# Patient Record
Sex: FEMALE | Race: WHITE | NOT HISPANIC OR LATINO | Employment: OTHER | URBAN - METROPOLITAN AREA
[De-identification: names, ages, dates, MRNs, and addresses within clinical notes are randomized per-mention and may not be internally consistent; named-entity substitution may affect disease eponyms.]

---

## 2017-02-10 DIAGNOSIS — E55.9 VITAMIN D DEFICIENCY: ICD-10-CM

## 2017-02-10 RX ORDER — ERGOCALCIFEROL 1.25 MG/1
CAPSULE ORAL
Qty: 4 CAPSULE | Refills: 0 | Status: SHIPPED | OUTPATIENT
Start: 2017-02-10 | End: 2017-03-20 | Stop reason: SDUPTHER

## 2017-02-20 DIAGNOSIS — IMO0002 DIABETES TYPE 2, UNCONTROLLED: ICD-10-CM

## 2017-02-20 DIAGNOSIS — I10 ESSENTIAL HYPERTENSION: ICD-10-CM

## 2017-02-20 RX ORDER — LISINOPRIL 20 MG/1
TABLET ORAL
Qty: 30 TABLET | Refills: 0 | Status: SHIPPED | OUTPATIENT
Start: 2017-02-20 | End: 2017-04-21 | Stop reason: SDUPTHER

## 2017-02-22 ENCOUNTER — TELEPHONE (OUTPATIENT)
Dept: FAMILY MEDICINE CLINIC | Facility: CLINIC | Age: 52
End: 2017-02-22

## 2017-02-22 RX ORDER — CITALOPRAM 40 MG/1
TABLET ORAL
Qty: 45 TABLET | Refills: 0 | Status: SHIPPED | OUTPATIENT
Start: 2017-02-22 | End: 2017-03-31 | Stop reason: SDUPTHER

## 2017-02-23 NOTE — TELEPHONE ENCOUNTER
Please call/ needs PA.  Received notification that citalopram 40 mg is only allowed for 30 tablets every 30 days.  Please PA for 45 tablets.  Diagnosis anxiety and depression.

## 2017-03-20 DIAGNOSIS — E55.9 VITAMIN D DEFICIENCY: ICD-10-CM

## 2017-03-20 RX ORDER — ERGOCALCIFEROL 1.25 MG/1
CAPSULE ORAL
Qty: 4 CAPSULE | Refills: 0 | Status: SHIPPED | OUTPATIENT
Start: 2017-03-20 | End: 2017-04-21 | Stop reason: SDUPTHER

## 2017-03-31 DIAGNOSIS — F41.1 GENERALIZED ANXIETY DISORDER: ICD-10-CM

## 2017-03-31 RX ORDER — CITALOPRAM 40 MG/1
TABLET ORAL
Qty: 45 TABLET | Refills: 0 | Status: SHIPPED | OUTPATIENT
Start: 2017-03-31 | End: 2017-04-28 | Stop reason: SDUPTHER

## 2017-03-31 RX ORDER — BUSPIRONE HYDROCHLORIDE 10 MG/1
TABLET ORAL
Qty: 60 TABLET | Refills: 0 | Status: SHIPPED | OUTPATIENT
Start: 2017-03-31 | End: 2017-04-28 | Stop reason: SDUPTHER

## 2017-04-21 DIAGNOSIS — I10 ESSENTIAL HYPERTENSION: ICD-10-CM

## 2017-04-21 DIAGNOSIS — E55.9 VITAMIN D DEFICIENCY: ICD-10-CM

## 2017-04-21 DIAGNOSIS — IMO0002 DIABETES TYPE 2, UNCONTROLLED: ICD-10-CM

## 2017-04-21 RX ORDER — ERGOCALCIFEROL 1.25 MG/1
CAPSULE ORAL
Qty: 4 CAPSULE | Refills: 0 | Status: SHIPPED | OUTPATIENT
Start: 2017-04-21 | End: 2017-05-19 | Stop reason: SDUPTHER

## 2017-04-21 RX ORDER — LISINOPRIL 20 MG/1
TABLET ORAL
Qty: 30 TABLET | Refills: 0 | Status: SHIPPED | OUTPATIENT
Start: 2017-04-21 | End: 2017-05-19 | Stop reason: SDUPTHER

## 2017-04-28 DIAGNOSIS — F41.1 GENERALIZED ANXIETY DISORDER: ICD-10-CM

## 2017-04-28 RX ORDER — CITALOPRAM 40 MG/1
TABLET ORAL
Qty: 45 TABLET | Refills: 0 | Status: SHIPPED | OUTPATIENT
Start: 2017-04-28 | End: 2017-08-25 | Stop reason: SDUPTHER

## 2017-04-28 RX ORDER — BUSPIRONE HYDROCHLORIDE 10 MG/1
TABLET ORAL
Qty: 60 TABLET | Refills: 0 | Status: SHIPPED | OUTPATIENT
Start: 2017-04-28 | End: 2017-06-21 | Stop reason: SDUPTHER

## 2017-05-01 ENCOUNTER — TELEPHONE (OUTPATIENT)
Dept: FAMILY MEDICINE CLINIC | Facility: CLINIC | Age: 52
End: 2017-05-01

## 2017-05-08 ENCOUNTER — TRANSITIONAL CARE MANAGEMENT TELEPHONE ENCOUNTER (OUTPATIENT)
Dept: FAMILY MEDICINE CLINIC | Facility: CLINIC | Age: 52
End: 2017-05-08

## 2017-05-12 ENCOUNTER — OFFICE VISIT (OUTPATIENT)
Dept: FAMILY MEDICINE CLINIC | Facility: CLINIC | Age: 52
End: 2017-05-12

## 2017-05-12 VITALS
BODY MASS INDEX: 45.99 KG/M2 | DIASTOLIC BLOOD PRESSURE: 86 MMHG | RESPIRATION RATE: 18 BRPM | HEART RATE: 99 BPM | OXYGEN SATURATION: 98 % | TEMPERATURE: 98 F | HEIGHT: 67 IN | WEIGHT: 293 LBS | SYSTOLIC BLOOD PRESSURE: 130 MMHG

## 2017-05-12 DIAGNOSIS — J44.1 CHRONIC OBSTRUCTIVE PULMONARY DISEASE WITH ACUTE EXACERBATION (HCC): ICD-10-CM

## 2017-05-12 DIAGNOSIS — I26.99 OTHER PULMONARY EMBOLISM WITHOUT ACUTE COR PULMONALE, UNSPECIFIED CHRONICITY (HCC): Primary | ICD-10-CM

## 2017-05-12 DIAGNOSIS — R60.0 LOCALIZED EDEMA: ICD-10-CM

## 2017-05-12 DIAGNOSIS — I10 ESSENTIAL HYPERTENSION: ICD-10-CM

## 2017-05-12 DIAGNOSIS — Z09 HOSPITAL DISCHARGE FOLLOW-UP: ICD-10-CM

## 2017-05-12 PROCEDURE — 99495 TRANSJ CARE MGMT MOD F2F 14D: CPT | Performed by: FAMILY MEDICINE

## 2017-05-12 RX ORDER — FUROSEMIDE 20 MG/1
20 TABLET ORAL DAILY PRN
Qty: 30 TABLET | Refills: 2 | Status: SHIPPED | OUTPATIENT
Start: 2017-05-12 | End: 2017-07-07 | Stop reason: SDUPTHER

## 2017-05-12 RX ORDER — AMLODIPINE BESYLATE 10 MG/1
10 TABLET ORAL DAILY
Qty: 30 TABLET | Refills: 2 | Status: SHIPPED | OUTPATIENT
Start: 2017-05-12 | End: 2017-08-25 | Stop reason: SDUPTHER

## 2017-05-12 RX ORDER — AMLODIPINE BESYLATE 10 MG/1
10 TABLET ORAL DAILY
COMMUNITY
End: 2017-05-12 | Stop reason: SDUPTHER

## 2017-05-19 DIAGNOSIS — IMO0002 DIABETES TYPE 2, UNCONTROLLED: ICD-10-CM

## 2017-05-19 DIAGNOSIS — I10 ESSENTIAL HYPERTENSION: ICD-10-CM

## 2017-05-19 DIAGNOSIS — E55.9 VITAMIN D DEFICIENCY: ICD-10-CM

## 2017-05-19 RX ORDER — ERGOCALCIFEROL 1.25 MG/1
CAPSULE ORAL
Qty: 4 CAPSULE | Refills: 0 | Status: SHIPPED | OUTPATIENT
Start: 2017-05-19 | End: 2017-06-16 | Stop reason: SDUPTHER

## 2017-05-19 RX ORDER — LISINOPRIL 20 MG/1
TABLET ORAL
Qty: 30 TABLET | Refills: 0 | Status: SHIPPED | OUTPATIENT
Start: 2017-05-19 | End: 2017-09-14 | Stop reason: SDUPTHER

## 2017-05-22 ENCOUNTER — TELEPHONE (OUTPATIENT)
Dept: FAMILY MEDICINE CLINIC | Facility: CLINIC | Age: 52
End: 2017-05-22

## 2017-05-26 DIAGNOSIS — F32.A DEPRESSION: ICD-10-CM

## 2017-05-26 RX ORDER — BUPROPION HYDROCHLORIDE 150 MG/1
TABLET, EXTENDED RELEASE ORAL
Qty: 60 TABLET | Refills: 0 | Status: SHIPPED | OUTPATIENT
Start: 2017-05-26 | End: 2017-08-04 | Stop reason: SDUPTHER

## 2017-06-08 ENCOUNTER — TELEPHONE (OUTPATIENT)
Dept: FAMILY MEDICINE CLINIC | Facility: CLINIC | Age: 52
End: 2017-06-08

## 2017-06-16 DIAGNOSIS — E55.9 VITAMIN D DEFICIENCY: ICD-10-CM

## 2017-06-16 DIAGNOSIS — IMO0002 DIABETES TYPE 2, UNCONTROLLED: ICD-10-CM

## 2017-06-16 RX ORDER — ERGOCALCIFEROL 1.25 MG/1
CAPSULE ORAL
Qty: 4 CAPSULE | Refills: 0 | Status: SHIPPED | OUTPATIENT
Start: 2017-06-16 | End: 2017-07-14 | Stop reason: SDUPTHER

## 2017-06-21 DIAGNOSIS — F41.1 GENERALIZED ANXIETY DISORDER: ICD-10-CM

## 2017-06-22 RX ORDER — BUSPIRONE HYDROCHLORIDE 10 MG/1
TABLET ORAL
Qty: 60 TABLET | Refills: 0 | Status: SHIPPED | OUTPATIENT
Start: 2017-06-22 | End: 2017-07-22 | Stop reason: SDUPTHER

## 2017-07-07 DIAGNOSIS — R60.0 LOCALIZED EDEMA: ICD-10-CM

## 2017-07-07 RX ORDER — FUROSEMIDE 20 MG/1
TABLET ORAL
Qty: 30 TABLET | Refills: 0 | Status: SHIPPED | OUTPATIENT
Start: 2017-07-07 | End: 2017-09-22 | Stop reason: SDUPTHER

## 2017-07-14 DIAGNOSIS — IMO0002 DIABETES TYPE 2, UNCONTROLLED: ICD-10-CM

## 2017-07-14 DIAGNOSIS — E55.9 VITAMIN D DEFICIENCY: ICD-10-CM

## 2017-07-14 RX ORDER — ERGOCALCIFEROL 1.25 MG/1
CAPSULE ORAL
Qty: 4 CAPSULE | Refills: 0 | Status: SHIPPED | OUTPATIENT
Start: 2017-07-14 | End: 2017-08-25 | Stop reason: SDUPTHER

## 2017-07-14 RX ORDER — ERGOCALCIFEROL 1.25 MG/1
CAPSULE ORAL
Qty: 4 CAPSULE | Refills: 0 | Status: SHIPPED | OUTPATIENT
Start: 2017-07-14 | End: 2019-06-28

## 2017-07-22 DIAGNOSIS — F41.1 GENERALIZED ANXIETY DISORDER: ICD-10-CM

## 2017-07-24 RX ORDER — BUSPIRONE HYDROCHLORIDE 10 MG/1
TABLET ORAL
Qty: 60 TABLET | Refills: 0 | Status: SHIPPED | OUTPATIENT
Start: 2017-07-24 | End: 2017-08-25 | Stop reason: SDUPTHER

## 2017-08-04 DIAGNOSIS — F32.A DEPRESSION: ICD-10-CM

## 2017-08-04 RX ORDER — BUPROPION HYDROCHLORIDE 150 MG/1
TABLET, EXTENDED RELEASE ORAL
Qty: 60 TABLET | Refills: 0 | Status: SHIPPED | OUTPATIENT
Start: 2017-08-04 | End: 2017-08-28 | Stop reason: SDUPTHER

## 2017-08-11 ENCOUNTER — TELEPHONE (OUTPATIENT)
Dept: FAMILY MEDICINE CLINIC | Facility: CLINIC | Age: 52
End: 2017-08-11

## 2017-08-11 NOTE — TELEPHONE ENCOUNTER
----- Message from Arlene Crabtree sent at 8/11/2017  3:49 PM EDT -----  Contact: ALYSON; PT CALLED  PT ASKING FOR A CALL BACK TODAY AND DOES NOT WANT TO TELL  WHAT IT IS ABOUT-ASKING FOR ZHENG    [phone]

## 2017-08-11 NOTE — TELEPHONE ENCOUNTER
SPOKE WITH PT AND SHE IS ABLE TO TAKE THIS AND I CALLED IN AND SPOKE WITH ADRIANA AT PHARM AND CALLED THIS IN FOR PT

## 2017-08-11 NOTE — TELEPHONE ENCOUNTER
PT CALLING IN ASKING IF YOU CAN CALL HER IN SOMETHING FOR HER NERVES. PT FOUND OUR PT JENNIFER UNRESPONSIVE AND WAS ABLE TO GET AMBULANCE AND THEY WAS ABLE TO BRING HER BACK AFTER PT STARTED CPR AND PT HAD SEVERAL MORE HEART ATTACKS AND WAS PLACED ON [facility] AND JENNIFER PAST AWAY THIS MORNING. PT IS NEEDING SOMETHING FOR HER NERVES AS JENNIFER WAS HER BEST FRIEND AND IS CRYING HYSTERICALLY. PLEASE ADVISE.

## 2017-08-11 NOTE — TELEPHONE ENCOUNTER
Please call, very sorry for her loss.   We can call in Lorazepam 0.5 mg 1 -2 po q 8 hrs as needed for anxiety #15    BUT please confirm if able to take. There is an allergy listed to alprazolam. need to confirm reaction 1st.     bds

## 2017-08-25 DIAGNOSIS — F32.A DEPRESSION: ICD-10-CM

## 2017-08-25 DIAGNOSIS — I10 ESSENTIAL HYPERTENSION: ICD-10-CM

## 2017-08-25 DIAGNOSIS — F41.1 GENERALIZED ANXIETY DISORDER: ICD-10-CM

## 2017-08-25 DIAGNOSIS — IMO0002 DIABETES TYPE 2, UNCONTROLLED: ICD-10-CM

## 2017-08-25 DIAGNOSIS — E55.9 VITAMIN D DEFICIENCY: ICD-10-CM

## 2017-08-25 RX ORDER — CITALOPRAM 40 MG/1
TABLET ORAL
Qty: 45 TABLET | Refills: 0 | Status: SHIPPED | OUTPATIENT
Start: 2017-08-25 | End: 2017-09-22 | Stop reason: SDUPTHER

## 2017-08-28 RX ORDER — ERGOCALCIFEROL 1.25 MG/1
CAPSULE ORAL
Qty: 4 CAPSULE | Refills: 0 | Status: SHIPPED | OUTPATIENT
Start: 2017-08-28 | End: 2017-09-28 | Stop reason: SDUPTHER

## 2017-08-28 RX ORDER — AMLODIPINE BESYLATE 10 MG/1
TABLET ORAL
Qty: 30 TABLET | Refills: 0 | Status: SHIPPED | OUTPATIENT
Start: 2017-08-28 | End: 2018-03-31 | Stop reason: HOSPADM

## 2017-08-28 RX ORDER — BUPROPION HYDROCHLORIDE 150 MG/1
TABLET, EXTENDED RELEASE ORAL
Qty: 60 TABLET | Refills: 0 | Status: SHIPPED | OUTPATIENT
Start: 2017-08-28 | End: 2017-09-25 | Stop reason: SDUPTHER

## 2017-08-28 RX ORDER — BUSPIRONE HYDROCHLORIDE 10 MG/1
TABLET ORAL
Qty: 60 TABLET | Refills: 0 | Status: SHIPPED | OUTPATIENT
Start: 2017-08-28 | End: 2017-09-25 | Stop reason: SDUPTHER

## 2017-09-11 ENCOUNTER — TELEPHONE (OUTPATIENT)
Dept: FAMILY MEDICINE CLINIC | Facility: CLINIC | Age: 52
End: 2017-09-11

## 2017-09-11 RX ORDER — APIXABAN 5 MG/1
TABLET, FILM COATED ORAL
Qty: 60 TABLET | Refills: 0 | Status: SHIPPED | OUTPATIENT
Start: 2017-09-11 | End: 2017-10-10 | Stop reason: SDUPTHER

## 2017-09-11 NOTE — TELEPHONE ENCOUNTER
----- Message from Clarita Carroll sent at 9/11/2017 12:25 PM EDT -----  Contact: DR SHIELDS   PATIENT GOT A CARD IN THE MAIL TO SCHEDULE AN APPOINTMENT. SHE HAS NOT BEEN ABLE TO BECAUSE HER GRANDSON WAS BORN AND HE HAS BEEN IN THE NICU FOR A WHILE AND HAS BEEN THERE WITH HIM SINCE HE WAS BORN. PATIENTS GRANDSON IS SUPPOSED TO GO HOME THIS WEEK AND ONCE HE DOES SHE WILL SCHEDULE AN APPOINTMENT WITH YOU.  [phone]

## 2017-09-13 ENCOUNTER — TELEPHONE (OUTPATIENT)
Dept: FAMILY MEDICINE CLINIC | Facility: CLINIC | Age: 52
End: 2017-09-13

## 2017-09-13 DIAGNOSIS — I10 ESSENTIAL HYPERTENSION: ICD-10-CM

## 2017-09-13 NOTE — TELEPHONE ENCOUNTER
----- Message from Carole Dove sent at 9/13/2017  3:13 PM EDT -----  Contact: ALYSON ARNETT  PATIENT IS NEEDING A REFILL ON lisinopril (PRINIVIL,ZESTRIL) 20 MG tablet  IS OUT AND NEEDING REFILLED ON Thursday WHEN YOU RETURN PLEASE SENT OVER TO [facility] WAS ADVISED THAT YOU WOULD BE IN ON Thursday PATIENT CAN BE REACHED  [phone]

## 2017-09-14 RX ORDER — LISINOPRIL 20 MG/1
20 TABLET ORAL DAILY
Qty: 30 TABLET | Refills: 1 | Status: SHIPPED | OUTPATIENT
Start: 2017-09-14 | End: 2017-10-10 | Stop reason: SDUPTHER

## 2017-09-22 DIAGNOSIS — R60.0 LOCALIZED EDEMA: ICD-10-CM

## 2017-09-22 RX ORDER — FUROSEMIDE 20 MG/1
TABLET ORAL
Qty: 30 TABLET | Refills: 0 | Status: SHIPPED | OUTPATIENT
Start: 2017-09-22 | End: 2017-10-23 | Stop reason: SDUPTHER

## 2017-09-22 RX ORDER — CITALOPRAM 40 MG/1
TABLET ORAL
Qty: 45 TABLET | Refills: 0 | Status: SHIPPED | OUTPATIENT
Start: 2017-09-22 | End: 2017-10-23 | Stop reason: SDUPTHER

## 2017-09-25 DIAGNOSIS — F32.A DEPRESSION: ICD-10-CM

## 2017-09-25 DIAGNOSIS — F41.1 GENERALIZED ANXIETY DISORDER: ICD-10-CM

## 2017-09-25 DIAGNOSIS — IMO0002 DIABETES TYPE 2, UNCONTROLLED: ICD-10-CM

## 2017-09-26 RX ORDER — BUPROPION HYDROCHLORIDE 150 MG/1
TABLET, EXTENDED RELEASE ORAL
Qty: 30 TABLET | Refills: 0 | Status: SHIPPED | OUTPATIENT
Start: 2017-09-26 | End: 2017-10-13 | Stop reason: SDUPTHER

## 2017-09-26 RX ORDER — BUSPIRONE HYDROCHLORIDE 10 MG/1
TABLET ORAL
Qty: 30 TABLET | Refills: 0 | Status: SHIPPED | OUTPATIENT
Start: 2017-09-26 | End: 2017-10-23 | Stop reason: SDUPTHER

## 2017-09-28 ENCOUNTER — OFFICE VISIT (OUTPATIENT)
Dept: FAMILY MEDICINE CLINIC | Facility: CLINIC | Age: 52
End: 2017-09-28

## 2017-09-28 ENCOUNTER — TELEPHONE (OUTPATIENT)
Dept: FAMILY MEDICINE CLINIC | Facility: CLINIC | Age: 52
End: 2017-09-28

## 2017-09-28 VITALS
DIASTOLIC BLOOD PRESSURE: 70 MMHG | SYSTOLIC BLOOD PRESSURE: 108 MMHG | HEIGHT: 67 IN | TEMPERATURE: 96.5 F | BODY MASS INDEX: 45.99 KG/M2 | WEIGHT: 293 LBS | OXYGEN SATURATION: 96 % | HEART RATE: 102 BPM | RESPIRATION RATE: 18 BRPM

## 2017-09-28 DIAGNOSIS — Z12.11 COLON CANCER SCREENING: ICD-10-CM

## 2017-09-28 DIAGNOSIS — F41.9 ANXIETY: ICD-10-CM

## 2017-09-28 DIAGNOSIS — E11.65 UNCONTROLLED TYPE 2 DIABETES MELLITUS WITH HYPERGLYCEMIA, WITHOUT LONG-TERM CURRENT USE OF INSULIN (HCC): Primary | ICD-10-CM

## 2017-09-28 DIAGNOSIS — L85.3 DRY SKIN DERMATITIS: ICD-10-CM

## 2017-09-28 DIAGNOSIS — I26.99 OTHER ACUTE PULMONARY EMBOLISM WITHOUT ACUTE COR PULMONALE (HCC): ICD-10-CM

## 2017-09-28 DIAGNOSIS — Z12.31 ENCOUNTER FOR SCREENING MAMMOGRAM FOR BREAST CANCER: ICD-10-CM

## 2017-09-28 DIAGNOSIS — R91.8 MULTIPLE PULMONARY NODULES: ICD-10-CM

## 2017-09-28 DIAGNOSIS — I10 ESSENTIAL HYPERTENSION: Primary | ICD-10-CM

## 2017-09-28 DIAGNOSIS — J44.9 CHRONIC OBSTRUCTIVE PULMONARY DISEASE, UNSPECIFIED COPD TYPE (HCC): ICD-10-CM

## 2017-09-28 DIAGNOSIS — E11.65 UNCONTROLLED TYPE 2 DIABETES MELLITUS WITH HYPERGLYCEMIA, WITHOUT LONG-TERM CURRENT USE OF INSULIN (HCC): ICD-10-CM

## 2017-09-28 PROCEDURE — 99214 OFFICE O/P EST MOD 30 MIN: CPT | Performed by: FAMILY MEDICINE

## 2017-09-28 RX ORDER — PREDNISONE 10 MG/1
TABLET ORAL
Qty: 30 TABLET | Refills: 0 | Status: SHIPPED | OUTPATIENT
Start: 2017-09-28 | End: 2017-10-24

## 2017-09-28 RX ORDER — AMMONIUM LACTATE 12 G/100G
CREAM TOPICAL 2 TIMES DAILY
Qty: 140 G | Refills: 2 | Status: SHIPPED | OUTPATIENT
Start: 2017-09-28 | End: 2018-01-23 | Stop reason: SDUPTHER

## 2017-09-28 NOTE — TELEPHONE ENCOUNTER
Please call patient.  I failed to ask her when her last eye exam was and if it is been more than 1 year, recommend eye exam.  Medicare should cover a diabetic eye exam given that she has diabetes.    Also, last mammogram?    Last colonoscopy?    If agreeable, please set up for mammogram, colonoscopy screening and diabetic eye exam.

## 2017-09-28 NOTE — PROGRESS NOTES
Assessment/Plan     Problem List Items Addressed This Visit        Cardiovascular and Mediastinum    Essential hypertension - Primary    Relevant Orders    Lipid Panel With / Chol / HDL Ratio       Respiratory    Chronic obstructive pulmonary disease    Relevant Medications    predniSONE (DELTASONE) 10 MG tablet       Endocrine    Uncontrolled type 2 diabetes mellitus with hyperglycemia, without long-term current use of insulin    Relevant Orders    Lipid Panel With / Chol / HDL Ratio    Hemoglobin A1c       Other    Anxiety      Other Visit Diagnoses     Multiple pulmonary nodules        Dry skin dermatitis        Foot Left and Hands    Relevant Medications    ammonium lactate (LAC-HYDRIN) 12 % cream    Other acute pulmonary embolism without acute cor pulmonale               Follow up: Return in about 3 months (around 12/28/2017), or if symptoms worsen or fail to improve.     DISCUSSION  The patient had pulmonary embolism in June and found nodules on CT.  She reports a long-standing history of these nodules.  She will be due for repeat scan in December 2017 and will recheck then.    Diabetes mellitus type 2.  Uncontrolled.  Order for lipid panel and A1c was given.    COPD.  Some increased wheezing.  We will give a taper of prednisone.  Continue inhalers.  Recommend stopping smoking.    Anxiety.  Stable.  Continue medication.    Hypertension.  Blood pressure stable.  Continue current medications.    Dry cracked heels of the skin of the feet.  Try Lac-Hydrin grade May use on hands as well.  Explained that there would be some burning.  Follow-up with podiatry as scheduled.      We will have someone call her to check the status of her colonoscopy, mammogram, and eye exam.    Order for blood work was given and she will get that at the hospital since she is not fasting today and it is hard for her to get here fasting.      MEDICATIONS PRESCRIBED  Requested Prescriptions     Signed Prescriptions Disp Refills   •  ammonium lactate (LAC-HYDRIN) 12 % cream 140 g 2     Sig: Apply  topically 2 (Two) Times a Day.   • predniSONE (DELTASONE) 10 MG tablet 30 tablet 0     Si po daily x 3 days then 3 po daily x 3 days then 2 po daily x 3 days then 1 po daily x 3 days              -------------------------------------------    Subjective     Chief Complaint   Patient presents with   • Hypertension   • Hyperlipidemia   • Diabetes   • Anxiety   • Pain   • Med Refill       Hypertension   This is a chronic problem. The problem is controlled. Associated symptoms include anxiety, malaise/fatigue and shortness of breath (chronic). Pertinent negatives include no chest pain or headaches. Risk factors for coronary artery disease include diabetes mellitus, obesity and dyslipidemia. Past treatments include ACE inhibitors. There are no compliance problems.  Hypertensive end-organ damage includes kidney disease. There is no history of angina, CAD/MI, CVA, heart failure or left ventricular hypertrophy. Identifiable causes of hypertension include chronic renal disease.   Hyperlipidemia   This is a chronic problem. The current episode started more than 1 year ago. Recent lipid tests were reviewed and are variable. Exacerbating diseases include chronic renal disease and obesity. Associated symptoms include shortness of breath (chronic). Pertinent negatives include no chest pain or myalgias. She is currently on no antihyperlipidemic treatment. There are no compliance problems.  Risk factors for coronary artery disease include diabetes mellitus, hypertension and obesity.   Diabetes   She presents for her follow-up diabetic visit. She has type 2 diabetes mellitus. Her disease course has been stable. Hypoglycemia symptoms include nervousness/anxiousness (increased stress with death of friend). Pertinent negatives for hypoglycemia include no headaches. Pertinent negatives for diabetes include no chest pain. There are no hypoglycemic complications.  Symptoms are stable. Diabetic complications include peripheral neuropathy. Pertinent negatives for diabetic complications include no CVA or heart disease. Risk factors for coronary artery disease include diabetes mellitus, dyslipidemia, hypertension, obesity and tobacco exposure. Current diabetic treatment includes oral agent (monotherapy). She is compliant with treatment all of the time. Her weight is stable. She is following a generally healthy diet. (115- 140 now.   ) An ACE inhibitor/angiotensin II receptor blocker is being taken.   Anxiety   Presents for follow-up (on buspar and feels better) visit. Symptoms include nervous/anxious behavior (increased stress with death of friend) and shortness of breath (chronic). Patient reports no chest pain or depressed mood. Symptoms occur most days. The severity of symptoms is mild. The quality of sleep is fair. Nighttime awakenings: occasional.       Pain   This is a chronic problem. The current episode started more than 1 year ago. The problem occurs constantly. Pertinent negatives include no chest pain, headaches or myalgias. Associated symptoms comments: back pain . She has tried oral narcotics (Goes to [facility]) for the symptoms.     Grandson is 4 weeks this saturday    Sandra Fox  and grandrufino was born 4 weeks ago      Had CT in  and had bilateral pulmo noncalcified nodules. Needs repeat CT scan. 6 month , so Dec 2017 per rad report    Had been on antidepressants in the past. Effexor and Lexapro in the past. Effexor was good in the past.   Wellbutrin not helping with the smoking but still trying to quit    Going to podiatrist next month ( end of Oct). + neuropathy    Left foot drug and cracked  using cream (dimethicone and petroleum) and no hep    Had PE in  and on Eliquis now.   In Dec, can check CT PE protocol      Past Medical History,Medications, Allergies, and social history was reviewed.    Review of Systems   Constitutional: Positive for  "malaise/fatigue.   HENT: Negative.    Eyes: Negative.    Respiratory: Positive for shortness of breath (chronic).    Cardiovascular: Negative.  Negative for chest pain.   Gastrointestinal: Negative.    Endocrine: Negative.    Genitourinary: Negative.    Musculoskeletal: Negative.  Negative for myalgias.   Neurological: Negative.  Negative for headaches.   Psychiatric/Behavioral: The patient is nervous/anxious (increased stress with death of friend).        Objective     Vitals:    09/28/17 1140   BP: 108/70   Pulse: 102   Resp: 18   Temp: 96.5 °F (35.8 °C)   TempSrc: Temporal Artery    SpO2: 96%   Weight: (!) 339 lb 8 oz (154 kg)   Height: 66.5\" (168.9 cm)        Physical Exam   Constitutional: She is oriented to person, place, and time. She appears well-developed and well-nourished.   obese   HENT:   Head: Normocephalic and atraumatic.   Right Ear: Hearing, tympanic membrane, external ear and ear canal normal.   Left Ear: Hearing, tympanic membrane, external ear and ear canal normal.   Mouth/Throat: Oropharynx is clear and moist.   Eyes: Conjunctivae and EOM are normal. Pupils are equal, round, and reactive to light.   Neck: Normal range of motion. Neck supple. No thyromegaly present.   Cardiovascular: Normal rate, regular rhythm and normal heart sounds.  Exam reveals no gallop and no friction rub.    No murmur heard.  Pulmonary/Chest: Effort normal. No respiratory distress. She has wheezes (exp). She has no rales.   Abdominal: Soft. Bowel sounds are normal. She exhibits no distension. There is no tenderness.   obese   Musculoskeletal: She exhibits edema (trace).    Dani had a diabetic foot exam performed today.   Monofilament test performed: sensation is decreased in the toes to light touch.    Vascular Status -  Her exam exhibits right foot vasculature normal. Her exam exhibits no right foot edema. Her exam exhibits left foot vasculature normal. Her exam exhibits no left foot edema.   Skin Integrity  -  Her " right foot skin is intact (some dryness).    Dani's left foot skin is not intact. She has left heel dry and cracked. She has no left foot blister..  Neurological: She is alert and oriented to person, place, and time. No cranial nerve deficit.   Skin: Skin is warm and dry.   Psychiatric: She has a normal mood and affect. Her behavior is normal.   Nursing note and vitals reviewed.    left lower ab, 1 cm ulceration. no drainage, no scab              Darrion Tovar MD

## 2017-09-29 NOTE — TELEPHONE ENCOUNTER
SPOKE WITH PT AND ITS BEEN AWHILE SINCE SHE HAS EYE EXAM. THEY WILL COVER EXAM BUT HER PROBLEM IS GLASSES COST AND INFORMED HER A FEW PLACES TO CK OUT TO HELP WITH COST OF THEM. MAMMO HAS BEEN LONGER THAN 2 YRS AND STATES SHE IS SUPPOSE TO HAVE ONE DONE EVERY 9 MONTHS AS HER MOM IS IN REMISSION OF BREAST CANCER. PT HAS NEVER HAD COLONOSCOPY DONE. PT IS OK WITH YOU PLACING ORDERS FOR ALL OF THESE.

## 2017-10-03 ENCOUNTER — TELEPHONE (OUTPATIENT)
Dept: FAMILY MEDICINE CLINIC | Facility: CLINIC | Age: 52
End: 2017-10-03

## 2017-10-03 NOTE — TELEPHONE ENCOUNTER
----- Message from Arleen Crabtree sent at 10/3/2017 12:01 PM EDT -----  Contact: ALYSON;PT CALLED  PT STATES SHE WAS CALLED YESTERDAY-SHE WAS IN THE HOSPITAL  ON Sunday NIGHT-WILL PROBABLY WILL GO BACK TONIGHT BECAUSE SHE  IS HAVING A HARD TIME BREATHING AND LOW BLOOD CELL COUNT-WILL  WILL NOT HAVE A RIDE TO COME INTO OUR OFFICE    PLEASE CALL BACK [phone]  MESSAGE OK

## 2017-10-03 NOTE — TELEPHONE ENCOUNTER
----- Message from Arlene Crabtree sent at 10/3/2017 12:01 PM EDT -----  Contact: ALYSON;PT CALLED  PT STATES SHE WAS CALLED YESTERDAY-SHE WAS IN THE HOSPITAL  ON Sunday NIGHT-WILL PROBABLY WILL GO BACK TONIGHT BECAUSE SHE  IS HAVING A HARD TIME BREATHING AND LOW BLOOD CELL COUNT-WILL  WILL NOT HAVE A RIDE TO COME INTO OUR OFFICE    PLEASE CALL BACK [phone]  MESSAGE OK

## 2017-10-06 DIAGNOSIS — Z12.11 SPECIAL SCREENING FOR MALIGNANT NEOPLASMS, COLON: Primary | ICD-10-CM

## 2017-10-10 DIAGNOSIS — I10 ESSENTIAL HYPERTENSION: ICD-10-CM

## 2017-10-10 RX ORDER — LISINOPRIL 20 MG/1
TABLET ORAL
Qty: 30 TABLET | Refills: 2 | Status: SHIPPED | OUTPATIENT
Start: 2017-10-10 | End: 2018-01-17 | Stop reason: SDUPTHER

## 2017-10-10 RX ORDER — APIXABAN 5 MG/1
TABLET, FILM COATED ORAL
Qty: 60 TABLET | Refills: 2 | Status: SHIPPED | OUTPATIENT
Start: 2017-10-10 | End: 2017-12-05 | Stop reason: SDUPTHER

## 2017-10-13 ENCOUNTER — OFFICE VISIT (OUTPATIENT)
Dept: FAMILY MEDICINE CLINIC | Facility: CLINIC | Age: 52
End: 2017-10-13

## 2017-10-13 VITALS
WEIGHT: 293 LBS | HEART RATE: 96 BPM | TEMPERATURE: 97 F | OXYGEN SATURATION: 96 % | RESPIRATION RATE: 20 BRPM | HEIGHT: 67 IN | DIASTOLIC BLOOD PRESSURE: 64 MMHG | BODY MASS INDEX: 45.99 KG/M2 | SYSTOLIC BLOOD PRESSURE: 130 MMHG

## 2017-10-13 DIAGNOSIS — S90.851A FOREIGN BODY IN RIGHT FOOT, INITIAL ENCOUNTER: ICD-10-CM

## 2017-10-13 DIAGNOSIS — L03.116 CELLULITIS OF LEFT LOWER EXTREMITY: Primary | ICD-10-CM

## 2017-10-13 PROCEDURE — 99213 OFFICE O/P EST LOW 20 MIN: CPT | Performed by: FAMILY MEDICINE

## 2017-10-13 RX ORDER — BUPROPION HYDROCHLORIDE 150 MG/1
150 TABLET, EXTENDED RELEASE ORAL 2 TIMES DAILY
Qty: 60 TABLET | Refills: 5 | Status: SHIPPED | OUTPATIENT
Start: 2017-10-13 | End: 2017-11-07

## 2017-10-13 RX ORDER — DOXYCYCLINE HYCLATE 100 MG/1
100 CAPSULE ORAL 2 TIMES DAILY
Qty: 14 CAPSULE | Refills: 0 | Status: SHIPPED | OUTPATIENT
Start: 2017-10-13 | End: 2017-10-24

## 2017-10-13 NOTE — PROGRESS NOTES
Assessment/Plan     Problem List Items Addressed This Visit     None      Visit Diagnoses     Cellulitis of left lower extremity    -  Primary    Relevant Medications    doxycycline (VIBRAMYCIN) 100 MG capsule    Foreign body in right foot, initial encounter        Possible glass. Pt to call her podiatrist. There x 1 month           Follow up: Return if symptoms worsen or fail to improve.     DISCUSSION  Change to doxycycline for cellulitis. Side effects explained. Call if not improving or sooner if worsening or to emergency room if greatly worsens this weekend.    She will need to see podiatry for evaluation of possible glass in her foot.  She agrees to call.    Reviewed labs , A1c better and lipids good.       MEDICATIONS PRESCRIBED  Requested Prescriptions     Signed Prescriptions Disp Refills   • buPROPion SR (WELLBUTRIN SR) 150 MG 12 hr tablet 60 tablet 5     Sig: Take 1 tablet by mouth 2 (Two) Times a Day.   • doxycycline (VIBRAMYCIN) 100 MG capsule 14 capsule 0     Sig: Take 1 capsule by mouth 2 (Two) Times a Day.        -------------------------------------------    Subjective     Chief Complaint   Patient presents with   • Cellulitis     L leg redness, swelling, was seen at hospital for cellulitis, R foot has glass in it.       Lower Extremity Issue   This is a new problem. The current episode started 1 to 4 weeks ago (redness on left leg 2 weeks ago. Went to ER and dx cellulitis, rec'd rocehphin and cefdinir. Improving. Redness was worse and slowing getting better. ). The problem occurs constantly. The problem has been gradually improving (just not completly better. ). Associated symptoms include arthralgias. Pertinent negatives include no fever. Nothing aggravates the symptoms. Treatments tried: omnicef. The treatment provided mild relief.     Stepped on glass one month ago and small piece still there    Past Medical History,Medications, Allergies, and social history was reviewed.    Review of Systems  "  Constitutional: Negative.  Negative for fever.   HENT: Negative.    Respiratory: Negative.    Cardiovascular: Negative.    Musculoskeletal: Positive for arthralgias.   Psychiatric/Behavioral: Negative.        Objective     Vitals:    10/13/17 1423   BP: 130/64   Pulse: 96   Resp: 20   Temp: 97 °F (36.1 °C)   TempSrc: Temporal Artery    SpO2: 96%   Weight: (!) 335 lb (152 kg)   Height: 66.5\" (168.9 cm)        Physical Exam   Constitutional: She is oriented to person, place, and time. She appears well-developed and well-nourished.   HENT:   Head: Normocephalic and atraumatic.   Right Ear: Hearing and external ear normal.   Left Ear: Hearing and external ear normal.   Eyes: Conjunctivae and EOM are normal. Pupils are equal, round, and reactive to light.   Musculoskeletal:   left lateral lower leg. + mild redness, sl warm. sl tender.    Neurological: She is alert and oriented to person, place, and time.   Skin: Skin is warm.   Psychiatric: She has a normal mood and affect. Her behavior is normal.   Nursing note and vitals reviewed.              Darrion Tovra MD    "

## 2017-10-21 DIAGNOSIS — F41.1 GENERALIZED ANXIETY DISORDER: ICD-10-CM

## 2017-10-23 DIAGNOSIS — IMO0002 DIABETES TYPE 2, UNCONTROLLED: ICD-10-CM

## 2017-10-23 DIAGNOSIS — R60.0 LOCALIZED EDEMA: ICD-10-CM

## 2017-10-23 RX ORDER — FUROSEMIDE 20 MG/1
TABLET ORAL
Qty: 30 TABLET | Refills: 0 | Status: SHIPPED | OUTPATIENT
Start: 2017-10-23 | End: 2017-11-20 | Stop reason: SDUPTHER

## 2017-10-23 RX ORDER — CITALOPRAM 40 MG/1
TABLET ORAL
Qty: 45 TABLET | Refills: 0 | Status: SHIPPED | OUTPATIENT
Start: 2017-10-23 | End: 2017-11-20 | Stop reason: SDUPTHER

## 2017-10-23 RX ORDER — BUSPIRONE HYDROCHLORIDE 10 MG/1
TABLET ORAL
Qty: 60 TABLET | Refills: 2 | Status: SHIPPED | OUTPATIENT
Start: 2017-10-23 | End: 2018-01-27 | Stop reason: SDUPTHER

## 2017-10-23 NOTE — TELEPHONE ENCOUNTER
Please call.  Given she is on multiple medications, we need to confirm with her exactly which medications that she needs so we do not miss anything.  We only received a request from the pharmacy for metformin.  Please call and see which medication she needs specifically.

## 2017-10-23 NOTE — TELEPHONE ENCOUNTER
----- Message from Clarita Carroll sent at 10/23/2017 12:53 PM EDT -----  Contact: DR SHIELDS  PATIENT NEEDS A REFILL ON ALL HER MEDS SENT INTO [facility]. PATIENT WAS JUST SEEN LAST WEEK SO SHE WANTS THEM REFILLED FOR MORE THEN 2 WEEKS.  [phone]

## 2017-10-24 ENCOUNTER — OFFICE VISIT (OUTPATIENT)
Dept: FAMILY MEDICINE CLINIC | Facility: CLINIC | Age: 52
End: 2017-10-24

## 2017-10-24 VITALS
HEIGHT: 67 IN | OXYGEN SATURATION: 93 % | SYSTOLIC BLOOD PRESSURE: 122 MMHG | HEART RATE: 97 BPM | WEIGHT: 293 LBS | TEMPERATURE: 97.9 F | BODY MASS INDEX: 45.99 KG/M2 | RESPIRATION RATE: 18 BRPM | DIASTOLIC BLOOD PRESSURE: 84 MMHG

## 2017-10-24 DIAGNOSIS — E11.65 UNCONTROLLED TYPE 2 DIABETES MELLITUS WITH HYPERGLYCEMIA, WITHOUT LONG-TERM CURRENT USE OF INSULIN (HCC): ICD-10-CM

## 2017-10-24 DIAGNOSIS — L03.116 CELLULITIS OF LEFT LOWER EXTREMITY: Primary | ICD-10-CM

## 2017-10-24 PROCEDURE — 99213 OFFICE O/P EST LOW 20 MIN: CPT | Performed by: FAMILY MEDICINE

## 2017-10-24 RX ORDER — AMOXICILLIN AND CLAVULANATE POTASSIUM 875; 125 MG/1; MG/1
1 TABLET, FILM COATED ORAL 2 TIMES DAILY
Qty: 20 TABLET | Refills: 0 | Status: SHIPPED | OUTPATIENT
Start: 2017-10-24 | End: 2018-01-02

## 2017-10-24 NOTE — PROGRESS NOTES
Assessment/Plan     Problem List Items Addressed This Visit        Endocrine    Uncontrolled type 2 diabetes mellitus with hyperglycemia, without long-term current use of insulin    Relevant Medications    metFORMIN (GLUCOPHAGE) 1000 MG tablet      Other Visit Diagnoses     Cellulitis of left lower extremity    -  Primary    Relevant Medications    amoxicillin-clavulanate (AUGMENTIN) 875-125 MG per tablet           Follow up: Return if symptoms worsen or fail to improve.     DISCUSSION  Cellulitis.  Recurrent.  Consider Levaquin but does interact with citalopram.  We will try Augmentin as noted.  Side effects explained.  Miconazole if develops yeast infection.  Keep clean.  Call if not improving.    Consider knee injection in the future once infection is cleared up but I did explain to her that there was risk given that she is on Eliquis.  She has osteoarthritis of the knee.  Right.    Refilled metformin.      MEDICATIONS PRESCRIBED  Requested Prescriptions     Signed Prescriptions Disp Refills   • metFORMIN (GLUCOPHAGE) 1000 MG tablet 60 tablet 5     Sig: Take 1 tablet by mouth 2 (Two) Times a Day With Meals.   • amoxicillin-clavulanate (AUGMENTIN) 875-125 MG per tablet 20 tablet 0     Sig: Take 1 tablet by mouth 2 (Two) Times a Day.   • miconazole (MICOTIN) 100 MG vaginal suppository 7 suppository 1     Sig: Insert 1 suppository into the vagina Every Night. if needed for yeast infection          -------------------------------------------    Subjective     Chief Complaint   Patient presents with   • Hypertension     follow up bc pt rec'd a rx with needing appt on it so she came in and pt was upset as she hasn't rec'd all of her meds.    • Diabetes   • Med Refill   • cellulitis lf left lower extremity       HPI    Left leg cellulitis  + redness still  Had gotten better and then flared up again  No fever  + itch and warm and red  + tender  Does not feel bad.      Right knee pain  +arthritis   ? gets a shot  Was at  "[facility] and increased pain and had to stop the treadmill  Unable to do shot since active cellulitis  On Eliquis    Needs refill of metformin for diabetes.      Past Medical History,Medications, Allergies, and social history was reviewed.    Review of Systems   Constitutional: Negative.    HENT: Negative.    Respiratory: Positive for shortness of breath (improving).    Cardiovascular: Negative.    Musculoskeletal: Positive for arthralgias and back pain.   Skin:        see hpi       Objective     Vitals:    10/24/17 1648   BP: 122/84   Pulse: 97   Resp: 18   Temp: 97.9 °F (36.6 °C)   TempSrc: Temporal Artery    SpO2: 93%   Weight: (!) 343 lb (156 kg)   Height: 66.5\" (168.9 cm)        Physical Exam   Constitutional: She is oriented to person, place, and time. She appears well-developed and well-nourished.   HENT:   Head: Normocephalic and atraumatic.   Right Ear: Hearing and external ear normal.   Left Ear: Hearing and external ear normal.   Eyes: Conjunctivae and EOM are normal. Pupils are equal, round, and reactive to light.   Musculoskeletal:   Still some redness and warmth of the lower extremity.  He was to be more patchy now.  Slightly tender.  No calf tenderness.  Redness is more around the ankle and anterior shin.   Neurological: She is alert and oriented to person, place, and time.   Skin: Skin is warm.   Psychiatric: She has a normal mood and affect. Her behavior is normal.   Nursing note and vitals reviewed.              Darrion Tovar MD    "

## 2017-11-06 ENCOUNTER — OFFICE VISIT (OUTPATIENT)
Dept: FAMILY MEDICINE CLINIC | Facility: CLINIC | Age: 52
End: 2017-11-06

## 2017-11-06 VITALS
TEMPERATURE: 97.1 F | WEIGHT: 293 LBS | RESPIRATION RATE: 16 BRPM | SYSTOLIC BLOOD PRESSURE: 120 MMHG | OXYGEN SATURATION: 92 % | BODY MASS INDEX: 45.99 KG/M2 | HEART RATE: 83 BPM | DIASTOLIC BLOOD PRESSURE: 72 MMHG | HEIGHT: 67 IN

## 2017-11-06 DIAGNOSIS — R51.9 NEW ONSET OF HEADACHES: ICD-10-CM

## 2017-11-06 DIAGNOSIS — G43.009 MIGRAINE WITHOUT AURA AND WITHOUT STATUS MIGRAINOSUS, NOT INTRACTABLE: ICD-10-CM

## 2017-11-06 DIAGNOSIS — L03.116 CELLULITIS OF LEFT LOWER EXTREMITY: Primary | ICD-10-CM

## 2017-11-06 DIAGNOSIS — Z72.0 TOBACCO USE: ICD-10-CM

## 2017-11-06 PROCEDURE — 99214 OFFICE O/P EST MOD 30 MIN: CPT | Performed by: FAMILY MEDICINE

## 2017-11-06 PROCEDURE — 99406 BEHAV CHNG SMOKING 3-10 MIN: CPT | Performed by: FAMILY MEDICINE

## 2017-11-06 RX ORDER — VARENICLINE TARTRATE 1 MG/1
1 TABLET, FILM COATED ORAL 2 TIMES DAILY
Qty: 60 TABLET | Refills: 4 | Status: SHIPPED | OUTPATIENT
Start: 2017-11-06 | End: 2018-03-02

## 2017-11-06 RX ORDER — SUMATRIPTAN 50 MG/1
TABLET, FILM COATED ORAL
Qty: 9 TABLET | Refills: 1 | Status: SHIPPED | OUTPATIENT
Start: 2017-11-06 | End: 2017-12-05 | Stop reason: SDUPTHER

## 2017-11-06 NOTE — PROGRESS NOTES
Assessment/Plan     Problem List Items Addressed This Visit     None      Visit Diagnoses     Cellulitis of left lower extremity    -  Primary    Relevant Orders    Ambulatory Referral to Infectious Disease    Tobacco use        Relevant Medications    varenicline (CHANTIX STARTING MONTH ABHI) 0.5 MG X 11 & 1 MG X 42 tablet    varenicline (CHANTIX CONTINUING MONTH ABHI) 1 MG tablet    Migraine without aura and without status migrainosus, not intractable        Relevant Medications    SUMAtriptan (IMITREX) 50 MG tablet    Other Relevant Orders    CT head wo contrast    New onset of headaches        Relevant Orders    CT head wo contrast           Follow up: Return if symptoms worsen or fail to improve.     DISCUSSION  Persistent cellulitis.  Has been on 3 different antibiotics.  Refer to infectious disease for evaluation.    Tobacco use.  Spent 3-10 minutes discussing smoking cessation.  She would like to try Chantix.  Continue Wellbutrin.  Side effects explained including depression.    Migraine headaches.  New onset of headaches.  Check CT scan given new headaches.  Try Imitrex.  Side effects explained.  Further plan once we have CT scan back.      MEDICATIONS PRESCRIBED  Requested Prescriptions     Signed Prescriptions Disp Refills   • varenicline (CHANTIX STARTING MONTH ABHI) 0.5 MG X 11 & 1 MG X 42 tablet 53 tablet 0     Sig: Take 0.5 mg one daily on days 1-2 and 0.5 mg twice daily on days 4-7. Then 1 mg twice daily for a total of 12 weeks.   • varenicline (CHANTIX CONTINUING MONTH ABHI) 1 MG tablet 60 tablet 4     Sig: Take 1 tablet by mouth 2 (Two) Times a Day.   • SUMAtriptan (IMITREX) 50 MG tablet 9 tablet 1     Sig: Take one tablet at onset of headache. May repeat dose one time in 2 hours if headache not relieved.          -------------------------------------------    Subjective     Chief Complaint   Patient presents with   • Cellulitis       HPI    Left lower ext:  Still with cellulitis of the left lower  "extremity  Took Augmentin for this  Not able to take levaquin due to interaction with celexa  Has been on cefdinir, rocephin, doxycycline and Augmentin      2 questions:    Tobacco use, ? Go off wellbutrin to take chantix  1/2 ppd now   On Wellbutrin , 100 % ready to quit        Headaches: increased migraines. Sounds and light bothers her. Unable to take NSAID's but had to take 3 excedrin to help   No h/o migraines  Sl headache now  Aunt + migraines  Tried allergy meds and no help  Sharp right side of headache and both sides 5 days ago  tried different pillows and no help  Occ wakes her up.   New onset of headache      Bite:  ? Wasp, bite or sting her on the left upper thigh, + itch            Past Medical History,Medications, Allergies, and social history was reviewed.    Review of Systems   Constitutional: Negative.    Respiratory: Negative.    Cardiovascular: Negative.    Gastrointestinal: Negative.    Musculoskeletal: Positive for arthralgias and back pain.   Neurological: Positive for headaches.   Psychiatric/Behavioral: Negative.        Objective     Vitals:    11/06/17 1541   BP: 120/72   Pulse: 83   Resp: 16   Temp: 97.1 °F (36.2 °C)   TempSrc: Temporal Artery    SpO2: 92%   Weight: (!) 339 lb 8 oz (154 kg)   Height: 66.5\" (168.9 cm)        Physical Exam   Constitutional: She is oriented to person, place, and time. She appears well-developed and well-nourished.   Obese   HENT:   Head: Normocephalic and atraumatic.   Right Ear: Hearing and external ear normal.   Left Ear: Hearing and external ear normal.   Eyes: Conjunctivae and EOM are normal. Pupils are equal, round, and reactive to light.   Cardiovascular: Normal rate, regular rhythm and normal heart sounds.  Exam reveals no friction rub.    No murmur heard.  Pulmonary/Chest: Effort normal and breath sounds normal. No respiratory distress. She has no wheezes. She has no rales.   Abdominal: Soft. Bowel sounds are normal. She exhibits no distension. There is " no tenderness.   Neurological: She is alert and oriented to person, place, and time.   Skin: Skin is warm.   Left lower extremity reveals persistent mild erythema of the left shin and some nodularity of varicosity.  It has improved but still erythema and warmth present.  With chaperone present, left buttock revealed 2 small bites either from insect or spider.  No evidence of infection.  No abscess formation.  Only mild skin irritation.   Psychiatric: She has a normal mood and affect. Her behavior is normal.   Nursing note and vitals reviewed.              Darrion Tovar MD

## 2017-11-07 DIAGNOSIS — F32.A DEPRESSION: ICD-10-CM

## 2017-11-07 DIAGNOSIS — F34.1 DYSTHYMIA: ICD-10-CM

## 2017-11-07 RX ORDER — BUPROPION HYDROCHLORIDE 150 MG/1
TABLET, EXTENDED RELEASE ORAL
Qty: 30 TABLET | Refills: 0 | Status: SHIPPED | OUTPATIENT
Start: 2017-11-07 | End: 2017-11-07 | Stop reason: SDUPTHER

## 2017-11-07 RX ORDER — BUPROPION HYDROCHLORIDE 150 MG/1
150 TABLET, EXTENDED RELEASE ORAL 2 TIMES DAILY
Qty: 60 TABLET | Refills: 5 | Status: SHIPPED | OUTPATIENT
Start: 2017-11-07 | End: 2018-11-08 | Stop reason: SDUPTHER

## 2017-11-20 DIAGNOSIS — IMO0002 DIABETES TYPE 2, UNCONTROLLED: ICD-10-CM

## 2017-11-20 DIAGNOSIS — R60.0 LOCALIZED EDEMA: ICD-10-CM

## 2017-11-20 RX ORDER — CITALOPRAM 40 MG/1
TABLET ORAL
Qty: 45 TABLET | Refills: 0 | Status: SHIPPED | OUTPATIENT
Start: 2017-11-20 | End: 2018-03-02 | Stop reason: SDUPTHER

## 2017-11-20 RX ORDER — FUROSEMIDE 20 MG/1
TABLET ORAL
Qty: 30 TABLET | Refills: 0 | Status: SHIPPED | OUTPATIENT
Start: 2017-11-20 | End: 2018-03-31 | Stop reason: HOSPADM

## 2017-11-30 ENCOUNTER — LAB (OUTPATIENT)
Dept: LAB | Facility: HOSPITAL | Age: 52
End: 2017-11-30

## 2017-11-30 ENCOUNTER — TRANSCRIBE ORDERS (OUTPATIENT)
Dept: LAB | Facility: HOSPITAL | Age: 52
End: 2017-11-30

## 2017-11-30 DIAGNOSIS — R30.0 DYSURIA: Primary | ICD-10-CM

## 2017-11-30 DIAGNOSIS — R30.0 DYSURIA: ICD-10-CM

## 2017-11-30 LAB
BACTERIA UR QL AUTO: ABNORMAL /HPF
BILIRUB UR QL STRIP: ABNORMAL
CLARITY UR: ABNORMAL
COLOR UR: ABNORMAL
GLUCOSE UR STRIP-MCNC: NEGATIVE MG/DL
HGB UR QL STRIP.AUTO: NEGATIVE
HYALINE CASTS UR QL AUTO: ABNORMAL /LPF
KETONES UR QL STRIP: NEGATIVE
LEUKOCYTE ESTERASE UR QL STRIP.AUTO: NEGATIVE
NITRITE UR QL STRIP: POSITIVE
PH UR STRIP.AUTO: 5.5 [PH] (ref 5–8)
PROT UR QL STRIP: NEGATIVE
RBC # UR: ABNORMAL /HPF
REF LAB TEST METHOD: ABNORMAL
SP GR UR STRIP: 1.03 (ref 1–1.03)
SQUAMOUS #/AREA URNS HPF: ABNORMAL /HPF
UROBILINOGEN UR QL STRIP: ABNORMAL
WBC UR QL AUTO: ABNORMAL /HPF

## 2017-11-30 PROCEDURE — 87077 CULTURE AEROBIC IDENTIFY: CPT | Performed by: INTERNAL MEDICINE

## 2017-11-30 PROCEDURE — 81001 URINALYSIS AUTO W/SCOPE: CPT

## 2017-11-30 PROCEDURE — 87186 SC STD MICRODIL/AGAR DIL: CPT | Performed by: INTERNAL MEDICINE

## 2017-11-30 PROCEDURE — 87086 URINE CULTURE/COLONY COUNT: CPT | Performed by: INTERNAL MEDICINE

## 2017-12-02 LAB
BACTERIA SPEC AEROBE CULT: ABNORMAL
BACTERIA SPEC AEROBE CULT: ABNORMAL

## 2017-12-04 ENCOUNTER — TELEPHONE (OUTPATIENT)
Dept: FAMILY MEDICINE CLINIC | Facility: CLINIC | Age: 52
End: 2017-12-04

## 2017-12-04 DIAGNOSIS — E11.65 UNCONTROLLED TYPE 2 DIABETES MELLITUS WITH HYPERGLYCEMIA, WITHOUT LONG-TERM CURRENT USE OF INSULIN (HCC): Primary | ICD-10-CM

## 2017-12-04 NOTE — TELEPHONE ENCOUNTER
FAXED ORDER TO [facility] AND SPOKE WITH PT AND INFORMED HER OF MESSAGE AND PT VERBALIZED UNDERSTANDING.

## 2017-12-04 NOTE — TELEPHONE ENCOUNTER
[facility] NEEDS LAB ORDERS FOR PT BUN AND CREATINE PT SCHEDULED FOR FRIDAY.    FAX TO [facility] ATTN. [phone]

## 2017-12-04 NOTE — TELEPHONE ENCOUNTER
Please call.  What she having done?  I do not see any recent order for anything where she would need to have a BUN and creatinine done.

## 2017-12-04 NOTE — TELEPHONE ENCOUNTER
Ok for BMP. I placed order and printed.     Needs to hold metformin 48 hrs after the procedure and not to take the day of procedure.     Needs repeat BMP 2 days after and then restart metformin if kidney function is ok. bds

## 2017-12-05 DIAGNOSIS — G43.009 MIGRAINE WITHOUT AURA AND WITHOUT STATUS MIGRAINOSUS, NOT INTRACTABLE: ICD-10-CM

## 2017-12-05 RX ORDER — APIXABAN 5 MG/1
TABLET, FILM COATED ORAL
Qty: 60 TABLET | Refills: 2 | Status: SHIPPED | OUTPATIENT
Start: 2017-12-05 | End: 2018-04-02 | Stop reason: SDUPTHER

## 2017-12-05 RX ORDER — SUMATRIPTAN 50 MG/1
TABLET, FILM COATED ORAL
Qty: 9 TABLET | Refills: 2 | Status: SHIPPED | OUTPATIENT
Start: 2017-12-05 | End: 2021-01-20

## 2017-12-11 ENCOUNTER — TELEPHONE (OUTPATIENT)
Dept: FAMILY MEDICINE CLINIC | Facility: CLINIC | Age: 52
End: 2017-12-11

## 2017-12-11 DIAGNOSIS — E11.65 UNCONTROLLED TYPE 2 DIABETES MELLITUS WITH HYPERGLYCEMIA, WITHOUT LONG-TERM CURRENT USE OF INSULIN (HCC): ICD-10-CM

## 2017-12-11 NOTE — TELEPHONE ENCOUNTER
----- Message from Arlene Crabtree sent at 12/11/2017  4:18 PM EST -----  Contact: ZHENG;PT CALLED  PT STATES SHE WAS TO COME IN FOR LABWORK BUT CAN NOT MAKE IT  HERE AND REQUESTING THE ORDERS TO BE SENT TO [facility]  BECAUSE THEY ARE OPEN TO 6 OR 7    [phone]  MESSAGE OK

## 2017-12-11 NOTE — TELEPHONE ENCOUNTER
Pt is asking for results of CT scan. Please advise and pt is also needing order placed for a BUN so she can start her metformin back

## 2017-12-12 ENCOUNTER — TELEPHONE (OUTPATIENT)
Dept: FAMILY MEDICINE CLINIC | Facility: CLINIC | Age: 52
End: 2017-12-12

## 2017-12-12 NOTE — TELEPHONE ENCOUNTER
SPOKE WITH PT AND INFORMED HER OF THIS AND PT STATES SHE WENT TO GET BLOOD DRAWN LAST NIGHT AND THEY SAID THEY DIDN'T REC. ORDER. PT WAS INFORMED THAT I RE FAX AGAIN

## 2017-12-12 NOTE — TELEPHONE ENCOUNTER
----- Message from Clarita Carroll sent at 12/12/2017  2:38 PM EST -----  Contact: DR SHIELDS  PATIENT HAS BEEN 6 DAYS WITH OUT TAKING HER METFORMIN AND WANTS TO START TAKING IT. PATIENT WILL BE GETTING HER LABS DONE A T THE [facility] THIS EVENING.  [phone]

## 2017-12-13 ENCOUNTER — TELEPHONE (OUTPATIENT)
Dept: FAMILY MEDICINE CLINIC | Facility: CLINIC | Age: 52
End: 2017-12-13

## 2017-12-13 NOTE — TELEPHONE ENCOUNTER
----- Message from Clarita Carroll sent at 12/13/2017 12:30 PM EST -----  Contact: DR SHIELDS   PATIENT HAS HAD HER LABS DONE LAST NIGHT AND SHE WANTS HER METFORMIN SENT IN. PATIENT IS VERY CONCERNED AND WANTS A CALL BACK AT [phone]

## 2017-12-13 NOTE — TELEPHONE ENCOUNTER
----- Message from Clarita Carroll sent at 12/13/2017  4:24 PM EST -----  PATIENT IS RETURNING CALL ABOUT LABS PLEASE RETURN CALL TODAY IF POSSIBLE.

## 2017-12-18 ENCOUNTER — TELEPHONE (OUTPATIENT)
Dept: FAMILY MEDICINE CLINIC | Facility: CLINIC | Age: 52
End: 2017-12-18

## 2017-12-18 NOTE — TELEPHONE ENCOUNTER
----- Message from Sarah Morel sent at 12/18/2017  2:58 PM EST -----  Contact: Guy Miranda from [facility] is calling to let Dr. Tovar know that patient was admitted on 12/17/17 for COPD.

## 2017-12-22 ENCOUNTER — TRANSITIONAL CARE MANAGEMENT TELEPHONE ENCOUNTER (OUTPATIENT)
Dept: FAMILY MEDICINE CLINIC | Facility: CLINIC | Age: 52
End: 2017-12-22

## 2018-01-02 ENCOUNTER — OFFICE VISIT (OUTPATIENT)
Dept: FAMILY MEDICINE CLINIC | Facility: CLINIC | Age: 53
End: 2018-01-02

## 2018-01-02 VITALS
SYSTOLIC BLOOD PRESSURE: 170 MMHG | TEMPERATURE: 99.4 F | RESPIRATION RATE: 26 BRPM | WEIGHT: 293 LBS | HEIGHT: 67 IN | BODY MASS INDEX: 45.99 KG/M2 | OXYGEN SATURATION: 96 % | DIASTOLIC BLOOD PRESSURE: 94 MMHG | HEART RATE: 88 BPM

## 2018-01-02 DIAGNOSIS — J44.1 COPD WITH ACUTE EXACERBATION (HCC): Primary | ICD-10-CM

## 2018-01-02 DIAGNOSIS — R09.1 PLEURISY: ICD-10-CM

## 2018-01-02 PROCEDURE — 99495 TRANSJ CARE MGMT MOD F2F 14D: CPT | Performed by: NURSE PRACTITIONER

## 2018-01-02 RX ORDER — PREDNISONE 20 MG/1
20 TABLET ORAL DAILY
Qty: 6 TABLET | Refills: 0 | Status: SHIPPED | OUTPATIENT
Start: 2018-01-02 | End: 2018-03-02

## 2018-01-02 RX ORDER — MINOCYCLINE HYDROCHLORIDE 100 MG/1
100 CAPSULE ORAL DAILY
COMMUNITY
End: 2018-03-02

## 2018-01-02 RX ORDER — PREDNISONE 20 MG/1
20 TABLET ORAL DAILY
COMMUNITY
End: 2018-01-02 | Stop reason: SDUPTHER

## 2018-01-02 RX ORDER — LORAZEPAM 1 MG/1
1 TABLET ORAL EVERY 4 HOURS PRN
COMMUNITY
End: 2018-03-31 | Stop reason: HOSPADM

## 2018-01-02 RX ORDER — GUAIFENESIN 600 MG/1
1200 TABLET, EXTENDED RELEASE ORAL EVERY 12 HOURS SCHEDULED
Qty: 60 TABLET | Refills: 1 | Status: SHIPPED | OUTPATIENT
Start: 2018-01-02 | End: 2018-03-02

## 2018-01-02 RX ORDER — AZITHROMYCIN 250 MG/1
TABLET, FILM COATED ORAL
Qty: 6 TABLET | Refills: 0 | Status: SHIPPED | OUTPATIENT
Start: 2018-01-02 | End: 2018-03-02

## 2018-01-02 NOTE — PROGRESS NOTES
Subjective   Dani Ziegler is a 52 y.o. female.     History of Present Illness   COPD exacerbation was treated at [facility] 12/17-12/21  Breathing better but now having pleuritic chest pain  Left side pain when coughing or taking a deep breath, Percocet easing pain  Feels a sharp pain between shoulder blades; oxygen over the weekend,  Continues on Prednisone taper 20 mg once day has 8 pills left   Was in hospital for 3 days, was given breathing treatments and IV steroids, doing Albuterol breathing treatments, started on Daliresp once day, started on Breo using once day. Labs showed elevation of WBC ct.  Taking Minocycline twice day for cellulitis and has a sore on buttocks   Tobacco abuse, cutting back on her own and wants to quit; Was started on Chantix but not tolerating it     Ativan prn for reason death of dog while in hospital feeling very guilty about this, had to put the dog down    + hx of LBBB on EKG   Stage 2 kidney disease; to avoid NSAIDs due to this  Type 2 DM    The following portions of the patient's history were reviewed and updated as appropriate: allergies, current medications, past family history, past medical history, past social history, past surgical history and problem list.    Review of Systems   Constitutional: Negative.  Negative for chills and fever.   HENT: Negative.    Eyes: Negative.    Respiratory: Positive for cough, chest tightness, shortness of breath and wheezing.    Cardiovascular: Negative.  Negative for chest pain and palpitations.   Gastrointestinal: Negative.  Negative for nausea.   Endocrine: Negative.    Genitourinary: Negative.    Musculoskeletal: Negative.    Skin: Negative.    Allergic/Immunologic: Negative.    Neurological: Negative.  Negative for dizziness, weakness, light-headedness and headaches.   Hematological: Negative.    Psychiatric/Behavioral: Negative.        Objective   Physical Exam   Constitutional: She is oriented to person, place, and time. She appears  well-developed and well-nourished. No distress.   HENT:   Head: Normocephalic.   Right Ear: Tympanic membrane, external ear and ear canal normal.   Left Ear: Tympanic membrane, external ear and ear canal normal.   Nose: Mucosal edema and rhinorrhea present. Right sinus exhibits no maxillary sinus tenderness and no frontal sinus tenderness. Left sinus exhibits no maxillary sinus tenderness and no frontal sinus tenderness.   Mouth/Throat: Uvula is midline, oropharynx is clear and moist and mucous membranes are normal. No oropharyngeal exudate, posterior oropharyngeal edema or posterior oropharyngeal erythema.   Eyes: Conjunctivae are normal.   Neck: Normal range of motion. Neck supple.   Cardiovascular: Normal rate, regular rhythm and normal heart sounds.    Pulmonary/Chest: Effort normal. No respiratory distress. She has decreased breath sounds in the right lower field and the left lower field. She has no wheezes. She has no rhonchi. She has no rales.   Lymphadenopathy:        Head (right side): No tonsillar, no preauricular, no posterior auricular and no occipital adenopathy present.        Head (left side): No tonsillar, no preauricular, no posterior auricular and no occipital adenopathy present.     She has no cervical adenopathy.   Neurological: She is alert and oriented to person, place, and time.   Skin: Skin is warm and dry. No rash noted.   Psychiatric: Her speech is normal and behavior is normal. Judgment and thought content normal. Her mood appears anxious. Cognition and memory are normal.   Nursing note and vitals reviewed.      Assessment/Plan   Dani was seen today for hosptial follow up.    Diagnoses and all orders for this visit:    COPD with acute exacerbation    Pleurisy    Other orders  -     azithromycin (ZITHROMAX) 250 MG tablet; Take 2 tablets the first day, then 1 tablet daily for 4 days.  -     predniSONE (DELTASONE) 20 MG tablet; Take 1 tablet by mouth Daily.  -     guaiFENesin (MUCINEX) 600  MG 12 hr tablet; Take 2 tablets by mouth Every 12 (Twelve) Hours.      Will treat with abx and extend Prednisone treatment increasing dose to 20mg twice day for 4 days then tapering to 20 mg X 4 days then 10 mg for 4 days to hopefully help with pleuritic chest pains. Continue to NSAIDs  Start Mucinex twice day and drink plenty of fluids  F/U if pain persists pt agrees

## 2018-01-17 DIAGNOSIS — I10 ESSENTIAL HYPERTENSION: ICD-10-CM

## 2018-01-17 RX ORDER — LISINOPRIL 20 MG/1
TABLET ORAL
Qty: 30 TABLET | Refills: 3 | Status: SHIPPED | OUTPATIENT
Start: 2018-01-17 | End: 2018-05-29 | Stop reason: SDUPTHER

## 2018-01-23 DIAGNOSIS — L85.3 DRY SKIN DERMATITIS: ICD-10-CM

## 2018-01-23 RX ORDER — AMMONIUM LACTATE 12 G/100G
CREAM TOPICAL
Qty: 140 G | Refills: 0 | Status: SHIPPED | OUTPATIENT
Start: 2018-01-23 | End: 2018-03-02 | Stop reason: SDUPTHER

## 2018-01-27 DIAGNOSIS — F41.1 GENERALIZED ANXIETY DISORDER: ICD-10-CM

## 2018-01-29 RX ORDER — BUSPIRONE HYDROCHLORIDE 10 MG/1
TABLET ORAL
Qty: 60 TABLET | Refills: 2 | Status: ON HOLD | OUTPATIENT
Start: 2018-01-29 | End: 2018-03-30 | Stop reason: SDUPTHER

## 2018-03-02 ENCOUNTER — OFFICE VISIT (OUTPATIENT)
Dept: FAMILY MEDICINE CLINIC | Facility: CLINIC | Age: 53
End: 2018-03-02

## 2018-03-02 VITALS
TEMPERATURE: 97.8 F | HEART RATE: 101 BPM | WEIGHT: 293 LBS | RESPIRATION RATE: 22 BRPM | HEIGHT: 66 IN | SYSTOLIC BLOOD PRESSURE: 142 MMHG | DIASTOLIC BLOOD PRESSURE: 90 MMHG | BODY MASS INDEX: 47.09 KG/M2 | OXYGEN SATURATION: 98 %

## 2018-03-02 DIAGNOSIS — F34.1 DYSTHYMIA: ICD-10-CM

## 2018-03-02 DIAGNOSIS — E11.65 UNCONTROLLED TYPE 2 DIABETES MELLITUS WITH HYPERGLYCEMIA, WITHOUT LONG-TERM CURRENT USE OF INSULIN (HCC): ICD-10-CM

## 2018-03-02 DIAGNOSIS — R05.9 COUGH: Primary | ICD-10-CM

## 2018-03-02 DIAGNOSIS — I10 ESSENTIAL HYPERTENSION: ICD-10-CM

## 2018-03-02 DIAGNOSIS — J98.01 BRONCHOSPASM: ICD-10-CM

## 2018-03-02 DIAGNOSIS — L85.3 DRY SKIN DERMATITIS: ICD-10-CM

## 2018-03-02 DIAGNOSIS — D72.828 OTHER ELEVATED WHITE BLOOD CELL (WBC) COUNT: ICD-10-CM

## 2018-03-02 LAB
EXPIRATION DATE: NORMAL
FLUAV AG NPH QL: NORMAL
FLUBV AG NPH QL: NORMAL
INTERNAL CONTROL: NORMAL
Lab: NORMAL

## 2018-03-02 PROCEDURE — 87804 INFLUENZA ASSAY W/OPTIC: CPT | Performed by: FAMILY MEDICINE

## 2018-03-02 PROCEDURE — 99214 OFFICE O/P EST MOD 30 MIN: CPT | Performed by: FAMILY MEDICINE

## 2018-03-02 RX ORDER — CITALOPRAM 40 MG/1
TABLET ORAL
Qty: 45 TABLET | Refills: 0 | OUTPATIENT
Start: 2018-03-02

## 2018-03-02 RX ORDER — PREDNISONE 10 MG/1
TABLET ORAL
Qty: 20 TABLET | Refills: 0 | Status: SHIPPED | OUTPATIENT
Start: 2018-03-02 | End: 2018-03-31 | Stop reason: HOSPADM

## 2018-03-02 RX ORDER — AMMONIUM LACTATE 12 G/100G
CREAM TOPICAL 2 TIMES DAILY
Qty: 140 G | Refills: 5 | Status: SHIPPED | OUTPATIENT
Start: 2018-03-02 | End: 2018-03-31 | Stop reason: HOSPADM

## 2018-03-02 RX ORDER — CITALOPRAM 40 MG/1
60 TABLET ORAL DAILY
Qty: 45 TABLET | Refills: 5 | Status: SHIPPED | OUTPATIENT
Start: 2018-03-02 | End: 2018-09-13 | Stop reason: SDUPTHER

## 2018-03-02 NOTE — PROGRESS NOTES
Assessment/Plan     Problem List Items Addressed This Visit        Cardiovascular and Mediastinum    Essential hypertension       Endocrine    Uncontrolled type 2 diabetes mellitus with hyperglycemia, without long-term current use of insulin    Relevant Orders    Comprehensive Metabolic Panel    Hemoglobin A1c    Lipid Panel With / Chol / HDL Ratio       Immune and Lymphatic    Leukocytosis    Relevant Medications    predniSONE (DELTASONE) 10 MG tablet    Other Relevant Orders    CBC & Differential       Other    Depression    Relevant Medications    citalopram (CeleXA) 40 MG tablet      Other Visit Diagnoses     Cough    -  Primary    Relevant Medications    predniSONE (DELTASONE) 10 MG tablet    Other Relevant Orders    POC Influenza A / B (Completed)    Dry skin dermatitis        Foot Left and Hands    Relevant Medications    ammonium lactate (AMLACTIN) 12 % cream    Bronchospasm        Relevant Medications    predniSONE (DELTASONE) 10 MG tablet           Follow up: Return if symptoms worsen or fail to improve, for follow up depends on review of labs and testing.     DISCUSSION  Influenza A and B are both negative.  Start prednisone taper for bronchospasm.  Continue inhaler usage.  Continue efforts to quit smoking.    Hypertension.  Stable.    Diabetes mellitus type 2.  Recommend follow-up next week for fasting blood work.  Orders have been placed.  Further plan once we have labs back.    Refilled medication for dermatitis of hands and feet.  Working well.    Depression.  Refilled citalopram.  Stable.      MEDICATIONS PRESCRIBED  Requested Prescriptions     Signed Prescriptions Disp Refills   • citalopram (CeleXA) 40 MG tablet 45 tablet 5     Sig: Take 1.5 tablets by mouth Daily.   • ammonium lactate (AMLACTIN) 12 % cream 140 g 5     Sig: Apply  topically 2 (Two) Times a Day.   • predniSONE (DELTASONE) 10 MG tablet 20 tablet 0     Si po x 2 days then 3 po daily x 2 days then 2 po daily x 2 days then 1 po  daily x 2 days then stop.          -------------------------------------------    Subjective     Chief Complaint   Patient presents with   • Allergies     pt pulmonologist told her that he thinks that she is allergic to her dogs. pt isn't sure if she is getting sick or if she is having a reaction to dogs or what.        Diabetes   She presents for her follow-up diabetic visit. She has type 2 diabetes mellitus. Her disease course has been stable. There are no hypoglycemic associated symptoms. Associated symptoms include fatigue. There are no hypoglycemic complications. Symptoms are stable. There are no diabetic complications. Risk factors for coronary artery disease include diabetes mellitus and hypertension. Her weight is stable. She is following a generally healthy diet. She rarely participates in exercise. (120s) An ACE inhibitor/angiotensin II receptor blocker is being taken. Eye exam is not current.       Illness  2 am this am, clammy and shaky and felt bad. Used neb. Lung MD said may be allergic to dogs. On tuesday was holding dogs when got trimmed.   Hair all over. Was on Wednesday    + chills but no definite fever  Lite cough  Mucinex as needed   had the flu (exposed 3 days ago)    Dry hands and feet  Needs refill medication.  Using Lac-Hydrin and working well.    Depression and anxiety  Some increased depression at times.  Medication does help.  Needs refill of citalopram 40 mg 1-1/2 daily.  No reported side effects.  No suicidal thoughts.  Sleeping fair.    Home sleep study end of month  Ordered by Dr Bajwa    Hypertension.  Stable.  No problems at this time.  Taking medication appropriately.  No side effects.  No reported chest pain.  Chronic shortness of breath and wheezing of late.    Past Medical History,Medications, Allergies, and social history was reviewed.    Review of Systems   Constitutional: Positive for fatigue. Negative for unexpected weight change.   HENT: Negative.    Respiratory:  "Positive for cough, shortness of breath and wheezing.    Cardiovascular: Negative.    Gastrointestinal: Negative.    Musculoskeletal: Positive for arthralgias and back pain.   Neurological: Negative.    Psychiatric/Behavioral: Negative.        Objective     Vitals:    03/02/18 1649   BP: 142/90   Pulse: 101   Resp: 22   Temp: 97.8 °F (36.6 °C)   TempSrc: Temporal Artery    SpO2: 98%   Weight: (!) 152 kg (334 lb)   Height: 168.9 cm (66.5\")        Physical Exam   Constitutional: She is oriented to person, place, and time. She appears well-developed and well-nourished.   HENT:   Head: Normocephalic and atraumatic.   Right Ear: Hearing, external ear and ear canal normal. Tympanic membrane is retracted. Tympanic membrane is not erythematous.   Left Ear: Hearing, external ear and ear canal normal. Tympanic membrane is retracted. Tympanic membrane is not erythematous.   Mouth/Throat: Oropharynx is clear and moist.   Eyes: Conjunctivae and EOM are normal. Pupils are equal, round, and reactive to light.   Neck: Normal range of motion. Neck supple. No thyromegaly present.   Cardiovascular: Normal rate, regular rhythm and normal heart sounds.  Exam reveals no gallop and no friction rub.    No murmur heard.  Pulmonary/Chest: Effort normal. No respiratory distress. She has wheezes (expiratory). She has no rales.   Musculoskeletal: She exhibits no edema.   Neurological: She is alert and oriented to person, place, and time.   Skin: Skin is warm and dry.   Psychiatric: She has a normal mood and affect. Her behavior is normal.   Nursing note and vitals reviewed.              Darrion Tovar MD    "

## 2018-03-05 ENCOUNTER — RESULTS ENCOUNTER (OUTPATIENT)
Dept: FAMILY MEDICINE CLINIC | Facility: CLINIC | Age: 53
End: 2018-03-05

## 2018-03-05 DIAGNOSIS — D72.828 OTHER ELEVATED WHITE BLOOD CELL (WBC) COUNT: ICD-10-CM

## 2018-03-05 DIAGNOSIS — E11.65 UNCONTROLLED TYPE 2 DIABETES MELLITUS WITH HYPERGLYCEMIA, WITHOUT LONG-TERM CURRENT USE OF INSULIN (HCC): ICD-10-CM

## 2018-03-21 ENCOUNTER — TELEPHONE (OUTPATIENT)
Dept: FAMILY MEDICINE CLINIC | Facility: CLINIC | Age: 53
End: 2018-03-21

## 2018-03-21 DIAGNOSIS — J44.1 CHRONIC OBSTRUCTIVE PULMONARY DISEASE WITH ACUTE EXACERBATION (HCC): ICD-10-CM

## 2018-03-21 NOTE — TELEPHONE ENCOUNTER
----- Message from Arlene Crabtree sent at 3/21/2018  2:42 PM EDT -----  Contact: ALYSON;PT CALLED  PT HAS CHANGED PHARMACY TO [facility] JUST FOR HER   NEBULIZER SUPPLIES BECAUSE SHE CAN GET THEM FREE- SHE NEEDS  albuterol (PROVENTIL) (2.5 MG/3ML) 0.083% nebulizer solution SENT INTO  [facility] AND NEEDS IT SENT EARLY Thursday DUE TO BEING OUT      [phone]  MESSAGE OK

## 2018-03-22 RX ORDER — ALBUTEROL SULFATE 2.5 MG/3ML
2.5 SOLUTION RESPIRATORY (INHALATION) EVERY 6 HOURS PRN
Qty: 100 VIAL | Refills: 5 | Status: SHIPPED | OUTPATIENT
Start: 2018-03-22 | End: 2021-08-30 | Stop reason: SDUPTHER

## 2018-03-27 ENCOUNTER — HOSPITAL ENCOUNTER (INPATIENT)
Facility: HOSPITAL | Age: 53
LOS: 3 days | Discharge: HOME | End: 2018-03-31
Attending: EMERGENCY MEDICINE | Admitting: FAMILY MEDICINE

## 2018-03-27 ENCOUNTER — APPOINTMENT (OUTPATIENT)
Dept: GENERAL RADIOLOGY | Facility: HOSPITAL | Age: 53
End: 2018-03-27

## 2018-03-27 DIAGNOSIS — L03.116 CELLULITIS OF LEFT LEG: Primary | ICD-10-CM

## 2018-03-27 DIAGNOSIS — N28.9 ACUTE RENAL INSUFFICIENCY: ICD-10-CM

## 2018-03-27 DIAGNOSIS — G89.4 CHRONIC PAIN SYNDROME: ICD-10-CM

## 2018-03-27 DIAGNOSIS — E87.5 HYPERKALEMIA: ICD-10-CM

## 2018-03-27 PROBLEM — N18.9 ACUTE ON CHRONIC RENAL INSUFFICIENCY: Status: ACTIVE | Noted: 2018-03-27

## 2018-03-27 PROBLEM — L03.90 CELLULITIS: Status: ACTIVE | Noted: 2018-03-27

## 2018-03-27 PROBLEM — I50.9 CHF (CONGESTIVE HEART FAILURE) (HCC): Status: ACTIVE | Noted: 2018-03-27

## 2018-03-27 PROBLEM — A41.9 SEPSIS (HCC): Status: ACTIVE | Noted: 2018-03-27

## 2018-03-27 LAB
ALBUMIN SERPL-MCNC: 4.1 G/DL (ref 3.2–4.8)
ALBUMIN/GLOB SERPL: 1.4 G/DL (ref 1.5–2.5)
ALP SERPL-CCNC: 100 U/L (ref 25–100)
ALT SERPL W P-5'-P-CCNC: 16 U/L (ref 7–40)
ANION GAP SERPL CALCULATED.3IONS-SCNC: 5 MMOL/L (ref 3–11)
AST SERPL-CCNC: 13 U/L (ref 0–33)
BASOPHILS # BLD AUTO: 0.03 10*3/MM3 (ref 0–0.2)
BASOPHILS NFR BLD AUTO: 0.2 % (ref 0–1)
BILIRUB SERPL-MCNC: 0.2 MG/DL (ref 0.3–1.2)
BNP SERPL-MCNC: 12 PG/ML (ref 0–100)
BUN BLD-MCNC: 48 MG/DL (ref 9–23)
BUN/CREAT SERPL: 25.3 (ref 7–25)
CALCIUM SPEC-SCNC: 10.1 MG/DL (ref 8.7–10.4)
CHLORIDE SERPL-SCNC: 109 MMOL/L (ref 99–109)
CO2 SERPL-SCNC: 23 MMOL/L (ref 20–31)
CREAT BLD-MCNC: 1.9 MG/DL (ref 0.6–1.3)
DEPRECATED RDW RBC AUTO: 53.6 FL (ref 37–54)
EOSINOPHIL # BLD AUTO: 0.15 10*3/MM3 (ref 0–0.3)
EOSINOPHIL NFR BLD AUTO: 1.1 % (ref 0–3)
ERYTHROCYTE [DISTWIDTH] IN BLOOD BY AUTOMATED COUNT: 15.5 % (ref 11.3–14.5)
GFR SERPL CREATININE-BSD FRML MDRD: 28 ML/MIN/1.73
GLOBULIN UR ELPH-MCNC: 2.9 GM/DL
GLUCOSE BLD-MCNC: 94 MG/DL (ref 70–100)
HCT VFR BLD AUTO: 36.5 % (ref 34.5–44)
HGB BLD-MCNC: 11.2 G/DL (ref 11.5–15.5)
HOLD SPECIMEN: NORMAL
HOLD SPECIMEN: NORMAL
IMM GRANULOCYTES # BLD: 0.5 10*3/MM3 (ref 0–0.03)
IMM GRANULOCYTES NFR BLD: 3.7 % (ref 0–0.6)
INR PPP: 0.95 (ref 0.91–1.09)
LIPASE SERPL-CCNC: 62 U/L (ref 6–51)
LYMPHOCYTES # BLD AUTO: 3.81 10*3/MM3 (ref 0.6–4.8)
LYMPHOCYTES NFR BLD AUTO: 28.5 % (ref 24–44)
MCH RBC QN AUTO: 29.2 PG (ref 27–31)
MCHC RBC AUTO-ENTMCNC: 30.7 G/DL (ref 32–36)
MCV RBC AUTO: 95.1 FL (ref 80–99)
MONOCYTES # BLD AUTO: 0.83 10*3/MM3 (ref 0–1)
MONOCYTES NFR BLD AUTO: 6.2 % (ref 0–12)
NEUTROPHILS # BLD AUTO: 8.07 10*3/MM3 (ref 1.5–8.3)
NEUTROPHILS NFR BLD AUTO: 60.3 % (ref 41–71)
PLATELET # BLD AUTO: 267 10*3/MM3 (ref 150–450)
PMV BLD AUTO: 9.8 FL (ref 6–12)
POTASSIUM BLD-SCNC: 6.5 MMOL/L (ref 3.5–5.5)
PROT SERPL-MCNC: 7 G/DL (ref 5.7–8.2)
PROTHROMBIN TIME: 10 SECONDS (ref 9.6–11.5)
RBC # BLD AUTO: 3.84 10*6/MM3 (ref 3.89–5.14)
SODIUM BLD-SCNC: 137 MMOL/L (ref 132–146)
TROPONIN I SERPL-MCNC: 0 NG/ML (ref 0–0.07)
WBC NRBC COR # BLD: 13.39 10*3/MM3 (ref 3.5–10.8)
WHOLE BLOOD HOLD SPECIMEN: NORMAL
WHOLE BLOOD HOLD SPECIMEN: NORMAL

## 2018-03-27 PROCEDURE — 99223 1ST HOSP IP/OBS HIGH 75: CPT | Performed by: FAMILY MEDICINE

## 2018-03-27 PROCEDURE — 80053 COMPREHEN METABOLIC PANEL: CPT | Performed by: EMERGENCY MEDICINE

## 2018-03-27 PROCEDURE — 71045 X-RAY EXAM CHEST 1 VIEW: CPT

## 2018-03-27 PROCEDURE — 25010000002 DIPHENHYDRAMINE PER 50 MG: Performed by: FAMILY MEDICINE

## 2018-03-27 PROCEDURE — 85610 PROTHROMBIN TIME: CPT | Performed by: EMERGENCY MEDICINE

## 2018-03-27 PROCEDURE — 85025 COMPLETE CBC W/AUTO DIFF WBC: CPT | Performed by: EMERGENCY MEDICINE

## 2018-03-27 PROCEDURE — 25010000002 VANCOMYCIN HCL IN NACL 2-0.9 GM/500ML-% SOLUTION: Performed by: EMERGENCY MEDICINE

## 2018-03-27 PROCEDURE — 99284 EMERGENCY DEPT VISIT MOD MDM: CPT

## 2018-03-27 PROCEDURE — 93005 ELECTROCARDIOGRAM TRACING: CPT | Performed by: EMERGENCY MEDICINE

## 2018-03-27 PROCEDURE — 83690 ASSAY OF LIPASE: CPT | Performed by: EMERGENCY MEDICINE

## 2018-03-27 PROCEDURE — 25010000002 PIPERACILLIN-TAZOBACTAM: Performed by: EMERGENCY MEDICINE

## 2018-03-27 PROCEDURE — 84484 ASSAY OF TROPONIN QUANT: CPT

## 2018-03-27 PROCEDURE — 83880 ASSAY OF NATRIURETIC PEPTIDE: CPT | Performed by: EMERGENCY MEDICINE

## 2018-03-27 PROCEDURE — G0378 HOSPITAL OBSERVATION PER HR: HCPCS

## 2018-03-27 RX ORDER — GABAPENTIN 400 MG/1
800 CAPSULE ORAL EVERY 8 HOURS SCHEDULED
Status: DISCONTINUED | OUTPATIENT
Start: 2018-03-27 | End: 2018-03-31 | Stop reason: HOSPADM

## 2018-03-27 RX ORDER — NICOTINE POLACRILEX 4 MG
15 LOZENGE BUCCAL
Status: DISCONTINUED | OUTPATIENT
Start: 2018-03-27 | End: 2018-03-31 | Stop reason: HOSPADM

## 2018-03-27 RX ORDER — VANCOMYCIN 2 GRAM/500 ML IN 0.9 % SODIUM CHLORIDE INTRAVENOUS
2000 ONCE
Status: COMPLETED | OUTPATIENT
Start: 2018-03-27 | End: 2018-03-27

## 2018-03-27 RX ORDER — GABAPENTIN 800 MG/1
800 TABLET ORAL 3 TIMES DAILY
COMMUNITY
End: 2018-09-25

## 2018-03-27 RX ORDER — HYDRALAZINE HYDROCHLORIDE 50 MG/1
50 TABLET, FILM COATED ORAL 2 TIMES DAILY
COMMUNITY
End: 2018-04-30 | Stop reason: SDUPTHER

## 2018-03-27 RX ORDER — SODIUM CHLORIDE 0.9 % (FLUSH) 0.9 %
1-10 SYRINGE (ML) INJECTION AS NEEDED
Status: DISCONTINUED | OUTPATIENT
Start: 2018-03-27 | End: 2018-03-28

## 2018-03-27 RX ORDER — BUDESONIDE AND FORMOTEROL FUMARATE DIHYDRATE 80; 4.5 UG/1; UG/1
2 AEROSOL RESPIRATORY (INHALATION)
Status: DISCONTINUED | OUTPATIENT
Start: 2018-03-27 | End: 2018-03-31 | Stop reason: HOSPADM

## 2018-03-27 RX ORDER — DEXTROSE MONOHYDRATE 25 G/50ML
25 INJECTION, SOLUTION INTRAVENOUS
Status: DISCONTINUED | OUTPATIENT
Start: 2018-03-27 | End: 2018-03-31 | Stop reason: HOSPADM

## 2018-03-27 RX ORDER — ALBUTEROL SULFATE 2.5 MG/3ML
2.5 SOLUTION RESPIRATORY (INHALATION) EVERY 6 HOURS PRN
Status: DISCONTINUED | OUTPATIENT
Start: 2018-03-27 | End: 2018-03-31 | Stop reason: HOSPADM

## 2018-03-27 RX ORDER — BUPROPION HYDROCHLORIDE 150 MG/1
150 TABLET, EXTENDED RELEASE ORAL EVERY 12 HOURS SCHEDULED
Status: DISCONTINUED | OUTPATIENT
Start: 2018-03-27 | End: 2018-03-31 | Stop reason: HOSPADM

## 2018-03-27 RX ORDER — ISOSORBIDE MONONITRATE 30 MG/1
30 TABLET, EXTENDED RELEASE ORAL DAILY
Status: DISCONTINUED | OUTPATIENT
Start: 2018-03-28 | End: 2018-03-31 | Stop reason: HOSPADM

## 2018-03-27 RX ORDER — OXYCODONE AND ACETAMINOPHEN 10; 325 MG/1; MG/1
1 TABLET ORAL EVERY 4 HOURS PRN
Status: DISCONTINUED | OUTPATIENT
Start: 2018-03-27 | End: 2018-03-31 | Stop reason: HOSPADM

## 2018-03-27 RX ORDER — DIPHENHYDRAMINE HYDROCHLORIDE 50 MG/ML
25 INJECTION INTRAMUSCULAR; INTRAVENOUS EVERY 6 HOURS PRN
Status: DISCONTINUED | OUTPATIENT
Start: 2018-03-27 | End: 2018-03-31 | Stop reason: HOSPADM

## 2018-03-27 RX ORDER — VANCOMYCIN 1.75 GRAM/500 ML IN 0.9 % SODIUM CHLORIDE INTRAVENOUS
1750 EVERY 24 HOURS
Status: DISCONTINUED | OUTPATIENT
Start: 2018-03-28 | End: 2018-03-28

## 2018-03-27 RX ORDER — HYDRALAZINE HYDROCHLORIDE 50 MG/1
50 TABLET, FILM COATED ORAL 2 TIMES DAILY
Status: DISCONTINUED | OUTPATIENT
Start: 2018-03-27 | End: 2018-03-31 | Stop reason: HOSPADM

## 2018-03-27 RX ORDER — SODIUM CHLORIDE 9 MG/ML
75 INJECTION, SOLUTION INTRAVENOUS ONCE
Status: COMPLETED | OUTPATIENT
Start: 2018-03-27 | End: 2018-03-28

## 2018-03-27 RX ORDER — ISOSORBIDE MONONITRATE 30 MG/1
30 TABLET, EXTENDED RELEASE ORAL DAILY
COMMUNITY
End: 2019-06-28

## 2018-03-27 RX ORDER — GUAIFENESIN 600 MG/1
1200 TABLET, EXTENDED RELEASE ORAL 2 TIMES DAILY
COMMUNITY
End: 2018-07-12

## 2018-03-27 RX ORDER — SODIUM POLYSTYRENE SULFONATE 15 G/60ML
15 SUSPENSION ORAL; RECTAL ONCE
Status: COMPLETED | OUTPATIENT
Start: 2018-03-27 | End: 2018-03-27

## 2018-03-27 RX ORDER — SODIUM CHLORIDE 0.9 % (FLUSH) 0.9 %
10 SYRINGE (ML) INJECTION AS NEEDED
Status: DISCONTINUED | OUTPATIENT
Start: 2018-03-27 | End: 2018-03-28

## 2018-03-27 RX ORDER — BUSPIRONE HYDROCHLORIDE 10 MG/1
10 TABLET ORAL 2 TIMES DAILY
Status: DISCONTINUED | OUTPATIENT
Start: 2018-03-27 | End: 2018-03-31 | Stop reason: HOSPADM

## 2018-03-27 RX ADMIN — HYDRALAZINE HYDROCHLORIDE 50 MG: 50 TABLET, FILM COATED ORAL at 22:47

## 2018-03-27 RX ADMIN — GABAPENTIN 800 MG: 400 CAPSULE ORAL at 22:47

## 2018-03-27 RX ADMIN — OXYCODONE HYDROCHLORIDE AND ACETAMINOPHEN 1 TABLET: 10; 325 TABLET ORAL at 22:47

## 2018-03-27 RX ADMIN — APIXABAN 5 MG: 5 TABLET, FILM COATED ORAL at 22:47

## 2018-03-27 RX ADMIN — TAZOBACTAM SODIUM AND PIPERACILLIN SODIUM 4.5 G: .5; 4 INJECTION, POWDER, LYOPHILIZED, FOR SOLUTION INTRAVENOUS at 19:58

## 2018-03-27 RX ADMIN — DIPHENHYDRAMINE HYDROCHLORIDE 25 MG: 50 INJECTION INTRAMUSCULAR; INTRAVENOUS at 22:48

## 2018-03-27 RX ADMIN — Medication 2000 MG: at 20:34

## 2018-03-27 RX ADMIN — SODIUM POLYSTYRENE SULFONATE 15 G: 15 SUSPENSION ORAL; RECTAL at 20:38

## 2018-03-28 ENCOUNTER — APPOINTMENT (OUTPATIENT)
Dept: CARDIOLOGY | Facility: HOSPITAL | Age: 53
End: 2018-03-28

## 2018-03-28 LAB
ANION GAP SERPL CALCULATED.3IONS-SCNC: 7 MMOL/L (ref 3–11)
BASOPHILS # BLD AUTO: 0.05 10*3/MM3 (ref 0–0.2)
BASOPHILS NFR BLD AUTO: 0.4 % (ref 0–1)
BH CV LOWER VASCULAR LEFT COMMON FEMORAL AUGMENT: NORMAL
BH CV LOWER VASCULAR LEFT COMMON FEMORAL COMPRESS: NORMAL
BH CV LOWER VASCULAR LEFT COMMON FEMORAL PHASIC: NORMAL
BH CV LOWER VASCULAR LEFT COMMON FEMORAL SPONT: NORMAL
BH CV LOWER VASCULAR LEFT DISTAL FEMORAL AUGMENT: NORMAL
BH CV LOWER VASCULAR LEFT DISTAL FEMORAL COMPRESS: NORMAL
BH CV LOWER VASCULAR LEFT GASTRONEMIUS COMPRESS: NORMAL
BH CV LOWER VASCULAR LEFT GREATER SAPH AK COMPRESS: NORMAL
BH CV LOWER VASCULAR LEFT GREATER SAPH BK COMPRESS: NORMAL
BH CV LOWER VASCULAR LEFT MID FEMORAL AUGMENT: NORMAL
BH CV LOWER VASCULAR LEFT MID FEMORAL COMPRESS: NORMAL
BH CV LOWER VASCULAR LEFT MID FEMORAL PHASIC: NORMAL
BH CV LOWER VASCULAR LEFT MID FEMORAL SPONT: NORMAL
BH CV LOWER VASCULAR LEFT PERONEAL AUGMENT: NORMAL
BH CV LOWER VASCULAR LEFT PERONEAL COMPRESS: NORMAL
BH CV LOWER VASCULAR LEFT POPLITEAL AUGMENT: NORMAL
BH CV LOWER VASCULAR LEFT POPLITEAL COMPRESS: NORMAL
BH CV LOWER VASCULAR LEFT POPLITEAL PHASIC: NORMAL
BH CV LOWER VASCULAR LEFT POPLITEAL SPONT: NORMAL
BH CV LOWER VASCULAR LEFT POSTERIOR TIBIAL AUGMENT: NORMAL
BH CV LOWER VASCULAR LEFT POSTERIOR TIBIAL COMPRESS: NORMAL
BH CV LOWER VASCULAR LEFT PROFUNDA FEMORAL AUGMENT: NORMAL
BH CV LOWER VASCULAR LEFT PROFUNDA FEMORAL COMPRESS: NORMAL
BH CV LOWER VASCULAR LEFT PROFUNDA FEMORAL PHASIC: NORMAL
BH CV LOWER VASCULAR LEFT PROFUNDA FEMORAL SPONT: NORMAL
BH CV LOWER VASCULAR LEFT PROXIMAL FEMORAL AUGMENT: NORMAL
BH CV LOWER VASCULAR LEFT PROXIMAL FEMORAL COMPRESS: NORMAL
BH CV LOWER VASCULAR LEFT SAPHENOFEMORAL JUNCTION AUGMENT: NORMAL
BH CV LOWER VASCULAR LEFT SAPHENOFEMORAL JUNCTION COMPRESS: NORMAL
BH CV LOWER VASCULAR LEFT SAPHENOFEMORAL JUNCTION PHASIC: NORMAL
BH CV LOWER VASCULAR LEFT SAPHENOFEMORAL JUNCTION SPONT: NORMAL
BH CV LOWER VASCULAR RIGHT COMMON FEMORAL AUGMENT: NORMAL
BH CV LOWER VASCULAR RIGHT COMMON FEMORAL COMPRESS: NORMAL
BH CV LOWER VASCULAR RIGHT COMMON FEMORAL PHASIC: NORMAL
BH CV LOWER VASCULAR RIGHT COMMON FEMORAL SPONT: NORMAL
BILIRUB UR QL STRIP: NEGATIVE
BUN BLD-MCNC: 47 MG/DL (ref 9–23)
BUN/CREAT SERPL: 27.6 (ref 7–25)
CALCIUM SPEC-SCNC: 9.4 MG/DL (ref 8.7–10.4)
CHLORIDE SERPL-SCNC: 107 MMOL/L (ref 99–109)
CLARITY UR: CLEAR
CO2 SERPL-SCNC: 22 MMOL/L (ref 20–31)
COLOR UR: YELLOW
CREAT BLD-MCNC: 1.7 MG/DL (ref 0.6–1.3)
CREAT UR-MCNC: 144 MG/DL
DEPRECATED RDW RBC AUTO: 54 FL (ref 37–54)
EOSINOPHIL # BLD AUTO: 0.22 10*3/MM3 (ref 0–0.3)
EOSINOPHIL NFR BLD AUTO: 1.9 % (ref 0–3)
ERYTHROCYTE [DISTWIDTH] IN BLOOD BY AUTOMATED COUNT: 15.3 % (ref 11.3–14.5)
FLUAV SUBTYP SPEC NAA+PROBE: NOT DETECTED
FLUBV RNA ISLT QL NAA+PROBE: NOT DETECTED
GFR SERPL CREATININE-BSD FRML MDRD: 31 ML/MIN/1.73
GLUCOSE BLD-MCNC: 97 MG/DL (ref 70–100)
GLUCOSE BLDC GLUCOMTR-MCNC: 139 MG/DL (ref 70–130)
GLUCOSE BLDC GLUCOMTR-MCNC: 145 MG/DL (ref 70–130)
GLUCOSE BLDC GLUCOMTR-MCNC: 156 MG/DL (ref 70–130)
GLUCOSE BLDC GLUCOMTR-MCNC: 185 MG/DL (ref 70–130)
GLUCOSE UR STRIP-MCNC: NEGATIVE MG/DL
HBA1C MFR BLD: 7.3 % (ref 4.8–5.6)
HCT VFR BLD AUTO: 33.2 % (ref 34.5–44)
HGB BLD-MCNC: 10.1 G/DL (ref 11.5–15.5)
HGB UR QL STRIP.AUTO: NEGATIVE
IMM GRANULOCYTES # BLD: 0.46 10*3/MM3 (ref 0–0.03)
IMM GRANULOCYTES NFR BLD: 3.9 % (ref 0–0.6)
KETONES UR QL STRIP: NEGATIVE
LEUKOCYTE ESTERASE UR QL STRIP.AUTO: NEGATIVE
LYMPHOCYTES # BLD AUTO: 3.75 10*3/MM3 (ref 0.6–4.8)
LYMPHOCYTES NFR BLD AUTO: 32 % (ref 24–44)
MCH RBC QN AUTO: 29.2 PG (ref 27–31)
MCHC RBC AUTO-ENTMCNC: 30.4 G/DL (ref 32–36)
MCV RBC AUTO: 96 FL (ref 80–99)
MONOCYTES # BLD AUTO: 0.85 10*3/MM3 (ref 0–1)
MONOCYTES NFR BLD AUTO: 7.3 % (ref 0–12)
NEUTROPHILS # BLD AUTO: 6.39 10*3/MM3 (ref 1.5–8.3)
NEUTROPHILS NFR BLD AUTO: 54.5 % (ref 41–71)
NITRITE UR QL STRIP: NEGATIVE
PH UR STRIP.AUTO: <=5 [PH] (ref 5–8)
PLATELET # BLD AUTO: 238 10*3/MM3 (ref 150–450)
PMV BLD AUTO: 9.4 FL (ref 6–12)
POTASSIUM BLD-SCNC: 6.3 MMOL/L (ref 3.5–5.5)
PROT UR QL STRIP: NEGATIVE
PROT UR-MCNC: 12 MG/DL (ref 1–14)
RBC # BLD AUTO: 3.46 10*6/MM3 (ref 3.89–5.14)
SODIUM BLD-SCNC: 136 MMOL/L (ref 132–146)
SODIUM UR-SCNC: 43 MMOL/L (ref 30–90)
SP GR UR STRIP: 1.02 (ref 1–1.03)
UROBILINOGEN UR QL STRIP: NORMAL
UUN 24H UR-MCNC: 946 MG/DL
WBC NRBC COR # BLD: 11.72 10*3/MM3 (ref 3.5–10.8)

## 2018-03-28 PROCEDURE — 94799 UNLISTED PULMONARY SVC/PX: CPT

## 2018-03-28 PROCEDURE — 84300 ASSAY OF URINE SODIUM: CPT | Performed by: NURSE PRACTITIONER

## 2018-03-28 PROCEDURE — 80048 BASIC METABOLIC PNL TOTAL CA: CPT | Performed by: NURSE PRACTITIONER

## 2018-03-28 PROCEDURE — 85025 COMPLETE CBC W/AUTO DIFF WBC: CPT | Performed by: NURSE PRACTITIONER

## 2018-03-28 PROCEDURE — 63710000001 INSULIN LISPRO (HUMAN) PER 5 UNITS: Performed by: NURSE PRACTITIONER

## 2018-03-28 PROCEDURE — 84156 ASSAY OF PROTEIN URINE: CPT | Performed by: NURSE PRACTITIONER

## 2018-03-28 PROCEDURE — 81003 URINALYSIS AUTO W/O SCOPE: CPT | Performed by: INTERNAL MEDICINE

## 2018-03-28 PROCEDURE — 25010000002 DIPHENHYDRAMINE PER 50 MG: Performed by: FAMILY MEDICINE

## 2018-03-28 PROCEDURE — 25010000002 PIPERACILLIN SOD-TAZOBACTAM PER 1 G: Performed by: NURSE PRACTITIONER

## 2018-03-28 PROCEDURE — 84540 ASSAY OF URINE/UREA-N: CPT | Performed by: INTERNAL MEDICINE

## 2018-03-28 PROCEDURE — 93971 EXTREMITY STUDY: CPT | Performed by: INTERNAL MEDICINE

## 2018-03-28 PROCEDURE — 93971 EXTREMITY STUDY: CPT

## 2018-03-28 PROCEDURE — 87502 INFLUENZA DNA AMP PROBE: CPT | Performed by: INTERNAL MEDICINE

## 2018-03-28 PROCEDURE — 83036 HEMOGLOBIN GLYCOSYLATED A1C: CPT | Performed by: NURSE PRACTITIONER

## 2018-03-28 PROCEDURE — 82570 ASSAY OF URINE CREATININE: CPT | Performed by: NURSE PRACTITIONER

## 2018-03-28 PROCEDURE — 99233 SBSQ HOSP IP/OBS HIGH 50: CPT | Performed by: INTERNAL MEDICINE

## 2018-03-28 PROCEDURE — 82962 GLUCOSE BLOOD TEST: CPT

## 2018-03-28 PROCEDURE — 94640 AIRWAY INHALATION TREATMENT: CPT

## 2018-03-28 RX ORDER — MINOCYCLINE HYDROCHLORIDE 50 MG/1
100 CAPSULE ORAL EVERY 12 HOURS SCHEDULED
Status: DISCONTINUED | OUTPATIENT
Start: 2018-03-28 | End: 2018-03-31 | Stop reason: HOSPADM

## 2018-03-28 RX ORDER — SODIUM POLYSTYRENE SULFONATE 15 G/60ML
15 SUSPENSION ORAL; RECTAL ONCE
Status: COMPLETED | OUTPATIENT
Start: 2018-03-28 | End: 2018-03-28

## 2018-03-28 RX ORDER — VANCOMYCIN 1.75 GRAM/500 ML IN 0.9 % SODIUM CHLORIDE INTRAVENOUS
1750 EVERY 24 HOURS
Status: DISCONTINUED | OUTPATIENT
Start: 2018-03-28 | End: 2018-03-28

## 2018-03-28 RX ADMIN — BUDESONIDE AND FORMOTEROL FUMARATE DIHYDRATE 2 PUFF: 80; 4.5 AEROSOL RESPIRATORY (INHALATION) at 09:48

## 2018-03-28 RX ADMIN — OXYCODONE HYDROCHLORIDE AND ACETAMINOPHEN 1 TABLET: 10; 325 TABLET ORAL at 17:42

## 2018-03-28 RX ADMIN — CITALOPRAM HYDROBROMIDE 60 MG: 20 TABLET ORAL at 08:51

## 2018-03-28 RX ADMIN — GABAPENTIN 800 MG: 400 CAPSULE ORAL at 14:40

## 2018-03-28 RX ADMIN — TAZOBACTAM SODIUM AND PIPERACILLIN SODIUM 3.38 G: 375; 3 INJECTION, SOLUTION INTRAVENOUS at 02:52

## 2018-03-28 RX ADMIN — BUPROPION HYDROCHLORIDE 150 MG: 150 TABLET, EXTENDED RELEASE ORAL at 00:30

## 2018-03-28 RX ADMIN — GABAPENTIN 800 MG: 400 CAPSULE ORAL at 06:16

## 2018-03-28 RX ADMIN — BUPROPION HYDROCHLORIDE 150 MG: 150 TABLET, EXTENDED RELEASE ORAL at 21:39

## 2018-03-28 RX ADMIN — INSULIN LISPRO 2 UNITS: 100 INJECTION, SOLUTION INTRAVENOUS; SUBCUTANEOUS at 12:07

## 2018-03-28 RX ADMIN — DIPHENHYDRAMINE HYDROCHLORIDE 25 MG: 50 INJECTION INTRAMUSCULAR; INTRAVENOUS at 19:38

## 2018-03-28 RX ADMIN — APIXABAN 5 MG: 5 TABLET, FILM COATED ORAL at 21:39

## 2018-03-28 RX ADMIN — SODIUM CHLORIDE 75 ML/HR: 9 INJECTION, SOLUTION INTRAVENOUS at 00:31

## 2018-03-28 RX ADMIN — HYDRALAZINE HYDROCHLORIDE 50 MG: 50 TABLET, FILM COATED ORAL at 21:39

## 2018-03-28 RX ADMIN — HYDRALAZINE HYDROCHLORIDE 50 MG: 50 TABLET, FILM COATED ORAL at 08:52

## 2018-03-28 RX ADMIN — OXYCODONE HYDROCHLORIDE AND ACETAMINOPHEN 1 TABLET: 10; 325 TABLET ORAL at 10:50

## 2018-03-28 RX ADMIN — BUSPIRONE HYDROCHLORIDE 10 MG: 10 TABLET ORAL at 21:39

## 2018-03-28 RX ADMIN — APIXABAN 5 MG: 5 TABLET, FILM COATED ORAL at 08:52

## 2018-03-28 RX ADMIN — OXYCODONE HYDROCHLORIDE AND ACETAMINOPHEN 1 TABLET: 10; 325 TABLET ORAL at 06:24

## 2018-03-28 RX ADMIN — MINOCYCLINE HYDROCHLORIDE 100 MG: 50 CAPSULE ORAL at 21:39

## 2018-03-28 RX ADMIN — BUSPIRONE HYDROCHLORIDE 10 MG: 10 TABLET ORAL at 00:30

## 2018-03-28 RX ADMIN — BUDESONIDE AND FORMOTEROL FUMARATE DIHYDRATE 2 PUFF: 80; 4.5 AEROSOL RESPIRATORY (INHALATION) at 20:20

## 2018-03-28 RX ADMIN — DIPHENHYDRAMINE HYDROCHLORIDE 25 MG: 50 INJECTION INTRAMUSCULAR; INTRAVENOUS at 12:07

## 2018-03-28 RX ADMIN — TAZOBACTAM SODIUM AND PIPERACILLIN SODIUM 3.38 G: 375; 3 INJECTION, SOLUTION INTRAVENOUS at 10:46

## 2018-03-28 RX ADMIN — BUSPIRONE HYDROCHLORIDE 10 MG: 10 TABLET ORAL at 08:51

## 2018-03-28 RX ADMIN — GABAPENTIN 800 MG: 400 CAPSULE ORAL at 21:39

## 2018-03-28 RX ADMIN — BUPROPION HYDROCHLORIDE 150 MG: 150 TABLET, EXTENDED RELEASE ORAL at 08:52

## 2018-03-28 RX ADMIN — SODIUM POLYSTYRENE SULFONATE 15 G: 15 SUSPENSION ORAL; RECTAL at 08:53

## 2018-03-28 RX ADMIN — OXYCODONE HYDROCHLORIDE AND ACETAMINOPHEN 1 TABLET: 10; 325 TABLET ORAL at 21:39

## 2018-03-28 RX ADMIN — ISOSORBIDE MONONITRATE 30 MG: 30 TABLET, EXTENDED RELEASE ORAL at 08:51

## 2018-03-29 LAB
ANION GAP SERPL CALCULATED.3IONS-SCNC: 4 MMOL/L (ref 3–11)
BUN BLD-MCNC: 40 MG/DL (ref 9–23)
BUN/CREAT SERPL: 28.6 (ref 7–25)
CALCIUM SPEC-SCNC: 9.5 MG/DL (ref 8.7–10.4)
CHLORIDE SERPL-SCNC: 106 MMOL/L (ref 99–109)
CK SERPL-CCNC: 51 U/L (ref 26–174)
CO2 SERPL-SCNC: 24 MMOL/L (ref 20–31)
CREAT BLD-MCNC: 1.4 MG/DL (ref 0.6–1.3)
DEPRECATED RDW RBC AUTO: 51.7 FL (ref 37–54)
ERYTHROCYTE [DISTWIDTH] IN BLOOD BY AUTOMATED COUNT: 15.1 % (ref 11.3–14.5)
GFR SERPL CREATININE-BSD FRML MDRD: 39 ML/MIN/1.73
GLUCOSE BLD-MCNC: 113 MG/DL (ref 70–100)
GLUCOSE BLDC GLUCOMTR-MCNC: 140 MG/DL (ref 70–130)
GLUCOSE BLDC GLUCOMTR-MCNC: 140 MG/DL (ref 70–130)
GLUCOSE BLDC GLUCOMTR-MCNC: 170 MG/DL (ref 70–130)
GLUCOSE BLDC GLUCOMTR-MCNC: 177 MG/DL (ref 70–130)
HCT VFR BLD AUTO: 32.4 % (ref 34.5–44)
HGB BLD-MCNC: 10.1 G/DL (ref 11.5–15.5)
MCH RBC QN AUTO: 29.4 PG (ref 27–31)
MCHC RBC AUTO-ENTMCNC: 31.2 G/DL (ref 32–36)
MCV RBC AUTO: 94.2 FL (ref 80–99)
PLATELET # BLD AUTO: 225 10*3/MM3 (ref 150–450)
PMV BLD AUTO: 9.2 FL (ref 6–12)
POTASSIUM BLD-SCNC: 5.8 MMOL/L (ref 3.5–5.5)
RBC # BLD AUTO: 3.44 10*6/MM3 (ref 3.89–5.14)
SODIUM BLD-SCNC: 134 MMOL/L (ref 132–146)
WBC NRBC COR # BLD: 10.28 10*3/MM3 (ref 3.5–10.8)

## 2018-03-29 PROCEDURE — 94640 AIRWAY INHALATION TREATMENT: CPT

## 2018-03-29 PROCEDURE — 25010000002 DIPHENHYDRAMINE PER 50 MG: Performed by: FAMILY MEDICINE

## 2018-03-29 PROCEDURE — 80048 BASIC METABOLIC PNL TOTAL CA: CPT | Performed by: INTERNAL MEDICINE

## 2018-03-29 PROCEDURE — 85027 COMPLETE CBC AUTOMATED: CPT | Performed by: INTERNAL MEDICINE

## 2018-03-29 PROCEDURE — 25010000002 ONDANSETRON PER 1 MG: Performed by: PHYSICIAN ASSISTANT

## 2018-03-29 PROCEDURE — 99232 SBSQ HOSP IP/OBS MODERATE 35: CPT | Performed by: FAMILY MEDICINE

## 2018-03-29 PROCEDURE — 94799 UNLISTED PULMONARY SVC/PX: CPT

## 2018-03-29 PROCEDURE — 82962 GLUCOSE BLOOD TEST: CPT

## 2018-03-29 PROCEDURE — 82550 ASSAY OF CK (CPK): CPT | Performed by: INTERNAL MEDICINE

## 2018-03-29 RX ORDER — ONDANSETRON 2 MG/ML
4 INJECTION INTRAMUSCULAR; INTRAVENOUS EVERY 6 HOURS PRN
Status: DISCONTINUED | OUTPATIENT
Start: 2018-03-29 | End: 2018-03-31 | Stop reason: HOSPADM

## 2018-03-29 RX ORDER — SODIUM CHLORIDE 9 MG/ML
100 INJECTION, SOLUTION INTRAVENOUS CONTINUOUS
Status: DISCONTINUED | OUTPATIENT
Start: 2018-03-29 | End: 2018-03-30

## 2018-03-29 RX ORDER — MINOCYCLINE HYDROCHLORIDE 100 MG/1
100 CAPSULE ORAL EVERY 12 HOURS SCHEDULED
Qty: 12 CAPSULE | Refills: 0 | Status: SHIPPED | OUTPATIENT
Start: 2018-03-29 | End: 2018-03-31

## 2018-03-29 RX ADMIN — INSULIN LISPRO 2 UNITS: 100 INJECTION, SOLUTION INTRAVENOUS; SUBCUTANEOUS at 13:56

## 2018-03-29 RX ADMIN — INSULIN LISPRO 2 UNITS: 100 INJECTION, SOLUTION INTRAVENOUS; SUBCUTANEOUS at 22:00

## 2018-03-29 RX ADMIN — BUSPIRONE HYDROCHLORIDE 10 MG: 10 TABLET ORAL at 08:37

## 2018-03-29 RX ADMIN — OXYCODONE HYDROCHLORIDE AND ACETAMINOPHEN 1 TABLET: 10; 325 TABLET ORAL at 05:40

## 2018-03-29 RX ADMIN — BUPROPION HYDROCHLORIDE 150 MG: 150 TABLET, EXTENDED RELEASE ORAL at 20:15

## 2018-03-29 RX ADMIN — HYDRALAZINE HYDROCHLORIDE 50 MG: 50 TABLET, FILM COATED ORAL at 08:37

## 2018-03-29 RX ADMIN — ONDANSETRON 4 MG: 2 INJECTION INTRAMUSCULAR; INTRAVENOUS at 21:50

## 2018-03-29 RX ADMIN — BUSPIRONE HYDROCHLORIDE 10 MG: 10 TABLET ORAL at 20:15

## 2018-03-29 RX ADMIN — ISOSORBIDE MONONITRATE 30 MG: 30 TABLET, EXTENDED RELEASE ORAL at 08:38

## 2018-03-29 RX ADMIN — DIPHENHYDRAMINE HYDROCHLORIDE 25 MG: 50 INJECTION INTRAMUSCULAR; INTRAVENOUS at 16:47

## 2018-03-29 RX ADMIN — DIPHENHYDRAMINE HYDROCHLORIDE 25 MG: 50 INJECTION INTRAMUSCULAR; INTRAVENOUS at 00:28

## 2018-03-29 RX ADMIN — SODIUM CHLORIDE 100 ML/HR: 9 INJECTION, SOLUTION INTRAVENOUS at 20:16

## 2018-03-29 RX ADMIN — CITALOPRAM HYDROBROMIDE 60 MG: 20 TABLET ORAL at 08:37

## 2018-03-29 RX ADMIN — BUDESONIDE AND FORMOTEROL FUMARATE DIHYDRATE 2 PUFF: 80; 4.5 AEROSOL RESPIRATORY (INHALATION) at 08:04

## 2018-03-29 RX ADMIN — HYDRALAZINE HYDROCHLORIDE 50 MG: 50 TABLET, FILM COATED ORAL at 20:15

## 2018-03-29 RX ADMIN — OXYCODONE HYDROCHLORIDE AND ACETAMINOPHEN 1 TABLET: 10; 325 TABLET ORAL at 20:15

## 2018-03-29 RX ADMIN — BUDESONIDE AND FORMOTEROL FUMARATE DIHYDRATE 2 PUFF: 80; 4.5 AEROSOL RESPIRATORY (INHALATION) at 22:47

## 2018-03-29 RX ADMIN — GABAPENTIN 800 MG: 400 CAPSULE ORAL at 20:15

## 2018-03-29 RX ADMIN — APIXABAN 5 MG: 5 TABLET, FILM COATED ORAL at 08:37

## 2018-03-29 RX ADMIN — OXYCODONE HYDROCHLORIDE AND ACETAMINOPHEN 1 TABLET: 10; 325 TABLET ORAL at 13:56

## 2018-03-29 RX ADMIN — OXYCODONE HYDROCHLORIDE AND ACETAMINOPHEN 1 TABLET: 10; 325 TABLET ORAL at 01:52

## 2018-03-29 RX ADMIN — MINOCYCLINE HYDROCHLORIDE 100 MG: 50 CAPSULE ORAL at 08:37

## 2018-03-29 RX ADMIN — BUPROPION HYDROCHLORIDE 150 MG: 150 TABLET, EXTENDED RELEASE ORAL at 08:37

## 2018-03-29 RX ADMIN — APIXABAN 5 MG: 5 TABLET, FILM COATED ORAL at 20:15

## 2018-03-29 RX ADMIN — GABAPENTIN 800 MG: 400 CAPSULE ORAL at 13:56

## 2018-03-29 RX ADMIN — GABAPENTIN 800 MG: 400 CAPSULE ORAL at 05:39

## 2018-03-29 RX ADMIN — OXYCODONE HYDROCHLORIDE AND ACETAMINOPHEN 1 TABLET: 10; 325 TABLET ORAL at 09:40

## 2018-03-29 RX ADMIN — MINOCYCLINE HYDROCHLORIDE 100 MG: 50 CAPSULE ORAL at 20:15

## 2018-03-30 ENCOUNTER — TELEPHONE (OUTPATIENT)
Dept: FAMILY MEDICINE CLINIC | Facility: CLINIC | Age: 53
End: 2018-03-30

## 2018-03-30 DIAGNOSIS — F41.1 GENERALIZED ANXIETY DISORDER: ICD-10-CM

## 2018-03-30 LAB
ANION GAP SERPL CALCULATED.3IONS-SCNC: 6 MMOL/L (ref 3–11)
BUN BLD-MCNC: 25 MG/DL (ref 9–23)
BUN/CREAT SERPL: 25 (ref 7–25)
CALCIUM SPEC-SCNC: 9.2 MG/DL (ref 8.7–10.4)
CHLORIDE SERPL-SCNC: 108 MMOL/L (ref 99–109)
CO2 SERPL-SCNC: 25 MMOL/L (ref 20–31)
CREAT BLD-MCNC: 1 MG/DL (ref 0.6–1.3)
GFR SERPL CREATININE-BSD FRML MDRD: 58 ML/MIN/1.73
GLUCOSE BLD-MCNC: 112 MG/DL (ref 70–100)
GLUCOSE BLDC GLUCOMTR-MCNC: 114 MG/DL (ref 70–130)
GLUCOSE BLDC GLUCOMTR-MCNC: 119 MG/DL (ref 70–130)
GLUCOSE BLDC GLUCOMTR-MCNC: 132 MG/DL (ref 70–130)
GLUCOSE BLDC GLUCOMTR-MCNC: 149 MG/DL (ref 70–130)
POTASSIUM BLD-SCNC: 5.5 MMOL/L (ref 3.5–5.5)
SODIUM BLD-SCNC: 139 MMOL/L (ref 132–146)

## 2018-03-30 PROCEDURE — 99232 SBSQ HOSP IP/OBS MODERATE 35: CPT | Performed by: PHYSICIAN ASSISTANT

## 2018-03-30 PROCEDURE — 80048 BASIC METABOLIC PNL TOTAL CA: CPT | Performed by: FAMILY MEDICINE

## 2018-03-30 PROCEDURE — 94799 UNLISTED PULMONARY SVC/PX: CPT

## 2018-03-30 PROCEDURE — 82962 GLUCOSE BLOOD TEST: CPT

## 2018-03-30 PROCEDURE — 94640 AIRWAY INHALATION TREATMENT: CPT

## 2018-03-30 RX ORDER — BUSPIRONE HYDROCHLORIDE 10 MG/1
TABLET ORAL
Qty: 60 TABLET | Refills: 2 | Status: SHIPPED | OUTPATIENT
Start: 2018-03-30 | End: 2018-08-16 | Stop reason: SDUPTHER

## 2018-03-30 RX ORDER — GUAIFENESIN 600 MG/1
600 TABLET, EXTENDED RELEASE ORAL EVERY 12 HOURS SCHEDULED
Status: DISCONTINUED | OUTPATIENT
Start: 2018-03-30 | End: 2018-03-31 | Stop reason: HOSPADM

## 2018-03-30 RX ORDER — LISINOPRIL 20 MG/1
20 TABLET ORAL
Status: DISCONTINUED | OUTPATIENT
Start: 2018-03-31 | End: 2018-03-31 | Stop reason: HOSPADM

## 2018-03-30 RX ADMIN — GABAPENTIN 800 MG: 400 CAPSULE ORAL at 14:18

## 2018-03-30 RX ADMIN — OXYCODONE HYDROCHLORIDE AND ACETAMINOPHEN 1 TABLET: 10; 325 TABLET ORAL at 20:02

## 2018-03-30 RX ADMIN — MINOCYCLINE HYDROCHLORIDE 100 MG: 50 CAPSULE ORAL at 20:50

## 2018-03-30 RX ADMIN — BUSPIRONE HYDROCHLORIDE 10 MG: 10 TABLET ORAL at 09:37

## 2018-03-30 RX ADMIN — BUPROPION HYDROCHLORIDE 150 MG: 150 TABLET, EXTENDED RELEASE ORAL at 20:51

## 2018-03-30 RX ADMIN — BUDESONIDE AND FORMOTEROL FUMARATE DIHYDRATE 2 PUFF: 80; 4.5 AEROSOL RESPIRATORY (INHALATION) at 21:01

## 2018-03-30 RX ADMIN — CITALOPRAM HYDROBROMIDE 60 MG: 20 TABLET ORAL at 09:37

## 2018-03-30 RX ADMIN — MINOCYCLINE HYDROCHLORIDE 100 MG: 50 CAPSULE ORAL at 09:37

## 2018-03-30 RX ADMIN — BUSPIRONE HYDROCHLORIDE 10 MG: 10 TABLET ORAL at 20:52

## 2018-03-30 RX ADMIN — OXYCODONE HYDROCHLORIDE AND ACETAMINOPHEN 1 TABLET: 10; 325 TABLET ORAL at 15:45

## 2018-03-30 RX ADMIN — ISOSORBIDE MONONITRATE 30 MG: 30 TABLET, EXTENDED RELEASE ORAL at 09:37

## 2018-03-30 RX ADMIN — GABAPENTIN 800 MG: 400 CAPSULE ORAL at 05:16

## 2018-03-30 RX ADMIN — GUAIFENESIN 600 MG: 600 TABLET, EXTENDED RELEASE ORAL at 20:51

## 2018-03-30 RX ADMIN — BUPROPION HYDROCHLORIDE 150 MG: 150 TABLET, EXTENDED RELEASE ORAL at 09:37

## 2018-03-30 RX ADMIN — BUDESONIDE AND FORMOTEROL FUMARATE DIHYDRATE 2 PUFF: 80; 4.5 AEROSOL RESPIRATORY (INHALATION) at 09:10

## 2018-03-30 RX ADMIN — HYDRALAZINE HYDROCHLORIDE 50 MG: 50 TABLET, FILM COATED ORAL at 20:51

## 2018-03-30 RX ADMIN — APIXABAN 5 MG: 5 TABLET, FILM COATED ORAL at 20:51

## 2018-03-30 RX ADMIN — OXYCODONE HYDROCHLORIDE AND ACETAMINOPHEN 1 TABLET: 10; 325 TABLET ORAL at 11:46

## 2018-03-30 RX ADMIN — APIXABAN 5 MG: 5 TABLET, FILM COATED ORAL at 09:37

## 2018-03-30 RX ADMIN — OXYCODONE HYDROCHLORIDE AND ACETAMINOPHEN 1 TABLET: 10; 325 TABLET ORAL at 00:43

## 2018-03-30 RX ADMIN — HYDRALAZINE HYDROCHLORIDE 50 MG: 50 TABLET, FILM COATED ORAL at 09:37

## 2018-03-30 RX ADMIN — GABAPENTIN 800 MG: 400 CAPSULE ORAL at 20:53

## 2018-03-30 RX ADMIN — OXYCODONE HYDROCHLORIDE AND ACETAMINOPHEN 1 TABLET: 10; 325 TABLET ORAL at 05:16

## 2018-03-31 VITALS
SYSTOLIC BLOOD PRESSURE: 145 MMHG | TEMPERATURE: 98.3 F | DIASTOLIC BLOOD PRESSURE: 90 MMHG | RESPIRATION RATE: 18 BRPM | HEART RATE: 94 BPM | WEIGHT: 293 LBS | OXYGEN SATURATION: 94 % | HEIGHT: 67 IN | BODY MASS INDEX: 45.99 KG/M2

## 2018-03-31 LAB
ANION GAP SERPL CALCULATED.3IONS-SCNC: 3 MMOL/L (ref 3–11)
BUN BLD-MCNC: 20 MG/DL (ref 9–23)
BUN/CREAT SERPL: 22.2 (ref 7–25)
CALCIUM SPEC-SCNC: 9.4 MG/DL (ref 8.7–10.4)
CHLORIDE SERPL-SCNC: 111 MMOL/L (ref 99–109)
CO2 SERPL-SCNC: 24 MMOL/L (ref 20–31)
CREAT BLD-MCNC: 0.9 MG/DL (ref 0.6–1.3)
DEPRECATED RDW RBC AUTO: 50.3 FL (ref 37–54)
ERYTHROCYTE [DISTWIDTH] IN BLOOD BY AUTOMATED COUNT: 14.6 % (ref 11.3–14.5)
GFR SERPL CREATININE-BSD FRML MDRD: 65 ML/MIN/1.73
GLUCOSE BLD-MCNC: 88 MG/DL (ref 70–100)
GLUCOSE BLDC GLUCOMTR-MCNC: 106 MG/DL (ref 70–130)
GLUCOSE BLDC GLUCOMTR-MCNC: 111 MG/DL (ref 70–130)
HCT VFR BLD AUTO: 31.3 % (ref 34.5–44)
HGB BLD-MCNC: 9.7 G/DL (ref 11.5–15.5)
MCH RBC QN AUTO: 29.3 PG (ref 27–31)
MCHC RBC AUTO-ENTMCNC: 31 G/DL (ref 32–36)
MCV RBC AUTO: 94.6 FL (ref 80–99)
PLATELET # BLD AUTO: 198 10*3/MM3 (ref 150–450)
PMV BLD AUTO: 9.2 FL (ref 6–12)
POTASSIUM BLD-SCNC: 5 MMOL/L (ref 3.5–5.5)
RBC # BLD AUTO: 3.31 10*6/MM3 (ref 3.89–5.14)
SODIUM BLD-SCNC: 138 MMOL/L (ref 132–146)
WBC NRBC COR # BLD: 7.96 10*3/MM3 (ref 3.5–10.8)

## 2018-03-31 PROCEDURE — 82962 GLUCOSE BLOOD TEST: CPT

## 2018-03-31 PROCEDURE — 94640 AIRWAY INHALATION TREATMENT: CPT

## 2018-03-31 PROCEDURE — 80048 BASIC METABOLIC PNL TOTAL CA: CPT | Performed by: PHYSICIAN ASSISTANT

## 2018-03-31 PROCEDURE — 25010000002 DIPHENHYDRAMINE PER 50 MG: Performed by: FAMILY MEDICINE

## 2018-03-31 PROCEDURE — 85027 COMPLETE CBC AUTOMATED: CPT | Performed by: PHYSICIAN ASSISTANT

## 2018-03-31 PROCEDURE — 94799 UNLISTED PULMONARY SVC/PX: CPT

## 2018-03-31 PROCEDURE — 25010000002 ONDANSETRON PER 1 MG: Performed by: PHYSICIAN ASSISTANT

## 2018-03-31 PROCEDURE — 99239 HOSP IP/OBS DSCHRG MGMT >30: CPT | Performed by: NURSE PRACTITIONER

## 2018-03-31 RX ORDER — MINOCYCLINE HYDROCHLORIDE 100 MG/1
100 CAPSULE ORAL EVERY 12 HOURS SCHEDULED
Qty: 12 CAPSULE | Refills: 0 | Status: SHIPPED | OUTPATIENT
Start: 2018-03-31 | End: 2018-04-06

## 2018-03-31 RX ORDER — OXYCODONE AND ACETAMINOPHEN 10; 325 MG/1; MG/1
1 TABLET ORAL EVERY 4 HOURS PRN
Qty: 18 TABLET | Refills: 0 | Status: SHIPPED | COMMUNITY
Start: 2018-03-31 | End: 2018-04-03

## 2018-03-31 RX ADMIN — LISINOPRIL 20 MG: 20 TABLET ORAL at 08:18

## 2018-03-31 RX ADMIN — HYDRALAZINE HYDROCHLORIDE 50 MG: 50 TABLET, FILM COATED ORAL at 08:18

## 2018-03-31 RX ADMIN — GABAPENTIN 800 MG: 400 CAPSULE ORAL at 05:30

## 2018-03-31 RX ADMIN — GUAIFENESIN 600 MG: 600 TABLET, EXTENDED RELEASE ORAL at 08:19

## 2018-03-31 RX ADMIN — ONDANSETRON 4 MG: 2 INJECTION INTRAMUSCULAR; INTRAVENOUS at 14:04

## 2018-03-31 RX ADMIN — OXYCODONE HYDROCHLORIDE AND ACETAMINOPHEN 1 TABLET: 10; 325 TABLET ORAL at 14:02

## 2018-03-31 RX ADMIN — APIXABAN 5 MG: 5 TABLET, FILM COATED ORAL at 08:19

## 2018-03-31 RX ADMIN — OXYCODONE HYDROCHLORIDE AND ACETAMINOPHEN 1 TABLET: 10; 325 TABLET ORAL at 09:33

## 2018-03-31 RX ADMIN — MINOCYCLINE HYDROCHLORIDE 100 MG: 50 CAPSULE ORAL at 08:19

## 2018-03-31 RX ADMIN — ISOSORBIDE MONONITRATE 30 MG: 30 TABLET, EXTENDED RELEASE ORAL at 08:18

## 2018-03-31 RX ADMIN — CITALOPRAM HYDROBROMIDE 60 MG: 20 TABLET ORAL at 08:19

## 2018-03-31 RX ADMIN — OXYCODONE HYDROCHLORIDE AND ACETAMINOPHEN 1 TABLET: 10; 325 TABLET ORAL at 05:34

## 2018-03-31 RX ADMIN — BUDESONIDE AND FORMOTEROL FUMARATE DIHYDRATE 2 PUFF: 80; 4.5 AEROSOL RESPIRATORY (INHALATION) at 08:10

## 2018-03-31 RX ADMIN — GABAPENTIN 800 MG: 400 CAPSULE ORAL at 14:02

## 2018-03-31 RX ADMIN — OXYCODONE HYDROCHLORIDE AND ACETAMINOPHEN 1 TABLET: 10; 325 TABLET ORAL at 00:50

## 2018-03-31 RX ADMIN — DIPHENHYDRAMINE HYDROCHLORIDE 25 MG: 50 INJECTION INTRAMUSCULAR; INTRAVENOUS at 02:17

## 2018-03-31 RX ADMIN — BUSPIRONE HYDROCHLORIDE 10 MG: 10 TABLET ORAL at 08:18

## 2018-03-31 RX ADMIN — BUPROPION HYDROCHLORIDE 150 MG: 150 TABLET, EXTENDED RELEASE ORAL at 08:19

## 2018-04-02 RX ORDER — APIXABAN 5 MG/1
TABLET, FILM COATED ORAL
Qty: 60 TABLET | Refills: 0 | Status: SHIPPED | OUTPATIENT
Start: 2018-04-02 | End: 2018-05-03 | Stop reason: SDUPTHER

## 2018-04-03 ENCOUNTER — TRANSITIONAL CARE MANAGEMENT TELEPHONE ENCOUNTER (OUTPATIENT)
Dept: FAMILY MEDICINE CLINIC | Facility: CLINIC | Age: 53
End: 2018-04-03

## 2018-04-03 NOTE — OUTREACH NOTE
INDIAR call completed.  Please refer to TCM call flowsheet for call documentation.  Patient will call back to schedule appointment when she is able to coordinate transportation.  INDIRA/TCM is applicable until 4/14/18.

## 2018-04-03 NOTE — OUTREACH NOTE
INDIRA CALL NOT COMPLETED.  INDIRA CALL NOT NEEDED.  PATIENT IS BEING SEEN WITHIN 48 BUSINESS HOURS OF D/C FROM HOSPITAL.  INDIRA/TCM APPLICABLE UNTIL 4/14/18.

## 2018-04-10 NOTE — TELEPHONE ENCOUNTER
Please call her to remind her to follow-up for appointment since she was hospitalized and for diabetes.

## 2018-04-30 ENCOUNTER — OFFICE VISIT (OUTPATIENT)
Dept: FAMILY MEDICINE CLINIC | Facility: CLINIC | Age: 53
End: 2018-04-30

## 2018-04-30 VITALS
DIASTOLIC BLOOD PRESSURE: 84 MMHG | HEART RATE: 76 BPM | HEIGHT: 67 IN | BODY MASS INDEX: 45.99 KG/M2 | RESPIRATION RATE: 18 BRPM | WEIGHT: 293 LBS | SYSTOLIC BLOOD PRESSURE: 128 MMHG | TEMPERATURE: 97.2 F

## 2018-04-30 DIAGNOSIS — I10 ESSENTIAL HYPERTENSION: ICD-10-CM

## 2018-04-30 DIAGNOSIS — E11.65 UNCONTROLLED TYPE 2 DIABETES MELLITUS WITH HYPERGLYCEMIA, WITHOUT LONG-TERM CURRENT USE OF INSULIN (HCC): ICD-10-CM

## 2018-04-30 DIAGNOSIS — L03.116 CELLULITIS OF LEFT LOWER EXTREMITY: Primary | ICD-10-CM

## 2018-04-30 DIAGNOSIS — N28.9 RENAL INSUFFICIENCY: ICD-10-CM

## 2018-04-30 DIAGNOSIS — I26.99 OTHER PULMONARY EMBOLISM WITHOUT ACUTE COR PULMONALE, UNSPECIFIED CHRONICITY (HCC): ICD-10-CM

## 2018-04-30 LAB
ALBUMIN SERPL-MCNC: 3.7 G/DL (ref 3.2–4.8)
ALBUMIN/GLOB SERPL: 1.4 G/DL (ref 1.5–2.5)
ALP SERPL-CCNC: 103 U/L (ref 25–100)
ALT SERPL-CCNC: 16 U/L (ref 7–40)
AST SERPL-CCNC: 9 U/L (ref 0–33)
BILIRUB SERPL-MCNC: 0.2 MG/DL (ref 0.3–1.2)
BUN SERPL-MCNC: 25 MG/DL (ref 9–23)
BUN/CREAT SERPL: 27.8 (ref 7–25)
CALCIUM SERPL-MCNC: 9.2 MG/DL (ref 8.7–10.4)
CHLORIDE SERPL-SCNC: 112 MMOL/L (ref 99–109)
CO2 SERPL-SCNC: 26 MMOL/L (ref 20–31)
CREAT SERPL-MCNC: 0.9 MG/DL (ref 0.6–1.3)
GFR SERPLBLD CREATININE-BSD FMLA CKD-EPI: 65 ML/MIN/1.73
GFR SERPLBLD CREATININE-BSD FMLA CKD-EPI: 79 ML/MIN/1.73
GLOBULIN SER CALC-MCNC: 2.7 GM/DL
GLUCOSE SERPL-MCNC: 146 MG/DL (ref 70–100)
POTASSIUM SERPL-SCNC: 5.5 MMOL/L (ref 3.5–5.5)
PROT SERPL-MCNC: 6.4 G/DL (ref 5.7–8.2)
SODIUM SERPL-SCNC: 141 MMOL/L (ref 132–146)

## 2018-04-30 PROCEDURE — 99214 OFFICE O/P EST MOD 30 MIN: CPT | Performed by: FAMILY MEDICINE

## 2018-04-30 RX ORDER — HYDRALAZINE HYDROCHLORIDE 50 MG/1
50 TABLET, FILM COATED ORAL 3 TIMES DAILY
Qty: 90 TABLET | Refills: 5 | Status: SHIPPED | OUTPATIENT
Start: 2018-04-30 | End: 2018-09-13 | Stop reason: SDUPTHER

## 2018-04-30 NOTE — PROGRESS NOTES
Assessment/Plan       Problems Addressed this Visit        Cardiovascular and Mediastinum    Essential hypertension    Relevant Medications    hydrALAZINE (APRESOLINE) 50 MG tablet    Other Relevant Orders    Comprehensive Metabolic Panel       Endocrine    Uncontrolled type 2 diabetes mellitus with hyperglycemia, without long-term current use of insulin       Other    Cellulitis of left lower extremity - Primary      Other Visit Diagnoses     Other pulmonary embolism without acute cor pulmonale, unspecified chronicity        Renal insufficiency        Relevant Orders    Comprehensive Metabolic Panel            Follow up: Return in about 3 months (around 7/30/2018).     DISCUSSION  Cellulitis of lower extremities.  Improving.  Follow-up with infectious disease as to be scheduled.    Diabetes mellitus type 2.  A1c was reviewed from hospitalization.    Hypertension.  Refilled medication.  Check CMP.    History of pulmonary embolism.  Stable.  On Eliquis.  Consider CT scan angiogram if renal situation has improved.  Further plan once we have labs back.      MEDICATIONS PRESCRIBED  Requested Prescriptions     Signed Prescriptions Disp Refills   • hydrALAZINE (APRESOLINE) 50 MG tablet 90 tablet 5     Sig: Take 1 tablet by mouth 3 (Three) Times a Day.            -------------------------------------------    Subjective     Chief Complaint   Patient presents with   • Follow-up     cellulitis         History of Present Illness    Cellulitis and renal failure  HAd gone to  ER  Left leg was huge and swelling  Wears stockings daily  Had labs done  K was increased  Lasix + Renal Failure and admitted  Admitted 3/27 and d/c 3/31/2018    On IV Zosyn in hospital    Now: some soreness in legs but much better  No antibiotic now. Finished them, minocin    Doing better now    Hospital Course:  Dani Ziegler is a 52 y.o. female a past medical history significant for COPD, DM, HTN, PE, DVT, and chronic pain who presented to the  emergency department with complaints of bilateral lower extremity erythema and pain.  The patient stated that she was recently treated by Dr. Leon with infectious disease for a bilateral lower extremity cellulitis.  Her symptoms were improved until 2 days prior to presentation when she started having pain while walking and noticed some developing redness.  The patient notes that she was recently hospitalized at [facility] with what she believed to be COPD but wasn't told it was congestive heart failure just prior to her discharge.  She noted that she was discharged home on oral Levaquin which she completed the day prior to her presentation.  She reports a fever of 101.2 at home as well as sore throat and myalgias.  Upon arrival to the emergency department, she was found to have a recurrent bilateral lower extremity cellulitis as well as hyperkalemia.  She was admitted to Hospital medicine services for further evaluation.  She was started on IV vancomycin and Zosyn in the emergency department and was seen by Dr. Leon with infectious disease for further evaluation.  She was switched from IV antibiotics to oral minocycline and Dr. Leon.  The patient did have a venous duplex which was negative for DVT.  She is chronically anticoagulated due to her previous history of PE and DVT.  The patient was given Kayexalate in the emergency department for her hyperkalemia.  Her potassium improved and was monitored closely during her hospitalization.  She does have a history of chronic kidney disease and was noted to have an AK I likely due to diuresis and Levaquin from her previous hospitalization.  This did improve with IV fluids.  Her Lasix was discontinued.  She was placed on sliding scale insulin during this hospitalization but can be discharged back on her metformin (which she states she takes but is not listed in her med rec).  The patient's cellulitis did improve on her current treatment.  She will be discharged  "home today and will follow-up with Dr. Leon in the office per his recommendations.  She will complete a course of minocycline per Dr. Leon's recommendation.  She'll need to follow-up with her primary care provider at the first available hospital follow-up appointment    ---------------------------    Shortness of breath  Sleeps with oxygen  Breathing neb BID  No Chest pain   No swelling now    --------------------------  DM 2  Metformin XR was tried and caused diarrhea  Went back to the regular metformin  Going to go back to the MEtformin XR tonight and see if stomach issues again  Sugars 101-130 in am  other times 150-179    Tob  1 cig per day, 5 yesterday      Past Medical History,Medications, Allergies, and social history was reviewed.      Review of Systems   Constitutional: Negative.    HENT: Negative.    Respiratory: Negative.  Negative for shortness of breath.    Cardiovascular: Positive for leg swelling. Negative for chest pain.   Gastrointestinal: Negative.    Musculoskeletal: Negative.    Neurological: Negative.    Psychiatric/Behavioral: Negative.        Objective     Vitals:    04/30/18 1240   BP: 128/84   Pulse: 76   Resp: 18   Temp: 97.2 °F (36.2 °C)   Weight: (!) 147 kg (325 lb)   Height: 170.2 cm (67\")          Physical Exam   Constitutional: She is oriented to person, place, and time. She appears well-developed and well-nourished.   HENT:   Head: Normocephalic and atraumatic.   Right Ear: Hearing, tympanic membrane, external ear and ear canal normal.   Left Ear: Hearing, tympanic membrane, external ear and ear canal normal.   Mouth/Throat: Oropharynx is clear and moist.   Eyes: Conjunctivae and EOM are normal. Pupils are equal, round, and reactive to light.   Neck: Normal range of motion. Neck supple. No thyromegaly present.   Cardiovascular: Normal rate, regular rhythm and normal heart sounds.  Exam reveals no gallop and no friction rub.    No murmur heard.  Pulmonary/Chest: Effort normal and " breath sounds normal. No respiratory distress. She has no wheezes. She has no rales.   Abdominal: Soft. Bowel sounds are normal. She exhibits no distension. There is no tenderness. There is no rebound and no guarding.   Musculoskeletal: She exhibits no edema.   Neurological: She is alert and oriented to person, place, and time.   Skin: Skin is warm and dry.   Psychiatric: She has a normal mood and affect. Her behavior is normal.   Nursing note and vitals reviewed.              Darrion Tovar MD

## 2018-05-11 ENCOUNTER — TELEPHONE (OUTPATIENT)
Dept: FAMILY MEDICINE CLINIC | Facility: CLINIC | Age: 53
End: 2018-05-11

## 2018-05-11 DIAGNOSIS — J30.9 ACUTE ALLERGIC RHINITIS, UNSPECIFIED SEASONALITY, UNSPECIFIED TRIGGER: Primary | ICD-10-CM

## 2018-05-11 RX ORDER — LORATADINE 10 MG/1
10 TABLET ORAL DAILY
Qty: 30 TABLET | Refills: 5 | Status: SHIPPED | OUTPATIENT
Start: 2018-05-11 | End: 2018-10-11 | Stop reason: SDUPTHER

## 2018-05-11 NOTE — TELEPHONE ENCOUNTER
----- Message from Arlene Victoria sent at 5/11/2018 10:43 AM EDT -----  Contact: SHIELDS  PATIENT ASKING FOR SOMETHING FOR HER ALLERGIES:    EYES ITCHING AND BURNING    [facility]

## 2018-05-29 DIAGNOSIS — I10 ESSENTIAL HYPERTENSION: ICD-10-CM

## 2018-05-29 RX ORDER — LISINOPRIL 20 MG/1
TABLET ORAL
Qty: 30 TABLET | Refills: 0 | Status: SHIPPED | OUTPATIENT
Start: 2018-05-29 | End: 2018-07-20 | Stop reason: SDUPTHER

## 2018-05-29 RX ORDER — METFORMIN HYDROCHLORIDE 1000 MG/1
TABLET, FILM COATED, EXTENDED RELEASE ORAL
Qty: 60 TABLET | Refills: 0 | Status: SHIPPED | OUTPATIENT
Start: 2018-05-29 | End: 2018-07-23 | Stop reason: SDUPTHER

## 2018-06-25 ENCOUNTER — TELEPHONE (OUTPATIENT)
Dept: FAMILY MEDICINE CLINIC | Facility: CLINIC | Age: 53
End: 2018-06-25

## 2018-06-25 ENCOUNTER — OFFICE VISIT (OUTPATIENT)
Dept: FAMILY MEDICINE CLINIC | Facility: CLINIC | Age: 53
End: 2018-06-25

## 2018-06-25 VITALS
BODY MASS INDEX: 45.99 KG/M2 | DIASTOLIC BLOOD PRESSURE: 80 MMHG | TEMPERATURE: 98.4 F | HEIGHT: 67 IN | HEART RATE: 76 BPM | SYSTOLIC BLOOD PRESSURE: 124 MMHG | RESPIRATION RATE: 18 BRPM | WEIGHT: 293 LBS

## 2018-06-25 DIAGNOSIS — F41.9 ANXIETY: Primary | ICD-10-CM

## 2018-06-25 DIAGNOSIS — S81.801A WOUND OF RIGHT LOWER EXTREMITY, INITIAL ENCOUNTER: Primary | ICD-10-CM

## 2018-06-25 DIAGNOSIS — F43.21 GRIEF REACTION: ICD-10-CM

## 2018-06-25 DIAGNOSIS — F41.9 ANXIETY: ICD-10-CM

## 2018-06-25 PROCEDURE — 99213 OFFICE O/P EST LOW 20 MIN: CPT | Performed by: PHYSICIAN ASSISTANT

## 2018-06-25 RX ORDER — CEPHALEXIN 500 MG/1
500 CAPSULE ORAL 2 TIMES DAILY
Qty: 20 CAPSULE | Refills: 0 | Status: SHIPPED | OUTPATIENT
Start: 2018-06-25 | End: 2018-07-12

## 2018-06-25 RX ORDER — LORAZEPAM 0.5 MG/1
.5-1 TABLET ORAL EVERY 8 HOURS PRN
Qty: 20 TABLET | Refills: 0 | Status: SHIPPED | OUTPATIENT
Start: 2018-06-25 | End: 2018-12-11

## 2018-06-25 NOTE — TELEPHONE ENCOUNTER
PT WAS IN AND SEEN MARKIE AND LET US KNOW HER MOTHER PAST AWAY. PT WAS ASKING IF WE COULD SEND HER ATIVAN IN TO HELP CALM HER NERVES. MARKIE WAS GOING TO SEND MESSAGE BUT PHARM. CLOSES AT 6 PM AND PT WANTED TO TRY TO GET THIS EVENING. PLEASE ADVISE.

## 2018-06-25 NOTE — PROGRESS NOTES
Subjective   Dani Ziegler is a 52 y.o. female.     History of Present Illness   Last week had small nick to RLE from her dog's claw. Bled a little then scabbed. Early this am, her dog licked her leg and she looked down and notices she was gushing blood from her leg. So alarmed she called 911. She stuck finger in wound to stop the bleeding. EMS arrived and evaluated her, by that time bleeding had stopped. Offered to take her to the ER or she could f/u here. Area scabbed now, no additional bleeding. Is developing a ring of erythema and some warmth. Hx of cellulitis.   Currently on eliquis. Denies any additional bleeding    Recently lost her mother, very anxious and tearful     The following portions of the patient's history were reviewed and updated as appropriate: allergies, current medications, past family history, past medical history, past social history, past surgical history and problem list.    Review of Systems   Constitutional: Negative.  Negative for chills, diaphoresis, fatigue and fever.   HENT: Negative.  Negative for congestion, ear discharge, ear pain, hearing loss, nosebleeds, postnasal drip, sinus pressure, sneezing and sore throat.    Eyes: Negative.    Respiratory: Negative.  Negative for cough, chest tightness, shortness of breath and wheezing.    Cardiovascular: Negative.  Negative for chest pain, palpitations and leg swelling.   Gastrointestinal: Negative for abdominal distention, abdominal pain, anal bleeding, blood in stool, constipation, diarrhea, nausea, rectal pain and vomiting.   Endocrine: Negative.  Negative for cold intolerance, heat intolerance, polydipsia, polyphagia and polyuria.   Genitourinary: Negative.  Negative for difficulty urinating, dysuria, flank pain, frequency, hematuria and urgency.   Musculoskeletal: Negative.  Negative for arthralgias, back pain, gait problem, joint swelling, myalgias, neck pain and neck stiffness.   Skin: Positive for wound. Negative for color change,  "pallor and rash.   Allergic/Immunologic: Negative.  Negative for immunocompromised state.   Neurological: Negative for dizziness, syncope, weakness, light-headedness, numbness and headaches.   Hematological: Negative for adenopathy. Bruises/bleeds easily.   Psychiatric/Behavioral: Positive for dysphoric mood and sleep disturbance. Negative for behavioral problems, confusion, self-injury and suicidal ideas. The patient is nervous/anxious.        Objective    Blood pressure 124/80, pulse 76, temperature 98.4 °F (36.9 °C), resp. rate 18, height 170.2 cm (67\"), weight (!) 144 kg (318 lb).     Physical Exam   Constitutional: She is oriented to person, place, and time. She appears well-developed and well-nourished.   HENT:   Head: Normocephalic and atraumatic.   Right Ear: Tympanic membrane, external ear and ear canal normal.   Left Ear: Tympanic membrane, external ear and ear canal normal.   Nose: Nose normal.   Mouth/Throat: Oropharynx is clear and moist. No oropharyngeal exudate.   Eyes: Conjunctivae and EOM are normal. Pupils are equal, round, and reactive to light.   Neck: Normal range of motion. Neck supple. No tracheal deviation present. No thyromegaly present.   Cardiovascular: Normal rate, regular rhythm, normal heart sounds and intact distal pulses.  Exam reveals no gallop and no friction rub.    No murmur heard.  Pulmonary/Chest: Effort normal and breath sounds normal. No respiratory distress. She has no wheezes. She has no rales. She exhibits no tenderness.   Lymphadenopathy:     She has no cervical adenopathy.   Neurological: She is alert and oriented to person, place, and time. She has normal reflexes.   Skin: Skin is warm and dry.   RLE scabbed lesion with surrounding erythema and warmth. No streaking. No active bleeding or drainage.    Psychiatric: She has a normal mood and affect. Her behavior is normal. Judgment and thought content normal.   Nursing note and vitals reviewed.      Assessment/Plan   Dani " was seen today for wound check.    Diagnoses and all orders for this visit:    Wound of right lower extremity, initial encounter  -     cephalexin (KEFLEX) 500 MG capsule; Take 1 capsule by mouth 2 (Two) Times a Day.    BMI 45.0-49.9, adult    Anxiety    Grief reaction     wound cleansed, abx ointment applied, and wrapped with nonadherent bandage and wrap. Will cover with keflex. F/U if new or worsening symptoms develop. Report to ER if significant bleeding returns.     Pt report lorazepam helps when she is this anxious. Will defer to her PCP.

## 2018-07-12 ENCOUNTER — OFFICE VISIT (OUTPATIENT)
Dept: FAMILY MEDICINE CLINIC | Facility: CLINIC | Age: 53
End: 2018-07-12

## 2018-07-12 VITALS
TEMPERATURE: 98 F | BODY MASS INDEX: 45.99 KG/M2 | WEIGHT: 293 LBS | HEIGHT: 67 IN | RESPIRATION RATE: 24 BRPM | SYSTOLIC BLOOD PRESSURE: 120 MMHG | DIASTOLIC BLOOD PRESSURE: 80 MMHG | HEART RATE: 88 BPM

## 2018-07-12 DIAGNOSIS — I26.99 OTHER PULMONARY EMBOLISM WITHOUT ACUTE COR PULMONALE, UNSPECIFIED CHRONICITY (HCC): ICD-10-CM

## 2018-07-12 DIAGNOSIS — R00.0 TACHYCARDIA: ICD-10-CM

## 2018-07-12 DIAGNOSIS — J44.1 COPD WITH ACUTE EXACERBATION (HCC): ICD-10-CM

## 2018-07-12 DIAGNOSIS — R05.9 COUGH: Primary | ICD-10-CM

## 2018-07-12 DIAGNOSIS — Z86.711 HISTORY OF PULMONARY EMBOLUS (PE): ICD-10-CM

## 2018-07-12 DIAGNOSIS — R10.9 SIDE PAIN: ICD-10-CM

## 2018-07-12 DIAGNOSIS — J04.0 LARYNGITIS: ICD-10-CM

## 2018-07-12 LAB
ALBUMIN SERPL-MCNC: 3.76 G/DL (ref 3.2–4.8)
ALBUMIN/GLOB SERPL: 1.5 G/DL (ref 1.5–2.5)
ALP SERPL-CCNC: 102 U/L (ref 25–100)
ALT SERPL-CCNC: 16 U/L (ref 7–40)
AST SERPL-CCNC: 11 U/L (ref 0–33)
BASOPHILS # BLD AUTO: 0.03 10*3/MM3 (ref 0–0.2)
BASOPHILS NFR BLD AUTO: 0.2 % (ref 0–1)
BILIRUB SERPL-MCNC: 0.3 MG/DL (ref 0.3–1.2)
BUN SERPL-MCNC: 16 MG/DL (ref 9–23)
BUN/CREAT SERPL: 17.6 (ref 7–25)
CALCIUM SERPL-MCNC: 8.7 MG/DL (ref 8.7–10.4)
CHLORIDE SERPL-SCNC: 105 MMOL/L (ref 99–109)
CO2 SERPL-SCNC: 26 MMOL/L (ref 20–31)
CREAT SERPL-MCNC: 0.91 MG/DL (ref 0.6–1.3)
D DIMER PPP FEU-MCNC: 0.42 MG/L (FEU) (ref 0–0.5)
EOSINOPHIL # BLD AUTO: 0.14 10*3/MM3 (ref 0–0.3)
EOSINOPHIL NFR BLD AUTO: 1 % (ref 0–3)
ERYTHROCYTE [DISTWIDTH] IN BLOOD BY AUTOMATED COUNT: 14.6 % (ref 11.3–14.5)
GLOBULIN SER CALC-MCNC: 2.5 GM/DL
GLUCOSE SERPL-MCNC: 95 MG/DL (ref 70–100)
HCT VFR BLD AUTO: 41.5 % (ref 34.5–44)
HGB BLD-MCNC: 13 G/DL (ref 11.5–15.5)
IMM GRANULOCYTES # BLD: 0.25 10*3/MM3 (ref 0–0.03)
IMM GRANULOCYTES NFR BLD: 1.7 % (ref 0–0.6)
LYMPHOCYTES # BLD AUTO: 3.8 10*3/MM3 (ref 0.6–4.8)
LYMPHOCYTES NFR BLD AUTO: 26.2 % (ref 24–44)
MCH RBC QN AUTO: 28.6 PG (ref 27–31)
MCHC RBC AUTO-ENTMCNC: 31.3 G/DL (ref 32–36)
MCV RBC AUTO: 91.2 FL (ref 80–99)
MONOCYTES # BLD AUTO: 0.74 10*3/MM3 (ref 0–1)
MONOCYTES NFR BLD AUTO: 5.1 % (ref 0–12)
NEUTROPHILS # BLD AUTO: 9.56 10*3/MM3 (ref 1.5–8.3)
NEUTROPHILS NFR BLD AUTO: 65.8 % (ref 41–71)
PLATELET # BLD AUTO: 367 10*3/MM3 (ref 150–450)
POTASSIUM SERPL-SCNC: 4.2 MMOL/L (ref 3.5–5.5)
PROT SERPL-MCNC: 6.3 G/DL (ref 5.7–8.2)
RBC # BLD AUTO: 4.55 10*6/MM3 (ref 3.89–5.14)
SODIUM SERPL-SCNC: 139 MMOL/L (ref 132–146)
WBC # BLD AUTO: 14.52 10*3/MM3 (ref 3.5–10.8)

## 2018-07-12 PROCEDURE — 99214 OFFICE O/P EST MOD 30 MIN: CPT | Performed by: PHYSICIAN ASSISTANT

## 2018-07-12 RX ORDER — DOXYCYCLINE 100 MG/1
100 CAPSULE ORAL EVERY 12 HOURS SCHEDULED
Qty: 20 CAPSULE | Refills: 0 | Status: SHIPPED | OUTPATIENT
Start: 2018-07-12 | End: 2018-09-25

## 2018-07-12 RX ORDER — APIXABAN 5 MG/1
TABLET, FILM COATED ORAL
Qty: 60 TABLET | Refills: 0 | OUTPATIENT
Start: 2018-07-12

## 2018-07-12 NOTE — PROGRESS NOTES
Subjective   Dani Ziegler is a 52 y.o. female.     History of Present Illness   Pt presents with CC of laryngitis, sore throat, cough, R since Saturday   R sided back pain that wraps around side. Hurts with deep breath.  Improving each day   Hx of PE, on Eliquis. Was suppose to have repeat CTA several weeks ago but had some personal issues arise. Agreeable to have this done.   Went to ER twice with recent symptoms. Given steroid burst. Did chest XR which was reportedly normal     The following portions of the patient's history were reviewed and updated as appropriate: allergies, current medications, past family history, past medical history, past social history, past surgical history and problem list.    Review of Systems   Constitutional: Negative.  Negative for chills, diaphoresis, fatigue and fever.   HENT: Positive for congestion, sore throat and voice change. Negative for ear discharge, ear pain, hearing loss, nosebleeds, postnasal drip, sinus pressure and sneezing.    Eyes: Negative.    Respiratory: Positive for cough and shortness of breath. Negative for chest tightness and wheezing.    Cardiovascular: Negative.  Negative for chest pain, palpitations and leg swelling.   Gastrointestinal: Negative for abdominal distention, abdominal pain, anal bleeding, blood in stool, constipation, diarrhea, nausea, rectal pain and vomiting.   Endocrine: Negative.  Negative for cold intolerance, heat intolerance, polydipsia, polyphagia and polyuria.   Genitourinary: Negative.  Negative for difficulty urinating, dysuria, flank pain, frequency, hematuria and urgency.   Musculoskeletal: Positive for back pain. Negative for arthralgias, gait problem, joint swelling, myalgias, neck pain and neck stiffness.   Skin: Negative.  Negative for color change, pallor, rash and wound.   Allergic/Immunologic: Negative.  Negative for immunocompromised state.   Neurological: Negative for dizziness, syncope, weakness, light-headedness, numbness  "and headaches.   Hematological: Negative.  Negative for adenopathy. Does not bruise/bleed easily.   Psychiatric/Behavioral: Negative.  Negative for behavioral problems, confusion, self-injury, sleep disturbance and suicidal ideas. The patient is not nervous/anxious.        Objective    Blood pressure 120/80, pulse 88, temperature 98 °F (36.7 °C), resp. rate 24, height 170.2 cm (67.01\"), weight (!) 140 kg (309 lb 8 oz).     Physical Exam   Constitutional: She is oriented to person, place, and time. She appears well-developed and well-nourished.   HENT:   Head: Normocephalic and atraumatic.   Right Ear: External ear normal.   Left Ear: External ear normal.   Nose: Nose normal.   Mouth/Throat: Oropharynx is clear and moist. No oropharyngeal exudate.   Eyes: Conjunctivae and EOM are normal. Pupils are equal, round, and reactive to light.   Neck: Normal range of motion. Neck supple. No tracheal deviation present. No thyromegaly present.   Cardiovascular: Normal rate, regular rhythm, normal heart sounds and intact distal pulses.  Exam reveals no gallop and no friction rub.    No murmur heard.  Pulmonary/Chest: Effort normal. No respiratory distress. She has wheezes. She has rhonchi. She has no rales. She exhibits no tenderness.   Musculoskeletal:        Arms:  Lymphadenopathy:     She has no cervical adenopathy.   Neurological: She is alert and oriented to person, place, and time. She has normal reflexes.   Skin: Skin is warm and dry.   Psychiatric: She has a normal mood and affect. Her behavior is normal. Judgment and thought content normal.   Nursing note and vitals reviewed.      Assessment/Plan   Dani was seen today for pain in r side mid back and r side rib pain and cough & hoarsesness.    Diagnoses and all orders for this visit:    Cough  -     D-dimer, Quantitative  -     CBC & Differential  -     Comprehensive Metabolic Panel  -     CT angiogram chest w contrast    Laryngitis    Side pain  -     D-dimer, " Quantitative  -     CT angiogram chest w contrast    Tachycardia  -     D-dimer, Quantitative  -     CT angiogram chest w contrast    History of pulmonary embolus (PE)  -     D-dimer, Quantitative    COPD with acute exacerbation (CMS/HCC)  -     doxycycline (MONODOX) 100 MG capsule; Take 1 capsule by mouth Every 12 (Twelve) Hours.    Other pulmonary embolism without acute cor pulmonale, unspecified chronicity (CMS/HCC)  -     apixaban (ELIQUIS) 5 MG tablet tablet; Take 1 tablet by mouth 2 (Two) Times a Day.  -     CT angiogram chest w contrast      Labs as outlined in plan. Will repeat CTA due to hx of recent PE and current symptoms. Report to ER if any new or worsening symptoms   Continue eliquis at this time pending imaging   Treatment as outlined in plan

## 2018-07-13 ENCOUNTER — TELEPHONE (OUTPATIENT)
Dept: FAMILY MEDICINE CLINIC | Facility: CLINIC | Age: 53
End: 2018-07-13

## 2018-07-13 RX ORDER — ALBUTEROL SULFATE 90 UG/1
1-2 AEROSOL, METERED RESPIRATORY (INHALATION) EVERY 4 HOURS PRN
Qty: 18 G | Refills: 2 | Status: SHIPPED | OUTPATIENT
Start: 2018-07-13 | End: 2018-10-11 | Stop reason: SDUPTHER

## 2018-07-13 NOTE — TELEPHONE ENCOUNTER
----- Message from Homa Perez sent at 7/13/2018 10:29 AM EDT -----  Contact: [facility]; MED REFILL   MED REFILL     VENTOLIN  (90 BASE) MCG/ACT inhaler INHALE 1 OR 2 PUFFS BY MOUTH EVERY 4 TO 6 HOURS AS NEEDED FOR COUGH/WHEEZING        [facility]

## 2018-07-20 ENCOUNTER — HOSPITAL ENCOUNTER (OUTPATIENT)
Dept: CT IMAGING | Facility: HOSPITAL | Age: 53
Discharge: HOME | End: 2018-07-20
Admitting: PHYSICIAN ASSISTANT

## 2018-07-20 DIAGNOSIS — I10 ESSENTIAL HYPERTENSION: ICD-10-CM

## 2018-07-20 PROCEDURE — 0 IOPAMIDOL PER 1 ML: Performed by: PHYSICIAN ASSISTANT

## 2018-07-20 PROCEDURE — 71275 CT ANGIOGRAPHY CHEST: CPT

## 2018-07-20 RX ORDER — LISINOPRIL 20 MG/1
TABLET ORAL
Qty: 30 TABLET | Refills: 0 | Status: SHIPPED | OUTPATIENT
Start: 2018-07-20 | End: 2018-08-16 | Stop reason: SDUPTHER

## 2018-07-20 RX ADMIN — IOPAMIDOL 95 ML: 755 INJECTION, SOLUTION INTRAVENOUS at 14:30

## 2018-07-23 RX ORDER — METFORMIN HYDROCHLORIDE 1000 MG/1
TABLET, FILM COATED, EXTENDED RELEASE ORAL
Qty: 60 TABLET | Refills: 2 | Status: SHIPPED | OUTPATIENT
Start: 2018-07-23 | End: 2018-10-02 | Stop reason: SDUPTHER

## 2018-07-31 ENCOUNTER — OFFICE VISIT (OUTPATIENT)
Dept: FAMILY MEDICINE CLINIC | Facility: CLINIC | Age: 53
End: 2018-07-31

## 2018-07-31 VITALS
HEIGHT: 67 IN | SYSTOLIC BLOOD PRESSURE: 142 MMHG | HEART RATE: 100 BPM | RESPIRATION RATE: 24 BRPM | TEMPERATURE: 96.8 F | WEIGHT: 293 LBS | DIASTOLIC BLOOD PRESSURE: 98 MMHG | BODY MASS INDEX: 45.99 KG/M2

## 2018-07-31 DIAGNOSIS — R20.0 NUMBNESS: ICD-10-CM

## 2018-07-31 DIAGNOSIS — B37.0 ORAL THRUSH: Primary | ICD-10-CM

## 2018-07-31 DIAGNOSIS — B35.6 TINEA CRURIS: ICD-10-CM

## 2018-07-31 PROCEDURE — 99214 OFFICE O/P EST MOD 30 MIN: CPT | Performed by: FAMILY MEDICINE

## 2018-07-31 RX ORDER — NYSTATIN 100000 U/G
CREAM TOPICAL 2 TIMES DAILY
Qty: 30 G | Refills: 2 | Status: SHIPPED | OUTPATIENT
Start: 2018-07-31 | End: 2018-09-28 | Stop reason: SDUPTHER

## 2018-07-31 NOTE — PROGRESS NOTES
Assessment/Plan       Problems Addressed this Visit     None      Visit Diagnoses     Oral thrush    -  Primary    Relevant Medications    nystatin (MYCOSTATIN) 956238 UNIT/ML suspension    Tinea cruris        Relevant Medications    nystatin (MYCOSTATIN) 327215 UNIT/GM cream    Numbness                Follow up: Return in about 1 month (around 8/31/2018).     DISCUSSION  Oral thrush and tinea cruris.  Start nystatin as noted.  Had been on antibiotics recently which may have caused her to develop this.    Numbness of left buttock.  May be related to position.  If not improving, she is less know.  She also reports some rectal irritation which also could be yeast related since all started the same time.  She will try the nystatin cream in that area as well but if not improving, will need examination.    Follow-up in one month for regular checkup and blood work.      MEDICATIONS PRESCRIBED  Requested Prescriptions     Signed Prescriptions Disp Refills   • nystatin (MYCOSTATIN) 399626 UNIT/ML suspension 473 mL 0     Sig: Swish and swallow 5 mL 4 (Four) Times a Day.   • nystatin (MYCOSTATIN) 066043 UNIT/GM cream 30 g 2     Sig: Apply  topically to the appropriate area as directed 2 (Two) Times a Day.               -------------------------------------------    Subjective     Chief Complaint   Patient presents with   • Oral Swelling     with white patches, sore   • Numbness     buttocks. It is painful to wipe   • Sore     She has sores under her abd         History of Present Illness    Tongue pain   Started yesterday  + pain   Had been keflex and doxycyline  Able to swallow okay.  Seems to be confined to the tongue.        Abd sores  Sores on belly  Lower abd  Come and go  Using neosporin  + rash      Numbness  Friday, fell asleep in recliner  Developed pain on bottom  Left cheek is numb  Pain with certain ways  No sore there  Hurts to wipe bottom  Burning to wipe.       Has lost 24 pounds since April  Has to force  "self to eat        History   Smoking Status   • Current Every Day Smoker   Smokeless Tobacco   • Never Used        Past Medical History,Medications, Allergies, and social history was reviewed.      Review of Systems   Constitutional: Negative.    HENT: Negative.    Respiratory: Positive for shortness of breath (chronic) and wheezing.    Cardiovascular: Negative.    Skin: Positive for rash.   Psychiatric/Behavioral: Negative.        Objective     Vitals:    07/31/18 1150 07/31/18 1208   BP: (!) 144/102 142/98   Pulse: 100    Resp: 24    Temp: 96.8 °F (36 °C)    Weight: (!) 137 kg (301 lb)    Height: 170.2 cm (67\")           Physical Exam   Constitutional: She is oriented to person, place, and time. She appears well-developed and well-nourished.   Obese   HENT:   Head: Normocephalic and atraumatic.   Right Ear: Hearing and external ear normal.   Left Ear: Hearing and external ear normal.   Mouth/Throat: Uvula is midline. Oropharyngeal exudate (along tongue, white patches consistent with thrush) present.   Eyes: Pupils are equal, round, and reactive to light. Conjunctivae and EOM are normal.   Cardiovascular: Normal rate, regular rhythm and normal heart sounds.    Pulmonary/Chest: Effort normal. No respiratory distress. She has wheezes (expiratory).   Neurological: She is alert and oriented to person, place, and time.   Skin: Skin is warm.   Under the pannus, there is erythema consistent with yeast infection.  Some satellite lesions.  No evidence of secondary infection or cellulitis.   Psychiatric: She has a normal mood and affect. Her behavior is normal.   Nursing note and vitals reviewed.                Darrion Tovar MD    "

## 2018-08-03 ENCOUNTER — TELEPHONE (OUTPATIENT)
Dept: FAMILY MEDICINE CLINIC | Facility: CLINIC | Age: 53
End: 2018-08-03

## 2018-08-03 RX ORDER — FLUCONAZOLE 150 MG/1
TABLET ORAL
Qty: 2 TABLET | Refills: 0 | Status: SHIPPED | OUTPATIENT
Start: 2018-08-03 | End: 2018-09-13

## 2018-08-03 NOTE — TELEPHONE ENCOUNTER
PLease call, I sent in Diflucan 150 mg x 1 and repeat in 3 days. May still need to do the cream.     She must take just 1/2 of the celexa while on this since there is an interaction.

## 2018-08-03 NOTE — TELEPHONE ENCOUNTER
----- Message from Clarita Carroll sent at 8/3/2018  3:35 PM EDT -----  Contact: [facility] / PT CALL   PT HAS THRUSH AND YEAST INFECTION CAN A ONE DAY RX BE CALLED IN?  - SHE CANNOT GET HER CREAM UNTIL Wednesday.     PRABHU CHAVES    PT CALL [phone]

## 2018-08-16 DIAGNOSIS — I10 ESSENTIAL HYPERTENSION: ICD-10-CM

## 2018-08-16 DIAGNOSIS — F41.1 GENERALIZED ANXIETY DISORDER: ICD-10-CM

## 2018-08-16 RX ORDER — GUAIFENESIN 600 MG/1
1200 TABLET, EXTENDED RELEASE ORAL EVERY 12 HOURS SCHEDULED
Qty: 60 TABLET | Refills: 2 | Status: SHIPPED | OUTPATIENT
Start: 2018-08-16 | End: 2018-12-11

## 2018-08-17 RX ORDER — BUSPIRONE HYDROCHLORIDE 10 MG/1
TABLET ORAL
Qty: 60 TABLET | Refills: 0 | Status: SHIPPED | OUTPATIENT
Start: 2018-08-17 | End: 2018-09-14 | Stop reason: SDUPTHER

## 2018-08-17 RX ORDER — LISINOPRIL 20 MG/1
TABLET ORAL
Qty: 30 TABLET | Refills: 0 | Status: SHIPPED | OUTPATIENT
Start: 2018-08-17 | End: 2018-09-14 | Stop reason: SDUPTHER

## 2018-09-10 ENCOUNTER — TELEPHONE (OUTPATIENT)
Dept: FAMILY MEDICINE CLINIC | Facility: CLINIC | Age: 53
End: 2018-09-10

## 2018-09-10 RX ORDER — AMOXICILLIN 500 MG/1
1000 CAPSULE ORAL
Qty: 40 CAPSULE | Refills: 0 | Status: SHIPPED | OUTPATIENT
Start: 2018-09-10 | End: 2018-09-25

## 2018-09-10 NOTE — TELEPHONE ENCOUNTER
Spoke with patient, she stated that she has had this since Saturday morning with a headache and vomited on Saturday. She said that her pain is behind her eyes and nose, her fever yesterday was 101.8 but is gone today. She didn't pay attention to her drainage to see if it was colored or not. She has been having a little bit of a cough and what she coughs up is a light colored green.

## 2018-09-10 NOTE — TELEPHONE ENCOUNTER
Please call, will send in Amoxicillin 500 mg 2 in am and 2 in pm. Office visit if not getting better. Sent to [facility]

## 2018-09-10 NOTE — TELEPHONE ENCOUNTER
----- Message from Homa Perez sent at 9/10/2018  3:42 PM EDT -----  Contact: ALYSON; MED REFILL   PT CALLED IN STATED THAT SHE HAS A SINUS INFECTION AND SHE IS NEEDIING  SOME ANTIBIOTICS.   SHE DOES NOT HAVE A WAY TO GET TO THE OFFICE   CALL BACK   [phone]

## 2018-09-13 DIAGNOSIS — I10 ESSENTIAL HYPERTENSION: ICD-10-CM

## 2018-09-13 DIAGNOSIS — F34.1 DYSTHYMIA: ICD-10-CM

## 2018-09-13 RX ORDER — HYDRALAZINE HYDROCHLORIDE 50 MG/1
TABLET, FILM COATED ORAL
Qty: 90 TABLET | Refills: 0 | Status: SHIPPED | OUTPATIENT
Start: 2018-09-13 | End: 2018-11-08 | Stop reason: SDUPTHER

## 2018-09-13 RX ORDER — CITALOPRAM 40 MG/1
TABLET ORAL
Qty: 45 TABLET | Refills: 0 | Status: SHIPPED | OUTPATIENT
Start: 2018-09-13 | End: 2018-11-08 | Stop reason: SDUPTHER

## 2018-09-14 DIAGNOSIS — F41.1 GENERALIZED ANXIETY DISORDER: ICD-10-CM

## 2018-09-14 DIAGNOSIS — I10 ESSENTIAL HYPERTENSION: ICD-10-CM

## 2018-09-14 RX ORDER — BUSPIRONE HYDROCHLORIDE 10 MG/1
TABLET ORAL
Qty: 60 TABLET | Refills: 0 | Status: SHIPPED | OUTPATIENT
Start: 2018-09-14 | End: 2018-10-11 | Stop reason: SDUPTHER

## 2018-09-14 RX ORDER — LISINOPRIL 20 MG/1
TABLET ORAL
Qty: 30 TABLET | Refills: 0 | Status: SHIPPED | OUTPATIENT
Start: 2018-09-14 | End: 2018-10-11 | Stop reason: SDUPTHER

## 2018-09-25 ENCOUNTER — OFFICE VISIT (OUTPATIENT)
Dept: FAMILY MEDICINE CLINIC | Facility: CLINIC | Age: 53
End: 2018-09-25

## 2018-09-25 ENCOUNTER — RESULTS ENCOUNTER (OUTPATIENT)
Dept: FAMILY MEDICINE CLINIC | Facility: CLINIC | Age: 53
End: 2018-09-25

## 2018-09-25 VITALS
HEIGHT: 67 IN | BODY MASS INDEX: 45.99 KG/M2 | WEIGHT: 293 LBS | TEMPERATURE: 97.5 F | OXYGEN SATURATION: 99 % | DIASTOLIC BLOOD PRESSURE: 92 MMHG | SYSTOLIC BLOOD PRESSURE: 164 MMHG | RESPIRATION RATE: 26 BRPM | HEART RATE: 95 BPM

## 2018-09-25 DIAGNOSIS — G89.4 CHRONIC PAIN SYNDROME: ICD-10-CM

## 2018-09-25 DIAGNOSIS — Z79.899 HIGH RISK MEDICATION USE: ICD-10-CM

## 2018-09-25 DIAGNOSIS — N30.00 ACUTE CYSTITIS WITHOUT HEMATURIA: Primary | ICD-10-CM

## 2018-09-25 DIAGNOSIS — M51.9 LUMBAR DISC DISEASE: ICD-10-CM

## 2018-09-25 LAB
BILIRUB BLD-MCNC: ABNORMAL MG/DL
CLARITY, POC: CLEAR
COLOR UR: ABNORMAL
GLUCOSE UR STRIP-MCNC: NEGATIVE MG/DL
KETONES UR QL: NEGATIVE
LEUKOCYTE EST, POC: ABNORMAL
NITRITE UR-MCNC: POSITIVE MG/ML
PH UR: 7 [PH] (ref 5–8)
PROT UR STRIP-MCNC: ABNORMAL MG/DL
RBC # UR STRIP: NEGATIVE /UL
SP GR UR: 1.01 (ref 1–1.03)
UROBILINOGEN UR QL: ABNORMAL

## 2018-09-25 PROCEDURE — 99214 OFFICE O/P EST MOD 30 MIN: CPT | Performed by: FAMILY MEDICINE

## 2018-09-25 PROCEDURE — 81003 URINALYSIS AUTO W/O SCOPE: CPT | Performed by: FAMILY MEDICINE

## 2018-09-25 RX ORDER — OXYCODONE AND ACETAMINOPHEN 10; 325 MG/1; MG/1
1 TABLET ORAL EVERY 8 HOURS PRN
Qty: 45 TABLET | Refills: 0 | Status: SHIPPED | OUTPATIENT
Start: 2018-09-25 | End: 2018-12-11

## 2018-09-25 RX ORDER — SULFAMETHOXAZOLE AND TRIMETHOPRIM 800; 160 MG/1; MG/1
1 TABLET ORAL 2 TIMES DAILY
Qty: 14 TABLET | Refills: 0 | Status: SHIPPED | OUTPATIENT
Start: 2018-09-25 | End: 2018-10-31

## 2018-09-25 RX ORDER — GABAPENTIN 800 MG/1
800 TABLET ORAL 3 TIMES DAILY
Qty: 45 TABLET | Refills: 0 | Status: SHIPPED | OUTPATIENT
Start: 2018-09-25 | End: 2018-12-11

## 2018-09-25 NOTE — PROGRESS NOTES
Assessment/Plan       Problems Addressed this Visit        Other    Chronic pain    Relevant Medications    oxyCODONE-acetaminophen (PERCOCET)  MG per tablet    gabapentin (NEURONTIN) 800 MG tablet    Other Relevant Orders    Pain Management Profile (13 Drugs) Urine - Urine, Clean Catch    Ambulatory Referral to Pain Management      Other Visit Diagnoses     Acute cystitis without hematuria    -  Primary    Relevant Medications    sulfamethoxazole-trimethoprim (BACTRIM DS) 800-160 MG per tablet    Other Relevant Orders    POC Urinalysis Dipstick, Automated (Completed)    Urine Culture - Urine, Urine, Clean Catch    Lumbar disc disease        Relevant Medications    oxyCODONE-acetaminophen (PERCOCET)  MG per tablet    gabapentin (NEURONTIN) 800 MG tablet    Other Relevant Orders    Pain Management Profile (13 Drugs) Urine - Urine, Clean Catch    Ambulatory Referral to Pain Management    High risk medication use        Relevant Orders    Pain Management Profile (13 Drugs) Urine - Urine, Clean Catch            Follow up: No Follow-up on file.     DISCUSSION  Urinary tract infection.  Treat with Bactrim.  Check culture.  Call with any increasing pain, fever, chills, nausea or vomiting.    Chronic pain due to lumbar disc disease.  Check urine drug screen.  Patient denies any misuse of medications.  She admits that she had taken some of her mother's methadone after she had passed away and this came up on the drug screen with pain clinic and they subsequently dismissed her.  She is having worsening pain since being off of her medication this week.  We will refer to pain management here in [place].  She is eager to try injections again.  In the meantime, I have given her 2 weeks of pain medication and gabapentin until drug screen is back.  A pain management agreement was signed and she is aware of the consequences of not following this.  She has been on chronic pain medications for some time and aware of  the complications and issues related to chronic pain medication use.          MEDICATIONS PRESCRIBED  Requested Prescriptions     Signed Prescriptions Disp Refills   • oxyCODONE-acetaminophen (PERCOCET)  MG per tablet 45 tablet 0     Sig: Take 1 tablet by mouth Every 8 (Eight) Hours As Needed for Moderate Pain .   • gabapentin (NEURONTIN) 800 MG tablet 45 tablet 0     Sig: Take 1 tablet by mouth 3 (Three) Times a Day.   • sulfamethoxazole-trimethoprim (BACTRIM DS) 800-160 MG per tablet 14 tablet 0     Sig: Take 1 tablet by mouth 2 (Two) Times a Day.            Alden dated on 9/25/2018  was requested but pending manual process.    -------------------------------------------    Subjective     Chief Complaint   Patient presents with   • Urinary Tract Infection   • Pain         Urinary Tract Infection    This is a new problem. The current episode started today. The problem occurs every urination. The problem has been unchanged. The quality of the pain is described as burning. The pain is moderate. There has been no fever. Associated symptoms include chills and frequency. Pertinent negatives include no flank pain, nausea or vomiting. Hematuria: after urine. Her past medical history is significant for recurrent UTIs (last UTI was June 8).   Pain   Associated symptoms include arthralgias and chills. Pertinent negatives include no nausea or vomiting.       Chronic pain      Lost pain clinic    Has not had gabapentin for 6 days    Took her mother's methadone and pain clinic dismissed her. Last month  Last methadone was 4-5 weeks ago    Last percocet (piece was yesterday)  Gabapentin was 6 days  No other pain med or narcotic  No lorazepam    Rx was  Percocet 10 3-4 per day   Gabapentin 800 mg tid    Without meds, +++ pain in feet  Pain in back and hips  Chronic lumbar back pain   Has had injections in the past              History   Smoking Status   • Current Every Day Smoker   Smokeless Tobacco   • Never Used     "    Past Medical History,Medications, Allergies, and social history was reviewed.      Review of Systems   Constitutional: Positive for chills.   HENT: Negative.    Respiratory: Negative.    Cardiovascular: Negative.    Gastrointestinal: Negative.  Negative for nausea and vomiting.   Genitourinary: Positive for frequency. Negative for flank pain. Hematuria: after urine.   Musculoskeletal: Positive for arthralgias and back pain.   Psychiatric/Behavioral: Positive for sleep disturbance. The patient is nervous/anxious.        Objective     Vitals:    09/25/18 1531   BP: 164/92   Pulse: 95   Resp: 26   Temp: 97.5 °F (36.4 °C)   TempSrc: Temporal Artery    SpO2: 99%   Weight: 136 kg (299 lb)   Height: 170.2 cm (67.01\")          Physical Exam   Constitutional: She is oriented to person, place, and time. She appears well-developed and well-nourished.   HENT:   Head: Normocephalic and atraumatic.   Right Ear: Hearing and external ear normal.   Left Ear: Hearing and external ear normal.   Eyes: Pupils are equal, round, and reactive to light. Conjunctivae and EOM are normal.   Cardiovascular: Normal rate, regular rhythm and normal heart sounds.  Exam reveals no friction rub.    No murmur heard.  Pulmonary/Chest: Effort normal and breath sounds normal. No respiratory distress. She has no wheezes. She has no rales.   Abdominal: Soft. Bowel sounds are normal. She exhibits no distension. There is tenderness (suprapubic). There is no guarding.   Musculoskeletal:        Lumbar back: She exhibits decreased range of motion and tenderness.   Neurological: She is alert and oriented to person, place, and time. Gait (antalgic) abnormal.   Straight leg raising is negative bilaterally   Skin: Skin is warm.   Psychiatric: She has a normal mood and affect. Her behavior is normal.   Nursing note and vitals reviewed.      See urinalysis          Darrion Tovar MD    "

## 2018-09-28 DIAGNOSIS — B35.6 TINEA CRURIS: ICD-10-CM

## 2018-09-28 LAB
BACTERIA UR CULT: ABNORMAL
BACTERIA UR CULT: ABNORMAL
OTHER ANTIBIOTIC SUSC ISLT: ABNORMAL

## 2018-09-28 RX ORDER — NYSTATIN 100000 U/G
CREAM TOPICAL
Qty: 30 G | Refills: 0 | Status: SHIPPED | OUTPATIENT
Start: 2018-09-28 | End: 2019-06-28

## 2018-10-02 RX ORDER — METFORMIN HYDROCHLORIDE 1000 MG/1
TABLET, FILM COATED, EXTENDED RELEASE ORAL
Qty: 60 TABLET | Refills: 0 | Status: SHIPPED | OUTPATIENT
Start: 2018-10-02 | End: 2018-11-28 | Stop reason: SDUPTHER

## 2018-10-11 DIAGNOSIS — F41.1 GENERALIZED ANXIETY DISORDER: ICD-10-CM

## 2018-10-11 DIAGNOSIS — J30.9 ACUTE ALLERGIC RHINITIS: ICD-10-CM

## 2018-10-11 DIAGNOSIS — I10 ESSENTIAL HYPERTENSION: ICD-10-CM

## 2018-10-11 RX ORDER — LORATADINE 10 MG/1
TABLET ORAL
Qty: 30 TABLET | Refills: 0 | Status: SHIPPED | OUTPATIENT
Start: 2018-10-11 | End: 2019-06-28

## 2018-10-11 RX ORDER — ALBUTEROL SULFATE 90 UG/1
AEROSOL, METERED RESPIRATORY (INHALATION)
Qty: 18 G | Refills: 0 | Status: SHIPPED | OUTPATIENT
Start: 2018-10-11 | End: 2018-11-08 | Stop reason: SDUPTHER

## 2018-10-11 RX ORDER — BUSPIRONE HYDROCHLORIDE 10 MG/1
TABLET ORAL
Qty: 60 TABLET | Refills: 0 | Status: SHIPPED | OUTPATIENT
Start: 2018-10-11 | End: 2018-11-08 | Stop reason: SDUPTHER

## 2018-10-11 RX ORDER — LISINOPRIL 20 MG/1
TABLET ORAL
Qty: 30 TABLET | Refills: 0 | Status: SHIPPED | OUTPATIENT
Start: 2018-10-11 | End: 2018-11-08 | Stop reason: SDUPTHER

## 2018-10-31 ENCOUNTER — OFFICE VISIT (OUTPATIENT)
Dept: FAMILY MEDICINE CLINIC | Facility: CLINIC | Age: 53
End: 2018-10-31

## 2018-10-31 VITALS
TEMPERATURE: 98.2 F | DIASTOLIC BLOOD PRESSURE: 56 MMHG | RESPIRATION RATE: 24 BRPM | HEART RATE: 115 BPM | WEIGHT: 293 LBS | HEIGHT: 67 IN | OXYGEN SATURATION: 97 % | SYSTOLIC BLOOD PRESSURE: 104 MMHG | BODY MASS INDEX: 45.99 KG/M2

## 2018-10-31 DIAGNOSIS — B02.9 HERPES ZOSTER WITHOUT COMPLICATION: ICD-10-CM

## 2018-10-31 DIAGNOSIS — Z23 NEED FOR INFLUENZA VACCINATION: ICD-10-CM

## 2018-10-31 DIAGNOSIS — L08.9 INFECTED SKIN LESION: Primary | ICD-10-CM

## 2018-10-31 PROCEDURE — 90674 CCIIV4 VAC NO PRSV 0.5 ML IM: CPT | Performed by: PHYSICIAN ASSISTANT

## 2018-10-31 PROCEDURE — 99213 OFFICE O/P EST LOW 20 MIN: CPT | Performed by: PHYSICIAN ASSISTANT

## 2018-10-31 PROCEDURE — G0008 ADMIN INFLUENZA VIRUS VAC: HCPCS | Performed by: PHYSICIAN ASSISTANT

## 2018-10-31 RX ORDER — OXYCODONE HYDROCHLORIDE AND ACETAMINOPHEN 5; 325 MG/1; MG/1
TABLET ORAL
Refills: 0 | COMMUNITY
Start: 2018-10-27 | End: 2018-12-11

## 2018-10-31 RX ORDER — SULFAMETHOXAZOLE AND TRIMETHOPRIM 800; 160 MG/1; MG/1
1 TABLET ORAL 2 TIMES DAILY
Qty: 14 TABLET | Refills: 0 | Status: SHIPPED | OUTPATIENT
Start: 2018-10-31 | End: 2018-12-11

## 2018-10-31 RX ORDER — VALACYCLOVIR HYDROCHLORIDE 1 G/1
1000 TABLET, FILM COATED ORAL 3 TIMES DAILY
Qty: 21 TABLET | Refills: 0 | Status: SHIPPED | OUTPATIENT
Start: 2018-10-31 | End: 2021-01-20

## 2018-10-31 NOTE — PROGRESS NOTES
"Albertina Ziegler is a 53 y.o. female.     History of Present Illness   Left buttock skin lesions  Burning, red, itching   Comes and goes for the last few years, usually about twice per year  Sometimes has pus   Has been scratching so has been swollen and some drainage.   No n/v/d/f  Requests flu shot today     The following portions of the patient's history were reviewed and updated as appropriate: allergies, current medications, past family history, past medical history, past social history, past surgical history and problem list.    Review of Systems   Constitutional: Negative.  Negative for chills, diaphoresis, fatigue and fever.   HENT: Negative.  Negative for congestion, ear discharge, ear pain, hearing loss, nosebleeds, postnasal drip, sinus pressure, sneezing and sore throat.    Eyes: Negative.    Respiratory: Negative.  Negative for cough, chest tightness, shortness of breath and wheezing.    Cardiovascular: Negative.  Negative for chest pain, palpitations and leg swelling.   Gastrointestinal: Negative for abdominal distention, abdominal pain, anal bleeding, blood in stool, constipation, diarrhea, nausea, rectal pain and vomiting.   Genitourinary: Negative.  Negative for difficulty urinating, dysuria, flank pain, frequency, hematuria and urgency.   Skin:        As noted per HPI   Allergic/Immunologic: Negative.  Negative for immunocompromised state.   Neurological: Negative for dizziness, syncope, weakness, light-headedness, numbness and headaches.   Hematological: Negative.  Negative for adenopathy. Does not bruise/bleed easily.       Objective    Blood pressure 104/56, pulse 115, temperature 98.2 °F (36.8 °C), temperature source Temporal Artery , resp. rate 24, height 170.2 cm (67.01\"), weight 136 kg (299 lb), SpO2 97 %.     Physical Exam   Constitutional: She is oriented to person, place, and time. She appears well-developed and well-nourished.   HENT:   Head: Normocephalic and atraumatic. "   Right Ear: External ear normal.   Left Ear: External ear normal.   Nose: Nose normal.   Mouth/Throat: No oropharyngeal exudate.   Eyes: Conjunctivae are normal.   Neck: Normal range of motion. Neck supple. No tracheal deviation present. No thyromegaly present.   Cardiovascular: Normal rate, regular rhythm, normal heart sounds and intact distal pulses.    Pulmonary/Chest: Effort normal and breath sounds normal. No respiratory distress. She has no wheezes. She has no rales. She exhibits no tenderness.   Lymphadenopathy:     She has no cervical adenopathy.   Neurological: She is alert and oriented to person, place, and time. She has normal reflexes.   Skin: Skin is warm and dry.        Psychiatric: She has a normal mood and affect. Her behavior is normal. Judgment and thought content normal.   Nursing note and vitals reviewed.      Assessment/Plan   Dani was seen today for spot on bottom and flu vaccine.    Diagnoses and all orders for this visit:    Infected skin lesion  -     sulfamethoxazole-trimethoprim (BACTRIM DS,SEPTRA DS) 800-160 MG per tablet; Take 1 tablet by mouth 2 (Two) Times a Day.  -     mupirocin (BACTROBAN) 2 % ointment; Apply  topically to the appropriate area as directed 3 (Three) Times a Day.    Herpes zoster without complication  -     valACYclovir (VALTREX) 1000 MG tablet; Take 1 tablet by mouth 3 (Three) Times a Day.    Need for influenza vaccination  -     Flucelvax Quad=>4Years (0362-4173)    BMI 45.0-49.9, adult (CMS/[facility])      Suspect recurring shingles rash, also looks acutely infected. Wound care discussed. Begin treatment as outlined in plan. Shingles shot encouraged once resolved.   Flu shot administered with patient's consent

## 2018-11-05 ENCOUNTER — TRANSCRIBE ORDERS (OUTPATIENT)
Dept: LAB | Facility: HOSPITAL | Age: 53
End: 2018-11-05

## 2018-11-05 ENCOUNTER — LAB (OUTPATIENT)
Dept: LAB | Facility: HOSPITAL | Age: 53
End: 2018-11-05

## 2018-11-05 DIAGNOSIS — L03.032 ABSCESS OF FIFTH TOENAIL OF LEFT FOOT: ICD-10-CM

## 2018-11-05 DIAGNOSIS — E11.42 DIABETIC POLYNEUROPATHY ASSOCIATED WITH TYPE 2 DIABETES MELLITUS (HCC): ICD-10-CM

## 2018-11-05 DIAGNOSIS — L03.116 CELLULITIS OF LEFT FOOT: ICD-10-CM

## 2018-11-05 DIAGNOSIS — E66.01 MORBID OBESITY (HCC): ICD-10-CM

## 2018-11-05 DIAGNOSIS — L03.032 ABSCESS OF FIFTH TOENAIL OF LEFT FOOT: Primary | ICD-10-CM

## 2018-11-05 LAB
ALBUMIN SERPL-MCNC: 3.99 G/DL (ref 3.2–4.8)
ALBUMIN/GLOB SERPL: 1.3 G/DL (ref 1.5–2.5)
ALP SERPL-CCNC: 120 U/L (ref 25–100)
ALT SERPL W P-5'-P-CCNC: 13 U/L (ref 7–40)
ANION GAP SERPL CALCULATED.3IONS-SCNC: 5 MMOL/L (ref 3–11)
AST SERPL-CCNC: 10 U/L (ref 0–33)
BASOPHILS # BLD AUTO: 0.03 10*3/MM3 (ref 0–0.2)
BASOPHILS NFR BLD AUTO: 0.2 % (ref 0–1)
BILIRUB SERPL-MCNC: 0.3 MG/DL (ref 0.3–1.2)
BUN BLD-MCNC: 19 MG/DL (ref 9–23)
BUN/CREAT SERPL: 17.1 (ref 7–25)
CALCIUM SPEC-SCNC: 9.6 MG/DL (ref 8.7–10.4)
CHLORIDE SERPL-SCNC: 104 MMOL/L (ref 99–109)
CO2 SERPL-SCNC: 25 MMOL/L (ref 20–31)
CREAT BLD-MCNC: 1.11 MG/DL (ref 0.6–1.3)
CRP SERPL-MCNC: 1.76 MG/DL (ref 0–1)
DEPRECATED RDW RBC AUTO: 47.4 FL (ref 37–54)
EOSINOPHIL # BLD AUTO: 0.22 10*3/MM3 (ref 0–0.3)
EOSINOPHIL NFR BLD AUTO: 1.7 % (ref 0–3)
ERYTHROCYTE [DISTWIDTH] IN BLOOD BY AUTOMATED COUNT: 14.8 % (ref 11.3–14.5)
ERYTHROCYTE [SEDIMENTATION RATE] IN BLOOD: 57 MM/HR (ref 0–30)
GFR SERPL CREATININE-BSD FRML MDRD: 51 ML/MIN/1.73
GLOBULIN UR ELPH-MCNC: 3 GM/DL
GLUCOSE BLD-MCNC: 102 MG/DL (ref 70–100)
HCT VFR BLD AUTO: 41.9 % (ref 34.5–44)
HGB BLD-MCNC: 13.5 G/DL (ref 11.5–15.5)
IMM GRANULOCYTES # BLD: 0.1 10*3/MM3 (ref 0–0.03)
IMM GRANULOCYTES NFR BLD: 0.8 % (ref 0–0.6)
LYMPHOCYTES # BLD AUTO: 3.85 10*3/MM3 (ref 0.6–4.8)
LYMPHOCYTES NFR BLD AUTO: 29.7 % (ref 24–44)
MCH RBC QN AUTO: 28.4 PG (ref 27–31)
MCHC RBC AUTO-ENTMCNC: 32.2 G/DL (ref 32–36)
MCV RBC AUTO: 88 FL (ref 80–99)
MONOCYTES # BLD AUTO: 0.65 10*3/MM3 (ref 0–1)
MONOCYTES NFR BLD AUTO: 5 % (ref 0–12)
NEUTROPHILS # BLD AUTO: 8.2 10*3/MM3 (ref 1.5–8.3)
NEUTROPHILS NFR BLD AUTO: 63.4 % (ref 41–71)
PLATELET # BLD AUTO: 391 10*3/MM3 (ref 150–450)
PMV BLD AUTO: 10.2 FL (ref 6–12)
POTASSIUM BLD-SCNC: 4.9 MMOL/L (ref 3.5–5.5)
PROT SERPL-MCNC: 7 G/DL (ref 5.7–8.2)
RBC # BLD AUTO: 4.76 10*6/MM3 (ref 3.89–5.14)
SODIUM BLD-SCNC: 134 MMOL/L (ref 132–146)
URATE SERPL-MCNC: 6.5 MG/DL (ref 3.1–7.8)
WBC NRBC COR # BLD: 12.95 10*3/MM3 (ref 3.5–10.8)

## 2018-11-05 PROCEDURE — 84550 ASSAY OF BLOOD/URIC ACID: CPT

## 2018-11-05 PROCEDURE — 36415 COLL VENOUS BLD VENIPUNCTURE: CPT

## 2018-11-05 PROCEDURE — 85652 RBC SED RATE AUTOMATED: CPT

## 2018-11-05 PROCEDURE — 85025 COMPLETE CBC W/AUTO DIFF WBC: CPT

## 2018-11-05 PROCEDURE — 80053 COMPREHEN METABOLIC PANEL: CPT

## 2018-11-05 PROCEDURE — 86140 C-REACTIVE PROTEIN: CPT

## 2018-11-08 DIAGNOSIS — F34.1 DYSTHYMIA: ICD-10-CM

## 2018-11-08 DIAGNOSIS — I10 ESSENTIAL HYPERTENSION: ICD-10-CM

## 2018-11-08 DIAGNOSIS — F41.1 GENERALIZED ANXIETY DISORDER: ICD-10-CM

## 2018-11-08 RX ORDER — HYDRALAZINE HYDROCHLORIDE 50 MG/1
TABLET, FILM COATED ORAL
Qty: 90 TABLET | Refills: 0 | Status: SHIPPED | OUTPATIENT
Start: 2018-11-08 | End: 2019-06-28

## 2018-11-08 RX ORDER — BUPROPION HYDROCHLORIDE 150 MG/1
TABLET, EXTENDED RELEASE ORAL
Qty: 60 TABLET | Refills: 0 | Status: SHIPPED | OUTPATIENT
Start: 2018-11-08 | End: 2018-12-11 | Stop reason: SDUPTHER

## 2018-11-08 RX ORDER — BUSPIRONE HYDROCHLORIDE 10 MG/1
TABLET ORAL
Qty: 60 TABLET | Refills: 0 | Status: SHIPPED | OUTPATIENT
Start: 2018-11-08 | End: 2018-11-28 | Stop reason: SDUPTHER

## 2018-11-08 RX ORDER — ALBUTEROL SULFATE 90 UG/1
AEROSOL, METERED RESPIRATORY (INHALATION)
Qty: 18 G | Refills: 0 | Status: SHIPPED | OUTPATIENT
Start: 2018-11-08 | End: 2021-01-15 | Stop reason: SDUPTHER

## 2018-11-08 RX ORDER — LISINOPRIL 20 MG/1
TABLET ORAL
Qty: 30 TABLET | Refills: 0 | Status: SHIPPED | OUTPATIENT
Start: 2018-11-08 | End: 2018-12-27 | Stop reason: SDUPTHER

## 2018-11-08 RX ORDER — CITALOPRAM 40 MG/1
TABLET ORAL
Qty: 45 TABLET | Refills: 0 | Status: SHIPPED | OUTPATIENT
Start: 2018-11-08 | End: 2019-02-26

## 2018-11-23 DIAGNOSIS — F34.1 DYSTHYMIA: ICD-10-CM

## 2018-11-26 RX ORDER — BUPROPION HYDROCHLORIDE 150 MG/1
TABLET, EXTENDED RELEASE ORAL
Qty: 60 TABLET | Refills: 2 | Status: SHIPPED | OUTPATIENT
Start: 2018-11-26 | End: 2019-02-05 | Stop reason: SDUPTHER

## 2018-11-28 DIAGNOSIS — F41.1 GENERALIZED ANXIETY DISORDER: ICD-10-CM

## 2018-11-28 RX ORDER — BUSPIRONE HYDROCHLORIDE 10 MG/1
TABLET ORAL
Qty: 60 TABLET | Refills: 0 | Status: SHIPPED | OUTPATIENT
Start: 2018-11-28 | End: 2019-02-08 | Stop reason: SDUPTHER

## 2018-11-28 RX ORDER — METFORMIN HYDROCHLORIDE 1000 MG/1
TABLET, FILM COATED, EXTENDED RELEASE ORAL
Qty: 60 TABLET | Refills: 0 | Status: SHIPPED | OUTPATIENT
Start: 2018-11-28 | End: 2018-12-27 | Stop reason: SDUPTHER

## 2018-12-11 ENCOUNTER — OFFICE VISIT (OUTPATIENT)
Dept: FAMILY MEDICINE CLINIC | Facility: CLINIC | Age: 53
End: 2018-12-11

## 2018-12-11 VITALS
HEIGHT: 67 IN | HEART RATE: 93 BPM | RESPIRATION RATE: 22 BRPM | DIASTOLIC BLOOD PRESSURE: 98 MMHG | BODY MASS INDEX: 45.99 KG/M2 | SYSTOLIC BLOOD PRESSURE: 140 MMHG | OXYGEN SATURATION: 93 % | TEMPERATURE: 97 F | WEIGHT: 293 LBS

## 2018-12-11 DIAGNOSIS — N30.00 ACUTE CYSTITIS WITHOUT HEMATURIA: Primary | ICD-10-CM

## 2018-12-11 DIAGNOSIS — I80.01 SUPERFICIAL PHLEBITIS OF LEG, RIGHT: ICD-10-CM

## 2018-12-11 DIAGNOSIS — E11.65 UNCONTROLLED TYPE 2 DIABETES MELLITUS WITH HYPERGLYCEMIA, WITHOUT LONG-TERM CURRENT USE OF INSULIN (HCC): ICD-10-CM

## 2018-12-11 DIAGNOSIS — R30.0 DYSURIA: ICD-10-CM

## 2018-12-11 LAB
BILIRUB BLD-MCNC: NEGATIVE MG/DL
CLARITY, POC: CLEAR
COLOR UR: ABNORMAL
GLUCOSE UR STRIP-MCNC: NEGATIVE MG/DL
KETONES UR QL: NEGATIVE
LEUKOCYTE EST, POC: ABNORMAL
NITRITE UR-MCNC: NEGATIVE MG/ML
PH UR: 6 [PH] (ref 5–8)
PROT UR STRIP-MCNC: ABNORMAL MG/DL
RBC # UR STRIP: NEGATIVE /UL
SP GR UR: 1.02 (ref 1–1.03)
UROBILINOGEN UR QL: NORMAL

## 2018-12-11 PROCEDURE — 81003 URINALYSIS AUTO W/O SCOPE: CPT | Performed by: FAMILY MEDICINE

## 2018-12-11 PROCEDURE — 99214 OFFICE O/P EST MOD 30 MIN: CPT | Performed by: FAMILY MEDICINE

## 2018-12-11 RX ORDER — CEPHALEXIN 500 MG/1
1000 CAPSULE ORAL 2 TIMES DAILY
Qty: 40 CAPSULE | Refills: 0 | Status: SHIPPED | OUTPATIENT
Start: 2018-12-11 | End: 2019-03-26

## 2018-12-11 RX ORDER — OXYCODONE HYDROCHLORIDE AND ACETAMINOPHEN 5; 325 MG/1; MG/1
1 TABLET ORAL EVERY 4 HOURS PRN
Qty: 18 TABLET | Refills: 0 | Status: SHIPPED | OUTPATIENT
Start: 2018-12-11 | End: 2018-12-14 | Stop reason: SDUPTHER

## 2018-12-11 NOTE — PROGRESS NOTES
Assessment/Plan       Problems Addressed this Visit        Endocrine    Uncontrolled type 2 diabetes mellitus with hyperglycemia, without long-term current use of insulin (CMS/[facility])    Relevant Orders    Comprehensive Metabolic Panel    Lipid Panel With / Chol / HDL Ratio    Hemoglobin A1c      Other Visit Diagnoses     Acute cystitis without hematuria    -  Primary    Relevant Medications    cephalexin (KEFLEX) 500 MG capsule    Other Relevant Orders    Urine Culture - Urine, Urine, Clean Catch    Superficial phlebitis of leg, right        Relevant Medications    cephalexin (KEFLEX) 500 MG capsule    oxyCODONE-acetaminophen (PERCOCET) 5-325 MG per tablet    Dysuria        Relevant Orders    POC Urinalysis Dipstick, Automated (Completed)            Follow up: Return if symptoms worsen or fail to improve.     DISCUSSION  Urinary tract infection.  Check urine culture.  Given infection in the right leg, will start Keflex and change antibiotic if needed.    Superficial phlebitis with possible infection and/or abscess.  Start Keflex.  If not improving, may need stronger antibiotic.  Continue warm compresses.    Diabetes mellitus type 2.  Due for blood work.  She will return fasting.  Orders placed.      MEDICATIONS PRESCRIBED  Requested Prescriptions     Signed Prescriptions Disp Refills   • cephalexin (KEFLEX) 500 MG capsule 40 capsule 0     Sig: Take 2 capsules by mouth 2 (Two) Times a Day.   • oxyCODONE-acetaminophen (PERCOCET) 5-325 MG per tablet 18 tablet 0     Sig: Take 1 tablet by mouth Every 4 (Four) Hours As Needed for Moderate Pain  for up to 3 days.            Alden dated on 12/11/2018   was reviewed and appropriate.        -------------------------------------------    Subjective     Chief Complaint   Patient presents with   • Urinary Tract Infection     less than 24 hours    • Cellulitis     right leg         Urinary Tract Infection    This is a new problem. The current episode started yesterday. The  "problem occurs intermittently. The quality of the pain is described as burning. The pain is moderate. There has been no fever. Associated symptoms include nausea and vomiting (due to taking ibuprofen). She has tried nothing for the symptoms. The treatment provided no relief.   Cellulitis   This is a new (Developed inflamed vein on right lower extremity, anteriorly.  Denies injury.  No fever or chills.) problem. The current episode started in the past 7 days. The problem occurs constantly. The problem has been gradually worsening. Associated symptoms include arthralgias, nausea and vomiting (due to taking ibuprofen). Pertinent negatives include no fever. Exacerbated by: Tenderness. She has tried heat for the symptoms. The treatment provided mild relief.       Denies taking any current pain medication since we stopped her pain medication after abnormal drug screen.  Urine drug screen showed evidence of recent Suboxone use.  She denied this and stated that a friend gave her something and was not honest with what it was.        Social History     Tobacco Use   Smoking Status Current Every Day Smoker   Smokeless Tobacco Never Used        Past Medical History,Medications, Allergies, and social history was reviewed.      Review of Systems   Constitutional: Negative.  Negative for fever.   HENT: Negative.    Respiratory: Negative.    Cardiovascular: Negative.    Gastrointestinal: Positive for nausea and vomiting (due to taking ibuprofen).   Genitourinary: Positive for dysuria.   Musculoskeletal: Positive for arthralgias and back pain.       Objective     Vitals:    12/11/18 1629   BP: 140/98   Pulse: 93   Resp: 22   Temp: 97 °F (36.1 °C)   TempSrc: Temporal   SpO2: 93%   Weight: 136 kg (300 lb)   Height: 170.2 cm (67.01\")          Physical Exam   Constitutional: She is oriented to person, place, and time. She appears well-developed and well-nourished.   HENT:   Head: Normocephalic and atraumatic.   Right Ear: Hearing and " external ear normal.   Left Ear: Hearing and external ear normal.   Eyes: Conjunctivae and EOM are normal. Pupils are equal, round, and reactive to light.   Cardiovascular: Normal rate, regular rhythm and normal heart sounds. Exam reveals no friction rub.   No murmur heard.  Pulmonary/Chest: Effort normal and breath sounds normal. No respiratory distress. She has no wheezes. She has no rales.   Abdominal: Soft. Bowel sounds are normal. She exhibits no distension. There is no tenderness.   Neurological: She is alert and oriented to person, place, and time.   Skin: Skin is warm.   Anterior right shin, reveals a swollen area that is erythematous and warm.  Swollen.  No fluctuance.  Appears to be superficial phlebitis..  There is erythema of the surrounding skin and tenderness.   Psychiatric: She has a normal mood and affect. Her behavior is normal.   Nursing note and vitals reviewed.                Darrion Tovar MD

## 2018-12-13 ENCOUNTER — RESULTS ENCOUNTER (OUTPATIENT)
Dept: FAMILY MEDICINE CLINIC | Facility: CLINIC | Age: 53
End: 2018-12-13

## 2018-12-13 DIAGNOSIS — E11.65 UNCONTROLLED TYPE 2 DIABETES MELLITUS WITH HYPERGLYCEMIA, WITHOUT LONG-TERM CURRENT USE OF INSULIN (HCC): ICD-10-CM

## 2018-12-13 LAB
BACTERIA UR CULT: NORMAL
BACTERIA UR CULT: NORMAL

## 2018-12-14 ENCOUNTER — TELEPHONE (OUTPATIENT)
Dept: FAMILY MEDICINE CLINIC | Facility: CLINIC | Age: 53
End: 2018-12-14

## 2018-12-14 DIAGNOSIS — I80.01 SUPERFICIAL PHLEBITIS OF LEG, RIGHT: ICD-10-CM

## 2018-12-14 RX ORDER — OXYCODONE HYDROCHLORIDE AND ACETAMINOPHEN 5; 325 MG/1; MG/1
1 TABLET ORAL EVERY 4 HOURS PRN
Qty: 18 TABLET | Refills: 0 | Status: SHIPPED | OUTPATIENT
Start: 2018-12-14 | End: 2018-12-17

## 2018-12-14 NOTE — TELEPHONE ENCOUNTER
Please call, will send in one more prescription. If gets worse over the weekend , needs to go to ER or [facility]

## 2018-12-14 NOTE — TELEPHONE ENCOUNTER
Spoke with pt she took the last one at 2pm today. She advised the meds are starting to work. The area is getting smaller however still having pain the red area is going down her leg.

## 2018-12-14 NOTE — TELEPHONE ENCOUNTER
----- Message from Homa Perez sent at 12/14/2018  3:27 PM EST -----  Contact: ALYSON; PT CALL BACK  PT CALLED IN REQUESTING A CALL BACK. SHE IS HAVING EXTREME PAIN IN HER LEGS FROM THE VEINS THAT ARE INFECTED.       CALL BACK   [phone]

## 2018-12-17 ENCOUNTER — OFFICE VISIT (OUTPATIENT)
Dept: FAMILY MEDICINE CLINIC | Facility: CLINIC | Age: 53
End: 2018-12-17

## 2018-12-17 ENCOUNTER — TELEPHONE (OUTPATIENT)
Dept: FAMILY MEDICINE CLINIC | Facility: CLINIC | Age: 53
End: 2018-12-17

## 2018-12-17 VITALS
HEART RATE: 92 BPM | OXYGEN SATURATION: 97 % | WEIGHT: 293 LBS | RESPIRATION RATE: 24 BRPM | DIASTOLIC BLOOD PRESSURE: 80 MMHG | HEIGHT: 67 IN | BODY MASS INDEX: 45.99 KG/M2 | TEMPERATURE: 97.8 F | SYSTOLIC BLOOD PRESSURE: 148 MMHG

## 2018-12-17 DIAGNOSIS — I80.01 SUPERFICIAL PHLEBITIS OF LEG, RIGHT: Primary | ICD-10-CM

## 2018-12-17 PROCEDURE — 99213 OFFICE O/P EST LOW 20 MIN: CPT | Performed by: FAMILY MEDICINE

## 2018-12-17 RX ORDER — OXYCODONE AND ACETAMINOPHEN 10; 325 MG/1; MG/1
.5-1 TABLET ORAL EVERY 6 HOURS PRN
Qty: 12 TABLET | Refills: 0 | Status: SHIPPED | OUTPATIENT
Start: 2018-12-17 | End: 2019-01-10 | Stop reason: SDUPTHER

## 2018-12-17 NOTE — TELEPHONE ENCOUNTER
----- Message from Ghanshyam Mcdonald sent at 12/17/2018  8:54 AM EST -----  Contact: DR SHIELDS// ER FOLLOW UP// MEDICATION CHANGES   PT CALLED IN REPORTING THAT SHE WENT TO ER YESTERDAY   HAD AN ULTRASOUND FOUND BLOOD CLOT IN LEG, THAT WAS SUPERFICIAL.  WOULD LIKE CALL TO FOLLOW UP AND DISCUSS MEDICATIONS.    PT CALL BACK   [phone]

## 2018-12-17 NOTE — PROGRESS NOTES
Assessment/Plan       Problems Addressed this Visit     None      Visit Diagnoses     Superficial phlebitis of leg, right    -  Primary    Relevant Medications    oxyCODONE-acetaminophen (PERCOCET)  MG per tablet            Follow up: Return if symptoms worsen or fail to improve.     DISCUSSION  Change Percocet to 10/325 one every 6 hours.  #12.    Warm compresses to be continued.  Finish antibiotic.    Start Eliquis 5 mg 1/2 po BID for the since history of PE. Explained not indicated but since not able to take NSAID, will start this.     Check ER records when faxed.    MEDICATIONS PRESCRIBED  Requested Prescriptions     Signed Prescriptions Disp Refills   • oxyCODONE-acetaminophen (PERCOCET)  MG per tablet 12 tablet 0     Sig: Take 0.5-1 tablets by mouth Every 6 (Six) Hours As Needed for Moderate Pain .            Alden dated on 12/11/2018  was reviewed and appropriate.        -------------------------------------------    Subjective     Chief Complaint   Patient presents with   • Leg Pain         History of Present Illness    Left leg pain   Percocet helps  Decreased sleep  Went to ER and + scan + superficial phlebitis    Percocet 5 when she takes 2 of them does help the pain    Had PE in lung in the past and been on PE  Has Eliquis form that left at home    Still on Keflex.  Causing some loose bowels.  No fever or chills.      Social History     Tobacco Use   Smoking Status Current Every Day Smoker   Smokeless Tobacco Never Used        Past Medical History,Medications, Allergies, and social history was reviewed.      Review of Systems   Constitutional: Positive for fatigue.   HENT: Negative.    Respiratory: Positive for wheezing (chronic).    Cardiovascular: Positive for leg swelling. Negative for chest pain.   Musculoskeletal: Positive for arthralgias.       Objective     Vitals:    12/17/18 1614   BP: 148/80   Pulse: 92   Resp: 24   Temp: 97.8 °F (36.6 °C)   TempSrc: Temporal   SpO2: 97%  "  Weight: 136 kg (299 lb)   Height: 170.2 cm (67.01\")          Physical Exam   Constitutional: She is oriented to person, place, and time. She appears well-developed and well-nourished.   HENT:   Head: Normocephalic and atraumatic.   Right Ear: Hearing and external ear normal.   Left Ear: Hearing and external ear normal.   Eyes: Conjunctivae and EOM are normal. Pupils are equal, round, and reactive to light.   Pulmonary/Chest: Effort normal.   Musculoskeletal:   Tenderness on the anterior and medial portion of the right lower extremity below the knee.  Some mild erythema anteriorly but has improved greatly with antibiotic.  Mild swelling as well.   Neurological: She is alert and oriented to person, place, and time.   Skin: Skin is warm.   Psychiatric: She has a normal mood and affect. Her behavior is normal.   Nursing note and vitals reviewed.                Darrion Tovar MD    "

## 2018-12-17 NOTE — TELEPHONE ENCOUNTER
Patient said she was seen at [facility]. I requested records from [facility]. Patient said they did not change any of her meds. She asked if you would need to see her, leg is very painful and almost unbearable. Wanted to know if there was anything you could do. She said she can come in if necessary after 4 when she has someone to take her. She has been using hot compresses twice an hour as advised but not helping. Advised patient we would get records and call her back.

## 2018-12-17 NOTE — TELEPHONE ENCOUNTER
Please call patient.  Confirm which was hospital and call for results.    Did they change any of her medication?

## 2018-12-27 DIAGNOSIS — I10 ESSENTIAL HYPERTENSION: ICD-10-CM

## 2018-12-27 RX ORDER — LISINOPRIL 20 MG/1
TABLET ORAL
Qty: 30 TABLET | Refills: 0 | Status: SHIPPED | OUTPATIENT
Start: 2018-12-27 | End: 2019-02-08 | Stop reason: SDUPTHER

## 2018-12-27 RX ORDER — METFORMIN HYDROCHLORIDE EXTENDED-RELEASE TABLETS 1000 MG/1
TABLET, FILM COATED, EXTENDED RELEASE ORAL
Qty: 60 TABLET | Refills: 0 | Status: SHIPPED | OUTPATIENT
Start: 2018-12-27 | End: 2019-02-08 | Stop reason: SDUPTHER

## 2019-01-03 ENCOUNTER — TELEPHONE (OUTPATIENT)
Dept: FAMILY MEDICINE CLINIC | Facility: CLINIC | Age: 54
End: 2019-01-03

## 2019-01-03 NOTE — TELEPHONE ENCOUNTER
----- Message from Clarita Carroll sent at 1/3/2019 10:12 AM EST -----  Contact: DR ALYSON POLLOCK FROM [facility] IS CALLING TO LET YOU KNOW THAT SHE WAS ADMITTED ON 12/30/18 FOR COPD AND PNEUMONIA.

## 2019-01-10 ENCOUNTER — TELEPHONE (OUTPATIENT)
Dept: FAMILY MEDICINE CLINIC | Facility: CLINIC | Age: 54
End: 2019-01-10

## 2019-01-10 ENCOUNTER — TRANSITIONAL CARE MANAGEMENT TELEPHONE ENCOUNTER (OUTPATIENT)
Dept: FAMILY MEDICINE CLINIC | Facility: CLINIC | Age: 54
End: 2019-01-10

## 2019-01-10 DIAGNOSIS — I80.01 SUPERFICIAL PHLEBITIS OF LEG, RIGHT: ICD-10-CM

## 2019-01-10 RX ORDER — OXYCODONE AND ACETAMINOPHEN 10; 325 MG/1; MG/1
.5-1 TABLET ORAL EVERY 6 HOURS PRN
Qty: 12 TABLET | Refills: 0 | Status: SHIPPED | OUTPATIENT
Start: 2019-01-10 | End: 2019-01-21 | Stop reason: SDUPTHER

## 2019-01-10 NOTE — TELEPHONE ENCOUNTER
----- Message from Luann Holcomb sent at 1/10/2019  4:07 PM EST -----  Contact: franck, patient  Patient was seen in the hospital and she has multiple veins busted (INDIRA note in chart) they sent her home with Ativan but she would like percocet, can we prescribe Percocet? [facility], [phone] may leave a message

## 2019-01-10 NOTE — OUTREACH NOTE
INDIRA call completed. Please see flow sheet for additional details.  Pt reports resp sx better, denies cough or fever.  Wears O2 at night, doing breathing txs.  Edema resolved, wt 291 today. Conts Lasix and KCL qd for 10 days. LE phlebitis resolved, but pt states lower arms swollen & hard from infiltrated IVs, L>R. States hospitalist was going to give her 3 days of pain RX for this, but none was sent, only ativan. She requests PCP send 3 days pain RX to [facility].  Confirms 1/15/19 INDIRA.  Urged to call Dr Armando's ofc for f/u appt.

## 2019-01-10 NOTE — TELEPHONE ENCOUNTER
Please call, requested meds sent to pharmacy. Bds  I sent in a few pain meds. She CANNOT take the pain med with the ativan.

## 2019-01-15 ENCOUNTER — TELEPHONE (OUTPATIENT)
Dept: FAMILY MEDICINE CLINIC | Facility: CLINIC | Age: 54
End: 2019-01-15

## 2019-01-15 NOTE — TELEPHONE ENCOUNTER
----- Message from Homa Perez sent at 1/15/2019 11:19 AM EST -----  Contact: ALYSON; PT CALL BACK   PT WANTED TO APOLOGIZE FOR MISSING HER APPT TODAY. IF YOU WOULD LIKE FOR HER TO SEE THE PA OR NP SOONER THAN Monday WHEN SHE HAS RESCHEDULED WITH YOU THEN SHE SAID TO JUST LET HER KNOW AND SHE WILL       CALL BACK   [phone]

## 2019-01-21 ENCOUNTER — TELEPHONE (OUTPATIENT)
Dept: FAMILY MEDICINE CLINIC | Facility: CLINIC | Age: 54
End: 2019-01-21

## 2019-01-21 ENCOUNTER — OFFICE VISIT (OUTPATIENT)
Dept: FAMILY MEDICINE CLINIC | Facility: CLINIC | Age: 54
End: 2019-01-21

## 2019-01-21 VITALS
SYSTOLIC BLOOD PRESSURE: 138 MMHG | HEART RATE: 110 BPM | TEMPERATURE: 97.2 F | BODY MASS INDEX: 45.99 KG/M2 | OXYGEN SATURATION: 94 % | WEIGHT: 293 LBS | RESPIRATION RATE: 22 BRPM | DIASTOLIC BLOOD PRESSURE: 64 MMHG | HEIGHT: 67 IN

## 2019-01-21 DIAGNOSIS — M79.605 LEFT LEG PAIN: ICD-10-CM

## 2019-01-21 DIAGNOSIS — J18.9 PNEUMONIA DUE TO INFECTIOUS ORGANISM, UNSPECIFIED LATERALITY, UNSPECIFIED PART OF LUNG: ICD-10-CM

## 2019-01-21 DIAGNOSIS — Z09 HOSPITAL DISCHARGE FOLLOW-UP: Primary | ICD-10-CM

## 2019-01-21 DIAGNOSIS — R93.89 ABNORMAL CT SCAN, CHEST: ICD-10-CM

## 2019-01-21 PROCEDURE — 99495 TRANSJ CARE MGMT MOD F2F 14D: CPT | Performed by: FAMILY MEDICINE

## 2019-01-21 RX ORDER — OXYCODONE AND ACETAMINOPHEN 10; 325 MG/1; MG/1
.5-1 TABLET ORAL EVERY 6 HOURS PRN
Qty: 28 TABLET | Refills: 0 | Status: SHIPPED | OUTPATIENT
Start: 2019-01-21 | End: 2019-02-05 | Stop reason: SDUPTHER

## 2019-01-21 RX ORDER — PROMETHAZINE HYDROCHLORIDE 12.5 MG/1
SUPPOSITORY RECTAL
Refills: 0 | COMMUNITY
Start: 2018-12-20 | End: 2021-01-20

## 2019-01-21 NOTE — PROGRESS NOTES
Transitional Care Follow Up Visit  Subjective     Dani FELIPA Ziegler is a 53 y.o. female who presents for a transitional care management visit.    Within 48 business hours after discharge our office contacted her via telephone to coordinate her care and needs.      I reviewed and discussed the details of that call along with the discharge summary, hospital problems, inpatient lab results, inpatient diagnostic studies, and consultation reports with Dani.     Current outpatient and discharge medications have been reconciled for the patient.    Date of TCM Phone Call 5/8/2017 12/22/2017 4/3/2018 1/10/2019   [facility]   Date of Admission 4/29/2017 - 3/27/2018 12/30/2018   Date of Discharge 5/5/2017 12/21/2017 3/31/2018 1/8/2019   Discharge Disposition Home or Self Care Home or Self Care - Home or Self Care     Risk for Readmission (LACE) No Data Recorded    History of Present Illness   To ER on 12/30 and felt terrible. Dx with pneumonia. Admitted.       Course During Hospital Stay:  On antibiotics for 10 days and steroids. Specialists saw. Hematologist saw. Dr Armando due to abnormal whit blood cell     Discharged on lasix x 10 days, 40 mg and increased UOP and weight decreased    Took last pill today      Cough is better. Finished antibiotics  Has oxygen if needed  Breathing is better since admission  No fever  No chills  Appetite fair    Leg left pain  Thought had blood clot  Pain and swelling  Had Lovenox in hospital to prevent and compression boots  ER 3 days after discharge due to concern for blood clot  Back of leg hurts  Had u.s  And no clot.   Miserable with pain     History of dvt    Has been trying to walk more  6/10 now and increases with walking    No med for pain right now      Leukocytosis  Had increased to 28  Sees infectious disease  Appt this friday to see him            The following portions  of the patient's history were reviewed and updated as appropriate: allergies, current medications, past family history, past medical history, past social history, past surgical history and problem list.    Review of Systems   Constitutional: Positive for fatigue.   HENT: Negative.    Respiratory: Positive for cough and shortness of breath.    Cardiovascular: Negative.  Negative for leg swelling.   Gastrointestinal: Negative.    Genitourinary: Positive for frequency (with lasix). Negative for dysuria.   Musculoskeletal:        Left leg pain      Psychiatric/Behavioral: Positive for dysphoric mood (due to pain). Negative for suicidal ideas.       Objective   Physical Exam   Constitutional: She is oriented to person, place, and time. She appears well-developed and well-nourished.   HENT:   Head: Normocephalic and atraumatic.   Right Ear: Hearing and external ear normal.   Left Ear: Hearing and external ear normal.   Mouth/Throat: Oropharynx is clear and moist.   Eyes: Conjunctivae and EOM are normal. Pupils are equal, round, and reactive to light.   Cardiovascular: Normal rate, regular rhythm and normal heart sounds. Exam reveals no friction rub.   No murmur heard.  Pulmonary/Chest: Effort normal. No respiratory distress. She has decreased breath sounds. She has wheezes (exp wheeze faint). She has rhonchi. She has no rales.   Musculoskeletal:   +++ tender left calf extending down to foot and ankle. Light palpation causes pain   No significant swelling or redness.    Neurological: She is alert and oriented to person, place, and time.   Skin: Skin is warm.   Psychiatric: She has a normal mood and affect. Her behavior is normal.   Nursing note and vitals reviewed.      Assessment/Plan   Problems Addressed this Visit     None      Visit Diagnoses     Hospital discharge follow-up    -  Primary    Pneumonia due to infectious organism, unspecified laterality, unspecified part of lung        Relevant Medications     promethazine (PHENERGAN) 12.5 MG suppository    Left leg pain        Relevant Medications    oxyCODONE-acetaminophen (PERCOCET)  MG per tablet    Abnormal CT scan, chest            Here for follow-up of hospital stay.  Overall improved with lung status but having significant left leg pain.  Call for results that were done in the emergency room after discharge.  Further plan once reviewed.  I do not believe she has a blood clot and she stated that the ultrasound was negative for blood clot.  Refilled pain medication for her left leg pain for 1 week supply.  Follow-up with hematology.    Pneumonia.  Improving.    Abnormal CT scan.  CT scan showed lymphadenopathy.  They recommended a repeat scan in 3 months.  Reminders been placed.      Alden dated 12/11/2018 was reviewed and appropriate.      Darrion Tovar MD

## 2019-01-29 ENCOUNTER — TELEPHONE (OUTPATIENT)
Dept: FAMILY MEDICINE CLINIC | Facility: CLINIC | Age: 54
End: 2019-01-29

## 2019-01-29 NOTE — TELEPHONE ENCOUNTER
----- Message from Arlene Crabtree sent at 1/29/2019  3:47 PM EST -----  Contact: ALYSON;PT CALLED  DR NUÑEZ (INFECTIOUS DISEASE)  GOT PT'S LABS RESULTS IN -   HE IS SENDING PT TO A HEMATOLOGIST    [phone]

## 2019-01-31 ENCOUNTER — TELEPHONE (OUTPATIENT)
Dept: FAMILY MEDICINE CLINIC | Facility: CLINIC | Age: 54
End: 2019-01-31

## 2019-01-31 DIAGNOSIS — I80.01 SUPERFICIAL PHLEBITIS OF LEG, RIGHT: ICD-10-CM

## 2019-01-31 RX ORDER — OXYCODONE HYDROCHLORIDE AND ACETAMINOPHEN 5; 325 MG/1; MG/1
1 TABLET ORAL EVERY 4 HOURS PRN
Qty: 18 TABLET | Refills: 0 | Status: SHIPPED | OUTPATIENT
Start: 2019-01-31 | End: 2019-02-03

## 2019-01-31 NOTE — TELEPHONE ENCOUNTER
----- Message from Luann Holcomb sent at 1/31/2019  8:02 AM EST -----  Contact: franck, patient  Refill Percocet for one more week, [facility],  [phone] may leave a message

## 2019-02-05 ENCOUNTER — OFFICE VISIT (OUTPATIENT)
Dept: FAMILY MEDICINE CLINIC | Facility: CLINIC | Age: 54
End: 2019-02-05

## 2019-02-05 VITALS
RESPIRATION RATE: 20 BRPM | BODY MASS INDEX: 45.99 KG/M2 | HEIGHT: 67 IN | SYSTOLIC BLOOD PRESSURE: 152 MMHG | OXYGEN SATURATION: 97 % | HEART RATE: 84 BPM | TEMPERATURE: 97.3 F | WEIGHT: 293 LBS | DIASTOLIC BLOOD PRESSURE: 88 MMHG

## 2019-02-05 DIAGNOSIS — F34.1 DYSTHYMIA: ICD-10-CM

## 2019-02-05 DIAGNOSIS — M79.605 LEFT LEG PAIN: Primary | ICD-10-CM

## 2019-02-05 DIAGNOSIS — I10 ESSENTIAL HYPERTENSION: ICD-10-CM

## 2019-02-05 DIAGNOSIS — E11.65 UNCONTROLLED TYPE 2 DIABETES MELLITUS WITH HYPERGLYCEMIA, WITHOUT LONG-TERM CURRENT USE OF INSULIN (HCC): ICD-10-CM

## 2019-02-05 LAB
ALBUMIN SERPL-MCNC: 3.85 G/DL (ref 3.2–4.8)
ALBUMIN/GLOB SERPL: 1.4 G/DL (ref 1.5–2.5)
ALP SERPL-CCNC: 111 U/L (ref 25–100)
ALT SERPL-CCNC: 10 U/L (ref 7–40)
AST SERPL-CCNC: 15 U/L (ref 0–33)
BILIRUB SERPL-MCNC: 0.2 MG/DL (ref 0.3–1.2)
BUN SERPL-MCNC: 27 MG/DL (ref 9–23)
BUN/CREAT SERPL: 24.5 (ref 7–25)
CALCIUM SERPL-MCNC: 9.8 MG/DL (ref 8.7–10.4)
CHLORIDE SERPL-SCNC: 108 MMOL/L (ref 99–109)
CHOLEST SERPL-MCNC: 141 MG/DL (ref 0–200)
CHOLEST/HDLC SERPL: 3.92 {RATIO}
CO2 SERPL-SCNC: 25 MMOL/L (ref 20–31)
CREAT SERPL-MCNC: 1.1 MG/DL (ref 0.6–1.3)
GLOBULIN SER CALC-MCNC: 2.8 GM/DL
GLUCOSE SERPL-MCNC: 116 MG/DL (ref 70–100)
HBA1C MFR BLD: 6.3 % (ref 4.8–5.6)
HDLC SERPL-MCNC: 36 MG/DL (ref 40–60)
LDLC SERPL CALC-MCNC: 70 MG/DL (ref 0–100)
POTASSIUM SERPL-SCNC: 5.6 MMOL/L (ref 3.5–5.5)
PROT SERPL-MCNC: 6.6 G/DL (ref 5.7–8.2)
SODIUM SERPL-SCNC: 137 MMOL/L (ref 132–146)
TRIGL SERPL-MCNC: 173 MG/DL (ref 0–150)
VLDLC SERPL CALC-MCNC: 34.6 MG/DL

## 2019-02-05 PROCEDURE — 99214 OFFICE O/P EST MOD 30 MIN: CPT | Performed by: FAMILY MEDICINE

## 2019-02-05 RX ORDER — OXYCODONE AND ACETAMINOPHEN 10; 325 MG/1; MG/1
.5-1 TABLET ORAL EVERY 6 HOURS PRN
Qty: 28 TABLET | Refills: 0 | Status: SHIPPED | OUTPATIENT
Start: 2019-02-05 | End: 2019-02-12

## 2019-02-05 RX ORDER — BUPROPION HYDROCHLORIDE 150 MG/1
TABLET, EXTENDED RELEASE ORAL
Qty: 60 TABLET | Refills: 2 | Status: SHIPPED | OUTPATIENT
Start: 2019-02-05 | End: 2019-06-28

## 2019-02-05 NOTE — PROGRESS NOTES
Assessment/Plan       Problems Addressed this Visit        Cardiovascular and Mediastinum    Essential hypertension    Relevant Orders    Comprehensive Metabolic Panel       Endocrine    Uncontrolled type 2 diabetes mellitus with hyperglycemia, without long-term current use of insulin (CMS/[facility])    Relevant Orders    Comprehensive Metabolic Panel    Lipid Panel With / Chol / HDL Ratio    Hemoglobin A1c      Other Visit Diagnoses     Left leg pain    -  Primary    Relevant Medications    oxyCODONE-acetaminophen (PERCOCET)  MG per tablet            Follow up: Return for follow up depends on review of labs and testing.     DISCUSSION  Persistent left leg pain.  Refilled oxycodone.  Denies misuse.  Use sparingly.  She expressed understanding and is aware of risk of addiction potential.    Diabetes mellitus type 2.  Due for labs.  We will check those today.    Hypertension.  Stable.    Follow-up with Dr. Armando due to abnormal CBC.      MEDICATIONS PRESCRIBED  Requested Prescriptions     Signed Prescriptions Disp Refills   • oxyCODONE-acetaminophen (PERCOCET)  MG per tablet 28 tablet 0     Sig: Take 0.5-1 tablets by mouth Every 6 (Six) Hours As Needed for Moderate Pain  for up to 7 days.            Alden dated on 12/11/2018  was reviewed and appropriate.        -------------------------------------------    Subjective     Chief Complaint   Patient presents with   • Hypertension     f/u, med refill,    • Hyperlipidemia   • Diabetes       Had seen Dr Ochoa ( ID) at [facility] and rec see oncologist at [facility], Dr Armando.  Having increased pain in legs. Had sent a referral to Dr Armando.     Sees Dr Bajwa again tomorrow for lungs    Only had 3 cigs yesterday   no soft drinks in 1 week    Had called Dr uLi and may look at veins    Left leg pain   Has open area now on the left ankle  Very small    Thrombophlebitis and had been in hospital  Pain med as needed.   Increase at night and hard to get out of bed  Percocet 10  "helps , takes 4 per day as needed.       Diabetes   She presents for her follow-up diabetic visit. She has type 2 diabetes mellitus. Her disease course has been stable. There are no hypoglycemic associated symptoms. Associated symptoms include fatigue. Pertinent negatives for diabetes include no chest pain. There are no hypoglycemic complications. Pertinent negatives for diabetic complications include no CVA or heart disease. Risk factors for coronary artery disease include hypertension and obesity. Current diabetic treatment includes oral agent (monotherapy). She is following a generally healthy (eating better) diet. Home blood sugar record trend: lower 100s to 179 at times. An ACE inhibitor/angiotensin II receptor blocker is being taken.   Hypertension   This is a chronic problem. The current episode started more than 1 year ago. The problem is unchanged. The problem is controlled. Associated symptoms include shortness of breath (Chronic). Pertinent negatives include no chest pain or peripheral edema. There are no associated agents to hypertension. Past treatments include ACE inhibitors and central alpha agonists. There is no history of CVA.             Social History     Tobacco Use   Smoking Status Current Every Day Smoker   Smokeless Tobacco Never Used        Past Medical History,Medications, Allergies, and social history was reviewed.      Review of Systems   Constitutional: Positive for fatigue.   HENT: Negative.    Respiratory: Positive for shortness of breath (Chronic).    Cardiovascular: Negative.  Negative for chest pain.   Gastrointestinal: Negative.    Musculoskeletal: Positive for arthralgias.   Psychiatric/Behavioral: Negative.  Positive for stress.       Objective     Vitals:    02/05/19 1029   BP: 152/88   Pulse: 84   Resp: 20   Temp: 97.3 °F (36.3 °C)   SpO2: 97%   Weight: 134 kg (295 lb 8 oz)   Height: 170.2 cm (67\")          Physical Exam   Constitutional: She is oriented to person, place, and " time. She appears well-developed and well-nourished.   HENT:   Head: Normocephalic and atraumatic.   Right Ear: Hearing and external ear normal.   Left Ear: Hearing and external ear normal.   Mouth/Throat: Oropharynx is clear and moist.   Eyes: Conjunctivae and EOM are normal. Pupils are equal, round, and reactive to light.   Cardiovascular: Normal rate, regular rhythm and normal heart sounds. Exam reveals no friction rub.   No murmur heard.  Pulmonary/Chest: Effort normal and breath sounds normal. No respiratory distress. She has no wheezes. She has no rales.   Musculoskeletal:   Tenderness of the inner part of the left leg towards the ankle and there is a very small ulceration with no active drainage.  No significant erythema or redness.  There is some mild calf tenderness with superficial phlebitis present.   Neurological: She is alert and oriented to person, place, and time.   Skin: Skin is warm.   Psychiatric: She has a normal mood and affect. Her behavior is normal.   Nursing note and vitals reviewed.                Darrion Tovar MD

## 2019-02-08 DIAGNOSIS — E87.5 HYPERKALEMIA: Primary | ICD-10-CM

## 2019-02-08 DIAGNOSIS — F41.1 GENERALIZED ANXIETY DISORDER: ICD-10-CM

## 2019-02-08 DIAGNOSIS — I10 ESSENTIAL HYPERTENSION: ICD-10-CM

## 2019-02-08 RX ORDER — LISINOPRIL 20 MG/1
TABLET ORAL
Qty: 30 TABLET | Refills: 1 | Status: SHIPPED | OUTPATIENT
Start: 2019-02-08 | End: 2019-03-26 | Stop reason: SDUPTHER

## 2019-02-08 RX ORDER — METFORMIN HYDROCHLORIDE 1000 MG/1
TABLET, FILM COATED, EXTENDED RELEASE ORAL
Qty: 60 TABLET | Refills: 1 | Status: SHIPPED | OUTPATIENT
Start: 2019-02-08 | End: 2019-03-27

## 2019-02-08 RX ORDER — BUSPIRONE HYDROCHLORIDE 5 MG/1
TABLET ORAL
Qty: 120 TABLET | Refills: 1 | Status: SHIPPED | OUTPATIENT
Start: 2019-02-08 | End: 2019-03-26 | Stop reason: SDUPTHER

## 2019-02-12 ENCOUNTER — TELEPHONE (OUTPATIENT)
Dept: FAMILY MEDICINE CLINIC | Facility: CLINIC | Age: 54
End: 2019-02-12

## 2019-02-12 NOTE — TELEPHONE ENCOUNTER
----- Message from Thania Kwon Rep sent at 2/12/2019 12:02 PM EST -----  Contact: PT ; ALYSON  PATIENT IS CALLING TO LET DR SHIELDS KNOW THAT SHE CANT COME IN AND DO HER LAB WORK TOMORROW SHE IS ADMITTED INTO THE HOSPITAL AT [facility].    PT: [phone]

## 2019-02-13 ENCOUNTER — RESULTS ENCOUNTER (OUTPATIENT)
Dept: FAMILY MEDICINE CLINIC | Facility: CLINIC | Age: 54
End: 2019-02-13

## 2019-02-13 DIAGNOSIS — E87.5 HYPERKALEMIA: ICD-10-CM

## 2019-02-13 NOTE — TELEPHONE ENCOUNTER
Spoke with patient, states she has Pneumonia. States that her recent CT scan showed a mass and nodules  and doing further testing today per patient and her WBC at admit was 35,000 and some other levels were out of range. States she is Feeling like crap. States she will do her best to keep you updated.

## 2019-02-21 ENCOUNTER — TELEPHONE (OUTPATIENT)
Dept: FAMILY MEDICINE CLINIC | Facility: CLINIC | Age: 54
End: 2019-02-21

## 2019-02-21 DIAGNOSIS — R07.81 RIB PAIN: Primary | ICD-10-CM

## 2019-02-21 RX ORDER — OXYCODONE AND ACETAMINOPHEN 10; 325 MG/1; MG/1
.5-1 TABLET ORAL EVERY 6 HOURS PRN
Qty: 12 TABLET | Refills: 0 | Status: SHIPPED | OUTPATIENT
Start: 2019-02-21 | End: 2019-02-26 | Stop reason: SDUPTHER

## 2019-02-21 NOTE — TELEPHONE ENCOUNTER
----- Message from Homa Perez sent at 2/21/2019 11:41 AM EST -----  Contact: SHIELDS; MED REQUES T  PT CALLED IN SHE IS BEING DISCHARGED WITH ALL NEW MEDICATIONS. SHE STATED THEY WILL NOT SEND HER HOME WITH PAIN MEDICATION. SHE WANTED TO KNOW IF SOMETHING COULD BE CALLED IN TO [facility]       CALL BACK   [phone]

## 2019-02-21 NOTE — TELEPHONE ENCOUNTER
Pain is in R side of ribs.  They will not give her any pain med at discharge. They have been giving her Percocet 10mg q 8hrs while in-patient.  Pt was transferred to the front to sched a fu appt with you.

## 2019-02-21 NOTE — TELEPHONE ENCOUNTER
----- Message from Arlene Crabtree sent at 2/21/2019  4:24 PM EST -----  Contact: ALYSON;PT CALLED  PT REQUESTING A RX FOR NICORETTE GUM CALLED INTO [facility]    CAN SHE START TAKING THE METFORMIN TONIGHT BECAUSE SHE IS ON STEROIDS  AND DOESN'T WANT TO GO WITHOUT ANYTHING    [phone]

## 2019-02-21 NOTE — TELEPHONE ENCOUNTER
Please call:  1. Confirm pain location    2. Why will they not give a short term Rx til can be seen.     3. Best I can do is a 3 days supply and will need office visit.

## 2019-02-26 ENCOUNTER — OFFICE VISIT (OUTPATIENT)
Dept: FAMILY MEDICINE CLINIC | Facility: CLINIC | Age: 54
End: 2019-02-26

## 2019-02-26 ENCOUNTER — TELEPHONE (OUTPATIENT)
Dept: FAMILY MEDICINE CLINIC | Facility: CLINIC | Age: 54
End: 2019-02-26

## 2019-02-26 VITALS
HEIGHT: 67 IN | BODY MASS INDEX: 45.99 KG/M2 | OXYGEN SATURATION: 95 % | HEART RATE: 128 BPM | DIASTOLIC BLOOD PRESSURE: 86 MMHG | RESPIRATION RATE: 22 BRPM | TEMPERATURE: 98.1 F | SYSTOLIC BLOOD PRESSURE: 142 MMHG | WEIGHT: 293 LBS

## 2019-02-26 DIAGNOSIS — G47.09 OTHER INSOMNIA: ICD-10-CM

## 2019-02-26 DIAGNOSIS — R07.9 RIGHT-SIDED CHEST PAIN: Primary | ICD-10-CM

## 2019-02-26 DIAGNOSIS — I10 ESSENTIAL HYPERTENSION: ICD-10-CM

## 2019-02-26 DIAGNOSIS — R91.8 MASS OF RIGHT LUNG: ICD-10-CM

## 2019-02-26 DIAGNOSIS — F34.1 DYSTHYMIA: ICD-10-CM

## 2019-02-26 PROCEDURE — 99214 OFFICE O/P EST MOD 30 MIN: CPT | Performed by: FAMILY MEDICINE

## 2019-02-26 RX ORDER — OXYCODONE AND ACETAMINOPHEN 10; 325 MG/1; MG/1
.5-1 TABLET ORAL EVERY 4 HOURS PRN
Qty: 18 TABLET | Refills: 0 | Status: SHIPPED | OUTPATIENT
Start: 2019-02-26 | End: 2019-03-01

## 2019-02-26 RX ORDER — ESCITALOPRAM OXALATE 20 MG/1
20 TABLET ORAL DAILY
Qty: 30 TABLET | Refills: 2 | Status: SHIPPED | OUTPATIENT
Start: 2019-02-26 | End: 2019-06-28

## 2019-02-26 RX ORDER — TRAZODONE HYDROCHLORIDE 50 MG/1
50 TABLET ORAL NIGHTLY
Qty: 30 TABLET | Refills: 2 | Status: SHIPPED | OUTPATIENT
Start: 2019-02-26 | End: 2019-06-28

## 2019-02-26 NOTE — TELEPHONE ENCOUNTER
Call [facility] for recent hospital discharge summary , H and P , and imaging studies and labs . Feb 11-Feb 21

## 2019-02-26 NOTE — PROGRESS NOTES
Assessment/Plan       Problems Addressed this Visit        Cardiovascular and Mediastinum    Essential hypertension       Other    Depression    Relevant Medications    traZODone (DESYREL) 50 MG tablet    escitalopram (LEXAPRO) 20 MG tablet      Other Visit Diagnoses     Right-sided chest pain    -  Primary    Relevant Medications    oxyCODONE-acetaminophen (PERCOCET)  MG per tablet    Mass of right lung        Relevant Medications    oxyCODONE-acetaminophen (PERCOCET)  MG per tablet    Other insomnia        Relevant Medications    traZODone (DESYREL) 50 MG tablet            Follow up: Return in about 1 month (around 3/26/2019).     DISCUSSION  Follow-up hospitalization for massive right lung and pneumonia and right-sided chest pain.  Refilled Percocet.  Follow-up with rheumatology.  Call for records from [facility].  Will review when available.    Depression and insomnia.  Continue medication.  Change to Lexapro 20 mg daily since trazodone was helpful for sleep and there is an interaction between trazodone and citalopram.    Follow-up and recheck in 1 month or sooner with problems.    Hypertension stable on current medications.    MEDICATIONS PRESCRIBED  Requested Prescriptions     Signed Prescriptions Disp Refills   • traZODone (DESYREL) 50 MG tablet 30 tablet 2     Sig: Take 1 tablet by mouth Every Night. For sleep   • oxyCODONE-acetaminophen (PERCOCET)  MG per tablet 18 tablet 0     Sig: Take 0.5-1 tablets by mouth Every 4 (Four) Hours As Needed for Moderate Pain .   • escitalopram (LEXAPRO) 20 MG tablet 30 tablet 2     Sig: Take 1 tablet by mouth Daily.            Alden dated on December 11, 2018 was reviewed and appropriate.        -------------------------------------------    Subjective     Chief Complaint   Patient presents with   • Follow-up     hospital          History of Present Illness    Hospital follow up   Was at [facility] 2/11-2/21 for pneumonia  Discharged on  "oxygen  Pain in right lateral and ant chest  Pain med helps    Appt with Dr Bajwa 3/19/19    D/c on prednisone 40 mg daily  finished antibiotic in hospital    2/8, felt ok and next day, vomiting an diarrhea  Fever 103 and on monday to [facility] ER, had labs   Dx with pneumonia  Transferred to [facility] that night    Pain in the right chest and + mass so had 6 biopsies.   No cancer there per patient    Staying on steroids to see if mass has shrunk but if not , then will need to be removed.     Feeling better except the pain   No fever now    No cig for 16 days  Using nicorette gum    Trazodone helped in hospital to sleep      Wt at d/c 319   292 at admission    Hypertension  BP doing better  + chest pain   some shortness of breath  No headache  Swelling in legs getting better  Lasix when in hospital with IVF    Depression and insomnia  Trazodone was helpful for sleep in the hospital.  She is already on citalopram and there is her interaction between trazodone and citalopram.  Has never been on Lexapro.  Mood is stable on citalopram but she is willing to change.    Social History     Tobacco Use   Smoking Status Current Every Day Smoker   Smokeless Tobacco Never Used        Past Medical History,Medications, Allergies, and social history was reviewed.      Review of Systems   Constitutional: Positive for fatigue.   HENT: Negative.    Respiratory: Positive for cough (some).    Cardiovascular: Positive for chest pain (right anteror).   Gastrointestinal: Negative.        Objective     Vitals:    02/26/19 1007   BP: 142/86   Pulse: (!) 128   Resp: 22   Temp: 98.1 °F (36.7 °C)   SpO2: 95%   Weight: 133 kg (294 lb)   Height: 170.2 cm (67\")          Physical Exam   Constitutional: She appears well-developed and well-nourished.   HENT:   Head: Normocephalic and atraumatic.   Right Ear: Hearing normal.   Left Ear: Hearing normal.   Eyes: Conjunctivae and EOM are normal. Pupils are equal, round, and reactive to light.   Cardiovascular: " Normal rate, regular rhythm and normal heart sounds. Exam reveals no friction rub.   No murmur heard.  Pulmonary/Chest: Effort normal and breath sounds normal. No respiratory distress. She has no wheezes. She has no rales.   Abdominal: Soft. Bowel sounds are normal. She exhibits no distension. There is no tenderness.   Musculoskeletal: She exhibits no edema.   Tenderness of the anterior chest wall and lateral chest wall.   Neurological: She is alert.   Skin: Skin is warm.   Psychiatric: She has a normal mood and affect. Her behavior is normal.   Nursing note and vitals reviewed.      Tender right anterior lower chest wall  Pain when breathing          Darrion Tovar MD

## 2019-03-01 ENCOUNTER — TELEPHONE (OUTPATIENT)
Dept: FAMILY MEDICINE CLINIC | Facility: CLINIC | Age: 54
End: 2019-03-01

## 2019-03-01 DIAGNOSIS — R07.81 RIB PAIN: Primary | ICD-10-CM

## 2019-03-01 RX ORDER — OXYCODONE AND ACETAMINOPHEN 7.5; 325 MG/1; MG/1
.5-1 TABLET ORAL EVERY 4 HOURS PRN
Qty: 18 TABLET | Refills: 0 | Status: SHIPPED | OUTPATIENT
Start: 2019-03-01 | End: 2019-03-05 | Stop reason: SDUPTHER

## 2019-03-01 NOTE — TELEPHONE ENCOUNTER
----- Message from Homa Perez sent at 3/1/2019  9:01 AM EST -----  Contact: ALYSON; MED REFILL REQUEST   PT CALLED IN WANTING TO KNOW IF DR. SHIELDS COULD CALL IN 3 MORE DAYS OF PAIN MEDICINE. SHE WANTED TO SEE IF SHE COULD HAVE IT LOWERED TO 7.5.   SHE WOULD LIKE TO HAVE A NURSE CALL HER BACK.   PHARMACY   [facility]       CALL BACK   [phone]

## 2019-03-01 NOTE — TELEPHONE ENCOUNTER
Notified patient. She verbalized good understanding, showed great appreciation and we ended the call.

## 2019-03-05 ENCOUNTER — TELEPHONE (OUTPATIENT)
Dept: FAMILY MEDICINE CLINIC | Facility: CLINIC | Age: 54
End: 2019-03-05

## 2019-03-05 DIAGNOSIS — R07.81 RIB PAIN: ICD-10-CM

## 2019-03-05 RX ORDER — OXYCODONE AND ACETAMINOPHEN 7.5; 325 MG/1; MG/1
.5-1 TABLET ORAL EVERY 4 HOURS PRN
Qty: 18 TABLET | Refills: 0 | Status: SHIPPED | OUTPATIENT
Start: 2019-03-05 | End: 2019-03-11 | Stop reason: SDUPTHER

## 2019-03-05 NOTE — TELEPHONE ENCOUNTER
----- Message from Thania Kwon sent at 3/5/2019  8:04 AM EST -----  Contact: pt; franck ZHENG    oxyCODONE-acetaminophen (PERCOCET) 7.5-325 MG per tablet     [facility]    PT: [phone]

## 2019-03-11 ENCOUNTER — TELEPHONE (OUTPATIENT)
Dept: FAMILY MEDICINE CLINIC | Facility: CLINIC | Age: 54
End: 2019-03-11

## 2019-03-11 DIAGNOSIS — R07.81 RIB PAIN: ICD-10-CM

## 2019-03-11 RX ORDER — OXYCODONE AND ACETAMINOPHEN 7.5; 325 MG/1; MG/1
.5-1 TABLET ORAL EVERY 4 HOURS PRN
Qty: 18 TABLET | Refills: 0 | Status: SHIPPED | OUTPATIENT
Start: 2019-03-11 | End: 2019-03-14 | Stop reason: SDUPTHER

## 2019-03-11 NOTE — TELEPHONE ENCOUNTER
----- Message from Arlene Crabtree sent at 3/11/2019  9:29 AM EDT -----  PT IS IN  PAIN-SHE IS TRYING INTO THE LUNG DOCTOR ON MAY 18 -SHE IS REQUESTING  REFILL ON PERCOCET 7.5    PHARMACY [facility]    [phone]

## 2019-03-14 ENCOUNTER — TELEPHONE (OUTPATIENT)
Dept: FAMILY MEDICINE CLINIC | Facility: CLINIC | Age: 54
End: 2019-03-14

## 2019-03-14 DIAGNOSIS — R07.81 RIB PAIN: ICD-10-CM

## 2019-03-14 RX ORDER — OXYCODONE AND ACETAMINOPHEN 7.5; 325 MG/1; MG/1
.5-1 TABLET ORAL EVERY 4 HOURS PRN
Qty: 18 TABLET | Refills: 0 | Status: SHIPPED | OUTPATIENT
Start: 2019-03-14 | End: 2019-03-18

## 2019-03-14 NOTE — TELEPHONE ENCOUNTER
----- Message from Yari Mendez sent at 3/14/2019  3:03 PM EDT -----  Contact: PT CALLED.  CALL IN REFILL ON PAIN MEDS.    [facility].

## 2019-03-18 ENCOUNTER — TELEPHONE (OUTPATIENT)
Dept: FAMILY MEDICINE CLINIC | Facility: CLINIC | Age: 54
End: 2019-03-18

## 2019-03-18 RX ORDER — OXYCODONE AND ACETAMINOPHEN 10; 325 MG/1; MG/1
1 TABLET ORAL EVERY 6 HOURS PRN
Qty: 12 TABLET | Refills: 0 | Status: SHIPPED | OUTPATIENT
Start: 2019-03-18 | End: 2019-03-26 | Stop reason: SDUPTHER

## 2019-03-18 NOTE — TELEPHONE ENCOUNTER
Please call.     1. Call for er records and xray results.     2. She will need to be seen. I don't have any appts today but will give 12 pills til she can get in this week.Make appt.     3. Toradol can cause kidney issues and not to be used for more than 5 days. Will need to see her before I can give this.

## 2019-03-18 NOTE — TELEPHONE ENCOUNTER
----- Message from Arlene Crabtree sent at 3/18/2019  8:09 AM EDT -----  Contact: ALYSON;PT CALLED  PT TOOK A FALL AND WENT TO ER; XRAYS AND CT SCAN  WERE NEGATIVE  FOR BROKEN BONES BUT SHE IS IN A LOT OF PAIN FOR CONTUSIONS; SHE  WAS GIVEN A TORADOL SHOT AND ONE PERCOCET 10MG BUT WAS TOLD  TO CONTACT PCP AND MORE MEDS-SHE IS REQUESTING PERCOCET 10  AND TORADOL  SHE WENT TO [facility] LAST NIGHT  PHARMACY [facility]    [phone]

## 2019-03-20 ENCOUNTER — TELEPHONE (OUTPATIENT)
Dept: FAMILY MEDICINE CLINIC | Facility: CLINIC | Age: 54
End: 2019-03-20

## 2019-03-20 NOTE — TELEPHONE ENCOUNTER
----- Message from Clarita Carroll sent at 3/20/2019  9:38 AM EDT -----  Contact: DR ALYSON POLLOCK IS CALLING FROM [facility] TO LET YOU KNOW THAT SHE WAS ADMITTED ON 3/20/19 FOR RASPATORY FAILURE.

## 2019-03-22 ENCOUNTER — TELEPHONE (OUTPATIENT)
Dept: FAMILY MEDICINE CLINIC | Facility: CLINIC | Age: 54
End: 2019-03-22

## 2019-03-22 NOTE — TELEPHONE ENCOUNTER
----- Message from Homa Perez sent at 3/22/2019  9:46 AM EDT -----  Contact: ALYSON; PT UPDATE   PT HAS BEEN IN THE HOSPITAL. SHE HAS NOT DISCHARGED. SHE HAD FALLEN ON Sunday AND SHE WAS HURTING AND HAVING TROUBLE BREATHING. SHE IS AT [facility]. SHE JUST WANTED TO UPDATE YOU.     [phone]

## 2019-03-26 ENCOUNTER — OFFICE VISIT (OUTPATIENT)
Dept: FAMILY MEDICINE CLINIC | Facility: CLINIC | Age: 54
End: 2019-03-26

## 2019-03-26 VITALS
RESPIRATION RATE: 24 BRPM | SYSTOLIC BLOOD PRESSURE: 122 MMHG | WEIGHT: 293 LBS | OXYGEN SATURATION: 97 % | DIASTOLIC BLOOD PRESSURE: 70 MMHG | TEMPERATURE: 97.1 F | HEART RATE: 114 BPM | BODY MASS INDEX: 45.99 KG/M2 | HEIGHT: 67 IN

## 2019-03-26 DIAGNOSIS — M25.561 ACUTE PAIN OF RIGHT KNEE: ICD-10-CM

## 2019-03-26 DIAGNOSIS — R05.9 COUGH: ICD-10-CM
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
## 2019-03-26 DIAGNOSIS — R07.81 RIB PAIN: ICD-10-CM

## 2019-03-26 DIAGNOSIS — F41.1 GENERALIZED ANXIETY DISORDER: ICD-10-CM

## 2019-03-26 DIAGNOSIS — I10 ESSENTIAL HYPERTENSION: ICD-10-CM

## 2019-03-26 DIAGNOSIS — E11.65 UNCONTROLLED TYPE 2 DIABETES MELLITUS WITH HYPERGLYCEMIA, WITHOUT LONG-TERM CURRENT USE OF INSULIN (HCC): ICD-10-CM

## 2019-03-26 PROCEDURE — 99214 OFFICE O/P EST MOD 30 MIN: CPT | Performed by: FAMILY MEDICINE

## 2019-03-26 RX ORDER — OXYCODONE AND ACETAMINOPHEN 10; 325 MG/1; MG/1
.5-1 TABLET ORAL EVERY 6 HOURS PRN
Qty: 60 TABLET | Refills: 0 | Status: SHIPPED | OUTPATIENT
Start: 2019-03-26 | End: 2019-04-10

## 2019-03-26 RX ORDER — LISINOPRIL 20 MG/1
TABLET ORAL
Qty: 30 TABLET | Refills: 2 | Status: SHIPPED | OUTPATIENT
Start: 2019-03-26 | End: 2019-06-28

## 2019-03-26 RX ORDER — BUSPIRONE HYDROCHLORIDE 5 MG/1
TABLET ORAL
Qty: 120 TABLET | Refills: 2 | Status: SHIPPED | OUTPATIENT
Start: 2019-03-26 | End: 2019-07-22 | Stop reason: SDUPTHER

## 2019-03-26 RX ORDER — GUAIFENESIN 600 MG/1
600-1200 TABLET, EXTENDED RELEASE ORAL 2 TIMES DAILY
Qty: 60 TABLET | Refills: 1 | Status: SHIPPED | OUTPATIENT
Start: 2019-03-26 | End: 2021-01-20

## 2019-03-26 NOTE — PROGRESS NOTES
Assessment/Plan       Problems Addressed this Visit        Cardiovascular and Mediastinum    Essential hypertension       Endocrine    Uncontrolled type 2 diabetes mellitus with hyperglycemia, without long-term current use of insulin (CMS/[facility])    Relevant Medications    Insulin Glargine (LANTUS SOLOSTAR) 100 UNIT/ML injection pen    Insulin Lispro (HUMALOG KWIKPEN) 100 UNIT/ML solution pen-injector      Other Visit Diagnoses     Fall, sequela    -  Primary    Relevant Medications    oxyCODONE-acetaminophen (PERCOCET)  MG per tablet    Rib pain        Relevant Medications    oxyCODONE-acetaminophen (PERCOCET)  MG per tablet    Acute pain of right knee        Relevant Medications    oxyCODONE-acetaminophen (PERCOCET)  MG per tablet    Cough        Relevant Medications    guaiFENesin (MUCINEX) 600 MG 12 hr tablet            Follow up: Return if symptoms worsen or fail to improve.     DISCUSSION  Fall with resulting right knee pain and left chest wall injury and pain.  Rib pain.  Refilled Percocet at one 4 times a day #60 with 2-week supply given the chronicity of this.  She has previously signed a pain management agreement.  She is aware of the risk of dependence and addiction.  We will plan to wean after this 2-week period.    Uncontrolled diabetes mellitus type 2.  Started on insulin in the hospital.  Continue this. Since om Insulin, check suagrs three times per day and keep log.     Hypertension.  Blood pressure is stable on hydralazine and lisinopril.    Cough.  Refilled Mucinex.      MEDICATIONS PRESCRIBED  Requested Prescriptions     Signed Prescriptions Disp Refills   • guaiFENesin (MUCINEX) 600 MG 12 hr tablet 60 tablet 1     Sig: Take 1-2 tablets by mouth 2 (Two) Times a Day.   • oxyCODONE-acetaminophen (PERCOCET)  MG per tablet 60 tablet 0     Sig: Take 0.5-1 tablets by mouth Every 6 (Six) Hours As Needed for Moderate Pain  for up to 15 days.            Alden dated on 3/26/2019    was reviewed and appropriate.        -------------------------------------------    Subjective     Chief Complaint   Patient presents with   • Pneumonia     one month f/u   • Depression     f/u med change   • Insomnia   • Fall     fell last , was in hospital Mon/tuesday got out this past saturday night. Has contusions all over. Records at [facility].         Fall   The accident occurred more than 1 week ago (Fell while at a  on 3/17 at 5 pm. To hospital then. Hurt chest and knee. left side of bottom. Went home and then 2-3 days later, + increased shortness of breath and to ER by squad. admitted at that time). The fall occurred in unknown circumstances. She landed on carpet (concrete). Point of impact: left side of bottom. no head injury. Pain location: now pain: whole left side. Right side is getting better. Right knee, back pain  The pain is at a severity of 9/10 (now. 6/7 on right). The pain is moderate. The symptoms are aggravated by ambulation (using walker). Pertinent negatives include no numbness or tingling. Treatments tried: percocet ( out of it). gave at d/c from.            Waiting on PA for lidocaine patch    To see Dr Bajwa.   Had repeat CT chest and no change     Cough  Hurts to cough  Sneezing caused increased pain  No fever  + prod cough. Thick but little amount    Hypertension  Stable on current medications including hydralazine and lisinopril.  No side effects reported.  Blood pressure is under control.  She does have chest pain from recent injury.  No change in shortness of breath which is also chronic.    Diabetes mellitus type 2  Had been off the metformin  sugar up and down with steroids  Back on metformin  Last night 458    INsulin 3 units TID with meals (humalog)  6 units once per day. (lantus)    Has gained weight since the hospital    Depression  Lexapro helping  Upset that she fell and is hurting        Social History     Tobacco Use   Smoking Status Current Every Day Smoker  "  Smokeless Tobacco Never Used        Past Medical History,Medications, Allergies, and social history was reviewed.      Review of Systems   Constitutional: Positive for fatigue and unexpected weight gain.   HENT: Negative.    Respiratory: Positive for cough and shortness of breath.    Cardiovascular: Positive for chest pain. Negative for leg swelling.   Gastrointestinal: Negative.    Musculoskeletal: Positive for arthralgias and back pain.   Neurological: Negative for tingling and numbness.   Psychiatric/Behavioral: Positive for depressed mood ( Better with medication). The patient has insomnia.        Objective     Vitals:    03/26/19 1115   BP: 122/70   Pulse: 114   Resp: 24   Temp: 97.1 °F (36.2 °C)   SpO2: 97%   Weight: (!) 140 kg (309 lb)   Height: 170.2 cm (67\")          Physical Exam   Constitutional: She appears well-developed and well-nourished.   HENT:   Head: Normocephalic and atraumatic.   Right Ear: Hearing, tympanic membrane, external ear and ear canal normal.   Left Ear: Hearing, tympanic membrane, external ear and ear canal normal.   Mouth/Throat: Oropharynx is clear and moist.   Eyes: Conjunctivae and EOM are normal. Pupils are equal, round, and reactive to light.   Neck: Normal range of motion. Neck supple. No thyromegaly present.   Cardiovascular: Normal rate, regular rhythm and normal heart sounds. Exam reveals no gallop and no friction rub.   No murmur heard.  Pulmonary/Chest: Effort normal and breath sounds normal. No respiratory distress. She has no wheezes. She has no rales.   Abdominal: Soft. Bowel sounds are normal. She exhibits no distension. There is no tenderness. There is no rebound and no guarding.   Musculoskeletal: She exhibits no edema.        Right knee: She exhibits decreased range of motion. She exhibits no swelling. Tenderness ( Diffusely) found.    Pain along the left side of the chest.  No crepitus.  Diffuse pain.   Neurological: She is alert.   Skin: Skin is warm and dry. "   Psychiatric: She has a normal mood and affect.   Nursing note and vitals reviewed.                Darrion Tovar MD

## 2019-03-27 ENCOUNTER — TELEPHONE (OUTPATIENT)
Dept: FAMILY MEDICINE CLINIC | Facility: CLINIC | Age: 54
End: 2019-03-27

## 2019-03-27 DIAGNOSIS — E11.65 UNCONTROLLED TYPE 2 DIABETES MELLITUS WITH HYPERGLYCEMIA, WITHOUT LONG-TERM CURRENT USE OF INSULIN (HCC): Primary | ICD-10-CM

## 2019-03-27 DIAGNOSIS — M25.561 ACUTE PAIN OF RIGHT KNEE: Primary | ICD-10-CM

## 2019-03-27 RX ORDER — LIDOCAINE 50 MG/G
1 PATCH TOPICAL EVERY 24 HOURS
Qty: 30 PATCH | Refills: 0 | Status: SHIPPED | OUTPATIENT
Start: 2019-03-27 | End: 2019-07-22 | Stop reason: SDUPTHER

## 2019-03-27 RX ORDER — METFORMIN HYDROCHLORIDE 500 MG/1
1000 TABLET, EXTENDED RELEASE ORAL 2 TIMES DAILY
Qty: 120 TABLET | Refills: 2 | Status: SHIPPED | OUTPATIENT
Start: 2019-03-27 | End: 2019-06-28

## 2019-03-27 NOTE — TELEPHONE ENCOUNTER
Pt was wondering about her metformin. Stated the pharmacy said she would need a PA to get her metformin because she is currently using insulin. Also stated that Dr. Espinoza at [facility] had prescribed her lidocaine patches for her knee pain and was wondering since she cant seem to get ahold of him could you prescribe them and would probably need a PA.

## 2019-03-27 NOTE — TELEPHONE ENCOUNTER
I sent the lidocaine patch to the pharmacy.  But it may not be covered and will need prior authorization.  Please do PA if this is the case.    Also, please call pharmacy and confirm that she needs a PA for metformin and do so if needed.

## 2019-03-27 NOTE — TELEPHONE ENCOUNTER
Called pt informed Dr. Tovar sending in new prescription and waiting to see if PA is needed for patches

## 2019-04-12 ENCOUNTER — TELEPHONE (OUTPATIENT)
Dept: FAMILY MEDICINE CLINIC | Facility: CLINIC | Age: 54
End: 2019-04-12

## 2019-04-12 NOTE — TELEPHONE ENCOUNTER
----- Message from Clarita Carroll sent at 4/12/2019  1:38 PM EDT -----  Contact: DR ALYSON POLLOCK IS CALLING FROM [facility] TO LET YOU KNOW THAT THE PATIENT WAS ADMITTED ON 4/9/19 FOR PNEUMONIA

## 2019-06-20 ENCOUNTER — TELEPHONE (OUTPATIENT)
Dept: FAMILY MEDICINE CLINIC | Facility: CLINIC | Age: 54
End: 2019-06-20

## 2019-06-20 NOTE — TELEPHONE ENCOUNTER
Patient said she does not get discharged from [facility] until Sunday or Monday. She will call back when she gets out to set up appt. Will ask to be worked in if nothing available.

## 2019-06-20 NOTE — TELEPHONE ENCOUNTER
----- Message from Clarita Carroll sent at 6/20/2019  4:58 PM EDT -----  Contact: DR SHIELDS  PATIENT WAS DISCHARGED FROM [facility] AND WANTS TO KNOW IF YOU WILL WORK HER IN TOMORROW. AS OF RIGHT NOW YOUR NEXT OPENING IS NOT TILL Friday 6/28/19.  [phone]

## 2019-06-25 ENCOUNTER — TELEPHONE (OUTPATIENT)
Dept: FAMILY MEDICINE CLINIC | Facility: CLINIC | Age: 54
End: 2019-06-25

## 2019-06-25 NOTE — TELEPHONE ENCOUNTER
Pt would like to know if you can work her in this coming Friday? She is in a lot of pain and would like to be seen sooner.

## 2019-06-25 NOTE — TELEPHONE ENCOUNTER
----- Message from Thania Kwon Rep sent at 6/25/2019  1:08 PM EDT -----  Contact: PT; ALYSON  HER BLOOD SUGAR  AND SHE HAS SOME NOVALOG AND BASALAR AND WANTS TO KNOW IF SHE CAN TAKE THAT TO GET HER BLOOD SUGAR DOWN OR WHAT SHE NEEDS TO DO.    PT: [phone]

## 2019-06-25 NOTE — TELEPHONE ENCOUNTER
Pt states [facility] cart[facility]a d/c'd her taking 1,000 Metformin BID and 6 units of the Basaglar. I advised her to move up to 10 units and she verbalized good understanding.    Pt states she has been taking Percocet 5-states she can barely walk, would like to know if you can fill the Percocet 10 because she has been having to take 2 of the 5's just to move around?

## 2019-06-25 NOTE — TELEPHONE ENCOUNTER
Please call, has she been off the insulin? Or taking now and if yes, what dose is she taking now?  If has been off the insulin, then rec start basaglar 10 units daily to start.

## 2019-06-28 ENCOUNTER — OFFICE VISIT (OUTPATIENT)
Dept: FAMILY MEDICINE CLINIC | Facility: CLINIC | Age: 54
End: 2019-06-28

## 2019-06-28 ENCOUNTER — TELEPHONE (OUTPATIENT)
Dept: FAMILY MEDICINE CLINIC | Facility: CLINIC | Age: 54
End: 2019-06-28

## 2019-06-28 VITALS
TEMPERATURE: 96.8 F | RESPIRATION RATE: 22 BRPM | BODY MASS INDEX: 45.99 KG/M2 | OXYGEN SATURATION: 97 % | HEART RATE: 119 BPM | HEIGHT: 67 IN | WEIGHT: 293 LBS | DIASTOLIC BLOOD PRESSURE: 74 MMHG | SYSTOLIC BLOOD PRESSURE: 122 MMHG

## 2019-06-28 DIAGNOSIS — R44.3 HALLUCINATIONS: ICD-10-CM

## 2019-06-28 DIAGNOSIS — M51.9 LUMBAR DISC DISEASE: ICD-10-CM

## 2019-06-28 DIAGNOSIS — I10 ESSENTIAL HYPERTENSION: ICD-10-CM

## 2019-06-28 DIAGNOSIS — E55.9 VITAMIN D DEFICIENCY: ICD-10-CM

## 2019-06-28 DIAGNOSIS — J44.9 CHRONIC OBSTRUCTIVE PULMONARY DISEASE, UNSPECIFIED COPD TYPE (HCC): Primary | ICD-10-CM

## 2019-06-28 DIAGNOSIS — R53.83 OTHER FATIGUE: ICD-10-CM

## 2019-06-28 DIAGNOSIS — E11.65 UNCONTROLLED TYPE 2 DIABETES MELLITUS WITH HYPERGLYCEMIA, WITHOUT LONG-TERM CURRENT USE OF INSULIN (HCC): ICD-10-CM

## 2019-06-28 PROCEDURE — 99214 OFFICE O/P EST MOD 30 MIN: CPT | Performed by: FAMILY MEDICINE

## 2019-06-28 RX ORDER — ZIPRASIDONE HYDROCHLORIDE 20 MG/1
CAPSULE ORAL
COMMUNITY
Start: 2019-06-20 | End: 2019-07-22 | Stop reason: SDUPTHER

## 2019-06-28 RX ORDER — OXYCODONE AND ACETAMINOPHEN 10; 325 MG/1; MG/1
.5-1 TABLET ORAL EVERY 6 HOURS PRN
Qty: 20 TABLET | Refills: 0 | Status: SHIPPED | OUTPATIENT
Start: 2019-06-28 | End: 2019-07-02 | Stop reason: SDUPTHER

## 2019-06-28 RX ORDER — PEN NEEDLE, DIABETIC 31 GX5/16"
NEEDLE, DISPOSABLE MISCELLANEOUS
COMMUNITY
Start: 2019-06-20

## 2019-06-28 RX ORDER — METOPROLOL SUCCINATE 25 MG/1
TABLET, EXTENDED RELEASE ORAL
COMMUNITY
Start: 2019-06-20 | End: 2019-06-28 | Stop reason: SDUPTHER

## 2019-06-28 RX ORDER — RANITIDINE 150 MG/1
TABLET ORAL
COMMUNITY
Start: 2019-06-20 | End: 2019-07-24 | Stop reason: SDUPTHER

## 2019-06-28 RX ORDER — ROFLUMILAST 500 UG/1
500 TABLET ORAL DAILY
Qty: 30 TABLET | COMMUNITY
Start: 2019-06-28 | End: 2019-07-22 | Stop reason: SDUPTHER

## 2019-06-28 RX ORDER — DOXYCYCLINE HYCLATE 100 MG
TABLET ORAL
COMMUNITY
Start: 2019-06-20 | End: 2019-12-12

## 2019-06-28 RX ORDER — FUROSEMIDE 20 MG/1
TABLET ORAL
COMMUNITY
Start: 2019-06-20 | End: 2019-07-24 | Stop reason: SDUPTHER

## 2019-06-28 RX ORDER — ROFLUMILAST 500 UG/1
TABLET ORAL
COMMUNITY
Start: 2019-06-20 | End: 2019-06-28

## 2019-06-28 RX ORDER — OXYCODONE HYDROCHLORIDE AND ACETAMINOPHEN 5; 325 MG/1; MG/1
TABLET ORAL
COMMUNITY
Start: 2019-06-20 | End: 2019-06-28

## 2019-06-28 RX ORDER — BLOOD-GLUCOSE METER
EACH MISCELLANEOUS
COMMUNITY
Start: 2019-03-25 | End: 2021-01-20

## 2019-06-28 RX ORDER — DULOXETIN HYDROCHLORIDE 30 MG/1
30 CAPSULE, DELAYED RELEASE ORAL DAILY
Refills: 0 | COMMUNITY
Start: 2019-06-24 | End: 2019-07-22 | Stop reason: SDUPTHER

## 2019-06-28 RX ORDER — METOPROLOL SUCCINATE 25 MG/1
75 TABLET, EXTENDED RELEASE ORAL DAILY
COMMUNITY
Start: 2019-06-28 | End: 2019-07-22 | Stop reason: SDUPTHER

## 2019-06-28 RX ORDER — APIXABAN 5 MG/1
TABLET, FILM COATED ORAL
COMMUNITY
Start: 2019-06-20 | End: 2019-07-22 | Stop reason: SDUPTHER

## 2019-06-28 NOTE — TELEPHONE ENCOUNTER
----- Message from Thania Kwon sent at 6/28/2019 11:20 AM EDT -----  Contact: RED WITH [facility]; SHIELDS  NEEDS VERBAL ORDERS FOR OCCUPATION THERAPY FOR 3 WEEKS TWICE A WEEK AND ONE WEEK FOR JUST ONCE A WEEK.    RED: [phone]

## 2019-06-28 NOTE — PROGRESS NOTES
Assessment/Plan       Problems Addressed this Visit        Cardiovascular and Mediastinum    Essential hypertension    Relevant Medications    furosemide (LASIX) 20 MG tablet    metoprolol succinate XL (TOPROL-XL) 25 MG 24 hr tablet    Other Relevant Orders    Comprehensive Metabolic Panel       Respiratory    Chronic obstructive pulmonary disease (CMS/HCC) - Primary    Relevant Medications    TRELEGY ELLIPTA 100-62.5-25 MCG/INH aerosol powder     DALIRESP 500 MCG tablet tablet       Digestive    Vitamin D deficiency    Relevant Orders    Vitamin D 25 Hydroxy       Endocrine    Uncontrolled type 2 diabetes mellitus with hyperglycemia, without long-term current use of insulin (CMS/[facility])    Relevant Medications    Insulin Glargine (BASAGLAR KWIKPEN SC)    metFORMIN (GLUCOPHAGE) 500 MG tablet    Other Relevant Orders    Hemoglobin A1c    Lipid Panel With / Chol / HDL Ratio       Musculoskeletal and Integument    Lumbar disc disease    Relevant Medications    oxyCODONE-acetaminophen (PERCOCET)  MG per tablet      Other Visit Diagnoses     Hallucinations        Resolved on Geodon    Other fatigue        Relevant Orders    CBC & Differential    Comprehensive Metabolic Panel    TSH    Vitamin B12            Follow up: Return in about 1 month (around 7/28/2019).     DISCUSSION  COPD.  Recent admission for pneumonia with subsequent intubation, trach placement and rehab stay.  Slowly improving.  Continue physical therapy, occupational therapy and speech therapy.    Hypertension.  Blood pressure stable on current medications.    Uncontrolled diabetes mellitus type 2.  Blood sugar was increased when she got home.  Increase Basaglar to 10 units and blood sugars are coming down.  Check A1c and CMP.    Hallucinations while hospitalized.  Improved on Geodon.  She would like to get off of this but I recommend that she continue this for now until her rehab is completed and then she may be in a better position to try and come  off.    Lumbar disc disease.  Chronic pain.  I have given her a small supply of Percocet to use for severe pain.  Side effects explained previously including addiction potential, sedation.    Vitamin D deficiency.  Recheck vitamin D level.    Fatigue.  Check CBC and CMP.      MEDICATIONS PRESCRIBED  Requested Prescriptions     Signed Prescriptions Disp Refills   • oxyCODONE-acetaminophen (PERCOCET)  MG per tablet 20 tablet 0     Sig: Take 0.5-1 tablets by mouth Every 6 (Six) Hours As Needed for Moderate Pain .            Alden dated on  6/28/2019   was reviewed and appropriate.        -------------------------------------------    Subjective     Chief Complaint   Patient presents with   • Pain         History of Present Illness    On april 7th, was admitted by Dr Bajwa. Had abnormal CXR. Does not recall what happened after that and woke up 6 weeks later.   On ventilator and had trach.   Had feeding tube as well.   Transferred to [facility] and then to [facility] [place] and was there for 10 days and had swelling and took 4 days to get lasix. Then sent to [facility] and then [facility] and admitted to [facility] for < 2 weeks    Has [facility] now    Getting P T , O T, speech (next week)    Eating     Diarrhea in am time    DM2  Had been on Insulin 10 units (Basaglar), sugars better 148 now and was 300      Lumbar back pain  Pain: In the upper lower back and radiates down, gave her percocet 5 and has to take 1-2 three times per day  Increased pain with walking  Pain in legs and feet.  Pain is worsening.  Seems to be worse since she is up and walking more in her home    Weakness in legs as well    Anxiety/hallucinations  Was told she was schizophrenic because she had hallucinations after she woke up.   Tearful a lot since she does not recall  Threatened to kill friend.   Was seeing parties at night  Screaming at times  No hallucinations since out of the nursing home    History of vitamin D deficiency.   "Recheck blood work.              Social History     Tobacco Use   Smoking Status Former Smoker   • Years: 30.00   • Types: Cigarettes   • Last attempt to quit: 2019   • Years since quittin.2   Smokeless Tobacco Never Used        Past Medical History,Medications, Allergies, and social history was reviewed.      Review of Systems   Constitutional: Positive for fatigue.        Lost weight while hospitalized.  Slowly gaining it back.   HENT: Negative.    Respiratory: Positive for shortness of breath.    Cardiovascular: Negative.  Negative for chest pain and leg swelling.   Gastrointestinal: Negative.    Musculoskeletal: Positive for arthralgias, back pain, gait problem ( Uses a walker) and myalgias.   Psychiatric/Behavioral: Positive for depressed mood and stress. Negative for suicidal ideas. The patient is nervous/anxious.        Objective     Vitals:    19 1435   BP: 122/74   Pulse: 119   Resp: 22   Temp: 96.8 °F (36 °C)   TempSrc: Temporal   SpO2: 97%   Weight: 134 kg (295 lb)   Height: 170.2 cm (67.01\")          Physical Exam   Constitutional: She appears well-developed and well-nourished.   Uses a walker   HENT:   Head: Normocephalic and atraumatic.   Right Ear: Hearing and external ear normal.   Left Ear: Hearing and external ear normal.   Mouth/Throat: Oropharynx is clear and moist.   Eyes: Conjunctivae and EOM are normal. Pupils are equal, round, and reactive to light.   Cardiovascular: Normal rate, regular rhythm and normal heart sounds. Exam reveals no friction rub.   No murmur heard.  Pulmonary/Chest: Effort normal and breath sounds normal. No respiratory distress. She has no wheezes. She has no rales.   Mild shortness of breath with speaking but especially after walking   Musculoskeletal: She exhibits edema (trace ).        Lumbar back: She exhibits decreased range of motion and tenderness ( Significant tenderness along the lumbar paraspinous musculature).   Straight leg raising is negative " bilaterally.  There is weakness of dorsiflexion of the right foot.  There is also some discoloration of both feet worse on the left but capillary refill is brisk and less than 1 second.  Feet are mildly cool but not cold.   Neurological: She is alert.   Skin: Skin is warm.   Psychiatric: She has a normal mood and affect. Her behavior is normal.   Nursing note and vitals reviewed.                Darrion Tovar MD

## 2019-06-29 LAB
25(OH)D3+25(OH)D2 SERPL-MCNC: 16.9 NG/ML (ref 30–100)
ALBUMIN SERPL-MCNC: 3.8 G/DL (ref 3.5–5.2)
ALBUMIN/GLOB SERPL: 1.3 G/DL
ALP SERPL-CCNC: 125 U/L (ref 39–117)
ALT SERPL-CCNC: 14 U/L (ref 1–33)
AST SERPL-CCNC: 12 U/L (ref 1–32)
BASOPHILS # BLD AUTO: 0.03 10*3/MM3 (ref 0–0.2)
BASOPHILS NFR BLD AUTO: 0.2 % (ref 0–1.5)
BILIRUB SERPL-MCNC: 0.2 MG/DL (ref 0.2–1.2)
BUN SERPL-MCNC: 10 MG/DL (ref 6–20)
BUN/CREAT SERPL: 16.4 (ref 7–25)
CALCIUM SERPL-MCNC: 9.5 MG/DL (ref 8.6–10.5)
CHLORIDE SERPL-SCNC: 102 MMOL/L (ref 98–107)
CHOLEST SERPL-MCNC: 139 MG/DL (ref 0–200)
CHOLEST/HDLC SERPL: 3.31 {RATIO}
CO2 SERPL-SCNC: 23.7 MMOL/L (ref 22–29)
CREAT SERPL-MCNC: 0.61 MG/DL (ref 0.57–1)
EOSINOPHIL # BLD AUTO: 0.13 10*3/MM3 (ref 0–0.4)
EOSINOPHIL NFR BLD AUTO: 1 % (ref 0.3–6.2)
ERYTHROCYTE [DISTWIDTH] IN BLOOD BY AUTOMATED COUNT: 15.8 % (ref 12.3–15.4)
GLOBULIN SER CALC-MCNC: 2.9 GM/DL
GLUCOSE SERPL-MCNC: 160 MG/DL (ref 65–99)
HBA1C MFR BLD: 7.1 % (ref 4.8–5.6)
HCT VFR BLD AUTO: 37.1 % (ref 34–46.6)
HDLC SERPL-MCNC: 42 MG/DL (ref 40–60)
HGB BLD-MCNC: 11.6 G/DL (ref 12–15.9)
IMM GRANULOCYTES # BLD AUTO: 0.16 10*3/MM3 (ref 0–0.05)
IMM GRANULOCYTES NFR BLD AUTO: 1.2 % (ref 0–0.5)
LDLC SERPL CALC-MCNC: 43 MG/DL (ref 0–100)
LYMPHOCYTES # BLD AUTO: 3.3 10*3/MM3 (ref 0.7–3.1)
LYMPHOCYTES NFR BLD AUTO: 25.5 % (ref 19.6–45.3)
MCH RBC QN AUTO: 28.9 PG (ref 26.6–33)
MCHC RBC AUTO-ENTMCNC: 31.3 G/DL (ref 31.5–35.7)
MCV RBC AUTO: 92.5 FL (ref 79–97)
MONOCYTES # BLD AUTO: 0.7 10*3/MM3 (ref 0.1–0.9)
MONOCYTES NFR BLD AUTO: 5.4 % (ref 5–12)
NEUTROPHILS # BLD AUTO: 8.61 10*3/MM3 (ref 1.7–7)
NEUTROPHILS NFR BLD AUTO: 66.7 % (ref 42.7–76)
NRBC BLD AUTO-RTO: 0 /100 WBC (ref 0–0.2)
PLATELET # BLD AUTO: 365 10*3/MM3 (ref 140–450)
POTASSIUM SERPL-SCNC: 3.5 MMOL/L (ref 3.5–5.2)
PROT SERPL-MCNC: 6.7 G/DL (ref 6–8.5)
RBC # BLD AUTO: 4.01 10*6/MM3 (ref 3.77–5.28)
SODIUM SERPL-SCNC: 143 MMOL/L (ref 136–145)
TRIGL SERPL-MCNC: 272 MG/DL (ref 0–150)
TSH SERPL DL<=0.005 MIU/L-ACNC: 0.72 UIU/ML (ref 0.45–4.5)
VIT B12 SERPL-MCNC: 436 PG/ML (ref 211–946)
VLDLC SERPL CALC-MCNC: 54.4 MG/DL
WBC # BLD AUTO: 12.93 10*3/MM3 (ref 3.4–10.8)

## 2019-07-02 ENCOUNTER — TELEPHONE (OUTPATIENT)
Dept: FAMILY MEDICINE CLINIC | Facility: CLINIC | Age: 54
End: 2019-07-02

## 2019-07-02 DIAGNOSIS — M51.9 LUMBAR DISC DISEASE: ICD-10-CM

## 2019-07-02 RX ORDER — OXYCODONE AND ACETAMINOPHEN 10; 325 MG/1; MG/1
.5-1 TABLET ORAL EVERY 6 HOURS PRN
Qty: 20 TABLET | Refills: 0 | Status: SHIPPED | OUTPATIENT
Start: 2019-07-02 | End: 2019-07-08 | Stop reason: SDUPTHER

## 2019-07-02 NOTE — TELEPHONE ENCOUNTER
----- Message from Yari Mendez sent at 7/2/2019  9:11 AM EDT -----  Contact: PT CALLED.  DR SHIELDS--    PT WILL BE OUT OF HER PERCOCET TOMORROW AND IS WONDERING IF A REFILL CAN BE CALLED IN TODAY.  HER PHONE MAY BE SHUT OFF THIS AFTERNOON.    TO [facility]

## 2019-07-08 ENCOUNTER — TELEPHONE (OUTPATIENT)
Dept: FAMILY MEDICINE CLINIC | Facility: CLINIC | Age: 54
End: 2019-07-08

## 2019-07-08 DIAGNOSIS — M51.9 LUMBAR DISC DISEASE: ICD-10-CM

## 2019-07-08 RX ORDER — OXYCODONE AND ACETAMINOPHEN 10; 325 MG/1; MG/1
.5-1 TABLET ORAL EVERY 6 HOURS PRN
Qty: 20 TABLET | Refills: 0 | Status: SHIPPED | OUTPATIENT
Start: 2019-07-08 | End: 2019-07-12 | Stop reason: SDUPTHER

## 2019-07-08 NOTE — TELEPHONE ENCOUNTER
----- Message from Homa Perez sent at 7/8/2019  8:00 AM EDT -----  Contact: ALYSON; ZECHARIAH REFILL    oxyCODONE-acetaminophen (PERCOCET)  MG per tablet Take 0.5-1 tablets by mouth Every 6 (Six) Hours As Needed for Moderate Pain .     Preferred Pharmacies      [facility] - [address] - [phone] [facility] [phone]  [phone] (Phone)

## 2019-07-12 ENCOUNTER — OFFICE VISIT (OUTPATIENT)
Dept: FAMILY MEDICINE CLINIC | Facility: CLINIC | Age: 54
End: 2019-07-12

## 2019-07-12 VITALS
SYSTOLIC BLOOD PRESSURE: 130 MMHG | BODY MASS INDEX: 45.99 KG/M2 | WEIGHT: 293 LBS | HEART RATE: 106 BPM | TEMPERATURE: 97.8 F | HEIGHT: 67 IN | DIASTOLIC BLOOD PRESSURE: 82 MMHG | OXYGEN SATURATION: 92 % | RESPIRATION RATE: 18 BRPM

## 2019-07-12 DIAGNOSIS — G43.009 MIGRAINE WITHOUT AURA AND WITHOUT STATUS MIGRAINOSUS, NOT INTRACTABLE: Primary | ICD-10-CM

## 2019-07-12 DIAGNOSIS — M51.9 LUMBAR DISC DISEASE: ICD-10-CM

## 2019-07-12 PROCEDURE — 99213 OFFICE O/P EST LOW 20 MIN: CPT | Performed by: FAMILY MEDICINE

## 2019-07-12 RX ORDER — OXYCODONE AND ACETAMINOPHEN 10; 325 MG/1; MG/1
.5-1 TABLET ORAL EVERY 6 HOURS PRN
Qty: 60 TABLET | Refills: 0 | Status: SHIPPED | OUTPATIENT
Start: 2019-07-12 | End: 2019-07-25 | Stop reason: SDUPTHER

## 2019-07-12 NOTE — PROGRESS NOTES
Assessment/Plan       Problems Addressed this Visit        Musculoskeletal and Integument    Lumbar disc disease    Relevant Medications    oxyCODONE-acetaminophen (PERCOCET)  MG per tablet      Other Visit Diagnoses     Migraine without aura and without status migrainosus, not intractable    -  Primary    Relevant Medications    oxyCODONE-acetaminophen (PERCOCET)  MG per tablet            Follow up: Return in about 1 month (around 8/12/2019).     DISCUSSION  Chronic lumbar disc disease.  Very difficult for her to get out to pain management at this time.  I have agreed to refill her Percocet as noted.  Pain management agreement was signed.  Follow-up in 1 month.  A 2-week supply at a time will be given at this time.    Recurrent migraines.  She changed her pillow and hopefully that will help these nighttime headaches that she is getting.  If it does not, will need further evaluation.      MEDICATIONS PRESCRIBED  Requested Prescriptions     Signed Prescriptions Disp Refills   • oxyCODONE-acetaminophen (PERCOCET)  MG per tablet 60 tablet 0     Sig: Take 0.5-1 tablets by mouth Every 6 (Six) Hours As Needed for Moderate Pain .            Alden dated on June 26, 2019 was reviewed and appropriate.        -------------------------------------------    Subjective     Chief Complaint   Patient presents with   • Pain     f/u    • Migraine         History of Present Illness     Migraines  Has been having migraines with vomiting   Takes nighttime meds at 10 pm  Wakes up 6 am with headache and vomiting  4 days this week  No change in meds  Had changed pillows before this    Now went to different pillow this am    Percocet has helped with the headache  Takes excedrin      Lumbar disc disease  Increased pain while in hospital  Uses a walker  ++ painful spasms in low back and goes up  P T at home  Home health    Weight gain   Has been eating more healthy but increased weight  Eating more than in the hospital  "and not active  No increased swelling    Angry since unable to leave the house  Took 2 hrs to make potato salad  Took 15 min to wash dishes with TO  Has to stand to do this        Social History     Tobacco Use   Smoking Status Former Smoker   • Years: 30.00   • Types: Cigarettes   • Last attempt to quit: 2019   • Years since quittin.2   Smokeless Tobacco Never Used        Past Medical History,Medications, Allergies, and social history was reviewed.      Review of Systems   Constitutional: Positive for fatigue and unexpected weight gain.   HENT: Negative.    Respiratory: Negative.    Cardiovascular: Negative.    Gastrointestinal: Negative.    Musculoskeletal: Positive for arthralgias, back pain and gait problem.   Neurological: Positive for headache.   Psychiatric/Behavioral: Positive for stress.       Objective     Vitals:    19 1539   BP: 130/82   Pulse: 106   Resp: 18   Temp: 97.8 °F (36.6 °C)   SpO2: 92%   Weight: (!) 144 kg (317 lb)   Height: 170.2 cm (67\")          Physical Exam   Constitutional: She appears well-developed and well-nourished.   HENT:   Head: Normocephalic and atraumatic.   Right Ear: Hearing and external ear normal.   Left Ear: Hearing and external ear normal.   Mouth/Throat: Oropharynx is clear and moist.   Eyes: Conjunctivae and EOM are normal. Pupils are equal, round, and reactive to light.   Cardiovascular: Normal rate, regular rhythm and normal heart sounds. Exam reveals no friction rub.   No murmur heard.  Pulmonary/Chest: Effort normal and breath sounds normal. No respiratory distress. She has no wheezes. She has no rales.   Musculoskeletal:        Lumbar back: She exhibits decreased range of motion and tenderness.   Uses walker     Neurological: She is alert.   Skin: Skin is warm.   Psychiatric: She has a normal mood and affect.   Nursing note and vitals reviewed.                Darrion Tovar MD    "

## 2019-07-22 ENCOUNTER — TELEPHONE (OUTPATIENT)
Dept: FAMILY MEDICINE CLINIC | Facility: CLINIC | Age: 54
End: 2019-07-22

## 2019-07-22 DIAGNOSIS — F41.1 GENERALIZED ANXIETY DISORDER: ICD-10-CM

## 2019-07-22 DIAGNOSIS — M25.561 ACUTE PAIN OF RIGHT KNEE: ICD-10-CM

## 2019-07-22 NOTE — TELEPHONE ENCOUNTER
----- Message from Homa Perez sent at 7/22/2019 12:01 PM EDT -----  Contact: ALYSON; MED REFILL   PT CALLED IN STATING SHE NEEDS A REFILL ON ALL OF HER MEDICATIONS EXCEPT FOR THE INSULIN AND PERCOCET.     CALL BACK   [phone]

## 2019-07-22 NOTE — TELEPHONE ENCOUNTER
----- Message from Homa Perez sent at 7/22/2019  3:56 PM EDT -----  Contact: belia; med refill   Medications    #BusPIRone (BUSPAR) 40 MG tablet TAKE 2 TABLETS BY MOUTH 2 TIMES A DAY  Patient taking differently: Taking 10mg 4times a day  #Cholecalciferol (VITAMIN D) 1000 units tablet Take 1 tablet by mouth Daily.  #DALIRESP 500 MCG tablet tablet Take 1 tablet by mouth Daily.  #DULoxetine (CYMBALTA) 30 MG capsule Take 30 mg by mouth Daily.  #ELIQUIS 5 MG tablet tablet    #blet        lidocaine (LIDODERM) 5 % Place 1 patch on the skin as directed by provider Daily. Remove & Discard patch within 12 hours or as directed by MD  #metFORMIN (GLUCOPHAGE) 500 MG tablet Take 2 tablets by mouth 2 (Two) Times a Day With Meals.  #metoprolol succinate XL (TOPROL-XL) 25 MG 24 hr tablet Take 3 tablets by mouth Daily.    #ziprasidone (GEODON) 20 MG capsule

## 2019-07-23 ENCOUNTER — TELEPHONE (OUTPATIENT)
Dept: FAMILY MEDICINE CLINIC | Facility: CLINIC | Age: 54
End: 2019-07-23

## 2019-07-23 RX ORDER — METOPROLOL SUCCINATE 25 MG/1
75 TABLET, EXTENDED RELEASE ORAL DAILY
Qty: 90 TABLET | Refills: 5 | Status: SHIPPED | OUTPATIENT
Start: 2019-07-23 | End: 2019-08-20 | Stop reason: SDUPTHER

## 2019-07-23 RX ORDER — LIDOCAINE 50 MG/G
1 PATCH TOPICAL EVERY 24 HOURS
Qty: 30 PATCH | Refills: 0 | Status: SHIPPED | OUTPATIENT
Start: 2019-07-23 | End: 2019-08-19 | Stop reason: SDUPTHER

## 2019-07-23 RX ORDER — BUSPIRONE HYDROCHLORIDE 5 MG/1
10 TABLET ORAL 4 TIMES DAILY
Qty: 120 TABLET | Refills: 2 | Status: SHIPPED | OUTPATIENT
Start: 2019-07-23 | End: 2019-08-20 | Stop reason: SDUPTHER

## 2019-07-23 RX ORDER — DULOXETIN HYDROCHLORIDE 30 MG/1
30 CAPSULE, DELAYED RELEASE ORAL DAILY
Qty: 30 CAPSULE | Refills: 2 | Status: SHIPPED | OUTPATIENT
Start: 2019-07-23 | End: 2019-08-20

## 2019-07-23 RX ORDER — ZIPRASIDONE HYDROCHLORIDE 20 MG/1
20 CAPSULE ORAL 2 TIMES DAILY WITH MEALS
Qty: 60 CAPSULE | Refills: 2 | Status: SHIPPED | OUTPATIENT
Start: 2019-07-23 | End: 2019-10-16 | Stop reason: SDUPTHER

## 2019-07-23 RX ORDER — APIXABAN 5 MG/1
5 TABLET, FILM COATED ORAL EVERY 12 HOURS SCHEDULED
Qty: 60 TABLET | Refills: 2 | Status: SHIPPED | OUTPATIENT
Start: 2019-07-23 | End: 2019-10-16 | Stop reason: SDUPTHER

## 2019-07-23 RX ORDER — ROFLUMILAST 500 UG/1
500 TABLET ORAL DAILY
Qty: 30 TABLET | Refills: 5 | Status: SHIPPED | OUTPATIENT
Start: 2019-07-23 | End: 2020-01-09

## 2019-07-23 NOTE — TELEPHONE ENCOUNTER
----- Message from Arlene Crabtree sent at 7/23/2019  3:18 PM EDT -----  Contact: ALYSON;PT CALLED  PT STATES SHE GOT HER MEDS DELIVERED AND THE  LASIX, TRELEGY ND ZANTAC WERE NOT DELIVERED  IS SHE STILL TO TAKE THESE MEDS?    [facility]     [phone]

## 2019-07-23 NOTE — TELEPHONE ENCOUNTER
Please call, those were not on the list of meds she requested yesterday. Please confirm dose and instructions of each med and I will send in.

## 2019-07-24 RX ORDER — RANITIDINE 150 MG/1
150 TABLET ORAL 2 TIMES DAILY
Qty: 60 TABLET | Refills: 5 | Status: SHIPPED | OUTPATIENT
Start: 2019-07-24 | End: 2019-12-20

## 2019-07-24 RX ORDER — FUROSEMIDE 20 MG/1
20 TABLET ORAL DAILY
Qty: 30 TABLET | Refills: 5 | Status: SHIPPED | OUTPATIENT
Start: 2019-07-24 | End: 2020-01-09

## 2019-07-25 ENCOUNTER — TELEPHONE (OUTPATIENT)
Dept: FAMILY MEDICINE CLINIC | Facility: CLINIC | Age: 54
End: 2019-07-25

## 2019-07-25 DIAGNOSIS — M51.9 LUMBAR DISC DISEASE: ICD-10-CM

## 2019-07-25 RX ORDER — OXYCODONE AND ACETAMINOPHEN 10; 325 MG/1; MG/1
.5-1 TABLET ORAL EVERY 6 HOURS PRN
Qty: 60 TABLET | Refills: 0 | Status: SHIPPED | OUTPATIENT
Start: 2019-07-25 | End: 2019-08-08 | Stop reason: SDUPTHER

## 2019-07-25 NOTE — TELEPHONE ENCOUNTER
----- Message from Thania Kwon sent at 7/25/2019  1:35 PM EDT -----  Contact: pt; franck Silver    oxyCODONE-acetaminophen (PERCOCET)  MG per tablet     [facility]    Pt: [phone]

## 2019-08-07 ENCOUNTER — TELEPHONE (OUTPATIENT)
Dept: FAMILY MEDICINE CLINIC | Facility: CLINIC | Age: 54
End: 2019-08-07

## 2019-08-07 DIAGNOSIS — M51.9 LUMBAR DISC DISEASE: ICD-10-CM

## 2019-08-07 NOTE — TELEPHONE ENCOUNTER
----- Message from Thania Kwon sent at 8/7/2019 12:45 PM EDT -----  Contact: PT; ALYSON ZHENG    oxyCODONE-acetaminophen (PERCOCET)  MG per tablet     [facility]    PT; [phone]

## 2019-08-08 RX ORDER — OXYCODONE AND ACETAMINOPHEN 10; 325 MG/1; MG/1
.5-1 TABLET ORAL EVERY 6 HOURS PRN
Qty: 60 TABLET | Refills: 0 | Status: SHIPPED | OUTPATIENT
Start: 2019-08-08 | End: 2019-08-20 | Stop reason: SDUPTHER

## 2019-08-08 NOTE — TELEPHONE ENCOUNTER
Please call, requested meds sent to pharmacy.     Due for office visit for next refill

## 2019-08-19 DIAGNOSIS — M25.561 ACUTE PAIN OF RIGHT KNEE: ICD-10-CM

## 2019-08-19 RX ORDER — LIDOCAINE 50 MG/G
PATCH TOPICAL
Qty: 30 EACH | Refills: 2 | Status: SHIPPED | OUTPATIENT
Start: 2019-08-19 | End: 2019-11-15 | Stop reason: SDUPTHER

## 2019-08-20 ENCOUNTER — OFFICE VISIT (OUTPATIENT)
Dept: FAMILY MEDICINE CLINIC | Facility: CLINIC | Age: 54
End: 2019-08-20

## 2019-08-20 VITALS
WEIGHT: 293 LBS | BODY MASS INDEX: 45.99 KG/M2 | HEIGHT: 67 IN | HEART RATE: 113 BPM | SYSTOLIC BLOOD PRESSURE: 132 MMHG | OXYGEN SATURATION: 96 % | DIASTOLIC BLOOD PRESSURE: 84 MMHG | TEMPERATURE: 98.3 F | RESPIRATION RATE: 18 BRPM

## 2019-08-20 DIAGNOSIS — M51.9 LUMBAR DISC DISEASE: ICD-10-CM

## 2019-08-20 DIAGNOSIS — I10 ESSENTIAL HYPERTENSION: ICD-10-CM

## 2019-08-20 DIAGNOSIS — F41.1 GENERALIZED ANXIETY DISORDER: ICD-10-CM

## 2019-08-20 DIAGNOSIS — J44.9 CHRONIC OBSTRUCTIVE PULMONARY DISEASE, UNSPECIFIED COPD TYPE (HCC): Primary | ICD-10-CM

## 2019-08-20 DIAGNOSIS — M79.672 BILATERAL FOOT PAIN: ICD-10-CM

## 2019-08-20 DIAGNOSIS — D64.9 ANEMIA, UNSPECIFIED TYPE: ICD-10-CM

## 2019-08-20 DIAGNOSIS — E55.9 VITAMIN D DEFICIENCY: ICD-10-CM

## 2019-08-20 DIAGNOSIS — F34.1 DYSTHYMIA: ICD-10-CM

## 2019-08-20 DIAGNOSIS — M79.671 BILATERAL FOOT PAIN: ICD-10-CM

## 2019-08-20 DIAGNOSIS — E11.65 UNCONTROLLED TYPE 2 DIABETES MELLITUS WITH HYPERGLYCEMIA, WITHOUT LONG-TERM CURRENT USE OF INSULIN (HCC): ICD-10-CM

## 2019-08-20 PROCEDURE — 99214 OFFICE O/P EST MOD 30 MIN: CPT | Performed by: FAMILY MEDICINE

## 2019-08-20 RX ORDER — DULOXETIN HYDROCHLORIDE 60 MG/1
60 CAPSULE, DELAYED RELEASE ORAL DAILY
Qty: 30 CAPSULE | Refills: 2 | Status: SHIPPED | OUTPATIENT
Start: 2019-08-20 | End: 2019-11-14 | Stop reason: SDUPTHER

## 2019-08-20 RX ORDER — METOPROLOL SUCCINATE 100 MG/1
100 TABLET, EXTENDED RELEASE ORAL DAILY
Qty: 30 TABLET | Refills: 2 | Status: SHIPPED | OUTPATIENT
Start: 2019-08-20 | End: 2019-11-14 | Stop reason: SDUPTHER

## 2019-08-20 RX ORDER — BUSPIRONE HYDROCHLORIDE 10 MG/1
20 TABLET ORAL 2 TIMES DAILY
Qty: 120 TABLET | Refills: 2 | Status: SHIPPED | OUTPATIENT
Start: 2019-08-20 | End: 2019-11-14 | Stop reason: SDUPTHER

## 2019-08-20 RX ORDER — OXYCODONE AND ACETAMINOPHEN 10; 325 MG/1; MG/1
.5-1 TABLET ORAL EVERY 6 HOURS PRN
Qty: 120 TABLET | Refills: 0 | Status: SHIPPED | OUTPATIENT
Start: 2019-08-20 | End: 2019-09-19 | Stop reason: SDUPTHER

## 2019-08-20 NOTE — PROGRESS NOTES
Assessment/Plan       Problems Addressed this Visit        Cardiovascular and Mediastinum    Essential hypertension    Relevant Medications    metoprolol succinate XL (TOPROL-XL) 100 MG 24 hr tablet    Other Relevant Orders    CBC & Differential    Comprehensive Metabolic Panel       Respiratory    Chronic obstructive pulmonary disease (CMS/HCC) - Primary       Digestive    Vitamin D deficiency    Relevant Orders    Vitamin D 25 Hydroxy       Endocrine    Uncontrolled type 2 diabetes mellitus with hyperglycemia, without long-term current use of insulin (CMS/[facility])    Relevant Orders    Ambulatory Referral for Diabetic Eye Exam-Ophthalmology       Musculoskeletal and Integument    Lumbar disc disease    Relevant Medications    oxyCODONE-acetaminophen (PERCOCET)  MG per tablet       Other    Depression    Relevant Medications    DULoxetine (CYMBALTA) 60 MG capsule    busPIRone (BUSPAR) 10 MG tablet      Other Visit Diagnoses     Bilateral foot pain        Relevant Orders    XR foot 2 vw bilateral    Ambulatory Referral to Podiatry    Anemia, unspecified type        Relevant Orders    Iron and TIBC    Ferritin    Generalized anxiety disorder        Relevant Medications    DULoxetine (CYMBALTA) 60 MG capsule    busPIRone (BUSPAR) 10 MG tablet            Follow up: Return in about 3 months (around 11/20/2019).     DISCUSSION  COPD.  Overall stable although she started smoking again.  Strongly strongly encouraged her to quit smoking.  She had recent significant respiratory failure on ventilator and this is important that she stop smoking.  She expressed understanding.    Chronic lumbar disc disease.  Refilled pain medication.  Pain management agreement has been signed.  Denies misuse or diversion.    Vitamin D deficiency.  Recheck vitamin D level.    Bilateral foot pain.  Recommend check x-ray.  Refer to podiatry.  This is been chronic and worsening.    History of anemia.  Recheck CBC.    Hypertension.  Blood  pressure stable.  Continue medication.    Anxiety and depression.  Increase Cymbalta to 60 mg daily.  This should also hopefully help her foot pain if this is a neuropathic issue.    Diabetes mellitus type 2.  Uncontrolled.  Not due for A1c yet but she is way behind on getting eye exam.  Referral placed for this.    Continue efforts with weight loss.  Decreasing carbohydrates in diet, increasing activity as tolerated.      MEDICATIONS PRESCRIBED  Requested Prescriptions     Signed Prescriptions Disp Refills   • DULoxetine (CYMBALTA) 60 MG capsule 30 capsule 2     Sig: Take 1 capsule by mouth Daily.   • oxyCODONE-acetaminophen (PERCOCET)  MG per tablet 120 tablet 0     Sig: Take 0.5-1 tablets by mouth Every 6 (Six) Hours As Needed for Moderate Pain  for up to 30 days.   • busPIRone (BUSPAR) 10 MG tablet 120 tablet 2     Sig: Take 2 tablets by mouth 2 (Two) Times a Day.   • metoprolol succinate XL (TOPROL-XL) 100 MG 24 hr tablet 30 tablet 2     Sig: Take 1 tablet by mouth Daily.            Alden dated on June 26, 2019 was reviewed and appropriate.        -------------------------------------------    Subjective     Chief Complaint   Patient presents with   • COPD     f/u, meds    • Pain         COPD   She complains of cough, shortness of breath (stable) and wheezing. There is no hemoptysis. This is a chronic problem. The current episode started more than 1 year ago. The problem occurs daily. The problem has been unchanged. The cough is non-productive. Associated symptoms include dyspnea on exertion. Pertinent negatives include no chest pain or weight loss. Exacerbated by: Activity. Relieved by: Inhalers help. She reports moderate improvement on treatment. Risk factors for lung disease include smoking/tobacco exposure. Her past medical history is significant for COPD.   Pain   Chronicity: Chronic lumbar back pain and disc disease.  Also has chronic foot pain.  Percocet does help.  No side effects reported. The  "current episode started more than 1 year ago. The problem occurs constantly. The problem has been unchanged. Associated symptoms include arthralgias, coughing and fatigue. Pertinent negatives include no chest pain. The symptoms are aggravated by walking. She has tried oral narcotics for the symptoms. The treatment provided mild relief.   Hypertension   This is a chronic problem. The current episode started more than 1 year ago. The problem is unchanged. The problem is controlled. Associated symptoms include shortness of breath (stable). Pertinent negatives include no chest pain. There are no associated agents to hypertension. Risk factors for coronary artery disease include smoking/tobacco exposure. Current antihypertension treatment includes beta blockers. The current treatment provides moderate improvement. There are no compliance problems.  There is no history of angina, kidney disease or CAD/MI. There is no history of chronic renal disease.       Foot pain   Excruciating pain in the am in the feet  In tears in the am   Takes a percocet and eases up some   Med helps some  Doing exercises  Grad P T and O T  Pain in back and knees are \"nothing\" when compared to the foot pain   Trying to walk w/o the walker  Thinks has neuropathy    Depression  Wants back on the lexapro  + tearful all the time  On Geodon and Buspar   On Cymbalta  Had panic attack on saturday  + shaking and sweaty and would not go away  Took ativan x 1 and helped       Vit D def  Taking vitamin D.  Due for recheck level.    Lumbar disc disease      Anemia  Had mild anemia in the hospital.  Due for repeat level.            Social History     Tobacco Use   Smoking Status Current Every Day Smoker   • Years: 30.00   • Types: Cigarettes   • Last attempt to quit: 2019   • Years since quittin.3   Smokeless Tobacco Never Used          Past Medical History,Medications, Allergies, and social history was reviewed.    Past Medical History:   Diagnosis " "Date   • Abnormal weight gain    • Acute UTI    • Anxiety    • Arthritis involving multiple sites    • Chronic obstructive pulmonary disease (CMS/HCC)    • Chronic pain    • Current every day smoker    • Depression    • Diabetes mellitus (CMS/HCC)    • Diarrhea    • Dysuria    • Edema, lower extremity    • Fever    • Head injury    • Hypertension    • Intervertebral disc disorder     Degeneration of intervertebral disc, site unspecified (722.6)   • Left bundle branch block    • Leukocytosis    • Long term current use of methadone for pain control    • Mass of right breast    • Nausea    • Obesity    • Overactive bladder    • Peripheral neuropathy    • Superficial phlebitis     right medial calf   • Upper respiratory infection    • Urinary incontinence    • Vitamin D deficiency    • Vomiting         Patient's Body mass index is 47.14 kg/m². BMI is above normal parameters. Recommendations include: decrease carbs and increase physical activity.         Review of Systems   Constitutional: Positive for fatigue. Negative for unexpected weight loss.   HENT: Negative.    Respiratory: Positive for cough, shortness of breath (stable) and wheezing. Negative for hemoptysis.    Cardiovascular: Positive for dyspnea on exertion. Negative for chest pain.   Gastrointestinal: Negative.    Musculoskeletal: Positive for arthralgias and back pain.   Psychiatric/Behavioral: Positive for sleep disturbance and depressed mood. Negative for suicidal ideas. The patient is nervous/anxious.        Objective     Vitals:    08/20/19 1430   BP: 132/84   Pulse: 113   Resp: 18   Temp: 98.3 °F (36.8 °C)   SpO2: 96%   Weight: (!) 137 kg (301 lb)   Height: 170.2 cm (67\")          Physical Exam   Constitutional: She appears well-developed and well-nourished.   HENT:   Head: Normocephalic and atraumatic.   Right Ear: Hearing and external ear normal.   Left Ear: Hearing and external ear normal.   Mouth/Throat: Oropharynx is clear and moist.   Eyes: " Conjunctivae and EOM are normal. Pupils are equal, round, and reactive to light.   Cardiovascular: Normal rate, regular rhythm and normal heart sounds. Exam reveals no friction rub.   No murmur heard.  Pulmonary/Chest: Effort normal. No respiratory distress. She has wheezes ( Expiratory). She has no rales.   Musculoskeletal:        Lumbar back: She exhibits decreased range of motion and tenderness.   Uses walker     Neurological: She is alert.   Skin: Skin is warm.   Psychiatric: She has a normal mood and affect.   Nursing note and vitals reviewed.                Darrion Tovar MD

## 2019-08-21 LAB
25(OH)D3+25(OH)D2 SERPL-MCNC: 19.3 NG/ML (ref 30–100)
ALBUMIN SERPL-MCNC: 3.9 G/DL (ref 3.5–5.5)
ALBUMIN/GLOB SERPL: 1.2 {RATIO} (ref 1.2–2.2)
ALP SERPL-CCNC: 155 IU/L (ref 39–117)
ALT SERPL-CCNC: 12 IU/L (ref 0–32)
AST SERPL-CCNC: 9 IU/L (ref 0–40)
BASOPHILS # BLD AUTO: 0 X10E3/UL (ref 0–0.2)
BASOPHILS NFR BLD AUTO: 0 %
BILIRUB SERPL-MCNC: <0.2 MG/DL (ref 0–1.2)
BUN SERPL-MCNC: 19 MG/DL (ref 6–24)
BUN/CREAT SERPL: 20 (ref 9–23)
CALCIUM SERPL-MCNC: 9.8 MG/DL (ref 8.7–10.2)
CHLORIDE SERPL-SCNC: 101 MMOL/L (ref 96–106)
CO2 SERPL-SCNC: 22 MMOL/L (ref 20–29)
CREAT SERPL-MCNC: 0.93 MG/DL (ref 0.57–1)
EOSINOPHIL # BLD AUTO: 0.2 X10E3/UL (ref 0–0.4)
EOSINOPHIL NFR BLD AUTO: 2 %
ERYTHROCYTE [DISTWIDTH] IN BLOOD BY AUTOMATED COUNT: 14.6 % (ref 12.3–15.4)
FERRITIN SERPL-MCNC: 33 NG/ML (ref 15–150)
GLOBULIN SER CALC-MCNC: 3.2 G/DL (ref 1.5–4.5)
GLUCOSE SERPL-MCNC: 179 MG/DL (ref 65–99)
HCT VFR BLD AUTO: 39.8 % (ref 34–46.6)
HGB BLD-MCNC: 13.2 G/DL (ref 11.1–15.9)
IMM GRANULOCYTES # BLD AUTO: 0.1 X10E3/UL (ref 0–0.1)
IMM GRANULOCYTES NFR BLD AUTO: 1 %
IRON SATN MFR SERPL: 10 % (ref 15–55)
IRON SERPL-MCNC: 37 UG/DL (ref 27–159)
LYMPHOCYTES # BLD AUTO: 4.2 X10E3/UL (ref 0.7–3.1)
LYMPHOCYTES NFR BLD AUTO: 33 %
MCH RBC QN AUTO: 27.6 PG (ref 26.6–33)
MCHC RBC AUTO-ENTMCNC: 33.2 G/DL (ref 31.5–35.7)
MCV RBC AUTO: 83 FL (ref 79–97)
MONOCYTES # BLD AUTO: 0.7 X10E3/UL (ref 0.1–0.9)
MONOCYTES NFR BLD AUTO: 5 %
NEUTROPHILS # BLD AUTO: 7.7 X10E3/UL (ref 1.4–7)
NEUTROPHILS NFR BLD AUTO: 59 %
PLATELET # BLD AUTO: 394 X10E3/UL (ref 150–450)
POTASSIUM SERPL-SCNC: 4.6 MMOL/L (ref 3.5–5.2)
PROT SERPL-MCNC: 7.1 G/DL (ref 6–8.5)
RBC # BLD AUTO: 4.78 X10E6/UL (ref 3.77–5.28)
SODIUM SERPL-SCNC: 140 MMOL/L (ref 134–144)
TIBC SERPL-MCNC: 369 UG/DL (ref 250–450)
UIBC SERPL-MCNC: 332 UG/DL (ref 131–425)
WBC # BLD AUTO: 12.9 X10E3/UL (ref 3.4–10.8)

## 2019-08-23 RX ORDER — ERGOCALCIFEROL 1.25 MG/1
50000 CAPSULE ORAL WEEKLY
Qty: 4 CAPSULE | Refills: 1 | Status: SHIPPED | OUTPATIENT
Start: 2019-08-23 | End: 2019-10-16 | Stop reason: SDUPTHER

## 2019-09-16 RX ORDER — METFORMIN HYDROCHLORIDE 500 MG/1
TABLET, FILM COATED, EXTENDED RELEASE ORAL
Qty: 120 TABLET | Refills: 2 | Status: SHIPPED | OUTPATIENT
Start: 2019-09-16 | End: 2019-12-19

## 2019-09-19 ENCOUNTER — TELEPHONE (OUTPATIENT)
Dept: FAMILY MEDICINE CLINIC | Facility: CLINIC | Age: 54
End: 2019-09-19

## 2019-09-19 DIAGNOSIS — M51.9 LUMBAR DISC DISEASE: ICD-10-CM

## 2019-09-19 RX ORDER — OXYCODONE AND ACETAMINOPHEN 10; 325 MG/1; MG/1
.5-1 TABLET ORAL EVERY 6 HOURS PRN
Qty: 120 TABLET | Refills: 0 | Status: SHIPPED | OUTPATIENT
Start: 2019-09-19 | End: 2019-10-16 | Stop reason: SDUPTHER

## 2019-09-19 NOTE — TELEPHONE ENCOUNTER
MED REFILL       oxyCODONE-acetaminophen (PERCOCET)  MG per tablet      PHARMACY  [facility]      CALL BACK   [phone]

## 2019-10-16 ENCOUNTER — TELEPHONE (OUTPATIENT)
Dept: FAMILY MEDICINE CLINIC | Facility: CLINIC | Age: 54
End: 2019-10-16

## 2019-10-16 DIAGNOSIS — M51.9 LUMBAR DISC DISEASE: ICD-10-CM

## 2019-10-16 RX ORDER — ERGOCALCIFEROL 1.25 MG/1
50000 CAPSULE ORAL WEEKLY
Qty: 4 CAPSULE | Refills: 1 | Status: SHIPPED | OUTPATIENT
Start: 2019-10-16 | End: 2020-01-02

## 2019-10-16 RX ORDER — DULOXETIN HYDROCHLORIDE 30 MG/1
30 CAPSULE, DELAYED RELEASE ORAL DAILY
Qty: 30 CAPSULE | Refills: 2 | OUTPATIENT
Start: 2019-10-16

## 2019-10-16 RX ORDER — APIXABAN 5 MG/1
TABLET, FILM COATED ORAL
Qty: 60 TABLET | Refills: 2 | Status: SHIPPED | OUTPATIENT
Start: 2019-10-16 | End: 2020-01-09

## 2019-10-16 RX ORDER — ZIPRASIDONE HYDROCHLORIDE 20 MG/1
CAPSULE ORAL
Qty: 60 CAPSULE | Refills: 2 | Status: SHIPPED | OUTPATIENT
Start: 2019-10-16 | End: 2020-01-09

## 2019-10-16 RX ORDER — OXYCODONE AND ACETAMINOPHEN 10; 325 MG/1; MG/1
.5-1 TABLET ORAL EVERY 6 HOURS PRN
Qty: 120 TABLET | Refills: 0 | Status: SHIPPED | OUTPATIENT
Start: 2019-10-16 | End: 2019-11-14 | Stop reason: SDUPTHER

## 2019-10-16 NOTE — TELEPHONE ENCOUNTER
Please call.  Received refill request for these 3 medications.  Please confirm if she is still taking all of these and I did send a request for Cymbalta 30 mg but she should be on 60 mg.  Please confirm.

## 2019-10-16 NOTE — TELEPHONE ENCOUNTER
Spoke with pt she is on cymbalta 60mg, she requested pharm cancel the 30mg last month. She needs a refill on her pain meds and vit d

## 2019-11-05 ENCOUNTER — TELEPHONE (OUTPATIENT)
Dept: FAMILY MEDICINE CLINIC | Facility: CLINIC | Age: 54
End: 2019-11-05

## 2019-11-05 NOTE — TELEPHONE ENCOUNTER
PATIENT CALLED SHE'S CURRENTLY OUT OF TOWN BUT RECEIVED A CALL FROM THE OFFICE NO VOICEMAIL WAS LEFT SO SHE'S UNSURE OF WHAT IT MAY BE IN REGARDS TO UNLESS IT WAS HER PAPERS TO  THAT SHE DROPPED OFF A FEW WEEKS AGO. PATIENT IS HAVING PHONE COVERAGE ISSUES DUE TO LOCATION SHE TRIED TO ANSWER BUT THEY HUNG UP AS SOON AS SHE PICKED UP.

## 2019-11-14 ENCOUNTER — TELEPHONE (OUTPATIENT)
Dept: FAMILY MEDICINE CLINIC | Facility: CLINIC | Age: 54
End: 2019-11-14

## 2019-11-14 DIAGNOSIS — F34.1 DYSTHYMIA: ICD-10-CM

## 2019-11-14 DIAGNOSIS — M51.9 LUMBAR DISC DISEASE: ICD-10-CM

## 2019-11-14 DIAGNOSIS — I10 ESSENTIAL HYPERTENSION: ICD-10-CM

## 2019-11-14 DIAGNOSIS — F41.1 GENERALIZED ANXIETY DISORDER: ICD-10-CM

## 2019-11-14 RX ORDER — OXYCODONE AND ACETAMINOPHEN 10; 325 MG/1; MG/1
.5-1 TABLET ORAL EVERY 6 HOURS PRN
Qty: 120 TABLET | Refills: 0 | Status: SHIPPED | OUTPATIENT
Start: 2019-11-14 | End: 2019-12-12 | Stop reason: SDUPTHER

## 2019-11-14 RX ORDER — OXYCODONE AND ACETAMINOPHEN 10; 325 MG/1; MG/1
TABLET ORAL
Qty: 120 TABLET | Refills: 0 | OUTPATIENT
Start: 2019-11-14

## 2019-11-15 DIAGNOSIS — M25.561 ACUTE PAIN OF RIGHT KNEE: ICD-10-CM

## 2019-11-15 RX ORDER — DULOXETIN HYDROCHLORIDE 60 MG/1
CAPSULE, DELAYED RELEASE ORAL
Qty: 30 CAPSULE | Refills: 2 | Status: SHIPPED | OUTPATIENT
Start: 2019-11-15 | End: 2020-02-06

## 2019-11-15 RX ORDER — LIDOCAINE 50 MG/G
PATCH TOPICAL
Qty: 30 PATCH | Refills: 2 | Status: SHIPPED | OUTPATIENT
Start: 2019-11-15 | End: 2020-02-06

## 2019-11-15 RX ORDER — BUSPIRONE HYDROCHLORIDE 10 MG/1
TABLET ORAL
Qty: 120 TABLET | Refills: 2 | Status: SHIPPED | OUTPATIENT
Start: 2019-11-15 | End: 2020-02-06

## 2019-11-15 RX ORDER — METOPROLOL SUCCINATE 100 MG/1
TABLET, EXTENDED RELEASE ORAL
Qty: 30 TABLET | Refills: 2 | Status: SHIPPED | OUTPATIENT
Start: 2019-11-15 | End: 2020-02-06

## 2019-12-12 ENCOUNTER — OFFICE VISIT (OUTPATIENT)
Dept: FAMILY MEDICINE CLINIC | Facility: CLINIC | Age: 54
End: 2019-12-12

## 2019-12-12 VITALS
SYSTOLIC BLOOD PRESSURE: 152 MMHG | DIASTOLIC BLOOD PRESSURE: 88 MMHG | WEIGHT: 293 LBS | RESPIRATION RATE: 18 BRPM | TEMPERATURE: 97.8 F | BODY MASS INDEX: 45.99 KG/M2 | HEART RATE: 90 BPM | HEIGHT: 67 IN

## 2019-12-12 DIAGNOSIS — M51.9 LUMBAR DISC DISEASE: ICD-10-CM

## 2019-12-12 DIAGNOSIS — E55.9 VITAMIN D DEFICIENCY: ICD-10-CM

## 2019-12-12 DIAGNOSIS — J01.00 ACUTE NON-RECURRENT MAXILLARY SINUSITIS: Primary | ICD-10-CM

## 2019-12-12 DIAGNOSIS — E11.42 DIABETIC PERIPHERAL NEUROPATHY (HCC): ICD-10-CM

## 2019-12-12 DIAGNOSIS — F34.1 DYSTHYMIA: ICD-10-CM

## 2019-12-12 DIAGNOSIS — E61.1 IRON DEFICIENCY: ICD-10-CM

## 2019-12-12 DIAGNOSIS — E11.65 UNCONTROLLED TYPE 2 DIABETES MELLITUS WITH HYPERGLYCEMIA, WITHOUT LONG-TERM CURRENT USE OF INSULIN (HCC): ICD-10-CM

## 2019-12-12 DIAGNOSIS — I10 ESSENTIAL HYPERTENSION: ICD-10-CM

## 2019-12-12 PROCEDURE — 99214 OFFICE O/P EST MOD 30 MIN: CPT | Performed by: FAMILY MEDICINE

## 2019-12-12 RX ORDER — AMOXICILLIN AND CLAVULANATE POTASSIUM 875; 125 MG/1; MG/1
1 TABLET, FILM COATED ORAL 2 TIMES DAILY
Qty: 20 TABLET | Refills: 0 | Status: SHIPPED | OUTPATIENT
Start: 2019-12-12 | End: 2020-01-23

## 2019-12-12 RX ORDER — DULOXETIN HYDROCHLORIDE 30 MG/1
30 CAPSULE, DELAYED RELEASE ORAL DAILY
Qty: 30 CAPSULE | Refills: 2 | Status: SHIPPED | OUTPATIENT
Start: 2019-12-12 | End: 2020-03-06

## 2019-12-12 RX ORDER — OXYCODONE AND ACETAMINOPHEN 10; 325 MG/1; MG/1
.5-1 TABLET ORAL EVERY 6 HOURS PRN
Qty: 120 TABLET | Refills: 0 | Status: SHIPPED | OUTPATIENT
Start: 2019-12-12 | End: 2020-01-09

## 2019-12-12 NOTE — PROGRESS NOTES
Assessment/Plan       Problems Addressed this Visit        Cardiovascular and Mediastinum    Essential hypertension    Relevant Orders    Comprehensive Metabolic Panel       Digestive    Vitamin D deficiency    Relevant Orders    Vitamin D 25 Hydroxy       Endocrine    Uncontrolled type 2 diabetes mellitus with hyperglycemia, without long-term current use of insulin (CMS/[facility])    Relevant Orders    Comprehensive Metabolic Panel    Lipid Panel With / Chol / HDL Ratio    Hemoglobin A1c       Musculoskeletal and Integument    Lumbar disc disease    Relevant Medications    oxyCODONE-acetaminophen (PERCOCET)  MG per tablet       Other    Depression    Relevant Medications    DULoxetine (CYMBALTA) 30 MG capsule      Other Visit Diagnoses     Acute non-recurrent maxillary sinusitis    -  Primary    Relevant Medications    amoxicillin-clavulanate (AUGMENTIN) 875-125 MG per tablet    Iron deficiency        Relevant Medications    Multiple Vitamins-Iron (MULTI-VITAMIN/IRON) tablet    Other Relevant Orders    CBC & Differential    Iron and TIBC    Ferritin            Follow up: Return in about 3 months (around 3/12/2020), or if symptoms worsen or fail to improve.     DISCUSSION  Maxillary sinusitis.  Start Augmentin as noted.  Increase fluids.  Rest.  Call if not getting better.  Continue efforts to quit smoking.    Uncontrolled diabetes mellitus type 2.  Hyperglycemia.  Check labs as noted.    Hypertension.  Blood pressure is slightly increased.  We will continue to monitor.    Depression.  Increasing symptoms.  Increase Cymbalta to 90 mg daily.  Add additional 30 mg to 60 mg dose that she is taking.    Vitamin D deficiency.  Recheck vitamin D level.    Iron deficiency.  Check CBC and iron panel.    Chronic pain due to lumbar disc disease.  Refill pain medication.  Pain management agreement previously signed.  No evidence of misuse or diversion.  Taking medication appropriately.           MEDICATIONS  PRESCRIBED  Requested Prescriptions     Signed Prescriptions Disp Refills   • Multiple Vitamins-Iron (MULTI-VITAMIN/IRON) tablet 30 each 5     Sig: Take 1 tablet by mouth Daily.   • DULoxetine (CYMBALTA) 30 MG capsule 30 capsule 2     Sig: Take 1 capsule by mouth Daily. Take with 60 mg dose   • oxyCODONE-acetaminophen (PERCOCET)  MG per tablet 120 tablet 0     Sig: Take 0.5-1 tablets by mouth Every 6 (Six) Hours As Needed for Moderate Pain  for up to 30 days.   • amoxicillin-clavulanate (AUGMENTIN) 875-125 MG per tablet 20 tablet 0     Sig: Take 1 tablet by mouth 2 (Two) Times a Day.            Alden dated on October 23, 2019 was reviewed and appropriate.        -------------------------------------------    Subjective     Chief Complaint   Patient presents with   • Sinus Problem     worse on right    • Med Refill   • labwork         Sinus Problem   This is a new (had recent cold and got better) problem. Episode onset: x 2 dasy. The problem is unchanged. There has been no fever. The pain is moderate. Associated symptoms include congestion (yellow), coughing (when had the cold and better now), headaches and sinus pressure (right side swollen and hurts). Treatments tried: nyquil cold and made her jittery. The treatment provided no relief.   Diabetes   She presents for her follow-up diabetic visit. She has type 2 diabetes mellitus. Her disease course has been stable. Hypoglycemia symptoms include headaches. Associated symptoms include foot paresthesias. Symptoms are stable. There are no diabetic complications. Current diabetic treatment includes insulin injections and oral agent (monotherapy). Her weight is increasing steadily. She is following a generally healthy diet. She never participates in exercise. There is no change (150-200 in am , up to 280 during the day) in her home blood glucose trend. An ACE inhibitor/angiotensin II receptor blocker is not being taken. Eye exam is current.       Basaglar 14 units  "daily  No humalog now    Depression  Cry all the time  On Cymbalta 60 mg daily  + more depressed    Lumbar disc disease  No change  Taking pain medication as prescribed.  Denies misuse.  No side effects.  No increased sedation.    Hypertension  Blood pressure is a little elevated today.  No reported chest pain.  Chronic shortness of breath.  Unchanged.  No swelling.    Needs diabetic foot exam and shoes.            Social History     Tobacco Use   Smoking Status Current Every Day Smoker   • Years: 30.00   • Types: Cigarettes   • Last attempt to quit: 2019   • Years since quittin.6   Smokeless Tobacco Never Used      Has been smoking again : 15 per day    Had quit and restarted    Past Medical History,Medications, Allergies, and social history was reviewed.        Review of Systems   Constitutional: Negative.    HENT: Positive for congestion (yellow) and sinus pressure (right side swollen and hurts).    Respiratory: Positive for cough (when had the cold and better now).    Cardiovascular: Negative.    Gastrointestinal: Negative.    Musculoskeletal: Positive for arthralgias and back pain.   Psychiatric/Behavioral: Negative.        Objective     Vitals:    19 1136   BP: 152/88   Pulse: 90   Resp: 18   Temp: 97.8 °F (36.6 °C)   Weight: (!) 143 kg (315 lb)   Height: 170.2 cm (67\")          Physical Exam   Constitutional: She appears well-developed and well-nourished.   HENT:   Head: Normocephalic and atraumatic.   Right Ear: Hearing, external ear and ear canal normal. Tympanic membrane is retracted. Tympanic membrane is not erythematous.   Left Ear: Hearing, external ear and ear canal normal. Tympanic membrane is retracted. Tympanic membrane is not erythematous.   Mouth/Throat: Oropharynx is clear and moist.   Eyes: Pupils are equal, round, and reactive to light. Conjunctivae and EOM are normal.   Neck: Normal range of motion. Neck supple. No thyromegaly present.   Cardiovascular: Normal rate, regular " rhythm and normal heart sounds. Exam reveals no gallop and no friction rub.   No murmur heard.  Pulmonary/Chest: Effort normal. No respiratory distress. She has no decreased breath sounds. She has no wheezes ( Faint expiratory). She has rhonchi ( With cough). She has no rales.   Abdominal: Soft. Bowel sounds are normal. She exhibits no distension. There is no tenderness. There is no rebound and no guarding.   Musculoskeletal: She exhibits no edema.    Dani had a diabetic foot exam performed today.   During the foot exam she had a monofilament test performed (decreased toes both feet and mid foot left).  Vascular Status -  Her right foot exhibits no edema. Her left foot exhibits abnormal foot vasculature  (decreased DP left). Her left foot exhibits no edema.  Skin Integrity  -  She has right heel is dry and cracked.She has callous left foot and left heel dry and cracked..  Neurological: She is alert.   Skin: Skin is warm and dry.   Psychiatric: She has a normal mood and affect.   Nursing note and vitals reviewed.                Darrion Tovar MD

## 2019-12-13 LAB
25(OH)D3+25(OH)D2 SERPL-MCNC: 30.5 NG/ML (ref 30–100)
ALBUMIN SERPL-MCNC: 4.1 G/DL (ref 3.5–5.2)
ALBUMIN/GLOB SERPL: 1.3 G/DL
ALP SERPL-CCNC: 150 U/L (ref 39–117)
ALT SERPL-CCNC: 18 U/L (ref 1–33)
AST SERPL-CCNC: 12 U/L (ref 1–32)
BASOPHILS # BLD AUTO: 0.05 10*3/MM3 (ref 0–0.2)
BASOPHILS NFR BLD AUTO: 0.3 % (ref 0–1.5)
BILIRUB SERPL-MCNC: 0.2 MG/DL (ref 0.2–1.2)
BUN SERPL-MCNC: 14 MG/DL (ref 6–20)
BUN/CREAT SERPL: 16.1 (ref 7–25)
CALCIUM SERPL-MCNC: 9.8 MG/DL (ref 8.6–10.5)
CHLORIDE SERPL-SCNC: 100 MMOL/L (ref 98–107)
CHOLEST SERPL-MCNC: 145 MG/DL (ref 0–200)
CHOLEST/HDLC SERPL: 3.54 {RATIO}
CO2 SERPL-SCNC: 24.6 MMOL/L (ref 22–29)
CREAT SERPL-MCNC: 0.87 MG/DL (ref 0.57–1)
EOSINOPHIL # BLD AUTO: 0.14 10*3/MM3 (ref 0–0.4)
EOSINOPHIL NFR BLD AUTO: 1 % (ref 0.3–6.2)
ERYTHROCYTE [DISTWIDTH] IN BLOOD BY AUTOMATED COUNT: 15.1 % (ref 12.3–15.4)
FERRITIN SERPL-MCNC: 73.3 NG/ML (ref 13–150)
GLOBULIN SER CALC-MCNC: 3.2 GM/DL
GLUCOSE SERPL-MCNC: 202 MG/DL (ref 65–99)
HBA1C MFR BLD: 8.4 % (ref 4.8–5.6)
HCT VFR BLD AUTO: 45.5 % (ref 34–46.6)
HDLC SERPL-MCNC: 41 MG/DL (ref 40–60)
HGB BLD-MCNC: 15 G/DL (ref 12–15.9)
IMM GRANULOCYTES # BLD AUTO: 0.29 10*3/MM3 (ref 0–0.05)
IMM GRANULOCYTES NFR BLD AUTO: 2 % (ref 0–0.5)
IRON SATN MFR SERPL: 8 % (ref 20–50)
IRON SERPL-MCNC: 32 MCG/DL (ref 37–145)
LDLC SERPL CALC-MCNC: 63 MG/DL (ref 0–100)
LYMPHOCYTES # BLD AUTO: 3.35 10*3/MM3 (ref 0.7–3.1)
LYMPHOCYTES NFR BLD AUTO: 22.9 % (ref 19.6–45.3)
MCH RBC QN AUTO: 28.1 PG (ref 26.6–33)
MCHC RBC AUTO-ENTMCNC: 33 G/DL (ref 31.5–35.7)
MCV RBC AUTO: 85.4 FL (ref 79–97)
MONOCYTES # BLD AUTO: 0.56 10*3/MM3 (ref 0.1–0.9)
MONOCYTES NFR BLD AUTO: 3.8 % (ref 5–12)
NEUTROPHILS # BLD AUTO: 10.26 10*3/MM3 (ref 1.7–7)
NEUTROPHILS NFR BLD AUTO: 70 % (ref 42.7–76)
NRBC BLD AUTO-RTO: 0 /100 WBC (ref 0–0.2)
PLATELET # BLD AUTO: 351 10*3/MM3 (ref 140–450)
POTASSIUM SERPL-SCNC: 5.1 MMOL/L (ref 3.5–5.2)
PROT SERPL-MCNC: 7.3 G/DL (ref 6–8.5)
RBC # BLD AUTO: 5.33 10*6/MM3 (ref 3.77–5.28)
SODIUM SERPL-SCNC: 139 MMOL/L (ref 136–145)
TIBC SERPL-MCNC: 423 MCG/DL
TRIGL SERPL-MCNC: 203 MG/DL (ref 0–150)
UIBC SERPL-MCNC: 391 MCG/DL (ref 112–346)
VLDLC SERPL CALC-MCNC: 40.6 MG/DL
WBC # BLD AUTO: 14.65 10*3/MM3 (ref 3.4–10.8)

## 2019-12-19 RX ORDER — FERROUS SULFATE 325(65) MG
325 TABLET ORAL
Qty: 30 TABLET | Refills: 1 | Status: SHIPPED | OUTPATIENT
Start: 2019-12-19 | End: 2022-06-26 | Stop reason: HOSPADM

## 2019-12-19 RX ORDER — METFORMIN HYDROCHLORIDE 500 MG/1
TABLET, FILM COATED, EXTENDED RELEASE ORAL
Qty: 120 TABLET | Refills: 2 | Status: SHIPPED | OUTPATIENT
Start: 2019-12-19 | End: 2020-04-02

## 2019-12-20 ENCOUNTER — TELEPHONE (OUTPATIENT)
Dept: FAMILY MEDICINE CLINIC | Facility: CLINIC | Age: 54
End: 2019-12-20

## 2019-12-20 RX ORDER — FAMOTIDINE 20 MG/1
20 TABLET, FILM COATED ORAL 2 TIMES DAILY
Qty: 60 TABLET | Refills: 5 | Status: SHIPPED | OUTPATIENT
Start: 2019-12-20 | End: 2021-03-15 | Stop reason: SDUPTHER

## 2019-12-20 RX ORDER — RANITIDINE 150 MG/1
150 TABLET ORAL 2 TIMES DAILY
Qty: 60 TABLET | Refills: 5 | Status: CANCELLED | OUTPATIENT
Start: 2019-12-20

## 2019-12-20 NOTE — TELEPHONE ENCOUNTER
Víctor called from pharmacy and on raniitidine, 150 mg is on backorder indefinetly and is suggesting to switch to pepsid 2x daily

## 2020-01-02 RX ORDER — ERGOCALCIFEROL 1.25 MG/1
CAPSULE ORAL
Qty: 4 CAPSULE | Refills: 1 | Status: SHIPPED | OUTPATIENT
Start: 2020-01-02 | End: 2020-03-05

## 2020-01-09 ENCOUNTER — TELEPHONE (OUTPATIENT)
Dept: FAMILY MEDICINE CLINIC | Facility: CLINIC | Age: 55
End: 2020-01-09

## 2020-01-09 DIAGNOSIS — M51.9 LUMBAR DISC DISEASE: ICD-10-CM

## 2020-01-09 RX ORDER — OXYCODONE AND ACETAMINOPHEN 10; 325 MG/1; MG/1
TABLET ORAL
Qty: 120 TABLET | Refills: 0 | Status: SHIPPED | OUTPATIENT
Start: 2020-01-09 | End: 2020-02-06

## 2020-01-09 RX ORDER — FUROSEMIDE 20 MG/1
20 TABLET ORAL DAILY
Qty: 30 TABLET | Refills: 5 | Status: SHIPPED | OUTPATIENT
Start: 2020-01-09 | End: 2021-01-20

## 2020-01-09 RX ORDER — ZIPRASIDONE HYDROCHLORIDE 20 MG/1
CAPSULE ORAL
Qty: 60 CAPSULE | Refills: 2 | Status: SHIPPED | OUTPATIENT
Start: 2020-01-09 | End: 2020-04-02

## 2020-01-09 RX ORDER — APIXABAN 5 MG/1
TABLET, FILM COATED ORAL
Qty: 60 TABLET | Refills: 2 | Status: SHIPPED | OUTPATIENT
Start: 2020-01-09 | End: 2020-04-02

## 2020-01-09 RX ORDER — ROFLUMILAST 500 UG/1
500 TABLET ORAL DAILY
Qty: 30 TABLET | Refills: 5 | Status: SHIPPED | OUTPATIENT
Start: 2020-01-09 | End: 2021-01-20

## 2020-01-09 RX ORDER — INSULIN GLARGINE 100 [IU]/ML
16 INJECTION, SOLUTION SUBCUTANEOUS DAILY
Qty: 15 ML | Refills: 1 | Status: SHIPPED | OUTPATIENT
Start: 2020-01-09 | End: 2020-05-04

## 2020-01-09 NOTE — TELEPHONE ENCOUNTER
PT CALLED AND NEEDS REFILL OF INSULIN AND PERCOCET CALLED INTO [facility].

## 2020-01-23 ENCOUNTER — OFFICE VISIT (OUTPATIENT)
Dept: FAMILY MEDICINE CLINIC | Facility: CLINIC | Age: 55
End: 2020-01-23

## 2020-01-23 VITALS
HEIGHT: 67 IN | WEIGHT: 293 LBS | BODY MASS INDEX: 45.99 KG/M2 | TEMPERATURE: 97.8 F | DIASTOLIC BLOOD PRESSURE: 82 MMHG | SYSTOLIC BLOOD PRESSURE: 134 MMHG | RESPIRATION RATE: 20 BRPM | HEART RATE: 92 BPM

## 2020-01-23 DIAGNOSIS — H60.502 ACUTE OTITIS EXTERNA OF LEFT EAR, UNSPECIFIED TYPE: Primary | ICD-10-CM

## 2020-01-23 DIAGNOSIS — H93.8X2 EAR CONGESTION, LEFT: ICD-10-CM

## 2020-01-23 PROCEDURE — 99213 OFFICE O/P EST LOW 20 MIN: CPT | Performed by: PHYSICIAN ASSISTANT

## 2020-01-23 RX ORDER — FLUTICASONE PROPIONATE 50 MCG
2 SPRAY, SUSPENSION (ML) NASAL DAILY
Qty: 16 G | Refills: 0 | Status: SHIPPED | OUTPATIENT
Start: 2020-01-23 | End: 2021-01-20

## 2020-01-23 RX ORDER — CIPROFLOXACIN AND DEXAMETHASONE 3; 1 MG/ML; MG/ML
4 SUSPENSION/ DROPS AURICULAR (OTIC) 2 TIMES DAILY
Qty: 7.5 ML | Refills: 0 | Status: SHIPPED | OUTPATIENT
Start: 2020-01-23 | End: 2020-01-30

## 2020-01-23 NOTE — PROGRESS NOTES
"Albertina Ziegler is a 54 y.o. female.     Earache    There is pain in the left ear. This is a new problem. The current episode started today. The problem occurs every few minutes. The problem has been unchanged. There has been no fever. The pain is at a severity of 3/10. The pain is mild. Associated symptoms include headaches. Pertinent negatives include no abdominal pain, coughing, diarrhea, ear discharge, hearing loss, neck pain, rash, rhinorrhea, sore throat or vomiting. Associated symptoms comments: Blood upon removing q tip today   HA, ear throbbing . She has tried nothing for the symptoms.        The following portions of the patient's history were reviewed and updated as appropriate: allergies, current medications, past family history, past medical history, past social history, past surgical history and problem list.    Review of Systems   Constitutional: Negative for chills, fatigue and fever.   HENT: Positive for ear pain. Negative for congestion, ear discharge, hearing loss, rhinorrhea and sore throat.    Respiratory: Negative for cough, shortness of breath and wheezing.    Cardiovascular: Negative for chest pain.   Gastrointestinal: Negative for abdominal pain, diarrhea and vomiting.   Musculoskeletal: Negative for neck pain.   Skin: Negative for rash.   Neurological: Positive for headaches.       Objective    Blood pressure 134/82, pulse 92, temperature 97.8 °F (36.6 °C), resp. rate 20, height 170.2 cm (67\"), weight (!) 145 kg (320 lb).     Physical Exam   Constitutional: She is oriented to person, place, and time. She appears well-developed and well-nourished.   HENT:   Head: Normocephalic and atraumatic.   Right Ear: Tympanic membrane, external ear and ear canal normal.   Left Ear: External ear normal. There is swelling and tenderness. Tympanic membrane is retracted. Tympanic membrane is not perforated, not erythematous and not bulging.   Nose: Nose normal.   Mouth/Throat: Oropharynx is " clear and moist. No oropharyngeal exudate.   Dried blood visible in ear canal    Eyes: Conjunctivae are normal.   Neck: Normal range of motion. Neck supple. No tracheal deviation present. No thyromegaly present.   Cardiovascular: Normal rate, regular rhythm and normal heart sounds.   Pulmonary/Chest: Effort normal and breath sounds normal. No respiratory distress. She has no rales. She exhibits no tenderness.   Scattered expiratory wheezing    Lymphadenopathy:     She has no cervical adenopathy.   Neurological: She is alert and oriented to person, place, and time.   Skin: Skin is warm and dry.   Psychiatric: She has a normal mood and affect. Her behavior is normal. Judgment and thought content normal.   Nursing note and vitals reviewed.      Assessment/Plan   Dani was seen today for earache.    Diagnoses and all orders for this visit:    Acute otitis externa of left ear, unspecified type  -     ciprofloxacin-dexamethasone (CIPRODEX) 0.3-0.1 % otic suspension; Administer 4 drops into the left ear 2 (Two) Times a Day for 7 days.    Ear congestion, left  -     fluticasone (FLONASE) 50 MCG/ACT nasal spray; 2 sprays into the nostril(s) as directed by provider Daily.    treatment as outlined in plan. Avoid putting q tip in ears   F/U INB

## 2020-02-06 ENCOUNTER — TELEPHONE (OUTPATIENT)
Dept: FAMILY MEDICINE CLINIC | Facility: CLINIC | Age: 55
End: 2020-02-06

## 2020-02-06 DIAGNOSIS — F34.1 DYSTHYMIA: ICD-10-CM

## 2020-02-06 DIAGNOSIS — F41.1 GENERALIZED ANXIETY DISORDER: ICD-10-CM

## 2020-02-06 DIAGNOSIS — M25.561 ACUTE PAIN OF RIGHT KNEE: ICD-10-CM

## 2020-02-06 DIAGNOSIS — M51.9 LUMBAR DISC DISEASE: ICD-10-CM

## 2020-02-06 DIAGNOSIS — I10 ESSENTIAL HYPERTENSION: ICD-10-CM

## 2020-02-06 RX ORDER — DULOXETIN HYDROCHLORIDE 60 MG/1
CAPSULE, DELAYED RELEASE ORAL
Qty: 30 CAPSULE | Refills: 1 | Status: SHIPPED | OUTPATIENT
Start: 2020-02-06 | End: 2020-04-03

## 2020-02-06 RX ORDER — OXYCODONE AND ACETAMINOPHEN 10; 325 MG/1; MG/1
TABLET ORAL
Qty: 120 TABLET | Refills: 0 | Status: SHIPPED | OUTPATIENT
Start: 2020-02-06 | End: 2020-03-05

## 2020-02-06 RX ORDER — BUSPIRONE HYDROCHLORIDE 10 MG/1
TABLET ORAL
Qty: 120 TABLET | Refills: 1 | Status: SHIPPED | OUTPATIENT
Start: 2020-02-06 | End: 2021-01-20

## 2020-02-06 RX ORDER — METOPROLOL SUCCINATE 100 MG/1
TABLET, EXTENDED RELEASE ORAL
Qty: 30 TABLET | Refills: 1 | Status: SHIPPED | OUTPATIENT
Start: 2020-02-06 | End: 2021-01-20

## 2020-02-06 RX ORDER — LIDOCAINE 50 MG/G
PATCH TOPICAL
Qty: 30 EACH | Refills: 1 | Status: SHIPPED | OUTPATIENT
Start: 2020-02-06 | End: 2021-01-20

## 2020-03-05 ENCOUNTER — TELEPHONE (OUTPATIENT)
Dept: FAMILY MEDICINE CLINIC | Facility: CLINIC | Age: 55
End: 2020-03-05

## 2020-03-05 DIAGNOSIS — M51.9 LUMBAR DISC DISEASE: ICD-10-CM

## 2020-03-05 DIAGNOSIS — F34.1 DYSTHYMIA: ICD-10-CM

## 2020-03-05 RX ORDER — ERGOCALCIFEROL 1.25 MG/1
CAPSULE ORAL
Qty: 4 CAPSULE | Refills: 0 | Status: SHIPPED | OUTPATIENT
Start: 2020-03-05 | End: 2020-08-25

## 2020-03-05 RX ORDER — OXYCODONE AND ACETAMINOPHEN 10; 325 MG/1; MG/1
TABLET ORAL
Qty: 120 TABLET | Refills: 0 | Status: SHIPPED | OUTPATIENT
Start: 2020-03-05 | End: 2020-04-02

## 2020-03-06 RX ORDER — DULOXETIN HYDROCHLORIDE 30 MG/1
30 CAPSULE, DELAYED RELEASE ORAL DAILY
Qty: 30 CAPSULE | Refills: 0 | Status: SHIPPED | OUTPATIENT
Start: 2020-03-06 | End: 2020-04-03

## 2020-04-01 ENCOUNTER — TELEPHONE (OUTPATIENT)
Dept: FAMILY MEDICINE CLINIC | Facility: CLINIC | Age: 55
End: 2020-04-01

## 2020-04-02 ENCOUNTER — TELEPHONE (OUTPATIENT)
Dept: FAMILY MEDICINE CLINIC | Facility: CLINIC | Age: 55
End: 2020-04-02

## 2020-04-02 DIAGNOSIS — M51.9 LUMBAR DISC DISEASE: ICD-10-CM

## 2020-04-02 RX ORDER — ZIPRASIDONE HYDROCHLORIDE 20 MG/1
CAPSULE ORAL
Qty: 60 CAPSULE | Refills: 1 | Status: SHIPPED | OUTPATIENT
Start: 2020-04-02 | End: 2021-01-20

## 2020-04-02 RX ORDER — METFORMIN HYDROCHLORIDE 1000 MG/1
TABLET, FILM COATED, EXTENDED RELEASE ORAL
Qty: 60 TABLET | Refills: 1 | Status: SHIPPED | OUTPATIENT
Start: 2020-04-02 | End: 2020-07-17

## 2020-04-02 RX ORDER — APIXABAN 5 MG/1
TABLET, FILM COATED ORAL
Qty: 60 TABLET | Refills: 1 | Status: SHIPPED | OUTPATIENT
Start: 2020-04-02 | End: 2021-03-15 | Stop reason: SDUPTHER

## 2020-04-02 RX ORDER — OXYCODONE AND ACETAMINOPHEN 10; 325 MG/1; MG/1
TABLET ORAL
Qty: 120 TABLET | Refills: 0 | Status: SHIPPED | OUTPATIENT
Start: 2020-04-02 | End: 2020-04-30

## 2020-04-02 NOTE — TELEPHONE ENCOUNTER
PT CALLED STATING THAT SHE NEEDS A REFILL BUT IS NOT ABLE TO COME IN TO  THE OFFICE.  PT REQUEST  A CALLED BACK.    PT CONTACT [phone]     PLEASE ADVISE

## 2020-04-02 NOTE — TELEPHONE ENCOUNTER
Called patient stated that last month the pharmacy switched her to 1000 mg bid due to they did not have 500 mg (was taking 2 twice a day.) Se wanted to leave it at 1000 mg that away she would only take 2 pills instead of 4. Also patient stated she is having a lot of trouble with anxiety stated that she will not leave her house and has not left in almost 6 weeks. informed her that we are doing the 3 new visits and that she could do one of those. She does not have a smart phone. So said she could do telephone if needed.

## 2020-04-02 NOTE — TELEPHONE ENCOUNTER
Please call patient and confirm dose of metformin.  We received a refill request for metformin 1000 mg twice a day but our system has down 500 mg.

## 2020-04-02 NOTE — TELEPHONE ENCOUNTER
Please call, requested meds sent to pharmacy.     See other message in regards to metformin.

## 2020-04-02 NOTE — TELEPHONE ENCOUNTER
Please call.  I sent in the new dose of the metformin and please get her set up for a telephone visit tomorrow Friday, April 3.

## 2020-04-03 ENCOUNTER — OFFICE VISIT (OUTPATIENT)
Dept: FAMILY MEDICINE CLINIC | Facility: CLINIC | Age: 55
End: 2020-04-03

## 2020-04-03 DIAGNOSIS — F34.1 DYSTHYMIA: Primary | ICD-10-CM

## 2020-04-03 DIAGNOSIS — M51.9 LUMBAR DISC DISEASE: ICD-10-CM

## 2020-04-03 PROCEDURE — 99212 OFFICE O/P EST SF 10 MIN: CPT | Performed by: FAMILY MEDICINE

## 2020-04-03 RX ORDER — DULOXETIN HYDROCHLORIDE 60 MG/1
120 CAPSULE, DELAYED RELEASE ORAL DAILY
Qty: 60 CAPSULE | Refills: 2 | Status: SHIPPED | OUTPATIENT
Start: 2020-04-03 | End: 2020-09-22

## 2020-04-03 NOTE — PROGRESS NOTES
You have chosen to receive care through a telephone visit today. Do you consent to use a telephone visit for your medical care today? Yes    Time spent on visit : 16 Min     Assessment/Plan       Problems Addressed this Visit        Musculoskeletal and Integument    Lumbar disc disease       Other    Depression - Primary    Relevant Medications    DULoxetine (CYMBALTA) 60 MG capsule            Follow up: No follow-ups on file.     DISCUSSION  Worsening depression.  Will increase duloxetine to 120 mg daily.    Chronic lumbar back pain. Stable. Continue pain meds. No evidence of misuse or diversion          MEDICATIONS PRESCRIBED  Requested Prescriptions     Signed Prescriptions Disp Refills   • DULoxetine (CYMBALTA) 60 MG capsule 60 capsule 2     Sig: Take 2 capsules by mouth Daily.              -------------------------------------------    Subjective     Chief Complaint   Patient presents with   • Depression         History of Present Illness    Depression  Emotional wreck  Has been praying more  Has been in the house for 6 weeks      Not suicidal  More depressed  Tearful  Clark her dogs  Sleep is off and on  Pacing at night  Sleeping all day and does not want to get out of bed     Tearful a lot    Denies suicidal ideation    On geodon and not helping as much    Had been on lexapro     On cymbalta 90 mg now    Losing weight and has not been eating as much      Chronic pain  On oxycodone 4 times per day  Still helps  No side effects  Stress makes it worse  May focus more when stressed                Social History     Tobacco Use   Smoking Status Current Every Day Smoker   • Years: 30.00   • Types: Cigarettes   • Last attempt to quit: 2019   • Years since quittin.9   Smokeless Tobacco Never Used          Past Medical History,Medications, Allergies, and social history was reviewed.          Review of Systems   Gastrointestinal: Positive for diarrhea (x 3 weeks and better now (none x2 days)).       Objective      There were no vitals filed for this visit.       Physical Exam  Unable to do exam since telephone visit    Tearful on phone.   Speech clear              Darrion Tovar MD

## 2020-04-30 DIAGNOSIS — M51.9 LUMBAR DISC DISEASE: ICD-10-CM

## 2020-04-30 RX ORDER — OXYCODONE AND ACETAMINOPHEN 10; 325 MG/1; MG/1
TABLET ORAL
Qty: 120 TABLET | Refills: 0 | Status: SHIPPED | OUTPATIENT
Start: 2020-04-30 | End: 2020-07-17 | Stop reason: SDUPTHER

## 2020-05-04 RX ORDER — INSULIN GLARGINE 100 [IU]/ML
16 INJECTION, SOLUTION SUBCUTANEOUS DAILY
Qty: 15 ML | Refills: 2 | Status: SHIPPED | OUTPATIENT
Start: 2020-05-04 | End: 2020-10-12

## 2020-05-05 ENCOUNTER — NURSE TRIAGE (OUTPATIENT)
Dept: CALL CENTER | Facility: HOSPITAL | Age: 55
End: 2020-05-05

## 2020-05-05 NOTE — TELEPHONE ENCOUNTER
"    Reason for Disposition  • Severe difficulty breathing (e.g., struggling for each breath, speaks in single words)    Additional Information  • Negative: [1] Breathing stopped AND [2] hasn't returned  • Negative: Choking on something    Answer Assessment - Initial Assessment Questions  1. RESPIRATORY STATUS: \"Describe your breathing?\" (e.g., wheezing, shortness of breath, unable to speak, severe coughing)       Shortness of breath, very labored, wheezing  2. ONSET: \"When did this breathing problem begin?\"       Last Friday  3. PATTERN \"Does the difficult breathing come and go, or has it been constant since it started?\"       Come and go  4. SEVERITY: \"How bad is your breathing?\" (e.g., mild, moderate, severe)     - MILD: No SOB at rest, mild SOB with walking, speaks normally in sentences, can lay down, no retractions, pulse < 100.     - MODERATE: SOB at rest, SOB with minimal exertion and prefers to sit, cannot lie down flat, speaks in phrases, mild retractions, audible wheezing, pulse 100-120.     - SEVERE: Very SOB at rest, speaks in single words, struggling to breathe, sitting hunched forward, retractions, pulse > 120       severe  5. RECURRENT SYMPTOM: \"Have you had difficulty breathing before?\" If so, ask: \"When was the last time?\" and \"What happened that time?\"       Yes, last year  6. CARDIAC HISTORY: \"Do you have any history of heart disease?\" (e.g., heart attack, angina, bypass surgery, angioplasty)       CHF  7. LUNG HISTORY: \"Do you have any history of lung disease?\"  (e.g., pulmonary embolus, asthma, emphysema)      COPD  8. CAUSE: \"What do you think is causing the breathing problem?\"       bronchitis  9. OTHER SYMPTOMS: \"Do you have any other symptoms? (e.g., dizziness, runny nose, cough, chest pain, fever)      Cough, runny nose, fever  10. PREGNANCY: \"Is there any chance you are pregnant?\" \"When was your last menstrual period?\"        na  11. TRAVEL: \"Have you traveled out of the country in the last " "month?\" (e.g., travel history, exposures)        denies    Protocols used: BREATHING DIFFICULTY-ADULT-AH      "

## 2020-05-07 ENCOUNTER — TELEPHONE (OUTPATIENT)
Dept: FAMILY MEDICINE CLINIC | Facility: CLINIC | Age: 55
End: 2020-05-07

## 2020-05-07 NOTE — TELEPHONE ENCOUNTER
MARIS FROM [facility] CALLED TO INFORM THE DR THAT THE PT WAS ADMITTED 5/5/20 FOR PNEUMONIA.    MARIS CONTACT [phone]

## 2020-06-29 ENCOUNTER — TELEPHONE (OUTPATIENT)
Dept: FAMILY MEDICINE CLINIC | Facility: CLINIC | Age: 55
End: 2020-06-29

## 2020-07-08 ENCOUNTER — TELEPHONE (OUTPATIENT)
Dept: FAMILY MEDICINE CLINIC | Facility: CLINIC | Age: 55
End: 2020-07-08

## 2020-07-08 NOTE — TELEPHONE ENCOUNTER
Please call.  Okay for home health.  Also, patient will need to make an appointment with me for follow-up.

## 2020-07-08 NOTE — TELEPHONE ENCOUNTER
[facility] IS ASKING IF YOU CAN ORDER THE PATIENTS HOME HEALTH CARE, SHE WILL BE GETTING RELEASED FROM [facility] TOMORROW    [facility] PH: [phone]

## 2020-07-09 ENCOUNTER — READMISSION MANAGEMENT (OUTPATIENT)
Dept: CALL CENTER | Facility: HOSPITAL | Age: 55
End: 2020-07-09

## 2020-07-09 RX ORDER — ALBUTEROL SULFATE 0.63 MG/3ML
SOLUTION RESPIRATORY (INHALATION)
Qty: 75 ML | Refills: 2 | Status: SHIPPED | OUTPATIENT
Start: 2020-07-09 | End: 2022-06-26 | Stop reason: HOSPADM

## 2020-07-09 NOTE — OUTREACH NOTE
Prep Survey      Responses   [facility] facility patient discharged from?  Non-BH   Is LACE score < 7 ?  Non-BH Discharge   Eligibility  [facility]   Date of Admission  06/10/20   Date of Discharge  07/10/20   Discharge Disposition  Home-Health Care [facility]   Discharge diagnosis  COPD   Does the patient have one of the following disease processes/diagnoses(primary or secondary)?  COPD/Pneumonia   Does the patient have Home health ordered?  No   Prep survey completed?  Yes          Chelo Menjivar RN

## 2020-07-10 ENCOUNTER — TRANSITIONAL CARE MANAGEMENT TELEPHONE ENCOUNTER (OUTPATIENT)
Dept: CALL CENTER | Facility: HOSPITAL | Age: 55
End: 2020-07-10

## 2020-07-10 NOTE — OUTREACH NOTE
Call Center TCM Note      Responses   [facility] patient discharged from?  Non-   Does the patient have one of the following disease processes/diagnoses(primary or secondary)?  COPD/Pneumonia   TCM attempt successful?  Yes   Call start time  1539   Call end time  1546   Discharge diagnosis  COPD   Meds reviewed with patient/caregiver?  Yes   Is the patient having any side effects they believe may be caused by any medication additions or changes?  No   Does the patient have all medications ordered at discharge?  N/A   Is the patient taking all medications as directed (includes completed medication regime)?  No   What is preventing the patient from taking all medications as directed?  Other [One of her medications was over $170/month. Pharmacy sent that to [facility] for PD. ]   Nursing Interventions  Nurse provided patient education   Does the patient have a primary care provider?   Yes   Does the patient have an appointment with their PCP within 7 days of discharge?  No   Comments regarding PCP  Pt will call back to schedule PCP appt for hospital d/c. Routed to PCP office. She's scared of getting COVID.    What is preventing the patient from scheduling follow up appointments within 7 days of discharge?  -- [Pt didn't want to schedule appt ]   Nursing Interventions  -- [Routed to PCP office]   Has the patient kept scheduled appointments due by today?  N/A   Psychosocial issues?  No   Did the patient receive a copy of their discharge instructions?  -- [Pt was D/C'd from [facility] on 7/9/20]   What is the patient's perception of their health status since discharge?  Same   Is the patient/caregiver able to teach back signs and symptoms related to disease process for when to call PCP?  Yes   Is the patient/caregiver able to teach back signs and symptoms related to disease process for when to call 911?  Yes   Is the patient/caregiver able to teach back the hierarchy of who to call/visit for  symptoms/problems? PCP, Specialist, Home health nurse, Urgent Care, ED, 911  Yes   TCM call completed?  Yes   Wrap up additional comments  Pt had to get up out of chair r/t something going on. Her dogs were parking. She was out of breath so call wrapped up quickly.          Madisyn Hartman RN    7/10/2020, 15:47

## 2020-07-11 ENCOUNTER — NURSE TRIAGE (OUTPATIENT)
Dept: CALL CENTER | Facility: HOSPITAL | Age: 55
End: 2020-07-11

## 2020-07-11 NOTE — TELEPHONE ENCOUNTER
"Home health nurse calling about the patient, needing clarification about Insulin order, will be calling the provider on call    Reason for Disposition  • [1] Caller has URGENT medication question about med that PCP or specialist prescribed AND [2] triager unable to answer question    Additional Information  • Negative: Drug overdose and triager unable to answer question  • Negative: Caller requesting information unrelated to medicine  • Negative: Caller requesting a prescription for Strep throat and has a positive culture result  • Negative: Rash while taking a medication or within 3 days of stopping it  • Negative: Immunization reaction suspected  • Negative: [1] Asthma and [2] having symptoms of asthma (cough, wheezing, etc.)  • Negative: [1] Influenza symptoms AND [2] anti-viral med prescription request, such as Tamiflu  • Negative: [1] Symptom of illness (e.g., headache, abdominal pain, earache, vomiting) AND [2] more than mild  • Negative: MORE THAN A DOUBLE DOSE of a prescription or over-the-counter (OTC) drug  • Negative: [1] DOUBLE DOSE (an extra dose or lesser amount) of over-the-counter (OTC) drug AND [2] any symptoms (e.g., dizziness, nausea, pain, sleepiness)  • Negative: [1] DOUBLE DOSE (an extra dose or lesser amount) of prescription drug AND [2] any symptoms (e.g., dizziness, nausea, pain, sleepiness)  • Negative: Took another person's prescription drug  • Negative: [1] DOUBLE DOSE (an extra dose or lesser amount) of prescription drug AND [2] NO symptoms (Exception: a double dose of antibiotics)  • Negative: Diabetes drug error or overdose (e.g., took wrong type of insulin or took extra dose)  • Negative: [1] Request for URGENT new prescription or refill of \"essential\" medication (i.e., likelihood of harm to patient if not taken) AND [2] triager unable to fill per unit policy  • Negative: [1] Prescription not at pharmacy AND [2] was prescribed by PCP recently  • Negative: [1] Pharmacy calling with " "prescription questions AND [2] triager unable to answer question    Answer Assessment - Initial Assessment Questions  1.   NAME of MEDICATION: \"What medicine are you calling about?\"      Insulin  2.   QUESTION: \"What is your question?\"      Needing clarification about an insulin order  3.   PRESCRIBING HCP: \"Who prescribed it?\" Reason: if prescribed by specialist, call should be referred to that group.      Darrion Tovar  4. SYMPTOMS: \"Do you have any symptoms?\"      none  5. SEVERITY: If symptoms are present, ask \"Are they mild, moderate or severe?\"      none  6.  PREGNANCY:  \"Is there any chance that you are pregnant?\" \"When was your last menstrual period?\"      na    Protocols used: MEDICATION QUESTION CALL-ADULT-      "

## 2020-07-13 ENCOUNTER — TELEPHONE (OUTPATIENT)
Dept: FAMILY MEDICINE CLINIC | Facility: CLINIC | Age: 55
End: 2020-07-13

## 2020-07-14 ENCOUNTER — TELEPHONE (OUTPATIENT)
Dept: FAMILY MEDICINE CLINIC | Facility: CLINIC | Age: 55
End: 2020-07-14

## 2020-07-14 NOTE — TELEPHONE ENCOUNTER
RONNIE WITH [facility]  CALLED TO REQUEST A VERBAL FOR CONTINUED  PT  TWICE A WEEK FOR 3 WEEKS , ONCE A WEEK FOR 2 WEEKS.    RONNIE - [phone]

## 2020-07-16 ENCOUNTER — TELEPHONE (OUTPATIENT)
Dept: FAMILY MEDICINE CLINIC | Facility: CLINIC | Age: 55
End: 2020-07-16

## 2020-07-16 NOTE — TELEPHONE ENCOUNTER
Please call and clarify. She is not on My Chart so not able to use that. If can do a video visit, can use [email or web address]. If she want to use that, please connect her to the front to make these changes and explain how to get set up. Last resort would be a telephone call visit.

## 2020-07-16 NOTE — TELEPHONE ENCOUNTER
Patient states that she would like her appt for tomorrow to be a MyChart visit.  She can be reached at [phone]

## 2020-07-17 ENCOUNTER — TELEMEDICINE (OUTPATIENT)
Dept: FAMILY MEDICINE CLINIC | Facility: CLINIC | Age: 55
End: 2020-07-17

## 2020-07-17 DIAGNOSIS — J96.90 RESPIRATORY FAILURE, UNSPECIFIED CHRONICITY, UNSPECIFIED WHETHER WITH HYPOXIA OR HYPERCAPNIA (HCC): ICD-10-CM

## 2020-07-17 DIAGNOSIS — Z09 HOSPITAL DISCHARGE FOLLOW-UP: Primary | ICD-10-CM

## 2020-07-17 DIAGNOSIS — M51.9 LUMBAR DISC DISEASE: ICD-10-CM

## 2020-07-17 PROCEDURE — 99214 OFFICE O/P EST MOD 30 MIN: CPT | Performed by: FAMILY MEDICINE

## 2020-07-17 RX ORDER — OXYCODONE AND ACETAMINOPHEN 10; 325 MG/1; MG/1
1 TABLET ORAL EVERY 6 HOURS PRN
Qty: 60 TABLET | Refills: 0 | Status: SHIPPED | OUTPATIENT
Start: 2020-07-17 | End: 2020-07-28

## 2020-07-17 NOTE — PROGRESS NOTES
This was an audio and video enabled telemedicine encounter.     You have chosen to receive care through a telehealth visit.  Do you consent to use a video/audio connection for your medical care today? Yes     Time spent: 18 min total with patient in discussion  .  Assessment/Plan       Problems Addressed this Visit        Musculoskeletal and Integument    Lumbar disc disease    Relevant Medications    oxyCODONE-acetaminophen (PERCOCET)  MG per tablet      Other Visit Diagnoses     Hospital discharge follow-up    -  Primary    Respiratory failure, unspecified chronicity, unspecified whether with hypoxia or hypercapnia (CMS/[facility])                Follow up: Return in about 1 month (around 8/17/2020), or if symptoms worsen or fail to improve.     DISCUSSION  Follow-up hospitalization.  Had extended stay with ventilator.  Tracheostomy.  Also had feeding tube.  That has been removed.  Overall doing better.  Recheck in 1 month.  Continue follow-up with specialist.    Chronic pain.  Refilled Percocet.  Explained not to take the Xanax and Percocet at the same time.  She only uses Xanax once or twice a week.    All other problems are currently stable.      MEDICATIONS PRESCRIBED  Requested Prescriptions     Signed Prescriptions Disp Refills   • oxyCODONE-acetaminophen (PERCOCET)  MG per tablet 60 tablet 0     Sig: Take 1 tablet by mouth Every 6 (Six) Hours As Needed for Moderate Pain .            Alden dated on 7/17/2020   was reviewed and appropriate.        -------------------------------------------    Subjective     Chief Complaint   Patient presents with   • Hospital follow-up         History of Present Illness    Here for follow-up of hospital visit with extended stay on ventilator and to rehab.    Last visited with her 4/3   Had shortness of breath and locked her self in the house  Called 911  o2 sat 75 %    Did not have covid 19      Admitted 5/5/2020  resp failure  On ventilator  Transferred care to [facility]  Main    Then to [facility] rehab    Was to leave  and then felt bad  and had fever and wheezing. + pneumonia. restarted iv antibiotics at [facility]    Now: Removed the Feeding tube last night    Had a trach. Hole nearly healed    Breathing: doing well. Uses oxygen still. Uses 2 liter per NC    Home health: comes to house  Home nursing 2 times per week  To start P T next week ( 2 weeks worth)    Pain in right leg. Right knee pain some and left shoulder pain     Nutrition: eating ok    Sleeping : ok at times    Mood: tearful at times    Does have help at home ( Nicole, friend)    Chronic pain   Sent on lortab 10 as needed and + nausea  In hospital , percocet 10/325 q 4 hrs in the hospital  Pain in the pain and legs and shoulder (left)    Also in gabapentin 400 mg BID  and vancomycin    Lexapro 20 mg one at bedtime  Cymbalta 60 mg once per day  Lantus 60 units bid  Gave xanax due to shakes as well , 0.25 mg one q 6 hrs. Has 7 pills left.           Social History     Tobacco Use   Smoking Status Current Every Day Smoker   • Years: 30.00   • Types: Cigarettes   • Last attempt to quit: 2019   • Years since quittin.2   Smokeless Tobacco Never Used          Past Medical History,Medications, Allergies, and social history was reviewed.          Review of Systems   Constitutional: Positive for fatigue.   HENT: Negative.    Respiratory: Positive for shortness of breath. Negative for cough.    Cardiovascular: Negative.    Gastrointestinal: Negative.    Musculoskeletal: Positive for arthralgias and back pain.   Psychiatric/Behavioral: Positive for depressed mood and stress. Negative for suicidal ideas.       Objective     Unable to do vital signs since video visit      Physical Exam   Constitutional: She appears well-developed and well-nourished. No distress.   HENT:   Head: Normocephalic.   Eyes: EOM are normal.   Pulmonary/Chest: Effort normal.   Neurological: She is alert.   Psychiatric: She mood  appears normal. Her behavior is normal. Thought content is normal. She does not express abnormal judgement.      Decreasing motion of the left shoulder.  Hard to lift above her head.    Bandage in place at neck at site of the tracheostomy site.    Unable to do full physical examination due to video visit            Darrion Tovar MD

## 2020-07-24 ENCOUNTER — TELEPHONE (OUTPATIENT)
Dept: FAMILY MEDICINE CLINIC | Facility: CLINIC | Age: 55
End: 2020-07-24

## 2020-07-24 DIAGNOSIS — E11.65 UNCONTROLLED TYPE 2 DIABETES MELLITUS WITH HYPERGLYCEMIA, WITHOUT LONG-TERM CURRENT USE OF INSULIN (HCC): Primary | ICD-10-CM

## 2020-07-24 RX ORDER — LANCETS 30 GAUGE
EACH MISCELLANEOUS
Qty: 100 EACH | Refills: 0 | Status: SHIPPED | OUTPATIENT
Start: 2020-07-24 | End: 2021-03-15 | Stop reason: SDUPTHER

## 2020-07-24 RX ORDER — BLOOD-GLUCOSE METER
KIT MISCELLANEOUS
Qty: 1 EACH | Refills: 0 | Status: SHIPPED | OUTPATIENT
Start: 2020-07-24

## 2020-07-24 NOTE — TELEPHONE ENCOUNTER
PT IS REQUESTING A GLUCOSE METER AND TEST STRIPS.    PT HAS TO CHECK SUGAR TWICE A DAY.      PHARMACY CONFIRMED  PLEASE ADVISE  [phone]

## 2020-07-27 DIAGNOSIS — M51.9 LUMBAR DISC DISEASE: Primary | ICD-10-CM

## 2020-07-27 RX ORDER — GABAPENTIN 400 MG/1
CAPSULE ORAL
Qty: 60 CAPSULE | Refills: 2 | Status: SHIPPED | OUTPATIENT
Start: 2020-07-27 | End: 2020-08-07

## 2020-07-27 RX ORDER — INSULIN GLARGINE 100 [IU]/ML
INJECTION, SOLUTION SUBCUTANEOUS
Qty: 30 ML | Refills: 2 | Status: SHIPPED | OUTPATIENT
Start: 2020-07-27 | End: 2021-01-29

## 2020-07-27 RX ORDER — METOPROLOL SUCCINATE 50 MG/1
TABLET, EXTENDED RELEASE ORAL
Qty: 30 TABLET | Refills: 2 | Status: SHIPPED | OUTPATIENT
Start: 2020-07-27 | End: 2021-01-20

## 2020-07-27 RX ORDER — ESCITALOPRAM OXALATE 20 MG/1
TABLET ORAL
Qty: 30 TABLET | Refills: 2 | Status: SHIPPED | OUTPATIENT
Start: 2020-07-27 | End: 2021-03-15 | Stop reason: SDUPTHER

## 2020-07-28 DIAGNOSIS — M51.9 LUMBAR DISC DISEASE: ICD-10-CM

## 2020-07-28 RX ORDER — INSULIN GLARGINE 100 [IU]/ML
INJECTION, SOLUTION SUBCUTANEOUS
Qty: 30 ML | Refills: 2 | OUTPATIENT
Start: 2020-07-28

## 2020-07-28 RX ORDER — OXYCODONE AND ACETAMINOPHEN 10; 325 MG/1; MG/1
TABLET ORAL
Qty: 60 TABLET | Refills: 0 | Status: SHIPPED | OUTPATIENT
Start: 2020-07-28 | End: 2020-08-07

## 2020-07-30 ENCOUNTER — TELEPHONE (OUTPATIENT)
Dept: FAMILY MEDICINE CLINIC | Facility: CLINIC | Age: 55
End: 2020-07-30

## 2020-07-30 NOTE — TELEPHONE ENCOUNTER
Please call patient.  I sent in her Lantus on July 27, 2020 +2 refills.  60 units twice a day.    As far as the metformin is concerned, I am not able to answer that question until we see recent blood work to check her kidney function.

## 2020-07-30 NOTE — TELEPHONE ENCOUNTER
Pt called because she was denied from getting her      LANTUS SOLOSTAR 100 UNIT/ML injection pen       and pt would like a call back to advise whether she should be prescribed her metformin       Please call and advise     [phone]

## 2020-08-07 ENCOUNTER — TELEPHONE (OUTPATIENT)
Dept: FAMILY MEDICINE CLINIC | Facility: CLINIC | Age: 55
End: 2020-08-07

## 2020-08-07 DIAGNOSIS — M51.9 LUMBAR DISC DISEASE: ICD-10-CM

## 2020-08-07 RX ORDER — OXYCODONE AND ACETAMINOPHEN 10; 325 MG/1; MG/1
TABLET ORAL
Qty: 60 TABLET | Refills: 0 | Status: SHIPPED | OUTPATIENT
Start: 2020-08-11 | End: 2020-08-24

## 2020-08-07 RX ORDER — GABAPENTIN 400 MG/1
CAPSULE ORAL
Qty: 60 CAPSULE | Refills: 1 | Status: SHIPPED | OUTPATIENT
Start: 2020-08-07 | End: 2021-01-20

## 2020-08-07 RX ORDER — FUROSEMIDE 40 MG/1
TABLET ORAL
Qty: 30 TABLET | Refills: 1 | Status: SHIPPED | OUTPATIENT
Start: 2020-08-07 | End: 2021-01-20

## 2020-08-07 NOTE — TELEPHONE ENCOUNTER
Please call.  This was last filled on July 28, 2020 for a 15-day supply.  It is not due until Wednesday, August 12.  I can send in a prescription now and okay to fill on August 11 which is Tuesday.  Please let her know that is what I am going to be doing.  I am out of the office next week.

## 2020-08-07 NOTE — TELEPHONE ENCOUNTER
Informed the patient of this, she verbalized a good understanding. She also wanted to know if you wanted her to stay on the Gabapentin that she was prescribed in the hospital or if you were wanting her to stop taking them. If you want her to continue taking them she stated that she only had two left.

## 2020-08-11 ENCOUNTER — TELEPHONE (OUTPATIENT)
Dept: FAMILY MEDICINE CLINIC | Facility: CLINIC | Age: 55
End: 2020-08-11

## 2020-08-11 NOTE — TELEPHONE ENCOUNTER
RONNIE FROM [facility] CALLED.  HE DID  RE-ASSESSMENT OF THE PATIENT TODAY AND SHE IS DOING WELL, SO HE WANTS TO CONTINUE HER PHYSICAL THERAPY, BUT HE NEEDS A VERBAL ORDER.      PLEASE CONTACT RONNIE -[phone].

## 2020-08-17 ENCOUNTER — TELEPHONE (OUTPATIENT)
Dept: FAMILY MEDICINE CLINIC | Facility: CLINIC | Age: 55
End: 2020-08-17

## 2020-08-17 NOTE — TELEPHONE ENCOUNTER
NIYA WITH [facility]  NEEDS A NEW ORDER FOR THE  EVALUATION RELATED TO COMMUNITY.     [phone]    OKAY TO LEAVE TO [facility]

## 2020-08-20 ENCOUNTER — TELEPHONE (OUTPATIENT)
Dept: FAMILY MEDICINE CLINIC | Facility: CLINIC | Age: 55
End: 2020-08-20

## 2020-08-20 NOTE — TELEPHONE ENCOUNTER
RED FROM [facility] CALLED STATING:  PT HAS A NEW WOUND ON RIGHT LOWER ABDOMEN THAT IS RED BUT NOT INFECTED    SHE IS REQUESTING WOUND CARE ORDERS  WOUND WAS NOTICED ON 8/15    PLEASE ADVISE AT: [phone]

## 2020-08-24 DIAGNOSIS — M51.9 LUMBAR DISC DISEASE: ICD-10-CM

## 2020-08-24 RX ORDER — OXYCODONE AND ACETAMINOPHEN 10; 325 MG/1; MG/1
TABLET ORAL
Qty: 60 TABLET | Refills: 0 | Status: SHIPPED | OUTPATIENT
Start: 2020-08-24 | End: 2020-09-08

## 2020-08-25 DIAGNOSIS — E11.65 UNCONTROLLED TYPE 2 DIABETES MELLITUS WITH HYPERGLYCEMIA, WITHOUT LONG-TERM CURRENT USE OF INSULIN (HCC): ICD-10-CM

## 2020-08-25 RX ORDER — ERGOCALCIFEROL 1.25 MG/1
CAPSULE ORAL
Qty: 4 CAPSULE | Refills: 1 | Status: SHIPPED | OUTPATIENT
Start: 2020-08-25 | End: 2020-10-23

## 2020-08-25 RX ORDER — CALCIUM CITRATE/VITAMIN D3 200MG-6.25
TABLET ORAL
Qty: 100 EACH | Refills: 0 | Status: SHIPPED | OUTPATIENT
Start: 2020-08-25 | End: 2021-01-07

## 2020-09-04 ENCOUNTER — TELEPHONE (OUTPATIENT)
Dept: FAMILY MEDICINE CLINIC | Facility: CLINIC | Age: 55
End: 2020-09-04

## 2020-09-04 DIAGNOSIS — J96.90 RESPIRATORY FAILURE, UNSPECIFIED CHRONICITY, UNSPECIFIED WHETHER WITH HYPOXIA OR HYPERCAPNIA (HCC): Primary | ICD-10-CM

## 2020-09-04 DIAGNOSIS — J44.9 CHRONIC OBSTRUCTIVE PULMONARY DISEASE, UNSPECIFIED COPD TYPE (HCC): ICD-10-CM

## 2020-09-04 NOTE — TELEPHONE ENCOUNTER
Yuly with HH called asked if patient could have an order for a hospital bed sent over to [facility] fax # [phone]. Having issues with cant lay flat and trouble breathing with copd when laying flat.

## 2020-09-08 DIAGNOSIS — M51.9 LUMBAR DISC DISEASE: ICD-10-CM

## 2020-09-08 RX ORDER — OXYCODONE AND ACETAMINOPHEN 10; 325 MG/1; MG/1
TABLET ORAL
Qty: 60 TABLET | Refills: 0 | Status: SHIPPED | OUTPATIENT
Start: 2020-09-08 | End: 2020-09-21

## 2020-09-21 ENCOUNTER — TELEPHONE (OUTPATIENT)
Dept: FAMILY MEDICINE CLINIC | Facility: CLINIC | Age: 55
End: 2020-09-21

## 2020-09-21 DIAGNOSIS — M51.9 LUMBAR DISC DISEASE: ICD-10-CM

## 2020-09-21 RX ORDER — OXYCODONE AND ACETAMINOPHEN 10; 325 MG/1; MG/1
TABLET ORAL
Qty: 60 TABLET | Refills: 0 | Status: SHIPPED | OUTPATIENT
Start: 2020-09-21 | End: 2021-01-07

## 2020-09-21 NOTE — TELEPHONE ENCOUNTER
Spoke with patient she stated she is just so scared to leave her house. She doesn't want to get sick.

## 2020-09-21 NOTE — TELEPHONE ENCOUNTER
Please call.  I sent in a refill of her pain medication and she is due for office visit.  Is she able to come in the office for a visit?

## 2020-09-22 DIAGNOSIS — F34.1 DYSTHYMIA: ICD-10-CM

## 2020-09-22 RX ORDER — FAMOTIDINE 40 MG/1
TABLET, FILM COATED ORAL
Qty: 30 TABLET | Refills: 2 | Status: SHIPPED | OUTPATIENT
Start: 2020-09-22 | End: 2021-01-20

## 2020-09-22 RX ORDER — DULOXETIN HYDROCHLORIDE 60 MG/1
CAPSULE, DELAYED RELEASE ORAL
Qty: 60 CAPSULE | Refills: 2 | Status: SHIPPED | OUTPATIENT
Start: 2020-09-22 | End: 2021-03-15 | Stop reason: SDUPTHER

## 2020-10-12 ENCOUNTER — TELEMEDICINE (OUTPATIENT)
Dept: FAMILY MEDICINE CLINIC | Facility: CLINIC | Age: 55
End: 2020-10-12

## 2020-10-12 DIAGNOSIS — E11.65 UNCONTROLLED TYPE 2 DIABETES MELLITUS WITH HYPERGLYCEMIA, WITHOUT LONG-TERM CURRENT USE OF INSULIN (HCC): ICD-10-CM

## 2020-10-12 DIAGNOSIS — S72.91XS CLOSED FRACTURE OF RIGHT FEMUR, UNSPECIFIED FRACTURE MORPHOLOGY, UNSPECIFIED PORTION OF FEMUR, SEQUELA: Primary | ICD-10-CM

## 2020-10-12 DIAGNOSIS — M51.9 LUMBAR DISC DISEASE: ICD-10-CM

## 2020-10-12 PROCEDURE — G2025 DIS SITE TELE SVCS RHC/FQHC: HCPCS | Performed by: FAMILY MEDICINE

## 2020-10-12 NOTE — PROGRESS NOTES
This was an audio enabled telemedicine encounter.     You have chosen to receive care through a telephone visit. Do you consent to use a telephone visit for your medical care today? Yes       Time spent: 20 min total with patient in discussion  .  Assessment/Plan       Problems Addressed this Visit        Endocrine    Uncontrolled type 2 diabetes mellitus with hyperglycemia, without long-term current use of insulin (CMS/[facility])       Musculoskeletal and Integument    Lumbar disc disease      Other Visit Diagnoses     Closed fracture of right femur, unspecified fracture morphology, unspecified portion of femur, sequela    -  Primary      Diagnoses       Codes Comments    Closed fracture of right femur, unspecified fracture morphology, unspecified portion of femur, sequela    -  Primary ICD-10-CM: S72.91XS  ICD-9-CM: 905.4     Uncontrolled type 2 diabetes mellitus with hyperglycemia, without long-term current use of insulin (CMS/[facility])     ICD-10-CM: E11.65  ICD-9-CM: 250.02     Lumbar disc disease     ICD-10-CM: M51.9  ICD-9-CM: 722.93             Follow up: No follow-ups on file.     DISCUSSION  Recent femur fracture.  Continue rehab at [facility].  Hopefully home soon.    Diabetes mellitus type 2.  Uncontrolled.  To have an A1c done while at the rehab facility.  She will let me know what that is.  We will need to adjust medication when she gets home.      Chronic lumbar disc disease.  She will notify me when she is discharged so we can continue her pain medication pending what she is currently taking at time of discharge.          MEDICATIONS PRESCRIBED  Requested Prescriptions      No prescriptions requested or ordered in this encounter            Alden dated on 10/12/2020   was reviewed and appropriate.        -------------------------------------------    Subjective     Chief Complaint   Patient presents with   • Pain         History of Present Illness    Right Femur fracture    Fell at home 9/29 at home and  broke femur ( right). Had surg at [facility]. Now at [facility] for rehab. Has been at [facility].   Unable to walk 6-7 more weeks        DM2  ? If sugar dropped  Had been on 60 units Lantus BID at home and sugar dropping  To get a1c done at   Off metformin  Now at  rehab  Has been working dietician  Wt down to 288    Lumbar back pain  Chronic disc disease.   Had been on robaxin q 8 hrs  10 mg oxycodone every 4 hrs  Then cut down to 5 mg   Will need pain med when she gets home                Social History     Tobacco Use   Smoking Status Current Every Day Smoker   • Years: 30.00   • Types: Cigarettes   • Last attempt to quit: 2019   • Years since quittin.5   Smokeless Tobacco Never Used          Past Medical History,Medications, Allergies, and social history was reviewed.          Review of Systems   Constitutional: Negative.    HENT: Negative.    Respiratory: Positive for shortness of breath.    Cardiovascular: Negative.    Gastrointestinal: Negative.    Musculoskeletal: Positive for arthralgias and back pain.   Psychiatric/Behavioral: Negative.        Objective     Unable to do vital signs since telephone visit    Physical Exam    Unable to do full physical examination due to telephone visit  Speech normal.               Darrion Tovar MD

## 2020-10-23 RX ORDER — ERGOCALCIFEROL 1.25 MG/1
CAPSULE ORAL
Qty: 4 CAPSULE | Refills: 0 | Status: SHIPPED | OUTPATIENT
Start: 2020-10-23 | End: 2021-01-20

## 2020-11-02 ENCOUNTER — TRANSITIONAL CARE MANAGEMENT TELEPHONE ENCOUNTER (OUTPATIENT)
Dept: CALL CENTER | Facility: HOSPITAL | Age: 55
End: 2020-11-02

## 2020-11-02 ENCOUNTER — READMISSION MANAGEMENT (OUTPATIENT)
Dept: CALL CENTER | Facility: HOSPITAL | Age: 55
End: 2020-11-02

## 2020-11-02 NOTE — OUTREACH NOTE
Call Center TCM Note      Responses   [facility] patient discharged from?  Non-BH   Does the patient have one of the following disease processes/diagnoses(primary or secondary)?  Other   TCM attempt successful?  No   Unsuccessful attempts  Attempt 2          Aga Barkley RN    11/2/2020, 16:50 EST

## 2020-11-02 NOTE — OUTREACH NOTE
Prep Survey      Responses   [facility] patient discharged from?  Non-BH   Is LACE score < 7 ?  Non-BH Discharge   Eligibility  [facility]   Date of Discharge  11/01/20   Discharge diagnosis  unknown   Does the patient have one of the following disease processes/diagnoses(primary or secondary)?  Other   Prep survey completed?  Yes          Alondra Garay RN

## 2020-11-02 NOTE — OUTREACH NOTE
Call Center TCM Note      Responses   [facility] patient discharged from?  Non-BH   Does the patient have one of the following disease processes/diagnoses(primary or secondary)?  Other   TCM attempt successful?  No   Unsuccessful attempts  Attempt 1          Aga Barkley RN    11/2/2020, 15:21 EST

## 2020-11-03 ENCOUNTER — TRANSITIONAL CARE MANAGEMENT TELEPHONE ENCOUNTER (OUTPATIENT)
Dept: CALL CENTER | Facility: HOSPITAL | Age: 55
End: 2020-11-03

## 2020-11-03 NOTE — OUTREACH NOTE
Call Center TCM Note      Responses   [facility] patient discharged from?  Non-BH   Does the patient have one of the following disease processes/diagnoses(primary or secondary)?  Other   TCM attempt successful?  No   Unsuccessful attempts  Attempt 3 [verbal release is over a year old]          Madisyn Hartman RN    11/3/2020, 11:41 EST

## 2020-11-06 ENCOUNTER — PATIENT OUTREACH (OUTPATIENT)
Dept: CASE MANAGEMENT | Facility: OTHER | Age: 55
End: 2020-11-06

## 2020-11-06 NOTE — OUTREACH NOTE
Patient Outreach Note    Was contacting pt after TCM calls were unsuccessful following d/c' from [facility].  Pt did answer phone, however she states she is back in hospital at  to have total hip replacement.   Wished her well and she voiced her appreciation for the call.      Jennifer Cerda RN  Ambulatory     11/6/2020, 16:18 EST

## 2021-01-07 ENCOUNTER — TELEMEDICINE (OUTPATIENT)
Dept: FAMILY MEDICINE CLINIC | Facility: CLINIC | Age: 56
End: 2021-01-07

## 2021-01-07 DIAGNOSIS — E11.65 UNCONTROLLED TYPE 2 DIABETES MELLITUS WITH HYPERGLYCEMIA, WITHOUT LONG-TERM CURRENT USE OF INSULIN (HCC): ICD-10-CM

## 2021-01-07 DIAGNOSIS — B37.9 YEAST INFECTION: ICD-10-CM

## 2021-01-07 DIAGNOSIS — Z96.641 HISTORY OF RIGHT HIP REPLACEMENT: Primary | ICD-10-CM

## 2021-01-07 PROBLEM — U07.1 COVID-19 VIRUS INFECTION: Status: ACTIVE | Noted: 2021-01-07

## 2021-01-07 PROCEDURE — 99213 OFFICE O/P EST LOW 20 MIN: CPT | Performed by: FAMILY MEDICINE

## 2021-01-07 RX ORDER — CALCIUM CITRATE/VITAMIN D3 200MG-6.25
TABLET ORAL
Qty: 100 EACH | Refills: 0 | Status: SHIPPED | OUTPATIENT
Start: 2021-01-07 | End: 2021-03-15 | Stop reason: SDUPTHER

## 2021-01-07 RX ORDER — NYSTATIN 100000 [USP'U]/G
POWDER TOPICAL 2 TIMES DAILY
Qty: 60 G | Refills: 2 | Status: SHIPPED | OUTPATIENT
Start: 2021-01-07 | End: 2021-06-11 | Stop reason: HOSPADM

## 2021-01-07 RX ORDER — FLUCONAZOLE 150 MG/1
TABLET ORAL
Qty: 2 TABLET | Refills: 0 | Status: SHIPPED | OUTPATIENT
Start: 2021-01-07 | End: 2021-01-20

## 2021-01-07 RX ORDER — OXYCODONE HYDROCHLORIDE AND ACETAMINOPHEN 5; 325 MG/1; MG/1
1 TABLET ORAL EVERY 6 HOURS PRN
Qty: 30 TABLET | Refills: 0 | Status: SHIPPED | OUTPATIENT
Start: 2021-01-07 | End: 2021-01-18 | Stop reason: SDUPTHER

## 2021-01-07 NOTE — PROGRESS NOTES
This was an audio and video enabled telemedicine encounter.     You have chosen to receive care through a telehealth visit.  Do you consent to use a video/audio connection for your medical care today? Yes     Time spent: 25 min total with patient in discussion  .  Assessment/Plan       Problems Addressed this Visit     None      Visit Diagnoses     History of right hip replacement    -  Primary    Relevant Medications    oxyCODONE-acetaminophen (Percocet) 5-325 MG per tablet    Yeast infection        Relevant Medications    fluconazole (Diflucan) 150 MG tablet    nystatin (MYCOSTATIN) 560404 UNIT/GM powder      Diagnoses       Codes Comments    History of right hip replacement    -  Primary ICD-10-CM: Z96.641  ICD-9-CM: V43.64     Yeast infection     ICD-10-CM: B37.9  ICD-9-CM: 112.9             Follow up: Return for Next scheduled follow up.     DISCUSSION  Patient reports symptoms of yeast infection.  Will treat with Diflucan and nystatin powder.  Side effects explained.  Told not to take the Geodon while taking the Diflucan.    She also has multiple other issues and problems including diabetes, chronic pain and breathing issues.  Encouraged her to make a follow-up appointment in the office and bring all of her medications so we can go over this and get her proper care.  She expressed understanding.    Refilled oxycodone for short supply until she can be seen.  No evidence of misuse or diversion.          MEDICATIONS PRESCRIBED  Requested Prescriptions     Signed Prescriptions Disp Refills   • fluconazole (Diflucan) 150 MG tablet 2 tablet 0     Sig: one by mouth today and repeat in 3 days if not better   • nystatin (MYCOSTATIN) 423582 UNIT/GM powder 60 g 2     Sig: Apply  topically to the appropriate area as directed 2 (Two) Times a Day.   • oxyCODONE-acetaminophen (Percocet) 5-325 MG per tablet 30 tablet 0     Sig: Take 1 tablet by mouth Every 6 (Six) Hours As Needed for Moderate Pain .            Alden dated on  2021   was reviewed and appropriate.        -------------------------------------------    Subjective     Chief Complaint   Patient presents with   • Vaginitis         History of Present Illness    Follow up Hosp/ rehab  Has gained weight    Was at [facility]    Had hip fixed. Had 3 pins in.   10 days later at [facility], the leg jolted and had severe pain and screws slipped. Had severe pain.  Had to have another surg.     Had a full hip replacement. Right hip.   Healing now    Then was close to coming home and fx the heel Right. In a boot now.     Heel is getting better    Then got Covid (Dec 2020).     Had been in [facility]  Was in quarantine room /  Was sick for 4 days  + fever for 2 days then 18 hrs 103 temp  Pain everywhere  + headache for 4-5 days    Then last week, felt bad again   Comes and goes    Lost taste and smell    ===============  On keflex for long term  Has yeast infection  Needs diflucan   Has helped before.   Has rash under breasts and vaginal area and under belly    =============  Insulin aspartate as needed    Insulin at am and pm 12 and 20  Sugars up and down    Last weigh was 327              Social History     Tobacco Use   Smoking Status Current Every Day Smoker   • Years: 30.00   • Types: Cigarettes   • Last attempt to quit: 2019   • Years since quittin.7   Smokeless Tobacco Never Used          Past Medical History,Medications, Allergies, and social history was reviewed.          Review of Systems   Constitutional: Negative.    HENT: Negative.    Respiratory: Positive for shortness of breath.    Musculoskeletal: Positive for arthralgias.   Psychiatric/Behavioral: Negative.  Positive for stress.       Objective     Unable to do vital signs since video visit      Physical Exam   Constitutional: She appears well-developed. No distress.   HENT:   Head: Normocephalic.   Eyes: Pupils are equal, round, and reactive to light.   Pulmonary/Chest: Effort normal.    Neurological: She is alert.   Psychiatric: She has a normal mood and affect. She mood appears normal.   Tearful at times          Unable to do full physical examination due to video visit            Darrion Tovar MD

## 2021-01-08 ENCOUNTER — TELEPHONE (OUTPATIENT)
Dept: FAMILY MEDICINE CLINIC | Facility: CLINIC | Age: 56
End: 2021-01-08

## 2021-01-08 DIAGNOSIS — M51.9 LUMBAR DISC DISEASE: ICD-10-CM

## 2021-01-08 DIAGNOSIS — J44.9 CHRONIC OBSTRUCTIVE PULMONARY DISEASE, UNSPECIFIED COPD TYPE (HCC): ICD-10-CM

## 2021-01-08 DIAGNOSIS — Z96.641 HISTORY OF RIGHT HIP REPLACEMENT: Primary | ICD-10-CM

## 2021-01-08 NOTE — TELEPHONE ENCOUNTER
PATIENT CALLED AND STATED THAT SHE APPRECIATED YOU ORDERING PAIN MEDICINE FOR HER.  THE NURSING HOME SAID THERE WASN'T GOING TO BE ANY PAIN MEDICINE.  SHE SAID THE NURSING HOME CALLED IN OXYCONTIN, BUT SHE DIDN'T KNOW THAT OR SHE WOULDN'T HAVE ASKED YOU FOR PAIN MEDICATION.    PATIENT WOULD LIKE FOR YOU TO GIVE HER A CALLBACK.    CALLBACK:  [phone]

## 2021-01-08 NOTE — TELEPHONE ENCOUNTER
PATIENT IS REQUESTING A BARIATRIC BED  SHE WEIGHED YESTERDAY  BEFORE SHE LEFT THE NURSING HOME AND SHE WEIGHED 369  AND SHE IS A LOT OF PAIN  SHE HAS BEEN VERY SWOLLEN    PLEASE FAX TO [facility] [phone]       PATIENT IS ALSO REQUESTING A CALL BACK TO TALK ABOUT HER PAIN MEDICATION WITH THE DOCTOR   SHE STATED SHE DOESN'T WANT TO GET IN TROUBLE AND WAS VERY UPSET ON THE PHONE      PATIENT CALLED BACK [phone]

## 2021-01-08 NOTE — TELEPHONE ENCOUNTER
Please call, no need to be upset. Just don't take the pain med that I gave her with the pain meds that they gave her.     Also, she needs to make an appt in the office.     Where does she want the bed Rx to go BUT she will have to be seen soon so that insurance will pay for the bed. Try to come in within the next 30 days.

## 2021-01-12 ENCOUNTER — TELEPHONE (OUTPATIENT)
Dept: FAMILY MEDICINE CLINIC | Facility: CLINIC | Age: 56
End: 2021-01-12

## 2021-01-12 NOTE — TELEPHONE ENCOUNTER
Tayler from [facility] wanted to let Dr. Tovar know the patient was admitted on 1/10/21 for COVID -19.    Tayler's call back [phone]

## 2021-01-14 ENCOUNTER — TELEPHONE (OUTPATIENT)
Dept: FAMILY MEDICINE CLINIC | Facility: CLINIC | Age: 56
End: 2021-01-14

## 2021-01-14 NOTE — TELEPHONE ENCOUNTER
Caller: Yanique     Relationship to patient: [facility]     Best call back number: [phone]    New or established patient?  [] New  [x] Established    Date of discharge: 01/14/2021    Facility discharged from: [facility]     Diagnosis/Symptoms: Respiratory Failure     Length of stay (If applicable): 4 days     Specialty Only: Did you see a [facility] health provider?    [] Yes  [x] No  If so, who? N/A

## 2021-01-14 NOTE — TELEPHONE ENCOUNTER
Called patient stated she is better now. Stated [facility]. [facility] fax number is listed in this message.

## 2021-01-15 ENCOUNTER — TELEPHONE (OUTPATIENT)
Dept: FAMILY MEDICINE CLINIC | Facility: CLINIC | Age: 56
End: 2021-01-15

## 2021-01-15 ENCOUNTER — READMISSION MANAGEMENT (OUTPATIENT)
Dept: CALL CENTER | Facility: HOSPITAL | Age: 56
End: 2021-01-15

## 2021-01-15 ENCOUNTER — TRANSITIONAL CARE MANAGEMENT TELEPHONE ENCOUNTER (OUTPATIENT)
Dept: CALL CENTER | Facility: HOSPITAL | Age: 56
End: 2021-01-15

## 2021-01-15 DIAGNOSIS — J44.9 CHRONIC OBSTRUCTIVE PULMONARY DISEASE, UNSPECIFIED COPD TYPE (HCC): Primary | ICD-10-CM

## 2021-01-15 RX ORDER — ALBUTEROL SULFATE 90 UG/1
1-2 AEROSOL, METERED RESPIRATORY (INHALATION) EVERY 4 HOURS PRN
Qty: 18 G | Refills: 2 | Status: SHIPPED | OUTPATIENT
Start: 2021-01-15 | End: 2022-08-19

## 2021-01-15 NOTE — TELEPHONE ENCOUNTER
Please call.  I will send in the albuterol inhaler to use as needed but do not use that in the nebulizer at the same time.      In regards to the diazepam.  She will need to be seen.  She has an appointment with me on January 20.  She needs to be seen in person as we had previously discussed.    As far as the blood pressure is concerned, we will need to check that when she is here in the office.  A diastolic blood pressure of 80 is not that bad.  We need to know what her top number is.  That is why she needs to come in and be seen.

## 2021-01-15 NOTE — OUTREACH NOTE
Call Center TCM Note      Responses   [facility] patient discharged from?  Non-BH   Does the patient have one of the following disease processes/diagnoses(primary or secondary)?  Other   TCM attempt successful?  No [Verbal release is over a year old]   Unsuccessful attempts  Attempt 1          Madisyn Hartman RN    1/15/2021, 12:41 EST

## 2021-01-15 NOTE — TELEPHONE ENCOUNTER
See printed order. Please fax/    I have reviewed and confirmed nurses' notes for patient's medications, allergies, medical history, and surgical history.

## 2021-01-15 NOTE — TELEPHONE ENCOUNTER
PT STATES THAT EVEN THOUGH SHE HAS A NEBULIZER SHE WAS TOLD SHE NEEDS ALBUTEROL VENTOLIN.    PT STATES THAT HER BLOOD PRESSURE HAS BEEN RUNNING HIGH AND THINKS SHE NEEDS MEDICATION .    PT STATES THAT SHE WAS PRESCRIBED DIAZEPAM AND WOULD LIKE TO KNOW IF  WILL CONTINUE TO FILL IT.      PLEASE ADVISE  [phone]

## 2021-01-15 NOTE — TELEPHONE ENCOUNTER
Pt state's, she has been in a nursing home and most recently [facility].     [facility] thinks she needs an Albuterol inhaler.    Her BP has only been 80 on the bottom and she has not had BP medication for a long time.    Pt will have her boyfriend bring by her hospital records on Monday to be scanned into her chart.

## 2021-01-15 NOTE — OUTREACH NOTE
Prep Survey      Responses   [facility] facility patient discharged from?  Non-BH   Is LACE score < 7 ?  Non-BH Discharge   Emergency Room discharge w/ pulse ox?  No   Eligibility  [facility]   Date of Discharge  01/14/21   Discharge diagnosis  respiratoary failure   Does the patient have one of the following disease processes/diagnoses(primary or secondary)?  Other   Prep survey completed?  Yes          Alondra Garay RN

## 2021-01-15 NOTE — OUTREACH NOTE
"Call Center TCM Note      Responses   [facility] patient discharged from?  Non-   Does the patient have one of the following disease processes/diagnoses(primary or secondary)?  Other   TCM attempt successful?  Yes   Call start time  1331   Call end time  1338   Discharge diagnosis  respiratoary failure   Meds reviewed with patient/caregiver?  Yes   Is the patient having any side effects they believe may be caused by any medication additions or changes?  No   Does the patient have all medications ordered at discharge?  Yes   Is the patient taking all medications as directed (includes completed medication regime)?  Yes   Does the patient have a primary care provider?   Yes   Does the patient have an appointment with their PCP within 7 days of discharge?  Yes   Comments regarding PCP  Hospital d/c f/u appt is on 1/20/21 at 11:00 am    Has the patient kept scheduled appointments due by today?  N/A   What is the Home health agency?   [facility]    Psychosocial issues?  No   Did the patient receive a copy of their discharge instructions?  Yes   Nursing interventions  Reviewed instructions with patient   What is the patient's perception of their health status since discharge?  Improving [Pt answeres the phone saying, \"I'm so confused. I'm so confused. I got a call from care takers saying they are coming at 2:00 but I don't want care takers.\" Pt verbalized she's anxious. RN can hear it in her voice. She's almost in tears r/t care takers. ]   Is the patient/caregiver able to teach back signs and symptoms related to disease process for when to call PCP?  Yes   Is the patient/caregiver able to teach back signs and symptoms related to disease process for when to call 911?  Yes   Is the patient/caregiver able to teach back the hierarchy of who to call/visit for symptoms/problems? PCP, Specialist, Home health nurse, Urgent Care, ED, 911  Yes   If the patient is a current smoker, are they able to teach back resources for " cessation?  Not a smoker [Pt recently quit]   TCM call completed?  Yes   Wrap up additional comments  Provided patients SO with Nurse Call Center number           Madisyn Hartman RN    1/15/2021, 13:39 EST

## 2021-01-18 ENCOUNTER — TELEPHONE (OUTPATIENT)
Dept: FAMILY MEDICINE CLINIC | Facility: CLINIC | Age: 56
End: 2021-01-18

## 2021-01-18 DIAGNOSIS — Z96.641 HISTORY OF RIGHT HIP REPLACEMENT: ICD-10-CM

## 2021-01-18 RX ORDER — OXYCODONE HYDROCHLORIDE AND ACETAMINOPHEN 5; 325 MG/1; MG/1
1 TABLET ORAL EVERY 6 HOURS PRN
Qty: 12 TABLET | Refills: 0 | Status: SHIPPED | OUTPATIENT
Start: 2021-01-18 | End: 2021-01-20

## 2021-01-18 NOTE — TELEPHONE ENCOUNTER
Please call, She had pain meds filled on 1/6/21 # 28 and #30 more on 1/13/2021.     That is 58 pills since Jan 6.     That is 4-5 per day.     She needs to limit this to no more than 4 per day. She has an appt.on 1/20/21. Will give enough til then.

## 2021-01-18 NOTE — TELEPHONE ENCOUNTER
Pt called back and say's, she spoke w/ pharmacy and they will not fill the Percocet 5mg until Wed. She is wanting to know if you can change it to the 7.5mg or 10mg so she can pick it up now?

## 2021-01-18 NOTE — TELEPHONE ENCOUNTER
Please call, not able to change this. Please call the pharmacy and ok to fill today. She has an appt on Wed with us.

## 2021-01-18 NOTE — TELEPHONE ENCOUNTER
Patient is calling to inquire if pcp could call some  Pain medication in...... she was in hospital and they prescribed her some and it wasn't helping and so she took more than she was supposed to and has run out and would like to know if the pcp could call in about 8-10 pain pills for her to get by.  Please advise      Dani Ziegler call back [phone]

## 2021-01-19 ENCOUNTER — TELEPHONE (OUTPATIENT)
Dept: FAMILY MEDICINE CLINIC | Facility: CLINIC | Age: 56
End: 2021-01-19

## 2021-01-19 NOTE — TELEPHONE ENCOUNTER
HH called they want us to address the issue that pt has not followed up with the surgeon following hip replacement surgery. No reason other that she just has not had time.

## 2021-01-19 NOTE — TELEPHONE ENCOUNTER
David at [facility] informed, he said he did tell patient that was all they were waiting on to get bed approved.

## 2021-01-19 NOTE — TELEPHONE ENCOUNTER
David from [facility] stated that the pt weight needs to be electronically documented so they find out what hospital bed she needs, please advise.

## 2021-01-20 ENCOUNTER — TELEPHONE (OUTPATIENT)
Dept: FAMILY MEDICINE CLINIC | Facility: CLINIC | Age: 56
End: 2021-01-20

## 2021-01-20 ENCOUNTER — OFFICE VISIT (OUTPATIENT)
Dept: FAMILY MEDICINE CLINIC | Facility: CLINIC | Age: 56
End: 2021-01-20

## 2021-01-20 VITALS
DIASTOLIC BLOOD PRESSURE: 72 MMHG | HEART RATE: 107 BPM | OXYGEN SATURATION: 97 % | RESPIRATION RATE: 20 BRPM | WEIGHT: 293 LBS | TEMPERATURE: 97.3 F | SYSTOLIC BLOOD PRESSURE: 142 MMHG | BODY MASS INDEX: 49.68 KG/M2

## 2021-01-20 DIAGNOSIS — F41.9 ANXIETY: ICD-10-CM

## 2021-01-20 DIAGNOSIS — Z09 HOSPITAL DISCHARGE FOLLOW-UP: Primary | ICD-10-CM

## 2021-01-20 DIAGNOSIS — Z96.641 HISTORY OF RIGHT HIP REPLACEMENT: ICD-10-CM

## 2021-01-20 DIAGNOSIS — E66.01 MORBID OBESITY (HCC): ICD-10-CM

## 2021-01-20 DIAGNOSIS — J96.21 ACUTE ON CHRONIC RESPIRATORY FAILURE WITH HYPOXIA AND HYPERCAPNIA (HCC): ICD-10-CM

## 2021-01-20 DIAGNOSIS — J96.22 ACUTE ON CHRONIC RESPIRATORY FAILURE WITH HYPOXIA AND HYPERCAPNIA (HCC): ICD-10-CM

## 2021-01-20 DIAGNOSIS — G89.4 CHRONIC PAIN SYNDROME: ICD-10-CM

## 2021-01-20 DIAGNOSIS — E11.42 DIABETIC PERIPHERAL NEUROPATHY: Primary | ICD-10-CM

## 2021-01-20 DIAGNOSIS — N17.9 ACUTE KIDNEY INJURY (HCC): ICD-10-CM

## 2021-01-20 PROCEDURE — 1111F DSCHRG MED/CURRENT MED MERGE: CPT | Performed by: PHYSICIAN ASSISTANT

## 2021-01-20 PROCEDURE — 99214 OFFICE O/P EST MOD 30 MIN: CPT | Performed by: PHYSICIAN ASSISTANT

## 2021-01-20 RX ORDER — GABAPENTIN 600 MG/1
600 TABLET ORAL 3 TIMES DAILY
Qty: 90 TABLET | Refills: 0 | Status: SHIPPED | OUTPATIENT
Start: 2021-01-20 | End: 2021-02-16

## 2021-01-20 RX ORDER — DIAZEPAM 2 MG/1
2 TABLET ORAL EVERY 12 HOURS PRN
Qty: 60 TABLET | Refills: 0 | Status: SHIPPED | OUTPATIENT
Start: 2021-01-20 | End: 2021-02-16

## 2021-01-20 RX ORDER — TRAZODONE HYDROCHLORIDE 50 MG/1
50 TABLET ORAL NIGHTLY
COMMUNITY
End: 2021-03-15 | Stop reason: SDUPTHER

## 2021-01-20 RX ORDER — DIVALPROEX SODIUM 125 MG/1
125 TABLET, DELAYED RELEASE ORAL 2 TIMES DAILY
Status: ON HOLD | COMMUNITY
End: 2021-06-04

## 2021-01-20 RX ORDER — DIAZEPAM 2 MG/1
2 TABLET ORAL 2 TIMES DAILY PRN
COMMUNITY
End: 2021-01-20 | Stop reason: SDUPTHER

## 2021-01-20 RX ORDER — OXYCODONE AND ACETAMINOPHEN 7.5; 325 MG/1; MG/1
1 TABLET ORAL EVERY 6 HOURS PRN
Qty: 120 TABLET | Refills: 0 | Status: SHIPPED | OUTPATIENT
Start: 2021-01-20 | End: 2021-02-16

## 2021-01-20 NOTE — TELEPHONE ENCOUNTER
PATIENT STATED INSTEAD OF THE DIAZEPAM CAN YOU UP THE BUSPAR, WOULD THAT HELP?    PLEASE ADVISE:  [phone]    PHARMACY:    [facility] - [place] [address] - [phone] [facility] [phone]   [phone]

## 2021-01-20 NOTE — TELEPHONE ENCOUNTER
Please call I refilled gabapentin at 600 mg 3 times a day, and the diazepam 2 mg twice a day, but I only increase the Percocet to 7.5 mg.  She should only take this up to 4 times a day.  The reason is being on diazepam, gabapentin, and pain medication increases her risk of respiratory depression.  We must be very careful and not giving her to use medication at one time.  Follow-up with me as scheduled on February 1, 2021.

## 2021-01-20 NOTE — TELEPHONE ENCOUNTER
Her note is complete. This note has nothing to do with her request for a hospital bed, however. I did not evaluate her for that. This is a hospital follow up note.  Okay to fax. FRAN

## 2021-01-20 NOTE — TELEPHONE ENCOUNTER
Dr. Tovar,     Pt states she is out of her pain medications today and is in significant discomfort.   She is requesting:     Gabapentin 600 mg TID (this is different from how you prescribed last but [facility] confirms this dose was sent in for her for a 7 day supply on 1/6 by Dr. Rudy Stark    Would like diazepam 2 mg BID prn anxiety. Notes she usually takes a half a tablet. Last Rx per [facility] was 1/6/21 by Dr. Rudy Stark for 7 day supply however noted he only supplied 7 tablets.     She is also requesting her oxycodone be increased fronm 5 mg/325 mg to 10 mg/325 mg 4 times daily prn. She states that her pain right now in her R ankle (in a boot, thinks shell have to have another surgery) is severe and current Rx is not helping enough with her pain. She has not had this dose on her zaire but you last allotted 12 tablets for 3 day supply on 1/18/21 per zaire.     Pt is hoping for a response today due to pain level and anxiety.   She is scheduled to see you as well next month. EILEEN

## 2021-01-20 NOTE — TELEPHONE ENCOUNTER
Please let medical supply who called know patient's weight is 317 lbs, recorded today.     Pt notes she plans to follow up with surgeon soon.

## 2021-01-20 NOTE — TELEPHONE ENCOUNTER
JAS WITH [employer] CALLED TO GET VERBAL ORDERS FOR PHYSICAL THERAPY.    JAS'S CONTACT [phone]

## 2021-01-20 NOTE — PROGRESS NOTES
Transitional Care Follow Up Visit  Subjective     Dani Ziegler is a 55 y.o. female who presents for a transitional care management visit.    Within 48 business hours after discharge our office contacted her via telephone to coordinate her care and needs.      I reviewed and discussed the details of that call along with the discharge summary, hospital problems, inpatient lab results, inpatient diagnostic studies, and consultation reports with Dani.     Current outpatient and discharge medications have been reconciled for the patient.  Reviewed by: JAMES Johnson      Date of TCM Phone Call 1/15/2021   [facility]   Date of Admission 1/10/21   Date of Discharge 1/14/2021   Discharge Disposition -     Risk for Readmission (LACE) No data recorded    History of Present Illness   Course During Hospital Stay:   Diagnosed with acute on chronic hypercarbic and hypoxic respiratory failure, acute on chronic COPD exacerbation, CHF with hypervolemic state (now normal LV EF and diastolic dysfunction), acute KI (resolved). Positive COVID test, however ID determined not acute infection- had positive test in early 12/2020  In hospital initiated on bipap (will continue at discharge), remdesivir (when thought COVID related, later stopped after ID consult), cefepime (concern for pseudomonas bacterial infection) continued IV steroids, IV lasix (for edema), trelegy, and daliresp   On insulin drip in hospital due to steroids increasing her blood sugar (DM home medications will be resumed)   Dr. Bajwa with cardiology started her on nifedipine 60 mg along with other BP medications due to HTN. Noted diuretics should be continued to maintain euvolemic state   D/c on continued steroids and 10 days of Levaquin per ID   Nephrology was consulted in hospital. Acute KI resolved in hospital   Pt was started on iron due to anemia and deficiency in hospital     Per patient, she has been taking the iron supplement  She was  not discharged on the nifedipine, so has not been taking. States that she checks her BP at home and it has been well controlled. Does not see a cardiologist. Pt was also not discharged on diuretic therapy, however notes fluid load gas been doing much better since getting out of the hospital. She is increasing water intake and watching her sodium. Urination has been normal.  Also trying to get up and move around more during the day. States she was able to wash her hair while standing by the sink and it was the first time in a year she was able to do this      Pt also notes that she does not think she needs the Bipap. Is getting new sleep study ordered through the hospital. Has home oxygen and is not needing it regularly. Pulse ox staying around 97    Main complaints today include anxiety. States she needs refill on diazepam she was being prescribed at nursing home and in hospital.   Was not previously being Rx'd by her PCP. Prescribed 2 mg BID prn. Pt notes she usually takes a half a tablet BID PRN. States that through all her health issues, anxiety has been very bad and feels like she needs this    Pt also requesting   Oxycodone 10 mg QID instead of current oxycodone 5 mg TID. States that current Rx is just not managing her pain well enough. She states she has discussed this with her PCP and has follow up scheduled with Dr. Tovar next month. She states she is currently out of her pain medication after this morning. insistent  she needs this     Pt also requesting   Gabapentin 600 mg TID. Previous Rx from her PCP was 400 mg BID, however dose was increased it appears by an outside provider and she was given just a one week supply. She is currently out of this. Feels like she needs the increase in dose.     Pt has been diagnosed with lumbar disc disease, chronic pain, and peripheral neuropathy  She has also had recent R hip replacement surgery   Her right ankle/ foot is still booted. Ortho is thinking she will need  another surgery. This has been major source of her pain recently     Pt ambulating in wheelchair today. Painful to walk or stand for prolonged periods      The following portions of the patient's history were reviewed and updated as appropriate: allergies, current medications, past family history, past medical history, past social history, past surgical history and problem list.    Review of Systems   Constitutional: Positive for fatigue. Negative for chills, diaphoresis and fever.   HENT: Negative.  Negative for congestion, ear discharge, ear pain, hearing loss, nosebleeds, postnasal drip, sinus pressure, sneezing and sore throat.    Eyes: Negative.    Respiratory: Negative.  Negative for cough, chest tightness, shortness of breath and wheezing.    Cardiovascular: Positive for leg swelling. Negative for chest pain and palpitations.   Gastrointestinal: Negative for abdominal distention, abdominal pain, blood in stool, constipation, diarrhea, nausea and vomiting.   Genitourinary: Negative.  Negative for difficulty urinating, dysuria, flank pain, frequency, hematuria and urgency.   Musculoskeletal: Positive for arthralgias, back pain and gait problem.   Skin: Negative.  Negative for color change, pallor, rash and wound.   Neurological: Positive for numbness. Negative for dizziness, syncope, weakness, light-headedness and headaches.   Psychiatric/Behavioral: The patient is nervous/anxious.        Objective    Blood pressure 142/72, pulse 107, temperature 97.3 °F (36.3 °C), resp. rate 20, weight (!) 144 kg (317 lb 3.2 oz), SpO2 97 %.   Body mass index is 49.68 kg/m².     Physical Exam  Vitals signs and nursing note reviewed.   Constitutional:       Appearance: She is well-developed. She is obese.   HENT:      Head: Normocephalic and atraumatic.      Right Ear: External ear normal.      Left Ear: External ear normal.      Nose: Nose normal.   Eyes:      Conjunctiva/sclera: Conjunctivae normal.   Neck:       Musculoskeletal: Normal range of motion and neck supple.      Thyroid: No thyromegaly.      Trachea: No tracheal deviation.   Cardiovascular:      Rate and Rhythm: Normal rate and regular rhythm.      Heart sounds: Normal heart sounds.   Pulmonary:      Effort: Pulmonary effort is normal. No respiratory distress.      Breath sounds: Wheezing present. No rales.   Chest:      Chest wall: No tenderness.   Musculoskeletal:      Comments: Ambulating in wheel chair  Left foot and ankle in boot    Lymphadenopathy:      Cervical: No cervical adenopathy.   Skin:     General: Skin is warm and dry.   Neurological:      Mental Status: She is alert and oriented to person, place, and time.   Psychiatric:         Mood and Affect: Mood is anxious. Affect is tearful.         Behavior: Behavior normal.         Thought Content: Thought content normal.         Judgment: Judgment normal.         Assessment/Plan   Diagnoses and all orders for this visit:    1. Hospital discharge follow-up (Primary)  -     CBC & Differential  -     Comprehensive Metabolic Panel  -     Magnesium    2. Acute on chronic respiratory failure with hypoxia and hypercapnia (CMS/HCC)  -     CBC & Differential  -     Comprehensive Metabolic Panel  -     Magnesium    3. Acute kidney injury (CMS/HCC)  -     CBC & Differential  -     Comprehensive Metabolic Panel  -     Magnesium    4. Chronic pain syndrome  - will discuss with her PCP Dr. Tovar her pain medication requests  5. Anxiety  - will discuss with her PCP Dr. Tovar her requested for controlled anxiety medication   6. Morbid obesity  - continue with nutrition changes     Check labs as outlined in plan   Keep follow ups as scheduled by hospital as well as with PCP next month   Pt is requesting for multiple controlled substances as well as dose adjustments to current controlled prescriptions  Pt aware that these requests will need to be reviewed by her PCP for consideration. Pt will be notified with his plan of  care.

## 2021-01-20 NOTE — TELEPHONE ENCOUNTER
Called and spoke with David from [facility]. Informed him of patient's weight. He needs OV note faxed to him at [phone] when it is complete.

## 2021-01-21 ENCOUNTER — TELEPHONE (OUTPATIENT)
Dept: FAMILY MEDICINE CLINIC | Facility: CLINIC | Age: 56
End: 2021-01-21

## 2021-01-21 LAB
ALBUMIN SERPL-MCNC: 3.7 G/DL (ref 3.5–5.2)
ALBUMIN/GLOB SERPL: 1.2 G/DL
ALP SERPL-CCNC: 130 U/L (ref 39–117)
ALT SERPL-CCNC: 14 U/L (ref 1–33)
AST SERPL-CCNC: 14 U/L (ref 1–32)
BASOPHILS # BLD AUTO: 0.08 10*3/MM3 (ref 0–0.2)
BASOPHILS NFR BLD AUTO: 0.5 % (ref 0–1.5)
BILIRUB SERPL-MCNC: 0.2 MG/DL (ref 0–1.2)
BUN SERPL-MCNC: 21 MG/DL (ref 6–20)
BUN/CREAT SERPL: 30.9 (ref 7–25)
CALCIUM SERPL-MCNC: 9.5 MG/DL (ref 8.6–10.5)
CHLORIDE SERPL-SCNC: 101 MMOL/L (ref 98–107)
CO2 SERPL-SCNC: 27.6 MMOL/L (ref 22–29)
CREAT SERPL-MCNC: 0.68 MG/DL (ref 0.57–1)
EOSINOPHIL # BLD AUTO: 0.07 10*3/MM3 (ref 0–0.4)
EOSINOPHIL NFR BLD AUTO: 0.4 % (ref 0.3–6.2)
ERYTHROCYTE [DISTWIDTH] IN BLOOD BY AUTOMATED COUNT: 16.5 % (ref 12.3–15.4)
GLOBULIN SER CALC-MCNC: 3.2 GM/DL
GLUCOSE SERPL-MCNC: 165 MG/DL (ref 65–99)
HCT VFR BLD AUTO: 38.2 % (ref 34–46.6)
HGB BLD-MCNC: 11.8 G/DL (ref 12–15.9)
IMM GRANULOCYTES # BLD AUTO: 0.6 10*3/MM3 (ref 0–0.05)
IMM GRANULOCYTES NFR BLD AUTO: 3.8 % (ref 0–0.5)
LYMPHOCYTES # BLD AUTO: 2.53 10*3/MM3 (ref 0.7–3.1)
LYMPHOCYTES NFR BLD AUTO: 15.8 % (ref 19.6–45.3)
MAGNESIUM SERPL-MCNC: 2 MG/DL (ref 1.6–2.6)
MCH RBC QN AUTO: 25.7 PG (ref 26.6–33)
MCHC RBC AUTO-ENTMCNC: 30.9 G/DL (ref 31.5–35.7)
MCV RBC AUTO: 83.2 FL (ref 79–97)
MONOCYTES # BLD AUTO: 0.77 10*3/MM3 (ref 0.1–0.9)
MONOCYTES NFR BLD AUTO: 4.8 % (ref 5–12)
NEUTROPHILS # BLD AUTO: 11.95 10*3/MM3 (ref 1.7–7)
NEUTROPHILS NFR BLD AUTO: 74.7 % (ref 42.7–76)
NRBC BLD AUTO-RTO: 0 /100 WBC (ref 0–0.2)
PLATELET # BLD AUTO: 422 10*3/MM3 (ref 140–450)
POTASSIUM SERPL-SCNC: 5.2 MMOL/L (ref 3.5–5.2)
PROT SERPL-MCNC: 6.9 G/DL (ref 6–8.5)
RBC # BLD AUTO: 4.59 10*6/MM3 (ref 3.77–5.28)
SODIUM SERPL-SCNC: 138 MMOL/L (ref 136–145)
WBC # BLD AUTO: 16 10*3/MM3 (ref 3.4–10.8)

## 2021-01-21 NOTE — TELEPHONE ENCOUNTER
Please call patient.  We received refill request for her Metformin.  We have not prescribed that in quite a while..  Has she been taking that?

## 2021-01-22 RX ORDER — METFORMIN HYDROCHLORIDE 1000 MG/1
TABLET, FILM COATED, EXTENDED RELEASE ORAL
Qty: 60 TABLET | Refills: 1 | OUTPATIENT
Start: 2021-01-22

## 2021-01-22 NOTE — TELEPHONE ENCOUNTER
Patient is not taking and has asked the pharmacy to stop filling. Informed patient I would call [facility] and request they take it off her list. I did call and request, they said they would delete metformin from her list.

## 2021-01-28 ENCOUNTER — NURSE TRIAGE (OUTPATIENT)
Dept: CALL CENTER | Facility: HOSPITAL | Age: 56
End: 2021-01-28

## 2021-01-28 ENCOUNTER — TELEPHONE (OUTPATIENT)
Dept: FAMILY MEDICINE CLINIC | Facility: CLINIC | Age: 56
End: 2021-01-28

## 2021-01-28 NOTE — TELEPHONE ENCOUNTER
PT APPLE CABEZAS FROM [facility] CALLED AND STATED THAT MS. [facility] WERE CONCERNING TO HER...  FEVER .3 AND HER PULSE IS HIGH 118 RESPIRATION IS 34 OXYGEN 83-85% BLOOD PRESSURE 132/72. PT  REACHED OUT TO HER NURSE. HER NUURSE SUGGESTED GOING TO THE ER BUT PATIENT REFUSED. I REACHED OUT TO THE OFFICE AND I WAS TOLD TO PUT IN AN ENCOUNTER. I REACHED OUT TO THE NURSE TRIAGE AND TRANSFERRED CALL.      CALL BACK NUMBER: [phone] (APPLE) PT FROM [facility]

## 2021-01-28 NOTE — TELEPHONE ENCOUNTER
"Respiratory Distress    Yanci Ramos, RN routed conversation to [facility] Clinical Pool 4 minutes ago (1:34 PM)      Yanci Ramos, RN 10 minutes ago (1:28 PM)        Caller is there for a home visit and the pt's VS are as follows, T 103 R 34 P 118 SPO2 83-85% on 4L/NC.  Call transferred from the HUB.  Patient breathing shallowly per caller and [facility].  Caller states that she does not believe that patient will be able to get into the car at this time, recommend calling EMS for transport to the ER.  Reason for Disposition  • [1] MODERATE difficulty breathing (e.g., speaks in phrases, SOB even at rest, pulse 100-120) AND [2] NEW-onset or WORSE than normal    Additional Information  • Negative: [1] Breathing stopped AND [2] hasn't returned  • Negative: Choking on something  • Negative: Severe difficulty breathing (e.g., struggling for each breath, speaks in single words)  • Negative: Bluish (or gray) lips or face now  • Negative: Difficult to awaken or acting confused (e.g., disoriented, slurred speech)  • Negative: Passed out (i.e., lost consciousness, collapsed and was not responding)  • Negative: Wheezing started suddenly after medicine, an allergic food or bee sting  • Negative: Stridor  • Negative: Slow, shallow and weak breathing  • Negative: Sounds like a life-threatening emergency to the triager  • Negative: Chest pain  • Negative: [1] Wheezing (high pitched whistling sound) AND [2] previous asthma attacks or use of asthma medicines  • Negative: [1] Difficulty breathing AND [2] only present when coughing  • Negative: [1] Difficulty breathing AND [2] only from stuffy or runny nose  • Negative: [1] Difficulty breathing AND [2] within 14 days of COVID-19 Exposure  • Negative: Wheezing can be heard across the room    Answer Assessment - Initial Assessment Questions  1. RESPIRATORY STATUS: \"Describe your breathing?\" (e.g., wheezing, shortness " "of breath, unable to speak, severe coughing)       SOA  2. ONSET: \"When did this breathing problem begin?\"        today  3. PATTERN \"Does the difficult breathing come and go, or has it been constant since it started?\"       constant  4. SEVERITY: \"How bad is your breathing?\" (e.g., mild, moderate, severe)     - MILD: No SOB at rest, mild SOB with walking, speaks normally in sentences, can lay down, no retractions, pulse < 100.     - MODERATE: SOB at rest, SOB with minimal exertion and prefers to sit, cannot lie down flat, speaks in phrases, mild retractions, audible wheezing, pulse 100-120.     - SEVERE: Very SOB at rest, speaks in single words, struggling to breathe, sitting hunched forward, retractions, pulse > 120       mod  5. RECURRENT SYMPTOM: \"Have you had difficulty breathing before?\" If so, ask: \"When was the last time?\" and \"What happened that time?\"       yes  6. CARDIAC HISTORY: \"Do you have any history of heart disease?\" (e.g., heart attack, angina, bypass surgery, angioplasty)       *No Answer*  7. LUNG HISTORY: \"Do you have any history of lung disease?\"  (e.g., pulmonary embolus, asthma, emphysema)      yes  8. CAUSE: \"What do you think is causing the breathing problem?\"       Not sure  9. OTHER SYMPTOMS: \"Do you have any other symptoms? (e.g., dizziness, runny nose, cough, chest pain, fever)      Fever, cough  10. PREGNANCY: \"Is there any chance you are pregnant?\" \"When was your last menstrual period?\"        na  11. TRAVEL: \"Have you traveled out of the country in the last month?\" (e.g., travel history, exposures)        no    Protocols used: BREATHING DIFFICULTY-ADULT-AH          "

## 2021-01-28 NOTE — TELEPHONE ENCOUNTER
LANDRY FROM [facility] STATES THAT PT HAD A FEVER SHORTNESS OF BREATH O OXYGEN WAS 82-83 RESPIRATION  WAS 32.    PT WAS LETHARGIC AND CONFUSED.  LANDRY STATES TAT PT'S BOYFRIEND CHECKED HER TEMPERATURE AND IT .5 OXYGEN WAS 74 WITH 4 LITERS.    PT HAD LABORED BREATHING AND WAS SHIVERING. BOYFRIEND DIDN'T WANT TO TAKE HER TO EMERGENCY ROOM AND STATES THAT HE GAVE HER TYLENOL AND IT DID HELP.    LANDRY STATES THAT SHE TALKED TO PT AND HER DAUGHTER AND PT WENT TO [facility] EMERGENCY ROOM    MARIA ALEJANDRA ALATORRE  [phone]

## 2021-01-28 NOTE — TELEPHONE ENCOUNTER
"Caller is there for a home visit and the pt's VS are as follows, T 103 R 34 P 118 SPO2 83-85% on 4L/NC.  Call transferred from the HUB.  Patient breathing shallowly per caller and had a recent hospitalization at a [facility] hospital for resp failure.  Caller states that she does not believe that patient will be able to get into the car at this time, recommend calling EMS for transport to the ER.  Reason for Disposition  • [1] MODERATE difficulty breathing (e.g., speaks in phrases, SOB even at rest, pulse 100-120) AND [2] NEW-onset or WORSE than normal    Additional Information  • Negative: [1] Breathing stopped AND [2] hasn't returned  • Negative: Choking on something  • Negative: Severe difficulty breathing (e.g., struggling for each breath, speaks in single words)  • Negative: Bluish (or gray) lips or face now  • Negative: Difficult to awaken or acting confused (e.g., disoriented, slurred speech)  • Negative: Passed out (i.e., lost consciousness, collapsed and was not responding)  • Negative: Wheezing started suddenly after medicine, an allergic food or bee sting  • Negative: Stridor  • Negative: Slow, shallow and weak breathing  • Negative: Sounds like a life-threatening emergency to the triager  • Negative: Chest pain  • Negative: [1] Wheezing (high pitched whistling sound) AND [2] previous asthma attacks or use of asthma medicines  • Negative: [1] Difficulty breathing AND [2] only present when coughing  • Negative: [1] Difficulty breathing AND [2] only from stuffy or runny nose  • Negative: [1] Difficulty breathing AND [2] within 14 days of COVID-19 Exposure  • Negative: Wheezing can be heard across the room    Answer Assessment - Initial Assessment Questions  1. RESPIRATORY STATUS: \"Describe your breathing?\" (e.g., wheezing, shortness of breath, unable to speak, severe coughing)       SOA  2. ONSET: \"When did this breathing problem begin?\"        today  3. PATTERN \"Does the difficult breathing come and go, or " "has it been constant since it started?\"       constant  4. SEVERITY: \"How bad is your breathing?\" (e.g., mild, moderate, severe)     - MILD: No SOB at rest, mild SOB with walking, speaks normally in sentences, can lay down, no retractions, pulse < 100.     - MODERATE: SOB at rest, SOB with minimal exertion and prefers to sit, cannot lie down flat, speaks in phrases, mild retractions, audible wheezing, pulse 100-120.     - SEVERE: Very SOB at rest, speaks in single words, struggling to breathe, sitting hunched forward, retractions, pulse > 120       mod  5. RECURRENT SYMPTOM: \"Have you had difficulty breathing before?\" If so, ask: \"When was the last time?\" and \"What happened that time?\"       yes  6. CARDIAC HISTORY: \"Do you have any history of heart disease?\" (e.g., heart attack, angina, bypass surgery, angioplasty)       *No Answer*  7. LUNG HISTORY: \"Do you have any history of lung disease?\"  (e.g., pulmonary embolus, asthma, emphysema)      yes  8. CAUSE: \"What do you think is causing the breathing problem?\"       Not sure  9. OTHER SYMPTOMS: \"Do you have any other symptoms? (e.g., dizziness, runny nose, cough, chest pain, fever)      Fever, cough  10. PREGNANCY: \"Is there any chance you are pregnant?\" \"When was your last menstrual period?\"        na  11. TRAVEL: \"Have you traveled out of the country in the last month?\" (e.g., travel history, exposures)        no    Protocols used: BREATHING DIFFICULTY-ADULT-AH    "

## 2021-01-29 RX ORDER — INSULIN GLARGINE 100 [IU]/ML
INJECTION, SOLUTION SUBCUTANEOUS
Qty: 30 ML | Refills: 2 | Status: SHIPPED | OUTPATIENT
Start: 2021-01-29 | End: 2021-04-13

## 2021-02-02 ENCOUNTER — TELEPHONE (OUTPATIENT)
Dept: FAMILY MEDICINE CLINIC | Facility: CLINIC | Age: 56
End: 2021-02-02

## 2021-02-02 ENCOUNTER — READMISSION MANAGEMENT (OUTPATIENT)
Dept: CALL CENTER | Facility: HOSPITAL | Age: 56
End: 2021-02-02

## 2021-02-02 NOTE — OUTREACH NOTE
Prep Survey      Responses   [facility] facility patient discharged from?  Non-BH   Is LACE score < 7 ?  Non-BH Discharge   Emergency Room discharge w/ pulse ox?  No   Eligibility  [facility]   Date of Discharge  02/02/21   Discharge diagnosis  respiratory failure   Does the patient have one of the following disease processes/diagnoses(primary or secondary)?  Other   Prep survey completed?  Yes          Alondra Garay RN

## 2021-02-02 NOTE — TELEPHONE ENCOUNTER
Caller: ASHLEE GILLESPIE    Relationship to patient: [facility]    Best call back number: [phone]    New or established patient?  [] New  [x] Established    Date of discharge: 2/2/21    Facility discharged from: [facility]     Diagnosis/Symptoms: RESPIRATORY FAILURE    Length of stay (If applicable): 5 DAYS    Specialty Only: Did you see a [facility] health provider?    [] Yes  [] No  If so, who?

## 2021-02-03 ENCOUNTER — TRANSITIONAL CARE MANAGEMENT TELEPHONE ENCOUNTER (OUTPATIENT)
Dept: CALL CENTER | Facility: HOSPITAL | Age: 56
End: 2021-02-03

## 2021-02-03 ENCOUNTER — TELEPHONE (OUTPATIENT)
Dept: FAMILY MEDICINE CLINIC | Facility: CLINIC | Age: 56
End: 2021-02-03

## 2021-02-03 NOTE — OUTREACH NOTE
Call Center TCM Note      Responses   [facility] patient discharged from?  Non-   Does the patient have one of the following disease processes/diagnoses(primary or secondary)?  Other   TCM attempt successful?  No   Unsuccessful attempts  Attempt 1          Goldy Noonan RN    2/3/2021, 14:00 EST

## 2021-02-03 NOTE — TELEPHONE ENCOUNTER
NIYA FROM [facility] IS CALLING BECAUSE PATIENT WAS RELEASED FROM ANOTHER HOSPITAL STAY YESTERDAY.  SKILLED NURSING WILL BE COMING 1 TIME A WEEK FOR THE NEXT FEW WEEKS. ALSO PT WILL BE OUT TO EVALUATE PATIENT FOR WEAKNESS  AND PREVIOUS HEAL FRACTURE  [phone]

## 2021-02-03 NOTE — OUTREACH NOTE
Call Center TCM Note      Responses   [facility] patient discharged from?  Non-BH   Does the patient have one of the following disease processes/diagnoses(primary or secondary)?  Other   TCM attempt successful?  No   Unsuccessful attempts  Attempt 2          Goldy Noonan RN    2/3/2021, 16:08 EST

## 2021-02-04 ENCOUNTER — TRANSITIONAL CARE MANAGEMENT TELEPHONE ENCOUNTER (OUTPATIENT)
Dept: CALL CENTER | Facility: HOSPITAL | Age: 56
End: 2021-02-04

## 2021-02-04 NOTE — OUTREACH NOTE
Call Center TCM Note      Responses   [facility] patient discharged from?  Non-   Does the patient have one of the following disease processes/diagnoses(primary or secondary)?  Other   TCM attempt successful?  No   Unsuccessful attempts  Attempt 3          Aga Barkley RN    2/4/2021, 16:19 EST

## 2021-02-08 ENCOUNTER — OFFICE VISIT (OUTPATIENT)
Dept: FAMILY MEDICINE CLINIC | Facility: CLINIC | Age: 56
End: 2021-02-08

## 2021-02-08 ENCOUNTER — NURSE TRIAGE (OUTPATIENT)
Dept: CALL CENTER | Facility: HOSPITAL | Age: 56
End: 2021-02-08

## 2021-02-08 DIAGNOSIS — B37.0 ORAL THRUSH: Primary | ICD-10-CM

## 2021-02-08 PROCEDURE — 99442 PR PHYS/QHP TELEPHONE EVALUATION 11-20 MIN: CPT | Performed by: PHYSICIAN ASSISTANT

## 2021-02-08 RX ORDER — FLUCONAZOLE 150 MG/1
150 TABLET ORAL ONCE
Qty: 2 TABLET | Refills: 0 | Status: SHIPPED | OUTPATIENT
Start: 2021-02-08 | End: 2021-02-08

## 2021-02-08 NOTE — TELEPHONE ENCOUNTER
States she has an appt at 0900 this morning but her ride got messed up and she can't make it, could maybe do telehealth? Instructed to call office when they open this morning. She is agreeable.

## 2021-02-08 NOTE — PROGRESS NOTES
"Albertina Ziegler is a 55 y.o. female.   You have chosen to receive care through a telephone visit. Do you consent to use a telephone visit for your medical care today? Yes    History of Present Illness   Pt presents for follow up for hospital for pneumonia on L side. TCM call was attempted and patient could not be reached   Was on heavy antibiotics and steroids in ER. Noticing white patches in throat in tongue . Sore tongue. Seems like thrush   Working on exercises   Fell yesterday with walker. States it was a fluke fall. Wheel turned in. Bruised bottom and knees. Ankle swollen   Breathing doing well   Completed antibiotic   BM twice per day and normal     Checking sugar more than she \"should\"   May need early refill on testing supplies   Sugar this morning 90 . Blood sugar this afternoon was 118.     Sleep study scheduled for Wednesday night   Needs BiPAP    Pulse ox 97% with 02 and 94% without     Cracks in fingers. Using vaseline       The following portions of the patient's history were reviewed and updated as appropriate: allergies, current medications, past family history, past medical history, past social history, past surgical history and problem list.    Review of Systems   Constitutional: Negative.  Negative for chills, diaphoresis and fever.   HENT: Positive for mouth sores. Negative for congestion, ear discharge, ear pain, hearing loss, nosebleeds, postnasal drip, sinus pressure, sneezing and sore throat.    Eyes: Negative.    Respiratory: Negative.  Negative for cough, chest tightness, shortness of breath and wheezing.    Cardiovascular: Negative.  Negative for chest pain, palpitations and leg swelling.   Gastrointestinal: Negative for abdominal distention, abdominal pain, blood in stool, constipation, diarrhea, nausea and vomiting.   Genitourinary: Negative.  Negative for difficulty urinating, dysuria, flank pain, frequency, hematuria and urgency.   Musculoskeletal: Positive for arthralgias, " back pain and joint swelling. Negative for gait problem, myalgias, neck pain and neck stiffness.   Skin: Negative.  Negative for color change, pallor and wound.        Dry skin    Neurological: Negative for dizziness, light-headedness and headaches.       Objective    Pulse ox 97% with O2, 94% without per patient   Physical Exam  Physical exam unable to obtained due to video visit     Assessment/Plan   Diagnoses and all orders for this visit:    1. Oral thrush (Primary)  -     nystatin (MYCOSTATIN) 575401 UNIT/ML suspension; Swish and swallow 5 mL 4 (Four) Times a Day.  Dispense: 473 mL; Refill: 0  -     fluconazole (Diflucan) 150 MG tablet; Take 1 tablet by mouth 1 (One) Time for 1 dose. May repeat in 3 days if symptoms persist  Dispense: 2 tablet; Refill: 0    nystatin and diflucan for suspected thrush   Pt encouraged to follow up with her PCP in the next 4 weeks. Had to cancer recent appointment due to hospitalization   Keep with recommendations from the hospital, report back if new or worsening symptoms develop     This visit has been rescheduled as a phone visit to comply with patient safety concerns in accordance with CDC recommendations. Total time of discussion was 15 minutes.

## 2021-02-09 RX ORDER — ROFLUMILAST 500 UG/1
TABLET ORAL
COMMUNITY
Start: 2021-02-02 | End: 2021-03-15 | Stop reason: SDUPTHER

## 2021-02-09 RX ORDER — NIFEDIPINE 30 MG/1
TABLET, EXTENDED RELEASE ORAL
COMMUNITY
Start: 2021-02-02 | End: 2021-03-15 | Stop reason: SDUPTHER

## 2021-02-10 ENCOUNTER — PATIENT OUTREACH (OUTPATIENT)
Dept: CASE MANAGEMENT | Facility: OTHER | Age: 56
End: 2021-02-10

## 2021-02-10 NOTE — OUTREACH NOTE
"Patient Outreach Note    Contacting pt regarding recent ED visits and hospitalization at [facility].  She states \"I'm alright right now.  Breathing is alright.\"  States she just woke up. \"I did not sleep good at all last night. \"  Ask if she still had home health coming to see her, which she states she does, but could not remember name at present.  She states she has someone staying with her right now.  She does not drive, but friend staying with her would take her to needed appts.  She states she does check her bp and bs at home and both have been good.  Thought last A1C was \"6.5 at the nursing home.\"  She states she is eating and drinking OK and denies any food insecurities at present.  Conversation kept brief per patient, however asked is she would be OK with future calls, which she states \"yes.\"      Jennifer Cerda RN  Ambulatory     2/10/2021, 15:23 EST      "

## 2021-02-10 NOTE — OUTREACH NOTE
Care Coordination Note    Contacted [facility] and talked with Madyson.  She confirms that they do have her as active pt for nursing and PT services.      Jennifer Cerda RN  Ambulatory     2/10/2021, 15:45 EST

## 2021-02-16 ENCOUNTER — NURSE TRIAGE (OUTPATIENT)
Dept: CALL CENTER | Facility: HOSPITAL | Age: 56
End: 2021-02-16

## 2021-02-16 DIAGNOSIS — E11.42 DIABETIC PERIPHERAL NEUROPATHY (HCC): ICD-10-CM

## 2021-02-16 DIAGNOSIS — G89.4 CHRONIC PAIN SYNDROME: ICD-10-CM

## 2021-02-16 DIAGNOSIS — F41.9 ANXIETY: ICD-10-CM

## 2021-02-16 DIAGNOSIS — Z96.641 HISTORY OF RIGHT HIP REPLACEMENT: ICD-10-CM

## 2021-02-16 RX ORDER — OXYCODONE AND ACETAMINOPHEN 7.5; 325 MG/1; MG/1
TABLET ORAL
Qty: 120 TABLET | Refills: 0 | Status: SHIPPED | OUTPATIENT
Start: 2021-02-16 | End: 2021-03-15 | Stop reason: SDUPTHER

## 2021-02-16 RX ORDER — DIAZEPAM 2 MG/1
TABLET ORAL
Qty: 60 TABLET | Refills: 0 | Status: SHIPPED | OUTPATIENT
Start: 2021-02-16 | End: 2021-03-15 | Stop reason: SDUPTHER

## 2021-02-16 RX ORDER — GABAPENTIN 600 MG/1
TABLET ORAL
Qty: 90 TABLET | Refills: 0 | Status: SHIPPED | OUTPATIENT
Start: 2021-02-16 | End: 2021-03-15 | Stop reason: SDUPTHER

## 2021-02-16 NOTE — TELEPHONE ENCOUNTER
"    Reason for Disposition  • Health Information question, no triage required and triager able to answer question    Additional Information  • Negative: [1] Caller is not with the adult (patient) AND [2] reporting urgent symptoms  • Negative: Lab result questions  • Negative: Medication questions  • Negative: Caller can't be reached by phone  • Negative: Caller has already spoken to PCP or another triager  • Negative: RN needs further essential information from caller in order to complete triage  • Negative: Requesting regular office appointment  • Negative: [1] Caller requesting NON-URGENT health information AND [2] PCP's office is the best resource    Answer Assessment - Initial Assessment Questions  1. REASON FOR CALL or QUESTION: \"What is your reason for calling today?\" or \"How can I best help you?\" or \"What question do you have that I can help answer?\"  Needing refills on medication. Not sure which medications. Her pharmacy, [facility] has a list. They have sent a electronic request to MD office. Her pharmacy is asking if there is someone at the office to address the request.    Protocols used: INFORMATION ONLY CALL - NO TRIAGE-ADULT-      "

## 2021-02-23 ENCOUNTER — PATIENT OUTREACH (OUTPATIENT)
Dept: CASE MANAGEMENT | Facility: OTHER | Age: 56
End: 2021-02-23

## 2021-02-23 NOTE — OUTREACH NOTE
"Care Coordination Note    Contacted Maris at [facility].  States she is still active pt and they will be seeing her this week.  Visits last week were cancelled due to weather.  Asked if she would mind contacting pt to let her know she still is receiving Home Health and when next visit would be, which she stated she \"would be glad to.\"    Jennifer Cerda RN  Ambulatory     2/23/2021, 15:32 EST      "

## 2021-02-23 NOTE — OUTREACH NOTE
Care Coordination Assessment    Documented/Reviewed By: Jennifer Cerda RN Date/time: 2/23/2021  3:09 PM   Assessment completed with: patient  Support system: significant other  Equipment used at home: walker, oxygen/respiratory treatment (Comment: bp monitor   glucometer)  Bed or wheelchair confined: No  History of fall(s) in last 6 months: Yes  Difficulty keeping appointments: No

## 2021-02-23 NOTE — OUTREACH NOTE
"Patient Outreach Note    F/u call.  Very difficult to follow conversation (hard to keep on topic) and phone kept going in and out and lost connection at one time.  She also kept talking to her boyfriend that was in another room.  Attempted to guide focus several times.   She states she is up and about in home with her walker.  She states she does check her bp and bs at home and they are doing good.  Says her breathing is doing good, but is wearing O2 \"90% of the time.\"  Asked if she lived by herself, which she said no, but did not offer who lived in home with her. She then stated, \"I want to see a therapist.\"  Asked what kind of therapist, which she stated, \"mental health.\"  [facility] number provided and informed that she will need to let them know her PCP is a [facility] provider.  Also, given [facility] number and [facility] numbers.  Asked if [facility] was see seeing her, which she was unsure.  Will contact [facility] for status and will f/u in approx a 5-10 days with her, which she voiced her appreciation for this.      Jennifer Cerda RN  Ambulatory     2/23/2021, 15:10 EST      "

## 2021-03-02 ENCOUNTER — TELEPHONE (OUTPATIENT)
Dept: FAMILY MEDICINE CLINIC | Facility: CLINIC | Age: 56
End: 2021-03-02

## 2021-03-04 ENCOUNTER — PATIENT OUTREACH (OUTPATIENT)
Dept: CASE MANAGEMENT | Facility: OTHER | Age: 56
End: 2021-03-04

## 2021-03-04 NOTE — OUTREACH NOTE
Patient Outreach Note    F/u call to check on Home Health and to see if see had contacted any of the Behavioral Health numbers.  States no one has been to see her from home health, however there had been weather issue and then when they were coming one day, she had vomiting and diarrhea and so they postponed visit.  She states she has not heard from them since.  Also, stated since was sick, she had not had a chance to call any of the Behavioral Health agencies.  Offered to contact Home Health for her, which she asked I do.      Jennifer Cerda RN  Ambulatory     3/4/2021, 16:34 EST

## 2021-03-04 NOTE — OUTREACH NOTE
Care Coordination Note    Maris at [facility] contacted and reports that they have attempted to contact pt, however has had no answer.  There was an order from Dr. Tovar to extend PT.  Will let pt know that they will try to contact her for visit first of next week and the importance of answering phone calls.      Jennifer Cerda RN  Ambulatory     3/4/2021, 16:41 EST

## 2021-03-04 NOTE — OUTREACH NOTE
Patient Outreach Note    Pt notified that they have attempted to contact her and explained that they may call her from the therapist cell phone, so it is important to answer phone calls.  Offered to check back with her in a week or 2, which she welcomed.      Jennifer Cerda RN  Ambulatory     3/4/2021, 16:43 EST

## 2021-03-10 ENCOUNTER — TELEPHONE (OUTPATIENT)
Dept: FAMILY MEDICINE CLINIC | Facility: CLINIC | Age: 56
End: 2021-03-10

## 2021-03-10 NOTE — TELEPHONE ENCOUNTER
Please check the chart for any emergency numbers and call to see if anyone knows why she is not answering the door.  Perhaps she is in the hospital or something else has happened.

## 2021-03-10 NOTE — TELEPHONE ENCOUNTER
MALLORIE ALATORRE FROM [facility]  PT HAS NOT BEEN ABLE TO BE REACHED-PT DOES NOT ANSWER  PHONE OR ANSWER THE DOOR.  THEY ARE WANTING TO DISCHARGE PT DUE TO THIS.  THEY HAVE TRIED FOR 4 WEEKS TO  GET AN EVALUATION. THREE VISITS WERE DONE IN January.     [phone]

## 2021-03-11 NOTE — TELEPHONE ENCOUNTER
Chirag from [facility] called stated PT tried to do their evaluation. Dani had told him to call at 12 and she would be ready at 1. Called again at 1 and didn't answer, kept trying to reach when at 2. Roommate let him in she was still in bed told him to come back later or tomorrow. He told her that he had over patients. Left. Chirag stated that the company is going to drop here since she is non-compliant. Stated they have tried multipe times to see her or contact her and are unable to.     Chirag: [phone]

## 2021-03-11 NOTE — TELEPHONE ENCOUNTER
([phone]) LM for pt's son Suresh to call us back & tried to reach daughter Santa but the phone number listed for her was for someone named Carmen.  Also attempted to reach pt again too, LM for her to call us back.

## 2021-03-15 ENCOUNTER — TELEPHONE (OUTPATIENT)
Dept: FAMILY MEDICINE CLINIC | Facility: CLINIC | Age: 56
End: 2021-03-15

## 2021-03-15 DIAGNOSIS — G89.4 CHRONIC PAIN SYNDROME: ICD-10-CM

## 2021-03-15 DIAGNOSIS — E11.65 UNCONTROLLED TYPE 2 DIABETES MELLITUS WITH HYPERGLYCEMIA, WITHOUT LONG-TERM CURRENT USE OF INSULIN (HCC): ICD-10-CM

## 2021-03-15 DIAGNOSIS — F34.1 DYSTHYMIA: ICD-10-CM

## 2021-03-15 DIAGNOSIS — F41.9 ANXIETY: ICD-10-CM

## 2021-03-15 DIAGNOSIS — Z96.641 HISTORY OF RIGHT HIP REPLACEMENT: ICD-10-CM

## 2021-03-15 DIAGNOSIS — E11.42 DIABETIC PERIPHERAL NEUROPATHY (HCC): ICD-10-CM

## 2021-03-15 RX ORDER — CALCIUM CITRATE/VITAMIN D3 200MG-6.25
TABLET ORAL
Qty: 100 EACH | Refills: 0 | OUTPATIENT
Start: 2021-03-15

## 2021-03-15 RX ORDER — DIAZEPAM 2 MG/1
TABLET ORAL
Qty: 60 TABLET | Refills: 0 | OUTPATIENT
Start: 2021-03-15

## 2021-03-15 RX ORDER — LANCETS 30 GAUGE
EACH MISCELLANEOUS
Qty: 100 EACH | Refills: 0 | Status: SHIPPED | OUTPATIENT
Start: 2021-03-15 | End: 2021-06-18

## 2021-03-15 RX ORDER — DULOXETIN HYDROCHLORIDE 60 MG/1
120 CAPSULE, DELAYED RELEASE ORAL DAILY
Qty: 60 CAPSULE | Refills: 0 | Status: SHIPPED | OUTPATIENT
Start: 2021-03-15 | End: 2021-04-13

## 2021-03-15 RX ORDER — GABAPENTIN 600 MG/1
TABLET ORAL
Qty: 90 TABLET | Refills: 0 | OUTPATIENT
Start: 2021-03-15

## 2021-03-15 RX ORDER — NIFEDIPINE 30 MG/1
30 TABLET, EXTENDED RELEASE ORAL DAILY
Qty: 30 TABLET | Refills: 0 | Status: SHIPPED | OUTPATIENT
Start: 2021-03-15 | End: 2021-04-13

## 2021-03-15 RX ORDER — OXYCODONE AND ACETAMINOPHEN 7.5; 325 MG/1; MG/1
1 TABLET ORAL EVERY 6 HOURS PRN
Qty: 120 TABLET | Refills: 0 | Status: SHIPPED | OUTPATIENT
Start: 2021-03-15 | End: 2021-04-13

## 2021-03-15 RX ORDER — ESCITALOPRAM OXALATE 20 MG/1
20 TABLET ORAL DAILY
Qty: 30 TABLET | Refills: 0 | Status: SHIPPED | OUTPATIENT
Start: 2021-03-15 | End: 2021-04-13

## 2021-03-15 RX ORDER — GABAPENTIN 600 MG/1
600 TABLET ORAL 3 TIMES DAILY
Qty: 90 TABLET | Refills: 0 | Status: SHIPPED | OUTPATIENT
Start: 2021-03-15 | End: 2021-04-13

## 2021-03-15 RX ORDER — BLOOD PRESSURE TEST KIT
KIT MISCELLANEOUS
Qty: 100 EACH | Refills: 1 | Status: SHIPPED | OUTPATIENT
Start: 2021-03-15

## 2021-03-15 RX ORDER — CALCIUM CITRATE/VITAMIN D3 200MG-6.25
1 TABLET ORAL 2 TIMES DAILY
Qty: 100 EACH | Refills: 0 | Status: SHIPPED | OUTPATIENT
Start: 2021-03-15 | End: 2021-06-18

## 2021-03-15 RX ORDER — BUSPIRONE HYDROCHLORIDE 10 MG/1
10 TABLET ORAL 2 TIMES DAILY
Qty: 60 TABLET | Refills: 0 | Status: SHIPPED | OUTPATIENT
Start: 2021-03-15 | End: 2021-04-13

## 2021-03-15 RX ORDER — METOPROLOL SUCCINATE 50 MG/1
TABLET, EXTENDED RELEASE ORAL
Qty: 30 TABLET | Refills: 0 | Status: SHIPPED | OUTPATIENT
Start: 2021-03-15 | End: 2021-05-12

## 2021-03-15 RX ORDER — TRAZODONE HYDROCHLORIDE 50 MG/1
50 TABLET ORAL NIGHTLY
Qty: 30 TABLET | Refills: 0 | Status: SHIPPED | OUTPATIENT
Start: 2021-03-15 | End: 2022-06-26 | Stop reason: HOSPADM

## 2021-03-15 RX ORDER — ROFLUMILAST 500 UG/1
500 TABLET ORAL DAILY
Qty: 30 TABLET | Refills: 0 | Status: SHIPPED | OUTPATIENT
Start: 2021-03-15 | End: 2021-04-13

## 2021-03-15 RX ORDER — FAMOTIDINE 20 MG/1
20 TABLET, FILM COATED ORAL 2 TIMES DAILY
Qty: 60 TABLET | Refills: 0 | Status: SHIPPED | OUTPATIENT
Start: 2021-03-15 | End: 2021-04-13

## 2021-03-15 RX ORDER — DIAZEPAM 2 MG/1
2 TABLET ORAL EVERY 12 HOURS PRN
Qty: 60 TABLET | Refills: 0 | Status: SHIPPED | OUTPATIENT
Start: 2021-03-15 | End: 2021-04-13

## 2021-03-15 NOTE — TELEPHONE ENCOUNTER
Caller: Dani Ziegler FELIPA    Relationship: Self    Best call back number: [phone]     Medication needed:   Requested Prescriptions     Pending Prescriptions Disp Refills   • Daliresp 500 MCG tablet tablet 30 tablet    • NIFEdipine XL (PROCARDIA XL) 30 MG 24 hr tablet     • DULoxetine (CYMBALTA) 60 MG capsule 60 capsule 2     Sig: Take 2 capsules by mouth Daily.   • apixaban (Eliquis) 5 MG tablet tablet 60 tablet 1     Sig: Take 1 tablet by mouth Every 12 (Twelve) Hours.   • famotidine (Pepcid) 20 MG tablet 60 tablet 5     Sig: Take 1 tablet by mouth 2 (Two) Times a Day.   • traZODone (DESYREL) 50 MG tablet       Sig: Take 1 tablet by mouth Every Night.   • escitalopram (LEXAPRO) 20 MG tablet 30 tablet 2   • gabapentin (NEURONTIN) 600 MG tablet 90 tablet 0     Sig: Take 1 tablet by mouth 3 (Three) Times a Day.   • diazePAM (VALIUM) 2 MG tablet 60 tablet 0     Sig: Take 1 tablet by mouth Every 12 (Twelve) Hours As Needed. for anxiety   • oxyCODONE-acetaminophen (PERCOCET) 7.5-325 MG per tablet 120 tablet 0     Sig: Take 1 tablet by mouth Every 6 (Six) Hours As Needed for Moderate Pain .   • Lancets misc 100 each 0     Sig: Check glucose twice daily   • True Metrix Blood Glucose Test test strip 100 each 0     Si each by Other route 2 (Two) Times a Day. to check glucose   ALSO NEEDS  BUSPIRONE 7.5 MG 1, TWICE PER DAY  PATIENT STATED THAT SHE WOULD LIKE DR SHIELDS TO UP HER DOSE TO 10 MG 1, TWICE PER DAY.    ALSO WANTS ALCOHOL WIPES  TRUE METRIX: WOULD LIKE ENOUGH TO TEST 3X PER DAY.      When do you need the refill by: ASAP      Does the patient have less than a 3 day supply:  [x] Yes  [] No    What is the patient's preferred pharmacy: [facility] - [place] [address] - [phone] [facility] [phone]

## 2021-04-13 ENCOUNTER — TELEPHONE (OUTPATIENT)
Dept: FAMILY MEDICINE CLINIC | Facility: CLINIC | Age: 56
End: 2021-04-13

## 2021-04-13 DIAGNOSIS — Z96.641 HISTORY OF RIGHT HIP REPLACEMENT: ICD-10-CM

## 2021-04-13 DIAGNOSIS — G89.4 CHRONIC PAIN SYNDROME: ICD-10-CM

## 2021-04-13 DIAGNOSIS — E11.42 DIABETIC PERIPHERAL NEUROPATHY (HCC): ICD-10-CM

## 2021-04-13 DIAGNOSIS — F34.1 DYSTHYMIA: ICD-10-CM

## 2021-04-13 DIAGNOSIS — F41.9 ANXIETY: ICD-10-CM

## 2021-04-13 RX ORDER — BUSPIRONE HYDROCHLORIDE 10 MG/1
TABLET ORAL
Qty: 60 TABLET | Refills: 0 | Status: SHIPPED | OUTPATIENT
Start: 2021-04-13 | End: 2021-05-12

## 2021-04-13 RX ORDER — OXYCODONE AND ACETAMINOPHEN 7.5; 325 MG/1; MG/1
TABLET ORAL
Qty: 120 TABLET | Refills: 0 | Status: SHIPPED | OUTPATIENT
Start: 2021-04-13 | End: 2021-05-18 | Stop reason: SDUPTHER

## 2021-04-13 RX ORDER — DULOXETIN HYDROCHLORIDE 60 MG/1
CAPSULE, DELAYED RELEASE ORAL
Qty: 60 CAPSULE | Refills: 0 | Status: SHIPPED | OUTPATIENT
Start: 2021-04-13 | End: 2021-05-12

## 2021-04-13 RX ORDER — GABAPENTIN 600 MG/1
TABLET ORAL
Qty: 90 TABLET | Refills: 0 | Status: SHIPPED | OUTPATIENT
Start: 2021-04-13 | End: 2021-05-12

## 2021-04-13 RX ORDER — ESCITALOPRAM OXALATE 20 MG/1
20 TABLET ORAL DAILY
Qty: 30 TABLET | Refills: 0 | Status: SHIPPED | OUTPATIENT
Start: 2021-04-13 | End: 2021-05-12

## 2021-04-13 RX ORDER — INSULIN GLARGINE 100 [IU]/ML
INJECTION, SOLUTION SUBCUTANEOUS
Qty: 30 ML | Refills: 1 | Status: SHIPPED | OUTPATIENT
Start: 2021-04-13 | End: 2021-08-05

## 2021-04-13 RX ORDER — ROFLUMILAST 500 UG/1
500 TABLET ORAL DAILY
Qty: 30 TABLET | Refills: 0 | Status: SHIPPED | OUTPATIENT
Start: 2021-04-13 | End: 2021-05-12

## 2021-04-13 RX ORDER — DIAZEPAM 2 MG/1
TABLET ORAL
Qty: 60 TABLET | Refills: 0 | Status: SHIPPED | OUTPATIENT
Start: 2021-04-13 | End: 2021-05-12

## 2021-04-13 RX ORDER — FAMOTIDINE 20 MG/1
TABLET, FILM COATED ORAL
Qty: 60 TABLET | Refills: 0 | Status: SHIPPED | OUTPATIENT
Start: 2021-04-13 | End: 2021-05-12

## 2021-04-13 RX ORDER — APIXABAN 5 MG/1
TABLET, FILM COATED ORAL
Qty: 60 TABLET | Refills: 0 | Status: SHIPPED | OUTPATIENT
Start: 2021-04-13 | End: 2021-05-12

## 2021-04-13 RX ORDER — NIFEDIPINE 30 MG/1
TABLET, FILM COATED, EXTENDED RELEASE ORAL
Qty: 30 TABLET | Refills: 0 | Status: SHIPPED | OUTPATIENT
Start: 2021-04-13 | End: 2021-05-12

## 2021-04-16 ENCOUNTER — PATIENT OUTREACH (OUTPATIENT)
Dept: CASE MANAGEMENT | Facility: OTHER | Age: 56
End: 2021-04-16

## 2021-04-16 NOTE — OUTREACH NOTE
"Patient Outreach Note    Pt returned call from message left earlier on her voice mail.  States she is \"doing fine.\"  She is scheduled for PCP visit 5/7/21.  States she had to cancel her appt on 4/6 b/c her bf was in MVA and he was going to be her transportation.  She has since obtained public transportation number and states \"so it will not be a problem.\"  She is up and about with walker and denies any recent falls.  She states her bs has been running good and highest reading recently has been 158 and reports her bp has been averaging 128/82 and breathing has been doing good.  Continues to wear O2 during night and occas prn during day.  She does states having some issues with food and is going to get food box today that the [employer] is providing.  She is also aware of [facility].  States she applied for food stamps, but had not completed all forms before hosp admission.  States she is going to apply again.  Offered number, but she states she has the number.  Also, explained [facility] and their potential assistance with possibility of qualifying for Medicaid and Medicaid Waiver and potential food program. She did accept the number.  She did ask about a recliner/lift chair that she thought may be helpful to her.  [employer] number provided.  She denies any other needs at present and voiced her appreciation for the call. Asked if OK to check back with her in 1-2 months, which she states, \"yes\"  and knows to call RN-ACM for any needs in interim.     Jennifer Cerda RN  Ambulatory     4/16/2021, 14:34 EDT      "

## 2021-04-20 ENCOUNTER — TELEPHONE (OUTPATIENT)
Dept: FAMILY MEDICINE CLINIC | Facility: CLINIC | Age: 56
End: 2021-04-20

## 2021-04-20 RX ORDER — FUROSEMIDE 20 MG/1
20 TABLET ORAL DAILY
Qty: 15 TABLET | Refills: 0 | Status: SHIPPED | OUTPATIENT
Start: 2021-04-20 | End: 2021-05-27

## 2021-04-20 NOTE — TELEPHONE ENCOUNTER
Called she stated she was taking this as needed. Has been out for about 2 weeks but has noticed swelling in the morning. Not much but some. Stated she requested to she if it will help.

## 2021-04-20 NOTE — TELEPHONE ENCOUNTER
Please call patient and confirm.  We received a refill request for furosemide but it is not actively on her med list.  Please confirm if she is still taking this.

## 2021-05-12 DIAGNOSIS — Z96.641 HISTORY OF RIGHT HIP REPLACEMENT: ICD-10-CM

## 2021-05-12 DIAGNOSIS — F34.1 DYSTHYMIA: ICD-10-CM

## 2021-05-12 DIAGNOSIS — F41.9 ANXIETY: ICD-10-CM

## 2021-05-12 DIAGNOSIS — E11.42 DIABETIC PERIPHERAL NEUROPATHY (HCC): ICD-10-CM

## 2021-05-12 DIAGNOSIS — G89.4 CHRONIC PAIN SYNDROME: ICD-10-CM

## 2021-05-12 RX ORDER — BUSPIRONE HYDROCHLORIDE 10 MG/1
TABLET ORAL
Qty: 60 TABLET | Refills: 0 | Status: SHIPPED | OUTPATIENT
Start: 2021-05-12 | End: 2021-07-16

## 2021-05-12 RX ORDER — METOPROLOL SUCCINATE 50 MG/1
TABLET, EXTENDED RELEASE ORAL
Qty: 30 TABLET | Refills: 0 | Status: SHIPPED | OUTPATIENT
Start: 2021-05-12 | End: 2021-07-16

## 2021-05-12 RX ORDER — APIXABAN 5 MG/1
TABLET, FILM COATED ORAL
Qty: 60 TABLET | Refills: 0 | Status: SHIPPED | OUTPATIENT
Start: 2021-05-12 | End: 2021-08-30

## 2021-05-12 RX ORDER — ROFLUMILAST 500 UG/1
500 TABLET ORAL DAILY
Qty: 30 TABLET | Refills: 0 | Status: SHIPPED | OUTPATIENT
Start: 2021-05-12 | End: 2022-03-09 | Stop reason: SDUPTHER

## 2021-05-12 RX ORDER — DULOXETIN HYDROCHLORIDE 60 MG/1
CAPSULE, DELAYED RELEASE ORAL
Qty: 60 CAPSULE | Refills: 0 | Status: SHIPPED | OUTPATIENT
Start: 2021-05-12 | End: 2021-06-15

## 2021-05-12 RX ORDER — ESCITALOPRAM OXALATE 20 MG/1
20 TABLET ORAL DAILY
Qty: 30 TABLET | Refills: 0 | Status: SHIPPED | OUTPATIENT
Start: 2021-05-12 | End: 2021-07-16 | Stop reason: SDUPTHER

## 2021-05-12 RX ORDER — FAMOTIDINE 20 MG/1
TABLET, FILM COATED ORAL
Qty: 60 TABLET | Refills: 0 | Status: SHIPPED | OUTPATIENT
Start: 2021-05-12 | End: 2021-08-18

## 2021-05-12 RX ORDER — GABAPENTIN 600 MG/1
TABLET ORAL
Qty: 90 TABLET | Refills: 0 | Status: SHIPPED | OUTPATIENT
Start: 2021-05-12 | End: 2021-06-16

## 2021-05-12 RX ORDER — NIFEDIPINE 30 MG/1
TABLET, FILM COATED, EXTENDED RELEASE ORAL
Qty: 30 TABLET | Refills: 0 | Status: SHIPPED | OUTPATIENT
Start: 2021-05-12 | End: 2021-07-16 | Stop reason: SDUPTHER

## 2021-05-12 RX ORDER — DIAZEPAM 2 MG/1
TABLET ORAL
Qty: 60 TABLET | Refills: 0 | Status: SHIPPED | OUTPATIENT
Start: 2021-05-12 | End: 2021-06-15

## 2021-05-18 ENCOUNTER — TELEPHONE (OUTPATIENT)
Dept: FAMILY MEDICINE CLINIC | Facility: CLINIC | Age: 56
End: 2021-05-18

## 2021-05-18 DIAGNOSIS — E11.42 DIABETIC PERIPHERAL NEUROPATHY (HCC): ICD-10-CM

## 2021-05-18 DIAGNOSIS — Z96.641 HISTORY OF RIGHT HIP REPLACEMENT: ICD-10-CM

## 2021-05-18 DIAGNOSIS — G89.4 CHRONIC PAIN SYNDROME: ICD-10-CM

## 2021-05-18 RX ORDER — OXYCODONE AND ACETAMINOPHEN 7.5; 325 MG/1; MG/1
1 TABLET ORAL EVERY 6 HOURS PRN
Qty: 40 TABLET | Refills: 0 | Status: SHIPPED | OUTPATIENT
Start: 2021-05-18 | End: 2021-05-25

## 2021-05-18 NOTE — TELEPHONE ENCOUNTER
Caller: Dani Ziegler    Relationship: Self    Best call back number:     Medication needed:   Requested Prescriptions     Pending Prescriptions Disp Refills   • oxyCODONE-acetaminophen (PERCOCET) 7.5-325 MG per tablet 120 tablet 0     Sig: Take 1 tablet by mouth Every 6 (Six) Hours As Needed for Moderate Pain .       When do you need the refill by: ASAP    What additional details did the patient provide when requesting the medication: PATIENT SEES DR SHIELDS ON 052521 AND WANTS TO KNOW IF SHE CAN HAVE ENOIUGH UNTIL HER VISIT; PLEASE CALL AND ADVISE    Does the patient have less than a 3 day supply:  [x] Yes  [] No    What is the patient's preferred pharmacy:[facility]

## 2021-05-25 ENCOUNTER — OFFICE VISIT (OUTPATIENT)
Dept: FAMILY MEDICINE CLINIC | Facility: CLINIC | Age: 56
End: 2021-05-25

## 2021-05-25 ENCOUNTER — TELEPHONE (OUTPATIENT)
Dept: FAMILY MEDICINE CLINIC | Facility: CLINIC | Age: 56
End: 2021-05-25

## 2021-05-25 VITALS
OXYGEN SATURATION: 95 % | TEMPERATURE: 98.1 F | DIASTOLIC BLOOD PRESSURE: 78 MMHG | BODY MASS INDEX: 45.04 KG/M2 | SYSTOLIC BLOOD PRESSURE: 128 MMHG | HEIGHT: 67 IN | RESPIRATION RATE: 18 BRPM | WEIGHT: 287 LBS | HEART RATE: 86 BPM

## 2021-05-25 DIAGNOSIS — E55.9 VITAMIN D DEFICIENCY: ICD-10-CM

## 2021-05-25 DIAGNOSIS — E11.65 UNCONTROLLED TYPE 2 DIABETES MELLITUS WITH HYPERGLYCEMIA, WITHOUT LONG-TERM CURRENT USE OF INSULIN (HCC): Primary | ICD-10-CM

## 2021-05-25 DIAGNOSIS — G89.4 CHRONIC PAIN SYNDROME: ICD-10-CM

## 2021-05-25 DIAGNOSIS — I10 ESSENTIAL HYPERTENSION: ICD-10-CM

## 2021-05-25 DIAGNOSIS — M51.9 LUMBAR DISC DISEASE: ICD-10-CM

## 2021-05-25 DIAGNOSIS — J44.9 CHRONIC OBSTRUCTIVE PULMONARY DISEASE, UNSPECIFIED COPD TYPE (HCC): ICD-10-CM

## 2021-05-25 DIAGNOSIS — M12.9 ARTHRITIS INVOLVING MULTIPLE SITES: ICD-10-CM

## 2021-05-25 DIAGNOSIS — D50.9 IRON DEFICIENCY ANEMIA, UNSPECIFIED IRON DEFICIENCY ANEMIA TYPE: ICD-10-CM

## 2021-05-25 DIAGNOSIS — L08.9 INFECTED LESION OF SKIN: ICD-10-CM

## 2021-05-25 PROCEDURE — 99214 OFFICE O/P EST MOD 30 MIN: CPT | Performed by: FAMILY MEDICINE

## 2021-05-25 RX ORDER — OXYCODONE AND ACETAMINOPHEN 10; 325 MG/1; MG/1
1 TABLET ORAL EVERY 6 HOURS PRN
Qty: 120 TABLET | Refills: 0 | Status: SHIPPED | OUTPATIENT
Start: 2021-05-25 | End: 2021-06-25

## 2021-05-25 NOTE — PROGRESS NOTES
Assessment/Plan       Diagnoses and all orders for this visit:    1. Uncontrolled type 2 diabetes mellitus with hyperglycemia, without long-term current use of insulin (CMS/[facility]) (Primary)  -     CBC & Differential  -     Comprehensive Metabolic Panel  -     Lipid Panel With / Chol / HDL Ratio  -     Hemoglobin A1c  -     TSH  -     Vitamin B12    2. Chronic obstructive pulmonary disease, unspecified COPD type (CMS/[facility])  -     Fluticasone-Umeclidin-Vilant (Trelegy Ellipta) 100-62.5-25 MCG/INH inhaler; Inhale 1 puff Daily.  Dispense: 60 each; Refill: 5    3. Essential hypertension  -     CBC & Differential  -     Comprehensive Metabolic Panel  -     Lipid Panel With / Chol / HDL Ratio    4. Vitamin D deficiency  -     Vitamin D 25 Hydroxy    5. Iron deficiency anemia, unspecified iron deficiency anemia type  -     CBC & Differential  -     Iron and TIBC  -     Ferritin    6. Infected lesion of skin  -     mupirocin (Bactroban) 2 % ointment; Apply  topically to the appropriate area as directed 3 (Three) Times a Day.  Dispense: 22 g; Refill: 2    7. Lumbar disc disease  -     oxyCODONE-acetaminophen (Percocet)  MG per tablet; Take 1 tablet by mouth Every 6 (Six) Hours As Needed for Moderate Pain .  Dispense: 120 tablet; Refill: 0    8. Arthritis involving multiple sites  -     oxyCODONE-acetaminophen (Percocet)  MG per tablet; Take 1 tablet by mouth Every 6 (Six) Hours As Needed for Moderate Pain .  Dispense: 120 tablet; Refill: 0    9. Chronic pain syndrome  -     oxyCODONE-acetaminophen (Percocet)  MG per tablet; Take 1 tablet by mouth Every 6 (Six) Hours As Needed for Moderate Pain .  Dispense: 120 tablet; Refill: 0           Follow up: No follow-ups on file.     DISCUSSION  Diabetes mellitus type 2.  Due for blood work.  Check A1c, CMP and lipid panel.    COPD.  Stable.  Needs refill of Daliresp but needs prior authorization.  We will get that going.    Hypertension.  Blood pressures currently  stable on metoprolol XL 50 mg daily.  Continue this.  Check CMP and CBC.    Vitamin D deficiency.  Recheck vitamin D level.    Infected skin lesions.  She has been picking the skin on her abdomen.  Bactroban.  Encouraged her to stop picking at the lesions.  Keep clean.    Chronic pain with lumbar disc disease and arthritis.  Increase oxycodone to 10-3 25 1 every 6 hours as needed.  Side effects explained.  She is not to take any more than prescribed.  She expressed understanding and agrees.    She is to follow-up with orthopedics in regards to her left shoulder pain.      MEDICATIONS PRESCRIBED  Requested Prescriptions     Signed Prescriptions Disp Refills   • mupirocin (Bactroban) 2 % ointment 22 g 2     Sig: Apply  topically to the appropriate area as directed 3 (Three) Times a Day.   • oxyCODONE-acetaminophen (Percocet)  MG per tablet 120 tablet 0     Sig: Take 1 tablet by mouth Every 6 (Six) Hours As Needed for Moderate Pain .   • Fluticasone-Umeclidin-Vilant (Trelegy Ellipta) 100-62.5-25 MCG/INH inhaler 60 each 5     Sig: Inhale 1 puff Daily.            Alden dated on 5/25/2021   was reviewed and appropriate.        -------------------------------------------    Subjective     Chief Complaint   Patient presents with   • Pain     f/u          Pain  This is a chronic problem. The current episode started more than 1 year ago. The problem occurs constantly. The problem has been gradually worsening. Associated symptoms include coughing. Pertinent negatives include no fever. The symptoms are aggravated by bending, walking and standing. Treatments tried: percocet 7.5/325. The treatment provided mild relief.   COPD  She complains of cough and shortness of breath. Primary symptoms comments: Has been out of the Dalresp x 3 weeks. This is a chronic problem. The current episode started more than 1 year ago. The problem occurs daily. The problem has been gradually improving. The cough is non-productive. Associated  "symptoms include dyspnea on exertion. Pertinent negatives include no fever or nasal congestion. Her symptoms are aggravated by any activity. Relieved by: Inhalers help. She reports moderate improvement on treatment. Her past medical history is significant for COPD.       Chronic Pain  sharp pain in hip and back  Left shoulder pain   Trying to get in back with ortho.   At [facility]  She let is know if not able to get in     Percocet 7.5/325 q 6 hrs  + excruciating pain now  10 mg was working better    Pain is worse in shoulder in back    Pain 8/10 now in back    Sues a walker      ===================  Picking  Has been picking skin  On abdomen  Using betadine  Some d/x form it    ================  DM 2  Has been checking sugars  189 was highest  In am 70-89   No increased urine    Has been losing weight  Eating healthier    Feet tingle and hurt at night                Social History     Tobacco Use   Smoking Status Current Every Day Smoker   • Years: 30.00   • Types: Cigarettes   • Last attempt to quit: 2019   • Years since quittin.1   Smokeless Tobacco Never Used   Tobacco Comment    1 daily          Past Medical History,Medications, Allergies, and social history was reviewed.          Review of Systems   Constitutional: Negative for fever.        Has been losing weigth   Respiratory: Positive for cough and shortness of breath.    Cardiovascular: Positive for dyspnea on exertion.   Gastrointestinal: Positive for diarrhea (2 days ago).       Objective     Vitals:    21 1636   BP: 128/78   Pulse: 86   Resp: 18   Temp: 98.1 °F (36.7 °C)   SpO2: 95%   Weight: 130 kg (287 lb)   Height: 168.9 cm (66.5\")          Physical Exam  Vitals and nursing note reviewed.   Constitutional:       Appearance: She is well-developed. She is obese.   HENT:      Head: Normocephalic and atraumatic.      Right Ear: Hearing and external ear normal.      Left Ear: Hearing and external ear normal.   Eyes:      Conjunctiva/sclera: " Conjunctivae normal.      Pupils: Pupils are equal, round, and reactive to light.   Cardiovascular:      Rate and Rhythm: Normal rate and regular rhythm.      Heart sounds: Normal heart sounds. No murmur heard.   No friction rub.   Pulmonary:      Effort: Pulmonary effort is normal. No respiratory distress.      Breath sounds: Normal breath sounds. No wheezing or rales.   Abdominal:      General: Bowel sounds are normal. There is no distension.      Tenderness: There is no abdominal tenderness. There is no guarding or rebound.   Musculoskeletal:      Right lower leg: Edema ( Trace) present.      Left lower leg: Edema ( Trace) present.      Comments: Uses walker.  Antalgic gait.  Tenderness of the paraspinous musculature of the lumbar spine   Skin:     General: Skin is warm.      Comments: 3-4 areas of skin ulceration from picking.  Some mild discharge.   Neurological:      Mental Status: She is alert and oriented to person, place, and time.   Psychiatric:         Behavior: Behavior normal.       Decreased range of motion of the left shoulder.  Unable to lift above head due to pain.              Darrion Tovar MD

## 2021-05-26 LAB
25(OH)D3+25(OH)D2 SERPL-MCNC: 28.5 NG/ML (ref 30–100)
ALBUMIN SERPL-MCNC: 3.9 G/DL (ref 3.8–4.9)
ALBUMIN/GLOB SERPL: 1.1 {RATIO} (ref 1.2–2.2)
ALP SERPL-CCNC: 131 IU/L (ref 48–121)
ALT SERPL-CCNC: 9 IU/L (ref 0–32)
AST SERPL-CCNC: 17 IU/L (ref 0–40)
BASOPHILS # BLD AUTO: 0.1 X10E3/UL (ref 0–0.2)
BASOPHILS NFR BLD AUTO: 0 %
BILIRUB SERPL-MCNC: 0.4 MG/DL (ref 0–1.2)
BUN SERPL-MCNC: 23 MG/DL (ref 6–24)
BUN/CREAT SERPL: 13 (ref 9–23)
CALCIUM SERPL-MCNC: 9.3 MG/DL (ref 8.7–10.2)
CHLORIDE SERPL-SCNC: 96 MMOL/L (ref 96–106)
CHOLEST SERPL-MCNC: 134 MG/DL (ref 100–199)
CHOLEST/HDLC SERPL: 4.2 RATIO (ref 0–4.4)
CO2 SERPL-SCNC: 24 MMOL/L (ref 20–29)
CREAT SERPL-MCNC: 1.74 MG/DL (ref 0.57–1)
EOSINOPHIL # BLD AUTO: 0.3 X10E3/UL (ref 0–0.4)
EOSINOPHIL NFR BLD AUTO: 2 %
ERYTHROCYTE [DISTWIDTH] IN BLOOD BY AUTOMATED COUNT: 15.4 % (ref 11.7–15.4)
FERRITIN SERPL-MCNC: 70 NG/ML (ref 15–150)
GLOBULIN SER CALC-MCNC: 3.7 G/DL (ref 1.5–4.5)
GLUCOSE SERPL-MCNC: 123 MG/DL (ref 65–99)
HBA1C MFR BLD: 6 % (ref 4.8–5.6)
HCT VFR BLD AUTO: 43.5 % (ref 34–46.6)
HDLC SERPL-MCNC: 32 MG/DL
HGB BLD-MCNC: 13.6 G/DL (ref 11.1–15.9)
IMM GRANULOCYTES # BLD AUTO: 0.2 X10E3/UL (ref 0–0.1)
IMM GRANULOCYTES NFR BLD AUTO: 1 %
IRON SATN MFR SERPL: 11 % (ref 15–55)
IRON SERPL-MCNC: 35 UG/DL (ref 27–159)
LDLC SERPL CALC-MCNC: 75 MG/DL (ref 0–99)
LYMPHOCYTES # BLD AUTO: 4.4 X10E3/UL (ref 0.7–3.1)
LYMPHOCYTES NFR BLD AUTO: 22 %
MCH RBC QN AUTO: 26 PG (ref 26.6–33)
MCHC RBC AUTO-ENTMCNC: 31.3 G/DL (ref 31.5–35.7)
MCV RBC AUTO: 83 FL (ref 79–97)
MONOCYTES # BLD AUTO: 1.1 X10E3/UL (ref 0.1–0.9)
MONOCYTES NFR BLD AUTO: 5 %
NEUTROPHILS # BLD AUTO: 14.1 X10E3/UL (ref 1.4–7)
NEUTROPHILS NFR BLD AUTO: 70 %
PLATELET # BLD AUTO: 375 X10E3/UL (ref 150–450)
POTASSIUM SERPL-SCNC: 4.3 MMOL/L (ref 3.5–5.2)
PROT SERPL-MCNC: 7.6 G/DL (ref 6–8.5)
RBC # BLD AUTO: 5.24 X10E6/UL (ref 3.77–5.28)
SODIUM SERPL-SCNC: 135 MMOL/L (ref 134–144)
TIBC SERPL-MCNC: 323 UG/DL (ref 250–450)
TRIGL SERPL-MCNC: 158 MG/DL (ref 0–149)
TSH SERPL DL<=0.005 MIU/L-ACNC: 2.03 UIU/ML (ref 0.45–4.5)
UIBC SERPL-MCNC: 288 UG/DL (ref 131–425)
VIT B12 SERPL-MCNC: 750 PG/ML (ref 232–1245)
VLDLC SERPL CALC-MCNC: 27 MG/DL (ref 5–40)
WBC # BLD AUTO: 20.2 X10E3/UL (ref 3.4–10.8)

## 2021-05-27 ENCOUNTER — TELEPHONE (OUTPATIENT)
Dept: FAMILY MEDICINE CLINIC | Facility: CLINIC | Age: 56
End: 2021-05-27

## 2021-05-27 ENCOUNTER — PATIENT OUTREACH (OUTPATIENT)
Dept: CASE MANAGEMENT | Facility: OTHER | Age: 56
End: 2021-05-27

## 2021-05-27 RX ORDER — FUROSEMIDE 20 MG/1
TABLET ORAL
Qty: 15 TABLET | Refills: 0 | Status: SHIPPED | OUTPATIENT
Start: 2021-05-27 | End: 2021-07-16 | Stop reason: SDUPTHER

## 2021-05-27 NOTE — OUTREACH NOTE
"Patient Outreach Note    F/u call with pt.  Pt answered phone, but sounded drowsy.  Asked if I woke her up, which she stated \"no,\" however speech was slurred throughout conversation.  Was making incoherent statements.  Asked if anyone was with her, which she stated \"I'm watching movie with my girlfriend.\"  Ask her name, which she could not tell me.  Asked to speak to her friend, which she stated \"OK,\"  but never put anyone on the phone and never came back to the phone.    RN-ACM attempted to contact her emergency contact listed as daughter, Santa, however this number was not correct.  Attempted to contact pt again, but no answer and went to voicemail      At 12:15, RN-ACM contacted [place] dispatch number to request well check.  Talked with  [facility] and she stated they will send someone out.     Jennifer Cerda RN  Ambulatory     5/27/2021, 12:19 EDT      "

## 2021-05-27 NOTE — PROGRESS NOTES
Please call patient. We had heard that it was recommended she go to ER. Please encourage her to go if feeling worse.

## 2021-05-27 NOTE — OUTREACH NOTE
Care Coordination Note    Contacted [place] Dispatch to f/u on Well Check.  Dispatch was able to tell me that they recommended she go to ER, but she refused.      Jennifer Cerda RN  Ambulatory     5/27/2021, 15:44 EDT

## 2021-05-28 ENCOUNTER — PATIENT OUTREACH (OUTPATIENT)
Dept: CASE MANAGEMENT | Facility: OTHER | Age: 56
End: 2021-05-28

## 2021-05-28 NOTE — OUTREACH NOTE
Patient Outreach Note    Attempted to contact pt to f/u from yest call.  No answer.  Had to leave voicemail     Jennifer Cerda RN  Ambulatory     5/28/2021, 15:22 EDT

## 2021-06-03 ENCOUNTER — HOSPITAL ENCOUNTER (INPATIENT)
Facility: HOSPITAL | Age: 56
LOS: 7 days | Discharge: HOME HEALTH | End: 2021-06-11
Attending: EMERGENCY MEDICINE | Admitting: INTERNAL MEDICINE

## 2021-06-03 ENCOUNTER — APPOINTMENT (OUTPATIENT)
Dept: GENERAL RADIOLOGY | Facility: HOSPITAL | Age: 56
End: 2021-06-03

## 2021-06-03 ENCOUNTER — PATIENT OUTREACH (OUTPATIENT)
Dept: CASE MANAGEMENT | Facility: OTHER | Age: 56
End: 2021-06-03

## 2021-06-03 ENCOUNTER — APPOINTMENT (OUTPATIENT)
Dept: CT IMAGING | Facility: HOSPITAL | Age: 56
End: 2021-06-03

## 2021-06-03 DIAGNOSIS — R09.02 HYPOXIA: ICD-10-CM

## 2021-06-03 DIAGNOSIS — J44.1 ACUTE EXACERBATION OF CHRONIC OBSTRUCTIVE PULMONARY DISEASE (COPD) (HCC): ICD-10-CM

## 2021-06-03 DIAGNOSIS — Z74.09 IMPAIRED FUNCTIONAL MOBILITY, BALANCE, GAIT, AND ENDURANCE: ICD-10-CM

## 2021-06-03 DIAGNOSIS — J18.9 PNEUMONIA DUE TO INFECTIOUS ORGANISM, UNSPECIFIED LATERALITY, UNSPECIFIED PART OF LUNG: ICD-10-CM

## 2021-06-03 DIAGNOSIS — A41.9 ACUTE SEPSIS (HCC): Primary | ICD-10-CM

## 2021-06-03 LAB
ALBUMIN SERPL-MCNC: 3.7 G/DL (ref 3.5–5.2)
ALBUMIN/GLOB SERPL: 1.1 G/DL
ALP SERPL-CCNC: 147 U/L (ref 39–117)
ALT SERPL W P-5'-P-CCNC: 9 U/L (ref 1–33)
ANION GAP SERPL CALCULATED.3IONS-SCNC: 11 MMOL/L (ref 5–15)
AST SERPL-CCNC: 19 U/L (ref 1–32)
BACTERIA UR QL AUTO: NORMAL /HPF
BASOPHILS # BLD AUTO: 0.05 10*3/MM3 (ref 0–0.2)
BASOPHILS NFR BLD AUTO: 0.4 % (ref 0–1.5)
BILIRUB SERPL-MCNC: 0.2 MG/DL (ref 0–1.2)
BILIRUB UR QL STRIP: NEGATIVE
BUN SERPL-MCNC: 8 MG/DL (ref 6–20)
BUN/CREAT SERPL: 11 (ref 7–25)
CALCIUM SPEC-SCNC: 9.5 MG/DL (ref 8.6–10.5)
CHLORIDE SERPL-SCNC: 101 MMOL/L (ref 98–107)
CLARITY UR: CLEAR
CO2 SERPL-SCNC: 27 MMOL/L (ref 22–29)
COLOR UR: YELLOW
CREAT SERPL-MCNC: 0.73 MG/DL (ref 0.57–1)
D-LACTATE SERPL-SCNC: 2.2 MMOL/L (ref 0.5–2)
DEPRECATED RDW RBC AUTO: 51 FL (ref 37–54)
EOSINOPHIL # BLD AUTO: 0.46 10*3/MM3 (ref 0–0.4)
EOSINOPHIL NFR BLD AUTO: 3.9 % (ref 0.3–6.2)
ERYTHROCYTE [DISTWIDTH] IN BLOOD BY AUTOMATED COUNT: 16.6 % (ref 12.3–15.4)
GFR SERPL CREATININE-BSD FRML MDRD: 82 ML/MIN/1.73
GLOBULIN UR ELPH-MCNC: 3.4 GM/DL
GLUCOSE SERPL-MCNC: 152 MG/DL (ref 65–99)
GLUCOSE UR STRIP-MCNC: NEGATIVE MG/DL
HCT VFR BLD AUTO: 45.8 % (ref 34–46.6)
HGB BLD-MCNC: 13.6 G/DL (ref 12–15.9)
HGB UR QL STRIP.AUTO: NEGATIVE
HOLD SPECIMEN: NORMAL
HYALINE CASTS UR QL AUTO: NORMAL /LPF
IMM GRANULOCYTES # BLD AUTO: 0.11 10*3/MM3 (ref 0–0.05)
IMM GRANULOCYTES NFR BLD AUTO: 0.9 % (ref 0–0.5)
KETONES UR QL STRIP: NEGATIVE
LEUKOCYTE ESTERASE UR QL STRIP.AUTO: ABNORMAL
LIPASE SERPL-CCNC: 19 U/L (ref 13–60)
LYMPHOCYTES # BLD AUTO: 2.73 10*3/MM3 (ref 0.7–3.1)
LYMPHOCYTES NFR BLD AUTO: 23.1 % (ref 19.6–45.3)
MCH RBC QN AUTO: 25.5 PG (ref 26.6–33)
MCHC RBC AUTO-ENTMCNC: 29.7 G/DL (ref 31.5–35.7)
MCV RBC AUTO: 85.8 FL (ref 79–97)
MONOCYTES # BLD AUTO: 0.56 10*3/MM3 (ref 0.1–0.9)
MONOCYTES NFR BLD AUTO: 4.7 % (ref 5–12)
NEUTROPHILS NFR BLD AUTO: 67 % (ref 42.7–76)
NEUTROPHILS NFR BLD AUTO: 7.89 10*3/MM3 (ref 1.7–7)
NITRITE UR QL STRIP: NEGATIVE
NRBC BLD AUTO-RTO: 0 /100 WBC (ref 0–0.2)
PH UR STRIP.AUTO: 5.5 [PH] (ref 5–8)
PLATELET # BLD AUTO: 341 10*3/MM3 (ref 140–450)
PMV BLD AUTO: 9.9 FL (ref 6–12)
POTASSIUM SERPL-SCNC: 4.6 MMOL/L (ref 3.5–5.2)
PROT SERPL-MCNC: 7.1 G/DL (ref 6–8.5)
PROT UR QL STRIP: NEGATIVE
RBC # BLD AUTO: 5.34 10*6/MM3 (ref 3.77–5.28)
RBC # UR: NORMAL /HPF
REF LAB TEST METHOD: NORMAL
SODIUM SERPL-SCNC: 139 MMOL/L (ref 136–145)
SP GR UR STRIP: 1.01 (ref 1–1.03)
SQUAMOUS #/AREA URNS HPF: NORMAL /HPF
UROBILINOGEN UR QL STRIP: ABNORMAL
WBC # BLD AUTO: 11.8 10*3/MM3 (ref 3.4–10.8)
WBC UR QL AUTO: NORMAL /HPF
WHOLE BLOOD HOLD SPECIMEN: NORMAL
WHOLE BLOOD HOLD SPECIMEN: NORMAL

## 2021-06-03 PROCEDURE — 87636 SARSCOV2 & INF A&B AMP PRB: CPT | Performed by: EMERGENCY MEDICINE

## 2021-06-03 PROCEDURE — 87040 BLOOD CULTURE FOR BACTERIA: CPT | Performed by: EMERGENCY MEDICINE

## 2021-06-03 PROCEDURE — 85025 COMPLETE CBC W/AUTO DIFF WBC: CPT

## 2021-06-03 PROCEDURE — 83036 HEMOGLOBIN GLYCOSYLATED A1C: CPT | Performed by: INTERNAL MEDICINE

## 2021-06-03 PROCEDURE — 83605 ASSAY OF LACTIC ACID: CPT | Performed by: EMERGENCY MEDICINE

## 2021-06-03 PROCEDURE — 80053 COMPREHEN METABOLIC PANEL: CPT

## 2021-06-03 PROCEDURE — 83690 ASSAY OF LIPASE: CPT

## 2021-06-03 PROCEDURE — 99284 EMERGENCY DEPT VISIT MOD MDM: CPT

## 2021-06-03 PROCEDURE — 81001 URINALYSIS AUTO W/SCOPE: CPT

## 2021-06-03 PROCEDURE — 83605 ASSAY OF LACTIC ACID: CPT

## 2021-06-03 PROCEDURE — 71045 X-RAY EXAM CHEST 1 VIEW: CPT

## 2021-06-03 RX ORDER — IPRATROPIUM BROMIDE AND ALBUTEROL SULFATE 2.5; .5 MG/3ML; MG/3ML
3 SOLUTION RESPIRATORY (INHALATION) ONCE
Status: DISCONTINUED | OUTPATIENT
Start: 2021-06-03 | End: 2021-06-07

## 2021-06-03 RX ORDER — SODIUM CHLORIDE 9 MG/ML
10 INJECTION INTRAVENOUS AS NEEDED
Status: DISCONTINUED | OUTPATIENT
Start: 2021-06-03 | End: 2021-06-11 | Stop reason: HOSPADM

## 2021-06-03 NOTE — OUTREACH NOTE
"Patient Outreach Note    Contacted pt today.  States she was heading to the hospital.  States Dr. Tovar's office had contacted her and is sending her to [facility] b/c \"my white blood count is 20,000.\"  She did say that someone call EMT to come check on her last week and they wanted her to go to ER, but she refused.  \"I was just tired.\"  Speech was clear and appropriate today.  States she is getting dressed to go to [facility] right now.     Jennifer Cerda RN  Ambulatory     6/3/2021, 16:57 EDT      "

## 2021-06-04 ENCOUNTER — APPOINTMENT (OUTPATIENT)
Dept: CT IMAGING | Facility: HOSPITAL | Age: 56
End: 2021-06-04

## 2021-06-04 PROBLEM — J96.21 ACUTE ON CHRONIC RESPIRATORY FAILURE WITH HYPOXIA: Status: ACTIVE | Noted: 2021-06-04

## 2021-06-04 PROBLEM — J18.9 CAP (COMMUNITY ACQUIRED PNEUMONIA): Status: ACTIVE | Noted: 2021-06-04

## 2021-06-04 PROBLEM — E66.01 MORBID OBESITY WITH BMI OF 45.0-49.9, ADULT (HCC): Status: ACTIVE | Noted: 2021-06-04

## 2021-06-04 PROBLEM — Z79.899 POLYPHARMACY: Status: ACTIVE | Noted: 2021-06-04

## 2021-06-04 LAB
D-LACTATE SERPL-SCNC: 0.8 MMOL/L (ref 0.5–2)
FLUAV SUBTYP SPEC NAA+PROBE: NOT DETECTED
FLUBV RNA ISLT QL NAA+PROBE: NOT DETECTED
GLUCOSE BLDC GLUCOMTR-MCNC: 184 MG/DL (ref 70–130)
GLUCOSE BLDC GLUCOMTR-MCNC: 201 MG/DL (ref 70–130)
GLUCOSE BLDC GLUCOMTR-MCNC: 257 MG/DL (ref 70–130)
GLUCOSE BLDC GLUCOMTR-MCNC: 258 MG/DL (ref 70–130)
HBA1C MFR BLD: 6 % (ref 4.8–5.6)
SARS-COV-2 RNA PNL SPEC NAA+PROBE: NOT DETECTED

## 2021-06-04 PROCEDURE — 63710000001 INSULIN DETEMIR PER 5 UNITS: Performed by: INTERNAL MEDICINE

## 2021-06-04 PROCEDURE — 25010000002 METHYLPREDNISOLONE PER 40 MG: Performed by: INTERNAL MEDICINE

## 2021-06-04 PROCEDURE — 0 IOPAMIDOL PER 1 ML: Performed by: EMERGENCY MEDICINE

## 2021-06-04 PROCEDURE — 25010000002 FENTANYL CITRATE (PF) 50 MCG/ML SOLUTION: Performed by: EMERGENCY MEDICINE

## 2021-06-04 PROCEDURE — 94640 AIRWAY INHALATION TREATMENT: CPT

## 2021-06-04 PROCEDURE — 25010000002 DEXAMETHASONE PER 1 MG: Performed by: EMERGENCY MEDICINE

## 2021-06-04 PROCEDURE — 82962 GLUCOSE BLOOD TEST: CPT

## 2021-06-04 PROCEDURE — 99223 1ST HOSP IP/OBS HIGH 75: CPT | Performed by: INTERNAL MEDICINE

## 2021-06-04 PROCEDURE — 25010000002 AZITHROMYCIN PER 500 MG: Performed by: EMERGENCY MEDICINE

## 2021-06-04 PROCEDURE — 71275 CT ANGIOGRAPHY CHEST: CPT

## 2021-06-04 PROCEDURE — 63710000001 INSULIN LISPRO (HUMAN) PER 5 UNITS: Performed by: INTERNAL MEDICINE

## 2021-06-04 PROCEDURE — 25010000002 AZITHROMYCIN PER 500 MG: Performed by: INTERNAL MEDICINE

## 2021-06-04 PROCEDURE — 94799 UNLISTED PULMONARY SVC/PX: CPT

## 2021-06-04 PROCEDURE — 25010000002 CEFTRIAXONE PER 250 MG: Performed by: INTERNAL MEDICINE

## 2021-06-04 PROCEDURE — 25010000002 CEFTRIAXONE PER 250 MG: Performed by: EMERGENCY MEDICINE

## 2021-06-04 RX ORDER — BUSPIRONE HYDROCHLORIDE 10 MG/1
10 TABLET ORAL 2 TIMES DAILY
Status: DISCONTINUED | OUTPATIENT
Start: 2021-06-04 | End: 2021-06-11 | Stop reason: HOSPADM

## 2021-06-04 RX ORDER — FUROSEMIDE 20 MG/1
20 TABLET ORAL ONCE
Status: COMPLETED | OUTPATIENT
Start: 2021-06-04 | End: 2021-06-04

## 2021-06-04 RX ORDER — BUDESONIDE 0.5 MG/2ML
0.5 INHALANT ORAL
Status: DISCONTINUED | OUTPATIENT
Start: 2021-06-04 | End: 2021-06-11 | Stop reason: HOSPADM

## 2021-06-04 RX ORDER — IPRATROPIUM BROMIDE AND ALBUTEROL SULFATE 2.5; .5 MG/3ML; MG/3ML
3 SOLUTION RESPIRATORY (INHALATION) ONCE
Status: COMPLETED | OUTPATIENT
Start: 2021-06-04 | End: 2021-06-04

## 2021-06-04 RX ORDER — DEXTROSE MONOHYDRATE 25 G/50ML
25 INJECTION, SOLUTION INTRAVENOUS
Status: DISCONTINUED | OUTPATIENT
Start: 2021-06-04 | End: 2021-06-11 | Stop reason: HOSPADM

## 2021-06-04 RX ORDER — METOPROLOL SUCCINATE 50 MG/1
50 TABLET, EXTENDED RELEASE ORAL DAILY
Status: DISCONTINUED | OUTPATIENT
Start: 2021-06-04 | End: 2021-06-11 | Stop reason: HOSPADM

## 2021-06-04 RX ORDER — BUDESONIDE 0.5 MG/2ML
0.5 INHALANT ORAL
Status: DISCONTINUED | OUTPATIENT
Start: 2021-06-04 | End: 2021-06-04

## 2021-06-04 RX ORDER — OXYCODONE AND ACETAMINOPHEN 10; 325 MG/1; MG/1
1 TABLET ORAL EVERY 6 HOURS PRN
Status: DISCONTINUED | OUTPATIENT
Start: 2021-06-04 | End: 2021-06-04 | Stop reason: SDUPTHER

## 2021-06-04 RX ORDER — GABAPENTIN 300 MG/1
600 CAPSULE ORAL EVERY 8 HOURS SCHEDULED
Status: DISCONTINUED | OUTPATIENT
Start: 2021-06-04 | End: 2021-06-11 | Stop reason: HOSPADM

## 2021-06-04 RX ORDER — FAMOTIDINE 20 MG/1
20 TABLET, FILM COATED ORAL 2 TIMES DAILY
Status: DISCONTINUED | OUTPATIENT
Start: 2021-06-04 | End: 2021-06-11 | Stop reason: HOSPADM

## 2021-06-04 RX ORDER — ARFORMOTEROL TARTRATE 15 UG/2ML
15 SOLUTION RESPIRATORY (INHALATION)
Status: DISCONTINUED | OUTPATIENT
Start: 2021-06-04 | End: 2021-06-04

## 2021-06-04 RX ORDER — FUROSEMIDE 20 MG/1
20 TABLET ORAL DAILY
Status: DISCONTINUED | OUTPATIENT
Start: 2021-06-04 | End: 2021-06-11 | Stop reason: HOSPADM

## 2021-06-04 RX ORDER — DIVALPROEX SODIUM 125 MG/1
125 TABLET, DELAYED RELEASE ORAL 2 TIMES DAILY
Status: DISCONTINUED | OUTPATIENT
Start: 2021-06-04 | End: 2021-06-04

## 2021-06-04 RX ORDER — NIFEDIPINE 30 MG/1
30 TABLET, EXTENDED RELEASE ORAL DAILY
Status: DISCONTINUED | OUTPATIENT
Start: 2021-06-04 | End: 2021-06-11 | Stop reason: HOSPADM

## 2021-06-04 RX ORDER — SODIUM CHLORIDE 0.9 % (FLUSH) 0.9 %
10 SYRINGE (ML) INJECTION EVERY 12 HOURS SCHEDULED
Status: DISCONTINUED | OUTPATIENT
Start: 2021-06-04 | End: 2021-06-11 | Stop reason: HOSPADM

## 2021-06-04 RX ORDER — OXYCODONE AND ACETAMINOPHEN 10; 325 MG/1; MG/1
1 TABLET ORAL EVERY 6 HOURS PRN
Status: DISCONTINUED | OUTPATIENT
Start: 2021-06-04 | End: 2021-06-07

## 2021-06-04 RX ORDER — ROFLUMILAST 500 UG/1
500 TABLET ORAL DAILY
Status: DISCONTINUED | OUTPATIENT
Start: 2021-06-04 | End: 2021-06-11 | Stop reason: HOSPADM

## 2021-06-04 RX ORDER — ESCITALOPRAM OXALATE 20 MG/1
20 TABLET ORAL DAILY
Status: DISCONTINUED | OUTPATIENT
Start: 2021-06-04 | End: 2021-06-11 | Stop reason: HOSPADM

## 2021-06-04 RX ORDER — NICOTINE POLACRILEX 4 MG
15 LOZENGE BUCCAL
Status: DISCONTINUED | OUTPATIENT
Start: 2021-06-04 | End: 2021-06-11 | Stop reason: HOSPADM

## 2021-06-04 RX ORDER — IPRATROPIUM BROMIDE AND ALBUTEROL SULFATE 2.5; .5 MG/3ML; MG/3ML
3 SOLUTION RESPIRATORY (INHALATION)
Status: DISCONTINUED | OUTPATIENT
Start: 2021-06-04 | End: 2021-06-11 | Stop reason: HOSPADM

## 2021-06-04 RX ORDER — NICOTINE 21 MG/24HR
1 PATCH, TRANSDERMAL 24 HOURS TRANSDERMAL
Status: DISCONTINUED | OUTPATIENT
Start: 2021-06-04 | End: 2021-06-11 | Stop reason: HOSPADM

## 2021-06-04 RX ORDER — METHYLPREDNISOLONE SODIUM SUCCINATE 40 MG/ML
40 INJECTION, POWDER, LYOPHILIZED, FOR SOLUTION INTRAMUSCULAR; INTRAVENOUS ONCE
Status: COMPLETED | OUTPATIENT
Start: 2021-06-04 | End: 2021-06-04

## 2021-06-04 RX ORDER — DIAZEPAM 2 MG/1
2 TABLET ORAL EVERY 12 HOURS PRN
Status: DISCONTINUED | OUTPATIENT
Start: 2021-06-04 | End: 2021-06-11 | Stop reason: HOSPADM

## 2021-06-04 RX ORDER — TRAZODONE HYDROCHLORIDE 50 MG/1
50 TABLET ORAL NIGHTLY
Status: DISCONTINUED | OUTPATIENT
Start: 2021-06-04 | End: 2021-06-11 | Stop reason: HOSPADM

## 2021-06-04 RX ORDER — ONDANSETRON 2 MG/ML
4 INJECTION INTRAMUSCULAR; INTRAVENOUS EVERY 6 HOURS PRN
Status: DISCONTINUED | OUTPATIENT
Start: 2021-06-04 | End: 2021-06-11 | Stop reason: HOSPADM

## 2021-06-04 RX ORDER — DULOXETIN HYDROCHLORIDE 60 MG/1
120 CAPSULE, DELAYED RELEASE ORAL DAILY
Status: DISCONTINUED | OUTPATIENT
Start: 2021-06-04 | End: 2021-06-11 | Stop reason: HOSPADM

## 2021-06-04 RX ORDER — ONDANSETRON 2 MG/ML
4 INJECTION INTRAMUSCULAR; INTRAVENOUS ONCE
Status: DISCONTINUED | OUTPATIENT
Start: 2021-06-04 | End: 2021-06-07

## 2021-06-04 RX ORDER — SODIUM CHLORIDE 0.9 % (FLUSH) 0.9 %
10 SYRINGE (ML) INJECTION AS NEEDED
Status: DISCONTINUED | OUTPATIENT
Start: 2021-06-04 | End: 2021-06-11 | Stop reason: HOSPADM

## 2021-06-04 RX ORDER — FENTANYL CITRATE 50 UG/ML
50 INJECTION, SOLUTION INTRAMUSCULAR; INTRAVENOUS ONCE
Status: COMPLETED | OUTPATIENT
Start: 2021-06-04 | End: 2021-06-04

## 2021-06-04 RX ORDER — ALBUTEROL SULFATE 2.5 MG/3ML
2.5 SOLUTION RESPIRATORY (INHALATION) EVERY 6 HOURS PRN
Status: DISCONTINUED | OUTPATIENT
Start: 2021-06-04 | End: 2021-06-11 | Stop reason: HOSPADM

## 2021-06-04 RX ADMIN — INSULIN LISPRO 3 UNITS: 100 INJECTION, SOLUTION INTRAVENOUS; SUBCUTANEOUS at 11:36

## 2021-06-04 RX ADMIN — METOPROLOL SUCCINATE 50 MG: 50 TABLET, EXTENDED RELEASE ORAL at 08:13

## 2021-06-04 RX ADMIN — BUDESONIDE 0.5 MG: 0.5 INHALANT RESPIRATORY (INHALATION) at 19:17

## 2021-06-04 RX ADMIN — AZITHROMYCIN 500 MG: 500 INJECTION, POWDER, LYOPHILIZED, FOR SOLUTION INTRAVENOUS at 22:34

## 2021-06-04 RX ADMIN — OXYCODONE HYDROCHLORIDE AND ACETAMINOPHEN 1 TABLET: 10; 325 TABLET ORAL at 02:07

## 2021-06-04 RX ADMIN — DIVALPROEX SODIUM 125 MG: 125 TABLET, DELAYED RELEASE ORAL at 08:12

## 2021-06-04 RX ADMIN — IPRATROPIUM BROMIDE AND ALBUTEROL SULFATE 3 ML: 2.5; .5 SOLUTION RESPIRATORY (INHALATION) at 00:50

## 2021-06-04 RX ADMIN — INSULIN LISPRO 2 UNITS: 100 INJECTION, SOLUTION INTRAVENOUS; SUBCUTANEOUS at 08:16

## 2021-06-04 RX ADMIN — AZITHROMYCIN 500 MG: 500 INJECTION, POWDER, LYOPHILIZED, FOR SOLUTION INTRAVENOUS at 01:10

## 2021-06-04 RX ADMIN — BUSPIRONE HYDROCHLORIDE 10 MG: 10 TABLET ORAL at 17:07

## 2021-06-04 RX ADMIN — FAMOTIDINE 20 MG: 20 TABLET ORAL at 21:10

## 2021-06-04 RX ADMIN — IOPAMIDOL 95 ML: 755 INJECTION, SOLUTION INTRAVENOUS at 00:07

## 2021-06-04 RX ADMIN — FUROSEMIDE 20 MG: 20 TABLET ORAL at 02:07

## 2021-06-04 RX ADMIN — INSULIN LISPRO 4 UNITS: 100 INJECTION, SOLUTION INTRAVENOUS; SUBCUTANEOUS at 17:07

## 2021-06-04 RX ADMIN — Medication 1 PATCH: at 08:15

## 2021-06-04 RX ADMIN — SODIUM CHLORIDE 1 G: 900 INJECTION INTRAVENOUS at 00:29

## 2021-06-04 RX ADMIN — BUSPIRONE HYDROCHLORIDE 10 MG: 10 TABLET ORAL at 08:13

## 2021-06-04 RX ADMIN — FAMOTIDINE 20 MG: 20 TABLET ORAL at 08:13

## 2021-06-04 RX ADMIN — OXYCODONE HYDROCHLORIDE AND ACETAMINOPHEN 1 TABLET: 10; 325 TABLET ORAL at 15:24

## 2021-06-04 RX ADMIN — IPRATROPIUM BROMIDE AND ALBUTEROL SULFATE 3 ML: 2.5; .5 SOLUTION RESPIRATORY (INHALATION) at 07:26

## 2021-06-04 RX ADMIN — BUDESONIDE 0.5 MG: 0.5 INHALANT RESPIRATORY (INHALATION) at 07:27

## 2021-06-04 RX ADMIN — DEXAMETHASONE SODIUM PHOSPHATE 10 MG: 10 INJECTION INTRAMUSCULAR; INTRAVENOUS at 00:20

## 2021-06-04 RX ADMIN — INSULIN DETEMIR 30 UNITS: 100 INJECTION, SOLUTION SUBCUTANEOUS at 21:10

## 2021-06-04 RX ADMIN — GABAPENTIN 600 MG: 300 CAPSULE ORAL at 21:10

## 2021-06-04 RX ADMIN — OXYCODONE HYDROCHLORIDE AND ACETAMINOPHEN 1 TABLET: 10; 325 TABLET ORAL at 21:15

## 2021-06-04 RX ADMIN — GABAPENTIN 600 MG: 300 CAPSULE ORAL at 13:09

## 2021-06-04 RX ADMIN — METHYLPREDNISOLONE SODIUM SUCCINATE 40 MG: 40 INJECTION, POWDER, FOR SOLUTION INTRAMUSCULAR; INTRAVENOUS at 06:20

## 2021-06-04 RX ADMIN — DIAZEPAM 2 MG: 2 TABLET ORAL at 13:09

## 2021-06-04 RX ADMIN — SODIUM CHLORIDE 1 G: 900 INJECTION INTRAVENOUS at 21:10

## 2021-06-04 RX ADMIN — NIFEDIPINE 30 MG: 30 TABLET, FILM COATED, EXTENDED RELEASE ORAL at 08:15

## 2021-06-04 RX ADMIN — ESCITALOPRAM OXALATE 20 MG: 20 TABLET ORAL at 08:13

## 2021-06-04 RX ADMIN — FUROSEMIDE 20 MG: 20 TABLET ORAL at 08:12

## 2021-06-04 RX ADMIN — IPRATROPIUM BROMIDE AND ALBUTEROL SULFATE 3 ML: 2.5; .5 SOLUTION RESPIRATORY (INHALATION) at 19:17

## 2021-06-04 RX ADMIN — INSULIN DETEMIR 30 UNITS: 100 INJECTION, SOLUTION SUBCUTANEOUS at 08:20

## 2021-06-04 RX ADMIN — DULOXETINE HYDROCHLORIDE 120 MG: 60 CAPSULE, DELAYED RELEASE ORAL at 08:12

## 2021-06-04 RX ADMIN — IPRATROPIUM BROMIDE AND ALBUTEROL SULFATE 3 ML: 2.5; .5 SOLUTION RESPIRATORY (INHALATION) at 23:00

## 2021-06-04 RX ADMIN — GABAPENTIN 600 MG: 300 CAPSULE ORAL at 06:20

## 2021-06-04 RX ADMIN — FENTANYL CITRATE 50 MCG: 50 INJECTION, SOLUTION INTRAMUSCULAR; INTRAVENOUS at 00:33

## 2021-06-04 RX ADMIN — ROFLUMILAST 500 MCG: 500 TABLET ORAL at 08:12

## 2021-06-04 RX ADMIN — APIXABAN 5 MG: 5 TABLET, FILM COATED ORAL at 08:15

## 2021-06-04 RX ADMIN — IPRATROPIUM BROMIDE AND ALBUTEROL SULFATE 3 ML: 2.5; .5 SOLUTION RESPIRATORY (INHALATION) at 12:11

## 2021-06-04 RX ADMIN — SODIUM CHLORIDE, PRESERVATIVE FREE 10 ML: 5 INJECTION INTRAVENOUS at 08:15

## 2021-06-04 RX ADMIN — TRAZODONE HYDROCHLORIDE 50 MG: 50 TABLET ORAL at 21:10

## 2021-06-04 RX ADMIN — INSULIN LISPRO 4 UNITS: 100 INJECTION, SOLUTION INTRAVENOUS; SUBCUTANEOUS at 21:10

## 2021-06-04 RX ADMIN — OXYCODONE HYDROCHLORIDE AND ACETAMINOPHEN 1 TABLET: 10; 325 TABLET ORAL at 08:20

## 2021-06-04 RX ADMIN — APIXABAN 5 MG: 5 TABLET, FILM COATED ORAL at 21:10

## 2021-06-04 NOTE — H&P
[facility] Medicine Services  HISTORY AND PHYSICAL    Patient Name: Dani Ziegler  : 1965  MRN: [id number]  Primary Care Physician: Darrion Tovar MD  Date of admission: 6/3/2021      Subjective   Subjective     Chief Complaint:  Abnormal lab, shortness of breath    HPI:  Dani Ziegler is a 55 y.o. female  with past medical history of diastolic congestive heart failure, COPD, hyperlipidemia, uncontrolled type 2 diabetes, anxiety/depression, chronic pain, polypharmacy on multiple medications including gabapentin, benzodiazepines, opiates who presents to the ER with complaints of abnormal outpatient lab and shortness of air.    Patient reports the ER tonight after being instructed to come by her PCP who reports an outpatient routine lab with WBC of 20,000.  Patient states she started noticing some increased shortness of breath at night.  Rated 2 out of 10 in severity.  Worse with exertion.  Better with rest.  Intermittent.  Denies any cough, fever, chills, or really any other symptoms.    Despite her COPD, patient continues to smoke.        COVID Details:    Symptoms:    [] NONE [] Fever []  Cough [x] Shortness of breath [] Change in taste/smell      The patient qualifies to receive the vaccine, but they have not yet received it.      Review of Systems   Gen- No fevers, chills  CV- No chest pain, palpitations  Resp- No cough, mild dyspnea  GI- No N/V/D, abd pain      All other systems reviewed and are negative.     Personal History     Past Medical History:   Diagnosis Date   • Abnormal weight gain    • Acute UTI    • Anxiety    • Arthritis involving multiple sites    • Chronic obstructive pulmonary disease (CMS/HCC)    • Chronic pain    • Current every day smoker    • Depression    • Diabetes mellitus (CMS/HCC)    • Diarrhea    • Dysuria    • Edema, lower extremity    • Fever    • Head injury    • Hypertension    • Intervertebral disc disorder     Degeneration of intervertebral  disc, site unspecified (722.6)   • Left bundle branch block    • Leukocytosis    • Long term current use of methadone for pain control    • Mass of right breast    • Nausea    • Obesity    • Overactive bladder    • Peripheral neuropathy    • Superficial phlebitis     right medial calf   • Upper respiratory infection    • Urinary incontinence    • Vitamin D deficiency    • Vomiting        Past Surgical History:   Procedure Laterality Date   • BLADDER SURGERY      pubovaginal sling   • CHOLECYSTECTOMY     • HIP ARTHROSCOPY Right 10/29/2020       Family History: family history includes Arthritis in her father and mother; Breast cancer in her mother; Cancer in her mother; Diabetes in her maternal grandmother and mother; Heart attack in her father; Hypertension in her father; Migraines in an other family member; Obesity in her mother; Stroke in her father. Otherwise pertinent FHx was reviewed and unremarkable.     Social History:  reports that she has been smoking cigarettes. She has smoked for the past 30.00 years. She has never used smokeless tobacco. She reports that she does not drink alcohol and does not use drugs.  Social History     Social History Narrative   • Not on file       Medications:  Available home medication information reviewed.  Medications Prior to Admission   Medication Sig Dispense Refill Last Dose   • albuterol (ACCUNEB) 0.63 MG/3ML nebulizer solution INHALE CONTENTS OF 1 VIAL VIA NEBULIZER EVERY 6 HOURS AS NEEDED FOR WHEEZING 75 mL 2    • albuterol (PROVENTIL) (2.5 MG/3ML) 0.083% nebulizer solution Take 2.5 mg by nebulization Every 6 (Six) Hours As Needed for Wheezing. 100 vial 5    • albuterol sulfate HFA (Ventolin HFA) 108 (90 Base) MCG/ACT inhaler Inhale 1-2 puffs Every 4 (Four) Hours As Needed for Wheezing. 18 g 2    • Alcohol Swabs pads Use when checking sugars 100 each 1    • B-D ULTRAFINE III SHORT PEN 31G X 8 MM misc       • busPIRone (BUSPAR) 10 MG tablet TAKE 1 TABLET BY MOUTH TWICE  DAILY 60 tablet 0    • Daliresp 500 MCG tablet tablet TAKE 1 TABLET BY MOUTH DAILY. 30 tablet 0    • diazePAM (VALIUM) 2 MG tablet TAKE 1 TABLET BY MOUTH EVERY 12 HOURS AS NEEDED FOR ANXIETY 60 tablet 0    • divalproex (DEPAKOTE) 125 MG DR tablet Take 125 mg by mouth 2 (Two) Times a Day.      • DULoxetine (CYMBALTA) 60 MG capsule TAKE 2 CAPSULES BY MOUTH EVERY DAY 60 capsule 0    • Eliquis 5 MG tablet tablet TAKE 1 TABLET BY MOUTH EVERY 12 HOURS 60 tablet 0    • escitalopram (LEXAPRO) 20 MG tablet TAKE 1 TABLET BY MOUTH DAILY. 30 tablet 0    • famotidine (PEPCID) 20 MG tablet TAKE 1 TABLET BY MOUTH TWICE DAILY 60 tablet 0    • ferrous sulfate (IRON SUPPLEMENT) 325 (65 FE) MG tablet Take 1 tablet by mouth Daily With Breakfast. 30 tablet 1    • Fluticasone-Umeclidin-Vilant (Trelegy Ellipta) 100-62.5-25 MCG/INH inhaler Inhale 1 puff Daily. 60 each 5    • furosemide (LASIX) 20 MG tablet TAKE 1 TABLET BY MOUTH EVERY DAY AS NEEDED FOR SWELLING 15 tablet 0    • gabapentin (NEURONTIN) 600 MG tablet TAKE 1 TABLET BY MOUTH THREE TIMES DAILY 90 tablet 0    • glucose monitor monitoring kit Check glucose twice daily 1 each 0    • Lancets misc Check glucose twice daily 100 each 0    • Lantus SoloStar 100 UNIT/ML injection pen INJECT 60 UNITS SUBCUTANEOUSLY EVERY 12 HOURS 30 mL 1    • metoprolol succinate XL (TOPROL-XL) 50 MG 24 hr tablet TAKE 1 TABLET BY MOUTH EVERY DAY 30 tablet 0    • Multiple Vitamins-Iron (MULTI-VITAMIN/IRON) tablet Take 1 tablet by mouth Daily. 30 each 5    • mupirocin (Bactroban) 2 % ointment Apply  topically to the appropriate area as directed 3 (Three) Times a Day. 22 g 2    • NIFEdipine CC (ADALAT CC) 30 MG 24 hr tablet TAKE 1 TABLET BY MOUTH DAILY 30 tablet 0    • nystatin (MYCOSTATIN) 028693 UNIT/GM powder Apply  topically to the appropriate area as directed 2 (Two) Times a Day. 60 g 2    • nystatin (MYCOSTATIN) 433515 UNIT/ML suspension Swish and swallow 5 mL 4 (Four) Times a Day. 473 mL 0    •  oxyCODONE-acetaminophen (Percocet)  MG per tablet Take 1 tablet by mouth Every 6 (Six) Hours As Needed for Moderate Pain . 120 tablet 0    • traZODone (DESYREL) 50 MG tablet Take 1 tablet by mouth Every Night. 30 tablet 0    • True Metrix Blood Glucose Test test strip 1 each by Other route 2 (Two) Times a Day. to check glucose 100 each 0        Allergies   Allergen Reactions   • Alprazolam Delirium   • Lortab [Hydrocodone-Acetaminophen] Hives   • Nsaids Other (See Comments)     Liver Dx   • Toradol [Ketorolac Tromethamine] Other (See Comments)     Denies any allergy   • Diphenhydramine Rash       Objective   Objective     Vital Signs:   Temp:  [98.4 °F (36.9 °C)-98.9 °F (37.2 °C)] 98.9 °F (37.2 °C)  Heart Rate:  [70-80] 79  Resp:  [18-20] 18  BP: (113-148)/(68-92) 133/80  Flow (L/min):  [2] 2       Physical Exam   Constitutional: Awake, alert, mild distress  Eyes: PERRLA, sclerae anicteric, no conjunctival injection  HENT: NCAT, mucous membranes moist  Neck: Supple, no thyromegaly, no lymphadenopathy, trachea midline  Respiratory: Poor airflow throughout, crackles in right lower lobe, mildly labored respirations   Cardiovascular: RRR, no murmurs, rubs, or gallops, palpable pedal pulses bilaterally  Gastrointestinal: Positive bowel sounds, soft, nontender, nondistended  Musculoskeletal: No bilateral ankle edema, no clubbing or cyanosis to extremities  Psychiatric: Appropriate affect, cooperative  Neurologic: Oriented x 3, strength symmetric in all extremities, Cranial Nerves grossly intact to confrontation, speech clear  Skin: No rashes      Result Review:  I have personally reviewed the results from the time of this admission to 6/4/2021 02:24 EDT and agree with these findings:  [x]  Laboratory  []  Microbiology  [x]  Radiology  [x]  EKG/Telemetry   []  Cardiology/Vascular   []  Pathology  []  Old records  []  Other:  Most notable findings include: Right lower lobe pneumonia, WBC 11.8      LAB RESULTS:       Lab 06/03/21  2357 06/03/21  1902   WBC  --  11.80*   HEMOGLOBIN  --  13.6   HEMATOCRIT  --  45.8   PLATELETS  --  341   NEUTROS ABS  --  7.89*   IMMATURE GRANS (ABS)  --  0.11*   LYMPHS ABS  --  2.73   MONOS ABS  --  0.56   EOS ABS  --  0.46*   MCV  --  85.8   LACTATE 0.8 2.2*         Lab 06/03/21  1902   SODIUM 139   POTASSIUM 4.6   CHLORIDE 101   CO2 27.0   ANION GAP 11.0   BUN 8   CREATININE 0.73   GLUCOSE 152*   CALCIUM 9.5         Lab 06/03/21  1902   TOTAL PROTEIN 7.1   ALBUMIN 3.70   GLOBULIN 3.4   ALT (SGPT) 9   AST (SGOT) 19   BILIRUBIN 0.2   ALK PHOS 147*   LIPASE 19                     UA    Urinalysis 6/3/21 6/3/21    2013 2013   Squamous Epithelial Cells, UA  0-2   Specific Gravity, UA 1.010    Ketones, UA Negative    Blood, UA Negative    Leukocytes, UA Trace (A)    Nitrite, UA Negative    RBC, UA  0-2   WBC, UA  0-2   Bacteria, UA  None Seen   (A) Abnormal value              Microbiology Results (last 10 days)     Procedure Component Value - Date/Time    COVID PRE-OP / PRE-PROCEDURE SCREENING ORDER (NO ISOLATION) - Swab, Nasopharynx [[id number]]  (Normal) Collected: 06/03/21 2345    Lab Status: Final result Specimen: Swab from Nasopharynx Updated: 06/04/21 0031    Narrative:      The following orders were created for panel order COVID PRE-OP / PRE-PROCEDURE SCREENING ORDER (NO ISOLATION) - Swab, Nasopharynx.  Procedure                               Abnormality         Status                     ---------                               -----------         ------                     COVID-19 and FLU A/B PCR...[[id number]]  Normal              Final result                 Please view results for these tests on the individual orders.    COVID-19 and FLU A/B PCR - Swab, Nasopharynx [[id number]]  (Normal) Collected: 06/03/21 2345    Lab Status: Final result Specimen: Swab from Nasopharynx Updated: 06/04/21 0031     COVID19 Not Detected     Influenza A PCR Not Detected     Influenza B PCR Not Detected     Narrative:      Fact sheet for providers: https://www.fda.gov/media/745084/download    Fact sheet for patients: https://www.fda.gov/media/164749/download    Test performed by PCR.          XR Chest 1 View    Result Date: 6/3/2021  CR Chest 1 Vw INDICATION: Cough COMPARISON:  Chest x-ray from 3/27/2018 FINDINGS: Single portable AP view(s) of the chest. Limited exam. The heart may be mildly enlarged. Nodule is again seen at the right lung apex.. No pneumothorax or pleural effusion.     Impression: Exam is grossly limited and it is uncertain if there may be some increasing opacification/developing pneumonia at the right lung base. . Nodular density is again visualized at the right lung apex. The heart may be mildly enlarged. Signer Name: Etta Granados MD  Signed: 6/3/2021 10:36 PM  Workstation Name: [id number]  [facility]    CT Angiogram Chest    Result Date: 6/4/2021  CTA Chest INDICATION: Shortness of air and hypoxia. Abnormal chest x-ray. TECHNIQUE: CT angiogram of the chest with IV contrast. 3-D reconstructions were obtained and reviewed.   Radiation dose reduction techniques included automated exposure control or exposure modulation based on body size. Count of known CT and cardiac nuc med studies performed in previous 12 months: 0. COMPARISON: 7/20/2018. FINDINGS: No pulmonary embolism or aortic dissection is seen. There is coronary artery disease. There is no pleural or pericardial effusion. There is mediastinal adenopathy. Right paratracheal lymph node measures 1.3 cm. AP window lymph node measures 1.4 cm. Left hilar adenopathy measures 1.3 cm. Right hilar adenopathy measures 1.8 cm.  Upper abdomen shows a stable right adrenal adenoma measuring 2.8 cm. There are chronic appearing thoracic compression fractures at T6 and T7. There are partially calcified granulomas in both upper lobes. There is a 9 mm nodule in the right lower lobe that is unchanged and benign. Atelectatic changes are noted  in both lower lobes. There is some alveolar infiltrate in the right middle lobe concerning for mild pneumonia. There is some consolidation in the lingula, probably due to atelectasis as well although pneumonia is not excluded. Imaging features are atypical or uncommonly reported for COVID-19 pneumonia. Alternative diagnoses should be considered.     Impression: 1. No PE or aortic dissection. 2. New mediastinal and bilateral hilar adenopathy is nonspecific. Attention on 3 month follow-up chest CT is recommended. 3. Mild infiltrate in the right middle lobe concerning for mild pneumonia. There are atelectatic changes in the lower lobes, and there is some consolidation in the lingula that is also probably due to atelectasis although pneumonia is not excluded. 4. Granulomatous nodules in the upper lobes with a stable and benign 9 mm right lower lobe nodule. Signer Name: Elmer Boswell MD  Signed: 6/4/2021 12:29 AM  Workstation Name: [email or web address]  [facility]      Results for orders placed in visit on 05/05/20    SCANNED - ECHOCARDIOGRAM      Assessment/Plan   Assessment & Plan     Active Hospital Problems    Diagnosis  POA   • **CAP (community acquired pneumonia) [J18.9]  Yes   • Polypharmacy [Z79.899]  Not Applicable   • CHF (congestive heart failure) (CMS/HCC) [I50.9]  Yes   • COPD with acute exacerbation (CMS/[facility]) [J44.1]  Yes   • Mixed hyperlipidemia [E78.2]  Yes   • Uncontrolled type 2 diabetes mellitus with hyperglycemia, without long-term current use of insulin (CMS/HCC) [E11.65]  Yes   • Vitamin D deficiency [E55.9]  Yes   • Peripheral neuropathy [G62.9]  Yes   • Essential hypertension [I10]  Yes   • Chronic pain [G89.29]  Yes   • Chronic obstructive pulmonary disease (CMS/HCC) [J44.9]  Yes   • Edema of lower extremity [R60.0]  Yes   • Depression [F32.9]  Yes   • Anxiety [F41.9]  Yes       Patient is a 55-year-old female with past medical history of diastolic congestive heart failure, COPD,  hyperlipidemia, uncontrolled type 2 diabetes, anxiety/depression, chronic pain, polypharmacy on multiple medications including gabapentin, benzodiazepines, opiates who presents to the ER with complaints of abnormal outpatient lab and shortness of air.    1.  Community acquired pneumonia, present on admission  2.  COPD with acute exacerbation  3.  Diastolic CHF  4.  Polypharmacy  5.  Uncontrolled type 2 diabetes  6.  Peripheral neuropathy  7.  Hypertension  8.  Depression/anxiety  9.  Ongoing tobacco use    --Scheduled DuoNeb  --Given Decadron in ER, minimal wheezing at this point and therefore will hold on further steroids if able as patient has uncontrolled type 2 diabetes  --Continue ceftriaxone, azithromycin  --continue lasix  --half dose levemir at 30 BID  --SSI  --continue home meds  --nicotine patch, counseled and educated on cessation.    DVT prophylaxis:  lovenox      CODE STATUS:  Full code  There are no questions and answers to display.       Admission Status:  I believe this patient meets INPATIENT status due to .  I feel patient’s risk for adverse outcomes and need for care warrant INPATIENT evaluation and I predict the patient’s care encounter to likely last beyond 2 midnights.      Tano Castillo, DO  06/04/21

## 2021-06-04 NOTE — PROGRESS NOTES
[facility] Medicine Services  ADMISSION FOLLOW-UP NOTE          Patient admitted after midnight, H&P by my partner performed earlier on today's date reviewed.  Interim findings, labs, and charting also reviewed.        The [facility] Hospital Problem List has been managed and updated to include any new diagnoses:  Active Hospital Problems    Diagnosis  POA   • **CAP (community acquired pneumonia) [J18.9]  Yes   • Polypharmacy [Z79.899]  Not Applicable   • Morbid obesity with BMI of 45.0-49.9, adult (CMS/[facility]) [E66.01, Z68.42]  Not Applicable   • Acute on chronic respiratory failure with hypoxia (CMS/[facility]) [J96.21]  Yes   • CHF (congestive heart failure) (CMS/[facility]) [I50.9]  Yes   • COPD with acute exacerbation (CMS/[facility]) [J44.1]  Yes   • Mixed hyperlipidemia [E78.2]  Yes   • Uncontrolled type 2 diabetes mellitus with hyperglycemia, without long-term current use of insulin (CMS/[facility]) [E11.65]  Yes   • Vitamin D deficiency [E55.9]  Yes   • Peripheral neuropathy [G62.9]  Yes   • Essential hypertension [I10]  Yes   • Chronic pain [G89.29]  Yes   • Chronic obstructive pulmonary disease (CMS/[facility]) [J44.9]  Yes   • Edema of lower extremity [R60.0]  Yes   • Depression [F32.9]  Yes   • Anxiety [F41.9]  Yes      Resolved Hospital Problems   No resolved problems to display.         ADDITIONAL PLAN:  - detailed assessment and plan from admission reviewed  -Continue antibiotics for community-acquired pneumonia, nebs and steroids for exacerbation of COPD  -Continue O2 to maintain sats greater than 90, wean as tolerated she wears 2 L oxygen at night at home  -May need PT and OT consults    Celine Villanueva MD  06/04/21

## 2021-06-04 NOTE — CASE MANAGEMENT/SOCIAL WORK
Discharge Planning Assessment  [facility]     Patient Name: Dani Ziegler  MRN: [id number]  Today's Date: 6/4/2021    Admit Date: 6/3/2021    Discharge Needs Assessment     Row Name 06/04/21 1542       Living Environment    Lives With  significant other    Current Living Arrangements  home/apartment/condo    Primary Care Provided by  self    Provides Primary Care For  no one, unable/limited ability to care for self    Family Caregiver if Needed  significant other    Quality of Family Relationships  helpful;involved;supportive    Able to Return to Prior Arrangements  yes       Resource/Environmental Concerns    Resource/Environmental Concerns  none       Transition Planning    Patient/Family Anticipates Transition to  home with family    Patient/Family Anticipated Services at Transition  none    Transportation Anticipated  family or friend will provide       Discharge Needs Assessment    Readmission Within the Last 30 Days  no previous admission in last 30 days    Equipment Currently Used at Home  commode;walker, rolling;oxygen Wears home O2 at 3L qhs and prn, provided by [employer], has portables    Concerns to be Addressed  discharge planning;denies needs/concerns at this time;no discharge needs identified    Current Discharge Risk  chronically ill        Discharge Plan     Row Name 06/04/21 1544       Plan    Plan  Home    Patient/Family in Agreement with Plan  yes    Plan Comments  Met with patient in the room to initiate discharge planning. Patient lives with her significant other in a single-story home in [place]. She is independent with ADLs and uses a RW as needed with mobility. She wears home oxygen at 3L qhs and prn. She states her s.o. will bring a portable O2 tank at DC. He will provide her ride. Patient's goal is home at discharge, and she denies any DC needs at this time. CM will continue to follow.    Final Discharge Disposition Code  01 - home or self-care        Continued Care and Services -  Admitted Since 6/3/2021    Coordination has not been started for this encounter.         Demographic Summary     Row Name 06/04/21 1541       General Information    Admission Type  inpatient    Referral Source  admission list    Reason for Consult  discharge planning    General Information Comments  Patient confirms her PCP is Dr. Darrion Tovar, that she has medical coverage through Medicare [employer] and [employer], and she has rx coverage through [employer] . She denies having a POA or LW.        Functional Status     Row Name 06/04/21 1542       Functional Status    Usual Activity Tolerance  moderate       Functional Status, IADL    Medications  independent    Meal Preparation  independent    Housekeeping  independent    Laundry  independent    Shopping  independent        Psychosocial    No documentation.       Abuse/Neglect    No documentation.       Legal    No documentation.       Substance Abuse    No documentation.       Patient Forms    No documentation.           Carrie Trotter RN

## 2021-06-04 NOTE — ED PROVIDER NOTES
[facility]    EMERGENCY DEPARTMENT ENCOUNTER      Pt Name: Dani Ziegler  MRN: [id number]  YOB: 1965  Date of evaluation: 6/3/2021  Provider: Rommel Tovar MD    CHIEF COMPLAINT       Chief Complaint   Patient presents with   • Abnormal Lab         HISTORY OF PRESENT ILLNESS  (Location/Symptom, Timing/Onset, Context/Setting, Quality, Duration, Modifying Factors, Severity.)   Dani Ziegler is a 55 y.o. female who presents to the emergency department due to elevated WBC checked by her PCP yesterday. She also has had moderate severity cough productive of green sputum and shortness of breath that's worse with exertion progressively worsening for the past week with increased utilization of her home O2 from her baseline 2 L to now 4 L but without any associated CP, fever, chills. She has no hx of VTE.      Nursing notes were reviewed.    REVIEW OF SYSTEMS    (2-9 systems for level 4, 10 or more for level 5)   ROS:  General:  No fevers, no chills, no weakness  Cardiovascular:  No chest pain, no palpitations  Respiratory:  Cough, SOB  Gastrointestinal:  No pain, no nausea, no vomiting, no diarrhea  Musculoskeletal:  No muscle pain, no joint pain  Skin:  No rash  Neurologic:  No speech problems, no headache, no extremity numbness, no extremity tingling, no extremity weakness  Psychiatric:  No anxiety  Genitourinary:  No dysuria, no hematuria    Except as noted above the remainder of the review of systems was reviewed and negative.       PAST MEDICAL HISTORY     Past Medical History:   Diagnosis Date   • Abnormal weight gain    • Acute UTI    • Anxiety    • Arthritis involving multiple sites    • Chronic obstructive pulmonary disease (CMS/HCC)    • Chronic pain    • Current every day smoker    • Depression    • Diabetes mellitus (CMS/HCC)    • Diarrhea    • Dysuria    • Edema, lower extremity    • Fever    • Head injury    • Hypertension    • Intervertebral disc disorder     Degeneration of intervertebral  disc, site unspecified (722.6)   • Left bundle branch block    • Leukocytosis    • Long term current use of methadone for pain control    • Mass of right breast    • Nausea    • Obesity    • Overactive bladder    • Peripheral neuropathy    • Superficial phlebitis     right medial calf   • Upper respiratory infection    • Urinary incontinence    • Vitamin D deficiency    • Vomiting          SURGICAL HISTORY       Past Surgical History:   Procedure Laterality Date   • BLADDER SURGERY      pubovaginal sling   • CHOLECYSTECTOMY     • HIP ARTHROSCOPY Right 10/29/2020         CURRENT MEDICATIONS       Current Facility-Administered Medications:   •  albuterol (PROVENTIL) nebulizer solution 0.083% 2.5 mg/3mL, 2.5 mg, Nebulization, Q6H PRN, Tano Castillo, DO  •  apixaban (ELIQUIS) tablet 5 mg, 5 mg, Oral, Q12H, Tano Castillo, DO, 5 mg at 06/06/21 0813  •  AZITHROMYCIN 500 MG/250 ML 0.9% NS IVPB (vial-mate), 500 mg, Intravenous, Q24H, Tano Castillo, DO, 500 mg at 06/05/21 2154  •  budesonide (PULMICORT) nebulizer solution 0.5 mg, 0.5 mg, Nebulization, BID - RT, Tano Castillo, DO, 0.5 mg at 06/06/21 0913  •  busPIRone (BUSPAR) tablet 10 mg, 10 mg, Oral, BID, Tano Castillo, DO, 10 mg at 06/06/21 0814  •  cefTRIAXone (ROCEPHIN) 1 g/100 mL 0.9% NS (MBP), 1 g, Intravenous, Q24H, aTno Castillo, DO, 1 g at 06/05/21 2108  •  dextrose (D50W) 25 g/ 50mL Intravenous Solution 25 g, 25 g, Intravenous, Q15 Min PRN, Tano Castillo, DO  •  dextrose (GLUTOSE) oral gel 15 g, 15 g, Oral, Q15 Min PRN, Tano Castillo, DO  •  diazePAM (VALIUM) tablet 2 mg, 2 mg, Oral, Q12H PRN, Tano Castillo, DO, 2 mg at 06/04/21 1309  •  DULoxetine (CYMBALTA) DR capsule 120 mg, 120 mg, Oral, Daily, Tano Castillo, DO, 120 mg at 06/06/21 0814  •  escitalopram (LEXAPRO) tablet 20 mg, 20 mg, Oral, Daily, Tano Castillo DO, 20 mg at 06/06/21 0813  •  famotidine (PEPCID) tablet 20 mg, 20 mg, Oral, BID, Tano Castillo DO, 20 mg at 06/06/21 0813  •  furosemide  (LASIX) tablet 20 mg, 20 mg, Oral, Daily, Tano Castillo, , 20 mg at 06/06/21 0814  •  gabapentin (NEURONTIN) capsule 600 mg, 600 mg, Oral, Q8H, Tano Castillo, , 600 mg at 06/06/21 0504  •  glucagon (human recombinant) (GLUCAGEN DIAGNOSTIC) injection 1 mg, 1 mg, Subcutaneous, Q15 Min PRN, Tano Castillo DO  •  insulin detemir (LEVEMIR) injection 30 Units, 30 Units, Subcutaneous, Q12H, Tano Castillo, , 30 Units at 06/06/21 0812  •  insulin lispro (humaLOG) injection 0-7 Units, 0-7 Units, Subcutaneous, 4x Daily With Meals & Nightly, Tano Castillo DO, 3 Units at 06/06/21 0813  •  ipratropium-albuterol (DUO-NEB) nebulizer solution 3 mL, 3 mL, Nebulization, Once, Tano Castillo DO  •  ipratropium-albuterol (DUO-NEB) nebulizer solution 3 mL, 3 mL, Nebulization, Q4H - RT, Tano Castillo DO, 3 mL at 06/06/21 0914  •  methylPREDNISolone sodium succinate (SOLU-Medrol) injection 40 mg, 40 mg, Intravenous, Q8H, Celine Villanueva MD, 40 mg at 06/06/21 0504  •  metoprolol succinate XL (TOPROL-XL) 24 hr tablet 50 mg, 50 mg, Oral, Daily, Tano Castillo DO, 50 mg at 06/06/21 0813  •  nicotine (NICODERM CQ) 21 MG/24HR patch 1 patch, 1 patch, Transdermal, Q24H, Tano Castillo DO, 1 patch at 06/06/21 0817  •  NIFEdipine XL (PROCARDIA XL) 24 hr tablet 30 mg, 30 mg, Oral, Daily, Tano Castillo DO, 30 mg at 06/06/21 0813  •  ondansetron (ZOFRAN) injection 4 mg, 4 mg, Intravenous, Once, Tano Castillo DO  •  ondansetron (ZOFRAN) injection 4 mg, 4 mg, Intravenous, Q6H PRN, Tano Castillo DO  •  oxyCODONE-acetaminophen (PERCOCET)  MG per tablet 1 tablet, 1 tablet, Oral, Q6H PRN, Tano Castillo DO, 1 tablet at 06/06/21 0346  •  Pharmacy Consult - [facility], , Does not apply, Daily, Sarah Macdonald  •  Pharmacy to dose Trelegy, , Does not apply, Continuous PRN, Tano Castillo DO  •  roflumilast (DALIRESP) tablet 500 mcg, 500 mcg, Oral, Daily, Tano Castillo DO, 500 mcg at 06/06/21 0814  •  Sodium Chloride (PF) 0.9 % 10 mL,  10 mL, Intravenous, PRN, Tano Castillo, DO  •  sodium chloride 0.9 % flush 10 mL, 10 mL, Intravenous, Q12H, Tano Castillo DO, 10 mL at 21 0947  •  sodium chloride 0.9 % flush 10 mL, 10 mL, Intravenous, PRN, Tano Castillo,   •  traZODone (DESYREL) tablet 50 mg, 50 mg, Oral, Nightly, Tano Castillo DO, 50 mg at 21 2108    ALLERGIES     Diphenhydramine, Lortab [hydrocodone-acetaminophen], Toradol [ketorolac tromethamine], Nsaids, and Alprazolam    FAMILY HISTORY       Family History   Problem Relation Age of Onset   • Breast cancer Mother    • Cancer Mother    • Arthritis Mother    • Diabetes Mother    • Obesity Mother    • Arthritis Father    • Stroke Father    • Hypertension Father    • Heart attack Father    • Diabetes Maternal Grandmother    • Migraines Other           SOCIAL HISTORY       Social History     Socioeconomic History   • Marital status:      Spouse name: Not on file   • Number of children: Not on file   • Years of education: Not on file   • Highest education level: Not on file   Tobacco Use   • Smoking status: Current Every Day Smoker     Years: 30.00     Types: Cigarettes     Last attempt to quit: 2019     Years since quittin.1   • Smokeless tobacco: Never Used   • Tobacco comment: 1 daily   Substance and Sexual Activity   • Alcohol use: No   • Drug use: No   • Sexual activity: Defer         PHYSICAL EXAM    (up to 7 for level 4, 8 or more for level 5)     Vitals:    21 0300 21 0500 21 0813 21 0914   BP: 156/78  160/92    BP Location:       Patient Position:       Pulse:   80 69   Resp:    18   Temp: 98.8 °F (37.1 °C)      TempSrc:       SpO2:    90%   Weight:  134 kg (296 lb 6.4 oz)     Height:           Physical Exam  General: Awake, alert, no acute distress.  HEENT: Conjunctiva normal.  Neck: Trachea midline.  Cardiac: Heart regular rate, rhythm, no murmurs, rubs, or gallops  Lungs: Diffusely diminished w/ expiratory wheezes  Chest wall:  There is no tenderness to palpation over the chest wall or over ribs  Abdomen: Abdomen is soft, nontender, nondistended. There are no firm or pulsatile masses, no rebound rigidity or guarding.   Musculoskeletal: No deformity.  Neuro: Alert and oriented x 4.  Dermatology: Skin is warm and dry  Psych: Mentation is grossly normal, cognition is grossly normal. Affect is appropriate.        DIAGNOSTIC RESULTS   RADIOLOGY:   Non-plain film images such as CT, Ultrasound and MRI are read by the radiologist. Plain radiographic images are visualized and preliminarily interpreted by the emergency physician with the below findings:      [x] Radiologist's Report Reviewed:  CT Angiogram Chest   Final Result      1. No PE or aortic dissection.   2. New mediastinal and bilateral hilar adenopathy is nonspecific. Attention on 3 month follow-up chest CT is recommended.   3. Mild infiltrate in the right middle lobe concerning for mild pneumonia. There are atelectatic changes in the lower lobes, and there is some consolidation in the lingula that is also probably due to atelectasis although pneumonia is not excluded.   4. Granulomatous nodules in the upper lobes with a stable and benign 9 mm right lower lobe nodule.      Signer Name: Elmer Boswell MD    Signed: 6/4/2021 12:29 AM    Workstation Name: [email or web address]     [facility]      XR Chest 1 View   Final Result   Exam is grossly limited and it is uncertain if there may be some increasing opacification/developing pneumonia at the right lung base. . Nodular density is again visualized at the right lung apex. The heart may be mildly enlarged.      Signer Name: Etta Granados MD    Signed: 6/3/2021 10:36 PM    Workstation Name: [id number]     [facility]            LABS:    I have reviewed and interpreted all of the currently available lab results from this visit (if applicable):  Results for orders placed or performed during the hospital  encounter of 06/03/21   Blood Culture - Blood, Arm, Left    Specimen: Arm, Left; Blood   Result Value Ref Range    Blood Culture No growth at 2 days    Blood Culture - Blood, Arm, Right    Specimen: Arm, Right; Blood   Result Value Ref Range    Blood Culture No growth at 2 days    COVID-19 and FLU A/B PCR - Swab, Nasopharynx    Specimen: Nasopharynx; Swab   Result Value Ref Range    COVID19 Not Detected Not Detected - Ref. Range    Influenza A PCR Not Detected Not Detected    Influenza B PCR Not Detected Not Detected   Comprehensive Metabolic Panel    Specimen: Blood   Result Value Ref Range    Glucose 152 (H) 65 - 99 mg/dL    BUN 8 6 - 20 mg/dL    Creatinine 0.73 0.57 - 1.00 mg/dL    Sodium 139 136 - 145 mmol/L    Potassium 4.6 3.5 - 5.2 mmol/L    Chloride 101 98 - 107 mmol/L    CO2 27.0 22.0 - 29.0 mmol/L    Calcium 9.5 8.6 - 10.5 mg/dL    Total Protein 7.1 6.0 - 8.5 g/dL    Albumin 3.70 3.50 - 5.20 g/dL    ALT (SGPT) 9 1 - 33 U/L    AST (SGOT) 19 1 - 32 U/L    Alkaline Phosphatase 147 (H) 39 - 117 U/L    Total Bilirubin 0.2 0.0 - 1.2 mg/dL    eGFR Non African Amer 82 >60 mL/min/1.73    Globulin 3.4 gm/dL    A/G Ratio 1.1 g/dL    BUN/Creatinine Ratio 11.0 7.0 - 25.0    Anion Gap 11.0 5.0 - 15.0 mmol/L   Lipase    Specimen: Blood   Result Value Ref Range    Lipase 19 13 - 60 U/L   Urinalysis With Microscopic If Indicated (No Culture) - Urine, Clean Catch    Specimen: Urine, Clean Catch   Result Value Ref Range    Color, UA Yellow Yellow, Straw    Appearance, UA Clear Clear    pH, UA 5.5 5.0 - 8.0    Specific Gravity, UA 1.010 1.001 - 1.030    Glucose, UA Negative Negative    Ketones, UA Negative Negative    Bilirubin, UA Negative Negative    Blood, UA Negative Negative    Protein, UA Negative Negative    Leuk Esterase, UA Trace (A) Negative    Nitrite, UA Negative Negative    Urobilinogen, UA 0.2 E.U./dL 0.2 - 1.0 E.U./dL   Lactic Acid, Plasma    Specimen: Blood   Result Value Ref Range    Lactate 2.2 (C) 0.5 - 2.0  mmol/L   CBC Auto Differential    Specimen: Blood   Result Value Ref Range    WBC 11.80 (H) 3.40 - 10.80 10*3/mm3    RBC 5.34 (H) 3.77 - 5.28 10*6/mm3    Hemoglobin 13.6 12.0 - 15.9 g/dL    Hematocrit 45.8 34.0 - 46.6 %    MCV 85.8 79.0 - 97.0 fL    MCH 25.5 (L) 26.6 - 33.0 pg    MCHC 29.7 (L) 31.5 - 35.7 g/dL    RDW 16.6 (H) 12.3 - 15.4 %    RDW-SD 51.0 37.0 - 54.0 fl    MPV 9.9 6.0 - 12.0 fL    Platelets 341 140 - 450 10*3/mm3    Neutrophil % 67.0 42.7 - 76.0 %    Lymphocyte % 23.1 19.6 - 45.3 %    Monocyte % 4.7 (L) 5.0 - 12.0 %    Eosinophil % 3.9 0.3 - 6.2 %    Basophil % 0.4 0.0 - 1.5 %    Immature Grans % 0.9 (H) 0.0 - 0.5 %    Neutrophils, Absolute 7.89 (H) 1.70 - 7.00 10*3/mm3    Lymphocytes, Absolute 2.73 0.70 - 3.10 10*3/mm3    Monocytes, Absolute 0.56 0.10 - 0.90 10*3/mm3    Eosinophils, Absolute 0.46 (H) 0.00 - 0.40 10*3/mm3    Basophils, Absolute 0.05 0.00 - 0.20 10*3/mm3    Immature Grans, Absolute 0.11 (H) 0.00 - 0.05 10*3/mm3    nRBC 0.0 0.0 - 0.2 /100 WBC   Timed Lactic Acid, Reflex    Specimen: Blood   Result Value Ref Range    Lactate 0.8 0.5 - 2.0 mmol/L   Urinalysis, Microscopic Only - Urine, Clean Catch    Specimen: Urine, Clean Catch   Result Value Ref Range    RBC, UA 0-2 None Seen, 0-2 /HPF    WBC, UA 0-2 None Seen, 0-2 /HPF    Bacteria, UA None Seen None Seen, Trace /HPF    Squamous Epithelial Cells, UA 0-2 None Seen, 0-2 /HPF    Hyaline Casts, UA None Seen 0 - 6 /LPF    Methodology Automated Microscopy    Hemoglobin A1c    Specimen: Blood   Result Value Ref Range    Hemoglobin A1C 6.00 (H) 4.80 - 5.60 %   POC Glucose Once    Specimen: Blood   Result Value Ref Range    Glucose 184 (H) 70 - 130 mg/dL   POC Glucose Once    Specimen: Blood   Result Value Ref Range    Glucose 201 (H) 70 - 130 mg/dL   POC Glucose Once    Specimen: Blood   Result Value Ref Range    Glucose 257 (H) 70 - 130 mg/dL   POC Glucose Once    Specimen: Blood   Result Value Ref Range    Glucose 258 (H) 70 - 130 mg/dL    POC Glucose Once    Specimen: Blood   Result Value Ref Range    Glucose 146 (H) 70 - 130 mg/dL   POC Glucose Once    Specimen: Blood   Result Value Ref Range    Glucose 202 (H) 70 - 130 mg/dL   POC Glucose Once    Specimen: Blood   Result Value Ref Range    Glucose 229 (H) 70 - 130 mg/dL   POC Glucose Once    Specimen: Blood   Result Value Ref Range    Glucose 284 (H) 70 - 130 mg/dL   POC Glucose Once    Specimen: Blood   Result Value Ref Range    Glucose 237 (H) 70 - 130 mg/dL   Light Blue Top   Result Value Ref Range    Extra Tube hold for add-on    Green Top (Gel)   Result Value Ref Range    Extra Tube Hold for add-ons.    Lavender Top   Result Value Ref Range    Extra Tube hold for add-on    Gold Top - SST   Result Value Ref Range    Extra Tube Hold for add-ons.    Gray Top   Result Value Ref Range    Extra Tube Hold for add-ons.         All other labs were within normal range or not returned as of this dictation.      EMERGENCY DEPARTMENT COURSE and DIFFERENTIAL DIAGNOSIS/MDM:   Vitals:    Vitals:    06/06/21 0300 06/06/21 0500 06/06/21 0813 06/06/21 0914   BP: 156/78  160/92    BP Location:       Patient Position:       Pulse:   80 69   Resp:    18   Temp: 98.8 °F (37.1 °C)      TempSrc:       SpO2:    90%   Weight:  134 kg (296 lb 6.4 oz)     Height:           ED Course as of Jun 06 0949   Fri Jun 04, 2021   0108 Patient stable. Remains on supplemental O2. Discussed dx results. Discussed case w/ Dr. Castillo who accepts pt for admission.    [NS]      ED Course User Index  [NS] Rommel Tovar MD       Pt w/ acute sepsis 2/2 R middle lobe PNA w/ associated new hypoxemia and COPD exacerbation. CTA is negative for PE/PTX/pericardial effusion. There is no indication of myocardial injury. Pt given nebs/steroids/abx/o2 in the ED and will be admitted to the hospitalist service.      MEDICATIONS ADMINISTERED IN ED:  Medications   Sodium Chloride (PF) 0.9 % 10 mL (has no administration in time range)    ipratropium-albuterol (DUO-NEB) nebulizer solution 3 mL (3 mL Nebulization Not Given 6/3/21 2259)   ondansetron (ZOFRAN) injection 4 mg (4 mg Intravenous Not Given 6/4/21 0059)   nicotine (NICODERM CQ) 21 MG/24HR patch 1 patch (1 patch Transdermal Medication Applied 6/6/21 0817)   ipratropium-albuterol (DUO-NEB) nebulizer solution 3 mL (3 mL Nebulization Given 6/6/21 0914)   busPIRone (BUSPAR) tablet 10 mg (10 mg Oral Given 6/6/21 0814)   roflumilast (DALIRESP) tablet 500 mcg (500 mcg Oral Given 6/6/21 0814)   diazePAM (VALIUM) tablet 2 mg (2 mg Oral Given 6/4/21 1309)   DULoxetine (CYMBALTA) DR capsule 120 mg (120 mg Oral Given 6/6/21 0814)   apixaban (ELIQUIS) tablet 5 mg (5 mg Oral Given 6/6/21 0813)   escitalopram (LEXAPRO) tablet 20 mg (20 mg Oral Given 6/6/21 0813)   famotidine (PEPCID) tablet 20 mg (20 mg Oral Given 6/6/21 0813)   Pharmacy to dose Trelegy (has no administration in time range)   furosemide (LASIX) tablet 20 mg (20 mg Oral Given 6/6/21 0814)   gabapentin (NEURONTIN) capsule 600 mg (600 mg Oral Given 6/6/21 0504)   metoprolol succinate XL (TOPROL-XL) 24 hr tablet 50 mg (50 mg Oral Given 6/6/21 0813)   insulin detemir (LEVEMIR) injection 30 Units (30 Units Subcutaneous Given 6/6/21 0812)   NIFEdipine XL (PROCARDIA XL) 24 hr tablet 30 mg (30 mg Oral Given 6/6/21 0813)   oxyCODONE-acetaminophen (PERCOCET)  MG per tablet 1 tablet (1 tablet Oral Given 6/6/21 7986)   traZODone (DESYREL) tablet 50 mg (50 mg Oral Given 6/5/21 2108)   sodium chloride 0.9 % flush 10 mL (10 mL Intravenous Given 6/6/21 0947)   sodium chloride 0.9 % flush 10 mL (has no administration in time range)   dextrose (GLUTOSE) oral gel 15 g (has no administration in time range)   dextrose (D50W) 25 g/ 50mL Intravenous Solution 25 g (has no administration in time range)   glucagon (human recombinant) (GLUCAGEN DIAGNOSTIC) injection 1 mg (has no administration in time range)   insulin lispro (humaLOG) injection 0-7 Units (3  Units Subcutaneous Given 6/6/21 0813)   ondansetron (ZOFRAN) injection 4 mg (has no administration in time range)   cefTRIAXone (ROCEPHIN) 1 g/100 mL 0.9% NS (MBP) (1 g Intravenous New Bag 6/5/21 2108)   AZITHROMYCIN 500 MG/250 ML 0.9% NS IVPB (vial-mate) (500 mg Intravenous New Bag 6/5/21 2154)   budesonide (PULMICORT) nebulizer solution 0.5 mg (0.5 mg Nebulization Given 6/6/21 0913)   albuterol (PROVENTIL) nebulizer solution 0.083% 2.5 mg/3mL (has no administration in time range)   Pharmacy Consult - MTM ( Does not apply Not Given 6/6/21 0946)   methylPREDNISolone sodium succinate (SOLU-Medrol) injection 40 mg (40 mg Intravenous Given 6/6/21 0504)   dexamethasone (DECADRON) IVPB 10 mg (0 mg Intravenous Stopped 6/4/21 0029)   cefTRIAXone (ROCEPHIN) 1 g/100 mL 0.9% NS (MBP) (0 g Intravenous Stopped 6/4/21 0109)   AZITHROMYCIN 500 MG/250 ML 0.9% NS IVPB (vial-mate) (500 mg Intravenous New Bag 6/4/21 0110)   iopamidol (ISOVUE-370) 76 % injection 100 mL (95 mL Intravenous Given 6/4/21 0007)   fentaNYL citrate (PF) (SUBLIMAZE) injection 50 mcg (50 mcg Intravenous Given 6/4/21 0033)   ipratropium-albuterol (DUO-NEB) nebulizer solution 3 mL (3 mL Nebulization Given 6/4/21 0050)   furosemide (LASIX) tablet 20 mg (20 mg Oral Given 6/4/21 0207)   methylPREDNISolone sodium succinate (SOLU-Medrol) injection 40 mg (40 mg Intravenous Given 6/4/21 0620)         CRITICAL CARE TIME    Approximately 35 minutes of discontinuous critical care time was provided to this patient by myself absent of any time spent performing procedures.  Patient presents critically ill with acute sepsis secondary to pneumonia with associated hypoxia and COPD exacerbation placing the CV and respiratory systems at risk requiring the following interventions: application of supplemental oxygen, provision of nebulizer therapy/IV steroids, broad spectrum IV antibiotics, interpretation of labs/ecg/imaing, frequent reassessment, coordination of admission with the  following response: improvement of hypoxia.  Patient at high risk of deterioration and possibly death without these interventions.        FINAL IMPRESSION      1. Acute sepsis (CMS/[facility])    2. Pneumonia due to infectious organism, unspecified laterality, unspecified part of lung    3. Hypoxia    4. Acute exacerbation of chronic obstructive pulmonary disease (COPD) (CMS/[facility])    5. Impaired functional mobility, balance, gait, and endurance          DISPOSITION/PLAN     ED Disposition     ED Disposition Condition Comment    Decision to Admit  Level of Care: Telemetry [5]   Diagnosis: CAP (community acquired pneumonia) [039113]   Admitting Physician: RANDALL COYLE [[id number]]   Attending Physician: RANDALL COYLE [[id number]]   Bed Request Comments: charmaine Tovar MD  Attending Emergency Physician               Rommel Tovar MD  06/06/21 0904

## 2021-06-04 NOTE — PLAN OF CARE
Problem: Adult Inpatient Plan of Care  Goal: Plan of Care Review  Outcome: Ongoing, Progressing  Flowsheets (Taken 6/4/2021 0432)  Plan of Care Reviewed With: patient  Goal: Patient-Specific Goal (Individualized)  Outcome: Ongoing, Progressing  Goal: Absence of Hospital-Acquired Illness or Injury  Outcome: Ongoing, Progressing  Intervention: Identify and Manage Fall Risk  Recent Flowsheet Documentation  Taken 6/4/2021 0200 by Isabel Hayes RN  Safety Promotion/Fall Prevention:   activity supervised   assistive device/personal items within reach   fall prevention program maintained   nonskid shoes/slippers when out of bed   safety round/check completed  Intervention: Prevent Skin Injury  Recent Flowsheet Documentation  Taken 6/4/2021 0200 by Isabel Hayes RN  Body Position: position changed independently  Intervention: Prevent Infection  Recent Flowsheet Documentation  Taken 6/4/2021 0200 by Isabel Hayes RN  Infection Prevention: rest/sleep promoted  Goal: Optimal Comfort and Wellbeing  Outcome: Ongoing, Progressing  Intervention: Provide Person-Centered Care  Recent Flowsheet Documentation  Taken 6/4/2021 0200 by Isabel Hayes RN  Trust Relationship/Rapport:   care explained   choices provided  Goal: Readiness for Transition of Care  Outcome: Ongoing, Progressing  Intervention: Mutually Develop Transition Plan  Recent Flowsheet Documentation  Taken 6/4/2021 0155 by Isabel Hayes RN  Transportation Anticipated: family or friend will provide  Patient/Family Anticipated Services at Transition: none  Patient/Family Anticipates Transition to: home with family  Taken 6/4/2021 0154 by Isabel Hayes RN  Equipment Currently Used at Home: walker, standard   Goal Outcome Evaluation:  Plan of Care Reviewed With: patient

## 2021-06-05 LAB
GLUCOSE BLDC GLUCOMTR-MCNC: 146 MG/DL (ref 70–130)
GLUCOSE BLDC GLUCOMTR-MCNC: 202 MG/DL (ref 70–130)
GLUCOSE BLDC GLUCOMTR-MCNC: 229 MG/DL (ref 70–130)
GLUCOSE BLDC GLUCOMTR-MCNC: 284 MG/DL (ref 70–130)

## 2021-06-05 PROCEDURE — 25010000002 AZITHROMYCIN PER 500 MG: Performed by: INTERNAL MEDICINE

## 2021-06-05 PROCEDURE — 25010000002 METHYLPREDNISOLONE PER 40 MG: Performed by: FAMILY MEDICINE

## 2021-06-05 PROCEDURE — 82962 GLUCOSE BLOOD TEST: CPT

## 2021-06-05 PROCEDURE — 63710000001 INSULIN LISPRO (HUMAN) PER 5 UNITS: Performed by: INTERNAL MEDICINE

## 2021-06-05 PROCEDURE — 94799 UNLISTED PULMONARY SVC/PX: CPT

## 2021-06-05 PROCEDURE — 63710000001 INSULIN DETEMIR PER 5 UNITS: Performed by: INTERNAL MEDICINE

## 2021-06-05 PROCEDURE — 99232 SBSQ HOSP IP/OBS MODERATE 35: CPT | Performed by: FAMILY MEDICINE

## 2021-06-05 PROCEDURE — 25010000002 CEFTRIAXONE PER 250 MG: Performed by: INTERNAL MEDICINE

## 2021-06-05 RX ORDER — METHYLPREDNISOLONE SODIUM SUCCINATE 40 MG/ML
40 INJECTION, POWDER, LYOPHILIZED, FOR SOLUTION INTRAMUSCULAR; INTRAVENOUS EVERY 8 HOURS SCHEDULED
Status: DISCONTINUED | OUTPATIENT
Start: 2021-06-05 | End: 2021-06-07

## 2021-06-05 RX ADMIN — Medication 1 PATCH: at 08:02

## 2021-06-05 RX ADMIN — IPRATROPIUM BROMIDE AND ALBUTEROL SULFATE 3 ML: 2.5; .5 SOLUTION RESPIRATORY (INHALATION) at 23:01

## 2021-06-05 RX ADMIN — OXYCODONE HYDROCHLORIDE AND ACETAMINOPHEN 1 TABLET: 10; 325 TABLET ORAL at 12:12

## 2021-06-05 RX ADMIN — ROFLUMILAST 500 MCG: 500 TABLET ORAL at 08:00

## 2021-06-05 RX ADMIN — BUSPIRONE HYDROCHLORIDE 10 MG: 10 TABLET ORAL at 18:06

## 2021-06-05 RX ADMIN — APIXABAN 5 MG: 5 TABLET, FILM COATED ORAL at 08:00

## 2021-06-05 RX ADMIN — TRAZODONE HYDROCHLORIDE 50 MG: 50 TABLET ORAL at 21:08

## 2021-06-05 RX ADMIN — FUROSEMIDE 20 MG: 20 TABLET ORAL at 08:00

## 2021-06-05 RX ADMIN — INSULIN LISPRO 3 UNITS: 100 INJECTION, SOLUTION INTRAVENOUS; SUBCUTANEOUS at 12:12

## 2021-06-05 RX ADMIN — IPRATROPIUM BROMIDE AND ALBUTEROL SULFATE 3 ML: 2.5; .5 SOLUTION RESPIRATORY (INHALATION) at 19:40

## 2021-06-05 RX ADMIN — GABAPENTIN 600 MG: 300 CAPSULE ORAL at 05:54

## 2021-06-05 RX ADMIN — IPRATROPIUM BROMIDE AND ALBUTEROL SULFATE 3 ML: 2.5; .5 SOLUTION RESPIRATORY (INHALATION) at 07:30

## 2021-06-05 RX ADMIN — BUDESONIDE 0.5 MG: 0.5 INHALANT RESPIRATORY (INHALATION) at 07:30

## 2021-06-05 RX ADMIN — GABAPENTIN 600 MG: 300 CAPSULE ORAL at 13:29

## 2021-06-05 RX ADMIN — METOPROLOL SUCCINATE 50 MG: 50 TABLET, EXTENDED RELEASE ORAL at 08:00

## 2021-06-05 RX ADMIN — SODIUM CHLORIDE, PRESERVATIVE FREE 10 ML: 5 INJECTION INTRAVENOUS at 08:00

## 2021-06-05 RX ADMIN — BUSPIRONE HYDROCHLORIDE 10 MG: 10 TABLET ORAL at 07:59

## 2021-06-05 RX ADMIN — BUDESONIDE 0.5 MG: 0.5 INHALANT RESPIRATORY (INHALATION) at 19:40

## 2021-06-05 RX ADMIN — APIXABAN 5 MG: 5 TABLET, FILM COATED ORAL at 21:08

## 2021-06-05 RX ADMIN — INSULIN DETEMIR 30 UNITS: 100 INJECTION, SOLUTION SUBCUTANEOUS at 21:45

## 2021-06-05 RX ADMIN — METHYLPREDNISOLONE SODIUM SUCCINATE 40 MG: 40 INJECTION, POWDER, FOR SOLUTION INTRAMUSCULAR; INTRAVENOUS at 10:11

## 2021-06-05 RX ADMIN — NIFEDIPINE 30 MG: 30 TABLET, FILM COATED, EXTENDED RELEASE ORAL at 08:00

## 2021-06-05 RX ADMIN — FAMOTIDINE 20 MG: 20 TABLET ORAL at 08:00

## 2021-06-05 RX ADMIN — INSULIN DETEMIR 30 UNITS: 100 INJECTION, SOLUTION SUBCUTANEOUS at 07:54

## 2021-06-05 RX ADMIN — IPRATROPIUM BROMIDE AND ALBUTEROL SULFATE 3 ML: 2.5; .5 SOLUTION RESPIRATORY (INHALATION) at 11:40

## 2021-06-05 RX ADMIN — DULOXETINE HYDROCHLORIDE 120 MG: 60 CAPSULE, DELAYED RELEASE ORAL at 08:00

## 2021-06-05 RX ADMIN — INSULIN LISPRO 3 UNITS: 100 INJECTION, SOLUTION INTRAVENOUS; SUBCUTANEOUS at 18:06

## 2021-06-05 RX ADMIN — FAMOTIDINE 20 MG: 20 TABLET ORAL at 21:08

## 2021-06-05 RX ADMIN — SODIUM CHLORIDE, PRESERVATIVE FREE 10 ML: 5 INJECTION INTRAVENOUS at 21:09

## 2021-06-05 RX ADMIN — OXYCODONE HYDROCHLORIDE AND ACETAMINOPHEN 1 TABLET: 10; 325 TABLET ORAL at 05:54

## 2021-06-05 RX ADMIN — GABAPENTIN 600 MG: 300 CAPSULE ORAL at 21:08

## 2021-06-05 RX ADMIN — ESCITALOPRAM OXALATE 20 MG: 20 TABLET ORAL at 08:00

## 2021-06-05 RX ADMIN — AZITHROMYCIN 500 MG: 500 INJECTION, POWDER, LYOPHILIZED, FOR SOLUTION INTRAVENOUS at 21:54

## 2021-06-05 RX ADMIN — METHYLPREDNISOLONE SODIUM SUCCINATE 40 MG: 40 INJECTION, POWDER, FOR SOLUTION INTRAMUSCULAR; INTRAVENOUS at 21:54

## 2021-06-05 RX ADMIN — METHYLPREDNISOLONE SODIUM SUCCINATE 40 MG: 40 INJECTION, POWDER, FOR SOLUTION INTRAMUSCULAR; INTRAVENOUS at 13:29

## 2021-06-05 RX ADMIN — OXYCODONE HYDROCHLORIDE AND ACETAMINOPHEN 1 TABLET: 10; 325 TABLET ORAL at 21:08

## 2021-06-05 RX ADMIN — SODIUM CHLORIDE 1 G: 900 INJECTION INTRAVENOUS at 21:08

## 2021-06-05 RX ADMIN — INSULIN LISPRO 4 UNITS: 100 INJECTION, SOLUTION INTRAVENOUS; SUBCUTANEOUS at 21:45

## 2021-06-05 NOTE — PROGRESS NOTES
[facility] Medicine Services  PROGRESS NOTE    Patient Name: Dani Ziegler  : 1965  MRN: [id number]    Date of Admission: 6/3/2021  Primary Care Physician: Darrion Tovar MD    Subjective   Subjective     CC:  Pneumonia    HPI:  Patient is a 55-year-old who was admitted with acute respiratory failure with hypoxia secondary to community-acquired pneumonia.  She also had significant leukocytosis which is why her PCP sent her to the ER.    2021-the patient remains on 3-4 L nasal cannula.  She states she is on 2 L nocturnally at at home.  She states since being admitted her cough has been a little more productive.  She is tolerating p.o. but does feel little bit weak.    ROS:  General: No fever, chills, positive fatigue  ENT: No sore throat, trouble swallowing or changes in vision  Respiratory: Positive shortness of breath, cough, denies wheezing or fast breathing  Cardiovascular: No chest pain, palpitations, positive dyspnea with exertion  Gastrointestinal: No nausea vomiting abdominal pain  Musculoskeletal: Positive difficulty walking, positive weakness  Vascular: No cyanosis or clubbing  Lymphatic: No peripheral edema, no lymphadenopathy  Neurologic: No headache, confusion, dizziness  Psychiatric: No changes in mood.  No depression or anxiety.       Objective   Objective     Vital Signs:   Temp:  [97.1 °F (36.2 °C)-98.6 °F (37 °C)] 98.3 °F (36.8 °C)  Heart Rate:  [63-83] 63  Resp:  [16-20] 16  BP: (114-134)/(63-78) 122/78        Physical Exam:  Constitutional: No acute distress, awake, alert, nontoxic, obese body habitus  Eyes: Pupils equal, sclerae anicteric, no conjunctival injection  HENT: NCAT, mucous membranes moist  Neck: Supple, no thyromegaly, no lymphadenopathy  Respiratory: Decreased breath sounds with faint crackles bilaterally, good effort, nonlabored respirations   Cardiovascular: RRR  Gastrointestinal: Positive bowel sounds, soft, nontender,  nondistended  Musculoskeletal: No peripheral edema, normal muscle tone for age  Psychiatric: Appropriate affect, good insight and judgement, cooperative  Neurologic: Oriented x 3, movements symmetric BUE and BLE, Cranial Nerves grossly intact, speech clear and fluent  Skin: No rashes, no jaundice, no petechiae, no mottling      Results Reviewed:  Results from last 7 days   Lab Units 06/03/21  1902   WBC 10*3/mm3 11.80*   HEMOGLOBIN g/dL 13.6   HEMATOCRIT % 45.8   PLATELETS 10*3/mm3 341     Results from last 7 days   Lab Units 06/03/21  1902   SODIUM mmol/L 139   POTASSIUM mmol/L 4.6   CHLORIDE mmol/L 101   CO2 mmol/L 27.0   BUN mg/dL 8   CREATININE mg/dL 0.73   GLUCOSE mg/dL 152*   CALCIUM mg/dL 9.5   ALT (SGPT) U/L 9   AST (SGOT) U/L 19     Estimated Creatinine Clearance: 120.1 mL/min (by C-G formula based on SCr of 0.73 mg/dL).    Microbiology Results Abnormal     Procedure Component Value - Date/Time    Blood Culture - Blood, Arm, Left [[id number]] Collected: 06/03/21 0001    Lab Status: Preliminary result Specimen: Blood from Arm, Left Updated: 06/05/21 0016     Blood Culture No growth at 24 hours    Blood Culture - Blood, Arm, Right [[id number]] Collected: 06/03/21 2350    Lab Status: Preliminary result Specimen: Blood from Arm, Right Updated: 06/05/21 0016     Blood Culture No growth at 24 hours    COVID PRE-OP / PRE-PROCEDURE SCREENING ORDER (NO ISOLATION) - Swab, Nasopharynx [[id number]]  (Normal) Collected: 06/03/21 2345    Lab Status: Final result Specimen: Swab from Nasopharynx Updated: 06/04/21 0031    Narrative:      The following orders were created for panel order COVID PRE-OP / PRE-PROCEDURE SCREENING ORDER (NO ISOLATION) - Swab, Nasopharynx.  Procedure                               Abnormality         Status                     ---------                               -----------         ------                     COVID-19 and FLU A/B PCR...[[id number]]  Normal              Final result                  Please view results for these tests on the individual orders.    COVID-19 and FLU A/B PCR - Swab, Nasopharynx [[id number]]  (Normal) Collected: 06/03/21 2345    Lab Status: Final result Specimen: Swab from Nasopharynx Updated: 06/04/21 0031     COVID19 Not Detected     Influenza A PCR Not Detected     Influenza B PCR Not Detected    Narrative:      Fact sheet for providers: https://www.fda.gov/media/822963/download    Fact sheet for patients: https://www.fda.gov/media/270440/download    Test performed by PCR.          Imaging Results (Last 24 Hours)     ** No results found for the last 24 hours. **          Results for orders placed in visit on 05/05/20    SCANNED - ECHOCARDIOGRAM      I have reviewed the medications:  Scheduled Meds:apixaban, 5 mg, Oral, Q12H  azithromycin, 500 mg, Intravenous, Q24H  budesonide, 0.5 mg, Nebulization, BID - RT  busPIRone, 10 mg, Oral, BID  cefTRIAXone, 1 g, Intravenous, Q24H  DULoxetine, 120 mg, Oral, Daily  escitalopram, 20 mg, Oral, Daily  famotidine, 20 mg, Oral, BID  furosemide, 20 mg, Oral, Daily  gabapentin, 600 mg, Oral, Q8H  insulin detemir, 30 Units, Subcutaneous, Q12H  insulin lispro, 0-7 Units, Subcutaneous, 4x Daily With Meals & Nightly  ipratropium-albuterol, 3 mL, Nebulization, Once  ipratropium-albuterol, 3 mL, Nebulization, Q4H - RT  methylPREDNISolone sodium succinate, 40 mg, Intravenous, Q8H  metoprolol succinate XL, 50 mg, Oral, Daily  nicotine, 1 patch, Transdermal, Q24H  NIFEdipine CC, 30 mg, Oral, Daily  ondansetron, 4 mg, Intravenous, Once  pharmacy consult - MTM, , Does not apply, Daily  roflumilast, 500 mcg, Oral, Daily  sodium chloride, 10 mL, Intravenous, Q12H  traZODone, 50 mg, Oral, Nightly      Continuous Infusions:Pharmacy to dose Trelegy,       PRN Meds:.•  albuterol  •  dextrose  •  dextrose  •  diazePAM  •  glucagon (human recombinant)  •  ondansetron  •  oxyCODONE-acetaminophen  •  Pharmacy to dose Trelegy  •  Sodium Chloride (PF)  •  sodium  chloride    Assessment/Plan   Assessment & Plan     Active Hospital Problems    Diagnosis  POA   • **CAP (community acquired pneumonia) [J18.9]  Yes   • Polypharmacy [Z79.899]  Not Applicable   • Morbid obesity with BMI of 45.0-49.9, adult (CMS/[facility]) [E66.01, Z68.42]  Not Applicable   • Acute on chronic respiratory failure with hypoxia (CMS/[facility]) [J96.21]  Yes   • CHF (congestive heart failure) (CMS/[facility]) [I50.9]  Yes   • COPD with acute exacerbation (CMS/[facility]) [J44.1]  Yes   • Mixed hyperlipidemia [E78.2]  Yes   • Uncontrolled type 2 diabetes mellitus with hyperglycemia, without long-term current use of insulin (CMS/[facility]) [E11.65]  Yes   • Vitamin D deficiency [E55.9]  Yes   • Peripheral neuropathy [G62.9]  Yes   • Essential hypertension [I10]  Yes   • Chronic pain [G89.29]  Yes   • Chronic obstructive pulmonary disease (CMS/[facility]) [J44.9]  Yes   • Edema of lower extremity [R60.0]  Yes   • Depression [F32.9]  Yes   • Anxiety [F41.9]  Yes      Resolved Hospital Problems   No resolved problems to display.        Brief Hospital Course to date:  Dani Ziegler is a 55 y.o. female admitted with acute respiratory failure with hypoxia secondary to community-acquired pneumonia with significant leukocytosis.    Acute respiratory failure with hypoxia  Community-acquired pneumonia  Significant leukocytosis  Acute exacerbation of COPD  -Continue Rocephin, azithromycin and methylprednisolone  -Continue O2 to maintain sats greater than 90, wean as tolerated  -Current O2 requirement 3 to 4 L nasal cannula (baseline 2 L nasal cannula nocturnally only)  -Improvement in leukocytosis noted, monitor again in a.m.  -Continue nebs and inhalers    Morbid obesity  Generalized weakness  Difficulty ambulating  -PT consult requested    Diabetes mellitus type 2  -Diabetic diet, sliding scale insulin as needed, Levemir 30 units twice daily  -Likely hyperglycemia exacerbated by steroid use    Peripheral neuropathy  -Continue  gabapentin    Hypertension    Anxiety and depression  -Continue Lexapro, BuSpar and Cymbalta    History of congestive heart failure  Chronic anticoagulation/questionable history of A. Fib  Patient reports increased risk of blood clots  -Continue Eliquis  -Continue medical management with Lasix daily, home meds reviewed include metoprolol and nifedipine    DVT Prophylaxis: Eliquis      Disposition: I expect the patient to be discharged pending improvement in her oxygen requirements, treatment of her pneumonia, weaning as tolerated, transition to oral antibiotics and oral steroids.  Also pending PT eval.    CODE STATUS:   Code Status and Medical Interventions:   Ordered at: 06/04/21 1221     Code Status:    CPR     Medical Interventions (Level of Support Prior to Arrest):    Full       Celine Villanueva MD  06/05/21

## 2021-06-05 NOTE — CONSULTS
Clinical Nutrition     Nutrition Education   Reason for Visit:   Physician Consult       Patient Name: Dani Ziegler  YOB: 1965  MRN: [id number]  Date of Encounter: 06/04/21 20:57 EDT  Admission date: 6/3/2021      Assessment   Applicable Diagnoses     Obesity DM II DHF COPD    Diet/Nutrition Related History:     Pt rpt was over 500 lbs at one time. In the past has been able to lose wt and keep bs under control when not on steroids. Now struggles w inability to have adequate activity to support wt loss. Allows knows how to count CHO and limit Na+ in diet. Able to describe strategies for this. Main concern re illnesses that bring her to hospital.      Labs reviewed   Yes      Nutrition Diagnosis     6/4  Problem Food and nutrition knowledge deficit potential   Etiology Wt mgt    Signs/Symptoms Ongoing obesity   Status: resolved as pt able to demonstrate appropriate knowledge -Gave attn to presentation of material; took material for review.    Goal:     Increase knowledge on diet/nutrition effects on condition/status     Nutrition Intervention     Education provided regarding nutrition therapy for: Diet rationale, Key food habit change, Consistent Carbohydrate Diet, Sodium Restriction and Weight Loss     Education provided regarding food habits/behavior related to:Eating pattern and Appropriate portions     RD provided printed material via [employer] RDN created education material    Pt acknowledged understanding of material covered      Monitoring/Evaluation:     Per protocol for hospital adm.        Blanca Kam RD  Time Spent: 25 min

## 2021-06-06 LAB
ANION GAP SERPL CALCULATED.3IONS-SCNC: 9 MMOL/L (ref 5–15)
BASOPHILS # BLD AUTO: 0.03 10*3/MM3 (ref 0–0.2)
BASOPHILS NFR BLD AUTO: 0.2 % (ref 0–1.5)
BUN SERPL-MCNC: 22 MG/DL (ref 6–20)
BUN/CREAT SERPL: 31 (ref 7–25)
CALCIUM SPEC-SCNC: 9.5 MG/DL (ref 8.6–10.5)
CHLORIDE SERPL-SCNC: 97 MMOL/L (ref 98–107)
CO2 SERPL-SCNC: 28 MMOL/L (ref 22–29)
CREAT SERPL-MCNC: 0.71 MG/DL (ref 0.57–1)
DEPRECATED RDW RBC AUTO: 50.3 FL (ref 37–54)
EOSINOPHIL # BLD AUTO: 0 10*3/MM3 (ref 0–0.4)
EOSINOPHIL NFR BLD AUTO: 0 % (ref 0.3–6.2)
ERYTHROCYTE [DISTWIDTH] IN BLOOD BY AUTOMATED COUNT: 16.2 % (ref 12.3–15.4)
GFR SERPL CREATININE-BSD FRML MDRD: 85 ML/MIN/1.73
GLUCOSE BLDC GLUCOMTR-MCNC: 237 MG/DL (ref 70–130)
GLUCOSE BLDC GLUCOMTR-MCNC: 281 MG/DL (ref 70–130)
GLUCOSE BLDC GLUCOMTR-MCNC: 293 MG/DL (ref 70–130)
GLUCOSE BLDC GLUCOMTR-MCNC: 357 MG/DL (ref 70–130)
GLUCOSE SERPL-MCNC: 306 MG/DL (ref 65–99)
HCT VFR BLD AUTO: 44 % (ref 34–46.6)
HGB BLD-MCNC: 13.2 G/DL (ref 12–15.9)
IMM GRANULOCYTES # BLD AUTO: 0.21 10*3/MM3 (ref 0–0.05)
IMM GRANULOCYTES NFR BLD AUTO: 1.5 % (ref 0–0.5)
LYMPHOCYTES # BLD AUTO: 1.12 10*3/MM3 (ref 0.7–3.1)
LYMPHOCYTES NFR BLD AUTO: 8 % (ref 19.6–45.3)
MCH RBC QN AUTO: 25.7 PG (ref 26.6–33)
MCHC RBC AUTO-ENTMCNC: 30 G/DL (ref 31.5–35.7)
MCV RBC AUTO: 85.6 FL (ref 79–97)
MONOCYTES # BLD AUTO: 0.28 10*3/MM3 (ref 0.1–0.9)
MONOCYTES NFR BLD AUTO: 2 % (ref 5–12)
NEUTROPHILS NFR BLD AUTO: 12.31 10*3/MM3 (ref 1.7–7)
NEUTROPHILS NFR BLD AUTO: 88.3 % (ref 42.7–76)
NRBC BLD AUTO-RTO: 0 /100 WBC (ref 0–0.2)
PLATELET # BLD AUTO: 271 10*3/MM3 (ref 140–450)
PMV BLD AUTO: 11.1 FL (ref 6–12)
POTASSIUM SERPL-SCNC: 4.8 MMOL/L (ref 3.5–5.2)
RBC # BLD AUTO: 5.14 10*6/MM3 (ref 3.77–5.28)
SODIUM SERPL-SCNC: 134 MMOL/L (ref 136–145)
WBC # BLD AUTO: 13.95 10*3/MM3 (ref 3.4–10.8)

## 2021-06-06 PROCEDURE — 97530 THERAPEUTIC ACTIVITIES: CPT

## 2021-06-06 PROCEDURE — 63710000001 INSULIN LISPRO (HUMAN) PER 5 UNITS: Performed by: INTERNAL MEDICINE

## 2021-06-06 PROCEDURE — 80048 BASIC METABOLIC PNL TOTAL CA: CPT | Performed by: INTERNAL MEDICINE

## 2021-06-06 PROCEDURE — 25010000002 AZITHROMYCIN PER 500 MG: Performed by: INTERNAL MEDICINE

## 2021-06-06 PROCEDURE — 94799 UNLISTED PULMONARY SVC/PX: CPT

## 2021-06-06 PROCEDURE — 97161 PT EVAL LOW COMPLEX 20 MIN: CPT

## 2021-06-06 PROCEDURE — 82962 GLUCOSE BLOOD TEST: CPT

## 2021-06-06 PROCEDURE — 85025 COMPLETE CBC W/AUTO DIFF WBC: CPT | Performed by: INTERNAL MEDICINE

## 2021-06-06 PROCEDURE — 25010000002 CEFTRIAXONE PER 250 MG: Performed by: INTERNAL MEDICINE

## 2021-06-06 PROCEDURE — 25010000002 METHYLPREDNISOLONE PER 40 MG: Performed by: FAMILY MEDICINE

## 2021-06-06 PROCEDURE — 63710000001 INSULIN DETEMIR PER 5 UNITS: Performed by: INTERNAL MEDICINE

## 2021-06-06 PROCEDURE — 99232 SBSQ HOSP IP/OBS MODERATE 35: CPT | Performed by: FAMILY MEDICINE

## 2021-06-06 RX ADMIN — IPRATROPIUM BROMIDE AND ALBUTEROL SULFATE 3 ML: 2.5; .5 SOLUTION RESPIRATORY (INHALATION) at 09:14

## 2021-06-06 RX ADMIN — Medication 1 PATCH: at 08:17

## 2021-06-06 RX ADMIN — OXYCODONE HYDROCHLORIDE AND ACETAMINOPHEN 1 TABLET: 10; 325 TABLET ORAL at 17:49

## 2021-06-06 RX ADMIN — BUDESONIDE 0.5 MG: 0.5 INHALANT RESPIRATORY (INHALATION) at 20:06

## 2021-06-06 RX ADMIN — GABAPENTIN 600 MG: 300 CAPSULE ORAL at 05:04

## 2021-06-06 RX ADMIN — METHYLPREDNISOLONE SODIUM SUCCINATE 40 MG: 40 INJECTION, POWDER, FOR SOLUTION INTRAMUSCULAR; INTRAVENOUS at 21:12

## 2021-06-06 RX ADMIN — BUSPIRONE HYDROCHLORIDE 10 MG: 10 TABLET ORAL at 08:14

## 2021-06-06 RX ADMIN — IPRATROPIUM BROMIDE AND ALBUTEROL SULFATE 3 ML: 2.5; .5 SOLUTION RESPIRATORY (INHALATION) at 12:18

## 2021-06-06 RX ADMIN — SODIUM CHLORIDE 1 G: 900 INJECTION INTRAVENOUS at 21:12

## 2021-06-06 RX ADMIN — INSULIN DETEMIR 30 UNITS: 100 INJECTION, SOLUTION SUBCUTANEOUS at 21:10

## 2021-06-06 RX ADMIN — OXYCODONE HYDROCHLORIDE AND ACETAMINOPHEN 1 TABLET: 10; 325 TABLET ORAL at 10:32

## 2021-06-06 RX ADMIN — INSULIN DETEMIR 30 UNITS: 100 INJECTION, SOLUTION SUBCUTANEOUS at 08:12

## 2021-06-06 RX ADMIN — ROFLUMILAST 500 MCG: 500 TABLET ORAL at 08:14

## 2021-06-06 RX ADMIN — DULOXETINE HYDROCHLORIDE 120 MG: 60 CAPSULE, DELAYED RELEASE ORAL at 08:14

## 2021-06-06 RX ADMIN — INSULIN LISPRO 3 UNITS: 100 INJECTION, SOLUTION INTRAVENOUS; SUBCUTANEOUS at 08:13

## 2021-06-06 RX ADMIN — FAMOTIDINE 20 MG: 20 TABLET ORAL at 08:13

## 2021-06-06 RX ADMIN — BUSPIRONE HYDROCHLORIDE 10 MG: 10 TABLET ORAL at 17:49

## 2021-06-06 RX ADMIN — BUDESONIDE 0.5 MG: 0.5 INHALANT RESPIRATORY (INHALATION) at 09:13

## 2021-06-06 RX ADMIN — METOPROLOL SUCCINATE 50 MG: 50 TABLET, EXTENDED RELEASE ORAL at 08:13

## 2021-06-06 RX ADMIN — OXYCODONE HYDROCHLORIDE AND ACETAMINOPHEN 1 TABLET: 10; 325 TABLET ORAL at 03:46

## 2021-06-06 RX ADMIN — METHYLPREDNISOLONE SODIUM SUCCINATE 40 MG: 40 INJECTION, POWDER, FOR SOLUTION INTRAMUSCULAR; INTRAVENOUS at 13:02

## 2021-06-06 RX ADMIN — IPRATROPIUM BROMIDE AND ALBUTEROL SULFATE 3 ML: 2.5; .5 SOLUTION RESPIRATORY (INHALATION) at 20:06

## 2021-06-06 RX ADMIN — ESCITALOPRAM OXALATE 20 MG: 20 TABLET ORAL at 08:13

## 2021-06-06 RX ADMIN — FUROSEMIDE 20 MG: 20 TABLET ORAL at 08:14

## 2021-06-06 RX ADMIN — INSULIN LISPRO 4 UNITS: 100 INJECTION, SOLUTION INTRAVENOUS; SUBCUTANEOUS at 12:55

## 2021-06-06 RX ADMIN — SODIUM CHLORIDE, PRESERVATIVE FREE 10 ML: 5 INJECTION INTRAVENOUS at 21:13

## 2021-06-06 RX ADMIN — INSULIN LISPRO 4 UNITS: 100 INJECTION, SOLUTION INTRAVENOUS; SUBCUTANEOUS at 17:49

## 2021-06-06 RX ADMIN — NIFEDIPINE 30 MG: 30 TABLET, FILM COATED, EXTENDED RELEASE ORAL at 08:13

## 2021-06-06 RX ADMIN — IPRATROPIUM BROMIDE AND ALBUTEROL SULFATE 3 ML: 2.5; .5 SOLUTION RESPIRATORY (INHALATION) at 15:45

## 2021-06-06 RX ADMIN — METHYLPREDNISOLONE SODIUM SUCCINATE 40 MG: 40 INJECTION, POWDER, FOR SOLUTION INTRAMUSCULAR; INTRAVENOUS at 05:04

## 2021-06-06 RX ADMIN — SODIUM CHLORIDE, PRESERVATIVE FREE 10 ML: 5 INJECTION INTRAVENOUS at 09:47

## 2021-06-06 RX ADMIN — APIXABAN 5 MG: 5 TABLET, FILM COATED ORAL at 08:13

## 2021-06-06 RX ADMIN — APIXABAN 5 MG: 5 TABLET, FILM COATED ORAL at 21:10

## 2021-06-06 RX ADMIN — DIAZEPAM 2 MG: 2 TABLET ORAL at 10:37

## 2021-06-06 RX ADMIN — GABAPENTIN 600 MG: 300 CAPSULE ORAL at 21:10

## 2021-06-06 RX ADMIN — TRAZODONE HYDROCHLORIDE 50 MG: 50 TABLET ORAL at 21:10

## 2021-06-06 RX ADMIN — FAMOTIDINE 20 MG: 20 TABLET ORAL at 21:10

## 2021-06-06 RX ADMIN — AZITHROMYCIN 500 MG: 500 INJECTION, POWDER, LYOPHILIZED, FOR SOLUTION INTRAVENOUS at 22:16

## 2021-06-06 RX ADMIN — IPRATROPIUM BROMIDE AND ALBUTEROL SULFATE 3 ML: 2.5; .5 SOLUTION RESPIRATORY (INHALATION) at 23:53

## 2021-06-06 RX ADMIN — INSULIN LISPRO 6 UNITS: 100 INJECTION, SOLUTION INTRAVENOUS; SUBCUTANEOUS at 21:11

## 2021-06-06 RX ADMIN — GABAPENTIN 600 MG: 300 CAPSULE ORAL at 13:02

## 2021-06-06 NOTE — PROGRESS NOTES
[facility] Medicine Services  PROGRESS NOTE    Patient Name: Dani Ziegler  : 1965  MRN: [id number]    Date of Admission: 6/3/2021  Primary Care Physician: Darrion Tovar MD    Subjective   Subjective     CC:  Pneumonia    HPI:  Patient is a 55-year-old who was admitted with acute respiratory failure with hypoxia secondary to community-acquired pneumonia.  She also had significant leukocytosis which is why her PCP sent her to the ER.    2021-the patient remains on 3-4 L nasal cannula.  She states she is on 2 L nocturnally at at home.  She states since being admitted her cough has been a little more productive.  She is tolerating p.o. but does feel little bit weak.    2021-patient had desaturation with O2 as low as 83% while ambulating.  She improved with 3 L nasal cannula.  PT is recommended skilled rehab.  She is tolerating p.o. well.  She still complains of cough and shortness of breath.  When discussing rehab she became very tearful and anxious.    ROS:  General: No fever, chills, positive fatigue  ENT: No sore throat, trouble swallowing or changes in vision  Respiratory: Positive shortness of breath, cough, denies wheezing or fast breathing  Cardiovascular: No chest pain, palpitations, positive dyspnea with exertion  Gastrointestinal: No nausea vomiting abdominal pain  Musculoskeletal: Positive difficulty walking, positive weakness  Vascular: No cyanosis or clubbing  Lymphatic: No peripheral edema, no lymphadenopathy  Neurologic: No headache, confusion, dizziness  Psychiatric: Positive crying with anxiety.       Objective   Objective     Vital Signs:   Temp:  [98.2 °F (36.8 °C)-98.8 °F (37.1 °C)] 98.8 °F (37.1 °C)  Heart Rate:  [63-82] 80  Resp:  [16-18] 16  BP: (112-160)/(65-92) 160/92        Physical Exam:  Constitutional: No acute distress, awake, alert, nontoxic, obese body habitus  Eyes: Pupils equal, sclerae anicteric, no conjunctival injection  HENT: NCAT,  mucous membranes moist  Neck: Supple, no thyromegaly, no lymphadenopathy  Respiratory: Decreased breath sounds with faint crackles bilaterally, good effort, nonlabored respirations   Cardiovascular: RRR  Gastrointestinal: Positive bowel sounds, soft, nontender, nondistended  Musculoskeletal: No peripheral edema, normal muscle tone for age  Psychiatric: Tearful and anxious, cooperative  Neurologic: Oriented x 3, movements symmetric BUE and BLE, Cranial Nerves grossly intact, speech clear and fluent  Skin: No rashes, no jaundice, no petechiae, no mottling      Results Reviewed:  Results from last 7 days   Lab Units 06/03/21  1902   WBC 10*3/mm3 11.80*   HEMOGLOBIN g/dL 13.6   HEMATOCRIT % 45.8   PLATELETS 10*3/mm3 341     Results from last 7 days   Lab Units 06/03/21  1902   SODIUM mmol/L 139   POTASSIUM mmol/L 4.6   CHLORIDE mmol/L 101   CO2 mmol/L 27.0   BUN mg/dL 8   CREATININE mg/dL 0.73   GLUCOSE mg/dL 152*   CALCIUM mg/dL 9.5   ALT (SGPT) U/L 9   AST (SGOT) U/L 19     Estimated Creatinine Clearance: 121.2 mL/min (by C-G formula based on SCr of 0.73 mg/dL).    Microbiology Results Abnormal     Procedure Component Value - Date/Time    Blood Culture - Blood, Arm, Left [[id number]] Collected: 06/03/21 0001    Lab Status: Preliminary result Specimen: Blood from Arm, Left Updated: 06/06/21 0015     Blood Culture No growth at 2 days    Blood Culture - Blood, Arm, Right [[id number]] Collected: 06/03/21 2350    Lab Status: Preliminary result Specimen: Blood from Arm, Right Updated: 06/06/21 0015     Blood Culture No growth at 2 days    COVID PRE-OP / PRE-PROCEDURE SCREENING ORDER (NO ISOLATION) - Swab, Nasopharynx [[id number]]  (Normal) Collected: 06/03/21 2345    Lab Status: Final result Specimen: Swab from Nasopharynx Updated: 06/04/21 0031    Narrative:      The following orders were created for panel order COVID PRE-OP / PRE-PROCEDURE SCREENING ORDER (NO ISOLATION) - Swab, Nasopharynx.  Procedure                                Abnormality         Status                     ---------                               -----------         ------                     COVID-19 and FLU A/B PCR...[[id number]]  Normal              Final result                 Please view results for these tests on the individual orders.    COVID-19 and FLU A/B PCR - Swab, Nasopharynx [[id number]]  (Normal) Collected: 06/03/21 2345    Lab Status: Final result Specimen: Swab from Nasopharynx Updated: 06/04/21 0031     COVID19 Not Detected     Influenza A PCR Not Detected     Influenza B PCR Not Detected    Narrative:      Fact sheet for providers: https://www.fda.gov/media/255886/download    Fact sheet for patients: https://www.fda.gov/media/190996/download    Test performed by PCR.          Imaging Results (Last 24 Hours)     ** No results found for the last 24 hours. **          Results for orders placed in visit on 05/05/20    SCANNED - ECHOCARDIOGRAM      I have reviewed the medications:  Scheduled Meds:apixaban, 5 mg, Oral, Q12H  azithromycin, 500 mg, Intravenous, Q24H  budesonide, 0.5 mg, Nebulization, BID - RT  busPIRone, 10 mg, Oral, BID  cefTRIAXone, 1 g, Intravenous, Q24H  DULoxetine, 120 mg, Oral, Daily  escitalopram, 20 mg, Oral, Daily  famotidine, 20 mg, Oral, BID  furosemide, 20 mg, Oral, Daily  gabapentin, 600 mg, Oral, Q8H  insulin detemir, 30 Units, Subcutaneous, Q12H  insulin lispro, 0-7 Units, Subcutaneous, 4x Daily With Meals & Nightly  ipratropium-albuterol, 3 mL, Nebulization, Once  ipratropium-albuterol, 3 mL, Nebulization, Q4H - RT  methylPREDNISolone sodium succinate, 40 mg, Intravenous, Q8H  metoprolol succinate XL, 50 mg, Oral, Daily  nicotine, 1 patch, Transdermal, Q24H  NIFEdipine CC, 30 mg, Oral, Daily  ondansetron, 4 mg, Intravenous, Once  pharmacy consult - MTM, , Does not apply, Daily  roflumilast, 500 mcg, Oral, Daily  sodium chloride, 10 mL, Intravenous, Q12H  traZODone, 50 mg, Oral, Nightly      Continuous Infusions:Pharmacy to  dose Trelegy,       PRN Meds:.•  albuterol  •  dextrose  •  dextrose  •  diazePAM  •  glucagon (human recombinant)  •  ondansetron  •  oxyCODONE-acetaminophen  •  Pharmacy to dose Trelegy  •  Sodium Chloride (PF)  •  sodium chloride    Assessment/Plan   Assessment & Plan     Active Hospital Problems    Diagnosis  POA   • **CAP (community acquired pneumonia) [J18.9]  Yes   • Polypharmacy [Z79.899]  Not Applicable   • Morbid obesity with BMI of 45.0-49.9, adult (CMS/[facility]) [E66.01, Z68.42]  Not Applicable   • Acute on chronic respiratory failure with hypoxia (CMS/[facility]) [J96.21]  Yes   • CHF (congestive heart failure) (CMS/[facility]) [I50.9]  Yes   • COPD with acute exacerbation (CMS/[facility]) [J44.1]  Yes   • Mixed hyperlipidemia [E78.2]  Yes   • Uncontrolled type 2 diabetes mellitus with hyperglycemia, without long-term current use of insulin (CMS/[facility]) [E11.65]  Yes   • Vitamin D deficiency [E55.9]  Yes   • Peripheral neuropathy [G62.9]  Yes   • Essential hypertension [I10]  Yes   • Chronic pain [G89.29]  Yes   • Chronic obstructive pulmonary disease (CMS/[facility]) [J44.9]  Yes   • Edema of lower extremity [R60.0]  Yes   • Depression [F32.9]  Yes   • Anxiety [F41.9]  Yes      Resolved Hospital Problems   No resolved problems to display.        Brief Hospital Course to date:  Dani Ziegler is a 55 y.o. female admitted with acute respiratory failure with hypoxia secondary to community-acquired pneumonia with significant leukocytosis.    Acute respiratory failure with hypoxia  Community-acquired pneumonia  Significant leukocytosis  Acute exacerbation of COPD  -Continue Rocephin, azithromycin and methylprednisolone  -Continue O2 to maintain sats greater than 90, wean as tolerated  -Current O2 requirement 3 to 4 L nasal cannula (baseline 2 L nasal cannula nocturnally only)  -Improvement in leukocytosis noted, repeat labs pending.  -Continue nebs and inhalers    Morbid obesity  Generalized weakness  Difficulty ambulating  -PT consulted and  recommended skilled facility for rehab  -Patient became very anxious when discussing referrals for rehab, she may elect just to go home    Diabetes mellitus type 2  -Diabetic diet, sliding scale insulin as needed, Levemir 30 units twice daily  -Likely hyperglycemia exacerbated by steroid use  -Fingerstick blood sugars in the 200s sleep with range 146-284    Peripheral neuropathy  -Continue gabapentin    Hypertension    Anxiety and depression  -Continue Lexapro, BuSpar and Cymbalta  -Continue Valium as needed    History of congestive heart failure  Chronic anticoagulation due to past history of DVT x2  Patient reports increased risk of blood clots  -Continue Eliquis  -Continue medical management with Lasix daily, home meds reviewed include metoprolol and nifedipine    DVT Prophylaxis: Eliquis      Disposition: I expect the patient to be discharged pending improvement in her oxygen requirements, treatment of her pneumonia, weaning as tolerated, transition to oral antibiotics and oral steroids.  Also pending referrals for rehab.    CODE STATUS:   Code Status and Medical Interventions:   Ordered at: 06/04/21 1221     Code Status:    CPR     Medical Interventions (Level of Support Prior to Arrest):    Full       Celine Villanueva MD  06/06/21

## 2021-06-06 NOTE — THERAPY EVALUATION
Patient Name: Dani Ziegler  : 1965    MRN: [id number]                              Today's Date: 2021       Admit Date: 6/3/2021    Visit Dx:     ICD-10-CM ICD-9-CM   1. Acute sepsis (CMS/[facility])  A41.9 038.9     995.91   2. Pneumonia due to infectious organism, unspecified laterality, unspecified part of lung  J18.9 486   3. Hypoxia  R09.02 799.02   4. Acute exacerbation of chronic obstructive pulmonary disease (COPD) (CMS/[facility])  J44.1 491.21   5. Impaired functional mobility, balance, gait, and endurance  Z74.09 V49.89     Patient Active Problem List   Diagnosis   • Uncontrolled type 2 diabetes mellitus with hyperglycemia, without long-term current use of insulin (CMS/[facility])   • Vitamin D deficiency   • Peripheral neuropathy   • Adiposity   • Left bundle branch block (LBBB)   • Essential hypertension   • Chronic pain   • Chronic obstructive pulmonary disease (CMS/[facility])   • Anxiety   • Depression   • Edema of lower extremity   • Arthritis involving multiple sites   • Leukocytosis   • Mixed hyperlipidemia   • COPD with acute exacerbation (CMS/[facility])   • Special screening for malignant neoplasms, colon   • Cellulitis of left lower extremity   • Hyperkalemia   • Acute on chronic renal insufficiency   • Sepsis (CMS/[facility])   • CHF (congestive heart failure) (CMS/[facility])   • Cellulitis of left leg   • Lumbar disc disease   • Diabetic peripheral neuropathy (CMS/[facility])   • COVID-19 virus infection   • CAP (community acquired pneumonia)   • Polypharmacy   • Morbid obesity with BMI of 45.0-49.9, adult (CMS/[facility])   • Acute on chronic respiratory failure with hypoxia (CMS/[facility])     Past Medical History:   Diagnosis Date   • Abnormal weight gain    • Acute UTI    • Anxiety    • Arthritis involving multiple sites    • Chronic obstructive pulmonary disease (CMS/[facility])    • Chronic pain    • Current every day smoker    • Depression    • Diabetes mellitus (CMS/[facility])    • Diarrhea    • Dysuria    • Edema, lower extremity    • Fever    •  Head injury    • Hypertension    • Intervertebral disc disorder     Degeneration of intervertebral disc, site unspecified (722.6)   • Left bundle branch block    • Leukocytosis    • Long term current use of methadone for pain control    • Mass of right breast    • Nausea    • Obesity    • Overactive bladder    • Peripheral neuropathy    • Superficial phlebitis     right medial calf   • Upper respiratory infection    • Urinary incontinence    • Vitamin D deficiency    • Vomiting      Past Surgical History:   Procedure Laterality Date   • BLADDER SURGERY      pubovaginal sling   • CHOLECYSTECTOMY     • HIP ARTHROSCOPY Right 10/29/2020     General Information     Row Name 06/06/21 0720          Physical Therapy Time and Intention    Document Type  evaluation  -     Mode of Treatment  physical therapy  -     Row Name 06/06/21 0720          General Information    Patient Profile Reviewed  yes  -LS     Prior Level of Function  independent:;gait;transfer;bed mobility uses RW. has wc but rarely uses it. has hospital bed.  -     Existing Precautions/Restrictions  oxygen therapy device and L/min 3L O2/NC  -     Row Name 06/06/21 0720          Living Environment    Lives With  significant other  -     Row Name 06/06/21 0720          Home Main Entrance    Number of Stairs, Main Entrance  three  -LS     Stair Railings, Main Entrance  railings on both sides of stairs able to reach both rails at the same time  -     Row Name 06/06/21 0720          Stairs Within Home, Primary    Number of Stairs, Within Home, Primary  none  -     Row Name 06/06/21 0720          Cognition    Orientation Status (Cognition)  oriented x 4  -     Row Name 06/06/21 0720          Safety Issues, Functional Mobility    Impairments Affecting Function (Mobility)  endurance/activity tolerance;shortness of breath;strength;balance  -       User Key  (r) = Recorded By, (t) = Taken By, (c) = Cosigned By    Initials Name Provider Type    LS  Sepideh Mendoza, PT Physical Therapist        Mobility     Row Name 06/06/21 0720          Bed Mobility    Bed Mobility  supine-sit-supine  -LS     Supine-Sit-Supine [place] (Bed Mobility)  modified independence  -LS     Assistive Device (Bed Mobility)  bed rails;head of bed elevated  -     Row Name 06/06/21 0720          Sit-Stand Transfer    Sit-Stand [place] (Transfers)  modified independence  -LS     Assistive Device (Sit-Stand Transfers)  walker, front-wheeled  -LS     Row Name 06/06/21 0720          Gait/Stairs (Locomotion)    [place] Level (Gait)  modified independence  -LS     Assistive Device (Gait)  walker, front-wheeled  -LS     Distance in Feet (Gait)  190 with 2 standing rest breaks  -LS     Deviations/Abnormal Patterns (Gait)  base of support, wide;gait speed decreased;weight shifting decreased  -     Comment (Gait/Stairs)  monitored O2 sats on RA initially during walk. O2 reapplied after walking 30' due to dropping O2 sats (down to 83%). on 3L O2/NC, O2 sats remained in 90s throughout remainder of walk.  -       User Key  (r) = Recorded By, (t) = Taken By, (c) = Cosigned By    Initials Name Provider Type    Sepideh Ireland, PT Physical Therapist        Obj/Interventions     Row Name 06/06/21 0720          Range of Motion Comprehensive    General Range of Motion  bilateral lower extremity ROM WFL  -     Comment, General Range of Motion  RUE AROM WFL. L sh flexion AAROM full. L sh flexion AROM 150 degrees. L elbow AROM WFL.  -     Row Name 06/06/21 0720          Strength Comprehensive (MMT)    Comment, General Manual Muscle Testing (MMT) Assessment  B hip flexors 3+. B knee extensors 4+. B ankle dorsiflexors 4/5.  -     Row Name 06/06/21 0720          Balance    Balance Interventions  standing;weight shifting activity  -       User Key  (r) = Recorded By, (t) = Taken By, (c) = Cosigned By    Initials Name Provider Type    Sepideh Ireland, PT Physical  Therapist        Goals/Plan     Row Name 06/06/21 0720          Bed Mobility Goal 1 (PT)    Activity/Assistive Device (Bed Mobility Goal 1, PT)  sit to supine/supine to sit  -LS     [place] Level/Cues Needed (Bed Mobility Goal 1, PT)  modified independence  -LS     Time Frame (Bed Mobility Goal 1, PT)  1 day  -LS     Progress/Outcomes (Bed Mobility Goal 1, PT)  goal met  -LS     Row Name 06/06/21 0720          Transfer Goal 1 (PT)    Activity/Assistive Device (Transfer Goal 1, PT)  sit-to-stand/stand-to-sit;walker, rolling  -LS     [place] Level/Cues Needed (Transfer Goal 1, PT)  modified independence  -LS     Time Frame (Transfer Goal 1, PT)  1 day  -LS     Progress/Outcome (Transfer Goal 1, PT)  goal met  -LS     Row Name 06/06/21 0720          Gait Training Goal 1 (PT)    Activity/Assistive Device (Gait Training Goal 1, PT)  gait (walking locomotion);assistive device use;walker, rolling  -LS     [place] Level (Gait Training Goal 1, PT)  modified independence  -LS     Distance (Gait Training Goal 1, PT)  300 maintaining O2 sats in the 90s  -LS     Time Frame (Gait Training Goal 1, PT)  10 days  -LS     Progress/Outcome (Gait Training Goal 1, PT)  goal ongoing  -LS       User Key  (r) = Recorded By, (t) = Taken By, (c) = Cosigned By    Initials Name Provider Type    LS Sepideh Mendoza, PT Physical Therapist        Clinical Impression     Row Name 06/06/21 0720          Pain    Additional Documentation  Pain Scale: Numbers Pre/Post-Treatment (Group)  -LS     Row Name 06/06/21 0720          Pain Scale: Numbers Pre/Post-Treatment    Pretreatment Pain Rating  6/10  -LS     Posttreatment Pain Rating  8/10  -LS     Pain Location  back back, R hip, feet  -LS     Pain Intervention(s)  Repositioned;Ambulation/increased activity  -LS     Row Name 06/06/21 0720          Plan of Care Review    Plan of Care Reviewed With  patient  -LS     Outcome Summary  Pt is able to walk short distances independently with  a RW but becomes SOA and has decreased activity tolerance. assessed amb on room air. O2 sats dropped to 83% on room air after walking 30 feet; O2 reapplied at 3L O2/NC. satss remained in 90s throughout remainder of walk. Pt would benefit from skilled PT services to improve functional mobility  -LS     Row Name 06/06/21 0720          Therapy Assessment/Plan (PT)    Patient/Family Therapy Goals Statement (PT)  Go home.  -LS     Rehab Potential (PT)  good, to achieve stated therapy goals  -LS     Criteria for Skilled Interventions Met (PT)  yes;meets criteria  -LS     Row Name 06/06/21 0734 06/06/21 0720       Vital Signs    Pre Systolic BP Rehab  --  156  -LS    Pre Treatment Diastolic BP  --  92  -LS    Post Systolic BP Rehab  --  160  -LS    Post Treatment Diastolic BP  --  92  -LS    Pretreatment Heart Rate (beats/min)  --  79  -LS    Posttreatment Heart Rate (beats/min)  --  82  -LS    Pre SpO2 (%)  --  -LS  90  -LS    O2 Delivery Pre Treatment  --  -LS  nasal cannula  -LS    Intra SpO2 (%)  --  83 amb without O2 initially to monitor sats on RA per nsg request. dropped to 80%. reapplied 3L O2/NC. O2 sats back to 90s throughout remainder of walk (92-93%)  -LS    Post SpO2 (%)  --  93  -LS    O2 Delivery Post Treatment  --  nasal cannula  -LS    Pre Patient Position  --  Sitting  -LS    Post Patient Position  --  Sitting  -LS    Row Name 06/06/21 0720          Positioning and Restraints    Pre-Treatment Position  in bed  -LS     Post Treatment Position  bed  -LS     In Bed  sitting;call light within reach  -LS       User Key  (r) = Recorded By, (t) = Taken By, (c) = Cosigned By    Initials Name Provider Type    LS Sepideh Mendoza, PT Physical Therapist        Outcome Measures     Row Name 06/06/21 0800 06/06/21 0720       How much help from another person do you currently need...    Turning from your back to your side while in flat bed without using bedrails?  3  -HS  4  -LS    Moving from lying on back to  sitting on the side of a flat bed without bedrails?  3  -HS  3  -LS    Moving to and from a bed to a chair (including a wheelchair)?  3  -HS  4  -LS    Standing up from a chair using your arms (e.g., wheelchair, bedside chair)?  2  -HS  4  -LS    Climbing 3-5 steps with a railing?  2  -HS  4  -LS    To walk in hospital room?  2  -HS  4  -LS    AM-PAC 6 Clicks Score (PT)  15  -HS  23  -LS    Row Name 06/06/21 0720          Functional Assessment    Outcome Measure Options  AM-PAC 6 Clicks Basic Mobility (PT)  -       User Key  (r) = Recorded By, (t) = Taken By, (c) = Cosigned By    Initials Name Provider Type    LS Sepideh Mendoza, PT Physical Therapist     Fatuma Mejia RN Registered Nurse        Physical Therapy Education                 Title: PT OT SLP Therapies (Done)     Topic: Physical Therapy (Done)     Point: Home exercise program (Done)     Learning Progress Summary           Patient Acceptance, E, VU by  at 6/6/2021 0720    Comment: Pt to increase walking distance as able. Pt said she has a pulse oximiter at home and can monitor O2 sats with activity and progress walking as she is able.                               User Key     Initials Effective Dates Name Provider Type Discipline     07/17/19 -  Sepideh Mendoza, PT Physical Therapist PT              PT Recommendation and Plan     Plan of Care Reviewed With: patient  Outcome Summary: Pt is able to walk short distances independently with a RW but becomes SOA and has decreased activity tolerance. assessed amb on room air. O2 sats dropped to 83% on room air after walking 30 feet; O2 reapplied at 3L O2/NC. satss remained in 90s throughout remainder of walk. Pt would benefit from skilled PT services to improve functional mobility     Time Calculation:   PT Charges     Row Name 06/06/21 0720             Time Calculation    Start Time  0720  -      PT Received On  06/06/21  -      PT Goal Re-Cert Due Date  06/16/21  -         Time  Calculation- PT    Total Timed Code Minutes- PT  15 minute(s)  -LS         Timed Charges    05822 - PT Therapeutic Activity Minutes  15  -LS         Untimed Charges    PT Eval/Re-eval Minutes  55  -LS         Total Minutes    Timed Charges Total Minutes  15  -LS      Untimed Charges Total Minutes  55  -LS       Total Minutes  70  -LS        User Key  (r) = Recorded By, (t) = Taken By, (c) = Cosigned By    Initials Name Provider Type    Sepideh Ireland, PT Physical Therapist        Therapy Charges for Today     Code Description Service Date Service Provider Modifiers Qty    96586462224 HC PT EVAL LOW COMPLEXITY 4 6/6/2021 Sepideh Mendoza, PT GP 1    91583002894 HC PT THERAPEUTIC ACT EA 15 MIN 6/6/2021 Sepideh Mendoza, PT GP 1          PT G-Codes  Outcome Measure Options: AM-PAC 6 Clicks Basic Mobility (PT)  AM-PAC 6 Clicks Score (PT): 15    Sepideh Mendoza PT  6/6/2021

## 2021-06-06 NOTE — PLAN OF CARE
Goal Outcome Evaluation:  Plan of Care Reviewed With: patient     Outcome Summary: Pt is able to walk short distances independently with a RW but becomes SOA and has decreased activity tolerance. assessed amb on room air. O2 sats dropped to 83% on room air after walking 30 feet; O2 reapplied at 3L O2/NC. satss remained in 90s throughout remainder of walk. Pt would benefit from skilled PT services to improve functional mobility

## 2021-06-07 LAB
ANION GAP SERPL CALCULATED.3IONS-SCNC: 10 MMOL/L (ref 5–15)
BASOPHILS # BLD AUTO: 0.03 10*3/MM3 (ref 0–0.2)
BASOPHILS NFR BLD AUTO: 0.2 % (ref 0–1.5)
BUN SERPL-MCNC: 24 MG/DL (ref 6–20)
BUN/CREAT SERPL: 35.8 (ref 7–25)
CALCIUM SPEC-SCNC: 9.3 MG/DL (ref 8.6–10.5)
CHLORIDE SERPL-SCNC: 98 MMOL/L (ref 98–107)
CO2 SERPL-SCNC: 28 MMOL/L (ref 22–29)
CREAT SERPL-MCNC: 0.67 MG/DL (ref 0.57–1)
DEPRECATED RDW RBC AUTO: 49.1 FL (ref 37–54)
EOSINOPHIL # BLD AUTO: 0 10*3/MM3 (ref 0–0.4)
EOSINOPHIL NFR BLD AUTO: 0 % (ref 0.3–6.2)
ERYTHROCYTE [DISTWIDTH] IN BLOOD BY AUTOMATED COUNT: 16.1 % (ref 12.3–15.4)
GFR SERPL CREATININE-BSD FRML MDRD: 91 ML/MIN/1.73
GLUCOSE BLDC GLUCOMTR-MCNC: 221 MG/DL (ref 70–130)
GLUCOSE BLDC GLUCOMTR-MCNC: 239 MG/DL (ref 70–130)
GLUCOSE BLDC GLUCOMTR-MCNC: 322 MG/DL (ref 70–130)
GLUCOSE BLDC GLUCOMTR-MCNC: 327 MG/DL (ref 70–130)
GLUCOSE SERPL-MCNC: 266 MG/DL (ref 65–99)
HCT VFR BLD AUTO: 43.9 % (ref 34–46.6)
HGB BLD-MCNC: 13.2 G/DL (ref 12–15.9)
IMM GRANULOCYTES # BLD AUTO: 0.25 10*3/MM3 (ref 0–0.05)
IMM GRANULOCYTES NFR BLD AUTO: 1.7 % (ref 0–0.5)
LYMPHOCYTES # BLD AUTO: 1.27 10*3/MM3 (ref 0.7–3.1)
LYMPHOCYTES NFR BLD AUTO: 8.5 % (ref 19.6–45.3)
MAGNESIUM SERPL-MCNC: 1.6 MG/DL (ref 1.6–2.6)
MCH RBC QN AUTO: 25.1 PG (ref 26.6–33)
MCHC RBC AUTO-ENTMCNC: 30.1 G/DL (ref 31.5–35.7)
MCV RBC AUTO: 83.6 FL (ref 79–97)
MONOCYTES # BLD AUTO: 0.51 10*3/MM3 (ref 0.1–0.9)
MONOCYTES NFR BLD AUTO: 3.4 % (ref 5–12)
NEUTROPHILS NFR BLD AUTO: 12.92 10*3/MM3 (ref 1.7–7)
NEUTROPHILS NFR BLD AUTO: 86.2 % (ref 42.7–76)
NRBC BLD AUTO-RTO: 0 /100 WBC (ref 0–0.2)
PLATELET # BLD AUTO: 260 10*3/MM3 (ref 140–450)
PMV BLD AUTO: 10.4 FL (ref 6–12)
POTASSIUM SERPL-SCNC: 4.9 MMOL/L (ref 3.5–5.2)
RBC # BLD AUTO: 5.25 10*6/MM3 (ref 3.77–5.28)
SODIUM SERPL-SCNC: 136 MMOL/L (ref 136–145)
WBC # BLD AUTO: 14.98 10*3/MM3 (ref 3.4–10.8)

## 2021-06-07 PROCEDURE — 99232 SBSQ HOSP IP/OBS MODERATE 35: CPT | Performed by: HOSPITALIST

## 2021-06-07 PROCEDURE — 80048 BASIC METABOLIC PNL TOTAL CA: CPT | Performed by: INTERNAL MEDICINE

## 2021-06-07 PROCEDURE — 25010000002 AZITHROMYCIN PER 500 MG: Performed by: INTERNAL MEDICINE

## 2021-06-07 PROCEDURE — 63710000001 INSULIN LISPRO (HUMAN) PER 5 UNITS: Performed by: INTERNAL MEDICINE

## 2021-06-07 PROCEDURE — 94799 UNLISTED PULMONARY SVC/PX: CPT

## 2021-06-07 PROCEDURE — 83735 ASSAY OF MAGNESIUM: CPT | Performed by: FAMILY MEDICINE

## 2021-06-07 PROCEDURE — 82962 GLUCOSE BLOOD TEST: CPT

## 2021-06-07 PROCEDURE — 25010000002 METHYLPREDNISOLONE PER 40 MG: Performed by: HOSPITALIST

## 2021-06-07 PROCEDURE — 25010000002 METHYLPREDNISOLONE PER 40 MG: Performed by: FAMILY MEDICINE

## 2021-06-07 PROCEDURE — 85025 COMPLETE CBC W/AUTO DIFF WBC: CPT | Performed by: INTERNAL MEDICINE

## 2021-06-07 PROCEDURE — 25010000002 CEFTRIAXONE PER 250 MG: Performed by: INTERNAL MEDICINE

## 2021-06-07 PROCEDURE — 63710000001 INSULIN DETEMIR PER 5 UNITS: Performed by: INTERNAL MEDICINE

## 2021-06-07 RX ORDER — OXYCODONE AND ACETAMINOPHEN 7.5; 325 MG/1; MG/1
1 TABLET ORAL EVERY 6 HOURS PRN
Status: DISCONTINUED | OUTPATIENT
Start: 2021-06-07 | End: 2021-06-11 | Stop reason: HOSPADM

## 2021-06-07 RX ORDER — TERAZOSIN 2 MG/1
2 CAPSULE ORAL NIGHTLY
Status: DISCONTINUED | OUTPATIENT
Start: 2021-06-07 | End: 2021-06-11 | Stop reason: HOSPADM

## 2021-06-07 RX ORDER — METHYLPREDNISOLONE SODIUM SUCCINATE 40 MG/ML
20 INJECTION, POWDER, LYOPHILIZED, FOR SOLUTION INTRAMUSCULAR; INTRAVENOUS EVERY 12 HOURS
Status: DISCONTINUED | OUTPATIENT
Start: 2021-06-07 | End: 2021-06-09

## 2021-06-07 RX ADMIN — ESCITALOPRAM OXALATE 20 MG: 20 TABLET ORAL at 08:02

## 2021-06-07 RX ADMIN — APIXABAN 5 MG: 5 TABLET, FILM COATED ORAL at 08:02

## 2021-06-07 RX ADMIN — IPRATROPIUM BROMIDE AND ALBUTEROL SULFATE 3 ML: 2.5; .5 SOLUTION RESPIRATORY (INHALATION) at 23:27

## 2021-06-07 RX ADMIN — DULOXETINE HYDROCHLORIDE 120 MG: 60 CAPSULE, DELAYED RELEASE ORAL at 08:02

## 2021-06-07 RX ADMIN — INSULIN LISPRO 5 UNITS: 100 INJECTION, SOLUTION INTRAVENOUS; SUBCUTANEOUS at 21:31

## 2021-06-07 RX ADMIN — GABAPENTIN 600 MG: 300 CAPSULE ORAL at 12:50

## 2021-06-07 RX ADMIN — Medication 1 PATCH: at 08:02

## 2021-06-07 RX ADMIN — INSULIN LISPRO 3 UNITS: 100 INJECTION, SOLUTION INTRAVENOUS; SUBCUTANEOUS at 08:01

## 2021-06-07 RX ADMIN — INSULIN LISPRO 5 UNITS: 100 INJECTION, SOLUTION INTRAVENOUS; SUBCUTANEOUS at 12:49

## 2021-06-07 RX ADMIN — DIAZEPAM 2 MG: 2 TABLET ORAL at 17:36

## 2021-06-07 RX ADMIN — GABAPENTIN 600 MG: 300 CAPSULE ORAL at 05:17

## 2021-06-07 RX ADMIN — OXYCODONE HYDROCHLORIDE AND ACETAMINOPHEN 1 TABLET: 7.5; 325 TABLET ORAL at 15:55

## 2021-06-07 RX ADMIN — TRAZODONE HYDROCHLORIDE 50 MG: 50 TABLET ORAL at 21:30

## 2021-06-07 RX ADMIN — FAMOTIDINE 20 MG: 20 TABLET ORAL at 08:02

## 2021-06-07 RX ADMIN — INSULIN DETEMIR 30 UNITS: 100 INJECTION, SOLUTION SUBCUTANEOUS at 08:00

## 2021-06-07 RX ADMIN — OXYCODONE HYDROCHLORIDE AND ACETAMINOPHEN 1 TABLET: 10; 325 TABLET ORAL at 08:01

## 2021-06-07 RX ADMIN — INSULIN LISPRO 3 UNITS: 100 INJECTION, SOLUTION INTRAVENOUS; SUBCUTANEOUS at 17:31

## 2021-06-07 RX ADMIN — OXYCODONE HYDROCHLORIDE AND ACETAMINOPHEN 1 TABLET: 10; 325 TABLET ORAL at 00:08

## 2021-06-07 RX ADMIN — NIFEDIPINE 30 MG: 30 TABLET, FILM COATED, EXTENDED RELEASE ORAL at 08:02

## 2021-06-07 RX ADMIN — IPRATROPIUM BROMIDE AND ALBUTEROL SULFATE 3 ML: 2.5; .5 SOLUTION RESPIRATORY (INHALATION) at 15:42

## 2021-06-07 RX ADMIN — OXYCODONE HYDROCHLORIDE AND ACETAMINOPHEN 1 TABLET: 7.5; 325 TABLET ORAL at 23:55

## 2021-06-07 RX ADMIN — GABAPENTIN 600 MG: 300 CAPSULE ORAL at 21:30

## 2021-06-07 RX ADMIN — IPRATROPIUM BROMIDE AND ALBUTEROL SULFATE 3 ML: 2.5; .5 SOLUTION RESPIRATORY (INHALATION) at 10:49

## 2021-06-07 RX ADMIN — BUDESONIDE 0.5 MG: 0.5 INHALANT RESPIRATORY (INHALATION) at 19:17

## 2021-06-07 RX ADMIN — TERAZOSIN HYDROCHLORIDE 2 MG: 2 CAPSULE ORAL at 22:51

## 2021-06-07 RX ADMIN — APIXABAN 5 MG: 5 TABLET, FILM COATED ORAL at 21:30

## 2021-06-07 RX ADMIN — METHYLPREDNISOLONE SODIUM SUCCINATE 40 MG: 40 INJECTION, POWDER, FOR SOLUTION INTRAMUSCULAR; INTRAVENOUS at 05:17

## 2021-06-07 RX ADMIN — IPRATROPIUM BROMIDE AND ALBUTEROL SULFATE 3 ML: 2.5; .5 SOLUTION RESPIRATORY (INHALATION) at 19:17

## 2021-06-07 RX ADMIN — SODIUM CHLORIDE 1 G: 900 INJECTION INTRAVENOUS at 21:31

## 2021-06-07 RX ADMIN — BUSPIRONE HYDROCHLORIDE 10 MG: 10 TABLET ORAL at 08:02

## 2021-06-07 RX ADMIN — IPRATROPIUM BROMIDE AND ALBUTEROL SULFATE 3 ML: 2.5; .5 SOLUTION RESPIRATORY (INHALATION) at 03:16

## 2021-06-07 RX ADMIN — ROFLUMILAST 500 MCG: 500 TABLET ORAL at 08:02

## 2021-06-07 RX ADMIN — BUDESONIDE 0.5 MG: 0.5 INHALANT RESPIRATORY (INHALATION) at 07:11

## 2021-06-07 RX ADMIN — METHYLPREDNISOLONE SODIUM SUCCINATE 20 MG: 40 INJECTION, POWDER, FOR SOLUTION INTRAMUSCULAR; INTRAVENOUS at 17:32

## 2021-06-07 RX ADMIN — BUSPIRONE HYDROCHLORIDE 10 MG: 10 TABLET ORAL at 17:32

## 2021-06-07 RX ADMIN — INSULIN DETEMIR 30 UNITS: 100 INJECTION, SOLUTION SUBCUTANEOUS at 21:31

## 2021-06-07 RX ADMIN — FAMOTIDINE 20 MG: 20 TABLET ORAL at 21:30

## 2021-06-07 RX ADMIN — METOPROLOL SUCCINATE 50 MG: 50 TABLET, EXTENDED RELEASE ORAL at 08:02

## 2021-06-07 RX ADMIN — IPRATROPIUM BROMIDE AND ALBUTEROL SULFATE 3 ML: 2.5; .5 SOLUTION RESPIRATORY (INHALATION) at 07:11

## 2021-06-07 RX ADMIN — FUROSEMIDE 20 MG: 20 TABLET ORAL at 08:02

## 2021-06-07 RX ADMIN — AZITHROMYCIN 500 MG: 500 INJECTION, POWDER, LYOPHILIZED, FOR SOLUTION INTRAVENOUS at 22:51

## 2021-06-07 RX ADMIN — SODIUM CHLORIDE, PRESERVATIVE FREE 10 ML: 5 INJECTION INTRAVENOUS at 08:03

## 2021-06-07 NOTE — PLAN OF CARE
VSS, NSR, 3 L humidified NC. No complaints or concerns at this time. Will continue to monitor.     Problem: Adult Inpatient Plan of Care  Goal: Plan of Care Review  Outcome: Ongoing, Progressing  Goal: Patient-Specific Goal (Individualized)  Outcome: Ongoing, Progressing  Goal: Absence of Hospital-Acquired Illness or Injury  Outcome: Ongoing, Progressing  Intervention: Identify and Manage Fall Risk  Recent Flowsheet Documentation  Taken 6/7/2021 0200 by Ele Stephen RNA  Safety Promotion/Fall Prevention:   activity supervised   assistive device/personal items within reach   clutter free environment maintained   fall prevention program maintained   nonskid shoes/slippers when out of bed   safety round/check completed  Taken 6/7/2021 0030 by Ele Stephen RNA  Safety Promotion/Fall Prevention:   activity supervised   assistive device/personal items within reach   clutter free environment maintained   fall prevention program maintained   nonskid shoes/slippers when out of bed   safety round/check completed  Taken 6/6/2021 2200 by Ele Stephen RNA  Safety Promotion/Fall Prevention:   activity supervised   assistive device/personal items within reach   clutter free environment maintained   fall prevention program maintained   nonskid shoes/slippers when out of bed   safety round/check completed  Taken 6/6/2021 2000 by Ele Stephen RNA  Safety Promotion/Fall Prevention:   activity supervised   assistive device/personal items within reach   clutter free environment maintained   fall prevention program maintained   safety round/check completed  Intervention: Prevent Skin Injury  Recent Flowsheet Documentation  Taken 6/7/2021 0200 by Ele Stephen RNA  Body Position: position changed independently  Skin Protection:   adhesive use limited   incontinence pads utilized   tubing/devices free from skin contact  Taken 6/7/2021 0030 by Ele Stephen RNA  Body Position: position changed independently  Skin Protection:    adhesive use limited   incontinence pads utilized   tubing/devices free from skin contact  Taken 6/6/2021 2200 by Ele Stephen RNA  Body Position: position changed independently  Skin Protection:   adhesive use limited   incontinence pads utilized   tubing/devices free from skin contact  Taken 6/6/2021 2000 by Ele Stephen RNA  Body Position: position changed independently  Skin Protection:   adhesive use limited   tubing/devices free from skin contact   incontinence pads utilized  Intervention: Prevent Infection  Recent Flowsheet Documentation  Taken 6/6/2021 2200 by Ele Stephen RNA  Infection Prevention:   single patient room provided   rest/sleep promoted   personal protective equipment utilized  Taken 6/6/2021 2000 by Ele Stephen RNA  Infection Prevention:   cohorting utilized   rest/sleep promoted   single patient room provided  Goal: Optimal Comfort and Wellbeing  Outcome: Ongoing, Progressing  Intervention: Provide Person-Centered Care  Recent Flowsheet Documentation  Taken 6/6/2021 2000 by Ele Stephen RNA  Trust Relationship/Rapport:   care explained   choices provided   emotional support provided   reassurance provided   thoughts/feelings acknowledged  Goal: Readiness for Transition of Care  Outcome: Ongoing, Progressing     Problem: Fall Injury Risk  Goal: Absence of Fall and Fall-Related Injury  Outcome: Ongoing, Progressing  Intervention: Identify and Manage Contributors to Fall Injury Risk  Recent Flowsheet Documentation  Taken 6/6/2021 2000 by Ele Stephen RNA  Self-Care Promotion: independence encouraged  Intervention: Promote Injury-Free Environment  Recent Flowsheet Documentation  Taken 6/7/2021 0200 by Ele Stephen RNA  Safety Promotion/Fall Prevention:   activity supervised   assistive device/personal items within reach   clutter free environment maintained   fall prevention program maintained   nonskid shoes/slippers when out of bed   safety round/check completed  Taken 6/7/2021  0030 by Stephen, Ele, RNA  Safety Promotion/Fall Prevention:   activity supervised   assistive device/personal items within reach   clutter free environment maintained   fall prevention program maintained   nonskid shoes/slippers when out of bed   safety round/check completed  Taken 6/6/2021 2200 by Ele Stephen RNA  Safety Promotion/Fall Prevention:   activity supervised   assistive device/personal items within reach   clutter free environment maintained   fall prevention program maintained   nonskid shoes/slippers when out of bed   safety round/check completed  Taken 6/6/2021 2000 by Ele Stephen RNA  Safety Promotion/Fall Prevention:   activity supervised   assistive device/personal items within reach   clutter free environment maintained   fall prevention program maintained   safety round/check completed   Goal Outcome Evaluation:

## 2021-06-07 NOTE — CONSULTS
"  Referring Provider: MD Ayleen  Reason for Consultation: AECOPD/ smoking cessation    Subjective .   Education:  NN spoke with pt at BS.  Pt alert and able to answer questions appropriately.  Pt O2 sat 92 % on  4 L currently, home O2 use 2-3L HS.  Pt states use of rescue inhaler  2  times daily, relief of SOB immediate.  Pt reports the ability to ambulate ~30-40 feet at baseline.  Tobacco cessation encouraged.  Discussion of smoking cessation consisted of assessment interview, provision of community resources, counseling on tobacco dependence, nonpharmacologic cessation techniques, medications and relapse prevention.  Smoking cessation education completed. Cessation support resources discussed with pt.  1800QUITNOW reference sheet discussed and given to patient at BS.  This discussion lasted 3-5 minutes this date.  She is not up to date on any vaccines.  Pt reports no issues at this time with medications or transportation for appointments.  Pt reports no previous hx of formal COPD teaching, no understanding of action plan, or MO.  Stop light report, NN contact information, instructions for accessing [facility] and list of educational videos given to pt.  \"A Patient's Guide to COPD\" booklet left at BS.  COPD education began in the form of explanation, handouts, and videos.  No new concerns or questions voiced at this time.  NN will continue to follow as needed.    Age: 55 y.o.  Sex: female  Smoker Status: current, ~31 pack years  Pulmonologist: MD Garett  FEV1 (PFT): NA  Home O2: 2-3 HS    Objective     SpO2 SpO2: 92 % (06/07/21 1204)  Device Device (Oxygen Therapy): nasal cannula, humidified (06/07/21 1204)  Flow Flow (L/min): 4 (06/07/21 1204)  Incentive Spirometer    IS Predicted Level (mL)     Number of Repetitions     Level Incentive Spirometer (mL)    Patient Tolerance     Inhaler Treatment Status    Treatment Route        Home Medications:  Medications Prior to Admission   Medication Sig Dispense Refill Last Dose "   • albuterol (ACCUNEB) 0.63 MG/3ML nebulizer solution INHALE CONTENTS OF 1 VIAL VIA NEBULIZER EVERY 6 HOURS AS NEEDED FOR WHEEZING 75 mL 2    • albuterol (PROVENTIL) (2.5 MG/3ML) 0.083% nebulizer solution Take 2.5 mg by nebulization Every 6 (Six) Hours As Needed for Wheezing. 100 vial 5    • albuterol sulfate HFA (Ventolin HFA) 108 (90 Base) MCG/ACT inhaler Inhale 1-2 puffs Every 4 (Four) Hours As Needed for Wheezing. 18 g 2    • Alcohol Swabs pads Use when checking sugars 100 each 1    • B-D ULTRAFINE III SHORT PEN 31G X 8 MM misc       • busPIRone (BUSPAR) 10 MG tablet TAKE 1 TABLET BY MOUTH TWICE DAILY 60 tablet 0    • Daliresp 500 MCG tablet tablet TAKE 1 TABLET BY MOUTH DAILY. 30 tablet 0    • diazePAM (VALIUM) 2 MG tablet TAKE 1 TABLET BY MOUTH EVERY 12 HOURS AS NEEDED FOR ANXIETY 60 tablet 0    • DULoxetine (CYMBALTA) 60 MG capsule TAKE 2 CAPSULES BY MOUTH EVERY DAY 60 capsule 0    • Eliquis 5 MG tablet tablet TAKE 1 TABLET BY MOUTH EVERY 12 HOURS 60 tablet 0    • escitalopram (LEXAPRO) 20 MG tablet TAKE 1 TABLET BY MOUTH DAILY. 30 tablet 0    • famotidine (PEPCID) 20 MG tablet TAKE 1 TABLET BY MOUTH TWICE DAILY 60 tablet 0    • ferrous sulfate (IRON SUPPLEMENT) 325 (65 FE) MG tablet Take 1 tablet by mouth Daily With Breakfast. 30 tablet 1    • Fluticasone-Umeclidin-Vilant (Trelegy Ellipta) 100-62.5-25 MCG/INH inhaler Inhale 1 puff Daily. 60 each 5    • furosemide (LASIX) 20 MG tablet TAKE 1 TABLET BY MOUTH EVERY DAY AS NEEDED FOR SWELLING 15 tablet 0    • gabapentin (NEURONTIN) 600 MG tablet TAKE 1 TABLET BY MOUTH THREE TIMES DAILY 90 tablet 0    • glucose monitor monitoring kit Check glucose twice daily 1 each 0    • Lancets misc Check glucose twice daily 100 each 0    • Lantus SoloStar 100 UNIT/ML injection pen INJECT 60 UNITS SUBCUTANEOUSLY EVERY 12 HOURS (Patient taking differently: 60 units QAM and 40 units QPM) 30 mL 1    • metoprolol succinate XL (TOPROL-XL) 50 MG 24 hr tablet TAKE 1 TABLET BY MOUTH  EVERY DAY 30 tablet 0    • Multiple Vitamins-Iron (MULTI-VITAMIN/IRON) tablet Take 1 tablet by mouth Daily. 30 each 5    • mupirocin (Bactroban) 2 % ointment Apply  topically to the appropriate area as directed 3 (Three) Times a Day. 22 g 2    • NIFEdipine CC (ADALAT CC) 30 MG 24 hr tablet TAKE 1 TABLET BY MOUTH DAILY 30 tablet 0    • nystatin (MYCOSTATIN) 954009 UNIT/GM powder Apply  topically to the appropriate area as directed 2 (Two) Times a Day. 60 g 2    • nystatin (MYCOSTATIN) 430894 UNIT/ML suspension Swish and swallow 5 mL 4 (Four) Times a Day. 473 mL 0    • oxyCODONE-acetaminophen (Percocet)  MG per tablet Take 1 tablet by mouth Every 6 (Six) Hours As Needed for Moderate Pain . 120 tablet 0    • traZODone (DESYREL) 50 MG tablet Take 1 tablet by mouth Every Night. 30 tablet 0    • True Metrix Blood Glucose Test test strip 1 each by Other route 2 (Two) Times a Day. to check glucose 100 each 0        Discussion: Per current [employer] Standards, please consider: :LAMA/LABA/ICS in place, Outpatient PFT, Outpatient sleep study, Pulmonary rehab as appropriate, Outpatient LDCT per [place] screening guidelines (>30 pack years+ 55 yo), NRT at [place]          Bhargavi Duong RN

## 2021-06-07 NOTE — CASE MANAGEMENT/SOCIAL WORK
Continued Stay Note  [facility]     Patient Name: Dani Ziegler  MRN: [id number]  Today's Date: 6/7/2021    Admit Date: 6/3/2021    Discharge Plan     Row Name 06/07/21 1124       Plan    Plan  Inpatient Rehab    Patient/Family in Agreement with Plan  yes    Plan Comments  Spoke with patient in room.  Therapy recommending IRF at discharge.  Patient agreeable and prefers [facility].  Referral called to Kristi.  CM will continue to follow.  Polly Cote RN [phone]    Final Discharge Disposition Code  30 - still a patient        Discharge Codes    No documentation.       Expected Discharge Date and Time     Expected Discharge Date Expected Discharge Time    Jun 11, 2021             Polly Cote RN

## 2021-06-08 LAB
ALBUMIN SERPL-MCNC: 3.4 G/DL (ref 3.5–5.2)
ALBUMIN/GLOB SERPL: 1 G/DL
ALP SERPL-CCNC: 102 U/L (ref 39–117)
ALT SERPL W P-5'-P-CCNC: 47 U/L (ref 1–33)
ANION GAP SERPL CALCULATED.3IONS-SCNC: 10 MMOL/L (ref 5–15)
AST SERPL-CCNC: 20 U/L (ref 1–32)
BASOPHILS # BLD AUTO: 0.05 10*3/MM3 (ref 0–0.2)
BASOPHILS NFR BLD AUTO: 0.3 % (ref 0–1.5)
BILIRUB SERPL-MCNC: 0.2 MG/DL (ref 0–1.2)
BUN SERPL-MCNC: 27 MG/DL (ref 6–20)
BUN/CREAT SERPL: 37.5 (ref 7–25)
CALCIUM SPEC-SCNC: 9.6 MG/DL (ref 8.6–10.5)
CHLORIDE SERPL-SCNC: 97 MMOL/L (ref 98–107)
CO2 SERPL-SCNC: 28 MMOL/L (ref 22–29)
CREAT SERPL-MCNC: 0.72 MG/DL (ref 0.57–1)
DEPRECATED RDW RBC AUTO: 48.1 FL (ref 37–54)
EOSINOPHIL # BLD AUTO: 0 10*3/MM3 (ref 0–0.4)
EOSINOPHIL NFR BLD AUTO: 0 % (ref 0.3–6.2)
ERYTHROCYTE [DISTWIDTH] IN BLOOD BY AUTOMATED COUNT: 16 % (ref 12.3–15.4)
GFR SERPL CREATININE-BSD FRML MDRD: 84 ML/MIN/1.73
GLOBULIN UR ELPH-MCNC: 3.5 GM/DL
GLUCOSE BLDC GLUCOMTR-MCNC: 192 MG/DL (ref 70–130)
GLUCOSE BLDC GLUCOMTR-MCNC: 202 MG/DL (ref 70–130)
GLUCOSE BLDC GLUCOMTR-MCNC: 268 MG/DL (ref 70–130)
GLUCOSE BLDC GLUCOMTR-MCNC: 332 MG/DL (ref 70–130)
GLUCOSE SERPL-MCNC: 222 MG/DL (ref 65–99)
HCT VFR BLD AUTO: 45.5 % (ref 34–46.6)
HGB BLD-MCNC: 13.9 G/DL (ref 12–15.9)
IMM GRANULOCYTES # BLD AUTO: 0.26 10*3/MM3 (ref 0–0.05)
IMM GRANULOCYTES NFR BLD AUTO: 1.7 % (ref 0–0.5)
LYMPHOCYTES # BLD AUTO: 1.78 10*3/MM3 (ref 0.7–3.1)
LYMPHOCYTES NFR BLD AUTO: 11.4 % (ref 19.6–45.3)
MAGNESIUM SERPL-MCNC: 1.8 MG/DL (ref 1.6–2.6)
MCH RBC QN AUTO: 25.5 PG (ref 26.6–33)
MCHC RBC AUTO-ENTMCNC: 30.5 G/DL (ref 31.5–35.7)
MCV RBC AUTO: 83.5 FL (ref 79–97)
MONOCYTES # BLD AUTO: 0.63 10*3/MM3 (ref 0.1–0.9)
MONOCYTES NFR BLD AUTO: 4 % (ref 5–12)
NEUTROPHILS NFR BLD AUTO: 12.92 10*3/MM3 (ref 1.7–7)
NEUTROPHILS NFR BLD AUTO: 82.6 % (ref 42.7–76)
NRBC BLD AUTO-RTO: 0 /100 WBC (ref 0–0.2)
PLATELET # BLD AUTO: 264 10*3/MM3 (ref 140–450)
PMV BLD AUTO: 10.3 FL (ref 6–12)
POTASSIUM SERPL-SCNC: 4.9 MMOL/L (ref 3.5–5.2)
PROCALCITONIN SERPL-MCNC: 0.04 NG/ML (ref 0–0.25)
PROT SERPL-MCNC: 6.9 G/DL (ref 6–8.5)
RBC # BLD AUTO: 5.45 10*6/MM3 (ref 3.77–5.28)
SODIUM SERPL-SCNC: 135 MMOL/L (ref 136–145)
WBC # BLD AUTO: 15.64 10*3/MM3 (ref 3.4–10.8)

## 2021-06-08 PROCEDURE — 94660 CPAP INITIATION&MGMT: CPT

## 2021-06-08 PROCEDURE — 99233 SBSQ HOSP IP/OBS HIGH 50: CPT | Performed by: INTERNAL MEDICINE

## 2021-06-08 PROCEDURE — 82962 GLUCOSE BLOOD TEST: CPT

## 2021-06-08 PROCEDURE — 25010000002 CEFTRIAXONE PER 250 MG: Performed by: INTERNAL MEDICINE

## 2021-06-08 PROCEDURE — 84145 PROCALCITONIN (PCT): CPT | Performed by: HOSPITALIST

## 2021-06-08 PROCEDURE — 85025 COMPLETE CBC W/AUTO DIFF WBC: CPT | Performed by: HOSPITALIST

## 2021-06-08 PROCEDURE — 63710000001 INSULIN DETEMIR PER 5 UNITS: Performed by: HOSPITALIST

## 2021-06-08 PROCEDURE — 94799 UNLISTED PULMONARY SVC/PX: CPT

## 2021-06-08 PROCEDURE — 25010000002 METHYLPREDNISOLONE PER 40 MG: Performed by: HOSPITALIST

## 2021-06-08 PROCEDURE — 63710000001 INSULIN LISPRO (HUMAN) PER 5 UNITS: Performed by: INTERNAL MEDICINE

## 2021-06-08 PROCEDURE — 83735 ASSAY OF MAGNESIUM: CPT

## 2021-06-08 PROCEDURE — 80053 COMPREHEN METABOLIC PANEL: CPT | Performed by: HOSPITALIST

## 2021-06-08 PROCEDURE — 25010000002 AZITHROMYCIN PER 500 MG: Performed by: INTERNAL MEDICINE

## 2021-06-08 PROCEDURE — 63710000001 INSULIN LISPRO (HUMAN) PER 5 UNITS: Performed by: HOSPITALIST

## 2021-06-08 RX ADMIN — IPRATROPIUM BROMIDE AND ALBUTEROL SULFATE 3 ML: 2.5; .5 SOLUTION RESPIRATORY (INHALATION) at 15:31

## 2021-06-08 RX ADMIN — FAMOTIDINE 20 MG: 20 TABLET ORAL at 08:45

## 2021-06-08 RX ADMIN — OXYCODONE HYDROCHLORIDE AND ACETAMINOPHEN 1 TABLET: 7.5; 325 TABLET ORAL at 08:55

## 2021-06-08 RX ADMIN — BUSPIRONE HYDROCHLORIDE 10 MG: 10 TABLET ORAL at 17:00

## 2021-06-08 RX ADMIN — IPRATROPIUM BROMIDE AND ALBUTEROL SULFATE 3 ML: 2.5; .5 SOLUTION RESPIRATORY (INHALATION) at 19:36

## 2021-06-08 RX ADMIN — DIAZEPAM 2 MG: 2 TABLET ORAL at 16:55

## 2021-06-08 RX ADMIN — INSULIN LISPRO 2 UNITS: 100 INJECTION, SOLUTION INTRAVENOUS; SUBCUTANEOUS at 08:44

## 2021-06-08 RX ADMIN — SODIUM CHLORIDE, PRESERVATIVE FREE 10 ML: 5 INJECTION INTRAVENOUS at 08:45

## 2021-06-08 RX ADMIN — Medication 1 PATCH: at 08:44

## 2021-06-08 RX ADMIN — APIXABAN 5 MG: 5 TABLET, FILM COATED ORAL at 21:18

## 2021-06-08 RX ADMIN — IPRATROPIUM BROMIDE AND ALBUTEROL SULFATE 3 ML: 2.5; .5 SOLUTION RESPIRATORY (INHALATION) at 03:54

## 2021-06-08 RX ADMIN — BUDESONIDE 0.5 MG: 0.5 INHALANT RESPIRATORY (INHALATION) at 07:28

## 2021-06-08 RX ADMIN — OXYCODONE HYDROCHLORIDE AND ACETAMINOPHEN 1 TABLET: 7.5; 325 TABLET ORAL at 15:06

## 2021-06-08 RX ADMIN — TRAZODONE HYDROCHLORIDE 50 MG: 50 TABLET ORAL at 21:18

## 2021-06-08 RX ADMIN — NIFEDIPINE 30 MG: 30 TABLET, FILM COATED, EXTENDED RELEASE ORAL at 08:45

## 2021-06-08 RX ADMIN — IPRATROPIUM BROMIDE AND ALBUTEROL SULFATE 3 ML: 2.5; .5 SOLUTION RESPIRATORY (INHALATION) at 07:28

## 2021-06-08 RX ADMIN — AZITHROMYCIN 500 MG: 500 INJECTION, POWDER, LYOPHILIZED, FOR SOLUTION INTRAVENOUS at 10:30

## 2021-06-08 RX ADMIN — IPRATROPIUM BROMIDE AND ALBUTEROL SULFATE 3 ML: 2.5; .5 SOLUTION RESPIRATORY (INHALATION) at 12:14

## 2021-06-08 RX ADMIN — INSULIN LISPRO 4 UNITS: 100 INJECTION, SOLUTION INTRAVENOUS; SUBCUTANEOUS at 11:35

## 2021-06-08 RX ADMIN — INSULIN DETEMIR 32 UNITS: 100 INJECTION, SOLUTION SUBCUTANEOUS at 21:22

## 2021-06-08 RX ADMIN — METHYLPREDNISOLONE SODIUM SUCCINATE 20 MG: 40 INJECTION, POWDER, FOR SOLUTION INTRAMUSCULAR; INTRAVENOUS at 06:01

## 2021-06-08 RX ADMIN — IPRATROPIUM BROMIDE AND ALBUTEROL SULFATE 3 ML: 2.5; .5 SOLUTION RESPIRATORY (INHALATION) at 23:27

## 2021-06-08 RX ADMIN — OXYCODONE HYDROCHLORIDE AND ACETAMINOPHEN 1 TABLET: 7.5; 325 TABLET ORAL at 22:58

## 2021-06-08 RX ADMIN — INSULIN LISPRO 5 UNITS: 100 INJECTION, SOLUTION INTRAVENOUS; SUBCUTANEOUS at 21:19

## 2021-06-08 RX ADMIN — INSULIN LISPRO 3 UNITS: 100 INJECTION, SOLUTION INTRAVENOUS; SUBCUTANEOUS at 16:55

## 2021-06-08 RX ADMIN — INSULIN LISPRO 3 UNITS: 100 INJECTION, SOLUTION INTRAVENOUS; SUBCUTANEOUS at 11:35

## 2021-06-08 RX ADMIN — DULOXETINE HYDROCHLORIDE 120 MG: 60 CAPSULE, DELAYED RELEASE ORAL at 08:44

## 2021-06-08 RX ADMIN — METHYLPREDNISOLONE SODIUM SUCCINATE 20 MG: 40 INJECTION, POWDER, FOR SOLUTION INTRAMUSCULAR; INTRAVENOUS at 17:00

## 2021-06-08 RX ADMIN — APIXABAN 5 MG: 5 TABLET, FILM COATED ORAL at 08:45

## 2021-06-08 RX ADMIN — GABAPENTIN 600 MG: 300 CAPSULE ORAL at 21:18

## 2021-06-08 RX ADMIN — FUROSEMIDE 20 MG: 20 TABLET ORAL at 08:45

## 2021-06-08 RX ADMIN — ESCITALOPRAM OXALATE 20 MG: 20 TABLET ORAL at 08:45

## 2021-06-08 RX ADMIN — METOPROLOL SUCCINATE 50 MG: 50 TABLET, EXTENDED RELEASE ORAL at 08:45

## 2021-06-08 RX ADMIN — SODIUM CHLORIDE 1 G: 900 INJECTION INTRAVENOUS at 10:00

## 2021-06-08 RX ADMIN — GABAPENTIN 600 MG: 300 CAPSULE ORAL at 13:40

## 2021-06-08 RX ADMIN — INSULIN LISPRO 3 UNITS: 100 INJECTION, SOLUTION INTRAVENOUS; SUBCUTANEOUS at 08:44

## 2021-06-08 RX ADMIN — GABAPENTIN 600 MG: 300 CAPSULE ORAL at 06:01

## 2021-06-08 RX ADMIN — BUSPIRONE HYDROCHLORIDE 10 MG: 10 TABLET ORAL at 08:45

## 2021-06-08 RX ADMIN — INSULIN DETEMIR 32 UNITS: 100 INJECTION, SOLUTION SUBCUTANEOUS at 08:42

## 2021-06-08 RX ADMIN — ROFLUMILAST 500 MCG: 500 TABLET ORAL at 08:45

## 2021-06-08 RX ADMIN — INSULIN LISPRO 3 UNITS: 100 INJECTION, SOLUTION INTRAVENOUS; SUBCUTANEOUS at 16:56

## 2021-06-08 RX ADMIN — BUDESONIDE 0.5 MG: 0.5 INHALANT RESPIRATORY (INHALATION) at 19:36

## 2021-06-08 RX ADMIN — FAMOTIDINE 20 MG: 20 TABLET ORAL at 21:18

## 2021-06-08 RX ADMIN — TERAZOSIN HYDROCHLORIDE 2 MG: 2 CAPSULE ORAL at 21:18

## 2021-06-08 NOTE — PROGRESS NOTES
Enter Query Response Below      Query Response:     T2DM, unspecified     Electronically signed by Tete Cunha MD, 21, 1:05 PM EDT.           If applicable, please update the problem list.           Patient: Dani Ziegler        : 1965  Account: [id number]           Admit Date:          Options to Respond to Query:    1. Access the Encounter     a. From the To-Do Side bar, click Respond With Note.     b. Click New Note     c. Answer query within the yellow box.                d. Update the Problem List if applicable.     Dr. Cunha:     Risk Factors:  55 year old female with history of hypertension, diastolic congestive heart failure, hyperlipidemia, COPD, diabetes mellitus type 2, anxiety, depression, chronic pain.     Clinical Indicators:  History and physical states uncontrolled type 2 diabetes mellitus. 6/3 Hemoglobin A1C 6.00.   Hospitalist progress note states diabetes mellitus type 2, fingerstick blood sugars in the 200s sleep with range 146-284, likely hyperglycemia exacerbated by steroid use.     Treatments:  6/3 accu checks with sliding scale coverage, half dose levemir at 30 bid, diabetic diet.     After study can this condition be further specified as:    Uncontrolled diabetes mellitus type 2 with hyperglycemia  Diabetes mellitus type 2 unspecified  Other_______________.  Unable to determine.     By submitting this query, we are merely seeking further clarification of documentation to accurately reflect all conditions that you are monitoring, evaluating, treating or that extend the hospitalization or utilize additional resources of care. Please utilize your independent clinical judgment when addressing the question(s) above.     This query and your response, once completed, will be entered into the legal medical record.    Sincerely,  Jaswinder THOMSON RN BSN   Clinical Documentation Integrity Program   [email or web address]

## 2021-06-08 NOTE — DISCHARGE PLACEMENT REQUEST
"Dani Mantilla (55 y.o. Female)     Case Management  [phone]      Date of Birth Social Security Number Address Home Phone MRN    1965  [address] [facility] [address] [phone] [id number]    Rast[employer]arian Marital Status          [employer]        Admission Date Admission Type Admitting Provider Attending Provider Department, Room/Bed    6/3/21 Emergency Tete Cunha MD Hunter, Sarah M, MD [facility] 5H, S575/1    Discharge Date Discharge Disposition Discharge Destination                       Attending Provider: Tete Cunha MD    Allergies: Diphenhydramine, Lortab [Hydrocodone-acetaminophen], Toradol [Ketorolac Tromethamine], Nsaids, Alprazolam    Isolation: None   Infection: None   Code Status: CPR    Ht: 167.6 cm (66\")   Wt: 131 kg (289 lb 9.6 oz)    Admission Cmt: None   Principal Problem: CAP (community acquired pneumonia) [J18.9]                 Active Insurance as of 6/3/2021     Primary Coverage     Payor Plan Insurance Group Employer/Plan Group    MEDICARE MEDICARE A & B      Payor Plan Address Payor Plan Phone Number Payor Plan Fax Number Effective Dates    [address] [phone]  7/1/2011 - None Entered    [facility] [id number]       Subscriber Name Subscriber Birth Date Member ID       DANI MANTILLA 1965 [id number]           Secondary Coverage     Payor Plan Insurance Group Employer/Plan Group    [place]      Payor Plan Address Payor Plan Phone Number Payor Plan Fax Number Effective Dates    [address] [address]   1/1/2014 - None Entered    [place] [address]       Subscriber Name Subscriber Birth Date Member ID       DANI MANTILLA 1965 [id number]                 Emergency Contacts      (Rel.) Home Phone Work Phone Mobile Phone    Santa Mantilla (Daughter) [phone] -- [phone]               History & Physical      Tano Castillo DO at 06/04/21 0210              [facility] " [facility] Medicine Services  HISTORY AND PHYSICAL    Patient Name: Dani Ziegler  : 1965  MRN: [id number]  Primary Care Physician: Darrion Tovar MD  Date of admission: 6/3/2021      Subjective   Subjective     Chief Complaint:  Abnormal lab, shortness of breath    HPI:  Dani Ziegler is a 55 y.o. female  with past medical history of diastolic congestive heart failure, COPD, hyperlipidemia, uncontrolled type 2 diabetes, anxiety/depression, chronic pain, polypharmacy on multiple medications including gabapentin, benzodiazepines, opiates who presents to the ER with complaints of abnormal outpatient lab and shortness of air.    Patient reports the ER tonight after being instructed to come by her PCP who reports an outpatient routine lab with WBC of 20,000.  Patient states she started noticing some increased shortness of breath at night.  Rated 2 out of 10 in severity.  Worse with exertion.  Better with rest.  Intermittent.  Denies any cough, fever, chills, or really any other symptoms.    Despite her COPD, patient continues to smoke.        COVID Details:    Symptoms:    [] NONE [] Fever []  Cough [x] Shortness of breath [] Change in taste/smell      The patient qualifies to receive the vaccine, but they have not yet received it.      Review of Systems   Gen- No fevers, chills  CV- No chest pain, palpitations  Resp- No cough, mild dyspnea  GI- No N/V/D, abd pain      All other systems reviewed and are negative.     Personal History     Past Medical History:   Diagnosis Date   • Abnormal weight gain    • Acute UTI    • Anxiety    • Arthritis involving multiple sites    • Chronic obstructive pulmonary disease (CMS/HCC)    • Chronic pain    • Current every day smoker    • Depression    • Diabetes mellitus (CMS/[facility])    • Diarrhea    • Dysuria    • Edema, lower extremity    • Fever    • Head injury    • Hypertension    • Intervertebral disc disorder     Degeneration of intervertebral disc, site  unspecified (722.6)   • Left bundle branch block    • Leukocytosis    • Long term current use of methadone for pain control    • Mass of right breast    • Nausea    • Obesity    • Overactive bladder    • Peripheral neuropathy    • Superficial phlebitis     right medial calf   • Upper respiratory infection    • Urinary incontinence    • Vitamin D deficiency    • Vomiting        Past Surgical History:   Procedure Laterality Date   • BLADDER SURGERY      pubovaginal sling   • CHOLECYSTECTOMY     • HIP ARTHROSCOPY Right 10/29/2020       Family History: family history includes Arthritis in her father and mother; Breast cancer in her mother; Cancer in her mother; Diabetes in her maternal grandmother and mother; Heart attack in her father; Hypertension in her father; Migraines in an other family member; Obesity in her mother; Stroke in her father. Otherwise pertinent FHx was reviewed and unremarkable.     Social History:  reports that she has been smoking cigarettes. She has smoked for the past 30.00 years. She has never used smokeless tobacco. She reports that she does not drink alcohol and does not use drugs.  Social History     Social History Narrative   • Not on file       Medications:  Available home medication information reviewed.  Medications Prior to Admission   Medication Sig Dispense Refill Last Dose   • albuterol (ACCUNEB) 0.63 MG/3ML nebulizer solution INHALE CONTENTS OF 1 VIAL VIA NEBULIZER EVERY 6 HOURS AS NEEDED FOR WHEEZING 75 mL 2    • albuterol (PROVENTIL) (2.5 MG/3ML) 0.083% nebulizer solution Take 2.5 mg by nebulization Every 6 (Six) Hours As Needed for Wheezing. 100 vial 5    • albuterol sulfate HFA (Ventolin HFA) 108 (90 Base) MCG/ACT inhaler Inhale 1-2 puffs Every 4 (Four) Hours As Needed for Wheezing. 18 g 2    • Alcohol Swabs pads Use when checking sugars 100 each 1    • B-D ULTRAFINE III SHORT PEN 31G X 8 MM misc       • busPIRone (BUSPAR) 10 MG tablet TAKE 1 TABLET BY MOUTH TWICE DAILY 60 tablet  0    • Daliresp 500 MCG tablet tablet TAKE 1 TABLET BY MOUTH DAILY. 30 tablet 0    • diazePAM (VALIUM) 2 MG tablet TAKE 1 TABLET BY MOUTH EVERY 12 HOURS AS NEEDED FOR ANXIETY 60 tablet 0    • divalproex (DEPAKOTE) 125 MG DR tablet Take 125 mg by mouth 2 (Two) Times a Day.      • DULoxetine (CYMBALTA) 60 MG capsule TAKE 2 CAPSULES BY MOUTH EVERY DAY 60 capsule 0    • Eliquis 5 MG tablet tablet TAKE 1 TABLET BY MOUTH EVERY 12 HOURS 60 tablet 0    • escitalopram (LEXAPRO) 20 MG tablet TAKE 1 TABLET BY MOUTH DAILY. 30 tablet 0    • famotidine (PEPCID) 20 MG tablet TAKE 1 TABLET BY MOUTH TWICE DAILY 60 tablet 0    • ferrous sulfate (IRON SUPPLEMENT) 325 (65 FE) MG tablet Take 1 tablet by mouth Daily With Breakfast. 30 tablet 1    • Fluticasone-Umeclidin-Vilant (Trelegy Ellipta) 100-62.5-25 MCG/INH inhaler Inhale 1 puff Daily. 60 each 5    • furosemide (LASIX) 20 MG tablet TAKE 1 TABLET BY MOUTH EVERY DAY AS NEEDED FOR SWELLING 15 tablet 0    • gabapentin (NEURONTIN) 600 MG tablet TAKE 1 TABLET BY MOUTH THREE TIMES DAILY 90 tablet 0    • glucose monitor monitoring kit Check glucose twice daily 1 each 0    • Lancets misc Check glucose twice daily 100 each 0    • Lantus SoloStar 100 UNIT/ML injection pen INJECT 60 UNITS SUBCUTANEOUSLY EVERY 12 HOURS 30 mL 1    • metoprolol succinate XL (TOPROL-XL) 50 MG 24 hr tablet TAKE 1 TABLET BY MOUTH EVERY DAY 30 tablet 0    • Multiple Vitamins-Iron (MULTI-VITAMIN/IRON) tablet Take 1 tablet by mouth Daily. 30 each 5    • mupirocin (Bactroban) 2 % ointment Apply  topically to the appropriate area as directed 3 (Three) Times a Day. 22 g 2    • NIFEdipine CC (ADALAT CC) 30 MG 24 hr tablet TAKE 1 TABLET BY MOUTH DAILY 30 tablet 0    • nystatin (MYCOSTATIN) 992366 UNIT/GM powder Apply  topically to the appropriate area as directed 2 (Two) Times a Day. 60 g 2    • nystatin (MYCOSTATIN) 158416 UNIT/ML suspension Swish and swallow 5 mL 4 (Four) Times a Day. 473 mL 0    •  oxyCODONE-acetaminophen (Percocet)  MG per tablet Take 1 tablet by mouth Every 6 (Six) Hours As Needed for Moderate Pain . 120 tablet 0    • traZODone (DESYREL) 50 MG tablet Take 1 tablet by mouth Every Night. 30 tablet 0    • True Metrix Blood Glucose Test test strip 1 each by Other route 2 (Two) Times a Day. to check glucose 100 each 0        Allergies   Allergen Reactions   • Alprazolam Delirium   • Lortab [Hydrocodone-Acetaminophen] Hives   • Nsaids Other (See Comments)     Liver Dx   • Toradol [Ketorolac Tromethamine] Other (See Comments)     Denies any allergy   • Diphenhydramine Rash       Objective   Objective     Vital Signs:   Temp:  [98.4 °F (36.9 °C)-98.9 °F (37.2 °C)] 98.9 °F (37.2 °C)  Heart Rate:  [70-80] 79  Resp:  [18-20] 18  BP: (113-148)/(68-92) 133/80  Flow (L/min):  [2] 2       Physical Exam   Constitutional: Awake, alert, mild distress  Eyes: PERRLA, sclerae anicteric, no conjunctival injection  HENT: NCAT, mucous membranes moist  Neck: Supple, no thyromegaly, no lymphadenopathy, trachea midline  Respiratory: Poor airflow throughout, crackles in right lower lobe, mildly labored respirations   Cardiovascular: RRR, no murmurs, rubs, or gallops, palpable pedal pulses bilaterally  Gastrointestinal: Positive bowel sounds, soft, nontender, nondistended  Musculoskeletal: No bilateral ankle edema, no clubbing or cyanosis to extremities  Psychiatric: Appropriate affect, cooperative  Neurologic: Oriented x 3, strength symmetric in all extremities, Cranial Nerves grossly intact to confrontation, speech clear  Skin: No rashes      Result Review:  I have personally reviewed the results from the time of this admission to 6/4/2021 02:24 EDT and agree with these findings:  [x]  Laboratory  []  Microbiology  [x]  Radiology  [x]  EKG/Telemetry   []  Cardiology/Vascular   []  Pathology  []  Old records  []  Other:  Most notable findings include: Right lower lobe pneumonia, WBC 11.8      LAB RESULTS:       Lab 06/03/21  2357 06/03/21  1902   WBC  --  11.80*   HEMOGLOBIN  --  13.6   HEMATOCRIT  --  45.8   PLATELETS  --  341   NEUTROS ABS  --  7.89*   IMMATURE GRANS (ABS)  --  0.11*   LYMPHS ABS  --  2.73   MONOS ABS  --  0.56   EOS ABS  --  0.46*   MCV  --  85.8   LACTATE 0.8 2.2*         Lab 06/03/21  1902   SODIUM 139   POTASSIUM 4.6   CHLORIDE 101   CO2 27.0   ANION GAP 11.0   BUN 8   CREATININE 0.73   GLUCOSE 152*   CALCIUM 9.5         Lab 06/03/21  1902   TOTAL PROTEIN 7.1   ALBUMIN 3.70   GLOBULIN 3.4   ALT (SGPT) 9   AST (SGOT) 19   BILIRUBIN 0.2   ALK PHOS 147*   LIPASE 19                     UA    Urinalysis 6/3/21 6/3/21    2013 2013   Squamous Epithelial Cells, UA  0-2   Specific Gravity, UA 1.010    Ketones, UA Negative    Blood, UA Negative    Leukocytes, UA Trace (A)    Nitrite, UA Negative    RBC, UA  0-2   WBC, UA  0-2   Bacteria, UA  None Seen   (A) Abnormal value              Microbiology Results (last 10 days)     Procedure Component Value - Date/Time    COVID PRE-OP / PRE-PROCEDURE SCREENING ORDER (NO ISOLATION) - Swab, Nasopharynx [[id number]]  (Normal) Collected: 06/03/21 2345    Lab Status: Final result Specimen: Swab from Nasopharynx Updated: 06/04/21 0031    Narrative:      The following orders were created for panel order COVID PRE-OP / PRE-PROCEDURE SCREENING ORDER (NO ISOLATION) - Swab, Nasopharynx.  Procedure                               Abnormality         Status                     ---------                               -----------         ------                     COVID-19 and FLU A/B PCR...[[id number]]  Normal              Final result                 Please view results for these tests on the individual orders.    COVID-19 and FLU A/B PCR - Swab, Nasopharynx [[id number]]  (Normal) Collected: 06/03/21 2345    Lab Status: Final result Specimen: Swab from Nasopharynx Updated: 06/04/21 0031     COVID19 Not Detected     Influenza A PCR Not Detected     Influenza B PCR Not Detected     Narrative:      Fact sheet for providers: https://www.fda.gov/media/778528/download    Fact sheet for patients: https://www.fda.gov/media/968922/download    Test performed by PCR.          XR Chest 1 View    Result Date: 6/3/2021  CR Chest 1 Vw INDICATION: Cough COMPARISON:  Chest x-ray from 3/27/2018 FINDINGS: Single portable AP view(s) of the chest. Limited exam. The heart may be mildly enlarged. Nodule is again seen at the right lung apex.. No pneumothorax or pleural effusion.     Impression: Exam is grossly limited and it is uncertain if there may be some increasing opacification/developing pneumonia at the right lung base. . Nodular density is again visualized at the right lung apex. The heart may be mildly enlarged. Signer Name: Etta Granados MD  Signed: 6/3/2021 10:36 PM  Workstation Name: [id number]  [facility]    CT Angiogram Chest    Result Date: 6/4/2021  CTA Chest INDICATION: Shortness of air and hypoxia. Abnormal chest x-ray. TECHNIQUE: CT angiogram of the chest with IV contrast. 3-D reconstructions were obtained and reviewed.   Radiation dose reduction techniques included automated exposure control or exposure modulation based on body size. Count of known CT and cardiac nuc med studies performed in previous 12 months: 0. COMPARISON: 7/20/2018. FINDINGS: No pulmonary embolism or aortic dissection is seen. There is coronary artery disease. There is no pleural or pericardial effusion. There is mediastinal adenopathy. Right paratracheal lymph node measures 1.3 cm. AP window lymph node measures 1.4 cm. Left hilar adenopathy measures 1.3 cm. Right hilar adenopathy measures 1.8 cm.  Upper abdomen shows a stable right adrenal adenoma measuring 2.8 cm. There are chronic appearing thoracic compression fractures at T6 and T7. There are partially calcified granulomas in both upper lobes. There is a 9 mm nodule in the right lower lobe that is unchanged and benign. Atelectatic changes are noted  in both lower lobes. There is some alveolar infiltrate in the right middle lobe concerning for mild pneumonia. There is some consolidation in the lingula, probably due to atelectasis as well although pneumonia is not excluded. Imaging features are atypical or uncommonly reported for COVID-19 pneumonia. Alternative diagnoses should be considered.     Impression: 1. No PE or aortic dissection. 2. New mediastinal and bilateral hilar adenopathy is nonspecific. Attention on 3 month follow-up chest CT is recommended. 3. Mild infiltrate in the right middle lobe concerning for mild pneumonia. There are atelectatic changes in the lower lobes, and there is some consolidation in the lingula that is also probably due to atelectasis although pneumonia is not excluded. 4. Granulomatous nodules in the upper lobes with a stable and benign 9 mm right lower lobe nodule. Signer Name: Elmer Boswell MD  Signed: 6/4/2021 12:29 AM  Workstation Name: [email or web address]  [facility]      Results for orders placed in visit on 05/05/20    SCANNED - ECHOCARDIOGRAM      Assessment/Plan   Assessment & Plan     Active Hospital Problems    Diagnosis  POA   • **CAP (community acquired pneumonia) [J18.9]  Yes   • Polypharmacy [Z79.899]  Not Applicable   • CHF (congestive heart failure) (CMS/HCC) [I50.9]  Yes   • COPD with acute exacerbation (CMS/[facility]) [J44.1]  Yes   • Mixed hyperlipidemia [E78.2]  Yes   • Uncontrolled type 2 diabetes mellitus with hyperglycemia, without long-term current use of insulin (CMS/HCC) [E11.65]  Yes   • Vitamin D deficiency [E55.9]  Yes   • Peripheral neuropathy [G62.9]  Yes   • Essential hypertension [I10]  Yes   • Chronic pain [G89.29]  Yes   • Chronic obstructive pulmonary disease (CMS/HCC) [J44.9]  Yes   • Edema of lower extremity [R60.0]  Yes   • Depression [F32.9]  Yes   • Anxiety [F41.9]  Yes       Patient is a 55-year-old female with past medical history of diastolic congestive heart failure, COPD,  hyperlipidemia, uncontrolled type 2 diabetes, anxiety/depression, chronic pain, polypharmacy on multiple medications including gabapentin, benzodiazepines, opiates who presents to the ER with complaints of abnormal outpatient lab and shortness of air.    1.  Community acquired pneumonia, present on admission  2.  COPD with acute exacerbation  3.  Diastolic CHF  4.  Polypharmacy  5.  Uncontrolled type 2 diabetes  6.  Peripheral neuropathy  7.  Hypertension  8.  Depression/anxiety  9.  Ongoing tobacco use    --Scheduled DuoNeb  --Given Decadron in ER, minimal wheezing at this point and therefore will hold on further steroids if able as patient has uncontrolled type 2 diabetes  --Continue ceftriaxone, azithromycin  --continue lasix  --half dose levemir at 30 BID  --SSI  --continue home meds  --nicotine patch, counseled and educated on cessation.    DVT prophylaxis:  lovenox      CODE STATUS:  Full code  There are no questions and answers to display.       Admission Status:  I believe this patient meets INPATIENT status due to .  I feel patient’s risk for adverse outcomes and need for care warrant INPATIENT evaluation and I predict the patient’s care encounter to likely last beyond 2 midnights.      Tano Castillo DO  06/04/21      Electronically signed by Tano Castillo DO at 06/04/21 0235         Current Facility-Administered Medications   Medication Dose Route Frequency Provider Last Rate Last Admin   • albuterol (PROVENTIL) nebulizer solution 0.083% 2.5 mg/3mL  2.5 mg Nebulization Q6H PRN Tano Castillo DO       • apixaban (ELIQUIS) tablet 5 mg  5 mg Oral Q12H Tano Castillo DO   5 mg at 06/08/21 0845   • AZITHROMYCIN 500 MG/250 ML 0.9% NS IVPB (vial-mate)  500 mg Intravenous Q24H Tete Cunha MD   500 mg at 06/08/21 1030   • budesonide (PULMICORT) nebulizer solution 0.5 mg  0.5 mg Nebulization BID - RT Tano Castillo DO   0.5 mg at 06/08/21 0728   • busPIRone (BUSPAR) tablet 10 mg  10 mg Oral BID Anna  Tano, DO   10 mg at 06/08/21 0845   • cefTRIAXone (ROCEPHIN) 1 g/100 mL 0.9% NS (MBP)  1 g Intravenous Q24H Tete Cunha MD   1 g at 06/08/21 1000   • dextrose (D50W) 25 g/ 50mL Intravenous Solution 25 g  25 g Intravenous Q15 Min PRN Tano Castillo, DO       • dextrose (GLUTOSE) oral gel 15 g  15 g Oral Q15 Min PRN Tano Castillo, DO       • diazePAM (VALIUM) tablet 2 mg  2 mg Oral Q12H PRN Tano Castillo, DO   2 mg at 06/07/21 1736   • DULoxetine (CYMBALTA) DR capsule 120 mg  120 mg Oral Daily Tano Castillo, DO   120 mg at 06/08/21 0844   • escitalopram (LEXAPRO) tablet 20 mg  20 mg Oral Daily Tano Castillo, DO   20 mg at 06/08/21 0845   • famotidine (PEPCID) tablet 20 mg  20 mg Oral BID Tano Castillo, DO   20 mg at 06/08/21 0845   • furosemide (LASIX) tablet 20 mg  20 mg Oral Daily Tano Castillo, DO   20 mg at 06/08/21 0845   • gabapentin (NEURONTIN) capsule 600 mg  600 mg Oral Q8H Tano Castillo, DO   600 mg at 06/08/21 1340   • glucagon (human recombinant) (GLUCAGEN DIAGNOSTIC) injection 1 mg  1 mg Subcutaneous Q15 Min PRN Tano Castillo, DO       • insulin detemir (LEVEMIR) injection 32 Units  32 Units Subcutaneous Q12H Alfonzo Abbasi MD   32 Units at 06/08/21 0842   • insulin lispro (humaLOG) injection 0-7 Units  0-7 Units Subcutaneous 4x Daily With Meals & Nightly Tano Castillo, DO   4 Units at 06/08/21 1135   • insulin lispro (humaLOG) injection 3 Units  3 Units Subcutaneous TID With Meals Alfonzo Abbasi MD   3 Units at 06/08/21 1135   • ipratropium-albuterol (DUO-NEB) nebulizer solution 3 mL  3 mL Nebulization Q4H - RT Tano Castillo, DO   3 mL at 06/08/21 1214   • methylPREDNISolone sodium succinate (SOLU-Medrol) injection 20 mg  20 mg Intravenous Q12H Alfonzo Abbasi MD   20 mg at 06/08/21 0601   • metoprolol succinate XL (TOPROL-XL) 24 hr tablet 50 mg  50 mg Oral Daily Tano Castillo DO   50 mg at 06/08/21 0845   • nicotine (NICODERM CQ) 21 MG/24HR patch 1 patch  1 patch Transdermal Q24H  "Tano Castillo DO   1 patch at 21 0844   • NIFEdipine XL (PROCARDIA XL) 24 hr tablet 30 mg  30 mg Oral Daily Tano Castillo DO   30 mg at 21 0845   • ondansetron (ZOFRAN) injection 4 mg  4 mg Intravenous Q6H PRN Tano Castillo DO       • oxyCODONE-acetaminophen (PERCOCET) 7.5-325 MG per tablet 1 tablet  1 tablet Oral Q6H PRN Alfonzo Abbasi MD   1 tablet at 21 0855   • Pharmacy Consult - MTM   Does not apply Daily Sarah Macdonald       • roflumilast (DALIRESP) tablet 500 mcg  500 mcg Oral Daily Tano Castillo DO   500 mcg at 21 0845   • Sodium Chloride (PF) 0.9 % 10 mL  10 mL Intravenous PRN Tano Castillo, DO       • sodium chloride 0.9 % flush 10 mL  10 mL Intravenous Q12H Tano Castillo DO   10 mL at 21 08   • sodium chloride 0.9 % flush 10 mL  10 mL Intravenous PRN Tano Castillo DO       • terazosin (HYTRIN) capsule 2 mg  2 mg Oral Nightly Alfonzo Abbasi MD   2 mg at 21   • traZODone (DESYREL) tablet 50 mg  50 mg Oral Nightly Tano Castillo DO   50 mg at 21          Tete Cunha MD   Physician   Hospitalist   Progress Notes       Signed   Date of Service:  21   Creation Time:  21            Signed        Expand AllCollapse All  []Expand All by Default    Show:Clear all  [x]Manual[x]Template[x]Copied    Added by:  [x]Tete Cunha MD    []rocio for details       [facility] Services  PROGRESS NOTE     Patient Name: Dani Ziegler  : 1965  MRN: [id number]     Date of Admission: 6/3/2021  Primary Care Physician: Darrion Tovar MD        Subjective      Subjective      CC:  Pneumonia     HPI:  Doing ok. Reports she feels fine and has been \"asymptomatic\" she reports never knowing she was sick.  On 4LNC today- reports having 2-3 L at home. Endorses sputum production today. Concerned about her WBC      ROS:  Gen- No fevers, chills  CV- No chest pain, palpitations  Resp- No cough, " dyspnea  GI- No N/V/D, abd pain                 Objective      Objective      Vital Signs:   Temp:  [97.5 °F (36.4 °C)-98.6 °F (37 °C)] 97.5 °F (36.4 °C)  Heart Rate:  [57-80] 57  Resp:  [16-20] 16  BP: (140-161)/(79-96) 150/95     Physical Exam:  GEN- no acute distress noted, resting in bed, awake, morbidly obese  HEENT- atraumatic, normocephalic, eomi  NECK- supple, trachea midline, no masses  RESP: good effort, on 4L, wheezing heard   CV: no murmurs, s1/s2, rrr  MSK: no edema noted, spontaneous movement of all extremities  NEURO: alert, oriented, no focal deficits  SKIN: no rashes  PSYCH: appropriate mood and affect         Results Reviewed:          Results from last 7 days   Lab Units 06/08/21 0442 06/08/21  0436 06/07/21  0358 06/06/21  0925   WBC 10*3/mm3  --  15.64* 14.98* 13.95*   HEMOGLOBIN g/dL  --  13.9 13.2 13.2   HEMATOCRIT %  --  45.5 43.9 44.0   PLATELETS 10*3/mm3  --  264 260 271   PROCALCITONIN ng/mL 0.04  --   --   --               Results from last 7 days   Lab Units 06/08/21 0442 06/07/21  0358 06/06/21  0925 06/03/21  1902   SODIUM mmol/L 135* 136 134* 139   POTASSIUM mmol/L 4.9 4.9 4.8 4.6   CHLORIDE mmol/L 97* 98 97* 101   CO2 mmol/L 28.0 28.0 28.0 27.0   BUN mg/dL 27* 24* 22* 8   CREATININE mg/dL 0.72 0.67 0.71 0.73   GLUCOSE mg/dL 222* 266* 306* 152*   CALCIUM mg/dL 9.6 9.3 9.5 9.5   ALT (SGPT) U/L 47*  --   --  9   AST (SGOT) U/L 20  --   --  19      Estimated Creatinine Clearance: 121.2 mL/min (by C-G formula based on SCr of 0.72 mg/dL).              Microbiology Results Abnormal      Procedure Component Value - Date/Time     Blood Culture - Blood, Arm, Left [[id number]] Collected: 06/03/21 0001     Lab Status: Preliminary result Specimen: Blood from Arm, Left Updated: 06/08/21 0015       Blood Culture No growth at 4 days     Blood Culture - Blood, Arm, Right [[id number]] Collected: 06/03/21 2350     Lab Status: Preliminary result Specimen: Blood from Arm, Right Updated: 06/08/21 0015        Blood Culture No growth at 4 days     COVID PRE-OP / PRE-PROCEDURE SCREENING ORDER (NO ISOLATION) - Swab, Nasopharynx [[id number]]  (Normal) Collected: 06/03/21 2345     Lab Status: Final result Specimen: Swab from Nasopharynx Updated: 06/04/21 0031     Narrative:       The following orders were created for panel order COVID PRE-OP / PRE-PROCEDURE SCREENING ORDER (NO ISOLATION) - Swab, Nasopharynx.  Procedure                               Abnormality         Status                     ---------                               -----------         ------                     COVID-19 and FLU A/B PCR...[[id number]]  Normal              Final result                  Please view results for these tests on the individual orders.     COVID-19 and FLU A/B PCR - Swab, Nasopharynx [[id number]]  (Normal) Collected: 06/03/21 2345     Lab Status: Final result Specimen: Swab from Nasopharynx Updated: 06/04/21 0031       COVID19 Not Detected       Influenza A PCR Not Detected       Influenza B PCR Not Detected     Narrative:       Fact sheet for providers: https://www.fda.gov/media/094427/download     Fact sheet for patients: https://www.fda.gov/media/067434/download     Test performed by PCR.                 Imaging Results (Last 24 Hours)      ** No results found for the last 24 hours. **             Results for orders placed in visit on 05/05/20     SCANNED - ECHOCARDIOGRAM        I have reviewed the medications:  Scheduled Meds:apixaban, 5 mg, Oral, Q12H  budesonide, 0.5 mg, Nebulization, BID - RT  busPIRone, 10 mg, Oral, BID  DULoxetine, 120 mg, Oral, Daily  escitalopram, 20 mg, Oral, Daily  famotidine, 20 mg, Oral, BID  furosemide, 20 mg, Oral, Daily  gabapentin, 600 mg, Oral, Q8H  insulin detemir, 32 Units, Subcutaneous, Q12H  insulin lispro, 0-7 Units, Subcutaneous, 4x Daily With Meals & Nightly  insulin lispro, 3 Units, Subcutaneous, TID With Meals  ipratropium-albuterol, 3 mL, Nebulization, Q4H - RT  methylPREDNISolone  sodium succinate, 20 mg, Intravenous, Q12H  metoprolol succinate XL, 50 mg, Oral, Daily  nicotine, 1 patch, Transdermal, Q24H  NIFEdipine CC, 30 mg, Oral, Daily  pharmacy consult - MTM, , Does not apply, Daily  roflumilast, 500 mcg, Oral, Daily  sodium chloride, 10 mL, Intravenous, Q12H  terazosin, 2 mg, Oral, Nightly  traZODone, 50 mg, Oral, Nightly        Continuous Infusions:   PRN Meds:.•  albuterol  •  dextrose  •  dextrose  •  diazePAM  •  glucagon (human recombinant)  •  ondansetron  •  oxyCODONE-acetaminophen  •  Sodium Chloride (PF)  •  sodium chloride           Assessment/Plan      Assessment & Plan            Active Hospital Problems     Diagnosis   POA   • **CAP (community acquired pneumonia) [J18.9]   Yes   • Polypharmacy [Z79.899]   Not Applicable   • Morbid obesity with BMI of 45.0-49.9, adult (CMS/[facility]) [E66.01, Z68.42]   Not Applicable   • Acute on chronic respiratory failure with hypoxia (CMS/[facility]) [J96.21]   Yes   • CHF (congestive heart failure) (CMS/[facility]) [I50.9]   Yes   • COPD with acute exacerbation (CMS/[facility]) [J44.1]   Yes   • Mixed hyperlipidemia [E78.2]   Yes   • Uncontrolled type 2 diabetes mellitus with hyperglycemia, without long-term current use of insulin (CMS/[facility]) [E11.65]   Yes   • Vitamin D deficiency [E55.9]   Yes   • Peripheral neuropathy [G62.9]   Yes   • Essential hypertension [I10]   Yes   • Chronic pain [G89.29]   Yes   • Chronic obstructive pulmonary disease (CMS/[facility]) [J44.9]   Yes   • Edema of lower extremity [R60.0]   Yes   • Depression [F32.9]   Yes   • Anxiety [F41.9]   Yes       Resolved Hospital Problems   No resolved problems to display.         Brief Hospital Course to date:  Dani Ziegler is a 55 y.o. female admitted with acute respiratory failure with hypoxia secondary to community-acquired pneumonia with significant leukocytosis.     Acute respiratory failure with hypoxia  Community-acquired pneumonia  Significant leukocytosis  Acute exacerbation of COPD  -Continue  Rocephin, azithromycin and methylprednisolone-- abx were stopped yesterday after 5 day course, but given persistently high oxygen requirements and no improvement in WBC, will extend to 7 day course   -Continue O2 to maintain sats greater than 90, wean as tolerated  -Current O2 requirement 3 to 4 L nasal cannula (baseline 2 L nasal cannula nocturnally only)  -Continue nebs and inhalers, labs in AM     Morbid obesity  Generalized weakness  Difficulty ambulating  -PT consulted and recommended skilled facility for rehab, she is agreeable     Diabetes mellitus type 2  -Diabetic diet, sliding scale insulin as needed, Levemir 30 units twice daily  -Likely hyperglycemia exacerbated by steroid use  -Fingerstick blood sugars in the 200s sleep with range 146-284     Peripheral neuropathy  -Continue gabapentin     Hypertension     Anxiety and depression  -Continue Lexapro, BuSpar and Cymbalta  -Continue Valium as needed     History of congestive heart failure  Chronic anticoagulation due to past history of DVT x2  Patient reports increased risk of blood clots  -Continue Eliquis  -Continue medical management with Lasix daily, home meds reviewed include metoprolol and nifedipine     DVT Prophylaxis: Eliquis        Disposition: I expect the patient to be discharged pending improvement in her oxygen requirements, treatment of her pneumonia, weaning as tolerated, transition to oral antibiotics and oral steroids.  Also pending referrals for rehab. She is agreeable     CODE STATUS:       Code Status and Medical Interventions:   Ordered at: 06/04/21 1221     Code Status:     CPR     Medical Interventions (Level of Support Prior to Arrest):     Full         Tete Cunha MD  06/08/21                                             Physical Therapy Notes (most recent note)      Sepideh Mendoza, PT at 06/06/21 0806  Version 1 of 1       Goal Outcome Evaluation:  Plan of Care Reviewed With: patient     Outcome Summary: Pt is able to  walk short distances independently with a RW but becomes SOA and has decreased activity tolerance. assessed amb on room air. O2 sats dropped to 83% on room air after walking 30 feet; O2 reapplied at 3L O2/NC. satss remained in 90s throughout remainder of walk. Pt would benefit from skilled PT services to improve functional mobility    Electronically signed by Sepideh Mendoza, PT at 06/06/21 0871       Occupational Therapy Notes (most recent note)    No notes exist for this encounter.         Speech Language Pathology Notes (most recent note)    No notes exist for this encounter.

## 2021-06-08 NOTE — PROGRESS NOTES
"    [facility] Medicine Services  PROGRESS NOTE    Patient Name: Dani Ziegler  : 1965  MRN: [id number]    Date of Admission: 6/3/2021  Primary Care Physician: Darrion Tovar MD    Subjective   Subjective     CC:  Pneumonia    HPI:  Doing ok. Reports she feels fine and has been \"asymptomatic\" she reports never knowing she was sick.  On 4LNC today- reports having 2-3 L at home. Endorses sputum production today. Concerned about her WBC     ROS:  Gen- No fevers, chills  CV- No chest pain, palpitations  Resp- No cough, dyspnea  GI- No N/V/D, abd pain         Objective   Objective     Vital Signs:   Temp:  [97.5 °F (36.4 °C)-98.6 °F (37 °C)] 97.5 °F (36.4 °C)  Heart Rate:  [57-80] 57  Resp:  [16-20] 16  BP: (140-161)/(79-96) 150/95        Physical Exam:  GEN- no acute distress noted, resting in bed, awake, morbidly obese  HEENT- atraumatic, normocephalic, eomi  NECK- supple, trachea midline, no masses  RESP: good effort, on 4L, wheezing heard   CV: no murmurs, s1/s2, rrr  MSK: no edema noted, spontaneous movement of all extremities  NEURO: alert, oriented, no focal deficits  SKIN: no rashes  PSYCH: appropriate mood and affect       Results Reviewed:  Results from last 7 days   Lab Units 21  0436 21  0358 21  0925   WBC 10*3/mm3  --  15.64* 14.98* 13.95*   HEMOGLOBIN g/dL  --  13.9 13.2 13.2   HEMATOCRIT %  --  45.5 43.9 44.0   PLATELETS 10*3/mm3  --  264 260 271   PROCALCITONIN ng/mL 0.04  --   --   --      Results from last 7 days   Lab Units 21  0358 21  0925 21  1902   SODIUM mmol/L 135* 136 134* 139   POTASSIUM mmol/L 4.9 4.9 4.8 4.6   CHLORIDE mmol/L 97* 98 97* 101   CO2 mmol/L 28.0 28.0 28.0 27.0   BUN mg/dL 27* 24* 22* 8   CREATININE mg/dL 0.72 0.67 0.71 0.73   GLUCOSE mg/dL 222* 266* 306* 152*   CALCIUM mg/dL 9.6 9.3 9.5 9.5   ALT (SGPT) U/L 47*  --   --  9   AST (SGOT) U/L 20  --   --  19     Estimated Creatinine " Clearance: 121.2 mL/min (by C-G formula based on SCr of 0.72 mg/dL).    Microbiology Results Abnormal     Procedure Component Value - Date/Time    Blood Culture - Blood, Arm, Left [[id number]] Collected: 06/03/21 0001    Lab Status: Preliminary result Specimen: Blood from Arm, Left Updated: 06/08/21 0015     Blood Culture No growth at 4 days    Blood Culture - Blood, Arm, Right [[id number]] Collected: 06/03/21 2350    Lab Status: Preliminary result Specimen: Blood from Arm, Right Updated: 06/08/21 0015     Blood Culture No growth at 4 days    COVID PRE-OP / PRE-PROCEDURE SCREENING ORDER (NO ISOLATION) - Swab, Nasopharynx [[id number]]  (Normal) Collected: 06/03/21 2345    Lab Status: Final result Specimen: Swab from Nasopharynx Updated: 06/04/21 0031    Narrative:      The following orders were created for panel order COVID PRE-OP / PRE-PROCEDURE SCREENING ORDER (NO ISOLATION) - Swab, Nasopharynx.  Procedure                               Abnormality         Status                     ---------                               -----------         ------                     COVID-19 and FLU A/B PCR...[[id number]]  Normal              Final result                 Please view results for these tests on the individual orders.    COVID-19 and FLU A/B PCR - Swab, Nasopharynx [[id number]]  (Normal) Collected: 06/03/21 2345    Lab Status: Final result Specimen: Swab from Nasopharynx Updated: 06/04/21 0031     COVID19 Not Detected     Influenza A PCR Not Detected     Influenza B PCR Not Detected    Narrative:      Fact sheet for providers: https://www.fda.gov/media/377666/download    Fact sheet for patients: https://www.fda.gov/media/171045/download    Test performed by PCR.          Imaging Results (Last 24 Hours)     ** No results found for the last 24 hours. **          Results for orders placed in visit on 05/05/20    SCANNED - ECHOCARDIOGRAM      I have reviewed the medications:  Scheduled Meds:apixaban, 5 mg, Oral,  Q12H  budesonide, 0.5 mg, Nebulization, BID - RT  busPIRone, 10 mg, Oral, BID  DULoxetine, 120 mg, Oral, Daily  escitalopram, 20 mg, Oral, Daily  famotidine, 20 mg, Oral, BID  furosemide, 20 mg, Oral, Daily  gabapentin, 600 mg, Oral, Q8H  insulin detemir, 32 Units, Subcutaneous, Q12H  insulin lispro, 0-7 Units, Subcutaneous, 4x Daily With Meals & Nightly  insulin lispro, 3 Units, Subcutaneous, TID With Meals  ipratropium-albuterol, 3 mL, Nebulization, Q4H - RT  methylPREDNISolone sodium succinate, 20 mg, Intravenous, Q12H  metoprolol succinate XL, 50 mg, Oral, Daily  nicotine, 1 patch, Transdermal, Q24H  NIFEdipine CC, 30 mg, Oral, Daily  pharmacy consult - MTM, , Does not apply, Daily  roflumilast, 500 mcg, Oral, Daily  sodium chloride, 10 mL, Intravenous, Q12H  terazosin, 2 mg, Oral, Nightly  traZODone, 50 mg, Oral, Nightly      Continuous Infusions:   PRN Meds:.•  albuterol  •  dextrose  •  dextrose  •  diazePAM  •  glucagon (human recombinant)  •  ondansetron  •  oxyCODONE-acetaminophen  •  Sodium Chloride (PF)  •  sodium chloride    Assessment/Plan   Assessment & Plan     Active Hospital Problems    Diagnosis  POA   • **CAP (community acquired pneumonia) [J18.9]  Yes   • Polypharmacy [Z79.899]  Not Applicable   • Morbid obesity with BMI of 45.0-49.9, adult (CMS/[facility]) [E66.01, Z68.42]  Not Applicable   • Acute on chronic respiratory failure with hypoxia (CMS/[facility]) [J96.21]  Yes   • CHF (congestive heart failure) (CMS/[facility]) [I50.9]  Yes   • COPD with acute exacerbation (CMS/[facility]) [J44.1]  Yes   • Mixed hyperlipidemia [E78.2]  Yes   • Uncontrolled type 2 diabetes mellitus with hyperglycemia, without long-term current use of insulin (CMS/[facility]) [E11.65]  Yes   • Vitamin D deficiency [E55.9]  Yes   • Peripheral neuropathy [G62.9]  Yes   • Essential hypertension [I10]  Yes   • Chronic pain [G89.29]  Yes   • Chronic obstructive pulmonary disease (CMS/[facility]) [J44.9]  Yes   • Edema of lower extremity [R60.0]  Yes   • Depression  [F32.9]  Yes   • Anxiety [F41.9]  Yes      Resolved Hospital Problems   No resolved problems to display.        Brief Hospital Course to date:  Dani Ziegler is a 55 y.o. female admitted with acute respiratory failure with hypoxia secondary to community-acquired pneumonia with significant leukocytosis.    Acute respiratory failure with hypoxia  Community-acquired pneumonia  Significant leukocytosis  Acute exacerbation of COPD  -Continue Rocephin, azithromycin and methylprednisolone-- abx were stopped yesterday after 5 day course, but given persistently high oxygen requirements and no improvement in WBC, will extend to 7 day course   -Continue O2 to maintain sats greater than 90, wean as tolerated  -Current O2 requirement 3 to 4 L nasal cannula (baseline 2 L nasal cannula nocturnally only)  -Continue nebs and inhalers, labs in AM    Morbid obesity  Generalized weakness  Difficulty ambulating  -PT consulted and recommended skilled facility for rehab, she is agreeable    Diabetes mellitus type 2  -Diabetic diet, sliding scale insulin as needed, Levemir 30 units twice daily  -Likely hyperglycemia exacerbated by steroid use  -Fingerstick blood sugars in the 200s sleep with range 146-284    Peripheral neuropathy  -Continue gabapentin    Hypertension    Anxiety and depression  -Continue Lexapro, BuSpar and Cymbalta  -Continue Valium as needed    History of congestive heart failure  Chronic anticoagulation due to past history of DVT x2  Patient reports increased risk of blood clots  -Continue Eliquis  -Continue medical management with Lasix daily, home meds reviewed include metoprolol and nifedipine    DVT Prophylaxis: Eliquis      Disposition: I expect the patient to be discharged pending improvement in her oxygen requirements, treatment of her pneumonia, weaning as tolerated, transition to oral antibiotics and oral steroids.  Also pending referrals for rehab. She is agreeable    CODE STATUS:   Code Status and Medical  Interventions:   Ordered at: 06/04/21 1221     Code Status:    CPR     Medical Interventions (Level of Support Prior to Arrest):    Full       Tete Cunha MD  06/08/21

## 2021-06-08 NOTE — PROGRESS NOTES
NN spoke with pt at BS.  Pt alert and able to answer questions appropriately.   Pt O2 sat  93% on 4 L currently, home O2 use 2-3 L HS.  Deep breathing exercises encouraged.  COPD action plan reviewed.  No new concerns or questions voiced at this time.  NN will continue to follow as needed.      Per current [employer] Standards, please consider: :LAMA/LABA/ICS in place, Outpatient PFT, Outpatient sleep study, Pulmonary rehab as appropriate, Outpatient LDCT per [place] screening guidelines (>30 pack years+ 57 yo), NRT at [place]

## 2021-06-08 NOTE — CASE MANAGEMENT/SOCIAL WORK
Continued Stay Note  [facility]     Patient Name: Dani Ziegler  MRN: [id number]  Today's Date: 6/8/2021    Admit Date: 6/3/2021    Discharge Plan     Row Name 06/08/21 1451       Plan    Plan  SKilled Nursing Facility    Patient/Family in Agreement with Plan  yes    Plan Comments  Received a call from Kristi with [facility].  Patient is not appropriate for acute rehab and they currently do not have any skilled beds available.  Spoke with patient in room.  She would like referrals to facilities in [facility].  Also agreeable to referral to [facility].  Referal called to Jes[facility] and faxed to [facility].  CM will continue to follow.  Polly Cote RN [phone]    Final Discharge Disposition Code  30 - still a patient        Discharge Codes    No documentation.       Expected Discharge Date and Time     Expected Discharge Date Expected Discharge Time    Jun 11, 2021             Polly Cote RN

## 2021-06-08 NOTE — PROGRESS NOTES
[facility] Medicine Services  PROGRESS NOTE    Patient Name: Dani Ziegler  : 1965  MRN: [id number]    Date of Admission: 6/3/2021  Primary Care Physician: Darrion Tovar MD    Subjective   Subjective     CC:   Abnormal Lab / Elevated WBC at PCP    HPI:   She has had worsening oxygen requirements.  She feels like she needs a flutter valve and incentive spirometer.  Denies pain.  She is worried about her white blood cell count going up and is asking if she needs different antibiotics.  Blood pressure up and blood glucose greater than 300    ROS:    Gen- No fevers, chills  CV- No chest pain, palpitations  Resp- No cough, dyspnea  GI- No N/V/D, abd pain        Objective   Objective     Vital Signs:   Temp:  [98 °F (36.7 °C)-98.5 °F (36.9 °C)] 98.5 °F (36.9 °C)  Heart Rate:  [65-80] 71  Resp:  [15-24] 20  BP: (151-168)/(85-96) 161/96        Physical Exam:    Constitutional: No acute distress, awake, alert  HENT: NCAT, mucous membranes moist  Respiratory: Poor inspiratory effort, no wheezes  Cardiovascular: RRR, no murmurs, rubs, or gallops  Gastrointestinal: Positive bowel sounds, soft, nontender, nondistended  Musculoskeletal: No bilateral ankle edema  Psychiatric: Appropriate affect, cooperative  Neurologic: Oriented x 3, strength symmetric in all extremities, Cranial Nerves grossly intact to confrontation, speech clear  Skin: No rashes    Results Reviewed:  Results from last 7 days   Lab Units 21  03521  1902   WBC 10*3/mm3 14.98* 13.95* 11.80*   HEMOGLOBIN g/dL 13.2 13.2 13.6   HEMATOCRIT % 43.9 44.0 45.8   PLATELETS 10*3/mm3 260 271 341     Results from last 7 days   Lab Units 21  0358 21  1902   SODIUM mmol/L 136 134* 139   POTASSIUM mmol/L 4.9 4.8 4.6   CHLORIDE mmol/L 98 97* 101   CO2 mmol/L 28.0 28.0 27.0   BUN mg/dL 24* 22* 8   CREATININE mg/dL 0.67 0.71 0.73   GLUCOSE mg/dL 266* 306* 152*   CALCIUM mg/dL 9.3 9.5 9.5    ALT (SGPT) U/L  --   --  9   AST (SGOT) U/L  --   --  19     Estimated Creatinine Clearance: 132 mL/min (by C-G formula based on SCr of 0.67 mg/dL).    Microbiology Results Abnormal     Procedure Component Value - Date/Time    Blood Culture - Blood, Arm, Left [[id number]] Collected: 06/03/21 0001    Lab Status: Preliminary result Specimen: Blood from Arm, Left Updated: 06/07/21 0015     Blood Culture No growth at 3 days    Blood Culture - Blood, Arm, Right [[id number]] Collected: 06/03/21 2350    Lab Status: Preliminary result Specimen: Blood from Arm, Right Updated: 06/07/21 0015     Blood Culture No growth at 3 days    COVID PRE-OP / PRE-PROCEDURE SCREENING ORDER (NO ISOLATION) - Swab, Nasopharynx [[id number]]  (Normal) Collected: 06/03/21 2345    Lab Status: Final result Specimen: Swab from Nasopharynx Updated: 06/04/21 0031    Narrative:      The following orders were created for panel order COVID PRE-OP / PRE-PROCEDURE SCREENING ORDER (NO ISOLATION) - Swab, Nasopharynx.  Procedure                               Abnormality         Status                     ---------                               -----------         ------                     COVID-19 and FLU A/B PCR...[[id number]]  Normal              Final result                 Please view results for these tests on the individual orders.    COVID-19 and FLU A/B PCR - Swab, Nasopharynx [[id number]]  (Normal) Collected: 06/03/21 2345    Lab Status: Final result Specimen: Swab from Nasopharynx Updated: 06/04/21 0031     COVID19 Not Detected     Influenza A PCR Not Detected     Influenza B PCR Not Detected    Narrative:      Fact sheet for providers: https://www.fda.gov/media/925292/download    Fact sheet for patients: https://www.fda.gov/media/234069/download    Test performed by PCR.          Imaging Results (Last 24 Hours)     ** No results found for the last 24 hours. **          Results for orders placed in visit on 05/05/20    SCANNED - ECHOCARDIOGRAM      I  have reviewed the medications:  Scheduled Meds:apixaban, 5 mg, Oral, Q12H  azithromycin, 500 mg, Intravenous, Q24H  budesonide, 0.5 mg, Nebulization, BID - RT  busPIRone, 10 mg, Oral, BID  cefTRIAXone, 1 g, Intravenous, Q24H  DULoxetine, 120 mg, Oral, Daily  escitalopram, 20 mg, Oral, Daily  famotidine, 20 mg, Oral, BID  furosemide, 20 mg, Oral, Daily  gabapentin, 600 mg, Oral, Q8H  insulin detemir, 30 Units, Subcutaneous, Q12H  insulin lispro, 0-7 Units, Subcutaneous, 4x Daily With Meals & Nightly  ipratropium-albuterol, 3 mL, Nebulization, Q4H - RT  methylPREDNISolone sodium succinate, 20 mg, Intravenous, Q12H  metoprolol succinate XL, 50 mg, Oral, Daily  nicotine, 1 patch, Transdermal, Q24H  NIFEdipine CC, 30 mg, Oral, Daily  pharmacy consult - MTM, , Does not apply, Daily  roflumilast, 500 mcg, Oral, Daily  sodium chloride, 10 mL, Intravenous, Q12H  terazosin, 2 mg, Oral, Nightly  traZODone, 50 mg, Oral, Nightly      Continuous Infusions:   PRN Meds:.•  albuterol  •  dextrose  •  dextrose  •  diazePAM  •  glucagon (human recombinant)  •  ondansetron  •  oxyCODONE-acetaminophen  •  Sodium Chloride (PF)  •  sodium chloride    Assessment/Plan   Assessment & Plan     Active Hospital Problems    Diagnosis  POA   • **CAP (community acquired pneumonia) [J18.9]  Yes   • Polypharmacy [Z79.899]  Not Applicable   • Morbid obesity with BMI of 45.0-49.9, adult (CMS/[facility]) [E66.01, Z68.42]  Not Applicable   • Acute on chronic respiratory failure with hypoxia (CMS/[facility]) [J96.21]  Yes   • CHF (congestive heart failure) (CMS/[facility]) [I50.9]  Yes   • COPD with acute exacerbation (CMS/[facility]) [J44.1]  Yes   • Mixed hyperlipidemia [E78.2]  Yes   • Uncontrolled type 2 diabetes mellitus with hyperglycemia, without long-term current use of insulin (CMS/[facility]) [E11.65]  Yes   • Vitamin D deficiency [E55.9]  Yes   • Peripheral neuropathy [G62.9]  Yes   • Essential hypertension [I10]  Yes   • Chronic pain [G89.29]  Yes   • Chronic obstructive  pulmonary disease (CMS/[facility]) [J44.9]  Yes   • Edema of lower extremity [R60.0]  Yes   • Depression [F32.9]  Yes   • Anxiety [F41.9]  Yes      Resolved Hospital Problems   No resolved problems to display.        Brief Hospital Course to date:  Dani Ziegler is a 55 y.o. female who was admitted with acute respiratory failure with hypoxia and pneumonia with significant leukocytosis.    She has elevated blood pressure today and elevated blood glucose.  He also appears to be on higher oxygen requirements today.    Acute respiratory failure with hypoxia  Community acquired pneumonia  Significant leukocytosis  Acute exacerbation of COPD  -Continue Rocephin and azithromycin  -Wean Solu-Medrol  -In oxygen as tolerated  -Oxygen requirements have gone up  -minimize narcotic analgesia  -Provide incentive spirometer and flutter valve    Morbid obesity  Generalized weakness  Difficulty ambulating  -PT consulted and recommending skilled nursing facility for rehab  -It is unclear if patient will be agreeable for rehab    Diabetes  -Upper glycemia likely exacerbated by steroid use  -Wean Solu-Medrol    Peripheral neuropathy  -She is on gabapentin    Hypertension  -Add terazosin today  -Wean Solu-Medrol    Depression/anxiety  -Lexapro  -BuSpar  -Cymbalta  -Valium as needed    History of congestive heart failure  Chronic anticoagulation due to past history of DVT x2  Patient reports increased risk of blood clots  -Continue Eliquis    Wean Solu-Medrol today  Increase Long Acting Insulin today  Add mealtime insulin today  Wean narcotic analgesia    DVT Prophylaxis: Apixaban      Disposition: I expect the patient to be discharged pending improvement and improving oxygen requirements.  Referrals for rehab but may want to go home    CODE STATUS:   Code Status and Medical Interventions:   Ordered at: 06/04/21 1221     Code Status:    CPR     Medical Interventions (Level of Support Prior to Arrest):    Full       Alfonzo Abbasi,  MD  06/07/21

## 2021-06-08 NOTE — PLAN OF CARE
VSS. NSR. 4 L NC humidified. Pt hasn't rested well tonight.     Problem: Adult Inpatient Plan of Care  Goal: Plan of Care Review  Outcome: Ongoing, Progressing  Goal: Patient-Specific Goal (Individualized)  Outcome: Ongoing, Progressing  Goal: Absence of Hospital-Acquired Illness or Injury  Outcome: Ongoing, Progressing  Intervention: Identify and Manage Fall Risk  Recent Flowsheet Documentation  Taken 6/8/2021 0400 by Ele Stephen RN  Safety Promotion/Fall Prevention:   activity supervised   assistive device/personal items within reach   clutter free environment maintained   fall prevention program maintained   nonskid shoes/slippers when out of bed   safety round/check completed  Taken 6/8/2021 0200 by Ele Stephen, NIKKI  Safety Promotion/Fall Prevention:   activity supervised   assistive device/personal items within reach   clutter free environment maintained   fall prevention program maintained   nonskid shoes/slippers when out of bed   safety round/check completed  Taken 6/8/2021 0000 by Ele Stephen, NIKKI  Safety Promotion/Fall Prevention:   activity supervised   assistive device/personal items within reach   clutter free environment maintained   fall prevention program maintained   nonskid shoes/slippers when out of bed   safety round/check completed  Taken 6/7/2021 2200 by Ele Stephen, NIKKI  Safety Promotion/Fall Prevention:   activity supervised   assistive device/personal items within reach   clutter free environment maintained   fall prevention program maintained   nonskid shoes/slippers when out of bed   safety round/check completed  Taken 6/7/2021 2000 by Ele Stephen, NIKKI  Safety Promotion/Fall Prevention:   activity supervised   assistive device/personal items within reach   clutter free environment maintained   fall prevention program maintained   nonskid shoes/slippers when out of bed   safety round/check completed  Intervention: Prevent Skin Injury  Recent Flowsheet Documentation  Taken 6/8/2021  0400 by Ele Stephen RN  Body Position: position changed independently  Skin Protection:   adhesive use limited   incontinence pads utilized   tubing/devices free from skin contact  Taken 6/8/2021 0200 by Ele Stephen RN  Body Position: position changed independently  Skin Protection:   adhesive use limited   incontinence pads utilized   tubing/devices free from skin contact  Taken 6/8/2021 0000 by Ele Stephen RN  Body Position: position changed independently  Skin Protection:   adhesive use limited   incontinence pads utilized   tubing/devices free from skin contact  Taken 6/7/2021 2200 by Ele Stephen RN  Body Position: position changed independently  Skin Protection:   adhesive use limited   incontinence pads utilized   tubing/devices free from skin contact  Taken 6/7/2021 2000 by Ele Stephen RN  Body Position: position changed independently  Skin Protection:   adhesive use limited   incontinence pads utilized   tubing/devices free from skin contact  Intervention: Prevent Infection  Recent Flowsheet Documentation  Taken 6/8/2021 0000 by Ele Stephen RN  Infection Prevention:   rest/sleep promoted   single patient room provided   personal protective equipment utilized  Taken 6/7/2021 2000 by Ele Stephen RN  Infection Prevention:   rest/sleep promoted   single patient room provided  Goal: Optimal Comfort and Wellbeing  Outcome: Ongoing, Progressing  Goal: Readiness for Transition of Care  Outcome: Ongoing, Progressing     Problem: Fall Injury Risk  Goal: Absence of Fall and Fall-Related Injury  Outcome: Ongoing, Progressing  Intervention: Promote Injury-Free Environment  Recent Flowsheet Documentation  Taken 6/8/2021 0400 by Ele Stephen RN  Safety Promotion/Fall Prevention:   activity supervised   assistive device/personal items within reach   clutter free environment maintained   fall prevention program maintained   nonskid shoes/slippers when out of bed   safety round/check completed  Taken  6/8/2021 0200 by Ele Stephen, RN  Safety Promotion/Fall Prevention:   activity supervised   assistive device/personal items within reach   clutter free environment maintained   fall prevention program maintained   nonskid shoes/slippers when out of bed   safety round/check completed  Taken 6/8/2021 0000 by Ele Stephen, RN  Safety Promotion/Fall Prevention:   activity supervised   assistive device/personal items within reach   clutter free environment maintained   fall prevention program maintained   nonskid shoes/slippers when out of bed   safety round/check completed  Taken 6/7/2021 2200 by Ele Stephen RN  Safety Promotion/Fall Prevention:   activity supervised   assistive device/personal items within reach   clutter free environment maintained   fall prevention program maintained   nonskid shoes/slippers when out of bed   safety round/check completed  Taken 6/7/2021 2000 by Ele Stephen RN  Safety Promotion/Fall Prevention:   activity supervised   assistive device/personal items within reach   clutter free environment maintained   fall prevention program maintained   nonskid shoes/slippers when out of bed   safety round/check completed     Problem: Adjustment to Illness COPD (Chronic Obstructive Pulmonary Disease)  Goal: Optimal Chronic Illness Coping  Outcome: Ongoing, Progressing     Problem: Functional Ability Impaired COPD (Chronic Obstructive Pulmonary Disease)  Goal: Optimal Level of Functional Petroleum  Outcome: Ongoing, Progressing  Intervention: Optimize Functional Ability  Recent Flowsheet Documentation  Taken 6/8/2021 0400 by Ele Stephen, RN  Activity Management:   activity adjusted per tolerance   activity encouraged  Taken 6/8/2021 0200 by Ele Stephen, RN  Activity Management: activity adjusted per tolerance  Taken 6/8/2021 0000 by Ele Stephen, RN  Activity Management:   activity adjusted per tolerance   activity encouraged  Taken 6/7/2021 2200 by Ele Stephen, RN  Activity  Management:   activity adjusted per tolerance   activity encouraged  Taken 6/7/2021 2000 by Ele Stephen RN  Activity Management:   activity adjusted per tolerance   activity encouraged     Problem: Infection COPD (Chronic Obstructive Pulmonary Disease)  Goal: Absence of Infection Signs and Symptoms  Outcome: Ongoing, Progressing     Problem: Oral Intake Inadequate COPD (Chronic Obstructive Pulmonary Disease)  Goal: Improved Nutrition Intake  Outcome: Ongoing, Progressing     Problem: Respiratory Compromise COPD (Chronic Obstructive Pulmonary Disease)  Goal: Effective Oxygenation and Ventilation  Outcome: Ongoing, Progressing  Intervention: Promote Airway Secretion Clearance  Recent Flowsheet Documentation  Taken 6/8/2021 0400 by Ele Stephen RN  Activity Management:   activity adjusted per tolerance   activity encouraged  Taken 6/8/2021 0200 by Ele Stephen RN  Activity Management: activity adjusted per tolerance  Taken 6/8/2021 0000 by Ele Stephen RN  Activity Management:   activity adjusted per tolerance   activity encouraged  Taken 6/7/2021 2200 by Ele Stephen RN  Activity Management:   activity adjusted per tolerance   activity encouraged  Taken 6/7/2021 2000 by Ele Stephen RN  Activity Management:   activity adjusted per tolerance   activity encouraged  Intervention: Optimize Oxygenation and Ventilation  Recent Flowsheet Documentation  Taken 6/8/2021 0400 by Ele Stephen RN  Head of Bed (HOB): HOB elevated  Taken 6/8/2021 0200 by Ele Stephen RN  Head of Bed (HOB): HOB elevated  Taken 6/8/2021 0000 by Ele Stephen RN  Head of Bed (HOB): HOB elevated  Taken 6/7/2021 2200 by Ele Stephen RN  Head of Bed (HOB): HOB elevated  Taken 6/7/2021 2000 by Ele Stephen RN  Head of Bed (HOB): HOB elevated   Goal Outcome Evaluation:

## 2021-06-09 LAB
ANION GAP SERPL CALCULATED.3IONS-SCNC: 8 MMOL/L (ref 5–15)
BACTERIA SPEC AEROBE CULT: NORMAL
BACTERIA SPEC AEROBE CULT: NORMAL
BASOPHILS # BLD AUTO: 0.07 10*3/MM3 (ref 0–0.2)
BASOPHILS NFR BLD AUTO: 0.4 % (ref 0–1.5)
BUN SERPL-MCNC: 28 MG/DL (ref 6–20)
BUN/CREAT SERPL: 42.4 (ref 7–25)
CALCIUM SPEC-SCNC: 10 MG/DL (ref 8.6–10.5)
CHLORIDE SERPL-SCNC: 96 MMOL/L (ref 98–107)
CO2 SERPL-SCNC: 30 MMOL/L (ref 22–29)
CREAT SERPL-MCNC: 0.66 MG/DL (ref 0.57–1)
DEPRECATED RDW RBC AUTO: 47.2 FL (ref 37–54)
EOSINOPHIL # BLD AUTO: 0 10*3/MM3 (ref 0–0.4)
EOSINOPHIL NFR BLD AUTO: 0 % (ref 0.3–6.2)
ERYTHROCYTE [DISTWIDTH] IN BLOOD BY AUTOMATED COUNT: 15.6 % (ref 12.3–15.4)
GFR SERPL CREATININE-BSD FRML MDRD: 93 ML/MIN/1.73
GLUCOSE BLDC GLUCOMTR-MCNC: 151 MG/DL (ref 70–130)
GLUCOSE BLDC GLUCOMTR-MCNC: 183 MG/DL (ref 70–130)
GLUCOSE BLDC GLUCOMTR-MCNC: 233 MG/DL (ref 70–130)
GLUCOSE BLDC GLUCOMTR-MCNC: 348 MG/DL (ref 70–130)
GLUCOSE SERPL-MCNC: 195 MG/DL (ref 65–99)
HCT VFR BLD AUTO: 45.2 % (ref 34–46.6)
HGB BLD-MCNC: 13.9 G/DL (ref 12–15.9)
IMM GRANULOCYTES # BLD AUTO: 0.49 10*3/MM3 (ref 0–0.05)
IMM GRANULOCYTES NFR BLD AUTO: 2.8 % (ref 0–0.5)
LYMPHOCYTES # BLD AUTO: 2.16 10*3/MM3 (ref 0.7–3.1)
LYMPHOCYTES NFR BLD AUTO: 12.3 % (ref 19.6–45.3)
MCH RBC QN AUTO: 25.6 PG (ref 26.6–33)
MCHC RBC AUTO-ENTMCNC: 30.8 G/DL (ref 31.5–35.7)
MCV RBC AUTO: 83.2 FL (ref 79–97)
MONOCYTES # BLD AUTO: 0.88 10*3/MM3 (ref 0.1–0.9)
MONOCYTES NFR BLD AUTO: 5 % (ref 5–12)
NEUTROPHILS NFR BLD AUTO: 13.89 10*3/MM3 (ref 1.7–7)
NEUTROPHILS NFR BLD AUTO: 79.5 % (ref 42.7–76)
NRBC BLD AUTO-RTO: 0 /100 WBC (ref 0–0.2)
PLATELET # BLD AUTO: 300 10*3/MM3 (ref 140–450)
PMV BLD AUTO: 10.9 FL (ref 6–12)
POTASSIUM SERPL-SCNC: 4.6 MMOL/L (ref 3.5–5.2)
RBC # BLD AUTO: 5.43 10*6/MM3 (ref 3.77–5.28)
SODIUM SERPL-SCNC: 134 MMOL/L (ref 136–145)
WBC # BLD AUTO: 17.49 10*3/MM3 (ref 3.4–10.8)

## 2021-06-09 PROCEDURE — 94799 UNLISTED PULMONARY SVC/PX: CPT

## 2021-06-09 PROCEDURE — 63710000001 INSULIN DETEMIR PER 5 UNITS: Performed by: HOSPITALIST

## 2021-06-09 PROCEDURE — 94660 CPAP INITIATION&MGMT: CPT

## 2021-06-09 PROCEDURE — 99233 SBSQ HOSP IP/OBS HIGH 50: CPT | Performed by: INTERNAL MEDICINE

## 2021-06-09 PROCEDURE — 87205 SMEAR GRAM STAIN: CPT | Performed by: INTERNAL MEDICINE

## 2021-06-09 PROCEDURE — 25010000002 AZITHROMYCIN PER 500 MG: Performed by: INTERNAL MEDICINE

## 2021-06-09 PROCEDURE — 85025 COMPLETE CBC W/AUTO DIFF WBC: CPT | Performed by: INTERNAL MEDICINE

## 2021-06-09 PROCEDURE — 25010000002 METHYLPREDNISOLONE PER 40 MG: Performed by: HOSPITALIST

## 2021-06-09 PROCEDURE — 82962 GLUCOSE BLOOD TEST: CPT

## 2021-06-09 PROCEDURE — 63710000001 INSULIN LISPRO (HUMAN) PER 5 UNITS: Performed by: INTERNAL MEDICINE

## 2021-06-09 PROCEDURE — 80048 BASIC METABOLIC PNL TOTAL CA: CPT | Performed by: INTERNAL MEDICINE

## 2021-06-09 PROCEDURE — 25010000002 CEFTRIAXONE PER 250 MG: Performed by: INTERNAL MEDICINE

## 2021-06-09 PROCEDURE — 63710000001 INSULIN LISPRO (HUMAN) PER 5 UNITS: Performed by: HOSPITALIST

## 2021-06-09 PROCEDURE — 87070 CULTURE OTHR SPECIMN AEROBIC: CPT | Performed by: INTERNAL MEDICINE

## 2021-06-09 RX ADMIN — IPRATROPIUM BROMIDE AND ALBUTEROL SULFATE 3 ML: 2.5; .5 SOLUTION RESPIRATORY (INHALATION) at 06:56

## 2021-06-09 RX ADMIN — ESCITALOPRAM OXALATE 20 MG: 20 TABLET ORAL at 08:15

## 2021-06-09 RX ADMIN — FAMOTIDINE 20 MG: 20 TABLET ORAL at 08:15

## 2021-06-09 RX ADMIN — OXYCODONE HYDROCHLORIDE AND ACETAMINOPHEN 1 TABLET: 7.5; 325 TABLET ORAL at 19:32

## 2021-06-09 RX ADMIN — NIFEDIPINE 30 MG: 30 TABLET, FILM COATED, EXTENDED RELEASE ORAL at 08:14

## 2021-06-09 RX ADMIN — INSULIN LISPRO 3 UNITS: 100 INJECTION, SOLUTION INTRAVENOUS; SUBCUTANEOUS at 11:54

## 2021-06-09 RX ADMIN — GABAPENTIN 600 MG: 300 CAPSULE ORAL at 05:36

## 2021-06-09 RX ADMIN — METHYLPREDNISOLONE SODIUM SUCCINATE 20 MG: 40 INJECTION, POWDER, FOR SOLUTION INTRAMUSCULAR; INTRAVENOUS at 05:39

## 2021-06-09 RX ADMIN — IPRATROPIUM BROMIDE AND ALBUTEROL SULFATE 3 ML: 2.5; .5 SOLUTION RESPIRATORY (INHALATION) at 02:28

## 2021-06-09 RX ADMIN — APIXABAN 5 MG: 5 TABLET, FILM COATED ORAL at 20:49

## 2021-06-09 RX ADMIN — INSULIN LISPRO 2 UNITS: 100 INJECTION, SOLUTION INTRAVENOUS; SUBCUTANEOUS at 20:49

## 2021-06-09 RX ADMIN — GABAPENTIN 600 MG: 300 CAPSULE ORAL at 20:48

## 2021-06-09 RX ADMIN — BUDESONIDE 0.5 MG: 0.5 INHALANT RESPIRATORY (INHALATION) at 18:40

## 2021-06-09 RX ADMIN — IPRATROPIUM BROMIDE AND ALBUTEROL SULFATE 3 ML: 2.5; .5 SOLUTION RESPIRATORY (INHALATION) at 18:40

## 2021-06-09 RX ADMIN — INSULIN DETEMIR 32 UNITS: 100 INJECTION, SOLUTION SUBCUTANEOUS at 20:50

## 2021-06-09 RX ADMIN — INSULIN LISPRO 3 UNITS: 100 INJECTION, SOLUTION INTRAVENOUS; SUBCUTANEOUS at 16:37

## 2021-06-09 RX ADMIN — BUSPIRONE HYDROCHLORIDE 10 MG: 10 TABLET ORAL at 08:15

## 2021-06-09 RX ADMIN — OXYCODONE HYDROCHLORIDE AND ACETAMINOPHEN 1 TABLET: 7.5; 325 TABLET ORAL at 12:28

## 2021-06-09 RX ADMIN — DULOXETINE HYDROCHLORIDE 120 MG: 60 CAPSULE, DELAYED RELEASE ORAL at 08:15

## 2021-06-09 RX ADMIN — INSULIN LISPRO 5 UNITS: 100 INJECTION, SOLUTION INTRAVENOUS; SUBCUTANEOUS at 11:53

## 2021-06-09 RX ADMIN — Medication 1 PATCH: at 08:16

## 2021-06-09 RX ADMIN — METOPROLOL SUCCINATE 50 MG: 50 TABLET, EXTENDED RELEASE ORAL at 08:15

## 2021-06-09 RX ADMIN — TRAZODONE HYDROCHLORIDE 50 MG: 50 TABLET ORAL at 20:48

## 2021-06-09 RX ADMIN — BUSPIRONE HYDROCHLORIDE 10 MG: 10 TABLET ORAL at 16:38

## 2021-06-09 RX ADMIN — APIXABAN 5 MG: 5 TABLET, FILM COATED ORAL at 08:15

## 2021-06-09 RX ADMIN — SODIUM CHLORIDE 1 G: 900 INJECTION INTRAVENOUS at 11:53

## 2021-06-09 RX ADMIN — OXYCODONE HYDROCHLORIDE AND ACETAMINOPHEN 1 TABLET: 7.5; 325 TABLET ORAL at 05:44

## 2021-06-09 RX ADMIN — TERAZOSIN HYDROCHLORIDE 2 MG: 2 CAPSULE ORAL at 20:49

## 2021-06-09 RX ADMIN — ROFLUMILAST 500 MCG: 500 TABLET ORAL at 08:14

## 2021-06-09 RX ADMIN — IPRATROPIUM BROMIDE AND ALBUTEROL SULFATE 3 ML: 2.5; .5 SOLUTION RESPIRATORY (INHALATION) at 11:41

## 2021-06-09 RX ADMIN — INSULIN LISPRO 2 UNITS: 100 INJECTION, SOLUTION INTRAVENOUS; SUBCUTANEOUS at 08:16

## 2021-06-09 RX ADMIN — GABAPENTIN 600 MG: 300 CAPSULE ORAL at 13:20

## 2021-06-09 RX ADMIN — IPRATROPIUM BROMIDE AND ALBUTEROL SULFATE 3 ML: 2.5; .5 SOLUTION RESPIRATORY (INHALATION) at 15:52

## 2021-06-09 RX ADMIN — INSULIN DETEMIR 32 UNITS: 100 INJECTION, SOLUTION SUBCUTANEOUS at 08:19

## 2021-06-09 RX ADMIN — AZITHROMYCIN 500 MG: 500 INJECTION, POWDER, LYOPHILIZED, FOR SOLUTION INTRAVENOUS at 11:53

## 2021-06-09 RX ADMIN — FAMOTIDINE 20 MG: 20 TABLET ORAL at 20:49

## 2021-06-09 RX ADMIN — INSULIN LISPRO 3 UNITS: 100 INJECTION, SOLUTION INTRAVENOUS; SUBCUTANEOUS at 08:16

## 2021-06-09 RX ADMIN — SODIUM CHLORIDE, PRESERVATIVE FREE 10 ML: 5 INJECTION INTRAVENOUS at 08:15

## 2021-06-09 RX ADMIN — IPRATROPIUM BROMIDE AND ALBUTEROL SULFATE 3 ML: 2.5; .5 SOLUTION RESPIRATORY (INHALATION) at 23:34

## 2021-06-09 RX ADMIN — BUDESONIDE 0.5 MG: 0.5 INHALANT RESPIRATORY (INHALATION) at 06:56

## 2021-06-09 RX ADMIN — FUROSEMIDE 20 MG: 20 TABLET ORAL at 08:15

## 2021-06-09 NOTE — PLAN OF CARE
Goal Outcome Evaluation:  Plan of Care Reviewed With: patient      VSS. NSR on telemetry monitor. Pt wears 4L nasal cannula and a CPAP during the night, and has maintained adequate oxygen saturations. She was sad and tearful at the beginning of the night, but was easily consolable. She has had adequate UOP. She has remained alert and oriented. She has remained calm and pleasant and has rested peacefully throughout the night. No other problems identified. Will continue to monitor.

## 2021-06-09 NOTE — PROGRESS NOTES
NN spoke with pt at BS.  Pt alert and able to answer questions appropriately.  Pt O2 sat  93% on  4 L currently, home O2 use 2-3 L HS.  Deep breathing exercises encouraged.  Tobacco cessation encouraged.  COPD action plan reviewed.  Patient plans to self schedule with Dr. lucia after she leaves rehab.  No new concerns or questions voiced at this time.  NN will continue to follow as needed.      Per current [employer] Standards, please consider: :LAMA/LABA/ICS in place, Outpatient PFT, Outpatient sleep study, Pulmonary rehab as appropriate, Outpatient LDCT per [place] screening guidelines (>30 pack years+ 57 yo), NRT at [place]

## 2021-06-09 NOTE — CASE MANAGEMENT/SOCIAL WORK
Continued Stay Note   Barber     Patient Name: Dani Ziegler  MRN: [id number]  Today's Date: 6/9/2021    Admit Date: 6/3/2021    Discharge Plan     Row Name 06/09/21 1432       Plan    Plan  Home w/Home Health    Patient/Family in Agreement with Plan  yes    Plan Comments  Spoke with patient in room.  Her plan is to return home with home health.  Patient deciding which home health agency she would like to use.  CM will continue to follow and make referral.  Tammi Cote RN [phone]    Final Discharge Disposition Code  06 - home with home health care        Discharge Codes    No documentation.       Expected Discharge Date and Time     Expected Discharge Date Expected Discharge Time    Jun 11, 2021             Polly Cote RN

## 2021-06-09 NOTE — PLAN OF CARE
Goal Outcome Evaluation:  Plan of Care Reviewed With: patient        Progress: improving  Outcome Summary: PT with no new complaints. VSS, still on 4L NC. Up to chair and walked in room. Hopes to go home with HH and O2 soon.

## 2021-06-09 NOTE — PROGRESS NOTES
Psychiatric Medicine Services  PROGRESS NOTE    Patient Name: Dani Ziegler  : 1965  MRN: [id number]    Date of Admission: 6/3/2021  Primary Care Physician: Darrion Tovar MD    Subjective   Subjective     CC:  Pneumonia    HPI:  Doing ok. Feels clinically better. Sputum production increased. Remains on 4L    ROS:  Gen- No fevers, chills  CV- No chest pain, palpitations  Resp- No cough, dyspnea  GI- No N/V/D, abd pain         Objective   Objective     Vital Signs:   Temp:  [97.7 °F (36.5 °C)-98 °F (36.7 °C)] 97.7 °F (36.5 °C)  Heart Rate:  [60-76] 74  Resp:  [16-22] 16  BP: (138-168)/(78-94) 156/94        Physical Exam:  GEN- no acute distress noted, resting in bed, awake, morbidly obese  HEENT- atraumatic, normocephalic, eomi  NECK- supple, trachea midline, no masses  RESP: good effort, on 4L, wheezing improved, couging throughout  CV: no murmurs, s1/s2, rrr  MSK: no edema noted, spontaneous movement of all extremities  NEURO: alert, oriented, no focal deficits  SKIN: no rashes  PSYCH: appropriate mood and affect       Results Reviewed:  Results from last 7 days   Lab Units 21  0436 21  0358   WBC 10*3/mm3 17.49*  --  15.64* 14.98*   HEMOGLOBIN g/dL 13.9  --  13.9 13.2   HEMATOCRIT % 45.2  --  45.5 43.9   PLATELETS 10*3/mm3 300  --  264 260   PROCALCITONIN ng/mL  --  0.04  --   --      Results from last 7 days   Lab Units 21  042212 21  0358 21  1902   SODIUM mmol/L 134* 135* 136 139   POTASSIUM mmol/L 4.6 4.9 4.9 4.6   CHLORIDE mmol/L 96* 97* 98 101   CO2 mmol/L 30.0* 28.0 28.0 27.0   BUN mg/dL 28* 27* 24* 8   CREATININE mg/dL 0.66 0.72 0.67 0.73   GLUCOSE mg/dL 195* 222* 266* 152*   CALCIUM mg/dL 10.0 9.6 9.3 9.5   ALT (SGPT) U/L  --  47*  --  9   AST (SGOT) U/L  --  20  --  19     Estimated Creatinine Clearance: 132.2 mL/min (by C-G formula based on SCr of 0.66 mg/dL).    Microbiology Results Abnormal      Procedure Component Value - Date/Time    Blood Culture - Blood, Arm, Left [[id number]] Collected: 06/03/21 0001    Lab Status: Final result Specimen: Blood from Arm, Left Updated: 06/09/21 0015     Blood Culture No growth at 5 days    Blood Culture - Blood, Arm, Right [[id number]] Collected: 06/03/21 2350    Lab Status: Final result Specimen: Blood from Arm, Right Updated: 06/09/21 0015     Blood Culture No growth at 5 days    COVID PRE-OP / PRE-PROCEDURE SCREENING ORDER (NO ISOLATION) - Swab, Nasopharynx [[id number]]  (Normal) Collected: 06/03/21 2345    Lab Status: Final result Specimen: Swab from Nasopharynx Updated: 06/04/21 0031    Narrative:      The following orders were created for panel order COVID PRE-OP / PRE-PROCEDURE SCREENING ORDER (NO ISOLATION) - Swab, Nasopharynx.  Procedure                               Abnormality         Status                     ---------                               -----------         ------                     COVID-19 and FLU A/B PCR...[[id number]]  Normal              Final result                 Please view results for these tests on the individual orders.    COVID-19 and FLU A/B PCR - Swab, Nasopharynx [[id number]]  (Normal) Collected: 06/03/21 2345    Lab Status: Final result Specimen: Swab from Nasopharynx Updated: 06/04/21 0031     COVID19 Not Detected     Influenza A PCR Not Detected     Influenza B PCR Not Detected    Narrative:      Fact sheet for providers: https://www.fda.gov/media/949757/download    Fact sheet for patients: https://www.fda.gov/media/795150/download    Test performed by PCR.          Imaging Results (Last 24 Hours)     ** No results found for the last 24 hours. **          Results for orders placed in visit on 05/05/20    SCANNED - ECHOCARDIOGRAM      I have reviewed the medications:  Scheduled Meds:apixaban, 5 mg, Oral, Q12H  budesonide, 0.5 mg, Nebulization, BID - RT  busPIRone, 10 mg, Oral, BID  DULoxetine, 120 mg, Oral, Daily  escitalopram, 20  mg, Oral, Daily  famotidine, 20 mg, Oral, BID  furosemide, 20 mg, Oral, Daily  gabapentin, 600 mg, Oral, Q8H  insulin detemir, 32 Units, Subcutaneous, Q12H  insulin lispro, 0-7 Units, Subcutaneous, 4x Daily With Meals & Nightly  insulin lispro, 3 Units, Subcutaneous, TID With Meals  ipratropium-albuterol, 3 mL, Nebulization, Q4H - RT  methylPREDNISolone sodium succinate, 20 mg, Intravenous, Q12H  metoprolol succinate XL, 50 mg, Oral, Daily  nicotine, 1 patch, Transdermal, Q24H  NIFEdipine CC, 30 mg, Oral, Daily  pharmacy consult - MTM, , Does not apply, Daily  roflumilast, 500 mcg, Oral, Daily  sodium chloride, 10 mL, Intravenous, Q12H  terazosin, 2 mg, Oral, Nightly  traZODone, 50 mg, Oral, Nightly      Continuous Infusions:   PRN Meds:.•  albuterol  •  dextrose  •  dextrose  •  diazePAM  •  glucagon (human recombinant)  •  ondansetron  •  oxyCODONE-acetaminophen  •  Sodium Chloride (PF)  •  sodium chloride    Assessment/Plan   Assessment & Plan     Active Hospital Problems    Diagnosis  POA   • **CAP (community acquired pneumonia) [J18.9]  Yes   • Polypharmacy [Z79.899]  Not Applicable   • Morbid obesity with BMI of 45.0-49.9, adult (CMS/[facility]) [E66.01, Z68.42]  Not Applicable   • Acute on chronic respiratory failure with hypoxia (CMS/[facility]) [J96.21]  Yes   • CHF (congestive heart failure) (CMS/[facility]) [I50.9]  Yes   • COPD with acute exacerbation (CMS/[facility]) [J44.1]  Yes   • Mixed hyperlipidemia [E78.2]  Yes   • Uncontrolled type 2 diabetes mellitus with hyperglycemia, without long-term current use of insulin (CMS/[facility]) [E11.65]  Yes   • Vitamin D deficiency [E55.9]  Yes   • Peripheral neuropathy [G62.9]  Yes   • Essential hypertension [I10]  Yes   • Chronic pain [G89.29]  Yes   • Chronic obstructive pulmonary disease (CMS/[facility]) [J44.9]  Yes   • Edema of lower extremity [R60.0]  Yes   • Depression [F32.9]  Yes   • Anxiety [F41.9]  Yes      Resolved Hospital Problems   No resolved problems to display.        Brief Hospital  Course to date:  Dani Ziegler is a 55 y.o. female admitted with acute respiratory failure with hypoxia secondary to community-acquired pneumonia with significant leukocytosis.    Acute respiratory failure with hypoxia  Community-acquired pneumonia  Significant leukocytosis  Acute exacerbation of COPD  -Continue Rocephin, azithromycin and methylprednisolone-- abx were stopped yesterday after 5 day course, but given persistently high oxygen requirements and no improvement in WBC, will extend to 7 day course - to end today  --stop steroids today as has been >5 day course   -Continue O2 to maintain sats greater than 90, wean as tolerated  -Current O2 requirement 3 to 4 L nasal cannula (baseline 2 L nasal cannula nocturnally only)  -Continue nebs and inhalers, labs in AM    Morbid obesity  Generalized weakness  Difficulty ambulating  -PT consulted and recommended skilled facility for rehab, she is agreeable    Diabetes mellitus type 2  -Diabetic diet, sliding scale insulin as needed, Levemir 30 units twice daily  -Likely hyperglycemia exacerbated by steroid use  -Fingerstick blood sugars in the 200s sleep with range 146-284    Peripheral neuropathy  -Continue gabapentin    Hypertension    Anxiety and depression  -Continue Lexapro, BuSpar and Cymbalta  -Continue Valium as needed    History of congestive heart failure  Chronic anticoagulation due to past history of DVT x2  Patient reports increased risk of blood clots  -Continue Eliquis  -Continue medical management with Lasix daily, home meds reviewed include metoprolol and nifedipine    DVT Prophylaxis: Eliquis      Disposition: I expect the patient to be discharged pending improvement in her oxygen requirements, improvement in WBC. Looking into rehab but per CM may have walked too far to qualify     CODE STATUS:   Code Status and Medical Interventions:   Ordered at: 06/04/21 1221     Code Status:    CPR     Medical Interventions (Level of Support Prior to Arrest):     Full       Tete Cunha MD  06/09/21

## 2021-06-10 LAB
ANION GAP SERPL CALCULATED.3IONS-SCNC: 7 MMOL/L (ref 5–15)
BASOPHILS # BLD MANUAL: 0 10*3/MM3 (ref 0–0.2)
BASOPHILS NFR BLD AUTO: 0 % (ref 0–1.5)
BUN SERPL-MCNC: 26 MG/DL (ref 6–20)
BUN/CREAT SERPL: 39.4 (ref 7–25)
CALCIUM SPEC-SCNC: 9.2 MG/DL (ref 8.6–10.5)
CHLORIDE SERPL-SCNC: 99 MMOL/L (ref 98–107)
CO2 SERPL-SCNC: 30 MMOL/L (ref 22–29)
CREAT SERPL-MCNC: 0.66 MG/DL (ref 0.57–1)
DEPRECATED RDW RBC AUTO: 47.4 FL (ref 37–54)
EOSINOPHIL # BLD MANUAL: 0.2 10*3/MM3 (ref 0–0.4)
EOSINOPHIL NFR BLD MANUAL: 1 % (ref 0.3–6.2)
ERYTHROCYTE [DISTWIDTH] IN BLOOD BY AUTOMATED COUNT: 15.9 % (ref 12.3–15.4)
GFR SERPL CREATININE-BSD FRML MDRD: 93 ML/MIN/1.73
GLUCOSE BLDC GLUCOMTR-MCNC: 122 MG/DL (ref 70–130)
GLUCOSE BLDC GLUCOMTR-MCNC: 139 MG/DL (ref 70–130)
GLUCOSE BLDC GLUCOMTR-MCNC: 225 MG/DL (ref 70–130)
GLUCOSE BLDC GLUCOMTR-MCNC: 85 MG/DL (ref 70–130)
GLUCOSE SERPL-MCNC: 113 MG/DL (ref 65–99)
HCT VFR BLD AUTO: 46.2 % (ref 34–46.6)
HGB BLD-MCNC: 14.4 G/DL (ref 12–15.9)
LYMPHOCYTES # BLD MANUAL: 5.44 10*3/MM3 (ref 0.7–3.1)
LYMPHOCYTES NFR BLD MANUAL: 26 % (ref 19.6–45.3)
LYMPHOCYTES NFR BLD MANUAL: 4 % (ref 5–12)
MCH RBC QN AUTO: 25.6 PG (ref 26.6–33)
MCHC RBC AUTO-ENTMCNC: 31.2 G/DL (ref 31.5–35.7)
MCV RBC AUTO: 82.1 FL (ref 79–97)
METAMYELOCYTES NFR BLD MANUAL: 2 % (ref 0–0)
MONOCYTES # BLD AUTO: 0.81 10*3/MM3 (ref 0.1–0.9)
NEUTROPHILS # BLD AUTO: 13.31 10*3/MM3 (ref 1.7–7)
NEUTROPHILS NFR BLD MANUAL: 66 % (ref 42.7–76)
PLAT MORPH BLD: NORMAL
PLATELET # BLD AUTO: 279 10*3/MM3 (ref 140–450)
PMV BLD AUTO: 10.6 FL (ref 6–12)
POTASSIUM SERPL-SCNC: 4.4 MMOL/L (ref 3.5–5.2)
RBC # BLD AUTO: 5.63 10*6/MM3 (ref 3.77–5.28)
RBC MORPH BLD: NORMAL
SODIUM SERPL-SCNC: 136 MMOL/L (ref 136–145)
VARIANT LYMPHS NFR BLD MANUAL: 1 % (ref 0–5)
WBC # BLD AUTO: 20.16 10*3/MM3 (ref 3.4–10.8)
WBC MORPH BLD: NORMAL

## 2021-06-10 PROCEDURE — 97116 GAIT TRAINING THERAPY: CPT | Performed by: PHYSICAL THERAPIST

## 2021-06-10 PROCEDURE — 94799 UNLISTED PULMONARY SVC/PX: CPT

## 2021-06-10 PROCEDURE — 63710000001 INSULIN DETEMIR PER 5 UNITS: Performed by: HOSPITALIST

## 2021-06-10 PROCEDURE — 63710000001 INSULIN LISPRO (HUMAN) PER 5 UNITS: Performed by: HOSPITALIST

## 2021-06-10 PROCEDURE — 99232 SBSQ HOSP IP/OBS MODERATE 35: CPT | Performed by: NURSE PRACTITIONER

## 2021-06-10 PROCEDURE — 63710000001 INSULIN DETEMIR PER 5 UNITS: Performed by: NURSE PRACTITIONER

## 2021-06-10 PROCEDURE — 85025 COMPLETE CBC W/AUTO DIFF WBC: CPT | Performed by: INTERNAL MEDICINE

## 2021-06-10 PROCEDURE — 80048 BASIC METABOLIC PNL TOTAL CA: CPT | Performed by: INTERNAL MEDICINE

## 2021-06-10 PROCEDURE — 85007 BL SMEAR W/DIFF WBC COUNT: CPT | Performed by: INTERNAL MEDICINE

## 2021-06-10 PROCEDURE — 94660 CPAP INITIATION&MGMT: CPT

## 2021-06-10 PROCEDURE — 63710000001 INSULIN LISPRO (HUMAN) PER 5 UNITS: Performed by: INTERNAL MEDICINE

## 2021-06-10 PROCEDURE — 82962 GLUCOSE BLOOD TEST: CPT

## 2021-06-10 PROCEDURE — 97530 THERAPEUTIC ACTIVITIES: CPT | Performed by: PHYSICAL THERAPIST

## 2021-06-10 RX ORDER — METOPROLOL SUCCINATE 50 MG/1
TABLET, EXTENDED RELEASE ORAL
Qty: 30 TABLET | Refills: 0 | OUTPATIENT
Start: 2021-06-10

## 2021-06-10 RX ORDER — INSULIN GLARGINE 100 [IU]/ML
INJECTION, SOLUTION SUBCUTANEOUS
Qty: 30 ML | Refills: 0 | OUTPATIENT
Start: 2021-06-10

## 2021-06-10 RX ADMIN — DIAZEPAM 2 MG: 2 TABLET ORAL at 10:50

## 2021-06-10 RX ADMIN — INSULIN LISPRO 3 UNITS: 100 INJECTION, SOLUTION INTRAVENOUS; SUBCUTANEOUS at 17:12

## 2021-06-10 RX ADMIN — FAMOTIDINE 20 MG: 20 TABLET ORAL at 20:37

## 2021-06-10 RX ADMIN — IPRATROPIUM BROMIDE AND ALBUTEROL SULFATE 3 ML: 2.5; .5 SOLUTION RESPIRATORY (INHALATION) at 19:50

## 2021-06-10 RX ADMIN — FUROSEMIDE 20 MG: 20 TABLET ORAL at 08:40

## 2021-06-10 RX ADMIN — DULOXETINE HYDROCHLORIDE 120 MG: 60 CAPSULE, DELAYED RELEASE ORAL at 08:40

## 2021-06-10 RX ADMIN — GABAPENTIN 600 MG: 300 CAPSULE ORAL at 08:39

## 2021-06-10 RX ADMIN — IPRATROPIUM BROMIDE AND ALBUTEROL SULFATE 3 ML: 2.5; .5 SOLUTION RESPIRATORY (INHALATION) at 12:02

## 2021-06-10 RX ADMIN — GABAPENTIN 600 MG: 300 CAPSULE ORAL at 20:38

## 2021-06-10 RX ADMIN — BUSPIRONE HYDROCHLORIDE 10 MG: 10 TABLET ORAL at 08:40

## 2021-06-10 RX ADMIN — OXYCODONE HYDROCHLORIDE AND ACETAMINOPHEN 1 TABLET: 7.5; 325 TABLET ORAL at 14:51

## 2021-06-10 RX ADMIN — Medication 1 PATCH: at 08:41

## 2021-06-10 RX ADMIN — BUDESONIDE 0.5 MG: 0.5 INHALANT RESPIRATORY (INHALATION) at 19:50

## 2021-06-10 RX ADMIN — TRAZODONE HYDROCHLORIDE 50 MG: 50 TABLET ORAL at 22:47

## 2021-06-10 RX ADMIN — TERAZOSIN HYDROCHLORIDE 2 MG: 2 CAPSULE ORAL at 20:37

## 2021-06-10 RX ADMIN — INSULIN DETEMIR 28 UNITS: 100 INJECTION, SOLUTION SUBCUTANEOUS at 20:41

## 2021-06-10 RX ADMIN — INSULIN DETEMIR 32 UNITS: 100 INJECTION, SOLUTION SUBCUTANEOUS at 08:41

## 2021-06-10 RX ADMIN — ROFLUMILAST 500 MCG: 500 TABLET ORAL at 08:40

## 2021-06-10 RX ADMIN — INSULIN LISPRO 3 UNITS: 100 INJECTION, SOLUTION INTRAVENOUS; SUBCUTANEOUS at 08:40

## 2021-06-10 RX ADMIN — APIXABAN 5 MG: 5 TABLET, FILM COATED ORAL at 20:37

## 2021-06-10 RX ADMIN — OXYCODONE HYDROCHLORIDE AND ACETAMINOPHEN 1 TABLET: 7.5; 325 TABLET ORAL at 08:49

## 2021-06-10 RX ADMIN — IPRATROPIUM BROMIDE AND ALBUTEROL SULFATE 3 ML: 2.5; .5 SOLUTION RESPIRATORY (INHALATION) at 02:30

## 2021-06-10 RX ADMIN — IPRATROPIUM BROMIDE AND ALBUTEROL SULFATE 3 ML: 2.5; .5 SOLUTION RESPIRATORY (INHALATION) at 07:13

## 2021-06-10 RX ADMIN — INSULIN LISPRO 3 UNITS: 100 INJECTION, SOLUTION INTRAVENOUS; SUBCUTANEOUS at 12:56

## 2021-06-10 RX ADMIN — GABAPENTIN 600 MG: 300 CAPSULE ORAL at 12:56

## 2021-06-10 RX ADMIN — FAMOTIDINE 20 MG: 20 TABLET ORAL at 08:40

## 2021-06-10 RX ADMIN — APIXABAN 5 MG: 5 TABLET, FILM COATED ORAL at 08:39

## 2021-06-10 RX ADMIN — INSULIN LISPRO 3 UNITS: 100 INJECTION, SOLUTION INTRAVENOUS; SUBCUTANEOUS at 20:35

## 2021-06-10 RX ADMIN — DIAZEPAM 2 MG: 2 TABLET ORAL at 22:50

## 2021-06-10 RX ADMIN — BUDESONIDE 0.5 MG: 0.5 INHALANT RESPIRATORY (INHALATION) at 07:13

## 2021-06-10 RX ADMIN — NIFEDIPINE 30 MG: 30 TABLET, FILM COATED, EXTENDED RELEASE ORAL at 08:40

## 2021-06-10 RX ADMIN — BUSPIRONE HYDROCHLORIDE 10 MG: 10 TABLET ORAL at 17:12

## 2021-06-10 RX ADMIN — ESCITALOPRAM OXALATE 20 MG: 20 TABLET ORAL at 08:39

## 2021-06-10 RX ADMIN — OXYCODONE HYDROCHLORIDE AND ACETAMINOPHEN 1 TABLET: 7.5; 325 TABLET ORAL at 01:52

## 2021-06-10 RX ADMIN — OXYCODONE HYDROCHLORIDE AND ACETAMINOPHEN 1 TABLET: 7.5; 325 TABLET ORAL at 20:41

## 2021-06-10 RX ADMIN — METOPROLOL SUCCINATE 50 MG: 50 TABLET, EXTENDED RELEASE ORAL at 08:40

## 2021-06-10 RX ADMIN — IPRATROPIUM BROMIDE AND ALBUTEROL SULFATE 3 ML: 2.5; .5 SOLUTION RESPIRATORY (INHALATION) at 15:49

## 2021-06-10 NOTE — THERAPY TREATMENT NOTE
Patient Name: Dani Ziegler  : 1965    MRN: [id number]                              Today's Date: 6/10/2021       Admit Date: 6/3/2021    Visit Dx:     ICD-10-CM ICD-9-CM   1. Acute sepsis (CMS/[facility])  A41.9 038.9     995.91   2. Pneumonia due to infectious organism, unspecified laterality, unspecified part of lung  J18.9 486   3. Hypoxia  R09.02 799.02   4. Acute exacerbation of chronic obstructive pulmonary disease (COPD) (CMS/[facility])  J44.1 491.21   5. Impaired functional mobility, balance, gait, and endurance  Z74.09 V49.89     Patient Active Problem List   Diagnosis   • Uncontrolled type 2 diabetes mellitus with hyperglycemia, without long-term current use of insulin (CMS/[facility])   • Vitamin D deficiency   • Peripheral neuropathy   • Adiposity   • Left bundle branch block (LBBB)   • Essential hypertension   • Chronic pain   • Chronic obstructive pulmonary disease (CMS/[facility])   • Anxiety   • Depression   • Edema of lower extremity   • Arthritis involving multiple sites   • Leukocytosis   • Mixed hyperlipidemia   • COPD with acute exacerbation (CMS/[facility])   • Special screening for malignant neoplasms, colon   • Cellulitis of left lower extremity   • Hyperkalemia   • Acute on chronic renal insufficiency   • Sepsis (CMS/[facility])   • CHF (congestive heart failure) (CMS/[facility])   • Cellulitis of left leg   • Lumbar disc disease   • Diabetic peripheral neuropathy (CMS/[facility])   • COVID-19 virus infection   • CAP (community acquired pneumonia)   • Polypharmacy   • Morbid obesity with BMI of 45.0-49.9, adult (CMS/[facility])   • Acute on chronic respiratory failure with hypoxia (CMS/[facility])     Past Medical History:   Diagnosis Date   • Abnormal weight gain    • Acute UTI    • Anxiety    • Arthritis involving multiple sites    • Chronic obstructive pulmonary disease (CMS/[facility])    • Chronic pain    • Current every day smoker    • Depression    • Diabetes mellitus (CMS/[facility])    • Diarrhea    • Dysuria    • Edema, lower extremity    • Fever    •  Head injury    • Hypertension    • Intervertebral disc disorder     Degeneration of intervertebral disc, site unspecified (722.6)   • Left bundle branch block    • Leukocytosis    • Long term current use of methadone for pain control    • Mass of right breast    • Nausea    • Obesity    • Overactive bladder    • Peripheral neuropathy    • Superficial phlebitis     right medial calf   • Upper respiratory infection    • Urinary incontinence    • Vitamin D deficiency    • Vomiting      Past Surgical History:   Procedure Laterality Date   • BLADDER SURGERY      pubovaginal sling   • CHOLECYSTECTOMY     • HIP ARTHROSCOPY Right 10/29/2020     General Information     Row Name 06/10/21 1130          Physical Therapy Time and Intention    Document Type  therapy note (daily note)  -     Mode of Treatment  individual therapy;physical therapy  -     Row Name 06/10/21 1130          General Information    Patient Profile Reviewed  yes  -LM     Existing Precautions/Restrictions  fall;oxygen therapy device and L/min  -LM     Row Name 06/10/21 1130          Cognition    Orientation Status (Cognition)  oriented x 4  -LM     Row Name 06/10/21 1130          Safety Issues, Functional Mobility    Impairments Affecting Function (Mobility)  endurance/activity tolerance;pain;shortness of breath;strength  -LM       User Key  (r) = Recorded By, (t) = Taken By, (c) = Cosigned By    Initials Name Provider Type    LM Renea Gallo, STEPHAN Physical Therapist        Mobility     Row Name 06/10/21 1130          Bed Mobility    Bed Mobility  supine-sit;sit-supine  -LM     Supine-Sit [place] (Bed Mobility)  modified independence  -LM     Sit-Supine [place] (Bed Mobility)  modified independence  -     Assistive Device (Bed Mobility)  bed rails;head of bed elevated  -     Row Name 06/10/21 1130          Sit-Stand Transfer    Sit-Stand [place] (Transfers)  modified independence  -     Assistive Device (Sit-Stand Transfers)  walker  "front-wheeled  -LM     Row Name 06/10/21 1130          Gait/Stairs (Locomotion)    [place] Level (Gait)  standby assist  -LM     Assistive Device (Gait)  walker, front-wheeled  -LM     Distance in Feet (Gait)  150' (one standing rest break needed at correction point) + 100' with sitting rest break in between each gait cycle  -LM     Deviations/Abnormal Patterns (Gait)  base of support, wide;gait speed decreased  -LM     Right Sided Gait Deviations  weight shift ability decreased  -LM     Comment (Gait/Stairs)  Pt very SOA with gait - O2 noted to be 96% upon return from gait.  Reports pain in the R hip from past injury.  -LM       User Key  (r) = Recorded By, (t) = Taken By, (c) = Cosigned By    Initials Name Provider Type    Renea Pettit PT Physical Therapist        Obj/Interventions     Row Name 06/10/21 1130          Balance    Balance Assessment  sitting static balance;standing static balance;standing dynamic balance  -LM     Static Sitting Balance  WFL;sitting, edge of bed  -LM     Static Standing Balance  WFL;supported  -LM     Dynamic Standing Balance  WFL;supported  -LM       User Key  (r) = Recorded By, (t) = Taken By, (c) = Cosigned By    Initials Name Provider Type    Renea Pettit PT Physical Therapist        Goals/Plan    No documentation.       Clinical Impression     Row Name 06/10/21 1130          Pain    Additional Documentation  Pain Scale: Numbers Pre/Post-Treatment (Group)  -LM     Row Name 06/10/21 1130          Pain Scale: Numbers Pre/Post-Treatment    Pretreatment Pain Rating  6/10  -LM     Posttreatment Pain Rating  7/10  -LM     Pain Location - Orientation  generalized  -LM     Pain Location  -- \"all her joints\"  -LM     Pain Intervention(s)  Repositioned;Ambulation/increased activity  -LM     Row Name 06/10/21 1130          Plan of Care Review    Plan of Care Reviewed With  patient  -LM     Progress  improving  -LM     Outcome Summary  Pt progressing well towards skilled PT goals. "  Pt transferred supine<-->sit modified independently, stood modified independently, and ambulated 150' + 100' using rw with SBAx1.  Pt SOA post gait - O2 noted to be 96%.  Continue with skilled PT to improve mobility and safety.  -LM     Row Name 06/10/21 1130          Vital Signs    Pre Systolic BP Rehab  130  -LM     Pre Treatment Diastolic BP  80  -LM     Pretreatment Heart Rate (beats/min)  86  -LM     Posttreatment Heart Rate (beats/min)  89  -LM     Pre SpO2 (%)  93  -LM     O2 Delivery Pre Treatment  supplemental O2  -LM     Post SpO2 (%)  94  -LM     O2 Delivery Post Treatment  supplemental O2  -LM     Pre Patient Position  Supine  -LM     Post Patient Position  Supine  -LM     Row Name 06/10/21 1130          Positioning and Restraints    Pre-Treatment Position  in bed  -LM     Post Treatment Position  bed  -LM     In Bed  supine;call light within reach;encouraged to call for assist;with family/caregiver;notified nsg  -LM       User Key  (r) = Recorded By, (t) = Taken By, (c) = Cosigned By    Initials Name Provider Type    Renea Pettit PT Physical Therapist        Outcome Measures     Row Name 06/10/21 1130          How much help from another person do you currently need...    Turning from your back to your side while in flat bed without using bedrails?  4  -LM     Moving from lying on back to sitting on the side of a flat bed without bedrails?  4  -LM     Moving to and from a bed to a chair (including a wheelchair)?  3  -LM     Standing up from a chair using your arms (e.g., wheelchair, bedside chair)?  4  -LM     Climbing 3-5 steps with a railing?  3  -LM     To walk in hospital room?  3  -LM     AM-PAC 6 Clicks Score (PT)  21  -LM     Row Name 06/10/21 1130          Functional Assessment    Outcome Measure Options  AM-PAC 6 Clicks Basic Mobility (PT)  -LM       User Key  (r) = Recorded By, (t) = Taken By, (c) = Cosigned By    Initials Name Provider Type    Renea Pettit PT Physical Therapist         Physical Therapy Education                 Title: PT OT SLP Therapies (Done)     Topic: Physical Therapy (Done)     Point: Home exercise program (Done)     Learning Progress Summary           Patient Acceptance, E,TB, VU by KR at 6/9/2021 0216    Acceptance, E, VU by DAHLIA at 6/6/2021 0720    Comment: Pt to increase walking distance as able. Pt said she has a pulse oximiter at home and can monitor O2 sats with activity and progress walking as she is able.                               User Key     Initials Effective Dates Name Provider Type Discipline    LS 07/17/19 -  Sepideh Mendoza, PT Physical Therapist PT    SOUMYA 01/04/21 -  Renee Henry, RN Registered Nurse Nurse              PT Recommendation and Plan     Plan of Care Reviewed With: patient  Progress: improving  Outcome Summary: Pt progressing well towards skilled PT goals.  Pt transferred supine<-->sit modified independently, stood modified independently, and ambulated 150' + 100' using rw with SBAx1.  Pt SOA post gait - O2 noted to be 96%.  Continue with skilled PT to improve mobility and safety.     Time Calculation:   PT Charges     Row Name 06/10/21 1130             Time Calculation    Start Time  1130  -LM      PT Received On  06/10/21  -LM      PT Goal Re-Cert Due Date  06/16/21  -LM         Timed Charges    88048 - Gait Training Minutes   20  -LM      42237 - PT Therapeutic Activity Minutes  9  -LM         Total Minutes    Timed Charges Total Minutes  29  -LM       Total Minutes  29  -LM        User Key  (r) = Recorded By, (t) = Taken By, (c) = Cosigned By    Initials Name Provider Type    LM Renea Gallo, STEPHAN Physical Therapist        Therapy Charges for Today     Code Description Service Date Service Provider Modifiers Qty    13480426426 HC GAIT TRAINING EA 15 MIN 6/10/2021 Renea Gallo, PT GP 1    82527988894 HC PT THERAPEUTIC ACT EA 15 MIN 6/10/2021 Renea Gallo, PT GP 1          PT G-Codes  Outcome Measure Options: AM-PAC 6 Clicks Basic  Mobility (PT)  AM-PAC 6 Clicks Score (PT): 21    Renea Meraz, PT  6/10/2021

## 2021-06-10 NOTE — PROGRESS NOTES
Clinical Nutrition       Patient Name: Dani Ziegler  YOB: 1965  MRN: [id number]  Date of Encounter: 06/10/21 09:55 EDT  Admission date: 6/3/2021      Reason for Visit   LOS      EMR  Reviewed   Yes       Current Nutrition Prescription     Diet Regular; Cardiac    Average Intake from [place]% / 5 meals    Actions     Follow treatment progress, Care plan reviewed    Monitor Per Protocol        Prabha Johnson RD  Time Spent: 10 min

## 2021-06-10 NOTE — PROGRESS NOTES
NN spoke with patient at .  Patient alert and able to answer questions appropriately.  O2 sat 93% on 4L NC, home baseline is 2-3L HS.  Patient encouraged to use Aerobika and deep breathing techniques to wean O2.  She reports that she was unable to wear bipap last night.  Encouraged to wear during any naps or or sleeping.  Tobacco cessation discussed.  COPD action plan reviewed.  She plans to schedule with Dr. Bajwa after leaving the hospital.  No new questions or concerns voiced at this time.  NN continues to follow.          Per current [employer] Standards, please consider: :LAMA/LABA/ICS in place, Outpatient PFT, Outpatient sleep study, Pulmonary rehab as appropriate, Outpatient LDCT per [place] screening guidelines (>30 pack years+ 57 yo), NRT at [place]

## 2021-06-10 NOTE — PLAN OF CARE
Goal Outcome Evaluation:  Plan of Care Reviewed With: patient        Progress: improving  Outcome Summary: Pt progressing well towards skilled PT goals.  Pt transferred supine<-->sit modified independently, stood modified independently, and ambulated 150' + 100' using rw with SBAx1.  Pt SOA post gait - O2 noted to be 96%.  Continue with skilled PT to improve mobility and safety.

## 2021-06-10 NOTE — PLAN OF CARE
Problem: Adult Inpatient Plan of Care  Goal: Plan of Care Review  Outcome: Ongoing, Progressing  Flowsheets (Taken 6/10/2021 0410)  Plan of Care Reviewed With: patient  Goal: Patient-Specific Goal (Individualized)  Outcome: Ongoing, Progressing  Goal: Absence of Hospital-Acquired Illness or Injury  Outcome: Ongoing, Progressing  Intervention: Identify and Manage Fall Risk  Recent Flowsheet Documentation  Taken 6/10/2021 0400 by Isabel Hayes RN  Safety Promotion/Fall Prevention:   activity supervised   assistive device/personal items within reach   clutter free environment maintained   fall prevention program maintained   nonskid shoes/slippers when out of bed   safety round/check completed  Taken 6/10/2021 0000 by Isabel Hayes RN  Safety Promotion/Fall Prevention:   activity supervised   assistive device/personal items within reach   clutter free environment maintained   fall prevention program maintained   nonskid shoes/slippers when out of bed   safety round/check completed  Taken 6/9/2021 2000 by Isabel Hayes RN  Safety Promotion/Fall Prevention:   activity supervised   assistive device/personal items within reach   fall prevention program maintained   nonskid shoes/slippers when out of bed   safety round/check completed  Intervention: Prevent Skin Injury  Recent Flowsheet Documentation  Taken 6/10/2021 0400 by Isabel Hayes RN  Body Position: position changed independently  Taken 6/10/2021 0000 by Isabel Hayes RN  Body Position: position changed independently  Taken 6/9/2021 2000 by Isabel Hayes RN  Body Position: position changed independently  Skin Protection: adhesive use limited  Intervention: Prevent Infection  Recent Flowsheet Documentation  Taken 6/10/2021 0400 by Isabel Hayes RN  Infection Prevention: rest/sleep promoted  Taken 6/10/2021 0000 by Isabel Hayes RN  Infection Prevention: rest/sleep promoted  Taken 6/9/2021 2000 by Isabel Hayes RN  Infection Prevention: rest/sleep  promoted  Goal: Optimal Comfort and Wellbeing  Outcome: Ongoing, Progressing  Intervention: Provide Person-Centered Care  Recent Flowsheet Documentation  Taken 6/9/2021 2000 by Isabel Hayes, RN  Trust Relationship/Rapport:   choices provided   care explained  Goal: Readiness for Transition of Care  Outcome: Ongoing, Progressing   Goal Outcome Evaluation:  Plan of Care Reviewed With: patient

## 2021-06-10 NOTE — CASE MANAGEMENT/SOCIAL WORK
Continued Stay Note  [facility]     Patient Name: Dani Ziegler  MRN: [id number]  Today's Date: 6/10/2021    Admit Date: 6/3/2021    Discharge Plan     Row Name 06/10/21 1346       Plan    Plan  Home w/Home Health    Patient/Family in Agreement with Plan  yes    Plan Comments  Spoke with patient in room.  Her plan is to return home with [facility] at discharge.  Referral called to [facility] for PT, OT and skilled nursing.  No other needs noted at this time.  CM will continue to follow.  Polly Cote RN [phone]    Final Discharge Disposition Code  06 - home with home health care        Discharge Codes    No documentation.       Expected Discharge Date and Time     Expected Discharge Date Expected Discharge Time    Chad 10, 2021             Polly Cote RN

## 2021-06-10 NOTE — PROGRESS NOTES
[facility] Medicine Services  PROGRESS NOTE    Patient Name: Dani Ziegler  : 1965  MRN: [id number]    Date of Admission: 6/3/2021  Primary Care Physician: Darrion Tovar MD    Subjective   Subjective     CC:  Pneumonia    HPI:  Rough night, didn't sleep well with bad dreams and trouble with cpap. Otherwise breathing is better. No current soa but still on increased O2 at 4L. Not moving much out of bed. No f/c/s. No  Voiding issue. Lots of anxiety    ROS:  Gen- No fevers, chills  CV- No chest pain, palpitations  Resp- No cough, dyspnea  GI- No N/V/D, abd pain         Objective   Objective     Vital Signs:   Temp:  [97.7 °F (36.5 °C)-97.9 °F (36.6 °C)] 97.9 °F (36.6 °C)  Heart Rate:  [61-86] 67  Resp:  [16-20] 18  BP: (124-156)/(64-94) 136/80        Physical Exam:  Constitutional: No acute distress, awake, alert, sitting up in bed  HENT: NCAT, mucous membranes moist  Respiratory: mostly clear, few crackles RLL posteriorly, respiratory effort normal on 4L NC  Cardiovascular: RRR, no murmurs, rubs, or gallops  Gastrointestinal: Positive bowel sounds, soft, nontender, nondistended  Musculoskeletal: No bilateral ankle edema  Psychiatric: anxious/ tearful affect, cooperative  Neurologic: Oriented x 3, strength symmetric in all extremities, Cranial Nerves grossly intact to confrontation, speech clear  Skin: No rashes         Results Reviewed:  Results from last 7 days   Lab Units 06/10/21  0518 06/09/21  0420 06/08/21  0442 06/08/21  0436   WBC 10*3/mm3 20.16* 17.49*  --  15.64*   HEMOGLOBIN g/dL 14.4 13.9  --  13.9   HEMATOCRIT % 46.2 45.2  --  45.5   PLATELETS 10*3/mm3 279 300  --  264   PROCALCITONIN ng/mL  --   --  0.04  --      Results from last 7 days   Lab Units 06/10/21  0518 06/09/21  0420 06/08/21  0442 06/03/21  1902   SODIUM mmol/L 136 134* 135* 139   POTASSIUM mmol/L 4.4 4.6 4.9 4.6   CHLORIDE mmol/L 99 96* 97* 101   CO2 mmol/L 30.0* 30.0* 28.0 27.0   BUN mg/dL 26* 28* 27* 8    CREATININE mg/dL 0.66 0.66 0.72 0.73   GLUCOSE mg/dL 113* 195* 222* 152*   CALCIUM mg/dL 9.2 10.0 9.6 9.5   ALT (SGPT) U/L  --   --  47* 9   AST (SGOT) U/L  --   --  20 19     Estimated Creatinine Clearance: 132.8 mL/min (by C-G formula based on SCr of 0.66 mg/dL).    Microbiology Results Abnormal     Procedure Component Value - Date/Time    Respiratory Culture - Sputum, Cough [[id number]] Collected: 06/09/21 1305    Lab Status: Preliminary result Specimen: Sputum from Cough Updated: 06/09/21 1459     Gram Stain Few (2+) Epithelial cells per low power field      Moderate (3+) WBCs per low power field      Rare (1+) Gram positive cocci    Blood Culture - Blood, Arm, Left [[id number]] Collected: 06/03/21 0001    Lab Status: Final result Specimen: Blood from Arm, Left Updated: 06/09/21 0015     Blood Culture No growth at 5 days    Blood Culture - Blood, Arm, Right [[id number]] Collected: 06/03/21 2350    Lab Status: Final result Specimen: Blood from Arm, Right Updated: 06/09/21 0015     Blood Culture No growth at 5 days    COVID PRE-OP / PRE-PROCEDURE SCREENING ORDER (NO ISOLATION) - Swab, Nasopharynx [[id number]]  (Normal) Collected: 06/03/21 2345    Lab Status: Final result Specimen: Swab from Nasopharynx Updated: 06/04/21 0031    Narrative:      The following orders were created for panel order COVID PRE-OP / PRE-PROCEDURE SCREENING ORDER (NO ISOLATION) - Swab, Nasopharynx.  Procedure                               Abnormality         Status                     ---------                               -----------         ------                     COVID-19 and FLU A/B PCR...[[id number]]  Normal              Final result                 Please view results for these tests on the individual orders.    COVID-19 and FLU A/B PCR - Swab, Nasopharynx [[id number]]  (Normal) Collected: 06/03/21 2345    Lab Status: Final result Specimen: Swab from Nasopharynx Updated: 06/04/21 0031     COVID19 Not Detected     Influenza A PCR Not  Detected     Influenza B PCR Not Detected    Narrative:      Fact sheet for providers: https://www.fda.gov/media/372383/download    Fact sheet for patients: https://www.fda.gov/media/118235/download    Test performed by PCR.          Imaging Results (Last 24 Hours)     ** No results found for the last 24 hours. **          Results for orders placed in visit on 05/05/20    SCANNED - ECHOCARDIOGRAM      I have reviewed the medications:  Scheduled Meds:apixaban, 5 mg, Oral, Q12H  budesonide, 0.5 mg, Nebulization, BID - RT  busPIRone, 10 mg, Oral, BID  DULoxetine, 120 mg, Oral, Daily  escitalopram, 20 mg, Oral, Daily  famotidine, 20 mg, Oral, BID  furosemide, 20 mg, Oral, Daily  gabapentin, 600 mg, Oral, Q8H  insulin detemir, 28 Units, Subcutaneous, Q12H  insulin lispro, 0-7 Units, Subcutaneous, 4x Daily With Meals & Nightly  insulin lispro, 3 Units, Subcutaneous, TID With Meals  ipratropium-albuterol, 3 mL, Nebulization, Q4H - RT  metoprolol succinate XL, 50 mg, Oral, Daily  nicotine, 1 patch, Transdermal, Q24H  NIFEdipine CC, 30 mg, Oral, Daily  pharmacy consult - MTM, , Does not apply, Daily  roflumilast, 500 mcg, Oral, Daily  sodium chloride, 10 mL, Intravenous, Q12H  terazosin, 2 mg, Oral, Nightly  traZODone, 50 mg, Oral, Nightly      Continuous Infusions:   PRN Meds:.•  albuterol  •  dextrose  •  dextrose  •  diazePAM  •  glucagon (human recombinant)  •  ondansetron  •  oxyCODONE-acetaminophen  •  Sodium Chloride (PF)  •  sodium chloride    Assessment/Plan   Assessment & Plan     Active Hospital Problems    Diagnosis  POA   • **CAP (community acquired pneumonia) [J18.9]  Yes   • Polypharmacy [Z79.899]  Not Applicable   • Morbid obesity with BMI of 45.0-49.9, adult (CMS/[facility]) [E66.01, Z68.42]  Not Applicable   • Acute on chronic respiratory failure with hypoxia (CMS/[facility]) [J96.21]  Yes   • CHF (congestive heart failure) (CMS/[facility]) [I50.9]  Yes   • COPD with acute exacerbation (CMS/[facility]) [J44.1]  Yes   • Mixed  hyperlipidemia [E78.2]  Yes   • Uncontrolled type 2 diabetes mellitus with hyperglycemia, without long-term current use of insulin (CMS/[facility]) [E11.65]  Yes   • Vitamin D deficiency [E55.9]  Yes   • Peripheral neuropathy [G62.9]  Yes   • Essential hypertension [I10]  Yes   • Chronic pain [G89.29]  Yes   • Chronic obstructive pulmonary disease (CMS/[facility]) [J44.9]  Yes   • Edema of lower extremity [R60.0]  Yes   • Depression [F32.9]  Yes   • Anxiety [F41.9]  Yes      Resolved Hospital Problems   No resolved problems to display.        Brief Hospital Course to date:  Dani Ziegler is a 55 y.o. female admitted with acute respiratory failure with hypoxia secondary to community-acquired pneumonia with significant leukocytosis.    Acute respiratory failure with hypoxia  Community-acquired pneumonia  Significant leukocytosis  Acute exacerbation of COPD  -s/p 7 days CTX/ Azithromycin IV. Stopped solumedrol 6/9  --steroids dc'd yesterday. Suspect continued elevation of WBC due to steroid use, but increased to 20K today. Will leave off and recheck cbc in am. Patient appears improved without additional underlying infectious process noted  -Continue O2 to maintain sats greater than 90, wean as tolerated  -Current O2 requirement 3 to 4 L nasal cannula (baseline 2 L nasal cannula nocturnally only)  -Continue nebs and inhalers, COPD navigator following  -CT chest with hilar adenopathy. Recommend repeat CT imaging in 3mos. Discussed with patient    Morbid obesity  Generalized weakness  Difficulty ambulating  -PT consulted and recommended skilled facility for rehab but not approved. Plan home with HH at [place]    Diabetes mellitus type 2  -Diabetic diet, sliding scale insulin as needed, Levemir decreased today, continue prandial  -Likely hyperglycemia exacerbated by steroid use  --fsbs reviewed and improving off steroids    Peripheral neuropathy  -Continue gabapentin    Hypertension  - metoprolol, nifedipine and hytrin    Anxiety and  depression  -Continue Lexapro, BuSpar and Cymbalta  -Continue Valium as needed    History of congestive heart failure  Chronic anticoagulation due to past history of DVT x2  Patient reports increased risk of blood clots  -Continue Eliquis  -Continue medical management with Lasix daily, home meds reviewed include metoprolol and nifedipine    Tobacco abuse  Nicotine patch at DC    DVT Prophylaxis: Eliquis      Disposition: I expect the patient to be discharged tomorrow    CODE STATUS:   Code Status and Medical Interventions:   Ordered at: 06/04/21 1221     Code Status:    CPR     Medical Interventions (Level of Support Prior to Arrest):    Full       Steph Simental, APRN  06/10/21

## 2021-06-11 ENCOUNTER — HOME HEALTH ADMISSION (OUTPATIENT)
Dept: HOME HEALTH SERVICES | Facility: HOME HEALTHCARE | Age: 56
End: 2021-06-11

## 2021-06-11 ENCOUNTER — READMISSION MANAGEMENT (OUTPATIENT)
Dept: CALL CENTER | Facility: HOSPITAL | Age: 56
End: 2021-06-11

## 2021-06-11 VITALS
HEIGHT: 66 IN | BODY MASS INDEX: 45.88 KG/M2 | SYSTOLIC BLOOD PRESSURE: 129 MMHG | HEART RATE: 91 BPM | WEIGHT: 285.5 LBS | TEMPERATURE: 98.3 F | OXYGEN SATURATION: 95 % | RESPIRATION RATE: 18 BRPM | DIASTOLIC BLOOD PRESSURE: 76 MMHG

## 2021-06-11 PROBLEM — J18.9 CAP (COMMUNITY ACQUIRED PNEUMONIA): Status: RESOLVED | Noted: 2021-06-04 | Resolved: 2021-06-11

## 2021-06-11 LAB
BACTERIA SPEC RESP CULT: NORMAL
BASOPHILS # BLD AUTO: 0.14 10*3/MM3 (ref 0–0.2)
BASOPHILS NFR BLD AUTO: 0.8 % (ref 0–1.5)
DEPRECATED RDW RBC AUTO: 49.9 FL (ref 37–54)
EOSINOPHIL # BLD AUTO: 0.57 10*3/MM3 (ref 0–0.4)
EOSINOPHIL NFR BLD AUTO: 3.1 % (ref 0.3–6.2)
ERYTHROCYTE [DISTWIDTH] IN BLOOD BY AUTOMATED COUNT: 16.6 % (ref 12.3–15.4)
GLUCOSE BLDC GLUCOMTR-MCNC: 127 MG/DL (ref 70–130)
GLUCOSE BLDC GLUCOMTR-MCNC: 218 MG/DL (ref 70–130)
GRAM STN SPEC: NORMAL
HCT VFR BLD AUTO: 47.8 % (ref 34–46.6)
HGB BLD-MCNC: 14.3 G/DL (ref 12–15.9)
IMM GRANULOCYTES # BLD AUTO: 0.81 10*3/MM3 (ref 0–0.05)
IMM GRANULOCYTES NFR BLD AUTO: 4.3 % (ref 0–0.5)
LYMPHOCYTES # BLD AUTO: 5.23 10*3/MM3 (ref 0.7–3.1)
LYMPHOCYTES NFR BLD AUTO: 28.1 % (ref 19.6–45.3)
MCH RBC QN AUTO: 25.4 PG (ref 26.6–33)
MCHC RBC AUTO-ENTMCNC: 29.9 G/DL (ref 31.5–35.7)
MCV RBC AUTO: 84.8 FL (ref 79–97)
MONOCYTES # BLD AUTO: 1.07 10*3/MM3 (ref 0.1–0.9)
MONOCYTES NFR BLD AUTO: 5.7 % (ref 5–12)
NEUTROPHILS NFR BLD AUTO: 10.82 10*3/MM3 (ref 1.7–7)
NEUTROPHILS NFR BLD AUTO: 58 % (ref 42.7–76)
NRBC BLD AUTO-RTO: 0 /100 WBC (ref 0–0.2)
PLATELET # BLD AUTO: 288 10*3/MM3 (ref 140–450)
PMV BLD AUTO: 10.6 FL (ref 6–12)
RBC # BLD AUTO: 5.64 10*6/MM3 (ref 3.77–5.28)
WBC # BLD AUTO: 18.64 10*3/MM3 (ref 3.4–10.8)

## 2021-06-11 PROCEDURE — 94799 UNLISTED PULMONARY SVC/PX: CPT

## 2021-06-11 PROCEDURE — 85025 COMPLETE CBC W/AUTO DIFF WBC: CPT | Performed by: NURSE PRACTITIONER

## 2021-06-11 PROCEDURE — 63710000001 INSULIN LISPRO (HUMAN) PER 5 UNITS: Performed by: HOSPITALIST

## 2021-06-11 PROCEDURE — 99239 HOSP IP/OBS DSCHRG MGMT >30: CPT | Performed by: NURSE PRACTITIONER

## 2021-06-11 PROCEDURE — 63710000001 INSULIN LISPRO (HUMAN) PER 5 UNITS: Performed by: INTERNAL MEDICINE

## 2021-06-11 PROCEDURE — 63710000001 INSULIN DETEMIR PER 5 UNITS: Performed by: NURSE PRACTITIONER

## 2021-06-11 PROCEDURE — 82962 GLUCOSE BLOOD TEST: CPT

## 2021-06-11 RX ORDER — NICOTINE 21 MG/24HR
1 PATCH, TRANSDERMAL 24 HOURS TRANSDERMAL
Qty: 30 EACH | Refills: 0 | Status: SHIPPED | OUTPATIENT
Start: 2021-06-12

## 2021-06-11 RX ORDER — TERAZOSIN 2 MG/1
2 CAPSULE ORAL NIGHTLY
Qty: 30 CAPSULE | Refills: 0 | Status: SHIPPED | OUTPATIENT
Start: 2021-06-11

## 2021-06-11 RX ADMIN — NIFEDIPINE 30 MG: 30 TABLET, FILM COATED, EXTENDED RELEASE ORAL at 09:18

## 2021-06-11 RX ADMIN — GABAPENTIN 600 MG: 300 CAPSULE ORAL at 09:17

## 2021-06-11 RX ADMIN — SODIUM CHLORIDE, PRESERVATIVE FREE 10 ML: 5 INJECTION INTRAVENOUS at 09:17

## 2021-06-11 RX ADMIN — APIXABAN 5 MG: 5 TABLET, FILM COATED ORAL at 09:18

## 2021-06-11 RX ADMIN — FUROSEMIDE 20 MG: 20 TABLET ORAL at 09:18

## 2021-06-11 RX ADMIN — ROFLUMILAST 500 MCG: 500 TABLET ORAL at 09:18

## 2021-06-11 RX ADMIN — IPRATROPIUM BROMIDE AND ALBUTEROL SULFATE 3 ML: 2.5; .5 SOLUTION RESPIRATORY (INHALATION) at 07:12

## 2021-06-11 RX ADMIN — INSULIN DETEMIR 28 UNITS: 100 INJECTION, SOLUTION SUBCUTANEOUS at 09:30

## 2021-06-11 RX ADMIN — IPRATROPIUM BROMIDE AND ALBUTEROL SULFATE 3 ML: 2.5; .5 SOLUTION RESPIRATORY (INHALATION) at 15:37

## 2021-06-11 RX ADMIN — BUSPIRONE HYDROCHLORIDE 10 MG: 10 TABLET ORAL at 09:18

## 2021-06-11 RX ADMIN — IPRATROPIUM BROMIDE AND ALBUTEROL SULFATE 3 ML: 2.5; .5 SOLUTION RESPIRATORY (INHALATION) at 00:06

## 2021-06-11 RX ADMIN — DULOXETINE HYDROCHLORIDE 120 MG: 60 CAPSULE, DELAYED RELEASE ORAL at 09:17

## 2021-06-11 RX ADMIN — BUDESONIDE 0.5 MG: 0.5 INHALANT RESPIRATORY (INHALATION) at 07:12

## 2021-06-11 RX ADMIN — OXYCODONE HYDROCHLORIDE AND ACETAMINOPHEN 1 TABLET: 7.5; 325 TABLET ORAL at 09:30

## 2021-06-11 RX ADMIN — INSULIN LISPRO 3 UNITS: 100 INJECTION, SOLUTION INTRAVENOUS; SUBCUTANEOUS at 11:47

## 2021-06-11 RX ADMIN — IPRATROPIUM BROMIDE AND ALBUTEROL SULFATE 3 ML: 2.5; .5 SOLUTION RESPIRATORY (INHALATION) at 12:35

## 2021-06-11 RX ADMIN — OXYCODONE HYDROCHLORIDE AND ACETAMINOPHEN 1 TABLET: 7.5; 325 TABLET ORAL at 15:38

## 2021-06-11 RX ADMIN — METOPROLOL SUCCINATE 50 MG: 50 TABLET, EXTENDED RELEASE ORAL at 09:17

## 2021-06-11 RX ADMIN — ESCITALOPRAM OXALATE 20 MG: 20 TABLET ORAL at 09:18

## 2021-06-11 RX ADMIN — Medication 1 PATCH: at 09:19

## 2021-06-11 RX ADMIN — FAMOTIDINE 20 MG: 20 TABLET ORAL at 09:18

## 2021-06-11 NOTE — OUTREACH NOTE
Prep Survey      Responses   [facility] discharged [facility]?  [facility]   Is LACE score < 7 ?  No   Emergency Room discharge w/ pulse ox?  No   Eligibility  [facility]   Date of Admission  06/03/21   Date of Discharge  06/11/21   Discharge Disposition  Home or Self Care   Discharge diagnosis  community acquired pneumonia  CHF    Does the patient have one of the following disease processes/diagnoses(primary or secondary)?  COPD/Pneumonia   Does the patient have Home health ordered?  Yes   What is the Home health agency?    [facility] at   Is there a DME ordered?  No   Prep survey completed?  Yes          Adriane Johns RN

## 2021-06-11 NOTE — PROGRESS NOTES
NN spoke with pt at BS.  Pt alert and able to answer questions appropriately.  Pt O2 sat  94% on 4  L currently, home O2 use 2 L HS.  Deep breathing exercises encouraged.  Tobacco cessation encouraged.  COPD action plan reviewed.  Patient expected to be discharged today.  Proper follow up encouraged.  No new questions or concerns voiced at this time.  NN continues to follow.      Per current GOLD Standards, please consider: :LAMA/LABA/ICS in place, Outpatient PFT, Outpatient sleep study, Pulmonary rehab as appropriate, Outpatient LDCT per [place] screening guidelines (>30 pack years+ 57 yo), NRT at [place]

## 2021-06-11 NOTE — DISCHARGE SUMMARY
[facility] Medicine Services  DISCHARGE SUMMARY    Patient Name: Dani Ziegler  : 1965  MRN: [id number]    Date of Admission: 6/3/2021  8:02 PM  Date of Discharge:  2021  Primary Care Physician: Darrion Tovar MD    Consults     Date and Time Order Name Status Description    2021  1:09 AM Inpatient Infectious Diseases Consult Completed           Hospital Course     Presenting Problem:   CAP (community acquired pneumonia) [J18.9]    Active Hospital Problems    Diagnosis  POA   • Polypharmacy [Z79.899]  Not Applicable   • Morbid obesity with BMI of 45.0-49.9, adult (CMS/Formerly [facility]) [E66.01, Z68.42]  Not Applicable   • Acute on chronic respiratory failure with hypoxia (CMS/Formerly [facility]) [J96.21]  Yes   • CHF (congestive heart failure) (CMS/Formerly [facility]) [I50.9]  Yes   • Mixed hyperlipidemia [E78.2]  Yes   • Uncontrolled type 2 diabetes mellitus with hyperglycemia, without long-term current use of insulin (CMS/Formerly [facility]) [E11.65]  Yes   • Vitamin D deficiency [E55.9]  Yes   • Peripheral neuropathy [G62.9]  Yes   • Essential hypertension [I10]  Yes   • Chronic pain [G89.29]  Yes   • Chronic obstructive pulmonary disease (CMS/Formerly [facility]) [J44.9]  Yes   • Depression [F32.9]  Yes   • Anxiety [F41.9]  Yes      Resolved Hospital Problems    Diagnosis Date Resolved POA   • **CAP (community acquired pneumonia) [J18.9] 2021 Yes   • COPD with acute exacerbation (CMS/Formerly [facility]) [J44.1] 2021 Yes   • Edema of lower extremity [R60.0] 2021 Yes          Hospital Course:  Dani Ziegler is a 55 y.o. female with history of HTN, IDDM, COPD/ tobacco abuse on home oxygen admitted with acute on chronic respiratory failure with hypoxia secondary to community-acquired pneumonia/ COPD exacerbation with significant leukocytosis.     Acute respiratory failure with hypoxia  Community-acquired pneumonia  Significant leukocytosis  Acute exacerbation of COPD  -s/p 7 days CTX/ Azithromycin IV. Stopped solumedrol   --steroids  dc'd 6/10. Suspect continued elevation of WBC due to steroid use, now trending down. No other infectious process suspected  -Continue home oxygen and wean back to 2L as tolerates  -COPD navigator has followed. Continue home respiratory regimen at discharge. Recommend follow up PFTs  -missed outpatient sleep study. Will need to be rescheduled. Discussed with patient  -CT chest with hilar adenopathy. Recommend repeat CT imaging in 3mos. Discussed with patient     Morbid obesity  Generalized weakness  Difficulty ambulating  -PT consulted and recommended skilled facility for rehab but not approved. Plan home with HH at [place]     Diabetes mellitus type 2  -steroid induced hyperglycemia- improving off steroids  -- resume home regimen. A1c 6%     Peripheral neuropathy  -Continue gabapentin     Hypertension  - metoprolol, nifedipine- added hytrin     Anxiety and depression  -Continue Lexapro, BuSpar and Cymbalta  -Continue Valium as needed     History of congestive heart failure  Chronic anticoagulation due to past history of DVT x2  Patient reports increased risk of blood clots  -Continue Eliquis  -Continue medical management with Lasix daily, home meds reviewed include metoprolol and nifedipine     Tobacco abuse  Nicotine patch at [place]     Discharge Follow Up Recommendations for outpatient labs/diagnostics:  Follow up PCP 1 week. Will need to reschedule outpatient sleep study. Recommend follow up PFTs.  Repeat CT chest in 3 months per recs for hilar adenopathy        Day of Discharge     HPI:   Patient sitting up in bed finishing breakfast during exam. States she finally slept well and feeling better. No soa currently    Review of Systems  Gen- No fevers, chills  CV- No chest pain, palpitations  Resp- No cough, dyspnea  GI- No N/V/D, abd pain      Vital Signs:   Temp:  [97.7 °F (36.5 °C)-98.8 °F (37.1 °C)] 97.7 °F (36.5 °C)  Heart Rate:  [72-93] 84  Resp:  [17-18] 18  BP: (125-135)/(73-85) 135/78     Physical  Exam:  Constitutional: No acute distress, awake, alert, morbidly obese  HENT: NCAT, mucous membranes moist  Respiratory: Clear to auscultation bilaterally, respiratory effort normal   Cardiovascular: RRR, no murmurs, rubs, or gallops  Gastrointestinal: Positive bowel sounds, soft, nontender, nondistended  Musculoskeletal: No bilateral ankle edema  Psychiatric: Appropriate affect, cooperative  Neurologic: Oriented x 3, strength symmetric in all extremities, Cranial Nerves grossly intact to confrontation, speech clear  Skin: No rashes      Pertinent  and/or Most Recent Results     LAB RESULTS:      Lab 06/11/21  0533 06/10/21  0518 06/09/21  0420 06/08/21  0442 06/08/21  0436 06/07/21  0358 06/06/21  0925   WBC 18.64* 20.16* 17.49*  --  15.64* 14.98* 13.95*   HEMOGLOBIN 14.3 14.4 13.9  --  13.9 13.2 13.2   HEMATOCRIT 47.8* 46.2 45.2  --  45.5 43.9 44.0   PLATELETS 288 279 300  --  264 260 271   NEUTROS ABS 10.82* 13.31* 13.89*  --  12.92* 12.92* 12.31*   IMMATURE GRANS (ABS) 0.81*  --  0.49*  --  0.26* 0.25* 0.21*   LYMPHS ABS 5.23*  --  2.16  --  1.78 1.27 1.12   MONOS ABS 1.07*  --  0.88  --  0.63 0.51 0.28   EOS ABS 0.57* 0.20 0.00  --  0.00 0.00 0.00   MCV 84.8 82.1 83.2  --  83.5 83.6 85.6   PROCALCITONIN  --   --   --  0.04  --   --   --          Lab 06/10/21  0518 06/09/21  0420 06/08/21  0442 06/07/21  0358 06/06/21  0925   SODIUM 136 134* 135* 136 134*   POTASSIUM 4.4 4.6 4.9 4.9 4.8   CHLORIDE 99 96* 97* 98 97*   CO2 30.0* 30.0* 28.0 28.0 28.0   ANION GAP 7.0 8.0 10.0 10.0 9.0   BUN 26* 28* 27* 24* 22*   CREATININE 0.66 0.66 0.72 0.67 0.71   GLUCOSE 113* 195* 222* 266* 306*   CALCIUM 9.2 10.0 9.6 9.3 9.5   MAGNESIUM  --   --  1.8 1.6  --          Lab 06/08/21  0442   TOTAL PROTEIN 6.9   ALBUMIN 3.40*   GLOBULIN 3.5   ALT (SGPT) 47*   AST (SGOT) 20   BILIRUBIN 0.2   ALK PHOS 102                     Brief Urine Lab Results  (Last result in the past 365 days)      Color   Clarity   Blood   Leuk Est   Nitrite    Protein   CREAT   Urine HCG        06/03/21 2013 Yellow Clear Negative Trace Negative Negative             Microbiology Results (last 10 days)     Procedure Component Value - Date/Time    Respiratory Culture - Sputum, Cough [[id number]] Collected: 06/09/21 1305    Lab Status: Preliminary result Specimen: Sputum from Cough Updated: 06/10/21 1057     Respiratory Culture Scant growth (1+) Normal Respiratory Estrella: NO S.aureus/MRSA or Pseudomonas aeruginosa     Gram Stain Few (2+) Epithelial cells per low power field      Moderate (3+) WBCs per low power field      Rare (1+) Gram positive cocci    Blood Culture - Blood, Arm, Right [[id number]] Collected: 06/03/21 2350    Lab Status: Final result Specimen: Blood from Arm, Right Updated: 06/09/21 0015     Blood Culture No growth at 5 days    COVID PRE-OP / PRE-PROCEDURE SCREENING ORDER (NO ISOLATION) - Swab, Nasopharynx [[id number]]  (Normal) Collected: 06/03/21 2345    Lab Status: Final result Specimen: Swab from Nasopharynx Updated: 06/04/21 0031    Narrative:      The following orders were created for panel order COVID PRE-OP / PRE-PROCEDURE SCREENING ORDER (NO ISOLATION) - Swab, Nasopharynx.  Procedure                               Abnormality         Status                     ---------                               -----------         ------                     COVID-19 and FLU A/B PCR...[[id number]]  Normal              Final result                 Please view results for these tests on the individual orders.    COVID-19 and FLU A/B PCR - Swab, Nasopharynx [[id number]]  (Normal) Collected: 06/03/21 2345    Lab Status: Final result Specimen: Swab from Nasopharynx Updated: 06/04/21 0031     COVID19 Not Detected     Influenza A PCR Not Detected     Influenza B PCR Not Detected    Narrative:      Fact sheet for providers: https://www.fda.gov/media/193368/download    Fact sheet for patients: https://www.fda.gov/media/415807/download    Test performed by PCR.    Blood Culture  - Blood, Arm, Left [[id number]] Collected: 06/03/21 0001    Lab Status: Final result Specimen: Blood from Arm, Left Updated: 06/09/21 0015     Blood Culture No growth at 5 days          XR Chest 1 View    Result Date: 6/3/2021  CR Chest 1 Vw INDICATION: Cough COMPARISON:  Chest x-ray from 3/27/2018 FINDINGS: Single portable AP view(s) of the chest. Limited exam. The heart may be mildly enlarged. Nodule is again seen at the right lung apex.. No pneumothorax or pleural effusion.     Exam is grossly limited and it is uncertain if there may be some increasing opacification/developing pneumonia at the right lung base. . Nodular density is again visualized at the right lung apex. The heart may be mildly enlarged. Signer Name: Etta Granados MD  Signed: 6/3/2021 10:36 PM  Workstation Name: [id number]  [facility]    CT Angiogram Chest    Result Date: 6/4/2021  CTA Chest INDICATION: Shortness of air and hypoxia. Abnormal chest x-ray. TECHNIQUE: CT angiogram of the chest with IV contrast. 3-D reconstructions were obtained and reviewed.   Radiation dose reduction techniques included automated exposure control or exposure modulation based on body size. Count of known CT and cardiac nuc med studies performed in previous 12 months: 0. COMPARISON: 7/20/2018. FINDINGS: No pulmonary embolism or aortic dissection is seen. There is coronary artery disease. There is no pleural or pericardial effusion. There is mediastinal adenopathy. Right paratracheal lymph node measures 1.3 cm. AP window lymph node measures 1.4 cm. Left hilar adenopathy measures 1.3 cm. Right hilar adenopathy measures 1.8 cm.  Upper abdomen shows a stable right adrenal adenoma measuring 2.8 cm. There are chronic appearing thoracic compression fractures at T6 and T7. There are partially calcified granulomas in both upper lobes. There is a 9 mm nodule in the right lower lobe that is unchanged and benign. Atelectatic changes are noted in both lower  lobes. There is some alveolar infiltrate in the right middle lobe concerning for mild pneumonia. There is some consolidation in the lingula, probably due to atelectasis as well although pneumonia is not excluded. Imaging features are atypical or uncommonly reported for COVID-19 pneumonia. Alternative diagnoses should be considered.     1. No PE or aortic dissection. 2. New mediastinal and bilateral hilar adenopathy is nonspecific. Attention on 3 month follow-up chest CT is recommended. 3. Mild infiltrate in the right middle lobe concerning for mild pneumonia. There are atelectatic changes in the lower lobes, and there is some consolidation in the lingula that is also probably due to atelectasis although pneumonia is not excluded. 4. Granulomatous nodules in the upper lobes with a stable and benign 9 mm right lower lobe nodule. Signer Name: Elmer Boswell MD  Signed: 6/4/2021 12:29 AM  Workstation Name: [email or web address]  [facility]      Results for orders placed during the hospital encounter of 03/27/18    Duplex Venous Lower Extremity - Left CAR    Interpretation Summary  · No evidence of deep or superficial venous thrombosis in the left lower extremity.      Results for orders placed during the hospital encounter of 03/27/18    Duplex Venous Lower Extremity - Left CAR    Interpretation Summary  · No evidence of deep or superficial venous thrombosis in the left lower extremity.      Results for orders placed in visit on 05/05/20    SCANNED - ECHOCARDIOGRAM      Plan for Follow-up of Pending Labs/Results:   Pending Labs     Order Current Status    Respiratory Culture - Sputum, Cough Preliminary result        Discharge Details        Discharge Medications      New Medications      Instructions Start Date   nicotine 21 MG/24HR patch  Commonly known as: NICODERM CQ   1 patch, Transdermal, Every 24 Hours Scheduled   Start Date: June 12, 2021     terazosin 2 MG capsule  Commonly known as: HYTRIN   2  mg, Oral, Nightly         Changes to Medications      Instructions Start Date   Lantus SoloStar 100 UNIT/ML injection pen  Generic drug: Insulin Glargine  What changed: See the new instructions.   INJECT 60 UNITS SUBCUTANEOUSLY EVERY 12 HOURS         Continue These Medications      Instructions Start Date   albuterol (2.5 MG/3ML) 0.083% nebulizer solution  Commonly known as: PROVENTIL   2.5 mg, Nebulization, Every 6 Hours PRN      albuterol 0.63 MG/3ML nebulizer solution  Commonly known as: ACCUNEB   INHALE CONTENTS OF 1 VIAL VIA NEBULIZER EVERY 6 HOURS AS NEEDED FOR WHEEZING      albuterol sulfate  (90 Base) MCG/ACT inhaler  Commonly known as: Ventolin HFA   1-2 puffs, Inhalation, Every 4 Hours PRN      Alcohol Swabs pads   Use when checking sugars      B-D ULTRAFINE III SHORT PEN 31G X 8 MM misc  Generic drug: Insulin Pen Needle   No dose, route, or frequency recorded.      busPIRone 10 MG tablet  Commonly known as: BUSPAR   TAKE 1 TABLET BY MOUTH TWICE DAILY      Daliresp 500 MCG tablet tablet  Generic drug: roflumilast   500 mcg, Oral, Daily      diazePAM 2 MG tablet  Commonly known as: VALIUM   TAKE 1 TABLET BY MOUTH EVERY 12 HOURS AS NEEDED FOR ANXIETY      DULoxetine 60 MG capsule  Commonly known as: CYMBALTA   TAKE 2 CAPSULES BY MOUTH EVERY DAY      Eliquis 5 MG tablet tablet  Generic drug: apixaban   TAKE 1 TABLET BY MOUTH EVERY 12 HOURS      escitalopram 20 MG tablet  Commonly known as: LEXAPRO   20 mg, Oral, Daily      famotidine 20 MG tablet  Commonly known as: PEPCID   TAKE 1 TABLET BY MOUTH TWICE DAILY      ferrous sulfate 325 (65 FE) MG tablet  Commonly known as: Iron Supplement   325 mg, Oral, Daily With Breakfast      furosemide 20 MG tablet  Commonly known as: LASIX   TAKE 1 TABLET BY MOUTH EVERY DAY AS NEEDED FOR SWELLING      gabapentin 600 MG tablet  Commonly known as: NEURONTIN   TAKE 1 TABLET BY MOUTH THREE TIMES DAILY      glucose monitor monitoring kit   Check glucose twice daily       Lancets misc   Check glucose twice daily      metoprolol succinate XL 50 MG 24 hr tablet  Commonly known as: TOPROL-XL   TAKE 1 TABLET BY MOUTH EVERY DAY      Multi-Vitamin/Iron tablet   1 tablet, Oral, Daily      NIFEdipine CC 30 MG 24 hr tablet  Commonly known as: ADALAT CC   TAKE 1 TABLET BY MOUTH DAILY      oxyCODONE-acetaminophen  MG per tablet  Commonly known as: Percocet   1 tablet, Oral, Every 6 Hours PRN      traZODone 50 MG tablet  Commonly known as: DESYREL   50 mg, Oral, Nightly      Trelegy Ellipta 100-62.5-25 MCG/INH inhaler  Generic drug: Fluticasone-Umeclidin-Vilant   1 puff, Inhalation, Daily      True Metrix Blood Glucose Test test strip  Generic drug: glucose blood   1 each, Other, 2 Times Daily, to check glucose         Stop These Medications    mupirocin 2 % ointment  Commonly known as: Bactroban     nystatin 422137 UNIT/GM powder  Commonly known as: MYCOSTATIN     nystatin 297107 UNIT/ML suspension  Commonly known as: MYCOSTATIN            Allergies   Allergen Reactions   • Diphenhydramine Hives   • Lortab [Hydrocodone-Acetaminophen] Hives     Tolerates percocet   • Toradol [Ketorolac Tromethamine] Hives     Per patient tolerates motrin   • Nsaids Other (See Comments)     Liver Dx   • Alprazolam Delirium         Discharge Disposition:  Home or Self Care    Diet:  Hospital:  Diet Order   Procedures   • Diet Regular; Cardiac       Activity:  Activity Instructions     Activity as Tolerated            Restrictions or Other Recommendations:         CODE STATUS:    Code Status and Medical Interventions:   Ordered at: 06/04/21 1221     Code Status:    CPR     Medical Interventions (Level of Support Prior to Arrest):    Full       Future Appointments   Date Time Provider Department Center   1/21/2022 12:00 PM Darrion Tovar MD [facility]       Additional Instructions for the Follow-ups that You Need to Schedule     Ambulatory Referral to Home Health   As directed      Face to Face Visit  Date: 6/9/2021    Follow-up provider for Plan of Care?: I treated the patient in an acute care facility and will not continue treatment after discharge.    Follow-up provider: ALEXANDRE TOVAR [[id number]]    Reason/Clinical Findings: pneumonia    Describe mobility limitations that make leaving home difficult: imapired functional mobility, balance, gait and endurance    Nursing/Therapeutic Services Requested: Skilled Nursing Physical Therapy Occupational Therapy    Skilled nursing orders: Cardiopulmonary assessments Neurovascular assessments    PT orders: Therapeutic exercise Gait Training Transfer training Strengthening Home safety assessment    Weight Bearing Status: As Tolerated    Occupational orders: Activities of daily living Energy conservation Strengthening Home safety assessment    Frequency: 1 Week 1         Discharge Follow-up with PCP   As directed       Currently Documented PCP:    Alexandre Tovar MD    PCP Phone Number:    [phone]     Follow Up Details: 1 week                     MILEY Diamond  06/11/21      Time Spent on Discharge:  I spent  40 minutes on this discharge activity which included: face-to-face encounter with the patient, reviewing the data in the system, coordination of the care with the nursing staff as well as consultants, documentation, and entering orders.        Electronically signed by MILEY Diamond, 06/11/21, 10:17 AM EDT.

## 2021-06-12 ENCOUNTER — HOME CARE VISIT (OUTPATIENT)
Dept: HOME HEALTH SERVICES | Facility: HOME HEALTHCARE | Age: 56
End: 2021-06-12

## 2021-06-12 VITALS
DIASTOLIC BLOOD PRESSURE: 84 MMHG | OXYGEN SATURATION: 93 % | TEMPERATURE: 97 F | BODY MASS INDEX: 44.42 KG/M2 | HEART RATE: 112 BPM | SYSTOLIC BLOOD PRESSURE: 140 MMHG | HEIGHT: 67 IN | WEIGHT: 283 LBS | RESPIRATION RATE: 18 BRPM

## 2021-06-12 PROCEDURE — G0299 HHS/HOSPICE OF RN EA 15 MIN: HCPCS

## 2021-06-14 ENCOUNTER — TRANSITIONAL CARE MANAGEMENT TELEPHONE ENCOUNTER (OUTPATIENT)
Dept: CALL CENTER | Facility: HOSPITAL | Age: 56
End: 2021-06-14

## 2021-06-14 NOTE — OUTREACH NOTE
Call Center TCM Note      Responses   [facility] patient discharged from?  [place]   Does the patient have one of the following disease processes/diagnoses(primary or secondary)?  COPD/Pneumonia   Was the primary reason for admission:  COPD exacerbation   TCM attempt successful?  Yes   Discharge diagnosis  community acquired pneumonia  CHF    Meds reviewed with patient/caregiver?  Yes   Is the patient having any side effects they believe may be caused by any medication additions or changes?  No   Does the patient have all medications ordered at discharge?  Yes   Is the patient taking all medications as directed (includes completed medication regime)?  No   Does the patient have a primary care provider?   Yes   Does the patient have an appointment with their PCP or specialist within 7 days of discharge?  Greater than 7 days   Comments regarding PCP  TCM FWP with PCP is Darrion Tovar MD is 06/22/2021   What is preventing the patient from scheduling follow up appointments within 7 days of discharge?  Earlier appointment not available   Has the patient kept scheduled appointments due by today?  N/A   What is the Home health agency?    [facility] at   Has home health visited the patient within 72 hours of discharge?  Yes   Psychosocial issues?  No   Did the patient receive a copy of their discharge instructions?  Yes   Nursing interventions  Reviewed instructions with patient   What is the patient's perception of their health status since discharge?  Improving   Is the patient/caregiver able to teach back the hierarchy of who to call/visit for symptoms/problems? PCP, Specialist, Home health nurse, Urgent Care, ED, 911  Yes   Is the patient able to teach back COPD zones?  Yes   Is the patient/caregiver able to teach back signs and symptoms of worsening condition:  Fever/chills   Is the patient/caregiver able to teach back importance of completing antibiotic course of treatment?  Yes   TCM call completed?   Yes   Wrap up additional comments  Pt doing fair, she still has alot of difficulty speaking due to wheezing and SOB. Also hoarse today. Pt also seems disjointed in her thoughts. She would be trying to talk about one thing and then veer off to something else. I tried to call dtr who is a contact but her listed number gives someone elses name. I  also notified [facility], RN saw pt Saturday & OT sched today. I spoke with Luigi at [facility] & she was going to have RN fwp. TCM FWP with PCP Dr Tovar is 06/22/2021.          Adriane Marc MA    6/14/2021, 11:11 EDT

## 2021-06-15 DIAGNOSIS — F34.1 DYSTHYMIA: ICD-10-CM

## 2021-06-15 DIAGNOSIS — F41.9 ANXIETY: ICD-10-CM

## 2021-06-15 RX ORDER — DULOXETIN HYDROCHLORIDE 60 MG/1
CAPSULE, DELAYED RELEASE ORAL
Qty: 60 CAPSULE | Refills: 0 | Status: SHIPPED | OUTPATIENT
Start: 2021-06-15 | End: 2021-08-30

## 2021-06-15 RX ORDER — DIAZEPAM 2 MG/1
TABLET ORAL
Qty: 60 TABLET | Refills: 0 | Status: SHIPPED | OUTPATIENT
Start: 2021-06-15 | End: 2021-07-16

## 2021-06-16 ENCOUNTER — HOME CARE VISIT (OUTPATIENT)
Dept: HOME HEALTH SERVICES | Facility: HOME HEALTHCARE | Age: 56
End: 2021-06-16

## 2021-06-16 VITALS
SYSTOLIC BLOOD PRESSURE: 128 MMHG | RESPIRATION RATE: 18 BRPM | TEMPERATURE: 97.8 F | DIASTOLIC BLOOD PRESSURE: 78 MMHG | OXYGEN SATURATION: 96 % | HEART RATE: 88 BPM

## 2021-06-16 DIAGNOSIS — G89.4 CHRONIC PAIN SYNDROME: ICD-10-CM

## 2021-06-16 DIAGNOSIS — E11.42 DIABETIC PERIPHERAL NEUROPATHY (HCC): ICD-10-CM

## 2021-06-16 DIAGNOSIS — Z96.641 HISTORY OF RIGHT HIP REPLACEMENT: ICD-10-CM

## 2021-06-16 PROCEDURE — G0152 HHCP-SERV OF OT,EA 15 MIN: HCPCS

## 2021-06-16 PROCEDURE — G0151 HHCP-SERV OF PT,EA 15 MIN: HCPCS

## 2021-06-16 RX ORDER — GABAPENTIN 600 MG/1
TABLET ORAL
Qty: 90 TABLET | Refills: 0 | Status: SHIPPED | OUTPATIENT
Start: 2021-06-16 | End: 2021-07-16

## 2021-06-17 ENCOUNTER — HOME CARE VISIT (OUTPATIENT)
Dept: HOME HEALTH SERVICES | Facility: HOME HEALTHCARE | Age: 56
End: 2021-06-17

## 2021-06-17 VITALS — DIASTOLIC BLOOD PRESSURE: 98 MMHG | HEART RATE: 98 BPM | SYSTOLIC BLOOD PRESSURE: 142 MMHG | OXYGEN SATURATION: 92 %

## 2021-06-17 PROCEDURE — G0299 HHS/HOSPICE OF RN EA 15 MIN: HCPCS

## 2021-06-18 ENCOUNTER — TELEPHONE (OUTPATIENT)
Dept: FAMILY MEDICINE CLINIC | Facility: CLINIC | Age: 56
End: 2021-06-18

## 2021-06-18 ENCOUNTER — HOME CARE VISIT (OUTPATIENT)
Dept: HOME HEALTH SERVICES | Facility: HOME HEALTHCARE | Age: 56
End: 2021-06-18

## 2021-06-18 DIAGNOSIS — E11.65 UNCONTROLLED TYPE 2 DIABETES MELLITUS WITH HYPERGLYCEMIA, WITHOUT LONG-TERM CURRENT USE OF INSULIN (HCC): ICD-10-CM

## 2021-06-18 RX ORDER — CALCIUM CITRATE/VITAMIN D3 200MG-6.25
TABLET ORAL
Qty: 100 EACH | Refills: 2 | Status: SHIPPED | OUTPATIENT
Start: 2021-06-18 | End: 2022-02-08

## 2021-06-18 RX ORDER — GLUCOSAM/CHON-MSM1/C/MANG/BOSW 500-416.6
TABLET ORAL
Qty: 100 EACH | Refills: 0 | Status: SHIPPED | OUTPATIENT
Start: 2021-06-18 | End: 2021-11-11

## 2021-06-18 NOTE — CASE COMMUNICATION
Pt called requesting clx OT visit this afternoon due to migraine w/ nausea/vomiting. Pt rescheduled for Wed 6/23/21

## 2021-06-18 NOTE — TELEPHONE ENCOUNTER
Please call.  She should be able to check with the pharmacy about getting another lancet house.    In regards to the allergies, then recommend over-the-counter Claritin 10 mg 1 daily.  Insurance will not cover prescription for this and would have to get over-the-counter.

## 2021-06-18 NOTE — TELEPHONE ENCOUNTER
PATIENT HAS LANCET CASTILLO THAT IS BROKEN.  DO WE HAVE AN ADDITIONAL ONE OR DO WE NEED TO ORDER THE WHOLE TEST KIT.      PATIENT ALSO HAVING SINUS ISSUES DUE TO ALLERGIES AND WOULD LIKE MEDICATION FOR THE ALLERGIES TO HELP.  WHAT TO TAKE OR PRESCRIPTION?      PLEASE CALL [phone]

## 2021-06-21 ENCOUNTER — HOME CARE VISIT (OUTPATIENT)
Dept: HOME HEALTH SERVICES | Facility: HOME HEALTHCARE | Age: 56
End: 2021-06-21

## 2021-06-21 VITALS
HEART RATE: 100 BPM | DIASTOLIC BLOOD PRESSURE: 88 MMHG | TEMPERATURE: 97.3 F | OXYGEN SATURATION: 94 % | SYSTOLIC BLOOD PRESSURE: 148 MMHG

## 2021-06-21 PROCEDURE — G0151 HHCP-SERV OF PT,EA 15 MIN: HCPCS

## 2021-06-22 ENCOUNTER — READMISSION MANAGEMENT (OUTPATIENT)
Dept: CALL CENTER | Facility: HOSPITAL | Age: 56
End: 2021-06-22

## 2021-06-22 NOTE — OUTREACH NOTE
COPD/PN Week 2 Survey      Responses   [facility] patient discharged from?  [place]   Does the patient have one of the following disease processes/diagnoses(primary or secondary)?  COPD/Pneumonia   Was the primary reason for admission:  COPD exacerbation   Week 2 attempt successful?  No   Call start time  1353   Unsuccessful attempts  Attempt 1   Discharge diagnosis  community acquired pneumonia  CHF           Madisyn Hartamn, RN

## 2021-06-23 ENCOUNTER — HOME CARE VISIT (OUTPATIENT)
Dept: HOME HEALTH SERVICES | Facility: HOME HEALTHCARE | Age: 56
End: 2021-06-23

## 2021-06-23 PROCEDURE — G0152 HHCP-SERV OF OT,EA 15 MIN: HCPCS

## 2021-06-24 ENCOUNTER — HOME CARE VISIT (OUTPATIENT)
Dept: HOME HEALTH SERVICES | Facility: HOME HEALTHCARE | Age: 56
End: 2021-06-24

## 2021-06-24 ENCOUNTER — READMISSION MANAGEMENT (OUTPATIENT)
Dept: CALL CENTER | Facility: HOSPITAL | Age: 56
End: 2021-06-24

## 2021-06-24 VITALS
OXYGEN SATURATION: 86 % | RESPIRATION RATE: 18 BRPM | DIASTOLIC BLOOD PRESSURE: 90 MMHG | SYSTOLIC BLOOD PRESSURE: 160 MMHG | HEART RATE: 104 BPM

## 2021-06-24 PROCEDURE — G0299 HHS/HOSPICE OF RN EA 15 MIN: HCPCS

## 2021-06-24 NOTE — OUTREACH NOTE
COPD/PN Week 2 Survey      Responses   [facility] [place] patient discharged from?  [place]   Does the patient have one of the following disease processes/diagnoses(primary or secondary)?  COPD/Pneumonia   Was the primary reason for admission:  COPD exacerbation   Week 2 attempt successful?  No   Unsuccessful attempts  Attempt 2          Madisyn Hartman RN

## 2021-06-24 NOTE — HOME HEALTH
02 stat upon arrival= 86% after walking from bedroom to living area <40 feet. s/u concentrator w/ long tubing and central location in apt, 02 stat > 90% w/ showering/dressing tasks.

## 2021-06-25 ENCOUNTER — TELEPHONE (OUTPATIENT)
Dept: FAMILY MEDICINE CLINIC | Facility: CLINIC | Age: 56
End: 2021-06-25

## 2021-06-25 DIAGNOSIS — G89.4 CHRONIC PAIN SYNDROME: ICD-10-CM

## 2021-06-25 DIAGNOSIS — M51.9 LUMBAR DISC DISEASE: ICD-10-CM

## 2021-06-25 DIAGNOSIS — M12.9 ARTHRITIS INVOLVING MULTIPLE SITES: ICD-10-CM

## 2021-06-25 RX ORDER — OXYCODONE AND ACETAMINOPHEN 10; 325 MG/1; MG/1
TABLET ORAL
Qty: 120 TABLET | Refills: 0 | Status: SHIPPED | OUTPATIENT
Start: 2021-06-25 | End: 2021-07-22

## 2021-06-25 NOTE — TELEPHONE ENCOUNTER
Please call.  I refilled her pain medicine and just remind her that she will need to see me in August.

## 2021-06-29 ENCOUNTER — HOME CARE VISIT (OUTPATIENT)
Dept: HOME HEALTH SERVICES | Facility: HOME HEALTHCARE | Age: 56
End: 2021-06-29

## 2021-06-29 VITALS
HEART RATE: 76 BPM | SYSTOLIC BLOOD PRESSURE: 134 MMHG | DIASTOLIC BLOOD PRESSURE: 83 MMHG | OXYGEN SATURATION: 98 % | RESPIRATION RATE: 18 BRPM | TEMPERATURE: 96.9 F

## 2021-06-29 PROCEDURE — G0151 HHCP-SERV OF PT,EA 15 MIN: HCPCS

## 2021-06-30 PROCEDURE — G0180 MD CERTIFICATION HHA PATIENT: HCPCS | Performed by: FAMILY MEDICINE

## 2021-07-01 ENCOUNTER — HOME CARE VISIT (OUTPATIENT)
Dept: HOME HEALTH SERVICES | Facility: HOME HEALTHCARE | Age: 56
End: 2021-07-01

## 2021-07-01 ENCOUNTER — READMISSION MANAGEMENT (OUTPATIENT)
Dept: CALL CENTER | Facility: HOSPITAL | Age: 56
End: 2021-07-01

## 2021-07-01 VITALS — TEMPERATURE: 96.7 F | DIASTOLIC BLOOD PRESSURE: 86 MMHG | SYSTOLIC BLOOD PRESSURE: 130 MMHG | HEART RATE: 79 BPM

## 2021-07-01 PROCEDURE — G0152 HHCP-SERV OF OT,EA 15 MIN: HCPCS

## 2021-07-01 NOTE — OUTREACH NOTE
COPD/PN Week 3 Survey      Responses   [facility] patient discharged from?  [place]   Does the patient have one of the following disease processes/diagnoses(primary or secondary)?  COPD/Pneumonia   Was the primary reason for admission:  COPD exacerbation   Week 3 attempt successful?  No   Unsuccessful attempts  Attempt 1          Yaima Escobar RN

## 2021-07-02 ENCOUNTER — READMISSION MANAGEMENT (OUTPATIENT)
Dept: CALL CENTER | Facility: HOSPITAL | Age: 56
End: 2021-07-02

## 2021-07-02 NOTE — OUTREACH NOTE
COPD/PN Week 3 Survey      Responses   [facility] patient discharged from?  [place]   Does the patient have one of the following disease processes/diagnoses(primary or secondary)?  COPD/Pneumonia   Was the primary reason for admission:  COPD exacerbation   Week 3 attempt successful?  No   Unsuccessful attempts  Attempt 2   Rescheduled  Revoked          Aga Barkley RN

## 2021-07-07 ENCOUNTER — HOME CARE VISIT (OUTPATIENT)
Dept: HOME HEALTH SERVICES | Facility: HOME HEALTHCARE | Age: 56
End: 2021-07-07

## 2021-07-07 VITALS — TEMPERATURE: 97.5 F | SYSTOLIC BLOOD PRESSURE: 133 MMHG | DIASTOLIC BLOOD PRESSURE: 79 MMHG | HEART RATE: 88 BPM

## 2021-07-08 ENCOUNTER — HOME CARE VISIT (OUTPATIENT)
Dept: HOME HEALTH SERVICES | Facility: HOME HEALTHCARE | Age: 56
End: 2021-07-08

## 2021-07-09 ENCOUNTER — HOME CARE VISIT (OUTPATIENT)
Dept: HOME HEALTH SERVICES | Facility: HOME HEALTHCARE | Age: 56
End: 2021-07-09

## 2021-07-09 PROCEDURE — G0299 HHS/HOSPICE OF RN EA 15 MIN: HCPCS

## 2021-07-10 RX ORDER — NIFEDIPINE 30 MG/1
TABLET, FILM COATED, EXTENDED RELEASE ORAL
Qty: 30 TABLET | Refills: 0 | OUTPATIENT
Start: 2021-07-10

## 2021-07-10 RX ORDER — FUROSEMIDE 20 MG/1
TABLET ORAL
Qty: 15 TABLET | Refills: 0 | OUTPATIENT
Start: 2021-07-10

## 2021-07-10 RX ORDER — ESCITALOPRAM OXALATE 20 MG/1
20 TABLET ORAL DAILY
Qty: 30 TABLET | Refills: 0 | OUTPATIENT
Start: 2021-07-10

## 2021-07-10 NOTE — TELEPHONE ENCOUNTER
Check with Dr Tovar when he returns not sure if she is Duloxetine or Lexapro both are active on her med record

## 2021-07-11 VITALS
SYSTOLIC BLOOD PRESSURE: 136 MMHG | HEART RATE: 88 BPM | TEMPERATURE: 98.2 F | RESPIRATION RATE: 20 BRPM | OXYGEN SATURATION: 96 % | DIASTOLIC BLOOD PRESSURE: 86 MMHG

## 2021-07-12 ENCOUNTER — HOME CARE VISIT (OUTPATIENT)
Dept: HOME HEALTH SERVICES | Facility: HOME HEALTHCARE | Age: 56
End: 2021-07-12

## 2021-07-12 VITALS
SYSTOLIC BLOOD PRESSURE: 139 MMHG | TEMPERATURE: 97.3 F | HEART RATE: 80 BPM | RESPIRATION RATE: 20 BRPM | OXYGEN SATURATION: 85 % | DIASTOLIC BLOOD PRESSURE: 88 MMHG

## 2021-07-12 PROCEDURE — G0151 HHCP-SERV OF PT,EA 15 MIN: HCPCS

## 2021-07-13 NOTE — HOME HEALTH
"Upon PT arrival for visit, pt states her O2 concentrator has been \"beeping\".  Pt called [facility] while PT present and they will make a visit to pt's home to assess.  Pt O2 sat 85% on 3L and up to 88% on 4L pnc.  Pt instructed on PLB but with no increased O2 sat noted.  PT encouraged pt to seek medical care via 911 but she refused, pt states she will go to ER later when her boyfriend can transport."

## 2021-07-14 ENCOUNTER — TELEPHONE (OUTPATIENT)
Dept: FAMILY MEDICINE CLINIC | Facility: CLINIC | Age: 56
End: 2021-07-14

## 2021-07-14 NOTE — TELEPHONE ENCOUNTER
Caller: LUPILLO    Relationship: Home Health    Best call back number: [phone]    What orders are you requesting (i.e. lab or imaging):ORDERS FOR REASSESSMENT FOR OCCUPATIONAL THERAPY    In what timeframe would the patient need to come in: ASAP    Where will you receive your lab/imaging services: HOME    Additional notes: NEEDS TO BE REASSESSED FOR FURTHER POSSIBLE TREATMENT

## 2021-07-15 DIAGNOSIS — Z96.641 HISTORY OF RIGHT HIP REPLACEMENT: ICD-10-CM

## 2021-07-15 DIAGNOSIS — G89.4 CHRONIC PAIN SYNDROME: ICD-10-CM

## 2021-07-15 DIAGNOSIS — F41.9 ANXIETY: ICD-10-CM

## 2021-07-15 DIAGNOSIS — E11.42 DIABETIC PERIPHERAL NEUROPATHY (HCC): ICD-10-CM

## 2021-07-16 ENCOUNTER — HOME CARE VISIT (OUTPATIENT)
Dept: HOME HEALTH SERVICES | Facility: HOME HEALTHCARE | Age: 56
End: 2021-07-16

## 2021-07-16 PROCEDURE — G0152 HHCP-SERV OF OT,EA 15 MIN: HCPCS

## 2021-07-16 RX ORDER — METOPROLOL SUCCINATE 50 MG/1
TABLET, EXTENDED RELEASE ORAL
Qty: 30 TABLET | Refills: 0 | Status: SHIPPED | OUTPATIENT
Start: 2021-07-16 | End: 2021-08-12

## 2021-07-16 RX ORDER — BUSPIRONE HYDROCHLORIDE 10 MG/1
TABLET ORAL
Qty: 60 TABLET | Refills: 0 | Status: SHIPPED | OUTPATIENT
Start: 2021-07-16 | End: 2021-11-03

## 2021-07-16 RX ORDER — GABAPENTIN 600 MG/1
TABLET ORAL
Qty: 90 TABLET | Refills: 0 | Status: SHIPPED | OUTPATIENT
Start: 2021-07-16 | End: 2021-08-13

## 2021-07-16 RX ORDER — FUROSEMIDE 20 MG/1
20 TABLET ORAL DAILY PRN
Qty: 15 TABLET | Refills: 0 | Status: SHIPPED | OUTPATIENT
Start: 2021-07-16 | End: 2021-08-18

## 2021-07-16 RX ORDER — NIFEDIPINE 30 MG/1
30 TABLET, FILM COATED, EXTENDED RELEASE ORAL DAILY
Qty: 30 TABLET | Refills: 0 | Status: SHIPPED | OUTPATIENT
Start: 2021-07-16 | End: 2021-08-18

## 2021-07-16 RX ORDER — DIAZEPAM 2 MG/1
TABLET ORAL
Qty: 60 TABLET | Refills: 0 | Status: SHIPPED | OUTPATIENT
Start: 2021-07-16 | End: 2021-08-13

## 2021-07-16 RX ORDER — ESCITALOPRAM OXALATE 20 MG/1
20 TABLET ORAL DAILY
Qty: 30 TABLET | Refills: 0 | Status: SHIPPED | OUTPATIENT
Start: 2021-07-16 | End: 2021-08-18

## 2021-07-16 NOTE — TELEPHONE ENCOUNTER
Last refill was 6/16 on gabapentin. Stated she has been getting it this whole time. Stated she needs refill of the other 3 too.

## 2021-07-16 NOTE — TELEPHONE ENCOUNTER
Please call and clarify.  The gabapentin has not been filled since February.  If she has not been taking that, I cannot refill that at this time.    She is also due for office visit for next refill after this.    Let me know about the gabapentin and I will go ahead and send in the diazepam.    In addition, does she need a refill of nifedipine, furosemide, and Lexapro?

## 2021-07-19 ENCOUNTER — HOME CARE VISIT (OUTPATIENT)
Dept: HOME HEALTH SERVICES | Facility: HOME HEALTHCARE | Age: 56
End: 2021-07-19

## 2021-07-19 VITALS
HEART RATE: 80 BPM | TEMPERATURE: 97.1 F | SYSTOLIC BLOOD PRESSURE: 122 MMHG | OXYGEN SATURATION: 91 % | RESPIRATION RATE: 17 BRPM | DIASTOLIC BLOOD PRESSURE: 78 MMHG

## 2021-07-19 PROCEDURE — G0151 HHCP-SERV OF PT,EA 15 MIN: HCPCS

## 2021-07-20 ENCOUNTER — TELEPHONE (OUTPATIENT)
Dept: FAMILY MEDICINE CLINIC | Facility: CLINIC | Age: 56
End: 2021-07-20

## 2021-07-20 VITALS
DIASTOLIC BLOOD PRESSURE: 78 MMHG | OXYGEN SATURATION: 86 % | HEART RATE: 75 BPM | WEIGHT: 293 LBS | BODY MASS INDEX: 48.97 KG/M2 | TEMPERATURE: 97.8 F | SYSTOLIC BLOOD PRESSURE: 122 MMHG

## 2021-07-20 NOTE — TELEPHONE ENCOUNTER
[facility] occupational therapist called from  Home health, she saw patient late end of the week for reassessment, pt still has trouble with breathing, wants to follow up one more time with her sometime this week before discharging her, she can be reached at [phone]  Thank you.

## 2021-07-22 ENCOUNTER — TELEPHONE (OUTPATIENT)
Dept: FAMILY MEDICINE CLINIC | Facility: CLINIC | Age: 56
End: 2021-07-22

## 2021-07-22 DIAGNOSIS — M51.9 LUMBAR DISC DISEASE: ICD-10-CM

## 2021-07-22 DIAGNOSIS — M12.9 ARTHRITIS INVOLVING MULTIPLE SITES: ICD-10-CM

## 2021-07-22 DIAGNOSIS — G89.4 CHRONIC PAIN SYNDROME: ICD-10-CM

## 2021-07-22 RX ORDER — OXYCODONE AND ACETAMINOPHEN 10; 325 MG/1; MG/1
TABLET ORAL
Qty: 120 TABLET | Refills: 0 | Status: SHIPPED | OUTPATIENT
Start: 2021-07-22 | End: 2021-08-20

## 2021-07-23 ENCOUNTER — HOME CARE VISIT (OUTPATIENT)
Dept: HOME HEALTH SERVICES | Facility: HOME HEALTHCARE | Age: 56
End: 2021-07-23

## 2021-07-23 PROCEDURE — G0152 HHCP-SERV OF OT,EA 15 MIN: HCPCS

## 2021-07-23 PROCEDURE — G0299 HHS/HOSPICE OF RN EA 15 MIN: HCPCS

## 2021-07-24 VITALS
SYSTOLIC BLOOD PRESSURE: 133 MMHG | DIASTOLIC BLOOD PRESSURE: 74 MMHG | TEMPERATURE: 97.5 F | HEART RATE: 82 BPM | RESPIRATION RATE: 18 BRPM | OXYGEN SATURATION: 97 %

## 2021-08-04 DIAGNOSIS — E11.65 UNCONTROLLED TYPE 2 DIABETES MELLITUS WITH HYPERGLYCEMIA, WITHOUT LONG-TERM CURRENT USE OF INSULIN (HCC): Primary | ICD-10-CM

## 2021-08-05 RX ORDER — INSULIN GLARGINE 100 [IU]/ML
INJECTION, SOLUTION SUBCUTANEOUS
Qty: 30 ML | Refills: 0 | Status: SHIPPED | OUTPATIENT
Start: 2021-08-05 | End: 2021-09-07

## 2021-08-05 NOTE — TELEPHONE ENCOUNTER
Rx Refill Note  Requested Prescriptions     Pending Prescriptions Disp Refills   • Lantus SoloStar 100 UNIT/ML injection pen [Pharmacy Med Name: LANTUS SOLOS /ML] 30 mL 0     Sig: INJECT 60 UNITS SUBCUTANEOUSLY EVERY 12 HOURS      Last office visit with prescribing clinician: 5/25/2021      Next office visit with prescribing clinician: 1/21/2022            Mona Rodriguez  08/05/21, 07:28 EDT

## 2021-08-05 NOTE — TELEPHONE ENCOUNTER
Please call and confirm current dose of insulin. She is also due for follow-up office visit and routine blood work.

## 2021-08-06 RX ORDER — BLOOD-GLUCOSE METER
EACH MISCELLANEOUS
Qty: 100 EACH | Refills: 0 | Status: SHIPPED | OUTPATIENT
Start: 2021-08-06 | End: 2021-11-11

## 2021-08-10 ENCOUNTER — HOME CARE VISIT (OUTPATIENT)
Dept: HOME HEALTH SERVICES | Facility: HOME HEALTHCARE | Age: 56
End: 2021-08-10

## 2021-08-10 PROCEDURE — G0299 HHS/HOSPICE OF RN EA 15 MIN: HCPCS

## 2021-08-11 VITALS
HEART RATE: 88 BPM | OXYGEN SATURATION: 94 % | SYSTOLIC BLOOD PRESSURE: 126 MMHG | DIASTOLIC BLOOD PRESSURE: 78 MMHG | TEMPERATURE: 97.8 F | RESPIRATION RATE: 18 BRPM

## 2021-08-12 ENCOUNTER — PATIENT OUTREACH (OUTPATIENT)
Dept: CASE MANAGEMENT | Facility: OTHER | Age: 56
End: 2021-08-12

## 2021-08-12 RX ORDER — METOPROLOL SUCCINATE 50 MG/1
TABLET, EXTENDED RELEASE ORAL
Qty: 30 TABLET | Refills: 0 | Status: SHIPPED | OUTPATIENT
Start: 2021-08-12 | End: 2021-09-10

## 2021-08-12 NOTE — OUTREACH NOTE
"Ambulatory Case Management Note  Patient Outreach    F/u with pt.  States she is \"doing amazing.\"  She states that she has graduated from Home Health, Pt and OT.  She has been monitoring her bp, bs and pulse o2 and all staying very good.  Reports her bp averages 130/75-79 and bs betw .  Was aware that her last A1C was 6.00.  She is up and about and is ambulating with walker b/c of hip pain and states \"had a fall and now I am very cautious.  She lives with her SO and states they were actually getting ready for a trip to [place] just to get of of the house. States she is eating. She does relay some food insecurities, however they are provided with food by [facility] in [place] and states \"we are good right now.\"  Some concerns of other financial issues, but declined any community resources at present.  She has an AWV scheduled in January, but is going to call for regular f/u with her PCP, Dr. Tovar.  States she has to keep regular appts b/c her pain med that he manages.  States her chronic pain is in her back, hips and knees.  With medicine pain scale is around 4, when wears off, states it is 8.  She also is going to work on getting her preventive care caught up.  She would like to check on different insurances so that she might be able to have dental and eye care.  [employer] ([employer] Program) number provided.  She does not have living will and is interested, but not at present. Informed that this would be conversation at her AWV also and that office should have education material.  She denies any needs at present and again stated how \"amazing\" she felt she was doing.  Reminded of [facility] 24/7 Nurse Call Center and number provided.  Knows to contact RN-ACM for any future needs.       SDOH updated and reviewed with the patient during this program:     Financial Resource Strain: Medium Risk   • Difficulty of Paying Living Expenses: Somewhat hard       Food Insecurity: Food Insecurity " Present   • Worried About Running Out of Food in the Last Year: Sometimes true   • Ran Out of Food in the Last Year: Sometimes true       Transportation Needs: No Transportation Needs   • Lack of Transportation (Medical): No   • Lack of Transportation (Non-Medical): No       Housing Stability: Low Risk    • Unable to Pay for Housing in the Last Year: No   • Number of Places Lived in the Last Year: 1   • Unstable Housing in the Last Year: No     General & Health Literacy Assessment    Questions/Answers      Most Recent Value   Assessment Completed With  Patient   Living Arrangement  Significant Other   Type of Residence  Private Residence   Equiptment Used at Home  Walker, Oxygen/Respiratory Treatment [glucometer   bp monitor  pulse O2]   Bed or Wheelchair Confined  No   Difficulty Keeping Appointments  No   Chronic Pain  Yes   Location of Chronic Pain  back hip knees        Care Evaluation    Questions/Answers      Most Recent Value   Annual Wellness Visit:   -- [is scheduled for Jan 2022]   Care Gaps Addressed  Diabetic A1C   HbA1c Status  -- [6.00  6/3/21 ]   Other Patient Education/Resources   24/7 [facility] Healthcare Nurse Call Line, Advanced Care Planning, MyChart   24/7 Nurse Call Line Education Method  Verbal   ACP Education Method  Verbal   MyChart Education Method  Verbal   Advanced Directives:  Not Interested At This Time   Medication Adherence  Medications understood   Healthy Lifestyle (Self-Efficacy)  self-monitors vital signs appropriately, recognizes when to contact medical assistance        Jennifer Cerda RN  Ambulatory Case Management    8/12/2021, 15:14 EDT

## 2021-08-13 ENCOUNTER — TELEPHONE (OUTPATIENT)
Dept: FAMILY MEDICINE CLINIC | Facility: CLINIC | Age: 56
End: 2021-08-13

## 2021-08-13 DIAGNOSIS — Z96.641 HISTORY OF RIGHT HIP REPLACEMENT: ICD-10-CM

## 2021-08-13 DIAGNOSIS — E11.42 DIABETIC PERIPHERAL NEUROPATHY (HCC): ICD-10-CM

## 2021-08-13 DIAGNOSIS — G89.4 CHRONIC PAIN SYNDROME: ICD-10-CM

## 2021-08-13 DIAGNOSIS — F41.9 ANXIETY: ICD-10-CM

## 2021-08-13 RX ORDER — DIAZEPAM 2 MG/1
TABLET ORAL
Qty: 60 TABLET | Refills: 0 | Status: SHIPPED | OUTPATIENT
Start: 2021-08-13 | End: 2021-09-09

## 2021-08-13 RX ORDER — GABAPENTIN 600 MG/1
TABLET ORAL
Qty: 90 TABLET | Refills: 0 | Status: SHIPPED | OUTPATIENT
Start: 2021-08-13 | End: 2021-09-09

## 2021-08-18 RX ORDER — FUROSEMIDE 20 MG/1
TABLET ORAL
Qty: 15 TABLET | Refills: 0 | Status: SHIPPED | OUTPATIENT
Start: 2021-08-18 | End: 2021-09-07

## 2021-08-18 RX ORDER — ESCITALOPRAM OXALATE 20 MG/1
TABLET ORAL
Qty: 30 TABLET | Refills: 0 | Status: SHIPPED | OUTPATIENT
Start: 2021-08-18 | End: 2021-09-30

## 2021-08-18 RX ORDER — NIFEDIPINE 30 MG/1
TABLET, EXTENDED RELEASE ORAL
Qty: 30 TABLET | Refills: 0 | Status: SHIPPED | OUTPATIENT
Start: 2021-08-18 | End: 2021-09-30

## 2021-08-18 RX ORDER — FAMOTIDINE 20 MG/1
TABLET, FILM COATED ORAL
Qty: 60 TABLET | Refills: 0 | Status: SHIPPED | OUTPATIENT
Start: 2021-08-18 | End: 2021-11-03

## 2021-08-20 DIAGNOSIS — G89.4 CHRONIC PAIN SYNDROME: ICD-10-CM

## 2021-08-20 DIAGNOSIS — M12.9 ARTHRITIS INVOLVING MULTIPLE SITES: ICD-10-CM

## 2021-08-20 DIAGNOSIS — M51.9 LUMBAR DISC DISEASE: ICD-10-CM

## 2021-08-20 RX ORDER — OXYCODONE AND ACETAMINOPHEN 10; 325 MG/1; MG/1
TABLET ORAL
Qty: 120 TABLET | Refills: 0 | Status: SHIPPED | OUTPATIENT
Start: 2021-08-20 | End: 2021-09-16

## 2021-08-24 ENCOUNTER — OFFICE VISIT (OUTPATIENT)
Dept: FAMILY MEDICINE CLINIC | Facility: CLINIC | Age: 56
End: 2021-08-24

## 2021-08-24 VITALS
DIASTOLIC BLOOD PRESSURE: 82 MMHG | BODY MASS INDEX: 47.09 KG/M2 | TEMPERATURE: 97.8 F | HEART RATE: 80 BPM | SYSTOLIC BLOOD PRESSURE: 150 MMHG | RESPIRATION RATE: 18 BRPM | HEIGHT: 66 IN | WEIGHT: 293 LBS

## 2021-08-24 DIAGNOSIS — M51.9 LUMBAR DISC DISEASE: ICD-10-CM

## 2021-08-24 DIAGNOSIS — E11.65 UNCONTROLLED TYPE 2 DIABETES MELLITUS WITH HYPERGLYCEMIA, WITHOUT LONG-TERM CURRENT USE OF INSULIN (HCC): Primary | ICD-10-CM

## 2021-08-24 DIAGNOSIS — I10 ESSENTIAL HYPERTENSION: ICD-10-CM

## 2021-08-24 DIAGNOSIS — J44.9 CHRONIC OBSTRUCTIVE PULMONARY DISEASE, UNSPECIFIED COPD TYPE (HCC): ICD-10-CM

## 2021-08-24 DIAGNOSIS — G89.4 CHRONIC PAIN SYNDROME: ICD-10-CM

## 2021-08-24 PROCEDURE — 99214 OFFICE O/P EST MOD 30 MIN: CPT | Performed by: FAMILY MEDICINE

## 2021-08-24 NOTE — PROGRESS NOTES
Assessment/Plan       Diagnoses and all orders for this visit:    1. Uncontrolled type 2 diabetes mellitus with hyperglycemia, without long-term current use of insulin (CMS/[facility]) (Primary)  -     Comprehensive Metabolic Panel  -     Lipid Panel With / Chol / HDL Ratio  -     Hemoglobin A1c    2. Essential hypertension  -     CBC & Differential  -     Comprehensive Metabolic Panel    3. Chronic pain syndrome    4. Lumbar disc disease    5. Chronic obstructive pulmonary disease, unspecified COPD type (CMS/[facility])           Follow up: Return in about 3 months (around 11/24/2021).     DISCUSSION  Dm 2. Check labs including CMP, lipid panel and A1c.    HTN. Sl increased today but decreased sleep last night. Took med late today.  Continue to decrease tobacco. Continue metoprolol.    Chronic pain and lumbar disc disease. Takes med as prescribed. No evidence of misuse or diversion. Continue oxycodone  mg one four times a day.    COPD. Stay off tobacco. Continue home o2. Inhalers.       MEDICATIONS PRESCRIBED  Requested Prescriptions      No prescriptions requested or ordered in this encounter            Alden dated on 8/24/2021   was reviewed and appropriate.        -------------------------------------------    Subjective     Chief Complaint   Patient presents with   • Diabetes         Diabetes  She presents for her follow-up diabetic visit. She has type 2 diabetes mellitus. Her disease course has been improving. Hypoglycemia symptoms include nervousness/anxiousness (valium helps). Symptoms are improving. Current diabetic treatment includes insulin injections. She is compliant with treatment all of the time. She is following a generally healthy diet. Home blood sugar record trend: blood sugars have been good. An ACE inhibitor/angiotensin II receptor blocker is not being taken.   COPD  She complains of shortness of breath. This is a chronic problem. The current episode started more than 1 year ago. The problem occurs  "daily. The problem has been gradually improving. Associated symptoms comments: Worse in heat and using the mask.       Has been feeling better  Now showering 4 times per week  Has been cooking  Eating ok    Very little money and trying to get food stamps  Ate some this am    Lantus 60 am and 40  Pm    Chronic pain   On pain meds 4 times per day  Tolerating med  Meds help  Denies sedation usually. Some sleepiness today  Worse when hot  pain in low back  Uses a walker        Covid shot today after this appt.     Itching   Has been itching more  ? Try hydroxyzine again   scratches arm        Social History     Tobacco Use   Smoking Status Current Every Day Smoker   • Years: 30.00   • Types: Cigarettes   • Last attempt to quit: 2019   • Years since quittin.3   Smokeless Tobacco Never Used   Tobacco Comment    1 daily          Past Medical History,Medications, Allergies, and social history was reviewed.          Review of Systems   Constitutional: Negative.    HENT: Negative.    Respiratory: Positive for shortness of breath.    Cardiovascular: Negative.    Gastrointestinal: Negative.    Musculoskeletal: Positive for back pain.   Psychiatric/Behavioral: Negative for sleep disturbance. The patient is nervous/anxious (valium helps).        Objective     Vitals:    21 1451   BP: 150/82   Pulse: 80   Resp: 18   Temp: 97.8 °F (36.6 °C)   Weight: 133 kg (294 lb)   Height: 167.6 cm (66\")          Physical Exam  Vitals and nursing note reviewed.   Constitutional:       Appearance: She is well-developed. She is obese.      Comments: Seems sleepy today but speaking clearly   HENT:      Head: Normocephalic and atraumatic.      Right Ear: Hearing and external ear normal.      Left Ear: Hearing and external ear normal.   Eyes:      Conjunctiva/sclera: Conjunctivae normal.      Pupils: Pupils are equal, round, and reactive to light.   Cardiovascular:      Rate and Rhythm: Normal rate and regular rhythm.      Heart sounds: " Normal heart sounds. No murmur heard.   No friction rub.   Pulmonary:      Effort: Pulmonary effort is normal. No respiratory distress.      Breath sounds: Wheezing (faint exp) and rhonchi present. No rales.   Skin:     General: Skin is warm.   Neurological:      Mental Status: She is alert.   Psychiatric:         Mood and Affect: Mood is not anxious or depressed.         Speech: Speech normal.         Behavior: Behavior normal.       Has a walker    Back: mid back and upper lumbar + tenderness                Darrion Tovar MD

## 2021-08-25 LAB
ALBUMIN SERPL-MCNC: 3.7 G/DL (ref 3.8–4.9)
ALBUMIN/GLOB SERPL: 1.1 {RATIO} (ref 1.2–2.2)
ALP SERPL-CCNC: 122 IU/L (ref 48–121)
ALT SERPL-CCNC: 9 IU/L (ref 0–32)
AST SERPL-CCNC: 13 IU/L (ref 0–40)
BASOPHILS # BLD AUTO: 0 X10E3/UL (ref 0–0.2)
BASOPHILS NFR BLD AUTO: 0 %
BILIRUB SERPL-MCNC: 0.3 MG/DL (ref 0–1.2)
BUN SERPL-MCNC: 19 MG/DL (ref 6–24)
BUN/CREAT SERPL: 20 (ref 9–23)
CALCIUM SERPL-MCNC: 8.8 MG/DL (ref 8.7–10.2)
CHLORIDE SERPL-SCNC: 100 MMOL/L (ref 96–106)
CHOLEST SERPL-MCNC: 119 MG/DL (ref 100–199)
CHOLEST/HDLC SERPL: 3.4 RATIO (ref 0–4.4)
CO2 SERPL-SCNC: 28 MMOL/L (ref 20–29)
CREAT SERPL-MCNC: 0.96 MG/DL (ref 0.57–1)
EOSINOPHIL # BLD AUTO: 0.2 X10E3/UL (ref 0–0.4)
EOSINOPHIL NFR BLD AUTO: 2 %
ERYTHROCYTE [DISTWIDTH] IN BLOOD BY AUTOMATED COUNT: 16.6 % (ref 11.7–15.4)
GLOBULIN SER CALC-MCNC: 3.5 G/DL (ref 1.5–4.5)
GLUCOSE SERPL-MCNC: 41 MG/DL (ref 65–99)
HBA1C MFR BLD: 5.7 % (ref 4.8–5.6)
HCT VFR BLD AUTO: 40.5 % (ref 34–46.6)
HDLC SERPL-MCNC: 35 MG/DL
HGB BLD-MCNC: 12.5 G/DL (ref 11.1–15.9)
IMM GRANULOCYTES # BLD AUTO: 0.1 X10E3/UL (ref 0–0.1)
IMM GRANULOCYTES NFR BLD AUTO: 1 %
LDLC SERPL CALC-MCNC: 69 MG/DL (ref 0–99)
LYMPHOCYTES # BLD AUTO: 3.2 X10E3/UL (ref 0.7–3.1)
LYMPHOCYTES NFR BLD AUTO: 27 %
MCH RBC QN AUTO: 25.8 PG (ref 26.6–33)
MCHC RBC AUTO-ENTMCNC: 30.9 G/DL (ref 31.5–35.7)
MCV RBC AUTO: 84 FL (ref 79–97)
MONOCYTES # BLD AUTO: 0.8 X10E3/UL (ref 0.1–0.9)
MONOCYTES NFR BLD AUTO: 6 %
NEUTROPHILS # BLD AUTO: 7.5 X10E3/UL (ref 1.4–7)
NEUTROPHILS NFR BLD AUTO: 64 %
PLATELET # BLD AUTO: 323 X10E3/UL (ref 150–450)
POTASSIUM SERPL-SCNC: 4.5 MMOL/L (ref 3.5–5.2)
PROT SERPL-MCNC: 7.2 G/DL (ref 6–8.5)
RBC # BLD AUTO: 4.84 X10E6/UL (ref 3.77–5.28)
SODIUM SERPL-SCNC: 139 MMOL/L (ref 134–144)
TRIGL SERPL-MCNC: 70 MG/DL (ref 0–149)
VLDLC SERPL CALC-MCNC: 15 MG/DL (ref 5–40)
WBC # BLD AUTO: 11.8 X10E3/UL (ref 3.4–10.8)

## 2021-08-30 ENCOUNTER — TELEPHONE (OUTPATIENT)
Dept: FAMILY MEDICINE CLINIC | Facility: CLINIC | Age: 56
End: 2021-08-30

## 2021-08-30 DIAGNOSIS — J44.1 CHRONIC OBSTRUCTIVE PULMONARY DISEASE WITH ACUTE EXACERBATION (HCC): ICD-10-CM

## 2021-08-30 DIAGNOSIS — F34.1 DYSTHYMIA: ICD-10-CM

## 2021-08-30 RX ORDER — ALBUTEROL SULFATE 2.5 MG/3ML
2.5 SOLUTION RESPIRATORY (INHALATION) EVERY 6 HOURS PRN
Qty: 100 EACH | Refills: 1 | Status: SHIPPED | OUTPATIENT
Start: 2021-08-30 | End: 2021-11-11

## 2021-08-30 RX ORDER — APIXABAN 5 MG/1
TABLET, FILM COATED ORAL
Qty: 60 TABLET | Refills: 2 | Status: SHIPPED | OUTPATIENT
Start: 2021-08-30 | End: 2022-05-06 | Stop reason: SDUPTHER

## 2021-08-30 RX ORDER — ALBUTEROL SULFATE 0.63 MG/3ML
SOLUTION RESPIRATORY (INHALATION)
Qty: 75 ML | Refills: 2 | Status: CANCELLED | OUTPATIENT
Start: 2021-08-30

## 2021-08-30 RX ORDER — DULOXETIN HYDROCHLORIDE 60 MG/1
CAPSULE, DELAYED RELEASE ORAL
Qty: 60 CAPSULE | Refills: 2 | Status: SHIPPED | OUTPATIENT
Start: 2021-08-30 | End: 2021-12-03

## 2021-08-30 NOTE — TELEPHONE ENCOUNTER
Please call, requested meds sent to pharmacy.     Ok to drop off form for me to look at and see if can complete

## 2021-08-30 NOTE — TELEPHONE ENCOUNTER
Pt is calling in to get a refill on the albuterol nebulizer solution.         Pt stated that she wanted to know if you would approve some paper work for her if she brings it here. It is for depends where she is coughing and urinating on her self. She stated after having 3 surgeries she is having a hard time buying them.

## 2021-09-03 DIAGNOSIS — E11.65 UNCONTROLLED TYPE 2 DIABETES MELLITUS WITH HYPERGLYCEMIA, WITHOUT LONG-TERM CURRENT USE OF INSULIN (HCC): ICD-10-CM

## 2021-09-07 RX ORDER — INSULIN GLARGINE 100 [IU]/ML
INJECTION, SOLUTION SUBCUTANEOUS
Qty: 30 ML | Refills: 2 | Status: ON HOLD | OUTPATIENT
Start: 2021-09-07 | End: 2022-01-04

## 2021-09-07 RX ORDER — FUROSEMIDE 20 MG/1
TABLET ORAL
Qty: 15 TABLET | Refills: 0 | Status: SHIPPED | OUTPATIENT
Start: 2021-09-07 | End: 2021-09-30

## 2021-09-09 DIAGNOSIS — Z96.641 HISTORY OF RIGHT HIP REPLACEMENT: ICD-10-CM

## 2021-09-09 DIAGNOSIS — F41.9 ANXIETY: ICD-10-CM

## 2021-09-09 DIAGNOSIS — E11.42 DIABETIC PERIPHERAL NEUROPATHY (HCC): ICD-10-CM

## 2021-09-09 DIAGNOSIS — G89.4 CHRONIC PAIN SYNDROME: ICD-10-CM

## 2021-09-09 RX ORDER — GABAPENTIN 600 MG/1
TABLET ORAL
Qty: 90 TABLET | Refills: 1 | Status: SHIPPED | OUTPATIENT
Start: 2021-09-09 | End: 2021-11-04

## 2021-09-09 RX ORDER — DIAZEPAM 2 MG/1
TABLET ORAL
Qty: 60 TABLET | Refills: 1 | Status: SHIPPED | OUTPATIENT
Start: 2021-09-09 | End: 2021-11-04

## 2021-09-10 RX ORDER — METOPROLOL SUCCINATE 50 MG/1
TABLET, EXTENDED RELEASE ORAL
Qty: 30 TABLET | Refills: 2 | Status: SHIPPED | OUTPATIENT
Start: 2021-09-10 | End: 2021-12-23

## 2021-09-16 DIAGNOSIS — M12.9 ARTHRITIS INVOLVING MULTIPLE SITES: ICD-10-CM

## 2021-09-16 DIAGNOSIS — G89.4 CHRONIC PAIN SYNDROME: ICD-10-CM

## 2021-09-16 DIAGNOSIS — M51.9 LUMBAR DISC DISEASE: ICD-10-CM

## 2021-09-16 RX ORDER — OXYCODONE AND ACETAMINOPHEN 10; 325 MG/1; MG/1
TABLET ORAL
Qty: 120 TABLET | Refills: 0 | Status: SHIPPED | OUTPATIENT
Start: 2021-09-16 | End: 2021-10-15

## 2021-09-30 ENCOUNTER — TELEPHONE (OUTPATIENT)
Dept: FAMILY MEDICINE CLINIC | Facility: CLINIC | Age: 56
End: 2021-09-30

## 2021-09-30 RX ORDER — FUROSEMIDE 20 MG/1
TABLET ORAL
Qty: 15 TABLET | Refills: 2 | Status: SHIPPED | OUTPATIENT
Start: 2021-09-30 | End: 2021-11-11

## 2021-09-30 RX ORDER — NIFEDIPINE 30 MG/1
TABLET, EXTENDED RELEASE ORAL
Qty: 30 TABLET | Refills: 2 | Status: SHIPPED | OUTPATIENT
Start: 2021-09-30 | End: 2021-11-04

## 2021-09-30 RX ORDER — ESCITALOPRAM OXALATE 20 MG/1
TABLET ORAL
Qty: 30 TABLET | Refills: 2 | Status: SHIPPED | OUTPATIENT
Start: 2021-09-30 | End: 2021-12-23

## 2021-10-14 ENCOUNTER — TELEPHONE (OUTPATIENT)
Dept: FAMILY MEDICINE CLINIC | Facility: CLINIC | Age: 56
End: 2021-10-14

## 2021-10-14 DIAGNOSIS — M51.9 LUMBAR DISC DISEASE: ICD-10-CM

## 2021-10-14 DIAGNOSIS — M12.9 ARTHRITIS INVOLVING MULTIPLE SITES: ICD-10-CM

## 2021-10-14 DIAGNOSIS — G89.4 CHRONIC PAIN SYNDROME: ICD-10-CM

## 2021-10-15 RX ORDER — OXYCODONE AND ACETAMINOPHEN 10; 325 MG/1; MG/1
TABLET ORAL
Qty: 120 TABLET | Refills: 0 | Status: SHIPPED | OUTPATIENT
Start: 2021-10-15 | End: 2021-11-11

## 2021-10-15 NOTE — TELEPHONE ENCOUNTER
Caller: Dani Ziegler    Relationship: Self    Medication requested (name and dosage): oxyCODONE-acetaminophen (PERCOCET)  MG per tablet     Pharmacy where request should be sent: [address] - [phone]  - [phone] FX    Additional details provided by patient: PATIENT WILL RUN OUT OF MEDICATION TODAY    Best call back number: [phone]    Does the patient have less than a 3 day supply:  [x] Yes  [] No    Thania Singh Rep   10/15/21 09:22 EDT

## 2021-11-03 RX ORDER — BUSPIRONE HYDROCHLORIDE 10 MG/1
TABLET ORAL
Qty: 60 TABLET | Refills: 2 | Status: SHIPPED | OUTPATIENT
Start: 2021-11-03 | End: 2022-02-24

## 2021-11-03 RX ORDER — FAMOTIDINE 20 MG/1
TABLET, FILM COATED ORAL
Qty: 60 TABLET | Refills: 2 | Status: SHIPPED | OUTPATIENT
Start: 2021-11-03 | End: 2022-02-24

## 2021-11-04 DIAGNOSIS — Z96.641 HISTORY OF RIGHT HIP REPLACEMENT: ICD-10-CM

## 2021-11-04 DIAGNOSIS — E11.42 DIABETIC PERIPHERAL NEUROPATHY (HCC): ICD-10-CM

## 2021-11-04 DIAGNOSIS — F41.9 ANXIETY: ICD-10-CM

## 2021-11-04 DIAGNOSIS — G89.4 CHRONIC PAIN SYNDROME: ICD-10-CM

## 2021-11-04 RX ORDER — GABAPENTIN 600 MG/1
TABLET ORAL
Qty: 90 TABLET | Refills: 1 | Status: SHIPPED | OUTPATIENT
Start: 2021-11-04 | End: 2021-12-30

## 2021-11-04 RX ORDER — DIAZEPAM 2 MG/1
TABLET ORAL
Qty: 60 TABLET | Refills: 1 | Status: SHIPPED | OUTPATIENT
Start: 2021-11-04 | End: 2021-12-30

## 2021-11-04 RX ORDER — NIFEDIPINE 30 MG/1
TABLET, EXTENDED RELEASE ORAL
Qty: 30 TABLET | Refills: 2 | Status: SHIPPED | OUTPATIENT
Start: 2021-11-04 | End: 2022-04-25

## 2021-11-11 DIAGNOSIS — M51.9 LUMBAR DISC DISEASE: ICD-10-CM

## 2021-11-11 DIAGNOSIS — M12.9 ARTHRITIS INVOLVING MULTIPLE SITES: ICD-10-CM

## 2021-11-11 DIAGNOSIS — G89.4 CHRONIC PAIN SYNDROME: ICD-10-CM

## 2021-11-11 DIAGNOSIS — J44.1 CHRONIC OBSTRUCTIVE PULMONARY DISEASE WITH ACUTE EXACERBATION (HCC): ICD-10-CM

## 2021-11-11 DIAGNOSIS — E11.65 UNCONTROLLED TYPE 2 DIABETES MELLITUS WITH HYPERGLYCEMIA, WITHOUT LONG-TERM CURRENT USE OF INSULIN (HCC): ICD-10-CM

## 2021-11-11 RX ORDER — FUROSEMIDE 20 MG/1
TABLET ORAL
Qty: 15 TABLET | Refills: 1 | Status: SHIPPED | OUTPATIENT
Start: 2021-11-11 | End: 2021-12-23

## 2021-11-11 RX ORDER — GLUCOSAM/CHON-MSM1/C/MANG/BOSW 500-416.6
TABLET ORAL
Qty: 100 EACH | Refills: 1 | Status: SHIPPED | OUTPATIENT
Start: 2021-11-11 | End: 2022-05-27

## 2021-11-11 RX ORDER — BLOOD-GLUCOSE METER
EACH MISCELLANEOUS
Qty: 100 EACH | Refills: 1 | Status: SHIPPED | OUTPATIENT
Start: 2021-11-11 | End: 2022-02-08

## 2021-11-11 RX ORDER — OXYCODONE AND ACETAMINOPHEN 10; 325 MG/1; MG/1
TABLET ORAL
Qty: 120 TABLET | Refills: 0 | Status: SHIPPED | OUTPATIENT
Start: 2021-11-11 | End: 2021-12-09

## 2021-11-11 RX ORDER — LANCING DEVICE
EACH MISCELLANEOUS
Qty: 1 EACH | Refills: 1 | Status: SHIPPED | OUTPATIENT
Start: 2021-11-11

## 2021-11-11 RX ORDER — ALBUTEROL SULFATE 2.5 MG/3ML
SOLUTION RESPIRATORY (INHALATION)
Qty: 180 ML | Refills: 1 | Status: SHIPPED | OUTPATIENT
Start: 2021-11-11 | End: 2022-08-24

## 2021-12-03 DIAGNOSIS — F34.1 DYSTHYMIA: ICD-10-CM

## 2021-12-03 RX ORDER — DULOXETIN HYDROCHLORIDE 60 MG/1
CAPSULE, DELAYED RELEASE ORAL
Qty: 60 CAPSULE | Refills: 1 | Status: SHIPPED | OUTPATIENT
Start: 2021-12-03 | End: 2022-02-11

## 2021-12-09 DIAGNOSIS — M51.9 LUMBAR DISC DISEASE: ICD-10-CM

## 2021-12-09 DIAGNOSIS — M12.9 ARTHRITIS INVOLVING MULTIPLE SITES: ICD-10-CM

## 2021-12-09 DIAGNOSIS — G89.4 CHRONIC PAIN SYNDROME: ICD-10-CM

## 2021-12-09 RX ORDER — OXYCODONE AND ACETAMINOPHEN 10; 325 MG/1; MG/1
TABLET ORAL
Qty: 120 TABLET | Refills: 0 | Status: SHIPPED | OUTPATIENT
Start: 2021-12-09 | End: 2022-02-10 | Stop reason: SDUPTHER

## 2021-12-13 RX ORDER — NYSTATIN 100000 U/G
CREAM TOPICAL
Qty: 30 G | Refills: 0 | Status: SHIPPED | OUTPATIENT
Start: 2021-12-13 | End: 2022-09-19

## 2021-12-13 NOTE — TELEPHONE ENCOUNTER
Rx Refill Note  Requested Prescriptions     Pending Prescriptions Disp Refills   • nystatin (MYCOSTATIN) [id number] UNIT/GM cream [Pharmacy Med Name: NYSTATIN CRE [id number]] 30 g 0     Sig: APPLY TOPICALLY TO AFFECTED AREA 2 TIMES A DAY AS DIRECTED      Last office visit with prescribing clinician: 8/24/2021      Next office visit with prescribing clinician: 1/21/2022            Mona Rodriguez  12/13/21, 17:11 EST

## 2021-12-14 NOTE — TELEPHONE ENCOUNTER
Group Topic:  Education    Date: 12/14/2021  Start Time: 1230  End Time: 1330  Facilitators: Neena Mayorga LCSW    Focus: reasons to live  Number in attendance: 7    Pt discussed the concept of what a life worth living would look like. Pt was tasked to think about what elements/things they would need in order to feel that their life is worth living for and draw a picture of those things. Pt was encouraged to share their art with the group. Supportive and encouraging feedback was provided.  Carina DELANEY          Method: Group  Attendance: Present  Participation: Active  Patient Response: Attentive  Mood: Normal     Pt was engaged and interactive during group.     Please call, is she out of the pain med I gave her? Did it help when she was taking it?

## 2021-12-23 RX ORDER — METOPROLOL SUCCINATE 50 MG/1
TABLET, EXTENDED RELEASE ORAL
Qty: 30 TABLET | Refills: 1 | Status: SHIPPED | OUTPATIENT
Start: 2021-12-23 | End: 2022-02-24

## 2021-12-23 RX ORDER — FUROSEMIDE 20 MG/1
TABLET ORAL
Qty: 15 TABLET | Refills: 0 | Status: SHIPPED | OUTPATIENT
Start: 2021-12-23 | End: 2022-02-24

## 2021-12-23 RX ORDER — ESCITALOPRAM OXALATE 20 MG/1
TABLET ORAL
Qty: 30 TABLET | Refills: 1 | Status: SHIPPED | OUTPATIENT
Start: 2021-12-23 | End: 2022-01-23 | Stop reason: HOSPADM

## 2021-12-30 ENCOUNTER — APPOINTMENT (OUTPATIENT)
Dept: CT IMAGING | Facility: HOSPITAL | Age: 56
End: 2021-12-30

## 2021-12-30 ENCOUNTER — HOSPITAL ENCOUNTER (INPATIENT)
Facility: HOSPITAL | Age: 56
LOS: 23 days | Discharge: NURSING, REHAB OR LONG-TERM CARE | End: 2022-01-23
Attending: EMERGENCY MEDICINE | Admitting: INTERNAL MEDICINE

## 2021-12-30 ENCOUNTER — APPOINTMENT (OUTPATIENT)
Dept: GENERAL RADIOLOGY | Facility: HOSPITAL | Age: 56
End: 2021-12-30

## 2021-12-30 DIAGNOSIS — F41.9 ANXIETY: ICD-10-CM

## 2021-12-30 DIAGNOSIS — J96.01 SEPSIS WITH ACUTE HYPOXIC RESPIRATORY FAILURE WITHOUT SEPTIC SHOCK, DUE TO UNSPECIFIED ORGANISM: ICD-10-CM

## 2021-12-30 DIAGNOSIS — R13.12 OROPHARYNGEAL DYSPHAGIA: ICD-10-CM

## 2021-12-30 DIAGNOSIS — A41.9 SEPSIS WITH ACUTE HYPOXIC RESPIRATORY FAILURE WITHOUT SEPTIC SHOCK, DUE TO UNSPECIFIED ORGANISM: ICD-10-CM

## 2021-12-30 DIAGNOSIS — Z96.641 HISTORY OF RIGHT HIP REPLACEMENT: ICD-10-CM

## 2021-12-30 DIAGNOSIS — R09.02 HYPOXIA: ICD-10-CM

## 2021-12-30 DIAGNOSIS — J44.1 ACUTE EXACERBATION OF CHRONIC OBSTRUCTIVE PULMONARY DISEASE (COPD): Primary | ICD-10-CM

## 2021-12-30 DIAGNOSIS — E11.42 DIABETIC PERIPHERAL NEUROPATHY (HCC): ICD-10-CM

## 2021-12-30 DIAGNOSIS — J18.9 PNEUMONIA OF BOTH LUNGS DUE TO INFECTIOUS ORGANISM, UNSPECIFIED PART OF LUNG: ICD-10-CM

## 2021-12-30 DIAGNOSIS — R65.20 SEPSIS WITH ACUTE HYPOXIC RESPIRATORY FAILURE WITHOUT SEPTIC SHOCK, DUE TO UNSPECIFIED ORGANISM: ICD-10-CM

## 2021-12-30 DIAGNOSIS — G89.4 CHRONIC PAIN SYNDROME: ICD-10-CM

## 2021-12-30 DIAGNOSIS — R06.03 ACUTE RESPIRATORY DISTRESS: ICD-10-CM

## 2021-12-30 LAB
ALBUMIN SERPL-MCNC: 3.2 G/DL (ref 3.5–5.2)
ALBUMIN/GLOB SERPL: 0.8 G/DL
ALP SERPL-CCNC: 136 U/L (ref 39–117)
ALT SERPL W P-5'-P-CCNC: 9 U/L (ref 1–33)
AMPHET+METHAMPHET UR QL: NEGATIVE
AMPHETAMINES UR QL: POSITIVE
ANION GAP SERPL CALCULATED.3IONS-SCNC: 11 MMOL/L (ref 5–15)
AST SERPL-CCNC: 12 U/L (ref 1–32)
BARBITURATES UR QL SCN: NEGATIVE
BASOPHILS # BLD AUTO: 0.05 10*3/MM3 (ref 0–0.2)
BASOPHILS NFR BLD AUTO: 0.3 % (ref 0–1.5)
BENZODIAZ UR QL SCN: POSITIVE
BILIRUB SERPL-MCNC: 0.3 MG/DL (ref 0–1.2)
BUN SERPL-MCNC: 16 MG/DL (ref 6–20)
BUN/CREAT SERPL: 16.8 (ref 7–25)
BUPRENORPHINE SERPL-MCNC: NEGATIVE NG/ML
CALCIUM SPEC-SCNC: 8.5 MG/DL (ref 8.6–10.5)
CANNABINOIDS SERPL QL: NEGATIVE
CHLORIDE SERPL-SCNC: 104 MMOL/L (ref 98–107)
CO2 SERPL-SCNC: 27 MMOL/L (ref 22–29)
COCAINE UR QL: NEGATIVE
CREAT SERPL-MCNC: 0.95 MG/DL (ref 0.57–1)
D-LACTATE SERPL-SCNC: 2.2 MMOL/L (ref 0.5–2)
DEPRECATED RDW RBC AUTO: 63.7 FL (ref 37–54)
EOSINOPHIL # BLD AUTO: 0.26 10*3/MM3 (ref 0–0.4)
EOSINOPHIL NFR BLD AUTO: 1.4 % (ref 0.3–6.2)
ERYTHROCYTE [DISTWIDTH] IN BLOOD BY AUTOMATED COUNT: 19.2 % (ref 12.3–15.4)
ETHANOL BLD-MCNC: <10 MG/DL (ref 0–10)
FLUAV SUBTYP SPEC NAA+PROBE: NOT DETECTED
FLUBV RNA ISLT QL NAA+PROBE: NOT DETECTED
GFR SERPL CREATININE-BSD FRML MDRD: 61 ML/MIN/1.73
GLOBULIN UR ELPH-MCNC: 4 GM/DL
GLUCOSE SERPL-MCNC: 197 MG/DL (ref 65–99)
HCT VFR BLD AUTO: 44.4 % (ref 34–46.6)
HGB BLD-MCNC: 13.4 G/DL (ref 12–15.9)
HOLD SPECIMEN: NORMAL
HOLD SPECIMEN: NORMAL
IMM GRANULOCYTES # BLD AUTO: 0.1 10*3/MM3 (ref 0–0.05)
IMM GRANULOCYTES NFR BLD AUTO: 0.5 % (ref 0–0.5)
LYMPHOCYTES # BLD AUTO: 1.37 10*3/MM3 (ref 0.7–3.1)
LYMPHOCYTES NFR BLD AUTO: 7.2 % (ref 19.6–45.3)
MCH RBC QN AUTO: 27.6 PG (ref 26.6–33)
MCHC RBC AUTO-ENTMCNC: 30.2 G/DL (ref 31.5–35.7)
MCV RBC AUTO: 91.4 FL (ref 79–97)
METHADONE UR QL SCN: NEGATIVE
MONOCYTES # BLD AUTO: 0.54 10*3/MM3 (ref 0.1–0.9)
MONOCYTES NFR BLD AUTO: 2.8 % (ref 5–12)
NEUTROPHILS NFR BLD AUTO: 16.76 10*3/MM3 (ref 1.7–7)
NEUTROPHILS NFR BLD AUTO: 87.8 % (ref 42.7–76)
NRBC BLD AUTO-RTO: 0 /100 WBC (ref 0–0.2)
NT-PROBNP SERPL-MCNC: 1339 PG/ML (ref 0–900)
OPIATES UR QL: NEGATIVE
OXYCODONE UR QL SCN: NEGATIVE
PCP UR QL SCN: NEGATIVE
PLATELET # BLD AUTO: 309 10*3/MM3 (ref 140–450)
PMV BLD AUTO: 9.9 FL (ref 6–12)
POTASSIUM SERPL-SCNC: 3.7 MMOL/L (ref 3.5–5.2)
PROPOXYPH UR QL: NEGATIVE
PROT SERPL-MCNC: 7.2 G/DL (ref 6–8.5)
RBC # BLD AUTO: 4.86 10*6/MM3 (ref 3.77–5.28)
SARS-COV-2 RNA PNL SPEC NAA+PROBE: NOT DETECTED
SODIUM SERPL-SCNC: 142 MMOL/L (ref 136–145)
TRICYCLICS UR QL SCN: NEGATIVE
TROPONIN T SERPL-MCNC: <0.01 NG/ML (ref 0–0.03)
WBC NRBC COR # BLD: 19.08 10*3/MM3 (ref 3.4–10.8)
WHOLE BLOOD HOLD SPECIMEN: NORMAL
WHOLE BLOOD HOLD SPECIMEN: NORMAL

## 2021-12-30 PROCEDURE — 87150 DNA/RNA AMPLIFIED PROBE: CPT | Performed by: EMERGENCY MEDICINE

## 2021-12-30 PROCEDURE — 80053 COMPREHEN METABOLIC PANEL: CPT | Performed by: EMERGENCY MEDICINE

## 2021-12-30 PROCEDURE — 80306 DRUG TEST PRSMV INSTRMNT: CPT | Performed by: EMERGENCY MEDICINE

## 2021-12-30 PROCEDURE — 87636 SARSCOV2 & INF A&B AMP PRB: CPT | Performed by: EMERGENCY MEDICINE

## 2021-12-30 PROCEDURE — 71275 CT ANGIOGRAPHY CHEST: CPT

## 2021-12-30 PROCEDURE — 87040 BLOOD CULTURE FOR BACTERIA: CPT | Performed by: EMERGENCY MEDICINE

## 2021-12-30 PROCEDURE — 83605 ASSAY OF LACTIC ACID: CPT | Performed by: EMERGENCY MEDICINE

## 2021-12-30 PROCEDURE — 93005 ELECTROCARDIOGRAM TRACING: CPT | Performed by: EMERGENCY MEDICINE

## 2021-12-30 PROCEDURE — 0 IOPAMIDOL PER 1 ML: Performed by: EMERGENCY MEDICINE

## 2021-12-30 PROCEDURE — 84484 ASSAY OF TROPONIN QUANT: CPT | Performed by: EMERGENCY MEDICINE

## 2021-12-30 PROCEDURE — 83880 ASSAY OF NATRIURETIC PEPTIDE: CPT | Performed by: EMERGENCY MEDICINE

## 2021-12-30 PROCEDURE — 99291 CRITICAL CARE FIRST HOUR: CPT

## 2021-12-30 PROCEDURE — 82077 ASSAY SPEC XCP UR&BREATH IA: CPT | Performed by: EMERGENCY MEDICINE

## 2021-12-30 PROCEDURE — 94799 UNLISTED PULMONARY SVC/PX: CPT

## 2021-12-30 PROCEDURE — 85025 COMPLETE CBC W/AUTO DIFF WBC: CPT | Performed by: EMERGENCY MEDICINE

## 2021-12-30 PROCEDURE — 94660 CPAP INITIATION&MGMT: CPT

## 2021-12-30 PROCEDURE — 71045 X-RAY EXAM CHEST 1 VIEW: CPT

## 2021-12-30 PROCEDURE — 94640 AIRWAY INHALATION TREATMENT: CPT

## 2021-12-30 RX ORDER — DIAZEPAM 2 MG/1
TABLET ORAL
Qty: 60 TABLET | Refills: 0 | OUTPATIENT
Start: 2021-12-30

## 2021-12-30 RX ORDER — IPRATROPIUM BROMIDE AND ALBUTEROL SULFATE 2.5; .5 MG/3ML; MG/3ML
3 SOLUTION RESPIRATORY (INHALATION) ONCE
Status: COMPLETED | OUTPATIENT
Start: 2021-12-30 | End: 2021-12-30

## 2021-12-30 RX ORDER — GABAPENTIN 600 MG/1
TABLET ORAL
Qty: 90 TABLET | Refills: 0 | Status: SHIPPED | OUTPATIENT
Start: 2021-12-30 | End: 2022-03-28 | Stop reason: SDUPTHER

## 2021-12-30 RX ORDER — DIAZEPAM 2 MG/1
TABLET ORAL
Qty: 60 TABLET | Refills: 0 | Status: SHIPPED | OUTPATIENT
Start: 2021-12-30 | End: 2022-02-10 | Stop reason: SDUPTHER

## 2021-12-30 RX ORDER — SODIUM CHLORIDE 0.9 % (FLUSH) 0.9 %
10 SYRINGE (ML) INJECTION AS NEEDED
Status: DISCONTINUED | OUTPATIENT
Start: 2021-12-30 | End: 2022-01-02

## 2021-12-30 RX ADMIN — IOPAMIDOL 100 ML: 755 INJECTION, SOLUTION INTRAVENOUS at 21:40

## 2021-12-30 RX ADMIN — IPRATROPIUM BROMIDE AND ALBUTEROL SULFATE 3 ML: .5; 3 SOLUTION RESPIRATORY (INHALATION) at 20:12

## 2021-12-31 ENCOUNTER — APPOINTMENT (OUTPATIENT)
Dept: GENERAL RADIOLOGY | Facility: HOSPITAL | Age: 56
End: 2021-12-31

## 2021-12-31 PROBLEM — E66.01 MORBID OBESITY WITH BMI OF 60.0-69.9, ADULT: Status: ACTIVE | Noted: 2021-12-31

## 2021-12-31 PROBLEM — J96.22 ACUTE ON CHRONIC RESPIRATORY FAILURE WITH HYPOXIA AND HYPERCAPNIA (HCC): Status: ACTIVE | Noted: 2021-06-04

## 2021-12-31 PROBLEM — Z86.718 HISTORY OF RECURRENT DEEP VEIN THROMBOSIS (DVT): Status: ACTIVE | Noted: 2021-12-31

## 2021-12-31 LAB
ARTERIAL PATENCY WRIST A: ABNORMAL
ARTERIAL PATENCY WRIST A: POSITIVE
ARTERIAL PATENCY WRIST A: POSITIVE
ATMOSPHERIC PRESS: ABNORMAL MM[HG]
BASE EXCESS BLDA CALC-SCNC: 1.3 MMOL/L (ref 0–2)
BASE EXCESS BLDA CALC-SCNC: 1.6 MMOL/L (ref 0–2)
BASE EXCESS BLDA CALC-SCNC: 3.6 MMOL/L (ref 0–2)
BDY SITE: ABNORMAL
BODY TEMPERATURE: 37 C
CO2 BLDA-SCNC: 31.7 MMOL/L (ref 22–33)
CO2 BLDA-SCNC: 31.9 MMOL/L (ref 22–33)
CO2 BLDA-SCNC: 32.7 MMOL/L (ref 22–33)
COHGB MFR BLD: 0.8 % (ref 0–2)
COHGB MFR BLD: 0.9 % (ref 0–2)
COHGB MFR BLD: 1.2 % (ref 0–2)
EPAP: 0
GLUCOSE BLDC GLUCOMTR-MCNC: 112 MG/DL (ref 70–130)
GLUCOSE BLDC GLUCOMTR-MCNC: 171 MG/DL (ref 70–130)
GLUCOSE BLDC GLUCOMTR-MCNC: 173 MG/DL (ref 70–130)
GLUCOSE BLDC GLUCOMTR-MCNC: 176 MG/DL (ref 70–130)
GLUCOSE BLDC GLUCOMTR-MCNC: 178 MG/DL (ref 70–130)
HCO3 BLDA-SCNC: 29.8 MMOL/L (ref 20–26)
HCO3 BLDA-SCNC: 29.9 MMOL/L (ref 20–26)
HCO3 BLDA-SCNC: 30.9 MMOL/L (ref 20–26)
HCT VFR BLD CALC: 38.4 % (ref 38–51)
HCT VFR BLD CALC: 39.4 % (ref 38–51)
HCT VFR BLD CALC: 39.6 % (ref 38–51)
HGB BLDA-MCNC: 12.5 G/DL (ref 14–18)
HGB BLDA-MCNC: 12.8 G/DL (ref 14–18)
HGB BLDA-MCNC: 12.9 G/DL (ref 14–18)
HOLD SPECIMEN: NORMAL
INHALED O2 CONCENTRATION: 75 %
INHALED O2 CONCENTRATION: 90 %
INHALED O2 CONCENTRATION: 90 %
IPAP: 0
L PNEUMO1 AG UR QL IA: NEGATIVE
METHGB BLD QL: 0.2 % (ref 0–1.5)
METHGB BLD QL: 0.3 % (ref 0–1.5)
METHGB BLD QL: 0.3 % (ref 0–1.5)
MODALITY: ABNORMAL
MRSA DNA SPEC QL NAA+PROBE: NEGATIVE
NOTE: ABNORMAL
OXYHGB MFR BLDV: 91.5 % (ref 94–99)
OXYHGB MFR BLDV: 93.2 % (ref 94–99)
OXYHGB MFR BLDV: 93.8 % (ref 94–99)
PAW @ PEAK INSP FLOW SETTING VENT: 0 CMH2O
PCO2 BLDA: 58.2 MM HG (ref 35–45)
PCO2 BLDA: 62.8 MM HG (ref 35–45)
PCO2 BLDA: 65.6 MM HG (ref 35–45)
PCO2 TEMP ADJ BLD: 58.2 MM HG (ref 35–45)
PCO2 TEMP ADJ BLD: 62.8 MM HG (ref 35–45)
PCO2 TEMP ADJ BLD: 65.6 MM HG (ref 35–45)
PEEP RESPIRATORY: 5 CM[H2O]
PEEP RESPIRATORY: 5 CM[H2O]
PH BLDA: 7.27 PH UNITS (ref 7.35–7.45)
PH BLDA: 7.28 PH UNITS (ref 7.35–7.45)
PH BLDA: 7.33 PH UNITS (ref 7.35–7.45)
PH, TEMP CORRECTED: 7.27 PH UNITS
PH, TEMP CORRECTED: 7.28 PH UNITS
PH, TEMP CORRECTED: 7.33 PH UNITS
PO2 BLDA: 70.7 MM HG (ref 83–108)
PO2 BLDA: 71.8 MM HG (ref 83–108)
PO2 BLDA: 78.6 MM HG (ref 83–108)
PO2 TEMP ADJ BLD: 70.7 MM HG (ref 83–108)
PO2 TEMP ADJ BLD: 71.8 MM HG (ref 83–108)
PO2 TEMP ADJ BLD: 78.6 MM HG (ref 83–108)
S PNEUM AG SPEC QL LA: NEGATIVE
TOTAL RATE: 0 BREATHS/MINUTE
TOTAL RATE: 21 BREATHS/MINUTE
TOTAL RATE: 22 BREATHS/MINUTE
VANCOMYCIN SERPL-MCNC: 15.7 MCG/ML (ref 5–40)
VANCOMYCIN SERPL-MCNC: 9.5 MCG/ML (ref 5–40)
VENTILATOR MODE: AC

## 2021-12-31 PROCEDURE — 63710000001 INSULIN REGULAR HUMAN PER 5 UNITS: Performed by: INTERNAL MEDICINE

## 2021-12-31 PROCEDURE — 25010000002 VANCOMYCIN 10 G RECONSTITUTED SOLUTION

## 2021-12-31 PROCEDURE — 83050 HGB METHEMOGLOBIN QUAN: CPT

## 2021-12-31 PROCEDURE — 94799 UNLISTED PULMONARY SVC/PX: CPT

## 2021-12-31 PROCEDURE — 87205 SMEAR GRAM STAIN: CPT | Performed by: INTERNAL MEDICINE

## 2021-12-31 PROCEDURE — 87899 AGENT NOS ASSAY W/OPTIC: CPT | Performed by: INTERNAL MEDICINE

## 2021-12-31 PROCEDURE — 74018 RADEX ABDOMEN 1 VIEW: CPT

## 2021-12-31 PROCEDURE — 82962 GLUCOSE BLOOD TEST: CPT

## 2021-12-31 PROCEDURE — 80202 ASSAY OF VANCOMYCIN: CPT

## 2021-12-31 PROCEDURE — 94002 VENT MGMT INPAT INIT DAY: CPT

## 2021-12-31 PROCEDURE — 82375 ASSAY CARBOXYHB QUANT: CPT

## 2021-12-31 PROCEDURE — 87641 MR-STAPH DNA AMP PROBE: CPT

## 2021-12-31 PROCEDURE — 87070 CULTURE OTHR SPECIMN AEROBIC: CPT | Performed by: INTERNAL MEDICINE

## 2021-12-31 PROCEDURE — 99291 CRITICAL CARE FIRST HOUR: CPT | Performed by: INTERNAL MEDICINE

## 2021-12-31 PROCEDURE — 25010000002 PIPERACILLIN SOD-TAZOBACTAM PER 1 G

## 2021-12-31 PROCEDURE — 82805 BLOOD GASES W/O2 SATURATION: CPT

## 2021-12-31 PROCEDURE — 25010000002 SUCCINYLCHOLINE PER 20 MG: Performed by: EMERGENCY MEDICINE

## 2021-12-31 PROCEDURE — 25010000002 PROPOFOL 10 MG/ML EMULSION: Performed by: EMERGENCY MEDICINE

## 2021-12-31 PROCEDURE — 94003 VENT MGMT INPAT SUBQ DAY: CPT

## 2021-12-31 PROCEDURE — 25010000002 PIPERACILLIN SOD-TAZOBACTAM PER 1 G: Performed by: EMERGENCY MEDICINE

## 2021-12-31 PROCEDURE — 36600 WITHDRAWAL OF ARTERIAL BLOOD: CPT

## 2021-12-31 PROCEDURE — 25010000002 VANCOMYCIN 10 G RECONSTITUTED SOLUTION: Performed by: EMERGENCY MEDICINE

## 2021-12-31 PROCEDURE — 31500 INSERT EMERGENCY AIRWAY: CPT

## 2021-12-31 PROCEDURE — 5A1955Z RESPIRATORY VENTILATION, GREATER THAN 96 CONSECUTIVE HOURS: ICD-10-PCS | Performed by: INTERNAL MEDICINE

## 2021-12-31 PROCEDURE — 25010000002 FENTANYL 10 MCG/1 ML NS: Performed by: INTERNAL MEDICINE

## 2021-12-31 PROCEDURE — 71045 X-RAY EXAM CHEST 1 VIEW: CPT

## 2021-12-31 PROCEDURE — 25010000002 MIDAZOLAM PER 1 MG: Performed by: EMERGENCY MEDICINE

## 2021-12-31 PROCEDURE — 25010000002 LINEZOLID 600 MG/300ML SOLUTION: Performed by: INTERNAL MEDICINE

## 2021-12-31 RX ORDER — DEXTROSE MONOHYDRATE 25 G/50ML
25 INJECTION, SOLUTION INTRAVENOUS
Status: DISCONTINUED | OUTPATIENT
Start: 2021-12-31 | End: 2021-12-31

## 2021-12-31 RX ORDER — GABAPENTIN 300 MG/1
300 CAPSULE ORAL EVERY 8 HOURS SCHEDULED
Status: DISCONTINUED | OUTPATIENT
Start: 2021-12-31 | End: 2022-01-02

## 2021-12-31 RX ORDER — FAMOTIDINE 10 MG/ML
20 INJECTION, SOLUTION INTRAVENOUS 2 TIMES DAILY
Status: DISCONTINUED | OUTPATIENT
Start: 2021-12-31 | End: 2022-01-12 | Stop reason: ALTCHOICE

## 2021-12-31 RX ORDER — MIDAZOLAM HYDROCHLORIDE 1 MG/ML
2 INJECTION INTRAMUSCULAR; INTRAVENOUS ONCE
Status: COMPLETED | OUTPATIENT
Start: 2021-12-31 | End: 2021-12-31

## 2021-12-31 RX ORDER — IPRATROPIUM BROMIDE AND ALBUTEROL SULFATE 2.5; .5 MG/3ML; MG/3ML
3 SOLUTION RESPIRATORY (INHALATION)
Status: DISCONTINUED | OUTPATIENT
Start: 2021-12-31 | End: 2022-01-15

## 2021-12-31 RX ORDER — SUCCINYLCHOLINE CHLORIDE 20 MG/ML
100 INJECTION INTRAMUSCULAR; INTRAVENOUS ONCE
Status: COMPLETED | OUTPATIENT
Start: 2021-12-31 | End: 2021-12-31

## 2021-12-31 RX ORDER — SODIUM CHLORIDE 0.9 % (FLUSH) 0.9 %
10 SYRINGE (ML) INJECTION AS NEEDED
Status: DISCONTINUED | OUTPATIENT
Start: 2021-12-31 | End: 2022-01-02

## 2021-12-31 RX ORDER — LINEZOLID 2 MG/ML
600 INJECTION, SOLUTION INTRAVENOUS EVERY 12 HOURS
Status: DISCONTINUED | OUTPATIENT
Start: 2021-12-31 | End: 2022-01-02

## 2021-12-31 RX ORDER — SODIUM CHLORIDE 0.9 % (FLUSH) 0.9 %
10 SYRINGE (ML) INJECTION EVERY 12 HOURS SCHEDULED
Status: DISCONTINUED | OUTPATIENT
Start: 2021-12-31 | End: 2022-01-02

## 2021-12-31 RX ORDER — NICOTINE POLACRILEX 4 MG
15 LOZENGE BUCCAL
Status: DISCONTINUED | OUTPATIENT
Start: 2021-12-31 | End: 2021-12-31

## 2021-12-31 RX ORDER — CHLORHEXIDINE GLUCONATE 0.12 MG/ML
15 RINSE ORAL EVERY 12 HOURS SCHEDULED
Status: DISCONTINUED | OUTPATIENT
Start: 2021-12-31 | End: 2022-01-13

## 2021-12-31 RX ADMIN — DOXYCYCLINE 100 MG: 100 INJECTION, POWDER, LYOPHILIZED, FOR SOLUTION INTRAVENOUS at 12:32

## 2021-12-31 RX ADMIN — DOXYCYCLINE 100 MG: 100 INJECTION, POWDER, LYOPHILIZED, FOR SOLUTION INTRAVENOUS at 04:25

## 2021-12-31 RX ADMIN — CHLORHEXIDINE GLUCONATE 15 ML: 1.2 SOLUTION ORAL at 11:26

## 2021-12-31 RX ADMIN — APIXABAN 5 MG: 5 TABLET, FILM COATED ORAL at 22:26

## 2021-12-31 RX ADMIN — FAMOTIDINE 20 MG: 10 INJECTION INTRAVENOUS at 10:44

## 2021-12-31 RX ADMIN — INSULIN HUMAN 2 UNITS: 100 INJECTION, SOLUTION PARENTERAL at 06:20

## 2021-12-31 RX ADMIN — GABAPENTIN 300 MG: 300 CAPSULE ORAL at 22:25

## 2021-12-31 RX ADMIN — FAMOTIDINE 20 MG: 10 INJECTION INTRAVENOUS at 20:54

## 2021-12-31 RX ADMIN — IPRATROPIUM BROMIDE AND ALBUTEROL SULFATE 3 ML: 2.5; .5 SOLUTION RESPIRATORY (INHALATION) at 07:15

## 2021-12-31 RX ADMIN — TAZOBACTAM SODIUM AND PIPERACILLIN SODIUM 4.5 G: 500; 4 INJECTION, SOLUTION INTRAVENOUS at 15:28

## 2021-12-31 RX ADMIN — VANCOMYCIN HYDROCHLORIDE 1500 MG: 10 INJECTION, POWDER, LYOPHILIZED, FOR SOLUTION INTRAVENOUS at 17:06

## 2021-12-31 RX ADMIN — MIDAZOLAM 2 MG: 1 INJECTION INTRAMUSCULAR; INTRAVENOUS at 00:22

## 2021-12-31 RX ADMIN — PROPOFOL 38.09 MCG/KG/MIN: 10 INJECTION, EMULSION INTRAVENOUS at 15:09

## 2021-12-31 RX ADMIN — PROPOFOL 5 MCG/KG/MIN: 10 INJECTION, EMULSION INTRAVENOUS at 00:37

## 2021-12-31 RX ADMIN — TAZOBACTAM SODIUM AND PIPERACILLIN SODIUM 4.5 G: 500; 4 INJECTION, SOLUTION INTRAVENOUS at 00:51

## 2021-12-31 RX ADMIN — PROPOFOL 38.09 MCG/KG/MIN: 10 INJECTION, EMULSION INTRAVENOUS at 12:08

## 2021-12-31 RX ADMIN — INSULIN HUMAN 2 UNITS: 100 INJECTION, SOLUTION PARENTERAL at 12:34

## 2021-12-31 RX ADMIN — Medication 50 MCG/HR: at 18:00

## 2021-12-31 RX ADMIN — TAZOBACTAM SODIUM AND PIPERACILLIN SODIUM 4.5 G: 500; 4 INJECTION, SOLUTION INTRAVENOUS at 10:45

## 2021-12-31 RX ADMIN — CHLORHEXIDINE GLUCONATE 15 ML: 1.2 SOLUTION ORAL at 20:55

## 2021-12-31 RX ADMIN — SODIUM CHLORIDE, PRESERVATIVE FREE 10 ML: 5 INJECTION INTRAVENOUS at 07:49

## 2021-12-31 RX ADMIN — PROPOFOL 25 MCG/KG/MIN: 10 INJECTION, EMULSION INTRAVENOUS at 20:54

## 2021-12-31 RX ADMIN — SODIUM CHLORIDE, PRESERVATIVE FREE 10 ML: 5 INJECTION INTRAVENOUS at 20:54

## 2021-12-31 RX ADMIN — SODIUM CHLORIDE, PRESERVATIVE FREE 10 ML: 5 INJECTION INTRAVENOUS at 10:44

## 2021-12-31 RX ADMIN — VANCOMYCIN HYDROCHLORIDE 3000 MG: 10 INJECTION, POWDER, LYOPHILIZED, FOR SOLUTION INTRAVENOUS at 02:45

## 2021-12-31 RX ADMIN — MIDAZOLAM 2 MG: 1 INJECTION INTRAMUSCULAR; INTRAVENOUS at 00:34

## 2021-12-31 RX ADMIN — IPRATROPIUM BROMIDE AND ALBUTEROL SULFATE 3 ML: 2.5; .5 SOLUTION RESPIRATORY (INHALATION) at 13:40

## 2021-12-31 RX ADMIN — Medication 100 MG: at 00:24

## 2021-12-31 RX ADMIN — INSULIN HUMAN 2 UNITS: 100 INJECTION, SOLUTION PARENTERAL at 17:51

## 2021-12-31 RX ADMIN — LINEZOLID 600 MG: 600 INJECTION, SOLUTION INTRAVENOUS at 18:35

## 2021-12-31 RX ADMIN — IPRATROPIUM BROMIDE AND ALBUTEROL SULFATE 3 ML: 2.5; .5 SOLUTION RESPIRATORY (INHALATION) at 19:11

## 2021-12-31 RX ADMIN — PROPOFOL 50 MCG/KG/MIN: 10 INJECTION, EMULSION INTRAVENOUS at 17:50

## 2022-01-01 ENCOUNTER — APPOINTMENT (OUTPATIENT)
Dept: GENERAL RADIOLOGY | Facility: HOSPITAL | Age: 57
End: 2022-01-01

## 2022-01-01 LAB
ALBUMIN SERPL-MCNC: 2.7 G/DL (ref 3.5–5.2)
ALBUMIN/GLOB SERPL: 0.7 G/DL
ALP SERPL-CCNC: 102 U/L (ref 39–117)
ALT SERPL W P-5'-P-CCNC: 6 U/L (ref 1–33)
ANION GAP SERPL CALCULATED.3IONS-SCNC: 7 MMOL/L (ref 5–15)
ARTERIAL PATENCY WRIST A: ABNORMAL
AST SERPL-CCNC: 10 U/L (ref 1–32)
ATMOSPHERIC PRESS: ABNORMAL MM[HG]
BACTERIA BLD CULT: NORMAL
BACTERIA UR QL AUTO: ABNORMAL /HPF
BASE EXCESS BLDA CALC-SCNC: 6.4 MMOL/L (ref 0–2)
BASOPHILS # BLD AUTO: 0.03 10*3/MM3 (ref 0–0.2)
BASOPHILS NFR BLD AUTO: 0.2 % (ref 0–1.5)
BDY SITE: ABNORMAL
BILIRUB SERPL-MCNC: 0.5 MG/DL (ref 0–1.2)
BILIRUB UR QL STRIP: NEGATIVE
BODY TEMPERATURE: 37 C
BUN SERPL-MCNC: 18 MG/DL (ref 6–20)
BUN/CREAT SERPL: 27.7 (ref 7–25)
CALCIUM SPEC-SCNC: 9 MG/DL (ref 8.6–10.5)
CHLORIDE SERPL-SCNC: 103 MMOL/L (ref 98–107)
CLARITY UR: CLEAR
CO2 BLDA-SCNC: 34.4 MMOL/L (ref 22–33)
CO2 SERPL-SCNC: 29 MMOL/L (ref 22–29)
COHGB MFR BLD: 1.1 % (ref 0–2)
COLOR UR: ABNORMAL
CREAT SERPL-MCNC: 0.65 MG/DL (ref 0.57–1)
DEPRECATED RDW RBC AUTO: 65 FL (ref 37–54)
EOSINOPHIL # BLD AUTO: 0.01 10*3/MM3 (ref 0–0.4)
EOSINOPHIL NFR BLD AUTO: 0.1 % (ref 0.3–6.2)
EPAP: 0
ERYTHROCYTE [DISTWIDTH] IN BLOOD BY AUTOMATED COUNT: 19.5 % (ref 12.3–15.4)
GFR SERPL CREATININE-BSD FRML MDRD: 94 ML/MIN/1.73
GLOBULIN UR ELPH-MCNC: 3.7 GM/DL
GLUCOSE BLDC GLUCOMTR-MCNC: 125 MG/DL (ref 70–130)
GLUCOSE BLDC GLUCOMTR-MCNC: 79 MG/DL (ref 70–130)
GLUCOSE BLDC GLUCOMTR-MCNC: 82 MG/DL (ref 70–130)
GLUCOSE BLDC GLUCOMTR-MCNC: 97 MG/DL (ref 70–130)
GLUCOSE SERPL-MCNC: 115 MG/DL (ref 65–99)
GLUCOSE UR STRIP-MCNC: NEGATIVE MG/DL
HCO3 BLDA-SCNC: 32.7 MMOL/L (ref 20–26)
HCT VFR BLD AUTO: 36.4 % (ref 34–46.6)
HCT VFR BLD CALC: 34.9 % (ref 38–51)
HGB BLD-MCNC: 11.1 G/DL (ref 12–15.9)
HGB BLDA-MCNC: 11.4 G/DL (ref 14–18)
HGB UR QL STRIP.AUTO: NEGATIVE
HYALINE CASTS UR QL AUTO: ABNORMAL /LPF
IMM GRANULOCYTES # BLD AUTO: 0.15 10*3/MM3 (ref 0–0.05)
IMM GRANULOCYTES NFR BLD AUTO: 0.8 % (ref 0–0.5)
INHALED O2 CONCENTRATION: 50 %
IPAP: 0
KETONES UR QL STRIP: ABNORMAL
LEUKOCYTE ESTERASE UR QL STRIP.AUTO: ABNORMAL
LYMPHOCYTES # BLD AUTO: 1.5 10*3/MM3 (ref 0.7–3.1)
LYMPHOCYTES NFR BLD AUTO: 7.9 % (ref 19.6–45.3)
MAGNESIUM SERPL-MCNC: 2 MG/DL (ref 1.6–2.6)
MCH RBC QN AUTO: 27.7 PG (ref 26.6–33)
MCHC RBC AUTO-ENTMCNC: 30.5 G/DL (ref 31.5–35.7)
MCV RBC AUTO: 90.8 FL (ref 79–97)
METHGB BLD QL: 0.3 % (ref 0–1.5)
MODALITY: ABNORMAL
MONOCYTES # BLD AUTO: 0.74 10*3/MM3 (ref 0.1–0.9)
MONOCYTES NFR BLD AUTO: 3.9 % (ref 5–12)
NEUTROPHILS NFR BLD AUTO: 16.52 10*3/MM3 (ref 1.7–7)
NEUTROPHILS NFR BLD AUTO: 87.1 % (ref 42.7–76)
NITRITE UR QL STRIP: NEGATIVE
NOTE: ABNORMAL
NRBC BLD AUTO-RTO: 0 /100 WBC (ref 0–0.2)
OXYHGB MFR BLDV: 89.4 % (ref 94–99)
PAW @ PEAK INSP FLOW SETTING VENT: 0 CMH2O
PCO2 BLDA: 54.7 MM HG (ref 35–45)
PCO2 TEMP ADJ BLD: 54.7 MM HG (ref 35–45)
PEEP RESPIRATORY: 8 CM[H2O]
PH BLDA: 7.38 PH UNITS (ref 7.35–7.45)
PH UR STRIP.AUTO: 5.5 [PH] (ref 5–8)
PH, TEMP CORRECTED: 7.38 PH UNITS
PHOSPHATE SERPL-MCNC: 2.5 MG/DL (ref 2.5–4.5)
PLATELET # BLD AUTO: 250 10*3/MM3 (ref 140–450)
PMV BLD AUTO: 10.8 FL (ref 6–12)
PO2 BLDA: 57.9 MM HG (ref 83–108)
PO2 TEMP ADJ BLD: 57.9 MM HG (ref 83–108)
POTASSIUM SERPL-SCNC: 4 MMOL/L (ref 3.5–5.2)
PROCALCITONIN SERPL-MCNC: 1.27 NG/ML (ref 0–0.25)
PROT SERPL-MCNC: 6.4 G/DL (ref 6–8.5)
PROT UR QL STRIP: ABNORMAL
RBC # BLD AUTO: 4.01 10*6/MM3 (ref 3.77–5.28)
RBC # UR STRIP: ABNORMAL /HPF
REF LAB TEST METHOD: ABNORMAL
SODIUM SERPL-SCNC: 139 MMOL/L (ref 136–145)
SP GR UR STRIP: 1.04 (ref 1–1.03)
SQUAMOUS #/AREA URNS HPF: ABNORMAL /HPF
TOTAL RATE: 0 BREATHS/MINUTE
UROBILINOGEN UR QL STRIP: ABNORMAL
WBC # UR STRIP: ABNORMAL /HPF
WBC NRBC COR # BLD: 18.95 10*3/MM3 (ref 3.4–10.8)

## 2022-01-01 PROCEDURE — C1751 CATH, INF, PER/CENT/MIDLINE: HCPCS

## 2022-01-01 PROCEDURE — 82962 GLUCOSE BLOOD TEST: CPT

## 2022-01-01 PROCEDURE — C1894 INTRO/SHEATH, NON-LASER: HCPCS

## 2022-01-01 PROCEDURE — 05HY33Z INSERTION OF INFUSION DEVICE INTO UPPER VEIN, PERCUTANEOUS APPROACH: ICD-10-PCS | Performed by: INTERNAL MEDICINE

## 2022-01-01 PROCEDURE — 84100 ASSAY OF PHOSPHORUS: CPT | Performed by: INTERNAL MEDICINE

## 2022-01-01 PROCEDURE — 82375 ASSAY CARBOXYHB QUANT: CPT

## 2022-01-01 PROCEDURE — 84145 PROCALCITONIN (PCT): CPT | Performed by: INTERNAL MEDICINE

## 2022-01-01 PROCEDURE — 82805 BLOOD GASES W/O2 SATURATION: CPT

## 2022-01-01 PROCEDURE — 36600 WITHDRAWAL OF ARTERIAL BLOOD: CPT

## 2022-01-01 PROCEDURE — 83050 HGB METHEMOGLOBIN QUAN: CPT

## 2022-01-01 PROCEDURE — 25010000002 FENTANYL 10 MCG/1 ML NS: Performed by: INTERNAL MEDICINE

## 2022-01-01 PROCEDURE — 71045 X-RAY EXAM CHEST 1 VIEW: CPT

## 2022-01-01 PROCEDURE — 83735 ASSAY OF MAGNESIUM: CPT | Performed by: INTERNAL MEDICINE

## 2022-01-01 PROCEDURE — 94003 VENT MGMT INPAT SUBQ DAY: CPT

## 2022-01-01 PROCEDURE — 80053 COMPREHEN METABOLIC PANEL: CPT | Performed by: INTERNAL MEDICINE

## 2022-01-01 PROCEDURE — 25010000002 LINEZOLID 600 MG/300ML SOLUTION: Performed by: INTERNAL MEDICINE

## 2022-01-01 PROCEDURE — 94799 UNLISTED PULMONARY SVC/PX: CPT

## 2022-01-01 PROCEDURE — 25010000002 PROPOFOL 10 MG/ML EMULSION: Performed by: EMERGENCY MEDICINE

## 2022-01-01 PROCEDURE — 25010000002 PIPERACILLIN SOD-TAZOBACTAM PER 1 G

## 2022-01-01 PROCEDURE — 85025 COMPLETE CBC W/AUTO DIFF WBC: CPT | Performed by: INTERNAL MEDICINE

## 2022-01-01 PROCEDURE — 99291 CRITICAL CARE FIRST HOUR: CPT | Performed by: INTERNAL MEDICINE

## 2022-01-01 PROCEDURE — 81001 URINALYSIS AUTO W/SCOPE: CPT | Performed by: INTERNAL MEDICINE

## 2022-01-01 RX ORDER — CASTOR OIL AND BALSAM, PERU 788; 87 MG/G; MG/G
1 OINTMENT TOPICAL EVERY 12 HOURS SCHEDULED
Status: DISCONTINUED | OUTPATIENT
Start: 2022-01-01 | End: 2022-01-23 | Stop reason: HOSPADM

## 2022-01-01 RX ORDER — SODIUM CHLORIDE 0.9 % (FLUSH) 0.9 %
10 SYRINGE (ML) INJECTION EVERY 12 HOURS SCHEDULED
Status: DISCONTINUED | OUTPATIENT
Start: 2022-01-01 | End: 2022-01-13

## 2022-01-01 RX ORDER — SODIUM CHLORIDE 0.9 % (FLUSH) 0.9 %
20 SYRINGE (ML) INJECTION AS NEEDED
Status: DISCONTINUED | OUTPATIENT
Start: 2022-01-01 | End: 2022-01-13

## 2022-01-01 RX ORDER — SODIUM CHLORIDE 0.9 % (FLUSH) 0.9 %
10 SYRINGE (ML) INJECTION AS NEEDED
Status: DISCONTINUED | OUTPATIENT
Start: 2022-01-01 | End: 2022-01-23 | Stop reason: HOSPADM

## 2022-01-01 RX ADMIN — SODIUM CHLORIDE, PRESERVATIVE FREE 10 ML: 5 INJECTION INTRAVENOUS at 08:36

## 2022-01-01 RX ADMIN — CASTOR OIL AND BALSAM, PERU 1 APPLICATION: 788; 87 OINTMENT TOPICAL at 13:55

## 2022-01-01 RX ADMIN — CHLORHEXIDINE GLUCONATE 15 ML: 1.2 SOLUTION ORAL at 08:34

## 2022-01-01 RX ADMIN — SODIUM CHLORIDE, PRESERVATIVE FREE 10 ML: 5 INJECTION INTRAVENOUS at 20:49

## 2022-01-01 RX ADMIN — CASTOR OIL AND BALSAM, PERU 1 APPLICATION: 788; 87 OINTMENT TOPICAL at 20:49

## 2022-01-01 RX ADMIN — IPRATROPIUM BROMIDE AND ALBUTEROL SULFATE 3 ML: 2.5; .5 SOLUTION RESPIRATORY (INHALATION) at 08:06

## 2022-01-01 RX ADMIN — APIXABAN 5 MG: 5 TABLET, FILM COATED ORAL at 08:35

## 2022-01-01 RX ADMIN — PROPOFOL 30 MCG/KG/MIN: 10 INJECTION, EMULSION INTRAVENOUS at 09:03

## 2022-01-01 RX ADMIN — GABAPENTIN 300 MG: 300 CAPSULE ORAL at 13:55

## 2022-01-01 RX ADMIN — GABAPENTIN 300 MG: 300 CAPSULE ORAL at 21:03

## 2022-01-01 RX ADMIN — IPRATROPIUM BROMIDE AND ALBUTEROL SULFATE 3 ML: 2.5; .5 SOLUTION RESPIRATORY (INHALATION) at 00:39

## 2022-01-01 RX ADMIN — PROPOFOL 30 MCG/KG/MIN: 10 INJECTION, EMULSION INTRAVENOUS at 06:01

## 2022-01-01 RX ADMIN — LINEZOLID 600 MG: 600 INJECTION, SOLUTION INTRAVENOUS at 05:52

## 2022-01-01 RX ADMIN — PROPOFOL 20 MCG/KG/MIN: 10 INJECTION, EMULSION INTRAVENOUS at 16:40

## 2022-01-01 RX ADMIN — PROPOFOL 20 MCG/KG/MIN: 10 INJECTION, EMULSION INTRAVENOUS at 20:48

## 2022-01-01 RX ADMIN — PROPOFOL 30 MCG/KG/MIN: 10 INJECTION, EMULSION INTRAVENOUS at 00:17

## 2022-01-01 RX ADMIN — PROPOFOL 30 MCG/KG/MIN: 10 INJECTION, EMULSION INTRAVENOUS at 03:43

## 2022-01-01 RX ADMIN — TAZOBACTAM SODIUM AND PIPERACILLIN SODIUM 4.5 G: 500; 4 INJECTION, SOLUTION INTRAVENOUS at 16:20

## 2022-01-01 RX ADMIN — LINEZOLID 600 MG: 600 INJECTION, SOLUTION INTRAVENOUS at 18:53

## 2022-01-01 RX ADMIN — DOXYCYCLINE 100 MG: 100 INJECTION, POWDER, LYOPHILIZED, FOR SOLUTION INTRAVENOUS at 05:52

## 2022-01-01 RX ADMIN — GABAPENTIN 300 MG: 300 CAPSULE ORAL at 05:52

## 2022-01-01 RX ADMIN — CHLORHEXIDINE GLUCONATE 15 ML: 1.2 SOLUTION ORAL at 20:48

## 2022-01-01 RX ADMIN — FAMOTIDINE 20 MG: 10 INJECTION INTRAVENOUS at 08:34

## 2022-01-01 RX ADMIN — Medication 100 MCG/HR: at 16:31

## 2022-01-01 RX ADMIN — APIXABAN 5 MG: 5 TABLET, FILM COATED ORAL at 20:48

## 2022-01-01 RX ADMIN — FAMOTIDINE 20 MG: 10 INJECTION INTRAVENOUS at 20:48

## 2022-01-01 RX ADMIN — IPRATROPIUM BROMIDE AND ALBUTEROL SULFATE 3 ML: 2.5; .5 SOLUTION RESPIRATORY (INHALATION) at 20:27

## 2022-01-01 RX ADMIN — TAZOBACTAM SODIUM AND PIPERACILLIN SODIUM 4.5 G: 500; 4 INJECTION, SOLUTION INTRAVENOUS at 00:17

## 2022-01-01 RX ADMIN — TAZOBACTAM SODIUM AND PIPERACILLIN SODIUM 4.5 G: 500; 4 INJECTION, SOLUTION INTRAVENOUS at 08:34

## 2022-01-01 RX ADMIN — PROPOFOL 20 MCG/KG/MIN: 10 INJECTION, EMULSION INTRAVENOUS at 13:55

## 2022-01-01 RX ADMIN — IPRATROPIUM BROMIDE AND ALBUTEROL SULFATE 3 ML: 2.5; .5 SOLUTION RESPIRATORY (INHALATION) at 12:17

## 2022-01-01 RX ADMIN — DOXYCYCLINE 100 MG: 100 INJECTION, POWDER, LYOPHILIZED, FOR SOLUTION INTRAVENOUS at 13:56

## 2022-01-01 NOTE — PLAN OF CARE
Goal Outcome Evaluation:           Progress: no change  Outcome Summary: pt remains intubated and sedated. fentanyl and propofol titrated due to agitation. VSS on 50-55% FiO2, sats >89%. UOP adequate.

## 2022-01-01 NOTE — NURSING NOTE
WOC consult:    Sacral and lumber spine     Patient presents with developing linear pressure areas.   Wound beds sindhu intact, red/purple, and 50% blanching.   Patient was sitting in a stretcher for a prolonged period prior to ICU transfer.     Likely developing DTPIs.     I will order:   -Venelex ointment BID.   Sacral foam dressing in place.     WOC will continue to follow.   Areas may need MIST in near future.   Contact WOC if needs arise.     Thanks

## 2022-01-01 NOTE — PROGRESS NOTES
INTENSIVIST   PROGRESS NOTE     Hospital:  LOS: 1 day      BERTO Louise 56 y.o. female is followed for: Shortness of Breath       Respiratory Failure Type 2    Uncontrolled type 2 diabetes mellitus with hyperglycemia, without long-term current use of insulin (HCC)    Sepsis (HCC)    As an Intensivist, we provide an integrated approach to the ICU patient and family, medical management of comorbid conditions, including but not limited to electrolytes, glycemic control, organ dysfunction, lead interdisciplinary rounds and coordinate the care with all other services, including those from other specialists.     Interval History:  Sedated.  ETT secretions tan, seems to be clearing up some compared to yesterday.  No fevers.    The patient qualifies to receive the vaccine, but they have not yet received it.     Temp  Min: 98.8 °F (37.1 °C)  Max: 99.3 °F (37.4 °C)       History     Last Reviewed by Benson Pelaez MD on 1/1/2022 at  4:56 PM    Sections Reviewed    Medical, Family, Surgical, Tobacco, Alcohol, Drug Use, Sexual Activity,   Social Documentation      Problem list reviewed by Benson Pelaez MD on 1/1/2022 at  4:56 PM  Medicines reviewed by Benson Pelaez MD on 1/1/2022 at  4:56 PM  Allergies reviewed by Benson Pelaez MD on 1/1/2022 at  4:55 PM       The patient's relevant past medical, surgical and social history were reviewed and updated in Epic as appropriate.        O     Vitals:  Temp: 99.3 °F (37.4 °C) (01/01/22 1600) Temp  Min: 98.8 °F (37.1 °C)  Max: 99.3 °F (37.4 °C)   Temp core:      BP: 148/83 (01/01/22 1600) BP  Min: 109/62  Max: 148/83   Pulse: 60 (01/01/22 1600) Pulse  Min: 58  Max: 89   Resp: 20 (01/01/22 1217) Resp  Min: 18  Max: 20   SpO2: 90 % (01/01/22 1600) SpO2  Min: 89 %  Max: 97 %   Device: ventilator (01/01/22 1400)    Flow Rate:   No data recorded     Intake/Ouptut 24 hrs (7:00AM - 6:59 AM)  Intake & Output (last 3 days)       12/29 0701  12/30 0700 12/30 0701 12/31 0700 12/31  0701  01/01 0700 01/01 0701  01/02 0700    P.O.   750     I.V. (mL/kg)   1106.8 (6.3) 492 (2.8)    NG/GT   120 60    IV Piggyback  100 537 607    Total Intake(mL/kg)  100 (0.6) 2513.8 (14.4) 1159 (6.6)    Urine (mL/kg/hr)   1375 (0.3) 395 (0.2)    Total Output   1375 395    Net  +100 +1138.8 +764                  Wt Readings from Last 3 Encounters:   12/30/21 (!) 175 kg (385 lb)   08/24/21 133 kg (294 lb)   07/16/21 (!) 140 kg (308 lb)       Medications (drips):  fentanyl 10 mcg/mL, Last Rate: 100 mcg/hr (01/01/22 1631)  propofol, Last Rate: 20 mcg/kg/min (01/01/22 1355)        Invasive Mechanical Ventilator   Settings: Observed:   Mode: VC+/AC (01/01/22 1217)    Vt (Set, mL): 430 mL (01/01/22 1217) Vt Mandatory Ins (observed, mL): 425 mL (01/01/22 1217)   Resp Rate (Set): 20 (01/01/22 1217) Resp Rate (Observed) Vent: 20 (01/01/22 1600)   FiO2 (%): 60 % (01/01/22 1217)    PEEP/CPAP (cm H2O): 8 cm H20 (01/01/22 1217) Plateau Pressure (cm H2O): 24 cm H2O (01/01/22 0806)    Minute Ventilation (L/min) (Obs): 8.89 L/min (01/01/22 1217)    I:E Ratio (Obs): 1:1.40 (01/01/22 1217)     Physical Examination  Telemetry:  Rhythm: normal sinus rhythm (01/01/22 1400)         Constitutional:  No acute distress.   Cardiovascular: RRR.   Normal heart sounds.  No murmurs, gallop or rub.   Respiratory: Normal breath sounds  No adventitious sounds.   Abdominal:  Soft with no tenderness.  No distension.   No HSM.   Extremities: Warm.  Dry.  No cyanosis.  Trace Edema   Neurological:   Sedated.  Best Eye Response: 3-->(E3) to speech (01/01/22 1400)  Best Motor Response: 6-->(M6) obeys commands (01/01/22 1400)  Best Verbal Response: 1-->(V1) none (01/01/22 1400)  Gabo Coma Scale Score: 10 (01/01/22 1400)     Lines      Results Reviewed:  Laboratory  Microbiology  Radiology  Pathology    Hematology:  Results from last 7 days   Lab Units 01/01/22  0350 12/30/21 2016   WBC 10*3/mm3 18.95* 19.08*   HEMOGLOBIN g/dL 11.1* 13.4   MCV fL 90.8  91.4   PLATELETS 10*3/mm3 250 309   NEUTROS ABS 10*3/mm3 16.52* 16.76*   LYMPHS ABS 10*3/mm3 1.50 1.37   EOS ABS 10*3/mm3 0.01 0.26     Chemistry:  Estimated Creatinine Clearance: 161.3 mL/min (by C-G formula based on SCr of 0.65 mg/dL).  Results from last 7 days   Lab Units 01/01/22  0350 12/30/21 2016   SODIUM mmol/L 139 142   POTASSIUM mmol/L 4.0 3.7   CHLORIDE mmol/L 103 104   CO2 mmol/L 29.0 27.0   BUN mg/dL 18 16   CREATININE mg/dL 0.65 0.95   GLUCOSE mg/dL 115* 197*     Results from last 7 days   Lab Units 01/01/22  0350 12/30/21 2016   CALCIUM mg/dL 9.0 8.5*   MAGNESIUM mg/dL 2.0  --    PHOSPHORUS mg/dL 2.5  --      Results from last 7 days   Lab Units 01/01/22  0350 12/30/21 2016   BILIRUBIN mg/dL 0.5 0.3   AST (SGOT) U/L 10 12   ALT (SGPT) U/L 6 9   ALK PHOS U/L 102 136*     Biomarkers:  Results from last 7 days   Lab Units 01/01/22  0350 12/30/21 2016   LACTATE mmol/L  --  2.2*   PROCALCITONIN ng/mL 1.27*  --      COVID-19  Lab Results   Component Value Date    COVID19 Not Detected 12/30/2021    COVID19 Not Detected 06/03/2021     Arterial Blood Gases:  Results from last 7 days   Lab Units 01/01/22  0320 12/31/21  1134 12/31/21  0547   PH, ARTERIAL pH units 7.385 7.334* 7.284*   PCO2, ARTERIAL mm Hg 54.7* 58.2* 62.8*   PO2 ART mm Hg 57.9* 71.8* 78.6*   FIO2 % 50 75 90       Images:  XR Chest 1 View    Result Date: 1/1/2022  Interval placement of nasogastric tube, otherwise no interval change.  DICTATED:   01/01/2022 EDITED/lfs:   01/01/2022       XR Chest 1 View    Result Date: 12/31/2021  Well-positioned endotracheal tube with slight worsening in right basilar infiltrates. Otherwise no significant change. Signer Name: Elmer Boswell MD  Signed: 12/31/2021 1:01 AM  Workstation Name: [facility]    XR Chest 1 View    Result Date: 12/30/2021  1. Bilateral perihilar and upper lobe infiltrates, concerning for pneumonia in the appropriate clinical setting. Follow-up to  resolution is recommended. 2. Mild cardiomegaly. Signer Name: Rick Sanchez MD  Signed: 12/30/2021 8:18 PM  Workstation Name: [facility]  Radiology [facility]    CT Angiogram Chest    Result Date: 12/30/2021  1.  No evidence of PE.  2.  Extensive bulky mediastinal lymphadenopathy and significant bilateral areas of consolidation in both lungs. As this is a new finding from 6 months ago, infectious and inflammatory disease are more likely than neoplasm but follow-up is recommended to document resolution and to exclude neoplasm. 3. Acute left seventh rib fracture. 4. Chronic compression deformities in the thoracic spine. 5. Adrenal adenoma requiring no further imaging follow-up Signer Name: Esme Torres MD  Signed: 12/30/2021 9:54 PM  Workstation Name: [facility]  Radiology [facility]    XR Abdomen KUB    Result Date: 12/31/2021  OG tube in satisfactory position. Signer Name: Errol Sanchez MD  Signed: 12/31/2021 9:53 PM  Workstation Name: [facility]  Radiology [facility]      Echo:  Results for orders placed in visit on 05/05/20    SCANNED - ECHOCARDIOGRAM      Results: Reviewed.  I reviewed the patient's new laboratory and imaging results.  I independently reviewed the patient's new images.    Medications: Reviewed.    Assessment/Plan   A / AMANDA Louise is a 56 y.o. female admitted on 12/30/2021 with Acute on chronic respiratory failure with hypoxia and hypercapnia (HCC) [J96.21, J96.22]:    1. Respiratory Failure type 2  1. Intubated 12/31/21  2. Invasive Mechanical Ventilation   3. Compensated Respiratory Acidosis as per ABG on 12/31/21 and 01/01/22  4. COPD on home O2  5. Pneumonia  6. CT: Bilateral areas of consolidation.  7. Elevated PCT but not in the sepsis range.  8. Antibiotics: Doxy, Linezolid, Piperacillin-Tazobactam   2. Aute left seventh rib fracture.  3. Previous DVT on APIxaban  4. Nutrition Support: Start Enteral Nutrition   5. HTN  6. Chronic pain  syndrome  7. Obesity III. Body mass index is 60.29 kg/m².   8. T2DM    Results from last 7 days   Lab Units 01/01/22  1112 01/01/22  0523 12/31/21  2343 12/31/21  1726 12/31/21  1219 12/31/21  0607   GLUCOSE mg/dL 97 125 112 178* 171* 176*     Lab Results   Lab Value Date/Time    HGBA1C 5.7 (H) 08/24/2021 1522    HGBA1C 6.00 (H) 06/03/2021 1902    HGBA1C 6.0 (H) 05/25/2021 1658    HGBA1C 8.3 01/11/2021 0000    HGBA1C 7.30 (H) 03/28/2018 0514       Nutrition Support: Patient isn't on Tube Feeding   Modulars: Patient doesn't have any tube feeding modular orders   Diet: NPO Diet   Advance Directives: Code Status and Medical Interventions:   Ordered at: 12/31/21 0151     Code Status (Patient has no pulse and is not breathing):    CPR (Attempt to Resuscitate)     Medical Interventions (Patient has pulse or is breathing):    Full Support        Plan:    1. Not ready to be liberated from the Ventilator  2. Continue antibiotics  3. Continue APIxaban   4. Start Enteral Nutrition   5. Goal: Glucose < 180 mg/dL.   6. PICC  7. Disposition: Keep in ICU.    Plan of care and goals reviewed during interdisciplinary rounds.  I discussed the patient's findings and my recommendations with nursing staff    Level of Risk is High due to:  illness with threat to life or bodily function.     Time: was greater than 35 minutes, excluding procedures.    She has a high probability of sudden, clinically significant deterioration, which requires the highest level of physician preparedness to intervene urgently. I devoted my full attention to the direct care of this patient for the amount of time indicated above. Time spent with family or surrogate(s) is included only if the patient was incapable of providing the necessary information or participating in medical decision making.    She has the following organ/system impairments Respiratory Failure.      [x]  Primary Attending  []  Consultant

## 2022-01-01 NOTE — PLAN OF CARE
Goal Outcome Evaluation:  Plan of Care Reviewed With: patient        Progress: no change  Outcome Summary: Pt  admitted from ED, sedated on propofol, fent added. VSS. NSR, no vaso active meds. Pressure injury on lower back, WOC consult placed. No family available for update. Blood cultures and sputum sent in ED, pending.

## 2022-01-02 ENCOUNTER — APPOINTMENT (OUTPATIENT)
Dept: GENERAL RADIOLOGY | Facility: HOSPITAL | Age: 57
End: 2022-01-02

## 2022-01-02 LAB
ALBUMIN SERPL-MCNC: 2.7 G/DL (ref 3.5–5.2)
ALBUMIN/GLOB SERPL: 0.7 G/DL
ALP SERPL-CCNC: 114 U/L (ref 39–117)
ALT SERPL W P-5'-P-CCNC: 7 U/L (ref 1–33)
ANION GAP SERPL CALCULATED.3IONS-SCNC: 8 MMOL/L (ref 5–15)
ARTERIAL PATENCY WRIST A: ABNORMAL
AST SERPL-CCNC: 13 U/L (ref 1–32)
ATMOSPHERIC PRESS: ABNORMAL MM[HG]
BACTERIA SPEC AEROBE CULT: ABNORMAL
BACTERIA SPEC RESP CULT: NORMAL
BASE EXCESS BLDA CALC-SCNC: 7.1 MMOL/L (ref 0–2)
BASOPHILS # BLD AUTO: 0.03 10*3/MM3 (ref 0–0.2)
BASOPHILS NFR BLD AUTO: 0.2 % (ref 0–1.5)
BDY SITE: ABNORMAL
BILIRUB SERPL-MCNC: 0.9 MG/DL (ref 0–1.2)
BODY TEMPERATURE: 37 C
BUN SERPL-MCNC: 18 MG/DL (ref 6–20)
BUN/CREAT SERPL: 27.3 (ref 7–25)
CALCIUM SPEC-SCNC: 8.8 MG/DL (ref 8.6–10.5)
CHLORIDE SERPL-SCNC: 102 MMOL/L (ref 98–107)
CO2 BLDA-SCNC: 34.3 MMOL/L (ref 22–33)
CO2 SERPL-SCNC: 29 MMOL/L (ref 22–29)
COHGB MFR BLD: 1.2 % (ref 0–2)
CREAT SERPL-MCNC: 0.66 MG/DL (ref 0.57–1)
DEPRECATED RDW RBC AUTO: 63.5 FL (ref 37–54)
EOSINOPHIL # BLD AUTO: 0.21 10*3/MM3 (ref 0–0.4)
EOSINOPHIL NFR BLD AUTO: 1.5 % (ref 0.3–6.2)
EPAP: 0
ERYTHROCYTE [DISTWIDTH] IN BLOOD BY AUTOMATED COUNT: 19.1 % (ref 12.3–15.4)
GFR SERPL CREATININE-BSD FRML MDRD: 92 ML/MIN/1.73
GLOBULIN UR ELPH-MCNC: 3.8 GM/DL
GLUCOSE BLDC GLUCOMTR-MCNC: 103 MG/DL (ref 70–130)
GLUCOSE BLDC GLUCOMTR-MCNC: 126 MG/DL (ref 70–130)
GLUCOSE BLDC GLUCOMTR-MCNC: 79 MG/DL (ref 70–130)
GLUCOSE BLDC GLUCOMTR-MCNC: 92 MG/DL (ref 70–130)
GLUCOSE SERPL-MCNC: 87 MG/DL (ref 65–99)
GRAM STN SPEC: ABNORMAL
GRAM STN SPEC: NORMAL
HCO3 BLDA-SCNC: 32.8 MMOL/L (ref 20–26)
HCT VFR BLD AUTO: 37.4 % (ref 34–46.6)
HCT VFR BLD CALC: 34.6 % (ref 38–51)
HGB BLD-MCNC: 11.3 G/DL (ref 12–15.9)
HGB BLDA-MCNC: 11.3 G/DL (ref 14–18)
IMM GRANULOCYTES # BLD AUTO: 0.07 10*3/MM3 (ref 0–0.05)
IMM GRANULOCYTES NFR BLD AUTO: 0.5 % (ref 0–0.5)
INHALED O2 CONCENTRATION: 60 %
IPAP: 0
ISOLATED FROM: ABNORMAL
LYMPHOCYTES # BLD AUTO: 2.04 10*3/MM3 (ref 0.7–3.1)
LYMPHOCYTES NFR BLD AUTO: 14.8 % (ref 19.6–45.3)
MAGNESIUM SERPL-MCNC: 1.8 MG/DL (ref 1.6–2.6)
MCH RBC QN AUTO: 27.2 PG (ref 26.6–33)
MCHC RBC AUTO-ENTMCNC: 30.2 G/DL (ref 31.5–35.7)
MCV RBC AUTO: 90.1 FL (ref 79–97)
METHGB BLD QL: 0.2 % (ref 0–1.5)
MODALITY: ABNORMAL
MONOCYTES # BLD AUTO: 0.68 10*3/MM3 (ref 0.1–0.9)
MONOCYTES NFR BLD AUTO: 4.9 % (ref 5–12)
NEUTROPHILS NFR BLD AUTO: 10.72 10*3/MM3 (ref 1.7–7)
NEUTROPHILS NFR BLD AUTO: 78.1 % (ref 42.7–76)
NOTE: ABNORMAL
NRBC BLD AUTO-RTO: 0 /100 WBC (ref 0–0.2)
OXYHGB MFR BLDV: 88.4 % (ref 94–99)
PAW @ PEAK INSP FLOW SETTING VENT: 0 CMH2O
PCO2 BLDA: 50.4 MM HG (ref 35–45)
PCO2 TEMP ADJ BLD: 50.4 MM HG (ref 35–45)
PH BLDA: 7.42 PH UNITS (ref 7.35–7.45)
PH, TEMP CORRECTED: 7.42 PH UNITS
PHOSPHATE SERPL-MCNC: 2.3 MG/DL (ref 2.5–4.5)
PLATELET # BLD AUTO: 260 10*3/MM3 (ref 140–450)
PMV BLD AUTO: 10.5 FL (ref 6–12)
PO2 BLDA: 56.1 MM HG (ref 83–108)
PO2 TEMP ADJ BLD: 56.1 MM HG (ref 83–108)
POTASSIUM SERPL-SCNC: 3.9 MMOL/L (ref 3.5–5.2)
PROCALCITONIN SERPL-MCNC: 0.77 NG/ML (ref 0–0.25)
PROT SERPL-MCNC: 6.5 G/DL (ref 6–8.5)
QT INTERVAL: 356 MS
QTC INTERVAL: 505 MS
RBC # BLD AUTO: 4.15 10*6/MM3 (ref 3.77–5.28)
SODIUM SERPL-SCNC: 139 MMOL/L (ref 136–145)
TOTAL RATE: 0 BREATHS/MINUTE
WBC NRBC COR # BLD: 13.75 10*3/MM3 (ref 3.4–10.8)

## 2022-01-02 PROCEDURE — 82375 ASSAY CARBOXYHB QUANT: CPT

## 2022-01-02 PROCEDURE — 80053 COMPREHEN METABOLIC PANEL: CPT | Performed by: INTERNAL MEDICINE

## 2022-01-02 PROCEDURE — 85025 COMPLETE CBC W/AUTO DIFF WBC: CPT | Performed by: INTERNAL MEDICINE

## 2022-01-02 PROCEDURE — 94799 UNLISTED PULMONARY SVC/PX: CPT

## 2022-01-02 PROCEDURE — 25010000002 PROPOFOL 10 MG/ML EMULSION: Performed by: EMERGENCY MEDICINE

## 2022-01-02 PROCEDURE — 25010000002 LINEZOLID 600 MG/300ML SOLUTION: Performed by: INTERNAL MEDICINE

## 2022-01-02 PROCEDURE — 83735 ASSAY OF MAGNESIUM: CPT | Performed by: INTERNAL MEDICINE

## 2022-01-02 PROCEDURE — 82962 GLUCOSE BLOOD TEST: CPT

## 2022-01-02 PROCEDURE — 82805 BLOOD GASES W/O2 SATURATION: CPT

## 2022-01-02 PROCEDURE — 84100 ASSAY OF PHOSPHORUS: CPT | Performed by: INTERNAL MEDICINE

## 2022-01-02 PROCEDURE — 25010000002 PROPOFOL 10 MG/ML EMULSION: Performed by: INTERNAL MEDICINE

## 2022-01-02 PROCEDURE — 36600 WITHDRAWAL OF ARTERIAL BLOOD: CPT

## 2022-01-02 PROCEDURE — 25010000002 PIPERACILLIN SOD-TAZOBACTAM PER 1 G

## 2022-01-02 PROCEDURE — 84145 PROCALCITONIN (PCT): CPT | Performed by: INTERNAL MEDICINE

## 2022-01-02 PROCEDURE — 94003 VENT MGMT INPAT SUBQ DAY: CPT

## 2022-01-02 PROCEDURE — 99291 CRITICAL CARE FIRST HOUR: CPT | Performed by: INTERNAL MEDICINE

## 2022-01-02 PROCEDURE — 25010000002 FENTANYL 10 MCG/1 ML NS: Performed by: INTERNAL MEDICINE

## 2022-01-02 PROCEDURE — 71045 X-RAY EXAM CHEST 1 VIEW: CPT

## 2022-01-02 PROCEDURE — 83050 HGB METHEMOGLOBIN QUAN: CPT

## 2022-01-02 RX ORDER — GABAPENTIN 300 MG/1
300 CAPSULE ORAL EVERY 8 HOURS SCHEDULED
Status: DISCONTINUED | OUTPATIENT
Start: 2022-01-02 | End: 2022-01-14

## 2022-01-02 RX ADMIN — PROPOFOL 20 MCG/KG/MIN: 10 INJECTION, EMULSION INTRAVENOUS at 11:59

## 2022-01-02 RX ADMIN — PROPOFOL 20 MCG/KG/MIN: 10 INJECTION, EMULSION INTRAVENOUS at 06:36

## 2022-01-02 RX ADMIN — PROPOFOL 20 MCG/KG/MIN: 10 INJECTION, EMULSION INTRAVENOUS at 20:33

## 2022-01-02 RX ADMIN — TAZOBACTAM SODIUM AND PIPERACILLIN SODIUM 4.5 G: 500; 4 INJECTION, SOLUTION INTRAVENOUS at 00:13

## 2022-01-02 RX ADMIN — FAMOTIDINE 20 MG: 10 INJECTION INTRAVENOUS at 09:02

## 2022-01-02 RX ADMIN — Medication 100 MCG/HR: at 11:59

## 2022-01-02 RX ADMIN — CASTOR OIL AND BALSAM, PERU 1 APPLICATION: 788; 87 OINTMENT TOPICAL at 09:03

## 2022-01-02 RX ADMIN — TAZOBACTAM SODIUM AND PIPERACILLIN SODIUM 4.5 G: 500; 4 INJECTION, SOLUTION INTRAVENOUS at 17:15

## 2022-01-02 RX ADMIN — SODIUM CHLORIDE, PRESERVATIVE FREE 10 ML: 5 INJECTION INTRAVENOUS at 20:34

## 2022-01-02 RX ADMIN — GABAPENTIN 300 MG: 300 CAPSULE ORAL at 14:27

## 2022-01-02 RX ADMIN — CHLORHEXIDINE GLUCONATE 15 ML: 1.2 SOLUTION ORAL at 09:03

## 2022-01-02 RX ADMIN — CASTOR OIL AND BALSAM, PERU 1 APPLICATION: 788; 87 OINTMENT TOPICAL at 20:33

## 2022-01-02 RX ADMIN — DOXYCYCLINE 100 MG: 100 INJECTION, POWDER, LYOPHILIZED, FOR SOLUTION INTRAVENOUS at 11:59

## 2022-01-02 RX ADMIN — APIXABAN 5 MG: 5 TABLET, FILM COATED ORAL at 20:33

## 2022-01-02 RX ADMIN — GABAPENTIN 300 MG: 300 CAPSULE ORAL at 21:49

## 2022-01-02 RX ADMIN — LINEZOLID 600 MG: 600 INJECTION, SOLUTION INTRAVENOUS at 17:15

## 2022-01-02 RX ADMIN — APIXABAN 5 MG: 5 TABLET, FILM COATED ORAL at 09:02

## 2022-01-02 RX ADMIN — SODIUM CHLORIDE, PRESERVATIVE FREE 10 ML: 5 INJECTION INTRAVENOUS at 09:03

## 2022-01-02 RX ADMIN — PROPOFOL 20 MCG/KG/MIN: 10 INJECTION, EMULSION INTRAVENOUS at 02:27

## 2022-01-02 RX ADMIN — CHLORHEXIDINE GLUCONATE 15 ML: 1.2 SOLUTION ORAL at 20:33

## 2022-01-02 RX ADMIN — PROPOFOL 20 MCG/KG/MIN: 10 INJECTION, EMULSION INTRAVENOUS at 17:15

## 2022-01-02 RX ADMIN — FAMOTIDINE 20 MG: 10 INJECTION INTRAVENOUS at 21:49

## 2022-01-02 RX ADMIN — IPRATROPIUM BROMIDE AND ALBUTEROL SULFATE 3 ML: 2.5; .5 SOLUTION RESPIRATORY (INHALATION) at 19:46

## 2022-01-02 RX ADMIN — IPRATROPIUM BROMIDE AND ALBUTEROL SULFATE 3 ML: 2.5; .5 SOLUTION RESPIRATORY (INHALATION) at 02:10

## 2022-01-02 RX ADMIN — LINEZOLID 600 MG: 600 INJECTION, SOLUTION INTRAVENOUS at 05:56

## 2022-01-02 RX ADMIN — DOXYCYCLINE 100 MG: 100 INJECTION, POWDER, LYOPHILIZED, FOR SOLUTION INTRAVENOUS at 05:56

## 2022-01-02 RX ADMIN — IPRATROPIUM BROMIDE AND ALBUTEROL SULFATE 3 ML: 2.5; .5 SOLUTION RESPIRATORY (INHALATION) at 12:10

## 2022-01-02 RX ADMIN — TAZOBACTAM SODIUM AND PIPERACILLIN SODIUM 4.5 G: 500; 4 INJECTION, SOLUTION INTRAVENOUS at 09:03

## 2022-01-02 RX ADMIN — IPRATROPIUM BROMIDE AND ALBUTEROL SULFATE 3 ML: 2.5; .5 SOLUTION RESPIRATORY (INHALATION) at 07:24

## 2022-01-02 RX ADMIN — GABAPENTIN 300 MG: 300 CAPSULE ORAL at 05:56

## 2022-01-02 NOTE — CONSULTS
Clinical Nutrition     Nutrition Assessment  Reason for Visit:   BMI, Physician consult, EN, Need for education      Patient Name: Dani Ziegler  YOB: 1965  MRN: [id number]  Date of Encounter: 01/02/22 17:21 EST  Admission date: 12/30/2021      Comments:  Education consult received for BMI, this is deferred as pt is intubated and sedated. EN assessment completed. EN Support per Dr. Pelaez.      Assessment      Admission Diagnosis    Acute on chronic respiratory failure with hypoxia and hypercapnia (HCC) [J96.21, J96.22]     Hospital Problem List    Respiratory Failure Type 2    Uncontrolled type 2 diabetes mellitus with hyperglycemia, without long-term current use of insulin (HCC)    Essential hypertension    Chronic pain    Chronic obstructive pulmonary disease (HCC)    Sepsis (HCC)    CAP (community acquired pneumonia)    Morbid obesity with BMI of 45.0-49.9, adult (HCC)    History of recurrent deep vein thrombosis (DVT)    Morbid obesity with BMI of 60.0-69.9, adult (HCC)    Applicable Interval History:  12/31/21 Intubated    Applicable PMH/PSxH:     PMH: She  has a past medical history of Abnormal weight gain, Acute UTI, Anxiety, Arthritis involving multiple sites, Chronic obstructive pulmonary disease (HCC), Chronic pain, Current every day smoker, Depression, Diabetes mellitus (HCC), Diarrhea, Dysuria, Edema, lower extremity, Fever, Head injury, Hypertension, Intervertebral disc disorder, Left bundle branch block, Leukocytosis, Long term current use of methadone for pain control, Mass of right breast, Nausea, Obesity, Overactive bladder, Peripheral neuropathy, Superficial phlebitis, Upper respiratory infection, Urinary incontinence, Vitamin D deficiency, and Vomiting.   PSxH: She  has a past surgical history that includes Bladder surgery; Cholecystectomy; and Hip Arthroscopy (Right, 10/29/2020).       Diet/Nutrition Related History:     RN reports  "pt tolerating EN support at current rate, remains on propofol for sedation        Anthropometrics     Admission Height 170.2 cm (67\") Documented at 12/30/2021 2003   Admission Weight 175 kg (385 lb) Documented at 12/30/2021 2003     Last filed wt: Weight: (!) 175 kg (385 lb) (12/30/21 2003)  Weight Method: Stated    BMI: BMI (Calculated): 60.3  Obese Class III extreme obesity: > or equal to 40kg/m2    Ideal Body Weight (IBW) (kg): 61.88  Adjustments:    Weight Change   UBW:  Weight change:   % wt change:   Time frame of weight loss:     Labs reviewed   Yes  Pertinent:    Results from last 7 days   Lab Units 01/02/22  0521 01/01/22  0350 12/30/21 2016   SODIUM mmol/L 139 139 142   POTASSIUM mmol/L 3.9 4.0 3.7   CHLORIDE mmol/L 102 103 104   CO2 mmol/L 29.0 29.0 27.0   BUN mg/dL 18 18 16   CREATININE mg/dL 0.66 0.65 0.95   GLUCOSE mg/dL 87 115* 197*   CALCIUM mg/dL 8.8 9.0 8.5*   PHOSPHORUS mg/dL 2.3* 2.5  --    MAGNESIUM mg/dL 1.8 2.0  --        Lab Results   Lab Value Date/Time    HGBA1C 5.7 (H) 08/24/2021 1522    HGBA1C 6.00 (H) 06/03/2021 1902    HGBA1C 6.0 (H) 05/25/2021 1658    HGBA1C 8.3 01/11/2021 0000    HGBA1C 8.40 (H) 12/12/2019 1236    HGBA1C 7.30 (H) 03/28/2018 0514       Medications reviewed   Yes  Pertinent:    Infusions: fentanyl, propofol at 20 mcg/kg/min =21 mL/hr    Intake & Ouptut  Past 24 hrs  reviewed     Intake & Output (last day)       01/01 0701  01/02 0700 01/02 0701 01/03 0700    P.O.      I.V. (mL/kg) 922 (5.3) 460 (2.6)    Other 271 360    NG/ 172    IV Piggyback 1063.9 616    Total Intake(mL/kg) 2602.9 (14.9) 1608 (9.2)    Urine (mL/kg/hr) 1560 (0.4) 1900 (1)    Total Output 1560 1900    Net +1042.9 -292              Nutrition Focused Physical Exam/ Physical Findings     Unable to perform exam due to: Potential for patient discomfort        Oral:  GI:  Drains/Tubes: ETT  Skin:  Needs Assessment     Height used: 170.2 cm (67\")  Weight used: 175 kg (385 lbs)  IBW: 61.4 " kg    Estimated Energy needs:  ~ 1600 kcal/d  22-25 kcal.kg IBW for BMI > 50 = 3875-4576 kcal/d  PSU = 2876 kcal adjusted to  70% w/ MO = 2013 kcal    Estimated Protein needs:  154 gm/d  2.5 gm /kg IBW    Estimated Fluid needs: ~1600 mL   1 mL/kcal         Current Nutrition Prescription     PO: NPO Diet  ONS:    EN Support:  Peptamen  VHP at 25 ml/hr FW at 30 ml/hr    Route:  OGT    EN Support Provides:  500 kcal, 46 gm Pro, 420 ml FW   Additional calories from propofol est at 554 kcal/d calc on 20 mcg/kg/min    Calculations based on 20 hrs of anticipated EN delivery in the critical care setting.    Evaluation of Received Nutrient/Fluid Intake:     Oral Intake:  Days evaluated:  n/a  Meals reviewed :    Average Intake %:       EN Support Intake:  Days evaluated:  Insufficient data      Nutrition Diagnosis     1/2/2022  Problem Inadequate oral intake   Etiology A/C RF    Signs/Symptoms NPO d/t mechanical ventilation   Status: ongoing    1/2/2022  Problem Food and nutrition knowledge deficit   Etiology MICHELINE   Signs/Symptoms BMI > 60, MD consult   Status: education pending- pt intubated and sedated    Goal:   General: Nutrition support treatment  PO: Advace diet as medically feasible/appropriate    EN/PN: Establish EN tolerance, Tolerate EN at goal, Adjust EN, Deliver estimated needs    Additional goals:  Appropriate wt loss       Nutrition Intervention     Follow treatment progress, Care plan reviewed, EN support per Dr. Pelaez      Monitoring/Evaluation:   Per protocol, I&O, Pertinent labs, EN delivery/tolerance, Weight, Skin status, GI status, Symptoms, POC/GOC        Margarita Macdonald, MS,RD,LD,   Time Spent: 45 mins

## 2022-01-02 NOTE — PROGRESS NOTES
INTENSIVIST   PROGRESS NOTE     Hospital:  LOS: 2 days      BERTO Louise 56 y.o. female is followed for: Shortness of Breath       Respiratory Failure Type 2    Uncontrolled type 2 diabetes mellitus with hyperglycemia, without long-term current use of insulin (HCC)    Sepsis (HCC)    As an Intensivist, we provide an integrated approach to the ICU patient and family, medical management of comorbid conditions, including but not limited to electrolytes, glycemic control, organ dysfunction, lead interdisciplinary rounds and coordinate the care with all other services, including those from other specialists.     Interval History:  Restless off sedatives.  Follows commands.  H/O narcotic use.    Previous Trach 2019 and 2020.    The patient qualifies to receive the vaccine, but they have not yet received it.     Temp  Min: 99 °F (37.2 °C)  Max: 100 °F (37.8 °C)       History     Last Reviewed by Benson Pelaze MD on 1/1/2022 at  4:56 PM    Sections Reviewed    Medical, Family, Surgical, Tobacco, Alcohol, Drug Use, Sexual Activity,   Social Documentation      Problem list reviewed by Benson Pelaez MD on 1/1/2022 at  4:56 PM  Medicines reviewed by Benson Pelaez MD on 1/1/2022 at  4:56 PM  Allergies reviewed by Benson Pelaez MD on 1/1/2022 at  4:55 PM       The patient's relevant past medical, surgical and social history were reviewed and updated in Epic as appropriate.        O     Vitals:  Temp: 99.3 °F (37.4 °C) (01/02/22 1600) Temp  Min: 99 °F (37.2 °C)  Max: 100 °F (37.8 °C)   Temp core:      BP: 144/81 (01/02/22 1700) BP  Min: 131/71  Max: 166/88   Pulse: 59 (01/02/22 1700) Pulse  Min: 52  Max: 71   Resp: 20 (01/02/22 1210) Resp  Min: 20  Max: 20   SpO2: 91 % (01/02/22 1700) SpO2  Min: 90 %  Max: 96 %   Device: ventilator (01/02/22 1600)    Flow Rate:   No data recorded     Intake/Ouptut 24 hrs (7:00AM - 6:59 AM)  Intake & Output (last 3 days)       12/30 0701 12/31 0700 12/31 0701 01/01 0700 01/01 0701 01/02  0700 01/02 0701  01/03 0700    P.O.  750      I.V. (mL/kg)  1106.8 (6.3) 922 (5.3) 488 (2.8)    Other   271 450    NG/GT  120 346 242    IV Piggyback .9 616    Total Intake(mL/kg) 100 (0.6) 2513.8 (14.4) 2602.9 (14.9) 1796 (10.3)    Urine (mL/kg/hr)  1375 (0.3) 1560 (0.4) 1900 (1)    Total Output  1375 1560 1900    Net +100 +1138.8 +1042.9 -104                  Wt Readings from Last 3 Encounters:   12/30/21 (!) 175 kg (385 lb)   08/24/21 133 kg (294 lb)   07/16/21 (!) 140 kg (308 lb)       Medications (drips):  fentanyl 10 mcg/mL, Last Rate: 100 mcg/hr (01/02/22 1159)  propofol, Last Rate: 20 mcg/kg/min (01/02/22 1715)        Invasive Mechanical Ventilator   Settings: Observed:   Mode: VC+/AC (01/02/22 1530)    Vt (Set, mL): 430 mL (01/02/22 1530) Vt Mandatory Ins (observed, mL): 343 mL (01/02/22 1530)   Resp Rate (Set): 20 (01/02/22 1530) Resp Rate (Observed) Vent: 20 (01/02/22 1700)   FiO2 (%): 60 % (01/02/22 1530)    PEEP/CPAP (cm H2O): 8 cm H20 (01/02/22 1530) Plateau Pressure (cm H2O): (S) 24 cm H2O (01/02/22 0724)    Minute Ventilation (L/min) (Obs): 7.97 L/min (01/02/22 1530)    I:E Ratio (Obs): 1:1.40 (01/02/22 1530)     Physical Examination  Telemetry:  Rhythm: normal sinus rhythm, sinus bradycardia (01/02/22 1600)         Constitutional:  No acute distress.   Cardiovascular: RRR.   Normal heart sounds.  No murmurs, gallop or rub.   Respiratory: Normal breath sounds  No adventitious sounds.   Abdominal:  Soft with no tenderness.  No distension.   No HSM.   Extremities: Warm.  Dry.  No cyanosis.  Trace Edema   Neurological:   Sedated.  Best Eye Response: 3-->(E3) to speech (01/02/22 1600)  Best Motor Response: 5-->(M5) localizes pain (01/02/22 1600)  Best Verbal Response: 1-->(V1) none (01/02/22 1600)  [place] Coma Scale Score: 9 (01/02/22 1600)     Lines      Results Reviewed:  Laboratory  Microbiology  Radiology  Pathology    Hematology:  Results from last 7 days   Lab Units 01/02/22  7038  01/01/22  0350   WBC 10*3/mm3 13.75* 18.95*   HEMOGLOBIN g/dL 11.3* 11.1*   MCV fL 90.1 90.8   PLATELETS 10*3/mm3 260 250   NEUTROS ABS 10*3/mm3 10.72* 16.52*   LYMPHS ABS 10*3/mm3 2.04 1.50   EOS ABS 10*3/mm3 0.21 0.01     Chemistry:  Estimated Creatinine Clearance: 158.9 mL/min (by C-G formula based on SCr of 0.66 mg/dL).  Results from last 7 days   Lab Units 01/02/22  0521 01/01/22  0350   SODIUM mmol/L 139 139   POTASSIUM mmol/L 3.9 4.0   CHLORIDE mmol/L 102 103   CO2 mmol/L 29.0 29.0   BUN mg/dL 18 18   CREATININE mg/dL 0.66 0.65   GLUCOSE mg/dL 87 115*     Results from last 7 days   Lab Units 01/02/22  0521 01/01/22  0350   CALCIUM mg/dL 8.8 9.0   MAGNESIUM mg/dL 1.8 2.0   PHOSPHORUS mg/dL 2.3* 2.5     Results from last 7 days   Lab Units 01/02/22  0521 01/01/22  0350   BILIRUBIN mg/dL 0.9 0.5   AST (SGOT) U/L 13 10   ALT (SGPT) U/L 7 6   ALK PHOS U/L 114 102     Biomarkers:  Results from last 7 days   Lab Units 01/02/22  0521 01/01/22  0350 12/30/21  2016   LACTATE mmol/L  --   --  2.2*   PROCALCITONIN ng/mL 0.77* 1.27*  --      COVID-19  Lab Results   Component Value Date    COVID19 Not Detected 12/30/2021    COVID19 Not Detected 06/03/2021     Arterial Blood Gases:  Results from last 7 days   Lab Units 01/02/22  0347 01/01/22  0320 12/31/21  1134   PH, ARTERIAL pH units 7.422 7.385 7.334*   PCO2, ARTERIAL mm Hg 50.4* 54.7* 58.2*   PO2 ART mm Hg 56.1* 57.9* 71.8*   FIO2 % 60 50 75       Images:  XR Chest 1 View    Result Date: 1/2/2022  Interval placement of right arm PICC without postprocedural pneumothorax. Otherwise no interval change.  DICTATED:   01/02/2022 EDITED/lfs:   01/02/2022  This report was finalized on 1/2/2022 4:31 PM by Dr. Tom Villalobos.      XR Chest 1 View    Result Date: 1/2/2022  Interval placement of nasogastric tube, otherwise no interval change.  DICTATED:   01/01/2022 EDITED/lfs:   01/01/2022  This report was finalized on 1/2/2022 1:16 PM by Dr. Tom Villalobos.      XR Abdomen  KUB    Result Date: 12/31/2021  OG tube in satisfactory position. Signer Name: Errol Sanchez MD  Signed: 12/31/2021 9:53 PM  Workstation Name: [id number]  [facility]      Echo:  Results for orders placed in visit on 05/05/20    SCANNED - ECHOCARDIOGRAM      Results: Reviewed.  I reviewed the patient's new laboratory and imaging results.  I independently reviewed the patient's new images.    Medications: Reviewed.    Assessment/Plan   A / P     Dani is a 56 y.o. female admitted on 12/30/2021 with Acute on chronic respiratory failure with hypoxia and hypercapnia (HCC) [J96.21, J96.22]:    1. Respiratory Failure type 2  1. Intubated 12/31/21  2. Invasive Mechanical Ventilation   3. Compensated Respiratory Acidosis as per ABG on 12/31/21 and 01/01/22  4. COPD on home O2  1. Previous Trach in 2019 and 2020.  5. Pneumonia  6. CT: Bilateral areas of consolidation.  7. Elevated PCT but not in the sepsis range.  8. Antibiotics: Doxy, Piperacillin-Tazobactam   2. Aute left seventh rib fracture.  3. Previous DVT on APIxaban  4. Nutrition Support: Enteral Nutrition     Lab Results   Component Value Date    CRP 1.76 (H) 11/05/2018     5. HTN  6. Chronic pain syndrome  7. Obesity III. Body mass index is 60.29 kg/m².   8. T2DM    Results from last 7 days   Lab Units 01/02/22  1710 01/02/22  1131 01/02/22  0540 01/01/22  2312 01/01/22  1712 01/01/22  1112   GLUCOSE mg/dL 92 126 103 82 79 97     Lab Results   Lab Value Date/Time    HGBA1C 5.7 (H) 08/24/2021 1522    HGBA1C 6.00 (H) 06/03/2021 1902    HGBA1C 6.0 (H) 05/25/2021 1658    HGBA1C 8.3 01/11/2021 0000    HGBA1C 7.30 (H) 03/28/2018 0514       Nutrition Support: Diet, Tube Feeding Tube Feeding Formula: Peptamen Intense VHP (Peptide Based, Very High Protein)   Modulars: Patient doesn't have any tube feeding modular orders   Diet: NPO Diet   Advance Directives: Code Status and Medical Interventions:   Ordered at: 12/31/21 0151     Code Status (Patient  has no pulse and is not breathing):    CPR (Attempt to Resuscitate)     Medical Interventions (Patient has pulse or is breathing):    Full Support        Plan:    1. Not ready to be liberated from the Ventilator  1. With her history, she may need another Trach.  2. PCT decreasing.  3. Discontinue Linezolid.(MRSA PCR screen negative)  4. Continue APIxaban   5. Advance Enteral Nutrition   6. Goal: Glucose < 180 mg/dL.   7. Disposition: Keep in ICU.   1. Nutrition biomarkers in AM    Plan of care and goals reviewed during interdisciplinary rounds.  I discussed the patient's findings and my recommendations with nursing staff    Level of Risk is High due to:  illness with threat to life or bodily function.     Time: was greater than 32 minutes, excluding procedures.    She has a high probability of sudden, clinically significant deterioration, which requires the highest level of physician preparedness to intervene urgently. I devoted my full attention to the direct care of this patient for the amount of time indicated above. Time spent with family or surrogate(s) is included only if the patient was incapable of providing the necessary information or participating in medical decision making.    She has the following organ/system impairments Respiratory Failure.      [x]  Primary Attending  []  Consultant

## 2022-01-02 NOTE — PLAN OF CARE
Goal Outcome Evaluation:           Progress: no change  Outcome Summary: pt remains intubated and sedated on fentanyl and propofol. no titrations made to either. temp max 99.7. UOP adequate.

## 2022-01-02 NOTE — PLAN OF CARE
Goal Outcome Evaluation:            Pt remains sedated and comfortable on the vent. Decreased diprivan to 20mcg. Remains on fentanyl at 100mcg. Increased Fio2 to 60%. PICC line placed. Low grade fever this evening. Pt.'s daughter called and updated on pt.'s current status. Daughter verbalized pt has had trach/peg in 2019 and 2020 and has frequent pneumonia now. Daughter verbalized pt continues to smoke and has a history of snorting prescribed narcotics.

## 2022-01-03 ENCOUNTER — APPOINTMENT (OUTPATIENT)
Dept: GENERAL RADIOLOGY | Facility: HOSPITAL | Age: 57
End: 2022-01-03

## 2022-01-03 DIAGNOSIS — E11.65 UNCONTROLLED TYPE 2 DIABETES MELLITUS WITH HYPERGLYCEMIA, WITHOUT LONG-TERM CURRENT USE OF INSULIN: ICD-10-CM

## 2022-01-03 LAB
ALBUMIN SERPL-MCNC: 2.5 G/DL (ref 3.5–5.2)
ALBUMIN/GLOB SERPL: 0.7 G/DL
ALP SERPL-CCNC: 100 U/L (ref 39–117)
ALT SERPL W P-5'-P-CCNC: 7 U/L (ref 1–33)
ANION GAP SERPL CALCULATED.3IONS-SCNC: 8 MMOL/L (ref 5–15)
AST SERPL-CCNC: 10 U/L (ref 1–32)
BASOPHILS # BLD AUTO: 0.03 10*3/MM3 (ref 0–0.2)
BASOPHILS NFR BLD AUTO: 0.3 % (ref 0–1.5)
BILIRUB SERPL-MCNC: 0.9 MG/DL (ref 0–1.2)
BUN SERPL-MCNC: 14 MG/DL (ref 6–20)
BUN/CREAT SERPL: 20.3 (ref 7–25)
CALCIUM SPEC-SCNC: 8.7 MG/DL (ref 8.6–10.5)
CHLORIDE SERPL-SCNC: 101 MMOL/L (ref 98–107)
CO2 SERPL-SCNC: 31 MMOL/L (ref 22–29)
CREAT SERPL-MCNC: 0.69 MG/DL (ref 0.57–1)
CRP SERPL-MCNC: 8.25 MG/DL (ref 0–0.5)
DEPRECATED RDW RBC AUTO: 60.2 FL (ref 37–54)
EOSINOPHIL # BLD AUTO: 0.3 10*3/MM3 (ref 0–0.4)
EOSINOPHIL NFR BLD AUTO: 2.5 % (ref 0.3–6.2)
ERYTHROCYTE [DISTWIDTH] IN BLOOD BY AUTOMATED COUNT: 18.4 % (ref 12.3–15.4)
GFR SERPL CREATININE-BSD FRML MDRD: 88 ML/MIN/1.73
GLOBULIN UR ELPH-MCNC: 3.6 GM/DL
GLUCOSE BLDC GLUCOMTR-MCNC: 102 MG/DL (ref 70–130)
GLUCOSE BLDC GLUCOMTR-MCNC: 126 MG/DL (ref 70–130)
GLUCOSE BLDC GLUCOMTR-MCNC: 91 MG/DL (ref 70–130)
GLUCOSE BLDC GLUCOMTR-MCNC: 98 MG/DL (ref 70–130)
GLUCOSE SERPL-MCNC: 87 MG/DL (ref 65–99)
HCT VFR BLD AUTO: 34.9 % (ref 34–46.6)
HGB BLD-MCNC: 10.7 G/DL (ref 12–15.9)
IMM GRANULOCYTES # BLD AUTO: 0.12 10*3/MM3 (ref 0–0.05)
IMM GRANULOCYTES NFR BLD AUTO: 1 % (ref 0–0.5)
LYMPHOCYTES # BLD AUTO: 2.31 10*3/MM3 (ref 0.7–3.1)
LYMPHOCYTES NFR BLD AUTO: 19.4 % (ref 19.6–45.3)
MAGNESIUM SERPL-MCNC: 2 MG/DL (ref 1.6–2.6)
MCH RBC QN AUTO: 27.2 PG (ref 26.6–33)
MCHC RBC AUTO-ENTMCNC: 30.7 G/DL (ref 31.5–35.7)
MCV RBC AUTO: 88.8 FL (ref 79–97)
MONOCYTES # BLD AUTO: 0.66 10*3/MM3 (ref 0.1–0.9)
MONOCYTES NFR BLD AUTO: 5.5 % (ref 5–12)
NEUTROPHILS NFR BLD AUTO: 71.3 % (ref 42.7–76)
NEUTROPHILS NFR BLD AUTO: 8.49 10*3/MM3 (ref 1.7–7)
NRBC BLD AUTO-RTO: 0 /100 WBC (ref 0–0.2)
PHOSPHATE SERPL-MCNC: 3.5 MG/DL (ref 2.5–4.5)
PLATELET # BLD AUTO: 267 10*3/MM3 (ref 140–450)
PMV BLD AUTO: 10.2 FL (ref 6–12)
POTASSIUM SERPL-SCNC: 3.7 MMOL/L (ref 3.5–5.2)
PREALB SERPL-MCNC: 11.7 MG/DL (ref 20–40)
PROT SERPL-MCNC: 6.1 G/DL (ref 6–8.5)
RBC # BLD AUTO: 3.93 10*6/MM3 (ref 3.77–5.28)
SODIUM SERPL-SCNC: 140 MMOL/L (ref 136–145)
TRIGL SERPL-MCNC: 154 MG/DL (ref 0–150)
WBC NRBC COR # BLD: 11.91 10*3/MM3 (ref 3.4–10.8)

## 2022-01-03 PROCEDURE — 94761 N-INVAS EAR/PLS OXIMETRY MLT: CPT

## 2022-01-03 PROCEDURE — 84478 ASSAY OF TRIGLYCERIDES: CPT | Performed by: INTERNAL MEDICINE

## 2022-01-03 PROCEDURE — 94799 UNLISTED PULMONARY SVC/PX: CPT

## 2022-01-03 PROCEDURE — 25010000002 FENTANYL 10 MCG/1 ML NS: Performed by: INTERNAL MEDICINE

## 2022-01-03 PROCEDURE — 25010000002 PIPERACILLIN SOD-TAZOBACTAM PER 1 G

## 2022-01-03 PROCEDURE — 85025 COMPLETE CBC W/AUTO DIFF WBC: CPT | Performed by: INTERNAL MEDICINE

## 2022-01-03 PROCEDURE — 25010000002 PROPOFOL 10 MG/ML EMULSION: Performed by: INTERNAL MEDICINE

## 2022-01-03 PROCEDURE — 80053 COMPREHEN METABOLIC PANEL: CPT | Performed by: INTERNAL MEDICINE

## 2022-01-03 PROCEDURE — 82962 GLUCOSE BLOOD TEST: CPT

## 2022-01-03 PROCEDURE — 71045 X-RAY EXAM CHEST 1 VIEW: CPT

## 2022-01-03 PROCEDURE — 84134 ASSAY OF PREALBUMIN: CPT | Performed by: INTERNAL MEDICINE

## 2022-01-03 PROCEDURE — 99291 CRITICAL CARE FIRST HOUR: CPT | Performed by: INTERNAL MEDICINE

## 2022-01-03 PROCEDURE — 83735 ASSAY OF MAGNESIUM: CPT | Performed by: INTERNAL MEDICINE

## 2022-01-03 PROCEDURE — 84100 ASSAY OF PHOSPHORUS: CPT | Performed by: INTERNAL MEDICINE

## 2022-01-03 PROCEDURE — 86140 C-REACTIVE PROTEIN: CPT | Performed by: INTERNAL MEDICINE

## 2022-01-03 PROCEDURE — 94003 VENT MGMT INPAT SUBQ DAY: CPT

## 2022-01-03 RX ORDER — AMINO ACIDS/PROTEIN HYDROLYS 15G-100/30
1 LIQUID (ML) ORAL 2 TIMES DAILY
Status: DISCONTINUED | OUTPATIENT
Start: 2022-01-03 | End: 2022-01-10

## 2022-01-03 RX ADMIN — IPRATROPIUM BROMIDE AND ALBUTEROL SULFATE 3 ML: 2.5; .5 SOLUTION RESPIRATORY (INHALATION) at 19:14

## 2022-01-03 RX ADMIN — TAZOBACTAM SODIUM AND PIPERACILLIN SODIUM 4.5 G: 500; 4 INJECTION, SOLUTION INTRAVENOUS at 09:03

## 2022-01-03 RX ADMIN — Medication 250 MCG/HR: at 01:59

## 2022-01-03 RX ADMIN — IPRATROPIUM BROMIDE AND ALBUTEROL SULFATE 3 ML: 2.5; .5 SOLUTION RESPIRATORY (INHALATION) at 06:41

## 2022-01-03 RX ADMIN — Medication 250 MCG/HR: at 20:11

## 2022-01-03 RX ADMIN — CHLORHEXIDINE GLUCONATE 15 ML: 1.2 SOLUTION ORAL at 08:49

## 2022-01-03 RX ADMIN — GABAPENTIN 300 MG: 300 CAPSULE ORAL at 06:18

## 2022-01-03 RX ADMIN — TAZOBACTAM SODIUM AND PIPERACILLIN SODIUM 4.5 G: 500; 4 INJECTION, SOLUTION INTRAVENOUS at 16:10

## 2022-01-03 RX ADMIN — GABAPENTIN 300 MG: 300 CAPSULE ORAL at 21:58

## 2022-01-03 RX ADMIN — FAMOTIDINE 20 MG: 10 INJECTION INTRAVENOUS at 08:46

## 2022-01-03 RX ADMIN — CHLORHEXIDINE GLUCONATE 15 ML: 1.2 SOLUTION ORAL at 21:58

## 2022-01-03 RX ADMIN — SODIUM CHLORIDE, PRESERVATIVE FREE 10 ML: 5 INJECTION INTRAVENOUS at 21:59

## 2022-01-03 RX ADMIN — IPRATROPIUM BROMIDE AND ALBUTEROL SULFATE 3 ML: 2.5; .5 SOLUTION RESPIRATORY (INHALATION) at 00:45

## 2022-01-03 RX ADMIN — DOXYCYCLINE 100 MG: 100 INJECTION, POWDER, LYOPHILIZED, FOR SOLUTION INTRAVENOUS at 08:46

## 2022-01-03 RX ADMIN — TAZOBACTAM SODIUM AND PIPERACILLIN SODIUM 4.5 G: 500; 4 INJECTION, SOLUTION INTRAVENOUS at 00:52

## 2022-01-03 RX ADMIN — GABAPENTIN 300 MG: 300 CAPSULE ORAL at 13:48

## 2022-01-03 RX ADMIN — PROPOFOL 20 MCG/KG/MIN: 10 INJECTION, EMULSION INTRAVENOUS at 20:10

## 2022-01-03 RX ADMIN — Medication 1 PACKET: at 21:58

## 2022-01-03 RX ADMIN — PROPOFOL 20 MCG/KG/MIN: 10 INJECTION, EMULSION INTRAVENOUS at 05:29

## 2022-01-03 RX ADMIN — DOXYCYCLINE 100 MG: 100 INJECTION, POWDER, LYOPHILIZED, FOR SOLUTION INTRAVENOUS at 11:18

## 2022-01-03 RX ADMIN — APIXABAN 5 MG: 5 TABLET, FILM COATED ORAL at 08:49

## 2022-01-03 RX ADMIN — CASTOR OIL AND BALSAM, PERU 1 APPLICATION: 788; 87 OINTMENT TOPICAL at 08:45

## 2022-01-03 RX ADMIN — PROPOFOL 20 MCG/KG/MIN: 10 INJECTION, EMULSION INTRAVENOUS at 14:25

## 2022-01-03 RX ADMIN — CASTOR OIL AND BALSAM, PERU 1 APPLICATION: 788; 87 OINTMENT TOPICAL at 21:59

## 2022-01-03 RX ADMIN — PROPOFOL 20 MCG/KG/MIN: 10 INJECTION, EMULSION INTRAVENOUS at 10:33

## 2022-01-03 RX ADMIN — Medication 250 MCG/HR: at 11:05

## 2022-01-03 RX ADMIN — APIXABAN 5 MG: 5 TABLET, FILM COATED ORAL at 21:58

## 2022-01-03 RX ADMIN — PROPOFOL 25 MCG/KG/MIN: 10 INJECTION, EMULSION INTRAVENOUS at 02:33

## 2022-01-03 RX ADMIN — FAMOTIDINE 20 MG: 10 INJECTION INTRAVENOUS at 21:58

## 2022-01-03 RX ADMIN — TAZOBACTAM SODIUM AND PIPERACILLIN SODIUM 4.5 G: 500; 4 INJECTION, SOLUTION INTRAVENOUS at 23:01

## 2022-01-03 RX ADMIN — Medication 1 PACKET: at 13:48

## 2022-01-03 NOTE — CASE MANAGEMENT/SOCIAL WORK
Continued Stay Note  [facility]     Patient Name: Dani Ziegler  MRN: [id number]  Today's Date: 1/3/2022    Admit Date: 12/30/2021     Discharge Plan     Row Name 01/03/22 1312       Plan    Plan Comments Pt currently intubated. MSW attempted to call pt's daughter listed, Santa [phone]. MSW left a voicemail for pt's daughter to return her call.               Discharge Codes    No documentation.                     CARLA Escalante

## 2022-01-03 NOTE — PROGRESS NOTES
"INTENSIVIST NOTE     Hospital Day: 3    Ms. Dani Ziegler, 56 y.o. female is followed for:   COPD, pneumonia, and respiratory failure       SUBJECTIVE     Interval history:    Maximum temperature 99.1.  Fluid balance -450 mL.  On assist control with rate 20 and tidal volume 430.  FiO2 currently 80% and PEEP 8.  Drips consisting of fentanyl and propofol.    The patient's relevant past medical, surgical and social history were reviewed and updated in Epic as appropriate.       OBJECTIVE     Vital Sign Min/Max for last 24 hours  Temp  Min: 98.8 °F (37.1 °C)  Max: 99.1 °F (37.3 °C)   BP  Min: 99/56  Max: 156/84   Pulse  Min: 57  Max: 75   Resp  Min: 20  Max: 20   SpO2  Min: 89 %  Max: 94 %   No data recorded   No data recorded      Intake/Output Summary (Last 24 hours) at 1/3/2022 1625  Last data filed at 1/3/2022 0600  Gross per 24 hour   Intake 1946 ml   Output 2400 ml   Net -454 ml      Flowsheet Rows      First Filed Value   Admission Height 170.2 cm (67\") Documented at 12/30/2021 2003   Admission Weight 175 kg (385 lb) Documented at 12/30/2021 2003 12/30/21 2003   Weight: (!) 175 kg (385 lb)            Objective:  General Appearance:  Ill-appearing.    Vital signs: (most recent): Blood pressure 125/67, pulse 66, temperature 99.1 °F (37.3 °C), temperature source Bladder, resp. rate 20, height 170.2 cm (67.01\"), weight (!) 175 kg (385 lb), SpO2 91 %.    HEENT: (Oral ET tube  NG tube)    Lungs:  There are decreased breath sounds and rhonchi.  No rales or wheezes.    Heart: Normal rate.  Regular rhythm.  S1 normal and S2 normal.  No murmur, gallop or friction rub.   Chest: Symmetric chest wall expansion.   Abdomen: Abdomen is soft and non-distended.  Bowel sounds are normal.   There is no abdominal tenderness.   There is no mass. There is no splenomegaly. There is no hepatomegaly.   Extremities: There is dependent edema.  There is no deformity.  (Right upper extremity PICC line)  Neurological: (Sedated).  "   Pupils:  Pupils are equal, round, and reactive to light.    Skin:  Warm and dry.              I reviewed the patient's new clinical results.  I reviewed the patient's new imaging results/reports including actual images and agree with reports.    XR Chest 1 View    Result Date: 1/3/2022  Mildly improved perihilar edema, presumably improving pulmonary vascular congestion.   D:  01/03/2022 E:  01/03/2022      XR Chest 1 View    Result Date: 1/2/2022  Interval placement of right arm PICC without postprocedural pneumothorax. Otherwise no interval change.  DICTATED:   01/02/2022 EDITED/lfs:   01/02/2022  This report was finalized on 1/2/2022 4:31 PM by Dr. Tom Villalobos.         INFUSIONS  fentanyl 10 mcg/mL,  mcg/hr, Last Rate: 250 mcg/hr (01/03/22 1105)  propofol, 5-25 mcg/kg/min, Last Rate: 20 mcg/kg/min (01/03/22 1425)        Results from last 7 days   Lab Units 01/03/22  0303 01/02/22  0521 01/01/22  0350   WBC 10*3/mm3 11.91* 13.75* 18.95*   HEMOGLOBIN g/dL 10.7* 11.3* 11.1*   HEMATOCRIT % 34.9 37.4 36.4   PLATELETS 10*3/mm3 267 260 250     Results from last 7 days   Lab Units 01/03/22  0303 01/02/22  0521 01/01/22  0350   SODIUM mmol/L 140 139 139   POTASSIUM mmol/L 3.7 3.9 4.0   CHLORIDE mmol/L 101 102 103   CO2 mmol/L 31.0* 29.0 29.0   BUN mg/dL 14 18 18   GLUCOSE mg/dL 87 87 115*   CREATININE mg/dL 0.69 0.66 0.65   MAGNESIUM mg/dL 2.0 1.8 2.0   CALCIUM mg/dL 8.7 8.8 9.0         Results from last 7 days   Lab Units 01/02/22  0347 01/01/22  0320 12/31/21  1134 12/31/21  0547 12/31/21  0307   PH, ARTERIAL pH units 7.422 7.385 7.334* 7.284* 7.267*   PCO2, ARTERIAL mm Hg 50.4* 54.7* 58.2* 62.8* 65.6*   PO2 ART mm Hg 56.1* 57.9* 71.8* 78.6* 70.7*   FIO2 % 60 50 75 90 90         Mechanical Ventilator:   Settings: Observed:   Mode: VC+/AC (01/03/22 1455)    Vt (Set, mL): 430 mL (01/03/22 1455) Vt Mandatory Ins (observed, mL): 639 mL (01/03/22 1455)   Resp Rate (Set): 20 (01/03/22 1455) Resp Rate (Observed) Vent:  35 (01/03/22 1455)   Pressure Support (cm H2O): 0 cm H20 (01/03/22 1455) Minute Ventilation (L/min) (Obs): 8.02 L/min (01/03/22 1455)       FiO2 (%): 80 % (01/03/22 1455) Plateau Pressure (cm H2O): (S) 22 cm H2O (01/02/22 1946)   PEEP/CPAP (cm H2O): 8 cm H20 (01/03/22 1455) I:E Ratio (Obs): 1:1.40 (01/03/22 1455)         I reviewed the patient's medications.    Assessment/Plan   ASSESSMENT/PLAN     Active Hospital Problems    Diagnosis    • **Acute on chronic respiratory failure with hypoxia and hypercapnia (HCC)    • CAP (community acquired pneumonia)    • Sepsis (HCC)    • Chronic obstructive pulmonary disease (HCC)    • History of recurrent deep vein thrombosis (DVT)    • Morbid obesity with BMI of 60.0-69.9, adult (HCC)    • Morbid obesity with BMI of 45.0-49.9, adult (HCC)    • Uncontrolled type 2 diabetes mellitus with hyperglycemia, without long-term current use of insulin (HCC)    • Chronic pain    • Essential hypertension          56-year-old female with a past medical history of COPD, type 2 diabetes mellitus, hypertension, chronic pain on chronic narcotics due to lumbar disc disease and on chronic benzodiazepines.  She is on home O2 and inhaled therapy and has been followed by Dr. Darrion Tovar.  Echocardiogram one year ago revealed normal systolic and diastolic function.  She presented on 12/30 with bilateral lobar infiltrates most evident in the right lower lobe with air bronchograms.  She had mediastinal and hilar adenopathy but this had been noted in the past.  She failed BiPAP therapy and required intubation in the ER.  Blood and sputum cultures were negative with the exception of a micrococcus in the blood assumed to be a contaminant.  Strep pneumonia antigen and Legionella antigen in the urine were negative.  MRSA PCR was negative.  COVID and influenza testing was negative    She continues on mechanical ventilation though continues to require high fractions of oxygen, currently 80% and PEEP of  8.    Acute on chronic respiratory failure with hypoxia and hypercapnia  · Mechanical ventilation currently assist-control 20, tidal volume 430 mL, FiO2 80%, and PEEP 8  · Will increase PEEP to 12 and wean FiO2  · Avoid excessive volume    Bilateral severe immunity acquired pneumonia  · Zosyn and doxycycline  · Cultures all negative as described above.  Legionella and strep pneumonia antigens negative.  MRSA PCR negative.    COPD  · Home O2  · Currently on duo nebs    History of recurrent DVT  · On Eliquis as at home    Nutrition  · Tube feeds at goal with Peptamen VHP at 65 mL/hour and free water at 30 mL/hour     I discussed the patient's findings and my recommendations with nursing staff     Plan of care and goals reviewed with multidisciplinary team at daily rounds.    Critical Care time spent in direct patient care: 31 minutes (excluding procedure time, if applicable) including high complexity decision making to assess, manipulate, and support vital organ system failure in this individual who has impairment of one or more vital organ systems such that there is a high probability of imminent or life threatening deterioration in the patient's condition.    Kuldeep Dodson MD  Pulmonary and Critical Care Medicine  01/03/22 16:25 EST

## 2022-01-03 NOTE — PROGRESS NOTES
Clinical Nutrition   Reason For Visit: MDR, Follow-up protocol, EN    Patient Name: Dani Ziegler  YOB: 1965  MRN: [id number]  Date of Encounter: 01/03/22 12:13 EST  Admission date: 12/30/2021      RD adjusted EN regimen to better meet calorie/protein needs after today's updated needs assessment:  Peptamen VHP @ 50 ml/hr. Free water @ 30 ml/hr. Prostat - 1 packet 2x daily.    At goal rate this regimen provides 1000 ml formula, 1200 calories (75% est needs), 123 g protein (102% est needs), 4 g fiber, 840 ml water from formula, 1440 ml total water from formula/flushes.    >>>With 554 calories from propofol, patient will receive 1754 calories (110% est needs) total.      Nutrition Assessment     Admission Problem List:  Acute on chronic respiratory failure  Sepsis  CAP (community acquired pneumonia)  Acute 7th rib fracture      Applicable medical tests/procedures since admission:  (12/31) intubated  (1/1) EN initiated via OG tube per intensivist      PMH: She  has a past medical history of Abnormal weight gain, Acute UTI, Anxiety, Arthritis involving multiple sites, Chronic obstructive pulmonary disease (HCC), Chronic pain, Current every day smoker, Depression, Diabetes mellitus (HCC), Diarrhea, Dysuria, Edema, lower extremity, Fever, Head injury, Hypertension, Intervertebral disc disorder, Left bundle branch block, Leukocytosis, Long term current use of methadone for pain control, Mass of right breast, Nausea, Obesity, Overactive bladder, Peripheral neuropathy, Superficial phlebitis, Upper respiratory infection, Urinary incontinence, Vitamin D deficiency, and Vomiting.   PSxH: She  has a past surgical history that includes Bladder surgery; Cholecystectomy; and Hip Arthroscopy (Right, 10/29/2020).        Reported/Observed/Food/Nutrition Related History   Per [facility] discussion - patient intubated and sedated. Has OG tube. Receiving EN @ 65 ml/hr with 30 ml Q 2 hours and tolerating. No bowel movement this  admission per I/Os.      Anthropometrics   Height: 67 in  Weight: 385 lbs (stated wt 12/30)  BMI: 60.3  BMI classification: Obese Class III extreme obesity: > or equal to 40kg/m2   IBW: 135 lbs    UBW:  Last 15 Recorded Weights  Weight Weight (kg) Weight (lbs) Weight Method VISIT REPORT   12/30/2021 174.635 kg 385 lb Stated -   8/24/2021 133.358 kg 294 lb - Report   7/16/2021 139.708 kg 308 lb - -   6/12/2021 128.368 kg 283 lb - -   6/11/2021 129.502 kg 285 lb 8 oz Standing scale -   6/10/2021 132.496 kg 292 lb 1.6 oz Bed scale -   6/9/2021 130.817 kg 288 lb 6.4 oz Bed scale -   6/8/2021 131.362 kg 289 lb 9.6 oz Standing scale -   6/7/2021 133.856 kg 295 lb 1.6 oz Bed scale -   6/6/2021 134.446 kg 296 lb 6.4 oz Bed scale -   6/4/2021 132.269 kg 291 lb 9.6 oz Bed scale -   6/3/2021 136.533 kg 301 lb Stated -   5/25/2021 130.182 kg 287 lb - Report   1/20/2021 143.881 kg 317 lb 3.2 oz - Report       ---RD notes stated wt on admission not consistent with EMR weight hx so RD suspects stated weight is inaccurate.      Labs reviewed   Labs reviewed: Yes    Results from last 7 days   Lab Units 01/03/22  0303 01/02/22  0521 01/01/22  0350   SODIUM mmol/L 140 139 139   POTASSIUM mmol/L 3.7 3.9 4.0   CHLORIDE mmol/L 101 102 103   CO2 mmol/L 31.0* 29.0 29.0   BUN mg/dL 14 18 18   CREATININE mg/dL 0.69 0.66 0.65   GLUCOSE mg/dL 87 87 115*   CALCIUM mg/dL 8.7 8.8 9.0   PHOSPHORUS mg/dL 3.5 2.3* 2.5   MAGNESIUM mg/dL 2.0 1.8 2.0   TRIGLYCERIDES mg/dL 154*  --   --      Results from last 7 days   Lab Units 01/03/22  0303 01/02/22  0521 01/01/22  0350   WBC 10*3/mm3 11.91* 13.75* 18.95*   ALBUMIN g/dL 2.50* 2.70* 2.70*   PREALBUMIN mg/dL 11.7*  --   --    CRP mg/dL 8.25*  --   --    TRIGLYCERIDES mg/dL 154*  --   --      Results from last 7 days   Lab Units 01/03/22  1142 01/03/22  0607 01/03/22  0055 01/02/22  2355 01/02/22  1710 01/02/22  1131   GLUCOSE mg/dL 102 98 91 79 92 126       Medications reviewed   Medications reviewed:  Yes  Scheduled: antibiotic, pepcid, insulin  GTT: fentanyl, propofol (21 ml/hr - 554 kcal)    Needs Assessment (1/3)   Height used: 67 in/170.2 cm  Weight used: 294 lbs/133.5 kg (actual wt estimated by RD);   135 lbs/61.5 kg (IBW)    Estimated Calories needs: ~1600 calories daily  PSU (VE = 8.8, Tmax = 37.3) = 2165 x .70 = 1516  11-14 kcal/kg actual wt = 0035-8616    Estimated Protein needs: ~123 g protein daily  2.0 g/kg IBW = 123    Estimated Fluid needs: ~1500 ml fluid daily/per clinical status      Current Nutrition Prescription   PO: NPO Diet      EN: Peptamen Intense VHP @ 65 ml/hr. Free water @ 30 ml Q 2 hours.   Diet, Tube Feeding Tube Feeding Formula: Peptamen Intense VHP (Peptide Based, Very High Protein)  Route: OG  Verified at bedside: Yes - per RN  Nutrition provided at goal: 1300 ml formula, 1300 calories (81% est needs), 121 g protein (98% est needs), 6 g fiber, 1092 ml water from formula, 1392 ml total water from formula/flushes.  ---With current propofol dose/rate = 1854 calories (116% est needs)  Calculations based on 20 hrs of anticipated EN delivery in the critical care setting.      Average PO intake: insufficient data (NPO)    EN delivery:  1 Day: 586 ml (45% goal volume) + 590 ml water (flushes)  ---RD notes goal rate reached early this morning (1/3)      Nutrition Diagnosis       1/2/2022  Problem Inadequate oral intake   Etiology A/C RF    Signs/Symptoms NPO d/t mechanical ventilation   Status: ongoing - EN initiated (1/1)     1/2/2022  Problem Food and nutrition knowledge deficit   Etiology MICHELINE   Signs/Symptoms BMI > 60, MD consult   Status: education pending- pt intubated and sedated      Goal:   General: Nutrition to support treatment  PO: Initiate diet as medically appropriate   EN/PN: Tolerate EN at goal, Adjust EN    Intervention   Intervention: Follow treatment progress, Care plan reviewed, Await begin PO, Nutrition support order placed    RD adjusted EN regimen to better meet  calorie/protein needs after today's updated needs assessment:  Peptamen VHP @ 50 ml/hr. Free water @ 30 ml/hr. Prostat - 1 packet 2x daily.    At goal rate this regimen provides 1000 ml formula, 1200 calories (75% est needs), 123 g protein (102% est needs), 4 g fiber, 840 ml water from formula, 1440 ml total water from formula/flushes.    >>>With 554 calories from propofol, patient will receive 1754 calories (110% est needs) total.    Monitoring/Evaluation:   Monitoring/Evaluation: Per protocol, I&O, Pertinent labs, EN delivery/tolerance, Weight, Symptoms, POC/GOC, Swallow function, Hemodynamic stability    Prabha Johnson RD  Time Spent: 45 min

## 2022-01-03 NOTE — PLAN OF CARE
Goal Outcome Evaluation:  Plan of Care Reviewed With: patient        Progress: no change     No changes in pt status. Remains on diprivan and fentanyl. T max 100. Visitor at bedside for short interim this afternoon.

## 2022-01-03 NOTE — CONSULTS
COPD Nurse Navigator has received consult and completed chart review.  Per documentation, patient is mechanically ventilated and medically sedated.  NN will continue to follow and will complete interview and education as appropriate.

## 2022-01-04 ENCOUNTER — APPOINTMENT (OUTPATIENT)
Dept: GENERAL RADIOLOGY | Facility: HOSPITAL | Age: 57
End: 2022-01-04

## 2022-01-04 LAB
ALBUMIN SERPL-MCNC: 2.9 G/DL (ref 3.5–5.2)
ALBUMIN/GLOB SERPL: 0.8 G/DL
ALP SERPL-CCNC: 114 U/L (ref 39–117)
ALT SERPL W P-5'-P-CCNC: 10 U/L (ref 1–33)
ANION GAP SERPL CALCULATED.3IONS-SCNC: 9 MMOL/L (ref 5–15)
AST SERPL-CCNC: 13 U/L (ref 1–32)
BACTERIA SPEC AEROBE CULT: NORMAL
BASOPHILS # BLD AUTO: 0.05 10*3/MM3 (ref 0–0.2)
BASOPHILS NFR BLD AUTO: 0.5 % (ref 0–1.5)
BILIRUB SERPL-MCNC: 0.8 MG/DL (ref 0–1.2)
BUN SERPL-MCNC: 18 MG/DL (ref 6–20)
BUN/CREAT SERPL: 26.5 (ref 7–25)
CALCIUM SPEC-SCNC: 8.7 MG/DL (ref 8.6–10.5)
CHLORIDE SERPL-SCNC: 102 MMOL/L (ref 98–107)
CO2 SERPL-SCNC: 29 MMOL/L (ref 22–29)
CREAT SERPL-MCNC: 0.68 MG/DL (ref 0.57–1)
DEPRECATED RDW RBC AUTO: 61.3 FL (ref 37–54)
EOSINOPHIL # BLD AUTO: 0.38 10*3/MM3 (ref 0–0.4)
EOSINOPHIL NFR BLD AUTO: 3.8 % (ref 0.3–6.2)
ERYTHROCYTE [DISTWIDTH] IN BLOOD BY AUTOMATED COUNT: 18.5 % (ref 12.3–15.4)
GFR SERPL CREATININE-BSD FRML MDRD: 89 ML/MIN/1.73
GLOBULIN UR ELPH-MCNC: 3.5 GM/DL
GLUCOSE BLDC GLUCOMTR-MCNC: 101 MG/DL (ref 70–130)
GLUCOSE BLDC GLUCOMTR-MCNC: 104 MG/DL (ref 70–130)
GLUCOSE BLDC GLUCOMTR-MCNC: 105 MG/DL (ref 70–130)
GLUCOSE BLDC GLUCOMTR-MCNC: 119 MG/DL (ref 70–130)
GLUCOSE BLDC GLUCOMTR-MCNC: 128 MG/DL (ref 70–130)
GLUCOSE SERPL-MCNC: 105 MG/DL (ref 65–99)
HCT VFR BLD AUTO: 37.8 % (ref 34–46.6)
HGB BLD-MCNC: 11.6 G/DL (ref 12–15.9)
IMM GRANULOCYTES # BLD AUTO: 0.28 10*3/MM3 (ref 0–0.05)
IMM GRANULOCYTES NFR BLD AUTO: 2.8 % (ref 0–0.5)
LYMPHOCYTES # BLD AUTO: 1.8 10*3/MM3 (ref 0.7–3.1)
LYMPHOCYTES NFR BLD AUTO: 18.1 % (ref 19.6–45.3)
MAGNESIUM SERPL-MCNC: 2 MG/DL (ref 1.6–2.6)
MCH RBC QN AUTO: 27.6 PG (ref 26.6–33)
MCHC RBC AUTO-ENTMCNC: 30.7 G/DL (ref 31.5–35.7)
MCV RBC AUTO: 89.8 FL (ref 79–97)
MONOCYTES # BLD AUTO: 0.61 10*3/MM3 (ref 0.1–0.9)
MONOCYTES NFR BLD AUTO: 6.1 % (ref 5–12)
NEUTROPHILS NFR BLD AUTO: 6.81 10*3/MM3 (ref 1.7–7)
NEUTROPHILS NFR BLD AUTO: 68.7 % (ref 42.7–76)
NRBC BLD AUTO-RTO: 0 /100 WBC (ref 0–0.2)
PHOSPHATE SERPL-MCNC: 3.3 MG/DL (ref 2.5–4.5)
PLATELET # BLD AUTO: 242 10*3/MM3 (ref 140–450)
PMV BLD AUTO: 10.1 FL (ref 6–12)
POTASSIUM SERPL-SCNC: 3.8 MMOL/L (ref 3.5–5.2)
PROT SERPL-MCNC: 6.4 G/DL (ref 6–8.5)
RBC # BLD AUTO: 4.21 10*6/MM3 (ref 3.77–5.28)
SODIUM SERPL-SCNC: 140 MMOL/L (ref 136–145)
WBC NRBC COR # BLD: 9.93 10*3/MM3 (ref 3.4–10.8)

## 2022-01-04 PROCEDURE — 25010000002 PROPOFOL 10 MG/ML EMULSION: Performed by: INTERNAL MEDICINE

## 2022-01-04 PROCEDURE — 80053 COMPREHEN METABOLIC PANEL: CPT | Performed by: INTERNAL MEDICINE

## 2022-01-04 PROCEDURE — 82962 GLUCOSE BLOOD TEST: CPT

## 2022-01-04 PROCEDURE — 71045 X-RAY EXAM CHEST 1 VIEW: CPT

## 2022-01-04 PROCEDURE — 83735 ASSAY OF MAGNESIUM: CPT | Performed by: INTERNAL MEDICINE

## 2022-01-04 PROCEDURE — 97610 LOW FREQUENCY NON-THERMAL US: CPT

## 2022-01-04 PROCEDURE — 97162 PT EVAL MOD COMPLEX 30 MIN: CPT

## 2022-01-04 PROCEDURE — 25010000002 FUROSEMIDE PER 20 MG: Performed by: INTERNAL MEDICINE

## 2022-01-04 PROCEDURE — 94799 UNLISTED PULMONARY SVC/PX: CPT

## 2022-01-04 PROCEDURE — 94003 VENT MGMT INPAT SUBQ DAY: CPT

## 2022-01-04 PROCEDURE — 99291 CRITICAL CARE FIRST HOUR: CPT | Performed by: INTERNAL MEDICINE

## 2022-01-04 PROCEDURE — 85025 COMPLETE CBC W/AUTO DIFF WBC: CPT | Performed by: INTERNAL MEDICINE

## 2022-01-04 PROCEDURE — 84100 ASSAY OF PHOSPHORUS: CPT | Performed by: INTERNAL MEDICINE

## 2022-01-04 PROCEDURE — 25010000002 PIPERACILLIN SOD-TAZOBACTAM PER 1 G

## 2022-01-04 PROCEDURE — 25010000002 FENTANYL CITRATE (PF) 2500 MCG/50ML SOLUTION: Performed by: INTERNAL MEDICINE

## 2022-01-04 RX ORDER — INSULIN GLARGINE 100 [IU]/ML
INJECTION, SOLUTION SUBCUTANEOUS
Qty: 30 ML | Refills: 1 | Status: SHIPPED | OUTPATIENT
Start: 2022-01-04 | End: 2022-02-24

## 2022-01-04 RX ORDER — MIDAZOLAM IN NACL,ISO-OSMOT/PF 50 MG/50ML
1-10 INFUSION BOTTLE (ML) INTRAVENOUS
Status: DISCONTINUED | OUTPATIENT
Start: 2022-01-04 | End: 2022-01-12

## 2022-01-04 RX ORDER — FUROSEMIDE 10 MG/ML
40 INJECTION INTRAMUSCULAR; INTRAVENOUS EVERY 12 HOURS
Status: COMPLETED | OUTPATIENT
Start: 2022-01-04 | End: 2022-01-07

## 2022-01-04 RX ADMIN — CASTOR OIL AND BALSAM, PERU 1 APPLICATION: 788; 87 OINTMENT TOPICAL at 08:43

## 2022-01-04 RX ADMIN — IPRATROPIUM BROMIDE AND ALBUTEROL SULFATE 3 ML: 2.5; .5 SOLUTION RESPIRATORY (INHALATION) at 00:56

## 2022-01-04 RX ADMIN — Medication 1 PACKET: at 08:43

## 2022-01-04 RX ADMIN — PROPOFOL 25 MCG/KG/MIN: 10 INJECTION, EMULSION INTRAVENOUS at 20:48

## 2022-01-04 RX ADMIN — Medication 300 MCG/HR: at 12:56

## 2022-01-04 RX ADMIN — FUROSEMIDE 40 MG: 10 INJECTION, SOLUTION INTRAMUSCULAR; INTRAVENOUS at 09:30

## 2022-01-04 RX ADMIN — CHLORHEXIDINE GLUCONATE 15 ML: 1.2 SOLUTION ORAL at 20:53

## 2022-01-04 RX ADMIN — TAZOBACTAM SODIUM AND PIPERACILLIN SODIUM 4.5 G: 500; 4 INJECTION, SOLUTION INTRAVENOUS at 23:49

## 2022-01-04 RX ADMIN — IPRATROPIUM BROMIDE AND ALBUTEROL SULFATE 3 ML: 2.5; .5 SOLUTION RESPIRATORY (INHALATION) at 06:54

## 2022-01-04 RX ADMIN — PROPOFOL 25 MCG/KG/MIN: 10 INJECTION, EMULSION INTRAVENOUS at 12:56

## 2022-01-04 RX ADMIN — CHLORHEXIDINE GLUCONATE 15 ML: 1.2 SOLUTION ORAL at 08:43

## 2022-01-04 RX ADMIN — TAZOBACTAM SODIUM AND PIPERACILLIN SODIUM 4.5 G: 500; 4 INJECTION, SOLUTION INTRAVENOUS at 08:42

## 2022-01-04 RX ADMIN — Medication 5 MG/HR: at 09:31

## 2022-01-04 RX ADMIN — PROPOFOL 20 MCG/KG/MIN: 10 INJECTION, EMULSION INTRAVENOUS at 00:44

## 2022-01-04 RX ADMIN — GABAPENTIN 300 MG: 300 CAPSULE ORAL at 14:27

## 2022-01-04 RX ADMIN — FAMOTIDINE 20 MG: 10 INJECTION INTRAVENOUS at 20:48

## 2022-01-04 RX ADMIN — FUROSEMIDE 40 MG: 10 INJECTION, SOLUTION INTRAMUSCULAR; INTRAVENOUS at 20:49

## 2022-01-04 RX ADMIN — PROPOFOL 20 MCG/KG/MIN: 10 INJECTION, EMULSION INTRAVENOUS at 05:18

## 2022-01-04 RX ADMIN — GABAPENTIN 300 MG: 300 CAPSULE ORAL at 06:28

## 2022-01-04 RX ADMIN — TAZOBACTAM SODIUM AND PIPERACILLIN SODIUM 4.5 G: 500; 4 INJECTION, SOLUTION INTRAVENOUS at 16:21

## 2022-01-04 RX ADMIN — PROPOFOL 25 MCG/KG/MIN: 10 INJECTION, EMULSION INTRAVENOUS at 10:50

## 2022-01-04 RX ADMIN — GABAPENTIN 300 MG: 300 CAPSULE ORAL at 21:54

## 2022-01-04 RX ADMIN — FAMOTIDINE 20 MG: 10 INJECTION INTRAVENOUS at 08:43

## 2022-01-04 RX ADMIN — DOXYCYCLINE 100 MG: 100 INJECTION, POWDER, LYOPHILIZED, FOR SOLUTION INTRAVENOUS at 06:28

## 2022-01-04 RX ADMIN — Medication 300 MCG/HR: at 21:54

## 2022-01-04 RX ADMIN — IPRATROPIUM BROMIDE AND ALBUTEROL SULFATE 3 ML: 2.5; .5 SOLUTION RESPIRATORY (INHALATION) at 12:16

## 2022-01-04 RX ADMIN — APIXABAN 5 MG: 5 TABLET, FILM COATED ORAL at 08:43

## 2022-01-04 RX ADMIN — IPRATROPIUM BROMIDE AND ALBUTEROL SULFATE 3 ML: 2.5; .5 SOLUTION RESPIRATORY (INHALATION) at 19:06

## 2022-01-04 RX ADMIN — APIXABAN 5 MG: 5 TABLET, FILM COATED ORAL at 20:48

## 2022-01-04 RX ADMIN — DOXYCYCLINE 100 MG: 100 INJECTION, POWDER, LYOPHILIZED, FOR SOLUTION INTRAVENOUS at 12:34

## 2022-01-04 RX ADMIN — Medication 7 MG/HR: at 19:40

## 2022-01-04 RX ADMIN — Medication 1 PACKET: at 20:49

## 2022-01-04 RX ADMIN — Medication 250 MCG/HR: at 04:01

## 2022-01-04 RX ADMIN — PROPOFOL 25 MCG/KG/MIN: 10 INJECTION, EMULSION INTRAVENOUS at 17:00

## 2022-01-04 RX ADMIN — CASTOR OIL AND BALSAM, PERU 1 APPLICATION: 788; 87 OINTMENT TOPICAL at 20:50

## 2022-01-04 RX ADMIN — SODIUM CHLORIDE, PRESERVATIVE FREE 10 ML: 5 INJECTION INTRAVENOUS at 08:43

## 2022-01-04 RX ADMIN — SODIUM CHLORIDE, PRESERVATIVE FREE 10 ML: 5 INJECTION INTRAVENOUS at 20:49

## 2022-01-04 NOTE — PROGRESS NOTES
Daily chart review performed.  Per documentation, patient is mechanically ventilated and medically sedated.  NN will continue to follow and see pt at BS at next appropriate opportunity.

## 2022-01-04 NOTE — PROGRESS NOTES
Multidisciplinary Rounds    Time: 20 min  Patient Name: Dani Ziegler  Date of Encounter: 01/04/22 10:48 EST  MRN: [id number]  Admission date: 12/30/2021      Reason for visit: MDR. RD to continue to follow per protocol.       Additional information obtained during MDR: Patient remains intubated and sedated. Propofol continues @ 21 ml/hr (554 calories). Versed being added this morning. Patient's EN formula/free water running at rates from previous EN prescription that was changed yesterday afternoon (Peptamen VHP @ 65 ml/hr with free water @ 30 ml Q 2 hours. RD requested that RN update rates to match current prescription. Patient has continued to tolerate EN. Per I/Os patient has received 1240 ml EN (124% goal volume) within the past 24 hours. No BM this admission per I/Os. Patient to be laterally transferred to different ICU today.      Current diet: NPO Diet      EN: Peptamen VHP @ 50 ml/hr. Free water @ 30 ml/hr. Prostat - 1 packet 2x daily.  Route: OGT  Verified at bedside: Yes - per RN  Nutrition provided at goal: 1000 ml formula, 1200 calories (75% est needs), 123 g protein (102% est needs), 4 g fiber, 840 ml water from formula, 1440 ml total water from formula/flushes.  >>>With 554 calories from propofol, provides 1754 total calories (110% est needs).      Intervention:  Follow treatment plan  Care plan reviewed  Continue current EN regimen  Recommend considering adding bowel regimen      Follow up:   Per protocol      Prabha Johnson RD  10:48 EST

## 2022-01-04 NOTE — PLAN OF CARE
Goal Outcome Evaluation:  Plan of Care Reviewed With: patient  Progress: no change    Neuro:  Remains sedated on Fentanyl and low-dose Precedex. Opens eyes to voice/stim, makes eye contact, DUPONT equally, but does not follow commands.    Resp:  Remains intubated with high O2 requirement. 80% FiO2, PEEP 8 overnight, SpO2 88-92%. Lungs sound coarse and rhonchus with copious tan ETT secretions.     Cardiac:  Sinus rhythm, SBP 90-120s, no ectopy noted.    GI/GI:  Pep VHP continues at goal rate; active bowel sounds, no BM. Excellent UOP (~1L) per [place]. Renal labs WNL.    Misc:  Temp 37-37.6 overnight, ABX continue.  Lower back pressure injury open to air per WOC, Venelex applied per order.  Heel boots in place, turned Q2 with mechanical lift. RUE soft wrist restraints in place for tube and line protection.

## 2022-01-04 NOTE — THERAPY EVALUATION
Acute Care - Wound/Debridement Initial Evaluation  [facility]     Patient Name: Dani Ziegler  : 1965  MRN: [id number]  Today's Date: 2022                Admit Date: 2021    Visit Dx:    ICD-10-CM ICD-9-CM   1. Acute exacerbation of chronic obstructive pulmonary disease (COPD) (Former[facility])  J44.1 491.21   2. Acute respiratory distress  R06.03 518.82   3. Hypoxia  R09.02 799.02   4. Pneumonia of both lungs due to infectious organism, unspecified part of lung  J18.9 483.8   5. Sepsis with acute hypoxic respiratory failure without septic shock, due to unspecified organism ([facility])  A41.9 038.9    R65.20 995.91    J96.01 518.81       Patient Active Problem List   Diagnosis   • Uncontrolled type 2 diabetes mellitus with hyperglycemia, without long-term current use of insulin ([facility])   • Vitamin D deficiency   • Peripheral neuropathy   • Adiposity   • Left bundle branch block (LBBB)   • Essential hypertension   • Chronic pain   • Chronic obstructive pulmonary disease ([facility])   • Anxiety   • Depression   • Arthritis involving multiple sites   • Leukocytosis   • Mixed hyperlipidemia   • Special screening for malignant neoplasms, colon   • Cellulitis of left lower extremity   • Hyperkalemia   • Acute on chronic renal insufficiency   • Sepsis ([facility])   • CHF (congestive heart failure) ([facility])   • Cellulitis of left leg   • Lumbar disc disease   • Diabetic peripheral neuropathy ([facility])   • COVID-19 virus infection   • CAP (community acquired pneumonia)   • Polypharmacy   • Morbid obesity with BMI of 45.0-49.9, adult ([facility])   • Acute on chronic respiratory failure with hypoxia and hypercapnia ([facility])   • History of recurrent deep vein thrombosis (DVT)   • Morbid obesity with BMI of 60.0-69.9, adult ([facility])        Past Medical History:   Diagnosis Date   • Abnormal weight gain    • Acute UTI    • Anxiety    • Arthritis involving multiple sites    • Chronic obstructive pulmonary disease ([facility])    • Chronic pain    • Current every day  smoker    • Depression    • Diabetes mellitus (HCC)    • Diarrhea    • Dysuria    • Edema, lower extremity    • Fever    • Head injury    • Hypertension    • Intervertebral disc disorder     Degeneration of intervertebral disc, site unspecified (722.6)   • Left bundle branch block    • Leukocytosis    • Long term current use of methadone for pain control    • Mass of right breast    • Nausea    • Obesity    • Overactive bladder    • Peripheral neuropathy    • Superficial phlebitis     right medial calf   • Upper respiratory infection    • Urinary incontinence    • Vitamin D deficiency    • Vomiting         Past Surgical History:   Procedure Laterality Date   • BLADDER SURGERY      pubovaginal sling   • CHOLECYSTECTOMY     • HIP ARTHROSCOPY Right 10/29/2020           Wound 12/31/21 1700 lower back Pressure Injury (Active)   Dressing Appearance open to air 01/04/22 1400   Closure Open to air 01/04/22 1200   Base dry; red 01/04/22 1400   Periwound intact; blanchable 01/04/22 1400   Periwound Temperature warm 01/04/22 1400   Periwound Skin Turgor firm 01/04/22 1400   Wound Length (cm) 1.5 cm 01/04/22 1400   Wound Width (cm) 3.5 cm 01/04/22 1400   Wound Depth (cm) 0 cm 01/04/22 1400   Drainage Amount none 01/04/22 1400   Care, Wound irrigated with; sterile normal saline; ultrasound therapy, non contact low frequency 01/04/22 1400   Dressing Care open to air 01/04/22 1400   Periwound Care barrier ointment applied 01/03/22 2200         WOUND DEBRIDEMENT                     PT Assessment (last 12 hours)     PT Evaluation and Treatment     Row Name 01/04/22 1400          Physical Therapy Time and Intention    Subjective Information --  Pt not able to verb due to intubation.  -     Document Type evaluation; therapy note (daily note); wound care  -     Mode of Treatment individual therapy; physical therapy  -     Row Name 01/04/22 1400          General Information    Patient Profile Reviewed yes  -     Patient  Observations alert; cooperative; agree to therapy  -     Pertinent History of Current Functional Problem Pt with DTPI to back  -MF     Risks Reviewed patient:; increased discomfort  -MF     Benefits Reviewed patient:; improve skin integrity  -MF     Barriers to Rehab medically complex; previous functional deficit; physical barrier  -     Row Name 01/04/22 1400          Cognition    Affect/Mental Status (Cognitive) unresponsive  Pt intubated  -     Row Name 01/04/22 1400          Pain    Additional Documentation Pain Scale: FACES Pre/Post-Treatment (Group)  -     Row Name 01/04/22 1400          Pain Scale: FACES Pre/Post-Treatment    Pain: FACES Scale, Pretreatment 0-->no hurt  -MF     Posttreatment Pain Rating 0-->no hurt  -MF     Row Name 01/04/22 1400          Wound 12/31/21 1700 lower back Pressure Injury    Wound - Properties Group Placement Date: 12/31/21 -KK Placement Time: 1700  -KK Present on Hospital Admission: Y  -KK Orientation: lower  -KK Location: back  -KK Primary Wound Type: Pressure inj  -KK Stage, Pressure Injury : Stage 1  -KK     Dressing Appearance open to air  -     Base dry; red  -MF     Periwound intact; blanchable  -     Periwound Temperature warm  -MF     Periwound Skin Turgor firm  -MF     Wound Length (cm) 1.5 cm  -MF     Wound Width (cm) 3.5 cm  -MF     Wound Depth (cm) 0 cm  -MF     Drainage Amount none  -MF     Care, Wound irrigated with; sterile normal saline; ultrasound therapy, non contact low frequency  MIST x 5 min  -MF     Dressing Care open to air  -MF     Retired Wound - Properties Group Date first assessed: 12/31/21 -KK Time first assessed: 1700 -KK Present on Hospital Admission: Y  -KK Location: back  -KK Primary Wound Type: Pressure inj  -KK     Row Name 01/04/22 1400          Coping    Observed Emotional State calm  -     Verbalized Emotional State other (see comments)  Pt unable to verb  -     Trust Relationship/Rapport care explained  -     Row Name  01/04/22 1400          Plan of Care Review    Plan of Care Reviewed With patient  -MF     Outcome Summary Pt presents with DPTI to back, PT was able to MIST area to help increase healing potential and improve skin integrity.  -     Row Name 01/04/22 1400          Physical Therapy Goals    Wound Care Goal Selection (PT) wound care, PT goal 1  -     Row Name 01/04/22 1400          Wound Care Goal 1 (PT)    Wound Care Goal 1 (PT) Decrease wound size by 20% as evidence of wound healing  -     Time Frame (Wound Care Goal 1, PT) 10 days  -     Row Name 01/04/22 1400          Positioning and Restraints    Pre-Treatment Position in bed  -     Post Treatment Position bed  -     In Bed supine; call light within reach  -     Row Name 01/04/22 1400          Therapy Assessment/Plan (PT)    Patient/Family Therapy Goals Statement (PT) pt unable to verb  -     PT Diagnosis (PT) DTPI to back  -     Rehab Potential (PT) fair, will monitor progress closely  -     Criteria for Skilled Interventions Met (PT) yes; meets criteria; skilled treatment is necessary  -     Row Name 01/04/22 1400          PT Evaluation Complexity    History, PT Evaluation Complexity 3 or more personal factors and/or comorbidities  -     Examination of Body Systems (PT Eval Complexity) total of 3 or more elements  -     Clinical Presentation (PT Evaluation Complexity) evolving  -     Clinical Decision Making (PT Evaluation Complexity) moderate complexity  -     Overall Complexity (PT Evaluation Complexity) moderate complexity  -     Row Name 01/04/22 1400          Therapy Plan Review/Discharge Plan (PT)    Therapy Plan Review (PT) evaluation/treatment results reviewed; care plan/treatment goals reviewed; risks/benefits reviewed; current/potential barriers reviewed; patient  Pt unable to verb  -           User Key  (r) = Recorded By, (t) = Taken By, (c) = Cosigned By    Initials Name Provider Type    Chau Dalton, PT  Physical Therapist    Sheldon Ruiz RN Registered Nurse                  Recommendation and Plan  Planned Therapy Interventions (PT): wound care, patient/family education  Plan of Care Reviewed With: patient           Outcome Summary: Pt presents with DPTI to back, PT was able to MIST area to help increase healing potential and improve skin integrity.  Plan of Care Reviewed With: patient            Time Calculation   PT Charges     Row Name 01/04/22 1400             Time Calculation    Start Time 1400  -MF      PT Goal Re-Cert Due Date 01/14/22  -MF              Untimed Charges    PT Eval/Re-eval Minutes 40  -MF      Wound Care 73151 NLFU Mist  -MF      [id number] Mist 15  -MF              Total Minutes    Untimed Charges Total Minutes 55  -MF       Total Minutes 55  -MF            User Key  (r) = Recorded By, (t) = Taken By, (c) = Cosigned By    Initials Name Provider Type    Chau Dalton, PT Physical Therapist                  Therapy Charges for Today     Code Description Service Date Service Provider Modifiers Qty    75885932629 HC PT NLFU MIST 1/4/2022 Chau Hurst, PT GP 1    92963711520 HC PT EVAL MOD COMPLEXITY 3 1/4/2022 Chau Hurst, PT GP 1            PT G-Codes  AM-PAC 6 Clicks Score (PT): 6       Chau Hurst, PT  1/4/2022

## 2022-01-04 NOTE — PLAN OF CARE
Goal Outcome Evaluation:  Plan of Care Reviewed With: daughter        Progress: no change  Outcome Summary: Pt remains on ventilator; Fi02 increased during shift to 80%. Pt has propofol, fentanyl, and IV fluids/ATB infusing.  Pt agitated at times, currently in restraints.  Pt turned q 2 hours.  Pt had adequate u/o.  VSS, will continue to monitor.

## 2022-01-04 NOTE — PLAN OF CARE
Goal Outcome Evaluation:  Plan of Care Reviewed With: patient           Outcome Summary: Pt presents with DPTI to back, PT was able to MIST area to help increase healing potential and improve skin integrity.

## 2022-01-04 NOTE — PROGRESS NOTES
"INTENSIVIST NOTE     Hospital Day: 4    Ms. Dani Ziegler, 56 y.o. female is followed for:   COPD, pneumonia, and respiratory failure       SUBJECTIVE     Interval history:    Remains hypoxic with FiO2 currently 80%.  Oxygen saturations around 90%.  PEEP increased to 12 yesterday.  Remains on assist control with rate 20 and tidal volume 430.  Current drips consisting of fentanyl and propofol.  Still fairly easily arousable.  Maximum temperature 100.  Fluid balance negative.     The patient's relevant past medical, surgical and social history were reviewed and updated in Epic as appropriate.       OBJECTIVE     Vital Sign Min/Max for last 24 hours  Temp  Min: 98.2 °F (36.8 °C)  Max: 100 °F (37.8 °C)   BP  Min: 88/47  Max: 149/84   Pulse  Min: 51  Max: 90   Resp  Min: 20  Max: 25   SpO2  Min: 88 %  Max: 94 %   No data recorded   No data recorded      Intake/Output Summary (Last 24 hours) at 1/4/2022 1637  Last data filed at 1/4/2022 1600  Gross per 24 hour   Intake 3797 ml   Output 6490 ml   Net -2693 ml      Flowsheet Rows      First Filed Value   Admission Height 170.2 cm (67\") Documented at 12/30/2021 2003   Admission Weight 175 kg (385 lb) Documented at 12/30/2021 2003 12/30/21 2003   Weight: (!) 175 kg (385 lb)            Objective:  General Appearance:  Ill-appearing.    Vital signs: (most recent): Blood pressure 92/50, pulse 54, temperature 98.8 °F (37.1 °C), temperature source Bladder, resp. rate 20, height 170.2 cm (67.01\"), weight (!) 175 kg (385 lb), SpO2 (!) 89 %.    HEENT: (Oral ET tube  NG tube)    Lungs:  There are decreased breath sounds and rhonchi.  No rales or wheezes.    Heart: Normal rate.  Regular rhythm.  S1 normal and S2 normal.  No murmur, gallop or friction rub.   Chest: Symmetric chest wall expansion.   Abdomen: Abdomen is soft and non-distended.  Bowel sounds are normal.   There is no abdominal tenderness.   There is no mass. There is no splenomegaly. There is no hepatomegaly. "   Extremities: There is dependent edema.  There is no deformity.  (Right upper extremity PICC line)  Neurological: (Sedated).    Pupils:  Pupils are equal, round, and reactive to light.    Skin:  Warm and dry.              I reviewed the patient's new clinical results.  I reviewed the patient's new imaging results/reports including actual images and agree with reports.    XR Chest 1 View    Result Date: 1/4/2022   No significant interval change.  This report was finalized on 1/4/2022 7:48 AM by Ranjeet Leonardo MD.      XR Chest 1 View    Result Date: 1/3/2022  Mildly improved perihilar edema.   D:  01/03/2022 E:  01/03/2022  This report was finalized on 1/3/2022 10:06 PM by Dr. Elton Cain MD.         INFUSIONS  fentanyl 10 mcg/mL,  mcg/hr, Last Rate: 300 mcg/hr (01/04/22 1256)  midazolam, 1-10 mg/hr, Last Rate: 7 mg/hr (01/04/22 1530)  propofol, 5-25 mcg/kg/min, Last Rate: 25 mcg/kg/min (01/04/22 1256)        Results from last 7 days   Lab Units 01/04/22  0302 01/03/22  0303 01/02/22  0521   WBC 10*3/mm3 9.93 11.91* 13.75*   HEMOGLOBIN g/dL 11.6* 10.7* 11.3*   HEMATOCRIT % 37.8 34.9 37.4   PLATELETS 10*3/mm3 242 267 260     Results from last 7 days   Lab Units 01/04/22  0302 01/03/22  0303 01/02/22  0521   SODIUM mmol/L 140 140 139   POTASSIUM mmol/L 3.8 3.7 3.9   CHLORIDE mmol/L 102 101 102   CO2 mmol/L 29.0 31.0* 29.0   BUN mg/dL 18 14 18   GLUCOSE mg/dL 105* 87 87   CREATININE mg/dL 0.68 0.69 0.66   MAGNESIUM mg/dL 2.0 2.0 1.8   CALCIUM mg/dL 8.7 8.7 8.8         Results from last 7 days   Lab Units 01/02/22  0347 01/01/22  0320 12/31/21  1134 12/31/21  0547 12/31/21  0307   PH, ARTERIAL pH units 7.422 7.385 7.334* 7.284* 7.267*   PCO2, ARTERIAL mm Hg 50.4* 54.7* 58.2* 62.8* 65.6*   PO2 ART mm Hg 56.1* 57.9* 71.8* 78.6* 70.7*   FIO2 % 60 50 75 90 90         Mechanical Ventilator:   Settings: Observed:   Mode: VC+/AC (01/04/22 1500)    Vt (Set, mL): 430 mL (01/04/22 1500) Vt Mandatory Ins (observed, mL): 432  mL (01/04/22 1500)   Resp Rate (Set): 20 (01/04/22 1500) Resp Rate (Observed) Vent: 20 (01/04/22 1600)   Pressure Support (cm H2O): 0 cm H20 (01/04/22 1500) Minute Ventilation (L/min) (Obs): 8.78 L/min (01/04/22 1500)       FiO2 (%): 80 % (01/04/22 1500) Plateau Pressure (cm H2O): 25 cm H2O (01/04/22 0654)   PEEP/CPAP (cm H2O): 12 cm H20 (01/04/22 1500) I:E Ratio (Obs): 1:1.40 (01/04/22 1500)         I reviewed the patient's medications.    Assessment/Plan   ASSESSMENT/PLAN     Active Hospital Problems    Diagnosis    • **Acute on chronic respiratory failure with hypoxia and hypercapnia (HCC)    • CAP (community acquired pneumonia)    • Sepsis (HCC)    • Chronic obstructive pulmonary disease (HCC)    • History of recurrent deep vein thrombosis (DVT)    • Morbid obesity with BMI of 60.0-69.9, adult (HCC)    • Morbid obesity with BMI of 45.0-49.9, adult (HCC)    • Uncontrolled type 2 diabetes mellitus with hyperglycemia, without long-term current use of insulin (HCC)    • Chronic pain    • Essential hypertension          56-year-old female with a past medical history of COPD, type 2 diabetes mellitus, hypertension, chronic pain on chronic narcotics due to lumbar disc disease and on chronic benzodiazepines.  She is on home O2 and inhaled therapy and has been followed by Dr. Darrion Tovar.  Echocardiogram one year ago revealed normal systolic and diastolic function.  She presented on 12/30 with bilateral lobar infiltrates most evident in the right lower lobe with air bronchograms.  She had mediastinal and hilar adenopathy but this had been noted in the past.  She failed BiPAP therapy and required intubation in the ER.  Blood and sputum cultures were negative with the exception of a micrococcus in the blood assumed to be a contaminant.  Strep pneumonia antigen and Legionella antigen in the urine were negative.  MRSA PCR was negative.  COVID and influenza testing was negative    She continues on mechanical ventilation  though continues to require high fractions of oxygen, currently 80% and PEEP of 12.    Acute on chronic respiratory failure with hypoxia and hypercapnia  · Mechanical ventilation currently assist-control 20, tidal volume 430 mL, FiO2 80%, and PEEP 12  · I think she is in adequately sedated and is somewhat dyssynchronous with the ventilator.  Will add Versed and continue diuresis.  Hopefully with better sedation and diuresis and continued PEEP we can wean her O2.  She would be difficult to prone given her BMI.    Bilateral severe immunity acquired pneumonia  · Zosyn and doxycycline seem appropriate.  · Cultures all negative as described above.  Legionella and strep pneumonia antigens negative.  MRSA PCR negative.    COPD  · Home O2  · Currently on duo nebs    History of recurrent DVT  · On Eliquis as at home    Nutrition  · Tube feeds at goal with Peptamen VHP at 65 mL/hour and free water at 30 mL/hour     I discussed the patient's findings and my recommendations with nursing staff     Plan of care and goals reviewed with multidisciplinary team at daily rounds.    Critical Care time spent in direct patient care: 32 minutes (excluding procedure time, if applicable) including high complexity decision making to assess, manipulate, and support vital organ system failure in this individual who has impairment of one or more vital organ systems such that there is a high probability of imminent or life threatening deterioration in the patient's condition.    Kludeep Dodson MD  Pulmonary and Critical Care Medicine  01/04/22 16:37 EST

## 2022-01-04 NOTE — NURSING NOTE
WOCN follow up for: Sacral and lumbar spine linear pressure areas    Assessment and Care provided: Sacral pressure related wound remains linear in shape but has lightened in color.  The entire wound is blanchable at this time.  Venelex applied.    The small area at her lumbar spine is also blanchable (50%) with a small area at the central wound bed that is light purple.  This pressure related wound is directly in the middle of the patient's skin fold.  Venelex applied.      Recommendation(s): We will consult PT wound care for mist therapy to lumbar pressure area.  Continue with current plan of care. See Wound Care orders.    *Continue to maintain good skin care, keep dry, turn regularly, keep heels elevated and offloaded with heel boots.    *Apply z-guard to bottom and bony prominences daily and as needed with incontinence episodes.  *Follow C.A.R.E protocol if medical devices (Bipap, lucero, Ng tube, etc) are being used.       All skin interventions are in place.  Discussed with RN.    WOC Nurse will continue to follow.  Please contact with questions or if needs arise.                This note is dictated utilizing voice recognition software. Unfortunately, this leads to occasional typographical errors. I apologize in advance if the situation occurs. If questions occur please do not hesitate to contact me.

## 2022-01-04 NOTE — CASE MANAGEMENT/SOCIAL WORK
Discharge Planning Assessment  Psychiatric     Patient Name: Dani Ziegler  MRN: [id number]  Today's Date: 1/4/2022    Admit Date: 12/30/2021     Discharge Needs Assessment     Row Name 01/04/22 1149       Living Environment    Lives With significant other    Name(s) of Who Lives With Patient Lives with DAFNE, Suresh, in [facility]    Current Living Arrangements home/apartment/condo    Primary Care Provided by self; spouse/significant other    Provides Primary Care For no one    Caregiving Concerns unable to assess-patient in ICU on vent    Family Caregiver if Needed child(fay), adult; significant other    Family Caregiver Names Adult daughter involved, Santa Ziegler @ [phone]    Quality of Family Relationships helpful; involved; supportive    Able to Return to Prior Arrangements other (see comments)-TBD, may need rehab    Living Arrangement Comments Prior to hospital admission was residing in private residence with SO; discharge plan TBD       Resource/Environmental Concerns    Transportation Concerns car, none; other (see comments)-TBD based on patient need       Transition Planning    Patient/Family Anticipates Transition to other (see comments)-TBD    Patient/Family Anticipated Services at Transition     Transportation Anticipated family or friend will provide; other (see comments)-TBD       Discharge Needs Assessment    Readmission Within the Last 30 Days no previous admission in last 30 days    Equipment Currently Used at Home shower chair; walker, rolling; bipap; oxygen; hospital bed; commode    Concerns to be Addressed discharge planning    Concerns Comments Case Management following for all needs/discharge planning when medically stable    Equipment Needed After Discharge other (see comments)-TBD    Outpatient/Agency/Support Group Needs other (see comments)-TBD    Discharge Facility/Level of Care Needs other (see comments)-TBD    Patient's Choice of Community Agency(s)  [facility], [phone]; [facility] in [place] provides home 02    Discharge Coordination/Progress TBD-remains critically ill on vent in ICU               Discharge Plan     Row Name 01/04/22 1200       Plan    Plan TBD    Patient/Family in Agreement with Plan yes  Daughter, Santa Ziegler    Plan Comments Remains in ICU on vent with plan for lateral ICU transfer today.  Spoke with daughter by telephone, reports patient lives in [place] with SO.  Daughter reports she feels patient will need physical rehab at discharge, also voices concerns regarding chronic narcotic use/positive UDS on 12/30.. Made aware Case Management will follow for all needs, plan referral to Inpatient Addiction Team as needed.  Previously followed by [facility]-spoke with agency today and they report patient no longer active, will  at hospital discharge if needed with new orders.    Final Discharge Disposition Code 30 - still a patient              Continued Care and Services - Admitted Since 12/30/2021    Coordination has not been started for this encounter.     Selected Continued Care - Episodes Includes selections from active Coordinated Care Management episodes    High-Risk Care Management Episode start date: 11/2/2020   There are no active outsourced providers for this episode.                  Demographic Summary     Row Name 01/04/22 1148       General Information    Referral Source admission list; interdisciplinary rounds    Reason for Consult discharge planning    Preferred Language English     Used During This Interaction no               Functional Status    No documentation.                Psychosocial    No documentation.                Abuse/Neglect    No documentation.                Legal    No documentation.                Substance Abuse    No documentation.                Patient Forms    No documentation.                   CARLA Salazar

## 2022-01-05 ENCOUNTER — APPOINTMENT (OUTPATIENT)
Dept: GENERAL RADIOLOGY | Facility: HOSPITAL | Age: 57
End: 2022-01-05

## 2022-01-05 LAB
ALBUMIN SERPL-MCNC: 2.8 G/DL (ref 3.5–5.2)
ALBUMIN/GLOB SERPL: 0.7 G/DL
ALP SERPL-CCNC: 134 U/L (ref 39–117)
ALT SERPL W P-5'-P-CCNC: 12 U/L (ref 1–33)
ANION GAP SERPL CALCULATED.3IONS-SCNC: 8 MMOL/L (ref 5–15)
ARTERIAL PATENCY WRIST A: ABNORMAL
AST SERPL-CCNC: 15 U/L (ref 1–32)
ATMOSPHERIC PRESS: ABNORMAL MM[HG]
BASE EXCESS BLDA CALC-SCNC: 10.4 MMOL/L (ref 0–2)
BASOPHILS # BLD AUTO: 0.09 10*3/MM3 (ref 0–0.2)
BASOPHILS NFR BLD AUTO: 0.7 % (ref 0–1.5)
BDY SITE: ABNORMAL
BILIRUB SERPL-MCNC: 0.7 MG/DL (ref 0–1.2)
BODY TEMPERATURE: 37 C
BUN SERPL-MCNC: 22 MG/DL (ref 6–20)
BUN/CREAT SERPL: 31.9 (ref 7–25)
CALCIUM SPEC-SCNC: 9.1 MG/DL (ref 8.6–10.5)
CHLORIDE SERPL-SCNC: 97 MMOL/L (ref 98–107)
CO2 BLDA-SCNC: 38 MMOL/L (ref 22–33)
CO2 SERPL-SCNC: 34 MMOL/L (ref 22–29)
COHGB MFR BLD: 0.9 % (ref 0–2)
CREAT SERPL-MCNC: 0.69 MG/DL (ref 0.57–1)
DEPRECATED RDW RBC AUTO: 60.3 FL (ref 37–54)
EOSINOPHIL # BLD AUTO: 0.53 10*3/MM3 (ref 0–0.4)
EOSINOPHIL NFR BLD AUTO: 4.2 % (ref 0.3–6.2)
EPAP: 0
ERYTHROCYTE [DISTWIDTH] IN BLOOD BY AUTOMATED COUNT: 18.5 % (ref 12.3–15.4)
GFR SERPL CREATININE-BSD FRML MDRD: 88 ML/MIN/1.73
GLOBULIN UR ELPH-MCNC: 3.9 GM/DL
GLUCOSE BLDC GLUCOMTR-MCNC: 108 MG/DL (ref 70–130)
GLUCOSE BLDC GLUCOMTR-MCNC: 110 MG/DL (ref 70–130)
GLUCOSE BLDC GLUCOMTR-MCNC: 129 MG/DL (ref 70–130)
GLUCOSE BLDC GLUCOMTR-MCNC: 139 MG/DL (ref 70–130)
GLUCOSE SERPL-MCNC: 114 MG/DL (ref 65–99)
HCO3 BLDA-SCNC: 36.3 MMOL/L (ref 20–26)
HCT VFR BLD AUTO: 35.7 % (ref 34–46.6)
HCT VFR BLD CALC: 35.8 % (ref 38–51)
HGB BLD-MCNC: 10.9 G/DL (ref 12–15.9)
HGB BLDA-MCNC: 11.7 G/DL (ref 14–18)
IMM GRANULOCYTES # BLD AUTO: 0.45 10*3/MM3 (ref 0–0.05)
IMM GRANULOCYTES NFR BLD AUTO: 3.5 % (ref 0–0.5)
INHALED O2 CONCENTRATION: 90 %
IPAP: 0
LYMPHOCYTES # BLD AUTO: 2.27 10*3/MM3 (ref 0.7–3.1)
LYMPHOCYTES NFR BLD AUTO: 17.8 % (ref 19.6–45.3)
MAGNESIUM SERPL-MCNC: 1.8 MG/DL (ref 1.6–2.6)
MCH RBC QN AUTO: 27.2 PG (ref 26.6–33)
MCHC RBC AUTO-ENTMCNC: 30.5 G/DL (ref 31.5–35.7)
MCV RBC AUTO: 89 FL (ref 79–97)
METHGB BLD QL: 0.5 % (ref 0–1.5)
MODALITY: ABNORMAL
MONOCYTES # BLD AUTO: 0.75 10*3/MM3 (ref 0.1–0.9)
MONOCYTES NFR BLD AUTO: 5.9 % (ref 5–12)
NEUTROPHILS NFR BLD AUTO: 67.9 % (ref 42.7–76)
NEUTROPHILS NFR BLD AUTO: 8.64 10*3/MM3 (ref 1.7–7)
NOTE: ABNORMAL
NRBC BLD AUTO-RTO: 0 /100 WBC (ref 0–0.2)
OXYHGB MFR BLDV: 93 % (ref 94–99)
PAW @ PEAK INSP FLOW SETTING VENT: 0 CMH2O
PCO2 BLDA: 53.9 MM HG (ref 35–45)
PCO2 TEMP ADJ BLD: 53.9 MM HG (ref 35–45)
PH BLDA: 7.44 PH UNITS (ref 7.35–7.45)
PH, TEMP CORRECTED: 7.44 PH UNITS
PHOSPHATE SERPL-MCNC: 3.5 MG/DL (ref 2.5–4.5)
PLATELET # BLD AUTO: 256 10*3/MM3 (ref 140–450)
PMV BLD AUTO: 10.5 FL (ref 6–12)
PO2 BLDA: 72.2 MM HG (ref 83–108)
PO2 TEMP ADJ BLD: 72.2 MM HG (ref 83–108)
POTASSIUM SERPL-SCNC: 3.4 MMOL/L (ref 3.5–5.2)
POTASSIUM SERPL-SCNC: 4.1 MMOL/L (ref 3.5–5.2)
PROT SERPL-MCNC: 6.7 G/DL (ref 6–8.5)
RBC # BLD AUTO: 4.01 10*6/MM3 (ref 3.77–5.28)
SODIUM SERPL-SCNC: 139 MMOL/L (ref 136–145)
TOTAL RATE: 0 BREATHS/MINUTE
WBC NRBC COR # BLD: 12.73 10*3/MM3 (ref 3.4–10.8)

## 2022-01-05 PROCEDURE — 83735 ASSAY OF MAGNESIUM: CPT | Performed by: INTERNAL MEDICINE

## 2022-01-05 PROCEDURE — 94799 UNLISTED PULMONARY SVC/PX: CPT

## 2022-01-05 PROCEDURE — 36600 WITHDRAWAL OF ARTERIAL BLOOD: CPT

## 2022-01-05 PROCEDURE — 25010000002 FENTANYL CITRATE (PF) 2500 MCG/50ML SOLUTION: Performed by: INTERNAL MEDICINE

## 2022-01-05 PROCEDURE — 0 POTASSIUM CHLORIDE PER 2 MEQ: Performed by: NURSE PRACTITIONER

## 2022-01-05 PROCEDURE — 25010000002 PIPERACILLIN SOD-TAZOBACTAM PER 1 G

## 2022-01-05 PROCEDURE — 80053 COMPREHEN METABOLIC PANEL: CPT | Performed by: INTERNAL MEDICINE

## 2022-01-05 PROCEDURE — 82962 GLUCOSE BLOOD TEST: CPT

## 2022-01-05 PROCEDURE — 82375 ASSAY CARBOXYHB QUANT: CPT

## 2022-01-05 PROCEDURE — 85025 COMPLETE CBC W/AUTO DIFF WBC: CPT | Performed by: INTERNAL MEDICINE

## 2022-01-05 PROCEDURE — 84100 ASSAY OF PHOSPHORUS: CPT | Performed by: INTERNAL MEDICINE

## 2022-01-05 PROCEDURE — 82805 BLOOD GASES W/O2 SATURATION: CPT

## 2022-01-05 PROCEDURE — 0 MAGNESIUM SULFATE 4 GM/100ML SOLUTION: Performed by: INTERNAL MEDICINE

## 2022-01-05 PROCEDURE — 71045 X-RAY EXAM CHEST 1 VIEW: CPT

## 2022-01-05 PROCEDURE — 25010000002 PROPOFOL 10 MG/ML EMULSION: Performed by: INTERNAL MEDICINE

## 2022-01-05 PROCEDURE — 25010000002 PIPERACILLIN SOD-TAZOBACTAM PER 1 G: Performed by: INTERNAL MEDICINE

## 2022-01-05 PROCEDURE — 99291 CRITICAL CARE FIRST HOUR: CPT | Performed by: INTERNAL MEDICINE

## 2022-01-05 PROCEDURE — 83050 HGB METHEMOGLOBIN QUAN: CPT

## 2022-01-05 PROCEDURE — 94003 VENT MGMT INPAT SUBQ DAY: CPT

## 2022-01-05 PROCEDURE — 84132 ASSAY OF SERUM POTASSIUM: CPT | Performed by: INTERNAL MEDICINE

## 2022-01-05 PROCEDURE — 25010000002 FUROSEMIDE PER 20 MG: Performed by: INTERNAL MEDICINE

## 2022-01-05 RX ORDER — BISACODYL 10 MG
10 SUPPOSITORY, RECTAL RECTAL DAILY PRN
Status: DISCONTINUED | OUTPATIENT
Start: 2022-01-05 | End: 2022-01-11

## 2022-01-05 RX ORDER — POTASSIUM CHLORIDE 29.8 MG/ML
20 INJECTION INTRAVENOUS
Status: DISCONTINUED | OUTPATIENT
Start: 2022-01-05 | End: 2022-01-23 | Stop reason: HOSPADM

## 2022-01-05 RX ORDER — MAGNESIUM SULFATE HEPTAHYDRATE 40 MG/ML
4 INJECTION, SOLUTION INTRAVENOUS AS NEEDED
Status: DISCONTINUED | OUTPATIENT
Start: 2022-01-05 | End: 2022-01-15

## 2022-01-05 RX ORDER — MAGNESIUM SULFATE HEPTAHYDRATE 40 MG/ML
2 INJECTION, SOLUTION INTRAVENOUS AS NEEDED
Status: DISCONTINUED | OUTPATIENT
Start: 2022-01-05 | End: 2022-01-15

## 2022-01-05 RX ORDER — POLYETHYLENE GLYCOL 3350 17 G/17G
17 POWDER, FOR SOLUTION ORAL DAILY PRN
Status: DISCONTINUED | OUTPATIENT
Start: 2022-01-05 | End: 2022-01-11

## 2022-01-05 RX ORDER — BISACODYL 5 MG/1
5 TABLET, DELAYED RELEASE ORAL DAILY PRN
Status: DISCONTINUED | OUTPATIENT
Start: 2022-01-05 | End: 2022-01-11

## 2022-01-05 RX ORDER — AMOXICILLIN 250 MG
2 CAPSULE ORAL 2 TIMES DAILY
Status: DISCONTINUED | OUTPATIENT
Start: 2022-01-05 | End: 2022-01-07

## 2022-01-05 RX ORDER — POTASSIUM CHLORIDE 7.45 MG/ML
10 INJECTION INTRAVENOUS
Status: DISCONTINUED | OUTPATIENT
Start: 2022-01-05 | End: 2022-01-05

## 2022-01-05 RX ADMIN — DOXYCYCLINE 100 MG: 100 INJECTION, POWDER, LYOPHILIZED, FOR SOLUTION INTRAVENOUS at 12:06

## 2022-01-05 RX ADMIN — PROPOFOL 25 MCG/KG/MIN: 10 INJECTION, EMULSION INTRAVENOUS at 08:21

## 2022-01-05 RX ADMIN — PROPOFOL 25 MCG/KG/MIN: 10 INJECTION, EMULSION INTRAVENOUS at 01:20

## 2022-01-05 RX ADMIN — Medication 1 PACKET: at 08:22

## 2022-01-05 RX ADMIN — APIXABAN 5 MG: 5 TABLET, FILM COATED ORAL at 08:22

## 2022-01-05 RX ADMIN — CASTOR OIL AND BALSAM, PERU 1 APPLICATION: 788; 87 OINTMENT TOPICAL at 08:22

## 2022-01-05 RX ADMIN — MAGNESIUM SULFATE HEPTAHYDRATE 4 G: 40 INJECTION, SOLUTION INTRAVENOUS at 08:22

## 2022-01-05 RX ADMIN — DOCUSATE SODIUM 50 MG AND SENNOSIDES 8.6 MG 2 TABLET: 8.6; 5 TABLET, FILM COATED ORAL at 10:10

## 2022-01-05 RX ADMIN — POTASSIUM CHLORIDE 20 MEQ: 29.8 INJECTION, SOLUTION INTRAVENOUS at 10:05

## 2022-01-05 RX ADMIN — FUROSEMIDE 40 MG: 10 INJECTION, SOLUTION INTRAMUSCULAR; INTRAVENOUS at 20:36

## 2022-01-05 RX ADMIN — PROPOFOL 25 MCG/KG/MIN: 10 INJECTION, EMULSION INTRAVENOUS at 19:13

## 2022-01-05 RX ADMIN — Medication 300 MCG/HR: at 14:12

## 2022-01-05 RX ADMIN — GABAPENTIN 300 MG: 300 CAPSULE ORAL at 14:35

## 2022-01-05 RX ADMIN — PROPOFOL 25 MCG/KG/MIN: 10 INJECTION, EMULSION INTRAVENOUS at 23:09

## 2022-01-05 RX ADMIN — DOCUSATE SODIUM 50 MG AND SENNOSIDES 8.6 MG 2 TABLET: 8.6; 5 TABLET, FILM COATED ORAL at 20:18

## 2022-01-05 RX ADMIN — PROPOFOL 25 MCG/KG/MIN: 10 INJECTION, EMULSION INTRAVENOUS at 16:09

## 2022-01-05 RX ADMIN — POTASSIUM CHLORIDE 20 MEQ: 29.8 INJECTION, SOLUTION INTRAVENOUS at 08:22

## 2022-01-05 RX ADMIN — CHLORHEXIDINE GLUCONATE 15 ML: 1.2 SOLUTION ORAL at 20:18

## 2022-01-05 RX ADMIN — IPRATROPIUM BROMIDE AND ALBUTEROL SULFATE 3 ML: 2.5; .5 SOLUTION RESPIRATORY (INHALATION) at 14:31

## 2022-01-05 RX ADMIN — Medication 7 MG/HR: at 08:23

## 2022-01-05 RX ADMIN — APIXABAN 5 MG: 5 TABLET, FILM COATED ORAL at 20:19

## 2022-01-05 RX ADMIN — IPRATROPIUM BROMIDE AND ALBUTEROL SULFATE 3 ML: 2.5; .5 SOLUTION RESPIRATORY (INHALATION) at 19:54

## 2022-01-05 RX ADMIN — Medication 1 PACKET: at 20:24

## 2022-01-05 RX ADMIN — IPRATROPIUM BROMIDE AND ALBUTEROL SULFATE 3 ML: 2.5; .5 SOLUTION RESPIRATORY (INHALATION) at 08:40

## 2022-01-05 RX ADMIN — Medication 8 MG/HR: at 23:11

## 2022-01-05 RX ADMIN — TAZOBACTAM SODIUM AND PIPERACILLIN SODIUM 4.5 G: 500; 4 INJECTION, SOLUTION INTRAVENOUS at 15:20

## 2022-01-05 RX ADMIN — PROPOFOL 25 MCG/KG/MIN: 10 INJECTION, EMULSION INTRAVENOUS at 14:35

## 2022-01-05 RX ADMIN — Medication 300 MCG/HR: at 23:10

## 2022-01-05 RX ADMIN — TAZOBACTAM SODIUM AND PIPERACILLIN SODIUM 4.5 G: 500; 4 INJECTION, SOLUTION INTRAVENOUS at 08:21

## 2022-01-05 RX ADMIN — GABAPENTIN 300 MG: 300 CAPSULE ORAL at 21:14

## 2022-01-05 RX ADMIN — IPRATROPIUM BROMIDE AND ALBUTEROL SULFATE 3 ML: 2.5; .5 SOLUTION RESPIRATORY (INHALATION) at 01:09

## 2022-01-05 RX ADMIN — CASTOR OIL AND BALSAM, PERU 1 APPLICATION: 788; 87 OINTMENT TOPICAL at 20:19

## 2022-01-05 RX ADMIN — Medication 300 MCG/HR: at 05:37

## 2022-01-05 RX ADMIN — FAMOTIDINE 20 MG: 10 INJECTION INTRAVENOUS at 20:19

## 2022-01-05 RX ADMIN — Medication 7 MG/HR: at 10:03

## 2022-01-05 RX ADMIN — DOXYCYCLINE 100 MG: 100 INJECTION, POWDER, LYOPHILIZED, FOR SOLUTION INTRAVENOUS at 05:37

## 2022-01-05 RX ADMIN — FUROSEMIDE 40 MG: 10 INJECTION, SOLUTION INTRAMUSCULAR; INTRAVENOUS at 08:22

## 2022-01-05 RX ADMIN — FAMOTIDINE 20 MG: 10 INJECTION INTRAVENOUS at 08:23

## 2022-01-05 RX ADMIN — GABAPENTIN 300 MG: 300 CAPSULE ORAL at 05:37

## 2022-01-05 NOTE — PROGRESS NOTES
Multidisciplinary Rounds    Time: 20 min  Patient Name: Dani Ziegler  Date of Encounter: 01/05/22 11:07 EST  MRN: [id number]  Admission date: 12/30/2021      Reason for visit: MDR. RD to continue to follow per protocol.       Additional information obtained during MDR: Patient remains intubated and sedated on fentanyl, versed, and propofol. Propofol @ 26.3 ml/hr (694 calories). Patient tolerating current EN regimen at goal rate. Per I/Os patient has received 1146 ml EN (115% goal volume) within the past 24 hours and 2 Prostat packets. No BM this admission per I/Os. Bowel regimen being initiated today. RN to attempt to get a zeroed bed scale weight as feasible - current bed scale weight reads 366 lbs.      Current diet: NPO Diet      EN: Peptamen VHP @ 50 ml/hr. Free water @ 30 ml/hr. Prostat - 1 packet 2x daily.  Route: OGT  Verified at bedside: Yes - per RN  Nutrition provided at goal: 1000 ml formula, 1200 calories (75% est needs), 123 g protein (102% est needs), 4 g fiber, 840 ml water from formula, 1440 ml total water from formula/flushes.  >>>With 694 calories from propofol, patient will receive 1894 total calories (118% est needs).      Intervention:  Follow treatment plan  Care plan reviewed  Continue current EN regimen      Follow up:   Per protocol      Prabha Johnson RD  10:48 EST

## 2022-01-05 NOTE — PROGRESS NOTES
"INTENSIVIST NOTE     Hospital Day: 5    Ms. Dani Ziegler, 56 y.o. female is followed for:   COPD, pneumonia, and respiratory failure       SUBJECTIVE     Interval history:    Fluid balance -3.2 L.  Better sedated.  PEEP remains at 12.  FiO2 weaned to 70%.  Remains afebrile with maximum temperature 99.3.  On fentanyl, Versed, and propofol drips.       The patient's relevant past medical, surgical and social history were reviewed and updated in Epic as appropriate.       OBJECTIVE     Vital Sign Min/Max for last 24 hours  Temp  Min: 98.4 °F (36.9 °C)  Max: 99.3 °F (37.4 °C)   BP  Min: 87/47  Max: 140/72   Pulse  Min: 50  Max: 70   Resp  Min: 20  Max: 20   SpO2  Min: 87 %  Max: 99 %   No data recorded   No data recorded      Intake/Output Summary (Last 24 hours) at 1/5/2022 1332  Last data filed at 1/5/2022 1230  Gross per 24 hour   Intake 3737.3 ml   Output 6950 ml   Net -3212.7 ml      Flowsheet Rows      First Filed Value   Admission Height 170.2 cm (67\") Documented at 12/30/2021 2003   Admission Weight 175 kg (385 lb) Documented at 12/30/2021 2003 12/30/21 2003   Weight: (!) 175 kg (385 lb)            Objective:  General Appearance:  Ill-appearing.    Vital signs: (most recent): Blood pressure 129/73, pulse 62, temperature 98.4 °F (36.9 °C), temperature source Bladder, resp. rate 20, height 170.2 cm (67.01\"), weight (!) 175 kg (385 lb), SpO2 93 %.    HEENT: (Oral ET tube  NG tube)    Lungs:  There are decreased breath sounds and rhonchi.  No rales or wheezes.    Heart: Normal rate.  Regular rhythm.  S1 normal and S2 normal.  No murmur, gallop or friction rub.   Chest: Symmetric chest wall expansion.   Abdomen: Abdomen is soft and non-distended.  Bowel sounds are normal.   There is no abdominal tenderness.   There is no mass. There is no splenomegaly. There is no hepatomegaly.   Extremities: There is dependent edema.  There is no deformity.  (Right upper extremity PICC line)  Neurological: (Sedated).  "   Pupils:  Pupils are equal, round, and reactive to light.    Skin:  Warm and dry.              I reviewed the patient's new clinical results.  I reviewed the patient's new imaging results/reports including actual images and agree with reports.    XR Chest 1 View    Result Date: 1/5/2022  New right basilar atelectasis.  D:  01/05/2022 E:  01/05/2022        XR Chest 1 View    Result Date: 1/4/2022   No significant interval change.  This report was finalized on 1/4/2022 7:48 AM by Ranjeet Leonardo MD.         INFUSIONS  fentanyl 10 mcg/mL,  mcg/hr, Last Rate: 300 mcg/hr (01/05/22 0537)  midazolam, 1-10 mg/hr, Last Rate: 7 mg/hr (01/05/22 1003)  propofol, 5-25 mcg/kg/min, Last Rate: 25 mcg/kg/min (01/05/22 0821)        Results from last 7 days   Lab Units 01/05/22  0258 01/04/22  0302 01/03/22  0303   WBC 10*3/mm3 12.73* 9.93 11.91*   HEMOGLOBIN g/dL 10.9* 11.6* 10.7*   HEMATOCRIT % 35.7 37.8 34.9   PLATELETS 10*3/mm3 256 242 267     Results from last 7 days   Lab Units 01/05/22  0258 01/04/22  0302 01/03/22  0303   SODIUM mmol/L 139 140 140   POTASSIUM mmol/L 3.4* 3.8 3.7   CHLORIDE mmol/L 97* 102 101   CO2 mmol/L 34.0* 29.0 31.0*   BUN mg/dL 22* 18 14   GLUCOSE mg/dL 114* 105* 87   CREATININE mg/dL 0.69 0.68 0.69   MAGNESIUM mg/dL 1.8 2.0 2.0   CALCIUM mg/dL 9.1 8.7 8.7         Results from last 7 days   Lab Units 01/05/22  0332 01/02/22  0347 01/01/22  0320 12/31/21  1134 12/31/21  0547   PH, ARTERIAL pH units 7.437 7.422 7.385 7.334* 7.284*   PCO2, ARTERIAL mm Hg 53.9* 50.4* 54.7* 58.2* 62.8*   PO2 ART mm Hg 72.2* 56.1* 57.9* 71.8* 78.6*   FIO2 % 90 60 50 75 90         Mechanical Ventilator:   Settings: Observed:   Mode: VC+/AC (01/05/22 0840)    Vt (Set, mL): 430 mL (01/05/22 0840) Vt Mandatory Ins (observed, mL): 438 mL (01/05/22 0840)   Resp Rate (Set): 20 (01/05/22 0840) Resp Rate (Observed) Vent: 20 (01/05/22 1200)   Pressure Support (cm H2O): 0 cm H20 (01/05/22 0840) Minute Ventilation (L/min) (Obs):  6.99 L/min (01/05/22 0840)       FiO2 (%): 80 % (01/05/22 0840) Plateau Pressure (cm H2O): (S) 27 cm H2O (01/05/22 0840)   PEEP/CPAP (cm H2O): 12 cm H20 (01/05/22 0840) I:E Ratio (Obs): 1:1.40 (01/05/22 0840)         I reviewed the patient's medications.    Assessment/Plan   ASSESSMENT/PLAN     Active Hospital Problems    Diagnosis    • **Acute on chronic respiratory failure with hypoxia and hypercapnia (HCC)    • CAP (community acquired pneumonia)    • Sepsis (HCC)    • Chronic obstructive pulmonary disease (HCC)    • History of recurrent deep vein thrombosis (DVT)    • Morbid obesity with BMI of 60.0-69.9, adult (HCC)    • Morbid obesity with BMI of 45.0-49.9, adult (HCC)    • Uncontrolled type 2 diabetes mellitus with hyperglycemia, without long-term current use of insulin (HCC)    • Chronic pain    • Essential hypertension          56-year-old female with a past medical history of COPD, type 2 diabetes mellitus, hypertension, chronic pain on chronic narcotics due to lumbar disc disease and on chronic benzodiazepines.  She is on home O2 and inhaled therapy and has been followed by Dr. Darrion Tovar.  Echocardiogram one year ago revealed normal systolic and diastolic function.  She presented on 12/30 with bilateral lobar infiltrates most evident in the right lower lobe with air bronchograms.  She had mediastinal and hilar adenopathy but this had been noted in the past.  She failed BiPAP therapy and required intubation in the ER.  Blood and sputum cultures were negative with the exception of a micrococcus in the blood assumed to be a contaminant.  Strep pneumonia antigen and Legionella antigen in the urine were negative.  MRSA PCR was negative.  COVID and influenza testing was negative    Have been able to wean oxygen to 70%.  Excellent diuresis.  Improved sedation with the addition of Versed.    Acute on chronic respiratory failure with hypoxia and hypercapnia  · Reduce respiratory rate to assist-control 16.   Continue tidal volume 430.  FiO2 weaned to 70% and PEEP continues at 12.  · Continue to wean oxygen as tolerated.  Saturations of 88% or greater acceptable.  · Exceeds weight of RotoProne bed and unable to prone manually.    Bilateral severe immunity acquired pneumonia  · Zosyn and doxycycline for 10 days  · Cultures all negative as described above.  Legionella and strep pneumonia antigens negative.  MRSA PCR negative.    COPD  · Home O2  · Currently on duo nebs    History of recurrent DVT  · On Eliquis as at home    Nutrition  · Tube feeds at goal with Peptamen VHP at 50 mL/hour and free water at 30 mL/hour     I discussed the patient's findings and my recommendations with nursing staff     Plan of care and goals reviewed with multidisciplinary team at daily rounds.    Critical Care time spent in direct patient care: 31 minutes (excluding procedure time, if applicable) including high complexity decision making to assess, manipulate, and support vital organ system failure in this individual who has impairment of one or more vital organ systems such that there is a high probability of imminent or life threatening deterioration in the patient's condition.    Kuldeep Dodson MD  Pulmonary and Critical Care Medicine  01/05/22 13:32 EST

## 2022-01-05 NOTE — PROGRESS NOTES
Daily chart review performed.  Patient remains mechanically ventilated with increasing requirements.  NN continues to follow for interview and education as appropriate.

## 2022-01-05 NOTE — PLAN OF CARE
Goal Outcome Evaluation:              Outcome Summary: Pt. is sedated on Versed, Fentanyl, and Propofol. Bilateral wrist restraints continue.  FiO2 90%, peep 12, SaO2 88-91%.  Her sats decrease when turned on her right side desating to 85%, however sats increase to 91% when turned on her left side. Lungs are coarse and diminished in the bases. ETT secretions have decreased throughout the day but remain thick and tan. She remains bradycardic in the 50s occassionally dropping to the high 40s at times, PVCs are rare.  SBP 90s-1teens. Diuresed today with total UOP for last 12 hours at 5050 ml.  Pt. placed on bariatric dolphin bed today.

## 2022-01-06 ENCOUNTER — APPOINTMENT (OUTPATIENT)
Dept: GENERAL RADIOLOGY | Facility: HOSPITAL | Age: 57
End: 2022-01-06

## 2022-01-06 DIAGNOSIS — M12.9 ARTHRITIS INVOLVING MULTIPLE SITES: ICD-10-CM

## 2022-01-06 DIAGNOSIS — M51.9 LUMBAR DISC DISEASE: ICD-10-CM

## 2022-01-06 DIAGNOSIS — G89.4 CHRONIC PAIN SYNDROME: ICD-10-CM

## 2022-01-06 LAB
ALBUMIN SERPL-MCNC: 3.1 G/DL (ref 3.5–5.2)
ALBUMIN/GLOB SERPL: 0.7 G/DL
ALP SERPL-CCNC: 153 U/L (ref 39–117)
ALT SERPL W P-5'-P-CCNC: 19 U/L (ref 1–33)
ANION GAP SERPL CALCULATED.3IONS-SCNC: 8 MMOL/L (ref 5–15)
ARTERIAL PATENCY WRIST A: ABNORMAL
AST SERPL-CCNC: 21 U/L (ref 1–32)
ATMOSPHERIC PRESS: ABNORMAL MM[HG]
BASE EXCESS BLDA CALC-SCNC: 9.7 MMOL/L (ref 0–2)
BASOPHILS # BLD AUTO: 0.13 10*3/MM3 (ref 0–0.2)
BASOPHILS NFR BLD AUTO: 0.9 % (ref 0–1.5)
BDY SITE: ABNORMAL
BILIRUB SERPL-MCNC: 0.7 MG/DL (ref 0–1.2)
BODY TEMPERATURE: 37 C
BUN SERPL-MCNC: 25 MG/DL (ref 6–20)
BUN/CREAT SERPL: 28.1 (ref 7–25)
CALCIUM SPEC-SCNC: 9.3 MG/DL (ref 8.6–10.5)
CHLORIDE SERPL-SCNC: 94 MMOL/L (ref 98–107)
CO2 BLDA-SCNC: 38.5 MMOL/L (ref 22–33)
CO2 SERPL-SCNC: 33 MMOL/L (ref 22–29)
COHGB MFR BLD: 0.9 % (ref 0–2)
CREAT SERPL-MCNC: 0.89 MG/DL (ref 0.57–1)
DEPRECATED RDW RBC AUTO: 59.9 FL (ref 37–54)
EOSINOPHIL # BLD AUTO: 0.6 10*3/MM3 (ref 0–0.4)
EOSINOPHIL NFR BLD AUTO: 4 % (ref 0.3–6.2)
EPAP: 0
ERYTHROCYTE [DISTWIDTH] IN BLOOD BY AUTOMATED COUNT: 18.1 % (ref 12.3–15.4)
GFR SERPL CREATININE-BSD FRML MDRD: 65 ML/MIN/1.73
GLOBULIN UR ELPH-MCNC: 4.2 GM/DL
GLUCOSE BLDC GLUCOMTR-MCNC: 109 MG/DL (ref 70–130)
GLUCOSE BLDC GLUCOMTR-MCNC: 115 MG/DL (ref 70–130)
GLUCOSE BLDC GLUCOMTR-MCNC: 129 MG/DL (ref 70–130)
GLUCOSE SERPL-MCNC: 108 MG/DL (ref 65–99)
HCO3 BLDA-SCNC: 36.6 MMOL/L (ref 20–26)
HCT VFR BLD AUTO: 40 % (ref 34–46.6)
HCT VFR BLD CALC: 38.3 % (ref 38–51)
HGB BLD-MCNC: 12.2 G/DL (ref 12–15.9)
HGB BLDA-MCNC: 12.5 G/DL (ref 14–18)
IMM GRANULOCYTES # BLD AUTO: 0.8 10*3/MM3 (ref 0–0.05)
IMM GRANULOCYTES NFR BLD AUTO: 5.4 % (ref 0–0.5)
INHALED O2 CONCENTRATION: 55 %
IPAP: 0
LYMPHOCYTES # BLD AUTO: 2.45 10*3/MM3 (ref 0.7–3.1)
LYMPHOCYTES NFR BLD AUTO: 16.5 % (ref 19.6–45.3)
MAGNESIUM SERPL-MCNC: 2.3 MG/DL (ref 1.6–2.6)
MCH RBC QN AUTO: 27.5 PG (ref 26.6–33)
MCHC RBC AUTO-ENTMCNC: 30.5 G/DL (ref 31.5–35.7)
MCV RBC AUTO: 90.3 FL (ref 79–97)
METHGB BLD QL: 0.1 % (ref 0–1.5)
MODALITY: ABNORMAL
MONOCYTES # BLD AUTO: 0.94 10*3/MM3 (ref 0.1–0.9)
MONOCYTES NFR BLD AUTO: 6.3 % (ref 5–12)
NEUTROPHILS NFR BLD AUTO: 66.9 % (ref 42.7–76)
NEUTROPHILS NFR BLD AUTO: 9.92 10*3/MM3 (ref 1.7–7)
NOTE: ABNORMAL
NRBC BLD AUTO-RTO: 0 /100 WBC (ref 0–0.2)
OXYHGB MFR BLDV: 90.3 % (ref 94–99)
PAW @ PEAK INSP FLOW SETTING VENT: 0 CMH2O
PCO2 BLDA: 59.8 MM HG (ref 35–45)
PCO2 TEMP ADJ BLD: 59.8 MM HG (ref 35–45)
PH BLDA: 7.4 PH UNITS (ref 7.35–7.45)
PH, TEMP CORRECTED: 7.4 PH UNITS
PHOSPHATE SERPL-MCNC: 4.2 MG/DL (ref 2.5–4.5)
PLATELET # BLD AUTO: 274 10*3/MM3 (ref 140–450)
PMV BLD AUTO: 10.6 FL (ref 6–12)
PO2 BLDA: 64.1 MM HG (ref 83–108)
PO2 TEMP ADJ BLD: 64.1 MM HG (ref 83–108)
POTASSIUM SERPL-SCNC: 4.6 MMOL/L (ref 3.5–5.2)
PROT SERPL-MCNC: 7.3 G/DL (ref 6–8.5)
RBC # BLD AUTO: 4.43 10*6/MM3 (ref 3.77–5.28)
SODIUM SERPL-SCNC: 135 MMOL/L (ref 136–145)
TOTAL RATE: 0 BREATHS/MINUTE
WBC NRBC COR # BLD: 14.84 10*3/MM3 (ref 3.4–10.8)

## 2022-01-06 PROCEDURE — 83735 ASSAY OF MAGNESIUM: CPT | Performed by: INTERNAL MEDICINE

## 2022-01-06 PROCEDURE — 82375 ASSAY CARBOXYHB QUANT: CPT

## 2022-01-06 PROCEDURE — 99291 CRITICAL CARE FIRST HOUR: CPT | Performed by: INTERNAL MEDICINE

## 2022-01-06 PROCEDURE — 36600 WITHDRAWAL OF ARTERIAL BLOOD: CPT

## 2022-01-06 PROCEDURE — 82962 GLUCOSE BLOOD TEST: CPT

## 2022-01-06 PROCEDURE — 84100 ASSAY OF PHOSPHORUS: CPT | Performed by: INTERNAL MEDICINE

## 2022-01-06 PROCEDURE — 85025 COMPLETE CBC W/AUTO DIFF WBC: CPT | Performed by: INTERNAL MEDICINE

## 2022-01-06 PROCEDURE — 25010000002 PROPOFOL 10 MG/ML EMULSION: Performed by: INTERNAL MEDICINE

## 2022-01-06 PROCEDURE — 80053 COMPREHEN METABOLIC PANEL: CPT | Performed by: INTERNAL MEDICINE

## 2022-01-06 PROCEDURE — 25010000002 FUROSEMIDE PER 20 MG: Performed by: INTERNAL MEDICINE

## 2022-01-06 PROCEDURE — 97610 LOW FREQUENCY NON-THERMAL US: CPT

## 2022-01-06 PROCEDURE — 94799 UNLISTED PULMONARY SVC/PX: CPT

## 2022-01-06 PROCEDURE — 82805 BLOOD GASES W/O2 SATURATION: CPT

## 2022-01-06 PROCEDURE — 71045 X-RAY EXAM CHEST 1 VIEW: CPT

## 2022-01-06 PROCEDURE — 83050 HGB METHEMOGLOBIN QUAN: CPT

## 2022-01-06 PROCEDURE — 25010000002 FENTANYL CITRATE (PF) 2500 MCG/50ML SOLUTION: Performed by: INTERNAL MEDICINE

## 2022-01-06 PROCEDURE — 25010000002 PIPERACILLIN SOD-TAZOBACTAM PER 1 G: Performed by: INTERNAL MEDICINE

## 2022-01-06 PROCEDURE — 94003 VENT MGMT INPAT SUBQ DAY: CPT

## 2022-01-06 RX ORDER — OXYCODONE AND ACETAMINOPHEN 10; 325 MG/1; MG/1
TABLET ORAL
Qty: 120 TABLET | Refills: 0 | OUTPATIENT
Start: 2022-01-06

## 2022-01-06 RX ADMIN — PROPOFOL 25 MCG/KG/MIN: 10 INJECTION, EMULSION INTRAVENOUS at 07:24

## 2022-01-06 RX ADMIN — DOCUSATE SODIUM 50 MG AND SENNOSIDES 8.6 MG 2 TABLET: 8.6; 5 TABLET, FILM COATED ORAL at 08:29

## 2022-01-06 RX ADMIN — IPRATROPIUM BROMIDE AND ALBUTEROL SULFATE 3 ML: 2.5; .5 SOLUTION RESPIRATORY (INHALATION) at 08:19

## 2022-01-06 RX ADMIN — Medication 300 MCG/HR: at 08:20

## 2022-01-06 RX ADMIN — TAZOBACTAM SODIUM AND PIPERACILLIN SODIUM 4.5 G: 500; 4 INJECTION, SOLUTION INTRAVENOUS at 15:39

## 2022-01-06 RX ADMIN — TAZOBACTAM SODIUM AND PIPERACILLIN SODIUM 4.5 G: 500; 4 INJECTION, SOLUTION INTRAVENOUS at 08:30

## 2022-01-06 RX ADMIN — Medication 1 PACKET: at 08:30

## 2022-01-06 RX ADMIN — Medication 300 MCG/HR: at 15:38

## 2022-01-06 RX ADMIN — TAZOBACTAM SODIUM AND PIPERACILLIN SODIUM 4.5 G: 500; 4 INJECTION, SOLUTION INTRAVENOUS at 00:07

## 2022-01-06 RX ADMIN — DOXYCYCLINE 100 MG: 100 INJECTION, POWDER, LYOPHILIZED, FOR SOLUTION INTRAVENOUS at 05:41

## 2022-01-06 RX ADMIN — GABAPENTIN 300 MG: 300 CAPSULE ORAL at 21:00

## 2022-01-06 RX ADMIN — FUROSEMIDE 40 MG: 10 INJECTION, SOLUTION INTRAMUSCULAR; INTRAVENOUS at 08:30

## 2022-01-06 RX ADMIN — DOXYCYCLINE 100 MG: 100 INJECTION, POWDER, LYOPHILIZED, FOR SOLUTION INTRAVENOUS at 11:45

## 2022-01-06 RX ADMIN — SODIUM CHLORIDE, PRESERVATIVE FREE 10 ML: 5 INJECTION INTRAVENOUS at 08:30

## 2022-01-06 RX ADMIN — GABAPENTIN 300 MG: 300 CAPSULE ORAL at 13:18

## 2022-01-06 RX ADMIN — CASTOR OIL AND BALSAM, PERU 1 APPLICATION: 788; 87 OINTMENT TOPICAL at 08:30

## 2022-01-06 RX ADMIN — PROPOFOL 25 MCG/KG/MIN: 10 INJECTION, EMULSION INTRAVENOUS at 15:39

## 2022-01-06 RX ADMIN — APIXABAN 5 MG: 5 TABLET, FILM COATED ORAL at 08:30

## 2022-01-06 RX ADMIN — PROPOFOL 25 MCG/KG/MIN: 10 INJECTION, EMULSION INTRAVENOUS at 02:30

## 2022-01-06 RX ADMIN — CHLORHEXIDINE GLUCONATE 15 ML: 1.2 SOLUTION ORAL at 08:30

## 2022-01-06 RX ADMIN — FUROSEMIDE 40 MG: 10 INJECTION, SOLUTION INTRAMUSCULAR; INTRAVENOUS at 21:00

## 2022-01-06 RX ADMIN — FAMOTIDINE 20 MG: 10 INJECTION INTRAVENOUS at 08:30

## 2022-01-06 RX ADMIN — DOCUSATE SODIUM 50 MG AND SENNOSIDES 8.6 MG 2 TABLET: 8.6; 5 TABLET, FILM COATED ORAL at 21:00

## 2022-01-06 RX ADMIN — IPRATROPIUM BROMIDE AND ALBUTEROL SULFATE 3 ML: 2.5; .5 SOLUTION RESPIRATORY (INHALATION) at 13:46

## 2022-01-06 RX ADMIN — PROPOFOL 25 MCG/KG/MIN: 10 INJECTION, EMULSION INTRAVENOUS at 11:10

## 2022-01-06 RX ADMIN — GABAPENTIN 300 MG: 300 CAPSULE ORAL at 05:41

## 2022-01-06 RX ADMIN — APIXABAN 5 MG: 5 TABLET, FILM COATED ORAL at 21:00

## 2022-01-06 RX ADMIN — Medication 4 MG/HR: at 18:04

## 2022-01-06 RX ADMIN — PROPOFOL 25 MCG/KG/MIN: 10 INJECTION, EMULSION INTRAVENOUS at 18:04

## 2022-01-06 RX ADMIN — CASTOR OIL AND BALSAM, PERU 1 APPLICATION: 788; 87 OINTMENT TOPICAL at 21:01

## 2022-01-06 RX ADMIN — IPRATROPIUM BROMIDE AND ALBUTEROL SULFATE 3 ML: 2.5; .5 SOLUTION RESPIRATORY (INHALATION) at 01:53

## 2022-01-06 RX ADMIN — Medication 1 PACKET: at 22:00

## 2022-01-06 RX ADMIN — FAMOTIDINE 20 MG: 10 INJECTION INTRAVENOUS at 21:00

## 2022-01-06 NOTE — PLAN OF CARE
Goal Outcome Evaluation:  Plan of Care Reviewed With: patient           Outcome Summary: DPTI stable with no darkening of discoloration noted. PT will cont with MIST 2-3 x/week to help further improve skin integrity.

## 2022-01-06 NOTE — PROGRESS NOTES
Daily chart review complete.  Patient remains intubated and sedated.  No new questions or concerns at this time.  NN will continue to follow and complete interview and education as appropriate.

## 2022-01-06 NOTE — PLAN OF CARE
Goal Outcome Evaluation:    Neuro: Sedated with Fentanyl, Propofol, and Versed. VAISHALI. Grimaces and withdraws from pain occasionally.    Resp: Currently still requiring ventilator support. FIO2 weaned to 55%, starting at 80%. PEEP remains at 12.0. Copious oral and subglottal secretions, minimal ETT secretions.    Cardiac: SB-NSR. Normotensive. Palpable pulses.     GI/: OG in place, Peptamen VHP at 50 mL/hr and flush at 30 mL/hr. No BM today, hypoactive bowel sounds. Carroll in place, diuresed today. 3,400 total UOP.    Temp/Skin/ADLs: Afebrile. Venelex for DTI  on back.     Gtts: Versed, Fentanyl, Propofol, KVO

## 2022-01-06 NOTE — CASE MANAGEMENT/SOCIAL WORK
Continued Stay Note  [facility]     Patient Name: Dani Ziegler  MRN: [id number]  Today's Date: 1/6/2022    Admit Date: 12/30/2021     Discharge Plan     Row Name 01/06/22 1353       Plan    Plan Ongoing    Plan Comments Remains critically ill on vent in ICU, case discussed in unit multidisciplinary rounds this am.  Case Management continues to follow for all needs, discharge planning when medically stable.  Romelia Pang, Ext. [phone]    Final Discharge Disposition Code 30 - still a patient               Discharge Codes    No documentation.                     CARLA Salazar

## 2022-01-06 NOTE — TELEPHONE ENCOUNTER
Received refill request for pain medicine.  She is currently in the hospital.     Not able to refill at this time     in addition, her urine drug screen did not show the oxycodone.  She will need an office visit before we can continue her pain medication.

## 2022-01-06 NOTE — PROGRESS NOTES
"INTENSIVIST NOTE     Hospital Day: 6    Ms. Dani Ziegler, 56 y.o. female is followed for:   COPD, pneumonia, and respiratory failure       SUBJECTIVE     Interval history:    Fluid balance -2.9 L.  Afebrile.  FiO2 weaned to 55%.  Remains on assist control with PEEP 14.  Remains on fentanyl, Versed, and propofol drips.     The patient's relevant past medical, surgical and social history were reviewed and updated in Epic as appropriate.       OBJECTIVE     Vital Sign Min/Max for last 24 hours  Temp  Min: 97.3 °F (36.3 °C)  Max: 99.5 °F (37.5 °C)   BP  Min: 91/49  Max: 143/84   Pulse  Min: 58  Max: 83   Resp  Min: 16  Max: 16   SpO2  Min: 90 %  Max: 98 %   No data recorded   No data recorded      Intake/Output Summary (Last 24 hours) at 1/6/2022 1649  Last data filed at 1/6/2022 1600  Gross per 24 hour   Intake 2805 ml   Output 5675 ml   Net -2870 ml      Flowsheet Rows      First Filed Value   Admission Height 170.2 cm (67\") Documented at 12/30/2021 2003   Admission Weight 175 kg (385 lb) Documented at 12/30/2021 2003 12/30/21 2003 01/05/22  1500   Weight: (!) 175 kg (385 lb) (!) 166 kg (366 lb)            Objective:  General Appearance:  Ill-appearing.    Vital signs: (most recent): Blood pressure 143/84, pulse 81, temperature 99.1 °F (37.3 °C), temperature source Axillary, resp. rate 16, height 170.2 cm (67.01\"), weight (!) 166 kg (366 lb), SpO2 91 %.    HEENT: (Oral ET tube  NG tube)    Lungs:  There are decreased breath sounds and rhonchi.  No rales or wheezes.    Heart: Normal rate.  Regular rhythm.  S1 normal and S2 normal.  No murmur, gallop or friction rub.   Chest: Symmetric chest wall expansion.   Abdomen: Abdomen is soft and non-distended.  Bowel sounds are normal.   There is no abdominal tenderness.   There is no mass. There is no splenomegaly. There is no hepatomegaly.   Extremities: There is dependent edema.  There is no deformity.  (Right upper extremity PICC line)  Neurological: " (Sedated).    Pupils:  Pupils are equal, round, and reactive to light.    Skin:  Warm and dry.              I reviewed the patient's new clinical results.  I reviewed the patient's new imaging results/reports including actual images and agree with reports.    XR Chest 1 View    Result Date: 1/6/2022   No significant interval change.  This report was finalized on 1/6/2022 8:23 AM by Ranjeet Leonardo MD.      XR Chest 1 View    Result Date: 1/5/2022  New right basilar atelectasis.  D:  01/05/2022 E:  01/05/2022    This report was finalized on 1/5/2022 8:46 PM by Dr. Elton Cain MD.         INFUSIONS  fentanyl 10 mcg/mL,  mcg/hr, Last Rate: 300 mcg/hr (01/06/22 1538)  midazolam, 1-10 mg/hr, Last Rate: 4 mg/hr (01/06/22 0600)  propofol, 5-25 mcg/kg/min, Last Rate: 25 mcg/kg/min (01/06/22 1539)        Results from last 7 days   Lab Units 01/06/22  0338 01/05/22  0258 01/04/22  0302   WBC 10*3/mm3 14.84* 12.73* 9.93   HEMOGLOBIN g/dL 12.2 10.9* 11.6*   HEMATOCRIT % 40.0 35.7 37.8   PLATELETS 10*3/mm3 274 256 242     Results from last 7 days   Lab Units 01/06/22  0338 01/05/22  1447 01/05/22  0258 01/04/22  0302 01/04/22  0302   SODIUM mmol/L 135*  --  139  --  140   POTASSIUM mmol/L 4.6 4.1 3.4*   < > 3.8   CHLORIDE mmol/L 94*  --  97*  --  102   CO2 mmol/L 33.0*  --  34.0*  --  29.0   BUN mg/dL 25*  --  22*  --  18   GLUCOSE mg/dL 108*  --  114*  --  105*   CREATININE mg/dL 0.89  --  0.69  --  0.68   MAGNESIUM mg/dL 2.3  --  1.8  --  2.0   CALCIUM mg/dL 9.3  --  9.1  --  8.7    < > = values in this interval not displayed.         Results from last 7 days   Lab Units 01/06/22  0527 01/05/22  0332 01/02/22  0347 01/01/22  0320 12/31/21  1134   PH, ARTERIAL pH units 7.396 7.437 7.422 7.385 7.334*   PCO2, ARTERIAL mm Hg 59.8* 53.9* 50.4* 54.7* 58.2*   PO2 ART mm Hg 64.1* 72.2* 56.1* 57.9* 71.8*   FIO2 % 55 90 60 50 75         Mechanical Ventilator:   Settings: Observed:   Mode: VC+/AC (01/06/22 1346)    Vt (Set, mL): 430  mL (01/06/22 1346) Vt Mandatory Ins (observed, mL): 433 mL (01/06/22 1346)   Resp Rate (Set): 16 (01/06/22 1346) Resp Rate (Observed) Vent: 17 (01/06/22 1600)   Pressure Support (cm H2O): 0 cm H20 (01/06/22 1346) Minute Ventilation (L/min) (Obs): 7.35 L/min (01/06/22 1346)       FiO2 (%): 55 % (01/06/22 1346) Plateau Pressure (cm H2O): 25 cm H2O (01/06/22 0819)   PEEP/CPAP (cm H2O): 12 cm H20 (01/06/22 1346) I:E Ratio (Obs): 1:2.00 (01/06/22 1346)         I reviewed the patient's medications.    Assessment/Plan   ASSESSMENT/PLAN     Active Hospital Problems    Diagnosis    • **Acute on chronic respiratory failure with hypoxia and hypercapnia (HCC)    • CAP (community acquired pneumonia)    • Sepsis (HCC)    • Chronic obstructive pulmonary disease (HCC)    • History of recurrent deep vein thrombosis (DVT)    • Morbid obesity with BMI of 60.0-69.9, adult (HCC)    • Morbid obesity with BMI of 45.0-49.9, adult (HCC)    • Uncontrolled type 2 diabetes mellitus with hyperglycemia, without long-term current use of insulin (HCC)    • Chronic pain    • Essential hypertension          56-year-old female with a past medical history of COPD, type 2 diabetes mellitus, hypertension, chronic pain on chronic narcotics due to lumbar disc disease and on chronic benzodiazepines.  She is on home O2 and inhaled therapy and has been followed by Dr. Darrion Tovar.  Echocardiogram one year ago revealed normal systolic and diastolic function.  She presented on 12/30 with bilateral lobar infiltrates most evident in the right lower lobe with air bronchograms.  She had mediastinal and hilar adenopathy but this had been noted in the past.  She failed BiPAP therapy and required intubation in the ER.  Blood and sputum cultures were negative with the exception of a micrococcus in the blood assumed to be a contaminant.  Strep pneumonia antigen and Legionella antigen in the urine were negative.  MRSA PCR was negative.  COVID and influenza testing was  negative    Oxygen weaned to 55%.  Remains on assist control ventilation.  Fluid balance remains negative nearly 3 L.    Acute on chronic respiratory failure with hypoxia and hypercapnia  · Assist-control with rate 16.  Continue tidal volume 430.  FiO2 weaned to 55% and PEEP continues at 12.  · Continue to wean oxygen as tolerated.  Saturations of 88% or greater acceptable.  · Exceeds weight of RotoProne bed and unable to prone manually.    Bilateral severe immunity acquired pneumonia  · Zosyn and doxycycline for 10 days total  · Cultures all negative as described above.  Legionella and strep pneumonia antigens negative.  MRSA PCR negative.    COPD  · Home O2 prior to admission  · Currently on duo nebs    History of recurrent DVT  · On Eliquis as at home    Nutrition  · Tube feeds at goal with Peptamen VHP at 50 mL/hour and free water at 30 mL/hour     I discussed the patient's findings and my recommendations with nursing staff     Plan of care and goals reviewed with multidisciplinary team at daily rounds.    Critical Care time spent in direct patient care: 32 minutes (excluding procedure time, if applicable) including high complexity decision making to assess, manipulate, and support vital organ system failure in this individual who has impairment of one or more vital organ systems such that there is a high probability of imminent or life threatening deterioration in the patient's condition.    Kuldeep Dodson MD  Pulmonary and Critical Care Medicine  01/06/22 16:49 EST

## 2022-01-06 NOTE — PLAN OF CARE
Goal Outcome Evaluation:  Plan of Care Reviewed With: patient        Progress: no change     Neuro: sedated, right pupil slightly larger than left, grimacing and coughing with suction, withdraw from pain  Respiratory: vent settings unchanged, spo2 >91%  Cardiac: pulses palpable, normal sinus with some PVCs  GI: tube feed @ goal, residual 35 ml returned, no bowel movement  : 2900 mL out   Skin: Mist therapy performed today, venelex applied as needed, interdry applied to moist areas    Remains on fentanyl, versed and propofol gtts.

## 2022-01-06 NOTE — THERAPY WOUND CARE TREATMENT
Acute Care - Wound/Debridement Treatment Note  [facility]     Patient Name: Dani Ziegler  : 1965  MRN: [id number]  Today's Date: 2022                Admit Date: 2021    Visit Dx:    ICD-10-CM ICD-9-CM   1. Acute exacerbation of chronic obstructive pulmonary disease (COPD) ([facility])  J44.1 491.21   2. Acute respiratory distress  R06.03 518.82   3. Hypoxia  R09.02 799.02   4. Pneumonia of both lungs due to infectious organism, unspecified part of lung  J18.9 483.8   5. Sepsis with acute hypoxic respiratory failure without septic shock, due to unspecified organism ([facility])  A41.9 038.9    R65.20 995.91    J96.01 518.81       Patient Active Problem List   Diagnosis   • Uncontrolled type 2 diabetes mellitus with hyperglycemia, without long-term current use of insulin ([facility])   • Vitamin D deficiency   • Peripheral neuropathy   • Adiposity   • Left bundle branch block (LBBB)   • Essential hypertension   • Chronic pain   • Chronic obstructive pulmonary disease ([facility])   • Anxiety   • Depression   • Arthritis involving multiple sites   • Leukocytosis   • Mixed hyperlipidemia   • Special screening for malignant neoplasms, colon   • Cellulitis of left lower extremity   • Hyperkalemia   • Acute on chronic renal insufficiency   • Sepsis ([facility])   • CHF (congestive heart failure) ([facility])   • Cellulitis of left leg   • Lumbar disc disease   • Diabetic peripheral neuropathy ([facility])   • COVID-19 virus infection   • CAP (community acquired pneumonia)   • Polypharmacy   • Morbid obesity with BMI of 45.0-49.9, adult ([facility])   • Acute on chronic respiratory failure with hypoxia and hypercapnia ([facility])   • History of recurrent deep vein thrombosis (DVT)   • Morbid obesity with BMI of 60.0-69.9, adult ([facility])        Past Medical History:   Diagnosis Date   • Abnormal weight gain    • Acute UTI    • Anxiety    • Arthritis involving multiple sites    • Chronic obstructive pulmonary disease ([facility])    • Chronic pain    • Current every day  smoker    • Depression    • Diabetes mellitus (HCC)    • Diarrhea    • Dysuria    • Edema, lower extremity    • Fever    • Head injury    • Hypertension    • Intervertebral disc disorder     Degeneration of intervertebral disc, site unspecified (722.6)   • Left bundle branch block    • Leukocytosis    • Long term current use of methadone for pain control    • Mass of right breast    • Nausea    • Obesity    • Overactive bladder    • Peripheral neuropathy    • Superficial phlebitis     right medial calf   • Upper respiratory infection    • Urinary incontinence    • Vitamin D deficiency    • Vomiting         Past Surgical History:   Procedure Laterality Date   • BLADDER SURGERY      pubovaginal sling   • CHOLECYSTECTOMY     • HIP ARTHROSCOPY Right 10/29/2020           Wound 12/31/21 1700 lower back Pressure Injury (Active)   Dressing Appearance open to air 01/06/22 1230   Closure Open to air 01/06/22 1200   Base red; dry; non-blanchable 01/06/22 1230   Periwound blanchable; redness; intact 01/06/22 1230   Periwound Temperature warm 01/06/22 1230   Periwound Skin Turgor soft 01/06/22 1230   Drainage Amount none 01/06/22 1230   Care, Wound irrigated with; sterile normal saline; ultrasound therapy, non contact low frequency 01/06/22 1230   Dressing Care skin barrier agent applied 01/06/22 0800   Periwound Care dry periwound area maintained 01/06/22 1230         WOUND DEBRIDEMENT                     PT Assessment (last 12 hours)     PT Evaluation and Treatment     Row Name 01/06/22 1230          Physical Therapy Time and Intention    Subjective Information --  intubated  -     Document Type therapy note (daily note); wound care  -     Mode of Treatment individual therapy; physical therapy  -     Row Name 01/06/22 1230          Pain    Additional Documentation Pain Scale: FACES Pre/Post-Treatment (Group)  -     Row Name 01/06/22 1230          Pain Scale: FACES Pre/Post-Treatment    Pain: FACES Scale, Pretreatment  0-->no hurt  -MF     Posttreatment Pain Rating 0-->no hurt  -MF     Row Name 01/06/22 1230          Wound 12/31/21 1700 lower back Pressure Injury    Wound - Properties Group Placement Date: 12/31/21 -KK Placement Time: 1700 -KK Present on Hospital Admission: Y -KK Orientation: lower  -KK Location: back  - Primary Wound Type: Pressure inj  -KK Stage, Pressure Injury : Stage 1  -KK     Dressing Appearance open to air  -MF     Base red; dry; non-blanchable  -MF     Periwound blanchable; redness; intact  -MF     Periwound Temperature warm  -MF     Periwound Skin Turgor soft  -MF     Drainage Amount none  -MF     Care, Wound irrigated with; sterile normal saline; ultrasound therapy, non contact low frequency  5 min mist  -MF     Periwound Care dry periwound area maintained  -MF     Retired Wound - Properties Group Date first assessed: 12/31/21 -KK Time first assessed: 1700  -KK Present on Hospital Admission: Y  -KK Location: back  -KK Primary Wound Type: Pressure inj  -KK     Row Name 01/06/22 1230          Coping    Observed Emotional State calm  -     Verbalized Emotional State other (see comments)  intubated  -     Row Name 01/06/22 1230          Plan of Care Review    Plan of Care Reviewed With patient  -MF     Outcome Summary DPTI stable with no darkening of discoloration noted. PT will cont with MIST 2-3 x/week to help further improve skin integrity.  -     Row Name 01/06/22 1230          Positioning and Restraints    Pre-Treatment Position in bed  -     Post Treatment Position bed  -     In Bed supine; with nsg  -           User Key  (r) = Recorded By, (t) = Taken By, (c) = Cosigned By    Initials Name Provider Type    Chau Dalton, PT Physical Therapist    Sheldon Ruiz RN Registered Nurse                  Recommendation and Plan  Planned Therapy Interventions (PT): wound care, patient/family education  Plan of Care Reviewed With: patient           Outcome Summary: DPTI  stable with no darkening of discoloration noted. PT will cont with MIST 2-3 x/week to help further improve skin integrity.  Plan of Care Reviewed With: patient            Time Calculation   PT Charges     Row Name 01/06/22 1230             Time Calculation    Start Time 1230  -MF      PT Goal Re-Cert Due Date 01/14/22  -MF              Untimed Charges    69215-OSUR Mist 20  -MF              Total Minutes    Untimed Charges Total Minutes 20  -MF       Total Minutes 20  -MF            User Key  (r) = Recorded By, (t) = Taken By, (c) = Cosigned By    Initials Name Provider Type    Chau Dalton, PT Physical Therapist                  Therapy Charges for Today     Code Description Service Date Service Provider Modifiers Qty    31129928878 HC PT NLFU MIST 1/6/2022 Chau Hurst, PT GP 1            PT G-Codes  AM-PAC 6 Clicks Score (PT): 6       Chau Hurst, PT  1/6/2022

## 2022-01-06 NOTE — PROGRESS NOTES
Clinical Nutrition   Reason For Visit: MDR, Follow-up protocol, EN    Patient Name: Dani Ziegler  YOB: 1965  MRN: [id number]  Date of Encounter: 01/06/22 10:32 EST  Admission date: 12/30/2021      Continue current EN regimen:  Peptamen VHP @ 50 ml/hr. Free water @ 30 ml/hr. Prostat - 1 packet 2x daily.    At goal rate this regimen provides 1000 ml formula, 1200 calories (75% est needs), 123 g protein (102% est needs), 4 g fiber, 840 ml water from formula, 1440 ml total water from formula/flushes.    >>>With 694 calories from propofol, patient will receive 1894 total calories (118% est needs).      Nutrition Assessment     Admission Problem List:  Acute on chronic respiratory failure  Sepsis  CAP (community acquired pneumonia)  Acute 7th rib fracture      Applicable medical tests/procedures since admission:  (12/31) intubated  (1/1) EN initiated via OG tube per intensivist  (1/3) EN adjusted per RD      PMH: She  has a past medical history of Abnormal weight gain, Acute UTI, Anxiety, Arthritis involving multiple sites, Chronic obstructive pulmonary disease (HCC), Chronic pain, Current every day smoker, Depression, Diabetes mellitus (HCC), Diarrhea, Dysuria, Edema, lower extremity, Fever, Head injury, Hypertension, Intervertebral disc disorder, Left bundle branch block, Leukocytosis, Long term current use of methadone for pain control, Mass of right breast, Nausea, Obesity, Overactive bladder, Peripheral neuropathy, Superficial phlebitis, Upper respiratory infection, Urinary incontinence, Vitamin D deficiency, and Vomiting.   PSxH: She  has a past surgical history that includes Bladder surgery; Cholecystectomy; and Hip Arthroscopy (Right, 10/29/2020).        Reported/Observed/Food/Nutrition Related History   1/6) Per MDR discussion - patient intubated and sedated. Continues to tolerate EN at goal rate of 50 ml/hr. No BM this admission per I/Os.    1/5) Per MDR discussion - patient intubated and  sedated. Has OG tube. Receiving EN @ 65 ml/hr with 30 ml Q 2 hours and tolerating. No bowel movement this admission per I/Os.      Anthropometrics   Height: 67 in  Weight: 366 lbs (bed scale weight 1/5 per RN) --- note fluid still present and bed had other equipment on it when weighed --- need a zeroed bed scale weight  BMI: 57.3  BMI classification: Obese Class III extreme obesity: > or equal to 40kg/m2   IBW: 135 lbs    UBW:  Last 15 Recorded Weights  Weight Weight (kg) Weight (lbs) Weight Method VISIT REPORT   12/30/2021 174.635 kg 385 lb Stated -   8/24/2021 133.358 kg 294 lb - Report   7/16/2021 139.708 kg 308 lb - -   6/12/2021 128.368 kg 283 lb - -   6/11/2021 129.502 kg 285 lb 8 oz Standing scale -   6/10/2021 132.496 kg 292 lb 1.6 oz Bed scale -   6/9/2021 130.817 kg 288 lb 6.4 oz Bed scale -   6/8/2021 131.362 kg 289 lb 9.6 oz Standing scale -   6/7/2021 133.856 kg 295 lb 1.6 oz Bed scale -   6/6/2021 134.446 kg 296 lb 6.4 oz Bed scale -   6/4/2021 132.269 kg 291 lb 9.6 oz Bed scale -   6/3/2021 136.533 kg 301 lb Stated -   5/25/2021 130.182 kg 287 lb - Report   1/20/2021 143.881 kg 317 lb 3.2 oz - Report       ---RD notes stated wt on admission not consistent with EMR weight hx so RD suspects stated weight is inaccurate.      Labs reviewed   Labs reviewed: Yes    Results from last 7 days   Lab Units 01/06/22  0338 01/05/22  1447 01/05/22  0258 01/04/22  0302 01/04/22  0302 01/03/22  0303 01/03/22  0303   SODIUM mmol/L 135*  --  139  --  140   < > 140   POTASSIUM mmol/L 4.6 4.1 3.4*   < > 3.8   < > 3.7   CHLORIDE mmol/L 94*  --  97*  --  102   < > 101   CO2 mmol/L 33.0*  --  34.0*  --  29.0   < > 31.0*   BUN mg/dL 25*  --  22*  --  18   < > 14   CREATININE mg/dL 0.89  --  0.69  --  0.68   < > 0.69   GLUCOSE mg/dL 108*  --  114*  --  105*   < > 87   CALCIUM mg/dL 9.3  --  9.1  --  8.7   < > 8.7   PHOSPHORUS mg/dL 4.2  --  3.5  --  3.3   < > 3.5   MAGNESIUM mg/dL 2.3  --  1.8  --  2.0   < > 2.0    TRIGLYCERIDES mg/dL  --   --   --   --   --   --  154*    < > = values in this interval not displayed.     Results from last 7 days   Lab Units 01/06/22  0338 01/05/22  0258 01/04/22  0302 01/03/22  0303 01/03/22  0303   WBC 10*3/mm3 14.84* 12.73* 9.93   < > 11.91*   ALBUMIN g/dL 3.10* 2.80* 2.90*   < > 2.50*   PREALBUMIN mg/dL  --   --   --   --  11.7*   CRP mg/dL  --   --   --   --  8.25*   TRIGLYCERIDES mg/dL  --   --   --   --  154*    < > = values in this interval not displayed.     Results from last 7 days   Lab Units 01/06/22  0519 01/05/22  2337 01/05/22  1730 01/05/22  1157 01/05/22  0514 01/04/22  2346   GLUCOSE mg/dL 109 139* 129 108 110 119       Medications reviewed   Medications reviewed: Yes  Scheduled: antibiotic, pepcid, insulin, lasix, pericolace  GTT: fentanyl, propofol (26.3 ml/hr - 694 kcal), versed    Needs Assessment (1/3)   Height used: 67 in/170.2 cm  Weight used: 294 lbs/133.5 kg (actual wt estimated by RD);   135 lbs/61.5 kg (IBW)    Estimated Calories needs: ~1600 calories daily  PSU (VE = 8.8, Tmax = 37.3) = 2165 x .70 = 1516  11-14 kcal/kg actual wt = 5065-7527    Estimated Protein needs: ~123 g protein daily  2.0 g/kg IBW = 123    Estimated Fluid needs: ~1500 ml fluid daily/per clinical status      Current Nutrition Prescription   PO: NPO Diet    Average PO intake: insufficient data (NPO)      EN: Peptamen Intense VHP @ 50 ml/hr. Free water @ 30 ml/hr. Prostat - 1 packet 2x daily.   Route: OG  Verified at bedside: Yes - per RN  Nutrition provided at goal: 1000 ml formula, 1200 calories (75% est needs), 123 g protein (102% est needs), 4 g fiber, 840 ml water from formula, 1440 ml total water from formula/flushes.  >>>With 694 calories from propofol, patient will receive 1894 total calories (118% est needs).    EN delivery:  3 Days: 1098 ml (110% goal volume) + 535 ml water (flushes) + 2 packets Prostat  1298 calories (81% est needs) + calories from propofol  132 g protein (107% est  needs)  5 g fiber  922 ml water from formula  1275 ml water total from formula/flushes      Nutrition Diagnosis       1/2/2022, 1/3  Problem Inadequate oral intake   Etiology A/C RF    Signs/Symptoms NPO d/t mechanical ventilation   Status: ongoing - EN initiated (1/1)     1/2/2022  Problem Food and nutrition knowledge deficit   Etiology MICHELINE   Signs/Symptoms BMI > 60, MD consult   Status: education pending- pt intubated and sedated (1/3)      Goal:   General: Nutrition to support treatment  PO: Initiate diet as medically appropriate   EN/PN: Maintain EN    Intervention   Intervention: Follow treatment progress, Care plan reviewed, Await begin PO, Nutrition support order placed    Continue current EN regimen  Peptamen VHP @ 50 ml/hr. Free water @ 30 ml/hr. Prostat - 1 packet 2x daily.    At goal rate this regimen provides 1000 ml formula, 1200 calories (75% est needs), 123 g protein (102% est needs), 4 g fiber, 840 ml water from formula, 1440 ml total water from formula/flushes.    >>>With 694 calories from propofol, patient will receive 1894 total calories (118% est needs).      Monitoring/Evaluation:   Monitoring/Evaluation: Per protocol, I&O, Pertinent labs, EN delivery/tolerance, Weight, GI status, Symptoms, POC/GOC, Swallow function, Hemodynamic stability    Prabha Johnson RD  Time Spent: 45 min

## 2022-01-06 NOTE — PLAN OF CARE
Goal Outcome Evaluation:  Remains intubated/sedated on fentanyl, versed, and diprivan. Positive cough/gag with ET/oral suctioning. No overbreathing of the vent noted. Grimaces with hard turning/oral care. Adequate UOP. No acute events over night.

## 2022-01-07 ENCOUNTER — APPOINTMENT (OUTPATIENT)
Dept: GENERAL RADIOLOGY | Facility: HOSPITAL | Age: 57
End: 2022-01-07

## 2022-01-07 LAB
ALBUMIN SERPL-MCNC: 3.4 G/DL (ref 3.5–5.2)
ALBUMIN/GLOB SERPL: 0.8 G/DL
ALP SERPL-CCNC: 157 U/L (ref 39–117)
ALT SERPL W P-5'-P-CCNC: 19 U/L (ref 1–33)
ANION GAP SERPL CALCULATED.3IONS-SCNC: 7 MMOL/L (ref 5–15)
ARTERIAL PATENCY WRIST A: ABNORMAL
AST SERPL-CCNC: 18 U/L (ref 1–32)
ATMOSPHERIC PRESS: ABNORMAL MM[HG]
BASE EXCESS BLDA CALC-SCNC: 11.4 MMOL/L (ref 0–2)
BASOPHILS # BLD AUTO: 0.11 10*3/MM3 (ref 0–0.2)
BASOPHILS NFR BLD AUTO: 0.7 % (ref 0–1.5)
BDY SITE: ABNORMAL
BILIRUB SERPL-MCNC: 0.6 MG/DL (ref 0–1.2)
BODY TEMPERATURE: 37 C
BUN SERPL-MCNC: 28 MG/DL (ref 6–20)
BUN/CREAT SERPL: 31.8 (ref 7–25)
CALCIUM SPEC-SCNC: 9.7 MG/DL (ref 8.6–10.5)
CHLORIDE SERPL-SCNC: 88 MMOL/L (ref 98–107)
CO2 BLDA-SCNC: 40 MMOL/L (ref 22–33)
CO2 SERPL-SCNC: 35 MMOL/L (ref 22–29)
COHGB MFR BLD: 0.8 % (ref 0–2)
CREAT SERPL-MCNC: 0.88 MG/DL (ref 0.57–1)
DEPRECATED RDW RBC AUTO: 57.4 FL (ref 37–54)
EOSINOPHIL # BLD AUTO: 0.76 10*3/MM3 (ref 0–0.4)
EOSINOPHIL NFR BLD AUTO: 4.9 % (ref 0.3–6.2)
EPAP: 0
ERYTHROCYTE [DISTWIDTH] IN BLOOD BY AUTOMATED COUNT: 18.2 % (ref 12.3–15.4)
GFR SERPL CREATININE-BSD FRML MDRD: 66 ML/MIN/1.73
GLOBULIN UR ELPH-MCNC: 4.5 GM/DL
GLUCOSE BLDC GLUCOMTR-MCNC: 121 MG/DL (ref 70–130)
GLUCOSE BLDC GLUCOMTR-MCNC: 126 MG/DL (ref 70–130)
GLUCOSE BLDC GLUCOMTR-MCNC: 126 MG/DL (ref 70–130)
GLUCOSE BLDC GLUCOMTR-MCNC: 140 MG/DL (ref 70–130)
GLUCOSE BLDC GLUCOMTR-MCNC: 154 MG/DL (ref 70–130)
GLUCOSE SERPL-MCNC: 127 MG/DL (ref 65–99)
HCO3 BLDA-SCNC: 38.2 MMOL/L (ref 20–26)
HCT VFR BLD AUTO: 39.8 % (ref 34–46.6)
HCT VFR BLD CALC: 39.4 % (ref 38–51)
HGB BLD-MCNC: 12.5 G/DL (ref 12–15.9)
HGB BLDA-MCNC: 12.9 G/DL (ref 14–18)
IMM GRANULOCYTES # BLD AUTO: 1.06 10*3/MM3 (ref 0–0.05)
IMM GRANULOCYTES NFR BLD AUTO: 6.8 % (ref 0–0.5)
INHALED O2 CONCENTRATION: 55 %
IPAP: 0
LYMPHOCYTES # BLD AUTO: 2.75 10*3/MM3 (ref 0.7–3.1)
LYMPHOCYTES NFR BLD AUTO: 17.6 % (ref 19.6–45.3)
MAGNESIUM SERPL-MCNC: 2 MG/DL (ref 1.6–2.6)
MCH RBC QN AUTO: 27.4 PG (ref 26.6–33)
MCHC RBC AUTO-ENTMCNC: 31.4 G/DL (ref 31.5–35.7)
MCV RBC AUTO: 87.1 FL (ref 79–97)
METHGB BLD QL: 0.5 % (ref 0–1.5)
MODALITY: ABNORMAL
MONOCYTES # BLD AUTO: 1.02 10*3/MM3 (ref 0.1–0.9)
MONOCYTES NFR BLD AUTO: 6.5 % (ref 5–12)
NEUTROPHILS NFR BLD AUTO: 63.5 % (ref 42.7–76)
NEUTROPHILS NFR BLD AUTO: 9.9 10*3/MM3 (ref 1.7–7)
NOTE: ABNORMAL
NRBC BLD AUTO-RTO: 0 /100 WBC (ref 0–0.2)
OXYHGB MFR BLDV: 93.5 % (ref 94–99)
PAW @ PEAK INSP FLOW SETTING VENT: 0 CMH2O
PCO2 BLDA: 59.2 MM HG (ref 35–45)
PCO2 TEMP ADJ BLD: 59.2 MM HG (ref 35–45)
PH BLDA: 7.42 PH UNITS (ref 7.35–7.45)
PH, TEMP CORRECTED: 7.42 PH UNITS
PHOSPHATE SERPL-MCNC: 3.9 MG/DL (ref 2.5–4.5)
PLAT MORPH BLD: NORMAL
PLATELET # BLD AUTO: 313 10*3/MM3 (ref 140–450)
PMV BLD AUTO: 10 FL (ref 6–12)
PO2 BLDA: 74.5 MM HG (ref 83–108)
PO2 TEMP ADJ BLD: 74.5 MM HG (ref 83–108)
POTASSIUM SERPL-SCNC: 4.6 MMOL/L (ref 3.5–5.2)
PROT SERPL-MCNC: 7.9 G/DL (ref 6–8.5)
RBC # BLD AUTO: 4.57 10*6/MM3 (ref 3.77–5.28)
RBC MORPH BLD: NORMAL
SODIUM SERPL-SCNC: 130 MMOL/L (ref 136–145)
TOTAL RATE: 0 BREATHS/MINUTE
WBC MORPH BLD: NORMAL
WBC NRBC COR # BLD: 15.6 10*3/MM3 (ref 3.4–10.8)

## 2022-01-07 PROCEDURE — 94761 N-INVAS EAR/PLS OXIMETRY MLT: CPT

## 2022-01-07 PROCEDURE — 71045 X-RAY EXAM CHEST 1 VIEW: CPT

## 2022-01-07 PROCEDURE — 25010000002 PROPOFOL 10 MG/ML EMULSION: Performed by: INTERNAL MEDICINE

## 2022-01-07 PROCEDURE — 25010000002 FENTANYL CITRATE (PF) 2500 MCG/50ML SOLUTION: Performed by: INTERNAL MEDICINE

## 2022-01-07 PROCEDURE — 84100 ASSAY OF PHOSPHORUS: CPT | Performed by: INTERNAL MEDICINE

## 2022-01-07 PROCEDURE — 83050 HGB METHEMOGLOBIN QUAN: CPT

## 2022-01-07 PROCEDURE — 82375 ASSAY CARBOXYHB QUANT: CPT

## 2022-01-07 PROCEDURE — 85025 COMPLETE CBC W/AUTO DIFF WBC: CPT | Performed by: INTERNAL MEDICINE

## 2022-01-07 PROCEDURE — 94799 UNLISTED PULMONARY SVC/PX: CPT

## 2022-01-07 PROCEDURE — 36600 WITHDRAWAL OF ARTERIAL BLOOD: CPT

## 2022-01-07 PROCEDURE — 94003 VENT MGMT INPAT SUBQ DAY: CPT

## 2022-01-07 PROCEDURE — 85007 BL SMEAR W/DIFF WBC COUNT: CPT | Performed by: INTERNAL MEDICINE

## 2022-01-07 PROCEDURE — 82962 GLUCOSE BLOOD TEST: CPT

## 2022-01-07 PROCEDURE — 83735 ASSAY OF MAGNESIUM: CPT | Performed by: INTERNAL MEDICINE

## 2022-01-07 PROCEDURE — 82805 BLOOD GASES W/O2 SATURATION: CPT

## 2022-01-07 PROCEDURE — 63710000001 INSULIN REGULAR HUMAN PER 5 UNITS: Performed by: INTERNAL MEDICINE

## 2022-01-07 PROCEDURE — 99291 CRITICAL CARE FIRST HOUR: CPT | Performed by: INTERNAL MEDICINE

## 2022-01-07 PROCEDURE — 25010000002 PIPERACILLIN SOD-TAZOBACTAM PER 1 G: Performed by: INTERNAL MEDICINE

## 2022-01-07 PROCEDURE — 80053 COMPREHEN METABOLIC PANEL: CPT | Performed by: INTERNAL MEDICINE

## 2022-01-07 PROCEDURE — 25010000002 FUROSEMIDE PER 20 MG: Performed by: INTERNAL MEDICINE

## 2022-01-07 RX ORDER — DOCUSATE SODIUM 50 MG/5 ML
100 LIQUID (ML) ORAL 2 TIMES DAILY
Status: DISCONTINUED | OUTPATIENT
Start: 2022-01-07 | End: 2022-01-11

## 2022-01-07 RX ADMIN — IPRATROPIUM BROMIDE AND ALBUTEROL SULFATE 3 ML: 2.5; .5 SOLUTION RESPIRATORY (INHALATION) at 12:39

## 2022-01-07 RX ADMIN — APIXABAN 5 MG: 5 TABLET, FILM COATED ORAL at 09:21

## 2022-01-07 RX ADMIN — TAZOBACTAM SODIUM AND PIPERACILLIN SODIUM 4.5 G: 500; 4 INJECTION, SOLUTION INTRAVENOUS at 09:49

## 2022-01-07 RX ADMIN — PROPOFOL 23.71 MCG/KG/MIN: 10 INJECTION, EMULSION INTRAVENOUS at 06:36

## 2022-01-07 RX ADMIN — CHLORHEXIDINE GLUCONATE 15 ML: 1.2 SOLUTION ORAL at 09:21

## 2022-01-07 RX ADMIN — CHLORHEXIDINE GLUCONATE 15 ML: 1.2 SOLUTION ORAL at 20:55

## 2022-01-07 RX ADMIN — PROPOFOL 25 MCG/KG/MIN: 10 INJECTION, EMULSION INTRAVENOUS at 16:55

## 2022-01-07 RX ADMIN — FUROSEMIDE 40 MG: 10 INJECTION, SOLUTION INTRAMUSCULAR; INTRAVENOUS at 09:21

## 2022-01-07 RX ADMIN — GABAPENTIN 300 MG: 300 CAPSULE ORAL at 13:52

## 2022-01-07 RX ADMIN — INSULIN HUMAN 2 UNITS: 100 INJECTION, SOLUTION PARENTERAL at 12:00

## 2022-01-07 RX ADMIN — DOXYCYCLINE 100 MG: 100 INJECTION, POWDER, LYOPHILIZED, FOR SOLUTION INTRAVENOUS at 12:47

## 2022-01-07 RX ADMIN — PROPOFOL 23.71 MCG/KG/MIN: 10 INJECTION, EMULSION INTRAVENOUS at 02:50

## 2022-01-07 RX ADMIN — Medication 300 MCG/HR: at 09:19

## 2022-01-07 RX ADMIN — IPRATROPIUM BROMIDE AND ALBUTEROL SULFATE 3 ML: 2.5; .5 SOLUTION RESPIRATORY (INHALATION) at 19:39

## 2022-01-07 RX ADMIN — CASTOR OIL AND BALSAM, PERU 1 APPLICATION: 788; 87 OINTMENT TOPICAL at 09:23

## 2022-01-07 RX ADMIN — FAMOTIDINE 20 MG: 10 INJECTION INTRAVENOUS at 09:21

## 2022-01-07 RX ADMIN — Medication 300 MCG/HR: at 20:55

## 2022-01-07 RX ADMIN — PROPOFOL 22.94 MCG/KG/MIN: 10 INJECTION, EMULSION INTRAVENOUS at 23:42

## 2022-01-07 RX ADMIN — Medication 300 MCG/HR: at 02:51

## 2022-01-07 RX ADMIN — Medication 5 MG/HR: at 09:20

## 2022-01-07 RX ADMIN — SENNOSIDES 10 ML: 8.8 LIQUID ORAL at 20:54

## 2022-01-07 RX ADMIN — PROPOFOL 25 MCG/KG/MIN: 10 INJECTION, EMULSION INTRAVENOUS at 09:21

## 2022-01-07 RX ADMIN — PROPOFOL 25 MCG/KG/MIN: 10 INJECTION, EMULSION INTRAVENOUS at 18:03

## 2022-01-07 RX ADMIN — SODIUM CHLORIDE, PRESERVATIVE FREE 10 ML: 5 INJECTION INTRAVENOUS at 09:23

## 2022-01-07 RX ADMIN — APIXABAN 5 MG: 5 TABLET, FILM COATED ORAL at 20:55

## 2022-01-07 RX ADMIN — CASTOR OIL AND BALSAM, PERU 1 APPLICATION: 788; 87 OINTMENT TOPICAL at 20:55

## 2022-01-07 RX ADMIN — IPRATROPIUM BROMIDE AND ALBUTEROL SULFATE 3 ML: 2.5; .5 SOLUTION RESPIRATORY (INHALATION) at 08:24

## 2022-01-07 RX ADMIN — PROPOFOL 23.71 MCG/KG/MIN: 10 INJECTION, EMULSION INTRAVENOUS at 00:00

## 2022-01-07 RX ADMIN — Medication 1 PACKET: at 09:21

## 2022-01-07 RX ADMIN — PROPOFOL 25 MCG/KG/MIN: 10 INJECTION, EMULSION INTRAVENOUS at 12:41

## 2022-01-07 RX ADMIN — FAMOTIDINE 20 MG: 10 INJECTION INTRAVENOUS at 20:55

## 2022-01-07 RX ADMIN — DOCUSATE SODIUM 100 MG: 50 LIQUID ORAL at 22:00

## 2022-01-07 RX ADMIN — GABAPENTIN 300 MG: 300 CAPSULE ORAL at 06:35

## 2022-01-07 RX ADMIN — GABAPENTIN 300 MG: 300 CAPSULE ORAL at 20:55

## 2022-01-07 RX ADMIN — TAZOBACTAM SODIUM AND PIPERACILLIN SODIUM 4.5 G: 500; 4 INJECTION, SOLUTION INTRAVENOUS at 16:55

## 2022-01-07 RX ADMIN — DOCUSATE SODIUM 100 MG: 50 LIQUID ORAL at 12:43

## 2022-01-07 RX ADMIN — DOXYCYCLINE 100 MG: 100 INJECTION, POWDER, LYOPHILIZED, FOR SOLUTION INTRAVENOUS at 06:35

## 2022-01-07 RX ADMIN — IPRATROPIUM BROMIDE AND ALBUTEROL SULFATE 3 ML: 2.5; .5 SOLUTION RESPIRATORY (INHALATION) at 01:34

## 2022-01-07 RX ADMIN — TAZOBACTAM SODIUM AND PIPERACILLIN SODIUM 4.5 G: 500; 4 INJECTION, SOLUTION INTRAVENOUS at 00:19

## 2022-01-07 RX ADMIN — SENNOSIDES 10 ML: 8.8 LIQUID ORAL at 12:41

## 2022-01-07 NOTE — CASE MANAGEMENT/SOCIAL WORK
Continued Stay Note  [facility]     Patient Name: Dani Ziegler  MRN: [id number]  Today's Date: 1/7/2022    Admit Date: 12/30/2021     Discharge Plan     Row Name 01/07/22 1240       Plan    Plan Comments Pt remains intubated. Case management continues to follow for discharge needs and will continue to follow pt.               Discharge Codes    No documentation.                     CARLA Escalante

## 2022-01-07 NOTE — PLAN OF CARE
Goal Outcome Evaluation:      Patient VSS. NAD. Remains intubated and sedated. Decreased peep and FIO2 today and patient tolerated well. Diuresed. Tube feed continues at goal- Patient tolerating well. Stopped free water today. See flowsheets, mar, and labs for specific details.

## 2022-01-07 NOTE — PROGRESS NOTES
"INTENSIVIST NOTE     Hospital Day: 7    Ms. Dani Ziegler, 56 y.o. female is followed for:   COPD, pneumonia, and respiratory failure       SUBJECTIVE     Interval history:    Remains on assist control 16 with tidal volume 430.  FiO2 at 50% and PEEP decreased to 10 on rounds this morning.  Continued aggressive diuresis with fluid balance -2 L over the last 24 hours.  Maximum temperature 99.1.  Remains on fentanyl, Versed, and propofol drips     The patient's relevant past medical, surgical and social history were reviewed and updated in Epic as appropriate.       OBJECTIVE     Vital Sign Min/Max for last 24 hours  Temp  Min: 98 °F (36.7 °C)  Max: 99.1 °F (37.3 °C)   BP  Min: 98/58  Max: 154/91   Pulse  Min: 71  Max: 89   Resp  Min: 16  Max: 16   SpO2  Min: 91 %  Max: 96 %   No data recorded   No data recorded      Intake/Output Summary (Last 24 hours) at 1/7/2022 1342  Last data filed at 1/7/2022 1247  Gross per 24 hour   Intake 1976 ml   Output 4060 ml   Net -2084 ml      Flowsheet Rows      First Filed Value   Admission Height 170.2 cm (67\") Documented at 12/30/2021 2003   Admission Weight 175 kg (385 lb) Documented at 12/30/2021 2003 12/30/21 2003 01/05/22  1500   Weight: (!) 175 kg (385 lb) (!) 166 kg (366 lb)            Objective:  General Appearance:  Ill-appearing.    Vital signs: (most recent): Blood pressure 98/58, pulse 84, temperature 98.4 °F (36.9 °C), temperature source Bladder, resp. rate 16, height 170.2 cm (67.01\"), weight (!) 166 kg (366 lb), SpO2 91 %.    HEENT: (Oral ET tube  NG tube)    Lungs:  There are decreased breath sounds and rhonchi.  No rales or wheezes.    Heart: Normal rate.  Regular rhythm.  S1 normal and S2 normal.  No murmur, gallop or friction rub.   Chest: Symmetric chest wall expansion.   Abdomen: Abdomen is soft and non-distended.  Bowel sounds are normal.   There is no abdominal tenderness.   There is no mass. There is no splenomegaly. There is no hepatomegaly. "   Extremities: There is dependent edema.  There is no deformity.  (Right upper extremity PICC line)  Neurological: (Sedated).    Pupils:  Pupils are equal, round, and reactive to light.    Skin:  Warm and dry.              I reviewed the patient's new clinical results.  I reviewed the patient's new imaging results/reports including actual images and agree with reports.    XR Chest 1 View    Result Date: 1/7/2022  Stable right basilar atelectasis and mild diffuse interstitial changes elsewhere. No new chest disease is seen.   D:  01/07/2022 E:  01/07/2022      XR Chest 1 View    Result Date: 1/6/2022   No significant interval change.  This report was finalized on 1/6/2022 8:23 AM by Ranjeet Leonardo MD.         INFUSIONS  fentanyl 10 mcg/mL,  mcg/hr, Last Rate: 300 mcg/hr (01/07/22 0919)  midazolam, 1-10 mg/hr, Last Rate: 5 mg/hr (01/07/22 0920)  propofol, 5-25 mcg/kg/min, Last Rate: 25 mcg/kg/min (01/07/22 1241)        Results from last 7 days   Lab Units 01/07/22  0401 01/06/22  0338 01/05/22  0258   WBC 10*3/mm3 15.60* 14.84* 12.73*   HEMOGLOBIN g/dL 12.5 12.2 10.9*   HEMATOCRIT % 39.8 40.0 35.7   PLATELETS 10*3/mm3 313 274 256     Results from last 7 days   Lab Units 01/07/22  0401 01/06/22  0338 01/05/22  1447 01/05/22  0258 01/05/22  0258   SODIUM mmol/L 130* 135*  --   --  139   POTASSIUM mmol/L 4.6 4.6 4.1   < > 3.4*   CHLORIDE mmol/L 88* 94*  --   --  97*   CO2 mmol/L 35.0* 33.0*  --   --  34.0*   BUN mg/dL 28* 25*  --   --  22*   GLUCOSE mg/dL 127* 108*  --   --  114*   CREATININE mg/dL 0.88 0.89  --   --  0.69   MAGNESIUM mg/dL 2.0 2.3  --   --  1.8   CALCIUM mg/dL 9.7 9.3  --   --  9.1    < > = values in this interval not displayed.         Results from last 7 days   Lab Units 01/07/22  0417 01/06/22  0527 01/05/22  0332 01/02/22  0347 01/01/22  0320   PH, ARTERIAL pH units 7.419 7.396 7.437 7.422 7.385   PCO2, ARTERIAL mm Hg 59.2* 59.8* 53.9* 50.4* 54.7*   PO2 ART mm Hg 74.5* 64.1* 72.2* 56.1* 57.9*    FIO2 % 55 55 90 60 50         Mechanical Ventilator:   Settings: Observed:   Mode: VC+/AC (01/07/22 1239)    Vt (Set, mL): 430 mL (01/07/22 1239) Vt Mandatory Ins (observed, mL): 434 mL (01/07/22 1239)   Resp Rate (Set): 16 (01/07/22 1239) Resp Rate (Observed) Vent: 16 (01/07/22 1300)   Pressure Support (cm H2O): 0 cm H20 (01/07/22 1239) Minute Ventilation (L/min) (Obs): 6.94 L/min (01/07/22 1239)       FiO2 (%): 50 % (01/07/22 1239) Plateau Pressure (cm H2O): (S) 25 cm H2O (01/06/22 2019)   PEEP/CPAP (cm H2O): 12 cm H20 (01/07/22 1239) I:E Ratio (Obs): 1:2.00 (01/07/22 1239)         I reviewed the patient's medications.    Assessment/Plan   ASSESSMENT/PLAN     Active Hospital Problems    Diagnosis    • **Acute on chronic respiratory failure with hypoxia and hypercapnia (HCC)    • CAP (community acquired pneumonia)    • Sepsis (HCC)    • Chronic obstructive pulmonary disease (HCC)    • History of recurrent deep vein thrombosis (DVT)    • Morbid obesity with BMI of 60.0-69.9, adult (HCC)    • Morbid obesity with BMI of 45.0-49.9, adult (HCC)    • Uncontrolled type 2 diabetes mellitus with hyperglycemia, without long-term current use of insulin (HCC)    • Chronic pain    • Essential hypertension          56-year-old female with a past medical history of COPD, type 2 diabetes mellitus, hypertension, chronic pain on chronic narcotics due to lumbar disc disease and on chronic benzodiazepines.  She is on home O2 and inhaled therapy and has been followed by Dr. Darrion Tovar.  Echocardiogram one year ago revealed normal systolic and diastolic function.  She has a history of 2 prior episodes of prolonged mechanical ventilation requiring tracheostomy, the 1st in early 2020 and the 2nd in early 2021.  Both of these episodes were at [facility] per her daughter and both times she had the tracheostomy decannulated.  She presented on 12/30 with bilateral lobar consolidative infiltrates most evident in the right lower  lobe with air bronchograms.  She had mediastinal and hilar adenopathy but this had been noted in the past.  She failed BiPAP therapy and required intubation in the ER.  Blood and sputum cultures were negative with the exception of a micrococcus in the blood assumed to be a contaminant.  Strep pneumonia antigen and Legionella antigen in the urine were negative.  MRSA PCR was negative.  COVID and influenza testing was negative    She has remained on mechanical ventilation since admission.  At one point was requiring high levels of PEEP and FiO2 though currently FiO2 down to 50% and PEEP decreased to 10 today.  She has required heavy sedation to facilitate ventilation and gas exchange.  Currently day #8 on mechanical ventilation.    Acute on chronic respiratory failure with hypoxia and hypercapnia  · Assist-control with rate 16.  Continue tidal volume 430.  FiO2 weaned to 50% and PEEP to 10  · Continue to wean oxygen as tolerated.  Saturations of 88% or greater acceptable.    Bilateral severe immunity acquired pneumonia  · Zosyn and doxycycline for 10 days total  · Cultures all negative as described above.  Legionella and strep pneumonia antigens negative.  MRSA PCR negative.    COPD  · Home O2 prior to admission  · Currently on duo nebs    History of recurrent DVT  · On Eliquis as at home    Nutrition  · Tube feeds at goal with Peptamen VHP at 50 mL/hour  · Free water DC'd due to decreasing sodium today.    I talked to her daughter via telephone today.  She was last here on Monday but has stayed away due to a respiratory infection.  She told me that she has been on prolonged mechanical ventilation twice and had a tracheostomy twice as described above.  She is not sure she would want to go through this again and I told her that we would work with the patient through the weekend and early next week but she should plan on coming in after that to make some decisions regarding further options if we cannot make progress  with mechanical ventilation.     I discussed the patient's findings and my recommendations with nursing staff     Plan of care and goals reviewed with multidisciplinary team at daily rounds.    Critical Care time spent in direct patient care: 33 minutes (excluding procedure time, if applicable) including high complexity decision making to assess, manipulate, and support vital organ system failure in this individual who has impairment of one or more vital organ systems such that there is a high probability of imminent or life threatening deterioration in the patient's condition.    Kuldeep Dodson MD  Pulmonary and Critical Care Medicine  01/07/22 13:42 EST

## 2022-01-07 NOTE — PROGRESS NOTES
Multidisciplinary Rounds    Time: 20min  Patient Name: Dani Ziegler  Date of Encounter: 01/07/22 10:00 EST  MRN: [id number]  Admission date: 12/30/2021      Reason for visit: MDR. RD to continue to follow per protocol.     Additional information obtained during MDR: Pt tolerating EN, with 95% of EN goal volume delivered over the past 24 hrs. Pt continues on the vent, versed, propofol. Na dropped to 130, will d/c free water in EN order per intensivist.     Per I&O- no bowel movement documented since admission. Receiving scheduled pericolace.     Current diet: NPO Diet    EN: Peptamen Intense VHP at 50 ml/hr with 1 ProStat 2x/day and free water at 30 ml/hr via OGT      Intervention:  Follow treatment plan  Care plan reviewed  D/c free water in EN    Follow up:   Per protocol      Marielos Hernandez MS RD/YAN CNSC  10:00 EST

## 2022-01-07 NOTE — PROGRESS NOTES
Daily chart review complete.  Patient remains intubated and sedated.  No new questions at this time.  NN continues to follow for interview and education as appropriate.

## 2022-01-08 ENCOUNTER — APPOINTMENT (OUTPATIENT)
Dept: GENERAL RADIOLOGY | Facility: HOSPITAL | Age: 57
End: 2022-01-08

## 2022-01-08 LAB
ALBUMIN SERPL-MCNC: 3.1 G/DL (ref 3.5–5.2)
ALBUMIN/GLOB SERPL: 0.7 G/DL
ALP SERPL-CCNC: 146 U/L (ref 39–117)
ALT SERPL W P-5'-P-CCNC: 18 U/L (ref 1–33)
ANION GAP SERPL CALCULATED.3IONS-SCNC: 10 MMOL/L (ref 5–15)
AST SERPL-CCNC: 20 U/L (ref 1–32)
BASOPHILS # BLD AUTO: 0.06 10*3/MM3 (ref 0–0.2)
BASOPHILS NFR BLD AUTO: 0.5 % (ref 0–1.5)
BILIRUB SERPL-MCNC: 0.5 MG/DL (ref 0–1.2)
BUN SERPL-MCNC: 31 MG/DL (ref 6–20)
BUN/CREAT SERPL: 52.5 (ref 7–25)
CALCIUM SPEC-SCNC: 9.6 MG/DL (ref 8.6–10.5)
CHLORIDE SERPL-SCNC: 93 MMOL/L (ref 98–107)
CO2 SERPL-SCNC: 31 MMOL/L (ref 22–29)
CREAT SERPL-MCNC: 0.59 MG/DL (ref 0.57–1)
DEPRECATED RDW RBC AUTO: 55.9 FL (ref 37–54)
EOSINOPHIL # BLD AUTO: 0.43 10*3/MM3 (ref 0–0.4)
EOSINOPHIL NFR BLD AUTO: 3.3 % (ref 0.3–6.2)
ERYTHROCYTE [DISTWIDTH] IN BLOOD BY AUTOMATED COUNT: 17.6 % (ref 12.3–15.4)
GFR SERPL CREATININE-BSD FRML MDRD: 105 ML/MIN/1.73
GLOBULIN UR ELPH-MCNC: 4.4 GM/DL
GLUCOSE BLDC GLUCOMTR-MCNC: 121 MG/DL (ref 70–130)
GLUCOSE BLDC GLUCOMTR-MCNC: 124 MG/DL (ref 70–130)
GLUCOSE BLDC GLUCOMTR-MCNC: 126 MG/DL (ref 70–130)
GLUCOSE BLDC GLUCOMTR-MCNC: 127 MG/DL (ref 70–130)
GLUCOSE SERPL-MCNC: 109 MG/DL (ref 65–99)
HCT VFR BLD AUTO: 39.5 % (ref 34–46.6)
HGB BLD-MCNC: 12.6 G/DL (ref 12–15.9)
IMM GRANULOCYTES # BLD AUTO: 0.72 10*3/MM3 (ref 0–0.05)
IMM GRANULOCYTES NFR BLD AUTO: 5.6 % (ref 0–0.5)
LYMPHOCYTES # BLD AUTO: 2.7 10*3/MM3 (ref 0.7–3.1)
LYMPHOCYTES NFR BLD AUTO: 21 % (ref 19.6–45.3)
MAGNESIUM SERPL-MCNC: 2 MG/DL (ref 1.6–2.6)
MCH RBC QN AUTO: 27.8 PG (ref 26.6–33)
MCHC RBC AUTO-ENTMCNC: 31.9 G/DL (ref 31.5–35.7)
MCV RBC AUTO: 87 FL (ref 79–97)
MONOCYTES # BLD AUTO: 0.94 10*3/MM3 (ref 0.1–0.9)
MONOCYTES NFR BLD AUTO: 7.3 % (ref 5–12)
NEUTROPHILS NFR BLD AUTO: 62.3 % (ref 42.7–76)
NEUTROPHILS NFR BLD AUTO: 8.03 10*3/MM3 (ref 1.7–7)
NRBC BLD AUTO-RTO: 0 /100 WBC (ref 0–0.2)
PHOSPHATE SERPL-MCNC: 3.7 MG/DL (ref 2.5–4.5)
PLATELET # BLD AUTO: 298 10*3/MM3 (ref 140–450)
PMV BLD AUTO: 10.4 FL (ref 6–12)
POTASSIUM SERPL-SCNC: 4.4 MMOL/L (ref 3.5–5.2)
PROT SERPL-MCNC: 7.5 G/DL (ref 6–8.5)
RBC # BLD AUTO: 4.54 10*6/MM3 (ref 3.77–5.28)
SODIUM SERPL-SCNC: 134 MMOL/L (ref 136–145)
WBC NRBC COR # BLD: 12.88 10*3/MM3 (ref 3.4–10.8)

## 2022-01-08 PROCEDURE — 94003 VENT MGMT INPAT SUBQ DAY: CPT

## 2022-01-08 PROCEDURE — 85025 COMPLETE CBC W/AUTO DIFF WBC: CPT | Performed by: INTERNAL MEDICINE

## 2022-01-08 PROCEDURE — 80053 COMPREHEN METABOLIC PANEL: CPT | Performed by: INTERNAL MEDICINE

## 2022-01-08 PROCEDURE — 25010000002 PROPOFOL 10 MG/ML EMULSION: Performed by: INTERNAL MEDICINE

## 2022-01-08 PROCEDURE — 25010000002 PIPERACILLIN SOD-TAZOBACTAM PER 1 G: Performed by: INTERNAL MEDICINE

## 2022-01-08 PROCEDURE — 99291 CRITICAL CARE FIRST HOUR: CPT | Performed by: INTERNAL MEDICINE

## 2022-01-08 PROCEDURE — 94799 UNLISTED PULMONARY SVC/PX: CPT

## 2022-01-08 PROCEDURE — 83735 ASSAY OF MAGNESIUM: CPT | Performed by: INTERNAL MEDICINE

## 2022-01-08 PROCEDURE — 82962 GLUCOSE BLOOD TEST: CPT

## 2022-01-08 PROCEDURE — 94761 N-INVAS EAR/PLS OXIMETRY MLT: CPT

## 2022-01-08 PROCEDURE — 25010000002 FENTANYL CITRATE (PF) 2500 MCG/50ML SOLUTION: Performed by: INTERNAL MEDICINE

## 2022-01-08 PROCEDURE — 84100 ASSAY OF PHOSPHORUS: CPT | Performed by: INTERNAL MEDICINE

## 2022-01-08 PROCEDURE — 71045 X-RAY EXAM CHEST 1 VIEW: CPT

## 2022-01-08 RX ORDER — BUSPIRONE HYDROCHLORIDE 5 MG/1
10 TABLET ORAL 2 TIMES DAILY
Status: DISCONTINUED | OUTPATIENT
Start: 2022-01-08 | End: 2022-01-11

## 2022-01-08 RX ORDER — ESCITALOPRAM OXALATE 20 MG/1
20 TABLET ORAL DAILY
Status: DISCONTINUED | OUTPATIENT
Start: 2022-01-08 | End: 2022-01-10

## 2022-01-08 RX ADMIN — TAZOBACTAM SODIUM AND PIPERACILLIN SODIUM 4.5 G: 500; 4 INJECTION, SOLUTION INTRAVENOUS at 15:33

## 2022-01-08 RX ADMIN — CASTOR OIL AND BALSAM, PERU 1 APPLICATION: 788; 87 OINTMENT TOPICAL at 21:41

## 2022-01-08 RX ADMIN — Medication 250 MCG/HR: at 15:32

## 2022-01-08 RX ADMIN — GABAPENTIN 300 MG: 300 CAPSULE ORAL at 21:40

## 2022-01-08 RX ADMIN — Medication 300 MCG/HR: at 06:15

## 2022-01-08 RX ADMIN — SODIUM CHLORIDE, PRESERVATIVE FREE 10 ML: 5 INJECTION INTRAVENOUS at 09:25

## 2022-01-08 RX ADMIN — IPRATROPIUM BROMIDE AND ALBUTEROL SULFATE 3 ML: 2.5; .5 SOLUTION RESPIRATORY (INHALATION) at 19:04

## 2022-01-08 RX ADMIN — SENNOSIDES 10 ML: 8.8 LIQUID ORAL at 09:25

## 2022-01-08 RX ADMIN — CHLORHEXIDINE GLUCONATE 15 ML: 1.2 SOLUTION ORAL at 21:40

## 2022-01-08 RX ADMIN — APIXABAN 5 MG: 5 TABLET, FILM COATED ORAL at 21:40

## 2022-01-08 RX ADMIN — SODIUM CHLORIDE, PRESERVATIVE FREE 10 ML: 5 INJECTION INTRAVENOUS at 21:40

## 2022-01-08 RX ADMIN — Medication 6 MG/HR: at 06:15

## 2022-01-08 RX ADMIN — Medication 2 MG/HR: at 23:22

## 2022-01-08 RX ADMIN — PROPOFOL 20 MCG/KG/MIN: 10 INJECTION, EMULSION INTRAVENOUS at 09:42

## 2022-01-08 RX ADMIN — SENNOSIDES 10 ML: 8.8 LIQUID ORAL at 21:48

## 2022-01-08 RX ADMIN — PROPOFOL 15 MCG/KG/MIN: 10 INJECTION, EMULSION INTRAVENOUS at 17:29

## 2022-01-08 RX ADMIN — DOXYCYCLINE 100 MG: 100 INJECTION, POWDER, LYOPHILIZED, FOR SOLUTION INTRAVENOUS at 05:11

## 2022-01-08 RX ADMIN — APIXABAN 5 MG: 5 TABLET, FILM COATED ORAL at 09:25

## 2022-01-08 RX ADMIN — DOCUSATE SODIUM 100 MG: 50 LIQUID ORAL at 09:26

## 2022-01-08 RX ADMIN — Medication 1 PACKET: at 09:43

## 2022-01-08 RX ADMIN — CASTOR OIL AND BALSAM, PERU 1 APPLICATION: 788; 87 OINTMENT TOPICAL at 09:25

## 2022-01-08 RX ADMIN — Medication 1 PACKET: at 01:18

## 2022-01-08 RX ADMIN — FAMOTIDINE 20 MG: 10 INJECTION INTRAVENOUS at 21:40

## 2022-01-08 RX ADMIN — Medication 1 PACKET: at 21:41

## 2022-01-08 RX ADMIN — FAMOTIDINE 20 MG: 10 INJECTION INTRAVENOUS at 09:25

## 2022-01-08 RX ADMIN — CHLORHEXIDINE GLUCONATE 15 ML: 1.2 SOLUTION ORAL at 09:25

## 2022-01-08 RX ADMIN — PROPOFOL 15 MCG/KG/MIN: 10 INJECTION, EMULSION INTRAVENOUS at 23:23

## 2022-01-08 RX ADMIN — TAZOBACTAM SODIUM AND PIPERACILLIN SODIUM 4.5 G: 500; 4 INJECTION, SOLUTION INTRAVENOUS at 09:00

## 2022-01-08 RX ADMIN — IPRATROPIUM BROMIDE AND ALBUTEROL SULFATE 3 ML: 2.5; .5 SOLUTION RESPIRATORY (INHALATION) at 08:14

## 2022-01-08 RX ADMIN — DOXYCYCLINE 100 MG: 100 INJECTION, POWDER, LYOPHILIZED, FOR SOLUTION INTRAVENOUS at 12:42

## 2022-01-08 RX ADMIN — BUSPIRONE HYDROCHLORIDE 10 MG: 5 TABLET ORAL at 12:41

## 2022-01-08 RX ADMIN — IPRATROPIUM BROMIDE AND ALBUTEROL SULFATE 3 ML: 2.5; .5 SOLUTION RESPIRATORY (INHALATION) at 12:31

## 2022-01-08 RX ADMIN — TAZOBACTAM SODIUM AND PIPERACILLIN SODIUM 4.5 G: 500; 4 INJECTION, SOLUTION INTRAVENOUS at 00:21

## 2022-01-08 RX ADMIN — GABAPENTIN 300 MG: 300 CAPSULE ORAL at 05:11

## 2022-01-08 RX ADMIN — GABAPENTIN 300 MG: 300 CAPSULE ORAL at 13:01

## 2022-01-08 RX ADMIN — PROPOFOL 23.71 MCG/KG/MIN: 10 INJECTION, EMULSION INTRAVENOUS at 03:30

## 2022-01-08 RX ADMIN — BUSPIRONE HYDROCHLORIDE 10 MG: 5 TABLET ORAL at 21:40

## 2022-01-08 RX ADMIN — ESCITALOPRAM OXALATE 20 MG: 20 TABLET ORAL at 12:41

## 2022-01-08 RX ADMIN — PROPOFOL 20 MCG/KG/MIN: 10 INJECTION, EMULSION INTRAVENOUS at 11:38

## 2022-01-08 RX ADMIN — DOCUSATE SODIUM 100 MG: 50 LIQUID ORAL at 21:41

## 2022-01-08 NOTE — PROGRESS NOTES
Intensive Care Follow-up     Hospital:  LOS: 8 days   Ms. Dani Ziegler, 56 y.o. female is followed for:   Acute on chronic respiratory failure with hypoxia and hypercapnia (HCC)            History of present illness:   56-year-old female with a past medical history of COPD, type 2 diabetes mellitus, hypertension, chronic pain on chronic narcotics due to lumbar disc disease and on chronic benzodiazepines and depression.  She is on home O2 and inhaled therapy and has been followed by Dr. Darrion Tovar.  Echocardiogram one year ago revealed normal systolic and diastolic function.  She has a history of 2 prior episodes of prolonged mechanical ventilation requiring tracheostomy, the 1st in early 2020 and the 2nd in early 2021.  Both of these episodes were at [facility] per her daughter and both times she had the tracheostomy decannulated.  She presented on 12/30 with bilateral lobar consolidative infiltrates most evident in the right lower lobe with air bronchograms.  She had mediastinal and hilar adenopathy but this had been noted in the past.  She failed BiPAP therapy and required intubation in the ER.  Blood and sputum cultures were negative with the exception of a micrococcus in the blood assumed to be a contaminant.  Strep pneumonia antigen and Legionella antigen in the urine were negative.  MRSA PCR was negative.  COVID and influenza testing was negative     She has remained on mechanical ventilation since admission.  At one point was requiring high levels of PEEP and FiO2.     UDS was positive for methamphetamine and benzodiazepines.    Subjective   Interval History:  Overnight no acute events.  Patient remains sedated at this time.  When sedation weaned patient gets agitated.  Down to 45% FiO2 currently                 The patient's past medical, surgical and social history were reviewed and updated in Epic as appropriate.       Objective     Infusions:  fentanyl 10 mcg/mL,  mcg/hr, Last Rate:  300 mcg/hr (01/08/22 0615)  midazolam, 1-10 mg/hr, Last Rate: 6 mg/hr (01/08/22 0615)  propofol, 5-25 mcg/kg/min, Last Rate: 20 mcg/kg/min (01/08/22 1138)      Medications:  apixaban, 5 mg, Nasogastric, Q12H  castor oil-balsam peru, 1 application, Topical, Q12H  chlorhexidine, 15 mL, Mouth/Throat, Q12H  docusate sodium, 100 mg, Nasogastric, BID  doxycycline, 100 mg, Intravenous, Q12H  famotidine, 20 mg, Intravenous, BID  gabapentin, 300 mg, Nasogastric, Q8H  insulin regular, 0-9 Units, Subcutaneous, Q6H  ipratropium-albuterol, 3 mL, Nebulization, Q6H - RT  piperacillin-tazobactam, 4.5 g, Intravenous, Q8H  PRO-STAT, 1 packet, Nasogastric, BID  sennosides, 10 mL, Nasogastric, BID  sodium chloride, 10 mL, Intravenous, Q12H        Vital Sign Min/Max for last 24 hours  Temp  Min: 97.9 °F (36.6 °C)  Max: 99.9 °F (37.7 °C)   BP  Min: 98/58  Max: 133/70   Pulse  Min: 73  Max: 89   Resp  Min: 16  Max: 17   SpO2  Min: 88 %  Max: 94 %   No data recorded       Input/Output for last 24 hour shift  01/07 0701 - 01/08 0700  In: 4169.6 [I.V.:2603.6]  Out: 3525 [Urine:3525]   FiO2 (%):  [45 %-50 %] 45 %  S RR:  [16] 16  PEEP/CPAP (cm H2O):  [10 cm H20-12 cm H20] 10 cm H20  NC SUP:  [0 cm H20] 0 cm H20  MAP (cm H2O):  [14-17] 17  Objective  General Appearance:  Not in distress, no asynchrony with mechanical ventilator.  Head:    Atraumatic, Normocephalic, Pupils reactive & symmetrical B/L.  Neck:  Endotracheal tube clean.   Lungs:   B/L Breath sounds present with decreased breath sounds on bases, no wheezing heard, no crackles.   Heart: S1 and S2 present, no murmur  Abdomen: Soft, nontender, no guarding or rigidity, bowel sounds positive, no masses.  Extremities: Atraumatic, no cyanosis or clubbing,  + edema, warm to touch.  Pulses: Positive and symmetric.  Neurologic:  Pt sedated on the vent. Not able to participate in full neurological exam    Ventilator settings: A/C: FiO2 45 /PEEP 10      Results from last 7 days   Lab Units  01/08/22  0258 01/07/22  0401 01/06/22  0338   WBC 10*3/mm3 12.88* 15.60* 14.84*   HEMOGLOBIN g/dL 12.6 12.5 12.2   PLATELETS 10*3/mm3 298 313 274     Results from last 7 days   Lab Units 01/08/22  0258 01/07/22  0401 01/06/22  0338   SODIUM mmol/L 134* 130* 135*   POTASSIUM mmol/L 4.4 4.6 4.6   CO2 mmol/L 31.0* 35.0* 33.0*   BUN mg/dL 31* 28* 25*   CREATININE mg/dL 0.59 0.88 0.89   MAGNESIUM mg/dL 2.0 2.0 2.3   PHOSPHORUS mg/dL 3.7 3.9 4.2   GLUCOSE mg/dL 109* 127* 108*     Estimated Creatinine Clearance: 171.1 mL/min (by C-G formula based on SCr of 0.59 mg/dL).    Results from last 7 days   Lab Units 01/07/22  0417   PH, ARTERIAL pH units 7.419   PCO2, ARTERIAL mm Hg 59.2*   PO2 ART mm Hg 74.5*       Images:   Chest x-ray reviewed personally and showed endotracheal tube above yuan.  Consolidative infiltrate left lingula.  Probable small effusion on the right side.  No obvious pneumothorax.  Poor lung volumes.    I reviewed the patient's results and images.     Assessment/Plan   Impression        Acute on chronic respiratory failure with hypoxia and hypercapnia ([facility])    Uncontrolled type 2 diabetes mellitus with hyperglycemia, without long-term current use of insulin (HCC)    Essential hypertension    Chronic pain    Chronic obstructive pulmonary disease (HCC)    Sepsis (HCC)    CAP (community acquired pneumonia)    Morbid obesity with BMI of 45.0-49.9, adult ([facility])    History of recurrent deep vein thrombosis (DVT)    Morbid obesity with BMI of 60.0-69.9, adult ([facility])       Plan        1.  Patient remains on mechanical ventilation for bilateral pneumonia.  Cultures negative.  We will continue supportive care at this point.  Try to wean PEEP down as tolerated.  Continue current DuoNeb nebulizers.  2.  Patient had a pretty dense bilateral infiltrates and consolidations.  Overall chest x-rays are showing some improvement.  If no significant improvement and unable to wean will consider systemic steroids to try to  control inflammation.  3.  Patient with previous history of DVT.  On Eliquis, tolerating well continue same.  4.  Tolerating tube feeds okay.  Continue to monitor.  Monitor bowel function.  5.  Urine output is acceptable.  Electrolytes are stable continue to monitor.  6.  Try to wean sedation and see how she wakes up.  She has multiple psych medications on board at home.  We will go ahead and resume BuSpar as well as escitalopram.  7.  GI prophylaxis.  8.  Sedation and pain medication for comfort and ventilator synchrony.  Again we will try to wean as tolerated.    Overall patient ismael in guarded condition.  Will try to wean patient from mechanical ventilation but if unable to then may need repeat tracheostomy.  Will discuss further with family.    Plan of care and goals reviewed with multidisciplinary/antibiotic stewardship team during rounds.   I discussed the patient's findings and my recommendations with nursing staff     High level of risk due to:  illness with threat to life or bodily function.    Time spent Critical care 35 min (exclusive of procedure time)  including high complexity decision making to assess, manipulate, and support vital organ system failure in this individual who has impairment of one or more vital organ systems such that there is a high probability of imminent or life threatening deterioration in the patient’s condition.      Sunil Carpenter MD, [facility]  Pulmonary, Critical care and Sleep Medicine

## 2022-01-09 ENCOUNTER — APPOINTMENT (OUTPATIENT)
Dept: GENERAL RADIOLOGY | Facility: HOSPITAL | Age: 57
End: 2022-01-09

## 2022-01-09 LAB
ALBUMIN SERPL-MCNC: 3.4 G/DL (ref 3.5–5.2)
ALBUMIN/GLOB SERPL: 0.8 G/DL
ALP SERPL-CCNC: 140 U/L (ref 39–117)
ALT SERPL W P-5'-P-CCNC: 20 U/L (ref 1–33)
ANION GAP SERPL CALCULATED.3IONS-SCNC: 10 MMOL/L (ref 5–15)
AST SERPL-CCNC: 27 U/L (ref 1–32)
BASOPHILS # BLD AUTO: 0.07 10*3/MM3 (ref 0–0.2)
BASOPHILS NFR BLD AUTO: 0.6 % (ref 0–1.5)
BILIRUB SERPL-MCNC: 0.5 MG/DL (ref 0–1.2)
BUN SERPL-MCNC: 32 MG/DL (ref 6–20)
BUN/CREAT SERPL: 43.8 (ref 7–25)
CALCIUM SPEC-SCNC: 9.6 MG/DL (ref 8.6–10.5)
CHLORIDE SERPL-SCNC: 97 MMOL/L (ref 98–107)
CO2 SERPL-SCNC: 30 MMOL/L (ref 22–29)
CREAT SERPL-MCNC: 0.73 MG/DL (ref 0.57–1)
DEPRECATED RDW RBC AUTO: 56 FL (ref 37–54)
EOSINOPHIL # BLD AUTO: 0.46 10*3/MM3 (ref 0–0.4)
EOSINOPHIL NFR BLD AUTO: 3.6 % (ref 0.3–6.2)
ERYTHROCYTE [DISTWIDTH] IN BLOOD BY AUTOMATED COUNT: 17.3 % (ref 12.3–15.4)
ERYTHROCYTE [SEDIMENTATION RATE] IN BLOOD: 80 MM/HR (ref 0–30)
GFR SERPL CREATININE-BSD FRML MDRD: 82 ML/MIN/1.73
GLOBULIN UR ELPH-MCNC: 4.1 GM/DL
GLUCOSE BLDC GLUCOMTR-MCNC: 124 MG/DL (ref 70–130)
GLUCOSE BLDC GLUCOMTR-MCNC: 134 MG/DL (ref 70–130)
GLUCOSE BLDC GLUCOMTR-MCNC: 138 MG/DL (ref 70–130)
GLUCOSE SERPL-MCNC: 116 MG/DL (ref 65–99)
HCT VFR BLD AUTO: 40.4 % (ref 34–46.6)
HGB BLD-MCNC: 12.4 G/DL (ref 12–15.9)
IMM GRANULOCYTES # BLD AUTO: 0.61 10*3/MM3 (ref 0–0.05)
IMM GRANULOCYTES NFR BLD AUTO: 4.8 % (ref 0–0.5)
LYMPHOCYTES # BLD AUTO: 2.67 10*3/MM3 (ref 0.7–3.1)
LYMPHOCYTES NFR BLD AUTO: 21.1 % (ref 19.6–45.3)
MAGNESIUM SERPL-MCNC: 2 MG/DL (ref 1.6–2.6)
MCH RBC QN AUTO: 27.3 PG (ref 26.6–33)
MCHC RBC AUTO-ENTMCNC: 30.7 G/DL (ref 31.5–35.7)
MCV RBC AUTO: 88.8 FL (ref 79–97)
MONOCYTES # BLD AUTO: 0.8 10*3/MM3 (ref 0.1–0.9)
MONOCYTES NFR BLD AUTO: 6.3 % (ref 5–12)
NEUTROPHILS NFR BLD AUTO: 63.6 % (ref 42.7–76)
NEUTROPHILS NFR BLD AUTO: 8.04 10*3/MM3 (ref 1.7–7)
NRBC BLD AUTO-RTO: 0 /100 WBC (ref 0–0.2)
PHOSPHATE SERPL-MCNC: 3 MG/DL (ref 2.5–4.5)
PLATELET # BLD AUTO: 283 10*3/MM3 (ref 140–450)
PMV BLD AUTO: 10.4 FL (ref 6–12)
POTASSIUM SERPL-SCNC: 3.9 MMOL/L (ref 3.5–5.2)
PROCALCITONIN SERPL-MCNC: 0.1 NG/ML (ref 0–0.25)
PROT SERPL-MCNC: 7.5 G/DL (ref 6–8.5)
RBC # BLD AUTO: 4.55 10*6/MM3 (ref 3.77–5.28)
SODIUM SERPL-SCNC: 137 MMOL/L (ref 136–145)
WBC NRBC COR # BLD: 12.65 10*3/MM3 (ref 3.4–10.8)

## 2022-01-09 PROCEDURE — 25010000002 FENTANYL CITRATE (PF) 2500 MCG/50ML SOLUTION: Performed by: INTERNAL MEDICINE

## 2022-01-09 PROCEDURE — 94799 UNLISTED PULMONARY SVC/PX: CPT

## 2022-01-09 PROCEDURE — 94761 N-INVAS EAR/PLS OXIMETRY MLT: CPT

## 2022-01-09 PROCEDURE — 80053 COMPREHEN METABOLIC PANEL: CPT | Performed by: INTERNAL MEDICINE

## 2022-01-09 PROCEDURE — 71045 X-RAY EXAM CHEST 1 VIEW: CPT

## 2022-01-09 PROCEDURE — 84100 ASSAY OF PHOSPHORUS: CPT | Performed by: INTERNAL MEDICINE

## 2022-01-09 PROCEDURE — 94762 N-INVAS EAR/PLS OXIMTRY CONT: CPT

## 2022-01-09 PROCEDURE — 94003 VENT MGMT INPAT SUBQ DAY: CPT

## 2022-01-09 PROCEDURE — 82962 GLUCOSE BLOOD TEST: CPT

## 2022-01-09 PROCEDURE — 99291 CRITICAL CARE FIRST HOUR: CPT | Performed by: INTERNAL MEDICINE

## 2022-01-09 PROCEDURE — 85652 RBC SED RATE AUTOMATED: CPT | Performed by: INTERNAL MEDICINE

## 2022-01-09 PROCEDURE — 25010000002 PIPERACILLIN SOD-TAZOBACTAM PER 1 G: Performed by: INTERNAL MEDICINE

## 2022-01-09 PROCEDURE — 84145 PROCALCITONIN (PCT): CPT | Performed by: INTERNAL MEDICINE

## 2022-01-09 PROCEDURE — 25010000002 PROPOFOL 10 MG/ML EMULSION: Performed by: INTERNAL MEDICINE

## 2022-01-09 PROCEDURE — 85025 COMPLETE CBC W/AUTO DIFF WBC: CPT | Performed by: INTERNAL MEDICINE

## 2022-01-09 PROCEDURE — 83735 ASSAY OF MAGNESIUM: CPT | Performed by: INTERNAL MEDICINE

## 2022-01-09 RX ORDER — DEXMEDETOMIDINE HYDROCHLORIDE 4 UG/ML
.2-1.5 INJECTION, SOLUTION INTRAVENOUS
Status: DISCONTINUED | OUTPATIENT
Start: 2022-01-09 | End: 2022-01-15

## 2022-01-09 RX ADMIN — SENNOSIDES 10 ML: 8.8 LIQUID ORAL at 21:33

## 2022-01-09 RX ADMIN — IPRATROPIUM BROMIDE AND ALBUTEROL SULFATE 3 ML: 2.5; .5 SOLUTION RESPIRATORY (INHALATION) at 18:29

## 2022-01-09 RX ADMIN — SENNOSIDES 10 ML: 8.8 LIQUID ORAL at 08:52

## 2022-01-09 RX ADMIN — TAZOBACTAM SODIUM AND PIPERACILLIN SODIUM 4.5 G: 500; 4 INJECTION, SOLUTION INTRAVENOUS at 00:26

## 2022-01-09 RX ADMIN — GABAPENTIN 300 MG: 300 CAPSULE ORAL at 05:38

## 2022-01-09 RX ADMIN — Medication 1 PACKET: at 08:53

## 2022-01-09 RX ADMIN — ESCITALOPRAM OXALATE 20 MG: 20 TABLET ORAL at 08:52

## 2022-01-09 RX ADMIN — IPRATROPIUM BROMIDE AND ALBUTEROL SULFATE 3 ML: 2.5; .5 SOLUTION RESPIRATORY (INHALATION) at 01:07

## 2022-01-09 RX ADMIN — DEXMEDETOMIDINE HYDROCHLORIDE 0.2 MCG/KG/HR: 4 INJECTION, SOLUTION INTRAVENOUS at 18:45

## 2022-01-09 RX ADMIN — DOXYCYCLINE 100 MG: 100 INJECTION, POWDER, LYOPHILIZED, FOR SOLUTION INTRAVENOUS at 05:38

## 2022-01-09 RX ADMIN — GABAPENTIN 300 MG: 300 CAPSULE ORAL at 13:43

## 2022-01-09 RX ADMIN — Medication 1 PACKET: at 21:30

## 2022-01-09 RX ADMIN — PROPOFOL 10 MCG/KG/MIN: 10 INJECTION, EMULSION INTRAVENOUS at 19:51

## 2022-01-09 RX ADMIN — CHLORHEXIDINE GLUCONATE 15 ML: 1.2 SOLUTION ORAL at 21:28

## 2022-01-09 RX ADMIN — IPRATROPIUM BROMIDE AND ALBUTEROL SULFATE 3 ML: 2.5; .5 SOLUTION RESPIRATORY (INHALATION) at 07:53

## 2022-01-09 RX ADMIN — IPRATROPIUM BROMIDE AND ALBUTEROL SULFATE 3 ML: 2.5; .5 SOLUTION RESPIRATORY (INHALATION) at 13:00

## 2022-01-09 RX ADMIN — TAZOBACTAM SODIUM AND PIPERACILLIN SODIUM 4.5 G: 500; 4 INJECTION, SOLUTION INTRAVENOUS at 17:00

## 2022-01-09 RX ADMIN — PROPOFOL 15 MCG/KG/MIN: 10 INJECTION, EMULSION INTRAVENOUS at 11:46

## 2022-01-09 RX ADMIN — BUSPIRONE HYDROCHLORIDE 10 MG: 5 TABLET ORAL at 21:28

## 2022-01-09 RX ADMIN — SODIUM CHLORIDE, PRESERVATIVE FREE 10 ML: 5 INJECTION INTRAVENOUS at 08:52

## 2022-01-09 RX ADMIN — Medication 250 MCG/HR: at 11:47

## 2022-01-09 RX ADMIN — FAMOTIDINE 20 MG: 10 INJECTION INTRAVENOUS at 21:29

## 2022-01-09 RX ADMIN — Medication 250 MCG/HR: at 02:43

## 2022-01-09 RX ADMIN — DOCUSATE SODIUM 100 MG: 50 LIQUID ORAL at 21:33

## 2022-01-09 RX ADMIN — SODIUM CHLORIDE, PRESERVATIVE FREE 10 ML: 5 INJECTION INTRAVENOUS at 21:29

## 2022-01-09 RX ADMIN — CASTOR OIL AND BALSAM, PERU 1 APPLICATION: 788; 87 OINTMENT TOPICAL at 21:28

## 2022-01-09 RX ADMIN — PROPOFOL 10 MCG/KG/MIN: 10 INJECTION, EMULSION INTRAVENOUS at 05:37

## 2022-01-09 RX ADMIN — APIXABAN 5 MG: 5 TABLET, FILM COATED ORAL at 21:28

## 2022-01-09 RX ADMIN — DOXYCYCLINE 100 MG: 100 INJECTION, POWDER, LYOPHILIZED, FOR SOLUTION INTRAVENOUS at 18:45

## 2022-01-09 RX ADMIN — APIXABAN 5 MG: 5 TABLET, FILM COATED ORAL at 08:52

## 2022-01-09 RX ADMIN — BUSPIRONE HYDROCHLORIDE 10 MG: 5 TABLET ORAL at 08:52

## 2022-01-09 RX ADMIN — DOCUSATE SODIUM 100 MG: 50 LIQUID ORAL at 08:52

## 2022-01-09 RX ADMIN — FAMOTIDINE 20 MG: 10 INJECTION INTRAVENOUS at 08:52

## 2022-01-09 RX ADMIN — GABAPENTIN 300 MG: 300 CAPSULE ORAL at 21:28

## 2022-01-09 RX ADMIN — CHLORHEXIDINE GLUCONATE 15 ML: 1.2 SOLUTION ORAL at 08:51

## 2022-01-09 RX ADMIN — TAZOBACTAM SODIUM AND PIPERACILLIN SODIUM 4.5 G: 500; 4 INJECTION, SOLUTION INTRAVENOUS at 08:51

## 2022-01-09 RX ADMIN — CASTOR OIL AND BALSAM, PERU 1 APPLICATION: 788; 87 OINTMENT TOPICAL at 08:53

## 2022-01-09 NOTE — PROGRESS NOTES
Intensive Care Follow-up     Hospital:  LOS: 9 days   Ms. Dani Ziegler, 56 y.o. female is followed for:   Acute on chronic respiratory failure with hypoxia and hypercapnia (HCC)            History of present illness:   56-year-old female with a past medical history of COPD, type 2 diabetes mellitus, hypertension, chronic pain on chronic narcotics due to lumbar disc disease and on chronic benzodiazepines and depression.  She is on home O2 and inhaled therapy and has been followed by Dr. Darrion Tovar.  Echocardiogram one year ago revealed normal systolic and diastolic function.  She has a history of 2 prior episodes of prolonged mechanical ventilation requiring tracheostomy, the 1st in early 2020 and the 2nd in early 2021.  Both of these episodes were at [facility] per her daughter and both times she had the tracheostomy decannulated.  She presented on 12/30 with bilateral lobar consolidative infiltrates most evident in the right lower lobe with air bronchograms.  She had mediastinal and hilar adenopathy but this had been noted in the past.  She failed BiPAP therapy and required intubation in the ER.  Blood and sputum cultures were negative with the exception of a micrococcus in the blood assumed to be a contaminant.  Strep pneumonia antigen and Legionella antigen in the urine were negative.  MRSA PCR was negative.  COVID and influenza testing was negative     She has remained on mechanical ventilation since admission.  At one point was requiring high levels of PEEP and FiO2.     UDS was positive for methamphetamine and benzodiazepines.    Subjective   Interval History:  No acute events overnight.  Sedation is being weaned.  Patient still gets agitated at times.  We will switch to Precedex infusion.                 The patient's past medical, surgical and social history were reviewed and updated in Epic as appropriate.       Objective     Infusions:  fentanyl 10 mcg/mL,  mcg/hr, Last Rate: 250 mcg/hr  "(01/09/22 1147)  midazolam, 1-10 mg/hr, Last Rate: 1 mg/hr (01/09/22 0457)  propofol, 5-25 mcg/kg/min, Last Rate: 15 mcg/kg/min (01/09/22 1146)      Medications:  apixaban, 5 mg, Nasogastric, Q12H  busPIRone, 10 mg, Oral, BID  castor oil-balsam peru, 1 application, Topical, Q12H  chlorhexidine, 15 mL, Mouth/Throat, Q12H  docusate sodium, 100 mg, Nasogastric, BID  doxycycline, 100 mg, Intravenous, Q12H  escitalopram, 20 mg, Oral, Daily  famotidine, 20 mg, Intravenous, BID  gabapentin, 300 mg, Nasogastric, Q8H  insulin regular, 0-9 Units, Subcutaneous, Q6H  ipratropium-albuterol, 3 mL, Nebulization, Q6H - RT  piperacillin-tazobactam, 4.5 g, Intravenous, Q8H  PRO-STAT, 1 packet, Nasogastric, BID  sennosides, 10 mL, Nasogastric, BID  sodium chloride, 10 mL, Intravenous, Q12H        Vital Sign Min/Max for last 24 hours  Temp  Min: 98.4 °F (36.9 °C)  Max: 100 °F (37.8 °C)   BP  Min: 100/63  Max: 143/86   Pulse  Min: 73  Max: 93   Resp  Min: 16  Max: 18   SpO2  Min: 88 %  Max: 100 %   No data recorded       Input/Output for last 24 hour shift  01/08 0701 - 01/09 0700  In: 3082.3 [I.V.:1490.3]  Out: 3100 [Urine:3100]   FiO2 (%):  [45 %-55 %] 45 %  S RR:  [16] 16  PEEP/CPAP (cm H2O):  [10 cm H20] 10 cm H20  NC SUP:  [0 cm H20] 0 cm H20  MAP (cm H2O):  [14-15] 14  Objective:  Vital signs: (most recent): Blood pressure 100/63, pulse 73, temperature 98.4 °F (36.9 °C), temperature source Bladder, resp. rate 18, height 170.2 cm (67.01\"), weight (!) 166 kg (365 lb 11.2 oz), SpO2 90 %.            General Appearance:  Not in distress, no asynchrony with mechanical ventilator.  Head:    Atraumatic, Normocephalic, Pupils reactive & symmetrical B/L.  Neck:  Endotracheal tube clean.   Lungs:   B/L Breath sounds present with decreased breath sounds on bases, no wheezing heard, no crackles.   Heart: S1 and S2 present, no murmur  Abdomen: Soft, nontender, no guarding or rigidity, bowel sounds positive, no masses.  Extremities: no cyanosis " or clubbing,  + edema, warm to touch.  Pulses: Positive and symmetric.  Neurologic:  Pt sedated on the vent. Opening eyes abut not following commands. Not able to participate in full neurological exam    Ventilator settings: A/C: FiO2 45 /PEEP 10      Results from last 7 days   Lab Units 01/09/22 0258 01/08/22 0258 01/07/22  0401   WBC 10*3/mm3 12.65* 12.88* 15.60*   HEMOGLOBIN g/dL 12.4 12.6 12.5   PLATELETS 10*3/mm3 283 298 313     Results from last 7 days   Lab Units 01/09/22  0258 01/08/22  0258 01/07/22  0401   SODIUM mmol/L 137 134* 130*   POTASSIUM mmol/L 3.9 4.4 4.6   CO2 mmol/L 30.0* 31.0* 35.0*   BUN mg/dL 32* 31* 28*   CREATININE mg/dL 0.73 0.59 0.88   MAGNESIUM mg/dL 2.0 2.0 2.0   PHOSPHORUS mg/dL 3.0 3.7 3.9   GLUCOSE mg/dL 116* 109* 127*     Estimated Creatinine Clearance: 138.3 mL/min (by C-G formula based on SCr of 0.73 mg/dL).    Results from last 7 days   Lab Units 01/07/22  0417   PH, ARTERIAL pH units 7.419   PCO2, ARTERIAL mm Hg 59.2*   PO2 ART mm Hg 74.5*       Sedimentation rate 80    Images:   Chest x-ray reviewed personally and showed endotracheal tube above yuan. Bilateral infiltrative process and atelectasis have significantly improved compared to yesterday. No new findings. No effusions noted.    I reviewed the patient's results and images.     Assessment/Plan   Impression        Acute on chronic respiratory failure with hypoxia and hypercapnia ([facility])    Uncontrolled type 2 diabetes mellitus with hyperglycemia, without long-term current use of insulin ([facility])    Essential hypertension    Chronic pain    Chronic obstructive pulmonary disease (HCC)    Sepsis (HCC)    CAP (community acquired pneumonia)    Morbid obesity with BMI of 45.0-49.9, adult ([facility])    History of recurrent deep vein thrombosis (DVT)    Morbid obesity with BMI of 60.0-69.9, adult ([facility])       Plan        1.  Patient remains on mechanical ventilation for bilateral pneumonia.  Cultures negative.  We will continue  supportive care at this point. Continue DuoNeb nebulizers.   2. Patient was requiring 55% FiO2 but able to wean down FiO2 some today. Will continue to work toward weaning further down.  chest x-ray showing significant improvement. Sed rate is however significantly elevated. If does not improve or gets any further worse we will consider empiric steroid treatment..  3.  Patient with previous history of DVT.  On Eliquis, tolerating well, continue same. No acute bleed noted.  4.  Tolerating tube feeds okay.  Continue to monitor. Patient starting to move bowels. Had small bowel movement. Will monitor.  5.  Urine output is acceptable.  Electrolytes are stable continue to monitor.  6. Patient's home psychiatric medications yesterday. Able to wean sedation some. We will switch from propofol to Precedex and see if we can get her into a calm state where we can start doing some weaning trials.  7.  GI prophylaxis.    Overall patient remains in guarded condition.  Will try to wean patient from mechanical ventilation but if unable to then may need repeat tracheostomy.  Will discuss further with family.    Plan of care and goals reviewed with multidisciplinary/antibiotic stewardship team during rounds.   I discussed the patient's findings and my recommendations with nursing staff     High level of risk due to:  illness with threat to life or bodily function.    Time spent Critical care 35 min (exclusive of procedure time)  including high complexity decision making to assess, manipulate, and support vital organ system failure in this individual who has impairment of one or more vital organ systems such that there is a high probability of imminent or life threatening deterioration in the patient’s condition.      Sunil Carpenter MD, [facility]  Pulmonary, Critical care and Sleep Medicine

## 2022-01-09 NOTE — PLAN OF CARE
PT wound care in to perform MIST this PM.  However, upon inspection left back DTI is now red and blanchable so MIST is no longer indicated.  PT wound care will sign off.

## 2022-01-09 NOTE — PLAN OF CARE
Goal Outcome Evaluation:        Episode of desaturation while on right side at end of shift. FiO2 increased to 50% and meeting goal sat. Sedation decreased overnight. Increased spontaneous movement noted. Continues to not follow commands or track people in room. Urine output adequate. BP and HR stable overnight. 1 soft BM.  Turned q2 hours.

## 2022-01-09 NOTE — PLAN OF CARE
Goal Outcome Evaluation:  Plan of Care Reviewed With: daughter        Progress: no change  Outcome Summary: Pt had no significant change during shift.  Weaning sedation during shift, pt remains on versed, fentanyl, and propofol.  Pt had copious amounts of oral secretions.  Patient resonds to voice, but did not follow commands.  Pt turned q 2 hours.  VSS, will continue to monitor.

## 2022-01-10 ENCOUNTER — APPOINTMENT (OUTPATIENT)
Dept: GENERAL RADIOLOGY | Facility: HOSPITAL | Age: 57
End: 2022-01-10

## 2022-01-10 LAB
ALBUMIN SERPL-MCNC: 3.3 G/DL (ref 3.5–5.2)
ALBUMIN/GLOB SERPL: 0.8 G/DL
ALP SERPL-CCNC: 135 U/L (ref 39–117)
ALT SERPL W P-5'-P-CCNC: 20 U/L (ref 1–33)
ANION GAP SERPL CALCULATED.3IONS-SCNC: 10 MMOL/L (ref 5–15)
AST SERPL-CCNC: 23 U/L (ref 1–32)
BASOPHILS # BLD AUTO: 0.06 10*3/MM3 (ref 0–0.2)
BASOPHILS NFR BLD AUTO: 0.5 % (ref 0–1.5)
BILIRUB SERPL-MCNC: 0.5 MG/DL (ref 0–1.2)
BUN SERPL-MCNC: 29 MG/DL (ref 6–20)
BUN/CREAT SERPL: 42 (ref 7–25)
CALCIUM SPEC-SCNC: 9.9 MG/DL (ref 8.6–10.5)
CHLORIDE SERPL-SCNC: 97 MMOL/L (ref 98–107)
CO2 SERPL-SCNC: 32 MMOL/L (ref 22–29)
CREAT SERPL-MCNC: 0.69 MG/DL (ref 0.57–1)
DEPRECATED RDW RBC AUTO: 56.3 FL (ref 37–54)
EOSINOPHIL # BLD AUTO: 0.44 10*3/MM3 (ref 0–0.4)
EOSINOPHIL NFR BLD AUTO: 3.5 % (ref 0.3–6.2)
ERYTHROCYTE [DISTWIDTH] IN BLOOD BY AUTOMATED COUNT: 17.3 % (ref 12.3–15.4)
GFR SERPL CREATININE-BSD FRML MDRD: 88 ML/MIN/1.73
GLOBULIN UR ELPH-MCNC: 4.2 GM/DL
GLUCOSE BLDC GLUCOMTR-MCNC: 141 MG/DL (ref 70–130)
GLUCOSE BLDC GLUCOMTR-MCNC: 159 MG/DL (ref 70–130)
GLUCOSE BLDC GLUCOMTR-MCNC: 187 MG/DL (ref 70–130)
GLUCOSE BLDC GLUCOMTR-MCNC: 205 MG/DL (ref 70–130)
GLUCOSE SERPL-MCNC: 149 MG/DL (ref 65–99)
HCT VFR BLD AUTO: 40.4 % (ref 34–46.6)
HGB BLD-MCNC: 12.5 G/DL (ref 12–15.9)
IMM GRANULOCYTES # BLD AUTO: 0.36 10*3/MM3 (ref 0–0.05)
IMM GRANULOCYTES NFR BLD AUTO: 2.9 % (ref 0–0.5)
LYMPHOCYTES # BLD AUTO: 2.42 10*3/MM3 (ref 0.7–3.1)
LYMPHOCYTES NFR BLD AUTO: 19.4 % (ref 19.6–45.3)
MAGNESIUM SERPL-MCNC: 2 MG/DL (ref 1.6–2.6)
MCH RBC QN AUTO: 27.5 PG (ref 26.6–33)
MCHC RBC AUTO-ENTMCNC: 30.9 G/DL (ref 31.5–35.7)
MCV RBC AUTO: 89 FL (ref 79–97)
MONOCYTES # BLD AUTO: 0.8 10*3/MM3 (ref 0.1–0.9)
MONOCYTES NFR BLD AUTO: 6.4 % (ref 5–12)
NEUTROPHILS NFR BLD AUTO: 67.3 % (ref 42.7–76)
NEUTROPHILS NFR BLD AUTO: 8.42 10*3/MM3 (ref 1.7–7)
NRBC BLD AUTO-RTO: 0 /100 WBC (ref 0–0.2)
PHOSPHATE SERPL-MCNC: 3.3 MG/DL (ref 2.5–4.5)
PLATELET # BLD AUTO: 284 10*3/MM3 (ref 140–450)
PMV BLD AUTO: 10.5 FL (ref 6–12)
POTASSIUM SERPL-SCNC: 3.9 MMOL/L (ref 3.5–5.2)
PROT SERPL-MCNC: 7.5 G/DL (ref 6–8.5)
RBC # BLD AUTO: 4.54 10*6/MM3 (ref 3.77–5.28)
SODIUM SERPL-SCNC: 139 MMOL/L (ref 136–145)
WBC NRBC COR # BLD: 12.5 10*3/MM3 (ref 3.4–10.8)

## 2022-01-10 PROCEDURE — 99291 CRITICAL CARE FIRST HOUR: CPT | Performed by: INTERNAL MEDICINE

## 2022-01-10 PROCEDURE — 25010000002 PIPERACILLIN SOD-TAZOBACTAM PER 1 G: Performed by: INTERNAL MEDICINE

## 2022-01-10 PROCEDURE — 25010000002 FENTANYL CITRATE (PF) 2500 MCG/50ML SOLUTION: Performed by: INTERNAL MEDICINE

## 2022-01-10 PROCEDURE — 85025 COMPLETE CBC W/AUTO DIFF WBC: CPT | Performed by: INTERNAL MEDICINE

## 2022-01-10 PROCEDURE — 94799 UNLISTED PULMONARY SVC/PX: CPT

## 2022-01-10 PROCEDURE — 63710000001 INSULIN REGULAR HUMAN PER 5 UNITS: Performed by: INTERNAL MEDICINE

## 2022-01-10 PROCEDURE — 82962 GLUCOSE BLOOD TEST: CPT

## 2022-01-10 PROCEDURE — 71045 X-RAY EXAM CHEST 1 VIEW: CPT

## 2022-01-10 PROCEDURE — 25010000002 HYDRALAZINE PER 20 MG: Performed by: INTERNAL MEDICINE

## 2022-01-10 PROCEDURE — 83735 ASSAY OF MAGNESIUM: CPT | Performed by: INTERNAL MEDICINE

## 2022-01-10 PROCEDURE — 80053 COMPREHEN METABOLIC PANEL: CPT | Performed by: INTERNAL MEDICINE

## 2022-01-10 PROCEDURE — 84100 ASSAY OF PHOSPHORUS: CPT | Performed by: INTERNAL MEDICINE

## 2022-01-10 PROCEDURE — 94003 VENT MGMT INPAT SUBQ DAY: CPT

## 2022-01-10 RX ORDER — ESCITALOPRAM OXALATE 20 MG/1
20 TABLET ORAL DAILY
Status: DISCONTINUED | OUTPATIENT
Start: 2022-01-11 | End: 2022-01-14

## 2022-01-10 RX ORDER — AMLODIPINE BESYLATE 5 MG/1
5 TABLET ORAL
Status: DISCONTINUED | OUTPATIENT
Start: 2022-01-10 | End: 2022-01-11

## 2022-01-10 RX ORDER — NYSTATIN 100000 [USP'U]/G
POWDER TOPICAL EVERY 12 HOURS SCHEDULED
Status: DISCONTINUED | OUTPATIENT
Start: 2022-01-10 | End: 2022-01-23 | Stop reason: HOSPADM

## 2022-01-10 RX ORDER — HYDRALAZINE HYDROCHLORIDE 20 MG/ML
10 INJECTION INTRAMUSCULAR; INTRAVENOUS EVERY 4 HOURS PRN
Status: DISCONTINUED | OUTPATIENT
Start: 2022-01-10 | End: 2022-01-23 | Stop reason: HOSPADM

## 2022-01-10 RX ADMIN — CASTOR OIL AND BALSAM, PERU 1 APPLICATION: 788; 87 OINTMENT TOPICAL at 21:29

## 2022-01-10 RX ADMIN — NYSTATIN 1 APPLICATION: 100000 POWDER TOPICAL at 09:53

## 2022-01-10 RX ADMIN — CASTOR OIL AND BALSAM, PERU 1 APPLICATION: 788; 87 OINTMENT TOPICAL at 08:17

## 2022-01-10 RX ADMIN — NYSTATIN: 100000 POWDER TOPICAL at 21:28

## 2022-01-10 RX ADMIN — APIXABAN 5 MG: 5 TABLET, FILM COATED ORAL at 08:16

## 2022-01-10 RX ADMIN — DEXMEDETOMIDINE HYDROCHLORIDE 0.8 MCG/KG/HR: 4 INJECTION, SOLUTION INTRAVENOUS at 22:33

## 2022-01-10 RX ADMIN — SENNOSIDES 10 ML: 8.8 LIQUID ORAL at 21:29

## 2022-01-10 RX ADMIN — TAZOBACTAM SODIUM AND PIPERACILLIN SODIUM 4.5 G: 500; 4 INJECTION, SOLUTION INTRAVENOUS at 00:22

## 2022-01-10 RX ADMIN — ESCITALOPRAM OXALATE 20 MG: 20 TABLET ORAL at 08:16

## 2022-01-10 RX ADMIN — INSULIN HUMAN 2 UNITS: 100 INJECTION, SOLUTION PARENTERAL at 00:22

## 2022-01-10 RX ADMIN — GABAPENTIN 300 MG: 300 CAPSULE ORAL at 21:28

## 2022-01-10 RX ADMIN — FAMOTIDINE 20 MG: 10 INJECTION INTRAVENOUS at 08:16

## 2022-01-10 RX ADMIN — SENNOSIDES 10 ML: 8.8 LIQUID ORAL at 08:17

## 2022-01-10 RX ADMIN — DEXMEDETOMIDINE HYDROCHLORIDE 0.6 MCG/KG/HR: 4 INJECTION, SOLUTION INTRAVENOUS at 09:53

## 2022-01-10 RX ADMIN — IPRATROPIUM BROMIDE AND ALBUTEROL SULFATE 3 ML: 2.5; .5 SOLUTION RESPIRATORY (INHALATION) at 19:36

## 2022-01-10 RX ADMIN — SODIUM CHLORIDE, PRESERVATIVE FREE 10 ML: 5 INJECTION INTRAVENOUS at 21:29

## 2022-01-10 RX ADMIN — CHLORHEXIDINE GLUCONATE 15 ML: 1.2 SOLUTION ORAL at 08:16

## 2022-01-10 RX ADMIN — APIXABAN 5 MG: 5 TABLET, FILM COATED ORAL at 21:28

## 2022-01-10 RX ADMIN — DEXMEDETOMIDINE HYDROCHLORIDE 0.6 MCG/KG/HR: 4 INJECTION, SOLUTION INTRAVENOUS at 13:58

## 2022-01-10 RX ADMIN — AMLODIPINE BESYLATE 5 MG: 5 TABLET ORAL at 15:58

## 2022-01-10 RX ADMIN — HYDRALAZINE HYDROCHLORIDE 10 MG: 20 INJECTION INTRAMUSCULAR; INTRAVENOUS at 17:11

## 2022-01-10 RX ADMIN — BUSPIRONE HYDROCHLORIDE 10 MG: 5 TABLET ORAL at 21:28

## 2022-01-10 RX ADMIN — IPRATROPIUM BROMIDE AND ALBUTEROL SULFATE 3 ML: 2.5; .5 SOLUTION RESPIRATORY (INHALATION) at 07:07

## 2022-01-10 RX ADMIN — DOCUSATE SODIUM 100 MG: 50 LIQUID ORAL at 08:17

## 2022-01-10 RX ADMIN — Medication 1 PACKET: at 08:17

## 2022-01-10 RX ADMIN — CHLORHEXIDINE GLUCONATE 15 ML: 1.2 SOLUTION ORAL at 21:29

## 2022-01-10 RX ADMIN — GABAPENTIN 300 MG: 300 CAPSULE ORAL at 05:44

## 2022-01-10 RX ADMIN — DEXMEDETOMIDINE HYDROCHLORIDE 0.2 MCG/KG/HR: 4 INJECTION, SOLUTION INTRAVENOUS at 03:48

## 2022-01-10 RX ADMIN — SODIUM CHLORIDE, PRESERVATIVE FREE 10 ML: 5 INJECTION INTRAVENOUS at 08:17

## 2022-01-10 RX ADMIN — DOCUSATE SODIUM 100 MG: 50 LIQUID ORAL at 21:29

## 2022-01-10 RX ADMIN — DEXMEDETOMIDINE HYDROCHLORIDE 0.6 MCG/KG/HR: 4 INJECTION, SOLUTION INTRAVENOUS at 18:18

## 2022-01-10 RX ADMIN — IPRATROPIUM BROMIDE AND ALBUTEROL SULFATE 3 ML: 2.5; .5 SOLUTION RESPIRATORY (INHALATION) at 01:01

## 2022-01-10 RX ADMIN — Medication 250 MCG/HR: at 00:23

## 2022-01-10 RX ADMIN — INSULIN HUMAN 4 UNITS: 100 INJECTION, SOLUTION PARENTERAL at 12:07

## 2022-01-10 RX ADMIN — GABAPENTIN 300 MG: 300 CAPSULE ORAL at 13:30

## 2022-01-10 RX ADMIN — Medication 200 MCG/HR: at 12:00

## 2022-01-10 RX ADMIN — FAMOTIDINE 20 MG: 10 INJECTION INTRAVENOUS at 21:28

## 2022-01-10 RX ADMIN — INSULIN HUMAN 2 UNITS: 100 INJECTION, SOLUTION PARENTERAL at 18:16

## 2022-01-10 RX ADMIN — BUSPIRONE HYDROCHLORIDE 10 MG: 5 TABLET ORAL at 08:16

## 2022-01-10 NOTE — PROGRESS NOTES
Clinical Nutrition   Reason For Visit: MDR, Follow-up protocol, EN    Patient Name: Dani Ziegler  YOB: 1965  MRN: [id number]  Date of Encounter: 01/10/22 11:29 EST  Admission date: 12/30/2021      Now that propofol has been discontinued, RD adjusted EN regimen to better meet calorie/protein needs:  Peptamen VHP @ 65 ml/hr and advance as tolerated by 10 ml/hr Q 8 hours to goal rate 75 ml/hr. No free water (per MD). No Prostat.    At goal rate this regimen provides 1500 ml formula, 1500 calories (94% est needs), 140 g protein (114% est needs), 7 g fiber, 1260 ml water from formula.    Nutrition Assessment     Admission Problem List:  Acute on chronic respiratory failure  Sepsis  CAP (community acquired pneumonia)  Acute 7th rib fracture      Applicable medical tests/procedures since admission:  (12/31) intubated  (1/1) EN initiated via OG tube per intensivist  (1/3) EN adjusted per RD  (1/7) free water of 30 ml/hr discontinued due to hyponatremia      PMH: She  has a past medical history of Abnormal weight gain, Acute UTI, Anxiety, Arthritis involving multiple sites, Chronic obstructive pulmonary disease (HCC), Chronic pain, Current every day smoker, Depression, Diabetes mellitus (HCC), Diarrhea, Dysuria, Edema, lower extremity, Fever, Head injury, Hypertension, Intervertebral disc disorder, Left bundle branch block, Leukocytosis, Long term current use of methadone for pain control, Mass of right breast, Nausea, Obesity, Overactive bladder, Peripheral neuropathy, Superficial phlebitis, Upper respiratory infection, Urinary incontinence, Vitamin D deficiency, and Vomiting.   PSxH: She  has a past surgical history that includes Bladder surgery; Cholecystectomy; and Hip Arthroscopy (Right, 10/29/2020).        Reported/Observed/Food/Nutrition Related History   1/10) Per MDR discussion - patient intubated and sedated on fentanyl and precedex. Propofol discontinued overnight. Versed weaned off this morning.  2 soft small bowel movements within the past 24 hours per I/Os / RN notes. Continues to tolerate EN. Still receiving no free water. Intensivist would like to continue without free water today. Serum Na 139 this morning.     1/6) Per MDR discussion - patient intubated and sedated. Continues to tolerate EN at goal rate of 50 ml/hr. No BM this admission per I/Os.    1/5) Per MDR discussion - patient intubated and sedated. Has OG tube. Receiving EN @ 65 ml/hr with 30 ml Q 2 hours and tolerating. No bowel movement this admission per I/Os.      Anthropometrics   Height: 67 in  Weight: 366 lbs (bed scale weight 1/5 per RN) --- note fluid still present and bed had other equipment on it when weighed --- need a zeroed bed scale weight  BMI: 57.3  BMI classification: Obese Class III extreme obesity: > or equal to 40kg/m2   IBW: 135 lbs    UBW:  Last 15 Recorded Weights  Weight Weight (kg) Weight (lbs) Weight Method VISIT REPORT   12/30/2021 174.635 kg 385 lb Stated -   8/24/2021 133.358 kg 294 lb - Report   7/16/2021 139.708 kg 308 lb - -   6/12/2021 128.368 kg 283 lb - -   6/11/2021 129.502 kg 285 lb 8 oz Standing scale -   6/10/2021 132.496 kg 292 lb 1.6 oz Bed scale -   6/9/2021 130.817 kg 288 lb 6.4 oz Bed scale -   6/8/2021 131.362 kg 289 lb 9.6 oz Standing scale -   6/7/2021 133.856 kg 295 lb 1.6 oz Bed scale -   6/6/2021 134.446 kg 296 lb 6.4 oz Bed scale -   6/4/2021 132.269 kg 291 lb 9.6 oz Bed scale -   6/3/2021 136.533 kg 301 lb Stated -   5/25/2021 130.182 kg 287 lb - Report   1/20/2021 143.881 kg 317 lb 3.2 oz - Report       ---RD notes stated wt on admission (385 lbs) not consistent with EMR weight hx so RD suspects stated weight is inaccurate.      Labs reviewed   Labs reviewed: Yes    Results from last 7 days   Lab Units 01/10/22  0308 01/09/22  0258 01/08/22  0258   SODIUM mmol/L 139 137 134*   POTASSIUM mmol/L 3.9 3.9 4.4   CHLORIDE mmol/L 97* 97* 93*   CO2 mmol/L 32.0* 30.0* 31.0*   BUN mg/dL 29* 32* 31*    CREATININE mg/dL 0.69 0.73 0.59   GLUCOSE mg/dL 149* 116* 109*   CALCIUM mg/dL 9.9 9.6 9.6   PHOSPHORUS mg/dL 3.3 3.0 3.7   MAGNESIUM mg/dL 2.0 2.0 2.0     Results from last 7 days   Lab Units 01/10/22  0308 01/09/22  0258 01/08/22  0258   WBC 10*3/mm3 12.50* 12.65* 12.88*   ALBUMIN g/dL 3.30* 3.40* 3.10*     Results from last 7 days   Lab Units 01/10/22  0502 01/10/22  0011 01/09/22  1734 01/09/22  1312 01/09/22  0503 01/08/22  2315   GLUCOSE mg/dL 141* 159* 138* 134* 124 127       Medications reviewed   Medications reviewed: Yes  Scheduled: senekot, pepcid, insulin, colace  GTT: fentanyl, precedex    Needs Assessment (1/10)   Height used: 67 in/170.2 cm  Weight used: 294 lbs/133.5 kg (actual wt estimated by RD);   135 lbs/61.5 kg (IBW)    Estimated Calories needs: ~1600 calories daily  PSU (VE = 7.93, Tmax = 37.3) = 2139 x .70 = 1497  11-14 kcal/kg actual wt = 3050-3637    Estimated Protein needs: ~123 g protein daily  2.0-2.5 g/kg IBW = 123-154    Estimated Fluid needs: ~1500 ml fluid daily/per clinical status      Current Nutrition Prescription   PO: NPO Diet      EN: Peptamen Intense VHP @ 50 ml/hr. Prostat - 1 packet 2x daily. No free water.  Route: OG  Verified at bedside: Yes - per RN  Nutrition provided at goal: 1000 ml formula, 1200 calories (75% est needs), 123 g protein (102% est needs), 4 g fiber, 840 ml water from formula.      EN delivery:  3 Days: 1056 ml (106% goal volume) + 173 ml water (flushes) + 2 packets Prostat  1256 calories (79% est needs)  128 g protein (104% est needs)  5 g fiber  887 ml water from formula  1060 ml water total from formula/flushes      Nutrition Diagnosis     1/2/2022, 1/3  Problem Inadequate oral intake   Etiology A/C RF    Signs/Symptoms NPO d/t mechanical ventilation   Status: ongoing - EN initiated (1/1)     1/2/2022  Problem Food and nutrition knowledge deficit   Etiology MICHELINE   Signs/Symptoms BMI > 60, MD consult   Status: education pending- pt intubated and sedated  (1/3)      Goal:   General: Nutrition to support treatment  PO: Initiate diet as medically appropriate   EN/PN: Tolerate EN at goal, Adjust EN    Intervention   Intervention: Follow treatment progress, Care plan reviewed, Await begin PO, Nutrition support order placed    Now that propofol has been discontinued, RD adjusted EN regimen to better meet calorie/protein needs:  Peptamen VHP @ 65 ml/hr and advance as tolerated by 10 ml/hr Q 8 hours to goal rate 75 ml/hr. No free water (per MD). No Prostat.    At goal rate this regimen provides 1500 ml formula, 1500 calories (94% est needs), 140 g protein (114% est needs), 7 g fiber, 1260 ml water from formula.      Monitoring/Evaluation:   Monitoring/Evaluation: Per protocol, I&O, Pertinent labs, EN delivery/tolerance, Weight, GI status, Symptoms, POC/GOC, Swallow function, Hemodynamic stability    Prabha Johnson RD  Time Spent: 45 min

## 2022-01-10 NOTE — PROGRESS NOTES
Intensive Care Follow-up     Hospital:  LOS: 10 days   Ms. Dani Ziegler, 56 y.o. female is followed for:   Acute on chronic respiratory failure with hypoxia and hypercapnia (HCC)            History of present illness:   56-year-old female with a past medical history of COPD, type 2 diabetes mellitus, hypertension, chronic pain on chronic narcotics due to lumbar disc disease and on chronic benzodiazepines and depression.  She is on home O2 and inhaled therapy and has been followed by Dr. Darrion Tovar.  Echocardiogram one year ago revealed normal systolic and diastolic function.  She has a history of 2 prior episodes of prolonged mechanical ventilation requiring tracheostomy, the 1st in early 2020 and the 2nd in early 2021.  Both of these episodes were at [facility] per her daughter and both times she had the tracheostomy decannulated.  She presented on 12/30 with bilateral lobar consolidative infiltrates most evident in the right lower lobe with air bronchograms.  She had mediastinal and hilar adenopathy but this had been noted in the past.  She failed BiPAP therapy and required intubation in the ER.  Blood and sputum cultures were negative with the exception of a micrococcus in the blood assumed to be a contaminant.  Strep pneumonia antigen and Legionella antigen in the urine were negative.  MRSA PCR was negative.  COVID and influenza testing was negative     She has remained on mechanical ventilation since admission.  At one point was requiring high levels of PEEP and FiO2.     UDS was positive for methamphetamine and benzodiazepines.    Subjective   Interval History:  No acute events overnight.  She does have intermittent bradycardia.  Sedation is being weaned but patient is still not fully awake or following commands.  FiO2 remains stable at 45%.  We will start weaning PEEP down.       The patient's past medical, surgical and social history were reviewed and updated in Epic as appropriate.      "  Objective     Infusions:  dexmedetomidine, 0.2-1.5 mcg/kg/hr, Last Rate: 0.6 mcg/kg/hr (01/10/22 0953)  fentanyl 10 mcg/mL,  mcg/hr, Last Rate: 200 mcg/hr (01/10/22 1200)  midazolam, 1-10 mg/hr, Last Rate: Stopped (01/10/22 0825)  propofol, 5-25 mcg/kg/min, Last Rate: Stopped (01/10/22 0224)      Medications:  apixaban, 5 mg, Nasogastric, Q12H  busPIRone, 10 mg, Oral, BID  castor oil-balsam peru, 1 application, Topical, Q12H  chlorhexidine, 15 mL, Mouth/Throat, Q12H  docusate sodium, 100 mg, Nasogastric, BID  [START ON 1/11/2022] escitalopram, 20 mg, Nasogastric, Daily  famotidine, 20 mg, Intravenous, BID  gabapentin, 300 mg, Nasogastric, Q8H  insulin regular, 0-9 Units, Subcutaneous, Q6H  ipratropium-albuterol, 3 mL, Nebulization, Q6H - RT  nystatin, , Topical, Q12H  sennosides, 10 mL, Nasogastric, BID  sodium chloride, 10 mL, Intravenous, Q12H        Vital Sign Min/Max for last 24 hours  Temp  Min: 98.4 °F (36.9 °C)  Max: 99.1 °F (37.3 °C)   BP  Min: 100/67  Max: 169/90   Pulse  Min: 53  Max: 80   Resp  Min: 16  Max: 20   SpO2  Min: 88 %  Max: 96 %   No data recorded       Input/Output for last 24 hour shift  01/09 0701 - 01/10 0700  In: 2736.3 [I.V.:1378.3]  Out: 1800 [Urine:1800]   FiO2 (%):  [45 %-50 %] 45 %  S RR:  [16] 16  PEEP/CPAP (cm H2O):  [10 cm H20] 10 cm H20  RI SUP:  [0 cm H20] 0 cm H20  MAP (cm H2O):  [14-16] 16  Objective:  Vital signs: (most recent): Blood pressure 169/90, pulse 53, temperature 98.8 °F (37.1 °C), temperature source Bladder, resp. rate 20, height 170.2 cm (67.01\"), weight (!) 166 kg (365 lb 11.2 oz), SpO2 90 %.            General Appearance:  Not in distress, no asynchrony with mechanical ventilator.  Head:    Atraumatic, Normocephalic, Pupils reactive & symmetrical B/L.  Neck:  Endotracheal tube clean.   Lungs:   B/L Breath sounds present with decreased breath sounds on bases, no wheezing heard, no crackles.   Heart: S1 and S2 present, no murmur  Abdomen: Soft, nontender, " no guarding or rigidity, bowel sounds positive, no masses.  Extremities: no cyanosis or clubbing,  + edema, warm to touch.  Pulses: Positive and symmetric.  Neurologic:  Pt on the vent. Opening eyes abut not following commands. Not able to participate in full neurological exam    Ventilator settings: A/C: FiO2 45 /PEEP 8      Results from last 7 days   Lab Units 01/10/22  0308 01/09/22 0258 01/08/22  0258   WBC 10*3/mm3 12.50* 12.65* 12.88*   HEMOGLOBIN g/dL 12.5 12.4 12.6   PLATELETS 10*3/mm3 284 283 298     Results from last 7 days   Lab Units 01/10/22  0308 01/09/22 0258 01/08/22  0258   SODIUM mmol/L 139 137 134*   POTASSIUM mmol/L 3.9 3.9 4.4   CO2 mmol/L 32.0* 30.0* 31.0*   BUN mg/dL 29* 32* 31*   CREATININE mg/dL 0.69 0.73 0.59   MAGNESIUM mg/dL 2.0 2.0 2.0   PHOSPHORUS mg/dL 3.3 3.0 3.7   GLUCOSE mg/dL 149* 116* 109*     Estimated Creatinine Clearance: 146.3 mL/min (by C-G formula based on SCr of 0.69 mg/dL).    Results from last 7 days   Lab Units 01/07/22  0417   PH, ARTERIAL pH units 7.419   PCO2, ARTERIAL mm Hg 59.2*   PO2 ART mm Hg 74.5*       Sedimentation rate 80    Images:   Chest x-ray reviewed personally and showed endotracheal tube above yuan.  Support lines in good position.  Pulmonary vascular congestion noted.    I reviewed the patient's results and images.     Assessment/Plan   Impression        Acute on chronic respiratory failure with hypoxia and hypercapnia ([facility])    Uncontrolled type 2 diabetes mellitus with hyperglycemia, without long-term current use of insulin ([facility])    Essential hypertension    Chronic pain    Chronic obstructive pulmonary disease (HCC)    Sepsis (HCC)    CAP (community acquired pneumonia)    Morbid obesity with BMI of 45.0-49.9, adult ([facility])    History of recurrent deep vein thrombosis (DVT)    Morbid obesity with BMI of 60.0-69.9, adult ([facility])       Plan        1.  Patient remains on mechanical ventilation for bilateral pneumonia.  Cultures negative.  We will  continue supportive care at this point. Continue DuoNeb nebulizers.   2.  Able to wean down FiO2 to 45%.  Tolerating okay so far.  Will decrease PEEP to 8.   3.  Patient with previous history of DVT.  On Eliquis, tolerating well, continue same. No acute bleed noted.  4.  Tolerating tube feeds okay.  Continue to monitor. Patient starting to move bowels.  Moving bowels okay.    5.  Urine output is acceptable.  Electrolytes are stable. Will continue to monitor.  6. Patient's home psych medications started. Able to wean sedation some. We will continue precedexd  7.  GI prophylaxis.    Overall patient remains in guarded condition.  Will try to wean patient from mechanical ventilation but if unable to then may need repeat tracheostomy.  Will discuss further with family.  Slowly improving however mental status remains a concern.    Plan of care and goals reviewed with multidisciplinary/antibiotic stewardship team during rounds.   I discussed the patient's findings and my recommendations with nursing staff     High level of risk due to:  illness with threat to life or bodily function.    Time spent Critical care 30 min (exclusive of procedure time)  including high complexity decision making to assess, manipulate, and support vital organ system failure in this individual who has impairment of one or more vital organ systems such that there is a high probability of imminent or life threatening deterioration in the patient’s condition.      Sunil Carpenter MD, [facility]  Pulmonary, Critical care and Sleep Medicine

## 2022-01-10 NOTE — PROGRESS NOTES
Daily chart review complete.  Patient remains intubated and sedated.  FULL CODE.  NN continues to follow for interview and education as appropriate.

## 2022-01-10 NOTE — PLAN OF CARE
Goal Outcome Evaluation:               Pt does not follow commands, MAEE. Versed/Fentanyl/Precedex gtts remain, Propofol gtt off. LS coarse/rhonchi, copious thin/white secretions. VSS, NSR, palpable pulses. Tolerating TF, 1 small formed BM. UOP adequate via lucero. Used lift to turn, venelex applied to lower back wound. Sats remained 88-92% throughout shift on 45% FiO2.

## 2022-01-10 NOTE — NURSING NOTE
Wocn follow up for: Transverse sacral pressure injury and wound in the lumbar fold    Assessment: Wound in lumbar fold is just dermis partial-thickness center does not gerardo wound is moist still open.  Cleansed with blue wipes and covered with regard.  The transverse pressure injury is 100% blanching at this time still red.    Improvement: Yes    Recommendations/ changes: Changes to therapy    Areas of concern/ new issues: Patient with yeast infection in groin, nursing applying nystatin powder.  Nursing informed that if this nystatin does not clear this up in 3 days to call Woc back and we will try my econazole powder.  Although patient is sedated and ventilated patient remains high risk for further breakdown.    Skin interventions in place.    Specialty bed: Patient on very bed.    Pressure Injury Protocol (initiate for Lj Score of 18 or less):   *Maintain good skin care, keep dry, turn q 2 hr, keep heels elevated and offloaded with heel boots.    *Apply z-guard to sacrococcygeal area/ perineal area BID or daily and PRN per incontinent episodes.  *Follow C.A.R.E protocol if medical devices (Bipap, lucero, Ng tube, etc) are being used.     Woc will follow.    Please contact with questions or concerns.     Dragon disclaimer:  This note was entered via electronic voice recognition/ transcription prorgram. The electronic translation of spoken language may permit erroneous, or at times, nonsensical words or phrases to be inadvertently transcribed; Although I have reviewed the note for such errors, some may still exist.

## 2022-01-10 NOTE — CASE MANAGEMENT/SOCIAL WORK
Continued Stay Note  [facility]     Patient Name: Dani Ziegler  MRN: [id number]  Today's Date: 1/10/2022    Admit Date: 12/30/2021     Discharge Plan     Row Name 01/10/22 1306       Plan    Plan TBD, like to need SNF placement    Patient/Family in Agreement with Plan yes    Plan Comments Pt remains on the vent in ICU.  Wound PT has signed off.  Pt is likely to need SNF at discharge.  CM/SW will continue to follow for discharge planning needs.  CARLA Dunn @ [phone]    Final Discharge Disposition Code 30 - still a patient               Discharge Codes    No documentation.                     CARLA Peoples

## 2022-01-11 ENCOUNTER — APPOINTMENT (OUTPATIENT)
Dept: GENERAL RADIOLOGY | Facility: HOSPITAL | Age: 57
End: 2022-01-11

## 2022-01-11 DIAGNOSIS — B37.9 YEAST INFECTION: ICD-10-CM

## 2022-01-11 LAB
ALBUMIN SERPL-MCNC: 3.1 G/DL (ref 3.5–5.2)
ALBUMIN/GLOB SERPL: 0.7 G/DL
ALP SERPL-CCNC: 136 U/L (ref 39–117)
ALT SERPL W P-5'-P-CCNC: 17 U/L (ref 1–33)
ANION GAP SERPL CALCULATED.3IONS-SCNC: 9 MMOL/L (ref 5–15)
ARTERIAL PATENCY WRIST A: ABNORMAL
AST SERPL-CCNC: 23 U/L (ref 1–32)
ATMOSPHERIC PRESS: ABNORMAL MM[HG]
BASE EXCESS BLDA CALC-SCNC: 4.1 MMOL/L (ref 0–2)
BASOPHILS # BLD AUTO: 0.09 10*3/MM3 (ref 0–0.2)
BASOPHILS NFR BLD AUTO: 0.7 % (ref 0–1.5)
BDY SITE: ABNORMAL
BILIRUB SERPL-MCNC: 0.5 MG/DL (ref 0–1.2)
BODY TEMPERATURE: 37 C
BUN SERPL-MCNC: 25 MG/DL (ref 6–20)
BUN/CREAT SERPL: 53.2 (ref 7–25)
CALCIUM SPEC-SCNC: 9.6 MG/DL (ref 8.6–10.5)
CHLORIDE SERPL-SCNC: 100 MMOL/L (ref 98–107)
CO2 BLDA-SCNC: 30.2 MMOL/L (ref 22–33)
CO2 SERPL-SCNC: 25 MMOL/L (ref 22–29)
COHGB MFR BLD: 0.8 % (ref 0–2)
CREAT SERPL-MCNC: 0.47 MG/DL (ref 0.57–1)
DEPRECATED RDW RBC AUTO: 52.4 FL (ref 37–54)
EOSINOPHIL # BLD AUTO: 0.37 10*3/MM3 (ref 0–0.4)
EOSINOPHIL NFR BLD AUTO: 2.9 % (ref 0.3–6.2)
EPAP: 0
ERYTHROCYTE [DISTWIDTH] IN BLOOD BY AUTOMATED COUNT: 16.3 % (ref 12.3–15.4)
GFR SERPL CREATININE-BSD FRML MDRD: 137 ML/MIN/1.73
GLOBULIN UR ELPH-MCNC: 4.5 GM/DL
GLUCOSE BLDC GLUCOMTR-MCNC: 189 MG/DL (ref 70–130)
GLUCOSE BLDC GLUCOMTR-MCNC: 189 MG/DL (ref 70–130)
GLUCOSE BLDC GLUCOMTR-MCNC: 194 MG/DL (ref 70–130)
GLUCOSE BLDC GLUCOMTR-MCNC: 252 MG/DL (ref 70–130)
GLUCOSE SERPL-MCNC: 194 MG/DL (ref 65–99)
HCO3 BLDA-SCNC: 28.9 MMOL/L (ref 20–26)
HCT VFR BLD AUTO: 42.7 % (ref 34–46.6)
HCT VFR BLD CALC: 42.4 % (ref 38–51)
HGB BLD-MCNC: 13.3 G/DL (ref 12–15.9)
HGB BLDA-MCNC: 13.8 G/DL (ref 14–18)
IMM GRANULOCYTES # BLD AUTO: 0.26 10*3/MM3 (ref 0–0.05)
IMM GRANULOCYTES NFR BLD AUTO: 2 % (ref 0–0.5)
INHALED O2 CONCENTRATION: 50 %
IPAP: 0
LYMPHOCYTES # BLD AUTO: 2.47 10*3/MM3 (ref 0.7–3.1)
LYMPHOCYTES NFR BLD AUTO: 19.3 % (ref 19.6–45.3)
MAGNESIUM SERPL-MCNC: 1.7 MG/DL (ref 1.6–2.6)
MCH RBC QN AUTO: 27.5 PG (ref 26.6–33)
MCHC RBC AUTO-ENTMCNC: 31.1 G/DL (ref 31.5–35.7)
MCV RBC AUTO: 88.4 FL (ref 79–97)
METHGB BLD QL: 0.4 % (ref 0–1.5)
MODALITY: ABNORMAL
MONOCYTES # BLD AUTO: 0.74 10*3/MM3 (ref 0.1–0.9)
MONOCYTES NFR BLD AUTO: 5.8 % (ref 5–12)
NEUTROPHILS NFR BLD AUTO: 69.3 % (ref 42.7–76)
NEUTROPHILS NFR BLD AUTO: 8.88 10*3/MM3 (ref 1.7–7)
NOTE: ABNORMAL
NRBC BLD AUTO-RTO: 0 /100 WBC (ref 0–0.2)
OXYHGB MFR BLDV: 92.4 % (ref 94–99)
PAW @ PEAK INSP FLOW SETTING VENT: 0 CMH2O
PCO2 BLDA: 42.6 MM HG (ref 35–45)
PCO2 TEMP ADJ BLD: 42.6 MM HG (ref 35–45)
PH BLDA: 7.44 PH UNITS (ref 7.35–7.45)
PH, TEMP CORRECTED: 7.44 PH UNITS
PHOSPHATE SERPL-MCNC: 3.1 MG/DL (ref 2.5–4.5)
PLATELET # BLD AUTO: 285 10*3/MM3 (ref 140–450)
PMV BLD AUTO: 10.3 FL (ref 6–12)
PO2 BLDA: 67.9 MM HG (ref 83–108)
PO2 TEMP ADJ BLD: 67.9 MM HG (ref 83–108)
POTASSIUM SERPL-SCNC: 4 MMOL/L (ref 3.5–5.2)
PROT SERPL-MCNC: 7.6 G/DL (ref 6–8.5)
RBC # BLD AUTO: 4.83 10*6/MM3 (ref 3.77–5.28)
SODIUM SERPL-SCNC: 134 MMOL/L (ref 136–145)
TOTAL RATE: 0 BREATHS/MINUTE
VENTILATOR MODE: ABNORMAL
WBC NRBC COR # BLD: 12.81 10*3/MM3 (ref 3.4–10.8)

## 2022-01-11 PROCEDURE — 71045 X-RAY EXAM CHEST 1 VIEW: CPT

## 2022-01-11 PROCEDURE — 83735 ASSAY OF MAGNESIUM: CPT | Performed by: INTERNAL MEDICINE

## 2022-01-11 PROCEDURE — 94799 UNLISTED PULMONARY SVC/PX: CPT

## 2022-01-11 PROCEDURE — 25010000002 FUROSEMIDE PER 20 MG: Performed by: INTERNAL MEDICINE

## 2022-01-11 PROCEDURE — 0 MAGNESIUM SULFATE 4 GM/100ML SOLUTION: Performed by: INTERNAL MEDICINE

## 2022-01-11 PROCEDURE — 63710000001 INSULIN REGULAR HUMAN PER 5 UNITS: Performed by: INTERNAL MEDICINE

## 2022-01-11 PROCEDURE — 36600 WITHDRAWAL OF ARTERIAL BLOOD: CPT

## 2022-01-11 PROCEDURE — 84100 ASSAY OF PHOSPHORUS: CPT | Performed by: INTERNAL MEDICINE

## 2022-01-11 PROCEDURE — 83050 HGB METHEMOGLOBIN QUAN: CPT

## 2022-01-11 PROCEDURE — 25010000002 FENTANYL 10 MCG/1 ML NS: Performed by: INTERNAL MEDICINE

## 2022-01-11 PROCEDURE — 25010000002 FENTANYL CITRATE (PF) 2500 MCG/50ML SOLUTION: Performed by: INTERNAL MEDICINE

## 2022-01-11 PROCEDURE — 25010000002 HYDRALAZINE PER 20 MG: Performed by: INTERNAL MEDICINE

## 2022-01-11 PROCEDURE — 94761 N-INVAS EAR/PLS OXIMETRY MLT: CPT

## 2022-01-11 PROCEDURE — 80053 COMPREHEN METABOLIC PANEL: CPT | Performed by: INTERNAL MEDICINE

## 2022-01-11 PROCEDURE — 85025 COMPLETE CBC W/AUTO DIFF WBC: CPT | Performed by: INTERNAL MEDICINE

## 2022-01-11 PROCEDURE — 94003 VENT MGMT INPAT SUBQ DAY: CPT

## 2022-01-11 PROCEDURE — 99291 CRITICAL CARE FIRST HOUR: CPT | Performed by: INTERNAL MEDICINE

## 2022-01-11 PROCEDURE — 82805 BLOOD GASES W/O2 SATURATION: CPT

## 2022-01-11 PROCEDURE — 74018 RADEX ABDOMEN 1 VIEW: CPT

## 2022-01-11 PROCEDURE — 82962 GLUCOSE BLOOD TEST: CPT

## 2022-01-11 PROCEDURE — 82375 ASSAY CARBOXYHB QUANT: CPT

## 2022-01-11 RX ORDER — DOCUSATE SODIUM 50 MG/5 ML
100 LIQUID (ML) ORAL 2 TIMES DAILY PRN
Status: DISCONTINUED | OUTPATIENT
Start: 2022-01-11 | End: 2022-01-14

## 2022-01-11 RX ORDER — BISACODYL 5 MG/1
5 TABLET, DELAYED RELEASE ORAL DAILY PRN
Status: DISCONTINUED | OUTPATIENT
Start: 2022-01-11 | End: 2022-01-14

## 2022-01-11 RX ORDER — BUSPIRONE HYDROCHLORIDE 5 MG/1
10 TABLET ORAL 2 TIMES DAILY
Status: DISCONTINUED | OUTPATIENT
Start: 2022-01-11 | End: 2022-01-14

## 2022-01-11 RX ORDER — BISACODYL 10 MG
10 SUPPOSITORY, RECTAL RECTAL DAILY PRN
Status: DISCONTINUED | OUTPATIENT
Start: 2022-01-11 | End: 2022-01-14

## 2022-01-11 RX ORDER — POLYETHYLENE GLYCOL 3350 17 G/17G
17 POWDER, FOR SOLUTION ORAL DAILY PRN
Status: DISCONTINUED | OUTPATIENT
Start: 2022-01-11 | End: 2022-01-14

## 2022-01-11 RX ORDER — FUROSEMIDE 10 MG/ML
40 INJECTION INTRAMUSCULAR; INTRAVENOUS ONCE
Status: COMPLETED | OUTPATIENT
Start: 2022-01-11 | End: 2022-01-11

## 2022-01-11 RX ORDER — AMLODIPINE BESYLATE 5 MG/1
5 TABLET ORAL
Status: DISCONTINUED | OUTPATIENT
Start: 2022-01-12 | End: 2022-01-12

## 2022-01-11 RX ADMIN — SODIUM CHLORIDE, PRESERVATIVE FREE 10 ML: 5 INJECTION INTRAVENOUS at 21:03

## 2022-01-11 RX ADMIN — ESCITALOPRAM OXALATE 20 MG: 20 TABLET ORAL at 08:04

## 2022-01-11 RX ADMIN — Medication 100 MCG/HR: at 17:46

## 2022-01-11 RX ADMIN — DEXMEDETOMIDINE HYDROCHLORIDE 1.5 MCG/KG/HR: 4 INJECTION, SOLUTION INTRAVENOUS at 20:22

## 2022-01-11 RX ADMIN — CASTOR OIL AND BALSAM, PERU 1 APPLICATION: 788; 87 OINTMENT TOPICAL at 21:03

## 2022-01-11 RX ADMIN — Medication 250 MCG/HR: at 00:33

## 2022-01-11 RX ADMIN — DEXMEDETOMIDINE HYDROCHLORIDE 1 MCG/KG/HR: 4 INJECTION, SOLUTION INTRAVENOUS at 01:27

## 2022-01-11 RX ADMIN — APIXABAN 5 MG: 5 TABLET, FILM COATED ORAL at 08:04

## 2022-01-11 RX ADMIN — AMLODIPINE BESYLATE 5 MG: 5 TABLET ORAL at 08:04

## 2022-01-11 RX ADMIN — DEXMEDETOMIDINE HYDROCHLORIDE 1 MCG/KG/HR: 4 INJECTION, SOLUTION INTRAVENOUS at 03:28

## 2022-01-11 RX ADMIN — DEXMEDETOMIDINE HYDROCHLORIDE 1.5 MCG/KG/HR: 4 INJECTION, SOLUTION INTRAVENOUS at 23:34

## 2022-01-11 RX ADMIN — NYSTATIN: 100000 POWDER TOPICAL at 21:03

## 2022-01-11 RX ADMIN — HYDRALAZINE HYDROCHLORIDE 10 MG: 20 INJECTION INTRAMUSCULAR; INTRAVENOUS at 00:06

## 2022-01-11 RX ADMIN — BUSPIRONE HYDROCHLORIDE 10 MG: 5 TABLET ORAL at 21:02

## 2022-01-11 RX ADMIN — DEXMEDETOMIDINE HYDROCHLORIDE 1.3 MCG/KG/HR: 4 INJECTION, SOLUTION INTRAVENOUS at 16:57

## 2022-01-11 RX ADMIN — CHLORHEXIDINE GLUCONATE 15 ML: 1.2 SOLUTION ORAL at 08:05

## 2022-01-11 RX ADMIN — DEXMEDETOMIDINE HYDROCHLORIDE 0.8 MCG/KG/HR: 4 INJECTION, SOLUTION INTRAVENOUS at 10:57

## 2022-01-11 RX ADMIN — IPRATROPIUM BROMIDE AND ALBUTEROL SULFATE 3 ML: 2.5; .5 SOLUTION RESPIRATORY (INHALATION) at 01:24

## 2022-01-11 RX ADMIN — APIXABAN 5 MG: 5 TABLET, FILM COATED ORAL at 21:02

## 2022-01-11 RX ADMIN — FAMOTIDINE 20 MG: 10 INJECTION INTRAVENOUS at 08:04

## 2022-01-11 RX ADMIN — SODIUM CHLORIDE, PRESERVATIVE FREE 10 ML: 5 INJECTION INTRAVENOUS at 08:05

## 2022-01-11 RX ADMIN — INSULIN HUMAN 2 UNITS: 100 INJECTION, SOLUTION PARENTERAL at 05:46

## 2022-01-11 RX ADMIN — INSULIN HUMAN 6 UNITS: 100 INJECTION, SOLUTION PARENTERAL at 17:58

## 2022-01-11 RX ADMIN — FAMOTIDINE 20 MG: 10 INJECTION INTRAVENOUS at 21:02

## 2022-01-11 RX ADMIN — CHLORHEXIDINE GLUCONATE 15 ML: 1.2 SOLUTION ORAL at 21:02

## 2022-01-11 RX ADMIN — INSULIN HUMAN 2 UNITS: 100 INJECTION, SOLUTION PARENTERAL at 14:02

## 2022-01-11 RX ADMIN — SENNOSIDES 10 ML: 8.8 LIQUID ORAL at 08:05

## 2022-01-11 RX ADMIN — Medication 250 MCG/HR: at 08:06

## 2022-01-11 RX ADMIN — FUROSEMIDE 40 MG: 10 INJECTION, SOLUTION INTRAMUSCULAR; INTRAVENOUS at 15:59

## 2022-01-11 RX ADMIN — IPRATROPIUM BROMIDE AND ALBUTEROL SULFATE 3 ML: 2.5; .5 SOLUTION RESPIRATORY (INHALATION) at 07:06

## 2022-01-11 RX ADMIN — INSULIN HUMAN 2 UNITS: 100 INJECTION, SOLUTION PARENTERAL at 01:04

## 2022-01-11 RX ADMIN — CASTOR OIL AND BALSAM, PERU 1 APPLICATION: 788; 87 OINTMENT TOPICAL at 08:06

## 2022-01-11 RX ADMIN — DEXMEDETOMIDINE HYDROCHLORIDE 1 MCG/KG/HR: 4 INJECTION, SOLUTION INTRAVENOUS at 14:19

## 2022-01-11 RX ADMIN — GABAPENTIN 300 MG: 300 CAPSULE ORAL at 21:02

## 2022-01-11 RX ADMIN — DEXMEDETOMIDINE HYDROCHLORIDE 0.8 MCG/KG/HR: 4 INJECTION, SOLUTION INTRAVENOUS at 08:04

## 2022-01-11 RX ADMIN — MAGNESIUM SULFATE HEPTAHYDRATE 4 G: 40 INJECTION, SOLUTION INTRAVENOUS at 05:47

## 2022-01-11 RX ADMIN — IPRATROPIUM BROMIDE AND ALBUTEROL SULFATE 3 ML: 2.5; .5 SOLUTION RESPIRATORY (INHALATION) at 13:02

## 2022-01-11 RX ADMIN — DEXMEDETOMIDINE HYDROCHLORIDE 1.5 MCG/KG/HR: 4 INJECTION, SOLUTION INTRAVENOUS at 21:53

## 2022-01-11 RX ADMIN — DEXMEDETOMIDINE HYDROCHLORIDE 1.5 MCG/KG/HR: 4 INJECTION, SOLUTION INTRAVENOUS at 18:29

## 2022-01-11 RX ADMIN — NYSTATIN 1 APPLICATION: 100000 POWDER TOPICAL at 08:05

## 2022-01-11 RX ADMIN — BUSPIRONE HYDROCHLORIDE 10 MG: 5 TABLET ORAL at 08:04

## 2022-01-11 RX ADMIN — GABAPENTIN 300 MG: 300 CAPSULE ORAL at 18:27

## 2022-01-11 RX ADMIN — DOCUSATE SODIUM 100 MG: 50 LIQUID ORAL at 08:05

## 2022-01-11 RX ADMIN — GABAPENTIN 300 MG: 300 CAPSULE ORAL at 05:46

## 2022-01-11 NOTE — PROGRESS NOTES
Multidisciplinary Rounds    Patient Name: Dani Ziegler  Date of Encounter: 01/11/22 11:51 EST  MRN: [id number]  Admission date: 12/30/2021    Reason for visit: MDR. RD to continue to follow per protocol.     Additional information obtained during MDR:  Possible extubate from vent today. EN to goal rate and being tolerated.  On hold this morning for possible extubation.     GTT: Precedx 0.5mcg/kg/hr, fentanyl 250mcg/hr    24hrs I/Os: + 2BM yesterday.        Current diet: NPO Diet     Tube Feeding Formula: Peptamen Intense VHP (Peptide Based, Very High Protein) @ 75ml/hr.  TGV 1500ml. No free water.   Route: NGT (plan to replace with small bore feeding tube after extubation)  Verified at bedside: on hold for extubation.     Evaluation of Received Nutrient/Fluid Intake:  1 Day: 1903ml (127%)     EMR reviewed     Intervention:  Follow treatment plan  Care plan reviewed  Restart EN post extubation.   Monitor swallow eval post extubation.     Follow up:   Per protocol      Mel Waite RD  11:51 EST  Time: 20min

## 2022-01-11 NOTE — SIGNIFICANT NOTE
01/11/22 1310   SLP Deferred Reason   SLP Deferred Reason Patient unavailable for evaluation  (Consult received for failed dysphagia screen. Pt just extubated this am after 12 days on vent. Spoke with RN - in agreement for dysphagia eval tomorrow.)

## 2022-01-11 NOTE — PLAN OF CARE
Goal Outcome Evaluation:               Pt awake/following commands at beginning of shift. Around 0000, pt started to get agitated/attempting to sit up in bed, restraints applied. Precedex/Fentanyl titrated to reduce agitation. Pt still opens eyes spontaneously and follows commands. O2 on vent at 50%, sats remain in low 90s. Coarse/rhonchi. PRN Hydralazine given for high SPB. Palpable pulses. Tolerating TF, no BM. UOP adequate. Nystatin applied to appropriate areas, venelex on lower back wound.

## 2022-01-11 NOTE — PROGRESS NOTES
Intensive Care Follow-up     Hospital:  LOS: 11 days   Ms. Dani Ziegler, 56 y.o. female is followed for:   Acute on chronic respiratory failure with hypoxia and hypercapnia (HCC)            History of present illness:   56-year-old female with a past medical history of COPD, type 2 diabetes mellitus, hypertension, chronic pain on chronic narcotics due to lumbar disc disease and on chronic benzodiazepines and depression.  She is on home O2 and inhaled therapy and has been followed by Dr. Darrion Tovar.  Echocardiogram one year ago revealed normal systolic and diastolic function.  She has a history of 2 prior episodes of prolonged mechanical ventilation requiring tracheostomy, the 1st in early 2020 and the 2nd in early 2021.  Both of these episodes were at [facility] per her daughter and both times she had the tracheostomy decannulated.  She presented on 12/30 with bilateral lobar consolidative infiltrates most evident in the right lower lobe with air bronchograms.  She had mediastinal and hilar adenopathy but this had been noted in the past.  She failed BiPAP therapy and required intubation in the ER.  Blood and sputum cultures were negative with the exception of a micrococcus in the blood assumed to be a contaminant.  Strep pneumonia antigen and Legionella antigen in the urine were negative.  MRSA PCR was negative.  COVID and influenza testing was negative     She has remained on mechanical ventilation since admission.  At one point was requiring high levels of PEEP and FiO2.     UDS was positive for methamphetamine and benzodiazepines.    Subjective   Interval History:  Patient much more awake today morning and following commands.  Spontaneous breathing trial done and she did acceptably well.  Proceeded to extubate.  Doing okay post extubation but oxygenation dropped.  Placed on partial coverage with her mask and she seems to be doing okay for now.  We will try high flow nasal cannula.  We will wean off  "fentanyl and Precedex as tolerated.       The patient's past medical, surgical and social history were reviewed and updated in Epic as appropriate.       Objective     Infusions:  dexmedetomidine, 0.2-1.5 mcg/kg/hr, Last Rate: 1 mcg/kg/hr (01/11/22 1419)  fentanyl 10 mcg/mL,  mcg/hr, Last Rate: 50 mcg/hr (01/11/22 1100)  midazolam, 1-10 mg/hr, Last Rate: Stopped (01/10/22 2005)  propofol, 5-25 mcg/kg/min, Last Rate: Stopped (01/10/22 0224)      Medications:  [START ON 1/12/2022] amLODIPine, 5 mg, Nasogastric, Q24H  apixaban, 5 mg, Nasogastric, Q12H  busPIRone, 10 mg, Nasogastric, BID  castor oil-balsam peru, 1 application, Topical, Q12H  chlorhexidine, 15 mL, Mouth/Throat, Q12H  docusate sodium, 100 mg, Nasogastric, BID  escitalopram, 20 mg, Nasogastric, Daily  famotidine, 20 mg, Intravenous, BID  gabapentin, 300 mg, Nasogastric, Q8H  insulin regular, 0-9 Units, Subcutaneous, Q6H  ipratropium-albuterol, 3 mL, Nebulization, Q6H - RT  nystatin, , Topical, Q12H  sennosides, 10 mL, Nasogastric, BID  sodium chloride, 10 mL, Intravenous, Q12H        Vital Sign Min/Max for last 24 hours  Temp  Min: 97.9 °F (36.6 °C)  Max: 99.5 °F (37.5 °C)   BP  Min: 131/72  Max: 182/97   Pulse  Min: 54  Max: 70   Resp  Min: 16  Max: 23   SpO2  Min: 89 %  Max: 95 %   Flow (L/min)  Min: 6  Max: 6       Input/Output for last 24 hour shift  01/10 0701 - 01/11 0700  In: 3425.9 [I.V.:1462.9]  Out: 1950 [Urine:1950]   FiO2 (%):  [50 %] 50 %  S RR:  [16] 16  PEEP/CPAP (cm H2O):  [6 cm H20-8 cm H20] 6 cm H20  CA SUP:  [0 cm H20-10 cm H20] 10 cm H20  MAP (cm H2O):  [9.3-15] 9.3  Objective:  Vital signs: (most recent): Blood pressure 138/78, pulse 59, temperature 99.5 °F (37.5 °C), temperature source Bladder, resp. rate 20, height 170.2 cm (67.01\"), weight (!) 166 kg (365 lb 11.2 oz), SpO2 90 %.            General Appearance:  Not in distress.  Lungs:   B/L Breath sounds present with decreased breath sounds on bases, no wheezing heard, no " crackles.   Heart: S1 and S2 present, no murmur  Abdomen: Soft, nontender, no guarding or rigidity, bowel sounds positive, no masses.  Extremities: no cyanosis or clubbing,  + edema, warm to awake, alert.        Results from last 7 days   Lab Units 01/11/22 0338 01/10/22  0308 01/09/22  0258   WBC 10*3/mm3 12.81* 12.50* 12.65*   HEMOGLOBIN g/dL 13.3 12.5 12.4   PLATELETS 10*3/mm3 285 284 283     Results from last 7 days   Lab Units 01/11/22  0338 01/10/22  0308 01/09/22  0258   SODIUM mmol/L 134* 139 137   POTASSIUM mmol/L 4.0 3.9 3.9   CO2 mmol/L 25.0 32.0* 30.0*   BUN mg/dL 25* 29* 32*   CREATININE mg/dL 0.47* 0.69 0.73   MAGNESIUM mg/dL 1.7 2.0 2.0   PHOSPHORUS mg/dL 3.1 3.3 3.0   GLUCOSE mg/dL 194* 149* 116*     Estimated Creatinine Clearance: 214.7 mL/min (A) (by C-G formula based on SCr of 0.47 mg/dL (L)).    Results from last 7 days   Lab Units 01/11/22  1002   PH, ARTERIAL pH units 7.439   PCO2, ARTERIAL mm Hg 42.6   PO2 ART mm Hg 67.9*       Sedimentation rate 80    Images:   Chest x-ray reviewed personally and showed endotracheal tube above yuan.  Support lines in good position.  Mild pulmonary vascular congestion noted.    I reviewed the patient's results and images.     Assessment/Plan   Impression        Acute on chronic respiratory failure with hypoxia and hypercapnia (HCC)    Uncontrolled type 2 diabetes mellitus with hyperglycemia, without long-term current use of insulin (HCC)    Essential hypertension    Chronic pain    Chronic obstructive pulmonary disease (HCC)    Sepsis (HCC)    CAP (community acquired pneumonia)    Morbid obesity with BMI of 45.0-49.9, adult (HCC)    History of recurrent deep vein thrombosis (DVT)    Morbid obesity with BMI of 60.0-69.9, adult ([facility])       Plan        1.  Patient doing better from respiratory standpoint.  FiO2 requirements have improved.  More awake and alert today.  Did well on spontaneous breathing trial.  Proceeded to extubate.  Patient is somewhat hypoxic  post extubation.  Will place on partial nonrebreather mask.  Will give dose of diuretic and see if we can improve oxygenation further.  2.   swallow evaluation postextubation.  If unable to swallow will place Keofeed and start enteral nutrition.    3.  Patient with previous history of DVT.  On Eliquis, tolerating well, continue same. No acute bleed noted.  4.  Patient is moving bowels well.  Again we will see if she can take oral otherwise Keofeed and enteral nutrition.  5.  Replace magnesium today.  Urine output is okay.  Creatinine stable.    6.  Wean Precedex and fentanyl to off if possible.  7.  GI prophylaxis.    Overall patient remains in guarded condition.  Continue close monitoring in ICU.    Plan of care and goals reviewed with multidisciplinary/antibiotic stewardship team during rounds.   I discussed the patient's findings and my recommendations with nursing staff     High level of risk due to:  illness with threat to life or bodily function.    Time spent Critical care 35 min (exclusive of procedure time)  including high complexity decision making to assess, manipulate, and support vital organ system failure in this individual who has impairment of one or more vital organ systems such that there is a high probability of imminent or life threatening deterioration in the patient’s condition.      Sunil Carpenter MD, [facility]  Pulmonary, Critical care and Sleep Medicine

## 2022-01-12 ENCOUNTER — APPOINTMENT (OUTPATIENT)
Dept: GENERAL RADIOLOGY | Facility: HOSPITAL | Age: 57
End: 2022-01-12

## 2022-01-12 LAB
ALBUMIN SERPL-MCNC: 3.3 G/DL (ref 3.5–5.2)
ALBUMIN/GLOB SERPL: 0.7 G/DL
ALP SERPL-CCNC: 147 U/L (ref 39–117)
ALT SERPL W P-5'-P-CCNC: 20 U/L (ref 1–33)
ANION GAP SERPL CALCULATED.3IONS-SCNC: 11 MMOL/L (ref 5–15)
AST SERPL-CCNC: 20 U/L (ref 1–32)
BASOPHILS # BLD AUTO: 0.07 10*3/MM3 (ref 0–0.2)
BASOPHILS NFR BLD AUTO: 0.5 % (ref 0–1.5)
BILIRUB SERPL-MCNC: 0.6 MG/DL (ref 0–1.2)
BUN SERPL-MCNC: 24 MG/DL (ref 6–20)
BUN/CREAT SERPL: 42.1 (ref 7–25)
CALCIUM SPEC-SCNC: 9.8 MG/DL (ref 8.6–10.5)
CHLORIDE SERPL-SCNC: 99 MMOL/L (ref 98–107)
CO2 SERPL-SCNC: 25 MMOL/L (ref 22–29)
CREAT SERPL-MCNC: 0.57 MG/DL (ref 0.57–1)
DEPRECATED RDW RBC AUTO: 49.6 FL (ref 37–54)
EOSINOPHIL # BLD AUTO: 0.44 10*3/MM3 (ref 0–0.4)
EOSINOPHIL NFR BLD AUTO: 3.2 % (ref 0.3–6.2)
ERYTHROCYTE [DISTWIDTH] IN BLOOD BY AUTOMATED COUNT: 15.9 % (ref 12.3–15.4)
GFR SERPL CREATININE-BSD FRML MDRD: 109 ML/MIN/1.73
GLOBULIN UR ELPH-MCNC: 4.6 GM/DL
GLUCOSE BLDC GLUCOMTR-MCNC: 204 MG/DL (ref 70–130)
GLUCOSE BLDC GLUCOMTR-MCNC: 212 MG/DL (ref 70–130)
GLUCOSE BLDC GLUCOMTR-MCNC: 218 MG/DL (ref 70–130)
GLUCOSE BLDC GLUCOMTR-MCNC: 234 MG/DL (ref 70–130)
GLUCOSE BLDC GLUCOMTR-MCNC: 245 MG/DL (ref 70–130)
GLUCOSE SERPL-MCNC: 247 MG/DL (ref 65–99)
HCT VFR BLD AUTO: 44.9 % (ref 34–46.6)
HGB BLD-MCNC: 14.1 G/DL (ref 12–15.9)
IMM GRANULOCYTES # BLD AUTO: 0.21 10*3/MM3 (ref 0–0.05)
IMM GRANULOCYTES NFR BLD AUTO: 1.5 % (ref 0–0.5)
LYMPHOCYTES # BLD AUTO: 2.68 10*3/MM3 (ref 0.7–3.1)
LYMPHOCYTES NFR BLD AUTO: 19.2 % (ref 19.6–45.3)
MAGNESIUM SERPL-MCNC: 2.2 MG/DL (ref 1.6–2.6)
MCH RBC QN AUTO: 27.1 PG (ref 26.6–33)
MCHC RBC AUTO-ENTMCNC: 31.4 G/DL (ref 31.5–35.7)
MCV RBC AUTO: 86.3 FL (ref 79–97)
MONOCYTES # BLD AUTO: 0.71 10*3/MM3 (ref 0.1–0.9)
MONOCYTES NFR BLD AUTO: 5.1 % (ref 5–12)
NEUTROPHILS NFR BLD AUTO: 70.5 % (ref 42.7–76)
NEUTROPHILS NFR BLD AUTO: 9.82 10*3/MM3 (ref 1.7–7)
NRBC BLD AUTO-RTO: 0 /100 WBC (ref 0–0.2)
PHOSPHATE SERPL-MCNC: 3.7 MG/DL (ref 2.5–4.5)
PLATELET # BLD AUTO: 342 10*3/MM3 (ref 140–450)
PMV BLD AUTO: 10.7 FL (ref 6–12)
POTASSIUM SERPL-SCNC: 4.3 MMOL/L (ref 3.5–5.2)
PROT SERPL-MCNC: 7.9 G/DL (ref 6–8.5)
RBC # BLD AUTO: 5.2 10*6/MM3 (ref 3.77–5.28)
SODIUM SERPL-SCNC: 135 MMOL/L (ref 136–145)
WBC NRBC COR # BLD: 13.93 10*3/MM3 (ref 3.4–10.8)

## 2022-01-12 PROCEDURE — 25010000002 FENTANYL 10 MCG/1 ML NS: Performed by: INTERNAL MEDICINE

## 2022-01-12 PROCEDURE — 25010000002 FUROSEMIDE PER 20 MG: Performed by: INTERNAL MEDICINE

## 2022-01-12 PROCEDURE — 71045 X-RAY EXAM CHEST 1 VIEW: CPT

## 2022-01-12 PROCEDURE — 82962 GLUCOSE BLOOD TEST: CPT

## 2022-01-12 PROCEDURE — 63710000001 INSULIN DETEMIR PER 5 UNITS: Performed by: INTERNAL MEDICINE

## 2022-01-12 PROCEDURE — 85025 COMPLETE CBC W/AUTO DIFF WBC: CPT | Performed by: INTERNAL MEDICINE

## 2022-01-12 PROCEDURE — 84100 ASSAY OF PHOSPHORUS: CPT | Performed by: INTERNAL MEDICINE

## 2022-01-12 PROCEDURE — 25010000002 HYDRALAZINE PER 20 MG: Performed by: INTERNAL MEDICINE

## 2022-01-12 PROCEDURE — 99232 SBSQ HOSP IP/OBS MODERATE 35: CPT | Performed by: INTERNAL MEDICINE

## 2022-01-12 PROCEDURE — 94761 N-INVAS EAR/PLS OXIMETRY MLT: CPT

## 2022-01-12 PROCEDURE — 63710000001 INSULIN REGULAR HUMAN PER 5 UNITS: Performed by: INTERNAL MEDICINE

## 2022-01-12 PROCEDURE — 80053 COMPREHEN METABOLIC PANEL: CPT | Performed by: INTERNAL MEDICINE

## 2022-01-12 PROCEDURE — 83735 ASSAY OF MAGNESIUM: CPT | Performed by: INTERNAL MEDICINE

## 2022-01-12 PROCEDURE — 94799 UNLISTED PULMONARY SVC/PX: CPT

## 2022-01-12 RX ORDER — OXYCODONE AND ACETAMINOPHEN 10; 325 MG/1; MG/1
1 TABLET ORAL EVERY 6 HOURS PRN
Status: DISCONTINUED | OUTPATIENT
Start: 2022-01-12 | End: 2022-01-14

## 2022-01-12 RX ORDER — DULOXETIN HYDROCHLORIDE 60 MG/1
60 CAPSULE, DELAYED RELEASE ORAL DAILY
Status: DISCONTINUED | OUTPATIENT
Start: 2022-01-12 | End: 2022-01-13

## 2022-01-12 RX ORDER — DIAZEPAM 2 MG/1
2 TABLET ORAL EVERY 12 HOURS PRN
Status: DISCONTINUED | OUTPATIENT
Start: 2022-01-12 | End: 2022-01-13

## 2022-01-12 RX ORDER — AMLODIPINE BESYLATE 10 MG/1
10 TABLET ORAL
Status: DISCONTINUED | OUTPATIENT
Start: 2022-01-13 | End: 2022-01-14

## 2022-01-12 RX ORDER — FAMOTIDINE 20 MG/1
20 TABLET, FILM COATED ORAL 2 TIMES DAILY
Status: DISCONTINUED | OUTPATIENT
Start: 2022-01-12 | End: 2022-01-14

## 2022-01-12 RX ORDER — FUROSEMIDE 10 MG/ML
40 INJECTION INTRAMUSCULAR; INTRAVENOUS ONCE
Status: COMPLETED | OUTPATIENT
Start: 2022-01-12 | End: 2022-01-12

## 2022-01-12 RX ORDER — NICOTINE POLACRILEX 4 MG
15 LOZENGE BUCCAL
Status: DISCONTINUED | OUTPATIENT
Start: 2022-01-12 | End: 2022-01-15

## 2022-01-12 RX ORDER — DEXTROSE MONOHYDRATE 25 G/50ML
25 INJECTION, SOLUTION INTRAVENOUS
Status: DISCONTINUED | OUTPATIENT
Start: 2022-01-12 | End: 2022-01-15

## 2022-01-12 RX ORDER — NYSTATIN 100000 [USP'U]/G
POWDER TOPICAL
Qty: 60 G | Refills: 1 | OUTPATIENT
Start: 2022-01-12

## 2022-01-12 RX ORDER — AMLODIPINE BESYLATE 5 MG/1
5 TABLET ORAL ONCE
Status: COMPLETED | OUTPATIENT
Start: 2022-01-12 | End: 2022-01-12

## 2022-01-12 RX ADMIN — DEXMEDETOMIDINE HYDROCHLORIDE 1.5 MCG/KG/HR: 4 INJECTION, SOLUTION INTRAVENOUS at 04:31

## 2022-01-12 RX ADMIN — IPRATROPIUM BROMIDE AND ALBUTEROL SULFATE 3 ML: 2.5; .5 SOLUTION RESPIRATORY (INHALATION) at 07:47

## 2022-01-12 RX ADMIN — DEXMEDETOMIDINE HYDROCHLORIDE 1.5 MCG/KG/HR: 4 INJECTION, SOLUTION INTRAVENOUS at 07:42

## 2022-01-12 RX ADMIN — DEXMEDETOMIDINE HYDROCHLORIDE 1.3 MCG/KG/HR: 4 INJECTION, SOLUTION INTRAVENOUS at 09:40

## 2022-01-12 RX ADMIN — GABAPENTIN 300 MG: 300 CAPSULE ORAL at 06:04

## 2022-01-12 RX ADMIN — DEXMEDETOMIDINE HYDROCHLORIDE 1 MCG/KG/HR: 4 INJECTION, SOLUTION INTRAVENOUS at 14:23

## 2022-01-12 RX ADMIN — DEXMEDETOMIDINE HYDROCHLORIDE 1.5 MCG/KG/HR: 4 INJECTION, SOLUTION INTRAVENOUS at 02:44

## 2022-01-12 RX ADMIN — APIXABAN 5 MG: 5 TABLET, FILM COATED ORAL at 20:58

## 2022-01-12 RX ADMIN — BUSPIRONE HYDROCHLORIDE 10 MG: 5 TABLET ORAL at 20:58

## 2022-01-12 RX ADMIN — DIAZEPAM 2 MG: 2 TABLET ORAL at 20:58

## 2022-01-12 RX ADMIN — FAMOTIDINE 20 MG: 10 INJECTION INTRAVENOUS at 08:34

## 2022-01-12 RX ADMIN — NYSTATIN: 100000 POWDER TOPICAL at 08:35

## 2022-01-12 RX ADMIN — GABAPENTIN 300 MG: 300 CAPSULE ORAL at 22:36

## 2022-01-12 RX ADMIN — CASTOR OIL AND BALSAM, PERU 1 APPLICATION: 788; 87 OINTMENT TOPICAL at 21:00

## 2022-01-12 RX ADMIN — INSULIN HUMAN 4 UNITS: 100 INJECTION, SOLUTION PARENTERAL at 18:45

## 2022-01-12 RX ADMIN — CHLORHEXIDINE GLUCONATE 15 ML: 1.2 SOLUTION ORAL at 20:58

## 2022-01-12 RX ADMIN — DEXMEDETOMIDINE HYDROCHLORIDE 1 MCG/KG/HR: 4 INJECTION, SOLUTION INTRAVENOUS at 18:07

## 2022-01-12 RX ADMIN — INSULIN HUMAN 4 UNITS: 100 INJECTION, SOLUTION PARENTERAL at 12:37

## 2022-01-12 RX ADMIN — IPRATROPIUM BROMIDE AND ALBUTEROL SULFATE 3 ML: 2.5; .5 SOLUTION RESPIRATORY (INHALATION) at 12:58

## 2022-01-12 RX ADMIN — DEXMEDETOMIDINE HYDROCHLORIDE 1.3 MCG/KG/HR: 4 INJECTION, SOLUTION INTRAVENOUS at 15:29

## 2022-01-12 RX ADMIN — IPRATROPIUM BROMIDE AND ALBUTEROL SULFATE 3 ML: 2.5; .5 SOLUTION RESPIRATORY (INHALATION) at 19:30

## 2022-01-12 RX ADMIN — AMLODIPINE BESYLATE 5 MG: 5 TABLET ORAL at 11:00

## 2022-01-12 RX ADMIN — FAMOTIDINE 20 MG: 20 TABLET, FILM COATED ORAL at 20:58

## 2022-01-12 RX ADMIN — INSULIN DETEMIR 10 UNITS: 100 INJECTION, SOLUTION SUBCUTANEOUS at 20:58

## 2022-01-12 RX ADMIN — APIXABAN 5 MG: 5 TABLET, FILM COATED ORAL at 08:34

## 2022-01-12 RX ADMIN — CHLORHEXIDINE GLUCONATE 15 ML: 1.2 SOLUTION ORAL at 08:34

## 2022-01-12 RX ADMIN — GABAPENTIN 300 MG: 300 CAPSULE ORAL at 14:23

## 2022-01-12 RX ADMIN — SODIUM CHLORIDE, PRESERVATIVE FREE 10 ML: 5 INJECTION INTRAVENOUS at 21:00

## 2022-01-12 RX ADMIN — BUSPIRONE HYDROCHLORIDE 10 MG: 5 TABLET ORAL at 08:34

## 2022-01-12 RX ADMIN — INSULIN HUMAN 4 UNITS: 100 INJECTION, SOLUTION PARENTERAL at 06:04

## 2022-01-12 RX ADMIN — AMLODIPINE BESYLATE 5 MG: 5 TABLET ORAL at 08:35

## 2022-01-12 RX ADMIN — NYSTATIN 500000 UNITS: 100000 SUSPENSION ORAL at 17:25

## 2022-01-12 RX ADMIN — DEXMEDETOMIDINE HYDROCHLORIDE 1 MCG/KG/HR: 4 INJECTION, SOLUTION INTRAVENOUS at 11:10

## 2022-01-12 RX ADMIN — ESCITALOPRAM OXALATE 20 MG: 20 TABLET ORAL at 08:34

## 2022-01-12 RX ADMIN — HYDRALAZINE HYDROCHLORIDE 10 MG: 20 INJECTION INTRAMUSCULAR; INTRAVENOUS at 04:30

## 2022-01-12 RX ADMIN — NYSTATIN: 100000 POWDER TOPICAL at 21:00

## 2022-01-12 RX ADMIN — CASTOR OIL AND BALSAM, PERU 1 APPLICATION: 788; 87 OINTMENT TOPICAL at 08:35

## 2022-01-12 RX ADMIN — DEXMEDETOMIDINE HYDROCHLORIDE 0.8 MCG/KG/HR: 4 INJECTION, SOLUTION INTRAVENOUS at 20:58

## 2022-01-12 RX ADMIN — DEXMEDETOMIDINE HYDROCHLORIDE 1.3 MCG/KG/HR: 4 INJECTION, SOLUTION INTRAVENOUS at 12:39

## 2022-01-12 RX ADMIN — DEXMEDETOMIDINE HYDROCHLORIDE 1.5 MCG/KG/HR: 4 INJECTION, SOLUTION INTRAVENOUS at 01:09

## 2022-01-12 RX ADMIN — Medication 50 MCG/HR: at 12:13

## 2022-01-12 RX ADMIN — DEXMEDETOMIDINE HYDROCHLORIDE 1.5 MCG/KG/HR: 4 INJECTION, SOLUTION INTRAVENOUS at 06:04

## 2022-01-12 RX ADMIN — INSULIN HUMAN 6 UNITS: 100 INJECTION, SOLUTION PARENTERAL at 00:45

## 2022-01-12 RX ADMIN — FUROSEMIDE 40 MG: 10 INJECTION, SOLUTION INTRAMUSCULAR; INTRAVENOUS at 16:40

## 2022-01-12 RX ADMIN — NYSTATIN 500000 UNITS: 100000 SUSPENSION ORAL at 20:58

## 2022-01-12 NOTE — CASE MANAGEMENT/SOCIAL WORK
Continued Stay Note  [facility]     Patient Name: Dani Ziegler  MRN: [id number]  Today's Date: 1/12/2022    Admit Date: 12/30/2021     Discharge Plan     Row Name 01/12/22 1453       Plan    Plan Comments Remains in ICU, extubated to HFNC.  Case Management continues to follow closely for all needs, discharge planning when more medically stable.  Romelia Pang, Ext. [phone]    Final Discharge Disposition Code 30 - still a patient               Discharge Codes    No documentation.                     CARLA Salazar

## 2022-01-12 NOTE — PLAN OF CARE
Goal Outcome Evaluation:  Plan of Care Reviewed With: patient        Progress: improving  Outcome Summary: Pt improving, extubated at 1016.  Pt remains on precedex and fentanyl.  Pt increasingly restless and confused.  Spoke with patient at length and reoriented multiple times.  Pt remains in restraints.  HLNC in use with no issue.  Diuretics given and patient responded.  Keofeed placed and verified; tube feed restarted.  Pt had multiple bowel movements.  VSS, will continue to monitor.

## 2022-01-12 NOTE — PROGRESS NOTES
Clinical Nutrition   Reason For Visit: MDR, Follow-up protocol, EN    Patient Name: Dani Ziegler  YOB: 1965  MRN: [id number]  Date of Encounter: 01/12/22 10:34 EST  Admission date: 12/30/2021      Continue current EN regimen for now:  Peptamen VHP @ 75 ml/hr. No free water (per MD).    At goal rate this regimen provides 1500 ml formula, 1500 calories (94% est needs), 140 g protein (114% est needs), 7 g fiber, 1260 ml water from formula.    SLP fly today.      Nutrition Assessment     Admission Problem List:  Acute on chronic respiratory failure  Sepsis  CAP (community acquired pneumonia)  Acute 7th rib fracture      Applicable medical tests/procedures since admission:  (12/31) intubated  (1/1) EN initiated via OG tube per intensivist  (1/3) EN adjusted per RD  (1/7) free water of 30 ml/hr discontinued due to hyponatremia  (1/11) extubated, OGT removed, ND tube placed      PMH: She  has a past medical history of Abnormal weight gain, Acute UTI, Anxiety, Arthritis involving multiple sites, Chronic obstructive pulmonary disease (HCC), Chronic pain, Current every day smoker, Depression, Diabetes mellitus (HCC), Diarrhea, Dysuria, Edema, lower extremity, Fever, Head injury, Hypertension, Intervertebral disc disorder, Left bundle branch block, Leukocytosis, Long term current use of methadone for pain control, Mass of right breast, Nausea, Obesity, Overactive bladder, Peripheral neuropathy, Superficial phlebitis, Upper respiratory infection, Urinary incontinence, Vitamin D deficiency, and Vomiting.   PSxH: She  has a past surgical history that includes Bladder surgery; Cholecystectomy; and Hip Arthroscopy (Right, 10/29/2020).        Reported/Observed/Food/Nutrition Related History   1/12) Per MDR discussion - patient continues on fentanyl and precedex. Yesterday EN was placed on hold temporarily for planned extubation. Yesterday patient was extubated, OGT removed, ND tube placed for EN, and EN resumed @  65 ml/hr. EN currently running @ 65 ml/hr with plans to increase to goal rate 75 ml/hr this morning. Tolerating EN. BM x4 within past 24 hours per I/Os. RN to wean fentanyl - SLP to evaluate swallowing later today once patient has been off of fentanyl for a while so she can participate.    1/10) Per MDR discussion - patient intubated and sedated on fentanyl and precedex. Propofol discontinued overnight. Versed weaned off this morning. 2 soft small bowel movements within the past 24 hours per I/Os / RN notes. Continues to tolerate EN. Still receiving no free water. Intensivist would like to continue without free water today. Serum Na 139 this morning.     1/6) Per MDR discussion - patient intubated and sedated. Continues to tolerate EN at goal rate of 50 ml/hr. No BM this admission per I/Os.    1/5) Per MDR discussion - patient intubated and sedated. Has OG tube. Receiving EN @ 65 ml/hr with 30 ml Q 2 hours and tolerating. No bowel movement this admission per I/Os.      Anthropometrics   Height: 67 in  Weight: 366 lbs (bed scale weight 1/5 per RN) --- note fluid still present and bed had other equipment on it when weighed --- need a zeroed bed scale weight  BMI: 57.3  BMI classification: Obese Class III extreme obesity: > or equal to 40kg/m2   IBW: 135 lbs    UBW:  Last 15 Recorded Weights  Weight Weight (kg) Weight (lbs) Weight Method VISIT REPORT   12/30/2021 174.635 kg 385 lb Stated -   8/24/2021 133.358 kg 294 lb - Report   7/16/2021 139.708 kg 308 lb - -   6/12/2021 128.368 kg 283 lb - -   6/11/2021 129.502 kg 285 lb 8 oz Standing scale -   6/10/2021 132.496 kg 292 lb 1.6 oz Bed scale -   6/9/2021 130.817 kg 288 lb 6.4 oz Bed scale -   6/8/2021 131.362 kg 289 lb 9.6 oz Standing scale -   6/7/2021 133.856 kg 295 lb 1.6 oz Bed scale -   6/6/2021 134.446 kg 296 lb 6.4 oz Bed scale -   6/4/2021 132.269 kg 291 lb 9.6 oz Bed scale -   6/3/2021 136.533 kg 301 lb Stated -   5/25/2021 130.182 kg 287 lb - Report    1/20/2021 143.881 kg 317 lb 3.2 oz - Report       ---RD notes stated wt on admission (385 lbs) not consistent with EMR weight hx so RD suspects stated weight is inaccurate.      Labs reviewed   Labs reviewed: Yes    Results from last 7 days   Lab Units 01/12/22  0338 01/11/22  0338 01/10/22  0308   SODIUM mmol/L 135* 134* 139   POTASSIUM mmol/L 4.3 4.0 3.9   CHLORIDE mmol/L 99 100 97*   CO2 mmol/L 25.0 25.0 32.0*   BUN mg/dL 24* 25* 29*   CREATININE mg/dL 0.57 0.47* 0.69   GLUCOSE mg/dL 247* 194* 149*   CALCIUM mg/dL 9.8 9.6 9.9   PHOSPHORUS mg/dL 3.7 3.1 3.3   MAGNESIUM mg/dL 2.2 1.7 2.0     Results from last 7 days   Lab Units 01/12/22  0338 01/11/22  0338 01/10/22  0308   WBC 10*3/mm3 13.93* 12.81* 12.50*   ALBUMIN g/dL 3.30* 3.10* 3.30*     Results from last 7 days   Lab Units 01/12/22  0520 01/11/22  2358 01/11/22  1720 01/11/22  1120 01/11/22  0506 01/11/22  0053   GLUCOSE mg/dL 245* 234* 252* 194* 189* 189*       Medications reviewed   Medications reviewed: Yes  Scheduled: pepcid, insulin  GTT: fentanyl, precedex    Needs Assessment (1/10)   Height used: 67 in/170.2 cm  Weight used: 294 lbs/133.5 kg (actual wt estimated by RD);   135 lbs/61.5 kg (IBW)    Estimated Calories needs: ~1600 calories daily  11-14 kcal/kg actual wt = 6129-7678    Estimated Protein needs: ~123 g protein daily  2.0-2.5 g/kg IBW = 123-154    Estimated Fluid needs: ~1500 ml fluid daily/per clinical status      Current Nutrition Prescription   PO: NPO Diet      EN: Peptamen Intense VHP @ 75 ml/hr.  Route: ND  Verified at bedside: Yes - EN running @ 65 ml/hr this morning  Nutrition provided at goal: 1500 ml formula, 1500 calories (94% est needs), 140 g protein (114% est needs), 7 g fiber, 1260 ml water from formula.      EN delivery:  1 Day: no EN delivery documentation available past 24 hours per I/Os      Nutrition Diagnosis     1/2/2022, 1/3  Problem Inadequate oral intake   Etiology A/C RF    Signs/Symptoms NPO d/t mechanical  ventilation   Status: ongoing - EN initiated (1/1)     1/2/2022  Problem Food and nutrition knowledge deficit   Etiology MICHELINE   Signs/Symptoms BMI > 60, MD consult   Status: education pending- pt intubated and sedated (1/3)      Goal:   General: Nutrition to support treatment  PO: Initiate diet as medically appropriate   EN/PN: Maintain EN Rx, advance EN from 65 ml/hr to 75 ml/hr today    Intervention   Intervention: Follow treatment progress, Care plan reviewed, Await begin PO, Nutrition support order placed    -Continue current EN regimen for now.    Monitoring/Evaluation:   Monitoring/Evaluation: Per protocol, I&O, Pertinent labs, EN delivery/tolerance, Weight, GI status, Symptoms, POC/GOC, Swallow function, Hemodynamic stability    Prabha Johnson RD  Time Spent: 45 min

## 2022-01-12 NOTE — PLAN OF CARE
Goal Outcome Evaluation:               Pt A&Ox4, MAEE, threatening at times, restraints remain. Remains on hi-flow NC, no cough, refuses to IS. VSS, SB-NSR with PVCs, PRN hydralazine given to maintain SBP <160, palpable pulses. 1 BM, UOP adequate for shift. Oral care provided. Venelex on lower backwound, Nystatin in folds.

## 2022-01-12 NOTE — PROGRESS NOTES
Daily chart review complete.  Patient remains In ICU on HFNC.  No new questions or concerns at this time.  NN continues to follow and will see patient for interview and education as appropriate.

## 2022-01-12 NOTE — NURSING NOTE
[facility] consulted for: Lower back    Assessment and Care provided: Patient currently being followed by [facility].  Previous assessment of wounds on 1/10/2022 that [facility] had been following.  Discussed patient case with RN.  Reviewed wounds that were already present and recommended interventions.  RN states that patient's bottom is red and blanchable with likely moisture associated breakdown.  No new areas of pressure at this time.  Patient needs good general skin care to be kept clean and dry.    Recommendation(s): Continue with current plan of care.  Keep patient clean and dry.  Turn every 2 hours    *Maintain good skin care, keep dry, turn q 2 hr, keep heels elevated and offloaded with heel boots.    *Apply z-guard to bottom and bony prominences daily and as needed with incontinence episodes.  *Follow C.A.R.E protocol if medical devices (Bipap, lucero, Ng tube, etc) are being used.     All skin interventions in place.  Head to toe assessment completed. Heels and bottom blanchable, intact and offloaded.     Discussed plan of care with  RN.    Thank you for consulting the [facility] Nurse.  Will continue to follow.  Please contact with questions or if needs arise.    This note is dictated utilizing voice recognition software. Unfortunately, this leads to occasional typographical errors. I apologize in advance if the situation occurs. If questions occur please do not hesitate to contact me.

## 2022-01-12 NOTE — PROGRESS NOTES
Intensive Care Follow-up     Hospital:  LOS: 12 days   Ms. Dani Ziegler, 56 y.o. female is followed for:   Acute on chronic respiratory failure with hypoxia and hypercapnia (HCC)            History of present illness:   56-year-old female with a past medical history of COPD, type 2 diabetes mellitus, hypertension, chronic pain on chronic narcotics due to lumbar disc disease and on chronic benzodiazepines and depression.  She is on home O2 and inhaled therapy and has been followed by Dr. Darrion Tovar.  Echocardiogram one year ago revealed normal systolic and diastolic function.  She has a history of 2 prior episodes of prolonged mechanical ventilation requiring tracheostomy, the 1st in early 2020 and the 2nd in early 2021.  Both of these episodes were at [facility] per her daughter and both times she had the tracheostomy decannulated.  She presented on 12/30 with bilateral lobar consolidative infiltrates most evident in the right lower lobe with air bronchograms.  She had mediastinal and hilar adenopathy but this had been noted in the past.  She failed BiPAP therapy and required intubation in the ER.  Blood and sputum cultures were negative with the exception of a micrococcus in the blood assumed to be a contaminant.  Strep pneumonia antigen and Legionella antigen in the urine were negative.  MRSA PCR was negative.  COVID and influenza testing was negative     She has remained on mechanical ventilation since admission.  At one point was requiring high levels of PEEP and FiO2.     UDS was positive for methamphetamine and benzodiazepines.    Pt was extubated on 1/10.     Subjective   Interval History:  Patient remains confused and agitated at times. Remains on HFNC. Remains in restraints.         The patient's past medical, surgical and social history were reviewed and updated in Epic as appropriate.       Objective     Infusions:  dexmedetomidine, 0.2-1.5 mcg/kg/hr, Last Rate: 1.3 mcg/kg/hr (01/12/22  "1529)  fentanyl 10 mcg/mL,  mcg/hr, Last Rate: Stopped (01/12/22 1402)      Medications:  [START ON 1/13/2022] amLODIPine, 10 mg, Nasogastric, Q24H  apixaban, 5 mg, Nasogastric, Q12H  busPIRone, 10 mg, Nasogastric, BID  castor oil-balsam peru, 1 application, Topical, Q12H  chlorhexidine, 15 mL, Mouth/Throat, Q12H  DULoxetine, 60 mg, Oral, Daily  escitalopram, 20 mg, Nasogastric, Daily  famotidine, 20 mg, Nasogastric, BID  gabapentin, 300 mg, Nasogastric, Q8H  insulin detemir, 10 Units, Subcutaneous, Nightly  insulin regular, 0-9 Units, Subcutaneous, Q6H  ipratropium-albuterol, 3 mL, Nebulization, Q6H - RT  nystatin, , Topical, Q12H  sodium chloride, 10 mL, Intravenous, Q12H        Vital Sign Min/Max for last 24 hours  Temp  Min: 98.6 °F (37 °C)  Max: 99.5 °F (37.5 °C)   BP  Min: 92/71  Max: 180/90   Pulse  Min: 53  Max: 71   Resp  Min: 20  Max: 30   SpO2  Min: 88 %  Max: 96 %   Flow (L/min)  Min: 45  Max: 45       Input/Output for last 24 hour shift  01/11 0701 - 01/12 0700  In: 1592.9 [I.V.:1592.9]  Out: 4150 [Urine:4150]      Objective:  Vital signs: (most recent): Blood pressure 116/74, pulse 67, temperature 98.6 °F (37 °C), temperature source Axillary, resp. rate 26, height 170.2 cm (67.01\"), weight (!) 166 kg (365 lb 11.2 oz), SpO2 96 %.            General Appearance:  Not in distress.  Lungs:   B/L Breath sounds present with decreased breath sounds on bases, no wheezing heard, no crackles.   Heart: S1 and S2 present, no murmur  Abdomen: Soft, nontender, no guarding or rigidity, bowel sounds positive, no masses.  Extremities: no cyanosis or clubbing,  + edema, warm to awake, alert.        Results from last 7 days   Lab Units 01/12/22  0338 01/11/22  0338 01/10/22  0308   WBC 10*3/mm3 13.93* 12.81* 12.50*   HEMOGLOBIN g/dL 14.1 13.3 12.5   PLATELETS 10*3/mm3 342 285 284     Results from last 7 days   Lab Units 01/12/22  0338 01/11/22  0338 01/10/22  0308   SODIUM mmol/L 135* 134* 139   POTASSIUM mmol/L 4.3 " 4.0 3.9   CO2 mmol/L 25.0 25.0 32.0*   BUN mg/dL 24* 25* 29*   CREATININE mg/dL 0.57 0.47* 0.69   MAGNESIUM mg/dL 2.2 1.7 2.0   PHOSPHORUS mg/dL 3.7 3.1 3.3   GLUCOSE mg/dL 247* 194* 149*     Estimated Creatinine Clearance: 177.1 mL/min (by C-G formula based on SCr of 0.57 mg/dL).    Results from last 7 days   Lab Units 01/11/22  1002   PH, ARTERIAL pH units 7.439   PCO2, ARTERIAL mm Hg 42.6   PO2 ART mm Hg 67.9*         Images:   Chest x-ray reviewed personally and support lines in position. Right basilar atelectasis/small effusion.     I reviewed the patient's results and images.     Assessment/Plan   Impression        Acute on chronic respiratory failure with hypoxia and hypercapnia ([facility])    Uncontrolled type 2 diabetes mellitus with hyperglycemia, without long-term current use of insulin (HCC)    Essential hypertension    Chronic pain    Chronic obstructive pulmonary disease ([facility])    Sepsis ([facility])    CAP (community acquired pneumonia)    Morbid obesity with BMI of 45.0-49.9, adult ([facility])    History of recurrent deep vein thrombosis (DVT)    Morbid obesity with BMI of 60.0-69.9, adult ([facility])       Plan        1.  Patient doing OK from respiratory standpoint.  FiO2 requirements are stable. Try to wean Fio2 as tolerated.   2.  Swallow evaluation.  Continue enteral nutrition.   3.  Patient with previous history of DVT.  On Eliquis, tolerating well, continue same. No acute bleed noted.  4.  Patient is in delirium state.  Looking through her [place] report patient has been on a lot of medications including Valium, Percocet and gabapentin as well.  We will go ahead and start Percocet back.  We will wean fentanyl off.  Try to wean Precedex down as well.  We will go ahead and start Cymbalta.  We will also use as needed Valium.  5.  Urine output is acceptable.  Will give dose of diuretic to keep in negative fluid balance.  Monitor electrolytes closely.  6.  GI prophylaxis.  7.  Monitor blood glucose and insulin Levemir and  sliding scale to control blood sugars.    Overall patient remains in guarded condition.  Continue close monitoring in ICU.    Plan of care and goals reviewed with multidisciplinary/antibiotic stewardship team during rounds.   I discussed the patient's findings and my recommendations with nursing staff     High level of risk due to:  illness with threat to life or bodily function.    Time spent 30 min (exclusive of procedure time)  including high complexity decision making to assess, manipulate, and support vital organ system failure in this individual who has impairment of one or more vital organ systems such that there is a high probability of imminent or life threatening deterioration in the patient’s condition.      Sunil Carpenter MD, [facility]  Pulmonary, Critical care and Sleep Medicine

## 2022-01-12 NOTE — SIGNIFICANT NOTE
01/12/22 0930   SLP Deferred Reason   SLP Deferred Reason   (Reviewed chart & spoke to RN. Pt not appropriate for swallow eval. Will check tomorrow.)

## 2022-01-13 ENCOUNTER — APPOINTMENT (OUTPATIENT)
Dept: GENERAL RADIOLOGY | Facility: HOSPITAL | Age: 57
End: 2022-01-13

## 2022-01-13 LAB
ALBUMIN SERPL-MCNC: 3.5 G/DL (ref 3.5–5.2)
ALBUMIN/GLOB SERPL: 0.7 G/DL
ALP SERPL-CCNC: 146 U/L (ref 39–117)
ALT SERPL W P-5'-P-CCNC: 23 U/L (ref 1–33)
ANION GAP SERPL CALCULATED.3IONS-SCNC: 14 MMOL/L (ref 5–15)
AST SERPL-CCNC: 21 U/L (ref 1–32)
BASOPHILS # BLD AUTO: 0.08 10*3/MM3 (ref 0–0.2)
BASOPHILS NFR BLD AUTO: 0.5 % (ref 0–1.5)
BILIRUB SERPL-MCNC: 0.6 MG/DL (ref 0–1.2)
BUN SERPL-MCNC: 32 MG/DL (ref 6–20)
BUN/CREAT SERPL: 52.5 (ref 7–25)
CALCIUM SPEC-SCNC: 10.2 MG/DL (ref 8.6–10.5)
CHLORIDE SERPL-SCNC: 99 MMOL/L (ref 98–107)
CO2 SERPL-SCNC: 23 MMOL/L (ref 22–29)
CREAT SERPL-MCNC: 0.61 MG/DL (ref 0.57–1)
DEPRECATED RDW RBC AUTO: 52.9 FL (ref 37–54)
EOSINOPHIL # BLD AUTO: 0.17 10*3/MM3 (ref 0–0.4)
EOSINOPHIL NFR BLD AUTO: 1 % (ref 0.3–6.2)
ERYTHROCYTE [DISTWIDTH] IN BLOOD BY AUTOMATED COUNT: 16.5 % (ref 12.3–15.4)
GFR SERPL CREATININE-BSD FRML MDRD: 101 ML/MIN/1.73
GLOBULIN UR ELPH-MCNC: 4.9 GM/DL
GLUCOSE BLDC GLUCOMTR-MCNC: 227 MG/DL (ref 70–130)
GLUCOSE BLDC GLUCOMTR-MCNC: 254 MG/DL (ref 70–130)
GLUCOSE BLDC GLUCOMTR-MCNC: 264 MG/DL (ref 70–130)
GLUCOSE BLDC GLUCOMTR-MCNC: 267 MG/DL (ref 70–130)
GLUCOSE SERPL-MCNC: 226 MG/DL (ref 65–99)
HCT VFR BLD AUTO: 46.1 % (ref 34–46.6)
HGB BLD-MCNC: 14.6 G/DL (ref 12–15.9)
IMM GRANULOCYTES # BLD AUTO: 0.2 10*3/MM3 (ref 0–0.05)
IMM GRANULOCYTES NFR BLD AUTO: 1.2 % (ref 0–0.5)
LYMPHOCYTES # BLD AUTO: 3.23 10*3/MM3 (ref 0.7–3.1)
LYMPHOCYTES NFR BLD AUTO: 19 % (ref 19.6–45.3)
MAGNESIUM SERPL-MCNC: 1.9 MG/DL (ref 1.6–2.6)
MCH RBC QN AUTO: 27.7 PG (ref 26.6–33)
MCHC RBC AUTO-ENTMCNC: 31.7 G/DL (ref 31.5–35.7)
MCV RBC AUTO: 87.5 FL (ref 79–97)
MONOCYTES # BLD AUTO: 0.94 10*3/MM3 (ref 0.1–0.9)
MONOCYTES NFR BLD AUTO: 5.5 % (ref 5–12)
NEUTROPHILS NFR BLD AUTO: 12.38 10*3/MM3 (ref 1.7–7)
NEUTROPHILS NFR BLD AUTO: 72.8 % (ref 42.7–76)
NRBC BLD AUTO-RTO: 0 /100 WBC (ref 0–0.2)
NT-PROBNP SERPL-MCNC: 816.7 PG/ML (ref 0–900)
PHOSPHATE SERPL-MCNC: 3.6 MG/DL (ref 2.5–4.5)
PLATELET # BLD AUTO: 380 10*3/MM3 (ref 140–450)
PMV BLD AUTO: 11 FL (ref 6–12)
POTASSIUM SERPL-SCNC: 4.3 MMOL/L (ref 3.5–5.2)
PROT SERPL-MCNC: 8.4 G/DL (ref 6–8.5)
QT INTERVAL: 354 MS
QTC INTERVAL: 496 MS
RBC # BLD AUTO: 5.27 10*6/MM3 (ref 3.77–5.28)
SODIUM SERPL-SCNC: 136 MMOL/L (ref 136–145)
WBC NRBC COR # BLD: 17 10*3/MM3 (ref 3.4–10.8)

## 2022-01-13 PROCEDURE — 93010 ELECTROCARDIOGRAM REPORT: CPT | Performed by: INTERNAL MEDICINE

## 2022-01-13 PROCEDURE — 63710000001 INSULIN DETEMIR PER 5 UNITS: Performed by: INTERNAL MEDICINE

## 2022-01-13 PROCEDURE — 93005 ELECTROCARDIOGRAM TRACING: CPT | Performed by: INTERNAL MEDICINE

## 2022-01-13 PROCEDURE — 80053 COMPREHEN METABOLIC PANEL: CPT | Performed by: INTERNAL MEDICINE

## 2022-01-13 PROCEDURE — 83880 ASSAY OF NATRIURETIC PEPTIDE: CPT | Performed by: INTERNAL MEDICINE

## 2022-01-13 PROCEDURE — 94761 N-INVAS EAR/PLS OXIMETRY MLT: CPT

## 2022-01-13 PROCEDURE — 25010000002 FUROSEMIDE PER 20 MG: Performed by: INTERNAL MEDICINE

## 2022-01-13 PROCEDURE — 94799 UNLISTED PULMONARY SVC/PX: CPT

## 2022-01-13 PROCEDURE — 82962 GLUCOSE BLOOD TEST: CPT

## 2022-01-13 PROCEDURE — 92610 EVALUATE SWALLOWING FUNCTION: CPT

## 2022-01-13 PROCEDURE — 63710000001 INSULIN REGULAR HUMAN PER 5 UNITS: Performed by: INTERNAL MEDICINE

## 2022-01-13 PROCEDURE — 84100 ASSAY OF PHOSPHORUS: CPT | Performed by: INTERNAL MEDICINE

## 2022-01-13 PROCEDURE — 99232 SBSQ HOSP IP/OBS MODERATE 35: CPT | Performed by: INTERNAL MEDICINE

## 2022-01-13 PROCEDURE — 85025 COMPLETE CBC W/AUTO DIFF WBC: CPT | Performed by: INTERNAL MEDICINE

## 2022-01-13 PROCEDURE — 83735 ASSAY OF MAGNESIUM: CPT | Performed by: INTERNAL MEDICINE

## 2022-01-13 PROCEDURE — 71045 X-RAY EXAM CHEST 1 VIEW: CPT

## 2022-01-13 RX ORDER — DIAZEPAM 2 MG/1
2 TABLET ORAL EVERY 12 HOURS SCHEDULED
Status: DISCONTINUED | OUTPATIENT
Start: 2022-01-13 | End: 2022-01-14

## 2022-01-13 RX ORDER — FUROSEMIDE 10 MG/ML
40 INJECTION INTRAMUSCULAR; INTRAVENOUS ONCE
Status: COMPLETED | OUTPATIENT
Start: 2022-01-13 | End: 2022-01-13

## 2022-01-13 RX ADMIN — INSULIN HUMAN 6 UNITS: 100 INJECTION, SOLUTION PARENTERAL at 23:34

## 2022-01-13 RX ADMIN — IPRATROPIUM BROMIDE AND ALBUTEROL SULFATE 3 ML: 2.5; .5 SOLUTION RESPIRATORY (INHALATION) at 19:14

## 2022-01-13 RX ADMIN — FAMOTIDINE 20 MG: 20 TABLET, FILM COATED ORAL at 21:02

## 2022-01-13 RX ADMIN — GABAPENTIN 300 MG: 300 CAPSULE ORAL at 06:11

## 2022-01-13 RX ADMIN — DEXMEDETOMIDINE HYDROCHLORIDE 0.8 MCG/KG/HR: 4 INJECTION, SOLUTION INTRAVENOUS at 00:30

## 2022-01-13 RX ADMIN — DEXMEDETOMIDINE HYDROCHLORIDE 1.5 MCG/KG/HR: 4 INJECTION, SOLUTION INTRAVENOUS at 10:10

## 2022-01-13 RX ADMIN — CASTOR OIL AND BALSAM, PERU 1 APPLICATION: 788; 87 OINTMENT TOPICAL at 21:02

## 2022-01-13 RX ADMIN — INSULIN HUMAN 4 UNITS: 100 INJECTION, SOLUTION PARENTERAL at 00:28

## 2022-01-13 RX ADMIN — BUSPIRONE HYDROCHLORIDE 10 MG: 5 TABLET ORAL at 08:31

## 2022-01-13 RX ADMIN — GABAPENTIN 300 MG: 300 CAPSULE ORAL at 21:02

## 2022-01-13 RX ADMIN — NYSTATIN: 100000 POWDER TOPICAL at 08:32

## 2022-01-13 RX ADMIN — AMLODIPINE BESYLATE 10 MG: 10 TABLET ORAL at 08:31

## 2022-01-13 RX ADMIN — CHLORHEXIDINE GLUCONATE 15 ML: 1.2 SOLUTION ORAL at 08:31

## 2022-01-13 RX ADMIN — APIXABAN 5 MG: 5 TABLET, FILM COATED ORAL at 21:02

## 2022-01-13 RX ADMIN — NYSTATIN 500000 UNITS: 100000 SUSPENSION ORAL at 18:27

## 2022-01-13 RX ADMIN — INSULIN HUMAN 4 UNITS: 100 INJECTION, SOLUTION PARENTERAL at 17:56

## 2022-01-13 RX ADMIN — DEXMEDETOMIDINE HYDROCHLORIDE 1 MCG/KG/HR: 4 INJECTION, SOLUTION INTRAVENOUS at 03:12

## 2022-01-13 RX ADMIN — DIAZEPAM 2 MG: 2 TABLET ORAL at 12:48

## 2022-01-13 RX ADMIN — NYSTATIN: 100000 POWDER TOPICAL at 20:00

## 2022-01-13 RX ADMIN — DEXMEDETOMIDINE HYDROCHLORIDE 1.5 MCG/KG/HR: 4 INJECTION, SOLUTION INTRAVENOUS at 08:39

## 2022-01-13 RX ADMIN — INSULIN DETEMIR 15 UNITS: 100 INJECTION, SOLUTION SUBCUTANEOUS at 21:44

## 2022-01-13 RX ADMIN — DULOXETINE 60 MG: 60 CAPSULE, DELAYED RELEASE ORAL at 08:31

## 2022-01-13 RX ADMIN — NYSTATIN 500000 UNITS: 100000 SUSPENSION ORAL at 12:53

## 2022-01-13 RX ADMIN — FUROSEMIDE 40 MG: 10 INJECTION, SOLUTION INTRAMUSCULAR; INTRAVENOUS at 12:48

## 2022-01-13 RX ADMIN — INSULIN HUMAN 6 UNITS: 100 INJECTION, SOLUTION PARENTERAL at 12:49

## 2022-01-13 RX ADMIN — DIAZEPAM 2 MG: 2 TABLET ORAL at 21:02

## 2022-01-13 RX ADMIN — DEXMEDETOMIDINE HYDROCHLORIDE 1 MCG/KG/HR: 4 INJECTION, SOLUTION INTRAVENOUS at 17:59

## 2022-01-13 RX ADMIN — NYSTATIN: 100000 POWDER TOPICAL at 12:48

## 2022-01-13 RX ADMIN — CASTOR OIL AND BALSAM, PERU 1 APPLICATION: 788; 87 OINTMENT TOPICAL at 08:32

## 2022-01-13 RX ADMIN — BUSPIRONE HYDROCHLORIDE 10 MG: 5 TABLET ORAL at 21:02

## 2022-01-13 RX ADMIN — IPRATROPIUM BROMIDE AND ALBUTEROL SULFATE 3 ML: 2.5; .5 SOLUTION RESPIRATORY (INHALATION) at 12:34

## 2022-01-13 RX ADMIN — IPRATROPIUM BROMIDE AND ALBUTEROL SULFATE 3 ML: 2.5; .5 SOLUTION RESPIRATORY (INHALATION) at 06:33

## 2022-01-13 RX ADMIN — DEXMEDETOMIDINE HYDROCHLORIDE 0.8 MCG/KG/HR: 4 INJECTION, SOLUTION INTRAVENOUS at 22:56

## 2022-01-13 RX ADMIN — DEXMEDETOMIDINE HYDROCHLORIDE 1 MCG/KG/HR: 4 INJECTION, SOLUTION INTRAVENOUS at 20:21

## 2022-01-13 RX ADMIN — DEXMEDETOMIDINE HYDROCHLORIDE 1.5 MCG/KG/HR: 4 INJECTION, SOLUTION INTRAVENOUS at 06:58

## 2022-01-13 RX ADMIN — FAMOTIDINE 20 MG: 20 TABLET, FILM COATED ORAL at 08:31

## 2022-01-13 RX ADMIN — NYSTATIN 500000 UNITS: 100000 SUSPENSION ORAL at 08:31

## 2022-01-13 RX ADMIN — INSULIN HUMAN 6 UNITS: 100 INJECTION, SOLUTION PARENTERAL at 06:11

## 2022-01-13 RX ADMIN — ESCITALOPRAM OXALATE 20 MG: 20 TABLET ORAL at 08:31

## 2022-01-13 RX ADMIN — GABAPENTIN 300 MG: 300 CAPSULE ORAL at 14:39

## 2022-01-13 RX ADMIN — APIXABAN 5 MG: 5 TABLET, FILM COATED ORAL at 08:31

## 2022-01-13 RX ADMIN — DEXMEDETOMIDINE HYDROCHLORIDE 1.5 MCG/KG/HR: 4 INJECTION, SOLUTION INTRAVENOUS at 04:34

## 2022-01-13 RX ADMIN — NYSTATIN 500000 UNITS: 100000 SUSPENSION ORAL at 21:02

## 2022-01-13 RX ADMIN — DEXMEDETOMIDINE HYDROCHLORIDE 1 MCG/KG/HR: 4 INJECTION, SOLUTION INTRAVENOUS at 15:07

## 2022-01-13 NOTE — THERAPY EVALUATION
Acute Care - Speech Language Pathology   Swallow Initial Evaluation [facility]   Clinical Swallow Evaluation       Patient Name: Dani Ziegler  : 1965  MRN: [id number]  Today's Date: 2022               Admit Date: 2021    Visit Dx:     ICD-10-CM ICD-9-CM   1. Acute exacerbation of chronic obstructive pulmonary disease (COPD) ([facility])  J44.1 491.21   2. Acute respiratory distress  R06.03 518.82   3. Hypoxia  R09.02 799.02   4. Pneumonia of both lungs due to infectious organism, unspecified part of lung  J18.9 483.8   5. Sepsis with acute hypoxic respiratory failure without septic shock, due to unspecified organism ([facility])  A41.9 038.9    R65.20 995.91    J96.01 518.81   6. Dysphagia, unspecified type  R13.10 787.20     Patient Active Problem List   Diagnosis   • Uncontrolled type 2 diabetes mellitus with hyperglycemia, without long-term current use of insulin ([facility])   • Vitamin D deficiency   • Peripheral neuropathy   • Adiposity   • Left bundle branch block (LBBB)   • Essential hypertension   • Chronic pain   • Chronic obstructive pulmonary disease ([facility])   • Anxiety   • Depression   • Arthritis involving multiple sites   • Leukocytosis   • Mixed hyperlipidemia   • Special screening for malignant neoplasms, colon   • Cellulitis of left lower extremity   • Hyperkalemia   • Acute on chronic renal insufficiency   • Sepsis ([facility])   • CHF (congestive heart failure) ([facility])   • Cellulitis of left leg   • Lumbar disc disease   • Diabetic peripheral neuropathy ([facility])   • COVID-19 virus infection   • CAP (community acquired pneumonia)   • Polypharmacy   • Morbid obesity with BMI of 45.0-49.9, adult ([facility])   • Acute on chronic respiratory failure with hypoxia and hypercapnia ([facility])   • History of recurrent deep vein thrombosis (DVT)   • Morbid obesity with BMI of 60.0-69.9, adult ([facility])     Past Medical History:   Diagnosis Date   • Abnormal weight gain    • Acute UTI    • Anxiety    • Arthritis involving multiple  sites    • Chronic obstructive pulmonary disease (HCC)    • Chronic pain    • Current every day smoker    • Depression    • Diabetes mellitus (HCC)    • Diarrhea    • Dysuria    • Edema, lower extremity    • Fever    • Head injury    • Hypertension    • Intervertebral disc disorder     Degeneration of intervertebral disc, site unspecified (722.6)   • Left bundle branch block    • Leukocytosis    • Long term current use of methadone for pain control    • Mass of right breast    • Nausea    • Obesity    • Overactive bladder    • Peripheral neuropathy    • Superficial phlebitis     right medial calf   • Upper respiratory infection    • Urinary incontinence    • Vitamin D deficiency    • Vomiting      Past Surgical History:   Procedure Laterality Date   • BLADDER SURGERY      pubovaginal sling   • CHOLECYSTECTOMY     • HIP ARTHROSCOPY Right 10/29/2020       SLP Recommendation and Plan  SLP Swallowing Diagnosis: functional oral phase, suspected pharyngeal dysphagia (01/13/22 1115)  SLP Diet Recommendation: NPO, temporary alternate methods of nutrition/hydration, other (see comments) (ok for 4-5 ice chips per hour after oral care w supervision) (01/13/22 1115)  Recommended Precautions and Strategies: general aspiration precautions (01/13/22 1115)  SLP Rec. for Method of Medication Administration: meds via alternate route (01/13/22 1115)     Monitor for Signs of Aspiration: yes, notify SLP if any concerns (01/13/22 1115)  Recommended Diagnostics: reassess via clinical swallow evaluation (01/13/22 1115)     Anticipated Discharge Disposition (SLP): unknown, anticipate therapy at next level of care (01/13/22 1115)  Rehab Potential/Prognosis, Swallowing: adequate, monitor progress closely (01/13/22 1115)                                               SWALLOW EVALUATION (last 72 hours)     SLP Adult Swallow Evaluation     Row Name 01/13/22 1115                   Rehab Evaluation    Subjective Information no complaints  -CH         Patient Observations alert; poorly cooperative  confused, in 4 pt restraints  -CH        Patient/Family/Caregiver Comments/Observations No family present  -CH        Patient Effort fair  -CH        Comment confused, mildly agitated, in 4 pt restraints  -CH        Symptoms Noted During/After Treatment none  -CH                  General Information    Patient Profile Reviewed yes  -CH        Pertinent History Of Current Problem Hx of COPD, type 2 diabetes mellitus, hypertension, chronic pain. 2 prior episodes of prolonged mechanical ventilation requiring tracheostomy, the 1st in early 2020 and the 2nd in early 2021. Extubated this admission after 12-13 days on the vent. Dysphonic. clinical swallow evaluation completed to assess swallow.  -CH        Current Method of Nutrition NPO; nasogastric feedings  -CH        Precautions/Limitations, Vision WFL; for purposes of eval  -CH        Precautions/Limitations, Hearing WFL; for purposes of eval  -CH        Prior Level of Function-Communication cognitive-linguistic impairment; other (see comments)  confused. Baseline unclear  -CH        Prior Level of Function-Swallowing unknown  -        Plans/Goals Discussed with patient; agreed upon  -        Barriers to Rehab cognitive status; previous functional deficit  -        Patient's Goals for Discharge patient did not state  -                  Pain    Additional Documentation Pain Scale: FACES Pre/Post-Treatment (Group)  -CH                  Pain Scale: FACES Pre/Post-Treatment    Pain: FACES Scale, Pretreatment 0-->no hurt  -CH        Posttreatment Pain Rating 0-->no hurt  -CH                  Oral Motor Structure and Function    Dentition Assessment missing teeth; teeth are in poor condition  -CH        Secretion Management WNL/WFL  -CH        Mucosal Quality dry  -CH        Volitional Swallow weak  -CH        Volitional Cough weak  -CH                  Oral Musculature and Cranial Nerve Assessment    Oral Motor  General Assessment WFL  -CH                  General Eating/Swallowing Observations    Respiratory Support Currently in Use high-flow nasal cannula  -        Eating/Swallowing Skills fed by SLP  -        Positioning During Eating upright 90 degree; upright in bed  -        Utensils Used spoon  -        Consistencies Trialed ice chips; thin liquids; pureed  -CH        Pre SpO2 (%) 95  -CH        Post SpO2 (%) 96  -CH                  Respiratory    Respiratory Status WFL  -CH                  Clinical Swallow Eval    Oral Prep Phase WFL  -CH        Oral Transit WFL  -CH        Oral Residue WFL  -CH        Pharyngeal Phase suspected pharyngeal impairment  -CH        Esophageal Phase unremarkable  -        Clinical Swallow Evaluation Summary CSE completed. Pt confused and easily agitated. Tolerated ice trials x 4 without s/s of aspiraiton. Throat clear, cough and multiple swallows observed with thin liquid trials via teaspoon. Multiple swallows with pureed trials. Delayed throat clearing following all trials. Recommend continued NPO with alternate means of nutrition. Re-eval tomorrow if patient appropriate.  -                  Pharyngeal Phase Concerns    Pharyngeal Phase Concerns multiple swallows; cough; throat clear  -        Multiple Swallows pudding; thin  -CH        Cough thin  -CH        Throat Clear thin  -CH                  SLP Evaluation Clinical Impression    SLP Swallowing Diagnosis functional oral phase; suspected pharyngeal dysphagia  -        Functional Impact risk of aspiration/pneumonia  -        Rehab Potential/Prognosis, Swallowing adequate, monitor progress closely  -                  Recommendations    SLP Diet Recommendation NPO; temporary alternate methods of nutrition/hydration; other (see comments)  ok for 4-5 ice chips per hour after oral care w supervision  -        Recommended Diagnostics reassess via clinical swallow evaluation  -        Recommended Precautions and  Strategies general aspiration precautions  -        Oral Care Recommendations Oral Care BID/PRN; Suction toothbrush  -        SLP Rec. for Method of Medication Administration meds via alternate route  -        Monitor for Signs of Aspiration yes; notify SLP if any concerns  -        Anticipated Discharge Disposition (SLP) unknown; anticipate therapy at next level of care  -              User Key  (r) = Recorded By, (t) = Taken By, (c) = Cosigned By    Initials Name Effective Dates    Melissa Higginbotham MS CCC-SLP 06/16/21 -                 EDUCATION  The patient has been educated in the following areas:   Dysphagia (Swallowing Impairment) Oral Care/Hydration NPO rationale.              Time Calculation:    Time Calculation- SLP     Row Name 01/13/22 1311             Time Calculation- SLP    SLP Start Time 1115  -      SLP Received On 01/13/22  -              Untimed Charges    93292-BA Eval Oral Pharyng Swallow Minutes 45  -CH              Total Minutes    Untimed Charges Total Minutes 45  -CH       Total Minutes 45  -CH            User Key  (r) = Recorded By, (t) = Taken By, (c) = Cosigned By    Initials Name Provider Type    Melissa Higginbotham MS CCC-SLP Speech and Language Pathologist                Therapy Charges for Today     Code Description Service Date Service Provider Modifiers Qty    [id number] HC ST EVAL ORAL PHARYNG SWALLOW 3 1/13/2022 Melissa Colin MS CCC-SLP GN 1               Melissa Colin MS CCC-SLP  1/13/2022

## 2022-01-13 NOTE — PLAN OF CARE
Goal Outcome Evaluation:               Oriented to self/place. Attempted to wean precedex gtt, was only able to wean to 0.6 before pt attempted to climb out of bed and became very agitated. MAEE. Inappropriate behavior, emotionally labile. Remains on high-flow NC, 65% 45L. VSS. Tolerating TF, UOP adequate for  shift. Skin excoriated/wound on lower back (mepilex/venelex applied).

## 2022-01-13 NOTE — PROGRESS NOTES
Multidisciplinary Rounds    Patient Name: Dani Ziegler  Date of Encounter: 01/13/22 10:38 EST  MRN: [id number]  Admission date: 12/30/2021    Reason for visit: MDR. RD to continue to follow per protocol.     Additional information obtained during MDR:  Patient on precedex. Inappropriate behavior. Per I/Os: BM x3;  387 ml EN (26% goal volume) delivered within past 24 hours --- ?accuracy of documentation as no EN holding occurred. Tolerating EN at goal at this time. Patient was not appropriate to participate in SLP evaluation yesterday.    Current diet: NPO Diet     Tube Feeding Formula: Peptamen Intense VHP @ 75ml/hr. No free water.   Route: ND  Verified at bedside: Yes  Nutrition provided at goal: 1500 ml formula, 1500 calories (94% est needs), 140 g protein (114% est needs), 7 g fiber, 1260 ml water from formula.    EMR reviewed     Intervention:  Follow treatment plan  Care plan reviewed  Continue current EN regimen    Follow up:   Per protocol      Prabha Johnson, BLAYNE  11:51 EST  Time: 20min

## 2022-01-13 NOTE — PLAN OF CARE
Goal Outcome Evaluation:  Plan of Care Reviewed With: patient, daughter                                                                                  Neuro: Oriented to self and intermittently oriented to place. Inapropriate behavior and emotionally labile.. consists of verbal threats toward staff and yelling at anyone that is in the hallway. BUE restraints.    Resp: HFNC 65%/45L. RR 24-30. SpO2 drops to 86-87% when pt becomes tearful.     Cardiac: HR 60-90s. SR with frequent PVCs. Palpable pulses. Audible S1 and S2.    GI/: Peptaman VHP at 75ml/hr with 0FW. x2 BMs. Lasix administered. Venelex and Z-Guard applied to wounds and coccyx. Skin is excoriated around groin and in folds.   UOP: 1.9L.    GTTs: Precedex at 1.     No complaints of pain when prompted.. Home medications were restarted during rounds.

## 2022-01-13 NOTE — PROGRESS NOTES
Intensive Care Follow-up     Hospital:  LOS: 13 days   Ms. Dani Ziegler, 56 y.o. female is followed for:   Acute on chronic respiratory failure with hypoxia and hypercapnia (HCC)            History of present illness:   56-year-old female with a past medical history of COPD, type 2 diabetes mellitus, hypertension, chronic pain on chronic narcotics due to lumbar disc disease and on chronic benzodiazepines and depression.  She is on home O2 and inhaled therapy and has been followed by Dr. Darrion Tovar.  Echocardiogram one year ago revealed normal systolic and diastolic function.  She has a history of 2 prior episodes of prolonged mechanical ventilation requiring tracheostomy, the 1st in early 2020 and the 2nd in early 2021.  Both of these episodes were at [facility] per her daughter and both times she had the tracheostomy decannulated.  She presented on 12/30 with bilateral lobar consolidative infiltrates most evident in the right lower lobe with air bronchograms.  She had mediastinal and hilar adenopathy but this had been noted in the past.  She failed BiPAP therapy and required intubation in the ER.  Blood and sputum cultures were negative with the exception of a micrococcus in the blood assumed to be a contaminant.  Strep pneumonia antigen and Legionella antigen in the urine were negative.  MRSA PCR was negative.  COVID and influenza testing was negative     She has remained on mechanical ventilation since admission.  At one point was requiring high levels of PEEP and FiO2.     UDS was positive for methamphetamine and benzodiazepines.    Pt was extubated on 1/10.     Subjective   Interval History:  Patient remains confused and agitated at times. Remains on HFNC requirements are slightly better compared to yesterday.  Remains in restraints.  Getting agitated on weaning sedation.  Patient has been weaned off fentanyl infusion.  Remains on high-dose Precedex for now.  Continue to wean it further.  Patient  "tells me that she has been taking scheduled gabapentin, Percocet and Valium twice a day at home.  She is also getting agitated that we are not letting her eat due to she is not passing speech evaluation either.        The patient's past medical, surgical and social history were reviewed and updated in Epic as appropriate.       Objective     Infusions:  dexmedetomidine, 0.2-1.5 mcg/kg/hr, Last Rate: 0.5 mcg/kg/hr (01/13/22 1130)      Medications:  amLODIPine, 10 mg, Nasogastric, Q24H  apixaban, 5 mg, Nasogastric, Q12H  busPIRone, 10 mg, Nasogastric, BID  castor oil-balsam peru, 1 application, Topical, Q12H  diazePAM, 2 mg, Nasogastric, Q12H  escitalopram, 20 mg, Nasogastric, Daily  famotidine, 20 mg, Nasogastric, BID  gabapentin, 300 mg, Nasogastric, Q8H  insulin detemir, 10 Units, Subcutaneous, Nightly  insulin regular, 0-9 Units, Subcutaneous, Q6H  ipratropium-albuterol, 3 mL, Nebulization, Q6H - RT  nystatin, 5 mL, Swish & Spit, 4x Daily  nystatin, , Topical, Q12H        Vital Sign Min/Max for last 24 hours  Temp  Min: 98.2 °F (36.8 °C)  Max: 100.6 °F (38.1 °C)   BP  Min: 92/71  Max: 161/89   Pulse  Min: 60  Max: 90   Resp  Min: 20  Max: 30   SpO2  Min: 88 %  Max: 96 %   Flow (L/min)  Min: 45  Max: 445       Input/Output for last 24 hour shift  01/12 0701 - 01/13 0700  In: 1145 [I.V.:758]  Out: 2800 [Urine:2800]      Objective:  Vital signs: (most recent): Blood pressure 120/88, pulse 68, temperature 98.8 °F (37.1 °C), temperature source Bladder, resp. rate 22, height 170.2 cm (67.01\"), weight (!) 166 kg (365 lb 11.2 oz), SpO2 94 %.            General Appearance: Intermittent agitation, currently in four-point restraints   lungs:   B/L Breath sounds present with decreased breath sounds on bases, no wheezing heard, no crackles.   Heart: S1 and S2 present, no murmur  Abdomen: Soft, nontender, no guarding or rigidity, bowel sounds positive, no masses.  Extremities: no cyanosis or clubbing,  + edema, warm to awake, " alert.        Results from last 7 days   Lab Units 01/13/22  0324 01/12/22  0338 01/11/22  0338   WBC 10*3/mm3 17.00* 13.93* 12.81*   HEMOGLOBIN g/dL 14.6 14.1 13.3   PLATELETS 10*3/mm3 380 342 285     Results from last 7 days   Lab Units 01/13/22  0324 01/12/22  0338 01/11/22  0338   SODIUM mmol/L 136 135* 134*   POTASSIUM mmol/L 4.3 4.3 4.0   CO2 mmol/L 23.0 25.0 25.0   BUN mg/dL 32* 24* 25*   CREATININE mg/dL 0.61 0.57 0.47*   MAGNESIUM mg/dL 1.9 2.2 1.7   PHOSPHORUS mg/dL 3.6 3.7 3.1   GLUCOSE mg/dL 226* 247* 194*     Estimated Creatinine Clearance: 165.5 mL/min (by C-G formula based on SCr of 0.61 mg/dL).    Results from last 7 days   Lab Units 01/11/22  1002   PH, ARTERIAL pH units 7.439   PCO2, ARTERIAL mm Hg 42.6   PO2 ART mm Hg 67.9*         Images:   Chest x-ray reviewed personally and support lines in position.  No significant effusion, atelectasis or consolidation.    I reviewed the patient's results and images.     Assessment/Plan   Impression        Acute on chronic respiratory failure with hypoxia and hypercapnia (HCC)    Uncontrolled type 2 diabetes mellitus with hyperglycemia, without long-term current use of insulin (HCC)    Essential hypertension    Chronic pain    Chronic obstructive pulmonary disease (HCC)    Sepsis (HCC)    CAP (community acquired pneumonia)    Morbid obesity with BMI of 45.0-49.9, adult ([facility])    History of recurrent deep vein thrombosis (DVT)    Morbid obesity with BMI of 60.0-69.9, adult ([facility])       Plan        1.  Patient doing OK from respiratory standpoint.  Weaned FiO2 to 60%.  Continue to wean it further as tolerated.  2.  Swallow evaluation and see if we can allow her to have oral diet.  Continue enteral nutrition in the meantime.   3.  Patient with previous history of DVT.  On Eliquis, tolerating well, continue same. No acute bleed noted.  4.  Patient is in delirium state.  Looking through her [facility] report patient has been on a lot of medications including Valium,  Percocet and gabapentin as well.  She has been weaned off fentanyl infusion.  Continue Precedex.  We will schedule Valium twice a day.  5.  Urine output is acceptable. Monitor electrolytes closely.  We will continue low-dose diuresis.  6.  GI prophylaxis.  7.  Monitor blood glucose and increase Levemir insulin.    Overall patient remains in guarded condition.  Continue close monitoring in ICU.    Plan of care and goals reviewed with multidisciplinary/antibiotic stewardship team during rounds.   I discussed the patient's findings and my recommendations with nursing staff     High level of risk due to:  illness with threat to life or bodily function.    Time spent 30 min (exclusive of procedure time)  including high complexity decision making to assess, manipulate, and support vital organ system failure in this individual who has impairment of one or more vital organ systems such that there is a high probability of imminent or life threatening deterioration in the patient’s condition.      Sunil Carpenter MD, [facility]  Pulmonary, Critical care and Sleep Medicine

## 2022-01-13 NOTE — PROGRESS NOTES
Daily chart review complete.  Per conversation with primary RN, patient is not appropriate for interview or education at this time.  No other questions or concerns at this time.  NN continues to follow.

## 2022-01-14 ENCOUNTER — ANCILLARY PROCEDURE (OUTPATIENT)
Dept: SPEECH THERAPY | Facility: HOSPITAL | Age: 57
End: 2022-01-14

## 2022-01-14 LAB
ALBUMIN SERPL-MCNC: 3.7 G/DL (ref 3.5–5.2)
ALBUMIN/GLOB SERPL: 0.8 G/DL
ALP SERPL-CCNC: 128 U/L (ref 39–117)
ALT SERPL W P-5'-P-CCNC: 21 U/L (ref 1–33)
ANION GAP SERPL CALCULATED.3IONS-SCNC: 11 MMOL/L (ref 5–15)
AST SERPL-CCNC: 14 U/L (ref 1–32)
BASOPHILS # BLD AUTO: 0.06 10*3/MM3 (ref 0–0.2)
BASOPHILS NFR BLD AUTO: 0.4 % (ref 0–1.5)
BILIRUB SERPL-MCNC: 0.6 MG/DL (ref 0–1.2)
BUN SERPL-MCNC: 42 MG/DL (ref 6–20)
BUN/CREAT SERPL: 61.8 (ref 7–25)
CALCIUM SPEC-SCNC: 10.3 MG/DL (ref 8.6–10.5)
CHLORIDE SERPL-SCNC: 98 MMOL/L (ref 98–107)
CO2 SERPL-SCNC: 27 MMOL/L (ref 22–29)
CREAT SERPL-MCNC: 0.68 MG/DL (ref 0.57–1)
DEPRECATED RDW RBC AUTO: 53.1 FL (ref 37–54)
EOSINOPHIL # BLD AUTO: 0.24 10*3/MM3 (ref 0–0.4)
EOSINOPHIL NFR BLD AUTO: 1.5 % (ref 0.3–6.2)
ERYTHROCYTE [DISTWIDTH] IN BLOOD BY AUTOMATED COUNT: 16.7 % (ref 12.3–15.4)
GFR SERPL CREATININE-BSD FRML MDRD: 89 ML/MIN/1.73
GLOBULIN UR ELPH-MCNC: 4.4 GM/DL
GLUCOSE BLDC GLUCOMTR-MCNC: 176 MG/DL (ref 70–130)
GLUCOSE BLDC GLUCOMTR-MCNC: 186 MG/DL (ref 70–130)
GLUCOSE BLDC GLUCOMTR-MCNC: 209 MG/DL (ref 70–130)
GLUCOSE BLDC GLUCOMTR-MCNC: 233 MG/DL (ref 70–130)
GLUCOSE SERPL-MCNC: 240 MG/DL (ref 65–99)
HCT VFR BLD AUTO: 42 % (ref 34–46.6)
HGB BLD-MCNC: 13.6 G/DL (ref 12–15.9)
IMM GRANULOCYTES # BLD AUTO: 0.12 10*3/MM3 (ref 0–0.05)
IMM GRANULOCYTES NFR BLD AUTO: 0.8 % (ref 0–0.5)
LYMPHOCYTES # BLD AUTO: 3.82 10*3/MM3 (ref 0.7–3.1)
LYMPHOCYTES NFR BLD AUTO: 23.9 % (ref 19.6–45.3)
MAGNESIUM SERPL-MCNC: 1.9 MG/DL (ref 1.6–2.6)
MCH RBC QN AUTO: 28 PG (ref 26.6–33)
MCHC RBC AUTO-ENTMCNC: 32.4 G/DL (ref 31.5–35.7)
MCV RBC AUTO: 86.4 FL (ref 79–97)
MONOCYTES # BLD AUTO: 0.93 10*3/MM3 (ref 0.1–0.9)
MONOCYTES NFR BLD AUTO: 5.8 % (ref 5–12)
NEUTROPHILS NFR BLD AUTO: 10.79 10*3/MM3 (ref 1.7–7)
NEUTROPHILS NFR BLD AUTO: 67.6 % (ref 42.7–76)
NRBC BLD AUTO-RTO: 0 /100 WBC (ref 0–0.2)
PHOSPHATE SERPL-MCNC: 3.3 MG/DL (ref 2.5–4.5)
PLATELET # BLD AUTO: 373 10*3/MM3 (ref 140–450)
PMV BLD AUTO: 10.9 FL (ref 6–12)
POTASSIUM SERPL-SCNC: 4.1 MMOL/L (ref 3.5–5.2)
PROT SERPL-MCNC: 8.1 G/DL (ref 6–8.5)
RBC # BLD AUTO: 4.86 10*6/MM3 (ref 3.77–5.28)
SODIUM SERPL-SCNC: 136 MMOL/L (ref 136–145)
WBC NRBC COR # BLD: 15.96 10*3/MM3 (ref 3.4–10.8)

## 2022-01-14 PROCEDURE — 94799 UNLISTED PULMONARY SVC/PX: CPT

## 2022-01-14 PROCEDURE — 63710000001 INSULIN DETEMIR PER 5 UNITS: Performed by: INTERNAL MEDICINE

## 2022-01-14 PROCEDURE — 0 MAGNESIUM SULFATE 4 GM/100ML SOLUTION: Performed by: INTERNAL MEDICINE

## 2022-01-14 PROCEDURE — 84100 ASSAY OF PHOSPHORUS: CPT | Performed by: INTERNAL MEDICINE

## 2022-01-14 PROCEDURE — 63710000001 INSULIN LISPRO (HUMAN) PER 5 UNITS: Performed by: INTERNAL MEDICINE

## 2022-01-14 PROCEDURE — 83735 ASSAY OF MAGNESIUM: CPT | Performed by: INTERNAL MEDICINE

## 2022-01-14 PROCEDURE — 25010000002 ONDANSETRON PER 1 MG: Performed by: NURSE PRACTITIONER

## 2022-01-14 PROCEDURE — 80053 COMPREHEN METABOLIC PANEL: CPT | Performed by: INTERNAL MEDICINE

## 2022-01-14 PROCEDURE — 85025 COMPLETE CBC W/AUTO DIFF WBC: CPT | Performed by: INTERNAL MEDICINE

## 2022-01-14 PROCEDURE — 82962 GLUCOSE BLOOD TEST: CPT

## 2022-01-14 PROCEDURE — 92612 ENDOSCOPY SWALLOW (FEES) VID: CPT

## 2022-01-14 PROCEDURE — 63710000001 INSULIN REGULAR HUMAN PER 5 UNITS: Performed by: INTERNAL MEDICINE

## 2022-01-14 PROCEDURE — 99232 SBSQ HOSP IP/OBS MODERATE 35: CPT | Performed by: INTERNAL MEDICINE

## 2022-01-14 PROCEDURE — 94761 N-INVAS EAR/PLS OXIMETRY MLT: CPT

## 2022-01-14 PROCEDURE — 92610 EVALUATE SWALLOWING FUNCTION: CPT

## 2022-01-14 RX ORDER — DIAZEPAM 2 MG/1
2 TABLET ORAL EVERY 8 HOURS SCHEDULED
Status: DISCONTINUED | OUTPATIENT
Start: 2022-01-14 | End: 2022-01-14

## 2022-01-14 RX ORDER — METOPROLOL TARTRATE 5 MG/5ML
5 INJECTION INTRAVENOUS ONCE
Status: COMPLETED | OUTPATIENT
Start: 2022-01-14 | End: 2022-01-14

## 2022-01-14 RX ORDER — BUSPIRONE HYDROCHLORIDE 10 MG/1
10 TABLET ORAL 2 TIMES DAILY
Status: DISCONTINUED | OUTPATIENT
Start: 2022-01-15 | End: 2022-01-23 | Stop reason: HOSPADM

## 2022-01-14 RX ORDER — FAMOTIDINE 20 MG/1
20 TABLET, FILM COATED ORAL 2 TIMES DAILY
Status: DISCONTINUED | OUTPATIENT
Start: 2022-01-15 | End: 2022-01-23 | Stop reason: HOSPADM

## 2022-01-14 RX ORDER — POLYETHYLENE GLYCOL 3350 17 G/17G
17 POWDER, FOR SOLUTION ORAL DAILY PRN
Status: DISCONTINUED | OUTPATIENT
Start: 2022-01-14 | End: 2022-01-15

## 2022-01-14 RX ORDER — ONDANSETRON 2 MG/ML
4 INJECTION INTRAMUSCULAR; INTRAVENOUS EVERY 6 HOURS PRN
Status: DISCONTINUED | OUTPATIENT
Start: 2022-01-14 | End: 2022-01-23 | Stop reason: HOSPADM

## 2022-01-14 RX ORDER — BISACODYL 5 MG/1
5 TABLET, DELAYED RELEASE ORAL DAILY PRN
Status: DISCONTINUED | OUTPATIENT
Start: 2022-01-14 | End: 2022-01-15

## 2022-01-14 RX ORDER — DOCUSATE SODIUM 100 MG/1
100 CAPSULE, LIQUID FILLED ORAL 2 TIMES DAILY PRN
Status: DISCONTINUED | OUTPATIENT
Start: 2022-01-14 | End: 2022-01-15

## 2022-01-14 RX ORDER — AMLODIPINE BESYLATE 10 MG/1
10 TABLET ORAL
Status: DISCONTINUED | OUTPATIENT
Start: 2022-01-15 | End: 2022-01-23 | Stop reason: HOSPADM

## 2022-01-14 RX ORDER — ESCITALOPRAM OXALATE 20 MG/1
20 TABLET ORAL DAILY
Status: DISCONTINUED | OUTPATIENT
Start: 2022-01-15 | End: 2022-01-19

## 2022-01-14 RX ORDER — BISACODYL 10 MG
10 SUPPOSITORY, RECTAL RECTAL DAILY PRN
Status: DISCONTINUED | OUTPATIENT
Start: 2022-01-14 | End: 2022-01-15

## 2022-01-14 RX ORDER — GABAPENTIN 300 MG/1
300 CAPSULE ORAL EVERY 8 HOURS SCHEDULED
Status: DISCONTINUED | OUTPATIENT
Start: 2022-01-15 | End: 2022-01-23 | Stop reason: HOSPADM

## 2022-01-14 RX ORDER — OXYCODONE AND ACETAMINOPHEN 10; 325 MG/1; MG/1
1 TABLET ORAL EVERY 6 HOURS PRN
Status: DISPENSED | OUTPATIENT
Start: 2022-01-14 | End: 2022-01-19

## 2022-01-14 RX ORDER — SENNA PLUS 8.6 MG/1
1 TABLET ORAL 2 TIMES DAILY PRN
Status: DISCONTINUED | OUTPATIENT
Start: 2022-01-14 | End: 2022-01-23 | Stop reason: HOSPADM

## 2022-01-14 RX ORDER — DIAZEPAM 2 MG/1
2 TABLET ORAL EVERY 8 HOURS SCHEDULED
Status: DISCONTINUED | OUTPATIENT
Start: 2022-01-15 | End: 2022-01-19

## 2022-01-14 RX ADMIN — IPRATROPIUM BROMIDE AND ALBUTEROL SULFATE 3 ML: 2.5; .5 SOLUTION RESPIRATORY (INHALATION) at 01:10

## 2022-01-14 RX ADMIN — FAMOTIDINE 20 MG: 20 TABLET, FILM COATED ORAL at 08:28

## 2022-01-14 RX ADMIN — INSULIN LISPRO 4 UNITS: 100 INJECTION, SOLUTION INTRAVENOUS; SUBCUTANEOUS at 17:18

## 2022-01-14 RX ADMIN — AMLODIPINE BESYLATE 10 MG: 10 TABLET ORAL at 08:28

## 2022-01-14 RX ADMIN — OXYCODONE HYDROCHLORIDE AND ACETAMINOPHEN 1 TABLET: 10; 325 TABLET ORAL at 23:31

## 2022-01-14 RX ADMIN — NYSTATIN: 100000 POWDER TOPICAL at 20:14

## 2022-01-14 RX ADMIN — BUSPIRONE HYDROCHLORIDE 10 MG: 5 TABLET ORAL at 20:14

## 2022-01-14 RX ADMIN — DIAZEPAM 2 MG: 2 TABLET ORAL at 13:26

## 2022-01-14 RX ADMIN — GABAPENTIN 300 MG: 300 CAPSULE ORAL at 20:15

## 2022-01-14 RX ADMIN — APIXABAN 5 MG: 5 TABLET, FILM COATED ORAL at 08:28

## 2022-01-14 RX ADMIN — NYSTATIN 500000 UNITS: 100000 SUSPENSION ORAL at 20:22

## 2022-01-14 RX ADMIN — APIXABAN 5 MG: 5 TABLET, FILM COATED ORAL at 20:14

## 2022-01-14 RX ADMIN — INSULIN HUMAN 4 UNITS: 100 INJECTION, SOLUTION PARENTERAL at 06:31

## 2022-01-14 RX ADMIN — DIAZEPAM 2 MG: 2 TABLET ORAL at 08:28

## 2022-01-14 RX ADMIN — DEXMEDETOMIDINE HYDROCHLORIDE 0.7 MCG/KG/HR: 4 INJECTION, SOLUTION INTRAVENOUS at 05:02

## 2022-01-14 RX ADMIN — INSULIN DETEMIR 15 UNITS: 100 INJECTION, SOLUTION SUBCUTANEOUS at 10:38

## 2022-01-14 RX ADMIN — CASTOR OIL AND BALSAM, PERU 1 APPLICATION: 788; 87 OINTMENT TOPICAL at 20:16

## 2022-01-14 RX ADMIN — ESCITALOPRAM OXALATE 20 MG: 20 TABLET ORAL at 08:28

## 2022-01-14 RX ADMIN — BUSPIRONE HYDROCHLORIDE 10 MG: 5 TABLET ORAL at 08:28

## 2022-01-14 RX ADMIN — DIAZEPAM 2 MG: 2 TABLET ORAL at 20:18

## 2022-01-14 RX ADMIN — CASTOR OIL AND BALSAM, PERU 1 APPLICATION: 788; 87 OINTMENT TOPICAL at 08:30

## 2022-01-14 RX ADMIN — IPRATROPIUM BROMIDE AND ALBUTEROL SULFATE 3 ML: 2.5; .5 SOLUTION RESPIRATORY (INHALATION) at 06:31

## 2022-01-14 RX ADMIN — NYSTATIN 500000 UNITS: 100000 SUSPENSION ORAL at 12:55

## 2022-01-14 RX ADMIN — METOPROLOL TARTRATE 5 MG: 5 INJECTION INTRAVENOUS at 13:26

## 2022-01-14 RX ADMIN — IPRATROPIUM BROMIDE AND ALBUTEROL SULFATE 3 ML: 2.5; .5 SOLUTION RESPIRATORY (INHALATION) at 19:51

## 2022-01-14 RX ADMIN — GABAPENTIN 300 MG: 300 CAPSULE ORAL at 06:31

## 2022-01-14 RX ADMIN — NYSTATIN: 100000 POWDER TOPICAL at 08:30

## 2022-01-14 RX ADMIN — NYSTATIN 500000 UNITS: 100000 SUSPENSION ORAL at 08:28

## 2022-01-14 RX ADMIN — DEXMEDETOMIDINE HYDROCHLORIDE 0.7 MCG/KG/HR: 4 INJECTION, SOLUTION INTRAVENOUS at 01:54

## 2022-01-14 RX ADMIN — FAMOTIDINE 20 MG: 20 TABLET, FILM COATED ORAL at 20:14

## 2022-01-14 RX ADMIN — ONDANSETRON 4 MG: 2 INJECTION INTRAMUSCULAR; INTRAVENOUS at 20:32

## 2022-01-14 RX ADMIN — METOPROLOL TARTRATE 5 MG: 5 INJECTION INTRAVENOUS at 21:16

## 2022-01-14 RX ADMIN — MAGNESIUM SULFATE HEPTAHYDRATE 4 G: 40 INJECTION, SOLUTION INTRAVENOUS at 09:03

## 2022-01-14 RX ADMIN — INSULIN LISPRO 2 UNITS: 100 INJECTION, SOLUTION INTRAVENOUS; SUBCUTANEOUS at 12:55

## 2022-01-14 RX ADMIN — GABAPENTIN 300 MG: 300 CAPSULE ORAL at 13:26

## 2022-01-14 RX ADMIN — INSULIN DETEMIR 15 UNITS: 100 INJECTION, SOLUTION SUBCUTANEOUS at 20:14

## 2022-01-14 RX ADMIN — IPRATROPIUM BROMIDE AND ALBUTEROL SULFATE 3 ML: 2.5; .5 SOLUTION RESPIRATORY (INHALATION) at 12:33

## 2022-01-14 NOTE — PROGRESS NOTES
Intensive Care Follow-up     Hospital:  LOS: 14 days   Ms. Dani Ziegler, 56 y.o. female is followed for:   Acute on chronic respiratory failure with hypoxia and hypercapnia (HCC)            History of present illness:   56-year-old female with a past medical history of COPD, type 2 diabetes mellitus, hypertension, chronic pain on chronic narcotics due to lumbar disc disease and on chronic benzodiazepines and depression.  She is on home O2 and inhaled therapy and has been followed by Dr. Darrion Tovar.  Echocardiogram one year ago revealed normal systolic and diastolic function.  She has a history of 2 prior episodes of prolonged mechanical ventilation requiring tracheostomy, the 1st in early 2020 and the 2nd in early 2021.  Both of these episodes were at [facility] per her daughter and both times she had the tracheostomy decannulated.  She presented on 12/30 with bilateral lobar consolidative infiltrates most evident in the right lower lobe with air bronchograms.  She had mediastinal and hilar adenopathy but this had been noted in the past.  She failed BiPAP therapy and required intubation in the ER.  Blood and sputum cultures were negative with the exception of a micrococcus in the blood assumed to be a contaminant.  Strep pneumonia antigen and Legionella antigen in the urine were negative.  MRSA PCR was negative.  COVID and influenza testing was negative     She has remained on mechanical ventilation since admission.  At one point was requiring high levels of PEEP and FiO2.     UDS was positive for methamphetamine and benzodiazepines.    Pt was extubated on 1/10.  Patient has remained in ICU due to ongoing delirium state, hypoxemia.    Subjective   Interval History:  Patient did little better yesterday after we had scheduled her Valium.  Came off Precedex drip but again was started overnight.  Today morning off Precedex drip.  Seems little more coherent but intermittent confusion and no significant  "agitation.  Out of restraints.        The patient's past medical, surgical and social history were reviewed and updated in Epic as appropriate.       Objective     Infusions:  dexmedetomidine, 0.2-1.5 mcg/kg/hr, Last Rate: 0.5 mcg/kg/hr (01/14/22 0503)      Medications:  amLODIPine, 10 mg, Nasogastric, Q24H  apixaban, 5 mg, Nasogastric, Q12H  busPIRone, 10 mg, Nasogastric, BID  castor oil-balsam peru, 1 application, Topical, Q12H  diazePAM, 2 mg, Nasogastric, Q8H  escitalopram, 20 mg, Nasogastric, Daily  famotidine, 20 mg, Nasogastric, BID  gabapentin, 300 mg, Nasogastric, Q8H  insulin detemir, 15 Units, Subcutaneous, BID  insulin lispro, 0-9 Units, Subcutaneous, TID AC  ipratropium-albuterol, 3 mL, Nebulization, Q6H - RT  metoprolol tartrate, 5 mg, Intravenous, Once  nystatin, 5 mL, Swish & Spit, 4x Daily  nystatin, , Topical, Q12H        Vital Sign Min/Max for last 24 hours  Temp  Min: 98.5 °F (36.9 °C)  Max: 100 °F (37.8 °C)   BP  Min: 107/63  Max: 137/97   Pulse  Min: 62  Max: 128   Resp  Min: 18  Max: 28   SpO2  Min: 86 %  Max: 98 %   Flow (L/min)  Min: 35  Max: 45       Input/Output for last 24 hour shift  01/13 0701 - 01/14 0700  In: 2352 [I.V.:549]  Out: 2850 [Urine:2850]      Objective:  Vital signs: (most recent): Blood pressure 125/74, pulse (!) 121, temperature 98.9 °F (37.2 °C), temperature source Axillary, resp. rate 18, height 170.2 cm (67.01\"), weight (!) 166 kg (365 lb 11.2 oz), SpO2 94 %.            General Appearance: Somewhat anxious but more coherent today  lungs:   B/L Breath sounds present with decreased breath sounds on bases, no wheezing heard, no crackles.   Heart: S1 and S2 present, no murmur  Abdomen: Soft, nontender, no guarding or rigidity, bowel sounds positive, no masses.  Extremities: no cyanosis or clubbing,  + edema, warm to awake, alert.        Results from last 7 days   Lab Units 01/14/22  0313 01/13/22  0324 01/12/22  0338   WBC 10*3/mm3 15.96* 17.00* 13.93*   HEMOGLOBIN g/dL " 13.6 14.6 14.1   PLATELETS 10*3/mm3 373 380 342     Results from last 7 days   Lab Units 01/14/22  0313 01/13/22  0324 01/12/22  0338   SODIUM mmol/L 136 136 135*   POTASSIUM mmol/L 4.1 4.3 4.3   CO2 mmol/L 27.0 23.0 25.0   BUN mg/dL 42* 32* 24*   CREATININE mg/dL 0.68 0.61 0.57   MAGNESIUM mg/dL 1.9 1.9 2.2   PHOSPHORUS mg/dL 3.3 3.6 3.7   GLUCOSE mg/dL 240* 226* 247*     Estimated Creatinine Clearance: 148.4 mL/min (by C-G formula based on SCr of 0.68 mg/dL).    Results from last 7 days   Lab Units 01/11/22  1002   PH, ARTERIAL pH units 7.439   PCO2, ARTERIAL mm Hg 42.6   PO2 ART mm Hg 67.9*         Images:   None new    I reviewed the patient's results and images.     Assessment/Plan   Impression        Acute on chronic respiratory failure with hypoxia and hypercapnia (HCC)    Uncontrolled type 2 diabetes mellitus with hyperglycemia, without long-term current use of insulin (HCC)    Essential hypertension    Chronic pain    Chronic obstructive pulmonary disease (HCC)    Sepsis (HCC)    CAP (community acquired pneumonia)    Morbid obesity with BMI of 45.0-49.9, adult (HCC)    History of recurrent deep vein thrombosis (DVT)    Morbid obesity with BMI of 60.0-69.9, adult ([facility])       Plan        1.  Patient doing fair from respiratory standpoint.  Remains on high flow nasal cannula oxygen.  We will try to wean as tolerated to keep saturations 90% or better.  Intermittent tachycardia now hemodynamically more stable.  We give low-dose beta-blocker and see how she tolerates that.  WBC count trending down.  Patient was treated with 10 days of Zosyn and doxycycline previously.  No definite focus of infection currently.  2.  Swallow evaluation and see if we can allow her to have oral diet.  Continue enteral nutrition in the meantime.   3.  Patient with previous history of DVT.  On Eliquis, tolerating well, continue same. No overt complications noted from that.  4.  Patient is in delirium state.  Looking through her [facility]  report patient has been on a lot of medications including Valium, Percocet and gabapentin as well.  We had scheduled Valium 2 mg every 12 hours.  Will change to 2 mg every 8 hours.  Precedex as needed and hopefully we do not have to rely on that.  5.  Urine output is acceptable. Monitor electrolytes closely.  We will continue low-dose diuresis as needed on a daily basis.  We will hold diuresis today as BUN and bicarb levels are slowly rising.  6.  GI prophylaxis.  7.  Blood sugars are not under good control.  We will increase Levemir to 15 units twice daily and monitor closely.  8.  Monitor electrolytes and replace per ICU protocol.  Replace magnesium today.    Overall patient remains in guarded condition.  Continue close monitoring in ICU.    Plan of care and goals reviewed with multidisciplinary/antibiotic stewardship team during rounds.   I discussed the patient's findings and my recommendations with nursing staff     High level of risk due to:  illness with threat to life or bodily function.    Time spent 30 min (exclusive of procedure time)  including high complexity decision making to assess, manipulate, and support vital organ system failure in this individual who has impairment of one or more vital organ systems such that there is a high probability of imminent or life threatening deterioration in the patient’s condition.      Sunil Carpenter MD, [facility]  Pulmonary, Critical care and Sleep Medicine

## 2022-01-14 NOTE — PLAN OF CARE
Goal Outcome Evaluation:  Plan of Care Reviewed With: patient            SLP evaluation completed. Will  plan for FEES today to further assess . Please see note for further details and recommendations.

## 2022-01-14 NOTE — CASE MANAGEMENT/SOCIAL WORK
Continued Stay Note  [facility]     Patient Name: Dani Ziegler  MRN: [id number]  Today's Date: 1/14/2022    Admit Date: 12/30/2021     Discharge Plan     Row Name 01/14/22 1339       Plan    Plan Likely needs SNF    Patient/Family in Agreement with Plan yes    Plan Comments Pt plan is likely SNF. Pt is currently confused and has been in and out of restraints. Pt is currently also on high flow oxygen. Once pt appropriate and able to work wiht therapy, will follow up with placement referrals. Case management continues to follow for discharge needs.    Final Discharge Disposition Code 30 - still a patient               Discharge Codes    No documentation.                     CARLA Escalante

## 2022-01-14 NOTE — DISCHARGE INSTR - DIET
FEES  1/14/2022  Reason for Referral  Patient was referred for a FEES to assess the efficiency of his/her swallow function, rule out aspiration and make recommendations regarding safe dietary consistencies, effective compensatory strategies, and safe eating environment.         Recommendations/Treatment  SLP Swallowing Diagnosis: mild-moderate, pharyngeal dysphagia (01/14/22 1040)  Functional Impact: risk of aspiration/pneumonia (01/14/22 1040)  Rehab Potential/Prognosis, Swallowing: good, to achieve stated therapy goals (01/14/22 1040)  Swallow Criteria for Skilled Therapeutic Interventions Met: demonstrates skilled criteria (01/14/22 1040)  Therapy Frequency (Swallow): 5 days per week (01/14/22 1040)  Predicted Duration Therapy Intervention (Days): until discharge (01/14/22 1040)  SLP Diet Recommendation: mechanical soft with no mixed consistencies, nectar thick liquids (01/14/22 1040)  Recommended Diagnostics: FEES (01/14/22 0950)  Recommended Precautions and Strategies: upright posture during/after eating, small bites of food and sips of liquid, general aspiration precautions (01/14/22 1040)  SLP Rec. for Method of Medication Administration: meds whole, with thick liquids, with pudding or applesauce, as tolerated (01/14/22 1040)  Monitor for Signs of Aspiration: yes, notify SLP if any concerns (01/14/22 1040)  Anticipated Discharge Disposition (SLP): unknown, anticipate therapy at next level of care (01/14/22 1040)    Instrumental Set-up  Risks/Benefits Reviewed: risks/benefits explained, patient, agreed to eval (01/14/22 1040)  Nasal Entry: right: (01/14/22 1040)  Anatomic Considerations: edema, vocal folds, false vocal folds, arytenoids (01/14/22 1040)  Consistencies Trialed: thin liquids, nectar-thick liquids, pudding/puree, regular textures (01/14/22 1040)    Oral Preparation/ Oral Phase  Oral Phase: WFL (01/14/22 1040)    Pharyngeal Phase  Initiation of Pharyngeal Swallow: bolus in pyriform sinuses  (01/14/22 1040)  Pharyngeal Phase: impaired pharyngeal phase of swallowing (01/14/22 1040)  Penetration During the Swallow: thin liquids, secondary to delayed swallow initiation or mistiming, secondary to reduced laryngeal elevation, secondary to reduced vestibular closure (01/14/22 1040)  Penetration After the Swallow: thin liquids, secondary to residue, in pyriform sinuses (01/14/22 1040)  Aspiration After the Swallow: thin liquids, secondary to residue, in pyriform sinuses, in laryngeal vestibule (01/14/22 1040)  Response to Aspiration: no response, silent aspiration (01/14/22 1040)  Rosenbek's Scale: thin:, 8-->Level 8 (01/14/22 1040)  Residue: thin liquids, pyriform sinuses, secondary to reduced laryngeal elevation, secondary to reduced hyolaryngeal excursion (01/14/22 1040)  Response to Residue: other (see comments) (partially cleared w/ spontaneous subsequent swallows) (01/14/22 1040)  Attempted Compensatory Maneuvers: bolus size, bolus presentation style (01/14/22 1040)  Response to Attempted Compensatory Maneuvers: did not prevent penetration, did not prevent aspiration (01/14/22 1040)  FEES Summary: Mild-moderate pharyngeal dysphagia. Penetration during & after w/ thin liquids w/ aspiration after the swallow. Aspiration was silent. No penetration/aspiration w/ nectar-thick, pudding, or solid. Mild-moderate pyriform residue w/ thin liquids - reduced, but did not fully clear w/ spontaneous subseqent swallow - eventually spilled over posterior comissure. Rec dysphagia level IV ([facility] soft/no mixed) diet w/ nectar-thick liquids. Meds whole w/ nectar-thick or puree. SLP will continue to follow for tx. (01/14/22 1040)       FEES Reason for Referral 1/19/2022  Patient was referred for a FEES to assess the efficiency of his/her swallow function, rule out aspiration and make recommendations regarding safe dietary consistencies, effective compensatory strategies, and safe eating environment.          Recommendations/Treatment  SLP Swallowing Diagnosis: mild, oral dysphagia, mild-moderate, pharyngeal dysphagia (01/19/22 1200)  Functional Impact: risk of aspiration/pneumonia, risk of malnutrition, risk of dehydration (01/19/22 1200)  Rehab Potential/Prognosis, Swallowing: good, to achieve stated therapy goals (01/19/22 1200)  Swallow Criteria for Skilled Therapeutic Interventions Met: demonstrates skilled criteria (01/19/22 1200)  Therapy Frequency (Swallow): 5 days per week (01/19/22 1200)  Predicted Duration Therapy Intervention (Days): until discharge (01/19/22 1200)  SLP Diet Recommendation: soft textures, whole, nectar thick liquids, ice chips between meals after oral care, with supervision, other (see comments) (dysphagia level IV diet--may have soft/whole food, but NO mixed consistencies) (01/19/22 1200)  Recommended Precautions and Strategies: upright posture during/after eating, general aspiration precautions, fatigue precautions (01/19/22 1200)  SLP Rec. for Method of Medication Administration: meds whole, with thick liquids, with pudding or applesauce, as tolerated (01/19/22 1200)  Monitor for Signs of Aspiration: yes, notify SLP if any concerns (01/19/22 1200)  Anticipated Discharge Disposition (SLP): anticipate therapy at next level of care (01/19/22 1200)    Instrumental Set-up  Risks/Benefits Reviewed: risks/benefits explained, patient, agreed to eval (01/19/22 1200)  Nasal Entry: right: (01/19/22 1200)  Anatomic Considerations: anatomic deviation observed (see comments), other (see comments) (01/19/22 1200)  Comment: Spherical, pulsating prominence noted to be protruding from R portion of posterior pharynx. Did not appear to greatly affect swallowing. (01/19/22 1200)  Utensils Used: Spoon, Cup, Straw (01/19/22 1200)  Consistencies Trialed: thin liquids, nectar-thick liquids, pudding/puree, regular textures (01/19/22 1200)    Oral Preparation/ Oral Phase  Oral Phase: prespill of liquids into  pharynx, reduced lingual control (01/19/22 1200)    Pharyngeal Phase  Initiation of Pharyngeal Swallow: bolus in pyriform sinuses (01/19/22 1200)  Pharyngeal Phase: impaired pharyngeal phase of swallowing (01/19/22 1200)  Aspiration Before the Swallow: thin liquids, secondary to reduced back of tongue control, secondary to delayed swallow initiation or mistiming (01/19/22 1200)  Aspiration During the Swallow: thin liquids, secondary to delayed swallow initiation or mistiming, secondary to reduced laryngeal elevation, secondary to reduced vestibular closure (01/19/22 1200)  Penetration After the Swallow: thin liquids, secondary to residue, in pyriform sinuses (01/19/22 1200)  Response to Aspiration: no response, silent aspiration (01/19/22 1200)  Rosenbek's Scale: thin:, 8-->Level 8, nectar:, pudding/puree:, regular textures:, 1-->Level 1 (01/19/22 1200)  Residue: thin liquids, diffuse within pharynx, secondary to reduced base of tongue retraction, secondary to reduced posterior pharyngeal wall stripping, secondary to reduced laryngeal elevation, secondary to reduced hyolaryngeal excursion, other (see comments) (mild-mod amount; pooled to pyriform sinuses) (01/19/22 1200)  Response to Residue: with spontaneous subsequent swallow (01/19/22 1200)  Attempted Compensatory Maneuvers: bolus size, bolus presentation style, chin tuck, breath hold, other (see comments) (3 sec prep) (01/19/22 1200)  Response to Attempted Compensatory Maneuvers: did not prevent aspiration (01/19/22 1200)  FEES Summary: There was silent aspiration of thin liquid via straw before/during the swallow w/ penetration after the swallow as residue spilled over posterior commissure. At first, pt was able to prevent aspiration by drinking thin via cup sips or by using chin tuck; however, as study progressed, pt fatigued and eventually aspirated thin liquid no matter the presentation style of compensatory strategy utilized. No penetration/aspiration  appreciated w/ nectar-thick liquid, pudding, or solid. (01/19/22 1200)

## 2022-01-14 NOTE — THERAPY EVALUATION
Acute Care - Speech Language Pathology   Swallow Re-Evaluation [facility]   Clinical Swallow Evaluation     Patient Name: Dani Ziegler  : 1965  MRN: [id number]  Today's Date: 2022               Admit Date: 2021    Visit Dx:     ICD-10-CM ICD-9-CM   1. Acute exacerbation of chronic obstructive pulmonary disease (COPD) (Formerly [facility])  J44.1 491.21   2. Acute respiratory distress  R06.03 518.82   3. Hypoxia  R09.02 799.02   4. Pneumonia of both lungs due to infectious organism, unspecified part of lung  J18.9 483.8   5. Sepsis with acute hypoxic respiratory failure without septic shock, due to unspecified organism (Formerly [facility] [place])  A41.9 038.9    R65.20 995.91    J96.01 518.81   6. Dysphagia, unspecified type  R13.10 787.20     Patient Active Problem List   Diagnosis   • Uncontrolled type 2 diabetes mellitus with hyperglycemia, without long-term current use of insulin (Formerly [facility])   • Vitamin D deficiency   • Peripheral neuropathy   • Adiposity   • Left bundle branch block (LBBB)   • Essential hypertension   • Chronic pain   • Chronic obstructive pulmonary disease (Formerly [facility])   • Anxiety   • Depression   • Arthritis involving multiple sites   • Leukocytosis   • Mixed hyperlipidemia   • Special screening for malignant neoplasms, colon   • Cellulitis of left lower extremity   • Hyperkalemia   • Acute on chronic renal insufficiency   • Sepsis (Formerly [facility])   • CHF (congestive heart failure) (Formerly [facility])   • Cellulitis of left leg   • Lumbar disc disease   • Diabetic peripheral neuropathy (Formerly [facility])   • COVID-19 virus infection   • CAP (community acquired pneumonia)   • Polypharmacy   • Morbid obesity with BMI of 45.0-49.9, adult (Formerly [facility])   • Acute on chronic respiratory failure with hypoxia and hypercapnia (Formerly [facility])   • History of recurrent deep vein thrombosis (DVT)   • Morbid obesity with BMI of 60.0-69.9, adult (Formerly [facility])     Past Medical History:   Diagnosis Date   • Abnormal weight gain    • Acute UTI    • Anxiety    • Arthritis involving multiple sites    •  Chronic obstructive pulmonary disease (HCC)    • Chronic pain    • Current every day smoker    • Depression    • Diabetes mellitus (HCC)    • Diarrhea    • Dysuria    • Edema, lower extremity    • Fever    • Head injury    • Hypertension    • Intervertebral disc disorder     Degeneration of intervertebral disc, site unspecified (722.6)   • Left bundle branch block    • Leukocytosis    • Long term current use of methadone for pain control    • Mass of right breast    • Nausea    • Obesity    • Overactive bladder    • Peripheral neuropathy    • Superficial phlebitis     right medial calf   • Upper respiratory infection    • Urinary incontinence    • Vitamin D deficiency    • Vomiting      Past Surgical History:   Procedure Laterality Date   • BLADDER SURGERY      pubovaginal sling   • CHOLECYSTECTOMY     • HIP ARTHROSCOPY Right 10/29/2020       SLP Recommendation and Plan  SLP Swallowing Diagnosis: suspected pharyngeal dysphagia (01/14/22 0950)  SLP Diet Recommendation: NPO, temporary alternate methods of nutrition/hydration (01/14/22 0950)     SLP Rec. for Method of Medication Administration: meds via alternate route (01/14/22 0950)        Recommended Diagnostics: FEES (01/14/22 0950)  Swallow Criteria for Skilled Therapeutic Interventions Met: demonstrates skilled criteria (01/14/22 0950)     Rehab Potential/Prognosis, Swallowing: adequate, monitor progress closely (01/14/22 0950)     Predicted Duration Therapy Intervention (Days): until discharge (01/14/22 0950)                                  Plan of Care Reviewed With: patient      SWALLOW EVALUATION (last 72 hours)     SLP Adult Swallow Evaluation     Row Name 01/14/22 0950 01/13/22 1709                Rehab Evaluation    Document Type re-evaluation  -MP --       Subjective Information no complaints  -MP no complaints  -CH       Patient Observations alert; cooperative  -MP alert; poorly cooperative  confused, in 4 pt restraints  -        Patient/Family/Caregiver Comments/Observations No family present  -MP No family present  -CH       Patient Effort adequate  -MP fair  -CH       Comment -- confused, mildly agitated, in 4 pt restraints  -CH       Symptoms Noted During/After Treatment -- none  -CH                General Information    Patient Profile Reviewed yes  -MP yes  -CH       Pertinent History Of Current Problem Adm 12/30 w/ A/C respiratory failure. Intubated 12/31-1/11. Hx HTN, DM2, DVT, COPD, two prior instances of prolonged ventilation resulting in trach placement.  -MP Hx of COPD, type 2 diabetes mellitus, hypertension, chronic pain. 2 prior episodes of prolonged mechanical ventilation requiring tracheostomy, the 1st in early 2020 and the 2nd in early 2021. Extubated this admission after 12-13 days on the vent. Dysphonic. clinical swallow evaluation completed to assess swallow.  -CH       Current Method of Nutrition NPO; nasogastric feedings  -MP NPO; nasogastric feedings  -CH       Precautions/Limitations, Vision WFL; for purposes of eval  -MP WFL; for purposes of eval  -CH       Precautions/Limitations, Hearing WFL; for purposes of eval  -MP WFL; for purposes of eval  -CH       Prior Level of Function-Communication unknown  -MP cognitive-linguistic impairment; other (see comments)  confused. Baseline unclear  -CH       Prior Level of Function-Swallowing unknown  -MP unknown  -       Plans/Goals Discussed with patient  -MP patient; agreed upon  -       Barriers to Rehab cognitive status; medically complex; previous functional deficit  - cognitive status; previous functional deficit  -       Patient's Goals for Discharge patient did not state  -MP patient did not state  -                Pain    Additional Documentation Pain Scale: FACES Pre/Post-Treatment (Group)  -MP Pain Scale: FACES Pre/Post-Treatment (Group)  -CH                Pain Scale: FACES Pre/Post-Treatment    Pain: FACES Scale, Pretreatment 0-->no hurt  -MP 0-->no hurt   -CH       Posttreatment Pain Rating 0-->no hurt  -MP 0-->no hurt  -CH                Oral Motor Structure and Function    Dentition Assessment missing teeth  -MP missing teeth; teeth are in poor condition  -CH       Secretion Management WNL/WFL  -MP WNL/WFL  -CH       Mucosal Quality moist, healthy  -MP dry  -CH       Volitional Swallow -- weak  -CH       Volitional Cough -- weak  -CH                Oral Musculature and Cranial Nerve Assessment    Oral Motor General Assessment generalized oral motor weakness  -MP WFL  -CH                General Eating/Swallowing Observations    Respiratory Support Currently in Use high-flow nasal cannula  -MP high-flow nasal cannula  -CH       Eating/Swallowing Skills fed by SLP  -MP fed by SLP  -CH       Positioning During Eating upright in bed  - upright 90 degree; upright in bed  -       Utensils Used spoon; cup  -MP spoon  -       Consistencies Trialed thin liquids; pureed  -MP ice chips; thin liquids; pureed  -CH       Pre SpO2 (%) -- 95  -CH       Post SpO2 (%) -- 96  -CH                Respiratory    Respiratory Status -- WFL  -CH                Clinical Swallow Eval    Oral Prep Phase -- WFL  -CH       Oral Transit -- WFL  -CH       Oral Residue -- WFL  -CH       Pharyngeal Phase suspected pharyngeal impairment  -MP suspected pharyngeal impairment  -CH       Esophageal Phase -- unremarkable  -CH       Clinical Swallow Evaluation Summary Appears improved c/t yesterday. ? intermittent wet vocal quality w/ thin. No s/sxs w/ puree. Will plan for FEES today to further assess.  -MP CSE completed. Pt confused and easily agitated. Tolerated ice trials x 4 without s/s of aspiraiton. Throat clear, cough and multiple swallows observed with thin liquid trials via teaspoon. Multiple swallows with pureed trials. Delayed throat clearing following all trials. Recommend continued NPO with alternate means of nutrition. Re-eval tomorrow if patient appropriate.  -CH                 Pharyngeal Phase Concerns    Pharyngeal Phase Concerns wet vocal quality  -MP multiple swallows; cough; throat clear  -       Wet Vocal Quality thin  -MP --       Multiple Swallows -- pudding; thin  -CH       Cough -- thin  -CH       Throat Clear -- thin  -CH                SLP Evaluation Clinical Impression    SLP Swallowing Diagnosis suspected pharyngeal dysphagia  -MP functional oral phase; suspected pharyngeal dysphagia  -       Functional Impact risk of aspiration/pneumonia  - risk of aspiration/pneumonia  -       Rehab Potential/Prognosis, Swallowing adequate, monitor progress closely  -MP adequate, monitor progress closely  -       Swallow Criteria for Skilled Therapeutic Interventions Met demonstrates skilled criteria  - --                Recommendations    Predicted Duration Therapy Intervention (Days) until discharge  - --       SLP Diet Recommendation NPO; temporary alternate methods of nutrition/hydration  - NPO; temporary alternate methods of nutrition/hydration; other (see comments)  ok for 4-5 ice chips per hour after oral care w supervision  -       Recommended Diagnostics FEES  - reassess via clinical swallow evaluation  -       Recommended Precautions and Strategies -- general aspiration precautions  -       Oral Care Recommendations Oral Care BID/PRN; Suction toothbrush  - Oral Care BID/PRN; Suction toothbrush  -       SLP Rec. for Method of Medication Administration meds via alternate route  - meds via alternate route  -       Monitor for Signs of Aspiration -- yes; notify SLP if any concerns  -       Anticipated Discharge Disposition (SLP) -- unknown; anticipate therapy at next level of care  -             User Key  (r) = Recorded By, (t) = Taken By, (c) = Cosigned By    Initials Name Effective Dates     Abdulaziz Johnson, MS CCC-SLP 12/28/21 -      Melissa Colin MS CCC-SLP 06/16/21 -                 EDUCATION  The patient has been educated in the  following areas:   Dysphagia (Swallowing Impairment) NPO rationale.              Time Calculation:    Time Calculation- SLP     Row Name 01/14/22 1008             Time Calculation- SLP    SLP Start Time 0950  -MP      SLP Received On 01/14/22  -MP              Untimed Charges    [id number] Eval Oral Pharyng Swallow Minutes 40  -MP              Total Minutes    Untimed Charges Total Minutes 40  -MP       Total Minutes 40  -MP            User Key  (r) = Recorded By, (t) = Taken By, (c) = Cosigned By    Initials Name Provider Type    Abdulaziz Bennett MS CCC-SLP Speech and Language Pathologist                Therapy Charges for Today     Code Description Service Date Service Provider Modifiers Qty    [id number] HC ST EVAL ORAL PHARYNG SWALLOW 3 1/14/2022 Abdulaziz Johnson MS CCC-SLP GN 1          Patient was not wearing a face mask and did exhibit coughing during this therapy encounter.  Procedure performed was aerosolizing, involved close contact (within 6 feet for at least 15 minutes or longer), and did not involve contact with infectious secretions or specimens.  Therapist used appropriate personal protective equipment including gloves, standard procedure mask and eye protection.  Appropriate PPE was worn during the entire therapy session.  Hand hygiene was completed before and after therapy session.        MS RADHA BraySLP  1/14/2022

## 2022-01-14 NOTE — PROGRESS NOTES
Daily chart review complete.  Per documentation, patient remains on HFNC and is confused with agitation at times.  Interview and education to be done once patient more appropriate.  No other questions or concerns at this time.  NN continues to follow.

## 2022-01-14 NOTE — MBS/VFSS/FEES
Acute Care - Speech Language Pathology   Swallow Initial Evaluation [facility]   Fiberoptic Endoscopic Evaluation of Swallowing (FEES)     Patient Name: Dani Ziegler  : 1965  MRN: [id number]  Today's Date: 2022               Admit Date: 2021    Visit Dx:     ICD-10-CM ICD-9-CM   1. Acute exacerbation of chronic obstructive pulmonary disease (COPD) (Formerly [facility])  J44.1 491.21   2. Acute respiratory distress  R06.03 518.82   3. Hypoxia  R09.02 799.02   4. Pneumonia of both lungs due to infectious organism, unspecified part of lung  J18.9 483.8   5. Sepsis with acute hypoxic respiratory failure without septic shock, due to unspecified organism (Formerly [facility])  A41.9 038.9    R65.20 995.91    J96.01 518.81   6. Dysphagia, unspecified type  R13.10 787.20     Patient Active Problem List   Diagnosis   • Uncontrolled type 2 diabetes mellitus with hyperglycemia, without long-term current use of insulin (Formerly [facility])   • Vitamin D deficiency   • Peripheral neuropathy   • Adiposity   • Left bundle branch block (LBBB)   • Essential hypertension   • Chronic pain   • Chronic obstructive pulmonary disease (Formerly [facility])   • Anxiety   • Depression   • Arthritis involving multiple sites   • Leukocytosis   • Mixed hyperlipidemia   • Special screening for malignant neoplasms, colon   • Cellulitis of left lower extremity   • Hyperkalemia   • Acute on chronic renal insufficiency   • Sepsis (Formerly [facility])   • CHF (congestive heart failure) (Formerly [facility])   • Cellulitis of left leg   • Lumbar disc disease   • Diabetic peripheral neuropathy (Formerly [facility])   • COVID-19 virus infection   • CAP (community acquired pneumonia)   • Polypharmacy   • Morbid obesity with BMI of 45.0-49.9, adult (Formerly [facility])   • Acute on chronic respiratory failure with hypoxia and hypercapnia (Formerly [facility])   • History of recurrent deep vein thrombosis (DVT)   • Morbid obesity with BMI of 60.0-69.9, adult (Formerly [facility])     Past Medical History:   Diagnosis Date   • Abnormal weight gain    • Acute UTI    • Anxiety    •  Arthritis involving multiple sites    • Chronic obstructive pulmonary disease (HCC)    • Chronic pain    • Current every day smoker    • Depression    • Diabetes mellitus (HCC)    • Diarrhea    • Dysuria    • Edema, lower extremity    • Fever    • Head injury    • Hypertension    • Intervertebral disc disorder     Degeneration of intervertebral disc, site unspecified (722.6)   • Left bundle branch block    • Leukocytosis    • Long term current use of methadone for pain control    • Mass of right breast    • Nausea    • Obesity    • Overactive bladder    • Peripheral neuropathy    • Superficial phlebitis     right medial calf   • Upper respiratory infection    • Urinary incontinence    • Vitamin D deficiency    • Vomiting      Past Surgical History:   Procedure Laterality Date   • BLADDER SURGERY      pubovaginal sling   • CHOLECYSTECTOMY     • HIP ARTHROSCOPY Right 10/29/2020       SLP Recommendation and Plan  SLP Swallowing Diagnosis: mild-moderate, pharyngeal dysphagia (01/14/22 1040)  SLP Diet Recommendation: mechanical soft with no mixed consistencies, nectar thick liquids (01/14/22 1040)  Recommended Precautions and Strategies: upright posture during/after eating, small bites of food and sips of liquid, general aspiration precautions (01/14/22 1040)  SLP Rec. for Method of Medication Administration: meds whole, with thick liquids, with pudding or applesauce, as tolerated (01/14/22 1040)     Monitor for Signs of Aspiration: yes, notify SLP if any concerns (01/14/22 1040)  Recommended Diagnostics: FEES (01/14/22 0950)  Swallow Criteria for Skilled Therapeutic Interventions Met: demonstrates skilled criteria (01/14/22 1040)  Anticipated Discharge Disposition (SLP): unknown, anticipate therapy at next level of care (01/14/22 1040)  Rehab Potential/Prognosis, Swallowing: good, to achieve stated therapy goals (01/14/22 1040)  Therapy Frequency (Swallow): 5 days per week (01/14/22 1040)  Predicted Duration Therapy  Intervention (Days): until discharge (01/14/22 1040)                                  Plan of Care Reviewed With: patient      SWALLOW EVALUATION (last 72 hours)     SLP Adult Swallow Evaluation     Row Name 01/14/22 1040 01/14/22 0950 01/13/22 1115             Rehab Evaluation    Document Type evaluation  -MP re-evaluation  -MP --      Subjective Information no complaints  -MP no complaints  -MP no complaints  -CH      Patient Observations alert; cooperative  -MP alert; cooperative  -MP alert; poorly cooperative  confused, in 4 pt restraints  -CH      Patient/Family/Caregiver Comments/Observations No family present  -MP No family present  -MP No family present  -CH      Patient Effort good  -MP adequate  -MP fair  -CH      Comment -- -- confused, mildly agitated, in 4 pt restraints  -CH      Symptoms Noted During/After Treatment -- -- none  -CH              General Information    Patient Profile Reviewed yes  -MP yes  -MP yes  -CH      Pertinent History Of Current Problem See AM eval  -MP Adm 12/30 w/ A/C respiratory failure. Intubated 12/31-1/11. Hx HTN, DM2, DVT, COPD, two prior instances of prolonged ventilation resulting in trach placement.  -MP Hx of COPD, type 2 diabetes mellitus, hypertension, chronic pain. 2 prior episodes of prolonged mechanical ventilation requiring tracheostomy, the 1st in early 2020 and the 2nd in early 2021. Extubated this admission after 12-13 days on the vent. Dysphonic. clinical swallow evaluation completed to assess swallow.  -CH      Current Method of Nutrition NPO; nasogastric feedings  -MP NPO; nasogastric feedings  -MP NPO; nasogastric feedings  -CH      Precautions/Limitations, Vision -- WFL; for purposes of eval  -MP WFL; for purposes of eval  -CH      Precautions/Limitations, Hearing -- WFL; for purposes of eval  -MP WFL; for purposes of eval  -CH      Prior Level of Function-Communication -- unknown  -MP cognitive-linguistic impairment; other (see comments)  confused.  Baseline unclear  -CH      Prior Level of Function-Swallowing -- unknown  -MP unknown  -CH      Plans/Goals Discussed with -- patient  -MP patient; agreed upon  -      Barriers to Rehab -- cognitive status; medically complex; previous functional deficit  - cognitive status; previous functional deficit  -      Patient's Goals for Discharge -- patient did not state  -MP patient did not state  -              Pain    Additional Documentation Pain Scale: FACES Pre/Post-Treatment (Group)  -MP Pain Scale: FACES Pre/Post-Treatment (Group)  -MP Pain Scale: FACES Pre/Post-Treatment (Group)  -CH              Pain Scale: FACES Pre/Post-Treatment    Pain: FACES Scale, Pretreatment 0-->no hurt  -MP 0-->no hurt  -MP 0-->no hurt  -CH      Posttreatment Pain Rating 0-->no hurt  -MP 0-->no hurt  -MP 0-->no hurt  -CH              Oral Motor Structure and Function    Dentition Assessment -- missing teeth  - missing teeth; teeth are in poor condition  -      Secretion Management -- WNL/WFL  -MP WNL/WFL  -CH      Mucosal Quality -- moist, healthy  -MP dry  -CH      Volitional Swallow -- -- weak  -CH      Volitional Cough -- -- weak  -CH              Oral Musculature and Cranial Nerve Assessment    Oral Motor General Assessment -- generalized oral motor weakness  - WFL  -              General Eating/Swallowing Observations    Respiratory Support Currently in Use high-flow nasal cannula  - high-flow nasal cannula  - high-flow nasal cannula  -      Eating/Swallowing Skills -- fed by SLP  -MP fed by SLP  -      Positioning During Eating -- upright in bed  - upright 90 degree; upright in bed  -      Utensils Used -- spoon; cup  - spoon  -      Consistencies Trialed -- thin liquids; pureed  -MP ice chips; thin liquids; pureed  -CH      Pre SpO2 (%) -- -- 95  -CH      Post SpO2 (%) -- -- 96  -CH              Respiratory    Respiratory Status -- -- WFL  -              Clinical Swallow Eval    Oral Prep Phase --  -- WFL  -CH      Oral Transit -- -- WFL  -CH      Oral Residue -- -- WFL  -CH      Pharyngeal Phase -- suspected pharyngeal impairment  -MP suspected pharyngeal impairment  -CH      Esophageal Phase -- -- unremarkable  -CH      Clinical Swallow Evaluation Summary -- Appears improved c/t yesterday. ? intermittent wet vocal quality w/ thin. No s/sxs w/ puree. Will plan for FEES today to further assess.  -MP CSE completed. Pt confused and easily agitated. Tolerated ice trials x 4 without s/s of aspiraiton. Throat clear, cough and multiple swallows observed with thin liquid trials via teaspoon. Multiple swallows with pureed trials. Delayed throat clearing following all trials. Recommend continued NPO with alternate means of nutrition. Re-eval tomorrow if patient appropriate.  -CH              Pharyngeal Phase Concerns    Pharyngeal Phase Concerns -- wet vocal quality  -MP multiple swallows; cough; throat clear  -CH      Wet Vocal Quality -- thin  -MP --      Multiple Swallows -- -- pudding; thin  -CH      Cough -- -- thin  -CH      Throat Clear -- -- thin  -CH              Fiberoptic Endoscopic Evaluation of Swallowing (FEES)    Risks/Benefits Reviewed risks/benefits explained; patient; agreed to eval  -MP -- --      Nasal Entry right:  -MP -- --      Scope serial number/identification [id number]  -MP -- --              Anatomy and Physiology    Anatomic Considerations edema; vocal folds; false vocal folds; arytenoids  -MP -- --      Velopharyngeal WFL  -MP -- --      Base of Tongue symmetrical  -MP -- --      Epiglottis WFL  -MP -- --      Laryngeal Function Breathing symmetrical  -MP -- --      Laryngeal Function Phonation symmetrical  -MP -- --      Laryngeal Function to Breath Hold TVF contact  -MP -- --      Secretion Rating Scale (Kumar et al. 1996) 0- normal, no visible secretions  -MP -- --      Ice Chips DNA  -MP -- --      Spontaneous Swallow frequency reduced  -MP -- --      Sensory sensed scope  -MP -- --       Consistencies Trialed thin liquids; nectar-thick liquids; pudding/puree; regular textures  -MP -- --              FEES Interpretation    Oral Phase WFL  -MP -- --              Initiation of Pharyngeal Swallow    Initiation of Pharyngeal Swallow bolus in pyriform sinuses  -MP -- --      Pharyngeal Phase impaired pharyngeal phase of swallowing  -MP -- --      Penetration During the Swallow thin liquids; secondary to delayed swallow initiation or mistiming; secondary to reduced laryngeal elevation; secondary to reduced vestibular closure  -MP -- --      Penetration After the Swallow thin liquids; secondary to residue; in pyriform sinuses  -MP -- --      Aspiration After the Swallow thin liquids; secondary to residue; in pyriform sinuses; in laryngeal vestibule  -MP -- --      Response to Penetration no response  -MP -- --      Response to Aspiration no response, silent aspiration  -MP -- --      Rosenbek's Scale thin:; 8-->Level 8  -MP -- --      Residue thin liquids; pyriform sinuses; secondary to reduced laryngeal elevation; secondary to reduced hyolaryngeal excursion  -MP -- --      Response to Residue other (see comments)  partially cleared w/ spontaneous subsequent swallows  -MP -- --      Attempted Compensatory Maneuvers bolus size; bolus presentation style  -MP -- --      Response to Attempted Compensatory Maneuvers did not prevent penetration; did not prevent aspiration  -MP -- --      FEES Summary Mild-moderate pharyngeal dysphagia. Penetration during & after w/ thin liquids w/ aspiration after the swallow. Aspiration was silent. No penetration/aspiration w/ nectar-thick, pudding, or solid. Mild-moderate pyriform residue w/ thin liquids - reduced, but did not fully clear w/ spontaneous subseqent swallow - eventually spilled over posterior comissure. Rec dysphagia level IV ([facility] soft/no mixed) diet w/ nectar-thick liquids. Meds whole w/ nectar-thick or puree. SLP will continue to follow for tx.  -MP -- --               SLP Evaluation Clinical Impression    SLP Swallowing Diagnosis mild-moderate; pharyngeal dysphagia  -MP suspected pharyngeal dysphagia  -MP functional oral phase; suspected pharyngeal dysphagia  -      Functional Impact risk of aspiration/pneumonia  - risk of aspiration/pneumonia  - risk of aspiration/pneumonia  -      Rehab Potential/Prognosis, Swallowing good, to achieve stated therapy goals  -MP adequate, monitor progress closely  - adequate, monitor progress closely  -      Swallow Criteria for Skilled Therapeutic Interventions Met demonstrates skilled criteria  - demonstrates skilled criteria  - --              Recommendations    Therapy Frequency (Swallow) 5 days per week  -MP -- --      Predicted Duration Therapy Intervention (Days) until discharge  - until discharge  - --      SLP Diet Recommendation mechanical soft with no mixed consistencies; nectar thick liquids  -MP NPO; temporary alternate methods of nutrition/hydration  - NPO; temporary alternate methods of nutrition/hydration; other (see comments)  ok for 4-5 ice chips per hour after oral care w supervision  -      Recommended Diagnostics -- FEES  - reassess via clinical swallow evaluation  -      Recommended Precautions and Strategies upright posture during/after eating; small bites of food and sips of liquid; general aspiration precautions  - -- general aspiration precautions  -      Oral Care Recommendations Oral Care BID/PRN  - Oral Care BID/PRN; Suction toothbrush  - Oral Care BID/PRN; Suction toothbrush  -      SLP Rec. for Method of Medication Administration meds whole; with thick liquids; with pudding or applesauce; as tolerated  - meds via alternate route  - meds via alternate route  -      Monitor for Signs of Aspiration yes; notify SLP if any concerns  - -- yes; notify SLP if any concerns  -      Anticipated Discharge Disposition (SLP) unknown; anticipate therapy at next level of care   -MP -- unknown; anticipate therapy at next level of care  -            User Key  (r) = Recorded By, (t) = Taken By, (c) = Cosigned By    Initials Name Effective Dates    MP Abdulaziz Johnson, MS CCC-SLP 12/28/21 -     NITHYA Colin Melissa, MS CCC-SLP 06/16/21 -                 EDUCATION  The patient has been educated in the following areas:   Dysphagia (Swallowing Impairment) Modified Diet Instruction.        SLP GOALS     Row Name 01/14/22 1040             Oral Nutrition/Hydration Goal 1 (SLP)    Oral Nutrition/Hydration Goal 1, SLP LTG: Pt will return to regular diet, thin liquids w/ no overt s/sxs aspiration/distress w/ 100% acc and no cues  -MP      Time Frame (Oral Nutrition/Hydration Goal 1, SLP) by discharge  -MP              Oral Nutrition/Hydration Goal 2 (SLP)    Oral Nutrition/Hydration Goal 2, SLP Pt will tolerate soft solids & nectar-thick liquids w/ no overt s/sxs aspiration/distress w/ 100% acc and no cues  -MP      Time Frame (Oral Nutrition/Hydration Goal 2, SLP) by discharge  -MP              Oral Nutrition/Hydration Goal (SLP)    Oral Nutrition/Hydration Goal, SLP Pt will tolerate therapeutic trials of thin H2O w/ no overt s/sxs aspiration/distress w/ 60% acc and no cues in order to assess appropriateness for repeat instrumental eval  -MP      Time Frame (Oral Nutrition/Hydration Goal, SLP) short term goal (STG)  -MP              Pharyngeal Strengthening Exercise Goal 1 (SLP)    Activity (Pharyngeal Strengthening Goal 1, SLP) increase timing; increase superior movement of the hyolaryngeal complex; increase anterior movement of the hyolaryngeal complex; increase closure at entrance to airway/closure of airway at glottis; increase squeeze/positive pressure generation  -MP      Increase Timing prepping - 3 second prep or suck swallow or 3-step swallow  -MP      Increase Superior Movement of the Hyolaryngeal Complex effortful pitch glide (falsetto + pharyngeal squeeze)  -MP      Increase  Anterior Movement of the Hyolaryngeal Complex chin tuck against resistance (CTAR)  -MP      Increase Closure at Entrance to Airway/Closure of Airway at Glottis supraglottic swallow  -MP      Increase Squeeze/Positive Pressure Generation hard effortful swallow  -MP      Webb/Accuracy (Pharyngeal Strengthening Goal 1, SLP) with minimal cues (75-90% accuracy)  -MP      Time Frame (Pharyngeal Strengthening Goal 1, SLP) short term goal (STG)  -MP            User Key  (r) = Recorded By, (t) = Taken By, (c) = Cosigned By    Initials Name Provider Type    Abdulaziz Bennett MS CCC-SLP Speech and Language Pathologist                   Time Calculation:    Time Calculation- SLP     Row Name 01/14/22 1149 01/14/22 1008          Time Calculation- SLP    SLP Start Time 1040  -MP 0950  -MP     SLP Received On 01/14/22  -MP 01/14/22  -MP            Untimed Charges    15589-TG Eval Oral Pharyng Swallow Minutes -- 40  -MP     67598-BY Fiberoptic Endo Eval Swallow Minutes 90  -MP --            Total Minutes    Untimed Charges Total Minutes 90  -MP 40  -MP      Total Minutes 90  -MP 40  -MP           User Key  (r) = Recorded By, (t) = Taken By, (c) = Cosigned By    Initials Name Provider Type    Abdulaziz Bennett MS CCC-SLP Speech and Language Pathologist                Therapy Charges for Today     Code Description Service Date Service Provider Modifiers Qty    [id number] HC ST EVAL ORAL PHARYNG SWALLOW 3 1/14/2022 Abdulaziz Johnson MS CCC-SLP GN 1    [id number] HC ST FIBEROPTIC ENDO EVAL SWALL 6 1/14/2022 Abdulaziz Johnson MS CCC-SLP GN 1               MS SARA Bray  1/14/2022

## 2022-01-14 NOTE — PLAN OF CARE
Goal Outcome Evaluation:    Pt with intermittent mild confusion but oriented to place and person, remains calm and cooperative all day, off precedex all day, passed FEES, taking po without difficulty, remains on high flow cannula.

## 2022-01-14 NOTE — PROGRESS NOTES
Clinical Nutrition   Reason For Visit: MDR, Follow-up protocol, EN    Patient Name: Dani Ziegler  YOB: 1965  MRN: [id number]  Date of Encounter: 01/14/22 14:30 EST  Admission date: 12/30/2021      Passed FEES.  Discontinued EN order in Epic.  Ordered Ensure HP and Magic Cup for patient to try.    Nutrition Assessment     Admission Problem List:  Acute on chronic respiratory failure  Sepsis  CAP (community acquired pneumonia)  Acute 7th rib fracture      Applicable medical tests/procedures since admission:  (12/31) intubated  (1/1) EN initiated via OG tube per intensivist  (1/3) EN adjusted per RD  (1/7) free water of 30 ml/hr discontinued due to hyponatremia  (1/11) extubated, OGT removed, ND tube placed  (1/13) SLP eval - NPO  (1/14) SLP FEES - mechanical soft/no mixed consistencies/nectar thick liquids;  ND tube removed and EN discontinued      PMH: She  has a past medical history of Abnormal weight gain, Acute UTI, Anxiety, Arthritis involving multiple sites, Chronic obstructive pulmonary disease (HCC), Chronic pain, Current every day smoker, Depression, Diabetes mellitus (HCC), Diarrhea, Dysuria, Edema, lower extremity, Fever, Head injury, Hypertension, Intervertebral disc disorder, Left bundle branch block, Leukocytosis, Long term current use of methadone for pain control, Mass of right breast, Nausea, Obesity, Overactive bladder, Peripheral neuropathy, Superficial phlebitis, Upper respiratory infection, Urinary incontinence, Vitamin D deficiency, and Vomiting.   PSxH: She  has a past surgical history that includes Bladder surgery; Cholecystectomy; and Hip Arthroscopy (Right, 10/29/2020).        Reported/Observed/Food/Nutrition Related History   1/14) Patient tolerating EN at goal rate. On HFNC. Intermittent confusion continues. BM x2 within past 24 hours per I/Os. Plan for insulin adjustment. Patient passed FEES this afternoon. Got lunch tray, ate just a little bit per RN. Feeding tube removed  and Precedex off per RN.    1/12) Per MDR discussion - patient continues on fentanyl and precedex. Yesterday EN was placed on hold temporarily for planned extubation. Yesterday patient was extubated, OGT removed, ND tube placed for EN, and EN resumed @ 65 ml/hr. EN currently running @ 65 ml/hr with plans to increase to goal rate 75 ml/hr this morning. Tolerating EN. BM x4 within past 24 hours per I/Os. RN to wean fentanyl - SLP to evaluate swallowing later today once patient has been off of fentanyl for a while so she can participate.    1/10) Per MDR discussion - patient intubated and sedated on fentanyl and precedex. Propofol discontinued overnight. Versed weaned off this morning. 2 soft small bowel movements within the past 24 hours per I/Os / RN notes. Continues to tolerate EN. Still receiving no free water. Intensivist would like to continue without free water today. Serum Na 139 this morning.     1/6) Per MDR discussion - patient intubated and sedated. Continues to tolerate EN at goal rate of 50 ml/hr. No BM this admission per I/Os.    1/5) Per MDR discussion - patient intubated and sedated. Has OG tube. Receiving EN @ 65 ml/hr with 30 ml Q 2 hours and tolerating. No bowel movement this admission per I/Os.      Anthropometrics   Height: 67 in  Weight: 366 lbs (bed scale weight 1/5 per RN) --- note fluid still present and bed had other equipment on it when weighed --- need a zeroed bed scale weight  BMI: 57.3  BMI classification: Obese Class III extreme obesity: > or equal to 40kg/m2   IBW: 135 lbs    UBW:  Last 15 Recorded Weights  Weight Weight (kg) Weight (lbs) Weight Method VISIT REPORT   12/30/2021 174.635 kg 385 lb Stated -   8/24/2021 133.358 kg 294 lb - Report   7/16/2021 139.708 kg 308 lb - -   6/12/2021 128.368 kg 283 lb - -   6/11/2021 129.502 kg 285 lb 8 oz Standing scale -   6/10/2021 132.496 kg 292 lb 1.6 oz Bed scale -   6/9/2021 130.817 kg 288 lb 6.4 oz Bed scale -   6/8/2021 131.362 kg 289 lb  9.6 oz Standing scale -   6/7/2021 133.856 kg 295 lb 1.6 oz Bed scale -   6/6/2021 134.446 kg 296 lb 6.4 oz Bed scale -   6/4/2021 132.269 kg 291 lb 9.6 oz Bed scale -   6/3/2021 136.533 kg 301 lb Stated -   5/25/2021 130.182 kg 287 lb - Report   1/20/2021 143.881 kg 317 lb 3.2 oz - Report       ---RD notes stated wt on admission (385 lbs) not consistent with EMR weight hx so RD suspects stated weight is inaccurate.      Labs reviewed   Labs reviewed: Yes    Results from last 7 days   Lab Units 01/14/22 0313 01/13/22  0324 01/12/22  0338   SODIUM mmol/L 136 136 135*   POTASSIUM mmol/L 4.1 4.3 4.3   CHLORIDE mmol/L 98 99 99   CO2 mmol/L 27.0 23.0 25.0   BUN mg/dL 42* 32* 24*   CREATININE mg/dL 0.68 0.61 0.57   GLUCOSE mg/dL 240* 226* 247*   CALCIUM mg/dL 10.3 10.2 9.8   PHOSPHORUS mg/dL 3.3 3.6 3.7   MAGNESIUM mg/dL 1.9 1.9 2.2     Results from last 7 days   Lab Units 01/14/22 0313 01/13/22  0324 01/12/22  0338   WBC 10*3/mm3 15.96* 17.00* 13.93*   ALBUMIN g/dL 3.70 3.50 3.30*     Results from last 7 days   Lab Units 01/14/22  1133 01/14/22  0541 01/13/22  2314 01/13/22  1721 01/13/22  1138 01/13/22  0542   GLUCOSE mg/dL 186* 233* 264* 227* 267* 254*       Medications reviewed   Medications reviewed: Yes  Scheduled: pepcid, insulin    Needs Assessment (1/10)   Height used: 67 in/170.2 cm  Weight used: 294 lbs/133.5 kg (actual wt estimated by RD);   135 lbs/61.5 kg (IBW)    Estimated Calories needs: ~1600 calories daily  11-14 kcal/kg actual wt = 7116-3236    Estimated Protein needs: ~123 g protein daily  2.0-2.5 g/kg IBW = 123-154    Estimated Fluid needs: ~1500 ml fluid daily/per clinical status      Current Nutrition Prescription   PO: Diet Dysphagia; IV - Mechanical Soft No Mixed Consistencies; Nectar / Syrup Thick; Cardiac, Consistent Carbohydrate (initiated 1/14)      EN: Peptamen Intense VHP @ 75 ml/hr.  Route: ND  Verified at bedside: Yes - per RN  Nutrition provided at goal: 1500 ml formula, 1500 calories  (94% est needs), 140 g protein (114% est needs), 7 g fiber, 1260 ml water from formula.      EN delivery:  2 Days: 1078 ml EN (72% goal volume)       Nutrition Diagnosis     1/2/2022, 1/3, 1/14  Problem Inadequate oral intake   Etiology AMS   Signs/Symptoms NPO with EN (12/31-1/14)   Status: ongoing     1/2/2022  Problem Food and nutrition knowledge deficit   Etiology MICHELINE   Signs/Symptoms BMI > 60, MD consult   Status: education pending - pt not appropriate at this time (1/14)      Goal:   General: Nutrition to support treatment  PO: Tolerate PO, Increase intake    Intervention   Intervention: Follow treatment progress, Care plan reviewed, Encourage intake, Supplement provided    Discontinued EN order in Epic.  Ordered Ensure HP and Magic Cup for patient to try.    Monitoring/Evaluation:   Monitoring/Evaluation: Per protocol, I&O, PO intake, Supplement intake, Pertinent labs, Weight, GI status, Symptoms, POC/GOC, Swallow function    Prabha Jhonson RD  Time Spent: 45 min

## 2022-01-14 NOTE — PLAN OF CARE
Goal Outcome Evaluation:  Plan of Care Reviewed With: patient, daughter                                                                                  Neuro:  Oriented to self and intermittently oriented to place. Pt had decent night and behavior and emotional state were better until respiratory came in this AM and patient became verbally abusive. Pt believes RT tried to put spiders in her nose yesterday. Although patient more appropriate this shift.. her speech is still illogical and she continues to yell at people walking in the hallway and yell at any stimulation from the TV..     Resp: SpO2 >90% on HFNC at 55% and 35L. Clear to diminished. Infrequent productive cough.       Cardiac: HR 60-90s. -140s. SR and SB. Palpable pulses. Audible S1 and S2.      GI/: NPO with Keofeed in place. Peptamen VHP at 75ml with no FW flushes.   UOP: 725mL    GTTs: Precedex at 0.5    No complaints of pain.

## 2022-01-15 ENCOUNTER — APPOINTMENT (OUTPATIENT)
Dept: GENERAL RADIOLOGY | Facility: HOSPITAL | Age: 57
End: 2022-01-15

## 2022-01-15 LAB
ALBUMIN SERPL-MCNC: 3.9 G/DL (ref 3.5–5.2)
ALBUMIN/GLOB SERPL: 0.8 G/DL
ALP SERPL-CCNC: 143 U/L (ref 39–117)
ALT SERPL W P-5'-P-CCNC: 32 U/L (ref 1–33)
ANION GAP SERPL CALCULATED.3IONS-SCNC: 15 MMOL/L (ref 5–15)
AST SERPL-CCNC: 27 U/L (ref 1–32)
BASOPHILS # BLD AUTO: 0.06 10*3/MM3 (ref 0–0.2)
BASOPHILS NFR BLD AUTO: 0.4 % (ref 0–1.5)
BILIRUB SERPL-MCNC: 0.7 MG/DL (ref 0–1.2)
BUN SERPL-MCNC: 40 MG/DL (ref 6–20)
BUN/CREAT SERPL: 54.8 (ref 7–25)
CALCIUM SPEC-SCNC: 10.1 MG/DL (ref 8.6–10.5)
CHLORIDE SERPL-SCNC: 101 MMOL/L (ref 98–107)
CO2 SERPL-SCNC: 22 MMOL/L (ref 22–29)
CREAT SERPL-MCNC: 0.73 MG/DL (ref 0.57–1)
DEPRECATED RDW RBC AUTO: 53.9 FL (ref 37–54)
EOSINOPHIL # BLD AUTO: 0.07 10*3/MM3 (ref 0–0.4)
EOSINOPHIL NFR BLD AUTO: 0.4 % (ref 0.3–6.2)
ERYTHROCYTE [DISTWIDTH] IN BLOOD BY AUTOMATED COUNT: 17 % (ref 12.3–15.4)
GFR SERPL CREATININE-BSD FRML MDRD: 82 ML/MIN/1.73
GLOBULIN UR ELPH-MCNC: 4.6 GM/DL
GLUCOSE BLDC GLUCOMTR-MCNC: 156 MG/DL (ref 70–130)
GLUCOSE BLDC GLUCOMTR-MCNC: 159 MG/DL (ref 70–130)
GLUCOSE BLDC GLUCOMTR-MCNC: 192 MG/DL (ref 70–130)
GLUCOSE BLDC GLUCOMTR-MCNC: 226 MG/DL (ref 70–130)
GLUCOSE SERPL-MCNC: 176 MG/DL (ref 65–99)
HCT VFR BLD AUTO: 44.5 % (ref 34–46.6)
HGB BLD-MCNC: 14.3 G/DL (ref 12–15.9)
IMM GRANULOCYTES # BLD AUTO: 0.17 10*3/MM3 (ref 0–0.05)
IMM GRANULOCYTES NFR BLD AUTO: 1 % (ref 0–0.5)
LYMPHOCYTES # BLD AUTO: 3.18 10*3/MM3 (ref 0.7–3.1)
LYMPHOCYTES NFR BLD AUTO: 19.2 % (ref 19.6–45.3)
MAGNESIUM SERPL-MCNC: 2.4 MG/DL (ref 1.6–2.6)
MCH RBC QN AUTO: 27.9 PG (ref 26.6–33)
MCHC RBC AUTO-ENTMCNC: 32.1 G/DL (ref 31.5–35.7)
MCV RBC AUTO: 86.9 FL (ref 79–97)
MONOCYTES # BLD AUTO: 1.01 10*3/MM3 (ref 0.1–0.9)
MONOCYTES NFR BLD AUTO: 6.1 % (ref 5–12)
NEUTROPHILS NFR BLD AUTO: 12.08 10*3/MM3 (ref 1.7–7)
NEUTROPHILS NFR BLD AUTO: 72.9 % (ref 42.7–76)
NRBC BLD AUTO-RTO: 0 /100 WBC (ref 0–0.2)
PHOSPHATE SERPL-MCNC: 4.5 MG/DL (ref 2.5–4.5)
PLATELET # BLD AUTO: 425 10*3/MM3 (ref 140–450)
PMV BLD AUTO: 11 FL (ref 6–12)
POTASSIUM SERPL-SCNC: 4.5 MMOL/L (ref 3.5–5.2)
PROT SERPL-MCNC: 8.5 G/DL (ref 6–8.5)
QT INTERVAL: 348 MS
QTC INTERVAL: 483 MS
RBC # BLD AUTO: 5.12 10*6/MM3 (ref 3.77–5.28)
SODIUM SERPL-SCNC: 138 MMOL/L (ref 136–145)
WBC NRBC COR # BLD: 16.57 10*3/MM3 (ref 3.4–10.8)

## 2022-01-15 PROCEDURE — 80053 COMPREHEN METABOLIC PANEL: CPT | Performed by: INTERNAL MEDICINE

## 2022-01-15 PROCEDURE — 85025 COMPLETE CBC W/AUTO DIFF WBC: CPT | Performed by: INTERNAL MEDICINE

## 2022-01-15 PROCEDURE — 93010 ELECTROCARDIOGRAM REPORT: CPT | Performed by: INTERNAL MEDICINE

## 2022-01-15 PROCEDURE — 93005 ELECTROCARDIOGRAM TRACING: CPT | Performed by: INTERNAL MEDICINE

## 2022-01-15 PROCEDURE — 63710000001 INSULIN LISPRO (HUMAN) PER 5 UNITS: Performed by: INTERNAL MEDICINE

## 2022-01-15 PROCEDURE — 63710000001 INSULIN DETEMIR PER 5 UNITS: Performed by: INTERNAL MEDICINE

## 2022-01-15 PROCEDURE — 71045 X-RAY EXAM CHEST 1 VIEW: CPT

## 2022-01-15 PROCEDURE — 99232 SBSQ HOSP IP/OBS MODERATE 35: CPT | Performed by: INTERNAL MEDICINE

## 2022-01-15 PROCEDURE — 82962 GLUCOSE BLOOD TEST: CPT

## 2022-01-15 PROCEDURE — 84100 ASSAY OF PHOSPHORUS: CPT | Performed by: INTERNAL MEDICINE

## 2022-01-15 PROCEDURE — 83735 ASSAY OF MAGNESIUM: CPT | Performed by: INTERNAL MEDICINE

## 2022-01-15 PROCEDURE — 97530 THERAPEUTIC ACTIVITIES: CPT

## 2022-01-15 PROCEDURE — 94799 UNLISTED PULMONARY SVC/PX: CPT

## 2022-01-15 PROCEDURE — 94761 N-INVAS EAR/PLS OXIMETRY MLT: CPT

## 2022-01-15 PROCEDURE — 97164 PT RE-EVAL EST PLAN CARE: CPT

## 2022-01-15 RX ORDER — METOPROLOL SUCCINATE 25 MG/1
25 TABLET, EXTENDED RELEASE ORAL
Status: DISCONTINUED | OUTPATIENT
Start: 2022-01-15 | End: 2022-01-16

## 2022-01-15 RX ORDER — ARFORMOTEROL TARTRATE 15 UG/2ML
15 SOLUTION RESPIRATORY (INHALATION)
Status: DISCONTINUED | OUTPATIENT
Start: 2022-01-15 | End: 2022-01-23 | Stop reason: HOSPADM

## 2022-01-15 RX ORDER — CHOLECALCIFEROL (VITAMIN D3) 125 MCG
5 CAPSULE ORAL ONCE
Status: COMPLETED | OUTPATIENT
Start: 2022-01-15 | End: 2022-01-15

## 2022-01-15 RX ORDER — ALBUTEROL SULFATE 2.5 MG/3ML
2.5 SOLUTION RESPIRATORY (INHALATION) EVERY 6 HOURS PRN
Status: DISCONTINUED | OUTPATIENT
Start: 2022-01-15 | End: 2022-01-23 | Stop reason: HOSPADM

## 2022-01-15 RX ADMIN — GABAPENTIN 300 MG: 300 CAPSULE ORAL at 14:21

## 2022-01-15 RX ADMIN — INSULIN DETEMIR 15 UNITS: 100 INJECTION, SOLUTION SUBCUTANEOUS at 09:04

## 2022-01-15 RX ADMIN — Medication 5 MG: at 01:31

## 2022-01-15 RX ADMIN — METOPROLOL SUCCINATE 25 MG: 25 TABLET, EXTENDED RELEASE ORAL at 21:50

## 2022-01-15 RX ADMIN — BUSPIRONE HYDROCHLORIDE 10 MG: 5 TABLET ORAL at 09:05

## 2022-01-15 RX ADMIN — TIOTROPIUM BROMIDE INHALATION SPRAY 2 PUFF: 3.12 SPRAY, METERED RESPIRATORY (INHALATION) at 19:38

## 2022-01-15 RX ADMIN — BUSPIRONE HYDROCHLORIDE 10 MG: 5 TABLET ORAL at 17:20

## 2022-01-15 RX ADMIN — OXYCODONE HYDROCHLORIDE AND ACETAMINOPHEN 1 TABLET: 10; 325 TABLET ORAL at 23:44

## 2022-01-15 RX ADMIN — INSULIN LISPRO 2 UNITS: 100 INJECTION, SOLUTION INTRAVENOUS; SUBCUTANEOUS at 12:59

## 2022-01-15 RX ADMIN — GABAPENTIN 300 MG: 300 CAPSULE ORAL at 21:08

## 2022-01-15 RX ADMIN — INSULIN DETEMIR 9 UNITS: 100 INJECTION, SOLUTION SUBCUTANEOUS at 20:47

## 2022-01-15 RX ADMIN — INSULIN LISPRO 2 UNITS: 100 INJECTION, SOLUTION INTRAVENOUS; SUBCUTANEOUS at 09:04

## 2022-01-15 RX ADMIN — ARFORMOTEROL TARTRATE 15 MCG: 15 SOLUTION RESPIRATORY (INHALATION) at 19:37

## 2022-01-15 RX ADMIN — IPRATROPIUM BROMIDE AND ALBUTEROL SULFATE 3 ML: 2.5; .5 SOLUTION RESPIRATORY (INHALATION) at 06:25

## 2022-01-15 RX ADMIN — FAMOTIDINE 20 MG: 20 TABLET, FILM COATED ORAL at 20:47

## 2022-01-15 RX ADMIN — ESCITALOPRAM OXALATE 20 MG: 20 TABLET ORAL at 09:05

## 2022-01-15 RX ADMIN — GABAPENTIN 300 MG: 300 CAPSULE ORAL at 05:12

## 2022-01-15 RX ADMIN — DEXMEDETOMIDINE HYDROCHLORIDE 0.2 MCG/KG/HR: 4 INJECTION, SOLUTION INTRAVENOUS at 02:21

## 2022-01-15 RX ADMIN — CASTOR OIL AND BALSAM, PERU 1 APPLICATION: 788; 87 OINTMENT TOPICAL at 20:47

## 2022-01-15 RX ADMIN — IPRATROPIUM BROMIDE AND ALBUTEROL SULFATE 3 ML: 2.5; .5 SOLUTION RESPIRATORY (INHALATION) at 01:21

## 2022-01-15 RX ADMIN — CASTOR OIL AND BALSAM, PERU 1 APPLICATION: 788; 87 OINTMENT TOPICAL at 09:07

## 2022-01-15 RX ADMIN — DIAZEPAM 2 MG: 2 TABLET ORAL at 05:12

## 2022-01-15 RX ADMIN — DIAZEPAM 2 MG: 2 TABLET ORAL at 21:08

## 2022-01-15 RX ADMIN — INSULIN LISPRO 2 UNITS: 100 INJECTION, SOLUTION INTRAVENOUS; SUBCUTANEOUS at 17:20

## 2022-01-15 RX ADMIN — APIXABAN 5 MG: 5 TABLET, FILM COATED ORAL at 09:05

## 2022-01-15 RX ADMIN — AMLODIPINE BESYLATE 10 MG: 10 TABLET ORAL at 09:05

## 2022-01-15 RX ADMIN — IPRATROPIUM BROMIDE AND ALBUTEROL SULFATE 3 ML: 2.5; .5 SOLUTION RESPIRATORY (INHALATION) at 12:44

## 2022-01-15 RX ADMIN — APIXABAN 5 MG: 5 TABLET, FILM COATED ORAL at 20:47

## 2022-01-15 RX ADMIN — FAMOTIDINE 20 MG: 20 TABLET, FILM COATED ORAL at 09:05

## 2022-01-15 RX ADMIN — DIAZEPAM 2 MG: 2 TABLET ORAL at 14:21

## 2022-01-15 RX ADMIN — NYSTATIN: 100000 POWDER TOPICAL at 09:07

## 2022-01-15 RX ADMIN — NYSTATIN: 100000 POWDER TOPICAL at 20:47

## 2022-01-15 NOTE — PROGRESS NOTES
Daily chart review complete.  Patient still not quite appropriate for interview.  NN continues to follow.

## 2022-01-15 NOTE — PROGRESS NOTES
INTENSIVIST   PROGRESS NOTE     Hospital:  LOS: 15 days      BERTO Louise 56 y.o. female is followed for: Shortness of Breath       Respiratory Failure Type 2    Uncontrolled type 2 diabetes mellitus with hyperglycemia, without long-term current use of insulin (HCC)    Sepsis (HCC)    As an Intensivist, we provide an integrated approach to the ICU patient and family, medical management of comorbid conditions, including but not limited to electrolytes, glycemic control, organ dysfunction, lead interdisciplinary rounds and coordinate the care with all other services, including those from other specialists.     Interval History:  Tearful when she talks.    No distress.    On low flow nasal canula.    The patient qualifies to receive the vaccine, but they have not yet received it.     Temp  Min: 98.4 °F (36.9 °C)  Max: 100.4 °F (38 °C)       History     Last Reviewed by Beverly Jean Baptiste, PT on 1/15/2022 at 10:11 AM    Sections Reviewed    Medical, Surgical, Family, Tobacco, Custom, Alcohol, Drug Use, Sexual   Activity      Problem list reviewed by Benson Pelaez MD on 1/1/2022 at  4:56 PM  Medicines reviewed by Benson Pelaez MD on 1/1/2022 at  4:56 PM  Allergies reviewed by Benson Pelaez MD on 1/1/2022 at  4:55 PM       The patient's relevant past medical, surgical and social history were reviewed and updated in Epic as appropriate.        O     Vitals:  Temp: 99.3 °F (37.4 °C) (01/15/22 1200) Temp  Min: 98.4 °F (36.9 °C)  Max: 100.4 °F (38 °C)   Temp core:      BP: 151/98 (01/15/22 1500) BP  Min: 121/71  Max: 174/155   Pulse: 112 (01/15/22 1500) Pulse  Min: 77  Max: 125   Resp: 18 (01/15/22 1400) Resp  Min: 18  Max: 26   SpO2: 90 % (01/15/22 1500) SpO2  Min: 86 %  Max: 94 %   Device: humidified, nasal cannula (01/15/22 1400)    Flow Rate: 6 (01/15/22 1400) Flow (L/min)  Min: 6  Max: 35     Intake/Ouptut 24 hrs (7:00AM - 6:59 AM)  Intake & Output (last 3 days)       01/12 0701 01/13 0700 01/13 0701 01/14 0700  01/14 0701  01/15 0700 01/15 0701 01/16 0700    P.O.   420 480    I.V. (mL/kg) 758 (4.6) 549 (3.3) 165 (1)     Other 0 35      NG/ 1768      Total Intake(mL/kg) 1145 (6.9) 2352 (14.2) 585 (3.5) 480 (2.9)    Urine (mL/kg/hr) 2800 (0.7) 2850 (0.7) 1125 (0.3) 150 (0.1)    Stool 0 0 0     Total Output 2800 2850 1125 150    Net -1655 -498 -540 +330            Stool Unmeasured Occurrence 3 x 2 x 1 x           Wt Readings from Last 3 Encounters:   01/09/22 (!) 166 kg (365 lb 11.2 oz)   08/24/21 133 kg (294 lb)   07/16/21 (!) 140 kg (308 lb)     Physical Examination  Telemetry:  Rhythm: normal sinus rhythm, sinus tachycardia (01/15/22 1200)     QTc Interval (Sec): 0.46 (01/05/22 2000)   Constitutional:  No acute distress.   Cardiovascular: RRR.   Normal heart sounds.  No murmurs, gallop or rub.   Respiratory: Normal breath sounds  No adventitious sounds.   Abdominal:  Soft with no tenderness.  No distension.   No HSM.   Extremities: Warm.  Dry.  No cyanosis.  No Edema   Neurological:   Awake.  Best Eye Response: 4-->(E4) spontaneous (01/15/22 0800)  Best Motor Response: 6-->(M6) obeys commands (01/15/22 0800)  Best Verbal Response: 4-->(V4) confused (01/15/22 0800)  [place] Coma Scale Score: 14 (01/15/22 0800)     Lines      Results Reviewed:  Laboratory  Microbiology  Radiology  Pathology    Hematology:  Results from last 7 days   Lab Units 01/15/22  0258 01/14/22  0313   WBC 10*3/mm3 16.57* 15.96*   HEMOGLOBIN g/dL 14.3 13.6   MCV fL 86.9 86.4   PLATELETS 10*3/mm3 425 373   NEUTROS ABS 10*3/mm3 12.08* 10.79*   LYMPHS ABS 10*3/mm3 3.18* 3.82*   EOS ABS 10*3/mm3 0.07 0.24     Chemistry:  Estimated Creatinine Clearance: 138.3 mL/min (by C-G formula based on SCr of 0.73 mg/dL).  Results from last 7 days   Lab Units 01/15/22  0258 01/14/22  0313   SODIUM mmol/L 138 136   POTASSIUM mmol/L 4.5 4.1   CHLORIDE mmol/L 101 98   CO2 mmol/L 22.0 27.0   BUN mg/dL 40* 42*   CREATININE mg/dL 0.73 0.68   GLUCOSE mg/dL 176* 240*      Results from last 7 days   Lab Units 01/15/22  0258 01/14/22  0313   CALCIUM mg/dL 10.1 10.3   MAGNESIUM mg/dL 2.4 1.9   PHOSPHORUS mg/dL 4.5 3.3     Results from last 7 days   Lab Units 01/15/22  0258 01/14/22  0313   BILIRUBIN mg/dL 0.7 0.6   AST (SGOT) U/L 27 14   ALT (SGPT) U/L 32 21   ALK PHOS U/L 143* 128*     COVID-19  Lab Results   Component Value Date    COVID19 Not Detected 12/30/2021    COVID19 Not Detected 06/03/2021       Images:  XR Chest 1 View    Result Date: 1/15/2022  Interval removal of enteric tube. Right-sided PICC line terminates in the SVC. Unchanged aeration without new focal consolidation. No enlarging pleural effusion or distinct pneumothorax. Persistent mild pulmonary vascular engorgement.  This report was finalized on 1/15/2022 6:05 AM by Pedro Tyson.        Echo:  Results for orders placed in visit on 05/05/20    SCANNED - ECHOCARDIOGRAM      Results: Reviewed.  I reviewed the patient's new laboratory and imaging results.  I independently reviewed the patient's new images.    Medications: Reviewed.    Assessment/Plan   A / P     Dani is a 56 y.o. female admitted on 12/30/2021 with Acute on chronic respiratory failure with hypoxia and hypercapnia (HCC) [J96.21, J96.22]:    1. Respiratory Failure type 2  1. Invasive Mechanical Ventilation   2. Intubated 12/31/21  3. Extubated 01/11/22  4. Compensated Respiratory Acidosis as per ABG on 12/31/21 and 01/01/22  5. COPD on home O2  1. Previous Trach in 2019 and 2020.  6. Pneumonia this admission  2. Aute left seventh rib fracture.  3. Previous DVT on APIxaban  4. HTN  5. Chronic pain syndrome  6. Anxiety/Depression  7. Obesity III. Body mass index is 57.26 kg/m².   8. T2DM    Results from last 7 days   Lab Units 01/15/22  1628 01/15/22  1141 01/15/22  0734 01/14/22 2014 01/14/22  1632 01/14/22  1133   GLUCOSE mg/dL 159* 192* 156* 176* 209* 186*     Lab Results   Lab Value Date/Time    HGBA1C 5.7 (H) 08/24/2021 1522    HGBA1C 6.00 (H)  06/03/2021 1902    HGBA1C 6.0 (H) 05/25/2021 1658    HGBA1C 8.3 01/11/2021 0000    HGBA1C 7.30 (H) 03/28/2018 0514       Nutrition Support: Patient isn't on Tube Feeding   Modulars: Patient doesn't have any tube feeding modular orders   Diet: Diet Dysphagia; IV - Mechanical Soft No Mixed Consistencies; Nectar / Syrup Thick; Cardiac, Consistent Carbohydrate   Advance Directives: Code Status and Medical Interventions:   Ordered at: 12/31/21 0151     Code Status (Patient has no pulse and is not breathing):    CPR (Attempt to Resuscitate)     Medical Interventions (Patient has pulse or is breathing):    Full Support        Plan:    1. Continue APIxaban   2. Start LAMA, LABA  3. Goal: Glucose < 180 mg/dL.  1. Insulin basal, prandial and correction   4. Disposition: Transfer to Telemetry Unit     Plan of care and goals reviewed during interdisciplinary rounds.  I discussed the patient's findings and my recommendations with nursing staff    Level of Risk is High due to:  illness with threat to life or bodily function.     Time: 25 minutes, in direct patient care, with the patient and/or in the ICU or de leon coordinating care with other health care providers.     I have spent > 50% percent of this time, counseling and discussing management.     OK to floor.    Hospitalist Team will assist with medical management, and assume Primary Attending, once on the Floor.    Thank you.    [x]  Primary Attending  []  Consultant

## 2022-01-15 NOTE — NURSING NOTE
Patient has been Oriented to self and place throughout the night. Patient still confused to situation and time-- will have confused conversation frequently. Patient was increasingly restless throughout the night and sustained Sinus Tachycardia in the 120-130s. Per telephone order from APRN, 1 time dose of Lopressor 5 mg IV was given at 2115 with short-effect. With increased restlessness and patient being visibly anxious, tachycardic, and tachypneic, decision was made to start patient on low dose Precedex per order. Patient was on 0.4mcg/kg/hr since 0200 and tolerated well. Patient finally rested and is in Sinus Rhythm in the 70-80s. Patient up in chair this morning with assist x 2 via lift device. Patient tolerated well. O2 able to wean down to 45%35L HFNC. Patient tolerating well with sats >91%. Patient was nauseous at beginning of shift; PRN Zofran ordered per APRN and given; tolerated well. Adequate UOP of 350.     Nothing further to report-- Will continue to monitor.

## 2022-01-15 NOTE — PLAN OF CARE
Goal Outcome Evaluation:  Plan of Care Reviewed With: patient        Progress: improving  Outcome Summary: PT reeval is completed for mobility. patient presents with resp failure requiring intubation. patient is Ox3 but has some confusing conversations. she demonstrates imapired bed mobility transfers and gait as well as decreased endurance and strength as well as standing balance deficits. patient was able to stand with min assist and ambulate 15 ft with rolling walker and min assist chair behind patient for safety. anticipate patient to need SNF placement at D/C

## 2022-01-15 NOTE — THERAPY RE-EVALUATION
Patient Name: Dani Ziegler  : 1965    MRN: [id number]                              Today's Date: 1/15/2022       Admit Date: 2021    Visit Dx:     ICD-10-CM ICD-9-CM   1. Acute exacerbation of chronic obstructive pulmonary disease (COPD) ([facility])  J44.1 491.21   2. Acute respiratory distress  R06.03 518.82   3. Hypoxia  R09.02 799.02   4. Pneumonia of both lungs due to infectious organism, unspecified part of lung  J18.9 483.8   5. Sepsis with acute hypoxic respiratory failure without septic shock, due to unspecified organism ([facility])  A41.9 038.9    R65.20 995.91    J96.01 518.81   6. Dysphagia, unspecified type  R13.10 787.20     Patient Active Problem List   Diagnosis   • Uncontrolled type 2 diabetes mellitus with hyperglycemia, without long-term current use of insulin ([facility])   • Vitamin D deficiency   • Peripheral neuropathy   • Adiposity   • Left bundle branch block (LBBB)   • Essential hypertension   • Chronic pain   • Chronic obstructive pulmonary disease ([facility])   • Anxiety   • Depression   • Arthritis involving multiple sites   • Leukocytosis   • Mixed hyperlipidemia   • Special screening for malignant neoplasms, colon   • Cellulitis of left lower extremity   • Hyperkalemia   • Acute on chronic renal insufficiency   • Sepsis ([facility])   • CHF (congestive heart failure) ([facility])   • Cellulitis of left leg   • Lumbar disc disease   • Diabetic peripheral neuropathy ([facility])   • COVID-19 virus infection   • CAP (community acquired pneumonia)   • Polypharmacy   • Morbid obesity with BMI of 45.0-49.9, adult ([facility])   • A/C Respiratory Failure Type 2   • History of recurrent deep vein thrombosis (DVT)   • Morbid obesity with BMI of 60.0-69.9, adult ([facility])     Past Medical History:   Diagnosis Date   • Abnormal weight gain    • Acute UTI    • Anxiety    • Arthritis involving multiple sites    • Chronic obstructive pulmonary disease ([facility])    • Chronic pain    • Current every day smoker    • Depression    • Diabetes  mellitus (HCC)    • Diarrhea    • Dysuria    • Edema, lower extremity    • Fever    • Head injury    • Hypertension    • Intervertebral disc disorder     Degeneration of intervertebral disc, site unspecified (722.6)   • Left bundle branch block    • Leukocytosis    • Long term current use of methadone for pain control    • Mass of right breast    • Nausea    • Obesity    • Overactive bladder    • Peripheral neuropathy    • Superficial phlebitis     right medial calf   • Upper respiratory infection    • Urinary incontinence    • Vitamin D deficiency    • Vomiting      Past Surgical History:   Procedure Laterality Date   • BLADDER SURGERY      pubovaginal sling   • CHOLECYSTECTOMY     • HIP ARTHROSCOPY Right 10/29/2020      General Information     Row Name 01/15/22 1011          Physical Therapy Time and Intention    Document Type re-evaluation  -LOPEZ     Mode of Treatment physical therapy  -LOPEZ     Row Name 01/15/22 1011          General Information    Patient Profile Reviewed yes  -LOPEZ     Prior Level of Function independent:; gait; transfer; ADL's; bed mobility  -LOPEZ     Existing Precautions/Restrictions fall; cardiac; oxygen therapy device and L/min  -LOPEZ     Barriers to Rehab medically complex  -     Row Name 01/15/22 1011          Living Environment    Lives With significant other  -[facility] Name 01/15/22 1011          Home Main Entrance    Number of Stairs, Main Entrance none  -LPOEZ     Row Name 01/15/22 1011          Cognition    Orientation Status (Cognition) oriented to; person; place; time  -LOPEZ     Row Name 01/15/22 1011          Safety Issues, Functional Mobility    Safety Issues Affecting Function (Mobility) safety precautions follow-through/compliance; insight into deficits/self-awareness; awareness of need for assistance; safety precaution awareness  -LOPEZ     Impairments Affecting Function (Mobility) balance; endurance/activity tolerance; shortness of breath; strength  -LOPEZ           User Key  (r) = Recorded  By, (t) = Taken By, (c) = Cosigned By    Initials Name Provider Type    Beverly Hernandes PT Physical Therapist               Mobility     Row Name 01/15/22 1012          Bed Mobility    Comment (Bed Mobility) patient is OOB and returns to the chair  -     Row Name 01/15/22 1012          Bed-Chair Transfer    Bed-Chair [place] (Transfers) minimum assist (75% patient effort)  -LOPEZ     Assistive Device (Bed-Chair Transfers) walker, front-wheeled  -LOPEZ     Row Name 01/15/22 1012          Sit-Stand Transfer    Sit-Stand [place] (Transfers) minimum assist (75% patient effort)  -LOPEZ     Assistive Device (Sit-Stand Transfers) walker, front-wheeled  -LOPEZ     Row Name 01/15/22 1012          Gait/Stairs (Locomotion)    [place] Level (Gait) minimum assist (75% patient effort)  -LOPEZ     Assistive Device (Gait) walker, front-wheeled  -LOPEZ     Distance in Feet (Gait) 15  -LOPEZ     Deviations/Abnormal Patterns (Gait) stride length decreased  -LOPEZ     Bilateral Gait Deviations forward flexed posture  -LOPEZ     Comment (Gait/Stairs) chair brought up behind patient for safety. patient needs cues for increased step length and to stay closer to the walker  -LOPEZ           User Key  (r) = Recorded By, (t) = Taken By, (c) = Cosigned By    Initials Name Provider Type    Beverly Hernandes PT Physical Therapist               Obj/Interventions     Row Name 01/15/22 1013          Range of Motion Comprehensive    General Range of Motion no range of motion deficits identified  -     Row Name 01/15/22 1013          Strength Comprehensive (MMT)    Comment, General Manual Muscle Testing (MMT) Assessment generalized weakness 3+/5  -     Row Name 01/15/22 1013          Motor Skills    Therapeutic Exercise hip; knee; ankle  -     Row Name 01/15/22 1013          Hip (Therapeutic Exercise)    Hip (Therapeutic Exercise) AROM (active range of motion)  -     Hip AROM (Therapeutic Exercise) bilateral; flexion; extension; 10  repetitions; sitting  -[facility] Name 01/15/22 1013          Knee (Therapeutic Exercise)    Knee (Therapeutic Exercise) AROM (active range of motion)  -     Knee AROM (Therapeutic Exercise) bilateral; flexion; extension; 10 repetitions; sitting  -[facility] Name 01/15/22 1013          Ankle (Therapeutic Exercise)    Ankle (Therapeutic Exercise) AROM (active range of motion)  -     Ankle AROM (Therapeutic Exercise) bilateral; dorsiflexion; 10 repetitions; plantarflexion; sitting  -[facility] Name 01/15/22 1013          Balance    Balance Assessment sitting static balance; sitting dynamic balance; standing static balance; standing dynamic balance  -LOPEZ     Static Sitting Balance WFL; unsupported  -LOPEZ     Dynamic Sitting Balance WFL; unsupported  -LOPEZ     Static Standing Balance mild impairment; supported  -LOPEZ     Dynamic Standing Balance mild impairment; supported  -LOPEZ     Balance Interventions standing; dynamic; weight shifting activity  -LOPEZ           User Key  (r) = Recorded By, (t) = Taken By, (c) = Cosigned By    Initials Name Provider Type    LOPEZ Beverly Jean Baptiste A, PT Physical Therapist               Goals/Plan     Row Name 01/15/22 1018          Bed Mobility Goal 1 (PT)    Activity/Assistive Device (Bed Mobility Goal 1, PT) bed mobility activities, all  -LOPEZ     [place] Level/Cues Needed (Bed Mobility Goal 1, PT) independent  -LOPEZ     Time Frame (Bed Mobility Goal 1, PT) long term goal (LTG); 10 days  -LOPEZ     Progress/Outcomes (Bed Mobility Goal 1, PT) goal ongoing  -[facility] Name 01/15/22 1018          Transfer Goal 1 (PT)    Activity/Assistive Device (Transfer Goal 1, PT) sit-to-stand/stand-to-sit  -LOPEZ     [place] Level/Cues Needed (Transfer Goal 1, PT) independent  -LOPEZ     Time Frame (Transfer Goal 1, PT) long term goal (LTG); 10 days  -LOPEZ     Progress/Outcome (Transfer Goal 1, PT) goal ongoing  -[facility] Name 01/15/22 1018          Gait Training Goal 1 (PT)    Activity/Assistive Device (Gait  Training Goal 1, PT) gait (walking locomotion)  -LOPEZ     [place] Level (Gait Training Goal 1, PT) independent  -LOPEZ     Distance (Gait Training Goal 1, PT) 300  -LOPEZ     Time Frame (Gait Training Goal 1, PT) long term goal (LTG); 10 days  -LOPEZ     Progress/Outcome (Gait Training Goal 1, PT) goal ongoing  -LOPEZ     Row Name 01/15/22 1018          Patient Education Goal (PT)    Activity (Patient Education Goal, PT) HEP  -LOPEZ     [place]/Cues/Accuracy (Memory Goal 2, PT) verbalizes understanding  -LOPEZ     Time Frame (Patient Education Goal, PT) long term goal (LTG); 10 days  -LOPEZ     Progress/Outcome (Patient Education Goal, PT) goal ongoing  -LOPEZ           User Key  (r) = Recorded By, (t) = Taken By, (c) = Cosigned By    Initials Name Provider Type    Beverly Hernandes, PT Physical Therapist               Clinical Impression     Row Name 01/15/22 1014          Pain Scale: FACES Pre/Post-Treatment    Pain: FACES Scale, Pretreatment 0-->no hurt  -LOPEZ     Posttreatment Pain Rating 0-->no hurt  -LOPEZ     Row Name 01/15/22 1014          Plan of Care Review    Plan of Care Reviewed With patient  -LOPEZ     Progress improving  -LOPEZ     Outcome Summary PT reeval is completed for mobility. patient presents with resp failure requiring intubation. patient is Ox3 but has some confusing conversations. she demonstrates imapired bed mobility transfers and gait as well as decreased endurance and strength as well as standing balance deficits. patient was able to stand with min assist and ambulate 15 ft with rolling walker and min assist chair behind patient for safety. anticipate patient to need SNF placement at D/C  -     Row Name 01/15/22 1014          Therapy Assessment/Plan (PT)    Patient/Family Therapy Goals Statement (PT) return to PLOF  -LOPEZ     Rehab Potential (PT) good, to achieve stated therapy goals  -LOPEZ     Criteria for Skilled Interventions Met (PT) yes; skilled treatment is necessary  -LOPEZ     Row Name 01/15/22 1014           Vital Signs    Pre Systolic BP Rehab 156  -LOPEZ     Pre Treatment Diastolic BP 90  -LOPEZ     Post Systolic BP Rehab 166  -LOPEZ     Post Treatment Diastolic BP 90  -LOPEZ     Pretreatment Heart Rate (beats/min) 109  -LOPEZ     Intratreatment Heart Rate (beats/min) 138  -LOPEZ     Posttreatment Heart Rate (beats/min) 110  -LOPEZ     Pre SpO2 (%) 91  -LOPEZ     O2 Delivery Pre Treatment nasal cannula  -LOPEZ     Post SpO2 (%) 95  -LOPEZ     O2 Delivery Post Treatment trach collar  -LOPEZ     Pre Patient Position Sitting  -LOPEZ     Intra Patient Position Standing  -LOPEZ     Post Patient Position Sitting  -LOPEZ     Row Name 01/15/22 1014          Positioning and Restraints    Pre-Treatment Position sitting in chair/recliner  -LOPEZ     Post Treatment Position chair  -LOPEZ     In Chair notified nsg; reclined; sitting; call light within reach; encouraged to call for assist; exit alarm on; with nsg  -LOPEZ           User Key  (r) = Recorded By, (t) = Taken By, (c) = Cosigned By    Initials Name Provider Type    Beverly Hernandes PT Physical Therapist               Outcome Measures     Row Name 01/15/22 1019          How much help from another person do you currently need...    Turning from your back to your side while in flat bed without using bedrails? 3  -LOPEZ     Moving from lying on back to sitting on the side of a flat bed without bedrails? 3  -LOPEZ     Moving to and from a bed to a chair (including a wheelchair)? 3  -LOPEZ     Standing up from a chair using your arms (e.g., wheelchair, bedside chair)? 3  -LOPEZ     Climbing 3-5 steps with a railing? 1  -LOPEZ     To walk in hospital room? 3  -LOPEZ     AM-PAC 6 Clicks Score (PT) 16  -LOPEZ           User Key  (r) = Recorded By, (t) = Taken By, (c) = Cosigned By    Initials Name Provider Type    Beverly Hernandes PT Physical Therapist                             Physical Therapy Education                 Title: PT OT SLP Therapies (In Progress)     Topic: Physical Therapy (In Progress)     Point: Mobility training (In  Progress)     Learning Progress Summary           Patient Acceptance, E, NR by LOPEZ at 1/15/2022 0925                   Point: Home exercise program (In Progress)     Learning Progress Summary           Patient Acceptance, E, NR by LOPEZ at 1/15/2022 0925                   Point: Body mechanics (In Progress)     Learning Progress Summary           Patient Acceptance, E, NR by LOPEZ at 1/15/2022 0925                   Point: Precautions (In Progress)     Learning Progress Summary           Patient Acceptance, E, NR by LOPEZ at 1/15/2022 0925                               User Key     Initials Effective Dates Name Provider Type Discipline     06/16/21 -  Beverly Jean Baptiste, PT Physical Therapist PT              PT Recommendation and Plan  Planned Therapy Interventions (PT): balance training, bed mobility training, gait training, home exercise program, strengthening, transfer training  Plan of Care Reviewed With: patient  Progress: improving  Outcome Summary: PT reeval is completed for mobility. patient presents with resp failure requiring intubation. patient is Ox3 but has some confusing conversations. she demonstrates imapired bed mobility transfers and gait as well as decreased endurance and strength as well as standing balance deficits. patient was able to stand with min assist and ambulate 15 ft with rolling walker and min assist chair behind patient for safety. anticipate patient to need SNF placement at D/C     Time Calculation:    PT Charges     Row Name 01/15/22 1020             Time Calculation    Start Time 0925  -LOPEZ      PT Received On 01/15/22  -LOPEZ      PT Goal Re-Cert Due Date 01/25/22  -LOPEZ              Time Calculation- PT    Total Timed Code Minutes- PT 25 minute(s)  -LOPEZ              Timed Charges    94493 - PT Therapeutic Activity Minutes 25  -LOPEZ              Untimed Charges    PT Eval/Re-eval Minutes 25  -LOPEZ              Total Minutes    Timed Charges Total Minutes 25  -LOPEZ      Untimed Charges Total Minutes 25   -LOPEZ       Total Minutes 50  -LOPEZ            User Key  (r) = Recorded By, (t) = Taken By, (c) = Cosigned By    Initials Name Provider Type    Beverly Hernandes, PT Physical Therapist              Therapy Charges for Today     Code Description Service Date Service Provider Modifiers Qty    [id number]  PT THERAPEUTIC ACT EA 15 MIN 1/15/2022 Beverly Jean Baptiste, PT GP 2    [id number]  PT RE-EVAL ESTABLISHED PLAN 2 1/15/2022 Beverly Jean Baptiste, PT GP 1          PT G-Codes  AM-PAC 6 Clicks Score (PT): 16    Beverly Jean Baptiste, PT  1/15/2022

## 2022-01-16 LAB
ANION GAP SERPL CALCULATED.3IONS-SCNC: 11 MMOL/L (ref 5–15)
BASOPHILS # BLD AUTO: 0.09 10*3/MM3 (ref 0–0.2)
BASOPHILS NFR BLD AUTO: 0.6 % (ref 0–1.5)
BUN SERPL-MCNC: 34 MG/DL (ref 6–20)
BUN/CREAT SERPL: 49.3 (ref 7–25)
CALCIUM SPEC-SCNC: 9.9 MG/DL (ref 8.6–10.5)
CHLORIDE SERPL-SCNC: 102 MMOL/L (ref 98–107)
CO2 SERPL-SCNC: 23 MMOL/L (ref 22–29)
CREAT SERPL-MCNC: 0.69 MG/DL (ref 0.57–1)
DEPRECATED RDW RBC AUTO: 55.7 FL (ref 37–54)
EOSINOPHIL # BLD AUTO: 0.19 10*3/MM3 (ref 0–0.4)
EOSINOPHIL NFR BLD AUTO: 1.3 % (ref 0.3–6.2)
ERYTHROCYTE [DISTWIDTH] IN BLOOD BY AUTOMATED COUNT: 16.8 % (ref 12.3–15.4)
GFR SERPL CREATININE-BSD FRML MDRD: 88 ML/MIN/1.73
GLUCOSE BLDC GLUCOMTR-MCNC: 130 MG/DL (ref 70–130)
GLUCOSE BLDC GLUCOMTR-MCNC: 152 MG/DL (ref 70–130)
GLUCOSE BLDC GLUCOMTR-MCNC: 156 MG/DL (ref 70–130)
GLUCOSE BLDC GLUCOMTR-MCNC: 172 MG/DL (ref 70–130)
GLUCOSE SERPL-MCNC: 135 MG/DL (ref 65–99)
HCT VFR BLD AUTO: 44.1 % (ref 34–46.6)
HGB BLD-MCNC: 13.9 G/DL (ref 12–15.9)
IMM GRANULOCYTES # BLD AUTO: 0.1 10*3/MM3 (ref 0–0.05)
IMM GRANULOCYTES NFR BLD AUTO: 0.7 % (ref 0–0.5)
LYMPHOCYTES # BLD AUTO: 4.57 10*3/MM3 (ref 0.7–3.1)
LYMPHOCYTES NFR BLD AUTO: 30.9 % (ref 19.6–45.3)
MCH RBC QN AUTO: 28.4 PG (ref 26.6–33)
MCHC RBC AUTO-ENTMCNC: 31.5 G/DL (ref 31.5–35.7)
MCV RBC AUTO: 90.2 FL (ref 79–97)
MONOCYTES # BLD AUTO: 0.87 10*3/MM3 (ref 0.1–0.9)
MONOCYTES NFR BLD AUTO: 5.9 % (ref 5–12)
NEUTROPHILS NFR BLD AUTO: 60.6 % (ref 42.7–76)
NEUTROPHILS NFR BLD AUTO: 8.96 10*3/MM3 (ref 1.7–7)
NRBC BLD AUTO-RTO: 0 /100 WBC (ref 0–0.2)
PLATELET # BLD AUTO: 356 10*3/MM3 (ref 140–450)
PMV BLD AUTO: 10.8 FL (ref 6–12)
POTASSIUM SERPL-SCNC: 4.2 MMOL/L (ref 3.5–5.2)
RBC # BLD AUTO: 4.89 10*6/MM3 (ref 3.77–5.28)
SODIUM SERPL-SCNC: 136 MMOL/L (ref 136–145)
WBC NRBC COR # BLD: 14.78 10*3/MM3 (ref 3.4–10.8)

## 2022-01-16 PROCEDURE — 85025 COMPLETE CBC W/AUTO DIFF WBC: CPT | Performed by: INTERNAL MEDICINE

## 2022-01-16 PROCEDURE — 63710000001 INSULIN LISPRO (HUMAN) PER 5 UNITS: Performed by: INTERNAL MEDICINE

## 2022-01-16 PROCEDURE — 80048 BASIC METABOLIC PNL TOTAL CA: CPT | Performed by: INTERNAL MEDICINE

## 2022-01-16 PROCEDURE — 97166 OT EVAL MOD COMPLEX 45 MIN: CPT

## 2022-01-16 PROCEDURE — 97530 THERAPEUTIC ACTIVITIES: CPT

## 2022-01-16 PROCEDURE — 94761 N-INVAS EAR/PLS OXIMETRY MLT: CPT

## 2022-01-16 PROCEDURE — 99232 SBSQ HOSP IP/OBS MODERATE 35: CPT | Performed by: INTERNAL MEDICINE

## 2022-01-16 PROCEDURE — 82962 GLUCOSE BLOOD TEST: CPT

## 2022-01-16 PROCEDURE — 63710000001 INSULIN DETEMIR PER 5 UNITS: Performed by: INTERNAL MEDICINE

## 2022-01-16 PROCEDURE — 94799 UNLISTED PULMONARY SVC/PX: CPT

## 2022-01-16 RX ORDER — METOPROLOL SUCCINATE 50 MG/1
50 TABLET, EXTENDED RELEASE ORAL
Status: DISCONTINUED | OUTPATIENT
Start: 2022-01-17 | End: 2022-01-23 | Stop reason: HOSPADM

## 2022-01-16 RX ORDER — METOPROLOL SUCCINATE 25 MG/1
25 TABLET, EXTENDED RELEASE ORAL
Status: DISCONTINUED | OUTPATIENT
Start: 2022-01-16 | End: 2022-01-16

## 2022-01-16 RX ORDER — DULOXETIN HYDROCHLORIDE 60 MG/1
60 CAPSULE, DELAYED RELEASE ORAL DAILY
Status: DISCONTINUED | OUTPATIENT
Start: 2022-01-16 | End: 2022-01-23 | Stop reason: HOSPADM

## 2022-01-16 RX ADMIN — APIXABAN 5 MG: 5 TABLET, FILM COATED ORAL at 09:28

## 2022-01-16 RX ADMIN — DIAZEPAM 2 MG: 2 TABLET ORAL at 14:03

## 2022-01-16 RX ADMIN — CASTOR OIL AND BALSAM, PERU 1 APPLICATION: 788; 87 OINTMENT TOPICAL at 20:10

## 2022-01-16 RX ADMIN — GABAPENTIN 300 MG: 300 CAPSULE ORAL at 06:16

## 2022-01-16 RX ADMIN — INSULIN LISPRO 6 UNITS: 100 INJECTION, SOLUTION INTRAVENOUS; SUBCUTANEOUS at 17:19

## 2022-01-16 RX ADMIN — GABAPENTIN 300 MG: 300 CAPSULE ORAL at 22:12

## 2022-01-16 RX ADMIN — OXYCODONE HYDROCHLORIDE AND ACETAMINOPHEN 1 TABLET: 10; 325 TABLET ORAL at 20:05

## 2022-01-16 RX ADMIN — DULOXETINE 60 MG: 60 CAPSULE, DELAYED RELEASE ORAL at 17:19

## 2022-01-16 RX ADMIN — GABAPENTIN 300 MG: 300 CAPSULE ORAL at 14:03

## 2022-01-16 RX ADMIN — APIXABAN 5 MG: 5 TABLET, FILM COATED ORAL at 20:05

## 2022-01-16 RX ADMIN — INSULIN LISPRO 6 UNITS: 100 INJECTION, SOLUTION INTRAVENOUS; SUBCUTANEOUS at 11:42

## 2022-01-16 RX ADMIN — INSULIN DETEMIR 9 UNITS: 100 INJECTION, SOLUTION SUBCUTANEOUS at 09:30

## 2022-01-16 RX ADMIN — INSULIN LISPRO 2 UNITS: 100 INJECTION, SOLUTION INTRAVENOUS; SUBCUTANEOUS at 17:19

## 2022-01-16 RX ADMIN — BUSPIRONE HYDROCHLORIDE 10 MG: 5 TABLET ORAL at 09:28

## 2022-01-16 RX ADMIN — ARFORMOTEROL TARTRATE 15 MCG: 15 SOLUTION RESPIRATORY (INHALATION) at 20:37

## 2022-01-16 RX ADMIN — ESCITALOPRAM OXALATE 20 MG: 20 TABLET ORAL at 09:29

## 2022-01-16 RX ADMIN — BUSPIRONE HYDROCHLORIDE 10 MG: 5 TABLET ORAL at 17:19

## 2022-01-16 RX ADMIN — FAMOTIDINE 20 MG: 20 TABLET, FILM COATED ORAL at 09:28

## 2022-01-16 RX ADMIN — INSULIN LISPRO 2 UNITS: 100 INJECTION, SOLUTION INTRAVENOUS; SUBCUTANEOUS at 11:42

## 2022-01-16 RX ADMIN — FAMOTIDINE 20 MG: 20 TABLET, FILM COATED ORAL at 20:05

## 2022-01-16 RX ADMIN — NYSTATIN: 100000 POWDER TOPICAL at 20:10

## 2022-01-16 RX ADMIN — DIAZEPAM 2 MG: 2 TABLET ORAL at 06:16

## 2022-01-16 RX ADMIN — INSULIN LISPRO 6 UNITS: 100 INJECTION, SOLUTION INTRAVENOUS; SUBCUTANEOUS at 09:30

## 2022-01-16 RX ADMIN — AMLODIPINE BESYLATE 10 MG: 10 TABLET ORAL at 09:28

## 2022-01-16 RX ADMIN — NYSTATIN: 100000 POWDER TOPICAL at 09:32

## 2022-01-16 RX ADMIN — DIAZEPAM 2 MG: 2 TABLET ORAL at 22:12

## 2022-01-16 RX ADMIN — CASTOR OIL AND BALSAM, PERU 1 APPLICATION: 788; 87 OINTMENT TOPICAL at 09:32

## 2022-01-16 RX ADMIN — TIOTROPIUM BROMIDE INHALATION SPRAY 2 PUFF: 3.12 SPRAY, METERED RESPIRATORY (INHALATION) at 09:18

## 2022-01-16 RX ADMIN — INSULIN DETEMIR 9 UNITS: 100 INJECTION, SOLUTION SUBCUTANEOUS at 20:04

## 2022-01-16 RX ADMIN — METOPROLOL SUCCINATE 25 MG: 25 TABLET, EXTENDED RELEASE ORAL at 09:36

## 2022-01-16 RX ADMIN — ARFORMOTEROL TARTRATE 15 MCG: 15 SOLUTION RESPIRATORY (INHALATION) at 09:18

## 2022-01-16 NOTE — PLAN OF CARE
Goal Outcome Evaluation:  Plan of Care Reviewed With: patient        Progress: improving  Outcome Summary: patient was able to increase ambulation to 30 ft with min assist using rolling walker patient still limited by SOA with activity. patient needs cues for safety but required less assistance overall for all activity. we will continue to increase activity as tolerated

## 2022-01-16 NOTE — THERAPY TREATMENT NOTE
Patient Name: Dani Ziegler  : 1965    MRN: [id number]                              Today's Date: 2022       Admit Date: 2021    Visit Dx:     ICD-10-CM ICD-9-CM   1. Acute exacerbation of chronic obstructive pulmonary disease (COPD) ([facility])  J44.1 491.21   2. Acute respiratory distress  R06.03 518.82   3. Hypoxia  R09.02 799.02   4. Pneumonia of both lungs due to infectious organism, unspecified part of lung  J18.9 483.8   5. Sepsis with acute hypoxic respiratory failure without septic shock, due to unspecified organism ([facility])  A41.9 038.9    R65.20 995.91    J96.01 518.81   6. Dysphagia, unspecified type  R13.10 787.20     Patient Active Problem List   Diagnosis   • Uncontrolled type 2 diabetes mellitus with hyperglycemia, without long-term current use of insulin ([facility])   • Vitamin D deficiency   • Peripheral neuropathy   • Adiposity   • Left bundle branch block (LBBB)   • Essential hypertension   • Chronic pain   • Chronic obstructive pulmonary disease ([facility])   • Anxiety   • Depression   • Arthritis involving multiple sites   • Leukocytosis   • Mixed hyperlipidemia   • Special screening for malignant neoplasms, colon   • Cellulitis of left lower extremity   • Hyperkalemia   • Acute on chronic renal insufficiency   • Sepsis ([facility])   • CHF (congestive heart failure) ([facility])   • Cellulitis of left leg   • Lumbar disc disease   • Diabetic peripheral neuropathy ([facility])   • COVID-19 virus infection   • CAP (community acquired pneumonia)   • Polypharmacy   • Morbid obesity with BMI of 45.0-49.9, adult ([facility])   • A/C Respiratory Failure Type 2   • History of recurrent deep vein thrombosis (DVT)   • Morbid obesity with BMI of 60.0-69.9, adult ([facility])     Past Medical History:   Diagnosis Date   • Abnormal weight gain    • Acute UTI    • Anxiety    • Arthritis involving multiple sites    • Chronic obstructive pulmonary disease ([facility])    • Chronic pain    • Current every day smoker    • Depression    • Diabetes  mellitus (HCC)    • Diarrhea    • Dysuria    • Edema, lower extremity    • Fever    • Head injury    • Hypertension    • Intervertebral disc disorder     Degeneration of intervertebral disc, site unspecified (722.6)   • Left bundle branch block    • Leukocytosis    • Long term current use of methadone for pain control    • Mass of right breast    • Nausea    • Obesity    • Overactive bladder    • Peripheral neuropathy    • Superficial phlebitis     right medial calf   • Upper respiratory infection    • Urinary incontinence    • Vitamin D deficiency    • Vomiting      Past Surgical History:   Procedure Laterality Date   • BLADDER SURGERY      pubovaginal sling   • CHOLECYSTECTOMY     • HIP ARTHROSCOPY Right 10/29/2020      General Information     [facility] Name 01/16/22 1120          Physical Therapy Time and Intention    Document Type therapy note (daily note)  -     Mode of Treatment physical therapy  -     Row Name 01/16/22 1120          General Information    Patient Profile Reviewed yes  -LOPEZ     Prior Level of Function independent:; gait; transfer; bed mobility; ADL's  -LOPEZ     Existing Precautions/Restrictions cardiac; oxygen therapy device and L/min  -     Barriers to Rehab medically complex  -     Row Name 01/16/22 1120          Living Environment    Lives With significant other  -     Row Name 01/16/22 1120          Home Main Entrance    Number of Stairs, Main Entrance none  -     Row Name 01/16/22 1120          Cognition    Orientation Status (Cognition) oriented to; person; place; time; disoriented to; situation  -     Row Name 01/16/22 1120          Safety Issues, Functional Mobility    Safety Issues Affecting Function (Mobility) safety precautions follow-through/compliance; insight into deficits/self-awareness; awareness of need for assistance; safety precaution awareness  -LOPEZ     Impairments Affecting Function (Mobility) balance; endurance/activity tolerance; shortness of breath; strength  -LOPEZ            User Key  (r) = Recorded By, (t) = Taken By, (c) = Cosigned By    Initials Name Provider Type    Beverly Hernandes PT Physical Therapist               Mobility     Row Name 01/16/22 1121          Bed Mobility    Bed Mobility rolling left; rolling right; scooting/bridging; supine-sit  -LOPEZ     Rolling Left [place] (Bed Mobility) contact guard  -LOPEZ     Rolling Right [place] (Bed Mobility) contact guard  -LOPEZ     Scooting/Bridging [place] (Bed Mobility) contact guard  -LOPEZ     Supine-Sit [place] (Bed Mobility) standby assist  -LOPEZ     Assistive Device (Bed Mobility) bed rails; head of bed elevated  -LOPEZ     Row Name 01/16/22 1121          Bed-Chair Transfer    Bed-Chair [place] (Transfers) minimum assist (75% patient effort)  -LOPEZ     Assistive Device (Bed-Chair Transfers) walker, front-wheeled  -LOPEZ     Row Name 01/16/22 1121          Sit-Stand Transfer    Sit-Stand [place] (Transfers) minimum assist (75% patient effort)  -LOPEZ     Assistive Device (Sit-Stand Transfers) walker, front-wheeled  -LOPEZ     Row Name 01/16/22 1121          Gait/Stairs (Locomotion)    [place] Level (Gait) minimum assist (75% patient effort)  -LOPEZ     Assistive Device (Gait) walker, front-wheeled  -LOPEZ     Distance in Feet (Gait) 30  -LOPEZ     Deviations/Abnormal Patterns (Gait) stride length decreased; festinating/shuffling  -LOPEZ     Bilateral Gait Deviations forward flexed posture  -LOPEZ     Comment (Gait/Stairs) patient has step to gait pattern cues for increased step length patient needs assist with maneuvering walker. patient needs cues for safety  -LOPEZ           User Key  (r) = Recorded By, (t) = Taken By, (c) = Cosigned By    Initials Name Provider Type    Beverly Hernandes PT Physical Therapist               Obj/Interventions     Row Name 01/16/22 1122          Balance    Balance Assessment sitting static balance; sitting dynamic balance; standing static balance; standing dynamic balance  -LOPEZ      Static Sitting Balance WFL; unsupported; sitting, edge of bed  -LOPEZ     Dynamic Sitting Balance WFL; unsupported; sitting, edge of bed  -LOPEZ     Static Standing Balance mild impairment; supported  -LOPEZ     Dynamic Standing Balance mild impairment; supported  -LOPEZ     Balance Interventions standing; dynamic; weight shifting activity  -LOPEZ           User Key  (r) = Recorded By, (t) = Taken By, (c) = Cosigned By    Initials Name Provider Type    LOPEZ Beverly Jean Baptiste, PT Physical Therapist               Goals/Plan    No documentation.                Clinical Impression     Row Name 01/16/22 1123          Pain Scale: FACES Pre/Post-Treatment    Pain: FACES Scale, Pretreatment 0-->no hurt  -LOPEZ     Posttreatment Pain Rating 0-->no hurt  -LOPEZ     Row Name 01/16/22 1123          Plan of Care Review    Plan of Care Reviewed With patient  -LOPEZ     Progress improving  -     Outcome Summary patient was able to increase ambulation to 30 ft with min assist using rolling walker patient still limited by SOA with activity. patient needs cues for safety but required less assistance overall for all activity. we will continue to increase activity as tolerated  -     Row Name 01/16/22 1123          Therapy Assessment/Plan (PT)    Patient/Family Therapy Goals Statement (PT) return to [facility]  -     Rehab Potential (PT) good, to achieve stated therapy goals  -     Criteria for Skilled Interventions Met (PT) yes; skilled treatment is necessary  -     Row Name 01/16/22 1123          Vital Signs    Pre Systolic BP Rehab 140  -LOPEZ     Pre Treatment Diastolic BP 80  -LOPEZ     Post Systolic BP Rehab 145  -LOPEZ     Post Treatment Diastolic BP 88  -LOPEZ     Pretreatment Heart Rate (beats/min) 96  -LOPEZ     Posttreatment Heart Rate (beats/min) 106  -LOPEZ     Pre SpO2 (%) 95  -LOPEZ     O2 Delivery Pre Treatment nasal cannula  -LOPEZ     Post SpO2 (%) 93  -LOPEZ     O2 Delivery Post Treatment nasal cannula  -LOPEZ     Pre Patient Position Supine  -LOPEZ     Intra Patient  Position Standing  -LOPEZ     Post Patient Position Sitting  -LOPEZ     Row Name 01/16/22 1123          Positioning and Restraints    Pre-Treatment Position in bed  -LOPEZ     Post Treatment Position chair  -LOPEZ     In Chair notified nsg; reclined; sitting; call light within reach; encouraged to call for assist; exit alarm on  -LOPEZ           User Key  (r) = Recorded By, (t) = Taken By, (c) = Cosigned By    Initials Name Provider Type    Beverly Hernandes, PT Physical Therapist               Outcome Measures     Row Name 01/16/22 1126          How much help from another person do you currently need...    Turning from your back to your side while in flat bed without using bedrails? 3  -LOPEZ     Moving from lying on back to sitting on the side of a flat bed without bedrails? 3  -LOPEZ     Moving to and from a bed to a chair (including a wheelchair)? 3  -LOPEZ     Standing up from a chair using your arms (e.g., wheelchair, bedside chair)? 3  -LOPEZ     Climbing 3-5 steps with a railing? 2  -LOPEZ     To walk in hospital room? 3  -LOPEZ     AM-PAC 6 Clicks Score (PT) 17  -LOPEZ     Row Name 01/16/22 1108          Functional Assessment    Outcome Measure Options AM-PAC 6 Clicks Daily Activity (OT)  -JR           User Key  (r) = Recorded By, (t) = Taken By, (c) = Cosigned By    Initials Name Provider Type    Beverly Hernandes, PT Physical Therapist    JR Romelia Kilpatrick OT Occupational Therapist                             Physical Therapy Education                 Title: PT OT SLP Therapies (In Progress)     Topic: Physical Therapy (In Progress)     Point: Mobility training (In Progress)     Learning Progress Summary           Patient Acceptance, E, NR by LOPEZ at 1/16/2022 0800    Acceptance, E, NR by LOPEZ at 1/15/2022 0925                   Point: Home exercise program (In Progress)     Learning Progress Summary           Patient Acceptance, E, NR by LOPEZ at 1/16/2022 0800    Acceptance, E, NR by LOPEZ at 1/15/2022 0925                   Point: Body  mechanics (In Progress)     Learning Progress Summary           Patient Acceptance, E, NR by LOPEZ at 1/16/2022 0800    Acceptance, E, NR by LOPEZ at 1/15/2022 0925                   Point: Precautions (In Progress)     Learning Progress Summary           Patient Acceptance, E, NR by LOPEZ at 1/16/2022 0800    Acceptance, E, NR by LOPEZ at 1/15/2022 0925                               User Key     Initials Effective Dates Name Provider Type Discipline    LOPEZ 06/16/21 -  Beverly Jean Baptiste PT Physical Therapist PT              PT Recommendation and Plan  Planned Therapy Interventions (PT): balance training, bed mobility training, gait training, home exercise program, transfer training, strengthening  Plan of Care Reviewed With: patient  Progress: improving  Outcome Summary: patient was able to increase ambulation to 30 ft with min assist using rolling walker patient still limited by SOA with activity. patient needs cues for safety but required less assistance overall for all activity. we will continue to increase activity as tolerated     Time Calculation:    PT Charges     Row Name 01/16/22 1128             Time Calculation    Start Time 0800  -LOPEZ      PT Received On 01/16/22  -LOPEZ      PT Goal Re-Cert Due Date 01/25/22  -              Time Calculation- PT    Total Timed Code Minutes- PT 15 minute(s)  -LOPEZ              Timed Charges    87685 - PT Therapeutic Activity Minutes 15  -LOPEZ              Total Minutes    Timed Charges Total Minutes 15  -LOPEZ       Total Minutes 15  -LOPEZ            User Key  (r) = Recorded By, (t) = Taken By, (c) = Cosigned By    Initials Name Provider Type    Beverly Hernandes PT Physical Therapist              Therapy Charges for Today     Code Description Service Date Service Provider Modifiers Qty    47826659147 HC PT THERAPEUTIC ACT EA 15 MIN 1/15/2022 Beverly Jean Baptiste, PT GP 2    71395571429 HC PT RE-EVAL ESTABLISHED PLAN 2 1/15/2022 Beverly Jean Baptiste, PT GP 1    59265467119 HC PT THERAPEUTIC  ACT EA 15 MIN 1/16/2022 Beverly Jean Baptiste, PT GP 1          PT G-Codes  Outcome Measure Options: AM-PAC 6 Clicks Daily Activity (OT)  AM-PAC 6 Clicks Score (PT): 17  AM-PAC 6 Clicks Score (OT): 12    Beverly Jean Baptiste, PT  1/16/2022

## 2022-01-16 NOTE — PLAN OF CARE
Goal Outcome Evaluation:  Plan of Care Reviewed With: patient           Outcome Summary: OT initial eval and expanded chart review completed. Pt presents with multiple comorbidities and decreased independence with ADL's and mobility. Recommend continued skilled OT services and IRF at d/c.

## 2022-01-16 NOTE — THERAPY EVALUATION
Patient Name: Dani Ziegler  : 1965    MRN: [id number]                              Today's Date: 2022       Admit Date: 2021    Visit Dx:     ICD-10-CM ICD-9-CM   1. Acute exacerbation of chronic obstructive pulmonary disease (COPD) ([facility])  J44.1 491.21   2. Acute respiratory distress  R06.03 518.82   3. Hypoxia  R09.02 799.02   4. Pneumonia of both lungs due to infectious organism, unspecified part of lung  J18.9 483.8   5. Sepsis with acute hypoxic respiratory failure without septic shock, due to unspecified organism ([facility])  A41.9 038.9    R65.20 995.91    J96.01 518.81   6. Dysphagia, unspecified type  R13.10 787.20     Patient Active Problem List   Diagnosis   • Uncontrolled type 2 diabetes mellitus with hyperglycemia, without long-term current use of insulin ([facility])   • Vitamin D deficiency   • Peripheral neuropathy   • Adiposity   • Left bundle branch block (LBBB)   • Essential hypertension   • Chronic pain   • Chronic obstructive pulmonary disease ([facility])   • Anxiety   • Depression   • Arthritis involving multiple sites   • Leukocytosis   • Mixed hyperlipidemia   • Special screening for malignant neoplasms, colon   • Cellulitis of left lower extremity   • Hyperkalemia   • Acute on chronic renal insufficiency   • Sepsis ([facility])   • CHF (congestive heart failure) ([facility])   • Cellulitis of left leg   • Lumbar disc disease   • Diabetic peripheral neuropathy ([facility])   • COVID-19 virus infection   • CAP (community acquired pneumonia)   • Polypharmacy   • Morbid obesity with BMI of 45.0-49.9, adult ([facility])   • A/C Respiratory Failure Type 2   • History of recurrent deep vein thrombosis (DVT)   • Morbid obesity with BMI of 60.0-69.9, adult ([facility])     Past Medical History:   Diagnosis Date   • Abnormal weight gain    • Acute UTI    • Anxiety    • Arthritis involving multiple sites    • Chronic obstructive pulmonary disease ([facility])    • Chronic pain    • Current every day smoker    • Depression    • Diabetes  mellitus (HCC)    • Diarrhea    • Dysuria    • Edema, lower extremity    • Fever    • Head injury    • Hypertension    • Intervertebral disc disorder     Degeneration of intervertebral disc, site unspecified (722.6)   • Left bundle branch block    • Leukocytosis    • Long term current use of methadone for pain control    • Mass of right breast    • Nausea    • Obesity    • Overactive bladder    • Peripheral neuropathy    • Superficial phlebitis     right medial calf   • Upper respiratory infection    • Urinary incontinence    • Vitamin D deficiency    • Vomiting      Past Surgical History:   Procedure Laterality Date   • BLADDER SURGERY      pubovaginal sling   • CHOLECYSTECTOMY     • HIP ARTHROSCOPY Right 10/29/2020      General Information     [facility] Name 22 1057          OT Time and Intention    Document Type evaluation  -     Mode of Treatment occupational therapy  -[facility] Name 22 1057          General Information    Patient Profile Reviewed yes  -     Prior Level of Function independent:; gait; transfer; bed mobility; ADL's; home management  -     Existing Precautions/Restrictions cardiac; oxygen therapy device and L/min  -     Barriers to Rehab medically complex; cognitive status  -[facility] Name 22 1057          Living Environment    Lives With significant other  -[facility] Name 22 1057          Home Main Entrance    Number of Stairs, Main Entrance none  -[facility] Name 22 1057          Cognition    Orientation Status (Cognition) oriented to; person; place  Pt very tearful stating her dad and her best friend , also very tearful and remorseful regarding current and past events  -[facility] Name 22 1057          Safety Issues, Functional Mobility    Safety Issues Affecting Function (Mobility) awareness of need for assistance; insight into deficits/self-awareness; safety precaution awareness; safety precautions follow-through/compliance  -     Impairments  Affecting Function (Mobility) balance; cognition; endurance/activity tolerance; grasp; pain; strength; shortness of breath  -     Cognitive Impairments, Mobility Safety/Performance awareness, need for assistance; problem-solving/reasoning; insight into deficits/self-awareness; safety precaution awareness  -           User Key  (r) = Recorded By, (t) = Taken By, (c) = Cosigned By    Initials Name Provider Type     Romelia Kilpatrick OT Occupational Therapist                 Mobility/ADL's     Row Name 01/16/22 1100          Bed Mobility    Bed Mobility supine-sit  -     Supine-Sit [place] (Bed Mobility) standby assist; verbal cues  -     Assistive Device (Bed Mobility) head of bed elevated; bed rails  -     Row Name 01/16/22 1100          Transfers    Transfers sit-stand transfer  -     Comment (Transfers) Pt initially stood with CGA with RWx, reported she wasn't feeling well and requested to sit back on the EOB, pt then required mod A x2 for sit to stand with RWx. Verbal cues for hand placement with transfers.  -     Sit-Stand [place] (Transfers) moderate assist (50% patient effort); 2 person assist; verbal cues  -     Row Name 01/16/22 1100          Sit-Stand Transfer    Assistive Device (Sit-Stand Transfers) walker, front-wheeled  -     Row Name 01/16/22 1100          Functional Mobility    Functional Mobility- Ind. Level minimum assist (75% patient effort); 2 person assist required; verbal cues required  -     Functional Mobility- Device rolling walker  -     Functional Mobility-Distance (Feet) --  to the door and to the chair  -     Functional Mobility- Safety Issues supplemental O2; balance decreased during turns; step length decreased  -     Row Name 01/16/22 1100          Activities of Daily Living    BADL Assessment/Intervention feeding; lower body dressing  -[facility] Name 01/16/22 1100          Self-Feeding Assessment/Training    [place] Level (Feeding) prepare  tray/open items; minimum assist (75% patient effort); verbal cues  -JR     Position (Self-Feeding) supported sitting  -JR     Row Name 01/16/22 1100          Lower Body Dressing Assessment/Training    [place] Level (Lower Body Dressing) don; socks; dependent (less than 25% patient effort)  -JR     Position (Lower Body Dressing) supine  -JR     Comment (Lower Body Dressing) adjusted socks this date  -           User Key  (r) = Recorded By, (t) = Taken By, (c) = Cosigned By    Initials Name Provider Type    JR Romelia Kilpatrick OT Occupational Therapist               Obj/Interventions     Row Name 01/16/22 1104          Sensory Assessment (Somatosensory)    Sensory Assessment (Somatosensory) sensation intact  -     Row Name 01/16/22 1104          Range of Motion Comprehensive    General Range of Motion upper extremity range of motion deficits identified  -JR     Comment, General Range of Motion R shoulder 0-90 degrees, L shoulder pain with MMT, remainder WFL  -JR     Row Name 01/16/22 1104          Strength Comprehensive (MMT)    General Manual Muscle Testing (MMT) Assessment upper extremity strength deficits identified  -JR     Comment, General Manual Muscle Testing (MMT) Assessment R shoulder 3-/5, L shoulder 4-/5, remainder 4/5  -JR     Row Name 01/16/22 1104          Balance    Balance Assessment sitting static balance; standing dynamic balance  -JR     Static Sitting Balance WFL  -     Dynamic Standing Balance mild impairment  -           User Key  (r) = Recorded By, (t) = Taken By, (c) = Cosigned By    Initials Name Provider Type    Romelia Hwang OT Occupational Therapist               Goals/Plan     Row Name 01/16/22 1107          Bed Mobility Goal 1 (OT)    Activity/Assistive Device (Bed Mobility Goal 1, OT) bed mobility activities, all  -JR     [place] Level/Cues Needed (Bed Mobility Goal 1, OT) modified independence  -     Time Frame (Bed Mobility Goal 1, OT) long term goal  (LTG); 2 weeks  -JR     Progress/Outcomes (Bed Mobility Goal 1, OT) goal ongoing  -     Row Name 01/16/22 1107          Transfer Goal 1 (OT)    Activity/Assistive Device (Transfer Goal 1, OT) transfers, all; walker, rolling  -JR     [place] Level/Cues Needed (Transfer Goal 1, OT) standby assist; verbal cues required  -JR     Time Frame (Transfer Goal 1, OT) long term goal (LTG); 2 weeks  -JR     Progress/Outcome (Transfer Goal 1, OT) goal ongoing  -     Row Name 01/16/22 1107          Dressing Goal 1 (OT)    Activity/Device (Dressing Goal 1, OT) lower body dressing  with AE PRN  -JR     [place]/Cues Needed (Dressing Goal 1, OT) minimum assist (75% or more patient effort); verbal cues required  -JR     Time Frame (Dressing Goal 1, OT) long term goal (LTG); 2 weeks  -JR     Progress/Outcome (Dressing Goal 1, OT) goal ongoing  -     Row Name 01/16/22 1107          Toileting Goal 1 (OT)    Activity/Device (Toileting Goal 1, OT) toileting skills, all  -JR     [place] Level/Cues Needed (Toileting Goal 1, OT) minimum assist (75% or more patient effort); verbal cues required  -JR     Time Frame (Toileting Goal 1, OT) long term goal (LTG); 2 weeks  -JR     Progress/Outcome (Toileting Goal 1, OT) goal ongoing  -     Row Name 01/16/22 1107          Therapy Assessment/Plan (OT)    Planned Therapy Interventions (OT) activity tolerance training; adaptive equipment training; BADL retraining; functional balance retraining; occupation/activity based interventions; ROM/therapeutic exercise; transfer/mobility retraining; patient/caregiver education/training; strengthening exercise  -           User Key  (r) = Recorded By, (t) = Taken By, (c) = Cosigned By    Initials Name Provider Type    JR Romelia Kilpatrick, OT Occupational Therapist               Clinical Impression     Row Name 01/16/22 1101          Pain Assessment    Additional Documentation Pain Scale: FACES Pre/Post-Treatment (Group)  -     Row Name  01/16/22 1105          Pain Scale: Numbers Pre/Post-Treatment    Pain Intervention(s) Repositioned  -     Row Name 01/16/22 1105          Pain Scale: FACES Pre/Post-Treatment    Pain: FACES Scale, Pretreatment 2-->hurts little bit  -JR     Posttreatment Pain Rating 2-->hurts little bit  -JR     Pain Location - Orientation generalized  -     Row Name 01/16/22 1105          Plan of Care Review    Plan of Care Reviewed With patient  -JR     Outcome Summary OT initial eval and expanded chart review completed. Pt presents with multiple comorbidities and decreased independence with ADL's and mobility. Recommend continued skilled OT services and IRF at d/c.  -     Row Name 01/16/22 1105          Therapy Assessment/Plan (OT)    Patient/Family Therapy Goal Statement (OT) go home  -     Rehab Potential (OT) good, to achieve stated therapy goals  -     Criteria for Skilled Therapeutic Interventions Met (OT) yes; meets criteria; skilled treatment is necessary  -     Therapy Frequency (OT) daily  -[facility] Name 01/16/22 1105          Therapy Plan Review/Discharge Plan (OT)    Anticipated Discharge Disposition (OT) inpatient rehabilitation facility  -     Row Name 01/16/22 1105          Vital Signs    Pre Systolic BP Rehab 144  -JR     Pre Treatment Diastolic BP 86  -JR     Post Systolic BP Rehab 142  -JR     Post Treatment Diastolic   -JR     Pretreatment Heart Rate (beats/min) 96  -JR     Posttreatment Heart Rate (beats/min) 110  -JR     Pre SpO2 (%) 94  -JR     O2 Delivery Pre Treatment supplemental O2  -JR     Post SpO2 (%) 93  -JR     O2 Delivery Post Treatment supplemental O2  -JR     Pre Patient Position Supine  -JR     Intra Patient Position Standing  -JR     Post Patient Position Sitting  -     Row Name 01/16/22 1105          Positioning and Restraints    Pre-Treatment Position in bed  -JR     Post Treatment Position chair  -JR     In Chair notified nsg; sitting; call light within reach;  encouraged to call for assist; exit alarm on; waffle cushion  -           User Key  (r) = Recorded By, (t) = Taken By, (c) = Cosigned By    Initials Name Provider Type    Romelia Hwang OT Occupational Therapist               Outcome Measures     Row Name 01/16/22 1108          How much help from another is currently needed...    Putting on and taking off regular lower body clothing? 1  -JR     Bathing (including washing, rinsing, and drying) 2  -JR     Toileting (which includes using toilet bed pan or urinal) 1  -JR     Putting on and taking off regular upper body clothing 2  -JR     Taking care of personal grooming (such as brushing teeth) 3  -JR     Eating meals 3  -JR     AM-PAC 6 Clicks Score (OT) 12  -     Row Name 01/16/22 1108          Functional Assessment    Outcome Measure Options AM-PAC 6 Clicks Daily Activity (OT)  -           User Key  (r) = Recorded By, (t) = Taken By, (c) = Cosigned By    Initials Name Provider Type    Romelia Hwang OT Occupational Therapist                Occupational Therapy Education                 Title: PT OT SLP Therapies (In Progress)     Topic: Occupational Therapy (In Progress)     Point: ADL training (In Progress)     Description:   Instruct learner(s) on proper safety adaptation and remediation techniques during self care or transfers.   Instruct in proper use of assistive devices.              Learning Progress Summary           Patient Acceptance, E, NR by  at 1/16/2022 0750    Comment: Educated pt regarding role of therapy and ongoing treatment plan                   Point: Home exercise program (Not Started)     Description:   Instruct learner(s) on appropriate technique for monitoring, assisting and/or progressing therapeutic exercises/activities.              Learner Progress:  Not documented in this visit.          Point: Precautions (Not Started)     Description:   Instruct learner(s) on prescribed precautions during self-care and functional  transfers.              Learner Progress:  Not documented in this visit.          Point: Body mechanics (Not Started)     Description:   Instruct learner(s) on proper positioning and spine alignment during self-care, functional mobility activities and/or exercises.              Learner Progress:  Not documented in this visit.                      User Key     Initials Effective Dates Name Provider Type [facility] 06/16/21 -  Romelia Kilpatrick OT Occupational Therapist OT              OT Recommendation and Plan  Planned Therapy Interventions (OT): activity tolerance training, adaptive equipment training, BADL retraining, functional balance retraining, occupation/activity based interventions, ROM/therapeutic exercise, transfer/mobility retraining, patient/caregiver education/training, strengthening exercise  Therapy Frequency (OT): daily  Plan of Care Review  Plan of Care Reviewed With: patient  Outcome Summary: OT initial eval and expanded chart review completed. Pt presents with multiple comorbidities and decreased independence with ADL's and mobility. Recommend continued skilled OT services and IRF at d/c.     Time Calculation:    Time Calculation- OT     Row Name 01/16/22 1110             Time Calculation- OT    OT Start Time 0750  -JR      OT Received On 01/16/22  -      OT Goal Re-Cert Due Date 01/26/22  -              Untimed Charges    OT Eval/Re-eval Minutes 47  -JR              Total Minutes    Untimed Charges Total Minutes 47  -JR       Total Minutes 47  -JR            User Key  (r) = Recorded By, (t) = Taken By, (c) = Cosigned By    Initials Name Provider Type     Romelia Kilpatrick OT Occupational Therapist              Therapy Charges for Today     Code Description Service Date Service Provider Modifiers Qty    07001813159 HC OT EVAL MOD COMPLEXITY 4 1/16/2022 Romelia Kilpatrick OT GO 1               Romelia Kilpatrick OT  1/16/2022

## 2022-01-16 NOTE — PROGRESS NOTES
INTENSIVIST   PROGRESS NOTE     Hospital:  LOS: 16 days      BERTO Louise 56 y.o. female is followed for: Shortness of Breath       Respiratory Failure Type 2    Uncontrolled type 2 diabetes mellitus with hyperglycemia, without long-term current use of insulin (HCC)    Sepsis (HCC)    As an Intensivist, we provide an integrated approach to the ICU patient and family, medical management of comorbid conditions, including but not limited to electrolytes, glycemic control, organ dysfunction, lead interdisciplinary rounds and coordinate the care with all other services, including those from other specialists.     Interval History:  Her father just passed away.    Breathing comfortable.    On low flow nasal cannula. ~3-4 lpm.    The patient qualifies to receive the vaccine, but they have not yet received it.     Temp  Min: 98 °F (36.7 °C)  Max: 100 °F (37.8 °C)       History     Last Reviewed by Beverly Jean Baptiste, PT on 1/15/2022 at 10:11 AM    Sections Reviewed    Medical, Surgical, Family, Tobacco, Custom, Alcohol, Drug Use, Sexual   Activity      Problem list reviewed by Benson Pelaez MD on 1/1/2022 at  4:56 PM  Medicines reviewed by Benson Pelaez MD on 1/1/2022 at  4:56 PM  Allergies reviewed by Benson Pelaez MD on 1/1/2022 at  4:55 PM       The patient's relevant past medical, surgical and social history were reviewed and updated in Epic as appropriate.        O     Vitals:  Temp: 98 °F (36.7 °C) (01/16/22 1200) Temp  Min: 98 °F (36.7 °C)  Max: 100 °F (37.8 °C)   Temp core:      BP: 139/80 (01/16/22 1400) BP  Min: 127/87  Max: 166/90   Pulse: 91 (01/16/22 1400) Pulse  Min: 88  Max: 128   Resp: 18 (01/16/22 1400) Resp  Min: 16  Max: 22   SpO2: 93 % (01/16/22 1400) SpO2  Min: 90 %  Max: 97 %   Device: humidified, nasal cannula (01/16/22 1400)    Flow Rate: 4 (01/16/22 1400) Flow (L/min)  Min: 4  Max: 6     Intake/Ouptut 24 hrs (7:00AM - 6:59 AM)  Intake & Output (last 3 days)       01/13 0701  01/14 0700 01/14  0701  01/15 0700 01/15 0701  01/16 0700 01/16 0701  01/17 0700    P.O.  420 960 720    I.V. (mL/kg) 549 (3.3) 165 (1)      Other 35       NG/GT 1768       Total Intake(mL/kg) 2352 (14.2) 585 (3.5) 960 (5.8) 720 (4.3)    Urine (mL/kg/hr) 2850 (0.7) 1125 (0.3) 1020 (0.3)     Stool 0 0      Total Output 2850 1125 1020     Net -498 -540 -60 +720            Stool Unmeasured Occurrence 2 x 1 x  1 x          Wt Readings from Last 3 Encounters:   01/09/22 (!) 166 kg (365 lb 11.2 oz)   08/24/21 133 kg (294 lb)   07/16/21 (!) 140 kg (308 lb)     Physical Examination  Telemetry:  Rhythm: normal sinus rhythm (01/16/22 0600)     QTc Interval (Sec): 0.46 (01/05/22 2000)   Constitutional:  No acute distress.   Cardiovascular: RRR.   Normal heart sounds.  No murmurs, gallop or rub.   Respiratory: Normal breath sounds  No adventitious sounds.   Abdominal:  Soft with no tenderness.  No distension.   No HSM.   Extremities: Warm.  Dry.  No cyanosis.  No Edema   Neurological:   Awake.  Best Eye Response: 4-->(E4) spontaneous (01/15/22 2000)  Best Motor Response: 6-->(M6) obeys commands (01/15/22 2000)  Best Verbal Response: 4-->(V4) confused (01/15/22 2000)  [place] Coma Scale Score: 14 (01/15/22 2000)     Lines      Results Reviewed:  Laboratory  Microbiology  Radiology  Pathology    Hematology:  Results from last 7 days   Lab Units 01/16/22  0255 01/15/22  0258   WBC 10*3/mm3 14.78* 16.57*   HEMOGLOBIN g/dL 13.9 14.3   MCV fL 90.2 86.9   PLATELETS 10*3/mm3 356 425   NEUTROS ABS 10*3/mm3 8.96* 12.08*   LYMPHS ABS 10*3/mm3 4.57* 3.18*   EOS ABS 10*3/mm3 0.19 0.07     Chemistry:  Estimated Creatinine Clearance: 146.3 mL/min (by C-G formula based on SCr of 0.69 mg/dL).  Results from last 7 days   Lab Units 01/16/22  0255 01/15/22  0258   SODIUM mmol/L 136 138   POTASSIUM mmol/L 4.2 4.5   CHLORIDE mmol/L 102 101   CO2 mmol/L 23.0 22.0   BUN mg/dL 34* 40*   CREATININE mg/dL 0.69 0.73   GLUCOSE mg/dL 135* 176*     Results from last 7 days    Lab Units 01/16/22  0255 01/15/22  0258 01/14/22  0313 01/14/22  0313   CALCIUM mg/dL 9.9 10.1   < > 10.3   MAGNESIUM mg/dL  --  2.4  --  1.9   PHOSPHORUS mg/dL  --  4.5  --  3.3    < > = values in this interval not displayed.     Results from last 7 days   Lab Units 01/15/22  0258 01/14/22  0313   BILIRUBIN mg/dL 0.7 0.6   AST (SGOT) U/L 27 14   ALT (SGPT) U/L 32 21   ALK PHOS U/L 143* 128*     COVID-19  Lab Results   Component Value Date    COVID19 Not Detected 12/30/2021    COVID19 Not Detected 06/03/2021       Images:  XR Chest 1 View    Result Date: 1/15/2022  Interval removal of enteric tube. Right-sided PICC line terminates in the SVC. Unchanged aeration without new focal consolidation. No enlarging pleural effusion or distinct pneumothorax. Persistent mild pulmonary vascular engorgement.  This report was finalized on 1/15/2022 6:05 AM by Pedro Tyson.        Echo:  Results for orders placed in visit on 05/05/20    SCANNED - ECHOCARDIOGRAM      Results: Reviewed.  I reviewed the patient's new laboratory and imaging results.  I independently reviewed the patient's new images.    Medications: Reviewed.    Assessment/Plan   HILARY / AMANDA Louise is a 56 y.o. female admitted on 12/30/2021 with Acute on chronic respiratory failure with hypoxia and hypercapnia (HCC) [J96.21, J96.22]:    1. Respiratory Failure type 2  1. Invasive Mechanical Ventilation   2. Intubated 12/31/21  3. Extubated 01/11/22  4. Compensated Respiratory Acidosis as per ABG on 12/31/21 and 01/01/22  5. COPD on home O2  1. Previous Trach in 2019 and 2020.  6. Pneumonia this admission  2. Aute left seventh rib fracture.  3. Previous DVT on APIxaban  4. HTN  5. Chronic pain syndrome  6. Anxiety/Depression  7. Obesity III. Body mass index is 57.26 kg/m².   8. T2DM    Results from last 7 days   Lab Units 01/16/22  1103 01/16/22  0654 01/15/22  2055 01/15/22  1628 01/15/22  1141 01/15/22  0734   GLUCOSE mg/dL 156* 130 226* 159* 192* 156*     Lab  Results   Lab Value Date/Time    HGBA1C 5.7 (H) 08/24/2021 1522    HGBA1C 6.00 (H) 06/03/2021 1902    HGBA1C 6.0 (H) 05/25/2021 1658    HGBA1C 8.3 01/11/2021 0000    HGBA1C 7.30 (H) 03/28/2018 0514       Nutrition Support: Patient isn't on Tube Feeding   Modulars: Patient doesn't have any tube feeding modular orders   Diet: Diet Dysphagia; IV - Mechanical Soft No Mixed Consistencies; Nectar / Syrup Thick; Cardiac, Consistent Carbohydrate   Advance Directives: Code Status and Medical Interventions:   Ordered at: 12/31/21 0151     Code Status (Patient has no pulse and is not breathing):    CPR (Attempt to Resuscitate)     Medical Interventions (Patient has pulse or is breathing):    Full Support        Plan:    1. Continue APIxaban   2. Continue LAMA, LABA  3. Resume Duloxetine DR but at 60 mg/d (home dose = 120 mg)  4. Increase metoprolol XL to 50 mg/d  5. Watch Na.  6. Goal: Glucose < 180 mg/dL.  1. Insulin basal, prandial and correction   2. No changes on current doses.  7. Disposition: Transfer to Telemetry Unit   1. Nutrition biomarkers in AM    Plan of care and goals reviewed during interdisciplinary rounds.  I discussed the patient's findings and my recommendations with nursing staff    Level of Risk is High due to:  illness with threat to life or bodily function.     Time: 25 minutes, in direct patient care, with the patient and/or in the ICU or de leon coordinating care with other health care providers.     I have spent > 50% percent of this time, counseling and discussing management.     OK to floor.    Hospitalist Team will assist with medical management, and assume Primary Attending, once on the Floor.    Thank you.    [x]  Primary Attending  []  Consultant

## 2022-01-16 NOTE — PLAN OF CARE
Goal Outcome Evaluation:  Plan of Care Reviewed With: patient     Pt remains intermittently confused to situation and time. On 6L nc. NSR-ST (HR ), half of home dose metoprolol XL (25mg) restarted per APRN. Carroll d/c'd, UOP adequate. VSS. Awaiting floor bed.

## 2022-01-17 ENCOUNTER — TELEPHONE (OUTPATIENT)
Dept: PSYCHIATRY | Facility: CLINIC | Age: 57
End: 2022-01-17

## 2022-01-17 LAB
ALBUMIN SERPL-MCNC: 3.7 G/DL (ref 3.5–5.2)
ALBUMIN/GLOB SERPL: 0.9 G/DL
ALP SERPL-CCNC: 123 U/L (ref 39–117)
ALT SERPL W P-5'-P-CCNC: 35 U/L (ref 1–33)
ANION GAP SERPL CALCULATED.3IONS-SCNC: 12 MMOL/L (ref 5–15)
AST SERPL-CCNC: 22 U/L (ref 1–32)
BASOPHILS # BLD AUTO: 0.08 10*3/MM3 (ref 0–0.2)
BASOPHILS NFR BLD AUTO: 0.6 % (ref 0–1.5)
BILIRUB SERPL-MCNC: 0.6 MG/DL (ref 0–1.2)
BUN SERPL-MCNC: 27 MG/DL (ref 6–20)
BUN/CREAT SERPL: 43.5 (ref 7–25)
CALCIUM SPEC-SCNC: 9.8 MG/DL (ref 8.6–10.5)
CHLORIDE SERPL-SCNC: 102 MMOL/L (ref 98–107)
CO2 SERPL-SCNC: 24 MMOL/L (ref 22–29)
CREAT SERPL-MCNC: 0.62 MG/DL (ref 0.57–1)
CRP SERPL-MCNC: 0.46 MG/DL (ref 0–0.5)
DEPRECATED RDW RBC AUTO: 53.7 FL (ref 37–54)
EOSINOPHIL # BLD AUTO: 0.29 10*3/MM3 (ref 0–0.4)
EOSINOPHIL NFR BLD AUTO: 2 % (ref 0.3–6.2)
ERYTHROCYTE [DISTWIDTH] IN BLOOD BY AUTOMATED COUNT: 16.1 % (ref 12.3–15.4)
GFR SERPL CREATININE-BSD FRML MDRD: 99 ML/MIN/1.73
GLOBULIN UR ELPH-MCNC: 4.3 GM/DL
GLUCOSE BLDC GLUCOMTR-MCNC: 148 MG/DL (ref 70–130)
GLUCOSE BLDC GLUCOMTR-MCNC: 159 MG/DL (ref 70–130)
GLUCOSE BLDC GLUCOMTR-MCNC: 174 MG/DL (ref 70–130)
GLUCOSE SERPL-MCNC: 111 MG/DL (ref 65–99)
HCT VFR BLD AUTO: 44.3 % (ref 34–46.6)
HGB BLD-MCNC: 13.8 G/DL (ref 12–15.9)
IMM GRANULOCYTES # BLD AUTO: 0.08 10*3/MM3 (ref 0–0.05)
IMM GRANULOCYTES NFR BLD AUTO: 0.6 % (ref 0–0.5)
LYMPHOCYTES # BLD AUTO: 5.23 10*3/MM3 (ref 0.7–3.1)
LYMPHOCYTES NFR BLD AUTO: 37 % (ref 19.6–45.3)
MAGNESIUM SERPL-MCNC: 1.8 MG/DL (ref 1.6–2.6)
MCH RBC QN AUTO: 28.2 PG (ref 26.6–33)
MCHC RBC AUTO-ENTMCNC: 31.2 G/DL (ref 31.5–35.7)
MCV RBC AUTO: 90.4 FL (ref 79–97)
MONOCYTES # BLD AUTO: 0.88 10*3/MM3 (ref 0.1–0.9)
MONOCYTES NFR BLD AUTO: 6.2 % (ref 5–12)
NEUTROPHILS NFR BLD AUTO: 53.6 % (ref 42.7–76)
NEUTROPHILS NFR BLD AUTO: 7.59 10*3/MM3 (ref 1.7–7)
NRBC BLD AUTO-RTO: 0 /100 WBC (ref 0–0.2)
PHOSPHATE SERPL-MCNC: 3.5 MG/DL (ref 2.5–4.5)
PLATELET # BLD AUTO: 348 10*3/MM3 (ref 140–450)
PMV BLD AUTO: 11.2 FL (ref 6–12)
POTASSIUM SERPL-SCNC: 3.9 MMOL/L (ref 3.5–5.2)
PREALB SERPL-MCNC: 26.2 MG/DL (ref 20–40)
PROT SERPL-MCNC: 8 G/DL (ref 6–8.5)
RBC # BLD AUTO: 4.9 10*6/MM3 (ref 3.77–5.28)
SODIUM SERPL-SCNC: 138 MMOL/L (ref 136–145)
TRIGL SERPL-MCNC: 101 MG/DL (ref 0–150)
WBC NRBC COR # BLD: 14.15 10*3/MM3 (ref 3.4–10.8)

## 2022-01-17 PROCEDURE — 84478 ASSAY OF TRIGLYCERIDES: CPT | Performed by: INTERNAL MEDICINE

## 2022-01-17 PROCEDURE — 84100 ASSAY OF PHOSPHORUS: CPT | Performed by: INTERNAL MEDICINE

## 2022-01-17 PROCEDURE — 94799 UNLISTED PULMONARY SVC/PX: CPT

## 2022-01-17 PROCEDURE — 99232 SBSQ HOSP IP/OBS MODERATE 35: CPT | Performed by: INTERNAL MEDICINE

## 2022-01-17 PROCEDURE — 94761 N-INVAS EAR/PLS OXIMETRY MLT: CPT

## 2022-01-17 PROCEDURE — 63710000001 INSULIN LISPRO (HUMAN) PER 5 UNITS: Performed by: INTERNAL MEDICINE

## 2022-01-17 PROCEDURE — 80053 COMPREHEN METABOLIC PANEL: CPT | Performed by: INTERNAL MEDICINE

## 2022-01-17 PROCEDURE — 84134 ASSAY OF PREALBUMIN: CPT | Performed by: INTERNAL MEDICINE

## 2022-01-17 PROCEDURE — 85025 COMPLETE CBC W/AUTO DIFF WBC: CPT | Performed by: INTERNAL MEDICINE

## 2022-01-17 PROCEDURE — 0 MAGNESIUM SULFATE 4 GM/100ML SOLUTION: Performed by: INTERNAL MEDICINE

## 2022-01-17 PROCEDURE — 86140 C-REACTIVE PROTEIN: CPT | Performed by: INTERNAL MEDICINE

## 2022-01-17 PROCEDURE — 63710000001 INSULIN DETEMIR PER 5 UNITS: Performed by: INTERNAL MEDICINE

## 2022-01-17 PROCEDURE — 82962 GLUCOSE BLOOD TEST: CPT

## 2022-01-17 PROCEDURE — 83735 ASSAY OF MAGNESIUM: CPT | Performed by: INTERNAL MEDICINE

## 2022-01-17 PROCEDURE — 97530 THERAPEUTIC ACTIVITIES: CPT

## 2022-01-17 RX ORDER — FUROSEMIDE 20 MG/1
TABLET ORAL
Qty: 15 TABLET | Refills: 0 | OUTPATIENT
Start: 2022-01-17

## 2022-01-17 RX ORDER — MAGNESIUM SULFATE HEPTAHYDRATE 40 MG/ML
4 INJECTION, SOLUTION INTRAVENOUS AS NEEDED
Status: DISCONTINUED | OUTPATIENT
Start: 2022-01-17 | End: 2022-01-23 | Stop reason: HOSPADM

## 2022-01-17 RX ORDER — FAMOTIDINE 20 MG/1
TABLET, FILM COATED ORAL
Qty: 60 TABLET | Refills: 1 | OUTPATIENT
Start: 2022-01-17

## 2022-01-17 RX ORDER — BUSPIRONE HYDROCHLORIDE 10 MG/1
TABLET ORAL
Qty: 60 TABLET | Refills: 1 | OUTPATIENT
Start: 2022-01-17

## 2022-01-17 RX ADMIN — MAGNESIUM SULFATE HEPTAHYDRATE 4 G: 40 INJECTION, SOLUTION INTRAVENOUS at 12:01

## 2022-01-17 RX ADMIN — INSULIN LISPRO 6 UNITS: 100 INJECTION, SOLUTION INTRAVENOUS; SUBCUTANEOUS at 18:11

## 2022-01-17 RX ADMIN — FAMOTIDINE 20 MG: 20 TABLET, FILM COATED ORAL at 08:21

## 2022-01-17 RX ADMIN — INSULIN LISPRO 6 UNITS: 100 INJECTION, SOLUTION INTRAVENOUS; SUBCUTANEOUS at 11:56

## 2022-01-17 RX ADMIN — INSULIN DETEMIR 9 UNITS: 100 INJECTION, SOLUTION SUBCUTANEOUS at 20:59

## 2022-01-17 RX ADMIN — AMLODIPINE BESYLATE 10 MG: 10 TABLET ORAL at 08:21

## 2022-01-17 RX ADMIN — ARFORMOTEROL TARTRATE 15 MCG: 15 SOLUTION RESPIRATORY (INHALATION) at 20:06

## 2022-01-17 RX ADMIN — DIAZEPAM 2 MG: 2 TABLET ORAL at 21:00

## 2022-01-17 RX ADMIN — TIOTROPIUM BROMIDE INHALATION SPRAY 2 PUFF: 3.12 SPRAY, METERED RESPIRATORY (INHALATION) at 07:27

## 2022-01-17 RX ADMIN — INSULIN DETEMIR 9 UNITS: 100 INJECTION, SOLUTION SUBCUTANEOUS at 08:26

## 2022-01-17 RX ADMIN — INSULIN LISPRO 2 UNITS: 100 INJECTION, SOLUTION INTRAVENOUS; SUBCUTANEOUS at 11:56

## 2022-01-17 RX ADMIN — GABAPENTIN 300 MG: 300 CAPSULE ORAL at 21:00

## 2022-01-17 RX ADMIN — ARFORMOTEROL TARTRATE 15 MCG: 15 SOLUTION RESPIRATORY (INHALATION) at 07:27

## 2022-01-17 RX ADMIN — ESCITALOPRAM OXALATE 20 MG: 20 TABLET ORAL at 08:21

## 2022-01-17 RX ADMIN — ALBUTEROL SULFATE 2.5 MG: 2.5 SOLUTION RESPIRATORY (INHALATION) at 15:06

## 2022-01-17 RX ADMIN — APIXABAN 5 MG: 5 TABLET, FILM COATED ORAL at 08:22

## 2022-01-17 RX ADMIN — METOPROLOL SUCCINATE 50 MG: 50 TABLET, EXTENDED RELEASE ORAL at 08:26

## 2022-01-17 RX ADMIN — DULOXETINE 60 MG: 60 CAPSULE, DELAYED RELEASE ORAL at 08:21

## 2022-01-17 RX ADMIN — APIXABAN 5 MG: 5 TABLET, FILM COATED ORAL at 20:57

## 2022-01-17 RX ADMIN — GABAPENTIN 300 MG: 300 CAPSULE ORAL at 14:43

## 2022-01-17 RX ADMIN — NYSTATIN: 100000 POWDER TOPICAL at 08:23

## 2022-01-17 RX ADMIN — BUSPIRONE HYDROCHLORIDE 10 MG: 5 TABLET ORAL at 08:22

## 2022-01-17 RX ADMIN — CASTOR OIL AND BALSAM, PERU 1 APPLICATION: 788; 87 OINTMENT TOPICAL at 20:58

## 2022-01-17 RX ADMIN — DIAZEPAM 2 MG: 2 TABLET ORAL at 14:43

## 2022-01-17 RX ADMIN — DIAZEPAM 2 MG: 2 TABLET ORAL at 05:55

## 2022-01-17 RX ADMIN — OXYCODONE HYDROCHLORIDE AND ACETAMINOPHEN 1 TABLET: 10; 325 TABLET ORAL at 14:45

## 2022-01-17 RX ADMIN — INSULIN LISPRO 6 UNITS: 100 INJECTION, SOLUTION INTRAVENOUS; SUBCUTANEOUS at 08:20

## 2022-01-17 RX ADMIN — NYSTATIN: 100000 POWDER TOPICAL at 20:58

## 2022-01-17 RX ADMIN — BUSPIRONE HYDROCHLORIDE 10 MG: 5 TABLET ORAL at 20:58

## 2022-01-17 RX ADMIN — CASTOR OIL AND BALSAM, PERU 1 APPLICATION: 788; 87 OINTMENT TOPICAL at 08:23

## 2022-01-17 RX ADMIN — FAMOTIDINE 20 MG: 20 TABLET, FILM COATED ORAL at 20:58

## 2022-01-17 RX ADMIN — GABAPENTIN 300 MG: 300 CAPSULE ORAL at 05:55

## 2022-01-17 NOTE — CASE MANAGEMENT/SOCIAL WORK
Continued Stay Note  [facility]     Patient Name: Dani Ziegler  MRN: [id number]  Today's Date: 1/17/2022    Admit Date: 12/30/2021     Discharge Plan     Row Name 01/17/22 1414       Plan    Plan Comments Intensivist in Multidisciplinary rounds place inpatient psychiatrist order for pt as she has been depressed and could use medication recommendations for this. MSW called the [facility] and pt has been scheduled with Coral Gonsales for telehealth assessment on Tuesday, tomorrow at 2:00pm. MSW spoke with pt at bedside and updated her on this. MSW also spoke with pt about discharge plan. Pt is agreeable to rehab and reports she is only interested in [facility] at this time. MSW called referral to Carolynn with [facility].               Discharge Codes    No documentation.                     CARLA Escalante

## 2022-01-17 NOTE — PROGRESS NOTES
Clinical Nutrition   Reason For Visit: MDR, Follow-up protocol    Patient Name: Dani Ziegler  YOB: 1965  MRN: [id number]  Date of Encounter: 01/17/22 10:24 EST  Admission date: 12/30/2021      Nutrition Assessment     Admission Problem List:  Acute on chronic respiratory failure  Sepsis  CAP (community acquired pneumonia)  Acute 7th rib fracture      Applicable medical tests/procedures since admission:  (12/31) intubated  (1/1) EN initiated via OG tube per intensivist  (1/3) EN adjusted per RD  (1/7) free water of 30 ml/hr discontinued due to hyponatremia  (1/11) extubated, OGT removed, ND tube placed  (1/13) SLP eval - NPO  (1/14) SLP FEES - mechanical soft/no mixed consistencies/nectar thick liquids;  ND tube removed and EN discontinued      PMH: She  has a past medical history of Abnormal weight gain, Acute UTI, Anxiety, Arthritis involving multiple sites, Chronic obstructive pulmonary disease (HCC), Chronic pain, Current every day smoker, Depression, Diabetes mellitus (HCC), Diarrhea, Dysuria, Edema, lower extremity, Fever, Head injury, Hypertension, Intervertebral disc disorder, Left bundle branch block, Leukocytosis, Long term current use of methadone for pain control, Mass of right breast, Nausea, Obesity, Overactive bladder, Peripheral neuropathy, Superficial phlebitis, Upper respiratory infection, Urinary incontinence, Vitamin D deficiency, and Vomiting.   PSxH: She  has a past surgical history that includes Bladder surgery; Cholecystectomy; and Hip Arthroscopy (Right, 10/29/2020).        Reported/Observed/Food/Nutrition Related History   1/17) RN reports patient ate about 25% of breakfast this morning. Patient has been tearful as her father as well as a friend have recently passed away. Unclear if patient has been consuming any of the Ensure HP/Magic Cup supplements since (1/14). RD attempted to speak with patient but patient very sleepy.    1/14) Patient tolerating EN at goal rate. On  HFNC. Intermittent confusion continues. BM x2 within past 24 hours per I/Os. Plan for insulin adjustment. Patient passed FEES this afternoon. Got lunch tray, ate just a little bit per RN. Feeding tube removed and Precedex off per RN.    1/12) Per MDR discussion - patient continues on fentanyl and precedex. Yesterday EN was placed on hold temporarily for planned extubation. Yesterday patient was extubated, OGT removed, ND tube placed for EN, and EN resumed @ 65 ml/hr. EN currently running @ 65 ml/hr with plans to increase to goal rate 75 ml/hr this morning. Tolerating EN. BM x4 within past 24 hours per I/Os. RN to wean fentanyl - SLP to evaluate swallowing later today once patient has been off of fentanyl for a while so she can participate.    1/10) Per MDR discussion - patient intubated and sedated on fentanyl and precedex. Propofol discontinued overnight. Versed weaned off this morning. 2 soft small bowel movements within the past 24 hours per I/Os / RN notes. Continues to tolerate EN. Still receiving no free water. Intensivist would like to continue without free water today. Serum Na 139 this morning.     1/6) Per MDR discussion - patient intubated and sedated. Continues to tolerate EN at goal rate of 50 ml/hr. No BM this admission per I/Os.    1/5) Per MDR discussion - patient intubated and sedated. Has OG tube. Receiving EN @ 65 ml/hr with 30 ml Q 2 hours and tolerating. No bowel movement this admission per I/Os.      Anthropometrics   Height: 67 in  Weight: 366 lbs (bed scale weight 1/5 per RN) --- note fluid still present and bed had other equipment on it when weighed --- need a zeroed bed scale weight  BMI: 57.3  BMI classification: Obese Class III extreme obesity: > or equal to 40kg/m2   IBW: 135 lbs    UBW:  Last 15 Recorded Weights  Weight Weight (kg) Weight (lbs) Weight Method VISIT REPORT   12/30/2021 174.635 kg 385 lb Stated -   8/24/2021 133.358 kg 294 lb - Report   7/16/2021 139.708 kg 308 lb - -    6/12/2021 128.368 kg 283 lb - -   6/11/2021 129.502 kg 285 lb 8 oz Standing scale -   6/10/2021 132.496 kg 292 lb 1.6 oz Bed scale -   6/9/2021 130.817 kg 288 lb 6.4 oz Bed scale -   6/8/2021 131.362 kg 289 lb 9.6 oz Standing scale -   6/7/2021 133.856 kg 295 lb 1.6 oz Bed scale -   6/6/2021 134.446 kg 296 lb 6.4 oz Bed scale -   6/4/2021 132.269 kg 291 lb 9.6 oz Bed scale -   6/3/2021 136.533 kg 301 lb Stated -   5/25/2021 130.182 kg 287 lb - Report   1/20/2021 143.881 kg 317 lb 3.2 oz - Report       ---RD notes stated wt on admission (385 lbs) not consistent with EMR weight hx so RD suspects stated weight is inaccurate.      Labs reviewed   Labs reviewed: Yes    Medications reviewed   Medications reviewed: Yes  Scheduled: pepcid, insulin  GTT: precedex    Needs Assessment (1/10)   Height used: 67 in/170.2 cm  Weight used: 294 lbs/133.5 kg (actual wt estimated by RD);   135 lbs/61.5 kg (IBW)    Estimated Calories needs: ~1600 calories daily  11-14 kcal/kg actual wt = [id number]    Estimated Protein needs: ~123 g protein daily  2.0-2.5 g/kg IBW = 123-154    Estimated Fluid needs: ~1500 ml fluid daily/per clinical status      Current Nutrition Prescription   PO: Diet Dysphagia; IV - Mechanical Soft No Mixed Consistencies; Nectar / Syrup Thick; Cardiac, Consistent Carbohydrate   Oral Nutrition Supplement: Ensure HP 1x daily, Magic Cup 1x daily    Average PO intake: 50% x 6 meals    Nutrition Diagnosis     1/2/2022, 1/3, 1/14, 1/17  Problem Inadequate oral intake   Etiology Clinical condition/dysphagia modified diet   Signs/Symptoms PO intake: 50% x 6 meals   Status: ongoing/improving     1/2/2022  Problem Food and nutrition knowledge deficit   Etiology MICHELINE   Signs/Symptoms BMI > 60, MD consult   Status: education pending - pt not appropriate at this time (1/17)      Goal:   General: Nutrition to support treatment  PO: Increase intake, Continue positive trend    Intervention   Intervention: Follow treatment progress,  Care plan reviewed, Encourage intake    -Continue Ensure HP and Magic Cup.    Monitoring/Evaluation:   Monitoring/Evaluation: Per protocol, I&O, PO intake, Supplement intake, Pertinent labs, Weight, GI status, Symptoms, POC/GOC, Swallow function    Prabha Johnson RD  Time Spent: 20 min

## 2022-01-17 NOTE — PROGRESS NOTES
INTENSIVIST   PROGRESS NOTE     Hospital:  LOS: 17 days      BERTO Louise 56 y.o. female is followed for: Shortness of Breath       Respiratory Failure Type 2    Uncontrolled type 2 diabetes mellitus with hyperglycemia, without long-term current use of insulin (HCC)    Sepsis (HCC)    As an Intensivist, we provide an integrated approach to the ICU patient and family, medical management of comorbid conditions, including but not limited to electrolytes, glycemic control, organ dysfunction, lead interdisciplinary rounds and coordinate the care with all other services, including those from other specialists.     Interval History:  She remains depressed and tearful.    No dyspnea.    Dysphagia diet.    The patient qualifies to receive the vaccine, but they have not yet received it.     Temp  Min: 97.5 °F (36.4 °C)  Max: 98.1 °F (36.7 °C)       History     Last Reviewed by Beverly Jean Baptiste, PT on 1/15/2022 at 10:11 AM    Sections Reviewed    Medical, Surgical, Family, Tobacco, Custom, Alcohol, Drug Use, Sexual   Activity      Problem list reviewed by Benson Pelaez MD on 1/1/2022 at  4:56 PM  Medicines reviewed by Benson Pelaez MD on 1/1/2022 at  4:56 PM  Allergies reviewed by Benson Pelaez MD on 1/1/2022 at  4:55 PM       The patient's relevant past medical, surgical and social history were reviewed and updated in Epic as appropriate.        O     Vitals:  Temp: 98.1 °F (36.7 °C) (01/17/22 0800) Temp  Min: 97.5 °F (36.4 °C)  Max: 98.1 °F (36.7 °C)   Temp core:      BP: 137/83 (01/17/22 0900) BP  Min: 127/87  Max: 161/99   Pulse: 99 (01/17/22 0900) Pulse  Min: 79  Max: 113   Resp: 18 (01/17/22 0900) Resp  Min: 16  Max: 20   SpO2: 91 % (01/17/22 0900) SpO2  Min: 90 %  Max: 97 %   Device: humidified, nasal cannula (01/17/22 0900)    Flow Rate: 4 (01/17/22 0900) Flow (L/min)  Min: 4  Max: 6     Intake/Ouptut 24 hrs (7:00AM - 6:59 AM)  Intake & Output (last 3 days)       01/14 0701  01/15 0700 01/15 0701  01/16 0700  01/16 0701  01/17 0700 01/17 0701 01/18 0700    P.O. 420 960 720     I.V. (mL/kg) 165 (1)       Other        NG/GT        Total Intake(mL/kg) 585 (3.5) 960 (5.8) 720 (4.3)     Urine (mL/kg/hr) 1125 (0.3) 1020 (0.3) 600 (0.2)     Stool 0  0     Total Output 1125 1020 600     Net -540 -60 +120             Stool Unmeasured Occurrence 1 x  1 x           Wt Readings from Last 3 Encounters:   01/09/22 (!) 166 kg (365 lb 11.2 oz)   08/24/21 133 kg (294 lb)   07/16/21 (!) 140 kg (308 lb)     Physical Examination  Telemetry:  Rhythm: normal sinus rhythm (01/17/22 0600)     QTc Interval (Sec): 0.46 (01/05/22 2000)   Constitutional:  No acute distress.   Cardiovascular: RRR.   Normal heart sounds.  No murmurs, gallop or rub.   Respiratory: Normal breath sounds  No adventitious sounds.   Abdominal:  Soft with no tenderness.  No distension.   No HSM.   Extremities: Warm.  Dry.  No cyanosis.  No Edema   Neurological:   Awake.  Best Eye Response: 4-->(E4) spontaneous (01/17/22 0600)  Best Motor Response: 6-->(M6) obeys commands (01/17/22 0600)  Best Verbal Response: 4-->(V4) confused (01/17/22 0600)  [place] Coma Scale Score: 14 (01/17/22 0600)     Lines  R PICC    Results Reviewed:  Laboratory  Microbiology  Radiology  Pathology    Hematology:  Results from last 7 days   Lab Units 01/17/22  0227 01/16/22  0255   WBC 10*3/mm3 14.15* 14.78*   HEMOGLOBIN g/dL 13.8 13.9   MCV fL 90.4 90.2   PLATELETS 10*3/mm3 348 356   NEUTROS ABS 10*3/mm3 7.59* 8.96*   LYMPHS ABS 10*3/mm3 5.23* 4.57*   EOS ABS 10*3/mm3 0.29 0.19     Chemistry:  Estimated Creatinine Clearance: 162.8 mL/min (by C-G formula based on SCr of 0.62 mg/dL).  Results from last 7 days   Lab Units 01/17/22 0227 01/16/22 0255   SODIUM mmol/L 138 136   POTASSIUM mmol/L 3.9 4.2   CHLORIDE mmol/L 102 102   CO2 mmol/L 24.0 23.0   BUN mg/dL 27* 34*   CREATININE mg/dL 0.62 0.69   GLUCOSE mg/dL 111* 135*     Results from last 7 days   Lab Units 01/17/22 0227 01/16/22  0255  01/15/22  0258 01/15/22  0258   CALCIUM mg/dL 9.8 9.9   < > 10.1   MAGNESIUM mg/dL 1.8  --   --  2.4   PHOSPHORUS mg/dL 3.5  --   --  4.5    < > = values in this interval not displayed.     Results from last 7 days   Lab Units 01/17/22  0227 01/15/22  0258   BILIRUBIN mg/dL 0.6 0.7   AST (SGOT) U/L 22 27   ALT (SGPT) U/L 35* 32   ALK PHOS U/L 123* 143*     COVID-19  Lab Results   Component Value Date    COVID19 Not Detected 12/30/2021    COVID19 Not Detected 06/03/2021       Images:  No radiology results for the last day    Results: Reviewed.  I reviewed the patient's new laboratory and imaging results.  I independently reviewed the patient's new images.    Medications: Reviewed.    Assessment/Plan   A / P     Dani is a 56 y.o. female admitted on 12/30/2021 with Acute on chronic respiratory failure with hypoxia and hypercapnia (HCC) [J96.21, J96.22]:    1. Respiratory Failure type 2  1. Invasive Mechanical Ventilation   2. Intubated 12/31/21  3. Extubated 01/11/22  4. Compensated Respiratory Acidosis as per ABG on 12/31/21 and 01/01/22  5. COPD on home O2  1. Previous Trach in 2019 and 2020.  6. Pneumonia this admission  2. Aute left seventh rib fracture.  3. Previous DVT on APIxaban  4. HTN  5. Chronic pain syndrome  6. Anxiety/Depression  7. Obesity III. Body mass index is 57.26 kg/m².   8. T2DM    Results from last 7 days   Lab Units 01/16/22  2100 01/16/22  1607 01/16/22  1103 01/16/22  0654 01/15/22  2055 01/15/22  1628   GLUCOSE mg/dL 152* 172* 156* 130 226* 159*     Lab Results   Lab Value Date/Time    HGBA1C 5.7 (H) 08/24/2021 1522    HGBA1C 6.00 (H) 06/03/2021 1902    HGBA1C 6.0 (H) 05/25/2021 1658    HGBA1C 8.3 01/11/2021 0000    HGBA1C 7.30 (H) 03/28/2018 0514       Nutrition Support: Patient isn't on Tube Feeding   Modulars: Patient doesn't have any tube feeding modular orders   Diet: Diet Dysphagia; IV - Mechanical Soft No Mixed Consistencies; Nectar / Syrup Thick; Cardiac, Consistent Carbohydrate    Advance Directives: Code Status and Medical Interventions:   Ordered at: 12/31/21 0151     Code Status (Patient has no pulse and is not breathing):    CPR (Attempt to Resuscitate)     Medical Interventions (Patient has pulse or is breathing):    Full Support        Plan:    1. Continue APIxaban   2. Continue LAMA, LABA  3. Resume Duloxetine DR but at 60 mg/d (home dose = 120 mg)  4. Psych Consult for depression and management of her medications.  5. Goal: Glucose < 180 mg/dL.  1. Insulin basal, prandial and correction   2. No changes on current doses.  6. Disposition: Transfer to Telemetry Unit   1. CM: Inpatient Rehab    Plan of care and goals reviewed during interdisciplinary rounds.  I discussed the patient's findings and my recommendations with nursing staff    Level of Risk is High due to:  illness with threat to life or bodily function.     Time: 25 minutes, in direct patient care, with the patient and/or in the ICU or de leon coordinating care with other health care providers.     I have spent > 50% percent of this time, counseling and discussing management.     OK to floor.    Hospitalist Team will assist with medical management, and assume Primary Attending, once on the Floor.    Thank you.    [x]  Primary Attending  []  Consultant

## 2022-01-17 NOTE — PLAN OF CARE
Goal Outcome Evaluation:  Plan of Care Reviewed With: patient   Pt A&O X4. Sad and tearful at times. Provided therapeutic conversation. Sad she did not have suicidal thoughts. Lungs with wheezes at times. Oxygen at 4LNC with sats >90%. HR 90-low 100's. SR with occasional PVCs. Walked in room with assist of 2. Up to chair most of day. Skin folds excoriated. Report give to Delia Carrera.

## 2022-01-17 NOTE — THERAPY TREATMENT NOTE
Patient Name: Dani Ziegler  : 1965    MRN: [id number]                              Today's Date: 2022       Admit Date: 2021    Visit Dx:     ICD-10-CM ICD-9-CM   1. Acute exacerbation of chronic obstructive pulmonary disease (COPD) ([facility])  J44.1 491.21   2. Acute respiratory distress  R06.03 518.82   3. Hypoxia  R09.02 799.02   4. Pneumonia of both lungs due to infectious organism, unspecified part of lung  J18.9 483.8   5. Sepsis with acute hypoxic respiratory failure without septic shock, due to unspecified organism ([facility])  A41.9 038.9    R65.20 995.91    J96.01 518.81   6. Dysphagia, unspecified type  R13.10 787.20     Patient Active Problem List   Diagnosis   • Uncontrolled type 2 diabetes mellitus with hyperglycemia, without long-term current use of insulin ([facility])   • Vitamin D deficiency   • Peripheral neuropathy   • Adiposity   • Left bundle branch block (LBBB)   • Essential hypertension   • Chronic pain   • Chronic obstructive pulmonary disease ([facility])   • Anxiety   • Depression   • Arthritis involving multiple sites   • Leukocytosis   • Mixed hyperlipidemia   • Special screening for malignant neoplasms, colon   • Cellulitis of left lower extremity   • Hyperkalemia   • Acute on chronic renal insufficiency   • Sepsis ([facility])   • CHF (congestive heart failure) ([facility])   • Cellulitis of left leg   • Lumbar disc disease   • Diabetic peripheral neuropathy ([facility])   • COVID-19 virus infection   • CAP (community acquired pneumonia)   • Polypharmacy   • Morbid obesity with BMI of 45.0-49.9, adult ([facility])   • A/C Respiratory Failure Type 2   • History of recurrent deep vein thrombosis (DVT)   • Morbid obesity with BMI of 60.0-69.9, adult ([facility])     Past Medical History:   Diagnosis Date   • Abnormal weight gain    • Acute UTI    • Anxiety    • Arthritis involving multiple sites    • Chronic obstructive pulmonary disease ([facility])    • Chronic pain    • Current every day smoker    • Depression    • Diabetes  mellitus (HCC)    • Diarrhea    • Dysuria    • Edema, lower extremity    • Fever    • Head injury    • Hypertension    • Intervertebral disc disorder     Degeneration of intervertebral disc, site unspecified (722.6)   • Left bundle branch block    • Leukocytosis    • Long term current use of methadone for pain control    • Mass of right breast    • Nausea    • Obesity    • Overactive bladder    • Peripheral neuropathy    • Superficial phlebitis     right medial calf   • Upper respiratory infection    • Urinary incontinence    • Vitamin D deficiency    • Vomiting      Past Surgical History:   Procedure Laterality Date   • BLADDER SURGERY      pubovaginal sling   • CHOLECYSTECTOMY     • HIP ARTHROSCOPY Right 10/29/2020      General Information     Row Name 01/17/22 1429          Physical Therapy Time and Intention    Document Type therapy note (daily note)  -KG     Mode of Treatment physical therapy  -KG     Row Name 01/17/22 1429          General Information    Existing Precautions/Restrictions cardiac; fall; oxygen therapy device and L/min  -KG     Row Name 01/17/22 1429          Cognition    Orientation Status (Cognition) oriented to; person; place  -KG     Row Name 01/17/22 1429          Safety Issues, Functional Mobility    Safety Issues Affecting Function (Mobility) ability to follow commands; awareness of need for assistance; insight into deficits/self-awareness; safety precaution awareness; safety precautions follow-through/compliance; sequencing abilities  -KG     Impairments Affecting Function (Mobility) balance; cognition; coordination; endurance/activity tolerance; postural/trunk control; shortness of breath; strength  -KG     Cognitive Impairments, Mobility Safety/Performance attention; awareness, need for assistance; insight into deficits/self-awareness; safety precaution awareness; safety precaution follow-through  -KG           User Key  (r) = Recorded By, (t) = Taken By, (c) = Cosigned By    Initials  Name Provider Type    KG Nohemi Torres, PT Physical Therapist               Mobility     Row Name 01/17/22 1430          Bed Mobility    Bed Mobility supine-sit  -KG     Supine-Sit Marion (Bed Mobility) contact guard; verbal cues  -KG     Assistive Device (Bed Mobility) bed rails; head of bed elevated  -KG     Comment (Bed Mobility) VC's for sequencing. Pt required increased encouragement to complete.  -KG     Row Name 01/17/22 1430          Transfers    Comment (Transfers) STS x2 from EOB. VC's for sequencing and safe hand placement. Pt c/o dizziness and returned to seated position. Encouragement provided for second STS transfer.  -KG     Row Name 01/17/22 1430          Sit-Stand Transfer    Sit-Stand Marion (Transfers) minimum assist (75% patient effort); verbal cues  -KG     Assistive Device (Sit-Stand Transfers) walker, front-wheeled  -KG     Row Name 01/17/22 1430          Gait/Stairs (Locomotion)    Marion Level (Gait) minimum assist (75% patient effort); verbal cues  -KG     Assistive Device (Gait) walker, front-wheeled  -KG     Distance in Feet (Gait) 40  -KG     Deviations/Abnormal Patterns (Gait) base of support, wide; joaquin decreased; festinating/shuffling; stride length decreased  -KG     Bilateral Gait Deviations forward flexed posture; heel strike decreased  -KG     Comment (Gait/Stairs) Pt demonstrated step to gait pattern with slow joaquin and wide MARK. Max cueing required for upright posture with increased stride length and proper breathing technique. Pt with frequent pauses between strides; required max cueing for continued forward ambulation. Distance limited by elevated HR and poor balance.  -KG           User Key  (r) = Recorded By, (t) = Taken By, (c) = Cosigned By    Initials Name Provider Type    Nohemi Keller, PT Physical Therapist               Obj/Interventions     Row Name 01/17/22 1433          Balance    Balance Assessment sitting static balance;  standing static balance; standing dynamic balance  -KG     Static Sitting Balance WFL; sitting, edge of bed  -KG     Static Standing Balance mild impairment; supported; standing  -KG     Dynamic Standing Balance mild impairment; supported; standing  -KG           User Key  (r) = Recorded By, (t) = Taken By, (c) = Cosigned By    Initials Name Provider Type    KG Nohemi Torres N, PT Physical Therapist               Goals/Plan    No documentation.                Clinical Impression     Row Name 01/17/22 1434          Pain    Additional Documentation Pain Scale: Numbers Pre/Post-Treatment (Group)  -KG     Row Name 01/17/22 1434          Pain Scale: Numbers Pre/Post-Treatment    Pretreatment Pain Rating 0/10 - no pain  -KG     Posttreatment Pain Rating 0/10 - no pain  -KG     Row Name 01/17/22 1434          Plan of Care Review    Plan of Care Reviewed With patient  -KG     Progress improving  -KG     Outcome Summary Pt increased ambulation distance to 40ft with Alcon and B UE support on tele monitor. Pt required max cueing for upright posture with increased stride length. Several standing rest breaks. Pt required increased encouragement for continued forward ambulation. Distance limited by elevated HR and poor balance. Pt continues to be limited by confusion. Continue to progress as appropriate.  -KG     Row Name 01/17/22 1434          Vital Signs    Pre Systolic BP Rehab 132  -KG     Pre Treatment Diastolic BP 87  -KG     Post Systolic BP Rehab 131  -KG     Post Treatment Diastolic BP 97  -KG     Pretreatment Heart Rate (beats/min) 107  -KG     Posttreatment Heart Rate (beats/min) 108  -KG     Pre SpO2 (%) 95  -KG     O2 Delivery Pre Treatment supplemental O2  -KG     Post SpO2 (%) 93  -KG     O2 Delivery Post Treatment supplemental O2  -KG     Pre Patient Position Supine  -KG     Intra Patient Position Standing  -KG     Post Patient Position Sitting  -KG     Row Name 01/17/22 1434          Positioning and  Restraints    Pre-Treatment Position in bed  -KG     Post Treatment Position chair  -KG     In Chair notified nsg; reclined; call light within reach; encouraged to call for assist; exit alarm on; RUE elevated; LUE elevated; legs elevated  -KG           User Key  (r) = Recorded By, (t) = Taken By, (c) = Cosigned By    Initials Name Provider Type    Nohemi Keller, PT Physical Therapist               Outcome Measures     Row Name 01/17/22 1435 01/17/22 1000       How much help from another person do you currently need...    Turning from your back to your side while in flat bed without using bedrails? 3  -KG 3  -SM    Moving from lying on back to sitting on the side of a flat bed without bedrails? 3  -KG 3  -SM    Moving to and from a bed to a chair (including a wheelchair)? 3  -KG 2  -SM    Standing up from a chair using your arms (e.g., wheelchair, bedside chair)? 3  -KG 3  -SM    Climbing 3-5 steps with a railing? 2  -KG 2  -SM    To walk in hospital room? 2  -KG 2  -SM    AM-PAC 6 Clicks Score (PT) 16  -KG 15  -SM    Row Name 01/17/22 0800          How much help from another person do you currently need...    Turning from your back to your side while in flat bed without using bedrails? 3  -SM     Moving from lying on back to sitting on the side of a flat bed without bedrails? 3  -SM     Moving to and from a bed to a chair (including a wheelchair)? 2  -SM     Standing up from a chair using your arms (e.g., wheelchair, bedside chair)? 3  -SM     Climbing 3-5 steps with a railing? 2  -SM     To walk in hospital room? 2  -SM     AM-PAC 6 Clicks Score (PT) 15  -SM     Row Name 01/17/22 1435          Functional Assessment    Outcome Measure Options AM-PAC 6 Clicks Basic Mobility (PT)  -KG           User Key  (r) = Recorded By, (t) = Taken By, (c) = Cosigned By    Initials Name Provider Type    Christina Oliva RN Registered Nurse    Nohemi Keller, PT Physical Therapist                              Physical Therapy Education                 Title: PT OT SLP Therapies (In Progress)     Topic: Physical Therapy (In Progress)     Point: Mobility training (In Progress)     Learning Progress Summary           Patient Acceptance, E, NR by KG at 1/17/2022 1018    Acceptance, E, NR by LOPEZ at 1/16/2022 0800    Acceptance, E, NR by LOPEZ at 1/15/2022 0925                   Point: Home exercise program (In Progress)     Learning Progress Summary           Patient Acceptance, E, NR by KG at 1/17/2022 1018    Acceptance, E, NR by LOPEZ at 1/16/2022 0800    Acceptance, E, NR by LOPEZ at 1/15/2022 0925                   Point: Body mechanics (In Progress)     Learning Progress Summary           Patient Acceptance, E, NR by KG at 1/17/2022 1018    Acceptance, E, NR by LOPEZ at 1/16/2022 0800    Acceptance, E, NR by LOPEZ at 1/15/2022 0925                   Point: Precautions (In Progress)     Learning Progress Summary           Patient Acceptance, E, NR by KG at 1/17/2022 1018    Acceptance, E, NR by LOPEZ at 1/16/2022 0800    Acceptance, E, NR by LOPEZ at 1/15/2022 0925                               User Key     Initials Effective Dates Name Provider Type Discipline    LOPEZ 06/16/21 -  Beverly Jean Baptiste, PT Physical Therapist PT     05/22/20 -  Nohemi Torres PT Physical Therapist PT              PT Recommendation and Plan     Plan of Care Reviewed With: patient  Progress: improving  Outcome Summary: Pt increased ambulation distance to 40ft with Alcon and B UE support on tele monitor. Pt required max cueing for upright posture with increased stride length. Several standing rest breaks. Pt required increased encouragement for continued forward ambulation. Distance limited by elevated HR and poor balance. Pt continues to be limited by confusion. Continue to progress as appropriate.     Time Calculation:    PT Charges     Row Name 01/17/22 1018             Time Calculation    Start Time 1018  -KG      PT Received On 01/17/22  -KG      PT  Goal Re-Cert Due Date 01/25/22  -KG              Time Calculation- PT    Total Timed Code Minutes- PT 23 minute(s)  -KG              Timed Charges    83134 - PT Therapeutic Activity Minutes 23  -KG              Total Minutes    Timed Charges Total Minutes 23  -KG       Total Minutes 23  -KG            User Key  (r) = Recorded By, (t) = Taken By, (c) = Cosigned By    Initials Name Provider Type    KG Nohemi Torres, PT Physical Therapist              Therapy Charges for Today     Code Description Service Date Service Provider Modifiers Qty    90536240257 HC PT THERAPEUTIC ACT EA 15 MIN 1/17/2022 Nohemi Torres, PT GP 2          PT G-Codes  Outcome Measure Options: AM-PAC 6 Clicks Basic Mobility (PT)  AM-PAC 6 Clicks Score (PT): 16  AM-PAC 6 Clicks Score (OT): 12    Melina Torres PT  1/17/2022

## 2022-01-17 NOTE — TELEPHONE ENCOUNTER
Patient is currently in the hospital and denied medication at this time since we do not know what medication she will be on at discharge

## 2022-01-17 NOTE — PROGRESS NOTES
Daily chart review complete.  Phychiatric consult pending.  NN will attempt to see patient at [facility] tomorrow for interview and education.  NN rounded today to attempt these things but was not able to speak with patient at that time as [atient was seen crying in room with another staff member present.

## 2022-01-17 NOTE — NURSING NOTE
WOC consult for:  Follow-up to back, buttocks, MASD     Assessment and Care provided:   1. Small area on the lower back still open, white-yellow moist but blanchable.  Venelex and Z guard applied  2. Linear previous pressure injuries noticed red dry and blanchable.  Venelex and Z guard applied as well  3. Severe MASD under pannus and in bilateral groin.  Patient is incontinent of urine and had a large episode of incontinence.  Pure wick applied, after cleansing with foam soap and blue wipes we applied nystatin powder and Interdry.  4. Bilateral heels intact and blanching     Recommendation(s):  Attempt to use Interdry under pannus and bilateral groin, attempt to use pure wick to collect incontinent urine.  *Maintain good skin care, keep dry, turn q 2 hr with wedge if possible, keep heels elevated and offloaded with heel boots.    *Apply z-guard to bottom and bony prominences daily and as needed with incontinence episodes.  *Follow C.A.R.E protocol for medical devices (Bipap, lucero, Ng tube, etc)      All skin interventions in place. Patient is on bariatric Low Air Loss mattress. Head to toe assessment completed. WOC orders placed.  Discussed plan of care with RN. Thank you for consulting [facility].  Will  follow.  Please call with questions or if needs arise.

## 2022-01-17 NOTE — TELEPHONE ENCOUNTER
Came to the hospital with shortness of breath and altered mental status on 12/31/21. She has been on ICU but is doing better and is going to be moved to a floor. She had to be incubated at one point and after she was extubated she has been very depressed. She cried every time the Provider come to see her. Her father and friend has recently passed away.     Requesting consult for medication recommendations.           Consult scheduled for 1/18/22 2:00pm

## 2022-01-17 NOTE — PLAN OF CARE
Goal Outcome Evaluation:  Plan of Care Reviewed With: patient        Progress: improving  Outcome Summary: Pt increased ambulation distance to 40ft with Alcon and B UE support on tele monitor. Pt required max cueing for upright posture with increased stride length. Several standing rest breaks. Pt required increased encouragement for continued forward ambulation. Distance limited by elevated HR and poor balance. Pt continues to be limited by confusion. Continue to progress as appropriate.

## 2022-01-18 LAB
ANION GAP SERPL CALCULATED.3IONS-SCNC: 10 MMOL/L (ref 5–15)
BASOPHILS # BLD MANUAL: 0 10*3/MM3 (ref 0–0.2)
BASOPHILS NFR BLD MANUAL: 0 % (ref 0–1.5)
BUN SERPL-MCNC: 24 MG/DL (ref 6–20)
BUN/CREAT SERPL: 36.4 (ref 7–25)
CALCIUM SPEC-SCNC: 9.3 MG/DL (ref 8.6–10.5)
CHLORIDE SERPL-SCNC: 100 MMOL/L (ref 98–107)
CO2 SERPL-SCNC: 26 MMOL/L (ref 22–29)
CREAT SERPL-MCNC: 0.66 MG/DL (ref 0.57–1)
DEPRECATED RDW RBC AUTO: 52.2 FL (ref 37–54)
EOSINOPHIL # BLD MANUAL: 0.31 10*3/MM3 (ref 0–0.4)
EOSINOPHIL NFR BLD MANUAL: 2 % (ref 0.3–6.2)
ERYTHROCYTE [DISTWIDTH] IN BLOOD BY AUTOMATED COUNT: 15.8 % (ref 12.3–15.4)
GFR SERPL CREATININE-BSD FRML MDRD: 92 ML/MIN/1.73
GLUCOSE BLDC GLUCOMTR-MCNC: 116 MG/DL (ref 70–130)
GLUCOSE BLDC GLUCOMTR-MCNC: 122 MG/DL (ref 70–130)
GLUCOSE BLDC GLUCOMTR-MCNC: 142 MG/DL (ref 70–130)
GLUCOSE BLDC GLUCOMTR-MCNC: 142 MG/DL (ref 70–130)
GLUCOSE BLDC GLUCOMTR-MCNC: 167 MG/DL (ref 70–130)
GLUCOSE BLDC GLUCOMTR-MCNC: 171 MG/DL (ref 70–130)
GLUCOSE SERPL-MCNC: 106 MG/DL (ref 65–99)
HCT VFR BLD AUTO: 44.3 % (ref 34–46.6)
HGB BLD-MCNC: 13.7 G/DL (ref 12–15.9)
LYMPHOCYTES # BLD MANUAL: 4.13 10*3/MM3 (ref 0.7–3.1)
LYMPHOCYTES NFR BLD MANUAL: 11 % (ref 5–12)
MAGNESIUM SERPL-MCNC: 2.1 MG/DL (ref 1.6–2.6)
MCH RBC QN AUTO: 27.7 PG (ref 26.6–33)
MCHC RBC AUTO-ENTMCNC: 30.9 G/DL (ref 31.5–35.7)
MCV RBC AUTO: 89.7 FL (ref 79–97)
MONOCYTES # BLD: 1.68 10*3/MM3 (ref 0.1–0.9)
NEUTROPHILS # BLD AUTO: 9.17 10*3/MM3 (ref 1.7–7)
NEUTROPHILS NFR BLD MANUAL: 60 % (ref 42.7–76)
PLAT MORPH BLD: NORMAL
PLATELET # BLD AUTO: 333 10*3/MM3 (ref 140–450)
PMV BLD AUTO: 11 FL (ref 6–12)
POTASSIUM SERPL-SCNC: 4.1 MMOL/L (ref 3.5–5.2)
RBC # BLD AUTO: 4.94 10*6/MM3 (ref 3.77–5.28)
RBC MORPH BLD: NORMAL
SCAN SLIDE: NORMAL
SODIUM SERPL-SCNC: 136 MMOL/L (ref 136–145)
VARIANT LYMPHS NFR BLD MANUAL: 20 % (ref 19.6–45.3)
VARIANT LYMPHS NFR BLD MANUAL: 7 % (ref 0–5)
WBC MORPH BLD: NORMAL
WBC NRBC COR # BLD: 15.29 10*3/MM3 (ref 3.4–10.8)

## 2022-01-18 PROCEDURE — 94761 N-INVAS EAR/PLS OXIMETRY MLT: CPT

## 2022-01-18 PROCEDURE — 99221 1ST HOSP IP/OBS SF/LOW 40: CPT | Performed by: NURSE PRACTITIONER

## 2022-01-18 PROCEDURE — 63710000001 INSULIN LISPRO (HUMAN) PER 5 UNITS: Performed by: INTERNAL MEDICINE

## 2022-01-18 PROCEDURE — 94799 UNLISTED PULMONARY SVC/PX: CPT

## 2022-01-18 PROCEDURE — 97530 THERAPEUTIC ACTIVITIES: CPT

## 2022-01-18 PROCEDURE — 80048 BASIC METABOLIC PNL TOTAL CA: CPT | Performed by: INTERNAL MEDICINE

## 2022-01-18 PROCEDURE — 97110 THERAPEUTIC EXERCISES: CPT

## 2022-01-18 PROCEDURE — 99232 SBSQ HOSP IP/OBS MODERATE 35: CPT | Performed by: INTERNAL MEDICINE

## 2022-01-18 PROCEDURE — 85025 COMPLETE CBC W/AUTO DIFF WBC: CPT | Performed by: INTERNAL MEDICINE

## 2022-01-18 PROCEDURE — 92526 ORAL FUNCTION THERAPY: CPT

## 2022-01-18 PROCEDURE — 83735 ASSAY OF MAGNESIUM: CPT | Performed by: INTERNAL MEDICINE

## 2022-01-18 PROCEDURE — 85007 BL SMEAR W/DIFF WBC COUNT: CPT | Performed by: INTERNAL MEDICINE

## 2022-01-18 PROCEDURE — 82962 GLUCOSE BLOOD TEST: CPT

## 2022-01-18 PROCEDURE — 63710000001 INSULIN DETEMIR PER 5 UNITS: Performed by: INTERNAL MEDICINE

## 2022-01-18 RX ADMIN — NYSTATIN: 100000 POWDER TOPICAL at 21:03

## 2022-01-18 RX ADMIN — AMLODIPINE BESYLATE 10 MG: 10 TABLET ORAL at 08:46

## 2022-01-18 RX ADMIN — DULOXETINE 60 MG: 60 CAPSULE, DELAYED RELEASE ORAL at 08:46

## 2022-01-18 RX ADMIN — FAMOTIDINE 20 MG: 20 TABLET, FILM COATED ORAL at 21:03

## 2022-01-18 RX ADMIN — ARFORMOTEROL TARTRATE 15 MCG: 15 SOLUTION RESPIRATORY (INHALATION) at 07:11

## 2022-01-18 RX ADMIN — DIAZEPAM 2 MG: 2 TABLET ORAL at 06:14

## 2022-01-18 RX ADMIN — DIAZEPAM 2 MG: 2 TABLET ORAL at 21:03

## 2022-01-18 RX ADMIN — FAMOTIDINE 20 MG: 20 TABLET, FILM COATED ORAL at 08:46

## 2022-01-18 RX ADMIN — GABAPENTIN 300 MG: 300 CAPSULE ORAL at 06:14

## 2022-01-18 RX ADMIN — BUSPIRONE HYDROCHLORIDE 10 MG: 5 TABLET ORAL at 09:07

## 2022-01-18 RX ADMIN — GABAPENTIN 300 MG: 300 CAPSULE ORAL at 14:13

## 2022-01-18 RX ADMIN — APIXABAN 5 MG: 5 TABLET, FILM COATED ORAL at 08:46

## 2022-01-18 RX ADMIN — GABAPENTIN 300 MG: 300 CAPSULE ORAL at 21:03

## 2022-01-18 RX ADMIN — OXYCODONE HYDROCHLORIDE AND ACETAMINOPHEN 1 TABLET: 10; 325 TABLET ORAL at 16:01

## 2022-01-18 RX ADMIN — INSULIN LISPRO 2 UNITS: 100 INJECTION, SOLUTION INTRAVENOUS; SUBCUTANEOUS at 12:26

## 2022-01-18 RX ADMIN — TIOTROPIUM BROMIDE INHALATION SPRAY 2 PUFF: 3.12 SPRAY, METERED RESPIRATORY (INHALATION) at 07:12

## 2022-01-18 RX ADMIN — DIAZEPAM 2 MG: 2 TABLET ORAL at 14:13

## 2022-01-18 RX ADMIN — METOPROLOL SUCCINATE 50 MG: 50 TABLET, EXTENDED RELEASE ORAL at 09:07

## 2022-01-18 RX ADMIN — INSULIN DETEMIR 9 UNITS: 100 INJECTION, SOLUTION SUBCUTANEOUS at 21:02

## 2022-01-18 RX ADMIN — BUSPIRONE HYDROCHLORIDE 10 MG: 5 TABLET ORAL at 17:19

## 2022-01-18 RX ADMIN — INSULIN LISPRO 6 UNITS: 100 INJECTION, SOLUTION INTRAVENOUS; SUBCUTANEOUS at 12:26

## 2022-01-18 RX ADMIN — OXYCODONE HYDROCHLORIDE AND ACETAMINOPHEN 1 TABLET: 10; 325 TABLET ORAL at 08:49

## 2022-01-18 RX ADMIN — NYSTATIN: 100000 POWDER TOPICAL at 08:50

## 2022-01-18 RX ADMIN — OXYCODONE HYDROCHLORIDE AND ACETAMINOPHEN 1 TABLET: 10; 325 TABLET ORAL at 21:38

## 2022-01-18 RX ADMIN — APIXABAN 5 MG: 5 TABLET, FILM COATED ORAL at 21:02

## 2022-01-18 RX ADMIN — INSULIN LISPRO 2 UNITS: 100 INJECTION, SOLUTION INTRAVENOUS; SUBCUTANEOUS at 17:20

## 2022-01-18 RX ADMIN — ESCITALOPRAM OXALATE 20 MG: 20 TABLET ORAL at 08:46

## 2022-01-18 RX ADMIN — INSULIN LISPRO 6 UNITS: 100 INJECTION, SOLUTION INTRAVENOUS; SUBCUTANEOUS at 17:19

## 2022-01-18 RX ADMIN — CASTOR OIL AND BALSAM, PERU 1 APPLICATION: 788; 87 OINTMENT TOPICAL at 21:03

## 2022-01-18 RX ADMIN — CASTOR OIL AND BALSAM, PERU 1 APPLICATION: 788; 87 OINTMENT TOPICAL at 08:50

## 2022-01-18 RX ADMIN — INSULIN LISPRO 6 UNITS: 100 INJECTION, SOLUTION INTRAVENOUS; SUBCUTANEOUS at 08:45

## 2022-01-18 RX ADMIN — INSULIN DETEMIR 9 UNITS: 100 INJECTION, SOLUTION SUBCUTANEOUS at 08:45

## 2022-01-18 NOTE — CONSULTS
"This provider is located at the [facility], [place] (through [facility]), [address], [facility], [address] using a secure video visit through [employer]. The patient is being seen remotely via telehealth at [facility], [address], [place] [address]. The patient's condition being consulted/diagnosed/treated is appropriate for telemedicine. The provider identified herself as well as her credentials.   The patient, and/or patients guardian, consent to be seen remotely, and when consent is given they understand that the consent allows for patient identifiable information to be sent to a third party as needed.   They may refuse to be seen remotely at any time. The electronic data is encrypted and password protected, and the patient and/or guardian has been advised of the potential risks to privacy not withstanding such measures.     The use of a video visit has been reviewed with the patient and verbal informed consent has been obtained.    Referring Provider: Benson Pelaez MD    Reason for Consultation: Severe depression    Chief complaint/Focus of Exam: Depression/mood    Subjective    Accompanied by: The patient is interviewed alone at today's encounter    History of present illness:   Dani Ziegler is a 56 y.o. female with a history of respiratory failure and pneumonia who was recently extubated on 1/11/2022, previous DVT, hypertension, chronic pain syndrome, anxiety, depression, obesity, and type 2 diabetes.   It is documented that the patient has been severely depressed during her current hospital course and has been crying a lot.  A psychiatric consult was ordered for medication recommendations.  At today's encounter the patient is alert and oriented.  The patient reports she has been dealing with severe chronic medical illness since 2018.  She reports her \"girlfriend's son\", a friend that she reports being close with, recently passed away.  The " patient reports during her current hospital course her father passed away.  She reports dealing with her medical illness as well as processing through the loss and grief that she has experienced recently has worsened her depression.  The patient denies any auditory or visual hallucinations.  The patient adamantly denies any suicidal or homicidal ideations, plans, or intent at the time of this encounter and is convincing.  The patient reports her family doctor has been treating her for depression and anxiety for many years.  The patient reports taking Lexapro, Cymbalta, BuSpar, and Valium chronically to treat her depression and anxiety.  The patient reports in 2018 she was diagnosed as having schizophrenia, but she does not know how that diagnosis was established, or who gave her that diagnosis.  The patient is not taking any medications that currently treat schizophrenia.  The patient denies any history of suicide attempts.  The patient denies any known family history of suicide attempts.  The patient denies any previous psychiatric hospitalizations.  The patient also reports situational stressors between herself, her fiancé, her daughter, and her son-in-law.  The patient states her urine drug screen was positive for methamphetamine upon her current admission, but she denies ever having used or taken methamphetamine.  The patient reports her daughter has been upset with her, and the family has even accused the patient's fiancée of giving her methamphetamine.  The patient denies this.  The patient reports she wants to live, she wants to get stronger and feel better, and she would not jeopardize this with self-harm/SI or taking medications such as methamphetamine.  At today's encounter the patient is agreeable for medication adjustments to help with her reported depressive symptoms.  The patient reports she is also agreeable to follow-up with a psychiatric mental health care Prescriber for management of her  psychotropic medications, as well as to start psychotherapy, after medical stabilization and discharge from her current hospital course.      History    Past Medical History:   Diagnosis Date   • Abnormal weight gain    • Acute UTI    • Anxiety    • Arthritis involving multiple sites    • Chronic obstructive pulmonary disease (HCC)    • Chronic pain    • Current every day smoker    • Depression    • Diabetes mellitus (HCC)    • Diarrhea    • Dysuria    • Edema, lower extremity    • Fever    • Head injury    • Hypertension    • Intervertebral disc disorder     Degeneration of intervertebral disc, site unspecified (722.6)   • Left bundle branch block    • Leukocytosis    • Long term current use of methadone for pain control    • Mass of right breast    • Nausea    • Obesity    • Overactive bladder    • Peripheral neuropathy    • Superficial phlebitis     right medial calf   • Upper respiratory infection    • Urinary incontinence    • Vitamin D deficiency    • Vomiting      Past Surgical History:   Procedure Laterality Date   • BLADDER SURGERY      pubovaginal sling   • CHOLECYSTECTOMY     • HIP ARTHROSCOPY Right 10/29/2020     Family History   Problem Relation Age of Onset   • Breast cancer Mother    • Cancer Mother    • Arthritis Mother    • Diabetes Mother    • Obesity Mother    • Arthritis Father    • Stroke Father    • Hypertension Father    • Heart attack Father    • Diabetes Maternal Grandmother    • Migraines Other      Social History     Socioeconomic History   • Marital status:    Tobacco Use   • Smoking status: Current Every Day Smoker     Years: 30.00     Types: Cigarettes     Last attempt to quit: 2019     Years since quittin.7   • Smokeless tobacco: Never Used   • Tobacco comment: 1 daily   Substance and Sexual Activity   • Alcohol use: No   • Drug use: No   • Sexual activity: Defer         Problem List:  Patient Active Problem List   Diagnosis   • Uncontrolled type 2 diabetes mellitus with  hyperglycemia, without long-term current use of insulin ([facility])   • Vitamin D deficiency   • Peripheral neuropathy   • Adiposity   • Left bundle branch block (LBBB)   • Essential hypertension   • Chronic pain   • Chronic obstructive pulmonary disease ([facility])   • Anxiety   • Depression   • Arthritis involving multiple sites   • Leukocytosis   • Mixed hyperlipidemia   • Special screening for malignant neoplasms, colon   • Cellulitis of left lower extremity   • Hyperkalemia   • Acute on chronic renal insufficiency   • Sepsis ([facility])   • CHF (congestive heart failure) ([facility])   • Cellulitis of left leg   • Lumbar disc disease   • Diabetic peripheral neuropathy ([facility])   • COVID-19 virus infection   • CAP (community acquired pneumonia)   • Polypharmacy   • Morbid obesity with BMI of 45.0-49.9, adult ([facility])   • A/C Respiratory Failure Type 2   • History of recurrent deep vein thrombosis (DVT)   • Morbid obesity with BMI of 60.0-69.9, adult ([facility])       Allergy:   Allergies   Allergen Reactions   • Diphenhydramine Hives   • Lortab [Hydrocodone-Acetaminophen] Hives     Tolerates percocet   • Toradol [Ketorolac Tromethamine] Hives     Per patient tolerates motrin   • Nsaids Other (See Comments)     Liver Dx   • Alprazolam Delirium        Current Medications:   Current Facility-Administered Medications   Medication Dose Route Frequency Provider Last Rate Last Admin   • albuterol (PROVENTIL) nebulizer solution 0.083% 2.5 mg/3mL  2.5 mg Nebulization Q6H PRN Benson Pelaez MD   2.5 mg at 01/17/22 1506   • amLODIPine (NORVASC) tablet 10 mg  10 mg Oral Q24H Chau Tomlinson [facility]   10 mg at 01/18/22 0846   • apixaban (ELIQUIS) tablet 5 mg  5 mg Oral Q12H Chau Tomlinson [facility]   5 mg at 01/18/22 0846   • arformoterol (BROVANA) nebulizer solution 15 mcg  15 mcg Nebulization BID - RT Benson Pelaez MD   15 mcg at 01/18/22 0711   • busPIRone (BUSPAR) tablet 10 mg  10 mg Oral BID Chau Tomlinson [facility]   10 mg at 01/18/22 0907   • castor oil-balsam peru  (VENELEX) ointment 1 application  1 application Topical Q12H Benson Pelaez MD   1 application at 01/18/22 0850   • diazePAM (VALIUM) tablet 2 mg  2 mg Oral Q8H Chau Tomlinson, [facility]   2 mg at 01/18/22 1413   • DULoxetine (CYMBALTA) DR capsule 60 mg  60 mg Oral Daily Benson Pelaez MD   60 mg at 01/18/22 0846   • escitalopram (LEXAPRO) tablet 20 mg  20 mg Oral Daily Chau Tomlinson, [facility]   20 mg at 01/18/22 0846   • famotidine (PEPCID) tablet 20 mg  20 mg Oral BID Chau Tomlinson, RP   20 mg at 01/18/22 0846   • gabapentin (NEURONTIN) capsule 300 mg  300 mg Oral Q8H Chau Tomlinson, [facility]   300 mg at 01/18/22 1413   • hydrALAZINE (APRESOLINE) injection 10 mg  10 mg Intravenous Q4H PRN Sunil Carpenter MD   10 mg at 01/12/22 0430   • insulin detemir (LEVEMIR) injection 9 Units  9 Units Subcutaneous BID Benson Pelaez MD   9 Units at 01/18/22 0845   • insulin lispro (humaLOG) injection 0-9 Units  0-9 Units Subcutaneous TID AC Sunil Carpenter MD   2 Units at 01/18/22 1226   • insulin lispro (humaLOG) injection 6 Units  6 Units Subcutaneous TID With Meals Benson Pelaez MD   6 Units at 01/18/22 1226   • magnesium sulfate 4g/100mL (PREMIX) infusion  4 g Intravenous PRN Benson Pelaez MD   4 g at 01/17/22 1201   • metoprolol succinate XL (TOPROL-XL) 24 hr tablet 50 mg  50 mg Oral Q24H Benson Pelaez MD   50 mg at 01/18/22 0907   • nystatin (MYCOSTATIN) powder   Topical Q12H Sunil Carpenter MD   Given at 01/18/22 0850   • ondansetron (ZOFRAN) injection 4 mg  4 mg Intravenous Q6H PRN Ruth Mcguire APRN   4 mg at 01/14/22 2032   • oxyCODONE-acetaminophen (PERCOCET)  MG per tablet 1 tablet  1 tablet Oral Q6H PRN Chau Tomlinson RPH   1 tablet at 01/18/22 0849   • potassium chloride 20 mEq in 50 mL IVPB  20 mEq Intravenous Q1H PRN Jason Swain APRN 50 mL/hr at 01/05/22 1005 20 mEq at 01/05/22 1005   • senna (SENOKOT) tablet 1 tablet  1 tablet Oral BID PRN Chau Tomlinson RPH       • sodium  "chloride 0.9 % flush 10 mL  10 mL Intravenous PRN Benson Pelaez MD   10 mL at 01/09/22 2129   • tiotropium (SPIRIVA RESPIMAT) 2.5 mcg/act aerosol solution inhaler  2 puff Inhalation Daily - RT Benson Pelaez MD   2 puff at 01/18/22 0712         Review of Systems  General: +weakness  Resp: +cough, +SOA  Psych: +depression, +anxiety, no SI, no HI, no AVH      Objective     Physical Exam:   Blood pressure 128/87, pulse 93, temperature 99 °F (37.2 °C), temperature source Oral, resp. rate 16, height 170.2 cm (67.01\"), weight (!) 166 kg (365 lb 11.2 oz), SpO2 94 %. Body mass index is 57.26 kg/m².     Vitals:    01/18/22 0907 01/18/22 1000 01/18/22 1100 01/18/22 1200   BP: 141/80 136/80  128/87   BP Location:  Left arm  Left arm   Patient Position:  Lying  Lying   Pulse: 88 95 98 93   Resp:    16   Temp:    99 °F (37.2 °C)   TempSrc:    Oral   SpO2:  90% 95% 94%   Weight:       Height:              Mental Status Exam:  Hygiene:   fair  Cooperation:  Cooperative  Eye Contact:  Fair  Psychomotor Behavior:  Restless   Mood: Depressed and Anxious  Affect:  tearful  Hopelessness: Denies  Speech:  Normal  Thought Progress:  Goal directed and Linear  Thought Content:  Mood congruent  Suicidal:  None  Homicidal:  None  Hallucinations:  Not demonstrated today  Delusion:  None  Memory:  Deficits  Orientation:  Person, Place and Time  Reliability:  fair to poor  Insight:  fair to poor  Judgement:  Impaired  Impulse Control:  Fair        Results Review:  Lab Results (last 24 hours)     Procedure Component Value Units Date/Time    POC Glucose Once [[id number]]  (Abnormal) Collected: 01/18/22 1100    Specimen: Blood Updated: 01/18/22 1100     Glucose 167 mg/dL      Comment: Meter: [id number] : [id number] Estrella Miriam       POC Glucose Once [[id number]]  (Abnormal) Collected: 01/18/22 0656    Specimen: Blood Updated: 01/18/22 0658     Glucose 142 mg/dL      Comment: Meter: [id number] : [id number] Estrella Miriam       POC Glucose Once " [[id number]]  (Abnormal) Collected: 01/18/22 0624    Specimen: Blood Updated: 01/18/22 0626     Glucose 142 mg/dL      Comment: Meter: [id number] : [id number] Jassi Hall       Manual Differential [[id number]]  (Abnormal) Collected: 01/18/22 0315    Specimen: Blood Updated: 01/18/22 0459     Neutrophil % 60.0 %      Lymphocyte % 20.0 %      Monocyte % 11.0 %      Eosinophil % 2.0 %      Basophil % 0.0 %      Atypical Lymphocyte % 7.0 %      Neutrophils Absolute 9.17 10*3/mm3      Lymphocytes Absolute 4.13 10*3/mm3      Monocytes Absolute 1.68 10*3/mm3      Eosinophils Absolute 0.31 10*3/mm3      Basophils Absolute 0.00 10*3/mm3      RBC Morphology Normal     WBC Morphology Normal     Platelet Morphology Normal    CBC & Differential [[id number]]  (Abnormal) Collected: 01/18/22 0315    Specimen: Blood Updated: 01/18/22 0459    Narrative:      The following orders were created for panel order CBC & Differential.  Procedure                               Abnormality         Status                     ---------                               -----------         ------                     CBC Auto Differential[[id number]]        Abnormal            Final result               Scan Slide[[id number]]                                       Final result                 Please view results for these tests on the individual orders.    Scan Slide [[id number]] Collected: 01/18/22 0315    Specimen: Blood Updated: 01/18/22 0459     Scan Slide --     Comment: See Manual Differential Results       CBC Auto Differential [[id number]]  (Abnormal) Collected: 01/18/22 0315    Specimen: Blood Updated: 01/18/22 0459     WBC 15.29 10*3/mm3      RBC 4.94 10*6/mm3      Hemoglobin 13.7 g/dL      Hematocrit 44.3 %      MCV 89.7 fL      MCH 27.7 pg      MCHC 30.9 g/dL      RDW 15.8 %      RDW-SD 52.2 fl      MPV 11.0 fL      Platelets 333 10*3/mm3     Narrative:      The previously reported component NRBC is no longer being reported. Previous result  was 0.0 /100 WBC (Reference Range: 0.0-0.2 /100 WBC) on 1/18/2022 at 0400 EST.    Basic Metabolic Panel [[id number]]  (Abnormal) Collected: 01/18/22 0315    Specimen: Blood Updated: 01/18/22 0411     Glucose 106 mg/dL      BUN 24 mg/dL      Creatinine 0.66 mg/dL      Sodium 136 mmol/L      Potassium 4.1 mmol/L      Comment: Slight hemolysis detected by analyzer. Results may be affected.        Chloride 100 mmol/L      CO2 26.0 mmol/L      Calcium 9.3 mg/dL      eGFR Non African Amer 92 mL/min/1.73      BUN/Creatinine Ratio 36.4     Anion Gap 10.0 mmol/L     Narrative:      GFR Normal >60  Chronic Kidney Disease <60  Kidney Failure <15      Magnesium [[id number]]  (Normal) Collected: 01/18/22 0315    Specimen: Blood Updated: 01/18/22 0411     Magnesium 2.1 mg/dL     POC Glucose Once [[id number]]  (Normal) Collected: 01/17/22 0720    Specimen: Blood Updated: 01/18/22 0139     Glucose 116 mg/dL      Comment: Meter: [id number] : [id number] Jameria Sharp       POC Glucose Once [[id number]]  (Abnormal) Collected: 01/17/22 1952    Specimen: Blood Updated: 01/17/22 1955     Glucose 174 mg/dL      Comment: Meter: [id number] : [id number] Abdulaziz Juan F       POC Glucose Once [[id number]]  (Abnormal) Collected: 01/17/22 1617    Specimen: Blood Updated: 01/17/22 1618     Glucose 148 mg/dL      Comment: Meter: [id number] : [id number] Jameria Sharp           Imaging Results (Last 24 Hours)     ** No results found for the last 24 hours. **            Assessment/Plan   Problems Addressed this Visit        Other    Sepsis ([facility])      Other Visit Diagnoses     Acute exacerbation of chronic obstructive pulmonary disease (COPD) ([facility])    -  Primary    Acute respiratory distress        Hypoxia        Pneumonia of both lungs due to infectious organism, unspecified part of lung        Pharyngeal dysphagia          Diagnoses       Codes Comments    Acute exacerbation of chronic obstructive pulmonary disease (COPD) ([facility])    -  Primary  ICD-10-CM: J44.1  ICD-9-CM: 491.21     Acute respiratory distress     ICD-10-CM: R06.03  ICD-9-CM: 518.82     Hypoxia     ICD-10-CM: R09.02  ICD-9-CM: 799.02     Pneumonia of both lungs due to infectious organism, unspecified part of lung     ICD-10-CM: J18.9  ICD-9-CM: 483.8     Sepsis with acute hypoxic respiratory failure without septic shock, due to unspecified organism ([facility])     ICD-10-CM: A41.9, R65.20, J96.01  ICD-9-CM: 038.9, 995.91, 518.81     Pharyngeal dysphagia     ICD-10-CM: R13.13  ICD-9-CM: 787.23           Visit Diagnoses:    ICD-10-CM ICD-9-CM   1. Acute exacerbation of chronic obstructive pulmonary disease (COPD) ([facility])  J44.1 491.21   2. Acute respiratory distress  R06.03 518.82   3. Hypoxia  R09.02 799.02   4. Pneumonia of both lungs due to infectious organism, unspecified part of lung  J18.9 483.8   5. Sepsis with acute hypoxic respiratory failure without septic shock, due to unspecified organism ([facility])  A41.9 038.9    R65.20 995.91    J96.01 518.81   6. Pharyngeal dysphagia  R13.13 787.23       Principal Problem:    A/C Respiratory Failure Type 2  Active Problems:    Uncontrolled type 2 diabetes mellitus with hyperglycemia, without long-term current use of insulin ([facility])    Essential hypertension    Chronic pain    Chronic obstructive pulmonary disease ([facility])    Sepsis ([facility])    CAP (community acquired pneumonia)    Morbid obesity with BMI of 45.0-49.9, adult ([facility])    History of recurrent deep vein thrombosis (DVT)    Morbid obesity with BMI of 60.0-69.9, adult ([facility])             -Major depressive disorder, severe, recurrent, without psychotic features  -Anxiety disorder, unspecified      Recommendations:  -Patient's current psychiatric medications include BuSpar 10 mg by mouth twice daily, Cymbalta 60 mg by mouth once daily, Lexapro 20 mg by mouth once daily, and Valium 2 mg by mouth every 8 hours.  The patient reports being on her current combination of Cymbalta, Lexapro, BuSpar, and Valium for  quite some time from her primary care provider.  The patient is agreeable to medication adjustments to assist with moods as well as improve safety.  The combination of BuSpar, Cymbalta, and Lexapro does significantly increase the patient's risk for serotonin syndrome, and possibly could increase the risk for hyponatremia, bleeding, CNS depression, and psychomotor impairment.  The patient verbally reports that Cymbalta is also prescribed to help with her chronic pain.  Recommend to taper and stop Lexapro by taking Lexapro 10 mg by mouth once daily for 3 days, then take Lexapro 5 mg by mouth once daily for 3 days, then completely stop taking Lexapro.  Recommend adding an adjunct to assist with mood such as Abilify at 2 mg by mouth once daily as an adjunct for the patient's mood while patient is tapering and discontinuing Lexapro.  Can increase Abilify as needed for mood with continued monitoring for metabolic adversities.  -Recommend patient to follow-up with a psychiatric provider after medical stabilization and discharge from her current hospital course for further management and adjustments in her psychotropic medications as well as to begin psychotherapy.  The patient is verbally agreeable to this.  -The patient's condition could change at any time, please re-consult as needed.        I discussed the patient's findings and my recommendations with patient and primary care team      Thank you for this consultation and allowing us to assist in the care of your patient.  If you have any further questions or concerns, please contact me at [phone].               Coral Gonsales, MILEY  01/18/22  15:09 EST      Please note that portions of this note were completed with a voice recognition program. Efforts were made to edit dictation, but occasionally words are mistranscribed.

## 2022-01-18 NOTE — THERAPY TREATMENT NOTE
Patient Name: Dani Ziegler  : 1965    MRN: [id number]                              Today's Date: 2022       Admit Date: 2021    Visit Dx:     ICD-10-CM ICD-9-CM   1. Acute exacerbation of chronic obstructive pulmonary disease (COPD) ([facility])  J44.1 491.21   2. Acute respiratory distress  R06.03 518.82   3. Hypoxia  R09.02 799.02   4. Pneumonia of both lungs due to infectious organism, unspecified part of lung  J18.9 483.8   5. Sepsis with acute hypoxic respiratory failure without septic shock, due to unspecified organism ([facility])  A41.9 038.9    R65.20 995.91    J96.01 518.81   6. Dysphagia, unspecified type  R13.10 787.20     Patient Active Problem List   Diagnosis   • Uncontrolled type 2 diabetes mellitus with hyperglycemia, without long-term current use of insulin ([facility])   • Vitamin D deficiency   • Peripheral neuropathy   • Adiposity   • Left bundle branch block (LBBB)   • Essential hypertension   • Chronic pain   • Chronic obstructive pulmonary disease ([facility])   • Anxiety   • Depression   • Arthritis involving multiple sites   • Leukocytosis   • Mixed hyperlipidemia   • Special screening for malignant neoplasms, colon   • Cellulitis of left lower extremity   • Hyperkalemia   • Acute on chronic renal insufficiency   • Sepsis ([facility])   • CHF (congestive heart failure) ([facility])   • Cellulitis of left leg   • Lumbar disc disease   • Diabetic peripheral neuropathy ([facility])   • COVID-19 virus infection   • CAP (community acquired pneumonia)   • Polypharmacy   • Morbid obesity with BMI of 45.0-49.9, adult ([facility])   • A/C Respiratory Failure Type 2   • History of recurrent deep vein thrombosis (DVT)   • Morbid obesity with BMI of 60.0-69.9, adult ([facility])     Past Medical History:   Diagnosis Date   • Abnormal weight gain    • Acute UTI    • Anxiety    • Arthritis involving multiple sites    • Chronic obstructive pulmonary disease ([facility])    • Chronic pain    • Current every day smoker    • Depression    • Diabetes  mellitus (HCC)    • Diarrhea    • Dysuria    • Edema, lower extremity    • Fever    • Head injury    • Hypertension    • Intervertebral disc disorder     Degeneration of intervertebral disc, site unspecified (722.6)   • Left bundle branch block    • Leukocytosis    • Long term current use of methadone for pain control    • Mass of right breast    • Nausea    • Obesity    • Overactive bladder    • Peripheral neuropathy    • Superficial phlebitis     right medial calf   • Upper respiratory infection    • Urinary incontinence    • Vitamin D deficiency    • Vomiting      Past Surgical History:   Procedure Laterality Date   • BLADDER SURGERY      pubovaginal sling   • CHOLECYSTECTOMY     • HIP ARTHROSCOPY Right 10/29/2020      General Information     Row Name 01/18/22 1039          OT Time and Intention    Document Type therapy note (daily note)  -CS     Mode of Treatment occupational therapy  -CS     Row Name 01/18/22 1039          General Information    Existing Precautions/Restrictions cardiac; fall; oxygen therapy device and L/min  -CS     Barriers to Rehab medically complex; cognitive status  -CS     Row Name 01/18/22 1039          Cognition    Orientation Status (Cognition) oriented to; person; place  -     Row Name 01/18/22 1039          Safety Issues, Functional Mobility    Impairments Affecting Function (Mobility) balance; cognition; coordination; endurance/activity tolerance; postural/trunk control; shortness of breath; strength  -CS     Cognitive Impairments, Mobility Safety/Performance attention; insight into deficits/self-awareness  -CS           User Key  (r) = Recorded By, (t) = Taken By, (c) = Cosigned By    Initials Name Provider Type    CS Iza Tovar OT Occupational Therapist                 Mobility/ADL's     Row Name 01/18/22 1039          Activities of Daily Living    BADL Assessment/Intervention grooming  -     Row Name 01/18/22 1039          Grooming Assessment/Training    [place]  Level (Grooming) hair care, combing/brushing; wash face, hands; supervision; verbal cues  -     Position (Grooming) supported sitting  -CS           User Key  (r) = Recorded By, (t) = Taken By, (c) = Cosigned By    Initials Name Provider Type    Iza Barroso OT Occupational Therapist               Obj/Interventions     Row Name 01/18/22 1039          Shoulder (Therapeutic Exercise)    Shoulder (Therapeutic Exercise) AROM (active range of motion)  -     Shoulder AROM (Therapeutic Exercise) bilateral; flexion; aBduction; aDduction; horizontal aBduction/aDduction; scapular retraction; scapular protraction; 10 repetitions  -[facility] Name 01/18/22 1039          Elbow/Forearm (Therapeutic Exercise)    Elbow/Forearm (Therapeutic Exercise) AROM (active range of motion)  -     Elbow/Forearm AROM (Therapeutic Exercise) bilateral; flexion; extension; 10 repetitions  -CS     Row Name 01/18/22 1039          Balance    Balance Assessment sitting static balance; sitting dynamic balance  -     Static Sitting Balance WFL; sitting in chair  -     Dynamic Sitting Balance WFL; sitting in chair  -CS     Row Name 01/18/22 1039          Therapeutic Exercise    Therapeutic Exercise shoulder; elbow/forearm  -           User Key  (r) = Recorded By, (t) = Taken By, (c) = Cosigned By    Initials Name Provider Type    Iza Barroso OT Occupational Therapist               Goals/Plan    No documentation.                Clinical Impression     Row Name 01/18/22 1040          Pain Scale: FACES Pre/Post-Treatment    Pain: FACES Scale, Pretreatment 0-->no hurt  -CS     Posttreatment Pain Rating 0-->no hurt  -CS     Row Name 01/18/22 1040          Plan of Care Review    Plan of Care Reviewed With patient  -CS     Outcome Summary Pt tolerated seated grooming tasks w/ Supervision and AROM HEP w/ PLB w/ good effort. Pt limited d/t generalized weakness and requires frequent cueing for attention to task. Recommend cont skilled IPOT  POC. Recommend pt DC to IP rehab.  -     Row Name 01/18/22 1040          Therapy Plan Review/Discharge Plan (OT)    Anticipated Discharge Disposition (OT) inpatient rehabilitation facility  -     Row Name 01/18/22 1040          Vital Signs    Pre Systolic BP Rehab --  VSS  -CS     Pre Patient Position Sitting  -CS     Intra Patient Position Sitting  -CS     Post Patient Position Sitting  -CS     Row Name 01/18/22 1040          Positioning and Restraints    Pre-Treatment Position sitting in chair/recliner  -CS     Post Treatment Position chair  -CS     In Chair notified nsg; reclined; call light within reach; encouraged to call for assist; exit alarm on; waffle cushion; RUE elevated; LUE elevated; legs elevated; heels elevated  -CS           User Key  (r) = Recorded By, (t) = Taken By, (c) = Cosigned By    Initials Name Provider Type    CS Iza Tovar, OT Occupational Therapist               Outcome Measures     Row Name 01/18/22 1041          How much help from another is currently needed...    Putting on and taking off regular lower body clothing? 2  -CS     Bathing (including washing, rinsing, and drying) 2  -CS     Toileting (which includes using toilet bed pan or urinal) 2  -CS     Putting on and taking off regular upper body clothing 2  -CS     Taking care of personal grooming (such as brushing teeth) 3  -CS     Eating meals 4  -CS     AM-PAC 6 Clicks Score (OT) 15  -CS     Row Name 01/18/22 0800          How much help from another person do you currently need...    Turning from your back to your side while in flat bed without using bedrails? 3  -ZAYRA     Moving from lying on back to sitting on the side of a flat bed without bedrails? 3  -ZAYRA     Moving to and from a bed to a chair (including a wheelchair)? 3  -ZAYRA     Standing up from a chair using your arms (e.g., wheelchair, bedside chair)? 3  -ZAYRA     Climbing 3-5 steps with a railing? 2  -ZAYRA     To walk in hospital room? 2  -ZAYRA     AM-PAC 6 Clicks Score  (PT) 16  -ZAYRA     Row Name 01/18/22 1041          Functional Assessment    Outcome Measure Options AM-PAC 6 Clicks Daily Activity (OT)  -CS           User Key  (r) = Recorded By, (t) = Taken By, (c) = Cosigned By    Initials Name Provider Type    Marianna Butler, RN Registered Nurse    Iza Barroso OT Occupational Therapist                Occupational Therapy Education                 Title: PT OT SLP Therapies (In Progress)     Topic: Occupational Therapy (In Progress)     Point: ADL training (In Progress)     Description:   Instruct learner(s) on proper safety adaptation and remediation techniques during self care or transfers.   Instruct in proper use of assistive devices.              Learning Progress Summary           Patient Acceptance, E, NR by CS at 1/18/2022 1041    Acceptance, E, NR by  at 1/16/2022 0750    Comment: Educated pt regarding role of therapy and ongoing treatment plan                   Point: Home exercise program (In Progress)     Description:   Instruct learner(s) on appropriate technique for monitoring, assisting and/or progressing therapeutic exercises/activities.              Learning Progress Summary           Patient Acceptance, E, NR by CS at 1/18/2022 1041                   Point: Precautions (In Progress)     Description:   Instruct learner(s) on prescribed precautions during self-care and functional transfers.              Learning Progress Summary           Patient Acceptance, E, NR by CS at 1/18/2022 1041                   Point: Body mechanics (Not Started)     Description:   Instruct learner(s) on proper positioning and spine alignment during self-care, functional mobility activities and/or exercises.              Learner Progress:  Not documented in this visit.                      User Key     Initials Effective Dates Name Provider Type Discipline     06/16/21 -  Romelia Kilpatrick OT Occupational Therapist OT     09/02/21 -  Iza Tovar OT Occupational  Therapist OT              OT Recommendation and Plan     Plan of Care Review  Plan of Care Reviewed With: patient  Outcome Summary: Pt tolerated seated grooming tasks w/ Supervision and AROM HEP w/ PLB w/ good effort. Pt limited d/t generalized weakness and requires frequent cueing for attention to task. Recommend cont skilled IPOT POC. Recommend pt DC to IP rehab.     Time Calculation:    Time Calculation- OT     Row Name 01/18/22 1042             Time Calculation- OT    OT Start Time 0938  -CS      OT Received On 01/18/22  -CS      OT Goal Re-Cert Due Date 01/26/22  -CS              Timed Charges    82519 - OT Therapeutic Exercise Minutes 9  -CS      13332 - OT Self Care/Mgmt Minutes 5  -CS              Total Minutes    Timed Charges Total Minutes 14  -CS       Total Minutes 14  -CS            User Key  (r) = Recorded By, (t) = Taken By, (c) = Cosigned By    Initials Name Provider Type    CS Iza Tovar OT Occupational Therapist              Therapy Charges for Today     Code Description Service Date Service Provider Modifiers Qty    03606008433 HC OT THER PROC EA 15 MIN 1/18/2022 Iza Tovar OT GO 1               Iza Tovar OT  1/18/2022

## 2022-01-18 NOTE — THERAPY TREATMENT NOTE
Acute Care - Speech Language Pathology   Swallow Treatment Note [facility]     Patient Name: Dani Ziegler  : 1965  MRN: [id number]  Today's Date: 2022               Admit Date: 2021    Visit Dx:     ICD-10-CM ICD-9-CM   1. Acute exacerbation of chronic obstructive pulmonary disease (COPD) ([facility])  J44.1 491.21   2. Acute respiratory distress  R06.03 518.82   3. Hypoxia  R09.02 799.02   4. Pneumonia of both lungs due to infectious organism, unspecified part of lung  J18.9 483.8   5. Sepsis with acute hypoxic respiratory failure without septic shock, due to unspecified organism ([facility])  A41.9 038.9    R65.20 995.91    J96.01 518.81   6. Pharyngeal dysphagia  R13.13 787.23     Patient Active Problem List   Diagnosis   • Uncontrolled type 2 diabetes mellitus with hyperglycemia, without long-term current use of insulin ([facility])   • Vitamin D deficiency   • Peripheral neuropathy   • Adiposity   • Left bundle branch block (LBBB)   • Essential hypertension   • Chronic pain   • Chronic obstructive pulmonary disease ([facility])   • Anxiety   • Depression   • Arthritis involving multiple sites   • Leukocytosis   • Mixed hyperlipidemia   • Special screening for malignant neoplasms, colon   • Cellulitis of left lower extremity   • Hyperkalemia   • Acute on chronic renal insufficiency   • Sepsis ([facility])   • CHF (congestive heart failure) ([facility])   • Cellulitis of left leg   • Lumbar disc disease   • Diabetic peripheral neuropathy ([facility])   • COVID-19 virus infection   • CAP (community acquired pneumonia)   • Polypharmacy   • Morbid obesity with BMI of 45.0-49.9, adult ([facility])   • A/C Respiratory Failure Type 2   • History of recurrent deep vein thrombosis (DVT)   • Morbid obesity with BMI of 60.0-69.9, adult ([facility])     Past Medical History:   Diagnosis Date   • Abnormal weight gain    • Acute UTI    • Anxiety    • Arthritis involving multiple sites    • Chronic obstructive pulmonary disease ([facility])    • Chronic pain    • Current  every day smoker    • Depression    • Diabetes mellitus (HCC)    • Diarrhea    • Dysuria    • Edema, lower extremity    • Fever    • Head injury    • Hypertension    • Intervertebral disc disorder     Degeneration of intervertebral disc, site unspecified (722.6)   • Left bundle branch block    • Leukocytosis    • Long term current use of methadone for pain control    • Mass of right breast    • Nausea    • Obesity    • Overactive bladder    • Peripheral neuropathy    • Superficial phlebitis     right medial calf   • Upper respiratory infection    • Urinary incontinence    • Vitamin D deficiency    • Vomiting      Past Surgical History:   Procedure Laterality Date   • BLADDER SURGERY      pubovaginal sling   • CHOLECYSTECTOMY     • HIP ARTHROSCOPY Right 10/29/2020       SLP Recommendation and Plan          Recommended Diagnostics: reassess via FEES (01/18/22 1120)         Daily Summary of Progress (SLP): progress toward functional goals as expected (01/18/22 1120)          Treatment Assessment (SLP): Mental status seems to be improving. Introduced swallowing exercises--pt motivated and eager to return to thin liquids. Intermittent use of [place] for oral suctioning, but no overt clinical s/sxs aspiration w/ ice or thin via tsp. Of note, FEES completed 1/14 revealed silent aspiration. (01/18/22 1120)  Plan for Continued Treatment (SLP): continue treatment per plan of care (01/18/22 1120)         Plan of Care Reviewed With: patient  Progress: improving      SWALLOW EVALUATION (last 72 hours)     SLP Adult Swallow Evaluation     Row Name 01/18/22 1120                   Rehab Evaluation    Document Type therapy note (daily note)  -AC        Subjective Information no complaints  -AC        Patient Observations alert; cooperative  -AC        Patient/Family/Caregiver Comments/Observations No family present.  -AC        Patient Effort good  -AC                  Pain Scale: FACES Pre/Post-Treatment    Pain: FACES Scale,  Pretreatment 0-->no hurt  -AC        Posttreatment Pain Rating 0-->no hurt  -AC                  SLP Treatment Clinical Impressions    Treatment Assessment (SLP) Mental status seems to be improving. Introduced swallowing exercises--pt motivated and eager to return to thin liquids. Intermittent use of [place] for oral suctioning, but no overt clinical s/sxs aspiration w/ ice or thin via tsp. Of note, FEES completed 1/14 revealed silent aspiration.  -AC        Daily Summary of Progress (SLP) progress toward functional goals as expected  -AC        Plan for Continued Treatment (SLP) continue treatment per plan of care  -AC        Care Plan Review evaluation/treatment results reviewed; care plan/treatment goals reviewed; risks/benefits reviewed; current/potential barriers reviewed; patient/other agree to care plan  -AC                  Recommendations    Recommended Diagnostics reassess via FEES  -AC              User Key  (r) = Recorded By, (t) = Taken By, (c) = Cosigned By    Initials Name Effective Dates    AC Mary Baker, MS CCC-SLP 06/16/21 -                 EDUCATION  The patient has been educated in the following areas:   Dysphagia (Swallowing Impairment) Oral Care/Hydration Modified Diet Instruction.        SLP GOALS     Row Name 01/18/22 1120             Oral Nutrition/Hydration Goal 1 (SLP)    Oral Nutrition/Hydration Goal 1, SLP LTG: Pt will return to regular diet, thin liquids w/ no overt s/sxs aspiration/distress w/ 100% acc and no cues  -AC      Time Frame (Oral Nutrition/Hydration Goal 1, SLP) by discharge  -AC      Progress/Outcomes (Oral Nutrition/Hydration Goal 1, SLP) continuing progress toward goal  -AC              Oral Nutrition/Hydration Goal 2 (SLP)    Oral Nutrition/Hydration Goal 2, SLP Pt will tolerate soft solids & nectar-thick liquids w/ no overt s/sxs aspiration/distress w/ 100% acc and no cues  -AC      Time Frame (Oral Nutrition/Hydration Goal 2, SLP) by discharge  -AC      Barriers  (Oral Nutrition/Hydration Goal 2, SLP) No overt clinical s/sxs aspiration w/ nectar via cup/straw. Prefers nectar-thickened tea or juice, rather than thickened H2O.  -AC      Progress/Outcomes (Oral Nutrition/Hydration Goal 2, SLP) continuing progress toward goal  -AC              Oral Nutrition/Hydration Goal (SLP)    Oral Nutrition/Hydration Goal, SLP Pt will tolerate therapeutic trials of thin H2O w/ no overt s/sxs aspiration/distress w/ 60% acc and no cues in order to assess appropriateness for repeat instrumental eval  -AC      Time Frame (Oral Nutrition/Hydration Goal, SLP) short term goal (STG)  -AC      Barriers (Oral Nutrition/Hydration Goal, SLP) No overt clinical s/sxs aspiration w/ ice or tsp thin liquid. Of note, recent hx silent aspiration.  -AC      Progress/Outcomes (Oral Nutrition/Hydration Goal, SLP) continuing progress toward goal  -AC              Pharyngeal Strengthening Exercise Goal 1 (SLP)    Increase Timing prepping - 3 second prep or suck swallow or 3-step swallow  -AC      Increase Superior Movement of the Hyolaryngeal Complex effortful pitch glide (falsetto + pharyngeal squeeze)  -AC      Increase Anterior Movement of the Hyolaryngeal Complex chin tuck against resistance (CTAR)  -AC      Increase Closure at Entrance to Airway/Closure of Airway at Glottis supraglottic swallow  -AC      Increase Squeeze/Positive Pressure Generation hard effortful swallow  -AC      [place]/Accuracy (Pharyngeal Strengthening Goal 1, SLP) with minimal cues (75-90% accuracy)  -AC      Time Frame (Pharyngeal Strengthening Goal 1, SLP) short term goal (STG)  -AC      Barriers (Pharyngeal Strengthening Goal 1, SLP) Provided handout & focused on CTAR, effortful swallow, and SGS. Pt was able to demonstrate all w/ model & mod cues. Encouraged independent practice w/ exercises until re-FEES.  -AC      Progress/Outcomes (Pharyngeal Strengthening Goal 1, SLP) continuing progress toward goal  -AC            User Key   (r) = Recorded By, (t) = Taken By, (c) = Cosigned By    Initials Name Provider Type    Mary Chung MS CCC-SLP Speech and Language Pathologist                   Time Calculation:    Time Calculation- SLP     Row Name 01/18/22 1340             Time Calculation- SLP    SLP Start Time 1120  -AC      SLP Received On 01/18/22  -AC              Untimed Charges    10340-XR Treatment Swallow Minutes 43  -AC              Total Minutes    Untimed Charges Total Minutes 43  -AC       Total Minutes 43  -AC            User Key  (r) = Recorded By, (t) = Taken By, (c) = Cosigned By    Initials Name Provider Type    Mary Chung MS CCC-SLP Speech and Language Pathologist                Therapy Charges for Today     Code Description Service Date Service Provider Modifiers Qty    [id number]  ST TREATMENT SWALLOW 3 1/18/2022 Mary Baker MS CCC-SLP GN 1        Patient was not wearing a face mask and did exhibit coughing during this therapy encounter.  Procedure performed was aerosolizing, involved close contact (within 6 feet for at least 15 minutes or longer), and involved contact with infectious secretions or specimens.  Therapist used appropriate personal protective equipment including gloves, standard procedure mask, eye protection and N95 mask.  Appropriate PPE was worn during the entire therapy session.  Hand hygiene was completed before and after therapy session.          MS SARA Santos  1/18/2022

## 2022-01-18 NOTE — PLAN OF CARE
Goal Outcome Evaluation:  Plan of Care Reviewed With: patient           Outcome Summary: Pt tolerated seated grooming tasks w/ Supervision and AROM HEP w/ PLB w/ good effort. Pt limited d/t generalized weakness and requires frequent cueing for attention to task. Recommend cont skilled IPOT POC. Recommend pt DC to IP rehab.

## 2022-01-18 NOTE — THERAPY TREATMENT NOTE
Patient Name: Dani Ziegler  : 1965    MRN: [id number]                              Today's Date: 2022       Admit Date: 2021    Visit Dx:     ICD-10-CM ICD-9-CM   1. Acute exacerbation of chronic obstructive pulmonary disease (COPD) ([facility])  J44.1 491.21   2. Acute respiratory distress  R06.03 518.82   3. Hypoxia  R09.02 799.02   4. Pneumonia of both lungs due to infectious organism, unspecified part of lung  J18.9 483.8   5. Sepsis with acute hypoxic respiratory failure without septic shock, due to unspecified organism ([facility])  A41.9 038.9    R65.20 995.91    J96.01 518.81   6. Dysphagia, unspecified type  R13.10 787.20     Patient Active Problem List   Diagnosis   • Uncontrolled type 2 diabetes mellitus with hyperglycemia, without long-term current use of insulin ([facility])   • Vitamin D deficiency   • Peripheral neuropathy   • Adiposity   • Left bundle branch block (LBBB)   • Essential hypertension   • Chronic pain   • Chronic obstructive pulmonary disease ([facility])   • Anxiety   • Depression   • Arthritis involving multiple sites   • Leukocytosis   • Mixed hyperlipidemia   • Special screening for malignant neoplasms, colon   • Cellulitis of left lower extremity   • Hyperkalemia   • Acute on chronic renal insufficiency   • Sepsis ([facility])   • CHF (congestive heart failure) ([facility])   • Cellulitis of left leg   • Lumbar disc disease   • Diabetic peripheral neuropathy ([facility])   • COVID-19 virus infection   • CAP (community acquired pneumonia)   • Polypharmacy   • Morbid obesity with BMI of 45.0-49.9, adult ([facility])   • A/C Respiratory Failure Type 2   • History of recurrent deep vein thrombosis (DVT)   • Morbid obesity with BMI of 60.0-69.9, adult ([facility])     Past Medical History:   Diagnosis Date   • Abnormal weight gain    • Acute UTI    • Anxiety    • Arthritis involving multiple sites    • Chronic obstructive pulmonary disease ([facility])    • Chronic pain    • Current every day smoker    • Depression    • Diabetes  mellitus (HCC)    • Diarrhea    • Dysuria    • Edema, lower extremity    • Fever    • Head injury    • Hypertension    • Intervertebral disc disorder     Degeneration of intervertebral disc, site unspecified (722.6)   • Left bundle branch block    • Leukocytosis    • Long term current use of methadone for pain control    • Mass of right breast    • Nausea    • Obesity    • Overactive bladder    • Peripheral neuropathy    • Superficial phlebitis     right medial calf   • Upper respiratory infection    • Urinary incontinence    • Vitamin D deficiency    • Vomiting      Past Surgical History:   Procedure Laterality Date   • BLADDER SURGERY      pubovaginal sling   • CHOLECYSTECTOMY     • HIP ARTHROSCOPY Right 10/29/2020      General Information     Row Name 01/18/22 1104          Physical Therapy Time and Intention    Document Type therapy note (daily note)  -KG     Mode of Treatment physical therapy  -KG     Row Name 01/18/22 1104          General Information    Existing Precautions/Restrictions cardiac; fall; oxygen therapy device and L/min  -KG     Barriers to Rehab medically complex; cognitive status  -KG     Row Name 01/18/22 1104          Cognition    Orientation Status (Cognition) oriented to; person; place  -KG     Row Name 01/18/22 1104          Safety Issues, Functional Mobility    Safety Issues Affecting Function (Mobility) ability to follow commands; awareness of need for assistance; insight into deficits/self-awareness; safety precaution awareness; safety precautions follow-through/compliance  -KG     Impairments Affecting Function (Mobility) balance; cognition; coordination; endurance/activity tolerance; postural/trunk control; shortness of breath; strength  -KG     Cognitive Impairments, Mobility Safety/Performance attention; awareness, need for assistance; insight into deficits/self-awareness; safety precaution awareness; safety precaution follow-through  -KG           User Key  (r) = Recorded By, (t) =  Taken By, (c) = Cosigned By    Initials Name Provider Type    KG Nohemi Torres PT Physical Therapist               Mobility     Row Name 01/18/22 1104          Bed Mobility    Comment (Bed Mobility) UIC  -KG     Row Name 01/18/22 1104          Transfers    Comment (Transfers) STS x2 from chair. VC's for sequencing and safe hand placement. Pt returned to seated position after first transfer attempt without warning.  -KG     Row Name 01/18/22 1104          Sit-Stand Transfer    Sit-Stand [place] (Transfers) minimum assist (75% patient effort); verbal cues  -KG     Row Name 01/18/22 1104          Gait/Stairs (Locomotion)    [place] Level (Gait) minimum assist (75% patient effort); 2 person assist; verbal cues  -KG     Assistive Device (Gait) other (see comments)  B UE support on tele monitor  -KG     Distance in Feet (Gait) 50  -KG     Deviations/Abnormal Patterns (Gait) base of support, wide; joaquin decreased; festinating/shuffling; stride length decreased  -KG     Bilateral Gait Deviations forward flexed posture; heel strike decreased  -KG     Comment (Gait/Stairs) Pt demonstrated step to gait pattern with slow joaquin and wide MARK. Pt required max cueing for upright posture with increased stride length. Pt with several standing rest breaks where pt would demonstrate worsening posture. Increased cues for continued forward ambulation. Pt limited by elevated HR, unsteady gait and periods of SOA.  -KG           User Key  (r) = Recorded By, (t) = Taken By, (c) = Cosigned By    Initials Name Provider Type    Nohemi Keller PT Physical Therapist               Obj/Interventions     Row Name 01/18/22 1106          Balance    Balance Assessment sitting static balance; standing static balance; standing dynamic balance  -KG     Static Sitting Balance WFL; sitting in chair  -KG     Static Standing Balance mild impairment; supported; standing  -KG     Dynamic Standing Balance mild impairment;  supported; standing  -KG           User Key  (r) = Recorded By, (t) = Taken By, (c) = Cosigned By    Initials Name Provider Type    KG Nohemi Torres, PT Physical Therapist               Goals/Plan    No documentation.                Clinical Impression     [facility] Name 01/18/22 1107          Pain    Additional Documentation Pain Scale: FACES Pre/Post-Treatment (Group)  -KG     Row Name 01/18/22 1107          Pain Scale: FACES Pre/Post-Treatment    Pain: FACES Scale, Pretreatment 2-->hurts little bit  -KG     Posttreatment Pain Rating 2-->hurts little bit  -KG     Pain Location hip  -KG     [facility] Name 01/18/22 1107          Plan of Care Review    Plan of Care Reviewed With patient  -KG     Progress improving  -KG     Outcome Summary Pt increased ambulation distance to 50ft with Alcon x2 and B UE support on tele monitor. Pt required max cueing for upright posture and increased stride length. Pt with several standing rest breaks, required increased encouragement for continued ambulation. Limited by unsteady gait and elevated HR. Continue to progress as appropriate.  -KG     Row Name 01/18/22 1107          Vital Signs    Pre Systolic BP Rehab 141  -KG     Pre Treatment Diastolic BP 80  -KG     Post Systolic BP Rehab 132  -KG     Post Treatment Diastolic BP 78  -KG     Pretreatment Heart Rate (beats/min) 106  -KG     Posttreatment Heart Rate (beats/min) 110  -KG     Pre SpO2 (%) 95  -KG     O2 Delivery Pre Treatment supplemental O2  -KG     Post SpO2 (%) 95  -KG     O2 Delivery Post Treatment supplemental O2  -KG     Pre Patient Position Sitting  -KG     Intra Patient Position Standing  -KG     Post Patient Position Sitting  -KG     Row Name 01/18/22 1107          Positioning and Restraints    Pre-Treatment Position sitting in chair/recliner  -KG     Post Treatment Position chair  -KG     In Chair notified nsg; reclined; call light within reach; encouraged to call for assist; exit alarm on; RUE elevated; LUE elevated;  legs elevated  -KG           User Key  (r) = Recorded By, (t) = Taken By, (c) = Cosigned By    Initials Name Provider Type    Nohemi Keller, PT Physical Therapist               Outcome Measures     Row Name 01/18/22 1110 01/18/22 0800       How much help from another person do you currently need...    Turning from your back to your side while in flat bed without using bedrails? 3  -KG 3  -ZAYRA    Moving from lying on back to sitting on the side of a flat bed without bedrails? 3  -KG 3  -ZAYRA    Moving to and from a bed to a chair (including a wheelchair)? 3  -KG 3  -ZAYRA    Standing up from a chair using your arms (e.g., wheelchair, bedside chair)? 3  -KG 3  -ZAYRA    Climbing 3-5 steps with a railing? 2  -KG 2  -ZAYRA    To walk in hospital room? 3  -KG 2  -ZAYRA    AM-PAC 6 Clicks Score (PT) 17  -KG 16  -ZAYRA    Row Name 01/18/22 1110 01/18/22 1041       Functional Assessment    Outcome Measure Options AM-PAC 6 Clicks Basic Mobility (PT)  -KG AM-PAC 6 Clicks Daily Activity (OT)  -CS          User Key  (r) = Recorded By, (t) = Taken By, (c) = Cosigned By    Initials Name Provider Type    Marianna Butler RN Registered Nurse    Iza Barroso, OT Occupational Therapist    Nohemi Keller, PT Physical Therapist                             Physical Therapy Education                 Title: PT OT SLP Therapies (In Progress)     Topic: Physical Therapy (In Progress)     Point: Mobility training (In Progress)     Learning Progress Summary           Patient Acceptance, E, NR by KG at 1/18/2022 0843    Acceptance, E, NR by KG at 1/17/2022 1018    Acceptance, E, NR by LOPEZ at 1/16/2022 0800    Acceptance, E, NR by LOPEZ at 1/15/2022 0925                   Point: Home exercise program (In Progress)     Learning Progress Summary           Patient Acceptance, E, NR by KG at 1/18/2022 0843    Acceptance, E, NR by KG at 1/17/2022 1018    Acceptance, E, NR by LOPEZ at 1/16/2022 0800    Acceptance, E, NR by LOPEZ at 1/15/2022 0925                    Point: Body mechanics (In Progress)     Learning Progress Summary           Patient Acceptance, E, NR by KG at 1/18/2022 0843    Acceptance, E, NR by KG at 1/17/2022 1018    Acceptance, E, NR by LOPEZ at 1/16/2022 0800    Acceptance, E, NR by LOPEZ at 1/15/2022 0925                   Point: Precautions (In Progress)     Learning Progress Summary           Patient Acceptance, E, NR by KG at 1/18/2022 0843    Acceptance, E, NR by KG at 1/17/2022 1018    Acceptance, E, NR by LOPEZ at 1/16/2022 0800    Acceptance, E, NR by LOPEZ at 1/15/2022 0925                               User Key     Initials Effective Dates Name Provider Type Discipline    LOPEZ 06/16/21 -  Beverly Jean Baptiste, PT Physical Therapist PT     05/22/20 -  Nohemi Torres PT Physical Therapist PT              PT Recommendation and Plan     Plan of Care Reviewed With: patient  Progress: improving  Outcome Summary: Pt increased ambulation distance to 50ft with Alcon x2 and B UE support on tele monitor. Pt required max cueing for upright posture and increased stride length. Pt with several standing rest breaks, required increased encouragement for continued ambulation. Limited by unsteady gait and elevated HR. Continue to progress as appropriate.     Time Calculation:    PT Charges     Row Name 01/18/22 0843             Time Calculation    Start Time 0843  -KG      PT Received On 01/18/22  -KG      PT Goal Re-Cert Due Date 01/25/22  -KG              Time Calculation- PT    Total Timed Code Minutes- PT 24 minute(s)  -KG              Timed Charges    68104 - PT Therapeutic Activity Minutes 24  -KG              Total Minutes    Timed Charges Total Minutes 24  -KG       Total Minutes 24  -KG            User Key  (r) = Recorded By, (t) = Taken By, (c) = Cosigned By    Initials Name Provider Type    KG Nohemi Torres, PT Physical Therapist              Therapy Charges for Today     Code Description Service Date Service Provider Modifiers Qty     08530830872 HC PT THERAPEUTIC ACT EA 15 MIN 1/17/2022 Nohemi Torres, PT GP 2    46518670196 HC PT THERAPEUTIC ACT EA 15 MIN 1/18/2022 Nohemi Torres, PT GP 2          PT G-Codes  Outcome Measure Options: AM-PAC 6 Clicks Basic Mobility (PT)  AM-PAC 6 Clicks Score (PT): 17  AM-PAC 6 Clicks Score (OT): 15    Melina Torres, PT  1/18/2022

## 2022-01-18 NOTE — PROGRESS NOTES
INTENSIVIST   PROGRESS NOTE     Hospital:  LOS: 18 days      BERTO Louise 56 y.o. female is followed for: Shortness of Breath       Respiratory Failure Type 2    Uncontrolled type 2 diabetes mellitus with hyperglycemia, without long-term current use of insulin (HCC)    Sepsis (HCC)    As an Intensivist, we provide an integrated approach to the ICU patient and family, medical management of comorbid conditions, including but not limited to electrolytes, glycemic control, organ dysfunction, lead interdisciplinary rounds and coordinate the care with all other services, including those from other specialists.     Interval History:  Occ periods of confusion.  No respiratory distress.    The patient qualifies to receive the vaccine, but they have not yet received it.     Temp  Min: 98 °F (36.7 °C)  Max: 98.8 °F (37.1 °C)       History     Last Reviewed by Beverly Jean Bpatiste PT on 1/15/2022 at 10:11 AM    Sections Reviewed    Medical, Surgical, Family, Tobacco, Custom, Alcohol, Drug Use, Sexual   Activity      Problem list reviewed by Benson Pelaze MD on 1/1/2022 at  4:56 PM  Medicines reviewed by Benson Pelaez MD on 1/1/2022 at  4:56 PM  Allergies reviewed by Benson Pelaez MD on 1/1/2022 at  4:55 PM       The patient's relevant past medical, surgical and social history were reviewed and updated in Epic as appropriate.        O     Vitals:  Temp: 98.8 °F (37.1 °C) (01/18/22 0800) Temp  Min: 98 °F (36.7 °C)  Max: 98.8 °F (37.1 °C)   Temp core:      BP: 136/80 (01/18/22 1000) BP  Min: 117/85  Max: 147/85   Pulse: 95 (01/18/22 1000) Pulse  Min: 69  Max: 103   Resp: 18 (01/18/22 0800) Resp  Min: 16  Max: 24   SpO2: 90 % (01/18/22 1000) SpO2  Min: 89 %  Max: 97 %   Device: humidified, nasal cannula (01/18/22 1000)    Flow Rate: 4 (01/18/22 1000) Flow (L/min)  Min: 2  Max: 4     Intake/Ouptut 24 hrs (7:00AM - 6:59 AM)  Intake & Output (last 3 days)       01/15 0701  01/16 0700 01/16 0701  01/17 0700 01/17 0701 01/18 0700  01/18 0701  01/19 0700    P.O. 960 720 920     I.V. (mL/kg)   100 (0.6)     Total Intake(mL/kg) 960 (5.8) 720 (4.3) 1020 (6.1)     Urine (mL/kg/hr) 1020 (0.3) 600 (0.2) 200 (0.1)     Stool  0 0     Total Output 1020 600 200     Net -60 +120 +820             Urine Unmeasured Occurrence   4 x     Stool Unmeasured Occurrence  1 x 2 x           Wt Readings from Last 3 Encounters:   01/09/22 (!) 166 kg (365 lb 11.2 oz)   08/24/21 133 kg (294 lb)   07/16/21 (!) 140 kg (308 lb)     Physical Examination  Telemetry:  Rhythm: normal sinus rhythm (01/18/22 1000)     QTc Interval (Sec): 0.46 (01/05/22 2000)   Constitutional:  No acute distress.   Cardiovascular: RRR.   Normal heart sounds.  No murmurs, gallop or rub.   Respiratory: Normal breath sounds  No adventitious sounds.   Abdominal:  Soft with no tenderness.  No distension.   No HSM.   Extremities: Warm.  Dry.  No cyanosis.  No Edema   Neurological:   Awake.  Best Eye Response: 4-->(E4) spontaneous (01/18/22 1000)  Best Motor Response: 6-->(M6) obeys commands (01/18/22 1000)  Best Verbal Response: 5-->(V5) oriented (01/18/22 1000)  [place] Coma Scale Score: 15 (01/18/22 1000)     Lines  R PICC    Results Reviewed:  Laboratory  Microbiology  Radiology  Pathology    Hematology:  Results from last 7 days   Lab Units 01/18/22  0315 01/17/22  0227 01/16/22  0255 01/16/22  0255   WBC 10*3/mm3 15.29* 14.15*   < > 14.78*   HEMOGLOBIN g/dL 13.7 13.8   < > 13.9   MCV fL 89.7 90.4   < > 90.2   PLATELETS 10*3/mm3 333 348   < > 356   NEUTROS ABS 10*3/mm3 9.17* 7.59*   < > 8.96*   LYMPHS ABS 10*3/mm3  --  5.23*  --  4.57*   EOS ABS 10*3/mm3 0.31 0.29   < > 0.19    < > = values in this interval not displayed.     Chemistry:  Estimated Creatinine Clearance: 152.9 mL/min (by C-G formula based on SCr of 0.66 mg/dL).  Results from last 7 days   Lab Units 01/18/22  0315 01/17/22  0227   SODIUM mmol/L 136 138   POTASSIUM mmol/L 4.1 3.9   CHLORIDE mmol/L 100 102   CO2 mmol/L 26.0 24.0   BUN  mg/dL 24* 27*   CREATININE mg/dL 0.66 0.62   GLUCOSE mg/dL 106* 111*     Results from last 7 days   Lab Units 01/18/22  0315 01/17/22  0227 01/16/22  0255 01/15/22  0258   CALCIUM mg/dL 9.3 9.8   < > 10.1   MAGNESIUM mg/dL 2.1 1.8  --  2.4   PHOSPHORUS mg/dL  --  3.5  --  4.5    < > = values in this interval not displayed.     Results from last 7 days   Lab Units 01/17/22  0227 01/15/22  0258   BILIRUBIN mg/dL 0.6 0.7   AST (SGOT) U/L 22 27   ALT (SGPT) U/L 35* 32   ALK PHOS U/L 123* 143*     COVID-19  Lab Results   Component Value Date    COVID19 Not Detected 12/30/2021    COVID19 Not Detected 06/03/2021       Images:  No radiology results for the last day    Results: Reviewed.  I reviewed the patient's new laboratory and imaging results.  I independently reviewed the patient's new images.    Medications: Reviewed.    Assessment/Plan   A / P     Dani is a 56 y.o. female admitted on 12/30/2021 with Acute on chronic respiratory failure with hypoxia and hypercapnia (HCC) [J96.21, J96.22]:    1. Respiratory Failure type 2  1. Invasive Mechanical Ventilation   2. Intubated 12/31/21  3. Extubated 01/11/22  4. Compensated Respiratory Acidosis as per ABG on 12/31/21 and 01/01/22  5. COPD on home O2  1. Previous Trach in 2019 and 2020.  2. LAMA, LABA  6. Pneumonia this admission  2. Aute left seventh rib fracture.  3. Previous DVT on APIxaban  4. HTN  5. Chronic pain syndrome  6. Anxiety/Depression  7. Obesity III. Body mass index is 57.26 kg/m².   8. T2DM    Results from last 7 days   Lab Units 01/18/22  0656 01/18/22  0624 01/17/22  1952 01/17/22  1617 01/17/22  1120 01/17/22  0720   GLUCOSE mg/dL 142* 142* 174* 148* 159* 116     Lab Results   Lab Value Date/Time    HGBA1C 5.7 (H) 08/24/2021 1522    HGBA1C 6.00 (H) 06/03/2021 1902    HGBA1C 6.0 (H) 05/25/2021 1658    HGBA1C 8.3 01/11/2021 0000    HGBA1C 7.30 (H) 03/28/2018 0514       Nutrition Support: Patient isn't on Tube Feeding   Modulars: Patient doesn't have any  tube feeding modular orders   Diet: Diet Dysphagia; IV - Mechanical Soft No Mixed Consistencies; Nectar / Syrup Thick; Cardiac, Consistent Carbohydrate   Advance Directives: Code Status and Medical Interventions:   Ordered at: 12/31/21 0151     Code Status (Patient has no pulse and is not breathing):    CPR (Attempt to Resuscitate)     Medical Interventions (Patient has pulse or is breathing):    Full Support        Plan:    1. Awaiting Psych Consult for depression and management of her medications.  2. ST to reassess (FEES) and advance diet if indicated.  3. Goal: Glucose < 180 mg/dL.  1. Insulin basal, prandial and correction   2. No changes on current doses.  4. Follow up leukocytosis in AM  5. Disposition: Transfer to Telemetry Unit   1. CM: Inpatient Rehab    Plan of care and goals reviewed during interdisciplinary rounds.  I discussed the patient's findings and my recommendations with nursing staff    Level of Risk is High due to:  illness with threat to life or bodily function.     Time: 25 minutes, in direct patient care, with the patient and/or in the ICU or de leon coordinating care with other health care providers.     I have spent > 50% percent of this time, counseling and discussing management.     OK to floor.    Hospitalist Team will assist with medical management, and assume Primary Attending, once on the Floor.    Thank you.    [x]  Primary Attending  []  Consultant

## 2022-01-18 NOTE — CONSULTS
"  Referring Provider: MD Latanya  Reason for Consultation: AoCRF    Subjective .   Education:  NN spoke with pt at BS.  Pt alert and able to answer questions appropriately.  Pt O2 sat 95% on 5  L currently, home O2 use 2 L HS.  Pt reports no the ability to ambulate at baseline before experiencing SOB.  Pt states use of rescue inhaler 3 times daily, relief of SOB unreported.  Patient is not up to date on COVID, flu and PNA vaccines.  Tobacco cessation encouraged.  Resources to be addressed in ongoing discussions.  Pt reports no issues at this time with medications but does state that she has issues transportation for appointments.  SW notified of need.  At last admission patient was supposed to follow up with Dr. Bajwa in [place], but she states today that the appointment was cancelled and then never happened.  Pt reports some previous hx of formal COPD teaching, no understanding of action plan, or MO.  Stop light report, NN contact information, instructions for accessing [facility] and list of educational videos given to pt.  \"A Patient's Guide to COPD\" booklet left at BS. [id number] reference sheet discussed and given to patient at BS. COPD education completed in the form of explanation, handouts, and videos.  No new concerns or questions voiced at this time.  NN will continue to follow as needed.     Age: 56 y.o.  Sex: female  Smoker Status: current, ~31 pack years   Pulmonologist: NA  FEV1 (PFT): NA  Home O2: 2L HS    Objective     SpO2 SpO2: 94 % (01/18/22 1200)  Device Device (Oxygen Therapy): humidified, nasal cannula (01/18/22 1200)  Flow Flow (L/min): 4 (01/18/22 1200)  Incentive Spirometer $ Incentive Spirometry: yes (01/17/22 1000)  IS Predicted Level (mL) Incentive Spirometer Predicted Level (mL): 1100 (01/16/22 2000)   Number of Repetitions Number of Repetitions (IS): 10 (01/17/22 1000)   Level Incentive Spirometer (mL) Level Incentive Spirometer (mL): 1000 (01/17/22 1000)  Patient Tolerance Patient " Tolerance (IS): good (01/17/22 1800)   Inhaler Treatment Status Respiratory Treatment Status (Inhaler): given (01/18/22 0711)  Treatment Route Respiratory Treatment Status (Inhaler): given (01/18/22 0711)      Home Medications:  Medications Prior to Admission   Medication Sig Dispense Refill Last Dose   • albuterol (ACCUNEB) 0.63 MG/3ML nebulizer solution INHALE CONTENTS OF 1 VIAL VIA NEBULIZER EVERY 6 HOURS AS NEEDED FOR WHEEZING 75 mL 2    • albuterol (PROVENTIL) (2.5 MG/3ML) 0.083% nebulizer solution INHALE CONTENTS OF 1 VIAL VIA NEBULIZER EVERY SIX HOURS AS NEEDED FOR WHEEZING 180 mL 1    • albuterol sulfate HFA (Ventolin HFA) 108 (90 Base) MCG/ACT inhaler Inhale 1-2 puffs Every 4 (Four) Hours As Needed for Wheezing. 18 g 2    • Alcohol Swabs pads Use when checking sugars 100 each 1    • B-D ULTRAFINE III SHORT PEN 31G X 8 MM misc       • busPIRone (BUSPAR) 10 MG tablet TAKE 1 TABLET BY MOUTH TWICE DAILY 60 tablet 2    • Daliresp 500 MCG tablet tablet TAKE 1 TABLET BY MOUTH DAILY. 30 tablet 0    • diazePAM (VALIUM) 2 MG tablet TAKE 1 TABLET BY MOUTH EVERY 12 HOURS AS NEEDED FOR ANXIETY 60 tablet 0    • DULoxetine (CYMBALTA) 60 MG capsule TAKE 2 CAPSULES BY MOUTH EVERY DAY 60 capsule 1    • Easy Comfort Pen Needles 32G X 4 MM misc USE AS DIRECTED TO INJECT INSULIN 100 each 1    • Eliquis 5 MG tablet tablet TAKE 1 TABLET BY MOUTH EVERY 12 HOURS 60 tablet 2    • escitalopram (LEXAPRO) 20 MG tablet TAKE 1 TABLET BY MOUTH EVERY DAY 30 tablet 1    • famotidine (PEPCID) 20 MG tablet TAKE 1 TABLET BY MOUTH TWICE DAILY 60 tablet 2    • ferrous sulfate (IRON SUPPLEMENT) 325 (65 FE) MG tablet Take 1 tablet by mouth Daily With Breakfast. 30 tablet 1    • Fluticasone-Umeclidin-Vilant (Trelegy Ellipta) 100-62.5-25 MCG/INH inhaler Inhale 1 puff Daily. 60 each 5    • furosemide (LASIX) 20 MG tablet TAKE 1 TABLET BY MOUTH EVERY DAY AS NEEDED FOR SWELLING 15 tablet 0    • gabapentin (NEURONTIN) 600 MG tablet TAKE 1 TABLET BY  MOUTH THREE TIMES DAILY 90 tablet 0    • glucose monitor monitoring kit Check glucose twice daily 1 each 0    • Lancet Devices (Leader Advanced Lancing Device) misc USE US TO TEST BLOOD SUGAR 1 each 1    • metoprolol succinate XL (TOPROL-XL) 50 MG 24 hr tablet TAKE 1 TABLET BY MOUTH EVERY DAY 30 tablet 1    • Multiple Vitamins-Iron (MULTI-VITAMIN/IRON) tablet Take 1 tablet by mouth Daily. 30 each 5    • nicotine (NICODERM CQ) 21 MG/24HR patch Place 1 patch on the skin as directed by provider Daily. 30 each 0    • NIFEdipine XL (PROCARDIA XL) 30 MG 24 hr tablet TAKE 1 TABLET BY MOUTH EVERY DAY 30 tablet 2    • nystatin (MYCOSTATIN) 661302 UNIT/GM cream APPLY TOPICALLY TO AFFECTED AREA 2 TIMES A DAY AS DIRECTED 30 g 0    • O2 (OXYGEN) Inhale 2 L/min Daily.      • oxyCODONE-acetaminophen (PERCOCET)  MG per tablet TAKE 1 TABLET BY MOUTH EVERY SIX HOURS AS NEEDED 120 tablet 0    • terazosin (HYTRIN) 2 MG capsule Take 1 capsule by mouth Every Night. 30 capsule 0    • traZODone (DESYREL) 50 MG tablet Take 1 tablet by mouth Every Night. 30 tablet 0    • True Metrix Blood Glucose Test test strip USE TO TEST BLOOD GLUCOSE TWICE DAILY 100 each 2    • TRUEplus Lancets 28G misc CHECK GLUCOSE TWICE DAILY 100 each 1        Discussion: Per current GOLD Standards, please consider: LAMA/LABA/ICS in place (Trelegy), Outpatient PFT, Rehab as appropriate      Discussed with primary RN    Bhargavi Duong RN

## 2022-01-18 NOTE — PLAN OF CARE
Goal Outcome Evaluation:      Pt. Neuro intact, up with 2 assist. On 2-4 L NC. VSS. Awaiting tele bed.

## 2022-01-18 NOTE — PLAN OF CARE
Goal Outcome Evaluation:  Plan of Care Reviewed With: patient        Progress: improving  Outcome Summary: Pt increased ambulation distance to 50ft with Alcon x2 and B UE support on tele monitor. Pt required max cueing for upright posture and increased stride length. Pt with several standing rest breaks, required increased encouragement for continued ambulation. Limited by unsteady gait and elevated HR. Continue to progress as appropriate.

## 2022-01-18 NOTE — CASE MANAGEMENT/SOCIAL WORK
Continued Stay Note  [facility]     Patient Name: Dani Ziegler  MRN: [id number]  Today's Date: 1/18/2022    Admit Date: 12/30/2021     Discharge Plan     Row Name 01/18/22 1215       Plan    Plan Rehab/SNF    Patient/Family in Agreement with Plan yes    Plan Comments Remains in ICU with plan to transfer to floor bed.  Met with patient at bedside, groggy but will wake up to talk some.  Agreeable to short term rehab-referral made to Kate Reed today for possible placement at either [place] or [facility].  Romelia Pang, Ext. [phone]    Final Discharge Disposition Code 30 - still a patient    Row Name 01/18/22 0933       Plan    Plan SW    Plan Comments SW’er spoke with Christina at [facility]: Behavioral Health. SW’er scheduled Telepsych Evaluation for today 1/18 at 2:00pm. SW can be called  [phone] to bring cart to pt's room. CM has linked consult to MILEY Rosa.  Nurse was informed. SW available to assist CARLA Quijano @ 1106               Discharge Codes    No documentation.                     CARLA Salazar

## 2022-01-18 NOTE — PLAN OF CARE
Goal Outcome Evaluation:  Plan of Care Reviewed With: patient        Progress: improving   SLP treatment completed. Will continue to address dysphagia and will plan for repeat FEES. Please see note for further details and recommendations.

## 2022-01-18 NOTE — CASE MANAGEMENT/SOCIAL WORK
Continued Stay Note  [facility]     Patient Name: Dani Ziegler  MRN: [id number]  Today's Date: 1/18/2022    Admit Date: 12/30/2021     Discharge Plan     Row Name 01/18/22 0933       Plan    Plan SW    Plan Comments SW’er spoke with Christina at [facility]: Behavioral Health. SW’er scheduled Telepsych Evaluation for today 1/18 at 2:00pm. SW can be called  [phone] to bring cart to pt's room. CM has linked consult to MILEY Rosa.  Nurse was informed. SW available to assist CARLA Quijano @ [phone]               Discharge Codes    No documentation.                     CARLA Gaspar (Kay)

## 2022-01-19 ENCOUNTER — ANCILLARY PROCEDURE (OUTPATIENT)
Dept: SPEECH THERAPY | Facility: HOSPITAL | Age: 57
End: 2022-01-19

## 2022-01-19 LAB
ANION GAP SERPL CALCULATED.3IONS-SCNC: 10 MMOL/L (ref 5–15)
BASOPHILS # BLD AUTO: 0.09 10*3/MM3 (ref 0–0.2)
BASOPHILS NFR BLD AUTO: 0.6 % (ref 0–1.5)
BUN SERPL-MCNC: 23 MG/DL (ref 6–20)
BUN/CREAT SERPL: 34.8 (ref 7–25)
CALCIUM SPEC-SCNC: 9.3 MG/DL (ref 8.6–10.5)
CHLORIDE SERPL-SCNC: 101 MMOL/L (ref 98–107)
CO2 SERPL-SCNC: 25 MMOL/L (ref 22–29)
CREAT SERPL-MCNC: 0.66 MG/DL (ref 0.57–1)
DEPRECATED RDW RBC AUTO: 51.9 FL (ref 37–54)
EOSINOPHIL # BLD AUTO: 0.56 10*3/MM3 (ref 0–0.4)
EOSINOPHIL NFR BLD AUTO: 3.6 % (ref 0.3–6.2)
ERYTHROCYTE [DISTWIDTH] IN BLOOD BY AUTOMATED COUNT: 16 % (ref 12.3–15.4)
GFR SERPL CREATININE-BSD FRML MDRD: 92 ML/MIN/1.73
GLUCOSE BLDC GLUCOMTR-MCNC: 110 MG/DL (ref 70–130)
GLUCOSE BLDC GLUCOMTR-MCNC: 112 MG/DL (ref 70–130)
GLUCOSE BLDC GLUCOMTR-MCNC: 127 MG/DL (ref 70–130)
GLUCOSE BLDC GLUCOMTR-MCNC: 165 MG/DL (ref 70–130)
GLUCOSE SERPL-MCNC: 154 MG/DL (ref 65–99)
HCT VFR BLD AUTO: 41.7 % (ref 34–46.6)
HGB BLD-MCNC: 13.3 G/DL (ref 12–15.9)
IMM GRANULOCYTES # BLD AUTO: 0.09 10*3/MM3 (ref 0–0.05)
IMM GRANULOCYTES NFR BLD AUTO: 0.6 % (ref 0–0.5)
LYMPHOCYTES # BLD AUTO: 6.11 10*3/MM3 (ref 0.7–3.1)
LYMPHOCYTES NFR BLD AUTO: 39.5 % (ref 19.6–45.3)
MCH RBC QN AUTO: 28.4 PG (ref 26.6–33)
MCHC RBC AUTO-ENTMCNC: 31.9 G/DL (ref 31.5–35.7)
MCV RBC AUTO: 88.9 FL (ref 79–97)
MONOCYTES # BLD AUTO: 0.88 10*3/MM3 (ref 0.1–0.9)
MONOCYTES NFR BLD AUTO: 5.7 % (ref 5–12)
NEUTROPHILS NFR BLD AUTO: 50 % (ref 42.7–76)
NEUTROPHILS NFR BLD AUTO: 7.74 10*3/MM3 (ref 1.7–7)
NRBC BLD AUTO-RTO: 0 /100 WBC (ref 0–0.2)
PLAT MORPH BLD: NORMAL
PLATELET # BLD AUTO: 330 10*3/MM3 (ref 140–450)
PMV BLD AUTO: 10.8 FL (ref 6–12)
POTASSIUM SERPL-SCNC: 4.1 MMOL/L (ref 3.5–5.2)
RBC # BLD AUTO: 4.69 10*6/MM3 (ref 3.77–5.28)
RBC MORPH BLD: NORMAL
SODIUM SERPL-SCNC: 136 MMOL/L (ref 136–145)
WBC MORPH BLD: NORMAL
WBC NRBC COR # BLD: 15.47 10*3/MM3 (ref 3.4–10.8)

## 2022-01-19 PROCEDURE — 63710000001 INSULIN DETEMIR PER 5 UNITS: Performed by: INTERNAL MEDICINE

## 2022-01-19 PROCEDURE — 83036 HEMOGLOBIN GLYCOSYLATED A1C: CPT | Performed by: INTERNAL MEDICINE

## 2022-01-19 PROCEDURE — 97530 THERAPEUTIC ACTIVITIES: CPT

## 2022-01-19 PROCEDURE — 85025 COMPLETE CBC W/AUTO DIFF WBC: CPT | Performed by: INTERNAL MEDICINE

## 2022-01-19 PROCEDURE — 63710000001 INSULIN LISPRO (HUMAN) PER 5 UNITS: Performed by: INTERNAL MEDICINE

## 2022-01-19 PROCEDURE — 99232 SBSQ HOSP IP/OBS MODERATE 35: CPT | Performed by: INTERNAL MEDICINE

## 2022-01-19 PROCEDURE — 94761 N-INVAS EAR/PLS OXIMETRY MLT: CPT

## 2022-01-19 PROCEDURE — 92612 ENDOSCOPY SWALLOW (FEES) VID: CPT

## 2022-01-19 PROCEDURE — 82962 GLUCOSE BLOOD TEST: CPT

## 2022-01-19 PROCEDURE — 85007 BL SMEAR W/DIFF WBC COUNT: CPT | Performed by: INTERNAL MEDICINE

## 2022-01-19 PROCEDURE — 94799 UNLISTED PULMONARY SVC/PX: CPT

## 2022-01-19 PROCEDURE — 80048 BASIC METABOLIC PNL TOTAL CA: CPT | Performed by: INTERNAL MEDICINE

## 2022-01-19 PROCEDURE — 97535 SELF CARE MNGMENT TRAINING: CPT

## 2022-01-19 RX ORDER — DIAZEPAM 2 MG/1
2 TABLET ORAL EVERY 12 HOURS SCHEDULED
Status: DISCONTINUED | OUTPATIENT
Start: 2022-01-19 | End: 2022-01-23 | Stop reason: HOSPADM

## 2022-01-19 RX ORDER — ESCITALOPRAM OXALATE 10 MG/1
5 TABLET ORAL DAILY
Status: DISCONTINUED | OUTPATIENT
Start: 2022-01-23 | End: 2022-01-23 | Stop reason: HOSPADM

## 2022-01-19 RX ORDER — OXYCODONE AND ACETAMINOPHEN 10; 325 MG/1; MG/1
1 TABLET ORAL EVERY 6 HOURS PRN
Status: DISCONTINUED | OUTPATIENT
Start: 2022-01-19 | End: 2022-01-23 | Stop reason: HOSPADM

## 2022-01-19 RX ORDER — ARIPIPRAZOLE 2 MG/1
2 TABLET ORAL DAILY
Status: DISCONTINUED | OUTPATIENT
Start: 2022-01-20 | End: 2022-01-21

## 2022-01-19 RX ORDER — ESCITALOPRAM OXALATE 10 MG/1
10 TABLET ORAL DAILY
Status: COMPLETED | OUTPATIENT
Start: 2022-01-20 | End: 2022-01-22

## 2022-01-19 RX ADMIN — SODIUM CHLORIDE, PRESERVATIVE FREE 10 ML: 5 INJECTION INTRAVENOUS at 21:46

## 2022-01-19 RX ADMIN — INSULIN LISPRO 6 UNITS: 100 INJECTION, SOLUTION INTRAVENOUS; SUBCUTANEOUS at 11:12

## 2022-01-19 RX ADMIN — FAMOTIDINE 20 MG: 20 TABLET, FILM COATED ORAL at 21:46

## 2022-01-19 RX ADMIN — BUSPIRONE HYDROCHLORIDE 10 MG: 5 TABLET ORAL at 08:18

## 2022-01-19 RX ADMIN — CASTOR OIL AND BALSAM, PERU 1 APPLICATION: 788; 87 OINTMENT TOPICAL at 21:50

## 2022-01-19 RX ADMIN — NYSTATIN: 100000 POWDER TOPICAL at 21:46

## 2022-01-19 RX ADMIN — GABAPENTIN 300 MG: 300 CAPSULE ORAL at 14:04

## 2022-01-19 RX ADMIN — INSULIN LISPRO 2 UNITS: 100 INJECTION, SOLUTION INTRAVENOUS; SUBCUTANEOUS at 11:11

## 2022-01-19 RX ADMIN — INSULIN LISPRO 6 UNITS: 100 INJECTION, SOLUTION INTRAVENOUS; SUBCUTANEOUS at 17:50

## 2022-01-19 RX ADMIN — INSULIN DETEMIR 9 UNITS: 100 INJECTION, SOLUTION SUBCUTANEOUS at 08:18

## 2022-01-19 RX ADMIN — GABAPENTIN 300 MG: 300 CAPSULE ORAL at 21:46

## 2022-01-19 RX ADMIN — APIXABAN 5 MG: 5 TABLET, FILM COATED ORAL at 21:46

## 2022-01-19 RX ADMIN — OXYCODONE HYDROCHLORIDE AND ACETAMINOPHEN 1 TABLET: 10; 325 TABLET ORAL at 16:42

## 2022-01-19 RX ADMIN — CASTOR OIL AND BALSAM, PERU 1 APPLICATION: 788; 87 OINTMENT TOPICAL at 08:17

## 2022-01-19 RX ADMIN — DIAZEPAM 2 MG: 2 TABLET ORAL at 21:46

## 2022-01-19 RX ADMIN — DULOXETINE 60 MG: 60 CAPSULE, DELAYED RELEASE ORAL at 08:18

## 2022-01-19 RX ADMIN — AMLODIPINE BESYLATE 10 MG: 10 TABLET ORAL at 08:18

## 2022-01-19 RX ADMIN — OXYCODONE HYDROCHLORIDE AND ACETAMINOPHEN 1 TABLET: 10; 325 TABLET ORAL at 10:50

## 2022-01-19 RX ADMIN — APIXABAN 5 MG: 5 TABLET, FILM COATED ORAL at 08:18

## 2022-01-19 RX ADMIN — INSULIN DETEMIR 9 UNITS: 100 INJECTION, SOLUTION SUBCUTANEOUS at 21:46

## 2022-01-19 RX ADMIN — ARFORMOTEROL TARTRATE 15 MCG: 15 SOLUTION RESPIRATORY (INHALATION) at 08:36

## 2022-01-19 RX ADMIN — ESCITALOPRAM OXALATE 20 MG: 20 TABLET ORAL at 08:18

## 2022-01-19 RX ADMIN — NYSTATIN: 100000 POWDER TOPICAL at 08:18

## 2022-01-19 RX ADMIN — INSULIN LISPRO 6 UNITS: 100 INJECTION, SOLUTION INTRAVENOUS; SUBCUTANEOUS at 08:18

## 2022-01-19 RX ADMIN — GABAPENTIN 300 MG: 300 CAPSULE ORAL at 05:52

## 2022-01-19 RX ADMIN — DIAZEPAM 2 MG: 2 TABLET ORAL at 05:52

## 2022-01-19 RX ADMIN — TIOTROPIUM BROMIDE INHALATION SPRAY 2 PUFF: 3.12 SPRAY, METERED RESPIRATORY (INHALATION) at 08:36

## 2022-01-19 RX ADMIN — BUSPIRONE HYDROCHLORIDE 10 MG: 5 TABLET ORAL at 17:50

## 2022-01-19 RX ADMIN — METOPROLOL SUCCINATE 50 MG: 50 TABLET, EXTENDED RELEASE ORAL at 08:18

## 2022-01-19 RX ADMIN — FAMOTIDINE 20 MG: 20 TABLET, FILM COATED ORAL at 08:18

## 2022-01-19 NOTE — PLAN OF CARE
Goal Outcome Evaluation:      Pt. Neuro intact, up with 1-2 assist. On 2-3 L NC. VSS. Adequate UOP. Awaiting tele bed.

## 2022-01-19 NOTE — PLAN OF CARE
Goal Outcome Evaluation:  Plan of Care Reviewed With: patient        Progress: improving  Outcome Summary: Pt completed bed mob with SBA throughout and cues for compensatory sequencing due to specialty mattress, completed STS CGA and CGA SPT to BSC, completed washing hands standing sink side with progress activity tolerance and completed functional mob 60ft CGA+1 for equipment management at [facility], cont IPOT per POC

## 2022-01-19 NOTE — MBS/VFSS/FEES
Acute Care - Speech Language Pathology   Swallow Re-Evaluation [facility]   Fiberoptic Endoscopic Evaluation of Swallowing (FEES)     Patient Name: Dani Ziegler  : 1965  MRN: [id number]  Today's Date: 2022               Admit Date: 2021    Visit Dx:     ICD-10-CM ICD-9-CM   1. Acute exacerbation of chronic obstructive pulmonary disease (COPD) ([facility])  J44.1 491.21   2. Acute respiratory distress  R06.03 518.82   3. Hypoxia  R09.02 799.02   4. Pneumonia of both lungs due to infectious organism, unspecified part of lung  J18.9 483.8   5. Sepsis with acute hypoxic respiratory failure without septic shock, due to unspecified organism ([facility])  A41.9 038.9    R65.20 995.91    J96.01 518.81   6. Oropharyngeal dysphagia  R13.12 787.22     Patient Active Problem List   Diagnosis   • Uncontrolled type 2 diabetes mellitus with hyperglycemia, without long-term current use of insulin ([facility])   • Vitamin D deficiency   • Peripheral neuropathy   • Adiposity   • Left bundle branch block (LBBB)   • Essential hypertension   • Chronic pain   • Chronic obstructive pulmonary disease ([facility])   • Anxiety   • Depression   • Arthritis involving multiple sites   • Leukocytosis   • Mixed hyperlipidemia   • Special screening for malignant neoplasms, colon   • Cellulitis of left lower extremity   • Hyperkalemia   • Acute on chronic renal insufficiency   • Sepsis ([facility])   • CHF (congestive heart failure) ([facility])   • Cellulitis of left leg   • Lumbar disc disease   • Diabetic peripheral neuropathy ([facility])   • COVID-19 virus infection   • CAP (community acquired pneumonia)   • Polypharmacy   • Morbid obesity with BMI of 45.0-49.9, adult ([facility])   • A/C Respiratory Failure Type 2   • History of recurrent deep vein thrombosis (DVT)   • Morbid obesity with BMI of 60.0-69.9, adult ([facility])     Past Medical History:   Diagnosis Date   • Abnormal weight gain    • Acute UTI    • Anxiety    • Arthritis involving multiple sites    • Chronic  obstructive pulmonary disease (HCC)    • Chronic pain    • Current every day smoker    • Depression    • Diabetes mellitus (HCC)    • Diarrhea    • Dysuria    • Edema, lower extremity    • Fever    • Head injury    • Hypertension    • Intervertebral disc disorder     Degeneration of intervertebral disc, site unspecified (722.6)   • Left bundle branch block    • Leukocytosis    • Long term current use of methadone for pain control    • Mass of right breast    • Nausea    • Obesity    • Overactive bladder    • Peripheral neuropathy    • Superficial phlebitis     right medial calf   • Upper respiratory infection    • Urinary incontinence    • Vitamin D deficiency    • Vomiting      Past Surgical History:   Procedure Laterality Date   • BLADDER SURGERY      pubovaginal sling   • CHOLECYSTECTOMY     • HIP ARTHROSCOPY Right 10/29/2020       SLP Recommendation and Plan  SLP Swallowing Diagnosis: mild, oral dysphagia, mild-moderate, pharyngeal dysphagia (01/19/22 1200)  SLP Diet Recommendation: soft textures, whole, nectar thick liquids, ice chips between meals after oral care, with supervision, other (see comments) (dysphagia level IV diet--may have soft/whole food, but NO mixed consistencies) (01/19/22 1200)  Recommended Precautions and Strategies: upright posture during/after eating, general aspiration precautions, fatigue precautions (01/19/22 1200)  SLP Rec. for Method of Medication Administration: meds whole, with thick liquids, with pudding or applesauce, as tolerated (01/19/22 1200)     Monitor for Signs of Aspiration: yes, notify SLP if any concerns (01/19/22 1200)     Swallow Criteria for Skilled Therapeutic Interventions Met: demonstrates skilled criteria (01/19/22 1200)  Anticipated Discharge Disposition (SLP): anticipate therapy at next level of care (01/19/22 1200)  Rehab Potential/Prognosis, Swallowing: good, to achieve stated therapy goals (01/19/22 1200)  Therapy Frequency (Swallow): 5 days per week  (22 1200)  Predicted Duration Therapy Intervention (Days): until discharge (22 1200)             Plan of Care Reviewed With: patient  Progress: improving      SWALLOW EVALUATION (last 72 hours)     SLP Adult Swallow Evaluation     Row Name 22 1200 22 1120                Rehab Evaluation    Document Type re-evaluation  -AC therapy note (daily note)  -AC       Subjective Information no complaints  -AC no complaints  -AC       Patient Observations alert; cooperative  -AC alert; cooperative  -AC       Patient/Family/Caregiver Comments/Observations No family present. Pt became emotionally labile during evaluation. Reported that her father and best friend recently .  -AC No family present.  -AC       Patient Effort excellent  -AC good  -AC                General Information    Patient Profile Reviewed yes  -AC --       Pertinent History Of Current Problem See previous eval.  -AC --       Current Method of Nutrition mechanical soft, no mixed consistencies; nectar/syrup-thick liquids  -AC --       Plans/Goals Discussed with patient  -AC --       Barriers to Rehab medically complex; previous functional deficit  -AC --       Patient's Goals for Discharge return to regular diet  -AC --                Pain Scale: FACES Pre/Post-Treatment    Pain: FACES Scale, Pretreatment 0-->no hurt  -AC 0-->no hurt  -AC       Posttreatment Pain Rating 0-->no hurt  -AC 0-->no hurt  -AC                Oral Motor Structure and Function    Dentition Assessment upper dentures/partial in place; lower dentures/partial in place  -AC --                General Eating/Swallowing Observations    Respiratory Support Currently in Use nasal cannula  -AC --       Eating/Swallowing Skills fed by staff/caregiver; fed by SLP; appropriate self-feeding skills observed  -AC --       Positioning During Eating upright 90 degree; upright in chair  -AC --                Fiberoptic Endoscopic Evaluation of Swallowing (FEES)    Risks/Benefits  Reviewed risks/benefits explained; patient; agreed to eval  -AC --       Nasal Entry right:  -AC --       Scope serial number/identification [id number]  -AC --                Anatomy and Physiology    Anatomic Considerations anatomic deviation observed (see comments); other (see comments)  -AC --       Comment Spherical, pulsating prominence noted to be protruding from R portion of posterior pharynx. Did not appear to greatly affect swallowing.  -AC --       Velopharyngeal CNA  -AC --       Base of Tongue symmetrical  -AC --       Epiglottis WFL  -AC --       Laryngeal Function Breathing symmetrical  -AC --       Laryngeal Function Phonation symmetrical  -AC --       Laryngeal Function to Breath Hold TVF contact  -AC --       Secretion Rating Scale (Kumar et al. 1996) 1- secretions present around the laryngeal vestibule  -AC --       Secretion Description thick; discolored; other (comment)  pulmonary secretions noted subglotically--expelled w/ several cued coughs  -AC --       Sensory sensed scope  -AC --       Utensils Used Spoon; Cup; Straw  -AC --       Consistencies Trialed thin liquids; nectar-thick liquids; pudding/puree; regular textures  -AC --                FEES Interpretation    Oral Phase prespill of liquids into pharynx; reduced lingual control  -AC --                Initiation of Pharyngeal Swallow    Initiation of Pharyngeal Swallow bolus in pyriform sinuses  -AC --       Pharyngeal Phase impaired pharyngeal phase of swallowing  -AC --       Aspiration Before the Swallow thin liquids; secondary to reduced back of tongue control; secondary to delayed swallow initiation or mistiming  -AC --       Aspiration During the Swallow thin liquids; secondary to delayed swallow initiation or mistiming; secondary to reduced laryngeal elevation; secondary to reduced vestibular closure  -AC --       Penetration After the Swallow thin liquids; secondary to residue; in pyriform sinuses  -AC --       Response to Penetration  no response  -AC --       Response to Aspiration no response, silent aspiration  -AC --       Rosenbek's Scale thin:; 8-->Level 8; nectar:; pudding/puree:; regular textures:; 1-->Level 1  -AC --       Residue thin liquids; diffuse within pharynx; secondary to reduced base of tongue retraction; secondary to reduced posterior pharyngeal wall stripping; secondary to reduced laryngeal elevation; secondary to reduced hyolaryngeal excursion; other (see comments)  mild-mod amount; pooled to pyriform sinuses  -AC --       Response to Residue with spontaneous subsequent swallow  -AC --       Attempted Compensatory Maneuvers bolus size; bolus presentation style; chin tuck; breath hold; other (see comments)  3 sec prep  -AC --       Response to Attempted Compensatory Maneuvers did not prevent aspiration  -AC --       FEES Summary There was silent aspiration of thin liquid via straw before/during the swallow w/ penetration after the swallow as residue spilled over posterior commissure. At first, pt was able to prevent aspiration by drinking thin via cup sips or by using chin tuck; however, as study progressed, pt fatigued and eventually aspirated thin liquid no matter the presentation style of compensatory strategy utilized. No penetration/aspiration appreciated w/ nectar-thick liquid, pudding, or solid.  -AC --                SLP Evaluation Clinical Impression    SLP Swallowing Diagnosis mild; oral dysphagia; mild-moderate; pharyngeal dysphagia  -AC --       Functional Impact risk of aspiration/pneumonia; risk of malnutrition; risk of dehydration  -AC --       Rehab Potential/Prognosis, Swallowing good, to achieve stated therapy goals  -AC --       Swallow Criteria for Skilled Therapeutic Interventions Met demonstrates skilled criteria  -AC --                SLP Treatment Clinical Impressions    Treatment Assessment (SLP) -- Mental status seems to be improving. Introduced swallowing exercises--pt motivated and eager to return  to thin liquids. Intermittent use of [place] for oral suctioning, but no overt clinical s/sxs aspiration w/ ice or thin via tsp. Of note, FEES completed 1/14 revealed silent aspiration.  -       Daily Summary of Progress (SLP) -- progress toward functional goals as expected  -       Plan for Continued Treatment (SLP) -- continue treatment per plan of care  -       Care Plan Review -- evaluation/treatment results reviewed; care plan/treatment goals reviewed; risks/benefits reviewed; current/potential barriers reviewed; patient/other agree to care plan  -                Recommendations    Therapy Frequency (Swallow) 5 days per week  - --       Predicted Duration Therapy Intervention (Days) until discharge  - --       SLP Diet Recommendation soft textures; whole; nectar thick liquids; ice chips between meals after oral care, with supervision; other (see comments)  dysphagia level IV diet--may have soft/whole food, but NO mixed consistencies  - --       Recommended Diagnostics -- reassess via FEES  -       Recommended Precautions and Strategies upright posture during/after eating; general aspiration precautions; fatigue precautions  - --       Oral Care Recommendations Oral Care BID/PRN  - --       SLP Rec. for Method of Medication Administration meds whole; with thick liquids; with pudding or applesauce; as tolerated  - --       Monitor for Signs of Aspiration yes; notify SLP if any concerns  - --       Anticipated Discharge Disposition (SLP) anticipate therapy at next level of care  - --             User Key  (r) = Recorded By, (t) = Taken By, (c) = Cosigned By    Initials Name Effective Dates     Mary Baker MS CCC-SLP 06/16/21 -                 EDUCATION  The patient has been educated in the following areas:   Dysphagia (Swallowing Impairment) Oral Care/Hydration Modified Diet Instruction.        SLP GOALS     Row Name 01/19/22 1200 01/18/22 1120          Oral Nutrition/Hydration  Goal 1 (SLP)    Oral Nutrition/Hydration Goal 1, SLP LTG: Pt will return to regular diet, thin liquids w/ no overt s/sxs aspiration/distress w/ 100% acc and no cues  -AC LTG: Pt will return to regular diet, thin liquids w/ no overt s/sxs aspiration/distress w/ 100% acc and no cues  -AC     Time Frame (Oral Nutrition/Hydration Goal 1, SLP) by discharge  -AC by discharge  -AC     Progress/Outcomes (Oral Nutrition/Hydration Goal 1, SLP) goal ongoing  -AC continuing progress toward goal  -AC            Oral Nutrition/Hydration Goal 2 (SLP)    Oral Nutrition/Hydration Goal 2, SLP Pt will tolerate soft solids & nectar-thick liquids w/ no overt s/sxs aspiration/distress w/ 100% acc and no cues  -AC Pt will tolerate soft solids & nectar-thick liquids w/ no overt s/sxs aspiration/distress w/ 100% acc and no cues  -AC     Time Frame (Oral Nutrition/Hydration Goal 2, SLP) by discharge  -AC by discharge  -AC     Barriers (Oral Nutrition/Hydration Goal 2, SLP) -- No overt clinical s/sxs aspiration w/ nectar via cup/straw. Prefers nectar-thickened tea or juice, rather than thickened H2O.  -AC     Progress/Outcomes (Oral Nutrition/Hydration Goal 2, SLP) goal ongoing  -AC continuing progress toward goal  -AC            Oral Nutrition/Hydration Goal (SLP)    Oral Nutrition/Hydration Goal, SLP Pt will tolerate therapeutic trials of thin H2O w/ no overt s/sxs aspiration/distress w/ 60% acc and no cues in order to assess appropriateness for repeat instrumental eval  -AC Pt will tolerate therapeutic trials of thin H2O w/ no overt s/sxs aspiration/distress w/ 60% acc and no cues in order to assess appropriateness for repeat instrumental eval  -AC     Time Frame (Oral Nutrition/Hydration Goal, SLP) short term goal (STG)  -AC short term goal (STG)  -AC     Barriers (Oral Nutrition/Hydration Goal, SLP) -- No overt clinical s/sxs aspiration w/ ice or tsp thin liquid. Of note, recent hx silent aspiration.  -AC     Progress/Outcomes (Oral  Nutrition/Hydration Goal, SLP) goal ongoing  -AC continuing progress toward goal  -AC            Lingual Strengthening Goal 1 (SLP)    Activity (Lingual Strengthening Goal 1, SLP) increase tongue back strength  -AC --     Increase Tongue Back Strength lingual resistance exercises  -AC --     [place]/Accuracy (Lingual Strengthening Goal 1, SLP) independently (over 90% accuracy)  -AC --     Time Frame (Lingual Strengthening Goal 1, SLP) short term goal (STG)  -AC --            Pharyngeal Strengthening Exercise Goal 1 (SLP)    Increase Timing prepping - 3 second prep or suck swallow or 3-step swallow  -AC prepping - 3 second prep or suck swallow or 3-step swallow  -AC     Increase Superior Movement of the Hyolaryngeal Complex Mendelsohn; effortful pitch glide (falsetto + pharyngeal squeeze)  -AC effortful pitch glide (falsetto + pharyngeal squeeze)  -AC     Increase Anterior Movement of the Hyolaryngeal Complex chin tuck against resistance (CTAR)  -AC chin tuck against resistance (CTAR)  -AC     Increase Closure at Entrance to Airway/Closure of Airway at Glottis super-supraglottic swallow  -AC supraglottic swallow  -AC     Increase Squeeze/Positive Pressure Generation hard effortful swallow  -AC hard effortful swallow  -AC     [place]/Accuracy (Pharyngeal Strengthening Goal 1, SLP) independently (over 90% accuracy)  -AC with minimal cues (75-90% accuracy)  -AC     Time Frame (Pharyngeal Strengthening Goal 1, SLP) short term goal (STG)  -AC short term goal (STG)  -AC     Barriers (Pharyngeal Strengthening Goal 1, SLP) -- Provided handout & focused on CTAR, effortful swallow, and SGS. Pt was able to demonstrate all w/ model & mod cues. Encouraged independent practice w/ exercises until re-FEES.  -AC     Progress/Outcomes (Pharyngeal Strengthening Goal 1, SLP) goal revised this date  -AC continuing progress toward goal  -AC           User Key  (r) = Recorded By, (t) = Taken By, (c) = Cosigned By    Initials Name  Provider Type    Mary Chung MS CCC-SLP Speech and Language Pathologist                   Time Calculation:    Time Calculation- SLP     Row Name 01/19/22 1437             Time Calculation- SLP    SLP Start Time 1200  -AC      SLP Received On 01/19/22  -AC              Untimed Charges    18819-IT Fiberoptic Endo Eval Swallow Minutes 115  -AC              Total Minutes    Untimed Charges Total Minutes 115  -AC       Total Minutes 115  -AC            User Key  (r) = Recorded By, (t) = Taken By, (c) = Cosigned By    Initials Name Provider Type    Mary Chung MS CCC-SLP Speech and Language Pathologist                Therapy Charges for Today     Code Description Service Date Service Provider Modifiers Qty    87156290977 HC ST TREATMENT SWALLOW 3 1/18/2022 Mary Baker MS CCC-SLP GN 1    [id number]  ST FIBEROPTIC ENDO EVAL SWALL 8 1/19/2022 Mary Baker MS CCC-SLP GN 1        Patient was not wearing a face mask and did exhibit coughing during this therapy encounter.  Procedure performed was aerosolizing, involved close contact (within 6 feet for at least 15 minutes or longer), and involved contact with infectious secretions or specimens.  Therapist used appropriate personal protective equipment including gloves, standard procedure mask, eye protection and N95 mask.  Appropriate PPE was worn during the entire therapy session.  Hand hygiene was completed before and after therapy session.          MS SARA Santos  1/19/2022

## 2022-01-19 NOTE — PROGRESS NOTES
NN spoke with patient at  as she was working with therapy services.  She has been transferred to the floor.  COPD education to be reviewed in future encounters.  No other questions or concerns at this time.  NN continues to follow.        Per current GOLD Standards, please consider: LAMA/LABA/ICS in place ([place]), Outpatient PFT, Rehab as appropriate

## 2022-01-19 NOTE — PLAN OF CARE
Goal Outcome Evaluation:  Plan of Care Reviewed With: patient        Progress: improving  Outcome Summary: Pt neuro appropiate.  Pt ambulated x2.  Pt UOP 600ml. Pt had 2 bowel movements. Pt on 3L NC sats >94%.  Pt schedule for FEES 1/19/2022. will continue to monitor.  at 1/18/2022 1920

## 2022-01-19 NOTE — PLAN OF CARE
Goal Outcome Evaluation:  Plan of Care Reviewed With: patient        Progress: improving  Outcome Summary: Pt increased ambulation distance to 110ft with [place] and B UE support on tele monitor. Improved balance and stability this date. Max cueing for upright posture and proper breathing technique. Pt required several standing rest breaks due to increased SOA. Continue to progress as appropriate.

## 2022-01-19 NOTE — THERAPY TREATMENT NOTE
Patient Name: Dani Ziegler  : 1965    MRN: [id number]                              Today's Date: 2022       Admit Date: 2021    Visit Dx:     ICD-10-CM ICD-9-CM   1. Acute exacerbation of chronic obstructive pulmonary disease (COPD) ([facility])  J44.1 491.21   2. Acute respiratory distress  R06.03 518.82   3. Hypoxia  R09.02 799.02   4. Pneumonia of both lungs due to infectious organism, unspecified part of lung  J18.9 483.8   5. Sepsis with acute hypoxic respiratory failure without septic shock, due to unspecified organism ([facility])  A41.9 038.9    R65.20 995.91    J96.01 518.81   6. Oropharyngeal dysphagia  R13.12 787.22     Patient Active Problem List   Diagnosis   • Uncontrolled type 2 diabetes mellitus with hyperglycemia, without long-term current use of insulin ([facility])   • Vitamin D deficiency   • Peripheral neuropathy   • Adiposity   • Left bundle branch block (LBBB)   • Essential hypertension   • Chronic pain   • Chronic obstructive pulmonary disease ([facility])   • Anxiety   • Depression   • Arthritis involving multiple sites   • Leukocytosis   • Mixed hyperlipidemia   • Special screening for malignant neoplasms, colon   • Cellulitis of left lower extremity   • Hyperkalemia   • Acute on chronic renal insufficiency   • Sepsis ([facility])   • CHF (congestive heart failure) ([facility])   • Cellulitis of left leg   • Lumbar disc disease   • Diabetic peripheral neuropathy ([facility])   • COVID-19 virus infection   • CAP (community acquired pneumonia)   • Polypharmacy   • Morbid obesity with BMI of 45.0-49.9, adult ([facility])   • A/C Respiratory Failure Type 2   • History of recurrent deep vein thrombosis (DVT)   • Morbid obesity with BMI of 60.0-69.9, adult ([facility])     Past Medical History:   Diagnosis Date   • Abnormal weight gain    • Acute UTI    • Anxiety    • Arthritis involving multiple sites    • Chronic obstructive pulmonary disease ([facility])    • Chronic pain    • Current every day smoker    • Depression    • Diabetes mellitus  ([facility])    • Diarrhea    • Dysuria    • Edema, lower extremity    • Fever    • Head injury    • Hypertension    • Intervertebral disc disorder     Degeneration of intervertebral disc, site unspecified (722.6)   • Left bundle branch block    • Leukocytosis    • Long term current use of methadone for pain control    • Mass of right breast    • Nausea    • Obesity    • Overactive bladder    • Peripheral neuropathy    • Superficial phlebitis     right medial calf   • Upper respiratory infection    • Urinary incontinence    • Vitamin D deficiency    • Vomiting      Past Surgical History:   Procedure Laterality Date   • BLADDER SURGERY      pubovaginal sling   • CHOLECYSTECTOMY     • HIP ARTHROSCOPY Right 10/29/2020      General Information     Row Name 01/19/22 1544          OT Time and Intention    Document Type therapy note (daily note)  -KF     Mode of Treatment occupational therapy  -     Row Name 01/19/22 1544          General Information    Patient Profile Reviewed yes  -KF     Existing Precautions/Restrictions cardiac; fall; oxygen therapy device and L/min  3.5 L NC  -KF     Barriers to Rehab medically complex  -     Row Name 01/19/22 1544          Cognition    Orientation Status (Cognition) oriented to; person; place; verbal cues/prompts needed for orientation; time  -KF     Row Name 01/19/22 1544          Safety Issues, Functional Mobility    Safety Issues Affecting Function (Mobility) awareness of need for assistance; insight into deficits/self-awareness; safety precaution awareness  -KF     Impairments Affecting Function (Mobility) balance; coordination; endurance/activity tolerance; postural/trunk control; shortness of breath; strength; range of motion (ROM)  -KF     Cognitive Impairments, Mobility Safety/Performance awareness, need for assistance; insight into deficits/self-awareness  -KF           User Key  (r) = Recorded By, (t) = Taken By, (c) = Cosigned By    Initials Name Provider Type    MICHELLE Eason  Ree GO OT Occupational Therapist                 Mobility/ADL's     Row Name 01/19/22 1545          Bed Mobility    Bed Mobility scooting/bridging; supine-sit  -KF     Rolling Right [place] (Bed Mobility) standby assist; verbal cues  -KF     Scooting/Bridging [place] (Bed Mobility) standby assist; verbal cues  -KF     Supine-Sit [place] (Bed Mobility) standby assist; verbal cues  -KF     Bed Mobility, Safety Issues decreased use of legs for bridging/pushing; decreased use of arms for pushing/pulling  -KF     Assistive Device (Bed Mobility) bed rails; head of bed elevated  -KF     Comment (Bed Mobility) specialty mattress only cues for seq and SBA for balance  -KF     Row Name 01/19/22 1545          Transfers    Transfers sit-stand transfer; toilet transfer  -KF     Comment (Transfers) STS x3 reps, min VC's for HP and progressing, CGA throughout no LOB  -KF     Sit-Stand [place] (Transfers) contact guard; verbal cues  -KF     [place] Level (Toilet Transfer) contact guard; verbal cues  -KF     Assistive Device (Toilet Transfer) commode, bedside without drop arms; walker, front-wheeled  -KF     Row Name 01/19/22 1545          Sit-Stand Transfer    Assistive Device (Sit-Stand Transfers) walker, front-wheeled  -KF     Row Name 01/19/22 1545          Toilet Transfer    Type (Toilet Transfer) stand pivot/stand step  -     Row Name 01/19/22 1545          Functional Mobility    Functional Mobility- Ind. Level 1 person + 1 person to manage equipment; contact guard assist  -KF     Functional Mobility- Device rolling walker  -KF     Functional Mobility-Distance (Feet) 60  -KF     Functional Mobility- Safety Issues supplemental O2; weight-shifting ability decreased  -KF     Functional Mobility- Comment good progress  -KF     Row Name 01/19/22 1545          Activities of Daily Living    BADL Assessment/Intervention upper body dressing; grooming; toileting  -     Row Name 01/19/22 1549           Grooming Assessment/Training    [place] Level (Grooming) wash face, hands; contact guard assist; verbal cues  -KF     Position (Grooming) sink side; supported standing  -KF     Row Name 01/19/22 1545          Upper Body Dressing Assessment/Training    [place] Level (Upper Body Dressing) don; front opening garment; minimum assist (75% patient effort)  -KF     Position (Upper Body Dressing) edge of bed sitting  -KF     Row Name 01/19/22 1545          Toileting Assessment/Training    [place] Level (Toileting) adjust/manage clothing; perform perineal hygiene; contact guard assist  -KF     Position (Toileting) supported sitting; supported standing  -KF           User Key  (r) = Recorded By, (t) = Taken By, (c) = Cosigned By    Initials Name Provider Type    Ree Bah OT Occupational Therapist               Obj/Interventions     Row Name 01/19/22 1547          Balance    Balance Assessment sitting static balance; sitting dynamic balance; standing static balance; standing dynamic balance  -KF     Static Sitting Balance WNL; sitting, edge of bed; unsupported  -KF     Dynamic Sitting Balance WFL; sitting, edge of bed; unsupported  -KF     Static Standing Balance mild impairment; WFL; supported  -KF     Dynamic Standing Balance mild impairment; supported  -KF     Balance Interventions sit to stand; standing; occupation based/functional task; weight shifting activity  -KF           User Key  (r) = Recorded By, (t) = Taken By, (c) = Cosigned By    Initials Name Provider Type    Ree Bah, DWIGHT Occupational Therapist               Goals/Plan    No documentation.                Clinical Impression     Row Name 01/19/22 1548          Pain Assessment    Additional Documentation Pain Scale: FACES Pre/Post-Treatment (Group)  -     Row Name 01/19/22 1548          Pain Scale: Numbers Pre/Post-Treatment    Pretreatment Pain Rating 7/10  -KF     Posttreatment Pain Rating 7/10  -KF     Pain Location  - Side Right  -KF     Pain Location hip  -KF     Pre/Posttreatment Pain Comment RN notified  -KF     Pain Intervention(s) Repositioned; Ambulation/increased activity  -     Row Name 01/19/22 1548          Plan of Care Review    Plan of Care Reviewed With patient  -KF     Progress improving  -KF     Outcome Summary Pt completed bed mob with SBA throughout and cues for compensatory sequencing due to specialty mattress, [place] [facility] [place] hands standing sink side with progress activity tolerance and completed functional mob 60ft CGA+1 for equipment management at [facility], cont IPOT per POC  -KF     Row Name 01/19/22 1548          Therapy Assessment/Plan (OT)    Rehab Potential (OT) good, to achieve stated therapy goals  -     Criteria for Skilled Therapeutic Interventions Met (OT) yes; meets criteria; skilled treatment is necessary  -KF     Therapy Frequency (OT) daily  -KF     Row Name 01/19/22 1548          Therapy Plan Review/Discharge Plan (OT)    Anticipated Discharge Disposition (OT) inpatient rehabilitation facility  -     Row Name 01/19/22 1548          Vital Signs    Pre Systolic BP Rehab --  RN cleared VSS  -KF     Post Systolic BP Rehab 130  -KF     Post Treatment Diastolic BP 78  -KF     Pre SpO2 (%) 94  -KF     O2 Delivery Pre Treatment supplemental O2  -KF     Post SpO2 (%) 95  -KF     O2 Delivery Post Treatment supplemental O2  -KF     Pre Patient Position Supine  -KF     Intra Patient Position Standing  -KF     Post Patient Position Supine  -KF     Rest Breaks  1  -KF     Row Name 01/19/22 1548          Positioning and Restraints    Pre-Treatment Position in bed  -KF     Post Treatment Position bed  -KF     In Bed notified nsg; supine; with nsg; encouraged to call for assist; call light within reach; legs elevated  -KF           User Key  (r) = Recorded By, (t) = Taken By, (c) = Cosigned By    Initials Name Provider Type    KF Ree Eason, OT Occupational  Therapist               Outcome Measures     Row Name 01/19/22 1550          How much help from another is currently needed...    Putting on and taking off regular lower body clothing? 2  -KF     Bathing (including washing, rinsing, and drying) 2  -KF     Toileting (which includes using toilet bed pan or urinal) 3  -KF     Putting on and taking off regular upper body clothing 3  -KF     Taking care of personal grooming (such as brushing teeth) 3  -KF     Eating meals 4  -KF     AM-PAC 6 Clicks Score (OT) 17  -KF     Row Name 01/19/22 1400 01/19/22 0800       How much help from another person do you currently need...    Turning from your back to your side while in flat bed without using bedrails? 3  -KG 3  -MM    Moving from lying on back to sitting on the side of a flat bed without bedrails? 3  -KG 3  -MM    Moving to and from a bed to a chair (including a wheelchair)? 3  -KG 3  -MM    Standing up from a chair using your arms (e.g., wheelchair, bedside chair)? 3  -KG 3  -MM    Climbing 3-5 steps with a railing? 2  -KG 3  -MM    To walk in hospital room? 3  -KG 3  -MM    AM-PAC 6 Clicks Score (PT) 17  -KG 18  -MM    Row Name 01/19/22 1550 01/19/22 1400       Functional Assessment    Outcome Measure Options AM-PAC 6 Clicks Daily Activity (OT)  -KF AM-PAC 6 Clicks Basic Mobility (PT)  -KG          User Key  (r) = Recorded By, (t) = Taken By, (c) = Cosigned By    Initials Name Provider Type    Abdulaziz Jain, RN Registered Nurse    KG Nohemi Torres, PT Physical Therapist    KF Ree Eason, OT Occupational Therapist                Occupational Therapy Education                 Title: PT OT SLP Therapies (In Progress)     Topic: Occupational Therapy (In Progress)     Point: ADL training (Done)     Description:   Instruct learner(s) on proper safety adaptation and remediation techniques during self care or transfers.   Instruct in proper use of assistive devices.              Learning Progress Summary            Patient Acceptance, E,TB,D, VU,DU,NR by KF at 1/19/2022 1551    Acceptance, E, NR by  at 1/18/2022 1041    Acceptance, E, NR by  at 1/16/2022 0750    Comment: Educated pt regarding role of therapy and ongoing treatment plan                   Point: Home exercise program (In Progress)     Description:   Instruct learner(s) on appropriate technique for monitoring, assisting and/or progressing therapeutic exercises/activities.              Learning Progress Summary           Patient Acceptance, E, NR by  at 1/18/2022 1041                   Point: Precautions (Done)     Description:   Instruct learner(s) on prescribed precautions during self-care and functional transfers.              Learning Progress Summary           Patient Acceptance, E,TB,D, VU,DU,NR by  at 1/19/2022 1551    Acceptance, E, NR by  at 1/18/2022 1041                   Point: Body mechanics (Done)     Description:   Instruct learner(s) on proper positioning and spine alignment during self-care, functional mobility activities and/or exercises.              Learning Progress Summary           Patient Acceptance, E,TB,D, VU,DU,NR by  at 1/19/2022 1551                               User Key     Initials Effective Dates Name Provider Type Discipline     06/16/21 -  Romelia Kilpatrick, OT Occupational Therapist OT     09/02/21 -  Iza Tovar OT Occupational Therapist OT     06/16/21 -  Ree Eason, OT Occupational Therapist OT              OT Recommendation and Plan  Therapy Frequency (OT): daily  Plan of Care Review  Plan of Care Reviewed With: patient  Progress: improving  Outcome Summary: Pt completed bed mob with SBA throughout and cues for compensatory sequencing due to specialty mattress, completed STS CGA and CGA SPT to [facility], completed washing hands standing sink side with progress activity tolerance and completed functional mob 60ft CGA+1 for equipment management at [facility], cont IPOT per POC     Time Calculation:     Time Calculation- OT     Row Name 01/19/22 1509             Time Calculation- OT    OT Start Time 1509  -KF      OT Received On 01/19/22  -KF              Timed Charges    01132 - OT Therapeutic Activity Minutes 15  -KF      51963 - OT Self Care/Mgmt Minutes 15  -KF              Total Minutes    Timed Charges Total Minutes 30  -KF       Total Minutes 30  -KF            User Key  (r) = Recorded By, (t) = Taken By, (c) = Cosigned By    Initials Name Provider Type    KF Ree Eason, OT Occupational Therapist              Therapy Charges for Today     Code Description Service Date Service Provider Modifiers Qty    97166843298 HC OT SELF CARE/MGMT/TRAIN EA 15 MIN 1/19/2022 Ree Eason OT GO 1    61294877897 HC OT THERAPEUTIC ACT EA 15 MIN 1/19/2022 Ree Eason OT GO 1               Ree Eason OT  1/19/2022

## 2022-01-19 NOTE — PROGRESS NOTES
INTENSIVIST   PROGRESS NOTE     Hospital:  LOS: 19 days      BERTO Louise 56 y.o. female is followed for: Shortness of Breath       Respiratory Failure Type 2    Uncontrolled type 2 diabetes mellitus with hyperglycemia, without long-term current use of insulin (HCC)    Sepsis (HCC)    As an Intensivist, we provide an integrated approach to the ICU patient and family, medical management of comorbid conditions, including but not limited to electrolytes, glycemic control, organ dysfunction, lead interdisciplinary rounds and coordinate the care with all other services, including those from other specialists.     Interval History:  Still awaiting floor bed since 01/15/22    No new events.    TeleHealth Psych consult done yesterday.    Tired.    Sleepy.    Walking with PT.    The patient qualifies to receive the vaccine, but they have not yet received it.     Temp  Min: 97.8 °F (36.6 °C)  Max: 98.9 °F (37.2 °C)       History     Last Reviewed by Beverly Jean Baptiste, PT on 1/15/2022 at 10:11 AM    Sections Reviewed    Medical, Surgical, Family, Tobacco, Custom, Alcohol, Drug Use, Sexual   Activity      Problem list reviewed by Benson Pelaez MD on 1/1/2022 at  4:56 PM  Medicines reviewed by Benson Pelaez MD on 1/1/2022 at  4:56 PM  Allergies reviewed by Benson Pelaez MD on 1/1/2022 at  4:55 PM       The patient's relevant past medical, surgical and social history were reviewed and updated in Epic as appropriate.        O     Vitals:  Temp: 97.9 °F (36.6 °C) (01/19/22 0800) Temp  Min: 97.8 °F (36.6 °C)  Max: 98.9 °F (37.2 °C)   Temp core:      BP: 150/72 (01/19/22 0800) BP  Min: 111/85  Max: 150/72   Pulse: 86 (01/19/22 0836) Pulse  Min: 73  Max: 102   Resp: 18 (01/19/22 0836) Resp  Min: 16  Max: 20   SpO2: 90 % (01/19/22 0836) SpO2  Min: 90 %  Max: 97 %   Device: humidified, nasal cannula (01/19/22 0836)    Flow Rate: 2 (01/19/22 0836) Flow (L/min)  Min: 2  Max: 3     Intake/Ouptut 24 hrs (7:00AM - 6:59 AM)  Intake &  Output (last 3 days)       01/16 0701  01/17 0700 01/17 0701  01/18 0700 01/18 0701 01/19 0700 01/19 0701 01/20 0700    P.O. 720 920 200 240    I.V. (mL/kg)  100 (0.6)      Total Intake(mL/kg) 720 (4.3) 1020 (6.1) 200 (1.2) 240 (1.4)    Urine (mL/kg/hr) 600 (0.2) 200 (0.1) 650 (0.2)     Stool 0 0 0     Total Output 600 200 650     Net +120 +820 -450 +240            Urine Unmeasured Occurrence  4 x  1 x    Stool Unmeasured Occurrence 1 x 2 x 1 x           Wt Readings from Last 3 Encounters:   01/09/22 (!) 166 kg (365 lb 11.2 oz)   08/24/21 133 kg (294 lb)   07/16/21 (!) 140 kg (308 lb)     Physical Examination  Telemetry:  Rhythm: normal sinus rhythm (01/19/22 0800)     QTc Interval (Sec): 0.46 (01/05/22 2000)   Constitutional:  No acute distress.   Cardiovascular: RRR.   Normal heart sounds.  No murmurs, gallop or rub.   Respiratory: Normal breath sounds  No adventitious sounds.   Abdominal:  Soft with no tenderness.  No distension.   No HSM.   Extremities: Warm.  Dry.  No cyanosis.  No Edema   Neurological:   Awake.  Best Eye Response: 4-->(E4) spontaneous (01/19/22 0800)  Best Motor Response: 6-->(M6) obeys commands (01/19/22 0800)  Best Verbal Response: 5-->(V5) oriented (01/19/22 0800)  [place] Coma Scale Score: 15 (01/19/22 0800)     Lines  R PICC    Results Reviewed:  Laboratory  Microbiology  Radiology  Pathology    Hematology:  Results from last 7 days   Lab Units 01/19/22  0323 01/18/22  0315 01/17/22 0227 01/17/22 0227   WBC 10*3/mm3 15.47* 15.29*   < > 14.15*   HEMOGLOBIN g/dL 13.3 13.7   < > 13.8   MCV fL 88.9 89.7   < > 90.4   PLATELETS 10*3/mm3 330 333   < > 348   NEUTROS ABS 10*3/mm3 7.74* 9.17*  --  7.59*   LYMPHS ABS 10*3/mm3 6.11*  --   --  5.23*   EOS ABS 10*3/mm3 0.56* 0.31  --  0.29    < > = values in this interval not displayed.     Chemistry:  Estimated Creatinine Clearance: 152.9 mL/min (by C-G formula based on SCr of 0.66 mg/dL).  Results from last 7 days   Lab Units 01/19/22  0323  01/18/22  0315   SODIUM mmol/L 136 136   POTASSIUM mmol/L 4.1 4.1   CHLORIDE mmol/L 101 100   CO2 mmol/L 25.0 26.0   BUN mg/dL 23* 24*   CREATININE mg/dL 0.66 0.66   GLUCOSE mg/dL 154* 106*     Results from last 7 days   Lab Units 01/19/22  0323 01/18/22  0315 01/17/22  0227 01/17/22  0227 01/16/22  0255 01/15/22  0258   CALCIUM mg/dL 9.3 9.3   < > 9.8   < > 10.1   MAGNESIUM mg/dL  --  2.1  --  1.8  --  2.4   PHOSPHORUS mg/dL  --   --   --  3.5  --  4.5    < > = values in this interval not displayed.     Results from last 7 days   Lab Units 01/17/22  0227 01/15/22  0258   BILIRUBIN mg/dL 0.6 0.7   AST (SGOT) U/L 22 27   ALT (SGPT) U/L 35* 32   ALK PHOS U/L 123* 143*     COVID-19  Lab Results   Component Value Date    COVID19 Not Detected 12/30/2021    COVID19 Not Detected 06/03/2021       Images:  No radiology results for the last day    Results: Reviewed.  I reviewed the patient's new laboratory and imaging results.  I independently reviewed the patient's new images.    Medications: Reviewed.    Assessment/Plan   A / P     Dani is a 56 y.o. female admitted on 12/30/2021 with Acute on chronic respiratory failure with hypoxia and hypercapnia (HCC) [J96.21, J96.22]:    1. Respiratory Failure type 2  1. Invasive Mechanical Ventilation   2. Intubated 12/31/21  3. Extubated 01/11/22  4. Compensated Respiratory Acidosis as per ABG on 12/31/21 and 01/01/22  5. COPD on home O2  1. Previous Trach in 2019 and 2020.  2. LAMA, LABA  6. Pneumonia this admission  2. Aute left seventh rib fracture.  3. Previous DVT on APIxaban  4. HTN  5. Chronic pain syndrome  6. Anxiety/Depression  7. Obesity III. Body mass index is 57.26 kg/m².   8. T2DM    Results from last 7 days   Lab Units 01/19/22  1108 01/19/22  0652 01/18/22  2057 01/18/22  1608 01/18/22  1100 01/18/22  0656   GLUCOSE mg/dL 165* 110 122 171* 167* 142*     Lab Results   Lab Value Date/Time    HGBA1C 5.7 (H) 08/24/2021 1522    HGBA1C 6.00 (H) 06/03/2021 1902    HGBA1C 6.0  (H) 05/25/2021 1658    HGBA1C 8.3 01/11/2021 0000    HGBA1C 7.30 (H) 03/28/2018 0514       Nutrition Support: Patient isn't on Tube Feeding   Modulars: Patient doesn't have any tube feeding modular orders   Diet: Diet Dysphagia; IV - Mechanical Soft No Mixed Consistencies; Nectar / Syrup Thick; Cardiac, Consistent Carbohydrate   Advance Directives: Code Status and Medical Interventions:   Ordered at: 12/31/21 0151     Code Status (Patient has no pulse and is not breathing):    CPR (Attempt to Resuscitate)     Medical Interventions (Patient has pulse or is breathing):    Full Support        Plan:    1. Psych medicines adjusted. Started on aripiprazole, and weaning escitalopram.  2. Decrease diazepam to q12 h instead of q 8h.  3. ST to reassess (FEES) and advance diet if indicated.  4. Trelegy is NOT available in [facility] formulary.  5. Goal: Glucose < 180 mg/dL.  1. Insulin basal, prandial and correction   2. No changes on current doses.  6. Recheck WBC in AM  7. Disposition: Transfer to Telemetry Unit   1. CM: Inpatient Rehab (Options: [facility], [facility], [facility] etc).    Plan of care and goals reviewed during interdisciplinary rounds.  I discussed the patient's findings and my recommendations with nursing staff    Level of Risk is High due to:  illness with threat to life or bodily function.     Time: 25 minutes, in direct patient care, with the patient and/or in the ICU or de leon coordinating care with other health care providers.     I have spent > 50% percent of this time, counseling and discussing management.     OK to floor.    Hospitalist Team will assist with medical management, and assume Primary Attending, once on the Floor.    Thank you.    [x]  Primary Attending  []  Consultant

## 2022-01-19 NOTE — THERAPY TREATMENT NOTE
Patient Name: Dani Ziegler  : 1965    MRN: [id number]                              Today's Date: 2022       Admit Date: 2021    Visit Dx:     ICD-10-CM ICD-9-CM   1. Acute exacerbation of chronic obstructive pulmonary disease (COPD) ([facility])  J44.1 491.21   2. Acute respiratory distress  R06.03 518.82   3. Hypoxia  R09.02 799.02   4. Pneumonia of both lungs due to infectious organism, unspecified part of lung  J18.9 483.8   5. Sepsis with acute hypoxic respiratory failure without septic shock, due to unspecified organism ([facility])  A41.9 038.9    R65.20 995.91    J96.01 518.81   6. Oropharyngeal dysphagia  R13.12 787.22     Patient Active Problem List   Diagnosis   • Uncontrolled type 2 diabetes mellitus with hyperglycemia, without long-term current use of insulin ([facility])   • Vitamin D deficiency   • Peripheral neuropathy   • Adiposity   • Left bundle branch block (LBBB)   • Essential hypertension   • Chronic pain   • Chronic obstructive pulmonary disease ([facility])   • Anxiety   • Depression   • Arthritis involving multiple sites   • Leukocytosis   • Mixed hyperlipidemia   • Special screening for malignant neoplasms, colon   • Cellulitis of left lower extremity   • Hyperkalemia   • Acute on chronic renal insufficiency   • Sepsis ([facility])   • CHF (congestive heart failure) ([facility])   • Cellulitis of left leg   • Lumbar disc disease   • Diabetic peripheral neuropathy ([facility])   • COVID-19 virus infection   • CAP (community acquired pneumonia)   • Polypharmacy   • Morbid obesity with BMI of 45.0-49.9, adult ([facility])   • A/C Respiratory Failure Type 2   • History of recurrent deep vein thrombosis (DVT)   • Morbid obesity with BMI of 60.0-69.9, adult ([facility])     Past Medical History:   Diagnosis Date   • Abnormal weight gain    • Acute UTI    • Anxiety    • Arthritis involving multiple sites    • Chronic obstructive pulmonary disease ([facility])    • Chronic pain    • Current every day smoker    • Depression    • Diabetes mellitus  ([facility])    • Diarrhea    • Dysuria    • Edema, lower extremity    • Fever    • Head injury    • Hypertension    • Intervertebral disc disorder     Degeneration of intervertebral disc, site unspecified (722.6)   • Left bundle branch block    • Leukocytosis    • Long term current use of methadone for pain control    • Mass of right breast    • Nausea    • Obesity    • Overactive bladder    • Peripheral neuropathy    • Superficial phlebitis     right medial calf   • Upper respiratory infection    • Urinary incontinence    • Vitamin D deficiency    • Vomiting      Past Surgical History:   Procedure Laterality Date   • BLADDER SURGERY      pubovaginal sling   • CHOLECYSTECTOMY     • HIP ARTHROSCOPY Right 10/29/2020      General Information     Row Name 01/19/22 [facility]5          Physical Therapy Time and Intention    Document Type therapy note (daily note)  -KG     Mode of Treatment physical therapy  -KG     Row Name 01/19/22 1357          General Information    Existing Precautions/Restrictions cardiac; fall; oxygen therapy device and L/min  -KG     Row Name 01/19/22 7290          Cognition    Orientation Status (Cognition) oriented to; person; place  -KG     Row Name 01/19/22 5491          Safety Issues, Functional Mobility    Safety Issues Affecting Function (Mobility) ability to follow commands; awareness of need for assistance; insight into deficits/self-awareness; safety precaution awareness; safety precautions follow-through/compliance  -KG     Impairments Affecting Function (Mobility) balance; cognition; coordination; endurance/activity tolerance; postural/trunk control; shortness of breath; strength  -KG     Cognitive Impairments, Mobility Safety/Performance attention; awareness, need for assistance; insight into deficits/self-awareness; safety precaution awareness; safety precaution follow-through  -KG           User Key  (r) = Recorded By, (t) = Taken By, (c) = Cosigned By    Initials Name Provider Type    KG  Nohemi Torres, PT Physical Therapist               Mobility     Row Name 01/19/22 1356          Bed Mobility    Comment (Bed Mobility) UIC  -KG     Row Name 01/19/22 1356          Transfers    Comment (Transfers) VC's for sequencing and safe hand placement. Toilet transfer to [facility] with Alcon.  -KG     Row Name 01/19/22 1356          Sit-Stand Transfer    Sit-Stand [place] (Transfers) contact guard; verbal cues  -KG     Row Name 01/19/22 1356          Gait/Stairs (Locomotion)    [place] Level (Gait) minimum assist (75% patient effort); verbal cues  -KG     Assistive Device (Gait) other (see comments)  B UE support on tele monitor  -KG     Distance in Feet (Gait) 110  -KG     Deviations/Abnormal Patterns (Gait) base of support, wide; joaquin decreased; stride length decreased  -KG     Bilateral Gait Deviations forward flexed posture; heel strike decreased  -KG     Comment (Gait/Stairs) Pt demonstrated step through gait pattern with slow joaquin and decreased step length. Improved balance and stability this session. Max cueing for upright posture and proper breathing technique. Pt required several standing rest breaks due to increased SOA and c/o R hip pain.  -KG           User Key  (r) = Recorded By, (t) = Taken By, (c) = Cosigned By    Initials Name Provider Type    Nohemi Keller PT Physical Therapist               Obj/Interventions     Row Name 01/19/22 1353          Balance    Balance Assessment sitting static balance; standing static balance; standing dynamic balance  -KG     Static Sitting Balance WFL; sitting in chair  -KG     Static Standing Balance mild impairment; supported; standing  -KG     Dynamic Standing Balance mild impairment; supported; standing  -KG           User Key  (r) = Recorded By, (t) = Taken By, (c) = Cosigned By    Initials Name Provider Type    KG Nohemi Torres, PT Physical Therapist               Goals/Plan    No documentation.                Clinical  Impression     Row Name 01/19/22 1358          Pain    Additional Documentation Pain Scale: Numbers Pre/Post-Treatment (Group)  -KG     Row Name 01/19/22 1358          Pain Scale: Numbers Pre/Post-Treatment    Pretreatment Pain Rating 0/10 - no pain  -KG     Posttreatment Pain Rating 0/10 - no pain  -KG     Row Name 01/19/22 1358          Plan of Care Review    Plan of Care Reviewed With patient  -KG     Progress improving  -KG     Outcome Summary Pt increased ambulation distance to 110ft with [place] and B UE support on tele monitor. Improved balance and stability this date. Max cueing for upright posture and proper breathing technique. Pt required several standing rest breaks due to increased SOA. Continue to progress as appropriate.  -KG     [facility] Name 01/19/22 1358          Vital Signs    Pre Systolic BP Rehab 150  -KG     Pre Treatment Diastolic BP 72  -KG     Post Systolic BP Rehab 139  -KG     Post Treatment Diastolic BP 84  -KG     Pretreatment Heart Rate (beats/min) 93  -KG     Posttreatment Heart Rate (beats/min) 103  -KG     Pre SpO2 (%) 95  -KG     O2 Delivery Pre Treatment supplemental O2  -KG     Post SpO2 (%) 91  -KG     O2 Delivery Post Treatment supplemental O2  -KG     Pre Patient Position Sitting  -KG     Intra Patient Position Standing  -KG     Post Patient Position Sitting  -KG     Row Name 01/19/22 1358          Positioning and Restraints    Pre-Treatment Position sitting in chair/recliner  -KG     Post Treatment Position bsc  -KG     On BS commode notified nsg; sitting; call light within reach; encouraged to call for assist  -KG           User Key  (r) = Recorded By, (t) = Taken By, (c) = Cosigned By    Initials Name Provider Type    KG Nohemi Torres, PT Physical Therapist               Outcome Measures     Row Name 01/19/22 1400 01/19/22 0800       How much help from another person do you currently need...    Turning from your back to your side while in flat bed without using bedrails? 3   -KG 3  -MM    Moving from lying on back to sitting on the side of a flat bed without bedrails? 3  -KG 3  -MM    Moving to and from a bed to a chair (including a wheelchair)? 3  -KG 3  -MM    Standing up from a chair using your arms (e.g., wheelchair, bedside chair)? 3  -KG 3  -MM    Climbing 3-5 steps with a railing? 2  -KG 3  -MM    To walk in hospital room? 3  -KG 3  -MM    AM-PAC 6 Clicks Score (PT) 17  -KG 18  -MM    Row Name 01/19/22 1400          Functional Assessment    Outcome Measure Options AM-PAC 6 Clicks Basic Mobility (PT)  -KG           User Key  (r) = Recorded By, (t) = Taken By, (c) = Cosigned By    Initials Name Provider Type    Abdulaziz Jain, RN Registered Nurse    Nohemi Keller, PT Physical Therapist                             Physical Therapy Education                 Title: PT OT SLP Therapies (In Progress)     Topic: Physical Therapy (In Progress)     Point: Mobility training (In Progress)     Learning Progress Summary           Patient Acceptance, E, NR by KG at 1/19/2022 0926    Acceptance, E, NR by KG at 1/18/2022 0843    Acceptance, E, NR by KG at 1/17/2022 1018    Acceptance, E, NR by LOPEZ at 1/16/2022 0800    Acceptance, E, NR by LOPEZ at 1/15/2022 0925                   Point: Home exercise program (In Progress)     Learning Progress Summary           Patient Acceptance, E, NR by KG at 1/19/2022 0926    Acceptance, E, NR by KG at 1/18/2022 0843    Acceptance, E, NR by KG at 1/17/2022 1018    Acceptance, E, NR by LOPEZ at 1/16/2022 0800    Acceptance, E, NR by LOPEZ at 1/15/2022 0925                   Point: Body mechanics (In Progress)     Learning Progress Summary           Patient Acceptance, E, NR by KG at 1/19/2022 0926    Acceptance, E, NR by KG at 1/18/2022 0843    Acceptance, E, NR by KG at 1/17/2022 1018    Acceptance, E, NR by LOPEZ at 1/16/2022 0800    Acceptance, E, NR by LOPEZ at 1/15/2022 0925                   Point: Precautions (In Progress)     Learning Progress Summary            Patient Acceptance, E, NR by KG at 1/19/2022 0926    Acceptance, E, NR by KG at 1/18/2022 0843    Acceptance, E, NR by KG at 1/17/2022 1018    Acceptance, E, NR by LOPEZ at 1/16/2022 0800    Acceptance, E, NR by LOPEZ at 1/15/2022 0925                               User Key     Initials Effective Dates Name Provider Type Discipline    LOPEZ 06/16/21 -  Beverly Jean Baptiste, PT Physical Therapist PT    KG 05/22/20 -  Nohemi Torres, PT Physical Therapist PT              PT Recommendation and Plan     Plan of Care Reviewed With: patient  Progress: improving  Outcome Summary: Pt increased ambulation distance to 110ft with Alcon and B UE support on tele monitor. Improved balance and stability this date. Max cueing for upright posture and proper breathing technique. Pt required several standing rest breaks due to increased SOA. Continue to progress as appropriate.     Time Calculation:    PT Charges     Row Name 01/19/22 0926             Time Calculation    Start Time 0926  -KG      PT Received On 01/19/22  -KG      PT Goal Re-Cert Due Date 01/25/22  -KG              Time Calculation- PT    Total Timed Code Minutes- PT 24 minute(s)  -KG              Timed Charges    35135 - PT Therapeutic Activity Minutes 24  -KG              Total Minutes    Timed Charges Total Minutes 24  -KG       Total Minutes 24  -KG            User Key  (r) = Recorded By, (t) = Taken By, (c) = Cosigned By    Initials Name Provider Type    KG Nohemi Torres, PT Physical Therapist              Therapy Charges for Today     Code Description Service Date Service Provider Modifiers Qty    86019676913 HC PT THERAPEUTIC ACT EA 15 MIN 1/18/2022 Nohemi Torres, PT GP 2    27909634718 HC PT THERAPEUTIC ACT EA 15 MIN 1/19/2022 Nohemi Torres, PT GP 2          PT G-Codes  Outcome Measure Options: AM-PAC 6 Clicks Basic Mobility (PT)  AM-PAC 6 Clicks Score (PT): 17  AM-PAC 6 Clicks Score (OT): 15    Melina Torres  PT  1/19/2022

## 2022-01-19 NOTE — CASE MANAGEMENT/SOCIAL WORK
Continued Stay Note  [facility]     Patient Name: Dani Ziegler  MRN: [id number]  Today's Date: 1/19/2022    Admit Date: 12/30/2021     Discharge Plan     Row Name 01/19/22 1427       Plan    Plan rehab    Patient/Family in Agreement with Plan yes    Plan Comments Mrs. Ziegler remains in the ICU awaiting transfer to floor.  Per MDR patient medically ready for Rehab placement.  I spoke with Carolynn at [facility] and they are in process of trying to verify her insurance.  CM will follow up with the patient regarding her insurance.    Final Discharge Disposition Code 30 - still a patient               Discharge Codes    No documentation.               Expected Discharge Date and Time     Expected Discharge Date Expected Discharge Time    Jan 24, 2022             Yuly Dean RN

## 2022-01-20 LAB
ANION GAP SERPL CALCULATED.3IONS-SCNC: 10 MMOL/L (ref 5–15)
BASOPHILS # BLD AUTO: 0.05 10*3/MM3 (ref 0–0.2)
BASOPHILS NFR BLD AUTO: 0.4 % (ref 0–1.5)
BUN SERPL-MCNC: 18 MG/DL (ref 6–20)
BUN/CREAT SERPL: 27.7 (ref 7–25)
CALCIUM SPEC-SCNC: 9.6 MG/DL (ref 8.6–10.5)
CHLORIDE SERPL-SCNC: 99 MMOL/L (ref 98–107)
CO2 SERPL-SCNC: 27 MMOL/L (ref 22–29)
CREAT SERPL-MCNC: 0.65 MG/DL (ref 0.57–1)
DEPRECATED RDW RBC AUTO: 51 FL (ref 37–54)
EOSINOPHIL # BLD AUTO: 0.51 10*3/MM3 (ref 0–0.4)
EOSINOPHIL NFR BLD AUTO: 3.8 % (ref 0.3–6.2)
ERYTHROCYTE [DISTWIDTH] IN BLOOD BY AUTOMATED COUNT: 15.8 % (ref 12.3–15.4)
GFR SERPL CREATININE-BSD FRML MDRD: 94 ML/MIN/1.73
GLUCOSE BLDC GLUCOMTR-MCNC: 101 MG/DL (ref 70–130)
GLUCOSE BLDC GLUCOMTR-MCNC: 131 MG/DL (ref 70–130)
GLUCOSE BLDC GLUCOMTR-MCNC: 153 MG/DL (ref 70–130)
GLUCOSE BLDC GLUCOMTR-MCNC: 171 MG/DL (ref 70–130)
GLUCOSE SERPL-MCNC: 89 MG/DL (ref 65–99)
HBA1C MFR BLD: 6.4 % (ref 4.8–5.6)
HCT VFR BLD AUTO: 43 % (ref 34–46.6)
HGB BLD-MCNC: 13.8 G/DL (ref 12–15.9)
IMM GRANULOCYTES # BLD AUTO: 0.14 10*3/MM3 (ref 0–0.05)
IMM GRANULOCYTES NFR BLD AUTO: 1.1 % (ref 0–0.5)
LYMPHOCYTES # BLD AUTO: 5.04 10*3/MM3 (ref 0.7–3.1)
LYMPHOCYTES NFR BLD AUTO: 38 % (ref 19.6–45.3)
MCH RBC QN AUTO: 28.3 PG (ref 26.6–33)
MCHC RBC AUTO-ENTMCNC: 32.1 G/DL (ref 31.5–35.7)
MCV RBC AUTO: 88.3 FL (ref 79–97)
MONOCYTES # BLD AUTO: 0.72 10*3/MM3 (ref 0.1–0.9)
MONOCYTES NFR BLD AUTO: 5.4 % (ref 5–12)
NEUTROPHILS NFR BLD AUTO: 51.3 % (ref 42.7–76)
NEUTROPHILS NFR BLD AUTO: 6.81 10*3/MM3 (ref 1.7–7)
NRBC BLD AUTO-RTO: 0 /100 WBC (ref 0–0.2)
PLATELET # BLD AUTO: 313 10*3/MM3 (ref 140–450)
PMV BLD AUTO: 11.1 FL (ref 6–12)
POTASSIUM SERPL-SCNC: 4.1 MMOL/L (ref 3.5–5.2)
RBC # BLD AUTO: 4.87 10*6/MM3 (ref 3.77–5.28)
SODIUM SERPL-SCNC: 136 MMOL/L (ref 136–145)
WBC NRBC COR # BLD: 13.27 10*3/MM3 (ref 3.4–10.8)

## 2022-01-20 PROCEDURE — 63710000001 INSULIN LISPRO (HUMAN) PER 5 UNITS: Performed by: INTERNAL MEDICINE

## 2022-01-20 PROCEDURE — 99232 SBSQ HOSP IP/OBS MODERATE 35: CPT | Performed by: INTERNAL MEDICINE

## 2022-01-20 PROCEDURE — 94799 UNLISTED PULMONARY SVC/PX: CPT

## 2022-01-20 PROCEDURE — 80048 BASIC METABOLIC PNL TOTAL CA: CPT | Performed by: INTERNAL MEDICINE

## 2022-01-20 PROCEDURE — 63710000001 INSULIN DETEMIR PER 5 UNITS: Performed by: INTERNAL MEDICINE

## 2022-01-20 PROCEDURE — 82962 GLUCOSE BLOOD TEST: CPT

## 2022-01-20 PROCEDURE — 85025 COMPLETE CBC W/AUTO DIFF WBC: CPT | Performed by: INTERNAL MEDICINE

## 2022-01-20 RX ADMIN — OXYCODONE HYDROCHLORIDE AND ACETAMINOPHEN 1 TABLET: 10; 325 TABLET ORAL at 11:45

## 2022-01-20 RX ADMIN — BUSPIRONE HYDROCHLORIDE 10 MG: 5 TABLET ORAL at 18:02

## 2022-01-20 RX ADMIN — OXYCODONE HYDROCHLORIDE AND ACETAMINOPHEN 1 TABLET: 10; 325 TABLET ORAL at 20:20

## 2022-01-20 RX ADMIN — ESCITALOPRAM OXALATE 10 MG: 10 TABLET ORAL at 09:25

## 2022-01-20 RX ADMIN — CASTOR OIL AND BALSAM, PERU 1 APPLICATION: 788; 87 OINTMENT TOPICAL at 20:19

## 2022-01-20 RX ADMIN — FAMOTIDINE 20 MG: 20 TABLET, FILM COATED ORAL at 20:18

## 2022-01-20 RX ADMIN — APIXABAN 5 MG: 5 TABLET, FILM COATED ORAL at 20:18

## 2022-01-20 RX ADMIN — INSULIN LISPRO 6 UNITS: 100 INJECTION, SOLUTION INTRAVENOUS; SUBCUTANEOUS at 18:03

## 2022-01-20 RX ADMIN — NYSTATIN: 100000 POWDER TOPICAL at 20:19

## 2022-01-20 RX ADMIN — ARFORMOTEROL TARTRATE 15 MCG: 15 SOLUTION RESPIRATORY (INHALATION) at 18:38

## 2022-01-20 RX ADMIN — INSULIN DETEMIR 9 UNITS: 100 INJECTION, SOLUTION SUBCUTANEOUS at 09:00

## 2022-01-20 RX ADMIN — METOPROLOL SUCCINATE 50 MG: 50 TABLET, EXTENDED RELEASE ORAL at 09:25

## 2022-01-20 RX ADMIN — FAMOTIDINE 20 MG: 20 TABLET, FILM COATED ORAL at 09:25

## 2022-01-20 RX ADMIN — NYSTATIN: 100000 POWDER TOPICAL at 11:35

## 2022-01-20 RX ADMIN — INSULIN DETEMIR 9 UNITS: 100 INJECTION, SOLUTION SUBCUTANEOUS at 20:29

## 2022-01-20 RX ADMIN — GABAPENTIN 300 MG: 300 CAPSULE ORAL at 05:09

## 2022-01-20 RX ADMIN — DIAZEPAM 2 MG: 2 TABLET ORAL at 09:25

## 2022-01-20 RX ADMIN — BUSPIRONE HYDROCHLORIDE 10 MG: 5 TABLET ORAL at 09:25

## 2022-01-20 RX ADMIN — ARFORMOTEROL TARTRATE 15 MCG: 15 SOLUTION RESPIRATORY (INHALATION) at 09:00

## 2022-01-20 RX ADMIN — TIOTROPIUM BROMIDE INHALATION SPRAY 2 PUFF: 3.12 SPRAY, METERED RESPIRATORY (INHALATION) at 09:02

## 2022-01-20 RX ADMIN — GABAPENTIN 300 MG: 300 CAPSULE ORAL at 22:35

## 2022-01-20 RX ADMIN — AMLODIPINE BESYLATE 10 MG: 10 TABLET ORAL at 09:25

## 2022-01-20 RX ADMIN — INSULIN LISPRO 6 UNITS: 100 INJECTION, SOLUTION INTRAVENOUS; SUBCUTANEOUS at 13:01

## 2022-01-20 RX ADMIN — GABAPENTIN 300 MG: 300 CAPSULE ORAL at 18:02

## 2022-01-20 RX ADMIN — APIXABAN 5 MG: 5 TABLET, FILM COATED ORAL at 09:25

## 2022-01-20 RX ADMIN — INSULIN LISPRO 6 UNITS: 100 INJECTION, SOLUTION INTRAVENOUS; SUBCUTANEOUS at 09:00

## 2022-01-20 RX ADMIN — ARIPIPRAZOLE 2 MG: 2 TABLET ORAL at 13:00

## 2022-01-20 RX ADMIN — DIAZEPAM 2 MG: 2 TABLET ORAL at 20:18

## 2022-01-20 RX ADMIN — INSULIN LISPRO 2 UNITS: 100 INJECTION, SOLUTION INTRAVENOUS; SUBCUTANEOUS at 13:00

## 2022-01-20 RX ADMIN — DULOXETINE 60 MG: 60 CAPSULE, DELAYED RELEASE ORAL at 18:02

## 2022-01-20 RX ADMIN — OXYCODONE HYDROCHLORIDE AND ACETAMINOPHEN 1 TABLET: 10; 325 TABLET ORAL at 05:09

## 2022-01-20 NOTE — PLAN OF CARE
Goal Outcome Evaluation:  Plan of Care Reviewed With: patient        Progress: improving  Outcome Summary: Increased ambulation distance to160' using RWx and CGA.  Required 1 brief standing rest due to fatigue.  O2 sats 98% on 2L.  Cuau[place]em[place] sit>supine. Will benefit from cont'd PT at d/c.  cont. per POC

## 2022-01-20 NOTE — THERAPY TREATMENT NOTE
Patient Name: Dani Ziegler  : 1965    MRN: [id number]                              Today's Date: 2022       Admit Date: 2021    Visit Dx:     ICD-10-CM ICD-9-CM   1. Acute exacerbation of chronic obstructive pulmonary disease (COPD) ([facility])  J44.1 491.21   2. Acute respiratory distress  R06.03 518.82   3. Hypoxia  R09.02 799.02   4. Pneumonia of both lungs due to infectious organism, unspecified part of lung  J18.9 483.8   5. Sepsis with acute hypoxic respiratory failure without septic shock, due to unspecified organism ([facility])  A41.9 038.9    R65.20 995.91    J96.01 518.81   6. Oropharyngeal dysphagia  R13.12 787.22     Patient Active Problem List   Diagnosis   • Uncontrolled type 2 diabetes mellitus with hyperglycemia, without long-term current use of insulin ([facility])   • Vitamin D deficiency   • Peripheral neuropathy   • Adiposity   • Left bundle branch block (LBBB)   • Essential hypertension   • Chronic pain   • Chronic obstructive pulmonary disease ([facility])   • Anxiety   • Depression   • Arthritis involving multiple sites   • Leukocytosis   • Mixed hyperlipidemia   • Special screening for malignant neoplasms, colon   • Cellulitis of left lower extremity   • Hyperkalemia   • Acute on chronic renal insufficiency   • Sepsis ([facility])   • CHF (congestive heart failure) ([facility])   • Cellulitis of left leg   • Lumbar disc disease   • Diabetic peripheral neuropathy ([facility])   • COVID-19 virus infection   • CAP (community acquired pneumonia)   • Polypharmacy   • Morbid obesity with BMI of 45.0-49.9, adult ([facility])   • A/C Respiratory Failure Type 2   • History of recurrent deep vein thrombosis (DVT)   • Morbid obesity with BMI of 60.0-69.9, adult ([facility])     Past Medical History:   Diagnosis Date   • Abnormal weight gain    • Acute UTI    • Anxiety    • Arthritis involving multiple sites    • Chronic obstructive pulmonary disease ([facility])    • Chronic pain    • Current every day smoker    • Depression    • Diabetes mellitus  ([facility])    • Diarrhea    • Dysuria    • Edema, lower extremity    • Fever    • Head injury    • Hypertension    • Intervertebral disc disorder     Degeneration of intervertebral disc, site unspecified (722.6)   • Left bundle branch block    • Leukocytosis    • Long term current use of methadone for pain control    • Mass of right breast    • Nausea    • Obesity    • Overactive bladder    • Peripheral neuropathy    • Superficial phlebitis     right medial calf   • Upper respiratory infection    • Urinary incontinence    • Vitamin D deficiency    • Vomiting      Past Surgical History:   Procedure Laterality Date   • BLADDER SURGERY      pubovaginal sling   • CHOLECYSTECTOMY     • HIP ARTHROSCOPY Right 10/29/2020      General Information     Row Name 01/20/22 1600          Physical Therapy Time and Intention    Document Type therapy note (daily note)  -     Mode of Treatment physical therapy  -     Row Name 01/20/22 1600          General Information    Patient Profile Reviewed yes  -     Barriers to Rehab previous functional deficit; medically complex  -     Row Name 01/20/22 1600          Safety Issues, Functional Mobility    Impairments Affecting Function (Mobility) endurance/activity tolerance; pain; strength; shortness of breath  -           User Key  (r) = Recorded By, (t) = Taken By, (c) = Cosigned By    Initials Name Provider Type     Marilou Neil PT Physical Therapist               Mobility     Row Name 01/20/22 1600          Bed Mobility    Bed Mobility supine-sit; sit-supine  -     Scooting/Bridging [place] (Bed Mobility) supervision  -     Supine-Sit [place] (Bed Mobility) minimum assist (75% patient effort)  -     Sit-Supine [place] (Bed Mobility) moderate assist (50% patient effort)  -     Assistive Device (Bed Mobility) bed rails  -     Row Name 01/20/22 1600          Sit-Stand Transfer    Sit-Stand [place] (Transfers) contact guard  -     Assistive Device  (Sit-Stand Transfers) walker, front-wheeled  -LF     Row Name 01/20/22 1600          Gait/Stairs (Locomotion)    [place] Level (Gait) minimum assist (75% patient effort)  -LF     Assistive Device (Gait) walker, front-wheeled  -LF     Distance in Feet (Gait) 160  -LF     Deviations/Abnormal Patterns (Gait) antalgic; weight shifting decreased; gait speed decreased  -LF     Comment (Gait/Stairs) required 1 brief standing rest  -LF           User Key  (r) = Recorded By, (t) = Taken By, (c) = Cosigned By    Initials Name Provider Type     Marilou Neil PT Physical Therapist               Obj/Interventions     Row Name 01/20/22 1600          Balance    Static Sitting Balance WNL  -LF     Dynamic Sitting Balance WNL  -LF     Static Standing Balance WNL; supported  -LF     Dynamic Standing Balance mild impairment; supported  -LF     Balance Interventions sitting; supported; static; weight shifting activity  -LF           User Key  (r) = Recorded By, (t) = Taken By, (c) = Cosigned By    Initials Name Provider Type     Marilou Neil PT Physical Therapist               Goals/Plan    No documentation.                Clinical Impression     Row Name 01/20/22 1600          Pain    Additional Documentation Pain Scale: FACES Pre/Post-Treatment (Group)  -LF     Row Name 01/20/22 1600          Pain Scale: FACES Pre/Post-Treatment    Pain: FACES Scale, Pretreatment 2-->hurts little bit  -LF     Posttreatment Pain Rating 2-->hurts little bit  -LF     Row Name 01/20/22 1600          Plan of Care Review    Plan of Care Reviewed With patient  -LF     Progress improving  -LF     Outcome Summary Increased ambulation distance to160' using RWx and CGA.  Required 1 brief standing rest due to fatigue.  O2 sats 98% on 2L.  [place]em[place] sit>supine. Will benefit from cont'd PT at d/c.  cont. per POC  -LF     Row Name 01/20/22 1600          Vital Signs    Pre SpO2 (%) 98  -LF     O2 Delivery Pre Treatment supplemental O2  -LF     Post  SpO2 (%) 98  -LF     Row Name 01/20/22 1600          Positioning and Restraints    Pre-Treatment Position in bed  -LF     Post Treatment Position bed  -LF     In Bed notified nsg; supine; call light within reach; encouraged to call for assist  -LF           User Key  (r) = Recorded By, (t) = Taken By, (c) = Cosigned By    Initials Name Provider Type    Marilou Gomes PT Physical Therapist               Outcome Measures     Row Name 01/20/22 1600 01/20/22 0800       How much help from another person do you currently need...    Turning from your back to your side while in flat bed without using bedrails? -- 4  -MO    Moving from lying on back to sitting on the side of a flat bed without bedrails? 3  -LF 3  -MO    Moving to and from a bed to a chair (including a wheelchair)? 3  -LF 3  -MO    Standing up from a chair using your arms (e.g., wheelchair, bedside chair)? 3  -LF 3  -MO    Climbing 3-5 steps with a railing? 2  -LF 2  -MO    To walk in hospital room? 3  -LF 3  -MO    AM-PAC 6 Clicks Score (PT) -- 18  -MO          User Key  (r) = Recorded By, (t) = Taken By, (c) = Cosigned By    Initials Name Provider Type    Marilou Gomes PT Physical Therapist    Sarah Noyola, RN Registered Nurse                             Physical Therapy Education                 Title: PT OT SLP Therapies (In Progress)     Topic: Physical Therapy (In Progress)     Point: Mobility training (In Progress)     Learning Progress Summary           Patient Acceptance, E, NR by KG at 1/19/2022 0926    Acceptance, E, NR by KG at 1/18/2022 0843    Acceptance, E, NR by KG at 1/17/2022 1018    Acceptance, E, NR by LOPEZ at 1/16/2022 0800    Acceptance, E, NR by LOPEZ at 1/15/2022 0925                   Point: Home exercise program (In Progress)     Learning Progress Summary           Patient Acceptance, E, NR by KG at 1/19/2022 0926    Acceptance, E, NR by KG at 1/18/2022 0843    Acceptance, E, NR by KG at 1/17/2022 1018    Acceptance, E,  NR by LOPEZ at 1/16/2022 0800    Acceptance, E, NR by LOPEZ at 1/15/2022 0925                   Point: Body mechanics (In Progress)     Learning Progress Summary           Patient Acceptance, E, NR by KG at 1/19/2022 0926    Acceptance, E, NR by KG at 1/18/2022 0843    Acceptance, E, NR by KG at 1/17/2022 1018    Acceptance, E, NR by LOPEZ at 1/16/2022 0800    Acceptance, E, NR by LOPEZ at 1/15/2022 0925                   Point: Precautions (In Progress)     Learning Progress Summary           Patient Acceptance, E, NR by KG at 1/19/2022 0926    Acceptance, E, NR by KG at 1/18/2022 0843    Acceptance, E, NR by KG at 1/17/2022 1018    Acceptance, E, NR by LOPEZ at 1/16/2022 0800    Acceptance, E, NR by LOPEZ at 1/15/2022 0925                               User Key     Initials Effective Dates Name Provider Type Discipline    LPOEZ 06/16/21 -  Beverly Jean Baptiste, PT Physical Therapist PT    KG 05/22/20 -  Nohemi Torres PT Physical Therapist PT              PT Recommendation and Plan  Planned Therapy Interventions (PT): balance training, gait training, bed mobility training, strengthening, transfer training  Plan of Care Reviewed With: patient  Progress: improving  Outcome Summary: Increased ambulation distance to160' using RWx and CGA.  Required 1 brief standing rest due to fatigue.  O2 sats 98% on 2L.  [place] sit>supine. Will benefit from cont'd PT at d/c.  cont. per POC     Time Calculation:    PT Charges     Row Name 01/20/22 1600             Time Calculation    Start Time 1600  -LF      PT Received On 01/20/22  -LF              Timed Charges    91919 - Gait Training Minutes  --  -LF      75076 - PT Therapeutic Activity Minutes 32  -LF              Total Minutes    Timed Charges Total Minutes 32  -LF       Total Minutes 32  -LF            User Key  (r) = Recorded By, (t) = Taken By, (c) = Cosigned By    Initials Name Provider Type    LF Marilou Neil, PT Physical Therapist                  PT G-Codes  Outcome Measure  Options: AM-PAC 6 Clicks Daily Activity (OT)  AM-PAC 6 Clicks Score (PT): 18  AM-PAC 6 Clicks Score (OT): 17    Marilou Neil, PT  1/20/2022

## 2022-01-20 NOTE — PROGRESS NOTES
NN spoke with pt at BS.  Pt alert and able to answer questions appropriately. Pt O2 sat 95% on  2 L currently, home O2 use 2 L HS.  Deep breathing exercises encouraged. Pulmonary follow up to be arranged once patient has transportation arranged.  No new concerns or questions voiced at this time.  NN will continue to follow as needed.       Per current [employer] Standards, please consider: LAMA/LABA/ICS in place ([place]), Outpatient PFT, Rehab as appropriate

## 2022-01-20 NOTE — PROGRESS NOTES
[facility] Medicine Services  PROGRESS NOTE    Patient Name: Dani Ziegler  : 1965  MRN: [id number]    Date of Admission: 2021  Primary Care Physician: Darrion Tovar MD    Subjective   Subjective     CC: ICU follow-up for respiratory failure    HPI: No acute distress overnight, patient says she rested well, feels much better, breathing is improved.    ROS:  Gen- No fevers, chills  CV- No chest pain, palpitations  Resp- No cough, dyspnea  GI- No N/V/D, abd pain    All other systems reviewed and negative    Objective   Objective     Vital Signs:   Temp:  [97.3 °F (36.3 °C)-98.4 °F (36.9 °C)] 97.3 °F (36.3 °C)  Heart Rate:  [64-86] 65  Resp:  [18] 18  BP: (119-129)/(71-85) 129/74  Flow (L/min):  [2-4] 2     Physical Exam:  Constitutional: Chronically ill-appearing female, no acute distress, awake, alert  HENT: NCAT, mucous membranes moist  Respiratory: Clear to auscultation bilaterally, respiratory effort normal on 2 L NC  Cardiovascular: RRR, no murmurs, rubs, or gallops  Gastrointestinal: Positive bowel sounds, soft, nontender, nondistended  Musculoskeletal: No bilateral ankle edema  Psychiatric: Depressed mood, tearful, cooperative  Neurologic: Oriented x 3, nonfocal  Skin: No rashes      Results Reviewed:  LAB RESULTS:      Lab 22  0622 22  0323 22  0315 22  0227 22  0255 01/15/22  0258 01/15/22  0258   WBC 13.27* 15.47* 15.29* 14.15* 14.78*   < > 16.57*   HEMOGLOBIN 13.8 13.3 13.7 13.8 13.9   < > 14.3   HEMATOCRIT 43.0 41.7 44.3 44.3 44.1   < > 44.5   PLATELETS 313 330 333 348 356   < > 425   NEUTROS ABS 6.81 7.74* 9.17* 7.59* 8.96*   < > 12.08*   IMMATURE GRANS (ABS) 0.14* 0.09*  --  0.08* 0.10*  --  0.17*   LYMPHS ABS 5.04* 6.11*  --  5.23* 4.57*  --  3.18*   MONOS ABS 0.72 0.88  --  0.88 0.87  --  1.01*   EOS ABS 0.51* 0.56* 0.31 0.29 0.19   < > 0.07   MCV 88.3 88.9 89.7 90.4 90.2   < > 86.9   CRP  --   --   --  0.46  --   --   --     < >  = values in this interval not displayed.         Lab 01/20/22  0620 01/19/22  0323 01/18/22 0315 01/17/22  0227 01/16/22  0255 01/15/22  0258 01/15/22  0258 01/14/22  0313 01/14/22  0313   SODIUM 136 136 136 138 136   < > 138   < > 136   POTASSIUM 4.1 4.1 4.1 3.9 4.2   < > 4.5   < > 4.1   CHLORIDE 99 101 100 102 102   < > 101   < > 98   CO2 27.0 25.0 26.0 24.0 23.0   < > 22.0   < > 27.0   ANION GAP 10.0 10.0 10.0 12.0 11.0   < > 15.0   < > 11.0   BUN 18 23* 24* 27* 34*   < > 40*   < > 42*   CREATININE 0.65 0.66 0.66 0.62 0.69   < > 0.73   < > 0.68   GLUCOSE 89 154* 106* 111* 135*   < > 176*   < > 240*   CALCIUM 9.6 9.3 9.3 9.8 9.9   < > 10.1   < > 10.3   MAGNESIUM  --   --  2.1 1.8  --   --  2.4  --  1.9   PHOSPHORUS  --   --   --  3.5  --   --  4.5  --  3.3   HEMOGLOBIN A1C  --  6.40*  --   --   --   --   --   --   --     < > = values in this interval not displayed.         Lab 01/17/22  0227 01/15/22  0258 01/14/22  0313   TOTAL PROTEIN 8.0 8.5 8.1   ALBUMIN 3.70 3.90 3.70   GLOBULIN 4.3 4.6 4.4   ALT (SGPT) 35* 32 21   AST (SGOT) 22 27 14   BILIRUBIN 0.6 0.7 0.6   ALK PHOS 123* 143* 128*             Lab 01/17/22 0227   TRIGLYCERIDES 101             Brief Urine Lab Results  (Last result in the past 365 days)      Color   Clarity   Blood   Leuk Est   Nitrite   Protein   CREAT   Urine HCG        01/01/22 1410 Dark Yellow   Clear   Negative   Trace   Negative   Trace                 Microbiology Results Abnormal     Procedure Component Value - Date/Time    Blood Culture - Blood, Arm, Left [[id number]]  (Normal) Collected: 12/30/21 2045    Lab Status: Final result Specimen: Blood from Arm, Left Updated: 01/04/22 2215     Blood Culture No growth at 5 days    Respiratory Culture - Sputum, ET Suction [[id number]] Collected: 12/31/21 1355    Lab Status: Final result Specimen: Sputum from ET Suction Updated: 01/02/22 1119     Respiratory Culture Scant growth (1+) Normal respiratory jaqui. No S. aureus or Pseudomonas  aeruginosa detected. Final report.     Gram Stain Many (4+) WBCs per low power field      No Epithelial cells per low power field      Few (2+) Gram positive cocci    Blood Culture ID, PCR - Blood, Arm, Right [[id number]]  (Normal) Collected: 12/30/21 2040    Lab Status: Final result Specimen: Blood from Arm, Right Updated: 01/01/22 1614     BCID, PCR Negative by BCID PCR. Culture to Follow.    S. Pneumo Ag Urine or CSF - Urine, Urine, Clean Catch [[id number]]  (Normal) Collected: 12/31/21 0650    Lab Status: Final result Specimen: Urine, Clean Catch Updated: 12/31/21 1309     Strep Pneumo Ag Negative    Legionella Antigen, Urine - Urine, Urine, Clean Catch [[id number]]  (Normal) Collected: 12/31/21 0650    Lab Status: Final result Specimen: Urine, Clean Catch Updated: 12/31/21 1309     LEGIONELLA ANTIGEN, URINE Negative    MRSA Screen, PCR (Inpatient) - Swab, Nares [[id number]]  (Normal) Collected: 12/31/21 0650    Lab Status: Final result Specimen: Swab from Nares Updated: 12/31/21 0853     MRSA PCR Negative    Narrative:      MRSA Negative    COVID PRE-OP / PRE-PROCEDURE SCREENING ORDER (NO ISOLATION) - Swab, Nasopharynx [[id number]]  (Normal) Collected: 12/30/21 2038    Lab Status: Final result Specimen: Swab from Nasopharynx Updated: 12/30/21 2227    Narrative:      The following orders were created for panel order COVID PRE-OP / PRE-PROCEDURE SCREENING ORDER (NO ISOLATION) - Swab, Nasopharynx.  Procedure                               Abnormality         Status                     ---------                               -----------         ------                     COVID-19 and FLU A/B PCR...[[id number]]  Normal              Final result                 Please view results for these tests on the individual orders.    COVID-19 and FLU A/B PCR - Swab, Nasopharynx [[id number]]  (Normal) Collected: 12/30/21 2038    Lab Status: Final result Specimen: Swab from Nasopharynx Updated: 12/30/21 2227     COVID19 Not Detected      Influenza A PCR Not Detected     Influenza B PCR Not Detected    Narrative:      Fact sheet for providers: https://www.fda.gov/media/932657/download    Fact sheet for patients: https://www.fda.gov/media/679344/download    Test performed by PCR.          SLP FEES - Fiberoptic Endo Eval Swallow    Result Date: 1/19/2022  This procedure was auto-finalized with no dictation required.      Results for orders placed in visit on 05/05/20    SCANNED - ECHOCARDIOGRAM      I have reviewed the medications:  Scheduled Meds:amLODIPine, 10 mg, Oral, Q24H  apixaban, 5 mg, Oral, Q12H  arformoterol, 15 mcg, Nebulization, BID - RT  ARIPiprazole, 2 mg, Oral, Daily  busPIRone, 10 mg, Oral, BID  castor oil-balsam peru, 1 application, Topical, Q12H  diazePAM, 2 mg, Oral, Q12H  DULoxetine, 60 mg, Oral, Daily  escitalopram, 10 mg, Oral, Daily   Followed by  [START ON 1/23/2022] escitalopram, 5 mg, Oral, Daily  famotidine, 20 mg, Oral, BID  gabapentin, 300 mg, Oral, Q8H  insulin detemir, 9 Units, Subcutaneous, BID  insulin lispro, 0-9 Units, Subcutaneous, TID AC  insulin lispro, 6 Units, Subcutaneous, TID With Meals  metoprolol succinate XL, 50 mg, Oral, Q24H  nystatin, , Topical, Q12H  tiotropium bromide monohydrate, 2 puff, Inhalation, Daily - RT      Continuous Infusions:   PRN Meds:.•  albuterol  •  hydrALAZINE  •  magnesium sulfate  •  ondansetron  •  oxyCODONE-acetaminophen  •  potassium chloride  •  senna  •  sodium chloride    Assessment/Plan   Assessment & Plan     Active Hospital Problems    Diagnosis  POA   • **A/C Respiratory Failure Type 2 [J96.21, J96.22]  Yes   • History of recurrent deep vein thrombosis (DVT) [Z86.718]  Not Applicable   • Morbid obesity with BMI of 60.0-69.9, adult ([facility]) [E66.01, Z68.44]  Not Applicable   • CAP (community acquired pneumonia) [J18.9]  Yes   • Sepsis ([facility]) [A41.9]  Yes   • Uncontrolled type 2 diabetes mellitus with hyperglycemia, without long-term current use of insulin ([facility]) [E11.65]  Yes    • Chronic pain [G89.29]  Yes   • Chronic obstructive pulmonary disease (HCC) [J44.9]  Yes   • Essential hypertension [I10]  Yes      Resolved Hospital Problems   No resolved problems to display.        Brief Hospital Course to date:  Dani Ziegler is a 56 y.o. female and history of hypertension, COPD, type 2 diabetes, COPD, chronic pain, history of recurrent DVT.  Patient to the ED 12/30/2021 with shortness of air, found to have acute on chronic hypoxemic hypercapnic respiratory failure, bilateral lower lobe pneumonia, her UDS was positive for methamphetamines and benzos.  She did require intubation and subsequent transferred to the ICU, patient subsequently extubated 1/10.  Her stay in the ICU was complicated with delirium requiring restraints, Valium and Precedex gtt.  Patient has since progressed well and was deemed stable for transfer to hospitalist service for continued care 1/20/2022    Acute on chronic hypoxic and hypercapnic respiratory failure  Bilateral lower lobe pneumonia  COPD  -Patient is s/p prolonged intubation, mechanical ventilation, extubated 1/10  -Status post antibiotics with Zosyn and doxycycline  -Continue Spiriva, Brovana, albuterol nebs  -She is currently on 2 L NC (baseline)with appropriate O2 sats  -PT/OT has evaluated, currently awaiting rehab    Hypertension  -BP currently stable, continue amlodipine    History of recurrent DVT-continue Eliquis    Type 2 diabetes  -seems to been well controlled based on prior A1c of 5.7% 8/24/2021, f/u repeat A1c  - FSBG's have been reviewed and currently appropriate  -Continue basal/prandial and SSI, adjust as warranted  -Continue gabapentin for neuropathy    Anxiety and depression  Delirium  -s/p psych consult, meds adjusted  -Continue BuSpar, Abilify, Cymbalta and Valium  -Continue Lexapro taper  -Patient to follow-up with her psychiatrist after discharge    Chronic pain-continue as needed Percocet    Substance abuse  -UDS was positive for  methamphetamine and benzos    Morbid obesity with BMI 57-complicates all aspects of care    DVT prophylaxis:  Medical DVT prophylaxis orders are present.     All problems listed above are new to me as this is my first encounter with patient.    AM-PAC 6 Clicks Score (PT): 17 (01/19/22 1400)    Disposition: TBD, anticipate discharge to rehab when arranged, CM following    CODE STATUS:   Code Status and Medical Interventions:   Ordered at: 12/31/21 0151     Code Status (Patient has no pulse and is not breathing):    CPR (Attempt to Resuscitate)     Medical Interventions (Patient has pulse or is breathing):    Full Support       Anderson Martinez MD  01/20/22

## 2022-01-21 LAB
GLUCOSE BLDC GLUCOMTR-MCNC: 108 MG/DL (ref 70–130)
GLUCOSE BLDC GLUCOMTR-MCNC: 111 MG/DL (ref 70–130)
GLUCOSE BLDC GLUCOMTR-MCNC: 117 MG/DL (ref 70–130)
GLUCOSE BLDC GLUCOMTR-MCNC: 95 MG/DL (ref 70–130)

## 2022-01-21 PROCEDURE — 63710000001 INSULIN DETEMIR PER 5 UNITS: Performed by: INTERNAL MEDICINE

## 2022-01-21 PROCEDURE — 99232 SBSQ HOSP IP/OBS MODERATE 35: CPT | Performed by: INTERNAL MEDICINE

## 2022-01-21 PROCEDURE — 63710000001 INSULIN LISPRO (HUMAN) PER 5 UNITS: Performed by: INTERNAL MEDICINE

## 2022-01-21 PROCEDURE — 97110 THERAPEUTIC EXERCISES: CPT

## 2022-01-21 PROCEDURE — 92526 ORAL FUNCTION THERAPY: CPT

## 2022-01-21 PROCEDURE — 97530 THERAPEUTIC ACTIVITIES: CPT

## 2022-01-21 PROCEDURE — 94799 UNLISTED PULMONARY SVC/PX: CPT

## 2022-01-21 PROCEDURE — 82962 GLUCOSE BLOOD TEST: CPT

## 2022-01-21 RX ORDER — ARIPIPRAZOLE 10 MG/1
5 TABLET ORAL DAILY
Status: DISCONTINUED | OUTPATIENT
Start: 2022-01-22 | End: 2022-01-23 | Stop reason: HOSPADM

## 2022-01-21 RX ADMIN — BUSPIRONE HYDROCHLORIDE 10 MG: 5 TABLET ORAL at 17:50

## 2022-01-21 RX ADMIN — OXYCODONE HYDROCHLORIDE AND ACETAMINOPHEN 1 TABLET: 10; 325 TABLET ORAL at 12:04

## 2022-01-21 RX ADMIN — ESCITALOPRAM OXALATE 10 MG: 10 TABLET ORAL at 08:57

## 2022-01-21 RX ADMIN — INSULIN DETEMIR 9 UNITS: 100 INJECTION, SOLUTION SUBCUTANEOUS at 08:56

## 2022-01-21 RX ADMIN — CASTOR OIL AND BALSAM, PERU 1 APPLICATION: 788; 87 OINTMENT TOPICAL at 08:58

## 2022-01-21 RX ADMIN — OXYCODONE HYDROCHLORIDE AND ACETAMINOPHEN 1 TABLET: 10; 325 TABLET ORAL at 05:05

## 2022-01-21 RX ADMIN — ARIPIPRAZOLE 2 MG: 2 TABLET ORAL at 12:04

## 2022-01-21 RX ADMIN — OXYCODONE HYDROCHLORIDE AND ACETAMINOPHEN 1 TABLET: 10; 325 TABLET ORAL at 17:50

## 2022-01-21 RX ADMIN — CASTOR OIL AND BALSAM, PERU 1 APPLICATION: 788; 87 OINTMENT TOPICAL at 21:41

## 2022-01-21 RX ADMIN — INSULIN DETEMIR 4.5 UNITS: 100 INJECTION, SOLUTION SUBCUTANEOUS at 21:41

## 2022-01-21 RX ADMIN — INSULIN LISPRO 6 UNITS: 100 INJECTION, SOLUTION INTRAVENOUS; SUBCUTANEOUS at 08:57

## 2022-01-21 RX ADMIN — BUSPIRONE HYDROCHLORIDE 10 MG: 5 TABLET ORAL at 08:56

## 2022-01-21 RX ADMIN — DIAZEPAM 2 MG: 2 TABLET ORAL at 21:40

## 2022-01-21 RX ADMIN — FAMOTIDINE 20 MG: 20 TABLET, FILM COATED ORAL at 08:56

## 2022-01-21 RX ADMIN — ARFORMOTEROL TARTRATE 15 MCG: 15 SOLUTION RESPIRATORY (INHALATION) at 20:28

## 2022-01-21 RX ADMIN — NYSTATIN: 100000 POWDER TOPICAL at 21:41

## 2022-01-21 RX ADMIN — DIAZEPAM 2 MG: 2 TABLET ORAL at 08:56

## 2022-01-21 RX ADMIN — GABAPENTIN 300 MG: 300 CAPSULE ORAL at 14:11

## 2022-01-21 RX ADMIN — APIXABAN 5 MG: 5 TABLET, FILM COATED ORAL at 21:40

## 2022-01-21 RX ADMIN — METOPROLOL SUCCINATE 50 MG: 50 TABLET, EXTENDED RELEASE ORAL at 08:56

## 2022-01-21 RX ADMIN — FAMOTIDINE 20 MG: 20 TABLET, FILM COATED ORAL at 21:41

## 2022-01-21 RX ADMIN — DULOXETINE 60 MG: 60 CAPSULE, DELAYED RELEASE ORAL at 08:56

## 2022-01-21 RX ADMIN — NYSTATIN: 100000 POWDER TOPICAL at 08:58

## 2022-01-21 RX ADMIN — APIXABAN 5 MG: 5 TABLET, FILM COATED ORAL at 08:56

## 2022-01-21 RX ADMIN — GABAPENTIN 300 MG: 300 CAPSULE ORAL at 05:05

## 2022-01-21 RX ADMIN — INSULIN LISPRO 6 UNITS: 100 INJECTION, SOLUTION INTRAVENOUS; SUBCUTANEOUS at 17:51

## 2022-01-21 RX ADMIN — GABAPENTIN 300 MG: 300 CAPSULE ORAL at 21:40

## 2022-01-21 RX ADMIN — AMLODIPINE BESYLATE 10 MG: 10 TABLET ORAL at 08:56

## 2022-01-21 NOTE — PLAN OF CARE
Goal Outcome Evaluation:  Plan of Care Reviewed With: patient            SLP treatment completed. Will continue to address dysphagia. Please see note for further details and recommendations.

## 2022-01-21 NOTE — PROGRESS NOTES
NN spoke with pt at BS.  Pt alert and able to answer questions appropriately. Pt O2 sat 97% on 2  L currently, home O2 use 2 L HS. COPD action plan reviewed. Deep breathing exercises encouraged. Patient agreeable to pulmonary follow up.  No new concerns or questions voiced at this time.  NN will continue to follow as needed.       Per current [employer] Standards, please consider: LAMA/LABA/ICS in place ([place]), Outpatient PFT, Rehab as appropriate    Patient has been scheduled for a hospital follow up with [facility] for 2/11/2022 @ 10 am with MILEY Murray.

## 2022-01-21 NOTE — THERAPY TREATMENT NOTE
Acute Care - Speech Language Pathology   Swallow Treatment Note [facility]     Patient Name: Dani Ziegler  : 1965  MRN: [id number]  Today's Date: 2022               Admit Date: 2021    Visit Dx:     ICD-10-CM ICD-9-CM   1. Acute exacerbation of chronic obstructive pulmonary disease (COPD) ([facility])  J44.1 491.21   2. Acute respiratory distress  R06.03 518.82   3. Hypoxia  R09.02 799.02   4. Pneumonia of both lungs due to infectious organism, unspecified part of lung  J18.9 483.8   5. Sepsis with acute hypoxic respiratory failure without septic shock, due to unspecified organism ([facility])  A41.9 038.9    R65.20 995.91    J96.01 518.81   6. Oropharyngeal dysphagia  R13.12 787.22     Patient Active Problem List   Diagnosis   • Uncontrolled type 2 diabetes mellitus with hyperglycemia, without long-term current use of insulin ([facility])   • Vitamin D deficiency   • Peripheral neuropathy   • Adiposity   • Left bundle branch block (LBBB)   • Essential hypertension   • Chronic pain   • Chronic obstructive pulmonary disease ([facility])   • Anxiety   • Depression   • Arthritis involving multiple sites   • Leukocytosis   • Mixed hyperlipidemia   • Special screening for malignant neoplasms, colon   • Cellulitis of left lower extremity   • Hyperkalemia   • Acute on chronic renal insufficiency   • Sepsis ([facility])   • CHF (congestive heart failure) ([facility])   • Cellulitis of left leg   • Lumbar disc disease   • Diabetic peripheral neuropathy ([facility])   • COVID-19 virus infection   • CAP (community acquired pneumonia)   • Polypharmacy   • Morbid obesity with BMI of 45.0-49.9, adult ([facility])   • A/C Respiratory Failure Type 2   • History of recurrent deep vein thrombosis (DVT)   • Morbid obesity with BMI of 60.0-69.9, adult ([facility])     Past Medical History:   Diagnosis Date   • Abnormal weight gain    • Acute UTI    • Anxiety    • Arthritis involving multiple sites    • Chronic obstructive pulmonary disease ([facility])    • Chronic pain    •  Current every day smoker    • Depression    • Diabetes mellitus ([facility])    • Diarrhea    • Dysuria    • Edema, lower extremity    • Fever    • Head injury    • Hypertension    • Intervertebral disc disorder     Degeneration of intervertebral disc, site unspecified (722.6)   • Left bundle branch block    • Leukocytosis    • Long term current use of methadone for pain control    • Mass of right breast    • Nausea    • Obesity    • Overactive bladder    • Peripheral neuropathy    • Superficial phlebitis     right medial calf   • Upper respiratory infection    • Urinary incontinence    • Vitamin D deficiency    • Vomiting      Past Surgical History:   Procedure Laterality Date   • BLADDER SURGERY      pubovaginal sling   • CHOLECYSTECTOMY     • HIP ARTHROSCOPY Right 10/29/2020       SLP Recommendation and Plan     SLP Diet Recommendation: soft textures, whole, nectar thick liquids, ice chips between meals after oral care, with supervision, other (see comments) (dysphagia level IV diet--may have soft/whole food, but NO mixed consistencies) (01/21/22 1050)  Recommended Precautions and Strategies: upright posture during/after eating, general aspiration precautions, fatigue precautions (01/21/22 1050)  SLP Rec. for Method of Medication Administration: meds whole, with thick liquids, with pudding or applesauce, as tolerated (01/21/22 1050)     Monitor for Signs of Aspiration: yes, notify SLP if any concerns (01/21/22 1050)        Anticipated Discharge Disposition (SLP): anticipate therapy at next level of care (01/21/22 1050)     Therapy Frequency (Swallow): 5 days per week (01/21/22 1050)  Predicted Duration Therapy Intervention (Days): until discharge (01/21/22 1050)     Daily Summary of Progress (SLP): progress toward functional goals as expected (01/21/22 1050)               Treatment Assessment (SLP): Reviewed & completed dysphagia exercises w/ pt. Good participation. Rec continue dysphagia level IV & nectar-thick per  FEES . Left new handout of exercises in room & encouraged independent practice. SLP will continue to follow for tx. (22 105)  Plan for Continued Treatment (SLP): continue treatment per plan of care (22 1050)         Plan of Care Reviewed With: patient      SWALLOW EVALUATION (last 72 hours)     SLP Adult Swallow Evaluation     Row Name 22 1050 22 1200 22 1120             Rehab Evaluation    Document Type therapy note (daily note)  -MP re-evaluation  -AC therapy note (daily note)  -AC      Subjective Information no complaints  -MP no complaints  -AC no complaints  -AC      Patient Observations cooperative; alert  -MP alert; cooperative  -AC alert; cooperative  -AC      Patient/Family/Caregiver Comments/Observations No family present  -MP No family present. Pt became emotionally labile during evaluation. Reported that her father and best friend recently .  -AC No family present.  -AC      Patient Effort good  -MP excellent  -AC good  -              General Information    Patient Profile Reviewed -- yes  -AC --      Pertinent History Of Current Problem -- See previous eval.  -AC --      Current Method of Nutrition -- mechanical soft, no mixed consistencies; nectar/syrup-thick liquids  - --      Plans/Goals Discussed with -- patient  -AC --      Barriers to Rehab -- medically complex; previous functional deficit  - --      Patient's Goals for Discharge -- return to regular diet  - --              Pain    Additional Documentation Pain Scale: FACES Pre/Post-Treatment (Group)  -MP -- --              Pain Scale: FACES Pre/Post-Treatment    Pain: FACES Scale, Pretreatment 0-->no hurt  -MP 0-->no hurt  -AC 0-->no hurt  -AC      Posttreatment Pain Rating 0-->no hurt  -MP 0-->no hurt  -AC 0-->no hurt  -AC              Oral Motor Structure and Function    Dentition Assessment -- upper dentures/partial in place; lower dentures/partial in place  - --              General  Eating/Swallowing Observations    Respiratory Support Currently in Use -- nasal cannula  -AC --      Eating/Swallowing Skills -- fed by staff/caregiver; fed by SLP; appropriate self-feeding skills observed  -AC --      Positioning During Eating -- upright 90 degree; upright in chair  -AC --              Fiberoptic Endoscopic Evaluation of Swallowing (FEES)    Risks/Benefits Reviewed -- risks/benefits explained; patient; agreed to eval  -AC --      Nasal Entry -- right:  -AC --      Scope serial number/identification -- [id number]  - --              Anatomy and Physiology    Anatomic Considerations -- anatomic deviation observed (see comments); other (see comments)  -AC --      Comment -- Spherical, pulsating prominence noted to be protruding from R portion of posterior pharynx. Did not appear to greatly affect swallowing.  -AC --      Velopharyngeal -- CNA  -AC --      Base of Tongue -- symmetrical  -AC --      Epiglottis -- WFL  -AC --      Laryngeal Function Breathing -- symmetrical  -AC --      Laryngeal Function Phonation -- symmetrical  -AC --      Laryngeal Function to Breath Hold -- TVF contact  -AC --      Secretion Rating Scale (Kumar et al. 1996) -- 1- secretions present around the laryngeal vestibule  -AC --      Secretion Description -- thick; discolored; other (comment)  pulmonary secretions noted subglotically--expelled w/ several cued coughs  -AC --      Sensory -- sensed scope  -AC --      Utensils Used -- Spoon; Cup; Straw  -AC --      Consistencies Trialed -- thin liquids; nectar-thick liquids; pudding/puree; regular textures  -AC --              FEES Interpretation    Oral Phase -- prespill of liquids into pharynx; reduced lingual control  -AC --              Initiation of Pharyngeal Swallow    Initiation of Pharyngeal Swallow -- bolus in pyriform sinuses  -AC --      Pharyngeal Phase -- impaired pharyngeal phase of swallowing  -AC --      Aspiration Before the Swallow -- thin liquids; secondary to  reduced back of tongue control; secondary to delayed swallow initiation or mistiming  -AC --      Aspiration During the Swallow -- thin liquids; secondary to delayed swallow initiation or mistiming; secondary to reduced laryngeal elevation; secondary to reduced vestibular closure  -AC --      Penetration After the Swallow -- thin liquids; secondary to residue; in pyriform sinuses  -AC --      Response to Penetration -- no response  -AC --      Response to Aspiration -- no response, silent aspiration  -AC --      Rosenbek's Scale -- thin:; 8-->Level 8; nectar:; pudding/puree:; regular textures:; 1-->Level 1  -AC --      Residue -- thin liquids; diffuse within pharynx; secondary to reduced base of tongue retraction; secondary to reduced posterior pharyngeal wall stripping; secondary to reduced laryngeal elevation; secondary to reduced hyolaryngeal excursion; other (see comments)  mild-mod amount; pooled to pyriform sinuses  -AC --      Response to Residue -- with spontaneous subsequent swallow  -AC --      Attempted Compensatory Maneuvers -- bolus size; bolus presentation style; chin tuck; breath hold; other (see comments)  3 sec prep  -AC --      Response to Attempted Compensatory Maneuvers -- did not prevent aspiration  -AC --      FEES Summary -- There was silent aspiration of thin liquid via straw before/during the swallow w/ penetration after the swallow as residue spilled over posterior commissure. At first, pt was able to prevent aspiration by drinking thin via cup sips or by using chin tuck; however, as study progressed, pt fatigued and eventually aspirated thin liquid no matter the presentation style of compensatory strategy utilized. No penetration/aspiration appreciated w/ nectar-thick liquid, pudding, or solid.  -AC --              SLP Evaluation Clinical Impression    SLP Swallowing Diagnosis -- mild; oral dysphagia; mild-moderate; pharyngeal dysphagia  -AC --      Functional Impact -- risk of  aspiration/pneumonia; risk of malnutrition; risk of dehydration  -AC --      Rehab Potential/Prognosis, Swallowing -- good, to achieve stated therapy goals  -AC --      Swallow Criteria for Skilled Therapeutic Interventions Met -- demonstrates skilled criteria  -AC --              SLP Treatment Clinical Impressions    Treatment Assessment (SLP) Reviewed & completed dysphagia exercises w/ pt. Good participation. Rec continue dysphagia level IV & nectar-thick per FEES 1/20. Left new handout of exercises in room & encouraged independent practice. SLP will continue to follow for tx.  -MP -- Mental status seems to be improving. Introduced swallowing exercises--pt motivated and eager to return to thin liquids. Intermittent use of [place] for oral suctioning, but no overt clinical s/sxs aspiration w/ ice or thin via tsp. Of note, FEES completed 1/14 revealed silent aspiration.  -      Daily Summary of Progress (SLP) progress toward functional goals as expected  -MP -- progress toward functional goals as expected  -      Plan for Continued Treatment (SLP) continue treatment per plan of care  -MP -- continue treatment per plan of care  -      Care Plan Review care plan/treatment goals reviewed  - -- evaluation/treatment results reviewed; care plan/treatment goals reviewed; risks/benefits reviewed; current/potential barriers reviewed; patient/other agree to care plan  -              Recommendations    Therapy Frequency (Swallow) 5 days per week  -MP 5 days per week  -AC --      Predicted Duration Therapy Intervention (Days) until discharge  -MP until discharge  - --      SLP Diet Recommendation soft textures; whole; nectar thick liquids; ice chips between meals after oral care, with supervision; other (see comments)  dysphagia level IV diet--may have soft/whole food, but NO mixed consistencies  - soft textures; whole; nectar thick liquids; ice chips between meals after oral care, with supervision; other (see  comments)  dysphagia level IV diet--may have soft/whole food, but NO mixed consistencies  - --      Recommended Diagnostics -- -- reassess via FEES  -AC      Recommended Precautions and Strategies upright posture during/after eating; general aspiration precautions; fatigue precautions  - upright posture during/after eating; general aspiration precautions; fatigue precautions  - --      Oral Care Recommendations Oral Care BID/PRN  -MP Oral Care BID/PRN  -AC --      SLP Rec. for Method of Medication Administration meds whole; with thick liquids; with pudding or applesauce; as tolerated  -MP meds whole; with thick liquids; with pudding or applesauce; as tolerated  -AC --      Monitor for Signs of Aspiration yes; notify SLP if any concerns  -MP yes; notify SLP if any concerns  -AC --      Anticipated Discharge Disposition (SLP) anticipate therapy at next level of care  -MP anticipate therapy at next level of care  - --            User Key  (r) = Recorded By, (t) = Taken By, (c) = Cosigned By    Initials Name Effective Dates    Mary Chung, MS CCC-SLP 06/16/21 -     Abdulaziz Bennett MS CCC-SLP 12/28/21 -                 EDUCATION  The patient has been educated in the following areas:   Dysphagia (Swallowing Impairment) Modified Diet Instruction.        SLP GOALS     Row Name 01/21/22 1050 01/19/22 1200 01/18/22 1120       Oral Nutrition/Hydration Goal 1 (SLP)    Oral Nutrition/Hydration Goal 1, SLP LTG: Pt will return to regular diet, thin liquids w/ no overt s/sxs aspiration/distress w/ 100% acc and no cues  -MP LTG: Pt will return to regular diet, thin liquids w/ no overt s/sxs aspiration/distress w/ 100% acc and no cues  -AC LTG: Pt will return to regular diet, thin liquids w/ no overt s/sxs aspiration/distress w/ 100% acc and no cues  -AC    Time Frame (Oral Nutrition/Hydration Goal 1, SLP) by discharge  -MP by discharge  -AC by discharge  -AC    Progress/Outcomes (Oral Nutrition/Hydration Goal  1, SLP) continuing progress toward goal  -MP goal ongoing  -AC continuing progress toward goal  -AC       Oral Nutrition/Hydration Goal 2 (SLP)    Oral Nutrition/Hydration Goal 2, SLP Pt will tolerate soft solids & nectar-thick liquids w/ no overt s/sxs aspiration/distress w/ 100% acc and no cues  -MP Pt will tolerate soft solids & nectar-thick liquids w/ no overt s/sxs aspiration/distress w/ 100% acc and no cues  -AC Pt will tolerate soft solids & nectar-thick liquids w/ no overt s/sxs aspiration/distress w/ 100% acc and no cues  -AC    Time Frame (Oral Nutrition/Hydration Goal 2, SLP) by discharge  -MP by discharge  -AC by discharge  -AC    Barriers (Oral Nutrition/Hydration Goal 2, SLP) -- -- No overt clinical s/sxs aspiration w/ nectar via cup/straw. Prefers nectar-thickened tea or juice, rather than thickened H2O.  -AC    Progress/Outcomes (Oral Nutrition/Hydration Goal 2, SLP) good progress toward goal  -MP goal ongoing  -AC continuing progress toward goal  -AC       Oral Nutrition/Hydration Goal (SLP)    Oral Nutrition/Hydration Goal, SLP Pt will tolerate therapeutic trials of thin H2O w/ no overt s/sxs aspiration/distress w/ 60% acc and no cues in order to assess appropriateness for repeat instrumental eval  -MP Pt will tolerate therapeutic trials of thin H2O w/ no overt s/sxs aspiration/distress w/ 60% acc and no cues in order to assess appropriateness for repeat instrumental eval  -AC Pt will tolerate therapeutic trials of thin H2O w/ no overt s/sxs aspiration/distress w/ 60% acc and no cues in order to assess appropriateness for repeat instrumental eval  -AC    Time Frame (Oral Nutrition/Hydration Goal, SLP) short term goal (STG)  -MP short term goal (STG)  -AC short term goal (STG)  -AC    Barriers (Oral Nutrition/Hydration Goal, SLP) No s/sxs aspiration w/ thin, but pt w/ hx silent aspiration  -MP -- No overt clinical s/sxs aspiration w/ ice or tsp thin liquid. Of note, recent hx silent aspiration.  -AC     Progress/Outcomes (Oral Nutrition/Hydration Goal, SLP) continuing progress toward goal  -MP goal ongoing  -AC continuing progress toward goal  -AC       Lingual Strengthening Goal 1 (SLP)    Activity (Lingual Strengthening Goal 1, SLP) increase tongue back strength  -MP increase tongue back strength  -AC --    Increase Tongue Back Strength lingual resistance exercises  -MP lingual resistance exercises  -AC --    [place]/Accuracy (Lingual Strengthening Goal 1, SLP) independently (over 90% accuracy)  -MP independently (over 90% accuracy)  -AC --    Time Frame (Lingual Strengthening Goal 1, SLP) short term goal (STG)  -MP short term goal (STG)  -AC --    Progress/Outcomes (Lingual Strengthening Goal 1, SLP) goal ongoing  -MP -- --       Pharyngeal Strengthening Exercise Goal 1 (SLP)    Activity (Pharyngeal Strengthening Goal 1, SLP) increase timing; increase superior movement of the hyolaryngeal complex; increase anterior movement of the hyolaryngeal complex; increase closure at entrance to airway/closure of airway at glottis; increase squeeze/positive pressure generation  -MP -- --    Increase Timing prepping - 3 second prep or suck swallow or 3-step swallow  -MP prepping - 3 second prep or suck swallow or 3-step swallow  -AC prepping - 3 second prep or suck swallow or 3-step swallow  -AC    Increase Superior Movement of the Hyolaryngeal Complex Mendelsohn; effortful pitch glide (falsetto + pharyngeal squeeze)  -MP Mendelsohn; effortful pitch glide (falsetto + pharyngeal squeeze)  -AC effortful pitch glide (falsetto + pharyngeal squeeze)  -AC    Increase Anterior Movement of the Hyolaryngeal Complex chin tuck against resistance (CTAR)  -MP chin tuck against resistance (CTAR)  -AC chin tuck against resistance (CTAR)  -AC    Increase Closure at Entrance to Airway/Closure of Airway at Glottis super-supraglottic swallow  -MP super-supraglottic swallow  -AC supraglottic swallow  -AC    Increase Squeeze/Positive  Pressure Generation hard effortful swallow  -MP hard effortful swallow  -AC hard effortful swallow  -AC    [place]/Accuracy (Pharyngeal Strengthening Goal 1, SLP) independently (over 90% accuracy)  -MP independently (over 90% accuracy)  -AC with minimal cues (75-90% accuracy)  -AC    Time Frame (Pharyngeal Strengthening Goal 1, SLP) short term goal (STG)  -MP short term goal (STG)  -AC short term goal (STG)  -AC    Barriers (Pharyngeal Strengthening Goal 1, SLP) Reviewed & completed w/ pt - provided new handout and encouraged independent practice  -MP -- Provided handout & focused on CTAR, effortful swallow, and SGS. Pt was able to demonstrate all w/ model & mod cues. Encouraged independent practice w/ exercises until re-FEES.  -AC    Progress/Outcomes (Pharyngeal Strengthening Goal 1, SLP) continuing progress toward goal  -MP goal revised this date  -AC continuing progress toward goal  -AC          User Key  (r) = Recorded By, (t) = Taken By, (c) = Cosigned By    Initials Name Provider Type    AC Mary Baker MS CCC-SLP Speech and Language Pathologist    Abdulaziz Bennett, MS CCC-SLP Speech and Language Pathologist                   Time Calculation:    Time Calculation- SLP     Row Name 01/21/22 1117             Time Calculation- SLP    SLP Start Time 1050  -MP      SLP Received On 01/21/22  -MP              Untimed Charges    54441-EO Treatment Swallow Minutes 40  -MP              Total Minutes    Untimed Charges Total Minutes 40  -MP       Total Minutes 40  -MP            User Key  (r) = Recorded By, (t) = Taken By, (c) = Cosigned By    Initials Name Provider Type    Abdulaziz Bennett, MS CCC-SLP Speech and Language Pathologist                Therapy Charges for Today     Code Description Service Date Service Provider Modifiers Qty    [id number]  ST TREATMENT SWALLOW 3 1/21/2022 Abdulaziz Johnson, MS CCC-SLP GN 1          Patient was not wearing a face mask and did exhibit coughing  during this therapy encounter.  Procedure performed was aerosolizing, involved close contact (within 6 feet for at least 15 minutes or longer), and did not involve contact with infectious secretions or specimens.  Therapist used appropriate personal protective equipment including gloves, standard procedure mask and eye protection.  Appropriate PPE was worn during the entire therapy session.  Hand hygiene was completed before and after therapy session.  Gabriela Neri, SLP student, was also present during this encounter and the aforementioned applies to them, as well.        Abdulaziz Summers, MS CCC-SLP  1/21/2022

## 2022-01-21 NOTE — THERAPY TREATMENT NOTE
Patient Name: Dani Ziegler  : 1965    MRN: [id number]                              Today's Date: 2022       Admit Date: 2021    Visit Dx:     ICD-10-CM ICD-9-CM   1. Acute exacerbation of chronic obstructive pulmonary disease (COPD) ([facility])  J44.1 491.21   2. Acute respiratory distress  R06.03 518.82   3. Hypoxia  R09.02 799.02   4. Pneumonia of both lungs due to infectious organism, unspecified part of lung  J18.9 483.8   5. Sepsis with acute hypoxic respiratory failure without septic shock, due to unspecified organism ([facility])  A41.9 038.9    R65.20 995.91    J96.01 518.81   6. Oropharyngeal dysphagia  R13.12 787.22     Patient Active Problem List   Diagnosis   • Uncontrolled type 2 diabetes mellitus with hyperglycemia, without long-term current use of insulin ([facility])   • Vitamin D deficiency   • Peripheral neuropathy   • Adiposity   • Left bundle branch block (LBBB)   • Essential hypertension   • Chronic pain   • Chronic obstructive pulmonary disease ([facility])   • Anxiety   • Depression   • Arthritis involving multiple sites   • Leukocytosis   • Mixed hyperlipidemia   • Special screening for malignant neoplasms, colon   • Cellulitis of left lower extremity   • Hyperkalemia   • Acute on chronic renal insufficiency   • Sepsis ([facility])   • CHF (congestive heart failure) ([facility])   • Cellulitis of left leg   • Lumbar disc disease   • Diabetic peripheral neuropathy ([facility])   • COVID-19 virus infection   • CAP (community acquired pneumonia)   • Polypharmacy   • Morbid obesity with BMI of 45.0-49.9, adult ([facility])   • A/C Respiratory Failure Type 2   • History of recurrent deep vein thrombosis (DVT)   • Morbid obesity with BMI of 60.0-69.9, adult ([facility])     Past Medical History:   Diagnosis Date   • Abnormal weight gain    • Acute UTI    • Anxiety    • Arthritis involving multiple sites    • Chronic obstructive pulmonary disease ([facility])    • Chronic pain    • Current every day smoker    • Depression    • Diabetes mellitus  ([facility])    • Diarrhea    • Dysuria    • Edema, lower extremity    • Fever    • Head injury    • Hypertension    • Intervertebral disc disorder     Degeneration of intervertebral disc, site unspecified (722.6)   • Left bundle branch block    • Leukocytosis    • Long term current use of methadone for pain control    • Mass of right breast    • Nausea    • Obesity    • Overactive bladder    • Peripheral neuropathy    • Superficial phlebitis     right medial calf   • Upper respiratory infection    • Urinary incontinence    • Vitamin D deficiency    • Vomiting      Past Surgical History:   Procedure Laterality Date   • BLADDER SURGERY      pubovaginal sling   • CHOLECYSTECTOMY     • HIP ARTHROSCOPY Right 10/29/2020      General Information     Row Name 01/21/22 1448          Physical Therapy Time and Intention    Document Type therapy note (daily note) (P)   -ML     Mode of Treatment physical therapy (P)   -ML     Row Name 01/21/22 1448          General Information    Patient Profile Reviewed yes (P)   -ML     Existing Precautions/Restrictions cardiac; fall; oxygen therapy device and L/min (P)   -ML     Barriers to Rehab medically complex; previous functional deficit (P)   -ML     Row Name 01/21/22 1448          Cognition    Orientation Status (Cognition) oriented x 4 (P)   -ML     Row Name 01/21/22 1448          Safety Issues, Functional Mobility    Safety Issues Affecting Function (Mobility) awareness of need for assistance; insight into deficits/self-awareness; safety precaution awareness (P)   -ML     Impairments Affecting Function (Mobility) endurance/activity tolerance; pain; strength; shortness of breath (P)   -ML     Cognitive Impairments, Mobility Safety/Performance awareness, need for assistance; insight into deficits/self-awareness (P)   -ML           User Key  (r) = Recorded By, (t) = Taken By, (c) = Cosigned By    Initials Name Provider Type    ML Liter, Arlyn S, PT Student PT Student               Mobility      Row Name 01/21/22 1448          Bed Mobility    Bed Mobility supine-sit; sit-supine; scooting/bridging (P)   -ML     Scooting/Bridging [place] (Bed Mobility) supervision (P)   -ML     Supine-Sit [place] (Bed Mobility) contact guard; verbal cues (P)   -ML     Sit-Supine [place] (Bed Mobility) moderate assist (50% patient effort); verbal cues (P)   -ML     Assistive Device (Bed Mobility) head of bed elevated (P)   -ML     Comment (Bed Mobility) ModA for positioning of BLE with supine to sit. VC for sequencing and hand placement (P)   -ML     Row Name 01/21/22 1448          Transfers    Comment (Transfers) STSx3, 1st from chair, 2nd and 3rd from BSC (P)   -ML     Row Name 01/21/22 1448          Bed-Chair Transfer    Bed-Chair [place] (Transfers) contact guard; verbal cues (P)   bed to BSC  -ML     Assistive Device (Bed-Chair Transfers) walker, front-wheeled (P)   -ML     Row Name 01/21/22 1448          Sit-Stand Transfer    Sit-Stand [place] (Transfers) contact guard; verbal cues (P)   -ML     Assistive Device (Sit-Stand Transfers) walker, front-wheeled (P)   -ML     Row Name 01/21/22 1448          Gait/Stairs (Locomotion)    Comment (Gait/Stairs) Deferred d/t increased dizziness and SOA once standing (P)   -ML           User Key  (r) = Recorded By, (t) = Taken By, (c) = Cosigned By    Initials Name Provider Type    ML Liter, Arlyn S, PT Student PT Student               Obj/Interventions     Row Name 01/21/22 1453          Motor Skills    Therapeutic Exercise hip; knee; ankle (P)   -ML     Row Name 01/21/22 1453          Hip (Therapeutic Exercise)    Hip (Therapeutic Exercise) strengthening exercise (P)   -ML     Hip Strengthening (Therapeutic Exercise) bilateral; flexion; extension; aBduction; aDduction; 5 repetitions (P)   -ML     Row Name 01/21/22 1453          Knee (Therapeutic Exercise)    Knee (Therapeutic Exercise) strengthening exercise (P)   -ML     Knee Strengthening (Therapeutic  Exercise) bilateral; LAQ (long arc quad); marching while seated; 10 repetitions; 2 sets (P)   -ML     Row Name 01/21/22 1453          Ankle (Therapeutic Exercise)    Ankle (Therapeutic Exercise) AROM (active range of motion) (P)   -ML     Ankle AROM (Therapeutic Exercise) bilateral; dorsiflexion; plantarflexion (P)   -ML     Row Name 01/21/22 1453          Balance    Balance Assessment sitting static balance; sitting dynamic balance; standing static balance; standing dynamic balance (P)   -ML     Static Sitting Balance WFL; sitting, edge of bed (P)   -ML     Dynamic Sitting Balance WFL; sitting, edge of bed (P)   -ML     Static Standing Balance WFL; supported; standing (P)   -ML     Dynamic Standing Balance mild impairment; supported; standing (P)   -ML     Balance Interventions sitting; standing; sit to stand; weight shifting activity (P)   -ML           User Key  (r) = Recorded By, (t) = Taken By, (c) = Cosigned By    Initials Name Provider Type    ML Liter, Arlyn S, PT Student PT Student               Goals/Plan    No documentation.                Clinical Impression     Row Name 01/21/22 1454          Pain    Additional Documentation Pain Scale: Numbers Pre/Post-Treatment (Group) (P)   -ML     [facility] Name 01/21/22 1454          Pain Scale: Numbers Pre/Post-Treatment    Pretreatment Pain Rating 4/10 (P)   -ML     Posttreatment Pain Rating 4/10 (P)   -ML     Pain Location - Side Right (P)   -ML     Pain Location hip (P)   -ML     Pain Intervention(s) Repositioned (P)   -ML     Row Name 01/21/22 1452          Plan of Care Review    Plan of Care Reviewed With patient (P)   -ML     Progress no change (P)   -ML     Outcome Summary Pt able to perform STSx3 with CGAx1 and FWW. Able to perform BLE exercise without VC for corrections. Further treatment limited d/t dizziness with standing and decreased functional endurance. Cont IP PT. Rec DC to IPR. (P)   -ML     Row Name 01/21/22 2336          Therapy Assessment/Plan (PT)     Rehab Potential (PT) good, to achieve stated therapy goals (P)   -ML     Criteria for Skilled Interventions Met (PT) yes; skilled treatment is necessary (P)   -ML     Row Name 01/21/22 1454          Vital Signs    Pre Systolic BP Rehab 109 (P)   -ML     Pre Treatment Diastolic BP 75 (P)   -ML     Post Systolic BP Rehab 128 (P)   -ML     Post Treatment Diastolic BP 70 (P)   -ML     Pretreatment Heart Rate (beats/min) 92 (P)   -ML     Posttreatment Heart Rate (beats/min) 100 (P)   -ML     Pre SpO2 (%) 88  (P)   -ML     O2 Delivery Pre Treatment room air (P)   -ML     Intra SpO2 (%) 90 (P)   -ML     O2 Delivery Intra Treatment nasal cannula (P)   -ML     Post SpO2 (%) 92 (P)   -ML     O2 Delivery Post Treatment nasal cannula (P)   -ML     Pre Patient Position Supine (P)   -ML     Intra Patient Position Standing (P)   -ML     Post Patient Position Supine (P)   -ML     Row Name 01/21/22 1454          Positioning and Restraints    Pre-Treatment Position in bed (P)   -ML     Post Treatment Position bed (P)   -ML     In Bed notified nsg; supine; fowlers; call light within reach; encouraged to call for assist; exit alarm on; with other staff; side rails up x3 (P)   -ML           User Key  (r) = Recorded By, (t) = Taken By, (c) = Cosigned By    Initials Name Provider Type    ML Arlyn Taylor, PT Student PT Student               Outcome Measures     Row Name 01/21/22 1501 01/21/22 0800       How much help from another person do you currently need...    Turning from your back to your side while in flat bed without using bedrails? 3 (P)   -ML 4  -MO    Moving from lying on back to sitting on the side of a flat bed without bedrails? 3 (P)   -ML 3  -MO    Moving to and from a bed to a chair (including a wheelchair)? 3 (P)   -ML 3  -MO    Standing up from a chair using your arms (e.g., wheelchair, bedside chair)? 3 (P)   -ML 3  -MO    Climbing 3-5 steps with a railing? 2 (P)   -ML 2  -MO    To walk in hospital room? 3 (P)   -ML 3   -MO    AM-PAC 6 Clicks Score (PT) 17 (P)   -ML 18  -MO    Row Name 01/21/22 1501          Functional Assessment    Outcome Measure Options AM-PAC 6 Clicks Basic Mobility (PT) (P)   -ML           User Key  (r) = Recorded By, (t) = Taken By, (c) = Cosigned By    Initials Name Provider Type    Sarah Noyola, RN Registered Nurse    ML Liter, Arlyn S, PT Student PT Student                             Physical Therapy Education                 Title: PT OT SLP Therapies (In Progress)     Topic: Physical Therapy (Done)     Point: Mobility training (Done)     Learning Progress Summary           Patient Acceptance, E, VU,NR by ML at 1/21/2022 1501    Acceptance, E, NR by KG at 1/19/2022 0926    Acceptance, E, NR by KG at 1/18/2022 0843    Acceptance, E, NR by KG at 1/17/2022 1018    Acceptance, E, NR by LOPEZ at 1/16/2022 0800    Acceptance, E, NR by LOPEZ at 1/15/2022 0925                   Point: Home exercise program (Done)     Learning Progress Summary           Patient Acceptance, E, VU,NR by ML at 1/21/2022 1501    Acceptance, E, NR by KG at 1/19/2022 0926    Acceptance, E, NR by KG at 1/18/2022 0843    Acceptance, E, NR by KG at 1/17/2022 1018    Acceptance, E, NR by LOPEZ at 1/16/2022 0800    Acceptance, E, NR by LOPEZ at 1/15/2022 0925                   Point: Body mechanics (Done)     Learning Progress Summary           Patient Acceptance, E, VU,NR by ML at 1/21/2022 1501    Acceptance, E, NR by KG at 1/19/2022 0926    Acceptance, E, NR by KG at 1/18/2022 0843    Acceptance, E, NR by KG at 1/17/2022 1018    Acceptance, E, NR by LOPEZ at 1/16/2022 0800    Acceptance, E, NR by LOPEZ at 1/15/2022 0925                   Point: Precautions (Done)     Learning Progress Summary           Patient Acceptance, E, VU,NR by ML at 1/21/2022 1501    Acceptance, E, NR by KG at 1/19/2022 0926    Acceptance, E, NR by KG at 1/18/2022 0843    Acceptance, E, NR by KG at 1/17/2022 1018    Acceptance, E, NR by LOPEZ at 1/16/2022 0800    Acceptance, E, NR by  LOPEZ at 1/15/2022 0925                               User Key     Initials Effective Dates Name Provider Type Discipline    LOPEZ 06/16/21 -  Beverly Jean Baptiste, PT Physical Therapist PT    KG 05/22/20 -  Nohemi Torres, PT Physical Therapist PT    ML 12/06/21 -  Liter, Arlyn S, PT Student PT Student PT              PT Recommendation and Plan     Plan of Care Reviewed With: (P) patient  Progress: (P) no change  Outcome Summary: (P) Pt able to perform STSx3 with CGAx1 and FWW. Able to perform BLE exercise without VC for corrections. Further treatment limited d/t dizziness with standing and decreased functional endurance. Cont IP PT. Rec DC to IPR.     Time Calculation:    PT Charges     Row Name 01/21/22 1501             Time Calculation    Start Time 1357 (P)   -ML      PT Received On 01/21/22 (P)   -ML      PT Goal Re-Cert Due Date 01/25/22 (P)   -ML              Timed Charges    19390 - PT Therapeutic Exercise Minutes 15 (P)   -ML      76742 - PT Therapeutic Activity Minutes 29 (P)   -ML              Total Minutes    Timed Charges Total Minutes 44 (P)   -ML       Total Minutes 44 (P)   -ML            User Key  (r) = Recorded By, (t) = Taken By, (c) = Cosigned By    Initials Name Provider Type    ML Liter, Arlyn S, PT Student PT Student              Therapy Charges for Today     Code Description Service Date Service Provider Modifiers Qty    36625945850 HC PT THER PROC EA 15 MIN 1/21/2022 Liter, Arlyn S, PT Student GP 1    56369808814 HC PT THERAPEUTIC ACT EA 15 MIN 1/21/2022 Liter, Arlyn S, PT Student GP 2          PT G-Codes  Outcome Measure Options: (P) AM-PAC 6 Clicks Basic Mobility (PT)  AM-PAC 6 Clicks Score (PT): (P) 17  AM-PAC 6 Clicks Score (OT): 17    Arlyn S Liter, PT Student  1/21/2022

## 2022-01-21 NOTE — PROGRESS NOTES
[facility] Medicine Services  PROGRESS NOTE    Patient Name: Dani Ziegler  : 1965  MRN: [id number]    Date of Admission: 2021  Primary Care Physician: Darrion Tovar MD    Subjective   Subjective     CC: ICU follow-up for respiratory failure      HPI: No acute events overnight, patient says she is doing okay, does endorse some pain.    ROS:  Gen- No fevers, chills  CV- No chest pain, palpitations  Resp- No cough, dyspnea  GI- No N/V/D, abd pain     All other systems reviewed and are negative  Objective   Objective     Vital Signs:   Temp:  [94.5 °F (34.7 °C)-97.4 °F (36.3 °C)] 97 °F (36.1 °C)  Heart Rate:  [63-97] 71  Resp:  [18] 18  BP: (105-132)/(69-90) 132/69  Flow (L/min):  [2] 2     Physical Exam:  Constitutional: Chronically ill-appearing, in no acute distress, sleepy  HENT: NCAT, mucous membranes moist  Respiratory: Clear to auscultation bilaterally, respiratory effort normal on 2 L NC  Cardiovascular: RRR, no murmurs, rubs, or gallops  Gastrointestinal: Positive bowel sounds, soft, nontender, nondistended  Musculoskeletal: No bilateral ankle edema  Psychiatric: Flat affect, depressed mood, cooperative  Neurologic: Oriented x 3, nonfocal  Skin: No rashes    Results Reviewed:  LAB RESULTS:      Lab 22  0622 22  0323 22  0315 22  0227 22  0255 01/15/22  0258 01/15/22  0258   WBC 13.27* 15.47* 15.29* 14.15* 14.78*   < > 16.57*   HEMOGLOBIN 13.8 13.3 13.7 13.8 13.9   < > 14.3   HEMATOCRIT 43.0 41.7 44.3 44.3 44.1   < > 44.5   PLATELETS 313 330 333 348 356   < > 425   NEUTROS ABS 6.81 7.74* 9.17* 7.59* 8.96*   < > 12.08*   IMMATURE GRANS (ABS) 0.14* 0.09*  --  0.08* 0.10*  --  0.17*   LYMPHS ABS 5.04* 6.11*  --  5.23* 4.57*  --  3.18*   MONOS ABS 0.72 0.88  --  0.88 0.87  --  1.01*   EOS ABS 0.51* 0.56* 0.31 0.29 0.19   < > 0.07   MCV 88.3 88.9 89.7 90.4 90.2   < > 86.9   CRP  --   --   --  0.46  --   --   --     < > = values in this  interval not displayed.         Lab 01/20/22  0620 01/19/22  0323 01/18/22  0315 01/17/22  0227 01/16/22  0255 01/15/22  0258 01/15/22  0258   SODIUM 136 136 136 138 136   < > 138   POTASSIUM 4.1 4.1 4.1 3.9 4.2   < > 4.5   CHLORIDE 99 101 100 102 102   < > 101   CO2 27.0 25.0 26.0 24.0 23.0   < > 22.0   ANION GAP 10.0 10.0 10.0 12.0 11.0   < > 15.0   BUN 18 23* 24* 27* 34*   < > 40*   CREATININE 0.65 0.66 0.66 0.62 0.69   < > 0.73   GLUCOSE 89 154* 106* 111* 135*   < > 176*   CALCIUM 9.6 9.3 9.3 9.8 9.9   < > 10.1   MAGNESIUM  --   --  2.1 1.8  --   --  2.4   PHOSPHORUS  --   --   --  3.5  --   --  4.5   HEMOGLOBIN A1C  --  6.40*  --   --   --   --   --     < > = values in this interval not displayed.         Lab 01/17/22  0227 01/15/22  0258   TOTAL PROTEIN 8.0 8.5   ALBUMIN 3.70 3.90   GLOBULIN 4.3 4.6   ALT (SGPT) 35* 32   AST (SGOT) 22 27   BILIRUBIN 0.6 0.7   ALK PHOS 123* 143*             Lab 01/17/22  0227   TRIGLYCERIDES 101             Brief Urine Lab Results  (Last result in the past 365 days)      Color   Clarity   Blood   Leuk Est   Nitrite   Protein   CREAT   Urine HCG        01/01/22 1410 Dark Yellow   Clear   Negative   Trace   Negative   Trace                 Microbiology Results Abnormal     Procedure Component Value - Date/Time    Blood Culture - Blood, Arm, Left [[id number]]  (Normal) Collected: 12/30/21 2045    Lab Status: Final result Specimen: Blood from Arm, Left Updated: 01/04/22 2215     Blood Culture No growth at 5 days    Respiratory Culture - Sputum, ET Suction [[id number]] Collected: 12/31/21 1355    Lab Status: Final result Specimen: Sputum from ET Suction Updated: 01/02/22 1119     Respiratory Culture Scant growth (1+) Normal respiratory jaqui. No S. aureus or Pseudomonas aeruginosa detected. Final report.     Gram Stain Many (4+) WBCs per low power field      No Epithelial cells per low power field      Few (2+) Gram positive cocci    Blood Culture ID, PCR - Blood, Arm, Right  [[id number]]  (Normal) Collected: 12/30/21 2040    Lab Status: Final result Specimen: Blood from Arm, Right Updated: 01/01/22 1614     BCID, PCR Negative by BCID PCR. Culture to Follow.    S. Pneumo Ag Urine or CSF - Urine, Urine, Clean Catch [[id number]]  (Normal) Collected: 12/31/21 0650    Lab Status: Final result Specimen: Urine, Clean Catch Updated: 12/31/21 1309     Strep Pneumo Ag Negative    Legionella Antigen, Urine - Urine, Urine, Clean Catch [[id number]]  (Normal) Collected: 12/31/21 0650    Lab Status: Final result Specimen: Urine, Clean Catch Updated: 12/31/21 1309     LEGIONELLA ANTIGEN, URINE Negative    MRSA Screen, PCR (Inpatient) - Swab, Nares [[id number]]  (Normal) Collected: 12/31/21 0650    Lab Status: Final result Specimen: Swab from Nares Updated: 12/31/21 0853     MRSA PCR Negative    Narrative:      MRSA Negative    COVID PRE-OP / PRE-PROCEDURE SCREENING ORDER (NO ISOLATION) - Swab, Nasopharynx [[id number]]  (Normal) Collected: 12/30/21 2038    Lab Status: Final result Specimen: Swab from Nasopharynx Updated: 12/30/21 2227    Narrative:      The following orders were created for panel order COVID PRE-OP / PRE-PROCEDURE SCREENING ORDER (NO ISOLATION) - Swab, Nasopharynx.  Procedure                               Abnormality         Status                     ---------                               -----------         ------                     COVID-19 and FLU A/B PCR...[[id number]]  Normal              Final result                 Please view results for these tests on the individual orders.    COVID-19 and FLU A/B PCR - Swab, Nasopharynx [[id number]]  (Normal) Collected: 12/30/21 2038    Lab Status: Final result Specimen: Swab from Nasopharynx Updated: 12/30/21 2227     COVID19 Not Detected     Influenza A PCR Not Detected     Influenza B PCR Not Detected    Narrative:      Fact sheet for providers: https://www.fda.gov/media/277232/download    Fact sheet for patients:  https://www.fda.gov/media/053716/download    Test performed by PCR.          SLP FEES - Fiberoptic Endo Eval Swallow    Result Date: 1/19/2022  This procedure was auto-finalized with no dictation required.      Results for orders placed in visit on 05/05/20    SCANNED - ECHOCARDIOGRAM      I have reviewed the medications:  Scheduled Meds:amLODIPine, 10 mg, Oral, Q24H  apixaban, 5 mg, Oral, Q12H  arformoterol, 15 mcg, Nebulization, BID - RT  ARIPiprazole, 2 mg, Oral, Daily  busPIRone, 10 mg, Oral, BID  castor oil-balsam peru, 1 application, Topical, Q12H  diazePAM, 2 mg, Oral, Q12H  DULoxetine, 60 mg, Oral, Daily  escitalopram, 10 mg, Oral, Daily   Followed by  [START ON 1/23/2022] escitalopram, 5 mg, Oral, Daily  famotidine, 20 mg, Oral, BID  gabapentin, 300 mg, Oral, Q8H  insulin detemir, 9 Units, Subcutaneous, BID  insulin lispro, 0-9 Units, Subcutaneous, TID AC  insulin lispro, 6 Units, Subcutaneous, TID With Meals  metoprolol succinate XL, 50 mg, Oral, Q24H  nystatin, , Topical, Q12H  tiotropium bromide monohydrate, 2 puff, Inhalation, Daily - RT      Continuous Infusions:   PRN Meds:.•  albuterol  •  hydrALAZINE  •  magnesium sulfate  •  ondansetron  •  oxyCODONE-acetaminophen  •  potassium chloride  •  senna  •  sodium chloride    Assessment/Plan   Assessment & Plan     Active Hospital Problems    Diagnosis  POA   • **A/C Respiratory Failure Type 2 [J96.21, J96.22]  Yes   • History of recurrent deep vein thrombosis (DVT) [Z86.718]  Not Applicable   • Morbid obesity with BMI of 60.0-69.9, adult ([facility]) [E66.01, Z68.44]  Not Applicable   • CAP (community acquired pneumonia) [J18.9]  Yes   • Sepsis ([facility]) [A41.9]  Yes   • Uncontrolled type 2 diabetes mellitus with hyperglycemia, without long-term current use of insulin ([facility]) [E11.65]  Yes   • Chronic pain [G89.29]  Yes   • Chronic obstructive pulmonary disease ([facility]) [J44.9]  Yes   • Essential hypertension [I10]  Yes      Resolved Hospital Problems   No resolved  problems to display.        Brief Hospital Course to date:  Dani Ziegler is a 56 y.o. female and history of hypertension, COPD, type 2 diabetes, COPD, chronic pain, history of recurrent DVT.  Patient to the ED 12/30/2021 with shortness of air, found to have acute on chronic hypoxemic hypercapnic respiratory failure, bilateral lower lobe pneumonia, her UDS was positive for methamphetamines and benzos.  She did require intubation and subsequent transferred to the ICU, patient subsequently extubated 1/10.  Her stay in the ICU was complicated with delirium requiring restraints, Valium and Precedex gtt.  Patient has since progressed well and was deemed stable for transfer to hospitalist service for continued care 1/20/2022     Acute on chronic hypoxic and hypercapnic respiratory failure  Bilateral lower lobe pneumonia  COPD  -Patient is s/p prolonged intubation, mechanical ventilation, extubated 1/10  -Status post antibiotics with Zosyn and doxycycline  -Continue Spiriva, Brovana, albuterol nebs  -She is currently on 2 L NC (baseline)with appropriate O2 sats  -PT/OT has evaluated, currently awaiting rehab     Hypertension  -BP currently stable, continue amlodipine     History of recurrent DVT-continue Eliquis     Well-controlled type 2 diabetes with A1c 6.4%  - FSBG's have been reviewed and currently appropriate  -Continue basal/prandial and SSI, adjust as warranted  -Continue gabapentin for neuropathy     Anxiety and depression  Delirium  -s/p psych consult, meds adjusted  -Continue BuSpar, Abilify, Cymbalta and Valium  -Continue Lexapro taper  -Patient to follow-up with her psychiatrist after discharge     Chronic pain-continue as needed Percocet     Substance abuse  -UDS was positive for methamphetamine and benzos     Morbid obesity with BMI 57-complicates all aspects of care     DVT prophylaxis:  Medical DVT prophylaxis orders are present.       AM-PAC 6 Clicks Score (PT): 18 (01/20/22 2015)    Disposition: TBD,  anticipate discharge to rehab once arranged, CM following    CODE STATUS:   Code Status and Medical Interventions:   Ordered at: 12/31/21 0151     Code Status (Patient has no pulse and is not breathing):    CPR (Attempt to Resuscitate)     Medical Interventions (Patient has pulse or is breathing):    Full Support       Anderson Martinez MD  01/21/22

## 2022-01-21 NOTE — CASE MANAGEMENT/SOCIAL WORK
Continued Stay Note  [facility]     Patient Name: Dani Ziegler  MRN: [id number]  Today's Date: 1/21/2022    Admit Date: 12/30/2021     Discharge Plan     Row Name 01/21/22 1623       Plan    Plan [facility]    Plan Comments Per Carolynn at [facility], they have insurance authorization for transfer to an acute level rehab bed. They will  have a bed available Sunday 1/23 on their General rehab unit ( [phone]). currently no ambulance or WC transportaion available discussed with Ms Ziegler,she is in agreement with plan, I left voice mail for her fiance requesting he transport by car. Also discussed with daughter by phone(she does not have a car for [place]). Case mgt will f/u this weekend to confirm transport               Discharge Codes    No documentation.               Expected Discharge Date and Time     Expected Discharge Date Expected Discharge Time    Jan 24, 2022             Sonja C Kellerman, RN

## 2022-01-21 NOTE — PROGRESS NOTES
Clinical Nutrition   Reason For Visit: Follow-up protocol    Patient Name: Dani Ziegler  YOB: 1965  MRN: [id number]  Date of Encounter: 01/21/22 14:08 EST  Admission date: 12/30/2021      Nutrition Assessment     Admission Problem List:  Acute on chronic respiratory failure  Sepsis  CAP (community acquired pneumonia)  Acute 7th rib fracture      Applicable medical tests/procedures since admission:  (12/31) intubated  (1/1) EN initiated via OG tube per intensivist  (1/3) EN adjusted per RD  (1/7) free water of 30 ml/hr discontinued due to hyponatremia  (1/11) extubated, OGT removed, ND tube placed  (1/13) SLP eval - NPO  (1/14) SLP FEES - mechanical soft/no mixed consistencies/nectar thick liquids;  ND tube removed and EN discontinued      PMH: She  has a past medical history of Abnormal weight gain, Acute UTI, Anxiety, Arthritis involving multiple sites, Chronic obstructive pulmonary disease (HCC), Chronic pain, Current every day smoker, Depression, Diabetes mellitus (HCC), Diarrhea, Dysuria, Edema, lower extremity, Fever, Head injury, Hypertension, Intervertebral disc disorder, Left bundle branch block, Leukocytosis, Long term current use of methadone for pain control, Mass of right breast, Nausea, Obesity, Overactive bladder, Peripheral neuropathy, Superficial phlebitis, Upper respiratory infection, Urinary incontinence, Vitamin D deficiency, and Vomiting.   PSxH: She  has a past surgical history that includes Bladder surgery; Cholecystectomy; and Hip Arthroscopy (Right, 10/29/2020).        Reported/Observed/Food/Nutrition Related History   1/21) Pt on bedside commode. She states she has had several issues with her dysphagia diet. She is on a mechanical soft diet but allowed whole soft meats/breads. She has been sent ground meats/breads on several occasions and would really like regular. RD working with SLP and dietary to achieve this. Pt denies further nutritional concerns, states she would be  eating better if she had foods she liked.    1/17) RN reports patient ate about 25% of breakfast this morning. Patient has been tearful as her father as well as a friend have recently passed away. Unclear if patient has been consuming any of the Ensure HP/Magic Cup supplements since (1/14). RD attempted to speak with patient but patient very sleepy.    1/14) Patient tolerating EN at goal rate. On HFNC. Intermittent confusion continues. BM x2 within past 24 hours per I/Os. Plan for insulin adjustment. Patient passed FEES this afternoon. Got lunch tray, ate just a little bit per RN. Feeding tube removed and Precedex off per RN.    1/12) Per MDR discussion - patient continues on fentanyl and precedex. Yesterday EN was placed on hold temporarily for planned extubation. Yesterday patient was extubated, OGT removed, ND tube placed for EN, and EN resumed @ 65 ml/hr. EN currently running @ 65 ml/hr with plans to increase to goal rate 75 ml/hr this morning. Tolerating EN. BM x4 within past 24 hours per I/Os. RN to wean fentanyl - SLP to evaluate swallowing later today once patient has been off of fentanyl for a while so she can participate.    1/10) Per MDR discussion - patient intubated and sedated on fentanyl and precedex. Propofol discontinued overnight. Versed weaned off this morning. 2 soft small bowel movements within the past 24 hours per I/Os / RN notes. Continues to tolerate EN. Still receiving no free water. Intensivist would like to continue without free water today. Serum Na 139 this morning.     1/6) Per MDR discussion - patient intubated and sedated. Continues to tolerate EN at goal rate of 50 ml/hr. No BM this admission per I/Os.    1/5) Per MDR discussion - patient intubated and sedated. Has OG tube. Receiving EN @ 65 ml/hr with 30 ml Q 2 hours and tolerating. No bowel movement this admission per I/Os.      Anthropometrics   Height: 67 in  Weight: 366 lbs (bed scale weight 1/5 per RN) --- note fluid still  present and bed had other equipment on it when weighed --- need a zeroed bed scale weight  BMI: 57.3  BMI classification: Obese Class III extreme obesity: > or equal to 40kg/m2   IBW: 135 lbs    UBW:  Last 15 Recorded Weights  Weight Weight (kg) Weight (lbs) Weight Method VISIT REPORT   12/30/2021 174.635 kg 385 lb Stated -   8/24/2021 133.358 kg 294 lb - Report   7/16/2021 139.708 kg 308 lb - -   6/12/2021 128.368 kg 283 lb - -   6/11/2021 129.502 kg 285 lb 8 oz Standing scale -   6/10/2021 132.496 kg 292 lb 1.6 oz Bed scale -   6/9/2021 130.817 kg 288 lb 6.4 oz Bed scale -   6/8/2021 131.362 kg 289 lb 9.6 oz Standing scale -   6/7/2021 133.856 kg 295 lb 1.6 oz Bed scale -   6/6/2021 134.446 kg 296 lb 6.4 oz Bed scale -   6/4/2021 132.269 kg 291 lb 9.6 oz Bed scale -   6/3/2021 136.533 kg 301 lb Stated -   5/25/2021 130.182 kg 287 lb - Report   1/20/2021 143.881 kg 317 lb 3.2 oz - Report       ---RD notes stated wt on admission (385 lbs) not consistent with EMR weight hx so RD suspects stated weight is inaccurate.      Labs reviewed   Labs reviewed: Yes    Medications reviewed   Medications reviewed: Yes  Scheduled: pepcid, insulin  GTT: precedex    Needs Assessment (1/10)   Height used: 67 in/170.2 cm  Weight used: 294 lbs/133.5 kg (actual wt estimated by RD);   135 lbs/61.5 kg (IBW)    Estimated Calories needs: ~1600 calories daily  11-14 kcal/kg actual wt = 9901-8324    Estimated Protein needs: ~123 g protein daily  2.0-2.5 g/kg IBW = 123-154    Estimated Fluid needs: ~1500 ml fluid daily/per clinical status      Current Nutrition Prescription   PO: Diet Dysphagia; IV - Mechanical Soft No Mixed Consistencies; Nectar / Syrup Thick; Cardiac, Consistent Carbohydrate   Oral Nutrition Supplement: Ensure HP 1x daily, Magic Cup 1x daily    Average PO intake: 54% x 14 meals    Nutrition Diagnosis     1/2/2022, 1/3, 1/14, 1/17, 1/22  Problem Inadequate oral intake   Etiology Clinical condition/dysphagia modified diet    Signs/Symptoms PO intake: 50% x 6 meals   Status: ongoing/improving     1/2/2022  Problem Food and nutrition knowledge deficit   Etiology MICHELINE   Signs/Symptoms BMI > 60, MD consult   Status: education pending - pt not appropriate at this time (1/17)      Goal:   General: Nutrition to support treatment  PO: Increase intake, Continue positive trend    Intervention   Intervention: Follow treatment progress, Care plan reviewed, Encourage intake  -Dietary needs communicated to kitchen  -Continue Ensure HP and Magic Cup.    Monitoring/Evaluation:   Monitoring/Evaluation: Per protocol, I&O, PO intake, Supplement intake, Pertinent labs, Weight, GI status, Symptoms, POC/GOC, Swallow function    Leigh Ann Jo RD  Time Spent: 30 min

## 2022-01-21 NOTE — PLAN OF CARE
Goal Outcome Evaluation:  Plan of Care Reviewed With: (P) patient        Progress: (P) no change  Outcome Summary: (P) Pt able to perform STSx3 with CGAx1 and FWW. Able to perform BLE exercise without VC for corrections. Further treatment limited d/t dizziness with standing and decreased functional endurance. Cont IP PT. Rec DC to IPR.

## 2022-01-22 LAB
GLUCOSE BLDC GLUCOMTR-MCNC: 115 MG/DL (ref 70–130)
GLUCOSE BLDC GLUCOMTR-MCNC: 136 MG/DL (ref 70–130)
GLUCOSE BLDC GLUCOMTR-MCNC: 164 MG/DL (ref 70–130)
GLUCOSE BLDC GLUCOMTR-MCNC: 170 MG/DL (ref 70–130)

## 2022-01-22 PROCEDURE — 94799 UNLISTED PULMONARY SVC/PX: CPT

## 2022-01-22 PROCEDURE — 97535 SELF CARE MNGMENT TRAINING: CPT

## 2022-01-22 PROCEDURE — 63710000001 INSULIN LISPRO (HUMAN) PER 5 UNITS: Performed by: INTERNAL MEDICINE

## 2022-01-22 PROCEDURE — 99232 SBSQ HOSP IP/OBS MODERATE 35: CPT | Performed by: INTERNAL MEDICINE

## 2022-01-22 PROCEDURE — 63710000001 INSULIN DETEMIR PER 5 UNITS: Performed by: INTERNAL MEDICINE

## 2022-01-22 PROCEDURE — 82962 GLUCOSE BLOOD TEST: CPT

## 2022-01-22 PROCEDURE — 97110 THERAPEUTIC EXERCISES: CPT

## 2022-01-22 RX ADMIN — APIXABAN 5 MG: 5 TABLET, FILM COATED ORAL at 20:44

## 2022-01-22 RX ADMIN — APIXABAN 5 MG: 5 TABLET, FILM COATED ORAL at 09:12

## 2022-01-22 RX ADMIN — INSULIN DETEMIR 9 UNITS: 100 INJECTION, SOLUTION SUBCUTANEOUS at 09:00

## 2022-01-22 RX ADMIN — OXYCODONE HYDROCHLORIDE AND ACETAMINOPHEN 1 TABLET: 10; 325 TABLET ORAL at 05:38

## 2022-01-22 RX ADMIN — FAMOTIDINE 20 MG: 20 TABLET, FILM COATED ORAL at 09:12

## 2022-01-22 RX ADMIN — NYSTATIN: 100000 POWDER TOPICAL at 09:13

## 2022-01-22 RX ADMIN — INSULIN DETEMIR 9 UNITS: 100 INJECTION, SOLUTION SUBCUTANEOUS at 20:45

## 2022-01-22 RX ADMIN — ARFORMOTEROL TARTRATE 15 MCG: 15 SOLUTION RESPIRATORY (INHALATION) at 20:26

## 2022-01-22 RX ADMIN — GABAPENTIN 300 MG: 300 CAPSULE ORAL at 13:50

## 2022-01-22 RX ADMIN — METOPROLOL SUCCINATE 50 MG: 50 TABLET, EXTENDED RELEASE ORAL at 09:12

## 2022-01-22 RX ADMIN — INSULIN LISPRO 2 UNITS: 100 INJECTION, SOLUTION INTRAVENOUS; SUBCUTANEOUS at 13:49

## 2022-01-22 RX ADMIN — CASTOR OIL AND BALSAM, PERU 1 APPLICATION: 788; 87 OINTMENT TOPICAL at 09:13

## 2022-01-22 RX ADMIN — INSULIN LISPRO 6 UNITS: 100 INJECTION, SOLUTION INTRAVENOUS; SUBCUTANEOUS at 09:13

## 2022-01-22 RX ADMIN — DIAZEPAM 2 MG: 2 TABLET ORAL at 09:12

## 2022-01-22 RX ADMIN — OXYCODONE HYDROCHLORIDE AND ACETAMINOPHEN 1 TABLET: 10; 325 TABLET ORAL at 13:50

## 2022-01-22 RX ADMIN — ARIPIPRAZOLE 5 MG: 10 TABLET ORAL at 09:12

## 2022-01-22 RX ADMIN — CASTOR OIL AND BALSAM, PERU 1 APPLICATION: 788; 87 OINTMENT TOPICAL at 20:44

## 2022-01-22 RX ADMIN — FAMOTIDINE 20 MG: 20 TABLET, FILM COATED ORAL at 20:44

## 2022-01-22 RX ADMIN — GABAPENTIN 300 MG: 300 CAPSULE ORAL at 05:38

## 2022-01-22 RX ADMIN — ARFORMOTEROL TARTRATE 15 MCG: 15 SOLUTION RESPIRATORY (INHALATION) at 08:31

## 2022-01-22 RX ADMIN — BUSPIRONE HYDROCHLORIDE 10 MG: 5 TABLET ORAL at 09:12

## 2022-01-22 RX ADMIN — AMLODIPINE BESYLATE 10 MG: 10 TABLET ORAL at 09:12

## 2022-01-22 RX ADMIN — INSULIN LISPRO 6 UNITS: 100 INJECTION, SOLUTION INTRAVENOUS; SUBCUTANEOUS at 18:28

## 2022-01-22 RX ADMIN — INSULIN LISPRO 6 UNITS: 100 INJECTION, SOLUTION INTRAVENOUS; SUBCUTANEOUS at 13:49

## 2022-01-22 RX ADMIN — DULOXETINE 60 MG: 60 CAPSULE, DELAYED RELEASE ORAL at 09:12

## 2022-01-22 RX ADMIN — DIAZEPAM 2 MG: 2 TABLET ORAL at 20:44

## 2022-01-22 RX ADMIN — GABAPENTIN 300 MG: 300 CAPSULE ORAL at 20:44

## 2022-01-22 RX ADMIN — BUSPIRONE HYDROCHLORIDE 10 MG: 5 TABLET ORAL at 18:28

## 2022-01-22 RX ADMIN — ESCITALOPRAM OXALATE 10 MG: 10 TABLET ORAL at 09:12

## 2022-01-22 RX ADMIN — OXYCODONE HYDROCHLORIDE AND ACETAMINOPHEN 1 TABLET: 10; 325 TABLET ORAL at 19:38

## 2022-01-22 RX ADMIN — NYSTATIN: 100000 POWDER TOPICAL at 20:44

## 2022-01-22 NOTE — THERAPY TREATMENT NOTE
Patient Name: Dani Ziegler  : 1965    MRN: [id number]                              Today's Date: 2022       Admit Date: 2021    Visit Dx:     ICD-10-CM ICD-9-CM   1. Acute exacerbation of chronic obstructive pulmonary disease (COPD) ([facility])  J44.1 491.21   2. Acute respiratory distress  R06.03 518.82   3. Hypoxia  R09.02 799.02   4. Pneumonia of both lungs due to infectious organism, unspecified part of lung  J18.9 483.8   5. Sepsis with acute hypoxic respiratory failure without septic shock, due to unspecified organism ([facility])  A41.9 038.9    R65.20 995.91    J96.01 518.81   6. Oropharyngeal dysphagia  R13.12 787.22     Patient Active Problem List   Diagnosis   • Uncontrolled type 2 diabetes mellitus with hyperglycemia, without long-term current use of insulin ([facility])   • Vitamin D deficiency   • Peripheral neuropathy   • Adiposity   • Left bundle branch block (LBBB)   • Essential hypertension   • Chronic pain   • Chronic obstructive pulmonary disease ([facility])   • Anxiety   • Depression   • Arthritis involving multiple sites   • Leukocytosis   • Mixed hyperlipidemia   • Special screening for malignant neoplasms, colon   • Cellulitis of left lower extremity   • Hyperkalemia   • Acute on chronic renal insufficiency   • Sepsis ([facility])   • CHF (congestive heart failure) ([facility])   • Cellulitis of left leg   • Lumbar disc disease   • Diabetic peripheral neuropathy ([facility])   • COVID-19 virus infection   • CAP (community acquired pneumonia)   • Polypharmacy   • Morbid obesity with BMI of 45.0-49.9, adult ([facility])   • A/C Respiratory Failure Type 2   • History of recurrent deep vein thrombosis (DVT)   • Morbid obesity with BMI of 60.0-69.9, adult ([facility])     Past Medical History:   Diagnosis Date   • Abnormal weight gain    • Acute UTI    • Anxiety    • Arthritis involving multiple sites    • Chronic obstructive pulmonary disease ([facility])    • Chronic pain    • Current every day smoker    • Depression    • Diabetes mellitus  ([facility])    • Diarrhea    • Dysuria    • Edema, lower extremity    • Fever    • Head injury    • Hypertension    • Intervertebral disc disorder     Degeneration of intervertebral disc, site unspecified (722.6)   • Left bundle branch block    • Leukocytosis    • Long term current use of methadone for pain control    • Mass of right breast    • Nausea    • Obesity    • Overactive bladder    • Peripheral neuropathy    • Superficial phlebitis     right medial calf   • Upper respiratory infection    • Urinary incontinence    • Vitamin D deficiency    • Vomiting      Past Surgical History:   Procedure Laterality Date   • BLADDER SURGERY      pubovaginal sling   • CHOLECYSTECTOMY     • HIP ARTHROSCOPY Right 10/29/2020      General Information     Row Name 01/22/22 0933          OT Time and Intention    Document Type therapy note (daily note)  -     Mode of Treatment occupational therapy  -[facility] Name 01/22/22 0933          General Information    Patient Profile Reviewed yes  -     Existing Precautions/Restrictions cardiac; fall; oxygen therapy device and L/min  3.5 L NC  -[facility] Name 01/22/22 0933          Cognition    Orientation Status (Cognition) oriented to; person; place; verbal cues/prompts needed for orientation; time  -[facility] Name 01/22/22 0933          Safety Issues, Functional Mobility    Impairments Affecting Function (Mobility) balance; coordination; endurance/activity tolerance; postural/trunk control; shortness of breath; strength; range of motion (ROM)  -           User Key  (r) = Recorded By, (t) = Taken By, (c) = Cosigned By    Initials Name Provider Type     Yanci Rehman OT Occupational Therapist                 Mobility/ADL's     Row Name 01/22/22 0933          Bed Mobility    Bed Mobility scooting/bridging  -     Scooting/Bridging [place] (Bed Mobility) supervision  -[facility] Name 01/22/22 0933          Transfers    Comment (Transfers) Pt deferred getting up oob.  -[facility]  Name 01/22/22 0933          Activities of Daily Living    BADL Assessment/Intervention feeding  -[facility] Name 01/22/22 0933          Self-Feeding Assessment/Training    [place] Level (Feeding) feeding skills; liquids to mouth; scoop food and bring to mouth; set up  -     Position (Self-Feeding) supported sitting  -           User Key  (r) = Recorded By, (t) = Taken By, (c) = Cosigned By    Initials Name Provider Type    Yanci Lang OT Occupational Therapist               Obj/Interventions     [facility] Name 01/22/22 0933          Shoulder (Therapeutic Exercise)    Shoulder (Therapeutic Exercise) AROM (active range of motion)  -     Shoulder AROM (Therapeutic Exercise) bilateral; flexion; extension; sitting; 10 repetitions  -[facility] Name 01/22/22 0933          Elbow/Forearm (Therapeutic Exercise)    Elbow/Forearm (Therapeutic Exercise) AROM (active range of motion)  -     Elbow/Forearm AROM (Therapeutic Exercise) bilateral; flexion; extension; sitting; 10 repetitions  -[facility] Name 01/22/22 09          Therapeutic Exercise    Therapeutic Exercise shoulder; elbow/forearm  UB and LB exercises completed against gravity  -           User Key  (r) = Recorded By, (t) = Taken By, (c) = Cosigned By    Initials Name Provider Type    Yanci Lang, DWIGHT Occupational Therapist               Goals/Plan    No documentation.                Clinical Impression     [facility] Name 01/22/22 1006          Pain Assessment    Additional Documentation Pain Scale: Numbers Pre/Post-Treatment (Group)  -SW     Row Name 01/22/22 1006          Pain Scale: Numbers Pre/Post-Treatment    Pretreatment Pain Rating 6/10  -SW     Posttreatment Pain Rating 6/10  -SW     Pain Location - Side Right  -SW     Pain Location - Orientation lower  -SW     Pain Location extremity; hip  -     Pain Intervention(s) Repositioned  -[facility] Name 01/22/22 1006          Plan of Care Review    Plan of Care Reviewed With patient  -     Progress no  change  -SW     Outcome Summary OT attempted to get pt oob however she deferred and stated she would get up later in the day.  She completed UB and LB TE against gravity and was tearful throughout session.  She reports several losses recently and an anticipated life change that has her sad.  -     Row Name 01/22/22 1006          Vital Signs    Pre Systolic BP Rehab 124  -SW     Pre Treatment Diastolic BP 83  -SW     Pretreatment Heart Rate (beats/min) 80  -SW     Pre SpO2 (%) 94  -SW     O2 Delivery Pre Treatment supplemental O2  -SW     O2 Delivery Intra Treatment supplemental O2  -SW     O2 Delivery Post Treatment supplemental O2  -SW     Pre Patient Position Sitting  -SW     Intra Patient Position Sitting  -SW     Post Patient Position Sitting  -SW     Row Name 01/22/22 1006          Positioning and Restraints    Pre-Treatment Position in bed  -SW     Post Treatment Position bed  -SW     In Bed notified nsg; fowlers; sitting; call light within reach; encouraged to call for assist; exit alarm on; side rails up x3  -SW           User Key  (r) = Recorded By, (t) = Taken By, (c) = Cosigned By    Initials Name Provider Type    Yanci Lang OT Occupational Therapist               Outcome Measures     Row Name 01/22/22 1011          How much help from another is currently needed...    Putting on and taking off regular lower body clothing? 2  -SW     Bathing (including washing, rinsing, and drying) 2  -SW     Toileting (which includes using toilet bed pan or urinal) 3  -SW     Putting on and taking off regular upper body clothing 3  -SW     Taking care of personal grooming (such as brushing teeth) 3  -SW     Eating meals 4  -SW     AM-PAC 6 Clicks Score (OT) 17  -SW     Row Name 01/22/22 1011          Functional Assessment    Outcome Measure Options AM-PAC 6 Clicks Daily Activity (OT)  -SW           User Key  (r) = Recorded By, (t) = Taken By, (c) = Cosigned By    Initials Name Provider Type    Yanci Lang OT  Occupational Therapist                Occupational Therapy Education                 Title: PT OT SLP Therapies (Done)     Topic: Occupational Therapy (Done)     Point: ADL training (Done)     Description:   Instruct learner(s) on proper safety adaptation and remediation techniques during self care or transfers.   Instruct in proper use of assistive devices.              Learning Progress Summary           Patient Acceptance, E, VU by  at 1/22/2022 1012    Acceptance, E,TB,D, VU,DU,NR by  at 1/19/2022 1551    Acceptance, E, NR by  at 1/18/2022 1041    Acceptance, E, NR by  at 1/16/2022 0750    Comment: Educated pt regarding role of therapy and ongoing treatment plan                   Point: Home exercise program (Done)     Description:   Instruct learner(s) on appropriate technique for monitoring, assisting and/or progressing therapeutic exercises/activities.              Learning Progress Summary           Patient Acceptance, E, VU by  at 1/22/2022 1012    Acceptance, E, NR by  at 1/18/2022 1041                   Point: Precautions (Done)     Description:   Instruct learner(s) on prescribed precautions during self-care and functional transfers.              Learning Progress Summary           Patient Acceptance, E,TB,D, VU,DU,NR by  at 1/19/2022 1551    Acceptance, E, NR by  at 1/18/2022 1041                   Point: Body mechanics (Done)     Description:   Instruct learner(s) on proper positioning and spine alignment during self-care, functional mobility activities and/or exercises.              Learning Progress Summary           Patient Acceptance, E,TB,D, VU,DU,NR by  at 1/19/2022 1551                               User Key     Initials Effective Dates Name Provider Type Discipline     06/16/21 -  Romelia Kilpatrick OT Occupational Therapist OT     09/02/21 -  Iza Tovar OT Occupational Therapist OT     06/16/21 -  Ree Eason OT Occupational Therapist OT     06/16/21 -   Yanci Rehman OT Occupational Therapist OT              OT Recommendation and Plan     Plan of Care Review  Plan of Care Reviewed With: patient  Progress: no change  Outcome Summary: OT attempted to get pt oob however she deferred and stated she would get up later in the day.  She completed UB and LB TE against gravity and was tearful throughout session.  She reports several losses recently and an anticipated life change that has her sad.     Time Calculation:    Time Calculation- OT     Row Name 01/22/22 0933             Time Calculation- OT    OT Start Time 0933  -SW      OT Received On 01/22/22  -SW              Timed Charges    36080 - OT Therapeutic Exercise Minutes 8  -SW      69382 - OT Self Care/Mgmt Minutes 15  -SW              Total Minutes    Timed Charges Total Minutes 23  -SW       Total Minutes 23  -SW            User Key  (r) = Recorded By, (t) = Taken By, (c) = Cosigned By    Initials Name Provider Type    SW Yanci Rehman OT Occupational Therapist              Therapy Charges for Today     Code Description Service Date Service Provider Modifiers Qty    72024262237  OT THER PROC EA 15 MIN 1/22/2022 Yanci Rehman OT GO 1    [id number]  OT SELF CARE/MGMT/TRAIN EA 15 MIN 1/22/2022 Yanci Rehman OT GO 1               Yanci Rehman OT  1/22/2022

## 2022-01-22 NOTE — PROGRESS NOTES
[facility] Medicine Services  PROGRESS NOTE    Patient Name: Dani Ziegler  : 1965  MRN: [id number]    Date of Admission: 2021  Primary Care Physician: Darrion Tovar MD    Subjective   Subjective     CC:  Follow-up respiratory failure    HPI: No acute events overnight, patient not get much sleep, no new complaints    ROS:  Gen- No fevers, chills  CV- No chest pain, palpitations  Resp- No cough, dyspnea  GI- No N/V/D, abd pain    All other systems reviewed and are negative  Objective   Objective     Vital Signs:   Temp:  [97.2 °F (36.2 °C)-97.9 °F (36.6 °C)] 97.9 °F (36.6 °C)  Heart Rate:  [69-96] 69  Resp:  [18] 18  BP: (109-135)/(73-83) 117/77  Flow (L/min):  [2] 2     Physical Exam:  Constitutional: Chronically ill-appearing lady, no acute distress, awake, alert  HENT: NCAT, mucous membranes moist  Respiratory: Clear to auscultation bilaterally, respiratory effort normal   Cardiovascular: RRR, no murmurs, rubs, or gallops  Gastrointestinal: Positive bowel sounds, soft, nontender, nondistended  Musculoskeletal: No bilateral ankle edema  Psychiatric: Appropriate affect, depressed mood, cooperative  Neurologic: Oriented x 3, nonfocal  Nonfocal  Skin: No rashes    Results Reviewed:  LAB RESULTS:      Lab 22  0622 22  0323 22  0315 22  0227 22  0255   WBC 13.27* 15.47* 15.29* 14.15* 14.78*   HEMOGLOBIN 13.8 13.3 13.7 13.8 13.9   HEMATOCRIT 43.0 41.7 44.3 44.3 44.1   PLATELETS 313 330 333 348 356   NEUTROS ABS 6.81 7.74* 9.17* 7.59* 8.96*   IMMATURE GRANS (ABS) 0.14* 0.09*  --  0.08* 0.10*   LYMPHS ABS 5.04* 6.11*  --  5.23* 4.57*   MONOS ABS 0.72 0.88  --  0.88 0.87   EOS ABS 0.51* 0.56* 0.31 0.29 0.19   MCV 88.3 88.9 89.7 90.4 90.2   CRP  --   --   --  0.46  --          Lab 22  0620 22  0323 22  0315 22  0227 22  0255   SODIUM 136 136 136 138 136   POTASSIUM 4.1 4.1 4.1 3.9 4.2   CHLORIDE 99 101 100 102 102   CO2  27.0 25.0 26.0 24.0 23.0   ANION GAP 10.0 10.0 10.0 12.0 11.0   BUN 18 23* 24* 27* 34*   CREATININE 0.65 0.66 0.66 0.62 0.69   GLUCOSE 89 154* 106* 111* 135*   CALCIUM 9.6 9.3 9.3 9.8 9.9   MAGNESIUM  --   --  2.1 1.8  --    PHOSPHORUS  --   --   --  3.5  --    HEMOGLOBIN A1C  --  6.40*  --   --   --          Lab 01/17/22 0227   TOTAL PROTEIN 8.0   ALBUMIN 3.70   GLOBULIN 4.3   ALT (SGPT) 35*   AST (SGOT) 22   BILIRUBIN 0.6   ALK PHOS 123*             Lab 01/17/22 0227   TRIGLYCERIDES 101             Brief Urine Lab Results  (Last result in the past 365 days)      Color   Clarity   Blood   Leuk Est   Nitrite   Protein   CREAT   Urine HCG        01/01/22 1410 Dark Yellow   Clear   Negative   Trace   Negative   Trace                 Microbiology Results Abnormal     Procedure Component Value - Date/Time    Blood Culture - Blood, Arm, Left [[id number]]  (Normal) Collected: 12/30/21 2045    Lab Status: Final result Specimen: Blood from Arm, Left Updated: 01/04/22 2215     Blood Culture No growth at 5 days    Respiratory Culture - Sputum, ET Suction [[id number]] Collected: 12/31/21 1355    Lab Status: Final result Specimen: Sputum from ET Suction Updated: 01/02/22 1119     Respiratory Culture Scant growth (1+) Normal respiratory jaqui. No S. aureus or Pseudomonas aeruginosa detected. Final report.     Gram Stain Many (4+) WBCs per low power field      No Epithelial cells per low power field      Few (2+) Gram positive cocci    Blood Culture ID, PCR - Blood, Arm, Right [[id number]]  (Normal) Collected: 12/30/21 2040    Lab Status: Final result Specimen: Blood from Arm, Right Updated: 01/01/22 1614     BCID, PCR Negative by BCID PCR. Culture to Follow.    S. Pneumo Ag Urine or CSF - Urine, Urine, Clean Catch [[id number]]  (Normal) Collected: 12/31/21 0650    Lab Status: Final result Specimen: Urine, Clean Catch Updated: 12/31/21 1309     Strep Pneumo Ag Negative    Legionella Antigen, Urine - Urine, Urine, Clean Catch  [[id number]]  (Normal) Collected: 12/31/21 0650    Lab Status: Final result Specimen: Urine, Clean Catch Updated: 12/31/21 1309     LEGIONELLA ANTIGEN, URINE Negative    MRSA Screen, PCR (Inpatient) - Swab, Nares [[id number]]  (Normal) Collected: 12/31/21 0650    Lab Status: Final result Specimen: Swab from Nares Updated: 12/31/21 0853     MRSA PCR Negative    Narrative:      MRSA Negative    COVID PRE-OP / PRE-PROCEDURE SCREENING ORDER (NO ISOLATION) - Swab, Nasopharynx [[id number]]  (Normal) Collected: 12/30/21 2038    Lab Status: Final result Specimen: Swab from Nasopharynx Updated: 12/30/21 2227    Narrative:      The following orders were created for panel order COVID PRE-OP / PRE-PROCEDURE SCREENING ORDER (NO ISOLATION) - Swab, Nasopharynx.  Procedure                               Abnormality         Status                     ---------                               -----------         ------                     COVID-19 and FLU A/B PCR...[[id number]]  Normal              Final result                 Please view results for these tests on the individual orders.    COVID-19 and FLU A/B PCR - Swab, Nasopharynx [[id number]]  (Normal) Collected: 12/30/21 2038    Lab Status: Final result Specimen: Swab from Nasopharynx Updated: 12/30/21 2227     COVID19 Not Detected     Influenza A PCR Not Detected     Influenza B PCR Not Detected    Narrative:      Fact sheet for providers: https://www.fda.gov/media/813917/download    Fact sheet for patients: https://www.fda.gov/media/991742/download    Test performed by PCR.          No radiology results from the last 24 hrs    Results for orders placed in visit on 05/05/20    SCANNED - ECHOCARDIOGRAM      I have reviewed the medications:  Scheduled Meds:amLODIPine, 10 mg, Oral, Q24H  apixaban, 5 mg, Oral, Q12H  arformoterol, 15 mcg, Nebulization, BID - RT  ARIPiprazole, 5 mg, Oral, Daily  busPIRone, 10 mg, Oral, BID  castor oil-balsam peru, 1 application, Topical, Q12H  diazePAM, 2  mg, Oral, Q12H  DULoxetine, 60 mg, Oral, Daily  escitalopram, 10 mg, Oral, Daily   Followed by  [START ON 1/23/2022] escitalopram, 5 mg, Oral, Daily  famotidine, 20 mg, Oral, BID  gabapentin, 300 mg, Oral, Q8H  insulin detemir, 9 Units, Subcutaneous, BID  insulin lispro, 0-9 Units, Subcutaneous, TID AC  insulin lispro, 6 Units, Subcutaneous, TID With Meals  metoprolol succinate XL, 50 mg, Oral, Q24H  nystatin, , Topical, Q12H  tiotropium bromide monohydrate, 2 puff, Inhalation, Daily - RT      Continuous Infusions:   PRN Meds:.•  albuterol  •  hydrALAZINE  •  magnesium sulfate  •  ondansetron  •  oxyCODONE-acetaminophen  •  potassium chloride  •  senna  •  sodium chloride    Assessment/Plan   Assessment & Plan     Active Hospital Problems    Diagnosis  POA   • **A/C Respiratory Failure Type 2 [J96.21, J96.22]  Yes   • History of recurrent deep vein thrombosis (DVT) [Z86.718]  Not Applicable   • Morbid obesity with BMI of 60.0-69.9, adult ([facility]) [E66.01, Z68.44]  Not Applicable   • CAP (community acquired pneumonia) [J18.9]  Yes   • Sepsis ([facility]) [A41.9]  Yes   • Uncontrolled type 2 diabetes mellitus with hyperglycemia, without long-term current use of insulin ([facility]) [E11.65]  Yes   • Chronic pain [G89.29]  Yes   • Chronic obstructive pulmonary disease ([facility]) [J44.9]  Yes   • Essential hypertension [I10]  Yes      Resolved Hospital Problems   No resolved problems to display.        Brief Hospital Course to date:  Dani Ziegler is a 56 y.o. female and history of hypertension, COPD, type 2 diabetes, COPD, chronic pain, history of recurrent DVT.  Patient to the ED 12/30/2021 with shortness of air, found to have acute on chronic hypoxemic hypercapnic respiratory failure, bilateral lower lobe pneumonia, her UDS was positive for methamphetamines and benzos.  She did require intubation and subsequent transferred to the ICU, patient subsequently extubated 1/10.  Her stay in the ICU was complicated with delirium requiring  restraints, Valium and Precedex gtt.  Patient has since progressed well and was deemed stable for transfer to hospitalist service for continued care 1/20/2022     Acute on chronic hypoxic and hypercapnic respiratory failure  Bilateral lower lobe pneumonia  COPD  -Patient is s/p prolonged intubation, mechanical ventilation, extubated 1/10  -Status post antibiotics with Zosyn and doxycycline  -Continue Spiriva, Brovana, albuterol nebs  -She is currently on 2 L NC (baseline)with appropriate O2 sats  -PT/OT has evaluated, plan for [facility] tomorrow 1/23/2022     Hypertension  -BP currently stable, continue amlodipine     History of recurrent DVT-continue Eliquis     Well-controlled type 2 diabetes with A1c 6.4%  - FSBG's have been reviewed and currently appropriate  -Continue basal/prandial and SSI, adjust as warranted  -Continue gabapentin for neuropathy     Anxiety and depression  Delirium  -s/p psych consult, meds adjusted  -Continue BuSpar, Abilify, Cymbalta and Valium  -Continue Lexapro taper  -Patient to follow-up with her psychiatrist after discharge     Chronic pain-continue as needed Percocet     Substance abuse  -UDS was positive for methamphetamine and benzos     Morbid obesity with BMI 57-complicates all aspects of care    DVT prophylaxis:  Medical DVT prophylaxis orders are present.     AM-PAC 6 Clicks Score (PT): 17 (01/21/22 2045)    Disposition: Anticipate discharge to [facility] tomorrow 1/23/2022    CODE STATUS:   Code Status and Medical Interventions:   Ordered at: 12/31/21 0151     Code Status (Patient has no pulse and is not breathing):    CPR (Attempt to Resuscitate)     Medical Interventions (Patient has pulse or is breathing):    Full Support       Anderson Martinez MD  01/22/22

## 2022-01-22 NOTE — DISCHARGE SUMMARY
[facility] Medicine Services  DISCHARGE SUMMARY    Patient Name: Dani Ziegler  : 1965  MRN: [id number]    Date of Admission: 2021  7:52 PM  Date of Discharge: 2022  Primary Care Physician: Darrion Tovar MD    Consults     Date and Time Order Name Status Description    2022  9:22 AM Inpatient Psychiatrist Consult Completed           Hospital Course     Presenting Problem:   Acute on chronic respiratory failure with hypoxia and hypercapnia (HCC) [J96.21, J96.22]    Active Hospital Problems    Diagnosis  POA   • **A/C Respiratory Failure Type 2 [J96.21, J96.22]  Yes   • History of recurrent deep vein thrombosis (DVT) [Z86.718]  Not Applicable   • Morbid obesity with BMI of 60.0-69.9, adult (HCC) [E66.01, Z68.44]  Not Applicable   • Uncontrolled type 2 diabetes mellitus with hyperglycemia, without long-term current use of insulin (HCC) [E11.65]  Yes   • Chronic pain [G89.29]  Yes   • Chronic obstructive pulmonary disease (HCC) [J44.9]  Yes   • Essential hypertension [I10]  Yes      Resolved Hospital Problems    Diagnosis Date Resolved POA   • CAP (community acquired pneumonia) [J18.9] 2022 Yes   • Sepsis (HCC) [A41.9] 2022 Yes      Hospital Course:  Dani Ziegler is a 56 y.o. female and history of hypertension, COPD, type 2 diabetes, COPD, chronic pain, history of recurrent DVT.  Patient to the ED 2021 with shortness of air, found to have acute on chronic hypoxemic hypercapnic respiratory failure, bilateral lower lobe pneumonia, her UDS was positive for methamphetamines and benzos.  She did require intubation and subsequent transferred to the ICU, patient subsequently extubated 1/10.  Her stay in the ICU was complicated with delirium requiring restraints, Valium and Precedex gtt.  Patient has since progressed well and was deemed stable for transfer to hospitalist service for continued care 2022.  She is now being discharged to [facility]  for rehabilitation.     Acute on chronic hypoxic and hypercapnic respiratory failure  Bilateral lower lobe pneumonia  COPD  -Patient is s/p prolonged intubation, mechanical ventilation, extubated 1/10  -Status post antibiotics with Zosyn and doxycycline  -Continue Spiriva, Brovana, albuterol nebs  -She is currently on 2 L NC (baseline)with appropriate O2 sats  -PT/OT has evaluated, d/c to [facility] today 1/23/2022     Hypertension  -BP currently stable, continue home meds     History of recurrent DVT-continue Eliquis     Well-controlled type 2 diabetes with A1c 6.4%  - FSBG's have been reviewed and currently appropriate  -Continue basal/prandial and SSI  -Continue gabapentin for neuropathy     Anxiety and depression  Delirium  -s/p psych consult,   -Continue BuSpar, Abilify, Cymbalta and Valium  -Continue Lexapro taper for the next 3 days and stop, okay to uptitrate Abilify as needed  -Patient to follow-up with her psychiatrist after discharge     Chronic pain-continue as needed Percocet     Substance abuse  -UDS was positive for methamphetamine and benzos    Discharge Follow Up Recommendations for outpatient labs/diagnostics:  Follow-up with PCP after rehab    Day of Discharge     HPI: No acute events overnight, patient rested well, no new complaints    Review of Systems  Gen- No fevers, chills  CV- No chest pain, palpitations  Resp- No cough, dyspnea  GI- No N/V/D, abd pain      Vital Signs:   Temp:  [86 °F (30 °C)-98 °F (36.7 °C)] 86 °F (30 °C)  Heart Rate:  [] 79  Resp:  [16-17] 16  BP: (112-130)/(69-78) 119/74  Flow (L/min):  [2] 2    Physical Exam:  Constitutional: Chronically ill-appearing lady, in no acute distress, awake, alert  HENT: NCAT, mucous membranes moist  Respiratory: Diffuse coarse breath sounds, no wheezes or rhonchi on 2 L NC  Cardiovascular: RRR, no murmurs, rubs, or gallops  Gastrointestinal: Positive bowel sounds, soft, nontender, nondistended  Musculoskeletal: No bilateral ankle  edema  Psychiatric: Appropriate affect, cooperative  Neurologic: Oriented x 3, nonfocal  Skin: No rashes      Pertinent  and/or Most Recent Results     LAB RESULTS:      Lab 01/20/22  0622 01/19/22 0323 01/18/22 0315 01/17/22 0227   WBC 13.27* 15.47* 15.29* 14.15*   HEMOGLOBIN 13.8 13.3 13.7 13.8   HEMATOCRIT 43.0 41.7 44.3 44.3   PLATELETS 313 330 333 348   NEUTROS ABS 6.81 7.74* 9.17* 7.59*   IMMATURE GRANS (ABS) 0.14* 0.09*  --  0.08*   LYMPHS ABS 5.04* 6.11*  --  5.23*   MONOS ABS 0.72 0.88  --  0.88   EOS ABS 0.51* 0.56* 0.31 0.29   MCV 88.3 88.9 89.7 90.4   CRP  --   --   --  0.46         Lab 01/20/22  0620 01/19/22 0323 01/18/22 0315 01/17/22 0227   SODIUM 136 136 136 138   POTASSIUM 4.1 4.1 4.1 3.9   CHLORIDE 99 101 100 102   CO2 27.0 25.0 26.0 24.0   ANION GAP 10.0 10.0 10.0 12.0   BUN 18 23* 24* 27*   CREATININE 0.65 0.66 0.66 0.62   GLUCOSE 89 154* 106* 111*   CALCIUM 9.6 9.3 9.3 9.8   MAGNESIUM  --   --  2.1 1.8   PHOSPHORUS  --   --   --  3.5   HEMOGLOBIN A1C  --  6.40*  --   --          Lab 01/17/22 0227   TOTAL PROTEIN 8.0   ALBUMIN 3.70   GLOBULIN 4.3   ALT (SGPT) 35*   AST (SGOT) 22   BILIRUBIN 0.6   ALK PHOS 123*             Lab 01/17/22 0227   TRIGLYCERIDES 101             Brief Urine Lab Results  (Last result in the past 365 days)      Color   Clarity   Blood   Leuk Est   Nitrite   Protein   CREAT   Urine HCG        01/01/22 1410 Dark Yellow   Clear   Negative   Trace   Negative   Trace               Microbiology Results (last 10 days)     ** No results found for the last 240 hours. **          SLP FEES - Fiberoptic Endo Eval Swallow    Result Date: 1/19/2022  This procedure was auto-finalized with no dictation required.    SLP FEES - Fiberoptic Endo Eval Swallow    Result Date: 1/14/2022  This procedure was auto-finalized with no dictation required.    XR Chest 1 View    Result Date: 1/15/2022  EXAMINATION: XR CHEST 1 VW-  INDICATION: resp failure; J44.1-Chronic obstructive pulmonary  disease with (acute) exacerbation; R06.03-Acute respiratory distress; R09.02-Hypoxemia; J18.9-Pneumonia, unspecified organism; A41.9-Sepsis, unspecified organism; R65.20-Severe sepsis without septic shock; J96.01-Acute respiratory failure with hypoxia; R13.10-Dysphagia, unspecified  COMPARISON: 1/13/2022  FINDINGS: Interval removal of enteric tube. Right-sided PICC line terminates in the SVC. Unchanged aeration without new focal consolidation. No enlarging pleural effusion or distinct pneumothorax. Persistent mild pulmonary vascular engorgement.      Interval removal of enteric tube. Right-sided PICC line terminates in the SVC. Unchanged aeration without new focal consolidation. No enlarging pleural effusion or distinct pneumothorax. Persistent mild pulmonary vascular engorgement.  This report was finalized on 1/15/2022 6:05 AM by Pedro Tyson.      XR Chest 1 View    Result Date: 1/13/2022  EXAMINATION: XR CHEST 1 VW-  INDICATION: Respiratory failure; J44.1-Chronic obstructive pulmonary disease with (acute) exacerbation; R06.03-Acute respiratory distress; R09.02-Hypoxemia; J18.9-Pneumonia, unspecified organism; A41.9-Sepsis, unspecified organism; R65.20-Severe sepsis without septic shock; J96.01-Acute respiratory failure with hypoxia  COMPARISON: 1/12/2022  FINDINGS: Feeding tube is seen in the stomach. PICC line remains in the distal SVC. Heart is normal in size. Vasculature remains cephalized and there is still a pattern of diffuse peribronchial thickening, unchanged. No pneumothorax or effusion is seen.       No new chest disease.    This report was finalized on 1/13/2022 8:45 AM by Dr. Elton Cain MD.        Results for orders placed during the hospital encounter of 03/27/18    Duplex Venous Lower Extremity - Left CAR    Interpretation Summary  · No evidence of deep or superficial venous thrombosis in the left lower extremity.      Results for orders placed during the hospital encounter of 03/27/18    Duplex  Venous Lower Extremity - Left CAR    Interpretation Summary  · No evidence of deep or superficial venous thrombosis in the left lower extremity.      Results for orders placed in visit on 05/05/20    SCANNED - ECHOCARDIOGRAM      Plan for Follow-up of Pending Labs/Results    Discharge Details        Discharge Medications      New Medications      Instructions Start Date   amLODIPine 10 MG tablet  Commonly known as: NORVASC   10 mg, Oral, Every 24 Hours Scheduled      ARIPiprazole 5 MG tablet  Commonly known as: ABILIFY   5 mg, Oral, Daily         Changes to Medications      Instructions Start Date   escitalopram 5 MG tablet  Commonly known as: LEXAPRO  What changed:   · medication strength  · how much to take   5 mg, Oral, Daily         Continue These Medications      Instructions Start Date   albuterol 0.63 MG/3ML nebulizer solution  Commonly known as: ACCUNEB   INHALE CONTENTS OF 1 VIAL VIA NEBULIZER EVERY 6 HOURS AS NEEDED FOR WHEEZING      albuterol sulfate  (90 Base) MCG/ACT inhaler  Commonly known as: Ventolin HFA   1-2 puffs, Inhalation, Every 4 Hours PRN      albuterol (2.5 MG/3ML) 0.083% nebulizer solution  Commonly known as: PROVENTIL   INHALE CONTENTS OF 1 VIAL VIA NEBULIZER EVERY SIX HOURS AS NEEDED FOR WHEEZING      Alcohol Swabs pads   Use when checking sugars      B-D ULTRAFINE III SHORT PEN 31G X 8 MM misc  Generic drug: Insulin Pen Needle   No dose, route, or frequency recorded.      Easy Comfort Pen Needles 32G X 4 MM misc  Generic drug: Insulin Pen Needle   USE AS DIRECTED TO INJECT INSULIN      busPIRone 10 MG tablet  Commonly known as: BUSPAR   TAKE 1 TABLET BY MOUTH TWICE DAILY      Daliresp 500 MCG tablet tablet  Generic drug: roflumilast   500 mcg, Oral, Daily      diazePAM 2 MG tablet  Commonly known as: VALIUM   TAKE 1 TABLET BY MOUTH EVERY 12 HOURS AS NEEDED FOR ANXIETY      DULoxetine 60 MG capsule  Commonly known as: CYMBALTA   TAKE 2 CAPSULES BY MOUTH EVERY DAY      Eliquis 5 MG  tablet tablet  Generic drug: apixaban   TAKE 1 TABLET BY MOUTH EVERY 12 HOURS      famotidine 20 MG tablet  Commonly known as: PEPCID   TAKE 1 TABLET BY MOUTH TWICE DAILY      ferrous sulfate 325 (65 FE) MG tablet  Commonly known as: Iron Supplement   325 mg, Oral, Daily With Breakfast      furosemide 20 MG tablet  Commonly known as: LASIX   TAKE 1 TABLET BY MOUTH EVERY DAY AS NEEDED FOR SWELLING      gabapentin 600 MG tablet  Commonly known as: NEURONTIN   TAKE 1 TABLET BY MOUTH THREE TIMES DAILY      glucose monitor monitoring kit   Check glucose twice daily      Lantus SoloStar 100 UNIT/ML injection pen  Generic drug: Insulin Glargine   INJECTS 60 UNITS SUBCUTANEOUSLY EVERY MORNING AND 40 UNITS EVERY EVENING      Leader Advanced Lancing Device misc   USE US TO TEST BLOOD SUGAR      metoprolol succinate XL 50 MG 24 hr tablet  Commonly known as: TOPROL-XL   TAKE 1 TABLET BY MOUTH EVERY DAY      Multi-Vitamin/Iron tablet   1 tablet, Oral, Daily      Nicotine Step 1 21 MG/24HR patch  Generic drug: nicotine   1 patch, Transdermal, Every 24 Hours Scheduled      NIFEdipine XL 30 MG 24 hr tablet  Commonly known as: PROCARDIA XL   TAKE 1 TABLET BY MOUTH EVERY DAY      nystatin 478081 UNIT/GM cream  Commonly known as: MYCOSTATIN   APPLY TOPICALLY TO AFFECTED AREA 2 TIMES A DAY AS DIRECTED      O2  Commonly known as: OXYGEN   2 L/min, Inhalation, Daily      oxyCODONE-acetaminophen  MG per tablet  Commonly known as: PERCOCET   TAKE 1 TABLET BY MOUTH EVERY SIX HOURS AS NEEDED      terazosin 2 MG capsule  Commonly known as: HYTRIN   2 mg, Oral, Nightly      traZODone 50 MG tablet  Commonly known as: DESYREL   50 mg, Oral, Nightly      Trelegy Ellipta 100-62.5-25 MCG/INH inhaler  Generic drug: Fluticasone-Umeclidin-Vilant   1 puff, Inhalation, Daily      True Metrix Blood Glucose Test test strip  Generic drug: glucose blood   USE TO TEST BLOOD GLUCOSE TWICE DAILY      TRUEplus Lancets 28G misc   CHECK GLUCOSE TWICE  DAILY             Allergies   Allergen Reactions   • Diphenhydramine Hives   • Lortab [Hydrocodone-Acetaminophen] Hives     Tolerates percocet   • Toradol [Ketorolac Tromethamine] Hives     Per patient tolerates motrin   • Nsaids Other (See Comments)     Liver Dx   • Alprazolam Delirium       Discharge Disposition:[facility]  Rehab Facility or Unit (DC - External)  Diet:  Hospital:  Diet Order   Procedures   • Diet Dysphagia; IV - Mechanical Soft No Mixed Consistencies; Nectar / Syrup Thick; Cardiac, Consistent Carbohydrate       Activity: As tolerated    Restrictions or Other Recommendations:  None       CODE STATUS:    Code Status and Medical Interventions:   Ordered at: 12/31/21 0151     Code Status (Patient has no pulse and is not breathing):    CPR (Attempt to Resuscitate)     Medical Interventions (Patient has pulse or is breathing):    Full Support       Future Appointments   Date Time Provider Department Center   2/11/2022 10:00 AM Martha Elizabeth APRN [facility] VELASQUEZ Martinez MD  01/23/22    Time Spent on Discharge:  I spent  35  minutes on this discharge activity which included: face-to-face encounter with the patient, reviewing the data in the system, coordination of the care with the nursing staff as well as consultants, documentation, and entering orders.

## 2022-01-22 NOTE — PLAN OF CARE
Goal Outcome Evaluation:  Plan of Care Reviewed With: patient        Progress: no change  Outcome Summary: OT attempted to get pt oob however she deferred and stated she would get up later in the day.  She completed UB and LB TE against gravity and was tearful throughout session.  She reports several losses recently and an anticipated life change that has her sad.

## 2022-01-22 NOTE — CASE MANAGEMENT/SOCIAL WORK
Case Management Discharge Note      Final Note: Ms. Ziegler has a bed on the general rehab unit at [facility] tomorrow. I have arranged [facility] to pick her up at the bedside at 0800. They will transport Ms. Ziegler with oxygen. Nurse to call report to [phone].         Selected Continued Care - Admitted Since 12/30/2021     Destination Coordination complete.    Service Provider Selected Services Address Phone Fax Patient Preferred    [facility]  Inpatient Rehabilitation [address] [address] [phone] --          Durable Medical Equipment    No services have been selected for the patient.              Dialysis/Infusion    No services have been selected for the patient.              Home Medical Care    No services have been selected for the patient.              Therapy    No services have been selected for the patient.              Community Resources    No services have been selected for the patient.              Community & DME    No services have been selected for the patient.                Selected Continued Care - Episodes Includes selections from active Coordinated Care Management episodes    High-Risk Care Management Episode start date: 11/2/2020 (Paused)   There are no active outsourced providers for this episode.               Transportation Services  W/C Van: [facility]    Final Discharge Disposition Code: 62 - inpatient rehab facility

## 2022-01-23 VITALS
HEIGHT: 67 IN | SYSTOLIC BLOOD PRESSURE: 131 MMHG | DIASTOLIC BLOOD PRESSURE: 90 MMHG | TEMPERATURE: 98.6 F | BODY MASS INDEX: 45.99 KG/M2 | HEART RATE: 79 BPM | RESPIRATION RATE: 16 BRPM | OXYGEN SATURATION: 92 % | WEIGHT: 293 LBS

## 2022-01-23 PROBLEM — A41.9 SEPSIS: Status: RESOLVED | Noted: 2018-03-27 | Resolved: 2022-01-23

## 2022-01-23 PROBLEM — J18.9 CAP (COMMUNITY ACQUIRED PNEUMONIA): Status: RESOLVED | Noted: 2021-06-04 | Resolved: 2022-01-23

## 2022-01-23 PROCEDURE — 99239 HOSP IP/OBS DSCHRG MGMT >30: CPT | Performed by: INTERNAL MEDICINE

## 2022-01-23 RX ORDER — ESCITALOPRAM OXALATE 5 MG/1
5 TABLET ORAL DAILY
Qty: 3 TABLET | Refills: 0
Start: 2022-01-23 | End: 2022-06-26 | Stop reason: HOSPADM

## 2022-01-23 RX ORDER — ARIPIPRAZOLE 5 MG/1
5 TABLET ORAL DAILY
Start: 2022-01-23 | End: 2022-03-09 | Stop reason: SDUPTHER

## 2022-01-23 RX ORDER — AMLODIPINE BESYLATE 10 MG/1
10 TABLET ORAL
Start: 2022-01-23

## 2022-01-23 RX ADMIN — AMLODIPINE BESYLATE 10 MG: 10 TABLET ORAL at 08:12

## 2022-01-23 RX ADMIN — OXYCODONE HYDROCHLORIDE AND ACETAMINOPHEN 1 TABLET: 10; 325 TABLET ORAL at 05:06

## 2022-01-23 RX ADMIN — APIXABAN 5 MG: 5 TABLET, FILM COATED ORAL at 08:11

## 2022-01-23 RX ADMIN — ESCITALOPRAM OXALATE 5 MG: 10 TABLET ORAL at 08:12

## 2022-01-23 RX ADMIN — GABAPENTIN 300 MG: 300 CAPSULE ORAL at 05:06

## 2022-01-23 RX ADMIN — DULOXETINE 60 MG: 60 CAPSULE, DELAYED RELEASE ORAL at 08:12

## 2022-01-23 RX ADMIN — FAMOTIDINE 20 MG: 20 TABLET, FILM COATED ORAL at 08:12

## 2022-01-23 RX ADMIN — BUSPIRONE HYDROCHLORIDE 10 MG: 5 TABLET ORAL at 08:11

## 2022-01-23 RX ADMIN — ARIPIPRAZOLE 5 MG: 10 TABLET ORAL at 08:11

## 2022-01-23 RX ADMIN — DIAZEPAM 2 MG: 2 TABLET ORAL at 08:10

## 2022-02-02 ENCOUNTER — HOME HEALTH ADMISSION (OUTPATIENT)
Dept: HOME HEALTH SERVICES | Facility: HOME HEALTHCARE | Age: 57
End: 2022-02-02

## 2022-02-03 ENCOUNTER — READMISSION MANAGEMENT (OUTPATIENT)
Dept: CALL CENTER | Facility: HOSPITAL | Age: 57
End: 2022-02-03

## 2022-02-03 NOTE — OUTREACH NOTE
Prep Survey      Responses   [facility] facility patient discharged from? Non-BH   Is LACE score < 7 ? Non-BH Discharge   Emergency Room discharge w/ pulse ox? No   Eligibility [facility]   Date of Discharge 02/03/22   Discharge diagnosis unavailable    Does the patient have one of the following disease processes/diagnoses(primary or secondary)? Non-BH Discharge   Does the patient have Home health ordered? Yes   What is the Home health agency?  unsure of company   Prep survey completed? Yes          Madisyn Hartman RN

## 2022-02-04 ENCOUNTER — TRANSITIONAL CARE MANAGEMENT TELEPHONE ENCOUNTER (OUTPATIENT)
Dept: CALL CENTER | Facility: HOSPITAL | Age: 57
End: 2022-02-04

## 2022-02-04 NOTE — OUTREACH NOTE
Call Center TCM Note      Responses   [facility] patient discharged from? Non-   Does the patient have one of the following disease processes/diagnoses(primary or secondary)? Non- Discharge   TCM attempt successful? No   Unsuccessful attempts Attempt 1   Call Status Left message          Goldy Noonan RN    2/4/2022, 09:39 EST

## 2022-02-04 NOTE — OUTREACH NOTE
Call Center TCM Note      Responses   [facility] patient discharged from? Non-   Does the patient have one of the following disease processes/diagnoses(primary or secondary)? Non- Discharge   TCM attempt successful? No   Unsuccessful attempts Attempt 2          Goldy Noonan RN    2/4/2022, 09:48 EST

## 2022-02-05 ENCOUNTER — TRANSITIONAL CARE MANAGEMENT TELEPHONE ENCOUNTER (OUTPATIENT)
Dept: CALL CENTER | Facility: HOSPITAL | Age: 57
End: 2022-02-05

## 2022-02-05 NOTE — OUTREACH NOTE
Call Center TCM Note      Responses   [facility] patient discharged from? Non-BH   Does the patient have one of the following disease processes/diagnoses(primary or secondary)? Non-BH Discharge   TCM attempt successful? No  [No verbal release on file within timeframe ]   Unsuccessful attempts Attempt 3          Gloria Mcgraw RN    2/5/2022, 11:00 EST

## 2022-02-07 ENCOUNTER — PATIENT OUTREACH (OUTPATIENT)
Dept: CASE MANAGEMENT | Facility: OTHER | Age: 57
End: 2022-02-07

## 2022-02-07 DIAGNOSIS — E11.65 UNCONTROLLED TYPE 2 DIABETES MELLITUS WITH HYPERGLYCEMIA, WITHOUT LONG-TERM CURRENT USE OF INSULIN: ICD-10-CM

## 2022-02-07 NOTE — OUTREACH NOTE
"Ambulatory Case Management Note    Patient Outreach    [facility] admission 12/30/21-1/23/22, then to [facility] 1/23-2/3/22.  Contacted pt for any case management needs.  States she is back home and \"I have already been cooking a little and took shower myself.  They think they finally figured out what my problem has been for the last 4 or 5 years.  I was aspirating.\" She states [facility] for [facility], OT and ST starting tomorrow.  Her voice was hoarse, but speech was not labored through conversation.  States she continues to live with her SO and states \"we are engaged.\"  She has been ambulating with walker and her next goal is to ambulate with cane.  At this time, lost connection.  Attempted to contact her, however went to her voicemail.  Did leave RN-ACM contact information and [facility] number on her voicemail.  Will f/u in approx 2 weeks.     Jennifer Cerda RN  Ambulatory Case Management    2/7/2022, 16:45 EST    "

## 2022-02-08 RX ORDER — BLOOD-GLUCOSE METER
EACH MISCELLANEOUS
Qty: 100 EACH | Refills: 2 | Status: SHIPPED | OUTPATIENT
Start: 2022-02-08

## 2022-02-08 RX ORDER — CALCIUM CITRATE/VITAMIN D3 200MG-6.25
TABLET ORAL
Qty: 100 EACH | Refills: 2 | Status: SHIPPED | OUTPATIENT
Start: 2022-02-08 | End: 2022-06-26 | Stop reason: HOSPADM

## 2022-02-10 ENCOUNTER — NURSE TRIAGE (OUTPATIENT)
Dept: CALL CENTER | Facility: HOSPITAL | Age: 57
End: 2022-02-10

## 2022-02-10 ENCOUNTER — TELEPHONE (OUTPATIENT)
Dept: FAMILY MEDICINE CLINIC | Facility: CLINIC | Age: 57
End: 2022-02-10

## 2022-02-10 DIAGNOSIS — M12.9 ARTHRITIS INVOLVING MULTIPLE SITES: ICD-10-CM

## 2022-02-10 DIAGNOSIS — F34.1 DYSTHYMIA: ICD-10-CM

## 2022-02-10 DIAGNOSIS — F41.9 ANXIETY: ICD-10-CM

## 2022-02-10 DIAGNOSIS — M51.9 LUMBAR DISC DISEASE: ICD-10-CM

## 2022-02-10 DIAGNOSIS — G89.4 CHRONIC PAIN SYNDROME: ICD-10-CM

## 2022-02-10 DIAGNOSIS — E11.42 DIABETIC PERIPHERAL NEUROPATHY: ICD-10-CM

## 2022-02-10 DIAGNOSIS — Z96.641 HISTORY OF RIGHT HIP REPLACEMENT: ICD-10-CM

## 2022-02-10 RX ORDER — OXYCODONE AND ACETAMINOPHEN 10; 325 MG/1; MG/1
1 TABLET ORAL EVERY 6 HOURS PRN
Qty: 60 TABLET | Refills: 0 | Status: SHIPPED | OUTPATIENT
Start: 2022-02-10 | End: 2022-02-24

## 2022-02-10 RX ORDER — DIAZEPAM 2 MG/1
2 TABLET ORAL EVERY 12 HOURS PRN
Qty: 30 TABLET | Refills: 0 | Status: SHIPPED | OUTPATIENT
Start: 2022-02-10 | End: 2022-02-24

## 2022-02-10 RX ORDER — GABAPENTIN 600 MG/1
600 TABLET ORAL 3 TIMES DAILY
Qty: 90 TABLET | Refills: 0 | Status: CANCELLED | OUTPATIENT
Start: 2022-02-10

## 2022-02-10 NOTE — TELEPHONE ENCOUNTER
Please call, requested meds sent to pharmacy.     I gave a 2 week supply of the valium and pain med. She just got 90 gabapentin on 2/1 when she was discharged so not able to fill that at this time.

## 2022-02-10 NOTE — TELEPHONE ENCOUNTER
Caller: Dani Ziegler    Relationship: Self    Best call back number: [phone]    Requested Prescriptions:   Requested Prescriptions     Pending Prescriptions Disp Refills   • oxyCODONE-acetaminophen (PERCOCET)  MG per tablet 120 tablet 0     Sig: Take 1 tablet by mouth Every 6 (Six) Hours As Needed.   • diazePAM (VALIUM) 2 MG tablet 60 tablet 0     Sig: Take 1 tablet by mouth Every 12 (Twelve) Hours As Needed. for anxiety   • gabapentin (NEURONTIN) 600 MG tablet 90 tablet 0     Sig: Take 1 tablet by mouth 3 (Three) Times a Day.        Pharmacy where request should be sent: [facility] - [place] [address] - [phone] [facility] [phone] FX     Additional details provided by patient: PATIENT OUT OF MEDICATION.  PHARMACY CLOSES AT 6 PM    Does the patient have less than a 3 day supply:  [x] Yes  [] No    Amita Meeks   02/10/22 16:22 EST

## 2022-02-10 NOTE — TELEPHONE ENCOUNTER
Caller advised Percocet has been sent in, Gabapentin and Valium prescriptions are pending doctor's signature.     Reason for Disposition  • Prescription refill request for a CONTROLLED substance (e.g., narcotics, ADHD medicines)    Additional Information  • Negative: [1] Intentional drug overdose AND [2] suicidal thoughts or ideas  • Negative: Drug overdose and triager unable to answer question  • Negative: Caller requesting information unrelated to medicine  • Negative: Caller requesting information about COVID-19 Vaccine  • Negative: Caller requesting information about Emergency Contraception  • Negative: Caller requesting information about Combined Birth Control Pills  • Negative: Caller requesting information about Progestin Birth Control Pills  • Negative: Caller requesting information about Post-Op pain or medicines  • Negative: Caller requesting a prescription antibiotic (such as Penicillin) for Strep throat and has a positive culture result  • Negative: Caller requesting a prescription anti-viral med (such as Tamiflu) and has influenza (flu)  symptoms  • Negative: Immunization reaction suspected  • Negative: Rash while taking a medicine or within 3 days of stopping it  • Negative: [1] Asthma and [2] having symptoms of asthma (cough, wheezing, etc.)  • Negative: [1] Symptom of illness (e.g., headache, abdominal pain, earache, vomiting) AND [2] more than mild  • Negative: Breastfeeding questions about mother's medicines and diet  • Negative: MORE THAN A DOUBLE DOSE of a prescription or over-the-counter (OTC) drug  • Negative: [1] DOUBLE DOSE (an extra dose or lesser amount) of prescription drug AND [2] any symptoms (e.g., dizziness, nausea, pain, sleepiness)  • Negative: [1] DOUBLE DOSE (an extra dose or lesser amount) of over-the-counter (OTC) drug AND [2] any symptoms (e.g., dizziness, nausea, pain, sleepiness)  • Negative: Took another person's prescription drug  • Negative: [1] DOUBLE DOSE (an extra dose  "or lesser amount) of prescription drug AND [2] NO symptoms (Exception: a double dose of antibiotics)  • Negative: Diabetes drug error or overdose (e.g., took wrong type of insulin or took extra dose)  • Negative: [1] Prescription refill request for ESSENTIAL medicine (i.e., likelihood of harm to patient if not taken) AND [2] triager unable to refill per department policy  • Negative: [1] Prescription not at pharmacy AND [2] was prescribed by PCP recently (Exception: triager has access to EMR and prescription is recorded there. Go to Home Care and confirm for pharmacy.)  • Negative: [1] Pharmacy calling with prescription question AND [2] triager unable to answer question  • Negative: [1] Caller has URGENT medicine question about med that PCP or specialist prescribed AND [2] triager unable to answer question  • Negative: Medicine patch causing local rash or itching  • Negative: [1] Caller has medicine question about med NOT prescribed by PCP AND [2] triager unable to answer question (e.g., compatibility with other med, storage)  • Negative: Prescription request for new medicine (not a refill)    Answer Assessment - Initial Assessment Questions  1. NAME of MEDICATION: \"What medicine are you calling about?\"      Calling about her Percocet, Gabapentin, valium  2. QUESTION: \"What is your question?\" (e.g., medication refill, side effect)      Asking if these have been sent to pharmacy  3. PRESCRIBING HCP: \"Who prescribed it?\" Reason: if prescribed by specialist, call should be referred to that group.      Dr. Tovar  4. SYMPTOMS: \"Do you have any symptoms?\"      *No Answer*  5. SEVERITY: If symptoms are present, ask \"Are they mild, moderate or severe?\"      *No Answer*  6. PREGNANCY:  \"Is there any chance that you are pregnant?\" \"When was your last menstrual period?\"      *No Answer*    Protocols used: MEDICATION QUESTION CALL-ADULT-      "

## 2022-02-11 ENCOUNTER — TELEPHONE (OUTPATIENT)
Dept: FAMILY MEDICINE CLINIC | Facility: CLINIC | Age: 57
End: 2022-02-11

## 2022-02-11 RX ORDER — DULOXETIN HYDROCHLORIDE 60 MG/1
CAPSULE, DELAYED RELEASE ORAL
Qty: 60 CAPSULE | Refills: 0 | Status: SHIPPED | OUTPATIENT
Start: 2022-02-11

## 2022-02-11 NOTE — TELEPHONE ENCOUNTER
Caller: JUNIOR    Relationship:     Best call back number: [phone]    Additional notes: SHIRLEY STATES SHE HAS MET WITH PATIENT FOR SPEECH THERAPY AND PLANS TO CONTINUE TREATMENT ONCE A WEEK FOR 5 WEEKS AND THEN ONCE EVERY OTHER WEEK FOR FOUR WEEKS FOR SWALLOWING THERAPY WITH SPEECH THERAPY.

## 2022-02-17 ENCOUNTER — TELEPHONE (OUTPATIENT)
Dept: FAMILY MEDICINE CLINIC | Facility: CLINIC | Age: 57
End: 2022-02-17

## 2022-02-17 NOTE — TELEPHONE ENCOUNTER
Caller: PAVITHRA     Relationship:  JUNIOR HE     Best call back number:  [phone]    Who are you requesting to speak with (clinical staff, provider,  specific staff member):   CLINICAL     What was the call regarding: PAVITHRA HE IS REPORTING A MISSED VISIT FOR THE PATIENT FOR SKILLED NURSING  AND ALSO A MISSED VISIT FOR SPEECH THERAPY     Do you require a callback: ONLY IF NEEDED

## 2022-02-17 NOTE — TELEPHONE ENCOUNTER
Caller: GRETCHEN OCCUPATIONAL THERAPIST    Relationship: [facility]UN[facility]    Best call back number: [phone]    What is the best time to reach you: NA    Who are you requesting to speak with (clinical staff, provider,  specific staff member): CLINICAL    Do you know the name of the person who called: NA    What was the call regarding: GRETCHEN STATED PATIENT CANCELLED OCCUPATIONAL THERAPY BEFORE THEY ARRIVED.    Do you require a callback: NA

## 2022-02-18 NOTE — TELEPHONE ENCOUNTER
Called spoke with jojo. Patient just refused this week and asked for them to be rescheduled to next week.they have been talking to her.

## 2022-02-24 ENCOUNTER — TELEPHONE (OUTPATIENT)
Dept: FAMILY MEDICINE CLINIC | Facility: CLINIC | Age: 57
End: 2022-02-24

## 2022-02-24 DIAGNOSIS — F41.9 ANXIETY: ICD-10-CM

## 2022-02-24 DIAGNOSIS — M51.9 LUMBAR DISC DISEASE: ICD-10-CM

## 2022-02-24 DIAGNOSIS — M12.9 ARTHRITIS INVOLVING MULTIPLE SITES: ICD-10-CM

## 2022-02-24 DIAGNOSIS — E11.65 UNCONTROLLED TYPE 2 DIABETES MELLITUS WITH HYPERGLYCEMIA, WITHOUT LONG-TERM CURRENT USE OF INSULIN: ICD-10-CM

## 2022-02-24 DIAGNOSIS — G89.4 CHRONIC PAIN SYNDROME: ICD-10-CM

## 2022-02-24 RX ORDER — METOPROLOL SUCCINATE 50 MG/1
TABLET, EXTENDED RELEASE ORAL
Qty: 30 TABLET | Refills: 0 | Status: SHIPPED | OUTPATIENT
Start: 2022-02-24 | End: 2022-03-09

## 2022-02-24 RX ORDER — FUROSEMIDE 20 MG/1
TABLET ORAL
Qty: 15 TABLET | Refills: 0 | Status: SHIPPED | OUTPATIENT
Start: 2022-02-24 | End: 2022-03-09

## 2022-02-24 RX ORDER — DIAZEPAM 2 MG/1
TABLET ORAL
Qty: 30 TABLET | Refills: 0 | Status: SHIPPED | OUTPATIENT
Start: 2022-02-24 | End: 2022-03-09 | Stop reason: SDUPTHER

## 2022-02-24 RX ORDER — BUSPIRONE HYDROCHLORIDE 10 MG/1
TABLET ORAL
Qty: 60 TABLET | Refills: 0 | Status: SHIPPED | OUTPATIENT
Start: 2022-02-24 | End: 2022-03-09

## 2022-02-24 RX ORDER — INSULIN GLARGINE 100 [IU]/ML
INJECTION, SOLUTION SUBCUTANEOUS
Qty: 30 ML | Refills: 0 | Status: SHIPPED | OUTPATIENT
Start: 2022-02-24 | End: 2022-04-13

## 2022-02-24 RX ORDER — FAMOTIDINE 20 MG/1
TABLET, FILM COATED ORAL
Qty: 60 TABLET | Refills: 0 | Status: SHIPPED | OUTPATIENT
Start: 2022-02-24 | End: 2022-03-09

## 2022-02-24 RX ORDER — ESCITALOPRAM OXALATE 20 MG/1
TABLET ORAL
Qty: 30 TABLET | Refills: 0 | Status: SHIPPED | OUTPATIENT
Start: 2022-02-24 | End: 2022-03-09

## 2022-02-24 RX ORDER — OXYCODONE AND ACETAMINOPHEN 10; 325 MG/1; MG/1
TABLET ORAL
Qty: 60 TABLET | Refills: 0 | Status: SHIPPED | OUTPATIENT
Start: 2022-02-24 | End: 2022-03-09 | Stop reason: SDUPTHER

## 2022-02-24 NOTE — TELEPHONE ENCOUNTER
Please call.  She is due for office visit.  I will send in another 2-week supply but she must follow-up.

## 2022-03-09 ENCOUNTER — TELEPHONE (OUTPATIENT)
Dept: FAMILY MEDICINE CLINIC | Facility: CLINIC | Age: 57
End: 2022-03-09

## 2022-03-09 DIAGNOSIS — M12.9 ARTHRITIS INVOLVING MULTIPLE SITES: ICD-10-CM

## 2022-03-09 DIAGNOSIS — M51.9 LUMBAR DISC DISEASE: ICD-10-CM

## 2022-03-09 DIAGNOSIS — F41.9 ANXIETY: ICD-10-CM

## 2022-03-09 DIAGNOSIS — G89.4 CHRONIC PAIN SYNDROME: ICD-10-CM

## 2022-03-09 RX ORDER — FUROSEMIDE 20 MG/1
TABLET ORAL
Qty: 15 TABLET | Refills: 0 | Status: SHIPPED | OUTPATIENT
Start: 2022-03-09 | End: 2022-04-06

## 2022-03-09 RX ORDER — OXYCODONE AND ACETAMINOPHEN 10; 325 MG/1; MG/1
1 TABLET ORAL EVERY 6 HOURS PRN
Qty: 60 TABLET | Refills: 0 | Status: SHIPPED | OUTPATIENT
Start: 2022-03-09 | End: 2022-03-28 | Stop reason: SDUPTHER

## 2022-03-09 RX ORDER — ESCITALOPRAM OXALATE 20 MG/1
TABLET ORAL
Qty: 30 TABLET | Refills: 0 | Status: SHIPPED | OUTPATIENT
Start: 2022-03-09 | End: 2022-08-19

## 2022-03-09 RX ORDER — DIAZEPAM 2 MG/1
2 TABLET ORAL EVERY 12 HOURS PRN
Qty: 30 TABLET | Refills: 0 | Status: SHIPPED | OUTPATIENT
Start: 2022-03-09 | End: 2022-03-28 | Stop reason: SDUPTHER

## 2022-03-09 RX ORDER — ARIPIPRAZOLE 5 MG/1
5 TABLET ORAL DAILY
Qty: 30 TABLET | Refills: 0 | Status: SHIPPED | OUTPATIENT
Start: 2022-03-09 | End: 2022-04-06

## 2022-03-09 RX ORDER — DIAZEPAM 2 MG/1
TABLET ORAL
Qty: 30 TABLET | Refills: 0 | OUTPATIENT
Start: 2022-03-09

## 2022-03-09 RX ORDER — BUSPIRONE HYDROCHLORIDE 10 MG/1
TABLET ORAL
Qty: 60 TABLET | Refills: 0 | Status: SHIPPED | OUTPATIENT
Start: 2022-03-09

## 2022-03-09 RX ORDER — FAMOTIDINE 20 MG/1
TABLET, FILM COATED ORAL
Qty: 60 TABLET | Refills: 0 | Status: SHIPPED | OUTPATIENT
Start: 2022-03-09 | End: 2022-06-26 | Stop reason: HOSPADM

## 2022-03-09 RX ORDER — OXYCODONE AND ACETAMINOPHEN 10; 325 MG/1; MG/1
TABLET ORAL
Qty: 60 TABLET | Refills: 0 | OUTPATIENT
Start: 2022-03-09

## 2022-03-09 RX ORDER — ROFLUMILAST 500 UG/1
500 TABLET ORAL DAILY
Qty: 30 TABLET | Refills: 0 | OUTPATIENT
Start: 2022-03-09

## 2022-03-09 RX ORDER — ROFLUMILAST 500 UG/1
500 TABLET ORAL DAILY
Qty: 30 TABLET | Refills: 0 | Status: SHIPPED | OUTPATIENT
Start: 2022-03-09 | End: 2022-04-06

## 2022-03-09 RX ORDER — METOPROLOL SUCCINATE 50 MG/1
TABLET, EXTENDED RELEASE ORAL
Qty: 30 TABLET | Refills: 0 | Status: SHIPPED | OUTPATIENT
Start: 2022-03-09 | End: 2022-06-26 | Stop reason: HOSPADM

## 2022-03-09 NOTE — TELEPHONE ENCOUNTER
Incoming Refill Request      Medication requested (name and dose): diazePAM (VALIUM) 2 MG tablet  gabapentin (NEURONTIN) 600 MG tablet  oxyCODONE-acetaminophen (PERCOCET)  MG per tablet  ARIPiprazole (ABILIFY) 5 MG tablet  Daliresp 500 MCG tablet tablet  Pharmacy where request should be sent: [address] [phone] [address] [facility] - [address] [phone] [address] [place]    Additional details provided by patient:  PT IS OUT OF MEDICATION      Best call back number:     Does the patient have less than a 3 day supply:  [x] Yes  [] No    Homa Perez  03/09/22, 09:58 EST

## 2022-03-09 NOTE — TELEPHONE ENCOUNTER
Please call, requested meds sent to pharmacy.     Please let her know that I am refilling the controlled medications at a 2-week supply until she comes in for her visit on April 5.

## 2022-03-18 ENCOUNTER — PATIENT OUTREACH (OUTPATIENT)
Dept: CASE MANAGEMENT | Facility: OTHER | Age: 57
End: 2022-03-18

## 2022-03-18 NOTE — OUTREACH NOTE
AMBULATORY CASE MANAGEMENT NOTE    Name and Relationship of Patient/Support Person: Dani Ziegler L - Self    Patient Outreach    Pt states she is doing OK.  She is ambulating with her walker and continues to have home health. Was waiting on OT to come today.  She states she is able to do some things for herself, such as get in shower with shower seat, ambulates with walker and manages her medicines.  She does report she tries to be compliant, however she has missed a few medicines, (just forgot).  She does use pill planner and gives her insulin herself. She wears her O2 at night. She monitors her pulse O2, bp, pulse and bs at home.  She does continue to smoke, but is trying to cut down.  States she has tried Chantix in past, but caused side effects.  1800-quit-now discussed and encouraged to call and to discuss with PCP, Dr. Tovar at her upcoming visit 4/5/22.  She has good support from her daughter and boyfriend. Her bf is her transportation.  Care gaps reviewed with pt.  She does not have living will, however is interested in material.  Will send.  She has endorsed food insecurities in past, however at present states OK with food.  She is getting food stamps.  She denies any needs at present, voiced her appreciation for the call and requested a f/u call.  Will f/u in approx 2-3 weeks.      Care Coordination    Contacted [facility].  She is receiving nursing, PT, OT and ST services.     Send Education  Questions/Answers    Flowsheet Row Most Recent Value   Other Patient Education/Resources  Advanced Care Planning   ACP Education Method Verbal   Advanced Directives: Send Materials          Adult Patient Profile  Questions/Answers    Flowsheet Row Most Recent Value   Symptoms/Conditions Managed at Home respiratory   Respiratory Management Strategies oxygen therapy, medication therapy   Oxygen Therapy Device nasal cannula   Oxygen Therapy Times night time only   Identifying Health Goals stopping or cutting  "back on smoking  [want to relax, walking and I want to do what I can for myself. ]   Barriers to Taking Medication as Prescribed forget to take it  [states has only forgotten meds at \"few times\" since home.  Does use pill planner ]   Equipment Currently Used at Home bath bench, walker, standard, oxygen, bp cuff, glucometer, pulse ox        Social Work Assessment  Questions/Answers    Flowsheet Row Most Recent Value   Equipment Currently Used at Home bath bench, walker, standard, oxygen, bp cuff, glucometer, pulse ox           Social Work Assessment  Questions/Answers    Flowsheet Row Most Recent Value   People in Home significant other   Current Living Arrangements home   Primary Care Provided by self, spouse/significant other   Quality of Family Relationships supportive  [good support from daughter and boyfriend]   Equipment Currently Used at Home bath bench, walker, standard, oxygen, bp cuff, glucometer, pulse ox        SDOH updated and reviewed with the patient during this program:  Financial Resource Strain: Medium Risk   • Difficulty of Paying Living Expenses: Somewhat hard      Food Insecurity: Food Insecurity Present   • Worried About Running Out of Food in the Last Year: Sometimes true   • Ran Out of Food in the Last Year: Never true      Transportation Needs: No Transportation Needs   • Lack of Transportation (Medical): No   • Lack of Transportation (Non-Medical): No      Housing Stability: Low Risk    • Unable to Pay for Housing in the Last Year: No   • Number of Places Lived in the Last Year: 1   • Unstable Housing in the Last Year: No       ADRIAN RICHARD  Ambulatory Case Management    3/18/2022, 10:55 EDT  "

## 2022-03-28 ENCOUNTER — TELEPHONE (OUTPATIENT)
Dept: FAMILY MEDICINE CLINIC | Facility: CLINIC | Age: 57
End: 2022-03-28

## 2022-03-28 DIAGNOSIS — E11.42 DIABETIC PERIPHERAL NEUROPATHY: ICD-10-CM

## 2022-03-28 DIAGNOSIS — Z96.641 HISTORY OF RIGHT HIP REPLACEMENT: ICD-10-CM

## 2022-03-28 DIAGNOSIS — M12.9 ARTHRITIS INVOLVING MULTIPLE SITES: ICD-10-CM

## 2022-03-28 DIAGNOSIS — G89.4 CHRONIC PAIN SYNDROME: ICD-10-CM

## 2022-03-28 DIAGNOSIS — M51.9 LUMBAR DISC DISEASE: ICD-10-CM

## 2022-03-28 DIAGNOSIS — F41.9 ANXIETY: ICD-10-CM

## 2022-03-28 RX ORDER — OXYCODONE AND ACETAMINOPHEN 10; 325 MG/1; MG/1
1 TABLET ORAL EVERY 6 HOURS PRN
Qty: 60 TABLET | Refills: 0 | Status: SHIPPED | OUTPATIENT
Start: 2022-03-28 | End: 2022-04-08 | Stop reason: SDUPTHER

## 2022-03-28 RX ORDER — DIAZEPAM 2 MG/1
2 TABLET ORAL EVERY 12 HOURS PRN
Qty: 30 TABLET | Refills: 0 | Status: SHIPPED | OUTPATIENT
Start: 2022-03-28 | End: 2022-04-08 | Stop reason: SDUPTHER

## 2022-03-28 RX ORDER — GABAPENTIN 600 MG/1
600 TABLET ORAL 3 TIMES DAILY
Qty: 45 TABLET | Refills: 0 | Status: SHIPPED | OUTPATIENT
Start: 2022-03-28 | End: 2022-04-08 | Stop reason: SDUPTHER

## 2022-03-28 NOTE — TELEPHONE ENCOUNTER
Please call.  I sent in 2-week supply again and definitely needs to keep the follow-up appointment as scheduled on April 5.

## 2022-03-28 NOTE — TELEPHONE ENCOUNTER
Caller: Karlie Dani L    Relationship: Self    Best call back number: [phone]  Requested Prescriptions:   Requested Prescriptions     Pending Prescriptions Disp Refills   • gabapentin (NEURONTIN) 600 MG tablet 90 tablet 0     Sig: Take 1 tablet by mouth 3 (Three) Times a Day.   • oxyCODONE-acetaminophen (PERCOCET)  MG per tablet 60 tablet 0     Sig: Take 1 tablet by mouth Every 6 (Six) Hours As Needed for Moderate Pain .   • diazePAM (VALIUM) 2 MG tablet 30 tablet 0     Sig: Take 1 tablet by mouth Every 12 (Twelve) Hours As Needed for Anxiety. for anxiety        Pharmacy where request should be sent: [facility] - [place] [address] - [phone] [facility] [phone] FX     Additional details provided by patient:THE PATIENT STATES THAT SHE IS OUT OF GABAPENTIN AND THE PERCOCET THE PATIENT STATES THAT SHE HAS AN APPOINTMENT ON 04/05/2022 BUT SHE IS IN PAIN AND NEEDS TO BE ABLE TO GET HER MEDICATION     Does the patient have less than a 3 day supply:  [x] Yes  [] No    Thania Cormier Rep   03/28/22 15:06 EDT

## 2022-04-05 ENCOUNTER — OFFICE VISIT (OUTPATIENT)
Dept: FAMILY MEDICINE CLINIC | Facility: CLINIC | Age: 57
End: 2022-04-05

## 2022-04-05 VITALS
WEIGHT: 280 LBS | SYSTOLIC BLOOD PRESSURE: 126 MMHG | DIASTOLIC BLOOD PRESSURE: 62 MMHG | HEIGHT: 67 IN | OXYGEN SATURATION: 93 % | RESPIRATION RATE: 18 BRPM | TEMPERATURE: 98.3 F | HEART RATE: 86 BPM | BODY MASS INDEX: 43.95 KG/M2

## 2022-04-05 DIAGNOSIS — G89.4 CHRONIC PAIN SYNDROME: ICD-10-CM

## 2022-04-05 DIAGNOSIS — I10 ESSENTIAL HYPERTENSION: ICD-10-CM

## 2022-04-05 DIAGNOSIS — J44.1 CHRONIC OBSTRUCTIVE PULMONARY DISEASE WITH ACUTE EXACERBATION: ICD-10-CM

## 2022-04-05 DIAGNOSIS — E11.65 UNCONTROLLED TYPE 2 DIABETES MELLITUS WITH HYPERGLYCEMIA, WITHOUT LONG-TERM CURRENT USE OF INSULIN: Primary | ICD-10-CM

## 2022-04-05 DIAGNOSIS — D50.9 IRON DEFICIENCY ANEMIA, UNSPECIFIED IRON DEFICIENCY ANEMIA TYPE: ICD-10-CM

## 2022-04-05 DIAGNOSIS — M51.9 LUMBAR DISC DISEASE: ICD-10-CM

## 2022-04-05 DIAGNOSIS — E55.9 VITAMIN D DEFICIENCY: ICD-10-CM

## 2022-04-05 PROCEDURE — 99214 OFFICE O/P EST MOD 30 MIN: CPT | Performed by: FAMILY MEDICINE

## 2022-04-05 RX ORDER — PREDNISONE 10 MG/1
TABLET ORAL
Qty: 20 TABLET | Refills: 0 | Status: SHIPPED | OUTPATIENT
Start: 2022-04-05 | End: 2022-06-26 | Stop reason: HOSPADM

## 2022-04-05 NOTE — PROGRESS NOTES
Chief Complaint  Diabetes (F/u )    Subjective          Dani Ziegler presents to [facility]  History of Present Illness assessment plan diabetes mellitus type 2 uncontrolled we will check CMP lipid panel and A1c and also TSH next number hypertension    Patient verbalized consent to the visit recording.    Diabetes mellitus type 2  The patient reports that she was sick for 3 days with water diarrhea. She states that she had a lot of accidents and went through 12 diapers the first 2 days because it just came out. She states that she is afraid to. She states that she needs new batteries for her oxygen because it was really low. She states that she was having a hard time while she was sick. She states that she had chills and a small fever of 99 to 100 degrees Fahrenheit while she was sick. She states that it is better now. She states that she was in the hospital at the end of 2022. She states that her father  while she was in the coma. She states that she could not breathe. She states that she saw the kids the week before for Oskar and she thought it was the next day. She states that when she the really bad this time, she told him to take her off some of the medications. She states that she told him that she does not call an ambulance. She states that she does not remember anything. She states that she went to [facility] after that. She states that she does not use methamphetamine. She states that she is taking it now. She states that her blood pressure is doing well. She denies any chest pain or trouble breathing. She states that her breathing started getting bad when she started the ships. She states that she is back on 24 hour oxygen. She states that she had to put it up to 3 L. She states that she was back on 2 L. She states that she has a foot spur that is another reason she needs help because she can not walk good. She states that she can walk around the building  "without her oxygen and then she can urinate herself and bring her pants up. She states that if she starts to not be able to breathe good, when she gets from one point to point B, she urinates it just falls out of her. She states that she has had lots of surgeries. She states that she does Kegel exercises. She states that her highest blood sugar has been 127. She states that she does not sleep during the day.    Back pain  She states that her back is doing pretty good. She states that pain medicine helps. She states that if she takes a big deep breath and hold about 5 to 10 minutes, it will not feel safe.    Review of Systems   Constitutional: Positive for fatigue.   HENT: Negative.    Respiratory: Positive for cough and shortness of breath.    Cardiovascular: Negative.    Gastrointestinal: Negative.    Musculoskeletal: Positive for arthralgias and back pain.        Objective       Vital Signs:   /62   Pulse 86   Temp 98.3 °F (36.8 °C)   Resp 18   Ht 170.2 cm (67\")   Wt 127 kg (280 lb)   SpO2 93%   BMI 43.85 kg/m²     Physical Exam  Vitals and nursing note reviewed.   Constitutional:       Appearance: She is well-developed.   HENT:      Head: Normocephalic and atraumatic.      Right Ear: Hearing and external ear normal.      Left Ear: Hearing and external ear normal.   Eyes:      Conjunctiva/sclera: Conjunctivae normal.      Pupils: Pupils are equal, round, and reactive to light.   Cardiovascular:      Rate and Rhythm: Normal rate and regular rhythm.      Heart sounds: Normal heart sounds. No murmur heard.    No friction rub.   Pulmonary:      Effort: Pulmonary effort is normal. No respiratory distress.      Breath sounds: Wheezing and rhonchi present. No rales.   Abdominal:      General: There is no distension.      Tenderness: There is no abdominal tenderness. There is no guarding or rebound.   Musculoskeletal:      Right lower leg: No edema.      Left lower leg: No edema.   Skin:     General: Skin is " warm.   Neurological:      Mental Status: She is alert.   Psychiatric:         Behavior: Behavior normal.     In a wheelchair.      Result Review :                     Assessment and Plan    Diagnoses and all orders for this visit:    1. Uncontrolled type 2 diabetes mellitus with hyperglycemia, without long-term current use of insulin (HCC) (Primary)        - We will check CMP, lipid panel, A1c, and also TSH.    -     Comprehensive Metabolic Panel  -     Lipid Panel With / Chol / HDL Ratio  -     Hemoglobin A1c  -     TSH    2. Essential hypertension        - Blood pressure is doing well. She will continue her current medications.  -     CBC & Differential  -     Comprehensive Metabolic Panel  -     Lipid Panel With / Chol / HDL Ratio    3. Chronic obstructive pulmonary disease with acute exacerbation (HCC)        - Start prednisone 40 mg taper over 8 days. Continue inhalers and oxygen. Call her follow up if not improving.  -     predniSONE (DELTASONE) 10 MG tablet; 4 po x 2 days then 3 po daily x 2 days then 2 po daily x 2 days then 1 po daily x 2 days then stop.  Dispense: 20 tablet; Refill: 0    4. Vitamin D deficiency        - Can recheck vitamin D level.  -     Vitamin D 25 Hydroxy    5. Iron deficiency anemia, unspecified iron deficiency anemia type  -     CBC & Differential  -     Iron and TIBC  -     Ferritin    6. Lumbar disc disease         - She adamantly denies any use of methamphetamine. She has been taking her oxycodone as appropriate and denies any misuse or diversion. We will continue this and keep a close eye on her with this. She is also on diazepam for anxiety and that is only at 2 mg dose every 12 hours. She is aware not to take those with her pain medication. Follow up in 3 months and we will need to do a random drug screen in the near future.    7. Chronic pain syndrome          DISCUSSION        Alden dated 4/5/22 was reviewed and appropriate.     Follow Up   Return in about 3 months (around  7/5/2022).    Patient was given instructions and counseling regarding her condition or for health maintenance advice. Please see specific information pulled into the AVS if appropriate.       Transcribed from ambient dictation for Darrion Tovar MD by Leigh Ann Valadez.  04/06/22   10:19 EDT

## 2022-04-06 LAB
25(OH)D3+25(OH)D2 SERPL-MCNC: 26.5 NG/ML (ref 30–100)
ALBUMIN SERPL-MCNC: 3 G/DL (ref 3.8–4.9)
ALBUMIN/GLOB SERPL: 0.9 {RATIO} (ref 1.2–2.2)
ALP SERPL-CCNC: 155 IU/L (ref 44–121)
ALT SERPL-CCNC: 11 IU/L (ref 0–32)
AST SERPL-CCNC: 11 IU/L (ref 0–40)
BASOPHILS # BLD AUTO: 0 X10E3/UL (ref 0–0.2)
BASOPHILS NFR BLD AUTO: 0 %
BILIRUB SERPL-MCNC: 0.4 MG/DL (ref 0–1.2)
BUN SERPL-MCNC: 10 MG/DL (ref 6–24)
BUN/CREAT SERPL: 11 (ref 9–23)
CALCIUM SERPL-MCNC: 8.8 MG/DL (ref 8.7–10.2)
CHLORIDE SERPL-SCNC: 100 MMOL/L (ref 96–106)
CHOLEST SERPL-MCNC: 81 MG/DL (ref 100–199)
CHOLEST/HDLC SERPL: 2.4 RATIO (ref 0–4.4)
CO2 SERPL-SCNC: 23 MMOL/L (ref 20–29)
CREAT SERPL-MCNC: 0.94 MG/DL (ref 0.57–1)
EGFRCR SERPLBLD CKD-EPI 2021: 71 ML/MIN/1.73
EOSINOPHIL # BLD AUTO: 0.3 X10E3/UL (ref 0–0.4)
EOSINOPHIL NFR BLD AUTO: 2 %
ERYTHROCYTE [DISTWIDTH] IN BLOOD BY AUTOMATED COUNT: 14.1 % (ref 11.7–15.4)
FERRITIN SERPL-MCNC: 76 NG/ML (ref 15–150)
GLOBULIN SER CALC-MCNC: 3.5 G/DL (ref 1.5–4.5)
GLUCOSE SERPL-MCNC: 193 MG/DL (ref 65–99)
HBA1C MFR BLD: 5.7 % (ref 4.8–5.6)
HCT VFR BLD AUTO: 39.1 % (ref 34–46.6)
HDLC SERPL-MCNC: 34 MG/DL
HGB BLD-MCNC: 12.4 G/DL (ref 11.1–15.9)
IMM GRANULOCYTES # BLD AUTO: 0.2 X10E3/UL (ref 0–0.1)
IMM GRANULOCYTES NFR BLD AUTO: 1 %
IRON SATN MFR SERPL: 6 % (ref 15–55)
IRON SERPL-MCNC: 13 UG/DL (ref 27–159)
LDLC SERPL CALC-MCNC: 35 MG/DL (ref 0–99)
LYMPHOCYTES # BLD AUTO: 2.4 X10E3/UL (ref 0.7–3.1)
LYMPHOCYTES NFR BLD AUTO: 14 %
MCH RBC QN AUTO: 28.2 PG (ref 26.6–33)
MCHC RBC AUTO-ENTMCNC: 31.7 G/DL (ref 31.5–35.7)
MCV RBC AUTO: 89 FL (ref 79–97)
MONOCYTES # BLD AUTO: 0.9 X10E3/UL (ref 0.1–0.9)
MONOCYTES NFR BLD AUTO: 6 %
NEUTROPHILS # BLD AUTO: 13.2 X10E3/UL (ref 1.4–7)
NEUTROPHILS NFR BLD AUTO: 77 %
PLATELET # BLD AUTO: 342 X10E3/UL (ref 150–450)
POTASSIUM SERPL-SCNC: 3.8 MMOL/L (ref 3.5–5.2)
PROT SERPL-MCNC: 6.5 G/DL (ref 6–8.5)
RBC # BLD AUTO: 4.39 X10E6/UL (ref 3.77–5.28)
SODIUM SERPL-SCNC: 138 MMOL/L (ref 134–144)
TIBC SERPL-MCNC: 214 UG/DL (ref 250–450)
TRIGL SERPL-MCNC: 43 MG/DL (ref 0–149)
TSH SERPL DL<=0.005 MIU/L-ACNC: 0.9 UIU/ML (ref 0.45–4.5)
UIBC SERPL-MCNC: 201 UG/DL (ref 131–425)
VLDLC SERPL CALC-MCNC: 12 MG/DL (ref 5–40)
WBC # BLD AUTO: 17 X10E3/UL (ref 3.4–10.8)

## 2022-04-06 RX ORDER — FUROSEMIDE 20 MG/1
TABLET ORAL
Qty: 30 TABLET | Refills: 2 | Status: SHIPPED | OUTPATIENT
Start: 2022-04-06 | End: 2022-06-26 | Stop reason: HOSPADM

## 2022-04-06 RX ORDER — ROFLUMILAST 500 UG/1
TABLET ORAL
Qty: 30 TABLET | Refills: 2 | Status: SHIPPED | OUTPATIENT
Start: 2022-04-06 | End: 2022-08-19

## 2022-04-06 RX ORDER — ARIPIPRAZOLE 5 MG/1
5 TABLET ORAL DAILY
Qty: 30 TABLET | Refills: 2 | Status: SHIPPED | OUTPATIENT
Start: 2022-04-06 | End: 2022-08-19

## 2022-04-08 ENCOUNTER — TELEPHONE (OUTPATIENT)
Dept: FAMILY MEDICINE CLINIC | Facility: CLINIC | Age: 57
End: 2022-04-08

## 2022-04-08 DIAGNOSIS — E11.42 DIABETIC PERIPHERAL NEUROPATHY: ICD-10-CM

## 2022-04-08 DIAGNOSIS — M51.9 LUMBAR DISC DISEASE: ICD-10-CM

## 2022-04-08 DIAGNOSIS — Z96.641 HISTORY OF RIGHT HIP REPLACEMENT: ICD-10-CM

## 2022-04-08 DIAGNOSIS — M12.9 ARTHRITIS INVOLVING MULTIPLE SITES: ICD-10-CM

## 2022-04-08 DIAGNOSIS — G89.4 CHRONIC PAIN SYNDROME: ICD-10-CM

## 2022-04-08 DIAGNOSIS — F41.9 ANXIETY: ICD-10-CM

## 2022-04-08 RX ORDER — GABAPENTIN 600 MG/1
600 TABLET ORAL 3 TIMES DAILY
Qty: 90 TABLET | Refills: 2 | Status: ON HOLD | OUTPATIENT
Start: 2022-04-08 | End: 2022-06-26 | Stop reason: SDUPTHER

## 2022-04-08 RX ORDER — OXYCODONE AND ACETAMINOPHEN 10; 325 MG/1; MG/1
1 TABLET ORAL EVERY 6 HOURS PRN
Qty: 120 TABLET | Refills: 0 | Status: SHIPPED | OUTPATIENT
Start: 2022-04-08 | End: 2022-05-06 | Stop reason: SDUPTHER

## 2022-04-08 RX ORDER — DIAZEPAM 2 MG/1
2 TABLET ORAL EVERY 12 HOURS PRN
Qty: 60 TABLET | Refills: 2 | Status: SHIPPED | OUTPATIENT
Start: 2022-04-08 | End: 2022-08-01 | Stop reason: SDUPTHER

## 2022-04-08 NOTE — TELEPHONE ENCOUNTER
Please call, requested meds sent to pharmacy. I reviewed her reply to the labs  With her white cell count high, if she does not feel better or getting worse, she needs to go to ER this weekend.

## 2022-04-12 ENCOUNTER — PATIENT OUTREACH (OUTPATIENT)
Dept: CASE MANAGEMENT | Facility: OTHER | Age: 57
End: 2022-04-12

## 2022-04-12 NOTE — OUTREACH NOTE
"AMBULATORY CASE MANAGEMENT NOTE    Name and Relationship of Patient/Support Person: Dani Ziegler L - Self    Patient Outreach    F/u with pt.  States she is doing good.  Recently had PCP visit.  States she is doing \"pretty good.\"  Breathing \"good today.\"  Her speech was non labored, clear and appropriate through conversation.  Discussed DM, importance of med adherence, diet, symptoms of hypo and hyperglycemia, rule of 15 for diabetes, bs monitoring and stated her last A1C was 5.7 on 4/5/22, which she was very pleased.  She is wearing her O2 \"80% of the time.\"  COPD Action Plan reviewed with pt and states she has the Action Plan on her fridge. She states she has all her medicines and reports she is compliant. She is ambulating with her walker and her bf continues to assist in caring for her.  She is interested in her bf possibly being paid for being her caregiver since he had to quit work.  Provided with [facility] Agency on Aging number [phone] and  [facility] [phone] or [phone].  Also, interested in food delivery that was provided to her upon returning home from rehab.  Will contact her [employer] Medicare for .  Care gaps reviewed and will work on completing this year.  Discussed AWV. Will think about it for her next appt.  She does not have living will, however does have the material. [facility] hotline explained and number provided.  [facility] 24/7 Nurse Call Center number again provided.  She voiced her appreciation for the call.     Care Coordination    Left message with [facility]  dept with pt info requesting  assignment.     ADRIAN RICHARD  Ambulatory Case Management    4/12/2022, 13:22 EDT  "

## 2022-04-13 DIAGNOSIS — E11.65 UNCONTROLLED TYPE 2 DIABETES MELLITUS WITH HYPERGLYCEMIA, WITHOUT LONG-TERM CURRENT USE OF INSULIN: ICD-10-CM

## 2022-04-13 RX ORDER — INSULIN GLARGINE 100 [IU]/ML
INJECTION, SOLUTION SUBCUTANEOUS
Qty: 30 ML | Refills: 4 | Status: SHIPPED | OUTPATIENT
Start: 2022-04-13 | End: 2022-06-26 | Stop reason: HOSPADM

## 2022-04-25 DIAGNOSIS — J44.9 CHRONIC OBSTRUCTIVE PULMONARY DISEASE, UNSPECIFIED COPD TYPE: ICD-10-CM

## 2022-04-25 RX ORDER — NIFEDIPINE 30 MG/1
TABLET, EXTENDED RELEASE ORAL
Qty: 30 TABLET | Refills: 0 | Status: SHIPPED | OUTPATIENT
Start: 2022-04-25 | End: 2022-05-23

## 2022-05-02 ENCOUNTER — TELEPHONE (OUTPATIENT)
Dept: FAMILY MEDICINE CLINIC | Facility: CLINIC | Age: 57
End: 2022-05-02

## 2022-05-02 DIAGNOSIS — F34.1 DYSTHYMIA: ICD-10-CM

## 2022-05-02 NOTE — TELEPHONE ENCOUNTER
Call to see if patient is still taking. Refill dates do not match up. And it also looks like she was prescribed lexapro by an outside office. Is she taking this? LENA

## 2022-05-02 NOTE — TELEPHONE ENCOUNTER
Please call patient.  We received a refill request for Eliquis but we have not refilled that in quite some time.  Please confirm if she is still taking this.

## 2022-05-02 NOTE — TELEPHONE ENCOUNTER
Dr. Tovar,     Is patient still suppose to be taking Eliquis?   Has not been sent in since last year for only a 3 month supply. LENA

## 2022-05-04 RX ORDER — DULOXETIN HYDROCHLORIDE 60 MG/1
CAPSULE, DELAYED RELEASE ORAL
Qty: 60 CAPSULE | Refills: 0 | OUTPATIENT
Start: 2022-05-04

## 2022-05-06 ENCOUNTER — TELEPHONE (OUTPATIENT)
Dept: FAMILY MEDICINE CLINIC | Facility: CLINIC | Age: 57
End: 2022-05-06

## 2022-05-06 DIAGNOSIS — M51.9 LUMBAR DISC DISEASE: ICD-10-CM

## 2022-05-06 DIAGNOSIS — G89.4 CHRONIC PAIN SYNDROME: ICD-10-CM

## 2022-05-06 DIAGNOSIS — M12.9 ARTHRITIS INVOLVING MULTIPLE SITES: ICD-10-CM

## 2022-05-06 RX ORDER — BLOOD-GLUCOSE METER
EACH MISCELLANEOUS
Qty: 100 EACH | Refills: 1 | OUTPATIENT
Start: 2022-05-06

## 2022-05-06 RX ORDER — OXYCODONE AND ACETAMINOPHEN 10; 325 MG/1; MG/1
TABLET ORAL
Qty: 120 TABLET | Refills: 0 | OUTPATIENT
Start: 2022-05-06

## 2022-05-06 RX ORDER — OXYCODONE AND ACETAMINOPHEN 10; 325 MG/1; MG/1
1 TABLET ORAL EVERY 6 HOURS PRN
Qty: 120 TABLET | Refills: 0 | Status: ON HOLD | OUTPATIENT
Start: 2022-05-06 | End: 2022-06-26 | Stop reason: SDUPTHER

## 2022-05-06 RX ORDER — APIXABAN 5 MG/1
TABLET, FILM COATED ORAL
Qty: 60 TABLET | Refills: 1 | OUTPATIENT
Start: 2022-05-06

## 2022-05-06 NOTE — TELEPHONE ENCOUNTER
Spoke with patient. Stated she never stopped taking it. The hospital had filled it for her once so she hadn't needed the refill.     Patient has already picked up medication.

## 2022-05-06 NOTE — TELEPHONE ENCOUNTER
Caller: Dani Ziegler    Relationship: Self    Best call back number: [phone] (H)    Requested Prescriptions:   Requested Prescriptions     Pending Prescriptions Disp Refills   • oxyCODONE-acetaminophen (PERCOCET)  MG per tablet 120 tablet 0     Sig: Take 1 tablet by mouth Every 6 (Six) Hours As Needed for Moderate Pain .   • apixaban (Eliquis) 5 MG tablet tablet 60 tablet 2     Sig: Take 1 tablet by mouth Every 12 (Twelve) Hours.        Pharmacy where request should be sent: [facility] - [address] - [phone]  - [phone] FX     Additional details provided by patient: PATIENT IS OUT OF BOTH MEDICATIONS- PATIENT PHARMACY STOPS DELIVERING AT 2:00.     Does the patient have less than a 3 day supply:  [x] Yes  [] No    Thania Rowell Rep   05/06/22 10:59 EDT          Discussed with the patient and I made appropriate referral. If she's having ongoing abdominal discomfort I would be happy to see her as well

## 2022-05-06 NOTE — TELEPHONE ENCOUNTER
Please call, requested meds sent to pharmacy.     Please call.  I sent in her medication but she can't call the day of and expect it to get done that quickly before they stop delivering.    She needs to go ahead make an appointment for July for follow-up as well.

## 2022-05-23 RX ORDER — NIFEDIPINE 30 MG/1
TABLET, EXTENDED RELEASE ORAL
Qty: 30 TABLET | Refills: 1 | Status: SHIPPED | OUTPATIENT
Start: 2022-05-23 | End: 2022-06-26 | Stop reason: HOSPADM

## 2022-05-27 DIAGNOSIS — E11.65 UNCONTROLLED TYPE 2 DIABETES MELLITUS WITH HYPERGLYCEMIA, WITHOUT LONG-TERM CURRENT USE OF INSULIN: ICD-10-CM

## 2022-05-27 RX ORDER — GLUCOSAM/CHON-MSM1/C/MANG/BOSW 500-416.6
TABLET ORAL
Qty: 100 EACH | Refills: 2 | Status: SHIPPED | OUTPATIENT
Start: 2022-05-27

## 2022-05-31 ENCOUNTER — HOSPITAL ENCOUNTER (INPATIENT)
Facility: HOSPITAL | Age: 57
LOS: 26 days | Discharge: NURSING, REHAB OR LONG-TERM CARE | End: 2022-06-26
Attending: EMERGENCY MEDICINE | Admitting: FAMILY MEDICINE

## 2022-05-31 ENCOUNTER — APPOINTMENT (OUTPATIENT)
Dept: GENERAL RADIOLOGY | Facility: HOSPITAL | Age: 57
End: 2022-05-31

## 2022-05-31 ENCOUNTER — APPOINTMENT (OUTPATIENT)
Dept: CARDIOLOGY | Facility: HOSPITAL | Age: 57
End: 2022-05-31

## 2022-05-31 DIAGNOSIS — J96.21 ACUTE ON CHRONIC RESPIRATORY FAILURE WITH HYPOXIA: ICD-10-CM

## 2022-05-31 DIAGNOSIS — G89.4 CHRONIC PAIN SYNDROME: ICD-10-CM

## 2022-05-31 DIAGNOSIS — J18.9 PNEUMONIA OF LEFT LOWER LOBE DUE TO INFECTIOUS ORGANISM: Primary | ICD-10-CM

## 2022-05-31 DIAGNOSIS — E11.42 DIABETIC PERIPHERAL NEUROPATHY: ICD-10-CM

## 2022-05-31 DIAGNOSIS — K42.9 HERNIA, UMBILICAL: ICD-10-CM

## 2022-05-31 DIAGNOSIS — J96.01 SEPSIS WITH ACUTE HYPOXIC RESPIRATORY FAILURE WITHOUT SEPTIC SHOCK, DUE TO UNSPECIFIED ORGANISM: ICD-10-CM

## 2022-05-31 DIAGNOSIS — M51.9 LUMBAR DISC DISEASE: ICD-10-CM

## 2022-05-31 DIAGNOSIS — A41.9 SEPSIS WITH ACUTE HYPOXIC RESPIRATORY FAILURE WITHOUT SEPTIC SHOCK, DUE TO UNSPECIFIED ORGANISM: ICD-10-CM

## 2022-05-31 DIAGNOSIS — R13.12 OROPHARYNGEAL DYSPHAGIA: ICD-10-CM

## 2022-05-31 DIAGNOSIS — M12.9 ARTHRITIS INVOLVING MULTIPLE SITES: ICD-10-CM

## 2022-05-31 DIAGNOSIS — Z96.641 HISTORY OF RIGHT HIP REPLACEMENT: ICD-10-CM

## 2022-05-31 DIAGNOSIS — R65.20 SEPSIS WITH ACUTE HYPOXIC RESPIRATORY FAILURE WITHOUT SEPTIC SHOCK, DUE TO UNSPECIFIED ORGANISM: ICD-10-CM

## 2022-05-31 PROBLEM — E87.5 HYPERKALEMIA: Status: RESOLVED | Noted: 2018-03-27 | Resolved: 2022-05-31

## 2022-05-31 PROBLEM — F11.90 CHRONIC NARCOTIC USE: Status: ACTIVE | Noted: 2022-05-31

## 2022-05-31 PROBLEM — I50.9 CHF (CONGESTIVE HEART FAILURE): Status: RESOLVED | Noted: 2018-03-27 | Resolved: 2022-05-31

## 2022-05-31 LAB
ALBUMIN SERPL-MCNC: 3.2 G/DL (ref 3.5–5.2)
ALBUMIN/GLOB SERPL: 0.6 G/DL
ALP SERPL-CCNC: 147 U/L (ref 39–117)
ALT SERPL W P-5'-P-CCNC: 9 U/L (ref 1–33)
AMPHET+METHAMPHET UR QL: POSITIVE
AMPHETAMINES UR QL: POSITIVE
ANION GAP SERPL CALCULATED.3IONS-SCNC: 13 MMOL/L (ref 5–15)
ARTERIAL PATENCY WRIST A: ABNORMAL
AST SERPL-CCNC: 12 U/L (ref 1–32)
ATMOSPHERIC PRESS: ABNORMAL MM[HG]
B PARAPERT DNA SPEC QL NAA+PROBE: NOT DETECTED
B PERT DNA SPEC QL NAA+PROBE: NOT DETECTED
BARBITURATES UR QL SCN: NEGATIVE
BASE EXCESS BLDA CALC-SCNC: 1.5 MMOL/L (ref 0–2)
BASOPHILS # BLD AUTO: 0.07 10*3/MM3 (ref 0–0.2)
BASOPHILS NFR BLD AUTO: 0.2 % (ref 0–1.5)
BDY SITE: ABNORMAL
BENZODIAZ UR QL SCN: NEGATIVE
BH CV ECHO MEAS - AO MAX PG: 10.7 MMHG
BH CV ECHO MEAS - AO MEAN PG: 5.8 MMHG
BH CV ECHO MEAS - AO ROOT DIAM: 3.2 CM
BH CV ECHO MEAS - AO V2 MAX: 163.9 CM/SEC
BH CV ECHO MEAS - AO V2 VTI: 39 CM
BH CV ECHO MEAS - AVA(I,D): 3.1 CM2
BH CV ECHO MEAS - EDV(CUBED): 125.7 ML
BH CV ECHO MEAS - EDV(MOD-SP2): 104 ML
BH CV ECHO MEAS - EDV(MOD-SP4): 90.3 ML
BH CV ECHO MEAS - EF(MOD-BP): 55.3 %
BH CV ECHO MEAS - EF(MOD-SP2): 53.7 %
BH CV ECHO MEAS - EF(MOD-SP4): 58.4 %
BH CV ECHO MEAS - ESV(CUBED): 44.9 ML
BH CV ECHO MEAS - ESV(MOD-SP2): 48.2 ML
BH CV ECHO MEAS - ESV(MOD-SP4): 37.6 ML
BH CV ECHO MEAS - FS: 29.1 %
BH CV ECHO MEAS - IVS/LVPW: 0.93 CM
BH CV ECHO MEAS - IVSD: 1.21 CM
BH CV ECHO MEAS - LA DIMENSION: 4.7 CM
BH CV ECHO MEAS - LAT PEAK E' VEL: 8.2 CM/SEC
BH CV ECHO MEAS - LV MASS(C)D: 248.3 GRAMS
BH CV ECHO MEAS - LV MAX PG: 5.8 MMHG
BH CV ECHO MEAS - LV MEAN PG: 2.9 MMHG
BH CV ECHO MEAS - LV V1 MAX: 120 CM/SEC
BH CV ECHO MEAS - LV V1 VTI: 29 CM
BH CV ECHO MEAS - LVIDD: 5 CM
BH CV ECHO MEAS - LVIDS: 3.6 CM
BH CV ECHO MEAS - LVOT AREA: 4.2 CM2
BH CV ECHO MEAS - LVOT DIAM: 2.32 CM
BH CV ECHO MEAS - LVPWD: 1.29 CM
BH CV ECHO MEAS - MED PEAK E' VEL: 5.8 CM/SEC
BH CV ECHO MEAS - MV A MAX VEL: 104.5 CM/SEC
BH CV ECHO MEAS - MV DEC SLOPE: 611.9 CM/SEC2
BH CV ECHO MEAS - MV DEC TIME: 0.16 MSEC
BH CV ECHO MEAS - MV E MAX VEL: 89.1 CM/SEC
BH CV ECHO MEAS - MV E/A: 0.85
BH CV ECHO MEAS - MV P1/2T: 66.8 MSEC
BH CV ECHO MEAS - MVA(P1/2T): 3.3 CM2
BH CV ECHO MEAS - PA ACC TIME: 0.21 SEC
BH CV ECHO MEAS - PA PR(ACCEL): -16.1 MMHG
BH CV ECHO MEAS - PA V2 MAX: 104.5 CM/SEC
BH CV ECHO MEAS - PI END-D VEL: 137.1 CM/SEC
BH CV ECHO MEAS - RAP SYSTOLE: 15 MMHG
BH CV ECHO MEAS - RVSP: 55 MMHG
BH CV ECHO MEAS - SV(LVOT): 122.3 ML
BH CV ECHO MEAS - SV(MOD-SP2): 55.8 ML
BH CV ECHO MEAS - SV(MOD-SP4): 52.7 ML
BH CV ECHO MEAS - TAPSE (>1.6): 2.44 CM
BH CV ECHO MEAS - TR MAX PG: 31.9 MMHG
BH CV ECHO MEAS - TR MAX VEL: 280.3 CM/SEC
BH CV ECHO MEASUREMENTS AVERAGE E/E' RATIO: 12.73
BH CV XLRA - RV BASE: 3.4 CM
BH CV XLRA - TDI S': 8.9 CM/SEC
BILIRUB SERPL-MCNC: 1.1 MG/DL (ref 0–1.2)
BILIRUB UR QL STRIP: NEGATIVE
BODY TEMPERATURE: 37 C
BUN SERPL-MCNC: 17 MG/DL (ref 6–20)
BUN/CREAT SERPL: 17.7 (ref 7–25)
BUPRENORPHINE SERPL-MCNC: NEGATIVE NG/ML
C PNEUM DNA NPH QL NAA+NON-PROBE: NOT DETECTED
CALCIUM SPEC-SCNC: 9.3 MG/DL (ref 8.6–10.5)
CANNABINOIDS SERPL QL: NEGATIVE
CHLORIDE SERPL-SCNC: 97 MMOL/L (ref 98–107)
CLARITY UR: CLEAR
CO2 BLDA-SCNC: 26.9 MMOL/L (ref 22–33)
CO2 SERPL-SCNC: 24 MMOL/L (ref 22–29)
COCAINE UR QL: NEGATIVE
COHGB MFR BLD: 2.5 % (ref 0–2)
COLOR UR: YELLOW
CREAT SERPL-MCNC: 0.96 MG/DL (ref 0.57–1)
D-LACTATE SERPL-SCNC: 0.8 MMOL/L (ref 0.5–2)
DEPRECATED RDW RBC AUTO: 49.6 FL (ref 37–54)
EGFRCR SERPLBLD CKD-EPI 2021: 69.2 ML/MIN/1.73
EOSINOPHIL # BLD AUTO: 0 10*3/MM3 (ref 0–0.4)
EOSINOPHIL NFR BLD AUTO: 0 % (ref 0.3–6.2)
EPAP: 0
ERYTHROCYTE [DISTWIDTH] IN BLOOD BY AUTOMATED COUNT: 16.1 % (ref 12.3–15.4)
FLUAV SUBTYP SPEC NAA+PROBE: NOT DETECTED
FLUBV RNA ISLT QL NAA+PROBE: NOT DETECTED
GLOBULIN UR ELPH-MCNC: 5.1 GM/DL
GLUCOSE BLDC GLUCOMTR-MCNC: 223 MG/DL (ref 70–130)
GLUCOSE BLDC GLUCOMTR-MCNC: 228 MG/DL (ref 70–130)
GLUCOSE BLDC GLUCOMTR-MCNC: 242 MG/DL (ref 70–130)
GLUCOSE BLDC GLUCOMTR-MCNC: 245 MG/DL (ref 70–130)
GLUCOSE SERPL-MCNC: 170 MG/DL (ref 65–99)
GLUCOSE UR STRIP-MCNC: NEGATIVE MG/DL
HADV DNA SPEC NAA+PROBE: NOT DETECTED
HCO3 BLDA-SCNC: 25.7 MMOL/L (ref 20–26)
HCOV 229E RNA SPEC QL NAA+PROBE: NOT DETECTED
HCOV HKU1 RNA SPEC QL NAA+PROBE: NOT DETECTED
HCOV NL63 RNA SPEC QL NAA+PROBE: NOT DETECTED
HCOV OC43 RNA SPEC QL NAA+PROBE: NOT DETECTED
HCT VFR BLD AUTO: 39.4 % (ref 34–46.6)
HCT VFR BLD CALC: 39 % (ref 38–51)
HGB BLD-MCNC: 12.6 G/DL (ref 12–15.9)
HGB BLDA-MCNC: 12.7 G/DL (ref 14–18)
HGB UR QL STRIP.AUTO: NEGATIVE
HMPV RNA NPH QL NAA+NON-PROBE: NOT DETECTED
HOLD SPECIMEN: NORMAL
HPIV1 RNA ISLT QL NAA+PROBE: NOT DETECTED
HPIV2 RNA SPEC QL NAA+PROBE: NOT DETECTED
HPIV3 RNA NPH QL NAA+PROBE: NOT DETECTED
HPIV4 P GENE NPH QL NAA+PROBE: NOT DETECTED
IMM GRANULOCYTES # BLD AUTO: 0.89 10*3/MM3 (ref 0–0.05)
IMM GRANULOCYTES NFR BLD AUTO: 2.3 % (ref 0–0.5)
INHALED O2 CONCENTRATION: 100 %
IPAP: 0
KETONES UR QL STRIP: NEGATIVE
L PNEUMO1 AG UR QL IA: NEGATIVE
LEFT ATRIUM VOLUME INDEX: 32.8 ML/M2
LEUKOCYTE ESTERASE UR QL STRIP.AUTO: NEGATIVE
LYMPHOCYTES # BLD AUTO: 1.26 10*3/MM3 (ref 0.7–3.1)
LYMPHOCYTES NFR BLD AUTO: 3.3 % (ref 19.6–45.3)
M PNEUMO IGG SER IA-ACNC: NOT DETECTED
MAXIMAL PREDICTED HEART RATE: 163 BPM
MCH RBC QN AUTO: 26.6 PG (ref 26.6–33)
MCHC RBC AUTO-ENTMCNC: 32 G/DL (ref 31.5–35.7)
MCV RBC AUTO: 83.1 FL (ref 79–97)
METHADONE UR QL SCN: NEGATIVE
METHGB BLD QL: 0 % (ref 0–1.5)
MODALITY: ABNORMAL
MONOCYTES # BLD AUTO: 1.67 10*3/MM3 (ref 0.1–0.9)
MONOCYTES NFR BLD AUTO: 4.4 % (ref 5–12)
MRSA DNA SPEC QL NAA+PROBE: POSITIVE
NEUTROPHILS NFR BLD AUTO: 34.4 10*3/MM3 (ref 1.7–7)
NEUTROPHILS NFR BLD AUTO: 89.8 % (ref 42.7–76)
NITRITE UR QL STRIP: NEGATIVE
NOTE: ABNORMAL
NRBC BLD AUTO-RTO: 0 /100 WBC (ref 0–0.2)
NT-PROBNP SERPL-MCNC: 2282 PG/ML (ref 0–900)
OPIATES UR QL: NEGATIVE
OXYCODONE UR QL SCN: NEGATIVE
OXYHGB MFR BLDV: 96 % (ref 94–99)
PAW @ PEAK INSP FLOW SETTING VENT: 0 CMH2O
PCO2 BLDA: 38.2 MM HG (ref 35–45)
PCO2 TEMP ADJ BLD: 38.2 MM HG (ref 35–45)
PCP UR QL SCN: NEGATIVE
PH BLDA: 7.44 PH UNITS (ref 7.35–7.45)
PH UR STRIP.AUTO: 6 [PH] (ref 5–8)
PH, TEMP CORRECTED: 7.44 PH UNITS
PLAT MORPH BLD: NORMAL
PLATELET # BLD AUTO: 368 10*3/MM3 (ref 140–450)
PMV BLD AUTO: 10 FL (ref 6–12)
PO2 BLDA: 99 MM HG (ref 83–108)
PO2 TEMP ADJ BLD: 99 MM HG (ref 83–108)
POTASSIUM SERPL-SCNC: 4.4 MMOL/L (ref 3.5–5.2)
PROCALCITONIN SERPL-MCNC: 0.35 NG/ML (ref 0–0.25)
PROPOXYPH UR QL: NEGATIVE
PROT SERPL-MCNC: 8.3 G/DL (ref 6–8.5)
PROT UR QL STRIP: NEGATIVE
QT INTERVAL: 364 MS
QTC INTERVAL: 459 MS
RBC # BLD AUTO: 4.74 10*6/MM3 (ref 3.77–5.28)
RBC MORPH BLD: NORMAL
RHINOVIRUS RNA SPEC NAA+PROBE: NOT DETECTED
RSV RNA NPH QL NAA+NON-PROBE: NOT DETECTED
S PNEUM AG SPEC QL LA: NEGATIVE
SARS-COV-2 RNA NPH QL NAA+NON-PROBE: NOT DETECTED
SODIUM SERPL-SCNC: 134 MMOL/L (ref 136–145)
SP GR UR STRIP: 1.01 (ref 1–1.03)
STRESS TARGET HR: 139 BPM
TOTAL RATE: 0 BREATHS/MINUTE
TRICYCLICS UR QL SCN: NEGATIVE
TROPONIN T SERPL-MCNC: <0.01 NG/ML (ref 0–0.03)
UROBILINOGEN UR QL STRIP: NORMAL
VENTILATOR MODE: ABNORMAL
WBC MORPH BLD: NORMAL
WBC NRBC COR # BLD: 38.29 10*3/MM3 (ref 3.4–10.8)
WHOLE BLOOD HOLD COAG: NORMAL
WHOLE BLOOD HOLD SPECIMEN: NORMAL

## 2022-05-31 PROCEDURE — 81003 URINALYSIS AUTO W/O SCOPE: CPT | Performed by: EMERGENCY MEDICINE

## 2022-05-31 PROCEDURE — 87641 MR-STAPH DNA AMP PROBE: CPT

## 2022-05-31 PROCEDURE — 94799 UNLISTED PULMONARY SVC/PX: CPT

## 2022-05-31 PROCEDURE — 63710000001 INSULIN DETEMIR PER 5 UNITS: Performed by: INTERNAL MEDICINE

## 2022-05-31 PROCEDURE — 51702 INSERT TEMP BLADDER CATH: CPT

## 2022-05-31 PROCEDURE — 80053 COMPREHEN METABOLIC PANEL: CPT | Performed by: EMERGENCY MEDICINE

## 2022-05-31 PROCEDURE — 82805 BLOOD GASES W/O2 SATURATION: CPT

## 2022-05-31 PROCEDURE — 36600 WITHDRAWAL OF ARTERIAL BLOOD: CPT

## 2022-05-31 PROCEDURE — 84484 ASSAY OF TROPONIN QUANT: CPT | Performed by: EMERGENCY MEDICINE

## 2022-05-31 PROCEDURE — 82375 ASSAY CARBOXYHB QUANT: CPT

## 2022-05-31 PROCEDURE — 25010000002 METHYLPREDNISOLONE PER 125 MG: Performed by: INTERNAL MEDICINE

## 2022-05-31 PROCEDURE — 25010000002 FUROSEMIDE PER 20 MG: Performed by: EMERGENCY MEDICINE

## 2022-05-31 PROCEDURE — 93005 ELECTROCARDIOGRAM TRACING: CPT | Performed by: NURSE PRACTITIONER

## 2022-05-31 PROCEDURE — 0202U NFCT DS 22 TRGT SARS-COV-2: CPT | Performed by: EMERGENCY MEDICINE

## 2022-05-31 PROCEDURE — 93306 TTE W/DOPPLER COMPLETE: CPT

## 2022-05-31 PROCEDURE — 82962 GLUCOSE BLOOD TEST: CPT

## 2022-05-31 PROCEDURE — 93005 ELECTROCARDIOGRAM TRACING: CPT | Performed by: EMERGENCY MEDICINE

## 2022-05-31 PROCEDURE — 25010000002 PIPERACILLIN SOD-TAZOBACTAM PER 1 G: Performed by: EMERGENCY MEDICINE

## 2022-05-31 PROCEDURE — 83050 HGB METHEMOGLOBIN QUAN: CPT

## 2022-05-31 PROCEDURE — 85025 COMPLETE CBC W/AUTO DIFF WBC: CPT | Performed by: EMERGENCY MEDICINE

## 2022-05-31 PROCEDURE — 85007 BL SMEAR W/DIFF WBC COUNT: CPT | Performed by: EMERGENCY MEDICINE

## 2022-05-31 PROCEDURE — 99291 CRITICAL CARE FIRST HOUR: CPT | Performed by: INTERNAL MEDICINE

## 2022-05-31 PROCEDURE — 93306 TTE W/DOPPLER COMPLETE: CPT | Performed by: INTERNAL MEDICINE

## 2022-05-31 PROCEDURE — 94640 AIRWAY INHALATION TREATMENT: CPT

## 2022-05-31 PROCEDURE — 80306 DRUG TEST PRSMV INSTRMNT: CPT | Performed by: INTERNAL MEDICINE

## 2022-05-31 PROCEDURE — 63710000001 INSULIN REGULAR HUMAN PER 5 UNITS: Performed by: INTERNAL MEDICINE

## 2022-05-31 PROCEDURE — 25010000002 ENOXAPARIN PER 10 MG: Performed by: INTERNAL MEDICINE

## 2022-05-31 PROCEDURE — 84145 PROCALCITONIN (PCT): CPT | Performed by: INTERNAL MEDICINE

## 2022-05-31 PROCEDURE — 83880 ASSAY OF NATRIURETIC PEPTIDE: CPT | Performed by: EMERGENCY MEDICINE

## 2022-05-31 PROCEDURE — 71045 X-RAY EXAM CHEST 1 VIEW: CPT

## 2022-05-31 PROCEDURE — 87040 BLOOD CULTURE FOR BACTERIA: CPT | Performed by: EMERGENCY MEDICINE

## 2022-05-31 PROCEDURE — 83605 ASSAY OF LACTIC ACID: CPT | Performed by: EMERGENCY MEDICINE

## 2022-05-31 PROCEDURE — 25010000002 PIPERACILLIN SOD-TAZOBACTAM PER 1 G

## 2022-05-31 PROCEDURE — 25010000002 VANCOMYCIN 10 G RECONSTITUTED SOLUTION: Performed by: EMERGENCY MEDICINE

## 2022-05-31 PROCEDURE — 87449 NOS EACH ORGANISM AG IA: CPT | Performed by: INTERNAL MEDICINE

## 2022-05-31 PROCEDURE — 93010 ELECTROCARDIOGRAM REPORT: CPT | Performed by: INTERNAL MEDICINE

## 2022-05-31 PROCEDURE — 99285 EMERGENCY DEPT VISIT HI MDM: CPT

## 2022-05-31 RX ORDER — AMLODIPINE BESYLATE 10 MG/1
10 TABLET ORAL
Status: DISCONTINUED | OUTPATIENT
Start: 2022-06-01 | End: 2022-06-14

## 2022-05-31 RX ORDER — TERAZOSIN 2 MG/1
2 CAPSULE ORAL NIGHTLY
Status: DISCONTINUED | OUTPATIENT
Start: 2022-06-01 | End: 2022-06-14

## 2022-05-31 RX ORDER — SODIUM CHLORIDE 0.9 % (FLUSH) 0.9 %
10 SYRINGE (ML) INJECTION AS NEEDED
Status: DISCONTINUED | OUTPATIENT
Start: 2022-05-31 | End: 2022-06-04

## 2022-05-31 RX ORDER — IPRATROPIUM BROMIDE AND ALBUTEROL SULFATE 2.5; .5 MG/3ML; MG/3ML
3 SOLUTION RESPIRATORY (INHALATION)
Status: DISCONTINUED | OUTPATIENT
Start: 2022-05-31 | End: 2022-06-15

## 2022-05-31 RX ORDER — FAMOTIDINE 20 MG/1
20 TABLET, FILM COATED ORAL 2 TIMES DAILY
Status: DISCONTINUED | OUTPATIENT
Start: 2022-06-01 | End: 2022-06-12

## 2022-05-31 RX ORDER — ENOXAPARIN SODIUM 100 MG/ML
40 INJECTION SUBCUTANEOUS EVERY 24 HOURS
Status: DISCONTINUED | OUTPATIENT
Start: 2022-05-31 | End: 2022-06-02

## 2022-05-31 RX ORDER — GABAPENTIN 300 MG/1
300 CAPSULE ORAL EVERY 8 HOURS SCHEDULED
Status: DISCONTINUED | OUTPATIENT
Start: 2022-05-31 | End: 2022-06-14

## 2022-05-31 RX ORDER — AMOXICILLIN 250 MG
2 CAPSULE ORAL 2 TIMES DAILY
Status: DISCONTINUED | OUTPATIENT
Start: 2022-05-31 | End: 2022-06-14

## 2022-05-31 RX ORDER — BISACODYL 10 MG
10 SUPPOSITORY, RECTAL RECTAL DAILY PRN
Status: DISCONTINUED | OUTPATIENT
Start: 2022-05-31 | End: 2022-06-14

## 2022-05-31 RX ORDER — DEXTROSE MONOHYDRATE 25 G/50ML
25 INJECTION, SOLUTION INTRAVENOUS
Status: DISCONTINUED | OUTPATIENT
Start: 2022-05-31 | End: 2022-06-26 | Stop reason: HOSPADM

## 2022-05-31 RX ORDER — NITROGLYCERIN 0.4 MG/1
0.4 TABLET SUBLINGUAL
Status: DISCONTINUED | OUTPATIENT
Start: 2022-05-31 | End: 2022-06-26 | Stop reason: HOSPADM

## 2022-05-31 RX ORDER — SODIUM CHLORIDE 0.9 % (FLUSH) 0.9 %
10 SYRINGE (ML) INJECTION EVERY 12 HOURS SCHEDULED
Status: DISCONTINUED | OUTPATIENT
Start: 2022-05-31 | End: 2022-06-26 | Stop reason: HOSPADM

## 2022-05-31 RX ORDER — BUSPIRONE HYDROCHLORIDE 10 MG/1
10 TABLET ORAL 2 TIMES DAILY
Status: DISCONTINUED | OUTPATIENT
Start: 2022-06-01 | End: 2022-06-14

## 2022-05-31 RX ORDER — DIAZEPAM 2 MG/1
2 TABLET ORAL EVERY 12 HOURS PRN
Status: DISCONTINUED | OUTPATIENT
Start: 2022-05-31 | End: 2022-06-01

## 2022-05-31 RX ORDER — SODIUM CHLORIDE 0.9 % (FLUSH) 0.9 %
10 SYRINGE (ML) INJECTION AS NEEDED
Status: DISCONTINUED | OUTPATIENT
Start: 2022-05-31 | End: 2022-06-09

## 2022-05-31 RX ORDER — FAMOTIDINE 20 MG/1
20 TABLET, FILM COATED ORAL 2 TIMES DAILY
Status: DISCONTINUED | OUTPATIENT
Start: 2022-05-31 | End: 2022-05-31

## 2022-05-31 RX ORDER — FUROSEMIDE 10 MG/ML
80 INJECTION INTRAMUSCULAR; INTRAVENOUS ONCE
Status: COMPLETED | OUTPATIENT
Start: 2022-05-31 | End: 2022-05-31

## 2022-05-31 RX ORDER — POLYETHYLENE GLYCOL 3350 17 G/17G
17 POWDER, FOR SOLUTION ORAL DAILY PRN
Status: DISCONTINUED | OUTPATIENT
Start: 2022-05-31 | End: 2022-06-14

## 2022-05-31 RX ORDER — METHYLPREDNISOLONE SODIUM SUCCINATE 125 MG/2ML
60 INJECTION, POWDER, LYOPHILIZED, FOR SOLUTION INTRAMUSCULAR; INTRAVENOUS
Status: DISCONTINUED | OUTPATIENT
Start: 2022-05-31 | End: 2022-06-03

## 2022-05-31 RX ORDER — ESCITALOPRAM OXALATE 20 MG/1
20 TABLET ORAL DAILY
Status: DISCONTINUED | OUTPATIENT
Start: 2022-06-01 | End: 2022-06-14

## 2022-05-31 RX ORDER — NICOTINE POLACRILEX 4 MG
15 LOZENGE BUCCAL
Status: DISCONTINUED | OUTPATIENT
Start: 2022-05-31 | End: 2022-06-14

## 2022-05-31 RX ORDER — BUDESONIDE 0.5 MG/2ML
0.5 INHALANT ORAL
Status: DISCONTINUED | OUTPATIENT
Start: 2022-05-31 | End: 2022-06-26 | Stop reason: HOSPADM

## 2022-05-31 RX ORDER — BISACODYL 5 MG/1
5 TABLET, DELAYED RELEASE ORAL DAILY PRN
Status: DISCONTINUED | OUTPATIENT
Start: 2022-05-31 | End: 2022-06-14

## 2022-05-31 RX ORDER — ARIPIPRAZOLE 10 MG/1
5 TABLET ORAL DAILY
Status: DISCONTINUED | OUTPATIENT
Start: 2022-06-01 | End: 2022-06-14

## 2022-05-31 RX ORDER — DULOXETIN HYDROCHLORIDE 60 MG/1
120 CAPSULE, DELAYED RELEASE ORAL DAILY
Status: DISCONTINUED | OUTPATIENT
Start: 2022-06-01 | End: 2022-06-26 | Stop reason: HOSPADM

## 2022-05-31 RX ADMIN — IPRATROPIUM BROMIDE AND ALBUTEROL SULFATE 3 ML: 2.5; .5 SOLUTION RESPIRATORY (INHALATION) at 19:16

## 2022-05-31 RX ADMIN — FAMOTIDINE 20 MG: 20 TABLET ORAL at 15:22

## 2022-05-31 RX ADMIN — NITROGLYCERIN 0.4 MG: 0.4 TABLET SUBLINGUAL at 23:52

## 2022-05-31 RX ADMIN — Medication 10 ML: at 20:04

## 2022-05-31 RX ADMIN — TAZOBACTAM SODIUM AND PIPERACILLIN SODIUM 4.5 G: 500; 4 INJECTION, SOLUTION INTRAVENOUS at 16:28

## 2022-05-31 RX ADMIN — INSULIN HUMAN 5 UNITS: 100 INJECTION, SOLUTION PARENTERAL at 17:43

## 2022-05-31 RX ADMIN — BUDESONIDE 0.5 MG: 0.5 SUSPENSION RESPIRATORY (INHALATION) at 19:16

## 2022-05-31 RX ADMIN — Medication 10 ML: at 13:50

## 2022-05-31 RX ADMIN — BUDESONIDE 0.5 MG: 0.5 SUSPENSION RESPIRATORY (INHALATION) at 13:26

## 2022-05-31 RX ADMIN — ENOXAPARIN SODIUM 40 MG: 40 INJECTION SUBCUTANEOUS at 12:09

## 2022-05-31 RX ADMIN — TAZOBACTAM SODIUM AND PIPERACILLIN SODIUM 4.5 G: 500; 4 INJECTION, SOLUTION INTRAVENOUS at 09:29

## 2022-05-31 RX ADMIN — METHYLPREDNISOLONE SODIUM SUCCINATE 60 MG: 125 INJECTION, POWDER, FOR SOLUTION INTRAMUSCULAR; INTRAVENOUS at 15:21

## 2022-05-31 RX ADMIN — FUROSEMIDE 80 MG: 10 INJECTION, SOLUTION INTRAMUSCULAR; INTRAVENOUS at 08:58

## 2022-05-31 RX ADMIN — IPRATROPIUM BROMIDE AND ALBUTEROL SULFATE 3 ML: 2.5; .5 SOLUTION RESPIRATORY (INHALATION) at 13:25

## 2022-05-31 RX ADMIN — TAZOBACTAM SODIUM AND PIPERACILLIN SODIUM 4.5 G: 500; 4 INJECTION, SOLUTION INTRAVENOUS at 23:37

## 2022-05-31 RX ADMIN — INSULIN DETEMIR 25 UNITS: 100 INJECTION, SOLUTION SUBCUTANEOUS at 16:18

## 2022-05-31 RX ADMIN — VANCOMYCIN HYDROCHLORIDE 2500 MG: 10 INJECTION, POWDER, LYOPHILIZED, FOR SOLUTION INTRAVENOUS at 10:02

## 2022-05-31 RX ADMIN — INSULIN HUMAN 5 UNITS: 100 INJECTION, SOLUTION PARENTERAL at 23:36

## 2022-06-01 ENCOUNTER — APPOINTMENT (OUTPATIENT)
Dept: GENERAL RADIOLOGY | Facility: HOSPITAL | Age: 57
End: 2022-06-01

## 2022-06-01 LAB
ANION GAP SERPL CALCULATED.3IONS-SCNC: 11 MMOL/L (ref 5–15)
BUN SERPL-MCNC: 22 MG/DL (ref 6–20)
BUN/CREAT SERPL: 25 (ref 7–25)
CALCIUM SPEC-SCNC: 9.5 MG/DL (ref 8.6–10.5)
CHLORIDE SERPL-SCNC: 94 MMOL/L (ref 98–107)
CK SERPL-CCNC: 14 U/L (ref 20–180)
CO2 SERPL-SCNC: 31 MMOL/L (ref 22–29)
CREAT SERPL-MCNC: 0.88 MG/DL (ref 0.57–1)
DEPRECATED RDW RBC AUTO: 47.5 FL (ref 37–54)
EGFRCR SERPLBLD CKD-EPI 2021: 76.8 ML/MIN/1.73
ERYTHROCYTE [DISTWIDTH] IN BLOOD BY AUTOMATED COUNT: 15.7 % (ref 12.3–15.4)
GLUCOSE BLDC GLUCOMTR-MCNC: 184 MG/DL (ref 70–130)
GLUCOSE BLDC GLUCOMTR-MCNC: 198 MG/DL (ref 70–130)
GLUCOSE BLDC GLUCOMTR-MCNC: 209 MG/DL (ref 70–130)
GLUCOSE BLDC GLUCOMTR-MCNC: 210 MG/DL (ref 70–130)
GLUCOSE BLDC GLUCOMTR-MCNC: 212 MG/DL (ref 70–130)
GLUCOSE BLDC GLUCOMTR-MCNC: 221 MG/DL (ref 70–130)
GLUCOSE SERPL-MCNC: 214 MG/DL (ref 65–99)
HCT VFR BLD AUTO: 40.4 % (ref 34–46.6)
HGB BLD-MCNC: 12.8 G/DL (ref 12–15.9)
MAGNESIUM SERPL-MCNC: 2.3 MG/DL (ref 1.6–2.6)
MCH RBC QN AUTO: 26.4 PG (ref 26.6–33)
MCHC RBC AUTO-ENTMCNC: 31.7 G/DL (ref 31.5–35.7)
MCV RBC AUTO: 83.5 FL (ref 79–97)
PHOSPHATE SERPL-MCNC: 2.8 MG/DL (ref 2.5–4.5)
PLATELET # BLD AUTO: 335 10*3/MM3 (ref 140–450)
PMV BLD AUTO: 10 FL (ref 6–12)
POTASSIUM SERPL-SCNC: 3.8 MMOL/L (ref 3.5–5.2)
QT INTERVAL: 404 MS
QT INTERVAL: 440 MS
QTC INTERVAL: 463 MS
QTC INTERVAL: 478 MS
RBC # BLD AUTO: 4.84 10*6/MM3 (ref 3.77–5.28)
SODIUM SERPL-SCNC: 136 MMOL/L (ref 136–145)
TROPONIN T SERPL-MCNC: <0.01 NG/ML (ref 0–0.03)
TROPONIN T SERPL-MCNC: <0.01 NG/ML (ref 0–0.03)
VANCOMYCIN SERPL-MCNC: 9.4 MCG/ML (ref 5–40)
WBC NRBC COR # BLD: 29.44 10*3/MM3 (ref 3.4–10.8)

## 2022-06-01 PROCEDURE — 94799 UNLISTED PULMONARY SVC/PX: CPT

## 2022-06-01 PROCEDURE — 25010000002 PIPERACILLIN SOD-TAZOBACTAM PER 1 G

## 2022-06-01 PROCEDURE — 84484 ASSAY OF TROPONIN QUANT: CPT | Performed by: NURSE PRACTITIONER

## 2022-06-01 PROCEDURE — 25010000002 DIAZEPAM PER 5 MG: Performed by: INTERNAL MEDICINE

## 2022-06-01 PROCEDURE — 71045 X-RAY EXAM CHEST 1 VIEW: CPT

## 2022-06-01 PROCEDURE — 87186 SC STD MICRODIL/AGAR DIL: CPT | Performed by: INTERNAL MEDICINE

## 2022-06-01 PROCEDURE — 25010000002 ENOXAPARIN PER 10 MG: Performed by: INTERNAL MEDICINE

## 2022-06-01 PROCEDURE — 80202 ASSAY OF VANCOMYCIN: CPT

## 2022-06-01 PROCEDURE — 80048 BASIC METABOLIC PNL TOTAL CA: CPT | Performed by: INTERNAL MEDICINE

## 2022-06-01 PROCEDURE — 25010000002 VANCOMYCIN 10 G RECONSTITUTED SOLUTION

## 2022-06-01 PROCEDURE — 83735 ASSAY OF MAGNESIUM: CPT | Performed by: INTERNAL MEDICINE

## 2022-06-01 PROCEDURE — 85027 COMPLETE CBC AUTOMATED: CPT | Performed by: INTERNAL MEDICINE

## 2022-06-01 PROCEDURE — 63710000001 INSULIN REGULAR HUMAN PER 5 UNITS: Performed by: INTERNAL MEDICINE

## 2022-06-01 PROCEDURE — 93010 ELECTROCARDIOGRAM REPORT: CPT | Performed by: INTERNAL MEDICINE

## 2022-06-01 PROCEDURE — 87205 SMEAR GRAM STAIN: CPT | Performed by: INTERNAL MEDICINE

## 2022-06-01 PROCEDURE — 25010000002 VANCOMYCIN PER 500 MG

## 2022-06-01 PROCEDURE — 93005 ELECTROCARDIOGRAM TRACING: CPT | Performed by: INTERNAL MEDICINE

## 2022-06-01 PROCEDURE — 99233 SBSQ HOSP IP/OBS HIGH 50: CPT | Performed by: INTERNAL MEDICINE

## 2022-06-01 PROCEDURE — 94660 CPAP INITIATION&MGMT: CPT

## 2022-06-01 PROCEDURE — 82550 ASSAY OF CK (CPK): CPT | Performed by: NURSE PRACTITIONER

## 2022-06-01 PROCEDURE — 25010000002 DIAZEPAM PER 5 MG: Performed by: NURSE PRACTITIONER

## 2022-06-01 PROCEDURE — 63710000001 INSULIN DETEMIR PER 5 UNITS: Performed by: INTERNAL MEDICINE

## 2022-06-01 PROCEDURE — 87070 CULTURE OTHR SPECIMN AEROBIC: CPT | Performed by: INTERNAL MEDICINE

## 2022-06-01 PROCEDURE — 25010000002 METHYLPREDNISOLONE PER 125 MG: Performed by: INTERNAL MEDICINE

## 2022-06-01 PROCEDURE — 84100 ASSAY OF PHOSPHORUS: CPT | Performed by: INTERNAL MEDICINE

## 2022-06-01 PROCEDURE — 82962 GLUCOSE BLOOD TEST: CPT

## 2022-06-01 PROCEDURE — 87147 CULTURE TYPE IMMUNOLOGIC: CPT | Performed by: INTERNAL MEDICINE

## 2022-06-01 RX ORDER — LABETALOL HYDROCHLORIDE 5 MG/ML
20 INJECTION, SOLUTION INTRAVENOUS EVERY 4 HOURS PRN
Status: DISCONTINUED | OUTPATIENT
Start: 2022-06-01 | End: 2022-06-26 | Stop reason: HOSPADM

## 2022-06-01 RX ORDER — DIAZEPAM 5 MG/ML
2 INJECTION, SOLUTION INTRAMUSCULAR; INTRAVENOUS EVERY 6 HOURS PRN
Status: DISCONTINUED | OUTPATIENT
Start: 2022-06-01 | End: 2022-06-08

## 2022-06-01 RX ORDER — TRAMADOL HYDROCHLORIDE 50 MG/1
50 TABLET ORAL ONCE
Status: COMPLETED | OUTPATIENT
Start: 2022-06-01 | End: 2022-06-01

## 2022-06-01 RX ORDER — VANCOMYCIN HYDROCHLORIDE 1 G/200ML
1000 INJECTION, SOLUTION INTRAVENOUS EVERY 12 HOURS SCHEDULED
Status: DISCONTINUED | OUTPATIENT
Start: 2022-06-01 | End: 2022-06-05

## 2022-06-01 RX ORDER — DIAZEPAM 5 MG/ML
2 INJECTION, SOLUTION INTRAMUSCULAR; INTRAVENOUS EVERY 12 HOURS PRN
Status: DISCONTINUED | OUTPATIENT
Start: 2022-06-01 | End: 2022-06-01

## 2022-06-01 RX ORDER — BUMETANIDE 0.25 MG/ML
2 INJECTION INTRAMUSCULAR; INTRAVENOUS ONCE
Status: COMPLETED | OUTPATIENT
Start: 2022-06-01 | End: 2022-06-01

## 2022-06-01 RX ORDER — LISINOPRIL 10 MG/1
10 TABLET ORAL
Status: DISCONTINUED | OUTPATIENT
Start: 2022-06-02 | End: 2022-06-14

## 2022-06-01 RX ADMIN — BUDESONIDE 0.5 MG: 0.5 SUSPENSION RESPIRATORY (INHALATION) at 08:30

## 2022-06-01 RX ADMIN — INSULIN DETEMIR 25 UNITS: 100 INJECTION, SOLUTION SUBCUTANEOUS at 20:26

## 2022-06-01 RX ADMIN — TAZOBACTAM SODIUM AND PIPERACILLIN SODIUM 4.5 G: 500; 4 INJECTION, SOLUTION INTRAVENOUS at 16:07

## 2022-06-01 RX ADMIN — IPRATROPIUM BROMIDE AND ALBUTEROL SULFATE 3 ML: 2.5; .5 SOLUTION RESPIRATORY (INHALATION) at 12:50

## 2022-06-01 RX ADMIN — VANCOMYCIN HYDROCHLORIDE 1000 MG: 1 INJECTION, SOLUTION INTRAVENOUS at 21:04

## 2022-06-01 RX ADMIN — TAZOBACTAM SODIUM AND PIPERACILLIN SODIUM 4.5 G: 500; 4 INJECTION, SOLUTION INTRAVENOUS at 23:49

## 2022-06-01 RX ADMIN — DIAZEPAM 2 MG: 5 INJECTION, SOLUTION INTRAMUSCULAR; INTRAVENOUS at 13:36

## 2022-06-01 RX ADMIN — TAZOBACTAM SODIUM AND PIPERACILLIN SODIUM 4.5 G: 500; 4 INJECTION, SOLUTION INTRAVENOUS at 08:24

## 2022-06-01 RX ADMIN — INSULIN HUMAN 5 UNITS: 100 INJECTION, SOLUTION PARENTERAL at 23:49

## 2022-06-01 RX ADMIN — IPRATROPIUM BROMIDE AND ALBUTEROL SULFATE 3 ML: 2.5; .5 SOLUTION RESPIRATORY (INHALATION) at 08:30

## 2022-06-01 RX ADMIN — TRAMADOL HYDROCHLORIDE 50 MG: 50 TABLET, COATED ORAL at 12:17

## 2022-06-01 RX ADMIN — AMLODIPINE BESYLATE 10 MG: 10 TABLET ORAL at 08:24

## 2022-06-01 RX ADMIN — Medication 10 ML: at 08:25

## 2022-06-01 RX ADMIN — ENOXAPARIN SODIUM 40 MG: 40 INJECTION SUBCUTANEOUS at 12:14

## 2022-06-01 RX ADMIN — INSULIN HUMAN 3 UNITS: 100 INJECTION, SOLUTION PARENTERAL at 12:14

## 2022-06-01 RX ADMIN — INSULIN HUMAN 5 UNITS: 100 INJECTION, SOLUTION PARENTERAL at 17:28

## 2022-06-01 RX ADMIN — BUSPIRONE HYDROCHLORIDE 10 MG: 10 TABLET ORAL at 08:24

## 2022-06-01 RX ADMIN — ESCITALOPRAM OXALATE 20 MG: 20 TABLET ORAL at 08:24

## 2022-06-01 RX ADMIN — TERAZOSIN HYDROCHLORIDE 2 MG: 2 CAPSULE ORAL at 20:17

## 2022-06-01 RX ADMIN — DIAZEPAM 2 MG: 5 INJECTION, SOLUTION INTRAMUSCULAR; INTRAVENOUS at 21:34

## 2022-06-01 RX ADMIN — METHYLPREDNISOLONE SODIUM SUCCINATE 60 MG: 125 INJECTION, POWDER, FOR SOLUTION INTRAMUSCULAR; INTRAVENOUS at 08:24

## 2022-06-01 RX ADMIN — VANCOMYCIN HYDROCHLORIDE 1250 MG: 10 INJECTION, POWDER, LYOPHILIZED, FOR SOLUTION INTRAVENOUS at 03:33

## 2022-06-01 RX ADMIN — Medication 10 ML: at 20:17

## 2022-06-01 RX ADMIN — GABAPENTIN 300 MG: 300 CAPSULE ORAL at 05:15

## 2022-06-01 RX ADMIN — TRAMADOL HYDROCHLORIDE 50 MG: 50 TABLET, COATED ORAL at 05:15

## 2022-06-01 RX ADMIN — BUSPIRONE HYDROCHLORIDE 10 MG: 10 TABLET ORAL at 20:17

## 2022-06-01 RX ADMIN — DULOXETINE HYDROCHLORIDE 120 MG: 60 CAPSULE, DELAYED RELEASE ORAL at 08:24

## 2022-06-01 RX ADMIN — INSULIN DETEMIR 25 UNITS: 100 INJECTION, SOLUTION SUBCUTANEOUS at 08:25

## 2022-06-01 RX ADMIN — IPRATROPIUM BROMIDE AND ALBUTEROL SULFATE 3 ML: 2.5; .5 SOLUTION RESPIRATORY (INHALATION) at 19:08

## 2022-06-01 RX ADMIN — SENNOSIDES AND DOCUSATE SODIUM 2 TABLET: 50; 8.6 TABLET ORAL at 08:24

## 2022-06-01 RX ADMIN — LABETALOL HYDROCHLORIDE 20 MG: 5 INJECTION INTRAVENOUS at 23:50

## 2022-06-01 RX ADMIN — BUMETANIDE 2 MG: 0.25 INJECTION INTRAMUSCULAR; INTRAVENOUS at 10:47

## 2022-06-01 RX ADMIN — INSULIN HUMAN 5 UNITS: 100 INJECTION, SOLUTION PARENTERAL at 05:23

## 2022-06-01 RX ADMIN — ARIPIPRAZOLE 5 MG: 10 TABLET ORAL at 08:24

## 2022-06-01 RX ADMIN — BUDESONIDE 0.5 MG: 0.5 SUSPENSION RESPIRATORY (INHALATION) at 19:08

## 2022-06-01 RX ADMIN — GABAPENTIN 300 MG: 300 CAPSULE ORAL at 14:20

## 2022-06-01 RX ADMIN — GABAPENTIN 300 MG: 300 CAPSULE ORAL at 22:46

## 2022-06-01 RX ADMIN — FAMOTIDINE 20 MG: 20 TABLET ORAL at 20:17

## 2022-06-01 RX ADMIN — IPRATROPIUM BROMIDE AND ALBUTEROL SULFATE 3 ML: 2.5; .5 SOLUTION RESPIRATORY (INHALATION) at 15:21

## 2022-06-01 RX ADMIN — DIAZEPAM 2 MG: 5 INJECTION, SOLUTION INTRAMUSCULAR; INTRAVENOUS at 01:03

## 2022-06-01 RX ADMIN — FAMOTIDINE 20 MG: 20 TABLET ORAL at 08:24

## 2022-06-02 ENCOUNTER — APPOINTMENT (OUTPATIENT)
Dept: GENERAL RADIOLOGY | Facility: HOSPITAL | Age: 57
End: 2022-06-02

## 2022-06-02 DIAGNOSIS — G89.4 CHRONIC PAIN SYNDROME: ICD-10-CM

## 2022-06-02 DIAGNOSIS — M51.9 LUMBAR DISC DISEASE: ICD-10-CM

## 2022-06-02 DIAGNOSIS — M12.9 ARTHRITIS INVOLVING MULTIPLE SITES: ICD-10-CM

## 2022-06-02 LAB
ANION GAP SERPL CALCULATED.3IONS-SCNC: 7 MMOL/L (ref 5–15)
BASOPHILS # BLD AUTO: 0.02 10*3/MM3 (ref 0–0.2)
BASOPHILS NFR BLD AUTO: 0.1 % (ref 0–1.5)
BUN SERPL-MCNC: 27 MG/DL (ref 6–20)
BUN/CREAT SERPL: 37 (ref 7–25)
CALCIUM SPEC-SCNC: 9.4 MG/DL (ref 8.6–10.5)
CHLORIDE SERPL-SCNC: 93 MMOL/L (ref 98–107)
CO2 SERPL-SCNC: 31 MMOL/L (ref 22–29)
CREAT SERPL-MCNC: 0.73 MG/DL (ref 0.57–1)
DEPRECATED RDW RBC AUTO: 46 FL (ref 37–54)
EGFRCR SERPLBLD CKD-EPI 2021: 96.1 ML/MIN/1.73
EOSINOPHIL # BLD AUTO: 0 10*3/MM3 (ref 0–0.4)
EOSINOPHIL NFR BLD AUTO: 0 % (ref 0.3–6.2)
ERYTHROCYTE [DISTWIDTH] IN BLOOD BY AUTOMATED COUNT: 15.2 % (ref 12.3–15.4)
GLUCOSE BLDC GLUCOMTR-MCNC: 137 MG/DL (ref 70–130)
GLUCOSE BLDC GLUCOMTR-MCNC: 165 MG/DL (ref 70–130)
GLUCOSE BLDC GLUCOMTR-MCNC: 210 MG/DL (ref 70–130)
GLUCOSE BLDC GLUCOMTR-MCNC: 269 MG/DL (ref 70–130)
GLUCOSE SERPL-MCNC: 173 MG/DL (ref 65–99)
HCT VFR BLD AUTO: 43 % (ref 34–46.6)
HGB BLD-MCNC: 13.6 G/DL (ref 12–15.9)
IMM GRANULOCYTES # BLD AUTO: 0.17 10*3/MM3 (ref 0–0.05)
IMM GRANULOCYTES NFR BLD AUTO: 1 % (ref 0–0.5)
LYMPHOCYTES # BLD AUTO: 1.24 10*3/MM3 (ref 0.7–3.1)
LYMPHOCYTES NFR BLD AUTO: 7 % (ref 19.6–45.3)
MAGNESIUM SERPL-MCNC: 2 MG/DL (ref 1.6–2.6)
MCH RBC QN AUTO: 26.1 PG (ref 26.6–33)
MCHC RBC AUTO-ENTMCNC: 31.6 G/DL (ref 31.5–35.7)
MCV RBC AUTO: 82.5 FL (ref 79–97)
MONOCYTES # BLD AUTO: 0.82 10*3/MM3 (ref 0.1–0.9)
MONOCYTES NFR BLD AUTO: 4.6 % (ref 5–12)
NEUTROPHILS NFR BLD AUTO: 15.55 10*3/MM3 (ref 1.7–7)
NEUTROPHILS NFR BLD AUTO: 87.3 % (ref 42.7–76)
NRBC BLD AUTO-RTO: 0 /100 WBC (ref 0–0.2)
PLATELET # BLD AUTO: 328 10*3/MM3 (ref 140–450)
PMV BLD AUTO: 9.6 FL (ref 6–12)
POTASSIUM SERPL-SCNC: 3.3 MMOL/L (ref 3.5–5.2)
POTASSIUM SERPL-SCNC: 4.1 MMOL/L (ref 3.5–5.2)
RBC # BLD AUTO: 5.21 10*6/MM3 (ref 3.77–5.28)
SODIUM SERPL-SCNC: 131 MMOL/L (ref 136–145)
WBC NRBC COR # BLD: 17.8 10*3/MM3 (ref 3.4–10.8)

## 2022-06-02 PROCEDURE — 94799 UNLISTED PULMONARY SVC/PX: CPT

## 2022-06-02 PROCEDURE — 84132 ASSAY OF SERUM POTASSIUM: CPT | Performed by: INTERNAL MEDICINE

## 2022-06-02 PROCEDURE — 63710000001 INSULIN LISPRO (HUMAN) PER 5 UNITS: Performed by: NURSE PRACTITIONER

## 2022-06-02 PROCEDURE — 80048 BASIC METABOLIC PNL TOTAL CA: CPT | Performed by: INTERNAL MEDICINE

## 2022-06-02 PROCEDURE — 25010000002 DIAZEPAM PER 5 MG: Performed by: NURSE PRACTITIONER

## 2022-06-02 PROCEDURE — 83735 ASSAY OF MAGNESIUM: CPT | Performed by: INTERNAL MEDICINE

## 2022-06-02 PROCEDURE — 25010000002 VANCOMYCIN PER 500 MG

## 2022-06-02 PROCEDURE — 99233 SBSQ HOSP IP/OBS HIGH 50: CPT | Performed by: INTERNAL MEDICINE

## 2022-06-02 PROCEDURE — 94660 CPAP INITIATION&MGMT: CPT

## 2022-06-02 PROCEDURE — 71045 X-RAY EXAM CHEST 1 VIEW: CPT

## 2022-06-02 PROCEDURE — 0 POTASSIUM CHLORIDE 10 MEQ/100ML SOLUTION: Performed by: NURSE PRACTITIONER

## 2022-06-02 PROCEDURE — 25010000002 METHYLPREDNISOLONE PER 125 MG: Performed by: INTERNAL MEDICINE

## 2022-06-02 PROCEDURE — 82962 GLUCOSE BLOOD TEST: CPT

## 2022-06-02 PROCEDURE — 63710000001 INSULIN REGULAR HUMAN PER 5 UNITS: Performed by: INTERNAL MEDICINE

## 2022-06-02 PROCEDURE — 85025 COMPLETE CBC W/AUTO DIFF WBC: CPT | Performed by: INTERNAL MEDICINE

## 2022-06-02 PROCEDURE — 63710000001 INSULIN DETEMIR PER 5 UNITS: Performed by: INTERNAL MEDICINE

## 2022-06-02 PROCEDURE — 25010000002 PIPERACILLIN SOD-TAZOBACTAM PER 1 G

## 2022-06-02 PROCEDURE — 25010000002 ONDANSETRON PER 1 MG: Performed by: NURSE PRACTITIONER

## 2022-06-02 RX ORDER — OXYCODONE AND ACETAMINOPHEN 10; 325 MG/1; MG/1
TABLET ORAL
Qty: 120 TABLET | Refills: 0 | OUTPATIENT
Start: 2022-06-02

## 2022-06-02 RX ORDER — POTASSIUM CHLORIDE 1.5 G/1.77G
40 POWDER, FOR SOLUTION ORAL AS NEEDED
Status: DISCONTINUED | OUTPATIENT
Start: 2022-06-02 | End: 2022-06-14

## 2022-06-02 RX ORDER — DIAZEPAM 2 MG/1
2 TABLET ORAL EVERY 8 HOURS SCHEDULED
Status: DISCONTINUED | OUTPATIENT
Start: 2022-06-02 | End: 2022-06-05

## 2022-06-02 RX ORDER — ESCITALOPRAM OXALATE 20 MG/1
TABLET ORAL
Qty: 30 TABLET | Refills: 0 | OUTPATIENT
Start: 2022-06-02

## 2022-06-02 RX ORDER — ONDANSETRON 2 MG/ML
4 INJECTION INTRAMUSCULAR; INTRAVENOUS EVERY 6 HOURS PRN
Status: DISCONTINUED | OUTPATIENT
Start: 2022-06-02 | End: 2022-06-26 | Stop reason: HOSPADM

## 2022-06-02 RX ORDER — INSULIN LISPRO 100 [IU]/ML
0-14 INJECTION, SOLUTION INTRAVENOUS; SUBCUTANEOUS
Status: DISCONTINUED | OUTPATIENT
Start: 2022-06-02 | End: 2022-06-13

## 2022-06-02 RX ORDER — POTASSIUM CHLORIDE 750 MG/1
40 CAPSULE, EXTENDED RELEASE ORAL AS NEEDED
Status: DISCONTINUED | OUTPATIENT
Start: 2022-06-02 | End: 2022-06-14

## 2022-06-02 RX ORDER — POTASSIUM CHLORIDE 7.45 MG/ML
10 INJECTION INTRAVENOUS
Status: DISCONTINUED | OUTPATIENT
Start: 2022-06-02 | End: 2022-06-14

## 2022-06-02 RX ORDER — BUMETANIDE 0.25 MG/ML
2 INJECTION INTRAMUSCULAR; INTRAVENOUS ONCE
Status: COMPLETED | OUTPATIENT
Start: 2022-06-02 | End: 2022-06-02

## 2022-06-02 RX ADMIN — IPRATROPIUM BROMIDE AND ALBUTEROL SULFATE 3 ML: 2.5; .5 SOLUTION RESPIRATORY (INHALATION) at 07:37

## 2022-06-02 RX ADMIN — LISINOPRIL 10 MG: 10 TABLET ORAL at 09:08

## 2022-06-02 RX ADMIN — VANCOMYCIN HYDROCHLORIDE 1000 MG: 1 INJECTION, SOLUTION INTRAVENOUS at 20:03

## 2022-06-02 RX ADMIN — LABETALOL HYDROCHLORIDE 20 MG: 5 INJECTION INTRAVENOUS at 05:03

## 2022-06-02 RX ADMIN — BUSPIRONE HYDROCHLORIDE 10 MG: 10 TABLET ORAL at 09:08

## 2022-06-02 RX ADMIN — SENNOSIDES AND DOCUSATE SODIUM 2 TABLET: 50; 8.6 TABLET ORAL at 09:08

## 2022-06-02 RX ADMIN — DIAZEPAM 2 MG: 2 TABLET ORAL at 22:48

## 2022-06-02 RX ADMIN — INSULIN DETEMIR 25 UNITS: 100 INJECTION, SOLUTION SUBCUTANEOUS at 09:09

## 2022-06-02 RX ADMIN — Medication 10 ML: at 20:06

## 2022-06-02 RX ADMIN — GABAPENTIN 300 MG: 300 CAPSULE ORAL at 13:43

## 2022-06-02 RX ADMIN — FAMOTIDINE 20 MG: 20 TABLET ORAL at 20:03

## 2022-06-02 RX ADMIN — IPRATROPIUM BROMIDE AND ALBUTEROL SULFATE 3 ML: 2.5; .5 SOLUTION RESPIRATORY (INHALATION) at 20:39

## 2022-06-02 RX ADMIN — TAZOBACTAM SODIUM AND PIPERACILLIN SODIUM 4.5 G: 500; 4 INJECTION, SOLUTION INTRAVENOUS at 17:25

## 2022-06-02 RX ADMIN — DIAZEPAM 2 MG: 2 TABLET ORAL at 10:38

## 2022-06-02 RX ADMIN — BUSPIRONE HYDROCHLORIDE 10 MG: 10 TABLET ORAL at 20:03

## 2022-06-02 RX ADMIN — SENNOSIDES AND DOCUSATE SODIUM 2 TABLET: 50; 8.6 TABLET ORAL at 20:04

## 2022-06-02 RX ADMIN — ARIPIPRAZOLE 5 MG: 10 TABLET ORAL at 09:08

## 2022-06-02 RX ADMIN — INSULIN HUMAN 8 UNITS: 100 INJECTION, SOLUTION PARENTERAL at 17:26

## 2022-06-02 RX ADMIN — GABAPENTIN 300 MG: 300 CAPSULE ORAL at 06:02

## 2022-06-02 RX ADMIN — TAZOBACTAM SODIUM AND PIPERACILLIN SODIUM 4.5 G: 500; 4 INJECTION, SOLUTION INTRAVENOUS at 09:09

## 2022-06-02 RX ADMIN — DIAZEPAM 2 MG: 5 INJECTION, SOLUTION INTRAMUSCULAR; INTRAVENOUS at 13:50

## 2022-06-02 RX ADMIN — BUMETANIDE 2 MG: 0.25 INJECTION INTRAMUSCULAR; INTRAVENOUS at 10:45

## 2022-06-02 RX ADMIN — ESCITALOPRAM OXALATE 20 MG: 20 TABLET ORAL at 09:08

## 2022-06-02 RX ADMIN — DULOXETINE HYDROCHLORIDE 120 MG: 60 CAPSULE, DELAYED RELEASE ORAL at 09:09

## 2022-06-02 RX ADMIN — GABAPENTIN 300 MG: 300 CAPSULE ORAL at 22:47

## 2022-06-02 RX ADMIN — FAMOTIDINE 20 MG: 20 TABLET ORAL at 09:08

## 2022-06-02 RX ADMIN — POTASSIUM CHLORIDE 10 MEQ: 7.46 INJECTION, SOLUTION INTRAVENOUS at 09:02

## 2022-06-02 RX ADMIN — ONDANSETRON 4 MG: 2 INJECTION INTRAMUSCULAR; INTRAVENOUS at 09:03

## 2022-06-02 RX ADMIN — DIAZEPAM 2 MG: 5 INJECTION, SOLUTION INTRAMUSCULAR; INTRAVENOUS at 20:04

## 2022-06-02 RX ADMIN — BISACODYL 10 MG: 10 SUPPOSITORY RECTAL at 12:37

## 2022-06-02 RX ADMIN — VANCOMYCIN HYDROCHLORIDE 1000 MG: 1 INJECTION, SOLUTION INTRAVENOUS at 10:38

## 2022-06-02 RX ADMIN — APIXABAN 5 MG: 5 TABLET, FILM COATED ORAL at 10:38

## 2022-06-02 RX ADMIN — AMLODIPINE BESYLATE 10 MG: 10 TABLET ORAL at 09:08

## 2022-06-02 RX ADMIN — POTASSIUM CHLORIDE 10 MEQ: 7.46 INJECTION, SOLUTION INTRAVENOUS at 10:38

## 2022-06-02 RX ADMIN — METHYLPREDNISOLONE SODIUM SUCCINATE 60 MG: 125 INJECTION, POWDER, FOR SOLUTION INTRAMUSCULAR; INTRAVENOUS at 09:06

## 2022-06-02 RX ADMIN — TERAZOSIN HYDROCHLORIDE 2 MG: 2 CAPSULE ORAL at 20:03

## 2022-06-02 RX ADMIN — INSULIN DETEMIR 25 UNITS: 100 INJECTION, SOLUTION SUBCUTANEOUS at 20:34

## 2022-06-02 RX ADMIN — BUDESONIDE 0.5 MG: 0.5 SUSPENSION RESPIRATORY (INHALATION) at 07:37

## 2022-06-02 RX ADMIN — APIXABAN 5 MG: 5 TABLET, FILM COATED ORAL at 20:03

## 2022-06-02 RX ADMIN — IPRATROPIUM BROMIDE AND ALBUTEROL SULFATE 3 ML: 2.5; .5 SOLUTION RESPIRATORY (INHALATION) at 15:44

## 2022-06-02 RX ADMIN — POTASSIUM CHLORIDE 10 MEQ: 7.46 INJECTION, SOLUTION INTRAVENOUS at 06:02

## 2022-06-02 RX ADMIN — BUDESONIDE 0.5 MG: 0.5 SUSPENSION RESPIRATORY (INHALATION) at 20:39

## 2022-06-02 RX ADMIN — INSULIN HUMAN 3 UNITS: 100 INJECTION, SOLUTION PARENTERAL at 12:37

## 2022-06-02 RX ADMIN — POTASSIUM CHLORIDE 10 MEQ: 7.46 INJECTION, SOLUTION INTRAVENOUS at 07:24

## 2022-06-02 RX ADMIN — DIAZEPAM 2 MG: 5 INJECTION, SOLUTION INTRAMUSCULAR; INTRAVENOUS at 03:06

## 2022-06-02 RX ADMIN — INSULIN LISPRO 5 UNITS: 100 INJECTION, SOLUTION INTRAVENOUS; SUBCUTANEOUS at 20:34

## 2022-06-02 RX ADMIN — Medication 10 ML: at 09:25

## 2022-06-03 ENCOUNTER — APPOINTMENT (OUTPATIENT)
Dept: GENERAL RADIOLOGY | Facility: HOSPITAL | Age: 57
End: 2022-06-03

## 2022-06-03 ENCOUNTER — TELEPHONE (OUTPATIENT)
Dept: FAMILY MEDICINE CLINIC | Facility: CLINIC | Age: 57
End: 2022-06-03

## 2022-06-03 DIAGNOSIS — M12.9 ARTHRITIS INVOLVING MULTIPLE SITES: ICD-10-CM

## 2022-06-03 DIAGNOSIS — G89.4 CHRONIC PAIN SYNDROME: ICD-10-CM

## 2022-06-03 DIAGNOSIS — M51.9 LUMBAR DISC DISEASE: ICD-10-CM

## 2022-06-03 LAB
ANION GAP SERPL CALCULATED.3IONS-SCNC: 10 MMOL/L (ref 5–15)
ARTERIAL PATENCY WRIST A: POSITIVE
ATMOSPHERIC PRESS: ABNORMAL MM[HG]
BACTERIA SPEC RESP CULT: ABNORMAL
BACTERIA SPEC RESP CULT: ABNORMAL
BASE EXCESS BLDA CALC-SCNC: 11.6 MMOL/L (ref 0–2)
BASOPHILS # BLD AUTO: 0.04 10*3/MM3 (ref 0–0.2)
BASOPHILS NFR BLD AUTO: 0.3 % (ref 0–1.5)
BDY SITE: ABNORMAL
BODY TEMPERATURE: 37 C
BUN SERPL-MCNC: 24 MG/DL (ref 6–20)
BUN/CREAT SERPL: 32.4 (ref 7–25)
CALCIUM SPEC-SCNC: 9.3 MG/DL (ref 8.6–10.5)
CHLORIDE SERPL-SCNC: 93 MMOL/L (ref 98–107)
CO2 BLDA-SCNC: 39.6 MMOL/L (ref 22–33)
CO2 SERPL-SCNC: 32 MMOL/L (ref 22–29)
COHGB MFR BLD: 1.2 % (ref 0–2)
CREAT SERPL-MCNC: 0.74 MG/DL (ref 0.57–1)
DEPRECATED RDW RBC AUTO: 45.8 FL (ref 37–54)
EGFRCR SERPLBLD CKD-EPI 2021: 94.5 ML/MIN/1.73
EOSINOPHIL # BLD AUTO: 0.01 10*3/MM3 (ref 0–0.4)
EOSINOPHIL NFR BLD AUTO: 0.1 % (ref 0.3–6.2)
EPAP: 6
ERYTHROCYTE [DISTWIDTH] IN BLOOD BY AUTOMATED COUNT: 15.1 % (ref 12.3–15.4)
GLUCOSE BLDC GLUCOMTR-MCNC: 227 MG/DL (ref 70–130)
GLUCOSE BLDC GLUCOMTR-MCNC: 231 MG/DL (ref 70–130)
GLUCOSE BLDC GLUCOMTR-MCNC: 332 MG/DL (ref 70–130)
GLUCOSE SERPL-MCNC: 120 MG/DL (ref 65–99)
GRAM STN SPEC: ABNORMAL
HCO3 BLDA-SCNC: 37.9 MMOL/L (ref 20–26)
HCT VFR BLD AUTO: 44.9 % (ref 34–46.6)
HCT VFR BLD CALC: 44.2 % (ref 38–51)
HGB BLD-MCNC: 14.3 G/DL (ref 12–15.9)
HGB BLDA-MCNC: 14.4 G/DL (ref 14–18)
IMM GRANULOCYTES # BLD AUTO: 0.14 10*3/MM3 (ref 0–0.05)
IMM GRANULOCYTES NFR BLD AUTO: 0.9 % (ref 0–0.5)
INHALED O2 CONCENTRATION: 45 %
IPAP: 14
LYMPHOCYTES # BLD AUTO: 2.54 10*3/MM3 (ref 0.7–3.1)
LYMPHOCYTES NFR BLD AUTO: 16.7 % (ref 19.6–45.3)
MAGNESIUM SERPL-MCNC: 1.8 MG/DL (ref 1.6–2.6)
MCH RBC QN AUTO: 26.5 PG (ref 26.6–33)
MCHC RBC AUTO-ENTMCNC: 31.8 G/DL (ref 31.5–35.7)
MCV RBC AUTO: 83.1 FL (ref 79–97)
METHGB BLD QL: 0.2 % (ref 0–1.5)
MODALITY: ABNORMAL
MONOCYTES # BLD AUTO: 1 10*3/MM3 (ref 0.1–0.9)
MONOCYTES NFR BLD AUTO: 6.6 % (ref 5–12)
NEUTROPHILS NFR BLD AUTO: 11.49 10*3/MM3 (ref 1.7–7)
NEUTROPHILS NFR BLD AUTO: 75.4 % (ref 42.7–76)
NOTE: ABNORMAL
NRBC BLD AUTO-RTO: 0 /100 WBC (ref 0–0.2)
OXYHGB MFR BLDV: 92.5 % (ref 94–99)
PAW @ PEAK INSP FLOW SETTING VENT: 0 CMH2O
PCO2 BLDA: 54.5 MM HG (ref 35–45)
PCO2 TEMP ADJ BLD: 54.5 MM HG (ref 35–45)
PH BLDA: 7.45 PH UNITS (ref 7.35–7.45)
PH, TEMP CORRECTED: 7.45 PH UNITS
PLATELET # BLD AUTO: 381 10*3/MM3 (ref 140–450)
PMV BLD AUTO: 9.8 FL (ref 6–12)
PO2 BLDA: 66.9 MM HG (ref 83–108)
PO2 TEMP ADJ BLD: 66.9 MM HG (ref 83–108)
POTASSIUM SERPL-SCNC: 3.8 MMOL/L (ref 3.5–5.2)
RBC # BLD AUTO: 5.4 10*6/MM3 (ref 3.77–5.28)
SODIUM SERPL-SCNC: 135 MMOL/L (ref 136–145)
TOTAL RATE: 0 BREATHS/MINUTE
WBC NRBC COR # BLD: 15.22 10*3/MM3 (ref 3.4–10.8)

## 2022-06-03 PROCEDURE — 80048 BASIC METABOLIC PNL TOTAL CA: CPT | Performed by: INTERNAL MEDICINE

## 2022-06-03 PROCEDURE — 63710000001 INSULIN DETEMIR PER 5 UNITS: Performed by: INTERNAL MEDICINE

## 2022-06-03 PROCEDURE — 94761 N-INVAS EAR/PLS OXIMETRY MLT: CPT

## 2022-06-03 PROCEDURE — 36600 WITHDRAWAL OF ARTERIAL BLOOD: CPT

## 2022-06-03 PROCEDURE — 99233 SBSQ HOSP IP/OBS HIGH 50: CPT | Performed by: INTERNAL MEDICINE

## 2022-06-03 PROCEDURE — 71045 X-RAY EXAM CHEST 1 VIEW: CPT

## 2022-06-03 PROCEDURE — 94664 DEMO&/EVAL PT USE INHALER: CPT

## 2022-06-03 PROCEDURE — 25010000002 METHYLPREDNISOLONE PER 125 MG: Performed by: INTERNAL MEDICINE

## 2022-06-03 PROCEDURE — 25010000002 DIAZEPAM PER 5 MG: Performed by: NURSE PRACTITIONER

## 2022-06-03 PROCEDURE — 0 MAGNESIUM SULFATE 4 GM/100ML SOLUTION: Performed by: INTERNAL MEDICINE

## 2022-06-03 PROCEDURE — 82805 BLOOD GASES W/O2 SATURATION: CPT

## 2022-06-03 PROCEDURE — 94799 UNLISTED PULMONARY SVC/PX: CPT

## 2022-06-03 PROCEDURE — 82375 ASSAY CARBOXYHB QUANT: CPT

## 2022-06-03 PROCEDURE — 83050 HGB METHEMOGLOBIN QUAN: CPT

## 2022-06-03 PROCEDURE — 85025 COMPLETE CBC W/AUTO DIFF WBC: CPT | Performed by: INTERNAL MEDICINE

## 2022-06-03 PROCEDURE — 94660 CPAP INITIATION&MGMT: CPT

## 2022-06-03 PROCEDURE — 25010000002 PIPERACILLIN SOD-TAZOBACTAM PER 1 G

## 2022-06-03 PROCEDURE — 25010000002 VANCOMYCIN PER 500 MG

## 2022-06-03 PROCEDURE — 63710000001 INSULIN LISPRO (HUMAN) PER 5 UNITS: Performed by: NURSE PRACTITIONER

## 2022-06-03 PROCEDURE — 82962 GLUCOSE BLOOD TEST: CPT

## 2022-06-03 PROCEDURE — 83735 ASSAY OF MAGNESIUM: CPT | Performed by: INTERNAL MEDICINE

## 2022-06-03 RX ORDER — METHYLPREDNISOLONE SODIUM SUCCINATE 40 MG/ML
40 INJECTION, POWDER, LYOPHILIZED, FOR SOLUTION INTRAMUSCULAR; INTRAVENOUS
Status: DISCONTINUED | OUTPATIENT
Start: 2022-06-04 | End: 2022-06-06

## 2022-06-03 RX ORDER — MAGNESIUM SULFATE HEPTAHYDRATE 40 MG/ML
2 INJECTION, SOLUTION INTRAVENOUS AS NEEDED
Status: DISCONTINUED | OUTPATIENT
Start: 2022-06-03 | End: 2022-06-26 | Stop reason: HOSPADM

## 2022-06-03 RX ORDER — BUMETANIDE 0.25 MG/ML
2 INJECTION INTRAMUSCULAR; INTRAVENOUS ONCE
Status: COMPLETED | OUTPATIENT
Start: 2022-06-03 | End: 2022-06-03

## 2022-06-03 RX ORDER — CHOLECALCIFEROL (VITAMIN D3) 125 MCG
10 CAPSULE ORAL NIGHTLY PRN
Status: DISCONTINUED | OUTPATIENT
Start: 2022-06-03 | End: 2022-06-15

## 2022-06-03 RX ORDER — OXYCODONE AND ACETAMINOPHEN 10; 325 MG/1; MG/1
1 TABLET ORAL EVERY 6 HOURS PRN
Qty: 120 TABLET | Refills: 0 | Status: CANCELLED | OUTPATIENT
Start: 2022-06-03

## 2022-06-03 RX ORDER — MAGNESIUM SULFATE HEPTAHYDRATE 40 MG/ML
4 INJECTION, SOLUTION INTRAVENOUS AS NEEDED
Status: DISCONTINUED | OUTPATIENT
Start: 2022-06-03 | End: 2022-06-26 | Stop reason: HOSPADM

## 2022-06-03 RX ORDER — OXYCODONE HYDROCHLORIDE AND ACETAMINOPHEN 5; 325 MG/1; MG/1
1 TABLET ORAL EVERY 6 HOURS PRN
Status: DISPENSED | OUTPATIENT
Start: 2022-06-03 | End: 2022-06-10

## 2022-06-03 RX ADMIN — ESCITALOPRAM OXALATE 20 MG: 20 TABLET ORAL at 08:11

## 2022-06-03 RX ADMIN — METHYLPREDNISOLONE SODIUM SUCCINATE 60 MG: 125 INJECTION, POWDER, FOR SOLUTION INTRAMUSCULAR; INTRAVENOUS at 08:10

## 2022-06-03 RX ADMIN — IPRATROPIUM BROMIDE AND ALBUTEROL SULFATE 3 ML: 2.5; .5 SOLUTION RESPIRATORY (INHALATION) at 17:00

## 2022-06-03 RX ADMIN — DIAZEPAM 2 MG: 2 TABLET ORAL at 21:03

## 2022-06-03 RX ADMIN — DIAZEPAM 2 MG: 5 INJECTION, SOLUTION INTRAMUSCULAR; INTRAVENOUS at 10:17

## 2022-06-03 RX ADMIN — INSULIN DETEMIR 25 UNITS: 100 INJECTION, SOLUTION SUBCUTANEOUS at 08:11

## 2022-06-03 RX ADMIN — IPRATROPIUM BROMIDE AND ALBUTEROL SULFATE 3 ML: 2.5; .5 SOLUTION RESPIRATORY (INHALATION) at 12:54

## 2022-06-03 RX ADMIN — SENNOSIDES AND DOCUSATE SODIUM 2 TABLET: 50; 8.6 TABLET ORAL at 21:03

## 2022-06-03 RX ADMIN — INSULIN LISPRO 10 UNITS: 100 INJECTION, SOLUTION INTRAVENOUS; SUBCUTANEOUS at 16:58

## 2022-06-03 RX ADMIN — LABETALOL HYDROCHLORIDE 20 MG: 5 INJECTION INTRAVENOUS at 08:01

## 2022-06-03 RX ADMIN — TERAZOSIN HYDROCHLORIDE 2 MG: 2 CAPSULE ORAL at 21:03

## 2022-06-03 RX ADMIN — Medication 10 MG: at 22:58

## 2022-06-03 RX ADMIN — BUDESONIDE 0.5 MG: 0.5 SUSPENSION RESPIRATORY (INHALATION) at 10:25

## 2022-06-03 RX ADMIN — APIXABAN 5 MG: 5 TABLET, FILM COATED ORAL at 21:03

## 2022-06-03 RX ADMIN — DIAZEPAM 2 MG: 2 TABLET ORAL at 13:00

## 2022-06-03 RX ADMIN — ARIPIPRAZOLE 5 MG: 10 TABLET ORAL at 08:10

## 2022-06-03 RX ADMIN — BUMETANIDE 2 MG: 0.25 INJECTION INTRAMUSCULAR; INTRAVENOUS at 12:13

## 2022-06-03 RX ADMIN — IPRATROPIUM BROMIDE AND ALBUTEROL SULFATE 3 ML: 2.5; .5 SOLUTION RESPIRATORY (INHALATION) at 10:25

## 2022-06-03 RX ADMIN — Medication 10 ML: at 08:05

## 2022-06-03 RX ADMIN — DIAZEPAM 2 MG: 2 TABLET ORAL at 06:07

## 2022-06-03 RX ADMIN — LISINOPRIL 10 MG: 10 TABLET ORAL at 09:55

## 2022-06-03 RX ADMIN — INSULIN DETEMIR 25 UNITS: 100 INJECTION, SOLUTION SUBCUTANEOUS at 21:19

## 2022-06-03 RX ADMIN — GABAPENTIN 300 MG: 300 CAPSULE ORAL at 13:00

## 2022-06-03 RX ADMIN — INSULIN LISPRO 5 UNITS: 100 INJECTION, SOLUTION INTRAVENOUS; SUBCUTANEOUS at 12:25

## 2022-06-03 RX ADMIN — METOPROLOL TARTRATE 25 MG: 25 TABLET, FILM COATED ORAL at 21:20

## 2022-06-03 RX ADMIN — BUSPIRONE HYDROCHLORIDE 10 MG: 10 TABLET ORAL at 21:04

## 2022-06-03 RX ADMIN — METOPROLOL TARTRATE 25 MG: 25 TABLET, FILM COATED ORAL at 13:32

## 2022-06-03 RX ADMIN — DIAZEPAM 2 MG: 5 INJECTION, SOLUTION INTRAMUSCULAR; INTRAVENOUS at 16:59

## 2022-06-03 RX ADMIN — SENNOSIDES AND DOCUSATE SODIUM 2 TABLET: 50; 8.6 TABLET ORAL at 08:11

## 2022-06-03 RX ADMIN — VANCOMYCIN HYDROCHLORIDE 1000 MG: 1 INJECTION, SOLUTION INTRAVENOUS at 21:04

## 2022-06-03 RX ADMIN — TAZOBACTAM SODIUM AND PIPERACILLIN SODIUM 4.5 G: 500; 4 INJECTION, SOLUTION INTRAVENOUS at 00:50

## 2022-06-03 RX ADMIN — Medication 10 ML: at 21:04

## 2022-06-03 RX ADMIN — DIAZEPAM 2 MG: 5 INJECTION, SOLUTION INTRAMUSCULAR; INTRAVENOUS at 23:20

## 2022-06-03 RX ADMIN — DIAZEPAM 2 MG: 5 INJECTION, SOLUTION INTRAMUSCULAR; INTRAVENOUS at 04:37

## 2022-06-03 RX ADMIN — FAMOTIDINE 20 MG: 20 TABLET ORAL at 21:03

## 2022-06-03 RX ADMIN — INSULIN LISPRO 5 UNITS: 100 INJECTION, SOLUTION INTRAVENOUS; SUBCUTANEOUS at 21:04

## 2022-06-03 RX ADMIN — GABAPENTIN 300 MG: 300 CAPSULE ORAL at 06:07

## 2022-06-03 RX ADMIN — TAZOBACTAM SODIUM AND PIPERACILLIN SODIUM 4.5 G: 500; 4 INJECTION, SOLUTION INTRAVENOUS at 08:03

## 2022-06-03 RX ADMIN — BUSPIRONE HYDROCHLORIDE 10 MG: 10 TABLET ORAL at 08:11

## 2022-06-03 RX ADMIN — MAGNESIUM SULFATE HEPTAHYDRATE 4 G: 40 INJECTION, SOLUTION INTRAVENOUS at 12:13

## 2022-06-03 RX ADMIN — APIXABAN 5 MG: 5 TABLET, FILM COATED ORAL at 08:10

## 2022-06-03 RX ADMIN — FAMOTIDINE 20 MG: 20 TABLET ORAL at 08:11

## 2022-06-03 RX ADMIN — GABAPENTIN 300 MG: 300 CAPSULE ORAL at 21:03

## 2022-06-03 RX ADMIN — AMLODIPINE BESYLATE 10 MG: 10 TABLET ORAL at 09:55

## 2022-06-03 RX ADMIN — IPRATROPIUM BROMIDE AND ALBUTEROL SULFATE 3 ML: 2.5; .5 SOLUTION RESPIRATORY (INHALATION) at 20:36

## 2022-06-03 RX ADMIN — DULOXETINE HYDROCHLORIDE 120 MG: 60 CAPSULE, DELAYED RELEASE ORAL at 08:11

## 2022-06-03 RX ADMIN — VANCOMYCIN HYDROCHLORIDE 1000 MG: 1 INJECTION, SOLUTION INTRAVENOUS at 08:03

## 2022-06-03 RX ADMIN — OXYCODONE HYDROCHLORIDE AND ACETAMINOPHEN 1 TABLET: 5; 325 TABLET ORAL at 12:13

## 2022-06-03 RX ADMIN — OXYCODONE HYDROCHLORIDE AND ACETAMINOPHEN 1 TABLET: 5; 325 TABLET ORAL at 18:43

## 2022-06-03 RX ADMIN — BUDESONIDE 0.5 MG: 0.5 SUSPENSION RESPIRATORY (INHALATION) at 20:36

## 2022-06-03 NOTE — TELEPHONE ENCOUNTER
Please call.  Since that she is currently in the hospital, I am not going to refill this medication at this time.  In addition, once she is discharged, I will need to discuss with her about continuing this.

## 2022-06-03 NOTE — TELEPHONE ENCOUNTER
Patient's fiance came in stating that the pharmacy did not receive the prescription for oxycodone-acetaminophen  MG. Looking at the e-scribe status, it doesn't show a transmission error, but it also does not confirm receipt. Please advise.

## 2022-06-03 NOTE — TELEPHONE ENCOUNTER
Left detailed vm for pt. Aware she's in the hospital. However, he's on her NATO but there is no ph# listed for the Finance. (Daughter is listed as alternate contact)

## 2022-06-04 ENCOUNTER — TELEPHONE (OUTPATIENT)
Dept: FAMILY MEDICINE CLINIC | Facility: CLINIC | Age: 57
End: 2022-06-04

## 2022-06-04 DIAGNOSIS — G89.4 CHRONIC PAIN SYNDROME: ICD-10-CM

## 2022-06-04 DIAGNOSIS — M12.9 ARTHRITIS INVOLVING MULTIPLE SITES: ICD-10-CM

## 2022-06-04 DIAGNOSIS — M51.9 LUMBAR DISC DISEASE: ICD-10-CM

## 2022-06-04 PROBLEM — J18.0 BRONCHIAL PNEUMONIA: Status: ACTIVE | Noted: 2022-06-04

## 2022-06-04 LAB
GLUCOSE BLDC GLUCOMTR-MCNC: 130 MG/DL (ref 70–130)
GLUCOSE BLDC GLUCOMTR-MCNC: 228 MG/DL (ref 70–130)
GLUCOSE BLDC GLUCOMTR-MCNC: 228 MG/DL (ref 70–130)
GLUCOSE BLDC GLUCOMTR-MCNC: 250 MG/DL (ref 70–130)
MAGNESIUM SERPL-MCNC: 2.6 MG/DL (ref 1.6–2.6)
VANCOMYCIN TROUGH SERPL-MCNC: 12.2 MCG/ML (ref 5–20)

## 2022-06-04 PROCEDURE — 83735 ASSAY OF MAGNESIUM: CPT | Performed by: INTERNAL MEDICINE

## 2022-06-04 PROCEDURE — 97166 OT EVAL MOD COMPLEX 45 MIN: CPT

## 2022-06-04 PROCEDURE — 94664 DEMO&/EVAL PT USE INHALER: CPT

## 2022-06-04 PROCEDURE — 63710000001 INSULIN DETEMIR PER 5 UNITS: Performed by: INTERNAL MEDICINE

## 2022-06-04 PROCEDURE — 94799 UNLISTED PULMONARY SVC/PX: CPT

## 2022-06-04 PROCEDURE — 63710000001 INSULIN LISPRO (HUMAN) PER 5 UNITS: Performed by: NURSE PRACTITIONER

## 2022-06-04 PROCEDURE — 25010000002 DIAZEPAM PER 5 MG: Performed by: NURSE PRACTITIONER

## 2022-06-04 PROCEDURE — 99233 SBSQ HOSP IP/OBS HIGH 50: CPT | Performed by: INTERNAL MEDICINE

## 2022-06-04 PROCEDURE — 82962 GLUCOSE BLOOD TEST: CPT

## 2022-06-04 PROCEDURE — 25010000002 VANCOMYCIN PER 500 MG

## 2022-06-04 PROCEDURE — 97162 PT EVAL MOD COMPLEX 30 MIN: CPT

## 2022-06-04 PROCEDURE — 25010000002 METHYLPREDNISOLONE PER 40 MG: Performed by: INTERNAL MEDICINE

## 2022-06-04 PROCEDURE — 80202 ASSAY OF VANCOMYCIN: CPT

## 2022-06-04 RX ORDER — BUMETANIDE 0.25 MG/ML
2 INJECTION INTRAMUSCULAR; INTRAVENOUS ONCE
Status: COMPLETED | OUTPATIENT
Start: 2022-06-04 | End: 2022-06-04

## 2022-06-04 RX ADMIN — IPRATROPIUM BROMIDE AND ALBUTEROL SULFATE 3 ML: 2.5; .5 SOLUTION RESPIRATORY (INHALATION) at 19:35

## 2022-06-04 RX ADMIN — BUSPIRONE HYDROCHLORIDE 10 MG: 10 TABLET ORAL at 20:07

## 2022-06-04 RX ADMIN — AMLODIPINE BESYLATE 10 MG: 10 TABLET ORAL at 08:51

## 2022-06-04 RX ADMIN — METOPROLOL TARTRATE 25 MG: 25 TABLET, FILM COATED ORAL at 20:08

## 2022-06-04 RX ADMIN — DIAZEPAM 2 MG: 5 INJECTION, SOLUTION INTRAMUSCULAR; INTRAVENOUS at 23:09

## 2022-06-04 RX ADMIN — BUMETANIDE 2 MG: 0.25 INJECTION INTRAMUSCULAR; INTRAVENOUS at 14:59

## 2022-06-04 RX ADMIN — INSULIN LISPRO 8 UNITS: 100 INJECTION, SOLUTION INTRAVENOUS; SUBCUTANEOUS at 18:38

## 2022-06-04 RX ADMIN — DIAZEPAM 2 MG: 2 TABLET ORAL at 21:58

## 2022-06-04 RX ADMIN — BUDESONIDE 0.5 MG: 0.5 SUSPENSION RESPIRATORY (INHALATION) at 19:35

## 2022-06-04 RX ADMIN — Medication 10 ML: at 08:52

## 2022-06-04 RX ADMIN — APIXABAN 5 MG: 5 TABLET, FILM COATED ORAL at 08:52

## 2022-06-04 RX ADMIN — INSULIN DETEMIR 25 UNITS: 100 INJECTION, SOLUTION SUBCUTANEOUS at 08:54

## 2022-06-04 RX ADMIN — OXYCODONE HYDROCHLORIDE AND ACETAMINOPHEN 1 TABLET: 5; 325 TABLET ORAL at 03:03

## 2022-06-04 RX ADMIN — Medication 10 MG: at 20:08

## 2022-06-04 RX ADMIN — FAMOTIDINE 20 MG: 20 TABLET ORAL at 20:08

## 2022-06-04 RX ADMIN — GABAPENTIN 300 MG: 300 CAPSULE ORAL at 21:58

## 2022-06-04 RX ADMIN — Medication 10 ML: at 20:09

## 2022-06-04 RX ADMIN — INSULIN LISPRO 5 UNITS: 100 INJECTION, SOLUTION INTRAVENOUS; SUBCUTANEOUS at 20:21

## 2022-06-04 RX ADMIN — METHYLPREDNISOLONE SODIUM SUCCINATE 40 MG: 40 INJECTION, POWDER, FOR SOLUTION INTRAMUSCULAR; INTRAVENOUS at 08:52

## 2022-06-04 RX ADMIN — GABAPENTIN 300 MG: 300 CAPSULE ORAL at 13:26

## 2022-06-04 RX ADMIN — INSULIN LISPRO 5 UNITS: 100 INJECTION, SOLUTION INTRAVENOUS; SUBCUTANEOUS at 12:53

## 2022-06-04 RX ADMIN — ARIPIPRAZOLE 5 MG: 10 TABLET ORAL at 08:51

## 2022-06-04 RX ADMIN — VANCOMYCIN HYDROCHLORIDE 1000 MG: 1 INJECTION, SOLUTION INTRAVENOUS at 20:09

## 2022-06-04 RX ADMIN — IPRATROPIUM BROMIDE AND ALBUTEROL SULFATE 3 ML: 2.5; .5 SOLUTION RESPIRATORY (INHALATION) at 12:40

## 2022-06-04 RX ADMIN — GABAPENTIN 300 MG: 300 CAPSULE ORAL at 06:09

## 2022-06-04 RX ADMIN — INSULIN DETEMIR 25 UNITS: 100 INJECTION, SOLUTION SUBCUTANEOUS at 20:23

## 2022-06-04 RX ADMIN — DULOXETINE HYDROCHLORIDE 120 MG: 60 CAPSULE, DELAYED RELEASE ORAL at 08:51

## 2022-06-04 RX ADMIN — TERAZOSIN HYDROCHLORIDE 2 MG: 2 CAPSULE ORAL at 20:07

## 2022-06-04 RX ADMIN — BUDESONIDE 0.5 MG: 0.5 SUSPENSION RESPIRATORY (INHALATION) at 08:33

## 2022-06-04 RX ADMIN — BUSPIRONE HYDROCHLORIDE 10 MG: 10 TABLET ORAL at 08:51

## 2022-06-04 RX ADMIN — DIAZEPAM 2 MG: 5 INJECTION, SOLUTION INTRAMUSCULAR; INTRAVENOUS at 11:04

## 2022-06-04 RX ADMIN — DIAZEPAM 2 MG: 2 TABLET ORAL at 13:26

## 2022-06-04 RX ADMIN — FAMOTIDINE 20 MG: 20 TABLET ORAL at 08:50

## 2022-06-04 RX ADMIN — VANCOMYCIN HYDROCHLORIDE 1000 MG: 1 INJECTION, SOLUTION INTRAVENOUS at 11:04

## 2022-06-04 RX ADMIN — DIAZEPAM 2 MG: 2 TABLET ORAL at 06:09

## 2022-06-04 RX ADMIN — IPRATROPIUM BROMIDE AND ALBUTEROL SULFATE 3 ML: 2.5; .5 SOLUTION RESPIRATORY (INHALATION) at 08:33

## 2022-06-04 RX ADMIN — OXYCODONE HYDROCHLORIDE AND ACETAMINOPHEN 1 TABLET: 5; 325 TABLET ORAL at 15:00

## 2022-06-04 RX ADMIN — IPRATROPIUM BROMIDE AND ALBUTEROL SULFATE 3 ML: 2.5; .5 SOLUTION RESPIRATORY (INHALATION) at 16:24

## 2022-06-04 RX ADMIN — LISINOPRIL 10 MG: 10 TABLET ORAL at 08:51

## 2022-06-04 RX ADMIN — ESCITALOPRAM OXALATE 20 MG: 20 TABLET ORAL at 08:51

## 2022-06-04 RX ADMIN — OXYCODONE HYDROCHLORIDE AND ACETAMINOPHEN 1 TABLET: 5; 325 TABLET ORAL at 08:50

## 2022-06-04 RX ADMIN — APIXABAN 5 MG: 5 TABLET, FILM COATED ORAL at 20:09

## 2022-06-04 RX ADMIN — DIAZEPAM 2 MG: 5 INJECTION, SOLUTION INTRAMUSCULAR; INTRAVENOUS at 16:57

## 2022-06-04 RX ADMIN — METOPROLOL TARTRATE 25 MG: 25 TABLET, FILM COATED ORAL at 08:51

## 2022-06-05 LAB
ANION GAP SERPL CALCULATED.3IONS-SCNC: 13 MMOL/L (ref 5–15)
BACTERIA SPEC AEROBE CULT: NORMAL
BACTERIA SPEC AEROBE CULT: NORMAL
BASOPHILS # BLD AUTO: 0.04 10*3/MM3 (ref 0–0.2)
BASOPHILS NFR BLD AUTO: 0.1 % (ref 0–1.5)
BUN SERPL-MCNC: 34 MG/DL (ref 6–20)
BUN/CREAT SERPL: 31.5 (ref 7–25)
CALCIUM SPEC-SCNC: 9.4 MG/DL (ref 8.6–10.5)
CHLORIDE SERPL-SCNC: 91 MMOL/L (ref 98–107)
CO2 SERPL-SCNC: 22 MMOL/L (ref 22–29)
CREAT SERPL-MCNC: 1.08 MG/DL (ref 0.57–1)
DEPRECATED RDW RBC AUTO: 46 FL (ref 37–54)
EGFRCR SERPLBLD CKD-EPI 2021: 60 ML/MIN/1.73
EOSINOPHIL # BLD AUTO: 0.52 10*3/MM3 (ref 0–0.4)
EOSINOPHIL NFR BLD AUTO: 1.9 % (ref 0.3–6.2)
ERYTHROCYTE [DISTWIDTH] IN BLOOD BY AUTOMATED COUNT: 15.9 % (ref 12.3–15.4)
GLUCOSE BLDC GLUCOMTR-MCNC: 170 MG/DL (ref 70–130)
GLUCOSE BLDC GLUCOMTR-MCNC: 251 MG/DL (ref 70–130)
GLUCOSE BLDC GLUCOMTR-MCNC: 324 MG/DL (ref 70–130)
GLUCOSE BLDC GLUCOMTR-MCNC: 350 MG/DL (ref 70–130)
GLUCOSE SERPL-MCNC: 262 MG/DL (ref 65–99)
HCT VFR BLD AUTO: 50.7 % (ref 34–46.6)
HGB BLD-MCNC: 15.7 G/DL (ref 12–15.9)
IMM GRANULOCYTES # BLD AUTO: 1.37 10*3/MM3 (ref 0–0.05)
IMM GRANULOCYTES NFR BLD AUTO: 5.1 % (ref 0–0.5)
LYMPHOCYTES # BLD AUTO: 5.87 10*3/MM3 (ref 0.7–3.1)
LYMPHOCYTES NFR BLD AUTO: 22 % (ref 19.6–45.3)
MAGNESIUM SERPL-MCNC: 2 MG/DL (ref 1.6–2.6)
MCH RBC QN AUTO: 25.6 PG (ref 26.6–33)
MCHC RBC AUTO-ENTMCNC: 31 G/DL (ref 31.5–35.7)
MCV RBC AUTO: 82.6 FL (ref 79–97)
MONOCYTES # BLD AUTO: 1.53 10*3/MM3 (ref 0.1–0.9)
MONOCYTES NFR BLD AUTO: 5.7 % (ref 5–12)
NEUTROPHILS NFR BLD AUTO: 17.41 10*3/MM3 (ref 1.7–7)
NEUTROPHILS NFR BLD AUTO: 65.2 % (ref 42.7–76)
NRBC BLD AUTO-RTO: 0 /100 WBC (ref 0–0.2)
PLAT MORPH BLD: NORMAL
PLATELET # BLD AUTO: 500 10*3/MM3 (ref 140–450)
PMV BLD AUTO: 10.4 FL (ref 6–12)
POTASSIUM SERPL-SCNC: 4.2 MMOL/L (ref 3.5–5.2)
RBC # BLD AUTO: 6.14 10*6/MM3 (ref 3.77–5.28)
RBC MORPH BLD: NORMAL
SODIUM SERPL-SCNC: 126 MMOL/L (ref 136–145)
WBC MORPH BLD: NORMAL
WBC NRBC COR # BLD: 26.74 10*3/MM3 (ref 3.4–10.8)

## 2022-06-05 PROCEDURE — 94799 UNLISTED PULMONARY SVC/PX: CPT

## 2022-06-05 PROCEDURE — 99233 SBSQ HOSP IP/OBS HIGH 50: CPT | Performed by: INTERNAL MEDICINE

## 2022-06-05 PROCEDURE — 94664 DEMO&/EVAL PT USE INHALER: CPT

## 2022-06-05 PROCEDURE — 85025 COMPLETE CBC W/AUTO DIFF WBC: CPT | Performed by: INTERNAL MEDICINE

## 2022-06-05 PROCEDURE — 25010000002 DIAZEPAM PER 5 MG: Performed by: NURSE PRACTITIONER

## 2022-06-05 PROCEDURE — 83735 ASSAY OF MAGNESIUM: CPT | Performed by: INTERNAL MEDICINE

## 2022-06-05 PROCEDURE — 63710000001 INSULIN DETEMIR PER 5 UNITS: Performed by: INTERNAL MEDICINE

## 2022-06-05 PROCEDURE — 82962 GLUCOSE BLOOD TEST: CPT

## 2022-06-05 PROCEDURE — 63710000001 INSULIN LISPRO (HUMAN) PER 5 UNITS: Performed by: NURSE PRACTITIONER

## 2022-06-05 PROCEDURE — 25010000002 VANCOMYCIN PER 500 MG

## 2022-06-05 PROCEDURE — 25010000002 METHYLPREDNISOLONE PER 40 MG: Performed by: INTERNAL MEDICINE

## 2022-06-05 PROCEDURE — 85007 BL SMEAR W/DIFF WBC COUNT: CPT | Performed by: INTERNAL MEDICINE

## 2022-06-05 PROCEDURE — 80048 BASIC METABOLIC PNL TOTAL CA: CPT | Performed by: INTERNAL MEDICINE

## 2022-06-05 RX ORDER — HYDROXYZINE HYDROCHLORIDE 25 MG/1
25 TABLET, FILM COATED ORAL ONCE
Status: COMPLETED | OUTPATIENT
Start: 2022-06-05 | End: 2022-06-05

## 2022-06-05 RX ADMIN — IPRATROPIUM BROMIDE AND ALBUTEROL SULFATE 3 ML: 2.5; .5 SOLUTION RESPIRATORY (INHALATION) at 16:00

## 2022-06-05 RX ADMIN — BUDESONIDE 0.5 MG: 0.5 SUSPENSION RESPIRATORY (INHALATION) at 19:38

## 2022-06-05 RX ADMIN — OXYCODONE HYDROCHLORIDE AND ACETAMINOPHEN 1 TABLET: 5; 325 TABLET ORAL at 12:46

## 2022-06-05 RX ADMIN — ESCITALOPRAM OXALATE 20 MG: 20 TABLET ORAL at 08:55

## 2022-06-05 RX ADMIN — DIAZEPAM 2 MG: 5 INJECTION, SOLUTION INTRAMUSCULAR; INTRAVENOUS at 17:07

## 2022-06-05 RX ADMIN — GABAPENTIN 300 MG: 300 CAPSULE ORAL at 14:48

## 2022-06-05 RX ADMIN — VANCOMYCIN HYDROCHLORIDE 1000 MG: 1 INJECTION, SOLUTION INTRAVENOUS at 08:54

## 2022-06-05 RX ADMIN — BUSPIRONE HYDROCHLORIDE 10 MG: 10 TABLET ORAL at 08:55

## 2022-06-05 RX ADMIN — GABAPENTIN 300 MG: 300 CAPSULE ORAL at 21:04

## 2022-06-05 RX ADMIN — Medication 10 ML: at 08:57

## 2022-06-05 RX ADMIN — GABAPENTIN 300 MG: 300 CAPSULE ORAL at 05:25

## 2022-06-05 RX ADMIN — ARIPIPRAZOLE 5 MG: 10 TABLET ORAL at 08:55

## 2022-06-05 RX ADMIN — DIAZEPAM 2 MG: 5 INJECTION, SOLUTION INTRAMUSCULAR; INTRAVENOUS at 10:11

## 2022-06-05 RX ADMIN — INSULIN LISPRO 12 UNITS: 100 INJECTION, SOLUTION INTRAVENOUS; SUBCUTANEOUS at 21:04

## 2022-06-05 RX ADMIN — BUSPIRONE HYDROCHLORIDE 10 MG: 10 TABLET ORAL at 20:50

## 2022-06-05 RX ADMIN — HYDROXYZINE HYDROCHLORIDE 25 MG: 25 TABLET, FILM COATED ORAL at 01:32

## 2022-06-05 RX ADMIN — INSULIN DETEMIR 28 UNITS: 100 INJECTION, SOLUTION SUBCUTANEOUS at 20:51

## 2022-06-05 RX ADMIN — METHYLPREDNISOLONE SODIUM SUCCINATE 40 MG: 40 INJECTION, POWDER, FOR SOLUTION INTRAMUSCULAR; INTRAVENOUS at 09:00

## 2022-06-05 RX ADMIN — AMLODIPINE BESYLATE 10 MG: 10 TABLET ORAL at 08:55

## 2022-06-05 RX ADMIN — INSULIN LISPRO 3 UNITS: 100 INJECTION, SOLUTION INTRAVENOUS; SUBCUTANEOUS at 08:56

## 2022-06-05 RX ADMIN — METOPROLOL TARTRATE 25 MG: 25 TABLET, FILM COATED ORAL at 08:55

## 2022-06-05 RX ADMIN — APIXABAN 5 MG: 5 TABLET, FILM COATED ORAL at 08:55

## 2022-06-05 RX ADMIN — FAMOTIDINE 20 MG: 20 TABLET ORAL at 08:55

## 2022-06-05 RX ADMIN — METOPROLOL TARTRATE 25 MG: 25 TABLET, FILM COATED ORAL at 20:50

## 2022-06-05 RX ADMIN — BUDESONIDE 0.5 MG: 0.5 SUSPENSION RESPIRATORY (INHALATION) at 08:16

## 2022-06-05 RX ADMIN — IPRATROPIUM BROMIDE AND ALBUTEROL SULFATE 3 ML: 2.5; .5 SOLUTION RESPIRATORY (INHALATION) at 19:38

## 2022-06-05 RX ADMIN — FAMOTIDINE 20 MG: 20 TABLET ORAL at 20:51

## 2022-06-05 RX ADMIN — INSULIN LISPRO 8 UNITS: 100 INJECTION, SOLUTION INTRAVENOUS; SUBCUTANEOUS at 12:46

## 2022-06-05 RX ADMIN — DULOXETINE HYDROCHLORIDE 120 MG: 60 CAPSULE, DELAYED RELEASE ORAL at 08:55

## 2022-06-05 RX ADMIN — APIXABAN 5 MG: 5 TABLET, FILM COATED ORAL at 20:50

## 2022-06-05 RX ADMIN — LISINOPRIL 10 MG: 10 TABLET ORAL at 08:55

## 2022-06-05 RX ADMIN — IPRATROPIUM BROMIDE AND ALBUTEROL SULFATE 3 ML: 2.5; .5 SOLUTION RESPIRATORY (INHALATION) at 12:56

## 2022-06-05 RX ADMIN — IPRATROPIUM BROMIDE AND ALBUTEROL SULFATE 3 ML: 2.5; .5 SOLUTION RESPIRATORY (INHALATION) at 08:16

## 2022-06-05 RX ADMIN — INSULIN DETEMIR 25 UNITS: 100 INJECTION, SOLUTION SUBCUTANEOUS at 09:00

## 2022-06-05 RX ADMIN — Medication 10 ML: at 20:50

## 2022-06-05 RX ADMIN — Medication 10 MG: at 21:04

## 2022-06-05 RX ADMIN — INSULIN LISPRO 10 UNITS: 100 INJECTION, SOLUTION INTRAVENOUS; SUBCUTANEOUS at 17:07

## 2022-06-05 RX ADMIN — TERAZOSIN HYDROCHLORIDE 2 MG: 2 CAPSULE ORAL at 20:50

## 2022-06-05 RX ADMIN — DIAZEPAM 2 MG: 2 TABLET ORAL at 05:25

## 2022-06-05 RX ADMIN — OXYCODONE HYDROCHLORIDE AND ACETAMINOPHEN 1 TABLET: 5; 325 TABLET ORAL at 06:24

## 2022-06-05 RX ADMIN — OXYCODONE HYDROCHLORIDE AND ACETAMINOPHEN 1 TABLET: 5; 325 TABLET ORAL at 20:50

## 2022-06-06 LAB
ANION GAP SERPL CALCULATED.3IONS-SCNC: 14 MMOL/L (ref 5–15)
BUN SERPL-MCNC: 35 MG/DL (ref 6–20)
BUN/CREAT SERPL: 36.5 (ref 7–25)
CALCIUM SPEC-SCNC: 9.2 MG/DL (ref 8.6–10.5)
CHLORIDE SERPL-SCNC: 90 MMOL/L (ref 98–107)
CO2 SERPL-SCNC: 22 MMOL/L (ref 22–29)
CREAT SERPL-MCNC: 0.96 MG/DL (ref 0.57–1)
EGFRCR SERPLBLD CKD-EPI 2021: 69.2 ML/MIN/1.73
GLUCOSE BLDC GLUCOMTR-MCNC: 171 MG/DL (ref 70–130)
GLUCOSE BLDC GLUCOMTR-MCNC: 193 MG/DL (ref 70–130)
GLUCOSE BLDC GLUCOMTR-MCNC: 320 MG/DL (ref 70–130)
GLUCOSE BLDC GLUCOMTR-MCNC: 349 MG/DL (ref 70–130)
GLUCOSE SERPL-MCNC: 216 MG/DL (ref 65–99)
POTASSIUM SERPL-SCNC: 4.2 MMOL/L (ref 3.5–5.2)
SODIUM SERPL-SCNC: 126 MMOL/L (ref 136–145)
VANCOMYCIN SERPL-MCNC: 11.3 MCG/ML (ref 5–40)

## 2022-06-06 PROCEDURE — 25010000002 METHYLPREDNISOLONE PER 40 MG: Performed by: INTERNAL MEDICINE

## 2022-06-06 PROCEDURE — 80202 ASSAY OF VANCOMYCIN: CPT

## 2022-06-06 PROCEDURE — 63710000001 INSULIN LISPRO (HUMAN) PER 5 UNITS: Performed by: NURSE PRACTITIONER

## 2022-06-06 PROCEDURE — 94664 DEMO&/EVAL PT USE INHALER: CPT

## 2022-06-06 PROCEDURE — 25010000002 DIAZEPAM PER 5 MG: Performed by: NURSE PRACTITIONER

## 2022-06-06 PROCEDURE — 82962 GLUCOSE BLOOD TEST: CPT

## 2022-06-06 PROCEDURE — 63710000001 INSULIN DETEMIR PER 5 UNITS: Performed by: INTERNAL MEDICINE

## 2022-06-06 PROCEDURE — 97530 THERAPEUTIC ACTIVITIES: CPT

## 2022-06-06 PROCEDURE — 80048 BASIC METABOLIC PNL TOTAL CA: CPT

## 2022-06-06 PROCEDURE — 25010000002 VANCOMYCIN 10 G RECONSTITUTED SOLUTION

## 2022-06-06 PROCEDURE — 97535 SELF CARE MNGMENT TRAINING: CPT

## 2022-06-06 PROCEDURE — 94799 UNLISTED PULMONARY SVC/PX: CPT

## 2022-06-06 PROCEDURE — 99232 SBSQ HOSP IP/OBS MODERATE 35: CPT | Performed by: INTERNAL MEDICINE

## 2022-06-06 RX ORDER — PREDNISONE 1 MG/1
5 TABLET ORAL
Status: DISCONTINUED | OUTPATIENT
Start: 2022-06-15 | End: 2022-06-14

## 2022-06-06 RX ORDER — PREDNISONE 10 MG/1
10 TABLET ORAL
Status: COMPLETED | OUTPATIENT
Start: 2022-06-13 | End: 2022-06-14

## 2022-06-06 RX ORDER — PREDNISONE 20 MG/1
20 TABLET ORAL
Status: DISPENSED | OUTPATIENT
Start: 2022-06-11 | End: 2022-06-13

## 2022-06-06 RX ORDER — NYSTATIN 100000 [USP'U]/G
POWDER TOPICAL EVERY 12 HOURS SCHEDULED
Status: DISCONTINUED | OUTPATIENT
Start: 2022-06-06 | End: 2022-06-26 | Stop reason: HOSPADM

## 2022-06-06 RX ORDER — PREDNISONE 20 MG/1
40 TABLET ORAL
Status: COMPLETED | OUTPATIENT
Start: 2022-06-07 | End: 2022-06-08

## 2022-06-06 RX ORDER — OXYCODONE AND ACETAMINOPHEN 10; 325 MG/1; MG/1
TABLET ORAL
Qty: 120 TABLET | Refills: 0 | OUTPATIENT
Start: 2022-06-06

## 2022-06-06 RX ADMIN — APIXABAN 5 MG: 5 TABLET, FILM COATED ORAL at 20:41

## 2022-06-06 RX ADMIN — INSULIN DETEMIR 28 UNITS: 100 INJECTION, SOLUTION SUBCUTANEOUS at 20:40

## 2022-06-06 RX ADMIN — OXYCODONE HYDROCHLORIDE AND ACETAMINOPHEN 1 TABLET: 5; 325 TABLET ORAL at 15:18

## 2022-06-06 RX ADMIN — DULOXETINE HYDROCHLORIDE 120 MG: 60 CAPSULE, DELAYED RELEASE ORAL at 09:52

## 2022-06-06 RX ADMIN — BUSPIRONE HYDROCHLORIDE 10 MG: 10 TABLET ORAL at 08:32

## 2022-06-06 RX ADMIN — IPRATROPIUM BROMIDE AND ALBUTEROL SULFATE 3 ML: 2.5; .5 SOLUTION RESPIRATORY (INHALATION) at 15:28

## 2022-06-06 RX ADMIN — Medication 10 MG: at 21:54

## 2022-06-06 RX ADMIN — AMLODIPINE BESYLATE 10 MG: 10 TABLET ORAL at 08:31

## 2022-06-06 RX ADMIN — APIXABAN 5 MG: 5 TABLET, FILM COATED ORAL at 08:31

## 2022-06-06 RX ADMIN — METOPROLOL TARTRATE 25 MG: 25 TABLET, FILM COATED ORAL at 20:41

## 2022-06-06 RX ADMIN — ARIPIPRAZOLE 5 MG: 10 TABLET ORAL at 08:32

## 2022-06-06 RX ADMIN — GABAPENTIN 300 MG: 300 CAPSULE ORAL at 21:53

## 2022-06-06 RX ADMIN — GABAPENTIN 300 MG: 300 CAPSULE ORAL at 14:21

## 2022-06-06 RX ADMIN — NYSTATIN: 100000 POWDER TOPICAL at 20:43

## 2022-06-06 RX ADMIN — GABAPENTIN 300 MG: 300 CAPSULE ORAL at 06:24

## 2022-06-06 RX ADMIN — FAMOTIDINE 20 MG: 20 TABLET ORAL at 20:41

## 2022-06-06 RX ADMIN — OXYCODONE HYDROCHLORIDE AND ACETAMINOPHEN 1 TABLET: 5; 325 TABLET ORAL at 21:53

## 2022-06-06 RX ADMIN — OXYCODONE HYDROCHLORIDE AND ACETAMINOPHEN 1 TABLET: 5; 325 TABLET ORAL at 08:32

## 2022-06-06 RX ADMIN — ESCITALOPRAM OXALATE 20 MG: 20 TABLET ORAL at 08:31

## 2022-06-06 RX ADMIN — INSULIN LISPRO 10 UNITS: 100 INJECTION, SOLUTION INTRAVENOUS; SUBCUTANEOUS at 20:41

## 2022-06-06 RX ADMIN — INSULIN LISPRO 3 UNITS: 100 INJECTION, SOLUTION INTRAVENOUS; SUBCUTANEOUS at 08:30

## 2022-06-06 RX ADMIN — INSULIN DETEMIR 28 UNITS: 100 INJECTION, SOLUTION SUBCUTANEOUS at 08:38

## 2022-06-06 RX ADMIN — BUDESONIDE 0.5 MG: 0.5 SUSPENSION RESPIRATORY (INHALATION) at 20:05

## 2022-06-06 RX ADMIN — Medication 10 ML: at 20:52

## 2022-06-06 RX ADMIN — FAMOTIDINE 20 MG: 20 TABLET ORAL at 08:31

## 2022-06-06 RX ADMIN — BUSPIRONE HYDROCHLORIDE 10 MG: 10 TABLET ORAL at 20:41

## 2022-06-06 RX ADMIN — TERAZOSIN HYDROCHLORIDE 2 MG: 2 CAPSULE ORAL at 20:41

## 2022-06-06 RX ADMIN — LISINOPRIL 10 MG: 10 TABLET ORAL at 08:31

## 2022-06-06 RX ADMIN — NYSTATIN: 100000 POWDER TOPICAL at 09:52

## 2022-06-06 RX ADMIN — VANCOMYCIN HYDROCHLORIDE 1500 MG: 10 INJECTION, POWDER, LYOPHILIZED, FOR SOLUTION INTRAVENOUS at 12:16

## 2022-06-06 RX ADMIN — METOPROLOL TARTRATE 25 MG: 25 TABLET, FILM COATED ORAL at 08:31

## 2022-06-06 RX ADMIN — INSULIN LISPRO 10 UNITS: 100 INJECTION, SOLUTION INTRAVENOUS; SUBCUTANEOUS at 16:51

## 2022-06-06 RX ADMIN — IPRATROPIUM BROMIDE AND ALBUTEROL SULFATE 3 ML: 2.5; .5 SOLUTION RESPIRATORY (INHALATION) at 20:05

## 2022-06-06 RX ADMIN — METHYLPREDNISOLONE SODIUM SUCCINATE 40 MG: 40 INJECTION, POWDER, FOR SOLUTION INTRAMUSCULAR; INTRAVENOUS at 08:31

## 2022-06-06 RX ADMIN — Medication 10 ML: at 08:43

## 2022-06-06 RX ADMIN — IPRATROPIUM BROMIDE AND ALBUTEROL SULFATE 3 ML: 2.5; .5 SOLUTION RESPIRATORY (INHALATION) at 12:42

## 2022-06-06 RX ADMIN — INSULIN LISPRO 3 UNITS: 100 INJECTION, SOLUTION INTRAVENOUS; SUBCUTANEOUS at 12:16

## 2022-06-06 RX ADMIN — DIAZEPAM 2 MG: 5 INJECTION, SOLUTION INTRAMUSCULAR; INTRAVENOUS at 09:58

## 2022-06-06 NOTE — TELEPHONE ENCOUNTER
Please call whoever is on her release of information.  We are not refilling her pain medication until she is discharged from the hospital and I can speak to her directly.

## 2022-06-06 NOTE — TELEPHONE ENCOUNTER
Called lvm   Called informed pt pharmacy that she is in the hospital that we will not be refilling at this time. She will need to see us after being released.

## 2022-06-07 LAB
ANION GAP SERPL CALCULATED.3IONS-SCNC: 9 MMOL/L (ref 5–15)
BUN SERPL-MCNC: 31 MG/DL (ref 6–20)
BUN/CREAT SERPL: 32.6 (ref 7–25)
CALCIUM SPEC-SCNC: 9.4 MG/DL (ref 8.6–10.5)
CHLORIDE SERPL-SCNC: 97 MMOL/L (ref 98–107)
CO2 SERPL-SCNC: 24 MMOL/L (ref 22–29)
CREAT SERPL-MCNC: 0.95 MG/DL (ref 0.57–1)
DEPRECATED RDW RBC AUTO: 47.6 FL (ref 37–54)
EGFRCR SERPLBLD CKD-EPI 2021: 70 ML/MIN/1.73
ERYTHROCYTE [DISTWIDTH] IN BLOOD BY AUTOMATED COUNT: 16 % (ref 12.3–15.4)
GLUCOSE BLDC GLUCOMTR-MCNC: 170 MG/DL (ref 70–130)
GLUCOSE BLDC GLUCOMTR-MCNC: 188 MG/DL (ref 70–130)
GLUCOSE BLDC GLUCOMTR-MCNC: 207 MG/DL (ref 70–130)
GLUCOSE BLDC GLUCOMTR-MCNC: 243 MG/DL (ref 70–130)
GLUCOSE SERPL-MCNC: 225 MG/DL (ref 65–99)
HCT VFR BLD AUTO: 49.8 % (ref 34–46.6)
HGB BLD-MCNC: 15.7 G/DL (ref 12–15.9)
MAGNESIUM SERPL-MCNC: 2.1 MG/DL (ref 1.6–2.6)
MCH RBC QN AUTO: 26.4 PG (ref 26.6–33)
MCHC RBC AUTO-ENTMCNC: 31.5 G/DL (ref 31.5–35.7)
MCV RBC AUTO: 83.8 FL (ref 79–97)
PHOSPHATE SERPL-MCNC: 3.4 MG/DL (ref 2.5–4.5)
PLATELET # BLD AUTO: 496 10*3/MM3 (ref 140–450)
PMV BLD AUTO: 10.4 FL (ref 6–12)
POTASSIUM SERPL-SCNC: 4.6 MMOL/L (ref 3.5–5.2)
RBC # BLD AUTO: 5.94 10*6/MM3 (ref 3.77–5.28)
SODIUM SERPL-SCNC: 130 MMOL/L (ref 136–145)
WBC NRBC COR # BLD: 28.52 10*3/MM3 (ref 3.4–10.8)

## 2022-06-07 PROCEDURE — 94799 UNLISTED PULMONARY SVC/PX: CPT

## 2022-06-07 PROCEDURE — 94664 DEMO&/EVAL PT USE INHALER: CPT

## 2022-06-07 PROCEDURE — 97530 THERAPEUTIC ACTIVITIES: CPT

## 2022-06-07 PROCEDURE — 63710000001 INSULIN LISPRO (HUMAN) PER 5 UNITS: Performed by: INTERNAL MEDICINE

## 2022-06-07 PROCEDURE — 82962 GLUCOSE BLOOD TEST: CPT

## 2022-06-07 PROCEDURE — 80048 BASIC METABOLIC PNL TOTAL CA: CPT | Performed by: INTERNAL MEDICINE

## 2022-06-07 PROCEDURE — 25010000002 DIAZEPAM PER 5 MG: Performed by: NURSE PRACTITIONER

## 2022-06-07 PROCEDURE — 84100 ASSAY OF PHOSPHORUS: CPT | Performed by: INTERNAL MEDICINE

## 2022-06-07 PROCEDURE — 99232 SBSQ HOSP IP/OBS MODERATE 35: CPT | Performed by: INTERNAL MEDICINE

## 2022-06-07 PROCEDURE — 25010000002 VANCOMYCIN 10 G RECONSTITUTED SOLUTION

## 2022-06-07 PROCEDURE — 85027 COMPLETE CBC AUTOMATED: CPT | Performed by: INTERNAL MEDICINE

## 2022-06-07 PROCEDURE — 63710000001 INSULIN LISPRO (HUMAN) PER 5 UNITS: Performed by: NURSE PRACTITIONER

## 2022-06-07 PROCEDURE — 63710000001 PREDNISONE PER 1 MG: Performed by: INTERNAL MEDICINE

## 2022-06-07 PROCEDURE — 83735 ASSAY OF MAGNESIUM: CPT | Performed by: INTERNAL MEDICINE

## 2022-06-07 PROCEDURE — 63710000001 INSULIN DETEMIR PER 5 UNITS: Performed by: INTERNAL MEDICINE

## 2022-06-07 RX ORDER — INSULIN LISPRO 100 [IU]/ML
10 INJECTION, SOLUTION INTRAVENOUS; SUBCUTANEOUS
Status: DISCONTINUED | OUTPATIENT
Start: 2022-06-07 | End: 2022-06-13

## 2022-06-07 RX ADMIN — INSULIN LISPRO 10 UNITS: 100 INJECTION, SOLUTION INTRAVENOUS; SUBCUTANEOUS at 17:20

## 2022-06-07 RX ADMIN — VANCOMYCIN HYDROCHLORIDE 1500 MG: 10 INJECTION, POWDER, LYOPHILIZED, FOR SOLUTION INTRAVENOUS at 05:54

## 2022-06-07 RX ADMIN — OXYCODONE HYDROCHLORIDE AND ACETAMINOPHEN 1 TABLET: 5; 325 TABLET ORAL at 21:45

## 2022-06-07 RX ADMIN — BUDESONIDE 0.5 MG: 0.5 SUSPENSION RESPIRATORY (INHALATION) at 20:11

## 2022-06-07 RX ADMIN — APIXABAN 5 MG: 5 TABLET, FILM COATED ORAL at 08:47

## 2022-06-07 RX ADMIN — INSULIN LISPRO 10 UNITS: 100 INJECTION, SOLUTION INTRAVENOUS; SUBCUTANEOUS at 08:47

## 2022-06-07 RX ADMIN — OXYCODONE HYDROCHLORIDE AND ACETAMINOPHEN 1 TABLET: 5; 325 TABLET ORAL at 14:25

## 2022-06-07 RX ADMIN — GABAPENTIN 300 MG: 300 CAPSULE ORAL at 14:25

## 2022-06-07 RX ADMIN — FAMOTIDINE 20 MG: 20 TABLET ORAL at 20:14

## 2022-06-07 RX ADMIN — BUSPIRONE HYDROCHLORIDE 10 MG: 10 TABLET ORAL at 20:14

## 2022-06-07 RX ADMIN — NYSTATIN: 100000 POWDER TOPICAL at 21:00

## 2022-06-07 RX ADMIN — DIAZEPAM 2 MG: 5 INJECTION, SOLUTION INTRAMUSCULAR; INTRAVENOUS at 11:58

## 2022-06-07 RX ADMIN — INSULIN DETEMIR 28 UNITS: 100 INJECTION, SOLUTION SUBCUTANEOUS at 20:14

## 2022-06-07 RX ADMIN — METOPROLOL TARTRATE 25 MG: 25 TABLET, FILM COATED ORAL at 20:14

## 2022-06-07 RX ADMIN — OXYCODONE HYDROCHLORIDE AND ACETAMINOPHEN 1 TABLET: 5; 325 TABLET ORAL at 08:54

## 2022-06-07 RX ADMIN — BUSPIRONE HYDROCHLORIDE 10 MG: 10 TABLET ORAL at 08:48

## 2022-06-07 RX ADMIN — NYSTATIN: 100000 POWDER TOPICAL at 08:48

## 2022-06-07 RX ADMIN — INSULIN LISPRO 3 UNITS: 100 INJECTION, SOLUTION INTRAVENOUS; SUBCUTANEOUS at 08:46

## 2022-06-07 RX ADMIN — ARIPIPRAZOLE 5 MG: 10 TABLET ORAL at 08:47

## 2022-06-07 RX ADMIN — SENNOSIDES AND DOCUSATE SODIUM 2 TABLET: 50; 8.6 TABLET ORAL at 08:48

## 2022-06-07 RX ADMIN — INSULIN LISPRO 10 UNITS: 100 INJECTION, SOLUTION INTRAVENOUS; SUBCUTANEOUS at 11:55

## 2022-06-07 RX ADMIN — TERAZOSIN HYDROCHLORIDE 2 MG: 2 CAPSULE ORAL at 20:14

## 2022-06-07 RX ADMIN — IPRATROPIUM BROMIDE AND ALBUTEROL SULFATE 3 ML: 2.5; .5 SOLUTION RESPIRATORY (INHALATION) at 12:51

## 2022-06-07 RX ADMIN — DULOXETINE HYDROCHLORIDE 120 MG: 60 CAPSULE, DELAYED RELEASE ORAL at 08:47

## 2022-06-07 RX ADMIN — IPRATROPIUM BROMIDE AND ALBUTEROL SULFATE 3 ML: 2.5; .5 SOLUTION RESPIRATORY (INHALATION) at 08:29

## 2022-06-07 RX ADMIN — ESCITALOPRAM OXALATE 20 MG: 20 TABLET ORAL at 08:48

## 2022-06-07 RX ADMIN — METOPROLOL TARTRATE 25 MG: 25 TABLET, FILM COATED ORAL at 08:47

## 2022-06-07 RX ADMIN — INSULIN DETEMIR 28 UNITS: 100 INJECTION, SOLUTION SUBCUTANEOUS at 08:45

## 2022-06-07 RX ADMIN — Medication 10 MG: at 21:45

## 2022-06-07 RX ADMIN — LISINOPRIL 10 MG: 10 TABLET ORAL at 08:48

## 2022-06-07 RX ADMIN — IPRATROPIUM BROMIDE AND ALBUTEROL SULFATE 3 ML: 2.5; .5 SOLUTION RESPIRATORY (INHALATION) at 15:42

## 2022-06-07 RX ADMIN — FAMOTIDINE 20 MG: 20 TABLET ORAL at 08:48

## 2022-06-07 RX ADMIN — INSULIN LISPRO 5 UNITS: 100 INJECTION, SOLUTION INTRAVENOUS; SUBCUTANEOUS at 17:20

## 2022-06-07 RX ADMIN — APIXABAN 5 MG: 5 TABLET, FILM COATED ORAL at 20:14

## 2022-06-07 RX ADMIN — PREDNISONE 40 MG: 20 TABLET ORAL at 08:48

## 2022-06-07 RX ADMIN — INSULIN LISPRO 5 UNITS: 100 INJECTION, SOLUTION INTRAVENOUS; SUBCUTANEOUS at 11:54

## 2022-06-07 RX ADMIN — GABAPENTIN 300 MG: 300 CAPSULE ORAL at 05:29

## 2022-06-07 RX ADMIN — INSULIN LISPRO 3 UNITS: 100 INJECTION, SOLUTION INTRAVENOUS; SUBCUTANEOUS at 20:14

## 2022-06-07 RX ADMIN — BUDESONIDE 0.5 MG: 0.5 SUSPENSION RESPIRATORY (INHALATION) at 08:29

## 2022-06-07 RX ADMIN — Medication 10 ML: at 21:00

## 2022-06-07 RX ADMIN — Medication 10 ML: at 08:49

## 2022-06-07 RX ADMIN — GABAPENTIN 300 MG: 300 CAPSULE ORAL at 21:45

## 2022-06-07 RX ADMIN — AMLODIPINE BESYLATE 10 MG: 10 TABLET ORAL at 08:48

## 2022-06-08 LAB
ALBUMIN SERPL-MCNC: 3.1 G/DL (ref 3.5–5.2)
ANION GAP SERPL CALCULATED.3IONS-SCNC: 9 MMOL/L (ref 5–15)
BUN SERPL-MCNC: 26 MG/DL (ref 6–20)
BUN/CREAT SERPL: 32.1 (ref 7–25)
CALCIUM SPEC-SCNC: 9.1 MG/DL (ref 8.6–10.5)
CHLORIDE SERPL-SCNC: 98 MMOL/L (ref 98–107)
CO2 SERPL-SCNC: 24 MMOL/L (ref 22–29)
CREAT SERPL-MCNC: 0.81 MG/DL (ref 0.57–1)
EGFRCR SERPLBLD CKD-EPI 2021: 84.8 ML/MIN/1.73
GLUCOSE BLDC GLUCOMTR-MCNC: 157 MG/DL (ref 70–130)
GLUCOSE BLDC GLUCOMTR-MCNC: 214 MG/DL (ref 70–130)
GLUCOSE BLDC GLUCOMTR-MCNC: 231 MG/DL (ref 70–130)
GLUCOSE BLDC GLUCOMTR-MCNC: 94 MG/DL (ref 70–130)
GLUCOSE SERPL-MCNC: 170 MG/DL (ref 65–99)
PHOSPHATE SERPL-MCNC: 3.6 MG/DL (ref 2.5–4.5)
POTASSIUM SERPL-SCNC: 4.1 MMOL/L (ref 3.5–5.2)
SODIUM SERPL-SCNC: 131 MMOL/L (ref 136–145)
VANCOMYCIN TROUGH SERPL-MCNC: 13.3 MCG/ML (ref 5–20)

## 2022-06-08 PROCEDURE — 80069 RENAL FUNCTION PANEL: CPT | Performed by: INTERNAL MEDICINE

## 2022-06-08 PROCEDURE — 82962 GLUCOSE BLOOD TEST: CPT

## 2022-06-08 PROCEDURE — 63710000001 INSULIN LISPRO (HUMAN) PER 5 UNITS: Performed by: NURSE PRACTITIONER

## 2022-06-08 PROCEDURE — 63710000001 INSULIN DETEMIR PER 5 UNITS: Performed by: INTERNAL MEDICINE

## 2022-06-08 PROCEDURE — 80202 ASSAY OF VANCOMYCIN: CPT

## 2022-06-08 PROCEDURE — 25010000002 DIAZEPAM PER 5 MG: Performed by: NURSE PRACTITIONER

## 2022-06-08 PROCEDURE — 63710000001 INSULIN LISPRO (HUMAN) PER 5 UNITS: Performed by: INTERNAL MEDICINE

## 2022-06-08 PROCEDURE — 97535 SELF CARE MNGMENT TRAINING: CPT

## 2022-06-08 PROCEDURE — 97530 THERAPEUTIC ACTIVITIES: CPT

## 2022-06-08 PROCEDURE — 94799 UNLISTED PULMONARY SVC/PX: CPT

## 2022-06-08 PROCEDURE — 97110 THERAPEUTIC EXERCISES: CPT

## 2022-06-08 PROCEDURE — 25010000002 VANCOMYCIN 10 G RECONSTITUTED SOLUTION: Performed by: INTERNAL MEDICINE

## 2022-06-08 PROCEDURE — C1751 CATH, INF, PER/CENT/MIDLINE: HCPCS

## 2022-06-08 PROCEDURE — 63710000001 PREDNISONE PER 1 MG: Performed by: INTERNAL MEDICINE

## 2022-06-08 PROCEDURE — 25010000002 VANCOMYCIN 10 G RECONSTITUTED SOLUTION

## 2022-06-08 PROCEDURE — C1894 INTRO/SHEATH, NON-LASER: HCPCS

## 2022-06-08 PROCEDURE — 02HV33Z INSERTION OF INFUSION DEVICE INTO SUPERIOR VENA CAVA, PERCUTANEOUS APPROACH: ICD-10-PCS | Performed by: NURSE PRACTITIONER

## 2022-06-08 PROCEDURE — 99233 SBSQ HOSP IP/OBS HIGH 50: CPT | Performed by: NURSE PRACTITIONER

## 2022-06-08 PROCEDURE — 94761 N-INVAS EAR/PLS OXIMETRY MLT: CPT

## 2022-06-08 PROCEDURE — 94664 DEMO&/EVAL PT USE INHALER: CPT

## 2022-06-08 RX ORDER — SODIUM CHLORIDE 0.9 % (FLUSH) 0.9 %
10 SYRINGE (ML) INJECTION AS NEEDED
Status: DISCONTINUED | OUTPATIENT
Start: 2022-06-08 | End: 2022-06-09

## 2022-06-08 RX ORDER — SODIUM CHLORIDE 0.9 % (FLUSH) 0.9 %
20 SYRINGE (ML) INJECTION AS NEEDED
Status: DISCONTINUED | OUTPATIENT
Start: 2022-06-08 | End: 2022-06-26 | Stop reason: HOSPADM

## 2022-06-08 RX ORDER — SODIUM CHLORIDE 0.9 % (FLUSH) 0.9 %
10 SYRINGE (ML) INJECTION EVERY 12 HOURS SCHEDULED
Status: DISCONTINUED | OUTPATIENT
Start: 2022-06-08 | End: 2022-06-09

## 2022-06-08 RX ADMIN — INSULIN LISPRO 10 UNITS: 100 INJECTION, SOLUTION INTRAVENOUS; SUBCUTANEOUS at 17:12

## 2022-06-08 RX ADMIN — APIXABAN 5 MG: 5 TABLET, FILM COATED ORAL at 09:06

## 2022-06-08 RX ADMIN — NYSTATIN: 100000 POWDER TOPICAL at 20:35

## 2022-06-08 RX ADMIN — BUDESONIDE 0.5 MG: 0.5 SUSPENSION RESPIRATORY (INHALATION) at 07:49

## 2022-06-08 RX ADMIN — FAMOTIDINE 20 MG: 20 TABLET ORAL at 09:06

## 2022-06-08 RX ADMIN — METOPROLOL TARTRATE 25 MG: 25 TABLET, FILM COATED ORAL at 09:04

## 2022-06-08 RX ADMIN — BUSPIRONE HYDROCHLORIDE 10 MG: 10 TABLET ORAL at 09:04

## 2022-06-08 RX ADMIN — IPRATROPIUM BROMIDE AND ALBUTEROL SULFATE 3 ML: 2.5; .5 SOLUTION RESPIRATORY (INHALATION) at 17:27

## 2022-06-08 RX ADMIN — Medication 10 MG: at 20:35

## 2022-06-08 RX ADMIN — INSULIN DETEMIR 28 UNITS: 100 INJECTION, SOLUTION SUBCUTANEOUS at 09:22

## 2022-06-08 RX ADMIN — INSULIN LISPRO 10 UNITS: 100 INJECTION, SOLUTION INTRAVENOUS; SUBCUTANEOUS at 12:03

## 2022-06-08 RX ADMIN — APIXABAN 5 MG: 5 TABLET, FILM COATED ORAL at 20:35

## 2022-06-08 RX ADMIN — Medication 10 ML: at 12:04

## 2022-06-08 RX ADMIN — AMLODIPINE BESYLATE 10 MG: 10 TABLET ORAL at 09:04

## 2022-06-08 RX ADMIN — OXYCODONE HYDROCHLORIDE AND ACETAMINOPHEN 1 TABLET: 5; 325 TABLET ORAL at 18:22

## 2022-06-08 RX ADMIN — DIAZEPAM 2 MG: 5 INJECTION, SOLUTION INTRAMUSCULAR; INTRAVENOUS at 09:21

## 2022-06-08 RX ADMIN — GABAPENTIN 300 MG: 300 CAPSULE ORAL at 14:25

## 2022-06-08 RX ADMIN — FAMOTIDINE 20 MG: 20 TABLET ORAL at 20:35

## 2022-06-08 RX ADMIN — INSULIN LISPRO 5 UNITS: 100 INJECTION, SOLUTION INTRAVENOUS; SUBCUTANEOUS at 17:10

## 2022-06-08 RX ADMIN — Medication 10 ML: at 20:36

## 2022-06-08 RX ADMIN — BUSPIRONE HYDROCHLORIDE 10 MG: 10 TABLET ORAL at 20:35

## 2022-06-08 RX ADMIN — ARIPIPRAZOLE 5 MG: 10 TABLET ORAL at 09:05

## 2022-06-08 RX ADMIN — Medication 10 ML: at 09:09

## 2022-06-08 RX ADMIN — IPRATROPIUM BROMIDE AND ALBUTEROL SULFATE 3 ML: 2.5; .5 SOLUTION RESPIRATORY (INHALATION) at 21:13

## 2022-06-08 RX ADMIN — DULOXETINE HYDROCHLORIDE 120 MG: 60 CAPSULE, DELAYED RELEASE ORAL at 09:05

## 2022-06-08 RX ADMIN — SENNOSIDES AND DOCUSATE SODIUM 2 TABLET: 50; 8.6 TABLET ORAL at 20:35

## 2022-06-08 RX ADMIN — INSULIN LISPRO 5 UNITS: 100 INJECTION, SOLUTION INTRAVENOUS; SUBCUTANEOUS at 20:35

## 2022-06-08 RX ADMIN — PREDNISONE 40 MG: 20 TABLET ORAL at 09:06

## 2022-06-08 RX ADMIN — INSULIN LISPRO 3 UNITS: 100 INJECTION, SOLUTION INTRAVENOUS; SUBCUTANEOUS at 12:02

## 2022-06-08 RX ADMIN — GABAPENTIN 300 MG: 300 CAPSULE ORAL at 20:35

## 2022-06-08 RX ADMIN — VANCOMYCIN HYDROCHLORIDE 1500 MG: 10 INJECTION, POWDER, LYOPHILIZED, FOR SOLUTION INTRAVENOUS at 01:29

## 2022-06-08 RX ADMIN — VANCOMYCIN HYDROCHLORIDE 1250 MG: 10 INJECTION, POWDER, LYOPHILIZED, FOR SOLUTION INTRAVENOUS at 18:31

## 2022-06-08 RX ADMIN — OXYCODONE HYDROCHLORIDE AND ACETAMINOPHEN 1 TABLET: 5; 325 TABLET ORAL at 12:02

## 2022-06-08 RX ADMIN — IPRATROPIUM BROMIDE AND ALBUTEROL SULFATE 3 ML: 2.5; .5 SOLUTION RESPIRATORY (INHALATION) at 07:49

## 2022-06-08 RX ADMIN — LISINOPRIL 10 MG: 10 TABLET ORAL at 09:06

## 2022-06-08 RX ADMIN — METOPROLOL TARTRATE 25 MG: 25 TABLET, FILM COATED ORAL at 20:35

## 2022-06-08 RX ADMIN — GABAPENTIN 300 MG: 300 CAPSULE ORAL at 05:24

## 2022-06-08 RX ADMIN — TERAZOSIN HYDROCHLORIDE 2 MG: 2 CAPSULE ORAL at 20:35

## 2022-06-08 RX ADMIN — IPRATROPIUM BROMIDE AND ALBUTEROL SULFATE 3 ML: 2.5; .5 SOLUTION RESPIRATORY (INHALATION) at 13:45

## 2022-06-08 RX ADMIN — ESCITALOPRAM OXALATE 20 MG: 20 TABLET ORAL at 09:06

## 2022-06-08 RX ADMIN — BUDESONIDE 0.5 MG: 0.5 SUSPENSION RESPIRATORY (INHALATION) at 21:13

## 2022-06-08 RX ADMIN — INSULIN DETEMIR 28 UNITS: 100 INJECTION, SOLUTION SUBCUTANEOUS at 20:37

## 2022-06-08 RX ADMIN — NYSTATIN: 100000 POWDER TOPICAL at 09:07

## 2022-06-08 RX ADMIN — SENNOSIDES AND DOCUSATE SODIUM 2 TABLET: 50; 8.6 TABLET ORAL at 09:21

## 2022-06-09 LAB
ANION GAP SERPL CALCULATED.3IONS-SCNC: 8 MMOL/L (ref 5–15)
BUN SERPL-MCNC: 23 MG/DL (ref 6–20)
BUN/CREAT SERPL: 26.1 (ref 7–25)
CA-I SERPL ISE-MCNC: 1.37 MMOL/L (ref 1.12–1.32)
CALCIUM SPEC-SCNC: 9.4 MG/DL (ref 8.6–10.5)
CHLORIDE SERPL-SCNC: 97 MMOL/L (ref 98–107)
CO2 SERPL-SCNC: 27 MMOL/L (ref 22–29)
CREAT SERPL-MCNC: 0.88 MG/DL (ref 0.57–1)
DEPRECATED RDW RBC AUTO: 48.7 FL (ref 37–54)
EGFRCR SERPLBLD CKD-EPI 2021: 76.8 ML/MIN/1.73
ERYTHROCYTE [DISTWIDTH] IN BLOOD BY AUTOMATED COUNT: 15.8 % (ref 12.3–15.4)
GLUCOSE BLDC GLUCOMTR-MCNC: 123 MG/DL (ref 70–130)
GLUCOSE BLDC GLUCOMTR-MCNC: 142 MG/DL (ref 70–130)
GLUCOSE BLDC GLUCOMTR-MCNC: 246 MG/DL (ref 70–130)
GLUCOSE BLDC GLUCOMTR-MCNC: 292 MG/DL (ref 70–130)
GLUCOSE SERPL-MCNC: 100 MG/DL (ref 65–99)
HCT VFR BLD AUTO: 47.3 % (ref 34–46.6)
HGB BLD-MCNC: 14.9 G/DL (ref 12–15.9)
MAGNESIUM SERPL-MCNC: 2 MG/DL (ref 1.6–2.6)
MCH RBC QN AUTO: 26.7 PG (ref 26.6–33)
MCHC RBC AUTO-ENTMCNC: 31.5 G/DL (ref 31.5–35.7)
MCV RBC AUTO: 84.6 FL (ref 79–97)
PHOSPHATE SERPL-MCNC: 3.8 MG/DL (ref 2.5–4.5)
PLATELET # BLD AUTO: 396 10*3/MM3 (ref 140–450)
PMV BLD AUTO: 10.6 FL (ref 6–12)
POTASSIUM SERPL-SCNC: 4.3 MMOL/L (ref 3.5–5.2)
RBC # BLD AUTO: 5.59 10*6/MM3 (ref 3.77–5.28)
SODIUM SERPL-SCNC: 132 MMOL/L (ref 136–145)
WBC NRBC COR # BLD: 26.11 10*3/MM3 (ref 3.4–10.8)

## 2022-06-09 PROCEDURE — 63710000001 INSULIN DETEMIR PER 5 UNITS: Performed by: INTERNAL MEDICINE

## 2022-06-09 PROCEDURE — 63710000001 INSULIN LISPRO (HUMAN) PER 5 UNITS: Performed by: INTERNAL MEDICINE

## 2022-06-09 PROCEDURE — 63710000001 INSULIN LISPRO (HUMAN) PER 5 UNITS: Performed by: NURSE PRACTITIONER

## 2022-06-09 PROCEDURE — 63710000001 PREDNISONE PER 1 MG: Performed by: INTERNAL MEDICINE

## 2022-06-09 PROCEDURE — 82330 ASSAY OF CALCIUM: CPT | Performed by: NURSE PRACTITIONER

## 2022-06-09 PROCEDURE — 99232 SBSQ HOSP IP/OBS MODERATE 35: CPT | Performed by: NURSE PRACTITIONER

## 2022-06-09 PROCEDURE — 83735 ASSAY OF MAGNESIUM: CPT | Performed by: NURSE PRACTITIONER

## 2022-06-09 PROCEDURE — 63710000001 PREDNISONE PER 5 MG: Performed by: INTERNAL MEDICINE

## 2022-06-09 PROCEDURE — 94664 DEMO&/EVAL PT USE INHALER: CPT

## 2022-06-09 PROCEDURE — 84100 ASSAY OF PHOSPHORUS: CPT | Performed by: NURSE PRACTITIONER

## 2022-06-09 PROCEDURE — 94799 UNLISTED PULMONARY SVC/PX: CPT

## 2022-06-09 PROCEDURE — 25010000002 VANCOMYCIN 10 G RECONSTITUTED SOLUTION

## 2022-06-09 PROCEDURE — 82962 GLUCOSE BLOOD TEST: CPT

## 2022-06-09 PROCEDURE — 63710000001 INSULIN DETEMIR PER 5 UNITS: Performed by: NURSE PRACTITIONER

## 2022-06-09 PROCEDURE — 94761 N-INVAS EAR/PLS OXIMETRY MLT: CPT

## 2022-06-09 PROCEDURE — 80048 BASIC METABOLIC PNL TOTAL CA: CPT | Performed by: NURSE PRACTITIONER

## 2022-06-09 PROCEDURE — 85027 COMPLETE CBC AUTOMATED: CPT | Performed by: NURSE PRACTITIONER

## 2022-06-09 RX ORDER — DIAZEPAM 2 MG/1
2 TABLET ORAL ONCE
Status: COMPLETED | OUTPATIENT
Start: 2022-06-09 | End: 2022-06-09

## 2022-06-09 RX ORDER — DIAZEPAM 2 MG/1
2 TABLET ORAL EVERY 12 HOURS PRN
Status: DISCONTINUED | OUTPATIENT
Start: 2022-06-09 | End: 2022-06-10

## 2022-06-09 RX ADMIN — TERAZOSIN HYDROCHLORIDE 2 MG: 2 CAPSULE ORAL at 20:20

## 2022-06-09 RX ADMIN — VANCOMYCIN HYDROCHLORIDE 1250 MG: 10 INJECTION, POWDER, LYOPHILIZED, FOR SOLUTION INTRAVENOUS at 17:41

## 2022-06-09 RX ADMIN — BUSPIRONE HYDROCHLORIDE 10 MG: 10 TABLET ORAL at 08:35

## 2022-06-09 RX ADMIN — BUDESONIDE 0.5 MG: 0.5 SUSPENSION RESPIRATORY (INHALATION) at 22:00

## 2022-06-09 RX ADMIN — METOPROLOL TARTRATE 25 MG: 25 TABLET, FILM COATED ORAL at 20:21

## 2022-06-09 RX ADMIN — METOPROLOL TARTRATE 25 MG: 25 TABLET, FILM COATED ORAL at 08:35

## 2022-06-09 RX ADMIN — GABAPENTIN 300 MG: 300 CAPSULE ORAL at 14:24

## 2022-06-09 RX ADMIN — Medication 10 ML: at 08:38

## 2022-06-09 RX ADMIN — INSULIN DETEMIR 28 UNITS: 100 INJECTION, SOLUTION SUBCUTANEOUS at 08:35

## 2022-06-09 RX ADMIN — GABAPENTIN 300 MG: 300 CAPSULE ORAL at 05:05

## 2022-06-09 RX ADMIN — INSULIN LISPRO 10 UNITS: 100 INJECTION, SOLUTION INTRAVENOUS; SUBCUTANEOUS at 12:21

## 2022-06-09 RX ADMIN — OXYCODONE HYDROCHLORIDE AND ACETAMINOPHEN 1 TABLET: 5; 325 TABLET ORAL at 20:21

## 2022-06-09 RX ADMIN — Medication 10 ML: at 20:22

## 2022-06-09 RX ADMIN — INSULIN LISPRO 10 UNITS: 100 INJECTION, SOLUTION INTRAVENOUS; SUBCUTANEOUS at 08:35

## 2022-06-09 RX ADMIN — LISINOPRIL 10 MG: 10 TABLET ORAL at 08:35

## 2022-06-09 RX ADMIN — INSULIN LISPRO 8 UNITS: 100 INJECTION, SOLUTION INTRAVENOUS; SUBCUTANEOUS at 17:02

## 2022-06-09 RX ADMIN — APIXABAN 5 MG: 5 TABLET, FILM COATED ORAL at 20:21

## 2022-06-09 RX ADMIN — BUSPIRONE HYDROCHLORIDE 10 MG: 10 TABLET ORAL at 20:21

## 2022-06-09 RX ADMIN — IPRATROPIUM BROMIDE AND ALBUTEROL SULFATE 3 ML: 2.5; .5 SOLUTION RESPIRATORY (INHALATION) at 12:40

## 2022-06-09 RX ADMIN — NYSTATIN: 100000 POWDER TOPICAL at 20:23

## 2022-06-09 RX ADMIN — OXYCODONE HYDROCHLORIDE AND ACETAMINOPHEN 1 TABLET: 5; 325 TABLET ORAL at 12:22

## 2022-06-09 RX ADMIN — IPRATROPIUM BROMIDE AND ALBUTEROL SULFATE 3 ML: 2.5; .5 SOLUTION RESPIRATORY (INHALATION) at 07:30

## 2022-06-09 RX ADMIN — INSULIN LISPRO 10 UNITS: 100 INJECTION, SOLUTION INTRAVENOUS; SUBCUTANEOUS at 17:02

## 2022-06-09 RX ADMIN — AMLODIPINE BESYLATE 10 MG: 10 TABLET ORAL at 08:34

## 2022-06-09 RX ADMIN — DIAZEPAM 2 MG: 2 TABLET ORAL at 15:48

## 2022-06-09 RX ADMIN — IPRATROPIUM BROMIDE AND ALBUTEROL SULFATE 3 ML: 2.5; .5 SOLUTION RESPIRATORY (INHALATION) at 16:02

## 2022-06-09 RX ADMIN — FAMOTIDINE 20 MG: 20 TABLET ORAL at 08:34

## 2022-06-09 RX ADMIN — GABAPENTIN 300 MG: 300 CAPSULE ORAL at 20:21

## 2022-06-09 RX ADMIN — VANCOMYCIN HYDROCHLORIDE 1250 MG: 10 INJECTION, POWDER, LYOPHILIZED, FOR SOLUTION INTRAVENOUS at 05:05

## 2022-06-09 RX ADMIN — OXYCODONE HYDROCHLORIDE AND ACETAMINOPHEN 1 TABLET: 5; 325 TABLET ORAL at 05:05

## 2022-06-09 RX ADMIN — ESCITALOPRAM OXALATE 20 MG: 20 TABLET ORAL at 08:35

## 2022-06-09 RX ADMIN — INSULIN LISPRO 5 UNITS: 100 INJECTION, SOLUTION INTRAVENOUS; SUBCUTANEOUS at 20:22

## 2022-06-09 RX ADMIN — IPRATROPIUM BROMIDE AND ALBUTEROL SULFATE 3 ML: 2.5; .5 SOLUTION RESPIRATORY (INHALATION) at 22:00

## 2022-06-09 RX ADMIN — NYSTATIN: 100000 POWDER TOPICAL at 08:38

## 2022-06-09 RX ADMIN — DULOXETINE HYDROCHLORIDE 120 MG: 60 CAPSULE, DELAYED RELEASE ORAL at 08:34

## 2022-06-09 RX ADMIN — INSULIN DETEMIR 28 UNITS: 100 INJECTION, SOLUTION SUBCUTANEOUS at 20:22

## 2022-06-09 RX ADMIN — APIXABAN 5 MG: 5 TABLET, FILM COATED ORAL at 08:35

## 2022-06-09 RX ADMIN — Medication 10 MG: at 20:22

## 2022-06-09 RX ADMIN — PREDNISONE 30 MG: 20 TABLET ORAL at 08:35

## 2022-06-09 RX ADMIN — SENNOSIDES AND DOCUSATE SODIUM 2 TABLET: 50; 8.6 TABLET ORAL at 08:34

## 2022-06-09 RX ADMIN — FAMOTIDINE 20 MG: 20 TABLET ORAL at 20:21

## 2022-06-09 RX ADMIN — ARIPIPRAZOLE 5 MG: 10 TABLET ORAL at 08:34

## 2022-06-09 RX ADMIN — BUDESONIDE 0.5 MG: 0.5 SUSPENSION RESPIRATORY (INHALATION) at 07:30

## 2022-06-10 ENCOUNTER — APPOINTMENT (OUTPATIENT)
Dept: GENERAL RADIOLOGY | Facility: HOSPITAL | Age: 57
End: 2022-06-10

## 2022-06-10 LAB
ANION GAP SERPL CALCULATED.3IONS-SCNC: 10 MMOL/L (ref 5–15)
BASOPHILS # BLD MANUAL: 0 10*3/MM3 (ref 0–0.2)
BASOPHILS NFR BLD MANUAL: 0 % (ref 0–1.5)
BUN SERPL-MCNC: 28 MG/DL (ref 6–20)
BUN/CREAT SERPL: 30.4 (ref 7–25)
CALCIUM SPEC-SCNC: 9.2 MG/DL (ref 8.6–10.5)
CHLORIDE SERPL-SCNC: 100 MMOL/L (ref 98–107)
CO2 SERPL-SCNC: 25 MMOL/L (ref 22–29)
CREAT SERPL-MCNC: 0.92 MG/DL (ref 0.57–1)
CRP SERPL-MCNC: <0.3 MG/DL (ref 0–0.5)
D DIMER PPP FEU-MCNC: 0.3 MCGFEU/ML (ref 0.01–0.5)
DEPRECATED RDW RBC AUTO: 47.2 FL (ref 37–54)
EGFRCR SERPLBLD CKD-EPI 2021: 72.8 ML/MIN/1.73
EOSINOPHIL # BLD MANUAL: 0.27 10*3/MM3 (ref 0–0.4)
EOSINOPHIL NFR BLD MANUAL: 1 % (ref 0.3–6.2)
ERYTHROCYTE [DISTWIDTH] IN BLOOD BY AUTOMATED COUNT: 15.9 % (ref 12.3–15.4)
GLUCOSE BLDC GLUCOMTR-MCNC: 165 MG/DL (ref 70–130)
GLUCOSE BLDC GLUCOMTR-MCNC: 221 MG/DL (ref 70–130)
GLUCOSE BLDC GLUCOMTR-MCNC: 263 MG/DL (ref 70–130)
GLUCOSE BLDC GLUCOMTR-MCNC: 94 MG/DL (ref 70–130)
GLUCOSE SERPL-MCNC: 125 MG/DL (ref 65–99)
HCT VFR BLD AUTO: 45.5 % (ref 34–46.6)
HGB BLD-MCNC: 14.5 G/DL (ref 12–15.9)
HYPOCHROMIA BLD QL: ABNORMAL
LYMPHOCYTES # BLD MANUAL: 6.58 10*3/MM3 (ref 0.7–3.1)
LYMPHOCYTES NFR BLD MANUAL: 3 % (ref 5–12)
MAGNESIUM SERPL-MCNC: 1.9 MG/DL (ref 1.6–2.6)
MCH RBC QN AUTO: 26.2 PG (ref 26.6–33)
MCHC RBC AUTO-ENTMCNC: 31.9 G/DL (ref 31.5–35.7)
MCV RBC AUTO: 82.3 FL (ref 79–97)
MONOCYTES # BLD: 0.82 10*3/MM3 (ref 0.1–0.9)
NEUTROPHILS # BLD AUTO: 19.75 10*3/MM3 (ref 1.7–7)
NEUTROPHILS NFR BLD MANUAL: 69 % (ref 42.7–76)
NEUTS BAND NFR BLD MANUAL: 3 % (ref 0–5)
NEUTS HYPERSEG # BLD: ABNORMAL 10*3/UL
PHOSPHATE SERPL-MCNC: 3.9 MG/DL (ref 2.5–4.5)
PLAT MORPH BLD: NORMAL
PLATELET # BLD AUTO: 381 10*3/MM3 (ref 140–450)
PMV BLD AUTO: 10.7 FL (ref 6–12)
POTASSIUM SERPL-SCNC: 4.3 MMOL/L (ref 3.5–5.2)
PROCALCITONIN SERPL-MCNC: 0.22 NG/ML (ref 0–0.25)
PROLYMPHOCYTES NFR BLD MANUAL: 0 % (ref 0–0)
RBC # BLD AUTO: 5.53 10*6/MM3 (ref 3.77–5.28)
SODIUM SERPL-SCNC: 135 MMOL/L (ref 136–145)
VARIANT LYMPHS NFR BLD MANUAL: 20 % (ref 19.6–45.3)
VARIANT LYMPHS NFR BLD MANUAL: 4 % (ref 0–5)
WBC NRBC COR # BLD: 27.43 10*3/MM3 (ref 3.4–10.8)

## 2022-06-10 PROCEDURE — 82962 GLUCOSE BLOOD TEST: CPT

## 2022-06-10 PROCEDURE — 63710000001 INSULIN LISPRO (HUMAN) PER 5 UNITS: Performed by: INTERNAL MEDICINE

## 2022-06-10 PROCEDURE — 94799 UNLISTED PULMONARY SVC/PX: CPT

## 2022-06-10 PROCEDURE — 85025 COMPLETE CBC W/AUTO DIFF WBC: CPT | Performed by: NURSE PRACTITIONER

## 2022-06-10 PROCEDURE — 97116 GAIT TRAINING THERAPY: CPT

## 2022-06-10 PROCEDURE — 83735 ASSAY OF MAGNESIUM: CPT | Performed by: NURSE PRACTITIONER

## 2022-06-10 PROCEDURE — 84145 PROCALCITONIN (PCT): CPT | Performed by: INTERNAL MEDICINE

## 2022-06-10 PROCEDURE — 85379 FIBRIN DEGRADATION QUANT: CPT | Performed by: INTERNAL MEDICINE

## 2022-06-10 PROCEDURE — 63710000001 INSULIN DETEMIR PER 5 UNITS: Performed by: INTERNAL MEDICINE

## 2022-06-10 PROCEDURE — 71045 X-RAY EXAM CHEST 1 VIEW: CPT

## 2022-06-10 PROCEDURE — 63710000001 INSULIN DETEMIR PER 5 UNITS: Performed by: NURSE PRACTITIONER

## 2022-06-10 PROCEDURE — 25010000002 VANCOMYCIN 10 G RECONSTITUTED SOLUTION

## 2022-06-10 PROCEDURE — 80048 BASIC METABOLIC PNL TOTAL CA: CPT | Performed by: NURSE PRACTITIONER

## 2022-06-10 PROCEDURE — 63710000001 INSULIN LISPRO (HUMAN) PER 5 UNITS: Performed by: NURSE PRACTITIONER

## 2022-06-10 PROCEDURE — 97530 THERAPEUTIC ACTIVITIES: CPT

## 2022-06-10 PROCEDURE — 85007 BL SMEAR W/DIFF WBC COUNT: CPT | Performed by: NURSE PRACTITIONER

## 2022-06-10 PROCEDURE — 99232 SBSQ HOSP IP/OBS MODERATE 35: CPT | Performed by: INTERNAL MEDICINE

## 2022-06-10 PROCEDURE — 94664 DEMO&/EVAL PT USE INHALER: CPT

## 2022-06-10 PROCEDURE — 94761 N-INVAS EAR/PLS OXIMETRY MLT: CPT

## 2022-06-10 PROCEDURE — 63710000001 PREDNISONE PER 1 MG: Performed by: INTERNAL MEDICINE

## 2022-06-10 PROCEDURE — 84100 ASSAY OF PHOSPHORUS: CPT | Performed by: NURSE PRACTITIONER

## 2022-06-10 PROCEDURE — 86140 C-REACTIVE PROTEIN: CPT | Performed by: INTERNAL MEDICINE

## 2022-06-10 PROCEDURE — 63710000001 PREDNISONE PER 5 MG: Performed by: INTERNAL MEDICINE

## 2022-06-10 RX ORDER — BUMETANIDE 0.25 MG/ML
2 INJECTION INTRAMUSCULAR; INTRAVENOUS ONCE
Status: COMPLETED | OUTPATIENT
Start: 2022-06-10 | End: 2022-06-10

## 2022-06-10 RX ORDER — DIAZEPAM 2 MG/1
2 TABLET ORAL EVERY 8 HOURS PRN
Status: DISCONTINUED | OUTPATIENT
Start: 2022-06-10 | End: 2022-06-14

## 2022-06-10 RX ORDER — OXYCODONE HYDROCHLORIDE AND ACETAMINOPHEN 5; 325 MG/1; MG/1
2 TABLET ORAL EVERY 4 HOURS PRN
Status: DISCONTINUED | OUTPATIENT
Start: 2022-06-10 | End: 2022-06-14

## 2022-06-10 RX ADMIN — DIAZEPAM 2 MG: 2 TABLET ORAL at 22:46

## 2022-06-10 RX ADMIN — BUSPIRONE HYDROCHLORIDE 10 MG: 10 TABLET ORAL at 08:50

## 2022-06-10 RX ADMIN — BUSPIRONE HYDROCHLORIDE 10 MG: 10 TABLET ORAL at 21:58

## 2022-06-10 RX ADMIN — Medication 10 ML: at 21:58

## 2022-06-10 RX ADMIN — VANCOMYCIN HYDROCHLORIDE 1250 MG: 10 INJECTION, POWDER, LYOPHILIZED, FOR SOLUTION INTRAVENOUS at 17:24

## 2022-06-10 RX ADMIN — ESCITALOPRAM OXALATE 20 MG: 20 TABLET ORAL at 08:50

## 2022-06-10 RX ADMIN — OXYCODONE HYDROCHLORIDE AND ACETAMINOPHEN 2 TABLET: 5; 325 TABLET ORAL at 21:59

## 2022-06-10 RX ADMIN — GABAPENTIN 300 MG: 300 CAPSULE ORAL at 21:59

## 2022-06-10 RX ADMIN — OXYCODONE HYDROCHLORIDE AND ACETAMINOPHEN 2 TABLET: 5; 325 TABLET ORAL at 18:23

## 2022-06-10 RX ADMIN — INSULIN DETEMIR 28 UNITS: 100 INJECTION, SOLUTION SUBCUTANEOUS at 10:09

## 2022-06-10 RX ADMIN — IPRATROPIUM BROMIDE AND ALBUTEROL SULFATE 3 ML: 2.5; .5 SOLUTION RESPIRATORY (INHALATION) at 14:19

## 2022-06-10 RX ADMIN — INSULIN LISPRO 10 UNITS: 100 INJECTION, SOLUTION INTRAVENOUS; SUBCUTANEOUS at 12:56

## 2022-06-10 RX ADMIN — SENNOSIDES AND DOCUSATE SODIUM 2 TABLET: 50; 8.6 TABLET ORAL at 08:49

## 2022-06-10 RX ADMIN — METOPROLOL TARTRATE 25 MG: 25 TABLET, FILM COATED ORAL at 21:59

## 2022-06-10 RX ADMIN — IPRATROPIUM BROMIDE AND ALBUTEROL SULFATE 3 ML: 2.5; .5 SOLUTION RESPIRATORY (INHALATION) at 11:02

## 2022-06-10 RX ADMIN — METOPROLOL TARTRATE 25 MG: 25 TABLET, FILM COATED ORAL at 08:49

## 2022-06-10 RX ADMIN — Medication 10 ML: at 08:51

## 2022-06-10 RX ADMIN — IPRATROPIUM BROMIDE AND ALBUTEROL SULFATE 3 ML: 2.5; .5 SOLUTION RESPIRATORY (INHALATION) at 19:26

## 2022-06-10 RX ADMIN — OXYCODONE HYDROCHLORIDE AND ACETAMINOPHEN 1 TABLET: 5; 325 TABLET ORAL at 05:43

## 2022-06-10 RX ADMIN — APIXABAN 5 MG: 5 TABLET, FILM COATED ORAL at 08:49

## 2022-06-10 RX ADMIN — DIAZEPAM 2 MG: 2 TABLET ORAL at 15:24

## 2022-06-10 RX ADMIN — TERAZOSIN HYDROCHLORIDE 2 MG: 2 CAPSULE ORAL at 21:59

## 2022-06-10 RX ADMIN — BUDESONIDE 0.5 MG: 0.5 SUSPENSION RESPIRATORY (INHALATION) at 19:26

## 2022-06-10 RX ADMIN — NYSTATIN: 100000 POWDER TOPICAL at 08:50

## 2022-06-10 RX ADMIN — FAMOTIDINE 20 MG: 20 TABLET ORAL at 21:59

## 2022-06-10 RX ADMIN — VANCOMYCIN HYDROCHLORIDE 1250 MG: 10 INJECTION, POWDER, LYOPHILIZED, FOR SOLUTION INTRAVENOUS at 05:35

## 2022-06-10 RX ADMIN — LISINOPRIL 10 MG: 10 TABLET ORAL at 08:49

## 2022-06-10 RX ADMIN — INSULIN LISPRO 10 UNITS: 100 INJECTION, SOLUTION INTRAVENOUS; SUBCUTANEOUS at 17:23

## 2022-06-10 RX ADMIN — BUMETANIDE 2 MG: 0.25 INJECTION INTRAMUSCULAR; INTRAVENOUS at 15:11

## 2022-06-10 RX ADMIN — INSULIN LISPRO 3 UNITS: 100 INJECTION, SOLUTION INTRAVENOUS; SUBCUTANEOUS at 22:02

## 2022-06-10 RX ADMIN — ARIPIPRAZOLE 5 MG: 10 TABLET ORAL at 08:50

## 2022-06-10 RX ADMIN — FAMOTIDINE 20 MG: 20 TABLET ORAL at 08:49

## 2022-06-10 RX ADMIN — DULOXETINE HYDROCHLORIDE 120 MG: 60 CAPSULE, DELAYED RELEASE ORAL at 08:49

## 2022-06-10 RX ADMIN — NYSTATIN: 100000 POWDER TOPICAL at 21:58

## 2022-06-10 RX ADMIN — INSULIN LISPRO 5 UNITS: 100 INJECTION, SOLUTION INTRAVENOUS; SUBCUTANEOUS at 12:56

## 2022-06-10 RX ADMIN — AMLODIPINE BESYLATE 10 MG: 10 TABLET ORAL at 08:49

## 2022-06-10 RX ADMIN — APIXABAN 5 MG: 5 TABLET, FILM COATED ORAL at 21:59

## 2022-06-10 RX ADMIN — DIAZEPAM 2 MG: 2 TABLET ORAL at 05:43

## 2022-06-10 RX ADMIN — GABAPENTIN 300 MG: 300 CAPSULE ORAL at 15:12

## 2022-06-10 RX ADMIN — IPRATROPIUM BROMIDE AND ALBUTEROL SULFATE 3 ML: 2.5; .5 SOLUTION RESPIRATORY (INHALATION) at 07:03

## 2022-06-10 RX ADMIN — SENNOSIDES AND DOCUSATE SODIUM 2 TABLET: 50; 8.6 TABLET ORAL at 21:59

## 2022-06-10 RX ADMIN — Medication 10 MG: at 21:59

## 2022-06-10 RX ADMIN — INSULIN LISPRO 8 UNITS: 100 INJECTION, SOLUTION INTRAVENOUS; SUBCUTANEOUS at 17:23

## 2022-06-10 RX ADMIN — PREDNISONE 30 MG: 20 TABLET ORAL at 08:49

## 2022-06-10 RX ADMIN — BUDESONIDE 0.5 MG: 0.5 SUSPENSION RESPIRATORY (INHALATION) at 07:03

## 2022-06-10 RX ADMIN — GABAPENTIN 300 MG: 300 CAPSULE ORAL at 05:35

## 2022-06-10 RX ADMIN — OXYCODONE HYDROCHLORIDE AND ACETAMINOPHEN 2 TABLET: 5; 325 TABLET ORAL at 13:04

## 2022-06-10 RX ADMIN — INSULIN DETEMIR 25 UNITS: 100 INJECTION, SOLUTION SUBCUTANEOUS at 22:02

## 2022-06-11 LAB
ALBUMIN SERPL-MCNC: 3.2 G/DL (ref 3.5–5.2)
ALBUMIN/GLOB SERPL: 0.9 G/DL
ALP SERPL-CCNC: 101 U/L (ref 39–117)
ALT SERPL W P-5'-P-CCNC: 26 U/L (ref 1–33)
ANION GAP SERPL CALCULATED.3IONS-SCNC: 9 MMOL/L (ref 5–15)
AST SERPL-CCNC: 14 U/L (ref 1–32)
BASOPHILS # BLD AUTO: 0.17 10*3/MM3 (ref 0–0.2)
BASOPHILS NFR BLD AUTO: 0.7 % (ref 0–1.5)
BILIRUB SERPL-MCNC: 0.3 MG/DL (ref 0–1.2)
BUN SERPL-MCNC: 26 MG/DL (ref 6–20)
BUN/CREAT SERPL: 29.5 (ref 7–25)
CALCIUM SPEC-SCNC: 9.3 MG/DL (ref 8.6–10.5)
CHLORIDE SERPL-SCNC: 94 MMOL/L (ref 98–107)
CO2 SERPL-SCNC: 26 MMOL/L (ref 22–29)
CREAT SERPL-MCNC: 0.88 MG/DL (ref 0.57–1)
CRP SERPL-MCNC: <0.3 MG/DL (ref 0–0.5)
DEPRECATED RDW RBC AUTO: 48.3 FL (ref 37–54)
EGFRCR SERPLBLD CKD-EPI 2021: 76.8 ML/MIN/1.73
EOSINOPHIL # BLD AUTO: 0.31 10*3/MM3 (ref 0–0.4)
EOSINOPHIL NFR BLD AUTO: 1.2 % (ref 0.3–6.2)
ERYTHROCYTE [DISTWIDTH] IN BLOOD BY AUTOMATED COUNT: 16 % (ref 12.3–15.4)
GLOBULIN UR ELPH-MCNC: 3.4 GM/DL
GLUCOSE BLDC GLUCOMTR-MCNC: 108 MG/DL (ref 70–130)
GLUCOSE BLDC GLUCOMTR-MCNC: 148 MG/DL (ref 70–130)
GLUCOSE BLDC GLUCOMTR-MCNC: 192 MG/DL (ref 70–130)
GLUCOSE BLDC GLUCOMTR-MCNC: 221 MG/DL (ref 70–130)
GLUCOSE SERPL-MCNC: 135 MG/DL (ref 65–99)
HCT VFR BLD AUTO: 46.8 % (ref 34–46.6)
HGB BLD-MCNC: 14.6 G/DL (ref 12–15.9)
IMM GRANULOCYTES # BLD AUTO: 0.92 10*3/MM3 (ref 0–0.05)
IMM GRANULOCYTES NFR BLD AUTO: 3.6 % (ref 0–0.5)
LYMPHOCYTES # BLD AUTO: 7.28 10*3/MM3 (ref 0.7–3.1)
LYMPHOCYTES NFR BLD AUTO: 28.6 % (ref 19.6–45.3)
MAGNESIUM SERPL-MCNC: 2.2 MG/DL (ref 1.6–2.6)
MCH RBC QN AUTO: 26 PG (ref 26.6–33)
MCHC RBC AUTO-ENTMCNC: 31.2 G/DL (ref 31.5–35.7)
MCV RBC AUTO: 83.3 FL (ref 79–97)
MONOCYTES # BLD AUTO: 1.08 10*3/MM3 (ref 0.1–0.9)
MONOCYTES NFR BLD AUTO: 4.2 % (ref 5–12)
NEUTROPHILS NFR BLD AUTO: 15.73 10*3/MM3 (ref 1.7–7)
NEUTROPHILS NFR BLD AUTO: 61.7 % (ref 42.7–76)
NRBC BLD AUTO-RTO: 0 /100 WBC (ref 0–0.2)
PHOSPHATE SERPL-MCNC: 3.7 MG/DL (ref 2.5–4.5)
PLATELET # BLD AUTO: 363 10*3/MM3 (ref 140–450)
PMV BLD AUTO: 10 FL (ref 6–12)
POTASSIUM SERPL-SCNC: 4.3 MMOL/L (ref 3.5–5.2)
PROCALCITONIN SERPL-MCNC: 0.24 NG/ML (ref 0–0.25)
PROT SERPL-MCNC: 6.6 G/DL (ref 6–8.5)
RBC # BLD AUTO: 5.62 10*6/MM3 (ref 3.77–5.28)
SODIUM SERPL-SCNC: 129 MMOL/L (ref 136–145)
WBC NRBC COR # BLD: 25.49 10*3/MM3 (ref 3.4–10.8)

## 2022-06-11 PROCEDURE — 83735 ASSAY OF MAGNESIUM: CPT | Performed by: INTERNAL MEDICINE

## 2022-06-11 PROCEDURE — 80053 COMPREHEN METABOLIC PANEL: CPT | Performed by: INTERNAL MEDICINE

## 2022-06-11 PROCEDURE — 85025 COMPLETE CBC W/AUTO DIFF WBC: CPT | Performed by: INTERNAL MEDICINE

## 2022-06-11 PROCEDURE — 84100 ASSAY OF PHOSPHORUS: CPT | Performed by: INTERNAL MEDICINE

## 2022-06-11 PROCEDURE — 63710000001 INSULIN DETEMIR PER 5 UNITS: Performed by: INTERNAL MEDICINE

## 2022-06-11 PROCEDURE — 87081 CULTURE SCREEN ONLY: CPT | Performed by: INTERNAL MEDICINE

## 2022-06-11 PROCEDURE — 84145 PROCALCITONIN (PCT): CPT | Performed by: INTERNAL MEDICINE

## 2022-06-11 PROCEDURE — 94799 UNLISTED PULMONARY SVC/PX: CPT

## 2022-06-11 PROCEDURE — 94664 DEMO&/EVAL PT USE INHALER: CPT

## 2022-06-11 PROCEDURE — 25010000002 VANCOMYCIN 10 G RECONSTITUTED SOLUTION

## 2022-06-11 PROCEDURE — 82962 GLUCOSE BLOOD TEST: CPT

## 2022-06-11 PROCEDURE — 63710000001 INSULIN LISPRO (HUMAN) PER 5 UNITS: Performed by: INTERNAL MEDICINE

## 2022-06-11 PROCEDURE — 63710000001 INSULIN LISPRO (HUMAN) PER 5 UNITS: Performed by: NURSE PRACTITIONER

## 2022-06-11 PROCEDURE — 63710000001 PREDNISONE PER 1 MG: Performed by: INTERNAL MEDICINE

## 2022-06-11 PROCEDURE — 94761 N-INVAS EAR/PLS OXIMETRY MLT: CPT

## 2022-06-11 PROCEDURE — 99232 SBSQ HOSP IP/OBS MODERATE 35: CPT | Performed by: INTERNAL MEDICINE

## 2022-06-11 PROCEDURE — 86140 C-REACTIVE PROTEIN: CPT | Performed by: INTERNAL MEDICINE

## 2022-06-11 RX ORDER — CHLORHEXIDINE GLUCONATE 0.12 MG/ML
15 RINSE ORAL EVERY 12 HOURS SCHEDULED
Status: COMPLETED | OUTPATIENT
Start: 2022-06-11 | End: 2022-06-13

## 2022-06-11 RX ADMIN — OXYCODONE HYDROCHLORIDE AND ACETAMINOPHEN 2 TABLET: 5; 325 TABLET ORAL at 08:24

## 2022-06-11 RX ADMIN — TERAZOSIN HYDROCHLORIDE 2 MG: 2 CAPSULE ORAL at 20:16

## 2022-06-11 RX ADMIN — BUDESONIDE 0.5 MG: 0.5 SUSPENSION RESPIRATORY (INHALATION) at 19:21

## 2022-06-11 RX ADMIN — BUSPIRONE HYDROCHLORIDE 10 MG: 10 TABLET ORAL at 20:16

## 2022-06-11 RX ADMIN — LISINOPRIL 10 MG: 10 TABLET ORAL at 08:14

## 2022-06-11 RX ADMIN — INSULIN DETEMIR 25 UNITS: 100 INJECTION, SOLUTION SUBCUTANEOUS at 08:13

## 2022-06-11 RX ADMIN — BUDESONIDE 0.5 MG: 0.5 SUSPENSION RESPIRATORY (INHALATION) at 07:19

## 2022-06-11 RX ADMIN — GABAPENTIN 300 MG: 300 CAPSULE ORAL at 06:05

## 2022-06-11 RX ADMIN — IPRATROPIUM BROMIDE AND ALBUTEROL SULFATE 3 ML: 2.5; .5 SOLUTION RESPIRATORY (INHALATION) at 07:18

## 2022-06-11 RX ADMIN — ESCITALOPRAM OXALATE 20 MG: 20 TABLET ORAL at 08:14

## 2022-06-11 RX ADMIN — BUSPIRONE HYDROCHLORIDE 10 MG: 10 TABLET ORAL at 08:14

## 2022-06-11 RX ADMIN — CHLORHEXIDINE GLUCONATE 15 ML: 1.2 SOLUTION ORAL at 11:02

## 2022-06-11 RX ADMIN — VANCOMYCIN HYDROCHLORIDE 1250 MG: 10 INJECTION, POWDER, LYOPHILIZED, FOR SOLUTION INTRAVENOUS at 06:05

## 2022-06-11 RX ADMIN — IPRATROPIUM BROMIDE AND ALBUTEROL SULFATE 3 ML: 2.5; .5 SOLUTION RESPIRATORY (INHALATION) at 10:43

## 2022-06-11 RX ADMIN — Medication 10 MG: at 22:08

## 2022-06-11 RX ADMIN — FAMOTIDINE 20 MG: 20 TABLET ORAL at 20:17

## 2022-06-11 RX ADMIN — FAMOTIDINE 20 MG: 20 TABLET ORAL at 08:14

## 2022-06-11 RX ADMIN — INSULIN LISPRO 5 UNITS: 100 INJECTION, SOLUTION INTRAVENOUS; SUBCUTANEOUS at 17:10

## 2022-06-11 RX ADMIN — IPRATROPIUM BROMIDE AND ALBUTEROL SULFATE 3 ML: 2.5; .5 SOLUTION RESPIRATORY (INHALATION) at 19:21

## 2022-06-11 RX ADMIN — AMLODIPINE BESYLATE 10 MG: 10 TABLET ORAL at 08:14

## 2022-06-11 RX ADMIN — VANCOMYCIN HYDROCHLORIDE 1250 MG: 10 INJECTION, POWDER, LYOPHILIZED, FOR SOLUTION INTRAVENOUS at 17:07

## 2022-06-11 RX ADMIN — OXYCODONE HYDROCHLORIDE AND ACETAMINOPHEN 2 TABLET: 5; 325 TABLET ORAL at 22:41

## 2022-06-11 RX ADMIN — INSULIN LISPRO 10 UNITS: 100 INJECTION, SOLUTION INTRAVENOUS; SUBCUTANEOUS at 12:24

## 2022-06-11 RX ADMIN — IPRATROPIUM BROMIDE AND ALBUTEROL SULFATE 3 ML: 2.5; .5 SOLUTION RESPIRATORY (INHALATION) at 14:41

## 2022-06-11 RX ADMIN — METOPROLOL TARTRATE 25 MG: 25 TABLET, FILM COATED ORAL at 20:16

## 2022-06-11 RX ADMIN — OXYCODONE HYDROCHLORIDE AND ACETAMINOPHEN 2 TABLET: 5; 325 TABLET ORAL at 14:12

## 2022-06-11 RX ADMIN — GABAPENTIN 300 MG: 300 CAPSULE ORAL at 20:17

## 2022-06-11 RX ADMIN — INSULIN LISPRO 3 UNITS: 100 INJECTION, SOLUTION INTRAVENOUS; SUBCUTANEOUS at 12:24

## 2022-06-11 RX ADMIN — Medication 10 ML: at 20:17

## 2022-06-11 RX ADMIN — APIXABAN 5 MG: 5 TABLET, FILM COATED ORAL at 20:17

## 2022-06-11 RX ADMIN — Medication 10 ML: at 08:15

## 2022-06-11 RX ADMIN — INSULIN DETEMIR 25 UNITS: 100 INJECTION, SOLUTION SUBCUTANEOUS at 20:16

## 2022-06-11 RX ADMIN — SENNOSIDES AND DOCUSATE SODIUM 2 TABLET: 50; 8.6 TABLET ORAL at 20:16

## 2022-06-11 RX ADMIN — MUPIROCIN: 20 OINTMENT TOPICAL at 20:16

## 2022-06-11 RX ADMIN — DIAZEPAM 2 MG: 2 TABLET ORAL at 14:12

## 2022-06-11 RX ADMIN — APIXABAN 5 MG: 5 TABLET, FILM COATED ORAL at 08:14

## 2022-06-11 RX ADMIN — PREDNISONE 20 MG: 20 TABLET ORAL at 08:14

## 2022-06-11 RX ADMIN — CHLORHEXIDINE GLUCONATE 15 ML: 1.2 SOLUTION ORAL at 20:18

## 2022-06-11 RX ADMIN — INSULIN LISPRO 10 UNITS: 100 INJECTION, SOLUTION INTRAVENOUS; SUBCUTANEOUS at 17:10

## 2022-06-11 RX ADMIN — ARIPIPRAZOLE 5 MG: 10 TABLET ORAL at 08:14

## 2022-06-11 RX ADMIN — DIAZEPAM 2 MG: 2 TABLET ORAL at 22:08

## 2022-06-11 RX ADMIN — SENNOSIDES AND DOCUSATE SODIUM 2 TABLET: 50; 8.6 TABLET ORAL at 08:14

## 2022-06-11 RX ADMIN — MUPIROCIN: 20 OINTMENT TOPICAL at 11:02

## 2022-06-11 RX ADMIN — DULOXETINE HYDROCHLORIDE 120 MG: 60 CAPSULE, DELAYED RELEASE ORAL at 08:14

## 2022-06-11 RX ADMIN — NYSTATIN: 100000 POWDER TOPICAL at 08:14

## 2022-06-11 RX ADMIN — METOPROLOL TARTRATE 25 MG: 25 TABLET, FILM COATED ORAL at 08:14

## 2022-06-11 RX ADMIN — NYSTATIN: 100000 POWDER TOPICAL at 21:26

## 2022-06-11 RX ADMIN — GABAPENTIN 300 MG: 300 CAPSULE ORAL at 14:03

## 2022-06-12 ENCOUNTER — APPOINTMENT (OUTPATIENT)
Dept: CT IMAGING | Facility: HOSPITAL | Age: 57
End: 2022-06-12

## 2022-06-12 ENCOUNTER — ANESTHESIA EVENT (OUTPATIENT)
Dept: PERIOP | Facility: HOSPITAL | Age: 57
End: 2022-06-12

## 2022-06-12 ENCOUNTER — ANESTHESIA EVENT CONVERTED (OUTPATIENT)
Dept: ANESTHESIOLOGY | Facility: HOSPITAL | Age: 57
End: 2022-06-12

## 2022-06-12 ENCOUNTER — APPOINTMENT (OUTPATIENT)
Dept: GENERAL RADIOLOGY | Facility: HOSPITAL | Age: 57
End: 2022-06-12

## 2022-06-12 ENCOUNTER — ANESTHESIA (OUTPATIENT)
Dept: PERIOP | Facility: HOSPITAL | Age: 57
End: 2022-06-12

## 2022-06-12 PROBLEM — F17.200 SMOKER: Status: ACTIVE | Noted: 2022-06-12

## 2022-06-12 PROBLEM — J15.212 PNEUMONIA OF LEFT LOWER LOBE DUE TO METHICILLIN-RESISTANT STAPHYLOCOCCUS AUREUS (MRSA): Status: ACTIVE | Noted: 2022-06-04

## 2022-06-12 PROBLEM — F19.90 ILLICIT DRUG USE: Status: ACTIVE | Noted: 2022-06-12

## 2022-06-12 PROBLEM — F17.200 SMOKER: Chronic | Status: ACTIVE | Noted: 2022-06-12

## 2022-06-12 PROBLEM — K43.0 INCISIONAL HERNIA WITH OBSTRUCTION BUT NO GANGRENE: Status: ACTIVE | Noted: 2022-06-12

## 2022-06-12 PROBLEM — Z86.718 HISTORY OF RECURRENT DEEP VEIN THROMBOSIS (DVT): Chronic | Status: ACTIVE | Noted: 2021-12-31

## 2022-06-12 PROBLEM — K56.609 SMALL BOWEL OBSTRUCTION: Status: ACTIVE | Noted: 2022-06-12

## 2022-06-12 PROBLEM — E66.01 MORBID OBESITY WITH BMI OF 45.0-49.9, ADULT (HCC): Chronic | Status: ACTIVE | Noted: 2021-06-04

## 2022-06-12 PROBLEM — F11.90 CHRONIC NARCOTIC USE: Chronic | Status: ACTIVE | Noted: 2022-05-31

## 2022-06-12 LAB
ANION GAP SERPL CALCULATED.3IONS-SCNC: 7 MMOL/L (ref 5–15)
BASOPHILS # BLD AUTO: 0.13 10*3/MM3 (ref 0–0.2)
BASOPHILS NFR BLD AUTO: 0.5 % (ref 0–1.5)
BUN SERPL-MCNC: 28 MG/DL (ref 6–20)
BUN/CREAT SERPL: 32.9 (ref 7–25)
CALCIUM SPEC-SCNC: 9.1 MG/DL (ref 8.6–10.5)
CHLORIDE SERPL-SCNC: 96 MMOL/L (ref 98–107)
CO2 SERPL-SCNC: 28 MMOL/L (ref 22–29)
CREAT SERPL-MCNC: 0.85 MG/DL (ref 0.57–1)
D-LACTATE SERPL-SCNC: 1.8 MMOL/L (ref 0.5–2)
DEPRECATED RDW RBC AUTO: 46.5 FL (ref 37–54)
EGFRCR SERPLBLD CKD-EPI 2021: 80 ML/MIN/1.73
EOSINOPHIL # BLD AUTO: 0.27 10*3/MM3 (ref 0–0.4)
EOSINOPHIL NFR BLD AUTO: 1.1 % (ref 0.3–6.2)
ERYTHROCYTE [DISTWIDTH] IN BLOOD BY AUTOMATED COUNT: 15.8 % (ref 12.3–15.4)
GLUCOSE BLDC GLUCOMTR-MCNC: 133 MG/DL (ref 70–130)
GLUCOSE BLDC GLUCOMTR-MCNC: 179 MG/DL (ref 70–130)
GLUCOSE BLDC GLUCOMTR-MCNC: 199 MG/DL (ref 70–130)
GLUCOSE BLDC GLUCOMTR-MCNC: 202 MG/DL (ref 70–130)
GLUCOSE SERPL-MCNC: 136 MG/DL (ref 65–99)
HCT VFR BLD AUTO: 45.9 % (ref 34–46.6)
HGB BLD-MCNC: 14.8 G/DL (ref 12–15.9)
IMM GRANULOCYTES # BLD AUTO: 0.65 10*3/MM3 (ref 0–0.05)
IMM GRANULOCYTES NFR BLD AUTO: 2.6 % (ref 0–0.5)
LYMPHOCYTES # BLD AUTO: 4.74 10*3/MM3 (ref 0.7–3.1)
LYMPHOCYTES NFR BLD AUTO: 18.7 % (ref 19.6–45.3)
MCH RBC QN AUTO: 26.2 PG (ref 26.6–33)
MCHC RBC AUTO-ENTMCNC: 32.2 G/DL (ref 31.5–35.7)
MCV RBC AUTO: 81.4 FL (ref 79–97)
MONOCYTES # BLD AUTO: 1.28 10*3/MM3 (ref 0.1–0.9)
MONOCYTES NFR BLD AUTO: 5 % (ref 5–12)
MRSA SPEC QL CULT: ABNORMAL
NEUTROPHILS NFR BLD AUTO: 18.32 10*3/MM3 (ref 1.7–7)
NEUTROPHILS NFR BLD AUTO: 72.1 % (ref 42.7–76)
NEUTS HYPERSEG # BLD: NORMAL 10*3/UL
NRBC BLD AUTO-RTO: 0 /100 WBC (ref 0–0.2)
PLAT MORPH BLD: NORMAL
PLATELET # BLD AUTO: 349 10*3/MM3 (ref 140–450)
PMV BLD AUTO: 10.3 FL (ref 6–12)
POTASSIUM SERPL-SCNC: 4.3 MMOL/L (ref 3.5–5.2)
RBC # BLD AUTO: 5.64 10*6/MM3 (ref 3.77–5.28)
RBC MORPH BLD: NORMAL
SODIUM SERPL-SCNC: 131 MMOL/L (ref 136–145)
WBC NRBC COR # BLD: 25.39 10*3/MM3 (ref 3.4–10.8)

## 2022-06-12 PROCEDURE — 99233 SBSQ HOSP IP/OBS HIGH 50: CPT | Performed by: INTERNAL MEDICINE

## 2022-06-12 PROCEDURE — 25010000002 VANCOMYCIN 10 G RECONSTITUTED SOLUTION

## 2022-06-12 PROCEDURE — 25010000002 PROPOFOL 10 MG/ML EMULSION: Performed by: INTERNAL MEDICINE

## 2022-06-12 PROCEDURE — 85007 BL SMEAR W/DIFF WBC COUNT: CPT | Performed by: INTERNAL MEDICINE

## 2022-06-12 PROCEDURE — 85060 BLOOD SMEAR INTERPRETATION: CPT | Performed by: INTERNAL MEDICINE

## 2022-06-12 PROCEDURE — 94799 UNLISTED PULMONARY SVC/PX: CPT

## 2022-06-12 PROCEDURE — 25010000002 PROPOFOL 10 MG/ML EMULSION: Performed by: SURGERY

## 2022-06-12 PROCEDURE — 25010000002 SUCCINYLCHOLINE PER 20 MG: Performed by: NURSE ANESTHETIST, CERTIFIED REGISTERED

## 2022-06-12 PROCEDURE — 80048 BASIC METABOLIC PNL TOTAL CA: CPT | Performed by: INTERNAL MEDICINE

## 2022-06-12 PROCEDURE — 25010000002 HYDROMORPHONE PER 4 MG: Performed by: NURSE PRACTITIONER

## 2022-06-12 PROCEDURE — 0WUF0JZ SUPPLEMENT ABDOMINAL WALL WITH SYNTHETIC SUBSTITUTE, OPEN APPROACH: ICD-10-PCS | Performed by: SURGERY

## 2022-06-12 PROCEDURE — P9041 ALBUMIN (HUMAN),5%, 50ML: HCPCS | Performed by: NURSE ANESTHETIST, CERTIFIED REGISTERED

## 2022-06-12 PROCEDURE — 82962 GLUCOSE BLOOD TEST: CPT

## 2022-06-12 PROCEDURE — 94761 N-INVAS EAR/PLS OXIMETRY MLT: CPT

## 2022-06-12 PROCEDURE — 63710000001 INSULIN LISPRO (HUMAN) PER 5 UNITS: Performed by: SURGERY

## 2022-06-12 PROCEDURE — 25010000002 DEXAMETHASONE PER 1 MG: Performed by: NURSE ANESTHETIST, CERTIFIED REGISTERED

## 2022-06-12 PROCEDURE — 25010000002 HYDROMORPHONE PER 4 MG: Performed by: INTERNAL MEDICINE

## 2022-06-12 PROCEDURE — 88302 TISSUE EXAM BY PATHOLOGIST: CPT | Performed by: SURGERY

## 2022-06-12 PROCEDURE — 25010000002 ONDANSETRON PER 1 MG: Performed by: NURSE PRACTITIONER

## 2022-06-12 PROCEDURE — 25010000002 PROPOFOL 10 MG/ML EMULSION: Performed by: NURSE ANESTHETIST, CERTIFIED REGISTERED

## 2022-06-12 PROCEDURE — 25010000002 FENTANYL CITRATE (PF) 50 MCG/ML SOLUTION

## 2022-06-12 PROCEDURE — 94002 VENT MGMT INPAT INIT DAY: CPT

## 2022-06-12 PROCEDURE — 99291 CRITICAL CARE FIRST HOUR: CPT | Performed by: INTERNAL MEDICINE

## 2022-06-12 PROCEDURE — 94664 DEMO&/EVAL PT USE INHALER: CPT

## 2022-06-12 PROCEDURE — 25010000002 ALBUMIN HUMAN 5% PER 50 ML: Performed by: NURSE ANESTHETIST, CERTIFIED REGISTERED

## 2022-06-12 PROCEDURE — 74018 RADEX ABDOMEN 1 VIEW: CPT

## 2022-06-12 PROCEDURE — 25010000002 FENTANYL 10 MCG/1 ML NS: Performed by: INTERNAL MEDICINE

## 2022-06-12 PROCEDURE — 85025 COMPLETE CBC W/AUTO DIFF WBC: CPT | Performed by: INTERNAL MEDICINE

## 2022-06-12 PROCEDURE — C1781 MESH (IMPLANTABLE): HCPCS | Performed by: SURGERY

## 2022-06-12 PROCEDURE — 63710000001 INSULIN DETEMIR PER 5 UNITS: Performed by: SURGERY

## 2022-06-12 PROCEDURE — 25010000002 PROTHROMBIN COMPLEX CONC HUMAN 1000 UNITS KIT: Performed by: SURGERY

## 2022-06-12 PROCEDURE — 74176 CT ABD & PELVIS W/O CONTRAST: CPT

## 2022-06-12 PROCEDURE — 25010000002 DEXAMETHASONE SODIUM PHOSPHATE 10 MG/ML SOLUTION: Performed by: ANESTHESIOLOGY

## 2022-06-12 PROCEDURE — 83605 ASSAY OF LACTIC ACID: CPT | Performed by: NURSE PRACTITIONER

## 2022-06-12 PROCEDURE — 25010000002 PROPOFOL 10 MG/ML EMULSION: Performed by: ANESTHESIOLOGY

## 2022-06-12 PROCEDURE — 25010000002 FENTANYL CITRATE (PF) 50 MCG/ML SOLUTION: Performed by: NURSE ANESTHETIST, CERTIFIED REGISTERED

## 2022-06-12 DEVICE — PHASIX ST MESH, CIRCLE
Type: IMPLANTABLE DEVICE | Site: ABDOMEN | Status: FUNCTIONAL
Brand: PHASIX ST MESH

## 2022-06-12 RX ORDER — KETAMINE HCL IN NACL, ISO-OSM 100MG/10ML
SYRINGE (ML) INJECTION AS NEEDED
Status: DISCONTINUED | OUTPATIENT
Start: 2022-06-12 | End: 2022-06-12 | Stop reason: SURG

## 2022-06-12 RX ORDER — SUCCINYLCHOLINE CHLORIDE 20 MG/ML
INJECTION INTRAMUSCULAR; INTRAVENOUS AS NEEDED
Status: DISCONTINUED | OUTPATIENT
Start: 2022-06-12 | End: 2022-06-12 | Stop reason: SURG

## 2022-06-12 RX ORDER — MORPHINE SULFATE 2 MG/ML
2 INJECTION, SOLUTION INTRAMUSCULAR; INTRAVENOUS
Status: DISCONTINUED | OUTPATIENT
Start: 2022-06-12 | End: 2022-06-16

## 2022-06-12 RX ORDER — SODIUM CHLORIDE 0.9 % (FLUSH) 0.9 %
10 SYRINGE (ML) INJECTION EVERY 12 HOURS SCHEDULED
Status: DISCONTINUED | OUTPATIENT
Start: 2022-06-12 | End: 2022-06-12 | Stop reason: HOSPADM

## 2022-06-12 RX ORDER — OXYCODONE HYDROCHLORIDE AND ACETAMINOPHEN 5; 325 MG/1; MG/1
2 TABLET ORAL EVERY 4 HOURS PRN
Status: DISCONTINUED | OUTPATIENT
Start: 2022-06-12 | End: 2022-06-12 | Stop reason: SDUPTHER

## 2022-06-12 RX ORDER — SODIUM CHLORIDE, SODIUM LACTATE, POTASSIUM CHLORIDE, CALCIUM CHLORIDE 600; 310; 30; 20 MG/100ML; MG/100ML; MG/100ML; MG/100ML
9 INJECTION, SOLUTION INTRAVENOUS CONTINUOUS
Status: DISCONTINUED | OUTPATIENT
Start: 2022-06-12 | End: 2022-06-12

## 2022-06-12 RX ORDER — NALOXONE HCL 0.4 MG/ML
0.4 VIAL (ML) INJECTION
Status: DISCONTINUED | OUTPATIENT
Start: 2022-06-12 | End: 2022-06-26 | Stop reason: HOSPADM

## 2022-06-12 RX ORDER — BUPIVACAINE HCL/0.9 % NACL/PF 0.125 %
PLASTIC BAG, INJECTION (ML) EPIDURAL AS NEEDED
Status: DISCONTINUED | OUTPATIENT
Start: 2022-06-12 | End: 2022-06-12 | Stop reason: SURG

## 2022-06-12 RX ORDER — FENTANYL CITRATE 50 UG/ML
50 INJECTION, SOLUTION INTRAMUSCULAR; INTRAVENOUS ONCE
Status: DISCONTINUED | OUTPATIENT
Start: 2022-06-12 | End: 2022-06-12 | Stop reason: HOSPADM

## 2022-06-12 RX ORDER — ACETAMINOPHEN 650 MG/1
650 SUPPOSITORY RECTAL EVERY 4 HOURS PRN
Status: DISCONTINUED | OUTPATIENT
Start: 2022-06-12 | End: 2022-06-14

## 2022-06-12 RX ORDER — HYDROMORPHONE HYDROCHLORIDE 1 MG/ML
0.5 INJECTION, SOLUTION INTRAMUSCULAR; INTRAVENOUS; SUBCUTANEOUS ONCE AS NEEDED
Status: COMPLETED | OUTPATIENT
Start: 2022-06-12 | End: 2022-06-12

## 2022-06-12 RX ORDER — FENTANYL CITRATE 50 UG/ML
INJECTION, SOLUTION INTRAMUSCULAR; INTRAVENOUS
Status: COMPLETED
Start: 2022-06-12 | End: 2022-06-12

## 2022-06-12 RX ORDER — DEXAMETHASONE SODIUM PHOSPHATE 10 MG/ML
INJECTION, SOLUTION INTRAMUSCULAR; INTRAVENOUS
Status: COMPLETED | OUTPATIENT
Start: 2022-06-12 | End: 2022-06-12

## 2022-06-12 RX ORDER — HEPARIN SODIUM 5000 [USP'U]/ML
5000 INJECTION, SOLUTION INTRAVENOUS; SUBCUTANEOUS EVERY 8 HOURS SCHEDULED
Status: DISCONTINUED | OUTPATIENT
Start: 2022-06-13 | End: 2022-06-14

## 2022-06-12 RX ORDER — FENTANYL CITRATE 50 UG/ML
INJECTION, SOLUTION INTRAMUSCULAR; INTRAVENOUS AS NEEDED
Status: DISCONTINUED | OUTPATIENT
Start: 2022-06-12 | End: 2022-06-12 | Stop reason: SURG

## 2022-06-12 RX ORDER — HYDROMORPHONE HYDROCHLORIDE 1 MG/ML
0.2 INJECTION, SOLUTION INTRAMUSCULAR; INTRAVENOUS; SUBCUTANEOUS
Status: DISCONTINUED | OUTPATIENT
Start: 2022-06-12 | End: 2022-06-13

## 2022-06-12 RX ORDER — FAMOTIDINE 10 MG/ML
20 INJECTION, SOLUTION INTRAVENOUS ONCE
Status: DISCONTINUED | OUTPATIENT
Start: 2022-06-12 | End: 2022-06-12 | Stop reason: HOSPADM

## 2022-06-12 RX ORDER — FAMOTIDINE 20 MG/1
20 TABLET, FILM COATED ORAL ONCE
Status: DISCONTINUED | OUTPATIENT
Start: 2022-06-12 | End: 2022-06-12 | Stop reason: HOSPADM

## 2022-06-12 RX ORDER — DEXAMETHASONE SODIUM PHOSPHATE 4 MG/ML
INJECTION, SOLUTION INTRA-ARTICULAR; INTRALESIONAL; INTRAMUSCULAR; INTRAVENOUS; SOFT TISSUE AS NEEDED
Status: DISCONTINUED | OUTPATIENT
Start: 2022-06-12 | End: 2022-06-12 | Stop reason: SURG

## 2022-06-12 RX ORDER — PANTOPRAZOLE SODIUM 40 MG/10ML
40 INJECTION, POWDER, LYOPHILIZED, FOR SOLUTION INTRAVENOUS
Status: DISCONTINUED | OUTPATIENT
Start: 2022-06-13 | End: 2022-06-20

## 2022-06-12 RX ORDER — MAGNESIUM HYDROXIDE 1200 MG/15ML
LIQUID ORAL AS NEEDED
Status: DISCONTINUED | OUTPATIENT
Start: 2022-06-12 | End: 2022-06-12 | Stop reason: HOSPADM

## 2022-06-12 RX ORDER — SODIUM CHLORIDE, SODIUM LACTATE, POTASSIUM CHLORIDE, CALCIUM CHLORIDE 600; 310; 30; 20 MG/100ML; MG/100ML; MG/100ML; MG/100ML
150 INJECTION, SOLUTION INTRAVENOUS CONTINUOUS
Status: DISCONTINUED | OUTPATIENT
Start: 2022-06-12 | End: 2022-06-14

## 2022-06-12 RX ORDER — LIDOCAINE HYDROCHLORIDE 10 MG/ML
INJECTION, SOLUTION EPIDURAL; INFILTRATION; INTRACAUDAL; PERINEURAL AS NEEDED
Status: DISCONTINUED | OUTPATIENT
Start: 2022-06-12 | End: 2022-06-12 | Stop reason: SURG

## 2022-06-12 RX ORDER — PROPOFOL 10 MG/ML
VIAL (ML) INTRAVENOUS AS NEEDED
Status: DISCONTINUED | OUTPATIENT
Start: 2022-06-12 | End: 2022-06-12 | Stop reason: SURG

## 2022-06-12 RX ORDER — HYDROMORPHONE HYDROCHLORIDE 1 MG/ML
0.5 INJECTION, SOLUTION INTRAMUSCULAR; INTRAVENOUS; SUBCUTANEOUS ONCE
Status: COMPLETED | OUTPATIENT
Start: 2022-06-12 | End: 2022-06-12

## 2022-06-12 RX ORDER — SODIUM CHLORIDE 0.9 % (FLUSH) 0.9 %
10 SYRINGE (ML) INJECTION AS NEEDED
Status: DISCONTINUED | OUTPATIENT
Start: 2022-06-12 | End: 2022-06-12 | Stop reason: HOSPADM

## 2022-06-12 RX ORDER — ROCURONIUM BROMIDE 10 MG/ML
INJECTION, SOLUTION INTRAVENOUS AS NEEDED
Status: DISCONTINUED | OUTPATIENT
Start: 2022-06-12 | End: 2022-06-12 | Stop reason: SURG

## 2022-06-12 RX ORDER — LIDOCAINE HYDROCHLORIDE 10 MG/ML
0.5 INJECTION, SOLUTION EPIDURAL; INFILTRATION; INTRACAUDAL; PERINEURAL ONCE AS NEEDED
Status: DISCONTINUED | OUTPATIENT
Start: 2022-06-12 | End: 2022-06-12 | Stop reason: HOSPADM

## 2022-06-12 RX ORDER — BUPIVACAINE HYDROCHLORIDE 2.5 MG/ML
INJECTION, SOLUTION EPIDURAL; INFILTRATION; INTRACAUDAL
Status: COMPLETED | OUTPATIENT
Start: 2022-06-12 | End: 2022-06-12

## 2022-06-12 RX ORDER — ACETAMINOPHEN 325 MG/1
650 TABLET ORAL EVERY 4 HOURS PRN
Status: DISCONTINUED | OUTPATIENT
Start: 2022-06-12 | End: 2022-06-14

## 2022-06-12 RX ORDER — ALBUMIN, HUMAN INJ 5% 5 %
SOLUTION INTRAVENOUS CONTINUOUS PRN
Status: DISCONTINUED | OUTPATIENT
Start: 2022-06-12 | End: 2022-06-12 | Stop reason: SURG

## 2022-06-12 RX ORDER — HYDROMORPHONE HYDROCHLORIDE 1 MG/ML
0.5 INJECTION, SOLUTION INTRAMUSCULAR; INTRAVENOUS; SUBCUTANEOUS
Status: DISCONTINUED | OUTPATIENT
Start: 2022-06-12 | End: 2022-06-13

## 2022-06-12 RX ADMIN — Medication 200 MCG: at 12:11

## 2022-06-12 RX ADMIN — IPRATROPIUM BROMIDE AND ALBUTEROL SULFATE 3 ML: 2.5; .5 SOLUTION RESPIRATORY (INHALATION) at 18:49

## 2022-06-12 RX ADMIN — PROPOFOL 40 MCG/KG/MIN: 10 INJECTION, EMULSION INTRAVENOUS at 17:58

## 2022-06-12 RX ADMIN — Medication 50 MG: at 11:43

## 2022-06-12 RX ADMIN — INSULIN LISPRO 10 UNITS: 100 INJECTION, SOLUTION INTRAVENOUS; SUBCUTANEOUS at 17:59

## 2022-06-12 RX ADMIN — ONDANSETRON 4 MG: 2 INJECTION INTRAMUSCULAR; INTRAVENOUS at 03:15

## 2022-06-12 RX ADMIN — INSULIN LISPRO 3 UNITS: 100 INJECTION, SOLUTION INTRAVENOUS; SUBCUTANEOUS at 21:10

## 2022-06-12 RX ADMIN — ALBUMIN HUMAN: 0.05 INJECTION, SOLUTION INTRAVENOUS at 12:48

## 2022-06-12 RX ADMIN — ROCURONIUM BROMIDE 25 MG: 10 INJECTION INTRAVENOUS at 13:56

## 2022-06-12 RX ADMIN — AMLODIPINE BESYLATE 10 MG: 10 TABLET ORAL at 15:50

## 2022-06-12 RX ADMIN — BUPIVACAINE HYDROCHLORIDE 60 ML: 2.5 INJECTION, SOLUTION EPIDURAL; INFILTRATION; INTRACAUDAL; PERINEURAL at 11:47

## 2022-06-12 RX ADMIN — GABAPENTIN 300 MG: 300 CAPSULE ORAL at 21:04

## 2022-06-12 RX ADMIN — HYDROMORPHONE HYDROCHLORIDE 0.5 MG: 1 INJECTION, SOLUTION INTRAMUSCULAR; INTRAVENOUS; SUBCUTANEOUS at 04:40

## 2022-06-12 RX ADMIN — FENTANYL CITRATE 100 MCG: 50 INJECTION, SOLUTION INTRAMUSCULAR; INTRAVENOUS at 11:43

## 2022-06-12 RX ADMIN — DEXAMETHASONE SODIUM PHOSPHATE 4 MG: 10 INJECTION, SOLUTION INTRAMUSCULAR; INTRAVENOUS at 11:47

## 2022-06-12 RX ADMIN — VANCOMYCIN HYDROCHLORIDE 1250 MG: 10 INJECTION, POWDER, LYOPHILIZED, FOR SOLUTION INTRAVENOUS at 06:35

## 2022-06-12 RX ADMIN — ROCURONIUM BROMIDE 90 MG: 10 INJECTION INTRAVENOUS at 11:50

## 2022-06-12 RX ADMIN — GABAPENTIN 300 MG: 300 CAPSULE ORAL at 15:49

## 2022-06-12 RX ADMIN — SODIUM CHLORIDE, POTASSIUM CHLORIDE, SODIUM LACTATE AND CALCIUM CHLORIDE 150 ML/HR: 600; 310; 30; 20 INJECTION, SOLUTION INTRAVENOUS at 22:10

## 2022-06-12 RX ADMIN — CHLORHEXIDINE GLUCONATE 15 ML: 1.2 SOLUTION ORAL at 09:01

## 2022-06-12 RX ADMIN — SENNOSIDES AND DOCUSATE SODIUM 2 TABLET: 50; 8.6 TABLET ORAL at 20:51

## 2022-06-12 RX ADMIN — BUSPIRONE HYDROCHLORIDE 10 MG: 10 TABLET ORAL at 20:51

## 2022-06-12 RX ADMIN — PROPOFOL 150 MG: 10 INJECTION, EMULSION INTRAVENOUS at 11:43

## 2022-06-12 RX ADMIN — PROPOFOL 25 MCG/KG/MIN: 10 INJECTION, EMULSION INTRAVENOUS at 13:56

## 2022-06-12 RX ADMIN — MUPIROCIN: 20 OINTMENT TOPICAL at 21:10

## 2022-06-12 RX ADMIN — CHLORHEXIDINE GLUCONATE 15 ML: 1.2 SOLUTION ORAL at 20:51

## 2022-06-12 RX ADMIN — ROCURONIUM BROMIDE 25 MG: 10 INJECTION INTRAVENOUS at 13:38

## 2022-06-12 RX ADMIN — ROCURONIUM BROMIDE 10 MG: 10 INJECTION INTRAVENOUS at 11:43

## 2022-06-12 RX ADMIN — BUDESONIDE 0.5 MG: 0.5 SUSPENSION RESPIRATORY (INHALATION) at 18:48

## 2022-06-12 RX ADMIN — DEXAMETHASONE SODIUM PHOSPHATE 6 MG: 4 INJECTION, SOLUTION INTRAMUSCULAR; INTRAVENOUS at 11:50

## 2022-06-12 RX ADMIN — PROPOFOL 30 MCG/KG/MIN: 10 INJECTION, EMULSION INTRAVENOUS at 21:10

## 2022-06-12 RX ADMIN — BUDESONIDE 0.5 MG: 0.5 SUSPENSION RESPIRATORY (INHALATION) at 07:12

## 2022-06-12 RX ADMIN — LABETALOL HYDROCHLORIDE 20 MG: 5 INJECTION INTRAVENOUS at 04:23

## 2022-06-12 RX ADMIN — Medication 5000 UNITS: at 09:37

## 2022-06-12 RX ADMIN — HYDROMORPHONE HYDROCHLORIDE 0.5 MG: 1 INJECTION, SOLUTION INTRAMUSCULAR; INTRAVENOUS; SUBCUTANEOUS at 06:19

## 2022-06-12 RX ADMIN — SODIUM CHLORIDE, POTASSIUM CHLORIDE, SODIUM LACTATE AND CALCIUM CHLORIDE 150 ML/HR: 600; 310; 30; 20 INJECTION, SOLUTION INTRAVENOUS at 15:50

## 2022-06-12 RX ADMIN — SODIUM CHLORIDE, POTASSIUM CHLORIDE, SODIUM LACTATE AND CALCIUM CHLORIDE 9 ML/HR: 600; 310; 30; 20 INJECTION, SOLUTION INTRAVENOUS at 11:20

## 2022-06-12 RX ADMIN — Medication 50 MCG/HR: at 14:20

## 2022-06-12 RX ADMIN — Medication 200 MCG: at 12:56

## 2022-06-12 RX ADMIN — Medication 10 ML: at 09:12

## 2022-06-12 RX ADMIN — FENTANYL CITRATE 50 MCG: 50 INJECTION INTRAMUSCULAR; INTRAVENOUS at 11:24

## 2022-06-12 RX ADMIN — HYDROMORPHONE HYDROCHLORIDE 0.5 MG: 1 INJECTION, SOLUTION INTRAMUSCULAR; INTRAVENOUS; SUBCUTANEOUS at 08:58

## 2022-06-12 RX ADMIN — NYSTATIN: 100000 POWDER TOPICAL at 20:52

## 2022-06-12 RX ADMIN — TERAZOSIN HYDROCHLORIDE 2 MG: 2 CAPSULE ORAL at 20:59

## 2022-06-12 RX ADMIN — ALBUMIN HUMAN: 0.05 INJECTION, SOLUTION INTRAVENOUS at 12:35

## 2022-06-12 RX ADMIN — PROPOFOL 40 MCG/KG/MIN: 10 INJECTION, EMULSION INTRAVENOUS at 14:20

## 2022-06-12 RX ADMIN — INSULIN LISPRO 5 UNITS: 100 INJECTION, SOLUTION INTRAVENOUS; SUBCUTANEOUS at 18:00

## 2022-06-12 RX ADMIN — SODIUM CHLORIDE 5 MG/HR: 9 INJECTION, SOLUTION INTRAVENOUS at 17:59

## 2022-06-12 RX ADMIN — INSULIN DETEMIR 25 UNITS: 100 INJECTION, SOLUTION SUBCUTANEOUS at 20:51

## 2022-06-12 RX ADMIN — IPRATROPIUM BROMIDE AND ALBUTEROL SULFATE 3 ML: 2.5; .5 SOLUTION RESPIRATORY (INHALATION) at 07:12

## 2022-06-12 RX ADMIN — SODIUM CHLORIDE, POTASSIUM CHLORIDE, SODIUM LACTATE AND CALCIUM CHLORIDE: 600; 310; 30; 20 INJECTION, SOLUTION INTRAVENOUS at 12:35

## 2022-06-12 RX ADMIN — LIDOCAINE HYDROCHLORIDE 50 MG: 10 INJECTION, SOLUTION EPIDURAL; INFILTRATION; INTRACAUDAL; PERINEURAL at 11:43

## 2022-06-12 RX ADMIN — Medication 200 MCG: at 11:50

## 2022-06-12 RX ADMIN — METOPROLOL TARTRATE 25 MG: 25 TABLET, FILM COATED ORAL at 20:51

## 2022-06-12 RX ADMIN — Medication 200 MCG: at 11:48

## 2022-06-12 RX ADMIN — ROCURONIUM BROMIDE 50 MG: 10 INJECTION INTRAVENOUS at 12:49

## 2022-06-12 RX ADMIN — Medication 10 ML: at 21:12

## 2022-06-12 RX ADMIN — Medication 200 MG: at 11:43

## 2022-06-12 NOTE — ANESTHESIA PROCEDURE NOTES

## 2022-06-12 NOTE — ANESTHESIA PROCEDURE NOTES
Peripheral IV    Patient location during procedure: pre-op  Line placed for Difficult Access.  Performed By   CRNA/CAA: Sameer Mireles CRNA  Preanesthetic Checklist  Completed: patient identified, IV checked, site marked, risks and benefits discussed, surgical consent, monitors and equipment checked, pre-op evaluation and timeout performed  Peripheral IV Prep   Patient position: supine   Prep: ChloraPrep  Peripheral IV Procedure   Laterality:left  Location:  Arm  Catheter size: 20 G         Post Assessment   Dressing Type: tape and transparent.    IV Dressing/Site: clean, dry and intact

## 2022-06-12 NOTE — ANESTHESIA PROCEDURE NOTES
Airway  Urgency: elective    Date/Time: 6/12/2022 11:44 AM  Airway not difficult    General Information and Staff    Patient location during procedure: OR  CRNA/CAA: Sameer Mireles CRNA    Indications and Patient Condition  Indications for airway management: airway protection    Preoxygenated: yes  MILS not maintained throughout  Mask difficulty assessment: 0 - not attempted    Final Airway Details  Final airway type: endotracheal airway      Successful airway: ETT  Cuffed: yes   Successful intubation technique: direct laryngoscopy and RSI  Facilitating devices/methods: intubating stylet and cricoid pressure  Endotracheal tube insertion site: oral  Blade: Crum  Blade size: 2  ETT size (mm): 7.5  Cormack-Lehane Classification: grade I - full view of glottis  Placement verified by: chest auscultation and capnometry   Cuff volume (mL): 8  Measured from: lips  ETT/EBT  to lips (cm): 21  Number of attempts at approach: 1  Assessment: lips, teeth, and gum same as pre-op and atraumatic intubation    Additional Comments  Negative epigastric sounds, Breath sound equal bilaterally with symmetric chest rise and fall. RSI with cricoid pressure until tube confirmation . VC clear upon DL

## 2022-06-12 NOTE — ANESTHESIA PREPROCEDURE EVALUATION
Anesthesia Evaluation     Patient summary reviewed and Nursing notes reviewed                Airway   Mallampati: II  TM distance: >3 FB  Neck ROM: full  No difficulty expected  Dental - normal exam     Pulmonary - normal exam   (+) pneumonia improving , a smoker Current, COPD,   Cardiovascular - normal exam    (+) hypertension, DVT (eliquis tx; kcentra today), hyperlipidemia,     ROS comment:   Echo 5/22: normal EF, mild TR (RVSP 45-55)    Neuro/Psych- negative ROS  GI/Hepatic/Renal/Endo    (+) morbid obesity,  renal disease CRI, diabetes mellitus,     Musculoskeletal (-) negative ROS    Abdominal  - normal exam    Bowel sounds: normal.   Substance History - negative use     OB/GYN negative ob/gyn ROS         Other                        Anesthesia Plan    ASA 3 - emergent     general   Rapid sequence  (TAP blocks; post op vent/ICU)  intravenous induction     Anesthetic plan, risks, benefits, and alternatives have been provided, discussed and informed consent has been obtained with: patient.    Plan discussed with CRNA.        CODE STATUS:    Level Of Support Discussed With: Patient  Code Status (Patient has no pulse and is not breathing): CPR (Attempt to Resuscitate)  Medical Interventions (Patient has pulse or is breathing): Full Support

## 2022-06-12 NOTE — ANESTHESIA POSTPROCEDURE EVALUATION
Patient: Dani Ziegler    Procedure Summary     Date: 06/12/22 Room / [facility]:  [facility]  [facility]    Anesthesia Start: 1137 Anesthesia Stop: 1416    Procedure: LAPAROTOMY EXPLORATORY UMBILICAL HERNIA REPAIR WITH MESH (N/A Abdomen) Diagnosis:     Surgeons: Reji Brown MD Provider: Sameer Torres MD    Anesthesia Type: general ASA Status: 3 - Emergent          Anesthesia Type: general    Vitals  Vitals Value Taken Time   /90 06/12/22 1411   Temp     Pulse 99 06/12/22 1415   Resp     SpO2 98 % 06/12/22 1415   Vitals shown include unvalidated device data.        Post Anesthesia Care and Evaluation    Patient location during evaluation: ICU  Patient participation: complete - patient cannot participate  Level of consciousness: obtunded/minimal responses  Pain management: adequate    Airway patency: patent  Anesthetic complications: No anesthetic complications  PONV Status: none  Cardiovascular status: hemodynamically stable and acceptable  Respiratory status: acceptable, intubated and ventilator  Hydration status: acceptable    Comments: To ICU on O2/ambu/monitor; VSS; report to RN/Intensivist

## 2022-06-13 ENCOUNTER — APPOINTMENT (OUTPATIENT)
Dept: GENERAL RADIOLOGY | Facility: HOSPITAL | Age: 57
End: 2022-06-13

## 2022-06-13 LAB
ALBUMIN SERPL-MCNC: 3.2 G/DL (ref 3.5–5.2)
ALBUMIN/GLOB SERPL: 1.3 G/DL
ALP SERPL-CCNC: 98 U/L (ref 39–117)
ALT SERPL W P-5'-P-CCNC: 19 U/L (ref 1–33)
ANION GAP SERPL CALCULATED.3IONS-SCNC: 9 MMOL/L (ref 5–15)
APTT PPP: 28.9 SECONDS (ref 22–39)
ARTERIAL PATENCY WRIST A: ABNORMAL
AST SERPL-CCNC: 14 U/L (ref 1–32)
ATMOSPHERIC PRESS: ABNORMAL MM[HG]
BASE EXCESS BLDA CALC-SCNC: 3.5 MMOL/L (ref 0–2)
BASOPHILS # BLD AUTO: 0.09 10*3/MM3 (ref 0–0.2)
BASOPHILS NFR BLD AUTO: 0.2 % (ref 0–1.5)
BDY SITE: ABNORMAL
BILIRUB SERPL-MCNC: 0.8 MG/DL (ref 0–1.2)
BODY TEMPERATURE: 37 C
BUN SERPL-MCNC: 25 MG/DL (ref 6–20)
BUN/CREAT SERPL: 32.1 (ref 7–25)
CALCIUM SPEC-SCNC: 9 MG/DL (ref 8.6–10.5)
CHLORIDE SERPL-SCNC: 98 MMOL/L (ref 98–107)
CO2 BLDA-SCNC: 30.4 MMOL/L (ref 22–33)
CO2 SERPL-SCNC: 26 MMOL/L (ref 22–29)
COHGB MFR BLD: 1.6 % (ref 0–2)
CREAT SERPL-MCNC: 0.78 MG/DL (ref 0.57–1)
CYTOLOGIST CVX/VAG CYTO: NORMAL
DEPRECATED RDW RBC AUTO: 46.5 FL (ref 37–54)
EGFRCR SERPLBLD CKD-EPI 2021: 88.7 ML/MIN/1.73
EOSINOPHIL # BLD AUTO: 0.02 10*3/MM3 (ref 0–0.4)
EOSINOPHIL NFR BLD AUTO: 0.1 % (ref 0.3–6.2)
EPAP: 0
ERYTHROCYTE [DISTWIDTH] IN BLOOD BY AUTOMATED COUNT: 15.9 % (ref 12.3–15.4)
GLOBULIN UR ELPH-MCNC: 2.5 GM/DL
GLUCOSE BLDC GLUCOMTR-MCNC: 110 MG/DL (ref 70–130)
GLUCOSE BLDC GLUCOMTR-MCNC: 130 MG/DL (ref 70–130)
GLUCOSE BLDC GLUCOMTR-MCNC: 159 MG/DL (ref 70–130)
GLUCOSE BLDC GLUCOMTR-MCNC: 160 MG/DL (ref 70–130)
GLUCOSE BLDC GLUCOMTR-MCNC: 80 MG/DL (ref 70–130)
GLUCOSE SERPL-MCNC: 165 MG/DL (ref 65–99)
HCO3 BLDA-SCNC: 29 MMOL/L (ref 20–26)
HCT VFR BLD AUTO: 43.7 % (ref 34–46.6)
HCT VFR BLD CALC: 43.9 % (ref 38–51)
HGB BLD-MCNC: 14 G/DL (ref 12–15.9)
HGB BLDA-MCNC: 14.3 G/DL (ref 14–18)
IMM GRANULOCYTES # BLD AUTO: 0.59 10*3/MM3 (ref 0–0.05)
IMM GRANULOCYTES NFR BLD AUTO: 1.5 % (ref 0–0.5)
INHALED O2 CONCENTRATION: 35 %
INR PPP: 1.08 (ref 0.84–1.13)
IPAP: 0
LYMPHOCYTES # BLD AUTO: 2.06 10*3/MM3 (ref 0.7–3.1)
LYMPHOCYTES NFR BLD AUTO: 5.3 % (ref 19.6–45.3)
MAGNESIUM SERPL-MCNC: 1.6 MG/DL (ref 1.6–2.6)
MCH RBC QN AUTO: 25.9 PG (ref 26.6–33)
MCHC RBC AUTO-ENTMCNC: 32 G/DL (ref 31.5–35.7)
MCV RBC AUTO: 80.9 FL (ref 79–97)
METHGB BLD QL: 0.2 % (ref 0–1.5)
MODALITY: ABNORMAL
MONOCYTES # BLD AUTO: 1.91 10*3/MM3 (ref 0.1–0.9)
MONOCYTES NFR BLD AUTO: 4.9 % (ref 5–12)
NEUTROPHILS NFR BLD AUTO: 34.17 10*3/MM3 (ref 1.7–7)
NEUTROPHILS NFR BLD AUTO: 88 % (ref 42.7–76)
NOTE: ABNORMAL
NRBC BLD AUTO-RTO: 0 /100 WBC (ref 0–0.2)
OXYHGB MFR BLDV: 91.6 % (ref 94–99)
PATH INTERP BLD-IMP: NORMAL
PAW @ PEAK INSP FLOW SETTING VENT: 0 CMH2O
PCO2 BLDA: 46.2 MM HG (ref 35–45)
PCO2 TEMP ADJ BLD: 46.2 MM HG (ref 35–45)
PEEP RESPIRATORY: 5 CM[H2O]
PH BLDA: 7.41 PH UNITS (ref 7.35–7.45)
PH, TEMP CORRECTED: 7.41 PH UNITS
PHOSPHATE SERPL-MCNC: 4 MG/DL (ref 2.5–4.5)
PLATELET # BLD AUTO: 331 10*3/MM3 (ref 140–450)
PMV BLD AUTO: 10.2 FL (ref 6–12)
PO2 BLDA: 65.4 MM HG (ref 83–108)
PO2 TEMP ADJ BLD: 65.4 MM HG (ref 83–108)
POTASSIUM SERPL-SCNC: 5 MMOL/L (ref 3.5–5.2)
PROT SERPL-MCNC: 5.7 G/DL (ref 6–8.5)
PROTHROMBIN TIME: 13.9 SECONDS (ref 11.4–14.4)
RBC # BLD AUTO: 5.4 10*6/MM3 (ref 3.77–5.28)
SODIUM SERPL-SCNC: 133 MMOL/L (ref 136–145)
TOTAL RATE: 0 BREATHS/MINUTE
VENTILATOR MODE: ABNORMAL
VT ON VENT VENT: 0.42 ML
WBC NRBC COR # BLD: 38.84 10*3/MM3 (ref 3.4–10.8)

## 2022-06-13 PROCEDURE — 63710000001 INSULIN LISPRO (HUMAN) PER 5 UNITS: Performed by: SURGERY

## 2022-06-13 PROCEDURE — 25010000002 METOCLOPRAMIDE PER 10 MG: Performed by: SURGERY

## 2022-06-13 PROCEDURE — 85025 COMPLETE CBC W/AUTO DIFF WBC: CPT | Performed by: SURGERY

## 2022-06-13 PROCEDURE — 82962 GLUCOSE BLOOD TEST: CPT

## 2022-06-13 PROCEDURE — 25010000002 HYDROMORPHONE HCL-NACL 30-0.9 MG/30ML-% SOLUTION PREFILLED SYRINGE: Performed by: NURSE PRACTITIONER

## 2022-06-13 PROCEDURE — 85730 THROMBOPLASTIN TIME PARTIAL: CPT | Performed by: SURGERY

## 2022-06-13 PROCEDURE — 94799 UNLISTED PULMONARY SVC/PX: CPT

## 2022-06-13 PROCEDURE — 25010000002 PROPOFOL 10 MG/ML EMULSION: Performed by: SURGERY

## 2022-06-13 PROCEDURE — 80053 COMPREHEN METABOLIC PANEL: CPT | Performed by: SURGERY

## 2022-06-13 PROCEDURE — 82805 BLOOD GASES W/O2 SATURATION: CPT

## 2022-06-13 PROCEDURE — 63710000001 PREDNISONE PER 5 MG: Performed by: SURGERY

## 2022-06-13 PROCEDURE — 83050 HGB METHEMOGLOBIN QUAN: CPT

## 2022-06-13 PROCEDURE — 94003 VENT MGMT INPAT SUBQ DAY: CPT

## 2022-06-13 PROCEDURE — 25010000002 HEPARIN (PORCINE) PER 1000 UNITS: Performed by: SURGERY

## 2022-06-13 PROCEDURE — 25010000002 FENTANYL 10 MCG/1 ML NS: Performed by: SURGERY

## 2022-06-13 PROCEDURE — 82375 ASSAY CARBOXYHB QUANT: CPT

## 2022-06-13 PROCEDURE — 25010000002 HYDROMORPHONE PER 4 MG: Performed by: SURGERY

## 2022-06-13 PROCEDURE — 85610 PROTHROMBIN TIME: CPT | Performed by: SURGERY

## 2022-06-13 PROCEDURE — 94660 CPAP INITIATION&MGMT: CPT

## 2022-06-13 PROCEDURE — 0 MAGNESIUM SULFATE 4 GM/100ML SOLUTION: Performed by: SURGERY

## 2022-06-13 PROCEDURE — 25010000002 METHYLNALTREXONE 12 MG/0.6ML SOLUTION: Performed by: SURGERY

## 2022-06-13 PROCEDURE — 36600 WITHDRAWAL OF ARTERIAL BLOOD: CPT

## 2022-06-13 PROCEDURE — 63710000001 INSULIN DETEMIR PER 5 UNITS: Performed by: SURGERY

## 2022-06-13 PROCEDURE — 71045 X-RAY EXAM CHEST 1 VIEW: CPT

## 2022-06-13 PROCEDURE — 99233 SBSQ HOSP IP/OBS HIGH 50: CPT | Performed by: INTERNAL MEDICINE

## 2022-06-13 PROCEDURE — 25010000002 VANCOMYCIN 10 G RECONSTITUTED SOLUTION: Performed by: SURGERY

## 2022-06-13 PROCEDURE — 84100 ASSAY OF PHOSPHORUS: CPT | Performed by: SURGERY

## 2022-06-13 PROCEDURE — 83735 ASSAY OF MAGNESIUM: CPT | Performed by: SURGERY

## 2022-06-13 RX ORDER — METOCLOPRAMIDE HYDROCHLORIDE 5 MG/ML
10 INJECTION INTRAMUSCULAR; INTRAVENOUS EVERY 6 HOURS
Status: DISCONTINUED | OUTPATIENT
Start: 2022-06-13 | End: 2022-06-22

## 2022-06-13 RX ORDER — DOCUSATE SODIUM 100 MG/1
100 CAPSULE, LIQUID FILLED ORAL 2 TIMES DAILY
Status: DISCONTINUED | OUTPATIENT
Start: 2022-06-13 | End: 2022-06-14

## 2022-06-13 RX ORDER — NALOXONE HCL 0.4 MG/ML
0.1 VIAL (ML) INJECTION
Status: DISCONTINUED | OUTPATIENT
Start: 2022-06-13 | End: 2022-06-26 | Stop reason: HOSPADM

## 2022-06-13 RX ORDER — HEPARIN SODIUM 5000 [USP'U]/ML
5000 INJECTION, SOLUTION INTRAVENOUS; SUBCUTANEOUS EVERY 8 HOURS SCHEDULED
Status: DISCONTINUED | OUTPATIENT
Start: 2022-06-13 | End: 2022-06-13

## 2022-06-13 RX ADMIN — INSULIN DETEMIR 25 UNITS: 100 INJECTION, SOLUTION SUBCUTANEOUS at 10:09

## 2022-06-13 RX ADMIN — METOCLOPRAMIDE 10 MG: 5 INJECTION, SOLUTION INTRAMUSCULAR; INTRAVENOUS at 20:53

## 2022-06-13 RX ADMIN — CHLORHEXIDINE GLUCONATE 15 ML: 1.2 SOLUTION ORAL at 10:08

## 2022-06-13 RX ADMIN — SENNOSIDES AND DOCUSATE SODIUM 2 TABLET: 50; 8.6 TABLET ORAL at 10:15

## 2022-06-13 RX ADMIN — GABAPENTIN 300 MG: 300 CAPSULE ORAL at 05:48

## 2022-06-13 RX ADMIN — GABAPENTIN 300 MG: 300 CAPSULE ORAL at 20:52

## 2022-06-13 RX ADMIN — NYSTATIN: 100000 POWDER TOPICAL at 10:56

## 2022-06-13 RX ADMIN — INSULIN LISPRO 10 UNITS: 100 INJECTION, SOLUTION INTRAVENOUS; SUBCUTANEOUS at 10:14

## 2022-06-13 RX ADMIN — DULOXETINE HYDROCHLORIDE 120 MG: 60 CAPSULE, DELAYED RELEASE ORAL at 10:11

## 2022-06-13 RX ADMIN — SODIUM CHLORIDE 5 MG/HR: 9 INJECTION, SOLUTION INTRAVENOUS at 00:50

## 2022-06-13 RX ADMIN — SODIUM CHLORIDE, POTASSIUM CHLORIDE, SODIUM LACTATE AND CALCIUM CHLORIDE 150 ML/HR: 600; 310; 30; 20 INJECTION, SOLUTION INTRAVENOUS at 05:46

## 2022-06-13 RX ADMIN — GABAPENTIN 300 MG: 300 CAPSULE ORAL at 16:05

## 2022-06-13 RX ADMIN — BUSPIRONE HYDROCHLORIDE 10 MG: 10 TABLET ORAL at 20:52

## 2022-06-13 RX ADMIN — IPRATROPIUM BROMIDE AND ALBUTEROL SULFATE 3 ML: 2.5; .5 SOLUTION RESPIRATORY (INHALATION) at 07:37

## 2022-06-13 RX ADMIN — HYDROMORPHONE HYDROCHLORIDE 0.5 MG: 1 INJECTION, SOLUTION INTRAMUSCULAR; INTRAVENOUS; SUBCUTANEOUS at 10:54

## 2022-06-13 RX ADMIN — ESCITALOPRAM OXALATE 20 MG: 20 TABLET ORAL at 10:13

## 2022-06-13 RX ADMIN — IPRATROPIUM BROMIDE AND ALBUTEROL SULFATE 3 ML: 2.5; .5 SOLUTION RESPIRATORY (INHALATION) at 12:41

## 2022-06-13 RX ADMIN — HEPARIN SODIUM 5000 UNITS: 5000 INJECTION INTRAVENOUS; SUBCUTANEOUS at 20:51

## 2022-06-13 RX ADMIN — PREDNISONE 10 MG: 10 TABLET ORAL at 10:12

## 2022-06-13 RX ADMIN — VANCOMYCIN HYDROCHLORIDE 1250 MG: 10 INJECTION, POWDER, LYOPHILIZED, FOR SOLUTION INTRAVENOUS at 18:31

## 2022-06-13 RX ADMIN — SODIUM CHLORIDE 5 MG/HR: 9 INJECTION, SOLUTION INTRAVENOUS at 06:45

## 2022-06-13 RX ADMIN — DOCUSATE SODIUM 100 MG: 100 CAPSULE, LIQUID FILLED ORAL at 20:53

## 2022-06-13 RX ADMIN — NYSTATIN: 100000 POWDER TOPICAL at 20:51

## 2022-06-13 RX ADMIN — METOPROLOL TARTRATE 25 MG: 25 TABLET, FILM COATED ORAL at 20:52

## 2022-06-13 RX ADMIN — TERAZOSIN HYDROCHLORIDE 2 MG: 2 CAPSULE ORAL at 20:52

## 2022-06-13 RX ADMIN — LISINOPRIL 10 MG: 10 TABLET ORAL at 10:10

## 2022-06-13 RX ADMIN — PROPOFOL 30 MCG/KG/MIN: 10 INJECTION, EMULSION INTRAVENOUS at 04:47

## 2022-06-13 RX ADMIN — ARIPIPRAZOLE 5 MG: 10 TABLET ORAL at 10:09

## 2022-06-13 RX ADMIN — Medication 10 MG: at 22:41

## 2022-06-13 RX ADMIN — VANCOMYCIN HYDROCHLORIDE 1250 MG: 10 INJECTION, POWDER, LYOPHILIZED, FOR SOLUTION INTRAVENOUS at 05:47

## 2022-06-13 RX ADMIN — SODIUM CHLORIDE, POTASSIUM CHLORIDE, SODIUM LACTATE AND CALCIUM CHLORIDE 150 ML/HR: 600; 310; 30; 20 INJECTION, SOLUTION INTRAVENOUS at 12:53

## 2022-06-13 RX ADMIN — HEPARIN SODIUM 5000 UNITS: 5000 INJECTION INTRAVENOUS; SUBCUTANEOUS at 05:48

## 2022-06-13 RX ADMIN — Medication 10 ML: at 20:53

## 2022-06-13 RX ADMIN — IPRATROPIUM BROMIDE AND ALBUTEROL SULFATE 3 ML: 2.5; .5 SOLUTION RESPIRATORY (INHALATION) at 19:22

## 2022-06-13 RX ADMIN — BUSPIRONE HYDROCHLORIDE 10 MG: 10 TABLET ORAL at 10:10

## 2022-06-13 RX ADMIN — BUDESONIDE 0.5 MG: 0.5 SUSPENSION RESPIRATORY (INHALATION) at 19:22

## 2022-06-13 RX ADMIN — METOPROLOL TARTRATE 25 MG: 25 TABLET, FILM COATED ORAL at 10:11

## 2022-06-13 RX ADMIN — Medication 200 MCG/HR: at 05:47

## 2022-06-13 RX ADMIN — PANTOPRAZOLE SODIUM 40 MG: 40 INJECTION, POWDER, FOR SOLUTION INTRAVENOUS at 05:48

## 2022-06-13 RX ADMIN — METOCLOPRAMIDE 10 MG: 5 INJECTION, SOLUTION INTRAMUSCULAR; INTRAVENOUS at 10:15

## 2022-06-13 RX ADMIN — AMLODIPINE BESYLATE 10 MG: 10 TABLET ORAL at 10:13

## 2022-06-13 RX ADMIN — BUDESONIDE 0.5 MG: 0.5 SUSPENSION RESPIRATORY (INHALATION) at 07:37

## 2022-06-13 RX ADMIN — Medication 10 ML: at 10:56

## 2022-06-13 RX ADMIN — SENNOSIDES AND DOCUSATE SODIUM 2 TABLET: 50; 8.6 TABLET ORAL at 20:52

## 2022-06-13 RX ADMIN — IPRATROPIUM BROMIDE AND ALBUTEROL SULFATE 3 ML: 2.5; .5 SOLUTION RESPIRATORY (INHALATION) at 15:51

## 2022-06-13 RX ADMIN — MUPIROCIN: 20 OINTMENT TOPICAL at 16:06

## 2022-06-13 RX ADMIN — INSULIN LISPRO 3 UNITS: 100 INJECTION, SOLUTION INTRAVENOUS; SUBCUTANEOUS at 10:14

## 2022-06-13 RX ADMIN — METHYLNALTREXONE BROMIDE 4 MG: 12 INJECTION, SOLUTION SUBCUTANEOUS at 16:05

## 2022-06-13 RX ADMIN — DIAZEPAM 2 MG: 2 TABLET ORAL at 22:41

## 2022-06-13 RX ADMIN — MUPIROCIN: 20 OINTMENT TOPICAL at 20:51

## 2022-06-13 RX ADMIN — METOCLOPRAMIDE 10 MG: 5 INJECTION, SOLUTION INTRAMUSCULAR; INTRAVENOUS at 16:05

## 2022-06-13 RX ADMIN — MAGNESIUM SULFATE HEPTAHYDRATE 4 G: 40 INJECTION, SOLUTION INTRAVENOUS at 05:47

## 2022-06-13 RX ADMIN — HEPARIN SODIUM 5000 UNITS: 5000 INJECTION INTRAVENOUS; SUBCUTANEOUS at 16:05

## 2022-06-13 RX ADMIN — CHLORHEXIDINE GLUCONATE 15 ML: 1.2 SOLUTION ORAL at 20:51

## 2022-06-13 RX ADMIN — Medication: at 12:52

## 2022-06-14 ENCOUNTER — APPOINTMENT (OUTPATIENT)
Dept: GENERAL RADIOLOGY | Facility: HOSPITAL | Age: 57
End: 2022-06-14

## 2022-06-14 LAB
ALBUMIN SERPL-MCNC: 2.5 G/DL (ref 3.5–5.2)
ALBUMIN/GLOB SERPL: 0.9 G/DL
ALP SERPL-CCNC: 83 U/L (ref 39–117)
ALT SERPL W P-5'-P-CCNC: 13 U/L (ref 1–33)
ANION GAP SERPL CALCULATED.3IONS-SCNC: 7 MMOL/L (ref 5–15)
ARTERIAL PATENCY WRIST A: ABNORMAL
AST SERPL-CCNC: 11 U/L (ref 1–32)
ATMOSPHERIC PRESS: ABNORMAL MM[HG]
BASE EXCESS BLDA CALC-SCNC: 5.7 MMOL/L (ref 0–2)
BASOPHILS # BLD AUTO: 0.04 10*3/MM3 (ref 0–0.2)
BASOPHILS NFR BLD AUTO: 0.2 % (ref 0–1.5)
BDY SITE: ABNORMAL
BILIRUB SERPL-MCNC: 0.7 MG/DL (ref 0–1.2)
BODY TEMPERATURE: 37 C
BUN SERPL-MCNC: 19 MG/DL (ref 6–20)
BUN/CREAT SERPL: 26.4 (ref 7–25)
CALCIUM SPEC-SCNC: 8.5 MG/DL (ref 8.6–10.5)
CHLORIDE SERPL-SCNC: 96 MMOL/L (ref 98–107)
CO2 BLDA-SCNC: 32.1 MMOL/L (ref 22–33)
CO2 SERPL-SCNC: 27 MMOL/L (ref 22–29)
COHGB MFR BLD: 1 % (ref 0–2)
CREAT SERPL-MCNC: 0.72 MG/DL (ref 0.57–1)
CYTO UR: NORMAL
DEPRECATED RDW RBC AUTO: 50 FL (ref 37–54)
EGFRCR SERPLBLD CKD-EPI 2021: 97.7 ML/MIN/1.73
EOSINOPHIL # BLD AUTO: 0.08 10*3/MM3 (ref 0–0.4)
EOSINOPHIL NFR BLD AUTO: 0.5 % (ref 0.3–6.2)
EPAP: 0
ERYTHROCYTE [DISTWIDTH] IN BLOOD BY AUTOMATED COUNT: 16.1 % (ref 12.3–15.4)
GLOBULIN UR ELPH-MCNC: 2.8 GM/DL
GLUCOSE BLDC GLUCOMTR-MCNC: 101 MG/DL (ref 70–130)
GLUCOSE BLDC GLUCOMTR-MCNC: 109 MG/DL (ref 70–130)
GLUCOSE BLDC GLUCOMTR-MCNC: 76 MG/DL (ref 70–130)
GLUCOSE BLDC GLUCOMTR-MCNC: 82 MG/DL (ref 70–130)
GLUCOSE BLDC GLUCOMTR-MCNC: 85 MG/DL (ref 70–130)
GLUCOSE BLDC GLUCOMTR-MCNC: 94 MG/DL (ref 70–130)
GLUCOSE SERPL-MCNC: 82 MG/DL (ref 65–99)
HCO3 BLDA-SCNC: 30.7 MMOL/L (ref 20–26)
HCT VFR BLD AUTO: 37 % (ref 34–46.6)
HCT VFR BLD CALC: 39.6 % (ref 38–51)
HGB BLD-MCNC: 11.5 G/DL (ref 12–15.9)
HGB BLDA-MCNC: 12.9 G/DL (ref 14–18)
IMM GRANULOCYTES # BLD AUTO: 0.13 10*3/MM3 (ref 0–0.05)
IMM GRANULOCYTES NFR BLD AUTO: 0.8 % (ref 0–0.5)
INHALED O2 CONCENTRATION: 100 %
IPAP: 0
LAB AP CASE REPORT: NORMAL
LAB AP CLINICAL INFORMATION: NORMAL
LYMPHOCYTES # BLD AUTO: 2.28 10*3/MM3 (ref 0.7–3.1)
LYMPHOCYTES NFR BLD AUTO: 13.2 % (ref 19.6–45.3)
MAGNESIUM SERPL-MCNC: 2 MG/DL (ref 1.6–2.6)
MCH RBC QN AUTO: 26.2 PG (ref 26.6–33)
MCHC RBC AUTO-ENTMCNC: 31.1 G/DL (ref 31.5–35.7)
MCV RBC AUTO: 84.3 FL (ref 79–97)
METHGB BLD QL: 0.4 % (ref 0–1.5)
MODALITY: ABNORMAL
MONOCYTES # BLD AUTO: 1.39 10*3/MM3 (ref 0.1–0.9)
MONOCYTES NFR BLD AUTO: 8.1 % (ref 5–12)
NEUTROPHILS NFR BLD AUTO: 13.29 10*3/MM3 (ref 1.7–7)
NEUTROPHILS NFR BLD AUTO: 77.2 % (ref 42.7–76)
NOTE: ABNORMAL
NRBC BLD AUTO-RTO: 0 /100 WBC (ref 0–0.2)
OXYHGB MFR BLDV: 95.1 % (ref 94–99)
PATH REPORT.FINAL DX SPEC: NORMAL
PATH REPORT.GROSS SPEC: NORMAL
PAW @ PEAK INSP FLOW SETTING VENT: 0 CMH2O
PCO2 BLDA: 45.3 MM HG (ref 35–45)
PCO2 TEMP ADJ BLD: 45.3 MM HG (ref 35–45)
PH BLDA: 7.44 PH UNITS (ref 7.35–7.45)
PH, TEMP CORRECTED: 7.44 PH UNITS
PHOSPHATE SERPL-MCNC: 3 MG/DL (ref 2.5–4.5)
PLATELET # BLD AUTO: 240 10*3/MM3 (ref 140–450)
PMV BLD AUTO: 10.5 FL (ref 6–12)
PO2 BLDA: 82 MM HG (ref 83–108)
PO2 TEMP ADJ BLD: 82 MM HG (ref 83–108)
POTASSIUM SERPL-SCNC: 4.5 MMOL/L (ref 3.5–5.2)
PROT SERPL-MCNC: 5.3 G/DL (ref 6–8.5)
RBC # BLD AUTO: 4.39 10*6/MM3 (ref 3.77–5.28)
SODIUM SERPL-SCNC: 130 MMOL/L (ref 136–145)
TOTAL RATE: 0 BREATHS/MINUTE
VENTILATOR MODE: ABNORMAL
WBC NRBC COR # BLD: 17.21 10*3/MM3 (ref 3.4–10.8)

## 2022-06-14 PROCEDURE — 97168 OT RE-EVAL EST PLAN CARE: CPT

## 2022-06-14 PROCEDURE — 80053 COMPREHEN METABOLIC PANEL: CPT | Performed by: INTERNAL MEDICINE

## 2022-06-14 PROCEDURE — P9047 ALBUMIN (HUMAN), 25%, 50ML: HCPCS | Performed by: NURSE PRACTITIONER

## 2022-06-14 PROCEDURE — 94660 CPAP INITIATION&MGMT: CPT

## 2022-06-14 PROCEDURE — 83735 ASSAY OF MAGNESIUM: CPT | Performed by: INTERNAL MEDICINE

## 2022-06-14 PROCEDURE — 94799 UNLISTED PULMONARY SVC/PX: CPT

## 2022-06-14 PROCEDURE — 97535 SELF CARE MNGMENT TRAINING: CPT

## 2022-06-14 PROCEDURE — 25010000002 HEPARIN (PORCINE) PER 1000 UNITS: Performed by: SURGERY

## 2022-06-14 PROCEDURE — 85025 COMPLETE CBC W/AUTO DIFF WBC: CPT | Performed by: INTERNAL MEDICINE

## 2022-06-14 PROCEDURE — 71045 X-RAY EXAM CHEST 1 VIEW: CPT

## 2022-06-14 PROCEDURE — 25010000002 FUROSEMIDE PER 20 MG: Performed by: NURSE PRACTITIONER

## 2022-06-14 PROCEDURE — 82375 ASSAY CARBOXYHB QUANT: CPT

## 2022-06-14 PROCEDURE — 82805 BLOOD GASES W/O2 SATURATION: CPT

## 2022-06-14 PROCEDURE — 97164 PT RE-EVAL EST PLAN CARE: CPT

## 2022-06-14 PROCEDURE — 99233 SBSQ HOSP IP/OBS HIGH 50: CPT | Performed by: INTERNAL MEDICINE

## 2022-06-14 PROCEDURE — 63710000001 PREDNISONE PER 5 MG: Performed by: SURGERY

## 2022-06-14 PROCEDURE — 36600 WITHDRAWAL OF ARTERIAL BLOOD: CPT

## 2022-06-14 PROCEDURE — 97530 THERAPEUTIC ACTIVITIES: CPT

## 2022-06-14 PROCEDURE — 25010000002 VANCOMYCIN: Performed by: INTERNAL MEDICINE

## 2022-06-14 PROCEDURE — 25010000002 METOCLOPRAMIDE PER 10 MG: Performed by: SURGERY

## 2022-06-14 PROCEDURE — 94761 N-INVAS EAR/PLS OXIMETRY MLT: CPT

## 2022-06-14 PROCEDURE — 25010000002 ALBUMIN HUMAN 25% PER 50 ML: Performed by: NURSE PRACTITIONER

## 2022-06-14 PROCEDURE — 83050 HGB METHEMOGLOBIN QUAN: CPT

## 2022-06-14 PROCEDURE — 25010000002 MORPHINE PER 10 MG: Performed by: SURGERY

## 2022-06-14 PROCEDURE — 25010000002 VANCOMYCIN 10 G RECONSTITUTED SOLUTION: Performed by: INTERNAL MEDICINE

## 2022-06-14 PROCEDURE — 84100 ASSAY OF PHOSPHORUS: CPT | Performed by: INTERNAL MEDICINE

## 2022-06-14 PROCEDURE — 82962 GLUCOSE BLOOD TEST: CPT

## 2022-06-14 RX ORDER — ARIPIPRAZOLE 10 MG/1
5 TABLET ORAL DAILY
Status: DISCONTINUED | OUTPATIENT
Start: 2022-06-15 | End: 2022-06-17

## 2022-06-14 RX ORDER — AMOXICILLIN 250 MG
2 CAPSULE ORAL 2 TIMES DAILY
Status: DISCONTINUED | OUTPATIENT
Start: 2022-06-14 | End: 2022-06-17

## 2022-06-14 RX ORDER — TERAZOSIN 2 MG/1
2 CAPSULE ORAL NIGHTLY
Status: DISCONTINUED | OUTPATIENT
Start: 2022-06-14 | End: 2022-06-17

## 2022-06-14 RX ORDER — BUMETANIDE 0.25 MG/ML
1 INJECTION INTRAMUSCULAR; INTRAVENOUS EVERY 8 HOURS
Status: COMPLETED | OUTPATIENT
Start: 2022-06-14 | End: 2022-06-14

## 2022-06-14 RX ORDER — LISINOPRIL 10 MG/1
10 TABLET ORAL
Status: DISCONTINUED | OUTPATIENT
Start: 2022-06-15 | End: 2022-06-15

## 2022-06-14 RX ORDER — OXYCODONE HYDROCHLORIDE AND ACETAMINOPHEN 5; 325 MG/1; MG/1
2 TABLET ORAL EVERY 4 HOURS PRN
Status: DISCONTINUED | OUTPATIENT
Start: 2022-06-14 | End: 2022-06-17

## 2022-06-14 RX ORDER — BISACODYL 10 MG
10 SUPPOSITORY, RECTAL RECTAL DAILY PRN
Status: DISCONTINUED | OUTPATIENT
Start: 2022-06-14 | End: 2022-06-17

## 2022-06-14 RX ORDER — AMLODIPINE BESYLATE 10 MG/1
10 TABLET ORAL
Status: DISCONTINUED | OUTPATIENT
Start: 2022-06-15 | End: 2022-06-17

## 2022-06-14 RX ORDER — FUROSEMIDE 10 MG/ML
40 INJECTION INTRAMUSCULAR; INTRAVENOUS ONCE
Status: COMPLETED | OUTPATIENT
Start: 2022-06-14 | End: 2022-06-14

## 2022-06-14 RX ORDER — POTASSIUM CHLORIDE 1.5 G/1.77G
40 POWDER, FOR SOLUTION ORAL AS NEEDED
Status: DISCONTINUED | OUTPATIENT
Start: 2022-06-14 | End: 2022-06-17

## 2022-06-14 RX ORDER — DOCUSATE SODIUM 50 MG/5 ML
100 LIQUID (ML) ORAL 2 TIMES DAILY
Status: DISCONTINUED | OUTPATIENT
Start: 2022-06-14 | End: 2022-06-17

## 2022-06-14 RX ORDER — BUSPIRONE HYDROCHLORIDE 10 MG/1
10 TABLET ORAL 2 TIMES DAILY
Status: DISCONTINUED | OUTPATIENT
Start: 2022-06-14 | End: 2022-06-17

## 2022-06-14 RX ORDER — ACETAMINOPHEN 650 MG/1
650 SUPPOSITORY RECTAL EVERY 4 HOURS PRN
Status: DISCONTINUED | OUTPATIENT
Start: 2022-06-14 | End: 2022-06-17

## 2022-06-14 RX ORDER — DIAZEPAM 2 MG/1
2 TABLET ORAL EVERY 8 HOURS PRN
Status: DISCONTINUED | OUTPATIENT
Start: 2022-06-14 | End: 2022-06-18

## 2022-06-14 RX ORDER — GABAPENTIN 300 MG/1
300 CAPSULE ORAL EVERY 8 HOURS SCHEDULED
Status: DISCONTINUED | OUTPATIENT
Start: 2022-06-14 | End: 2022-06-17

## 2022-06-14 RX ORDER — METOLAZONE 5 MG/1
10 TABLET ORAL ONCE
Status: COMPLETED | OUTPATIENT
Start: 2022-06-14 | End: 2022-06-14

## 2022-06-14 RX ORDER — POTASSIUM CHLORIDE 7.45 MG/ML
10 INJECTION INTRAVENOUS
Status: DISCONTINUED | OUTPATIENT
Start: 2022-06-14 | End: 2022-06-17

## 2022-06-14 RX ORDER — ALBUMIN (HUMAN) 12.5 G/50ML
25 SOLUTION INTRAVENOUS ONCE
Status: COMPLETED | OUTPATIENT
Start: 2022-06-14 | End: 2022-06-14

## 2022-06-14 RX ORDER — POLYETHYLENE GLYCOL 3350 17 G/17G
17 POWDER, FOR SOLUTION ORAL DAILY PRN
Status: DISCONTINUED | OUTPATIENT
Start: 2022-06-14 | End: 2022-06-17

## 2022-06-14 RX ORDER — ESCITALOPRAM OXALATE 20 MG/1
20 TABLET ORAL DAILY
Status: DISCONTINUED | OUTPATIENT
Start: 2022-06-15 | End: 2022-06-17

## 2022-06-14 RX ORDER — ACETAMINOPHEN 325 MG/1
650 TABLET ORAL EVERY 4 HOURS PRN
Status: DISCONTINUED | OUTPATIENT
Start: 2022-06-14 | End: 2022-06-17

## 2022-06-14 RX ORDER — PREDNISONE 1 MG/1
5 TABLET ORAL
Status: COMPLETED | OUTPATIENT
Start: 2022-06-15 | End: 2022-06-16

## 2022-06-14 RX ORDER — POTASSIUM CHLORIDE 1.5 G/1.77G
20 POWDER, FOR SOLUTION ORAL ONCE
Status: COMPLETED | OUTPATIENT
Start: 2022-06-14 | End: 2022-06-14

## 2022-06-14 RX ADMIN — Medication 10 ML: at 20:09

## 2022-06-14 RX ADMIN — GABAPENTIN 300 MG: 300 CAPSULE ORAL at 20:08

## 2022-06-14 RX ADMIN — METOLAZONE 10 MG: 5 TABLET ORAL at 21:34

## 2022-06-14 RX ADMIN — LISINOPRIL 10 MG: 10 TABLET ORAL at 08:49

## 2022-06-14 RX ADMIN — METOCLOPRAMIDE 10 MG: 5 INJECTION, SOLUTION INTRAMUSCULAR; INTRAVENOUS at 13:10

## 2022-06-14 RX ADMIN — IPRATROPIUM BROMIDE AND ALBUTEROL SULFATE 3 ML: 2.5; .5 SOLUTION RESPIRATORY (INHALATION) at 12:27

## 2022-06-14 RX ADMIN — BUMETANIDE 1 MG: 0.25 INJECTION, SOLUTION INTRAMUSCULAR; INTRAVENOUS at 18:34

## 2022-06-14 RX ADMIN — APIXABAN 5 MG: 5 TABLET, FILM COATED ORAL at 20:08

## 2022-06-14 RX ADMIN — HEPARIN SODIUM 5000 UNITS: 5000 INJECTION INTRAVENOUS; SUBCUTANEOUS at 05:58

## 2022-06-14 RX ADMIN — TERAZOSIN HYDROCHLORIDE 2 MG: 2 CAPSULE ORAL at 20:07

## 2022-06-14 RX ADMIN — MUPIROCIN: 20 OINTMENT TOPICAL at 20:08

## 2022-06-14 RX ADMIN — METOCLOPRAMIDE 10 MG: 5 INJECTION, SOLUTION INTRAMUSCULAR; INTRAVENOUS at 08:49

## 2022-06-14 RX ADMIN — VANCOMYCIN HYDROCHLORIDE 1250 MG: 10 INJECTION, POWDER, LYOPHILIZED, FOR SOLUTION INTRAVENOUS at 10:51

## 2022-06-14 RX ADMIN — METOPROLOL TARTRATE 25 MG: 25 TABLET, FILM COATED ORAL at 08:51

## 2022-06-14 RX ADMIN — DOCUSATE SODIUM 100 MG: 50 LIQUID ORAL at 20:07

## 2022-06-14 RX ADMIN — BUDESONIDE 0.5 MG: 0.5 SUSPENSION RESPIRATORY (INHALATION) at 20:51

## 2022-06-14 RX ADMIN — ARIPIPRAZOLE 5 MG: 10 TABLET ORAL at 08:49

## 2022-06-14 RX ADMIN — SENNOSIDES AND DOCUSATE SODIUM 2 TABLET: 50; 8.6 TABLET ORAL at 08:50

## 2022-06-14 RX ADMIN — BUSPIRONE HYDROCHLORIDE 10 MG: 10 TABLET ORAL at 08:51

## 2022-06-14 RX ADMIN — PANTOPRAZOLE SODIUM 40 MG: 40 INJECTION, POWDER, FOR SOLUTION INTRAVENOUS at 05:58

## 2022-06-14 RX ADMIN — METOPROLOL TARTRATE 25 MG: 25 TABLET, FILM COATED ORAL at 20:08

## 2022-06-14 RX ADMIN — SENNOSIDES AND DOCUSATE SODIUM 2 TABLET: 50; 8.6 TABLET ORAL at 20:07

## 2022-06-14 RX ADMIN — ESCITALOPRAM OXALATE 20 MG: 20 TABLET ORAL at 08:51

## 2022-06-14 RX ADMIN — DULOXETINE HYDROCHLORIDE 120 MG: 60 CAPSULE, DELAYED RELEASE ORAL at 08:50

## 2022-06-14 RX ADMIN — POTASSIUM CHLORIDE 20 MEQ: 1.5 POWDER, FOR SOLUTION ORAL at 21:33

## 2022-06-14 RX ADMIN — PREDNISONE 10 MG: 10 TABLET ORAL at 08:50

## 2022-06-14 RX ADMIN — ALBUMIN HUMAN 25 G: 0.25 SOLUTION INTRAVENOUS at 21:33

## 2022-06-14 RX ADMIN — NYSTATIN: 100000 POWDER TOPICAL at 08:51

## 2022-06-14 RX ADMIN — GABAPENTIN 300 MG: 300 CAPSULE ORAL at 05:58

## 2022-06-14 RX ADMIN — SODIUM CHLORIDE, POTASSIUM CHLORIDE, SODIUM LACTATE AND CALCIUM CHLORIDE 150 ML/HR: 600; 310; 30; 20 INJECTION, SOLUTION INTRAVENOUS at 06:06

## 2022-06-14 RX ADMIN — VANCOMYCIN HYDROCHLORIDE 1250 MG: 10 INJECTION, POWDER, LYOPHILIZED, FOR SOLUTION INTRAVENOUS at 22:36

## 2022-06-14 RX ADMIN — METOCLOPRAMIDE 10 MG: 5 INJECTION, SOLUTION INTRAMUSCULAR; INTRAVENOUS at 02:38

## 2022-06-14 RX ADMIN — Medication 10 MG: at 20:15

## 2022-06-14 RX ADMIN — AMLODIPINE BESYLATE 10 MG: 10 TABLET ORAL at 08:51

## 2022-06-14 RX ADMIN — IPRATROPIUM BROMIDE AND ALBUTEROL SULFATE 3 ML: 2.5; .5 SOLUTION RESPIRATORY (INHALATION) at 20:51

## 2022-06-14 RX ADMIN — DIAZEPAM 2 MG: 2 TABLET ORAL at 17:01

## 2022-06-14 RX ADMIN — FUROSEMIDE 40 MG: 10 INJECTION, SOLUTION INTRAMUSCULAR; INTRAVENOUS at 21:33

## 2022-06-14 RX ADMIN — MORPHINE SULFATE 2 MG: 2 INJECTION, SOLUTION INTRAMUSCULAR; INTRAVENOUS at 20:15

## 2022-06-14 RX ADMIN — IPRATROPIUM BROMIDE AND ALBUTEROL SULFATE 3 ML: 2.5; .5 SOLUTION RESPIRATORY (INHALATION) at 07:39

## 2022-06-14 RX ADMIN — BUDESONIDE 0.5 MG: 0.5 SUSPENSION RESPIRATORY (INHALATION) at 07:39

## 2022-06-14 RX ADMIN — DOCUSATE SODIUM 100 MG: 100 CAPSULE, LIQUID FILLED ORAL at 08:51

## 2022-06-14 RX ADMIN — BUMETANIDE 1 MG: 0.25 INJECTION, SOLUTION INTRAMUSCULAR; INTRAVENOUS at 10:52

## 2022-06-14 RX ADMIN — METOCLOPRAMIDE 10 MG: 5 INJECTION, SOLUTION INTRAMUSCULAR; INTRAVENOUS at 20:07

## 2022-06-14 RX ADMIN — NYSTATIN: 100000 POWDER TOPICAL at 20:08

## 2022-06-14 RX ADMIN — APIXABAN 5 MG: 5 TABLET, FILM COATED ORAL at 13:10

## 2022-06-14 RX ADMIN — IPRATROPIUM BROMIDE AND ALBUTEROL SULFATE 3 ML: 2.5; .5 SOLUTION RESPIRATORY (INHALATION) at 16:56

## 2022-06-14 RX ADMIN — GABAPENTIN 300 MG: 300 CAPSULE ORAL at 13:09

## 2022-06-14 RX ADMIN — MUPIROCIN: 20 OINTMENT TOPICAL at 08:51

## 2022-06-14 RX ADMIN — Medication 10 ML: at 08:51

## 2022-06-14 RX ADMIN — BUSPIRONE HYDROCHLORIDE 10 MG: 10 TABLET ORAL at 20:07

## 2022-06-15 ENCOUNTER — APPOINTMENT (OUTPATIENT)
Dept: GENERAL RADIOLOGY | Facility: HOSPITAL | Age: 57
End: 2022-06-15

## 2022-06-15 LAB
ALBUMIN SERPL-MCNC: 3 G/DL (ref 3.5–5.2)
ALBUMIN/GLOB SERPL: 1 G/DL
ALP SERPL-CCNC: 101 U/L (ref 39–117)
ALT SERPL W P-5'-P-CCNC: 15 U/L (ref 1–33)
ANION GAP SERPL CALCULATED.3IONS-SCNC: 12 MMOL/L (ref 5–15)
AST SERPL-CCNC: 13 U/L (ref 1–32)
BASOPHILS # BLD AUTO: 0.08 10*3/MM3 (ref 0–0.2)
BASOPHILS NFR BLD AUTO: 0.3 % (ref 0–1.5)
BILIRUB SERPL-MCNC: 1.8 MG/DL (ref 0–1.2)
BUN SERPL-MCNC: 22 MG/DL (ref 6–20)
BUN/CREAT SERPL: 20 (ref 7–25)
CALCIUM SPEC-SCNC: 9.1 MG/DL (ref 8.6–10.5)
CHLORIDE SERPL-SCNC: 91 MMOL/L (ref 98–107)
CO2 SERPL-SCNC: 28 MMOL/L (ref 22–29)
CREAT SERPL-MCNC: 1.1 MG/DL (ref 0.57–1)
DEPRECATED RDW RBC AUTO: 50 FL (ref 37–54)
EGFRCR SERPLBLD CKD-EPI 2021: 58.7 ML/MIN/1.73
EOSINOPHIL # BLD AUTO: 0.02 10*3/MM3 (ref 0–0.4)
EOSINOPHIL NFR BLD AUTO: 0.1 % (ref 0.3–6.2)
ERYTHROCYTE [DISTWIDTH] IN BLOOD BY AUTOMATED COUNT: 16.4 % (ref 12.3–15.4)
GLOBULIN UR ELPH-MCNC: 3.1 GM/DL
GLUCOSE BLDC GLUCOMTR-MCNC: 106 MG/DL (ref 70–130)
GLUCOSE BLDC GLUCOMTR-MCNC: 110 MG/DL (ref 70–130)
GLUCOSE BLDC GLUCOMTR-MCNC: 118 MG/DL (ref 70–130)
GLUCOSE BLDC GLUCOMTR-MCNC: 119 MG/DL (ref 70–130)
GLUCOSE BLDC GLUCOMTR-MCNC: 124 MG/DL (ref 70–130)
GLUCOSE SERPL-MCNC: 120 MG/DL (ref 65–99)
HCT VFR BLD AUTO: 35 % (ref 34–46.6)
HGB BLD-MCNC: 11 G/DL (ref 12–15.9)
IMM GRANULOCYTES # BLD AUTO: 0.39 10*3/MM3 (ref 0–0.05)
IMM GRANULOCYTES NFR BLD AUTO: 1.3 % (ref 0–0.5)
LYMPHOCYTES # BLD AUTO: 1.82 10*3/MM3 (ref 0.7–3.1)
LYMPHOCYTES NFR BLD AUTO: 6 % (ref 19.6–45.3)
MAGNESIUM SERPL-MCNC: 1.6 MG/DL (ref 1.6–2.6)
MCH RBC QN AUTO: 26.5 PG (ref 26.6–33)
MCHC RBC AUTO-ENTMCNC: 31.4 G/DL (ref 31.5–35.7)
MCV RBC AUTO: 84.3 FL (ref 79–97)
MONOCYTES # BLD AUTO: 1.54 10*3/MM3 (ref 0.1–0.9)
MONOCYTES NFR BLD AUTO: 5.1 % (ref 5–12)
NEUTROPHILS NFR BLD AUTO: 26.56 10*3/MM3 (ref 1.7–7)
NEUTROPHILS NFR BLD AUTO: 87.2 % (ref 42.7–76)
NEUTS VAC BLD QL SMEAR: NORMAL
NRBC BLD AUTO-RTO: 0 /100 WBC (ref 0–0.2)
PHOSPHATE SERPL-MCNC: 3.9 MG/DL (ref 2.5–4.5)
PLAT MORPH BLD: NORMAL
PLATELET # BLD AUTO: 216 10*3/MM3 (ref 140–450)
PMV BLD AUTO: 9.9 FL (ref 6–12)
POTASSIUM SERPL-SCNC: 4.1 MMOL/L (ref 3.5–5.2)
PROT SERPL-MCNC: 6.1 G/DL (ref 6–8.5)
RBC # BLD AUTO: 4.15 10*6/MM3 (ref 3.77–5.28)
RBC MORPH BLD: NORMAL
SODIUM SERPL-SCNC: 131 MMOL/L (ref 136–145)
WBC NRBC COR # BLD: 30.41 10*3/MM3 (ref 3.4–10.8)

## 2022-06-15 PROCEDURE — 94799 UNLISTED PULMONARY SVC/PX: CPT

## 2022-06-15 PROCEDURE — 94660 CPAP INITIATION&MGMT: CPT

## 2022-06-15 PROCEDURE — 25010000002 MORPHINE PER 10 MG: Performed by: SURGERY

## 2022-06-15 PROCEDURE — 94761 N-INVAS EAR/PLS OXIMETRY MLT: CPT

## 2022-06-15 PROCEDURE — 63710000001 PREDNISONE PER 5 MG: Performed by: INTERNAL MEDICINE

## 2022-06-15 PROCEDURE — 25010000002 METOCLOPRAMIDE PER 10 MG: Performed by: SURGERY

## 2022-06-15 PROCEDURE — 25010000002 METHYLNALTREXONE 12 MG/0.6ML SOLUTION: Performed by: SURGERY

## 2022-06-15 PROCEDURE — 71045 X-RAY EXAM CHEST 1 VIEW: CPT

## 2022-06-15 PROCEDURE — 99233 SBSQ HOSP IP/OBS HIGH 50: CPT | Performed by: INTERNAL MEDICINE

## 2022-06-15 PROCEDURE — 80053 COMPREHEN METABOLIC PANEL: CPT | Performed by: INTERNAL MEDICINE

## 2022-06-15 PROCEDURE — 85025 COMPLETE CBC W/AUTO DIFF WBC: CPT | Performed by: INTERNAL MEDICINE

## 2022-06-15 PROCEDURE — 85007 BL SMEAR W/DIFF WBC COUNT: CPT | Performed by: INTERNAL MEDICINE

## 2022-06-15 PROCEDURE — 97110 THERAPEUTIC EXERCISES: CPT

## 2022-06-15 PROCEDURE — 83735 ASSAY OF MAGNESIUM: CPT | Performed by: INTERNAL MEDICINE

## 2022-06-15 PROCEDURE — 84100 ASSAY OF PHOSPHORUS: CPT | Performed by: INTERNAL MEDICINE

## 2022-06-15 PROCEDURE — 82962 GLUCOSE BLOOD TEST: CPT

## 2022-06-15 RX ORDER — CHOLECALCIFEROL (VITAMIN D3) 125 MCG
10 CAPSULE ORAL NIGHTLY PRN
Status: DISCONTINUED | OUTPATIENT
Start: 2022-06-15 | End: 2022-06-17

## 2022-06-15 RX ORDER — SODIUM CHLORIDE FOR INHALATION 7 %
4 VIAL, NEBULIZER (ML) INHALATION
Status: DISCONTINUED | OUTPATIENT
Start: 2022-06-15 | End: 2022-06-26 | Stop reason: HOSPADM

## 2022-06-15 RX ORDER — IPRATROPIUM BROMIDE AND ALBUTEROL SULFATE 2.5; .5 MG/3ML; MG/3ML
3 SOLUTION RESPIRATORY (INHALATION)
Status: DISCONTINUED | OUTPATIENT
Start: 2022-06-15 | End: 2022-06-20

## 2022-06-15 RX ORDER — SODIUM CHLORIDE, SODIUM LACTATE, POTASSIUM CHLORIDE, CALCIUM CHLORIDE 600; 310; 30; 20 MG/100ML; MG/100ML; MG/100ML; MG/100ML
50 INJECTION, SOLUTION INTRAVENOUS CONTINUOUS
Status: DISCONTINUED | OUTPATIENT
Start: 2022-06-15 | End: 2022-06-16

## 2022-06-15 RX ADMIN — BUSPIRONE HYDROCHLORIDE 10 MG: 10 TABLET ORAL at 08:10

## 2022-06-15 RX ADMIN — METOPROLOL TARTRATE 25 MG: 25 TABLET, FILM COATED ORAL at 20:35

## 2022-06-15 RX ADMIN — IPRATROPIUM BROMIDE AND ALBUTEROL SULFATE 3 ML: .5; 3 SOLUTION RESPIRATORY (INHALATION) at 19:40

## 2022-06-15 RX ADMIN — GABAPENTIN 300 MG: 300 CAPSULE ORAL at 05:18

## 2022-06-15 RX ADMIN — PANTOPRAZOLE SODIUM 40 MG: 40 INJECTION, POWDER, FOR SOLUTION INTRAVENOUS at 05:18

## 2022-06-15 RX ADMIN — SODIUM CHLORIDE, POTASSIUM CHLORIDE, SODIUM LACTATE AND CALCIUM CHLORIDE 50 ML/HR: 600; 310; 30; 20 INJECTION, SOLUTION INTRAVENOUS at 16:55

## 2022-06-15 RX ADMIN — ESCITALOPRAM OXALATE 20 MG: 20 TABLET ORAL at 08:10

## 2022-06-15 RX ADMIN — BUDESONIDE 0.5 MG: 0.5 SUSPENSION RESPIRATORY (INHALATION) at 19:40

## 2022-06-15 RX ADMIN — BUSPIRONE HYDROCHLORIDE 10 MG: 10 TABLET ORAL at 20:35

## 2022-06-15 RX ADMIN — PREDNISONE 5 MG: 5 TABLET ORAL at 08:12

## 2022-06-15 RX ADMIN — GABAPENTIN 300 MG: 300 CAPSULE ORAL at 14:10

## 2022-06-15 RX ADMIN — MUPIROCIN 1 APPLICATION: 20 OINTMENT TOPICAL at 20:36

## 2022-06-15 RX ADMIN — DOCUSATE SODIUM 100 MG: 50 LIQUID ORAL at 20:35

## 2022-06-15 RX ADMIN — IPRATROPIUM BROMIDE AND ALBUTEROL SULFATE 3 ML: .5; 3 SOLUTION RESPIRATORY (INHALATION) at 16:00

## 2022-06-15 RX ADMIN — SENNOSIDES AND DOCUSATE SODIUM 2 TABLET: 50; 8.6 TABLET ORAL at 08:10

## 2022-06-15 RX ADMIN — GABAPENTIN 300 MG: 300 CAPSULE ORAL at 20:35

## 2022-06-15 RX ADMIN — DIAZEPAM 2 MG: 2 TABLET ORAL at 20:34

## 2022-06-15 RX ADMIN — BUDESONIDE 0.5 MG: 0.5 SUSPENSION RESPIRATORY (INHALATION) at 08:34

## 2022-06-15 RX ADMIN — LISINOPRIL 10 MG: 10 TABLET ORAL at 08:11

## 2022-06-15 RX ADMIN — Medication 10 ML: at 20:36

## 2022-06-15 RX ADMIN — NYSTATIN: 100000 POWDER TOPICAL at 20:36

## 2022-06-15 RX ADMIN — NYSTATIN: 100000 POWDER TOPICAL at 08:11

## 2022-06-15 RX ADMIN — TERAZOSIN HYDROCHLORIDE 2 MG: 2 CAPSULE ORAL at 20:34

## 2022-06-15 RX ADMIN — IPRATROPIUM BROMIDE AND ALBUTEROL SULFATE 3 ML: .5; 3 SOLUTION RESPIRATORY (INHALATION) at 23:34

## 2022-06-15 RX ADMIN — DOCUSATE SODIUM 100 MG: 50 LIQUID ORAL at 08:10

## 2022-06-15 RX ADMIN — Medication 10 ML: at 08:13

## 2022-06-15 RX ADMIN — MUPIROCIN: 20 OINTMENT TOPICAL at 08:11

## 2022-06-15 RX ADMIN — METOCLOPRAMIDE 10 MG: 5 INJECTION, SOLUTION INTRAMUSCULAR; INTRAVENOUS at 08:11

## 2022-06-15 RX ADMIN — DULOXETINE HYDROCHLORIDE 120 MG: 60 CAPSULE, DELAYED RELEASE ORAL at 08:11

## 2022-06-15 RX ADMIN — APIXABAN 5 MG: 5 TABLET, FILM COATED ORAL at 08:11

## 2022-06-15 RX ADMIN — METOCLOPRAMIDE 10 MG: 5 INJECTION, SOLUTION INTRAMUSCULAR; INTRAVENOUS at 04:19

## 2022-06-15 RX ADMIN — APIXABAN 5 MG: 5 TABLET, FILM COATED ORAL at 20:35

## 2022-06-15 RX ADMIN — SODIUM CHLORIDE SOLN NEBU 7% 4 ML: 7 NEBU SOLN at 19:40

## 2022-06-15 RX ADMIN — AMLODIPINE BESYLATE 10 MG: 10 TABLET ORAL at 08:11

## 2022-06-15 RX ADMIN — MORPHINE SULFATE 2 MG: 2 INJECTION, SOLUTION INTRAMUSCULAR; INTRAVENOUS at 20:34

## 2022-06-15 RX ADMIN — METOCLOPRAMIDE 10 MG: 5 INJECTION, SOLUTION INTRAMUSCULAR; INTRAVENOUS at 20:34

## 2022-06-15 RX ADMIN — SENNOSIDES AND DOCUSATE SODIUM 2 TABLET: 50; 8.6 TABLET ORAL at 20:35

## 2022-06-15 RX ADMIN — IPRATROPIUM BROMIDE AND ALBUTEROL SULFATE 3 ML: .5; 3 SOLUTION RESPIRATORY (INHALATION) at 11:55

## 2022-06-15 RX ADMIN — METOPROLOL TARTRATE 25 MG: 25 TABLET, FILM COATED ORAL at 08:11

## 2022-06-15 RX ADMIN — METOCLOPRAMIDE 10 MG: 5 INJECTION, SOLUTION INTRAMUSCULAR; INTRAVENOUS at 14:10

## 2022-06-15 RX ADMIN — ARIPIPRAZOLE 5 MG: 10 TABLET ORAL at 08:10

## 2022-06-15 RX ADMIN — IPRATROPIUM BROMIDE AND ALBUTEROL SULFATE 3 ML: 2.5; .5 SOLUTION RESPIRATORY (INHALATION) at 08:34

## 2022-06-15 RX ADMIN — Medication 10 MG: at 20:35

## 2022-06-15 RX ADMIN — METHYLNALTREXONE BROMIDE 4 MG: 12 INJECTION, SOLUTION SUBCUTANEOUS at 08:11

## 2022-06-16 ENCOUNTER — APPOINTMENT (OUTPATIENT)
Dept: GENERAL RADIOLOGY | Facility: HOSPITAL | Age: 57
End: 2022-06-16

## 2022-06-16 LAB
ALBUMIN SERPL-MCNC: 2.8 G/DL (ref 3.5–5.2)
ALP SERPL-CCNC: 108 U/L (ref 39–117)
ALT SERPL W P-5'-P-CCNC: 13 U/L (ref 1–33)
ANION GAP SERPL CALCULATED.3IONS-SCNC: 9 MMOL/L (ref 5–15)
AST SERPL-CCNC: 10 U/L (ref 1–32)
BASOPHILS # BLD AUTO: 0.02 10*3/MM3 (ref 0–0.2)
BASOPHILS NFR BLD AUTO: 0.1 % (ref 0–1.5)
BILIRUB SERPL-MCNC: 1.3 MG/DL (ref 0–1.2)
BUN SERPL-MCNC: 22 MG/DL (ref 6–20)
CALCIUM SPEC-SCNC: 9.3 MG/DL (ref 8.6–10.5)
CHLORIDE SERPL-SCNC: 90 MMOL/L (ref 98–107)
CHOLEST SERPL-MCNC: 95 MG/DL (ref 0–200)
CO2 SERPL-SCNC: 31 MMOL/L (ref 22–29)
CREAT SERPL-MCNC: 0.86 MG/DL (ref 0.57–1)
CRP SERPL-MCNC: 29.86 MG/DL (ref 0–0.5)
DEPRECATED RDW RBC AUTO: 49.4 FL (ref 37–54)
EOSINOPHIL # BLD AUTO: 0.11 10*3/MM3 (ref 0–0.4)
EOSINOPHIL NFR BLD AUTO: 0.5 % (ref 0.3–6.2)
ERYTHROCYTE [DISTWIDTH] IN BLOOD BY AUTOMATED COUNT: 16.5 % (ref 12.3–15.4)
GLUCOSE BLDC GLUCOMTR-MCNC: 111 MG/DL (ref 70–130)
GLUCOSE BLDC GLUCOMTR-MCNC: 125 MG/DL (ref 70–130)
GLUCOSE BLDC GLUCOMTR-MCNC: 179 MG/DL (ref 70–130)
GLUCOSE BLDC GLUCOMTR-MCNC: 185 MG/DL (ref 70–130)
GLUCOSE SERPL-MCNC: 118 MG/DL (ref 65–99)
HCT VFR BLD AUTO: 31.9 % (ref 34–46.6)
HGB BLD-MCNC: 10.2 G/DL (ref 12–15.9)
IMM GRANULOCYTES # BLD AUTO: 0.19 10*3/MM3 (ref 0–0.05)
IMM GRANULOCYTES NFR BLD AUTO: 0.9 % (ref 0–0.5)
LYMPHOCYTES # BLD AUTO: 1.93 10*3/MM3 (ref 0.7–3.1)
LYMPHOCYTES NFR BLD AUTO: 8.9 % (ref 19.6–45.3)
MAGNESIUM SERPL-MCNC: 2 MG/DL (ref 1.6–2.6)
MAGNESIUM SERPL-MCNC: 2 MG/DL (ref 1.6–2.6)
MCH RBC QN AUTO: 26.4 PG (ref 26.6–33)
MCHC RBC AUTO-ENTMCNC: 32 G/DL (ref 31.5–35.7)
MCV RBC AUTO: 82.4 FL (ref 79–97)
MONOCYTES # BLD AUTO: 1.1 10*3/MM3 (ref 0.1–0.9)
MONOCYTES NFR BLD AUTO: 5.1 % (ref 5–12)
NEUTROPHILS NFR BLD AUTO: 18.36 10*3/MM3 (ref 1.7–7)
NEUTROPHILS NFR BLD AUTO: 84.5 % (ref 42.7–76)
NRBC BLD AUTO-RTO: 0 /100 WBC (ref 0–0.2)
PHOSPHATE SERPL-MCNC: 2.6 MG/DL (ref 2.5–4.5)
PHOSPHATE SERPL-MCNC: 2.6 MG/DL (ref 2.5–4.5)
PLATELET # BLD AUTO: 249 10*3/MM3 (ref 140–450)
PMV BLD AUTO: 11.2 FL (ref 6–12)
POTASSIUM SERPL-SCNC: 3.6 MMOL/L (ref 3.5–5.2)
PREALB SERPL-MCNC: 8.6 MG/DL (ref 20–40)
PROCALCITONIN SERPL-MCNC: 0.59 NG/ML (ref 0–0.25)
PROT SERPL-MCNC: 6.2 G/DL (ref 6–8.5)
RBC # BLD AUTO: 3.87 10*6/MM3 (ref 3.77–5.28)
SODIUM SERPL-SCNC: 130 MMOL/L (ref 136–145)
TRIGL SERPL-MCNC: 112 MG/DL (ref 0–150)
WBC NRBC COR # BLD: 21.71 10*3/MM3 (ref 3.4–10.8)

## 2022-06-16 PROCEDURE — 94799 UNLISTED PULMONARY SVC/PX: CPT

## 2022-06-16 PROCEDURE — 97530 THERAPEUTIC ACTIVITIES: CPT

## 2022-06-16 PROCEDURE — 85025 COMPLETE CBC W/AUTO DIFF WBC: CPT | Performed by: INTERNAL MEDICINE

## 2022-06-16 PROCEDURE — 82465 ASSAY BLD/SERUM CHOLESTEROL: CPT

## 2022-06-16 PROCEDURE — 63710000001 PREDNISONE PER 5 MG: Performed by: INTERNAL MEDICINE

## 2022-06-16 PROCEDURE — 83735 ASSAY OF MAGNESIUM: CPT

## 2022-06-16 PROCEDURE — 82962 GLUCOSE BLOOD TEST: CPT

## 2022-06-16 PROCEDURE — 25010000002 METHYLPREDNISOLONE PER 40 MG: Performed by: INTERNAL MEDICINE

## 2022-06-16 PROCEDURE — 84100 ASSAY OF PHOSPHORUS: CPT

## 2022-06-16 PROCEDURE — 63710000001 INSULIN REGULAR HUMAN PER 5 UNITS: Performed by: INTERNAL MEDICINE

## 2022-06-16 PROCEDURE — 94660 CPAP INITIATION&MGMT: CPT

## 2022-06-16 PROCEDURE — 84100 ASSAY OF PHOSPHORUS: CPT | Performed by: INTERNAL MEDICINE

## 2022-06-16 PROCEDURE — 94664 DEMO&/EVAL PT USE INHALER: CPT

## 2022-06-16 PROCEDURE — 25010000002 METOCLOPRAMIDE PER 10 MG: Performed by: SURGERY

## 2022-06-16 PROCEDURE — 84145 PROCALCITONIN (PCT): CPT | Performed by: INTERNAL MEDICINE

## 2022-06-16 PROCEDURE — 25010000002 MORPHINE PER 10 MG: Performed by: SURGERY

## 2022-06-16 PROCEDURE — 25010000002 HYDROMORPHONE PER 4 MG: Performed by: INTERNAL MEDICINE

## 2022-06-16 PROCEDURE — 86140 C-REACTIVE PROTEIN: CPT

## 2022-06-16 PROCEDURE — 80053 COMPREHEN METABOLIC PANEL: CPT

## 2022-06-16 PROCEDURE — 99233 SBSQ HOSP IP/OBS HIGH 50: CPT | Performed by: INTERNAL MEDICINE

## 2022-06-16 PROCEDURE — 84478 ASSAY OF TRIGLYCERIDES: CPT

## 2022-06-16 PROCEDURE — 97110 THERAPEUTIC EXERCISES: CPT

## 2022-06-16 PROCEDURE — 83735 ASSAY OF MAGNESIUM: CPT | Performed by: INTERNAL MEDICINE

## 2022-06-16 PROCEDURE — 71045 X-RAY EXAM CHEST 1 VIEW: CPT

## 2022-06-16 PROCEDURE — 84134 ASSAY OF PREALBUMIN: CPT

## 2022-06-16 RX ORDER — HYDROMORPHONE HYDROCHLORIDE 1 MG/ML
0.5 INJECTION, SOLUTION INTRAMUSCULAR; INTRAVENOUS; SUBCUTANEOUS
Status: DISCONTINUED | OUTPATIENT
Start: 2022-06-16 | End: 2022-06-21

## 2022-06-16 RX ORDER — METHYLPREDNISOLONE SODIUM SUCCINATE 40 MG/ML
20 INJECTION, POWDER, LYOPHILIZED, FOR SOLUTION INTRAMUSCULAR; INTRAVENOUS EVERY 8 HOURS
Status: DISCONTINUED | OUTPATIENT
Start: 2022-06-16 | End: 2022-06-18

## 2022-06-16 RX ADMIN — METHYLPREDNISOLONE SODIUM SUCCINATE 20 MG: 40 INJECTION, POWDER, FOR SOLUTION INTRAMUSCULAR; INTRAVENOUS at 20:03

## 2022-06-16 RX ADMIN — METOCLOPRAMIDE 10 MG: 5 INJECTION, SOLUTION INTRAMUSCULAR; INTRAVENOUS at 02:42

## 2022-06-16 RX ADMIN — POTASSIUM CHLORIDE 40 MEQ: 1.5 POWDER, FOR SOLUTION ORAL at 04:07

## 2022-06-16 RX ADMIN — GABAPENTIN 300 MG: 300 CAPSULE ORAL at 20:03

## 2022-06-16 RX ADMIN — ESCITALOPRAM OXALATE 20 MG: 20 TABLET ORAL at 08:10

## 2022-06-16 RX ADMIN — IPRATROPIUM BROMIDE AND ALBUTEROL SULFATE 3 ML: .5; 3 SOLUTION RESPIRATORY (INHALATION) at 03:45

## 2022-06-16 RX ADMIN — APIXABAN 5 MG: 5 TABLET, FILM COATED ORAL at 20:03

## 2022-06-16 RX ADMIN — BUSPIRONE HYDROCHLORIDE 10 MG: 10 TABLET ORAL at 08:10

## 2022-06-16 RX ADMIN — PANTOPRAZOLE SODIUM 40 MG: 40 INJECTION, POWDER, FOR SOLUTION INTRAVENOUS at 05:52

## 2022-06-16 RX ADMIN — SODIUM CHLORIDE SOLN NEBU 7% 4 ML: 7 NEBU SOLN at 08:03

## 2022-06-16 RX ADMIN — METOCLOPRAMIDE 10 MG: 5 INJECTION, SOLUTION INTRAMUSCULAR; INTRAVENOUS at 13:08

## 2022-06-16 RX ADMIN — METOCLOPRAMIDE 10 MG: 5 INJECTION, SOLUTION INTRAMUSCULAR; INTRAVENOUS at 20:03

## 2022-06-16 RX ADMIN — METOPROLOL TARTRATE 25 MG: 25 TABLET, FILM COATED ORAL at 08:10

## 2022-06-16 RX ADMIN — MUPIROCIN: 20 OINTMENT TOPICAL at 20:04

## 2022-06-16 RX ADMIN — BUSPIRONE HYDROCHLORIDE 10 MG: 10 TABLET ORAL at 20:04

## 2022-06-16 RX ADMIN — APIXABAN 5 MG: 5 TABLET, FILM COATED ORAL at 08:10

## 2022-06-16 RX ADMIN — DULOXETINE HYDROCHLORIDE 120 MG: 60 CAPSULE, DELAYED RELEASE ORAL at 08:09

## 2022-06-16 RX ADMIN — IPRATROPIUM BROMIDE AND ALBUTEROL SULFATE 3 ML: .5; 3 SOLUTION RESPIRATORY (INHALATION) at 15:14

## 2022-06-16 RX ADMIN — IPRATROPIUM BROMIDE AND ALBUTEROL SULFATE 3 ML: .5; 3 SOLUTION RESPIRATORY (INHALATION) at 22:43

## 2022-06-16 RX ADMIN — AMLODIPINE BESYLATE 10 MG: 10 TABLET ORAL at 08:10

## 2022-06-16 RX ADMIN — NYSTATIN: 100000 POWDER TOPICAL at 08:10

## 2022-06-16 RX ADMIN — DOCUSATE SODIUM 100 MG: 50 LIQUID ORAL at 20:04

## 2022-06-16 RX ADMIN — SENNOSIDES AND DOCUSATE SODIUM 2 TABLET: 50; 8.6 TABLET ORAL at 08:10

## 2022-06-16 RX ADMIN — GABAPENTIN 300 MG: 300 CAPSULE ORAL at 13:08

## 2022-06-16 RX ADMIN — DIAZEPAM 2 MG: 2 TABLET ORAL at 20:03

## 2022-06-16 RX ADMIN — IPRATROPIUM BROMIDE AND ALBUTEROL SULFATE 3 ML: .5; 3 SOLUTION RESPIRATORY (INHALATION) at 19:15

## 2022-06-16 RX ADMIN — DOCUSATE SODIUM 100 MG: 50 LIQUID ORAL at 08:09

## 2022-06-16 RX ADMIN — HYDROMORPHONE HYDROCHLORIDE 0.5 MG: 1 INJECTION, SOLUTION INTRAMUSCULAR; INTRAVENOUS; SUBCUTANEOUS at 20:04

## 2022-06-16 RX ADMIN — INSULIN HUMAN 3 UNITS: 100 INJECTION, SOLUTION PARENTERAL at 17:50

## 2022-06-16 RX ADMIN — MUPIROCIN: 20 OINTMENT TOPICAL at 08:10

## 2022-06-16 RX ADMIN — SODIUM CHLORIDE SOLN NEBU 7% 4 ML: 7 NEBU SOLN at 19:15

## 2022-06-16 RX ADMIN — Medication 10 MG: at 21:05

## 2022-06-16 RX ADMIN — HYDROMORPHONE HYDROCHLORIDE 0.5 MG: 1 INJECTION, SOLUTION INTRAMUSCULAR; INTRAVENOUS; SUBCUTANEOUS at 17:33

## 2022-06-16 RX ADMIN — GABAPENTIN 300 MG: 300 CAPSULE ORAL at 05:52

## 2022-06-16 RX ADMIN — Medication 10 ML: at 08:10

## 2022-06-16 RX ADMIN — MORPHINE SULFATE 2 MG: 2 INJECTION, SOLUTION INTRAMUSCULAR; INTRAVENOUS at 04:07

## 2022-06-16 RX ADMIN — METOCLOPRAMIDE 10 MG: 5 INJECTION, SOLUTION INTRAMUSCULAR; INTRAVENOUS at 08:10

## 2022-06-16 RX ADMIN — BUDESONIDE 0.5 MG: 0.5 SUSPENSION RESPIRATORY (INHALATION) at 19:15

## 2022-06-16 RX ADMIN — IPRATROPIUM BROMIDE AND ALBUTEROL SULFATE 3 ML: .5; 3 SOLUTION RESPIRATORY (INHALATION) at 13:09

## 2022-06-16 RX ADMIN — Medication 10 ML: at 20:04

## 2022-06-16 RX ADMIN — BUDESONIDE 0.5 MG: 0.5 SUSPENSION RESPIRATORY (INHALATION) at 08:04

## 2022-06-16 RX ADMIN — METHYLPREDNISOLONE SODIUM SUCCINATE 20 MG: 40 INJECTION, POWDER, FOR SOLUTION INTRAMUSCULAR; INTRAVENOUS at 13:08

## 2022-06-16 RX ADMIN — HYDROMORPHONE HYDROCHLORIDE 0.5 MG: 1 INJECTION, SOLUTION INTRAMUSCULAR; INTRAVENOUS; SUBCUTANEOUS at 22:15

## 2022-06-16 RX ADMIN — ARIPIPRAZOLE 5 MG: 10 TABLET ORAL at 08:09

## 2022-06-16 RX ADMIN — HYDROMORPHONE HYDROCHLORIDE 0.5 MG: 1 INJECTION, SOLUTION INTRAMUSCULAR; INTRAVENOUS; SUBCUTANEOUS at 15:21

## 2022-06-16 RX ADMIN — TERAZOSIN HYDROCHLORIDE 2 MG: 2 CAPSULE ORAL at 20:03

## 2022-06-16 RX ADMIN — SENNOSIDES AND DOCUSATE SODIUM 2 TABLET: 50; 8.6 TABLET ORAL at 20:03

## 2022-06-16 RX ADMIN — OXYCODONE HYDROCHLORIDE AND ACETAMINOPHEN 2 TABLET: 5; 325 TABLET ORAL at 21:05

## 2022-06-16 RX ADMIN — IPRATROPIUM BROMIDE AND ALBUTEROL SULFATE 3 ML: .5; 3 SOLUTION RESPIRATORY (INHALATION) at 08:03

## 2022-06-16 RX ADMIN — NYSTATIN: 100000 POWDER TOPICAL at 20:04

## 2022-06-16 RX ADMIN — PREDNISONE 5 MG: 5 TABLET ORAL at 08:10

## 2022-06-16 RX ADMIN — POTASSIUM CHLORIDE 40 MEQ: 1.5 POWDER, FOR SOLUTION ORAL at 08:10

## 2022-06-16 RX ADMIN — HYDROMORPHONE HYDROCHLORIDE 0.5 MG: 1 INJECTION, SOLUTION INTRAMUSCULAR; INTRAVENOUS; SUBCUTANEOUS at 13:14

## 2022-06-17 ENCOUNTER — ANCILLARY PROCEDURE (OUTPATIENT)
Dept: SPEECH THERAPY | Facility: HOSPITAL | Age: 57
End: 2022-06-17

## 2022-06-17 ENCOUNTER — APPOINTMENT (OUTPATIENT)
Dept: GENERAL RADIOLOGY | Facility: HOSPITAL | Age: 57
End: 2022-06-17

## 2022-06-17 LAB
ALBUMIN SERPL-MCNC: 3.1 G/DL (ref 3.5–5.2)
ALBUMIN/GLOB SERPL: 1 G/DL
ALP SERPL-CCNC: 148 U/L (ref 39–117)
ALT SERPL W P-5'-P-CCNC: 14 U/L (ref 1–33)
ANION GAP SERPL CALCULATED.3IONS-SCNC: 11 MMOL/L (ref 5–15)
AST SERPL-CCNC: 10 U/L (ref 1–32)
BASOPHILS # BLD AUTO: 0.03 10*3/MM3 (ref 0–0.2)
BASOPHILS NFR BLD AUTO: 0.2 % (ref 0–1.5)
BILIRUB SERPL-MCNC: 0.8 MG/DL (ref 0–1.2)
BUN SERPL-MCNC: 28 MG/DL (ref 6–20)
BUN/CREAT SERPL: 34.6 (ref 7–25)
CALCIUM SPEC-SCNC: 9.6 MG/DL (ref 8.6–10.5)
CHLORIDE SERPL-SCNC: 89 MMOL/L (ref 98–107)
CO2 SERPL-SCNC: 29 MMOL/L (ref 22–29)
CREAT SERPL-MCNC: 0.81 MG/DL (ref 0.57–1)
DEPRECATED RDW RBC AUTO: 47.3 FL (ref 37–54)
EGFRCR SERPLBLD CKD-EPI 2021: 84.8 ML/MIN/1.73
EOSINOPHIL # BLD AUTO: 0 10*3/MM3 (ref 0–0.4)
EOSINOPHIL NFR BLD AUTO: 0 % (ref 0.3–6.2)
ERYTHROCYTE [DISTWIDTH] IN BLOOD BY AUTOMATED COUNT: 15.9 % (ref 12.3–15.4)
GLOBULIN UR ELPH-MCNC: 3.1 GM/DL
GLUCOSE BLDC GLUCOMTR-MCNC: 189 MG/DL (ref 70–130)
GLUCOSE BLDC GLUCOMTR-MCNC: 195 MG/DL (ref 70–130)
GLUCOSE BLDC GLUCOMTR-MCNC: 254 MG/DL (ref 70–130)
GLUCOSE BLDC GLUCOMTR-MCNC: 271 MG/DL (ref 70–130)
GLUCOSE SERPL-MCNC: 205 MG/DL (ref 65–99)
HCT VFR BLD AUTO: 34.2 % (ref 34–46.6)
HGB BLD-MCNC: 10.9 G/DL (ref 12–15.9)
IMM GRANULOCYTES # BLD AUTO: 0.19 10*3/MM3 (ref 0–0.05)
IMM GRANULOCYTES NFR BLD AUTO: 1.2 % (ref 0–0.5)
LYMPHOCYTES # BLD AUTO: 1.06 10*3/MM3 (ref 0.7–3.1)
LYMPHOCYTES NFR BLD AUTO: 6.9 % (ref 19.6–45.3)
MAGNESIUM SERPL-MCNC: 1.9 MG/DL (ref 1.6–2.6)
MCH RBC QN AUTO: 26 PG (ref 26.6–33)
MCHC RBC AUTO-ENTMCNC: 31.9 G/DL (ref 31.5–35.7)
MCV RBC AUTO: 81.6 FL (ref 79–97)
MONOCYTES # BLD AUTO: 0.34 10*3/MM3 (ref 0.1–0.9)
MONOCYTES NFR BLD AUTO: 2.2 % (ref 5–12)
NEUTROPHILS NFR BLD AUTO: 13.76 10*3/MM3 (ref 1.7–7)
NEUTROPHILS NFR BLD AUTO: 89.5 % (ref 42.7–76)
NRBC BLD AUTO-RTO: 0 /100 WBC (ref 0–0.2)
PHOSPHATE SERPL-MCNC: 4.4 MG/DL (ref 2.5–4.5)
PLATELET # BLD AUTO: 302 10*3/MM3 (ref 140–450)
PMV BLD AUTO: 10.5 FL (ref 6–12)
POTASSIUM SERPL-SCNC: 5 MMOL/L (ref 3.5–5.2)
PROT SERPL-MCNC: 6.2 G/DL (ref 6–8.5)
RBC # BLD AUTO: 4.19 10*6/MM3 (ref 3.77–5.28)
SODIUM SERPL-SCNC: 129 MMOL/L (ref 136–145)
WBC NRBC COR # BLD: 15.38 10*3/MM3 (ref 3.4–10.8)

## 2022-06-17 PROCEDURE — 80053 COMPREHEN METABOLIC PANEL: CPT | Performed by: INTERNAL MEDICINE

## 2022-06-17 PROCEDURE — 83735 ASSAY OF MAGNESIUM: CPT | Performed by: INTERNAL MEDICINE

## 2022-06-17 PROCEDURE — 99233 SBSQ HOSP IP/OBS HIGH 50: CPT | Performed by: INTERNAL MEDICINE

## 2022-06-17 PROCEDURE — 94799 UNLISTED PULMONARY SVC/PX: CPT

## 2022-06-17 PROCEDURE — 97530 THERAPEUTIC ACTIVITIES: CPT

## 2022-06-17 PROCEDURE — 94761 N-INVAS EAR/PLS OXIMETRY MLT: CPT

## 2022-06-17 PROCEDURE — 25010000002 METHYLNALTREXONE 12 MG/0.6ML SOLUTION: Performed by: SURGERY

## 2022-06-17 PROCEDURE — 25010000002 METOCLOPRAMIDE PER 10 MG: Performed by: SURGERY

## 2022-06-17 PROCEDURE — 71045 X-RAY EXAM CHEST 1 VIEW: CPT

## 2022-06-17 PROCEDURE — 85025 COMPLETE CBC W/AUTO DIFF WBC: CPT | Performed by: INTERNAL MEDICINE

## 2022-06-17 PROCEDURE — 84100 ASSAY OF PHOSPHORUS: CPT | Performed by: INTERNAL MEDICINE

## 2022-06-17 PROCEDURE — 92612 ENDOSCOPY SWALLOW (FEES) VID: CPT

## 2022-06-17 PROCEDURE — 63710000001 INSULIN LISPRO (HUMAN) PER 5 UNITS: Performed by: NURSE PRACTITIONER

## 2022-06-17 PROCEDURE — 25010000002 HYDROMORPHONE PER 4 MG: Performed by: INTERNAL MEDICINE

## 2022-06-17 PROCEDURE — 82962 GLUCOSE BLOOD TEST: CPT

## 2022-06-17 PROCEDURE — 0 MAGNESIUM SULFATE 4 GM/100ML SOLUTION: Performed by: SURGERY

## 2022-06-17 PROCEDURE — 92610 EVALUATE SWALLOWING FUNCTION: CPT

## 2022-06-17 PROCEDURE — 25010000002 METHYLPREDNISOLONE PER 40 MG: Performed by: INTERNAL MEDICINE

## 2022-06-17 PROCEDURE — 63710000001 INSULIN REGULAR HUMAN PER 5 UNITS: Performed by: INTERNAL MEDICINE

## 2022-06-17 RX ORDER — BUSPIRONE HYDROCHLORIDE 10 MG/1
10 TABLET ORAL 2 TIMES DAILY
Status: DISCONTINUED | OUTPATIENT
Start: 2022-06-17 | End: 2022-06-26 | Stop reason: HOSPADM

## 2022-06-17 RX ORDER — BISACODYL 10 MG
10 SUPPOSITORY, RECTAL RECTAL DAILY PRN
Status: DISCONTINUED | OUTPATIENT
Start: 2022-06-17 | End: 2022-06-26 | Stop reason: HOSPADM

## 2022-06-17 RX ORDER — POTASSIUM CHLORIDE 7.45 MG/ML
10 INJECTION INTRAVENOUS
Status: DISCONTINUED | OUTPATIENT
Start: 2022-06-17 | End: 2022-06-26 | Stop reason: HOSPADM

## 2022-06-17 RX ORDER — AMLODIPINE BESYLATE 10 MG/1
10 TABLET ORAL
Status: DISCONTINUED | OUTPATIENT
Start: 2022-06-18 | End: 2022-06-26 | Stop reason: HOSPADM

## 2022-06-17 RX ORDER — ACETAMINOPHEN 325 MG/1
650 TABLET ORAL EVERY 4 HOURS PRN
Status: DISCONTINUED | OUTPATIENT
Start: 2022-06-17 | End: 2022-06-26 | Stop reason: HOSPADM

## 2022-06-17 RX ORDER — AMOXICILLIN 250 MG
2 CAPSULE ORAL 2 TIMES DAILY
Status: DISCONTINUED | OUTPATIENT
Start: 2022-06-17 | End: 2022-06-26 | Stop reason: HOSPADM

## 2022-06-17 RX ORDER — OXYCODONE HYDROCHLORIDE AND ACETAMINOPHEN 5; 325 MG/1; MG/1
2 TABLET ORAL EVERY 4 HOURS PRN
Status: DISCONTINUED | OUTPATIENT
Start: 2022-06-17 | End: 2022-06-25

## 2022-06-17 RX ORDER — ACETAMINOPHEN 650 MG/1
650 SUPPOSITORY RECTAL EVERY 4 HOURS PRN
Status: DISCONTINUED | OUTPATIENT
Start: 2022-06-17 | End: 2022-06-26 | Stop reason: HOSPADM

## 2022-06-17 RX ORDER — GABAPENTIN 300 MG/1
300 CAPSULE ORAL EVERY 8 HOURS SCHEDULED
Status: DISCONTINUED | OUTPATIENT
Start: 2022-06-17 | End: 2022-06-26 | Stop reason: HOSPADM

## 2022-06-17 RX ORDER — POLYETHYLENE GLYCOL 3350 17 G/17G
17 POWDER, FOR SOLUTION ORAL DAILY PRN
Status: DISCONTINUED | OUTPATIENT
Start: 2022-06-17 | End: 2022-06-26 | Stop reason: HOSPADM

## 2022-06-17 RX ORDER — ESCITALOPRAM OXALATE 20 MG/1
20 TABLET ORAL DAILY
Status: DISCONTINUED | OUTPATIENT
Start: 2022-06-18 | End: 2022-06-26 | Stop reason: HOSPADM

## 2022-06-17 RX ORDER — INSULIN LISPRO 100 [IU]/ML
0-24 INJECTION, SOLUTION INTRAVENOUS; SUBCUTANEOUS
Status: DISCONTINUED | OUTPATIENT
Start: 2022-06-17 | End: 2022-06-26 | Stop reason: HOSPADM

## 2022-06-17 RX ORDER — ARIPIPRAZOLE 10 MG/1
5 TABLET ORAL DAILY
Status: DISCONTINUED | OUTPATIENT
Start: 2022-06-18 | End: 2022-06-26 | Stop reason: HOSPADM

## 2022-06-17 RX ORDER — POTASSIUM CHLORIDE 1.5 G/1.77G
40 POWDER, FOR SOLUTION ORAL AS NEEDED
Status: DISCONTINUED | OUTPATIENT
Start: 2022-06-17 | End: 2022-06-26 | Stop reason: HOSPADM

## 2022-06-17 RX ORDER — NICOTINE POLACRILEX 4 MG
15 LOZENGE BUCCAL
Status: DISCONTINUED | OUTPATIENT
Start: 2022-06-17 | End: 2022-06-26 | Stop reason: HOSPADM

## 2022-06-17 RX ORDER — DOCUSATE SODIUM 50 MG/5 ML
100 LIQUID (ML) ORAL 2 TIMES DAILY
Status: DISCONTINUED | OUTPATIENT
Start: 2022-06-17 | End: 2022-06-18

## 2022-06-17 RX ORDER — TERAZOSIN 2 MG/1
2 CAPSULE ORAL NIGHTLY
Status: DISCONTINUED | OUTPATIENT
Start: 2022-06-17 | End: 2022-06-26 | Stop reason: HOSPADM

## 2022-06-17 RX ORDER — CHOLECALCIFEROL (VITAMIN D3) 125 MCG
10 CAPSULE ORAL NIGHTLY PRN
Status: DISCONTINUED | OUTPATIENT
Start: 2022-06-17 | End: 2022-06-18

## 2022-06-17 RX ADMIN — OXYCODONE HYDROCHLORIDE AND ACETAMINOPHEN 2 TABLET: 5; 325 TABLET ORAL at 06:03

## 2022-06-17 RX ADMIN — BUSPIRONE HYDROCHLORIDE 10 MG: 10 TABLET ORAL at 08:54

## 2022-06-17 RX ADMIN — INSULIN HUMAN 3 UNITS: 100 INJECTION, SOLUTION PARENTERAL at 13:07

## 2022-06-17 RX ADMIN — METOCLOPRAMIDE 10 MG: 5 INJECTION, SOLUTION INTRAMUSCULAR; INTRAVENOUS at 08:54

## 2022-06-17 RX ADMIN — METOPROLOL TARTRATE 25 MG: 25 TABLET, FILM COATED ORAL at 21:33

## 2022-06-17 RX ADMIN — APIXABAN 5 MG: 5 TABLET, FILM COATED ORAL at 21:32

## 2022-06-17 RX ADMIN — IPRATROPIUM BROMIDE AND ALBUTEROL SULFATE 3 ML: .5; 3 SOLUTION RESPIRATORY (INHALATION) at 15:23

## 2022-06-17 RX ADMIN — IPRATROPIUM BROMIDE AND ALBUTEROL SULFATE 3 ML: .5; 3 SOLUTION RESPIRATORY (INHALATION) at 07:39

## 2022-06-17 RX ADMIN — ARIPIPRAZOLE 5 MG: 10 TABLET ORAL at 08:54

## 2022-06-17 RX ADMIN — NYSTATIN 1 APPLICATION: 100000 POWDER TOPICAL at 21:00

## 2022-06-17 RX ADMIN — DIAZEPAM 2 MG: 2 TABLET ORAL at 08:54

## 2022-06-17 RX ADMIN — METHYLPREDNISOLONE SODIUM SUCCINATE 20 MG: 40 INJECTION, POWDER, FOR SOLUTION INTRAMUSCULAR; INTRAVENOUS at 06:02

## 2022-06-17 RX ADMIN — PANTOPRAZOLE SODIUM 40 MG: 40 INJECTION, POWDER, FOR SOLUTION INTRAVENOUS at 06:04

## 2022-06-17 RX ADMIN — METOCLOPRAMIDE 10 MG: 5 INJECTION, SOLUTION INTRAMUSCULAR; INTRAVENOUS at 13:07

## 2022-06-17 RX ADMIN — OXYCODONE HYDROCHLORIDE AND ACETAMINOPHEN 2 TABLET: 5; 325 TABLET ORAL at 15:20

## 2022-06-17 RX ADMIN — HYDROMORPHONE HYDROCHLORIDE 0.5 MG: 1 INJECTION, SOLUTION INTRAMUSCULAR; INTRAVENOUS; SUBCUTANEOUS at 03:39

## 2022-06-17 RX ADMIN — OXYCODONE HYDROCHLORIDE AND ACETAMINOPHEN 2 TABLET: 5; 325 TABLET ORAL at 10:28

## 2022-06-17 RX ADMIN — MAGNESIUM SULFATE HEPTAHYDRATE 4 G: 40 INJECTION, SOLUTION INTRAVENOUS at 06:03

## 2022-06-17 RX ADMIN — METHYLPREDNISOLONE SODIUM SUCCINATE 20 MG: 40 INJECTION, POWDER, FOR SOLUTION INTRAMUSCULAR; INTRAVENOUS at 21:32

## 2022-06-17 RX ADMIN — GABAPENTIN 300 MG: 300 CAPSULE ORAL at 21:32

## 2022-06-17 RX ADMIN — Medication 10 ML: at 21:00

## 2022-06-17 RX ADMIN — Medication 10 MG: at 21:32

## 2022-06-17 RX ADMIN — METHYLPREDNISOLONE SODIUM SUCCINATE 20 MG: 40 INJECTION, POWDER, FOR SOLUTION INTRAMUSCULAR; INTRAVENOUS at 13:07

## 2022-06-17 RX ADMIN — METOCLOPRAMIDE 10 MG: 5 INJECTION, SOLUTION INTRAMUSCULAR; INTRAVENOUS at 20:11

## 2022-06-17 RX ADMIN — IPRATROPIUM BROMIDE AND ALBUTEROL SULFATE 3 ML: .5; 3 SOLUTION RESPIRATORY (INHALATION) at 23:21

## 2022-06-17 RX ADMIN — HYDROMORPHONE HYDROCHLORIDE 0.5 MG: 1 INJECTION, SOLUTION INTRAMUSCULAR; INTRAVENOUS; SUBCUTANEOUS at 13:08

## 2022-06-17 RX ADMIN — SENNOSIDES AND DOCUSATE SODIUM 2 TABLET: 50; 8.6 TABLET ORAL at 08:54

## 2022-06-17 RX ADMIN — SODIUM CHLORIDE SOLN NEBU 7% 4 ML: 7 NEBU SOLN at 07:39

## 2022-06-17 RX ADMIN — HYDROMORPHONE HYDROCHLORIDE 0.5 MG: 1 INJECTION, SOLUTION INTRAMUSCULAR; INTRAVENOUS; SUBCUTANEOUS at 08:55

## 2022-06-17 RX ADMIN — IPRATROPIUM BROMIDE AND ALBUTEROL SULFATE 3 ML: .5; 3 SOLUTION RESPIRATORY (INHALATION) at 02:29

## 2022-06-17 RX ADMIN — BUDESONIDE 0.5 MG: 0.5 SUSPENSION RESPIRATORY (INHALATION) at 07:39

## 2022-06-17 RX ADMIN — GABAPENTIN 300 MG: 300 CAPSULE ORAL at 06:03

## 2022-06-17 RX ADMIN — DIAZEPAM 2 MG: 2 TABLET ORAL at 21:31

## 2022-06-17 RX ADMIN — NYSTATIN: 100000 POWDER TOPICAL at 08:55

## 2022-06-17 RX ADMIN — MUPIROCIN 1 APPLICATION: 20 OINTMENT TOPICAL at 21:00

## 2022-06-17 RX ADMIN — ESCITALOPRAM OXALATE 20 MG: 20 TABLET ORAL at 08:54

## 2022-06-17 RX ADMIN — OXYCODONE HYDROCHLORIDE AND ACETAMINOPHEN 2 TABLET: 5; 325 TABLET ORAL at 20:11

## 2022-06-17 RX ADMIN — APIXABAN 5 MG: 5 TABLET, FILM COATED ORAL at 08:54

## 2022-06-17 RX ADMIN — METOPROLOL TARTRATE 25 MG: 25 TABLET, FILM COATED ORAL at 08:54

## 2022-06-17 RX ADMIN — BUDESONIDE 0.5 MG: 0.5 SUSPENSION RESPIRATORY (INHALATION) at 19:55

## 2022-06-17 RX ADMIN — METHYLNALTREXONE BROMIDE 4 MG: 12 INJECTION, SOLUTION SUBCUTANEOUS at 08:56

## 2022-06-17 RX ADMIN — METOCLOPRAMIDE 10 MG: 5 INJECTION, SOLUTION INTRAMUSCULAR; INTRAVENOUS at 01:42

## 2022-06-17 RX ADMIN — HYDROMORPHONE HYDROCHLORIDE 0.5 MG: 1 INJECTION, SOLUTION INTRAMUSCULAR; INTRAVENOUS; SUBCUTANEOUS at 06:17

## 2022-06-17 RX ADMIN — BUSPIRONE HYDROCHLORIDE 10 MG: 10 TABLET ORAL at 21:32

## 2022-06-17 RX ADMIN — AMLODIPINE BESYLATE 10 MG: 10 TABLET ORAL at 08:54

## 2022-06-17 RX ADMIN — INSULIN LISPRO 12 UNITS: 100 INJECTION, SOLUTION INTRAVENOUS; SUBCUTANEOUS at 17:06

## 2022-06-17 RX ADMIN — INSULIN HUMAN 3 UNITS: 100 INJECTION, SOLUTION PARENTERAL at 06:04

## 2022-06-17 RX ADMIN — DOCUSATE SODIUM 100 MG: 50 LIQUID ORAL at 08:54

## 2022-06-17 RX ADMIN — HYDROMORPHONE HYDROCHLORIDE 0.5 MG: 1 INJECTION, SOLUTION INTRAMUSCULAR; INTRAVENOUS; SUBCUTANEOUS at 00:18

## 2022-06-17 RX ADMIN — INSULIN LISPRO 12 UNITS: 100 INJECTION, SOLUTION INTRAVENOUS; SUBCUTANEOUS at 22:10

## 2022-06-17 RX ADMIN — HYDROMORPHONE HYDROCHLORIDE 0.5 MG: 1 INJECTION, SOLUTION INTRAMUSCULAR; INTRAVENOUS; SUBCUTANEOUS at 16:56

## 2022-06-17 RX ADMIN — IPRATROPIUM BROMIDE AND ALBUTEROL SULFATE 3 ML: .5; 3 SOLUTION RESPIRATORY (INHALATION) at 19:55

## 2022-06-17 RX ADMIN — SENNOSIDES AND DOCUSATE SODIUM 2 TABLET: 50; 8.6 TABLET ORAL at 21:32

## 2022-06-17 RX ADMIN — HYDROMORPHONE HYDROCHLORIDE 0.5 MG: 1 INJECTION, SOLUTION INTRAMUSCULAR; INTRAVENOUS; SUBCUTANEOUS at 21:31

## 2022-06-17 RX ADMIN — DOCUSATE SODIUM 100 MG: 50 LIQUID ORAL at 21:32

## 2022-06-17 RX ADMIN — SODIUM CHLORIDE SOLN NEBU 7% 4 ML: 7 NEBU SOLN at 19:55

## 2022-06-17 RX ADMIN — MUPIROCIN: 20 OINTMENT TOPICAL at 08:56

## 2022-06-17 RX ADMIN — OXYCODONE HYDROCHLORIDE AND ACETAMINOPHEN 2 TABLET: 5; 325 TABLET ORAL at 01:42

## 2022-06-17 RX ADMIN — Medication 1 SPRAY: at 10:28

## 2022-06-17 RX ADMIN — INSULIN HUMAN 3 UNITS: 100 INJECTION, SOLUTION PARENTERAL at 00:21

## 2022-06-17 RX ADMIN — GABAPENTIN 300 MG: 300 CAPSULE ORAL at 13:08

## 2022-06-18 LAB
ALBUMIN SERPL-MCNC: 3.3 G/DL (ref 3.5–5.2)
ALBUMIN/GLOB SERPL: 0.9 G/DL
ALP SERPL-CCNC: 132 U/L (ref 39–117)
ALT SERPL W P-5'-P-CCNC: 15 U/L (ref 1–33)
ANION GAP SERPL CALCULATED.3IONS-SCNC: 9 MMOL/L (ref 5–15)
AST SERPL-CCNC: 10 U/L (ref 1–32)
BASOPHILS # BLD AUTO: 0.05 10*3/MM3 (ref 0–0.2)
BASOPHILS NFR BLD AUTO: 0.3 % (ref 0–1.5)
BILIRUB SERPL-MCNC: 0.5 MG/DL (ref 0–1.2)
BUN SERPL-MCNC: 32 MG/DL (ref 6–20)
BUN/CREAT SERPL: 34 (ref 7–25)
CALCIUM SPEC-SCNC: 10 MG/DL (ref 8.6–10.5)
CHLORIDE SERPL-SCNC: 88 MMOL/L (ref 98–107)
CO2 SERPL-SCNC: 30 MMOL/L (ref 22–29)
CREAT SERPL-MCNC: 0.94 MG/DL (ref 0.57–1)
DEPRECATED RDW RBC AUTO: 45 FL (ref 37–54)
EGFRCR SERPLBLD CKD-EPI 2021: 70.9 ML/MIN/1.73
EOSINOPHIL # BLD AUTO: 0 10*3/MM3 (ref 0–0.4)
EOSINOPHIL NFR BLD AUTO: 0 % (ref 0.3–6.2)
ERYTHROCYTE [DISTWIDTH] IN BLOOD BY AUTOMATED COUNT: 15.5 % (ref 12.3–15.4)
GLOBULIN UR ELPH-MCNC: 3.8 GM/DL
GLUCOSE BLDC GLUCOMTR-MCNC: 132 MG/DL (ref 70–130)
GLUCOSE BLDC GLUCOMTR-MCNC: 178 MG/DL (ref 70–130)
GLUCOSE BLDC GLUCOMTR-MCNC: 240 MG/DL (ref 70–130)
GLUCOSE BLDC GLUCOMTR-MCNC: 395 MG/DL (ref 70–130)
GLUCOSE SERPL-MCNC: 236 MG/DL (ref 65–99)
HCT VFR BLD AUTO: 34.6 % (ref 34–46.6)
HGB BLD-MCNC: 11.4 G/DL (ref 12–15.9)
IMM GRANULOCYTES # BLD AUTO: 0.48 10*3/MM3 (ref 0–0.05)
IMM GRANULOCYTES NFR BLD AUTO: 2.4 % (ref 0–0.5)
LYMPHOCYTES # BLD AUTO: 1.73 10*3/MM3 (ref 0.7–3.1)
LYMPHOCYTES NFR BLD AUTO: 8.8 % (ref 19.6–45.3)
MAGNESIUM SERPL-MCNC: 2.4 MG/DL (ref 1.6–2.6)
MCH RBC QN AUTO: 26.3 PG (ref 26.6–33)
MCHC RBC AUTO-ENTMCNC: 32.9 G/DL (ref 31.5–35.7)
MCV RBC AUTO: 79.7 FL (ref 79–97)
MONOCYTES # BLD AUTO: 0.87 10*3/MM3 (ref 0.1–0.9)
MONOCYTES NFR BLD AUTO: 4.4 % (ref 5–12)
NEUTROPHILS NFR BLD AUTO: 16.52 10*3/MM3 (ref 1.7–7)
NEUTROPHILS NFR BLD AUTO: 84.1 % (ref 42.7–76)
NRBC BLD AUTO-RTO: 0 /100 WBC (ref 0–0.2)
PHOSPHATE SERPL-MCNC: 3.1 MG/DL (ref 2.5–4.5)
PLATELET # BLD AUTO: 342 10*3/MM3 (ref 140–450)
PMV BLD AUTO: 10.6 FL (ref 6–12)
POTASSIUM SERPL-SCNC: 4.8 MMOL/L (ref 3.5–5.2)
PROT SERPL-MCNC: 7.1 G/DL (ref 6–8.5)
RBC # BLD AUTO: 4.34 10*6/MM3 (ref 3.77–5.28)
SODIUM SERPL-SCNC: 127 MMOL/L (ref 136–145)
WBC NRBC COR # BLD: 19.65 10*3/MM3 (ref 3.4–10.8)

## 2022-06-18 PROCEDURE — 94799 UNLISTED PULMONARY SVC/PX: CPT

## 2022-06-18 PROCEDURE — 94664 DEMO&/EVAL PT USE INHALER: CPT

## 2022-06-18 PROCEDURE — 99232 SBSQ HOSP IP/OBS MODERATE 35: CPT | Performed by: INTERNAL MEDICINE

## 2022-06-18 PROCEDURE — 25010000002 METHYLPREDNISOLONE PER 40 MG: Performed by: INTERNAL MEDICINE

## 2022-06-18 PROCEDURE — 82962 GLUCOSE BLOOD TEST: CPT

## 2022-06-18 PROCEDURE — 84100 ASSAY OF PHOSPHORUS: CPT | Performed by: INTERNAL MEDICINE

## 2022-06-18 PROCEDURE — 25010000002 HYDROMORPHONE PER 4 MG: Performed by: INTERNAL MEDICINE

## 2022-06-18 PROCEDURE — 80053 COMPREHEN METABOLIC PANEL: CPT | Performed by: INTERNAL MEDICINE

## 2022-06-18 PROCEDURE — 94761 N-INVAS EAR/PLS OXIMETRY MLT: CPT

## 2022-06-18 PROCEDURE — 63710000001 INSULIN DETEMIR PER 5 UNITS: Performed by: INTERNAL MEDICINE

## 2022-06-18 PROCEDURE — 25010000002 METOCLOPRAMIDE PER 10 MG: Performed by: SURGERY

## 2022-06-18 PROCEDURE — 83735 ASSAY OF MAGNESIUM: CPT | Performed by: INTERNAL MEDICINE

## 2022-06-18 PROCEDURE — 63710000001 INSULIN LISPRO (HUMAN) PER 5 UNITS: Performed by: NURSE PRACTITIONER

## 2022-06-18 PROCEDURE — 85025 COMPLETE CBC W/AUTO DIFF WBC: CPT | Performed by: INTERNAL MEDICINE

## 2022-06-18 RX ORDER — DOCUSATE SODIUM 100 MG/1
100 CAPSULE, LIQUID FILLED ORAL 2 TIMES DAILY
Status: DISCONTINUED | OUTPATIENT
Start: 2022-06-18 | End: 2022-06-26 | Stop reason: HOSPADM

## 2022-06-18 RX ORDER — METHYLPREDNISOLONE SODIUM SUCCINATE 40 MG/ML
20 INJECTION, POWDER, LYOPHILIZED, FOR SOLUTION INTRAMUSCULAR; INTRAVENOUS 2 TIMES DAILY
Status: DISCONTINUED | OUTPATIENT
Start: 2022-06-18 | End: 2022-06-20

## 2022-06-18 RX ORDER — CHOLECALCIFEROL (VITAMIN D3) 125 MCG
10 CAPSULE ORAL NIGHTLY
Status: DISCONTINUED | OUTPATIENT
Start: 2022-06-18 | End: 2022-06-26 | Stop reason: HOSPADM

## 2022-06-18 RX ORDER — DIAZEPAM 2 MG/1
2 TABLET ORAL EVERY 8 HOURS PRN
Status: DISCONTINUED | OUTPATIENT
Start: 2022-06-18 | End: 2022-06-26 | Stop reason: HOSPADM

## 2022-06-18 RX ADMIN — GABAPENTIN 300 MG: 300 CAPSULE ORAL at 21:58

## 2022-06-18 RX ADMIN — SENNOSIDES AND DOCUSATE SODIUM 2 TABLET: 50; 8.6 TABLET ORAL at 08:54

## 2022-06-18 RX ADMIN — METOCLOPRAMIDE 10 MG: 5 INJECTION, SOLUTION INTRAMUSCULAR; INTRAVENOUS at 02:29

## 2022-06-18 RX ADMIN — BUSPIRONE HYDROCHLORIDE 10 MG: 10 TABLET ORAL at 21:58

## 2022-06-18 RX ADMIN — IPRATROPIUM BROMIDE AND ALBUTEROL SULFATE 3 ML: .5; 3 SOLUTION RESPIRATORY (INHALATION) at 20:10

## 2022-06-18 RX ADMIN — HYDROMORPHONE HYDROCHLORIDE 0.5 MG: 1 INJECTION, SOLUTION INTRAMUSCULAR; INTRAVENOUS; SUBCUTANEOUS at 00:14

## 2022-06-18 RX ADMIN — IPRATROPIUM BROMIDE AND ALBUTEROL SULFATE 3 ML: .5; 3 SOLUTION RESPIRATORY (INHALATION) at 23:08

## 2022-06-18 RX ADMIN — Medication 10 MG: at 21:58

## 2022-06-18 RX ADMIN — APIXABAN 5 MG: 5 TABLET, FILM COATED ORAL at 20:34

## 2022-06-18 RX ADMIN — HYDROMORPHONE HYDROCHLORIDE 0.5 MG: 1 INJECTION, SOLUTION INTRAMUSCULAR; INTRAVENOUS; SUBCUTANEOUS at 16:47

## 2022-06-18 RX ADMIN — METHYLPREDNISOLONE SODIUM SUCCINATE 20 MG: 40 INJECTION, POWDER, FOR SOLUTION INTRAMUSCULAR; INTRAVENOUS at 20:30

## 2022-06-18 RX ADMIN — IPRATROPIUM BROMIDE AND ALBUTEROL SULFATE 3 ML: .5; 3 SOLUTION RESPIRATORY (INHALATION) at 08:05

## 2022-06-18 RX ADMIN — OXYCODONE HYDROCHLORIDE AND ACETAMINOPHEN 2 TABLET: 5; 325 TABLET ORAL at 08:05

## 2022-06-18 RX ADMIN — METOCLOPRAMIDE 10 MG: 5 INJECTION, SOLUTION INTRAMUSCULAR; INTRAVENOUS at 20:17

## 2022-06-18 RX ADMIN — DULOXETINE HYDROCHLORIDE 120 MG: 60 CAPSULE, DELAYED RELEASE ORAL at 08:55

## 2022-06-18 RX ADMIN — MUPIROCIN: 20 OINTMENT TOPICAL at 08:58

## 2022-06-18 RX ADMIN — OXYCODONE HYDROCHLORIDE AND ACETAMINOPHEN 2 TABLET: 5; 325 TABLET ORAL at 02:29

## 2022-06-18 RX ADMIN — NYSTATIN 1 APPLICATION: 100000 POWDER TOPICAL at 21:57

## 2022-06-18 RX ADMIN — APIXABAN 5 MG: 5 TABLET, FILM COATED ORAL at 08:57

## 2022-06-18 RX ADMIN — METOPROLOL TARTRATE 25 MG: 25 TABLET, FILM COATED ORAL at 20:34

## 2022-06-18 RX ADMIN — METOCLOPRAMIDE 10 MG: 5 INJECTION, SOLUTION INTRAMUSCULAR; INTRAVENOUS at 08:01

## 2022-06-18 RX ADMIN — SODIUM CHLORIDE SOLN NEBU 7% 4 ML: 7 NEBU SOLN at 08:06

## 2022-06-18 RX ADMIN — DIAZEPAM 2 MG: 2 TABLET ORAL at 21:58

## 2022-06-18 RX ADMIN — IPRATROPIUM BROMIDE AND ALBUTEROL SULFATE 3 ML: .5; 3 SOLUTION RESPIRATORY (INHALATION) at 13:02

## 2022-06-18 RX ADMIN — SENNOSIDES AND DOCUSATE SODIUM 2 TABLET: 50; 8.6 TABLET ORAL at 20:34

## 2022-06-18 RX ADMIN — PANTOPRAZOLE SODIUM 40 MG: 40 INJECTION, POWDER, FOR SOLUTION INTRAVENOUS at 05:10

## 2022-06-18 RX ADMIN — METOPROLOL TARTRATE 25 MG: 25 TABLET, FILM COATED ORAL at 08:57

## 2022-06-18 RX ADMIN — OXYCODONE HYDROCHLORIDE AND ACETAMINOPHEN 2 TABLET: 5; 325 TABLET ORAL at 20:21

## 2022-06-18 RX ADMIN — GABAPENTIN 300 MG: 300 CAPSULE ORAL at 05:09

## 2022-06-18 RX ADMIN — HYDROMORPHONE HYDROCHLORIDE 0.5 MG: 1 INJECTION, SOLUTION INTRAMUSCULAR; INTRAVENOUS; SUBCUTANEOUS at 23:28

## 2022-06-18 RX ADMIN — Medication 10 ML: at 22:00

## 2022-06-18 RX ADMIN — IPRATROPIUM BROMIDE AND ALBUTEROL SULFATE 3 ML: .5; 3 SOLUTION RESPIRATORY (INHALATION) at 02:57

## 2022-06-18 RX ADMIN — DOCUSATE SODIUM 100 MG: 100 CAPSULE, LIQUID FILLED ORAL at 20:34

## 2022-06-18 RX ADMIN — INSULIN LISPRO 20 UNITS: 100 INJECTION, SOLUTION INTRAVENOUS; SUBCUTANEOUS at 11:52

## 2022-06-18 RX ADMIN — ARIPIPRAZOLE 5 MG: 10 TABLET ORAL at 08:56

## 2022-06-18 RX ADMIN — METOCLOPRAMIDE 10 MG: 5 INJECTION, SOLUTION INTRAMUSCULAR; INTRAVENOUS at 14:30

## 2022-06-18 RX ADMIN — BUDESONIDE 0.5 MG: 0.5 SUSPENSION RESPIRATORY (INHALATION) at 20:10

## 2022-06-18 RX ADMIN — BUDESONIDE 0.5 MG: 0.5 SUSPENSION RESPIRATORY (INHALATION) at 08:05

## 2022-06-18 RX ADMIN — INSULIN LISPRO 8 UNITS: 100 INJECTION, SOLUTION INTRAVENOUS; SUBCUTANEOUS at 08:01

## 2022-06-18 RX ADMIN — ESCITALOPRAM OXALATE 20 MG: 20 TABLET ORAL at 08:56

## 2022-06-18 RX ADMIN — INSULIN DETEMIR 10 UNITS: 100 INJECTION, SOLUTION SUBCUTANEOUS at 14:34

## 2022-06-18 RX ADMIN — NYSTATIN: 100000 POWDER TOPICAL at 08:58

## 2022-06-18 RX ADMIN — BUSPIRONE HYDROCHLORIDE 10 MG: 10 TABLET ORAL at 08:56

## 2022-06-18 RX ADMIN — HYDROMORPHONE HYDROCHLORIDE 0.5 MG: 1 INJECTION, SOLUTION INTRAMUSCULAR; INTRAVENOUS; SUBCUTANEOUS at 12:37

## 2022-06-18 RX ADMIN — OXYCODONE HYDROCHLORIDE AND ACETAMINOPHEN 2 TABLET: 5; 325 TABLET ORAL at 14:34

## 2022-06-18 RX ADMIN — AMLODIPINE BESYLATE 10 MG: 10 TABLET ORAL at 08:55

## 2022-06-18 RX ADMIN — MUPIROCIN 1 APPLICATION: 20 OINTMENT TOPICAL at 21:58

## 2022-06-18 RX ADMIN — Medication 10 ML: at 08:01

## 2022-06-18 RX ADMIN — GABAPENTIN 300 MG: 300 CAPSULE ORAL at 14:30

## 2022-06-18 RX ADMIN — HYDROMORPHONE HYDROCHLORIDE 0.5 MG: 1 INJECTION, SOLUTION INTRAMUSCULAR; INTRAVENOUS; SUBCUTANEOUS at 05:09

## 2022-06-18 RX ADMIN — DOCUSATE SODIUM 100 MG: 100 CAPSULE, LIQUID FILLED ORAL at 08:55

## 2022-06-18 RX ADMIN — METHYLPREDNISOLONE SODIUM SUCCINATE 20 MG: 40 INJECTION, POWDER, FOR SOLUTION INTRAMUSCULAR; INTRAVENOUS at 05:10

## 2022-06-18 RX ADMIN — INSULIN LISPRO 4 UNITS: 100 INJECTION, SOLUTION INTRAVENOUS; SUBCUTANEOUS at 21:56

## 2022-06-18 RX ADMIN — IPRATROPIUM BROMIDE AND ALBUTEROL SULFATE 3 ML: .5; 3 SOLUTION RESPIRATORY (INHALATION) at 16:39

## 2022-06-19 LAB
ALBUMIN SERPL-MCNC: 3.3 G/DL (ref 3.5–5.2)
ALBUMIN/GLOB SERPL: 0.9 G/DL
ALP SERPL-CCNC: 120 U/L (ref 39–117)
ALT SERPL W P-5'-P-CCNC: 18 U/L (ref 1–33)
ANION GAP SERPL CALCULATED.3IONS-SCNC: 9 MMOL/L (ref 5–15)
AST SERPL-CCNC: 10 U/L (ref 1–32)
BASOPHILS # BLD AUTO: 0.15 10*3/MM3 (ref 0–0.2)
BASOPHILS NFR BLD AUTO: 0.9 % (ref 0–1.5)
BILIRUB SERPL-MCNC: 0.4 MG/DL (ref 0–1.2)
BUN SERPL-MCNC: 34 MG/DL (ref 6–20)
BUN/CREAT SERPL: 42 (ref 7–25)
CALCIUM SPEC-SCNC: 9.7 MG/DL (ref 8.6–10.5)
CHLORIDE SERPL-SCNC: 94 MMOL/L (ref 98–107)
CO2 SERPL-SCNC: 30 MMOL/L (ref 22–29)
CREAT SERPL-MCNC: 0.81 MG/DL (ref 0.57–1)
DEPRECATED RDW RBC AUTO: 49.1 FL (ref 37–54)
EGFRCR SERPLBLD CKD-EPI 2021: 84.8 ML/MIN/1.73
EOSINOPHIL # BLD AUTO: 0.01 10*3/MM3 (ref 0–0.4)
EOSINOPHIL NFR BLD AUTO: 0.1 % (ref 0.3–6.2)
ERYTHROCYTE [DISTWIDTH] IN BLOOD BY AUTOMATED COUNT: 16 % (ref 12.3–15.4)
GLOBULIN UR ELPH-MCNC: 3.7 GM/DL
GLUCOSE BLDC GLUCOMTR-MCNC: 151 MG/DL (ref 70–130)
GLUCOSE BLDC GLUCOMTR-MCNC: 155 MG/DL (ref 70–130)
GLUCOSE BLDC GLUCOMTR-MCNC: 168 MG/DL (ref 70–130)
GLUCOSE BLDC GLUCOMTR-MCNC: 183 MG/DL (ref 70–130)
GLUCOSE SERPL-MCNC: 161 MG/DL (ref 65–99)
HCT VFR BLD AUTO: 39 % (ref 34–46.6)
HGB BLD-MCNC: 12.2 G/DL (ref 12–15.9)
IMM GRANULOCYTES # BLD AUTO: 0.62 10*3/MM3 (ref 0–0.05)
IMM GRANULOCYTES NFR BLD AUTO: 3.7 % (ref 0–0.5)
LYMPHOCYTES # BLD AUTO: 2.59 10*3/MM3 (ref 0.7–3.1)
LYMPHOCYTES NFR BLD AUTO: 15.6 % (ref 19.6–45.3)
MAGNESIUM SERPL-MCNC: 2 MG/DL (ref 1.6–2.6)
MCH RBC QN AUTO: 26.3 PG (ref 26.6–33)
MCHC RBC AUTO-ENTMCNC: 31.3 G/DL (ref 31.5–35.7)
MCV RBC AUTO: 84.1 FL (ref 79–97)
MONOCYTES # BLD AUTO: 0.86 10*3/MM3 (ref 0.1–0.9)
MONOCYTES NFR BLD AUTO: 5.2 % (ref 5–12)
NEUTROPHILS NFR BLD AUTO: 12.35 10*3/MM3 (ref 1.7–7)
NEUTROPHILS NFR BLD AUTO: 74.5 % (ref 42.7–76)
NRBC BLD AUTO-RTO: 0 /100 WBC (ref 0–0.2)
PHOSPHATE SERPL-MCNC: 3.6 MG/DL (ref 2.5–4.5)
PLATELET # BLD AUTO: 314 10*3/MM3 (ref 140–450)
PMV BLD AUTO: 10.2 FL (ref 6–12)
POTASSIUM SERPL-SCNC: 5.2 MMOL/L (ref 3.5–5.2)
PROT SERPL-MCNC: 7 G/DL (ref 6–8.5)
RBC # BLD AUTO: 4.64 10*6/MM3 (ref 3.77–5.28)
SODIUM SERPL-SCNC: 133 MMOL/L (ref 136–145)
WBC NRBC COR # BLD: 16.58 10*3/MM3 (ref 3.4–10.8)

## 2022-06-19 PROCEDURE — 25010000002 METOCLOPRAMIDE PER 10 MG: Performed by: SURGERY

## 2022-06-19 PROCEDURE — 97530 THERAPEUTIC ACTIVITIES: CPT

## 2022-06-19 PROCEDURE — 80053 COMPREHEN METABOLIC PANEL: CPT | Performed by: INTERNAL MEDICINE

## 2022-06-19 PROCEDURE — 99232 SBSQ HOSP IP/OBS MODERATE 35: CPT | Performed by: INTERNAL MEDICINE

## 2022-06-19 PROCEDURE — 63710000001 INSULIN DETEMIR PER 5 UNITS: Performed by: INTERNAL MEDICINE

## 2022-06-19 PROCEDURE — 94799 UNLISTED PULMONARY SVC/PX: CPT

## 2022-06-19 PROCEDURE — 85025 COMPLETE CBC W/AUTO DIFF WBC: CPT | Performed by: INTERNAL MEDICINE

## 2022-06-19 PROCEDURE — 97116 GAIT TRAINING THERAPY: CPT

## 2022-06-19 PROCEDURE — 63710000001 INSULIN LISPRO (HUMAN) PER 5 UNITS: Performed by: NURSE PRACTITIONER

## 2022-06-19 PROCEDURE — 83735 ASSAY OF MAGNESIUM: CPT | Performed by: INTERNAL MEDICINE

## 2022-06-19 PROCEDURE — 84100 ASSAY OF PHOSPHORUS: CPT | Performed by: INTERNAL MEDICINE

## 2022-06-19 PROCEDURE — 82962 GLUCOSE BLOOD TEST: CPT

## 2022-06-19 PROCEDURE — 25010000002 HYDROMORPHONE PER 4 MG: Performed by: INTERNAL MEDICINE

## 2022-06-19 PROCEDURE — 25010000002 METHYLNALTREXONE 12 MG/0.6ML SOLUTION: Performed by: SURGERY

## 2022-06-19 PROCEDURE — 25010000002 METHYLPREDNISOLONE PER 40 MG: Performed by: INTERNAL MEDICINE

## 2022-06-19 RX ADMIN — INSULIN LISPRO 4 UNITS: 100 INJECTION, SOLUTION INTRAVENOUS; SUBCUTANEOUS at 21:56

## 2022-06-19 RX ADMIN — PANTOPRAZOLE SODIUM 40 MG: 40 INJECTION, POWDER, FOR SOLUTION INTRAVENOUS at 05:58

## 2022-06-19 RX ADMIN — OXYCODONE HYDROCHLORIDE AND ACETAMINOPHEN 2 TABLET: 5; 325 TABLET ORAL at 06:03

## 2022-06-19 RX ADMIN — NYSTATIN 1 APPLICATION: 100000 POWDER TOPICAL at 21:09

## 2022-06-19 RX ADMIN — IPRATROPIUM BROMIDE AND ALBUTEROL SULFATE 3 ML: .5; 3 SOLUTION RESPIRATORY (INHALATION) at 08:10

## 2022-06-19 RX ADMIN — METOPROLOL TARTRATE 25 MG: 25 TABLET, FILM COATED ORAL at 20:48

## 2022-06-19 RX ADMIN — METOCLOPRAMIDE 10 MG: 5 INJECTION, SOLUTION INTRAMUSCULAR; INTRAVENOUS at 08:16

## 2022-06-19 RX ADMIN — HYDROMORPHONE HYDROCHLORIDE 0.5 MG: 1 INJECTION, SOLUTION INTRAMUSCULAR; INTRAVENOUS; SUBCUTANEOUS at 12:27

## 2022-06-19 RX ADMIN — METHYLPREDNISOLONE SODIUM SUCCINATE 20 MG: 40 INJECTION, POWDER, FOR SOLUTION INTRAMUSCULAR; INTRAVENOUS at 20:48

## 2022-06-19 RX ADMIN — SENNOSIDES AND DOCUSATE SODIUM 2 TABLET: 50; 8.6 TABLET ORAL at 08:18

## 2022-06-19 RX ADMIN — IPRATROPIUM BROMIDE AND ALBUTEROL SULFATE 3 ML: .5; 3 SOLUTION RESPIRATORY (INHALATION) at 23:31

## 2022-06-19 RX ADMIN — METOCLOPRAMIDE 10 MG: 5 INJECTION, SOLUTION INTRAMUSCULAR; INTRAVENOUS at 20:48

## 2022-06-19 RX ADMIN — DIAZEPAM 2 MG: 2 TABLET ORAL at 20:48

## 2022-06-19 RX ADMIN — MUPIROCIN: 20 OINTMENT TOPICAL at 08:25

## 2022-06-19 RX ADMIN — Medication 10 MG: at 20:48

## 2022-06-19 RX ADMIN — GABAPENTIN 300 MG: 300 CAPSULE ORAL at 21:56

## 2022-06-19 RX ADMIN — GABAPENTIN 300 MG: 300 CAPSULE ORAL at 14:17

## 2022-06-19 RX ADMIN — METHYLPREDNISOLONE SODIUM SUCCINATE 20 MG: 40 INJECTION, POWDER, FOR SOLUTION INTRAMUSCULAR; INTRAVENOUS at 08:19

## 2022-06-19 RX ADMIN — ARIPIPRAZOLE 5 MG: 10 TABLET ORAL at 08:19

## 2022-06-19 RX ADMIN — HYDROMORPHONE HYDROCHLORIDE 0.5 MG: 1 INJECTION, SOLUTION INTRAMUSCULAR; INTRAVENOUS; SUBCUTANEOUS at 21:06

## 2022-06-19 RX ADMIN — SODIUM CHLORIDE SOLN NEBU 7% 4 ML: 7 NEBU SOLN at 19:59

## 2022-06-19 RX ADMIN — INSULIN DETEMIR 10 UNITS: 100 INJECTION, SOLUTION SUBCUTANEOUS at 08:32

## 2022-06-19 RX ADMIN — INSULIN LISPRO 4 UNITS: 100 INJECTION, SOLUTION INTRAVENOUS; SUBCUTANEOUS at 17:54

## 2022-06-19 RX ADMIN — HYDROMORPHONE HYDROCHLORIDE 0.5 MG: 1 INJECTION, SOLUTION INTRAMUSCULAR; INTRAVENOUS; SUBCUTANEOUS at 17:54

## 2022-06-19 RX ADMIN — MUPIROCIN 1 APPLICATION: 20 OINTMENT TOPICAL at 21:11

## 2022-06-19 RX ADMIN — SODIUM CHLORIDE SOLN NEBU 7% 4 ML: 7 NEBU SOLN at 08:10

## 2022-06-19 RX ADMIN — METOPROLOL TARTRATE 25 MG: 25 TABLET, FILM COATED ORAL at 10:33

## 2022-06-19 RX ADMIN — OXYCODONE HYDROCHLORIDE AND ACETAMINOPHEN 2 TABLET: 5; 325 TABLET ORAL at 10:35

## 2022-06-19 RX ADMIN — BUSPIRONE HYDROCHLORIDE 10 MG: 10 TABLET ORAL at 20:48

## 2022-06-19 RX ADMIN — BUDESONIDE 0.5 MG: 0.5 SUSPENSION RESPIRATORY (INHALATION) at 19:59

## 2022-06-19 RX ADMIN — ESCITALOPRAM OXALATE 20 MG: 20 TABLET ORAL at 08:19

## 2022-06-19 RX ADMIN — IPRATROPIUM BROMIDE AND ALBUTEROL SULFATE 3 ML: .5; 3 SOLUTION RESPIRATORY (INHALATION) at 15:50

## 2022-06-19 RX ADMIN — AMLODIPINE BESYLATE 10 MG: 10 TABLET ORAL at 08:21

## 2022-06-19 RX ADMIN — OXYCODONE HYDROCHLORIDE AND ACETAMINOPHEN 2 TABLET: 5; 325 TABLET ORAL at 14:21

## 2022-06-19 RX ADMIN — IPRATROPIUM BROMIDE AND ALBUTEROL SULFATE 3 ML: .5; 3 SOLUTION RESPIRATORY (INHALATION) at 03:03

## 2022-06-19 RX ADMIN — APIXABAN 5 MG: 5 TABLET, FILM COATED ORAL at 08:23

## 2022-06-19 RX ADMIN — METOCLOPRAMIDE 10 MG: 5 INJECTION, SOLUTION INTRAMUSCULAR; INTRAVENOUS at 14:17

## 2022-06-19 RX ADMIN — DOCUSATE SODIUM 100 MG: 100 CAPSULE, LIQUID FILLED ORAL at 08:18

## 2022-06-19 RX ADMIN — OXYCODONE HYDROCHLORIDE AND ACETAMINOPHEN 2 TABLET: 5; 325 TABLET ORAL at 20:48

## 2022-06-19 RX ADMIN — NYSTATIN: 100000 POWDER TOPICAL at 08:25

## 2022-06-19 RX ADMIN — INSULIN LISPRO 4 UNITS: 100 INJECTION, SOLUTION INTRAVENOUS; SUBCUTANEOUS at 12:19

## 2022-06-19 RX ADMIN — BUSPIRONE HYDROCHLORIDE 10 MG: 10 TABLET ORAL at 08:21

## 2022-06-19 RX ADMIN — Medication 20 ML: at 14:18

## 2022-06-19 RX ADMIN — INSULIN LISPRO 4 UNITS: 100 INJECTION, SOLUTION INTRAVENOUS; SUBCUTANEOUS at 08:22

## 2022-06-19 RX ADMIN — DULOXETINE HYDROCHLORIDE 120 MG: 60 CAPSULE, DELAYED RELEASE ORAL at 08:21

## 2022-06-19 RX ADMIN — GABAPENTIN 300 MG: 300 CAPSULE ORAL at 05:58

## 2022-06-19 RX ADMIN — METOCLOPRAMIDE 10 MG: 5 INJECTION, SOLUTION INTRAMUSCULAR; INTRAVENOUS at 02:18

## 2022-06-19 RX ADMIN — APIXABAN 5 MG: 5 TABLET, FILM COATED ORAL at 20:48

## 2022-06-19 RX ADMIN — IPRATROPIUM BROMIDE AND ALBUTEROL SULFATE 3 ML: .5; 3 SOLUTION RESPIRATORY (INHALATION) at 11:00

## 2022-06-19 RX ADMIN — TERAZOSIN HYDROCHLORIDE 2 MG: 2 CAPSULE ORAL at 21:56

## 2022-06-19 RX ADMIN — METHYLNALTREXONE BROMIDE 4 MG: 12 INJECTION, SOLUTION SUBCUTANEOUS at 08:29

## 2022-06-19 RX ADMIN — BUDESONIDE 0.5 MG: 0.5 SUSPENSION RESPIRATORY (INHALATION) at 08:10

## 2022-06-19 RX ADMIN — Medication 10 ML: at 08:24

## 2022-06-19 RX ADMIN — HYDROMORPHONE HYDROCHLORIDE 0.5 MG: 1 INJECTION, SOLUTION INTRAMUSCULAR; INTRAVENOUS; SUBCUTANEOUS at 08:22

## 2022-06-19 RX ADMIN — Medication 10 ML: at 21:09

## 2022-06-19 RX ADMIN — IPRATROPIUM BROMIDE AND ALBUTEROL SULFATE 3 ML: .5; 3 SOLUTION RESPIRATORY (INHALATION) at 19:59

## 2022-06-20 ENCOUNTER — APPOINTMENT (OUTPATIENT)
Dept: GENERAL RADIOLOGY | Facility: HOSPITAL | Age: 57
End: 2022-06-20

## 2022-06-20 LAB
ALBUMIN SERPL-MCNC: 3.4 G/DL (ref 3.5–5.2)
ALP SERPL-CCNC: 110 U/L (ref 39–117)
ALT SERPL W P-5'-P-CCNC: 16 U/L (ref 1–33)
ANION GAP SERPL CALCULATED.3IONS-SCNC: 7 MMOL/L (ref 5–15)
ARTERIAL PATENCY WRIST A: ABNORMAL
AST SERPL-CCNC: 7 U/L (ref 1–32)
ATMOSPHERIC PRESS: ABNORMAL MM[HG]
BASE EXCESS BLDA CALC-SCNC: 2.1 MMOL/L (ref 0–2)
BASOPHILS # BLD MANUAL: 0 10*3/MM3 (ref 0–0.2)
BASOPHILS NFR BLD MANUAL: 0 % (ref 0–1.5)
BDY SITE: ABNORMAL
BILIRUB SERPL-MCNC: 0.3 MG/DL (ref 0–1.2)
BODY TEMPERATURE: 37 C
BUN SERPL-MCNC: 32 MG/DL (ref 6–20)
CALCIUM SPEC-SCNC: 9.5 MG/DL (ref 8.6–10.5)
CHLORIDE SERPL-SCNC: 90 MMOL/L (ref 98–107)
CHOLEST SERPL-MCNC: 115 MG/DL (ref 0–200)
CO2 BLDA-SCNC: 28.5 MMOL/L (ref 22–33)
CO2 SERPL-SCNC: 29 MMOL/L (ref 22–29)
COHGB MFR BLD: ABNORMAL %
CREAT SERPL-MCNC: 0.93 MG/DL (ref 0.57–1)
CRP SERPL-MCNC: 1.69 MG/DL (ref 0–0.5)
DEPRECATED RDW RBC AUTO: 48.2 FL (ref 37–54)
EOSINOPHIL # BLD MANUAL: 0 10*3/MM3 (ref 0–0.4)
EOSINOPHIL NFR BLD MANUAL: 0 % (ref 0.3–6.2)
EPAP: 0
ERYTHROCYTE [DISTWIDTH] IN BLOOD BY AUTOMATED COUNT: 15.8 % (ref 12.3–15.4)
GLUCOSE BLDC GLUCOMTR-MCNC: 212 MG/DL (ref 70–130)
GLUCOSE BLDC GLUCOMTR-MCNC: 216 MG/DL (ref 70–130)
GLUCOSE BLDC GLUCOMTR-MCNC: 239 MG/DL (ref 70–130)
GLUCOSE BLDC GLUCOMTR-MCNC: 355 MG/DL (ref 70–130)
GLUCOSE SERPL-MCNC: 248 MG/DL (ref 65–99)
HCO3 BLDA-SCNC: 27.2 MMOL/L (ref 20–26)
HCT VFR BLD AUTO: 38.5 % (ref 34–46.6)
HCT VFR BLD CALC: 38.7 % (ref 38–51)
HGB BLD-MCNC: 12 G/DL (ref 12–15.9)
HGB BLDA-MCNC: 12.6 G/DL (ref 14–18)
INHALED O2 CONCENTRATION: 36 %
IPAP: 0
LYMPHOCYTES # BLD MANUAL: 1.16 10*3/MM3 (ref 0.7–3.1)
LYMPHOCYTES NFR BLD MANUAL: 3 % (ref 5–12)
MAGNESIUM SERPL-MCNC: 1.7 MG/DL (ref 1.6–2.6)
MAGNESIUM SERPL-MCNC: 1.7 MG/DL (ref 1.6–2.6)
MCH RBC QN AUTO: 26.4 PG (ref 26.6–33)
MCHC RBC AUTO-ENTMCNC: 31.2 G/DL (ref 31.5–35.7)
MCV RBC AUTO: 84.6 FL (ref 79–97)
METHGB BLD QL: 0.5 % (ref 0–1.5)
MODALITY: ABNORMAL
MONOCYTES # BLD: 0.5 10*3/MM3 (ref 0.1–0.9)
NEUTROPHILS # BLD AUTO: 14.89 10*3/MM3 (ref 1.7–7)
NEUTROPHILS NFR BLD MANUAL: 87 % (ref 42.7–76)
NEUTS BAND NFR BLD MANUAL: 3 % (ref 0–5)
NEUTS VAC BLD QL SMEAR: ABNORMAL
NOTE: ABNORMAL
OXYHGB MFR BLDV: 92.7 % (ref 94–99)
PAW @ PEAK INSP FLOW SETTING VENT: 0 CMH2O
PCO2 BLDA: 43.3 MM HG (ref 35–45)
PCO2 TEMP ADJ BLD: 43.3 MM HG (ref 35–45)
PH BLDA: 7.41 PH UNITS (ref 7.35–7.45)
PH, TEMP CORRECTED: 7.41 PH UNITS
PHOSPHATE SERPL-MCNC: 3.4 MG/DL (ref 2.5–4.5)
PHOSPHATE SERPL-MCNC: 3.4 MG/DL (ref 2.5–4.5)
PLAT MORPH BLD: NORMAL
PLATELET # BLD AUTO: 305 10*3/MM3 (ref 140–450)
PMV BLD AUTO: 10.3 FL (ref 6–12)
PO2 BLDA: 73.7 MM HG (ref 83–108)
PO2 TEMP ADJ BLD: 73.7 MM HG (ref 83–108)
POTASSIUM SERPL-SCNC: 4.7 MMOL/L (ref 3.5–5.2)
PREALB SERPL-MCNC: 26.4 MG/DL (ref 20–40)
PROT SERPL-MCNC: 6.7 G/DL (ref 6–8.5)
RBC # BLD AUTO: 4.55 10*6/MM3 (ref 3.77–5.28)
RBC MORPH BLD: NORMAL
SCAN SLIDE: NORMAL
SODIUM SERPL-SCNC: 126 MMOL/L (ref 136–145)
TOTAL RATE: 0 BREATHS/MINUTE
TRIGL SERPL-MCNC: 80 MG/DL (ref 0–150)
VARIANT LYMPHS NFR BLD MANUAL: 3 % (ref 0–5)
VARIANT LYMPHS NFR BLD MANUAL: 4 % (ref 19.6–45.3)
VENTILATOR MODE: ABNORMAL
WBC NRBC COR # BLD: 16.54 10*3/MM3 (ref 3.4–10.8)

## 2022-06-20 PROCEDURE — 94664 DEMO&/EVAL PT USE INHALER: CPT

## 2022-06-20 PROCEDURE — 86140 C-REACTIVE PROTEIN: CPT

## 2022-06-20 PROCEDURE — 82465 ASSAY BLD/SERUM CHOLESTEROL: CPT

## 2022-06-20 PROCEDURE — 83735 ASSAY OF MAGNESIUM: CPT

## 2022-06-20 PROCEDURE — 71046 X-RAY EXAM CHEST 2 VIEWS: CPT

## 2022-06-20 PROCEDURE — 84478 ASSAY OF TRIGLYCERIDES: CPT

## 2022-06-20 PROCEDURE — 92526 ORAL FUNCTION THERAPY: CPT

## 2022-06-20 PROCEDURE — 92610 EVALUATE SWALLOWING FUNCTION: CPT

## 2022-06-20 PROCEDURE — 84100 ASSAY OF PHOSPHORUS: CPT | Performed by: INTERNAL MEDICINE

## 2022-06-20 PROCEDURE — 25010000002 METOCLOPRAMIDE PER 10 MG: Performed by: SURGERY

## 2022-06-20 PROCEDURE — 83050 HGB METHEMOGLOBIN QUAN: CPT

## 2022-06-20 PROCEDURE — 94799 UNLISTED PULMONARY SVC/PX: CPT

## 2022-06-20 PROCEDURE — 84134 ASSAY OF PREALBUMIN: CPT

## 2022-06-20 PROCEDURE — 0 MAGNESIUM SULFATE 4 GM/100ML SOLUTION: Performed by: SURGERY

## 2022-06-20 PROCEDURE — 82962 GLUCOSE BLOOD TEST: CPT

## 2022-06-20 PROCEDURE — 85025 COMPLETE CBC W/AUTO DIFF WBC: CPT | Performed by: INTERNAL MEDICINE

## 2022-06-20 PROCEDURE — 82375 ASSAY CARBOXYHB QUANT: CPT

## 2022-06-20 PROCEDURE — 63710000001 INSULIN LISPRO (HUMAN) PER 5 UNITS: Performed by: NURSE PRACTITIONER

## 2022-06-20 PROCEDURE — 63710000001 PREDNISONE PER 1 MG: Performed by: INTERNAL MEDICINE

## 2022-06-20 PROCEDURE — 25010000002 HYDROMORPHONE PER 4 MG: Performed by: INTERNAL MEDICINE

## 2022-06-20 PROCEDURE — 84100 ASSAY OF PHOSPHORUS: CPT

## 2022-06-20 PROCEDURE — 63710000001 INSULIN DETEMIR PER 5 UNITS: Performed by: INTERNAL MEDICINE

## 2022-06-20 PROCEDURE — 99233 SBSQ HOSP IP/OBS HIGH 50: CPT | Performed by: INTERNAL MEDICINE

## 2022-06-20 PROCEDURE — 85007 BL SMEAR W/DIFF WBC COUNT: CPT | Performed by: INTERNAL MEDICINE

## 2022-06-20 PROCEDURE — 82805 BLOOD GASES W/O2 SATURATION: CPT

## 2022-06-20 PROCEDURE — 94761 N-INVAS EAR/PLS OXIMETRY MLT: CPT

## 2022-06-20 PROCEDURE — 80053 COMPREHEN METABOLIC PANEL: CPT

## 2022-06-20 PROCEDURE — 25010000002 METHYLPREDNISOLONE PER 40 MG: Performed by: INTERNAL MEDICINE

## 2022-06-20 PROCEDURE — 83735 ASSAY OF MAGNESIUM: CPT | Performed by: INTERNAL MEDICINE

## 2022-06-20 PROCEDURE — 36600 WITHDRAWAL OF ARTERIAL BLOOD: CPT

## 2022-06-20 PROCEDURE — 94660 CPAP INITIATION&MGMT: CPT

## 2022-06-20 RX ORDER — IPRATROPIUM BROMIDE AND ALBUTEROL SULFATE 2.5; .5 MG/3ML; MG/3ML
3 SOLUTION RESPIRATORY (INHALATION) EVERY 4 HOURS PRN
Status: DISCONTINUED | OUTPATIENT
Start: 2022-06-20 | End: 2022-06-26 | Stop reason: HOSPADM

## 2022-06-20 RX ORDER — IPRATROPIUM BROMIDE AND ALBUTEROL SULFATE 2.5; .5 MG/3ML; MG/3ML
3 SOLUTION RESPIRATORY (INHALATION)
Status: DISCONTINUED | OUTPATIENT
Start: 2022-06-21 | End: 2022-06-26 | Stop reason: HOSPADM

## 2022-06-20 RX ORDER — PREDNISONE 20 MG/1
20 TABLET ORAL 2 TIMES DAILY WITH MEALS
Status: DISCONTINUED | OUTPATIENT
Start: 2022-06-20 | End: 2022-06-26 | Stop reason: HOSPADM

## 2022-06-20 RX ORDER — PANTOPRAZOLE SODIUM 40 MG/1
40 TABLET, DELAYED RELEASE ORAL
Status: DISCONTINUED | OUTPATIENT
Start: 2022-06-21 | End: 2022-06-26 | Stop reason: HOSPADM

## 2022-06-20 RX ADMIN — METOCLOPRAMIDE 10 MG: 5 INJECTION, SOLUTION INTRAMUSCULAR; INTRAVENOUS at 13:23

## 2022-06-20 RX ADMIN — PANTOPRAZOLE SODIUM 40 MG: 40 INJECTION, POWDER, FOR SOLUTION INTRAVENOUS at 05:44

## 2022-06-20 RX ADMIN — OXYCODONE HYDROCHLORIDE AND ACETAMINOPHEN 2 TABLET: 5; 325 TABLET ORAL at 04:12

## 2022-06-20 RX ADMIN — INSULIN DETEMIR 10 UNITS: 100 INJECTION, SOLUTION SUBCUTANEOUS at 09:05

## 2022-06-20 RX ADMIN — DIAZEPAM 2 MG: 2 TABLET ORAL at 20:14

## 2022-06-20 RX ADMIN — METOCLOPRAMIDE 10 MG: 5 INJECTION, SOLUTION INTRAMUSCULAR; INTRAVENOUS at 09:10

## 2022-06-20 RX ADMIN — SENNOSIDES AND DOCUSATE SODIUM 2 TABLET: 50; 8.6 TABLET ORAL at 20:00

## 2022-06-20 RX ADMIN — IPRATROPIUM BROMIDE AND ALBUTEROL SULFATE 3 ML: .5; 3 SOLUTION RESPIRATORY (INHALATION) at 10:32

## 2022-06-20 RX ADMIN — HYDROMORPHONE HYDROCHLORIDE 0.5 MG: 1 INJECTION, SOLUTION INTRAMUSCULAR; INTRAVENOUS; SUBCUTANEOUS at 19:46

## 2022-06-20 RX ADMIN — HYDROMORPHONE HYDROCHLORIDE 0.5 MG: 1 INJECTION, SOLUTION INTRAMUSCULAR; INTRAVENOUS; SUBCUTANEOUS at 04:23

## 2022-06-20 RX ADMIN — OXYCODONE HYDROCHLORIDE AND ACETAMINOPHEN 2 TABLET: 5; 325 TABLET ORAL at 22:49

## 2022-06-20 RX ADMIN — PREDNISONE 20 MG: 20 TABLET ORAL at 17:19

## 2022-06-20 RX ADMIN — OXYCODONE HYDROCHLORIDE AND ACETAMINOPHEN 2 TABLET: 5; 325 TABLET ORAL at 16:16

## 2022-06-20 RX ADMIN — INSULIN LISPRO 20 UNITS: 100 INJECTION, SOLUTION INTRAVENOUS; SUBCUTANEOUS at 22:58

## 2022-06-20 RX ADMIN — GABAPENTIN 300 MG: 300 CAPSULE ORAL at 20:01

## 2022-06-20 RX ADMIN — METOPROLOL TARTRATE 25 MG: 25 TABLET, FILM COATED ORAL at 20:00

## 2022-06-20 RX ADMIN — Medication 10 MG: at 20:01

## 2022-06-20 RX ADMIN — MUPIROCIN: 20 OINTMENT TOPICAL at 20:01

## 2022-06-20 RX ADMIN — GABAPENTIN 300 MG: 300 CAPSULE ORAL at 13:23

## 2022-06-20 RX ADMIN — METOCLOPRAMIDE 10 MG: 5 INJECTION, SOLUTION INTRAMUSCULAR; INTRAVENOUS at 20:01

## 2022-06-20 RX ADMIN — BUDESONIDE 0.5 MG: 0.5 SUSPENSION RESPIRATORY (INHALATION) at 10:33

## 2022-06-20 RX ADMIN — ESCITALOPRAM OXALATE 20 MG: 20 TABLET ORAL at 09:07

## 2022-06-20 RX ADMIN — IPRATROPIUM BROMIDE AND ALBUTEROL SULFATE 3 ML: .5; 3 SOLUTION RESPIRATORY (INHALATION) at 13:57

## 2022-06-20 RX ADMIN — DOCUSATE SODIUM 100 MG: 100 CAPSULE, LIQUID FILLED ORAL at 20:00

## 2022-06-20 RX ADMIN — ARIPIPRAZOLE 5 MG: 10 TABLET ORAL at 09:06

## 2022-06-20 RX ADMIN — INSULIN LISPRO 8 UNITS: 100 INJECTION, SOLUTION INTRAVENOUS; SUBCUTANEOUS at 11:18

## 2022-06-20 RX ADMIN — NYSTATIN: 100000 POWDER TOPICAL at 09:15

## 2022-06-20 RX ADMIN — INSULIN LISPRO 8 UNITS: 100 INJECTION, SOLUTION INTRAVENOUS; SUBCUTANEOUS at 09:06

## 2022-06-20 RX ADMIN — METOCLOPRAMIDE 10 MG: 5 INJECTION, SOLUTION INTRAMUSCULAR; INTRAVENOUS at 01:47

## 2022-06-20 RX ADMIN — GABAPENTIN 300 MG: 300 CAPSULE ORAL at 05:44

## 2022-06-20 RX ADMIN — BUDESONIDE 0.5 MG: 0.5 SUSPENSION RESPIRATORY (INHALATION) at 19:38

## 2022-06-20 RX ADMIN — BUSPIRONE HYDROCHLORIDE 10 MG: 10 TABLET ORAL at 09:07

## 2022-06-20 RX ADMIN — APIXABAN 5 MG: 5 TABLET, FILM COATED ORAL at 20:00

## 2022-06-20 RX ADMIN — DULOXETINE HYDROCHLORIDE 120 MG: 60 CAPSULE, DELAYED RELEASE ORAL at 09:07

## 2022-06-20 RX ADMIN — IPRATROPIUM BROMIDE AND ALBUTEROL SULFATE 3 ML: .5; 3 SOLUTION RESPIRATORY (INHALATION) at 16:46

## 2022-06-20 RX ADMIN — IPRATROPIUM BROMIDE AND ALBUTEROL SULFATE 3 ML: .5; 3 SOLUTION RESPIRATORY (INHALATION) at 03:51

## 2022-06-20 RX ADMIN — MAGNESIUM SULFATE HEPTAHYDRATE 4 G: 40 INJECTION, SOLUTION INTRAVENOUS at 05:44

## 2022-06-20 RX ADMIN — DIAZEPAM 2 MG: 2 TABLET ORAL at 14:03

## 2022-06-20 RX ADMIN — APIXABAN 5 MG: 5 TABLET, FILM COATED ORAL at 09:07

## 2022-06-20 RX ADMIN — BUSPIRONE HYDROCHLORIDE 10 MG: 10 TABLET ORAL at 20:00

## 2022-06-20 RX ADMIN — METOPROLOL TARTRATE 25 MG: 25 TABLET, FILM COATED ORAL at 09:08

## 2022-06-20 RX ADMIN — Medication 10 ML: at 09:15

## 2022-06-20 RX ADMIN — SODIUM CHLORIDE SOLN NEBU 7% 4 ML: 7 NEBU SOLN at 10:33

## 2022-06-20 RX ADMIN — NYSTATIN: 100000 POWDER TOPICAL at 20:17

## 2022-06-20 RX ADMIN — Medication 10 ML: at 20:17

## 2022-06-20 RX ADMIN — INSULIN LISPRO 8 UNITS: 100 INJECTION, SOLUTION INTRAVENOUS; SUBCUTANEOUS at 17:18

## 2022-06-20 RX ADMIN — AMLODIPINE BESYLATE 10 MG: 10 TABLET ORAL at 11:17

## 2022-06-20 RX ADMIN — HYDROMORPHONE HYDROCHLORIDE 0.5 MG: 1 INJECTION, SOLUTION INTRAMUSCULAR; INTRAVENOUS; SUBCUTANEOUS at 09:24

## 2022-06-20 RX ADMIN — OXYCODONE HYDROCHLORIDE AND ACETAMINOPHEN 2 TABLET: 5; 325 TABLET ORAL at 11:18

## 2022-06-20 RX ADMIN — METHYLPREDNISOLONE SODIUM SUCCINATE 20 MG: 40 INJECTION, POWDER, FOR SOLUTION INTRAMUSCULAR; INTRAVENOUS at 09:09

## 2022-06-20 RX ADMIN — HYDROMORPHONE HYDROCHLORIDE 0.5 MG: 1 INJECTION, SOLUTION INTRAMUSCULAR; INTRAVENOUS; SUBCUTANEOUS at 13:28

## 2022-06-20 RX ADMIN — INSULIN DETEMIR 10 UNITS: 100 INJECTION, SOLUTION SUBCUTANEOUS at 20:09

## 2022-06-20 RX ADMIN — TERAZOSIN HYDROCHLORIDE 2 MG: 2 CAPSULE ORAL at 20:01

## 2022-06-20 RX ADMIN — MUPIROCIN: 20 OINTMENT TOPICAL at 09:22

## 2022-06-20 RX ADMIN — SODIUM CHLORIDE SOLN NEBU 7% 4 ML: 7 NEBU SOLN at 19:38

## 2022-06-21 ENCOUNTER — ANCILLARY PROCEDURE (OUTPATIENT)
Dept: SPEECH THERAPY | Facility: HOSPITAL | Age: 57
End: 2022-06-21

## 2022-06-21 DIAGNOSIS — J44.9 CHRONIC OBSTRUCTIVE PULMONARY DISEASE, UNSPECIFIED COPD TYPE: ICD-10-CM

## 2022-06-21 LAB
ANION GAP SERPL CALCULATED.3IONS-SCNC: 9 MMOL/L (ref 5–15)
BASOPHILS # BLD MANUAL: 0 10*3/MM3 (ref 0–0.2)
BASOPHILS NFR BLD MANUAL: 0 % (ref 0–1.5)
BUN SERPL-MCNC: 28 MG/DL (ref 6–20)
BUN/CREAT SERPL: 44.4 (ref 7–25)
CALCIUM SPEC-SCNC: 9.3 MG/DL (ref 8.6–10.5)
CHLORIDE SERPL-SCNC: 93 MMOL/L (ref 98–107)
CO2 SERPL-SCNC: 30 MMOL/L (ref 22–29)
CREAT SERPL-MCNC: 0.63 MG/DL (ref 0.57–1)
DEPRECATED RDW RBC AUTO: 47.8 FL (ref 37–54)
EGFRCR SERPLBLD CKD-EPI 2021: 103.6 ML/MIN/1.73
EOSINOPHIL # BLD MANUAL: 0.62 10*3/MM3 (ref 0–0.4)
EOSINOPHIL NFR BLD MANUAL: 3 % (ref 0.3–6.2)
ERYTHROCYTE [DISTWIDTH] IN BLOOD BY AUTOMATED COUNT: 15.9 % (ref 12.3–15.4)
GLUCOSE BLDC GLUCOMTR-MCNC: 147 MG/DL (ref 70–130)
GLUCOSE BLDC GLUCOMTR-MCNC: 162 MG/DL (ref 70–130)
GLUCOSE BLDC GLUCOMTR-MCNC: 231 MG/DL (ref 70–130)
GLUCOSE BLDC GLUCOMTR-MCNC: 269 MG/DL (ref 70–130)
GLUCOSE SERPL-MCNC: 133 MG/DL (ref 65–99)
HCT VFR BLD AUTO: 38.9 % (ref 34–46.6)
HGB BLD-MCNC: 12.4 G/DL (ref 12–15.9)
LYMPHOCYTES # BLD MANUAL: 4.99 10*3/MM3 (ref 0.7–3.1)
LYMPHOCYTES NFR BLD MANUAL: 2 % (ref 5–12)
MAGNESIUM SERPL-MCNC: 2.2 MG/DL (ref 1.6–2.6)
MCH RBC QN AUTO: 26.3 PG (ref 26.6–33)
MCHC RBC AUTO-ENTMCNC: 31.9 G/DL (ref 31.5–35.7)
MCV RBC AUTO: 82.6 FL (ref 79–97)
METAMYELOCYTES NFR BLD MANUAL: 2 % (ref 0–0)
MONOCYTES # BLD: 0.42 10*3/MM3 (ref 0.1–0.9)
MYELOCYTES NFR BLD MANUAL: 1 % (ref 0–0)
NEUTROPHILS # BLD AUTO: 14.14 10*3/MM3 (ref 1.7–7)
NEUTROPHILS NFR BLD MANUAL: 67 % (ref 42.7–76)
NEUTS BAND NFR BLD MANUAL: 1 % (ref 0–5)
PLAT MORPH BLD: NORMAL
PLATELET # BLD AUTO: 306 10*3/MM3 (ref 140–450)
PMV BLD AUTO: 10.3 FL (ref 6–12)
POTASSIUM SERPL-SCNC: 4 MMOL/L (ref 3.5–5.2)
RBC # BLD AUTO: 4.71 10*6/MM3 (ref 3.77–5.28)
RBC MORPH BLD: NORMAL
SODIUM SERPL-SCNC: 132 MMOL/L (ref 136–145)
VARIANT LYMPHS NFR BLD MANUAL: 1 % (ref 0–5)
VARIANT LYMPHS NFR BLD MANUAL: 23 % (ref 19.6–45.3)
WBC MORPH BLD: NORMAL
WBC NRBC COR # BLD: 20.79 10*3/MM3 (ref 3.4–10.8)

## 2022-06-21 PROCEDURE — 97535 SELF CARE MNGMENT TRAINING: CPT

## 2022-06-21 PROCEDURE — 25010000002 METOCLOPRAMIDE PER 10 MG: Performed by: SURGERY

## 2022-06-21 PROCEDURE — 94799 UNLISTED PULMONARY SVC/PX: CPT

## 2022-06-21 PROCEDURE — 63710000001 INSULIN DETEMIR PER 5 UNITS: Performed by: INTERNAL MEDICINE

## 2022-06-21 PROCEDURE — 83735 ASSAY OF MAGNESIUM: CPT | Performed by: INTERNAL MEDICINE

## 2022-06-21 PROCEDURE — 25010000002 HYDROMORPHONE PER 4 MG: Performed by: FAMILY MEDICINE

## 2022-06-21 PROCEDURE — 80048 BASIC METABOLIC PNL TOTAL CA: CPT | Performed by: INTERNAL MEDICINE

## 2022-06-21 PROCEDURE — 63710000001 INSULIN LISPRO (HUMAN) PER 5 UNITS: Performed by: NURSE PRACTITIONER

## 2022-06-21 PROCEDURE — 63710000001 INSULIN DETEMIR PER 5 UNITS: Performed by: FAMILY MEDICINE

## 2022-06-21 PROCEDURE — 25010000002 HYDROMORPHONE PER 4 MG: Performed by: INTERNAL MEDICINE

## 2022-06-21 PROCEDURE — 63710000001 PREDNISONE PER 1 MG: Performed by: INTERNAL MEDICINE

## 2022-06-21 PROCEDURE — 94660 CPAP INITIATION&MGMT: CPT

## 2022-06-21 PROCEDURE — 25010000002 METHYLNALTREXONE 12 MG/0.6ML SOLUTION: Performed by: SURGERY

## 2022-06-21 PROCEDURE — 97530 THERAPEUTIC ACTIVITIES: CPT

## 2022-06-21 PROCEDURE — 85025 COMPLETE CBC W/AUTO DIFF WBC: CPT | Performed by: INTERNAL MEDICINE

## 2022-06-21 PROCEDURE — 82962 GLUCOSE BLOOD TEST: CPT

## 2022-06-21 PROCEDURE — 92612 ENDOSCOPY SWALLOW (FEES) VID: CPT | Performed by: SPEECH-LANGUAGE PATHOLOGIST

## 2022-06-21 PROCEDURE — 99233 SBSQ HOSP IP/OBS HIGH 50: CPT | Performed by: FAMILY MEDICINE

## 2022-06-21 PROCEDURE — 94664 DEMO&/EVAL PT USE INHALER: CPT

## 2022-06-21 PROCEDURE — 94761 N-INVAS EAR/PLS OXIMETRY MLT: CPT

## 2022-06-21 PROCEDURE — 85007 BL SMEAR W/DIFF WBC COUNT: CPT | Performed by: INTERNAL MEDICINE

## 2022-06-21 RX ORDER — HYDROMORPHONE HYDROCHLORIDE 1 MG/ML
0.5 INJECTION, SOLUTION INTRAMUSCULAR; INTRAVENOUS; SUBCUTANEOUS EVERY 4 HOURS PRN
Status: DISCONTINUED | OUTPATIENT
Start: 2022-06-21 | End: 2022-06-22

## 2022-06-21 RX ORDER — METOPROLOL SUCCINATE 50 MG/1
TABLET, EXTENDED RELEASE ORAL
Qty: 30 TABLET | Refills: 0 | OUTPATIENT
Start: 2022-06-21

## 2022-06-21 RX ADMIN — NYSTATIN: 100000 POWDER TOPICAL at 20:48

## 2022-06-21 RX ADMIN — INSULIN LISPRO 8 UNITS: 100 INJECTION, SOLUTION INTRAVENOUS; SUBCUTANEOUS at 20:49

## 2022-06-21 RX ADMIN — HYDROMORPHONE HYDROCHLORIDE 0.5 MG: 1 INJECTION, SOLUTION INTRAMUSCULAR; INTRAVENOUS; SUBCUTANEOUS at 05:22

## 2022-06-21 RX ADMIN — HYDROMORPHONE HYDROCHLORIDE 0.5 MG: 1 INJECTION, SOLUTION INTRAMUSCULAR; INTRAVENOUS; SUBCUTANEOUS at 20:55

## 2022-06-21 RX ADMIN — APIXABAN 5 MG: 5 TABLET, FILM COATED ORAL at 08:29

## 2022-06-21 RX ADMIN — BUDESONIDE 0.5 MG: 0.5 SUSPENSION RESPIRATORY (INHALATION) at 08:05

## 2022-06-21 RX ADMIN — IPRATROPIUM BROMIDE AND ALBUTEROL SULFATE 3 ML: .5; 3 SOLUTION RESPIRATORY (INHALATION) at 13:24

## 2022-06-21 RX ADMIN — INSULIN LISPRO 4 UNITS: 100 INJECTION, SOLUTION INTRAVENOUS; SUBCUTANEOUS at 12:14

## 2022-06-21 RX ADMIN — DIAZEPAM 2 MG: 2 TABLET ORAL at 20:54

## 2022-06-21 RX ADMIN — Medication 10 ML: at 20:47

## 2022-06-21 RX ADMIN — ARIPIPRAZOLE 5 MG: 10 TABLET ORAL at 08:28

## 2022-06-21 RX ADMIN — MUPIROCIN: 20 OINTMENT TOPICAL at 20:47

## 2022-06-21 RX ADMIN — METOCLOPRAMIDE 10 MG: 5 INJECTION, SOLUTION INTRAMUSCULAR; INTRAVENOUS at 15:15

## 2022-06-21 RX ADMIN — APIXABAN 5 MG: 5 TABLET, FILM COATED ORAL at 20:46

## 2022-06-21 RX ADMIN — OXYCODONE HYDROCHLORIDE AND ACETAMINOPHEN 2 TABLET: 5; 325 TABLET ORAL at 15:15

## 2022-06-21 RX ADMIN — METOCLOPRAMIDE 10 MG: 5 INJECTION, SOLUTION INTRAMUSCULAR; INTRAVENOUS at 20:46

## 2022-06-21 RX ADMIN — DULOXETINE HYDROCHLORIDE 120 MG: 60 CAPSULE, DELAYED RELEASE ORAL at 08:27

## 2022-06-21 RX ADMIN — INSULIN DETEMIR 10 UNITS: 100 INJECTION, SOLUTION SUBCUTANEOUS at 08:26

## 2022-06-21 RX ADMIN — IPRATROPIUM BROMIDE AND ALBUTEROL SULFATE 3 ML: .5; 3 SOLUTION RESPIRATORY (INHALATION) at 15:24

## 2022-06-21 RX ADMIN — INSULIN LISPRO 12 UNITS: 100 INJECTION, SOLUTION INTRAVENOUS; SUBCUTANEOUS at 17:51

## 2022-06-21 RX ADMIN — Medication 10 ML: at 08:36

## 2022-06-21 RX ADMIN — HYDROMORPHONE HYDROCHLORIDE 0.5 MG: 1 INJECTION, SOLUTION INTRAMUSCULAR; INTRAVENOUS; SUBCUTANEOUS at 16:45

## 2022-06-21 RX ADMIN — METHYLNALTREXONE BROMIDE 4 MG: 12 INJECTION, SOLUTION SUBCUTANEOUS at 08:27

## 2022-06-21 RX ADMIN — OXYCODONE HYDROCHLORIDE AND ACETAMINOPHEN 2 TABLET: 5; 325 TABLET ORAL at 11:00

## 2022-06-21 RX ADMIN — HYDROMORPHONE HYDROCHLORIDE 0.5 MG: 1 INJECTION, SOLUTION INTRAMUSCULAR; INTRAVENOUS; SUBCUTANEOUS at 01:48

## 2022-06-21 RX ADMIN — HYDROMORPHONE HYDROCHLORIDE 0.5 MG: 1 INJECTION, SOLUTION INTRAMUSCULAR; INTRAVENOUS; SUBCUTANEOUS at 08:35

## 2022-06-21 RX ADMIN — AMLODIPINE BESYLATE 10 MG: 10 TABLET ORAL at 08:29

## 2022-06-21 RX ADMIN — IPRATROPIUM BROMIDE AND ALBUTEROL SULFATE 3 ML: .5; 3 SOLUTION RESPIRATORY (INHALATION) at 19:57

## 2022-06-21 RX ADMIN — METOCLOPRAMIDE 10 MG: 5 INJECTION, SOLUTION INTRAMUSCULAR; INTRAVENOUS at 01:48

## 2022-06-21 RX ADMIN — METOPROLOL TARTRATE 25 MG: 25 TABLET, FILM COATED ORAL at 20:46

## 2022-06-21 RX ADMIN — SODIUM CHLORIDE SOLN NEBU 7% 4 ML: 7 NEBU SOLN at 19:54

## 2022-06-21 RX ADMIN — ESCITALOPRAM OXALATE 20 MG: 20 TABLET ORAL at 08:29

## 2022-06-21 RX ADMIN — BUSPIRONE HYDROCHLORIDE 10 MG: 10 TABLET ORAL at 08:28

## 2022-06-21 RX ADMIN — NYSTATIN: 100000 POWDER TOPICAL at 08:36

## 2022-06-21 RX ADMIN — METOCLOPRAMIDE 10 MG: 5 INJECTION, SOLUTION INTRAMUSCULAR; INTRAVENOUS at 08:27

## 2022-06-21 RX ADMIN — MUPIROCIN: 20 OINTMENT TOPICAL at 20:56

## 2022-06-21 RX ADMIN — BUSPIRONE HYDROCHLORIDE 10 MG: 10 TABLET ORAL at 20:47

## 2022-06-21 RX ADMIN — METOPROLOL TARTRATE 25 MG: 25 TABLET, FILM COATED ORAL at 08:28

## 2022-06-21 RX ADMIN — INSULIN DETEMIR 12 UNITS: 100 INJECTION, SOLUTION SUBCUTANEOUS at 20:59

## 2022-06-21 RX ADMIN — GABAPENTIN 300 MG: 300 CAPSULE ORAL at 05:22

## 2022-06-21 RX ADMIN — HYDROMORPHONE HYDROCHLORIDE 0.5 MG: 1 INJECTION, SOLUTION INTRAMUSCULAR; INTRAVENOUS; SUBCUTANEOUS at 12:33

## 2022-06-21 RX ADMIN — PANTOPRAZOLE SODIUM 40 MG: 40 TABLET, DELAYED RELEASE ORAL at 05:22

## 2022-06-21 RX ADMIN — IPRATROPIUM BROMIDE AND ALBUTEROL SULFATE 3 ML: .5; 3 SOLUTION RESPIRATORY (INHALATION) at 08:05

## 2022-06-21 RX ADMIN — DOCUSATE SODIUM 100 MG: 100 CAPSULE, LIQUID FILLED ORAL at 08:28

## 2022-06-21 RX ADMIN — MUPIROCIN: 20 OINTMENT TOPICAL at 08:27

## 2022-06-21 RX ADMIN — OXYCODONE HYDROCHLORIDE AND ACETAMINOPHEN 1 TABLET: 5; 325 TABLET ORAL at 20:54

## 2022-06-21 RX ADMIN — PREDNISONE 20 MG: 20 TABLET ORAL at 17:51

## 2022-06-21 RX ADMIN — PREDNISONE 20 MG: 20 TABLET ORAL at 08:29

## 2022-06-21 RX ADMIN — SODIUM CHLORIDE SOLN NEBU 7% 4 ML: 7 NEBU SOLN at 08:05

## 2022-06-21 RX ADMIN — GABAPENTIN 300 MG: 300 CAPSULE ORAL at 20:46

## 2022-06-21 RX ADMIN — Medication 10 MG: at 20:47

## 2022-06-21 RX ADMIN — BUDESONIDE 0.5 MG: 0.5 SUSPENSION RESPIRATORY (INHALATION) at 19:54

## 2022-06-21 RX ADMIN — GABAPENTIN 300 MG: 300 CAPSULE ORAL at 15:15

## 2022-06-21 RX ADMIN — TERAZOSIN HYDROCHLORIDE 2 MG: 2 CAPSULE ORAL at 20:46

## 2022-06-21 NOTE — TELEPHONE ENCOUNTER
Received refill request for metoprolol but she is currently in the hospital.  Refill medications at discharge.

## 2022-06-22 LAB
GLUCOSE BLDC GLUCOMTR-MCNC: 135 MG/DL (ref 70–130)
GLUCOSE BLDC GLUCOMTR-MCNC: 204 MG/DL (ref 70–130)
GLUCOSE BLDC GLUCOMTR-MCNC: 349 MG/DL (ref 70–130)
GLUCOSE BLDC GLUCOMTR-MCNC: 94 MG/DL (ref 70–130)

## 2022-06-22 PROCEDURE — 94660 CPAP INITIATION&MGMT: CPT

## 2022-06-22 PROCEDURE — 25010000002 METOCLOPRAMIDE PER 10 MG: Performed by: SURGERY

## 2022-06-22 PROCEDURE — 94761 N-INVAS EAR/PLS OXIMETRY MLT: CPT

## 2022-06-22 PROCEDURE — 94799 UNLISTED PULMONARY SVC/PX: CPT

## 2022-06-22 PROCEDURE — 82962 GLUCOSE BLOOD TEST: CPT

## 2022-06-22 PROCEDURE — 63710000001 PREDNISONE PER 1 MG: Performed by: INTERNAL MEDICINE

## 2022-06-22 PROCEDURE — 63710000001 INSULIN DETEMIR PER 5 UNITS: Performed by: FAMILY MEDICINE

## 2022-06-22 PROCEDURE — 99232 SBSQ HOSP IP/OBS MODERATE 35: CPT | Performed by: FAMILY MEDICINE

## 2022-06-22 PROCEDURE — 25010000002 HYDROMORPHONE PER 4 MG: Performed by: FAMILY MEDICINE

## 2022-06-22 PROCEDURE — 63710000001 INSULIN LISPRO (HUMAN) PER 5 UNITS: Performed by: NURSE PRACTITIONER

## 2022-06-22 PROCEDURE — 94664 DEMO&/EVAL PT USE INHALER: CPT

## 2022-06-22 RX ORDER — METOCLOPRAMIDE 10 MG/1
10 TABLET ORAL
Status: DISCONTINUED | OUTPATIENT
Start: 2022-06-22 | End: 2022-06-26 | Stop reason: HOSPADM

## 2022-06-22 RX ORDER — HYDROMORPHONE HYDROCHLORIDE 1 MG/ML
0.5 INJECTION, SOLUTION INTRAMUSCULAR; INTRAVENOUS; SUBCUTANEOUS EVERY 6 HOURS PRN
Status: DISCONTINUED | OUTPATIENT
Start: 2022-06-22 | End: 2022-06-23

## 2022-06-22 RX ADMIN — METOCLOPRAMIDE 10 MG: 10 TABLET ORAL at 17:08

## 2022-06-22 RX ADMIN — IPRATROPIUM BROMIDE AND ALBUTEROL SULFATE 3 ML: .5; 3 SOLUTION RESPIRATORY (INHALATION) at 06:40

## 2022-06-22 RX ADMIN — INSULIN LISPRO 8 UNITS: 100 INJECTION, SOLUTION INTRAVENOUS; SUBCUTANEOUS at 17:08

## 2022-06-22 RX ADMIN — ARIPIPRAZOLE 5 MG: 10 TABLET ORAL at 08:29

## 2022-06-22 RX ADMIN — BUDESONIDE 0.5 MG: 0.5 SUSPENSION RESPIRATORY (INHALATION) at 06:40

## 2022-06-22 RX ADMIN — APIXABAN 5 MG: 5 TABLET, FILM COATED ORAL at 08:28

## 2022-06-22 RX ADMIN — GABAPENTIN 300 MG: 300 CAPSULE ORAL at 14:35

## 2022-06-22 RX ADMIN — METOPROLOL TARTRATE 25 MG: 25 TABLET, FILM COATED ORAL at 20:56

## 2022-06-22 RX ADMIN — AMLODIPINE BESYLATE 10 MG: 10 TABLET ORAL at 08:28

## 2022-06-22 RX ADMIN — INSULIN DETEMIR 12 UNITS: 100 INJECTION, SOLUTION SUBCUTANEOUS at 20:59

## 2022-06-22 RX ADMIN — METOCLOPRAMIDE 10 MG: 5 INJECTION, SOLUTION INTRAMUSCULAR; INTRAVENOUS at 01:55

## 2022-06-22 RX ADMIN — METOCLOPRAMIDE 10 MG: 10 TABLET ORAL at 20:56

## 2022-06-22 RX ADMIN — METOCLOPRAMIDE 10 MG: 10 TABLET ORAL at 11:23

## 2022-06-22 RX ADMIN — ESCITALOPRAM OXALATE 20 MG: 20 TABLET ORAL at 08:29

## 2022-06-22 RX ADMIN — OXYCODONE HYDROCHLORIDE AND ACETAMINOPHEN 2 TABLET: 5; 325 TABLET ORAL at 21:02

## 2022-06-22 RX ADMIN — SODIUM CHLORIDE SOLN NEBU 7% 4 ML: 7 NEBU SOLN at 19:30

## 2022-06-22 RX ADMIN — INSULIN DETEMIR 12 UNITS: 100 INJECTION, SOLUTION SUBCUTANEOUS at 08:29

## 2022-06-22 RX ADMIN — MUPIROCIN: 20 OINTMENT TOPICAL at 20:55

## 2022-06-22 RX ADMIN — METOPROLOL TARTRATE 25 MG: 25 TABLET, FILM COATED ORAL at 08:29

## 2022-06-22 RX ADMIN — SODIUM CHLORIDE SOLN NEBU 7% 4 ML: 7 NEBU SOLN at 06:40

## 2022-06-22 RX ADMIN — OXYCODONE HYDROCHLORIDE AND ACETAMINOPHEN 2 TABLET: 5; 325 TABLET ORAL at 11:22

## 2022-06-22 RX ADMIN — NYSTATIN: 100000 POWDER TOPICAL at 08:30

## 2022-06-22 RX ADMIN — SENNOSIDES AND DOCUSATE SODIUM 2 TABLET: 50; 8.6 TABLET ORAL at 20:56

## 2022-06-22 RX ADMIN — DIAZEPAM 2 MG: 2 TABLET ORAL at 09:06

## 2022-06-22 RX ADMIN — GABAPENTIN 300 MG: 300 CAPSULE ORAL at 05:32

## 2022-06-22 RX ADMIN — OXYCODONE HYDROCHLORIDE AND ACETAMINOPHEN 2 TABLET: 5; 325 TABLET ORAL at 04:38

## 2022-06-22 RX ADMIN — DIAZEPAM 2 MG: 2 TABLET ORAL at 21:02

## 2022-06-22 RX ADMIN — BUSPIRONE HYDROCHLORIDE 10 MG: 10 TABLET ORAL at 08:29

## 2022-06-22 RX ADMIN — APIXABAN 5 MG: 5 TABLET, FILM COATED ORAL at 20:56

## 2022-06-22 RX ADMIN — NYSTATIN: 100000 POWDER TOPICAL at 20:57

## 2022-06-22 RX ADMIN — BUDESONIDE 0.5 MG: 0.5 SUSPENSION RESPIRATORY (INHALATION) at 19:30

## 2022-06-22 RX ADMIN — Medication 10 ML: at 20:54

## 2022-06-22 RX ADMIN — Medication 10 MG: at 20:56

## 2022-06-22 RX ADMIN — MUPIROCIN: 20 OINTMENT TOPICAL at 08:28

## 2022-06-22 RX ADMIN — OXYCODONE HYDROCHLORIDE AND ACETAMINOPHEN 2 TABLET: 5; 325 TABLET ORAL at 17:08

## 2022-06-22 RX ADMIN — DULOXETINE HYDROCHLORIDE 120 MG: 60 CAPSULE, DELAYED RELEASE ORAL at 08:29

## 2022-06-22 RX ADMIN — IPRATROPIUM BROMIDE AND ALBUTEROL SULFATE 3 ML: .5; 3 SOLUTION RESPIRATORY (INHALATION) at 14:12

## 2022-06-22 RX ADMIN — TERAZOSIN HYDROCHLORIDE 2 MG: 2 CAPSULE ORAL at 20:55

## 2022-06-22 RX ADMIN — Medication 10 ML: at 08:30

## 2022-06-22 RX ADMIN — HYDROMORPHONE HYDROCHLORIDE 0.5 MG: 1 INJECTION, SOLUTION INTRAMUSCULAR; INTRAVENOUS; SUBCUTANEOUS at 02:11

## 2022-06-22 RX ADMIN — PREDNISONE 20 MG: 20 TABLET ORAL at 17:08

## 2022-06-22 RX ADMIN — PREDNISONE 20 MG: 20 TABLET ORAL at 08:29

## 2022-06-22 RX ADMIN — HYDROMORPHONE HYDROCHLORIDE 0.5 MG: 1 INJECTION, SOLUTION INTRAMUSCULAR; INTRAVENOUS; SUBCUTANEOUS at 13:21

## 2022-06-22 RX ADMIN — DOCUSATE SODIUM 100 MG: 100 CAPSULE, LIQUID FILLED ORAL at 20:55

## 2022-06-22 RX ADMIN — IPRATROPIUM BROMIDE AND ALBUTEROL SULFATE 3 ML: .5; 3 SOLUTION RESPIRATORY (INHALATION) at 19:30

## 2022-06-22 RX ADMIN — INSULIN LISPRO 16 UNITS: 100 INJECTION, SOLUTION INTRAVENOUS; SUBCUTANEOUS at 20:57

## 2022-06-22 RX ADMIN — GABAPENTIN 300 MG: 300 CAPSULE ORAL at 20:55

## 2022-06-22 RX ADMIN — PANTOPRAZOLE SODIUM 40 MG: 40 TABLET, DELAYED RELEASE ORAL at 05:32

## 2022-06-22 RX ADMIN — IPRATROPIUM BROMIDE AND ALBUTEROL SULFATE 3 ML: .5; 3 SOLUTION RESPIRATORY (INHALATION) at 10:29

## 2022-06-22 RX ADMIN — BUSPIRONE HYDROCHLORIDE 10 MG: 10 TABLET ORAL at 20:56

## 2022-06-22 RX ADMIN — METOCLOPRAMIDE 10 MG: 5 INJECTION, SOLUTION INTRAMUSCULAR; INTRAVENOUS at 08:28

## 2022-06-23 LAB
ANION GAP SERPL CALCULATED.3IONS-SCNC: 6 MMOL/L (ref 5–15)
BASOPHILS # BLD MANUAL: 0 10*3/MM3 (ref 0–0.2)
BASOPHILS NFR BLD MANUAL: 0 % (ref 0–1.5)
BUN SERPL-MCNC: 26 MG/DL (ref 6–20)
BUN/CREAT SERPL: 37.1 (ref 7–25)
CALCIUM SPEC-SCNC: 8.7 MG/DL (ref 8.6–10.5)
CHLORIDE SERPL-SCNC: 97 MMOL/L (ref 98–107)
CO2 SERPL-SCNC: 29 MMOL/L (ref 22–29)
CREAT SERPL-MCNC: 0.7 MG/DL (ref 0.57–1)
DEPRECATED RDW RBC AUTO: 50 FL (ref 37–54)
EGFRCR SERPLBLD CKD-EPI 2021: 101 ML/MIN/1.73
EOSINOPHIL # BLD MANUAL: 0 10*3/MM3 (ref 0–0.4)
EOSINOPHIL NFR BLD MANUAL: 0 % (ref 0.3–6.2)
ERYTHROCYTE [DISTWIDTH] IN BLOOD BY AUTOMATED COUNT: 16.2 % (ref 12.3–15.4)
GLUCOSE BLDC GLUCOMTR-MCNC: 134 MG/DL (ref 70–130)
GLUCOSE BLDC GLUCOMTR-MCNC: 212 MG/DL (ref 70–130)
GLUCOSE BLDC GLUCOMTR-MCNC: 243 MG/DL (ref 70–130)
GLUCOSE BLDC GLUCOMTR-MCNC: 291 MG/DL (ref 70–130)
GLUCOSE SERPL-MCNC: 165 MG/DL (ref 65–99)
HCT VFR BLD AUTO: 35.5 % (ref 34–46.6)
HGB BLD-MCNC: 11.1 G/DL (ref 12–15.9)
LYMPHOCYTES # BLD MANUAL: 2.57 10*3/MM3 (ref 0.7–3.1)
LYMPHOCYTES NFR BLD MANUAL: 1 % (ref 5–12)
MCH RBC QN AUTO: 26.4 PG (ref 26.6–33)
MCHC RBC AUTO-ENTMCNC: 31.3 G/DL (ref 31.5–35.7)
MCV RBC AUTO: 84.3 FL (ref 79–97)
METAMYELOCYTES NFR BLD MANUAL: 2 % (ref 0–0)
MONOCYTES # BLD: 0.21 10*3/MM3 (ref 0.1–0.9)
MYELOCYTES NFR BLD MANUAL: 1 % (ref 0–0)
NEUTROPHILS # BLD AUTO: 17.96 10*3/MM3 (ref 1.7–7)
NEUTROPHILS NFR BLD MANUAL: 81 % (ref 42.7–76)
NEUTS BAND NFR BLD MANUAL: 3 % (ref 0–5)
PLAT MORPH BLD: NORMAL
PLATELET # BLD AUTO: 223 10*3/MM3 (ref 140–450)
PMV BLD AUTO: 10.6 FL (ref 6–12)
POTASSIUM SERPL-SCNC: 4.6 MMOL/L (ref 3.5–5.2)
RBC # BLD AUTO: 4.21 10*6/MM3 (ref 3.77–5.28)
RBC MORPH BLD: NORMAL
SODIUM SERPL-SCNC: 132 MMOL/L (ref 136–145)
VARIANT LYMPHS NFR BLD MANUAL: 1 % (ref 0–5)
VARIANT LYMPHS NFR BLD MANUAL: 11 % (ref 19.6–45.3)
WBC MORPH BLD: NORMAL
WBC NRBC COR # BLD: 21.38 10*3/MM3 (ref 3.4–10.8)

## 2022-06-23 PROCEDURE — 63710000001 PREDNISONE PER 1 MG: Performed by: INTERNAL MEDICINE

## 2022-06-23 PROCEDURE — 94799 UNLISTED PULMONARY SVC/PX: CPT

## 2022-06-23 PROCEDURE — 82962 GLUCOSE BLOOD TEST: CPT

## 2022-06-23 PROCEDURE — 97110 THERAPEUTIC EXERCISES: CPT

## 2022-06-23 PROCEDURE — 99232 SBSQ HOSP IP/OBS MODERATE 35: CPT | Performed by: FAMILY MEDICINE

## 2022-06-23 PROCEDURE — 92526 ORAL FUNCTION THERAPY: CPT

## 2022-06-23 PROCEDURE — 94664 DEMO&/EVAL PT USE INHALER: CPT

## 2022-06-23 PROCEDURE — 85025 COMPLETE CBC W/AUTO DIFF WBC: CPT | Performed by: FAMILY MEDICINE

## 2022-06-23 PROCEDURE — 80048 BASIC METABOLIC PNL TOTAL CA: CPT | Performed by: FAMILY MEDICINE

## 2022-06-23 PROCEDURE — 94660 CPAP INITIATION&MGMT: CPT

## 2022-06-23 PROCEDURE — 97530 THERAPEUTIC ACTIVITIES: CPT

## 2022-06-23 PROCEDURE — 63710000001 INSULIN DETEMIR PER 5 UNITS: Performed by: FAMILY MEDICINE

## 2022-06-23 PROCEDURE — 63710000001 INSULIN LISPRO (HUMAN) PER 5 UNITS: Performed by: NURSE PRACTITIONER

## 2022-06-23 PROCEDURE — 25010000002 HYDROMORPHONE PER 4 MG: Performed by: FAMILY MEDICINE

## 2022-06-23 PROCEDURE — 94761 N-INVAS EAR/PLS OXIMETRY MLT: CPT

## 2022-06-23 PROCEDURE — 85007 BL SMEAR W/DIFF WBC COUNT: CPT | Performed by: FAMILY MEDICINE

## 2022-06-23 RX ORDER — HYDROMORPHONE HYDROCHLORIDE 1 MG/ML
0.5 INJECTION, SOLUTION INTRAMUSCULAR; INTRAVENOUS; SUBCUTANEOUS EVERY 8 HOURS PRN
Status: DISCONTINUED | OUTPATIENT
Start: 2022-06-23 | End: 2022-06-25

## 2022-06-23 RX ORDER — ROFLUMILAST 500 UG/1
TABLET ORAL
Qty: 30 TABLET | Refills: 1 | OUTPATIENT
Start: 2022-06-23

## 2022-06-23 RX ADMIN — OXYCODONE HYDROCHLORIDE AND ACETAMINOPHEN 2 TABLET: 5; 325 TABLET ORAL at 08:12

## 2022-06-23 RX ADMIN — GABAPENTIN 300 MG: 300 CAPSULE ORAL at 05:36

## 2022-06-23 RX ADMIN — OXYCODONE HYDROCHLORIDE AND ACETAMINOPHEN 2 TABLET: 5; 325 TABLET ORAL at 14:01

## 2022-06-23 RX ADMIN — OXYCODONE HYDROCHLORIDE AND ACETAMINOPHEN 2 TABLET: 5; 325 TABLET ORAL at 22:02

## 2022-06-23 RX ADMIN — IPRATROPIUM BROMIDE AND ALBUTEROL SULFATE 3 ML: .5; 3 SOLUTION RESPIRATORY (INHALATION) at 12:46

## 2022-06-23 RX ADMIN — BUDESONIDE 0.5 MG: 0.5 SUSPENSION RESPIRATORY (INHALATION) at 19:01

## 2022-06-23 RX ADMIN — INSULIN DETEMIR 12 UNITS: 100 INJECTION, SOLUTION SUBCUTANEOUS at 08:08

## 2022-06-23 RX ADMIN — TERAZOSIN HYDROCHLORIDE 2 MG: 2 CAPSULE ORAL at 22:02

## 2022-06-23 RX ADMIN — NYSTATIN: 100000 POWDER TOPICAL at 22:04

## 2022-06-23 RX ADMIN — OXYCODONE HYDROCHLORIDE AND ACETAMINOPHEN 2 TABLET: 5; 325 TABLET ORAL at 02:24

## 2022-06-23 RX ADMIN — HYDROMORPHONE HYDROCHLORIDE 0.5 MG: 1 INJECTION, SOLUTION INTRAMUSCULAR; INTRAVENOUS; SUBCUTANEOUS at 11:38

## 2022-06-23 RX ADMIN — PREDNISONE 20 MG: 20 TABLET ORAL at 08:12

## 2022-06-23 RX ADMIN — METOPROLOL TARTRATE 25 MG: 25 TABLET, FILM COATED ORAL at 22:00

## 2022-06-23 RX ADMIN — BUSPIRONE HYDROCHLORIDE 10 MG: 10 TABLET ORAL at 22:02

## 2022-06-23 RX ADMIN — DOCUSATE SODIUM 100 MG: 100 CAPSULE, LIQUID FILLED ORAL at 08:13

## 2022-06-23 RX ADMIN — AMLODIPINE BESYLATE 10 MG: 10 TABLET ORAL at 08:12

## 2022-06-23 RX ADMIN — Medication 10 MG: at 22:00

## 2022-06-23 RX ADMIN — BUDESONIDE 0.5 MG: 0.5 SUSPENSION RESPIRATORY (INHALATION) at 08:51

## 2022-06-23 RX ADMIN — NYSTATIN: 100000 POWDER TOPICAL at 08:15

## 2022-06-23 RX ADMIN — PANTOPRAZOLE SODIUM 40 MG: 40 TABLET, DELAYED RELEASE ORAL at 05:36

## 2022-06-23 RX ADMIN — GABAPENTIN 300 MG: 300 CAPSULE ORAL at 14:01

## 2022-06-23 RX ADMIN — BUSPIRONE HYDROCHLORIDE 10 MG: 10 TABLET ORAL at 08:12

## 2022-06-23 RX ADMIN — HYDROMORPHONE HYDROCHLORIDE 0.5 MG: 1 INJECTION, SOLUTION INTRAMUSCULAR; INTRAVENOUS; SUBCUTANEOUS at 18:01

## 2022-06-23 RX ADMIN — METOCLOPRAMIDE 10 MG: 10 TABLET ORAL at 05:36

## 2022-06-23 RX ADMIN — SODIUM CHLORIDE SOLN NEBU 7% 4 ML: 7 NEBU SOLN at 08:50

## 2022-06-23 RX ADMIN — ARIPIPRAZOLE 5 MG: 10 TABLET ORAL at 08:12

## 2022-06-23 RX ADMIN — INSULIN LISPRO 8 UNITS: 100 INJECTION, SOLUTION INTRAVENOUS; SUBCUTANEOUS at 17:21

## 2022-06-23 RX ADMIN — ESCITALOPRAM OXALATE 20 MG: 20 TABLET ORAL at 08:12

## 2022-06-23 RX ADMIN — METOPROLOL TARTRATE 25 MG: 25 TABLET, FILM COATED ORAL at 08:13

## 2022-06-23 RX ADMIN — METOCLOPRAMIDE 10 MG: 10 TABLET ORAL at 22:01

## 2022-06-23 RX ADMIN — INSULIN DETEMIR 15 UNITS: 100 INJECTION, SOLUTION SUBCUTANEOUS at 22:04

## 2022-06-23 RX ADMIN — PREDNISONE 20 MG: 20 TABLET ORAL at 17:21

## 2022-06-23 RX ADMIN — GABAPENTIN 300 MG: 300 CAPSULE ORAL at 22:01

## 2022-06-23 RX ADMIN — SENNOSIDES AND DOCUSATE SODIUM 2 TABLET: 50; 8.6 TABLET ORAL at 08:13

## 2022-06-23 RX ADMIN — SODIUM CHLORIDE SOLN NEBU 7% 4 ML: 7 NEBU SOLN at 19:03

## 2022-06-23 RX ADMIN — IPRATROPIUM BROMIDE AND ALBUTEROL SULFATE 3 ML: .5; 3 SOLUTION RESPIRATORY (INHALATION) at 19:01

## 2022-06-23 RX ADMIN — DIAZEPAM 2 MG: 2 TABLET ORAL at 22:00

## 2022-06-23 RX ADMIN — METOCLOPRAMIDE 10 MG: 10 TABLET ORAL at 17:21

## 2022-06-23 RX ADMIN — IPRATROPIUM BROMIDE AND ALBUTEROL SULFATE 3 ML: .5; 3 SOLUTION RESPIRATORY (INHALATION) at 08:50

## 2022-06-23 RX ADMIN — MUPIROCIN: 20 OINTMENT TOPICAL at 22:01

## 2022-06-23 RX ADMIN — DIAZEPAM 2 MG: 2 TABLET ORAL at 08:13

## 2022-06-23 RX ADMIN — DULOXETINE HYDROCHLORIDE 120 MG: 60 CAPSULE, DELAYED RELEASE ORAL at 08:12

## 2022-06-23 RX ADMIN — Medication 10 ML: at 22:03

## 2022-06-23 RX ADMIN — IPRATROPIUM BROMIDE AND ALBUTEROL SULFATE 3 ML: .5; 3 SOLUTION RESPIRATORY (INHALATION) at 16:31

## 2022-06-23 RX ADMIN — MUPIROCIN: 20 OINTMENT TOPICAL at 08:12

## 2022-06-23 RX ADMIN — APIXABAN 5 MG: 5 TABLET, FILM COATED ORAL at 22:01

## 2022-06-23 RX ADMIN — INSULIN LISPRO 12 UNITS: 100 INJECTION, SOLUTION INTRAVENOUS; SUBCUTANEOUS at 11:37

## 2022-06-23 RX ADMIN — INSULIN LISPRO 8 UNITS: 100 INJECTION, SOLUTION INTRAVENOUS; SUBCUTANEOUS at 21:59

## 2022-06-23 RX ADMIN — APIXABAN 5 MG: 5 TABLET, FILM COATED ORAL at 08:12

## 2022-06-24 LAB
GLUCOSE BLDC GLUCOMTR-MCNC: 144 MG/DL (ref 70–130)
GLUCOSE BLDC GLUCOMTR-MCNC: 178 MG/DL (ref 70–130)
GLUCOSE BLDC GLUCOMTR-MCNC: 232 MG/DL (ref 70–130)
GLUCOSE BLDC GLUCOMTR-MCNC: 276 MG/DL (ref 70–130)

## 2022-06-24 PROCEDURE — 25010000002 HYDROMORPHONE PER 4 MG: Performed by: FAMILY MEDICINE

## 2022-06-24 PROCEDURE — 82962 GLUCOSE BLOOD TEST: CPT

## 2022-06-24 PROCEDURE — 94660 CPAP INITIATION&MGMT: CPT

## 2022-06-24 PROCEDURE — 63710000001 INSULIN DETEMIR PER 5 UNITS: Performed by: FAMILY MEDICINE

## 2022-06-24 PROCEDURE — 94799 UNLISTED PULMONARY SVC/PX: CPT

## 2022-06-24 PROCEDURE — 99232 SBSQ HOSP IP/OBS MODERATE 35: CPT | Performed by: FAMILY MEDICINE

## 2022-06-24 PROCEDURE — 94761 N-INVAS EAR/PLS OXIMETRY MLT: CPT

## 2022-06-24 PROCEDURE — 63710000001 PREDNISONE PER 1 MG: Performed by: INTERNAL MEDICINE

## 2022-06-24 PROCEDURE — 94664 DEMO&/EVAL PT USE INHALER: CPT

## 2022-06-24 PROCEDURE — 63710000001 INSULIN LISPRO (HUMAN) PER 5 UNITS: Performed by: NURSE PRACTITIONER

## 2022-06-24 RX ADMIN — OXYCODONE HYDROCHLORIDE AND ACETAMINOPHEN 2 TABLET: 5; 325 TABLET ORAL at 12:35

## 2022-06-24 RX ADMIN — SODIUM CHLORIDE SOLN NEBU 7% 4 ML: 7 NEBU SOLN at 20:35

## 2022-06-24 RX ADMIN — BUDESONIDE 0.5 MG: 0.5 SUSPENSION RESPIRATORY (INHALATION) at 20:35

## 2022-06-24 RX ADMIN — HYDROMORPHONE HYDROCHLORIDE 0.5 MG: 1 INJECTION, SOLUTION INTRAMUSCULAR; INTRAVENOUS; SUBCUTANEOUS at 05:31

## 2022-06-24 RX ADMIN — IPRATROPIUM BROMIDE AND ALBUTEROL SULFATE 3 ML: .5; 3 SOLUTION RESPIRATORY (INHALATION) at 13:08

## 2022-06-24 RX ADMIN — METOPROLOL TARTRATE 25 MG: 25 TABLET, FILM COATED ORAL at 08:56

## 2022-06-24 RX ADMIN — Medication 10 MG: at 22:24

## 2022-06-24 RX ADMIN — Medication 10 ML: at 22:36

## 2022-06-24 RX ADMIN — IPRATROPIUM BROMIDE AND ALBUTEROL SULFATE 3 ML: .5; 3 SOLUTION RESPIRATORY (INHALATION) at 07:03

## 2022-06-24 RX ADMIN — PREDNISONE 20 MG: 20 TABLET ORAL at 08:56

## 2022-06-24 RX ADMIN — GABAPENTIN 300 MG: 300 CAPSULE ORAL at 15:02

## 2022-06-24 RX ADMIN — DULOXETINE HYDROCHLORIDE 120 MG: 60 CAPSULE, DELAYED RELEASE ORAL at 08:56

## 2022-06-24 RX ADMIN — INSULIN DETEMIR 15 UNITS: 100 INJECTION, SOLUTION SUBCUTANEOUS at 08:57

## 2022-06-24 RX ADMIN — DOCUSATE SODIUM 100 MG: 100 CAPSULE, LIQUID FILLED ORAL at 22:23

## 2022-06-24 RX ADMIN — IPRATROPIUM BROMIDE AND ALBUTEROL SULFATE 3 ML: .5; 3 SOLUTION RESPIRATORY (INHALATION) at 17:24

## 2022-06-24 RX ADMIN — ARIPIPRAZOLE 5 MG: 10 TABLET ORAL at 08:56

## 2022-06-24 RX ADMIN — BUSPIRONE HYDROCHLORIDE 10 MG: 10 TABLET ORAL at 08:56

## 2022-06-24 RX ADMIN — APIXABAN 5 MG: 5 TABLET, FILM COATED ORAL at 08:56

## 2022-06-24 RX ADMIN — PANTOPRAZOLE SODIUM 40 MG: 40 TABLET, DELAYED RELEASE ORAL at 05:30

## 2022-06-24 RX ADMIN — OXYCODONE HYDROCHLORIDE AND ACETAMINOPHEN 2 TABLET: 5; 325 TABLET ORAL at 17:27

## 2022-06-24 RX ADMIN — INSULIN DETEMIR 15 UNITS: 100 INJECTION, SOLUTION SUBCUTANEOUS at 22:27

## 2022-06-24 RX ADMIN — OXYCODONE HYDROCHLORIDE AND ACETAMINOPHEN 2 TABLET: 5; 325 TABLET ORAL at 06:46

## 2022-06-24 RX ADMIN — BUDESONIDE 0.5 MG: 0.5 SUSPENSION RESPIRATORY (INHALATION) at 07:07

## 2022-06-24 RX ADMIN — METOPROLOL TARTRATE 25 MG: 25 TABLET, FILM COATED ORAL at 22:24

## 2022-06-24 RX ADMIN — INSULIN LISPRO 4 UNITS: 100 INJECTION, SOLUTION INTRAVENOUS; SUBCUTANEOUS at 12:35

## 2022-06-24 RX ADMIN — SENNOSIDES AND DOCUSATE SODIUM 2 TABLET: 50; 8.6 TABLET ORAL at 08:57

## 2022-06-24 RX ADMIN — MUPIROCIN: 20 OINTMENT TOPICAL at 22:35

## 2022-06-24 RX ADMIN — APIXABAN 5 MG: 5 TABLET, FILM COATED ORAL at 22:24

## 2022-06-24 RX ADMIN — METOCLOPRAMIDE 10 MG: 10 TABLET ORAL at 08:57

## 2022-06-24 RX ADMIN — NYSTATIN: 100000 POWDER TOPICAL at 22:36

## 2022-06-24 RX ADMIN — TERAZOSIN HYDROCHLORIDE 2 MG: 2 CAPSULE ORAL at 22:25

## 2022-06-24 RX ADMIN — HYDROMORPHONE HYDROCHLORIDE 0.5 MG: 1 INJECTION, SOLUTION INTRAMUSCULAR; INTRAVENOUS; SUBCUTANEOUS at 15:02

## 2022-06-24 RX ADMIN — INSULIN LISPRO 12 UNITS: 100 INJECTION, SOLUTION INTRAVENOUS; SUBCUTANEOUS at 17:27

## 2022-06-24 RX ADMIN — INSULIN LISPRO 8 UNITS: 100 INJECTION, SOLUTION INTRAVENOUS; SUBCUTANEOUS at 22:26

## 2022-06-24 RX ADMIN — METOCLOPRAMIDE 10 MG: 10 TABLET ORAL at 12:35

## 2022-06-24 RX ADMIN — DOCUSATE SODIUM 100 MG: 100 CAPSULE, LIQUID FILLED ORAL at 08:56

## 2022-06-24 RX ADMIN — DIAZEPAM 2 MG: 2 TABLET ORAL at 08:56

## 2022-06-24 RX ADMIN — IPRATROPIUM BROMIDE AND ALBUTEROL SULFATE 3 ML: .5; 3 SOLUTION RESPIRATORY (INHALATION) at 20:35

## 2022-06-24 RX ADMIN — SODIUM CHLORIDE SOLN NEBU 7% 4 ML: 7 NEBU SOLN at 07:12

## 2022-06-24 RX ADMIN — BUSPIRONE HYDROCHLORIDE 10 MG: 10 TABLET ORAL at 22:25

## 2022-06-24 RX ADMIN — GABAPENTIN 300 MG: 300 CAPSULE ORAL at 22:24

## 2022-06-24 RX ADMIN — ESCITALOPRAM OXALATE 20 MG: 20 TABLET ORAL at 08:57

## 2022-06-24 RX ADMIN — METOCLOPRAMIDE 10 MG: 10 TABLET ORAL at 17:27

## 2022-06-24 RX ADMIN — AMLODIPINE BESYLATE 10 MG: 10 TABLET ORAL at 08:56

## 2022-06-24 RX ADMIN — GABAPENTIN 300 MG: 300 CAPSULE ORAL at 05:31

## 2022-06-24 RX ADMIN — MUPIROCIN: 20 OINTMENT TOPICAL at 09:04

## 2022-06-24 RX ADMIN — PREDNISONE 20 MG: 20 TABLET ORAL at 17:27

## 2022-06-24 RX ADMIN — OXYCODONE HYDROCHLORIDE AND ACETAMINOPHEN 2 TABLET: 5; 325 TABLET ORAL at 22:22

## 2022-06-24 RX ADMIN — METOCLOPRAMIDE 10 MG: 10 TABLET ORAL at 22:24

## 2022-06-24 RX ADMIN — SENNOSIDES AND DOCUSATE SODIUM 2 TABLET: 50; 8.6 TABLET ORAL at 22:23

## 2022-06-25 LAB
GLUCOSE BLDC GLUCOMTR-MCNC: 108 MG/DL (ref 70–130)
GLUCOSE BLDC GLUCOMTR-MCNC: 179 MG/DL (ref 70–130)
GLUCOSE BLDC GLUCOMTR-MCNC: 203 MG/DL (ref 70–130)
GLUCOSE BLDC GLUCOMTR-MCNC: 379 MG/DL (ref 70–130)

## 2022-06-25 PROCEDURE — 63710000001 INSULIN LISPRO (HUMAN) PER 5 UNITS: Performed by: NURSE PRACTITIONER

## 2022-06-25 PROCEDURE — 99232 SBSQ HOSP IP/OBS MODERATE 35: CPT | Performed by: FAMILY MEDICINE

## 2022-06-25 PROCEDURE — 63710000001 PREDNISONE PER 1 MG: Performed by: INTERNAL MEDICINE

## 2022-06-25 PROCEDURE — 94664 DEMO&/EVAL PT USE INHALER: CPT

## 2022-06-25 PROCEDURE — 82962 GLUCOSE BLOOD TEST: CPT

## 2022-06-25 PROCEDURE — 94799 UNLISTED PULMONARY SVC/PX: CPT

## 2022-06-25 PROCEDURE — 63710000001 INSULIN DETEMIR PER 5 UNITS: Performed by: FAMILY MEDICINE

## 2022-06-25 RX ORDER — OXYCODONE HCL 10 MG/1
10 TABLET, FILM COATED, EXTENDED RELEASE ORAL EVERY 12 HOURS SCHEDULED
Status: DISCONTINUED | OUTPATIENT
Start: 2022-06-25 | End: 2022-06-26 | Stop reason: HOSPADM

## 2022-06-25 RX ORDER — OXYCODONE HYDROCHLORIDE AND ACETAMINOPHEN 5; 325 MG/1; MG/1
1 TABLET ORAL EVERY 4 HOURS PRN
Status: DISCONTINUED | OUTPATIENT
Start: 2022-06-25 | End: 2022-06-26 | Stop reason: HOSPADM

## 2022-06-25 RX ADMIN — SODIUM CHLORIDE SOLN NEBU 7% 4 ML: 7 NEBU SOLN at 19:54

## 2022-06-25 RX ADMIN — BUSPIRONE HYDROCHLORIDE 10 MG: 10 TABLET ORAL at 22:21

## 2022-06-25 RX ADMIN — MUPIROCIN: 20 OINTMENT TOPICAL at 22:19

## 2022-06-25 RX ADMIN — GABAPENTIN 300 MG: 300 CAPSULE ORAL at 05:34

## 2022-06-25 RX ADMIN — SODIUM CHLORIDE SOLN NEBU 7% 4 ML: 7 NEBU SOLN at 09:50

## 2022-06-25 RX ADMIN — GABAPENTIN 300 MG: 300 CAPSULE ORAL at 22:21

## 2022-06-25 RX ADMIN — ESCITALOPRAM OXALATE 20 MG: 20 TABLET ORAL at 09:41

## 2022-06-25 RX ADMIN — PREDNISONE 20 MG: 20 TABLET ORAL at 09:40

## 2022-06-25 RX ADMIN — PANTOPRAZOLE SODIUM 40 MG: 40 TABLET, DELAYED RELEASE ORAL at 05:34

## 2022-06-25 RX ADMIN — DIAZEPAM 2 MG: 2 TABLET ORAL at 09:47

## 2022-06-25 RX ADMIN — AMLODIPINE BESYLATE 10 MG: 10 TABLET ORAL at 09:38

## 2022-06-25 RX ADMIN — DIAZEPAM 2 MG: 2 TABLET ORAL at 01:32

## 2022-06-25 RX ADMIN — OXYCODONE HYDROCHLORIDE AND ACETAMINOPHEN 2 TABLET: 5; 325 TABLET ORAL at 09:40

## 2022-06-25 RX ADMIN — IPRATROPIUM BROMIDE AND ALBUTEROL SULFATE 3 ML: .5; 3 SOLUTION RESPIRATORY (INHALATION) at 13:30

## 2022-06-25 RX ADMIN — METOCLOPRAMIDE 10 MG: 10 TABLET ORAL at 11:49

## 2022-06-25 RX ADMIN — IPRATROPIUM BROMIDE AND ALBUTEROL SULFATE 3 ML: .5; 3 SOLUTION RESPIRATORY (INHALATION) at 19:54

## 2022-06-25 RX ADMIN — NYSTATIN: 100000 POWDER TOPICAL at 22:20

## 2022-06-25 RX ADMIN — IPRATROPIUM BROMIDE AND ALBUTEROL SULFATE 3 ML: .5; 3 SOLUTION RESPIRATORY (INHALATION) at 15:41

## 2022-06-25 RX ADMIN — BUDESONIDE 0.5 MG: 0.5 SUSPENSION RESPIRATORY (INHALATION) at 09:50

## 2022-06-25 RX ADMIN — OXYCODONE HYDROCHLORIDE AND ACETAMINOPHEN 2 TABLET: 5; 325 TABLET ORAL at 05:34

## 2022-06-25 RX ADMIN — DOCUSATE SODIUM 100 MG: 100 CAPSULE, LIQUID FILLED ORAL at 22:21

## 2022-06-25 RX ADMIN — BUSPIRONE HYDROCHLORIDE 10 MG: 10 TABLET ORAL at 09:41

## 2022-06-25 RX ADMIN — METOCLOPRAMIDE 10 MG: 10 TABLET ORAL at 22:22

## 2022-06-25 RX ADMIN — OXYCODONE HYDROCHLORIDE AND ACETAMINOPHEN 1 TABLET: 5; 325 TABLET ORAL at 17:09

## 2022-06-25 RX ADMIN — ARIPIPRAZOLE 5 MG: 10 TABLET ORAL at 09:38

## 2022-06-25 RX ADMIN — SENNOSIDES AND DOCUSATE SODIUM 2 TABLET: 50; 8.6 TABLET ORAL at 22:24

## 2022-06-25 RX ADMIN — METOPROLOL TARTRATE 25 MG: 25 TABLET, FILM COATED ORAL at 09:41

## 2022-06-25 RX ADMIN — INSULIN LISPRO 8 UNITS: 100 INJECTION, SOLUTION INTRAVENOUS; SUBCUTANEOUS at 22:20

## 2022-06-25 RX ADMIN — APIXABAN 5 MG: 5 TABLET, FILM COATED ORAL at 09:38

## 2022-06-25 RX ADMIN — METOCLOPRAMIDE 10 MG: 10 TABLET ORAL at 16:59

## 2022-06-25 RX ADMIN — INSULIN LISPRO 4 UNITS: 100 INJECTION, SOLUTION INTRAVENOUS; SUBCUTANEOUS at 11:48

## 2022-06-25 RX ADMIN — INSULIN LISPRO 20 UNITS: 100 INJECTION, SOLUTION INTRAVENOUS; SUBCUTANEOUS at 17:00

## 2022-06-25 RX ADMIN — BUDESONIDE 0.5 MG: 0.5 SUSPENSION RESPIRATORY (INHALATION) at 19:54

## 2022-06-25 RX ADMIN — GABAPENTIN 300 MG: 300 CAPSULE ORAL at 14:30

## 2022-06-25 RX ADMIN — DIAZEPAM 2 MG: 2 TABLET ORAL at 20:04

## 2022-06-25 RX ADMIN — METOPROLOL TARTRATE 25 MG: 25 TABLET, FILM COATED ORAL at 22:22

## 2022-06-25 RX ADMIN — INSULIN DETEMIR 15 UNITS: 100 INJECTION, SOLUTION SUBCUTANEOUS at 22:19

## 2022-06-25 RX ADMIN — IPRATROPIUM BROMIDE AND ALBUTEROL SULFATE 3 ML: .5; 3 SOLUTION RESPIRATORY (INHALATION) at 09:50

## 2022-06-25 RX ADMIN — APIXABAN 5 MG: 5 TABLET, FILM COATED ORAL at 22:24

## 2022-06-25 RX ADMIN — PREDNISONE 20 MG: 20 TABLET ORAL at 17:00

## 2022-06-25 RX ADMIN — SENNOSIDES AND DOCUSATE SODIUM 2 TABLET: 50; 8.6 TABLET ORAL at 09:40

## 2022-06-25 RX ADMIN — OXYCODONE HYDROCHLORIDE 10 MG: 10 TABLET, FILM COATED, EXTENDED RELEASE ORAL at 22:23

## 2022-06-25 RX ADMIN — TERAZOSIN HYDROCHLORIDE 2 MG: 2 CAPSULE ORAL at 22:24

## 2022-06-25 RX ADMIN — Medication 10 MG: at 22:23

## 2022-06-25 RX ADMIN — DULOXETINE HYDROCHLORIDE 120 MG: 60 CAPSULE, DELAYED RELEASE ORAL at 09:41

## 2022-06-25 RX ADMIN — MUPIROCIN 1 APPLICATION: 20 OINTMENT TOPICAL at 09:41

## 2022-06-25 RX ADMIN — METOCLOPRAMIDE 10 MG: 10 TABLET ORAL at 09:38

## 2022-06-25 RX ADMIN — OXYCODONE HYDROCHLORIDE 10 MG: 10 TABLET, FILM COATED, EXTENDED RELEASE ORAL at 11:49

## 2022-06-25 RX ADMIN — Medication 10 ML: at 22:25

## 2022-06-25 RX ADMIN — NYSTATIN 1 APPLICATION: 100000 POWDER TOPICAL at 09:41

## 2022-06-25 RX ADMIN — DOCUSATE SODIUM 100 MG: 100 CAPSULE, LIQUID FILLED ORAL at 09:41

## 2022-06-26 VITALS
DIASTOLIC BLOOD PRESSURE: 73 MMHG | HEIGHT: 67 IN | TEMPERATURE: 98.7 F | SYSTOLIC BLOOD PRESSURE: 124 MMHG | RESPIRATION RATE: 20 BRPM | BODY MASS INDEX: 43.72 KG/M2 | HEART RATE: 91 BPM | OXYGEN SATURATION: 95 % | WEIGHT: 278.56 LBS

## 2022-06-26 LAB
GLUCOSE BLDC GLUCOMTR-MCNC: 129 MG/DL (ref 70–130)
GLUCOSE BLDC GLUCOMTR-MCNC: 151 MG/DL (ref 70–130)
SARS-COV-2 RDRP RESP QL NAA+PROBE: NORMAL

## 2022-06-26 PROCEDURE — 94660 CPAP INITIATION&MGMT: CPT

## 2022-06-26 PROCEDURE — 99239 HOSP IP/OBS DSCHRG MGMT >30: CPT | Performed by: NURSE PRACTITIONER

## 2022-06-26 PROCEDURE — 63710000001 PREDNISONE PER 1 MG: Performed by: INTERNAL MEDICINE

## 2022-06-26 PROCEDURE — 94664 DEMO&/EVAL PT USE INHALER: CPT

## 2022-06-26 PROCEDURE — 82962 GLUCOSE BLOOD TEST: CPT

## 2022-06-26 PROCEDURE — 63710000001 INSULIN DETEMIR PER 5 UNITS: Performed by: FAMILY MEDICINE

## 2022-06-26 PROCEDURE — 94799 UNLISTED PULMONARY SVC/PX: CPT

## 2022-06-26 PROCEDURE — 87635 SARS-COV-2 COVID-19 AMP PRB: CPT | Performed by: NURSE PRACTITIONER

## 2022-06-26 PROCEDURE — 63710000001 INSULIN LISPRO (HUMAN) PER 5 UNITS: Performed by: NURSE PRACTITIONER

## 2022-06-26 RX ORDER — PREDNISONE 20 MG/1
TABLET ORAL
Qty: 7 TABLET | Refills: 0
Start: 2022-06-26 | End: 2022-07-03

## 2022-06-26 RX ORDER — PANTOPRAZOLE SODIUM 40 MG/1
40 TABLET, DELAYED RELEASE ORAL
Start: 2022-06-27

## 2022-06-26 RX ORDER — GABAPENTIN 600 MG/1
300 TABLET ORAL 3 TIMES DAILY
Qty: 90 TABLET | Refills: 2
Start: 2022-06-26 | End: 2022-08-19

## 2022-06-26 RX ORDER — OXYCODONE AND ACETAMINOPHEN 10; 325 MG/1; MG/1
1 TABLET ORAL EVERY 4 HOURS PRN
Qty: 120 TABLET | Refills: 0
Start: 2022-06-26

## 2022-06-26 RX ORDER — METOCLOPRAMIDE 10 MG/1
10 TABLET ORAL
Status: CANCELLED
Start: 2022-06-26

## 2022-06-26 RX ORDER — INSULIN LISPRO 100 [IU]/ML
0-24 INJECTION, SOLUTION INTRAVENOUS; SUBCUTANEOUS
Refills: 12
Start: 2022-06-26

## 2022-06-26 RX ORDER — PSEUDOEPHEDRINE HCL 30 MG
100 TABLET ORAL 2 TIMES DAILY
Start: 2022-06-26

## 2022-06-26 RX ADMIN — OXYCODONE HYDROCHLORIDE AND ACETAMINOPHEN 1 TABLET: 5; 325 TABLET ORAL at 11:59

## 2022-06-26 RX ADMIN — METOPROLOL TARTRATE 25 MG: 25 TABLET, FILM COATED ORAL at 09:04

## 2022-06-26 RX ADMIN — MUPIROCIN: 20 OINTMENT TOPICAL at 09:48

## 2022-06-26 RX ADMIN — ESCITALOPRAM OXALATE 20 MG: 20 TABLET ORAL at 09:03

## 2022-06-26 RX ADMIN — ARIPIPRAZOLE 5 MG: 10 TABLET ORAL at 09:03

## 2022-06-26 RX ADMIN — PREDNISONE 20 MG: 20 TABLET ORAL at 09:04

## 2022-06-26 RX ADMIN — INSULIN LISPRO 4 UNITS: 100 INJECTION, SOLUTION INTRAVENOUS; SUBCUTANEOUS at 11:59

## 2022-06-26 RX ADMIN — BUSPIRONE HYDROCHLORIDE 10 MG: 10 TABLET ORAL at 09:04

## 2022-06-26 RX ADMIN — OXYCODONE HYDROCHLORIDE 10 MG: 10 TABLET, FILM COATED, EXTENDED RELEASE ORAL at 09:03

## 2022-06-26 RX ADMIN — METOCLOPRAMIDE 10 MG: 10 TABLET ORAL at 11:59

## 2022-06-26 RX ADMIN — DOCUSATE SODIUM 100 MG: 100 CAPSULE, LIQUID FILLED ORAL at 09:04

## 2022-06-26 RX ADMIN — APIXABAN 5 MG: 5 TABLET, FILM COATED ORAL at 09:03

## 2022-06-26 RX ADMIN — SENNOSIDES AND DOCUSATE SODIUM 2 TABLET: 50; 8.6 TABLET ORAL at 09:04

## 2022-06-26 RX ADMIN — DULOXETINE HYDROCHLORIDE 120 MG: 60 CAPSULE, DELAYED RELEASE ORAL at 09:04

## 2022-06-26 RX ADMIN — DIAZEPAM 2 MG: 2 TABLET ORAL at 09:03

## 2022-06-26 RX ADMIN — GABAPENTIN 300 MG: 300 CAPSULE ORAL at 06:00

## 2022-06-26 RX ADMIN — METOCLOPRAMIDE 10 MG: 10 TABLET ORAL at 09:04

## 2022-06-26 RX ADMIN — IPRATROPIUM BROMIDE AND ALBUTEROL SULFATE 3 ML: .5; 3 SOLUTION RESPIRATORY (INHALATION) at 11:53

## 2022-06-26 RX ADMIN — INSULIN DETEMIR 15 UNITS: 100 INJECTION, SOLUTION SUBCUTANEOUS at 09:28

## 2022-06-26 RX ADMIN — SODIUM CHLORIDE SOLN NEBU 7% 4 ML: 7 NEBU SOLN at 07:49

## 2022-06-26 RX ADMIN — AMLODIPINE BESYLATE 10 MG: 10 TABLET ORAL at 09:04

## 2022-06-26 RX ADMIN — IPRATROPIUM BROMIDE AND ALBUTEROL SULFATE 3 ML: .5; 3 SOLUTION RESPIRATORY (INHALATION) at 07:49

## 2022-06-26 RX ADMIN — GABAPENTIN 300 MG: 300 CAPSULE ORAL at 14:37

## 2022-06-26 RX ADMIN — PANTOPRAZOLE SODIUM 40 MG: 40 TABLET, DELAYED RELEASE ORAL at 06:00

## 2022-06-26 RX ADMIN — BUDESONIDE 0.5 MG: 0.5 SUSPENSION RESPIRATORY (INHALATION) at 07:49

## 2022-06-26 RX ADMIN — OXYCODONE HYDROCHLORIDE AND ACETAMINOPHEN 1 TABLET: 5; 325 TABLET ORAL at 06:00

## 2022-06-26 RX ADMIN — NYSTATIN: 100000 POWDER TOPICAL at 09:05

## 2022-06-26 RX ADMIN — Medication 10 ML: at 09:05

## 2022-06-30 ENCOUNTER — HOSPITAL ENCOUNTER (EMERGENCY)
Facility: HOSPITAL | Age: 57
Discharge: HOME | End: 2022-06-30
Attending: EMERGENCY MEDICINE | Admitting: EMERGENCY MEDICINE

## 2022-06-30 VITALS
TEMPERATURE: 98.5 F | HEART RATE: 103 BPM | WEIGHT: 270 LBS | SYSTOLIC BLOOD PRESSURE: 138 MMHG | RESPIRATION RATE: 18 BRPM | DIASTOLIC BLOOD PRESSURE: 76 MMHG | HEIGHT: 66 IN | BODY MASS INDEX: 43.39 KG/M2 | OXYGEN SATURATION: 97 %

## 2022-06-30 DIAGNOSIS — F41.9 ANXIETY: ICD-10-CM

## 2022-06-30 DIAGNOSIS — T81.41XA INFECTION OF SUPERFICIAL INCISIONAL SURGICAL SITE AFTER PROCEDURE, INITIAL ENCOUNTER: Primary | ICD-10-CM

## 2022-06-30 DIAGNOSIS — E87.1 HYPONATREMIA: ICD-10-CM

## 2022-06-30 DIAGNOSIS — R73.09 ELEVATED GLUCOSE: ICD-10-CM

## 2022-06-30 DIAGNOSIS — Z96.641 HISTORY OF RIGHT HIP REPLACEMENT: ICD-10-CM

## 2022-06-30 DIAGNOSIS — G89.4 CHRONIC PAIN SYNDROME: ICD-10-CM

## 2022-06-30 DIAGNOSIS — D72.829 LEUKOCYTOSIS, UNSPECIFIED TYPE: ICD-10-CM

## 2022-06-30 DIAGNOSIS — E88.09 HYPOALBUMINEMIA: ICD-10-CM

## 2022-06-30 DIAGNOSIS — E11.42 DIABETIC PERIPHERAL NEUROPATHY: ICD-10-CM

## 2022-06-30 LAB
ALBUMIN SERPL-MCNC: 3 G/DL (ref 3.5–5.2)
ALBUMIN/GLOB SERPL: 0.9 G/DL
ALP SERPL-CCNC: 114 U/L (ref 39–117)
ALT SERPL W P-5'-P-CCNC: 34 U/L (ref 1–33)
ANION GAP SERPL CALCULATED.3IONS-SCNC: 8 MMOL/L (ref 5–15)
AST SERPL-CCNC: 15 U/L (ref 1–32)
BASOPHILS # BLD AUTO: 0.09 10*3/MM3 (ref 0–0.2)
BASOPHILS NFR BLD AUTO: 0.5 % (ref 0–1.5)
BILIRUB SERPL-MCNC: 0.3 MG/DL (ref 0–1.2)
BUN SERPL-MCNC: 25 MG/DL (ref 6–20)
BUN/CREAT SERPL: 36.8 (ref 7–25)
CALCIUM SPEC-SCNC: 8.6 MG/DL (ref 8.6–10.5)
CHLORIDE SERPL-SCNC: 96 MMOL/L (ref 98–107)
CO2 SERPL-SCNC: 31 MMOL/L (ref 22–29)
CREAT SERPL-MCNC: 0.68 MG/DL (ref 0.57–1)
DEPRECATED RDW RBC AUTO: 53.6 FL (ref 37–54)
EGFRCR SERPLBLD CKD-EPI 2021: 101.7 ML/MIN/1.73
EOSINOPHIL # BLD AUTO: 0.18 10*3/MM3 (ref 0–0.4)
EOSINOPHIL NFR BLD AUTO: 0.9 % (ref 0.3–6.2)
ERYTHROCYTE [DISTWIDTH] IN BLOOD BY AUTOMATED COUNT: 17.2 % (ref 12.3–15.4)
GLOBULIN UR ELPH-MCNC: 3.4 GM/DL
GLUCOSE SERPL-MCNC: 207 MG/DL (ref 65–99)
HCT VFR BLD AUTO: 34.4 % (ref 34–46.6)
HGB BLD-MCNC: 10.6 G/DL (ref 12–15.9)
HOLD SPECIMEN: NORMAL
IMM GRANULOCYTES # BLD AUTO: 0.97 10*3/MM3 (ref 0–0.05)
IMM GRANULOCYTES NFR BLD AUTO: 5 % (ref 0–0.5)
LYMPHOCYTES # BLD AUTO: 3.22 10*3/MM3 (ref 0.7–3.1)
LYMPHOCYTES NFR BLD AUTO: 16.6 % (ref 19.6–45.3)
MCH RBC QN AUTO: 26.2 PG (ref 26.6–33)
MCHC RBC AUTO-ENTMCNC: 30.8 G/DL (ref 31.5–35.7)
MCV RBC AUTO: 85.1 FL (ref 79–97)
MONOCYTES # BLD AUTO: 0.84 10*3/MM3 (ref 0.1–0.9)
MONOCYTES NFR BLD AUTO: 4.3 % (ref 5–12)
NEUTROPHILS NFR BLD AUTO: 14.05 10*3/MM3 (ref 1.7–7)
NEUTROPHILS NFR BLD AUTO: 72.7 % (ref 42.7–76)
NRBC BLD AUTO-RTO: 0 /100 WBC (ref 0–0.2)
PLATELET # BLD AUTO: 284 10*3/MM3 (ref 140–450)
PMV BLD AUTO: 9.9 FL (ref 6–12)
POTASSIUM SERPL-SCNC: 3.9 MMOL/L (ref 3.5–5.2)
PROCALCITONIN SERPL-MCNC: 0.12 NG/ML (ref 0–0.25)
PROT SERPL-MCNC: 6.4 G/DL (ref 6–8.5)
RBC # BLD AUTO: 4.04 10*6/MM3 (ref 3.77–5.28)
SODIUM SERPL-SCNC: 135 MMOL/L (ref 136–145)
WBC NRBC COR # BLD: 19.35 10*3/MM3 (ref 3.4–10.8)
WHOLE BLOOD HOLD COAG: NORMAL
WHOLE BLOOD HOLD SPECIMEN: NORMAL

## 2022-06-30 PROCEDURE — 84145 PROCALCITONIN (PCT): CPT | Performed by: EMERGENCY MEDICINE

## 2022-06-30 PROCEDURE — 85025 COMPLETE CBC W/AUTO DIFF WBC: CPT

## 2022-06-30 PROCEDURE — 87077 CULTURE AEROBIC IDENTIFY: CPT | Performed by: EMERGENCY MEDICINE

## 2022-06-30 PROCEDURE — 80053 COMPREHEN METABOLIC PANEL: CPT

## 2022-06-30 PROCEDURE — 87186 SC STD MICRODIL/AGAR DIL: CPT | Performed by: EMERGENCY MEDICINE

## 2022-06-30 PROCEDURE — 87070 CULTURE OTHR SPECIMN AEROBIC: CPT | Performed by: EMERGENCY MEDICINE

## 2022-06-30 PROCEDURE — 87205 SMEAR GRAM STAIN: CPT | Performed by: EMERGENCY MEDICINE

## 2022-06-30 PROCEDURE — 99284 EMERGENCY DEPT VISIT MOD MDM: CPT

## 2022-06-30 RX ORDER — SULFAMETHOXAZOLE AND TRIMETHOPRIM 800; 160 MG/1; MG/1
1 TABLET ORAL ONCE
Status: COMPLETED | OUTPATIENT
Start: 2022-06-30 | End: 2022-06-30

## 2022-06-30 RX ORDER — SULFAMETHOXAZOLE AND TRIMETHOPRIM 800; 160 MG/1; MG/1
1 TABLET ORAL 2 TIMES DAILY
Qty: 14 TABLET | Refills: 0 | Status: SHIPPED | OUTPATIENT
Start: 2022-06-30 | End: 2022-07-07

## 2022-06-30 RX ORDER — LIDOCAINE HYDROCHLORIDE 10 MG/ML
10 INJECTION, SOLUTION EPIDURAL; INFILTRATION; INTRACAUDAL; PERINEURAL ONCE
Status: DISCONTINUED | OUTPATIENT
Start: 2022-06-30 | End: 2022-06-30 | Stop reason: HOSPADM

## 2022-06-30 RX ADMIN — SULFAMETHOXAZOLE AND TRIMETHOPRIM 1 TABLET: 800; 160 TABLET ORAL at 11:12

## 2022-07-01 RX ORDER — FUROSEMIDE 20 MG/1
TABLET ORAL
Qty: 30 TABLET | Refills: 1 | OUTPATIENT
Start: 2022-07-01

## 2022-07-01 RX ORDER — GABAPENTIN 600 MG/1
TABLET ORAL
Qty: 90 TABLET | Refills: 1 | OUTPATIENT
Start: 2022-07-01

## 2022-07-01 RX ORDER — DIAZEPAM 2 MG/1
TABLET ORAL
Qty: 60 TABLET | Refills: 1 | OUTPATIENT
Start: 2022-07-01

## 2022-07-01 RX ORDER — ARIPIPRAZOLE 5 MG/1
TABLET ORAL
Qty: 30 TABLET | Refills: 1 | OUTPATIENT
Start: 2022-07-01

## 2022-07-01 NOTE — TELEPHONE ENCOUNTER
Please call Pharmacy, I am not able to refill these at this time. She is still in hospital/ rehab.

## 2022-07-02 LAB
BACTERIA SPEC AEROBE CULT: ABNORMAL
GRAM STN SPEC: ABNORMAL
GRAM STN SPEC: ABNORMAL

## 2022-07-22 ENCOUNTER — HOME HEALTH ADMISSION (OUTPATIENT)
Dept: HOME HEALTH SERVICES | Facility: HOME HEALTHCARE | Age: 57
End: 2022-07-22

## 2022-07-22 ENCOUNTER — TRANSCRIBE ORDERS (OUTPATIENT)
Dept: HOME HEALTH SERVICES | Facility: HOME HEALTHCARE | Age: 57
End: 2022-07-22

## 2022-07-22 DIAGNOSIS — S31.109A OPEN WOUND OF ANTERIOR ABDOMINAL WALL WITH COMPLICATION, INITIAL ENCOUNTER: Primary | ICD-10-CM

## 2022-07-24 ENCOUNTER — READMISSION MANAGEMENT (OUTPATIENT)
Dept: CALL CENTER | Facility: HOSPITAL | Age: 57
End: 2022-07-24

## 2022-07-24 NOTE — OUTREACH NOTE
Prep Survey    Flowsheet Row Responses   [facility] facility patient discharged from? Non-BH   Is LACE score < 7 ? Non-BH Discharge   Emergency Room discharge w/ pulse ox? No   Eligibility [facility]    Date of Discharge 07/24/22   Discharge Disposition Home-Health Care [facility]   Discharge diagnosis Unavailable    Does the patient have one of the following disease processes/diagnoses(primary or secondary)? Other   Does the patient have Home health ordered? Yes   What is the Home health agency?  Home with home health    Prep survey completed? Yes          TEA BE - Registered Nurse

## 2022-07-25 ENCOUNTER — HOME CARE VISIT (OUTPATIENT)
Dept: HOME HEALTH SERVICES | Facility: HOME HEALTHCARE | Age: 57
End: 2022-07-25

## 2022-07-25 ENCOUNTER — TRANSITIONAL CARE MANAGEMENT TELEPHONE ENCOUNTER (OUTPATIENT)
Dept: CALL CENTER | Facility: HOSPITAL | Age: 57
End: 2022-07-25

## 2022-07-25 VITALS
SYSTOLIC BLOOD PRESSURE: 128 MMHG | HEART RATE: 82 BPM | RESPIRATION RATE: 20 BRPM | OXYGEN SATURATION: 97 % | TEMPERATURE: 98.2 F | DIASTOLIC BLOOD PRESSURE: 82 MMHG

## 2022-07-25 PROCEDURE — G0299 HHS/HOSPICE OF RN EA 15 MIN: HCPCS

## 2022-07-25 NOTE — OUTREACH NOTE
Call Center TCM Note    Flowsheet Row Responses   [facility] patient discharged from? Non-   Does the patient have one of the following disease processes/diagnoses(primary or secondary)? Other   TCM attempt successful? No   Unsuccessful attempts Attempt 1          Gloria Mcgraw RN    7/25/2022, 09:44 EDT

## 2022-07-25 NOTE — OUTREACH NOTE
Call Center TCM Note    Flowsheet Row Responses   [facility] patient discharged from? Non-   Does the patient have one of the following disease processes/diagnoses(primary or secondary)? Other   TCM attempt successful? Yes   Call start time 1206   Call end time 1214   Discharge diagnosis Unavailable    Meds reviewed with patient/caregiver? Yes   Is the patient having any side effects they believe may be caused by any medication additions or changes? No   Does the patient have all medications ordered at discharge? Yes   Prescription comments Patient plans to discuss medication list with  nurse during visit this afternoon    Is the patient taking all medications as directed (includes completed medication regime)? Yes   Does the patient have a primary care provider?  Yes   Does the patient have an appointment with their PCP within 7 days of discharge? No   Comments regarding PCP No available apts within TCM timeframe-will route message to PCP group (pt declined another provider at this time)   What is preventing the patient from scheduling follow up appointments within 7 days of discharge? Earlier appointment not available   Nursing Interventions Educated patient on importance of making appointment   Has the patient kept scheduled appointments due by today? N/A   What is the Home health agency?  Home with home health    Home health comments 13:00 apt today -  nurse coming to address wound    Psychosocial issues? No   Did the patient receive a copy of their discharge instructions? Yes   Nursing interventions Reviewed instructions with patient   What is the patient's perception of their health status since discharge? Improving   Is the patient/caregiver able to teach back signs and symptoms related to disease process for when to call PCP? Yes   Is the patient/caregiver able to teach back signs and symptoms related to disease process for when to call 911? Yes   Is the patient/caregiver able to teach back the  hierarchy of who to call/visit for symptoms/problems? PCP, Specialist, Home health nurse, Urgent Care, ED, 911 Yes   If the patient is a current smoker, are they able to teach back resources for cessation? [id number], Smoking cessation medications   TCM call completed? Yes          Gloria Mcgraw RN    7/25/2022, 12:19 EDT

## 2022-07-26 ENCOUNTER — TELEPHONE (OUTPATIENT)
Dept: FAMILY MEDICINE CLINIC | Facility: CLINIC | Age: 57
End: 2022-07-26

## 2022-07-26 NOTE — HOME HEALTH
SOC Note:    Home Health ordered for: disciplines SN    Reason for Hosp/Primary Dx/Co-morbidities: Infection following a procedure, superficial incisional surgical site, subsequent encounter    Focus of Care: Wound Care    Current Functional status/mobility/DME: REquires assistance with ADl'S, uses walker with in home    HB status/Living Arrangements:     Skin Integrity/wound status: Adb wound has pink granular tissue , wound vac in place.    Code Status: Full Code    Fall Risk: high    POC confirmed with Patient 7-25-22  Focus of Care: Wound care.

## 2022-07-27 ENCOUNTER — HOME CARE VISIT (OUTPATIENT)
Dept: HOME HEALTH SERVICES | Facility: HOME HEALTHCARE | Age: 57
End: 2022-07-27

## 2022-07-27 VITALS
OXYGEN SATURATION: 91 % | DIASTOLIC BLOOD PRESSURE: 88 MMHG | SYSTOLIC BLOOD PRESSURE: 148 MMHG | TEMPERATURE: 97.5 F | RESPIRATION RATE: 16 BRPM | HEART RATE: 120 BPM

## 2022-07-27 PROCEDURE — G0299 HHS/HOSPICE OF RN EA 15 MIN: HCPCS

## 2022-07-27 PROCEDURE — G0151 HHCP-SERV OF PT,EA 15 MIN: HCPCS

## 2022-07-28 ENCOUNTER — PATIENT OUTREACH (OUTPATIENT)
Dept: CASE MANAGEMENT | Facility: OTHER | Age: 57
End: 2022-07-28

## 2022-07-28 ENCOUNTER — HOME CARE VISIT (OUTPATIENT)
Dept: HOME HEALTH SERVICES | Facility: HOME HEALTHCARE | Age: 57
End: 2022-07-28

## 2022-07-28 ENCOUNTER — TELEPHONE (OUTPATIENT)
Dept: FAMILY MEDICINE CLINIC | Facility: CLINIC | Age: 57
End: 2022-07-28

## 2022-07-28 NOTE — OUTREACH NOTE
AMBULATORY CASE MANAGEMENT NOTE    Name and Relationship of Patient/Support Person: Dani Ziegler L - Self    Patient Outreach    [facility] admission 5/31-6/26/22.  To [facility] 6/26-7/24/22.  At home now with [facility].  States she has a call in to PCP office regarding appt.  At present, states she continues to use CGM, has all her medicines and is compliant and states her breathing has been good. She is using O2 on and off.   Did ask about frozen meal deliveries.  Encouraged to check with her [employer] insurance for benefits.  Also, encouraged to talk with home health regarding SW referral if she feel like she needs additional resources, which she states she will do. Will f/u after in 3-4 weeks, but knows to contact RN-ACM for any needs in the interim.      ADRIAN RICHARD  Ambulatory Case Management    7/28/2022, 10:59 EDT

## 2022-07-28 NOTE — HOME HEALTH
PT Eval Note:    Home Health ordered for: disciplines SN/PT/OT    Reason for Hosp/Primary Dx/Co-morbidities: COPD exacerbation and incisional hernia repair/diabetes mellitus, hypertension, history DVT's on Eliquis, recurrent pneumonia, morbid obesity, chronic mixed hypoxemic/hypercarbic respiratory failure, chronic home O2 2 L nasal cannula, chronic pain syndrome.    Focus of Care: HHPT for gait training, balance training, endurance training, therapuetic exercise, pt education and HEP instruction s/p COPD exacerbation    Current Functional status/mobility/DME: Pt at SBA level for mobilty with FWW/ has hospital bed, FWW, bath chair; pt requires min A for ADLs    HB status/Living Arrangements: pt lives with significant other in a one story apt, pt is homebound related to requires the use of an AD and the assistance of another person to safely leave home, O2 dependent and increase risk of falls    Skin Integrity/wound status: wound tx per SN    Code Status: full code    Fall Risk: high per Pura

## 2022-07-28 NOTE — TELEPHONE ENCOUNTER
Caller: [facility]        Best call back number: [phone]    What orders are you requesting (i.e. lab or imaging): OCCUPATIONAL THERAPY EXTENSION     In what timeframe would the patient need to come in: AS SOON AS POSSIBLE         Additional notes: THE CALLER STATES THAT THEY NEED THE EXTENSION THROUGH NEXT WEEK

## 2022-07-29 ENCOUNTER — HOME CARE VISIT (OUTPATIENT)
Dept: HOME HEALTH SERVICES | Facility: HOME HEALTHCARE | Age: 57
End: 2022-07-29

## 2022-07-29 VITALS
SYSTOLIC BLOOD PRESSURE: 129 MMHG | TEMPERATURE: 98.2 F | RESPIRATION RATE: 18 BRPM | DIASTOLIC BLOOD PRESSURE: 80 MMHG | OXYGEN SATURATION: 97 % | HEART RATE: 85 BPM

## 2022-07-29 PROCEDURE — G0300 HHS/HOSPICE OF LPN EA 15 MIN: HCPCS

## 2022-07-31 VITALS
HEART RATE: 74 BPM | DIASTOLIC BLOOD PRESSURE: 68 MMHG | TEMPERATURE: 97.3 F | SYSTOLIC BLOOD PRESSURE: 122 MMHG | RESPIRATION RATE: 14 BRPM | OXYGEN SATURATION: 96 %

## 2022-07-31 NOTE — HOME HEALTH
Routine Visit Note:  LPN    Skill/education provided: wound care and assessment. NPWT dressing applied pt tolerated well, no s/s of infection noted.    Patient/caregiver response: pt will call provider/[facility] if s/s of infection are noted. such as increased pain, drainage odor,     Plan for next visit: NPWT / wound care and assessment    Other pertinent info: order supplies

## 2022-08-01 ENCOUNTER — TELEPHONE (OUTPATIENT)
Dept: FAMILY MEDICINE CLINIC | Facility: CLINIC | Age: 57
End: 2022-08-01

## 2022-08-01 ENCOUNTER — HOME CARE VISIT (OUTPATIENT)
Dept: HOME HEALTH SERVICES | Facility: HOME HEALTHCARE | Age: 57
End: 2022-08-01

## 2022-08-01 VITALS
HEART RATE: 84 BPM | DIASTOLIC BLOOD PRESSURE: 78 MMHG | OXYGEN SATURATION: 96 % | TEMPERATURE: 98.2 F | SYSTOLIC BLOOD PRESSURE: 133 MMHG

## 2022-08-01 DIAGNOSIS — M51.9 LUMBAR DISC DISEASE: ICD-10-CM

## 2022-08-01 DIAGNOSIS — G89.4 CHRONIC PAIN SYNDROME: ICD-10-CM

## 2022-08-01 DIAGNOSIS — M12.9 ARTHRITIS INVOLVING MULTIPLE SITES: ICD-10-CM

## 2022-08-01 DIAGNOSIS — F41.9 ANXIETY: ICD-10-CM

## 2022-08-01 PROCEDURE — G0299 HHS/HOSPICE OF RN EA 15 MIN: HCPCS

## 2022-08-01 RX ORDER — OXYCODONE AND ACETAMINOPHEN 10; 325 MG/1; MG/1
1 TABLET ORAL EVERY 4 HOURS PRN
Qty: 120 TABLET | Refills: 0 | Status: CANCELLED
Start: 2022-08-01

## 2022-08-01 RX ORDER — DIAZEPAM 2 MG/1
2 TABLET ORAL EVERY 12 HOURS PRN
Qty: 10 TABLET | Refills: 0 | Status: SHIPPED | OUTPATIENT
Start: 2022-08-01 | End: 2022-08-12 | Stop reason: SDUPTHER

## 2022-08-01 RX ORDER — OXYCODONE HYDROCHLORIDE 5 MG/1
5 TABLET ORAL EVERY 4 HOURS PRN
Qty: 30 TABLET | Refills: 0 | Status: SHIPPED | OUTPATIENT
Start: 2022-08-01 | End: 2022-08-12 | Stop reason: SDUPTHER

## 2022-08-01 NOTE — HOME HEALTH
Focus of Care:  Skill/education provided: wound care and assessment. NPWT dressing applied pt tolerated well, no s/s of infection noted.      Patient/caregiver response: pt will call provider/[facility] if s/s of infection are noted. such as increased pain, drainage odor,          Plan for next visit: NPWT / wound care and assessment..wound vac drsg kits to be picked up at [facility] office for next schedule drsg change.  Will order if further drsg kits required.

## 2022-08-01 NOTE — TELEPHONE ENCOUNTER
Caller: Dani Ziegler    Relationship: Self    Best call back number: [phone]    Requested Prescriptions:   Requested Prescriptions     Pending Prescriptions Disp Refills   • diazePAM (VALIUM) 2 MG tablet 60 tablet 2     Sig: Take 1 tablet by mouth Every 12 (Twelve) Hours As Needed for Anxiety. for anxiety   • oxyCODONE-acetaminophen (PERCOCET)  MG per tablet 120 tablet 0     Sig: Take 1 tablet by mouth Every 4 (Four) Hours As Needed for Moderate Pain .        Pharmacy where request should be sent: [facility] [place] [address] - [phone]  - [phone] FX     Additional details provided by patient: OUT OF MEDICATION. PATIENT STATES SHE IS CURRENTLY WAITING TO HEAR BACK FROM PRACTICE ABOUT AN APPOINTMENT DATE WITH PCP.     Does the patient have less than a 3 day supply:  [x] Yes  [] No    Thania Valadez Rep   08/01/22 16:21 EDT

## 2022-08-01 NOTE — TELEPHONE ENCOUNTER
Please call.  She was given 6 days of oxycodone 5 that was filled on July 24.  I will give her enough of that until she is seen in the office on August 5 as scheduled.  At that time, we will need to discuss the continued use of pain medication.    I will also give her enough diazepam until she comes in at that time as well.

## 2022-08-02 ENCOUNTER — TELEPHONE (OUTPATIENT)
Dept: FAMILY MEDICINE CLINIC | Facility: CLINIC | Age: 57
End: 2022-08-02

## 2022-08-02 NOTE — TELEPHONE ENCOUNTER
Pt called stating she could not be here to see Dr. Tovar on Friday due to having three other appts scheduled that day.  She has been rescheduled for Friday Aug 12 and wanted to confirm that date would be ok with  Dr. Tovar for her hospital fu?

## 2022-08-02 NOTE — TELEPHONE ENCOUNTER
Pt informed and understood-stated can't keep appt 8-5-22 because she has 3 other appts that day-pt rescheduling appt today-pt stated ok about the pain meds not being sent in for longer time if reschedules appt

## 2022-08-03 ENCOUNTER — HOME CARE VISIT (OUTPATIENT)
Dept: HOME HEALTH SERVICES | Facility: HOME HEALTHCARE | Age: 57
End: 2022-08-03

## 2022-08-03 PROCEDURE — G0299 HHS/HOSPICE OF RN EA 15 MIN: HCPCS

## 2022-08-05 ENCOUNTER — HOME CARE VISIT (OUTPATIENT)
Dept: HOME HEALTH SERVICES | Facility: HOME HEALTHCARE | Age: 57
End: 2022-08-05

## 2022-08-05 PROCEDURE — G0152 HHCP-SERV OF OT,EA 15 MIN: HCPCS

## 2022-08-05 PROCEDURE — G0299 HHS/HOSPICE OF RN EA 15 MIN: HCPCS

## 2022-08-07 VITALS
SYSTOLIC BLOOD PRESSURE: 128 MMHG | DIASTOLIC BLOOD PRESSURE: 75 MMHG | OXYGEN SATURATION: 96 % | RESPIRATION RATE: 18 BRPM | TEMPERATURE: 98.2 F | HEART RATE: 74 BPM

## 2022-08-07 NOTE — HOME HEALTH
Focus of Care:  Skill/education provided: wound care and assessment. NPWT dressing applied pt tolerated well, slight increased drainage, in amount and thicker consistency.  Wound presents with slight odor with dressing change.  Wound changes relayed to physician for evalution and possible antibiotic therapy to begin.  Supplies ordered by nurse, as patient did not have more drsg change kits as she previously thought.  Instructions for Wet to dry drsg change relayed to caregiver, , if seal not mainatained over weekend.  Will schedule to see patient on Monday visit (early) to assess for any wound changes or further interventions.            Patient/caregiver response: pt will call provider/[facility] if s/s of infection are noted. such as increased pain, drainage odor,

## 2022-08-08 ENCOUNTER — HOME CARE VISIT (OUTPATIENT)
Dept: HOME HEALTH SERVICES | Facility: HOME HEALTHCARE | Age: 57
End: 2022-08-08

## 2022-08-08 VITALS
OXYGEN SATURATION: 95 % | SYSTOLIC BLOOD PRESSURE: 127 MMHG | DIASTOLIC BLOOD PRESSURE: 80 MMHG | HEART RATE: 84 BPM | TEMPERATURE: 98.1 F

## 2022-08-08 VITALS — OXYGEN SATURATION: 95 % | RESPIRATION RATE: 18 BRPM | HEART RATE: 83 BPM

## 2022-08-08 PROCEDURE — G0299 HHS/HOSPICE OF RN EA 15 MIN: HCPCS

## 2022-08-08 NOTE — HOME HEALTH
Pt is a 58 yo female who lives w/ SO in a single story apt w/ 1 step at back door and 4 steps and railing at front door. Dx; COPD exacerbation and incisional hernia repair  PMH includes diabetes mellitus, hypertension, history DVT's on Eliquis, recurrent pneumonia, morbid obesity, chronic mixed hypoxemic/hypercarbic respiratory failure, chronic home O2 2 L nasal cannula, chronic pain syndrome. Completed HHOT evaluation and recommend OT for balance training, activity tolerance training, therapuetic exercise,  pt education for EC/WS principles, HEP, home safety/fall prevention to increase ADL/IADL skills. DME in the home FWW, hospital bed, TTB, 02 concentrator, Trilogy.and wound vac.

## 2022-08-08 NOTE — HOME HEALTH
Focus of Care:  Continue wound vac.  Drsg change completed, wound healing, smalldrainage noted, odor resolved, with patient bathing wound with antibacterial soap and water as advised.  Will consider further need fir wound vac at next wound assessment, 8-10-22.
None

## 2022-08-10 ENCOUNTER — HOME CARE VISIT (OUTPATIENT)
Dept: HOME HEALTH SERVICES | Facility: HOME HEALTHCARE | Age: 57
End: 2022-08-10

## 2022-08-10 PROCEDURE — G0299 HHS/HOSPICE OF RN EA 15 MIN: HCPCS

## 2022-08-11 ENCOUNTER — HOME CARE VISIT (OUTPATIENT)
Dept: HOME HEALTH SERVICES | Facility: HOME HEALTHCARE | Age: 57
End: 2022-08-11

## 2022-08-11 VITALS
RESPIRATION RATE: 20 BRPM | OXYGEN SATURATION: 94 % | DIASTOLIC BLOOD PRESSURE: 80 MMHG | SYSTOLIC BLOOD PRESSURE: 129 MMHG | HEART RATE: 88 BPM | TEMPERATURE: 98.2 F

## 2022-08-11 NOTE — HOME HEALTH
Focus of care:  Wound care (abdominal).  Wound vac discontinued.  Wound care, moist to dry, Q day and prn if needed.  Apply NS moistened 2x2 to wound bed, cover with 4x4 and abd pad, secure with tape.  Patient is to shower or wash wound with antibacterial soap and water rinse prior to drsg change.  Wound healing well, with small SS drainage present.  No SSI noted.  Teaching done with patient and caregiver R/T dressing changes, and daily cleansing of the wound.

## 2022-08-12 ENCOUNTER — HOME CARE VISIT (OUTPATIENT)
Dept: HOME HEALTH SERVICES | Facility: HOME HEALTHCARE | Age: 57
End: 2022-08-12

## 2022-08-12 ENCOUNTER — TELEPHONE (OUTPATIENT)
Dept: FAMILY MEDICINE CLINIC | Facility: CLINIC | Age: 57
End: 2022-08-12

## 2022-08-12 DIAGNOSIS — F41.9 ANXIETY: ICD-10-CM

## 2022-08-12 DIAGNOSIS — G89.4 CHRONIC PAIN SYNDROME: ICD-10-CM

## 2022-08-12 DIAGNOSIS — M51.9 LUMBAR DISC DISEASE: ICD-10-CM

## 2022-08-12 DIAGNOSIS — M12.9 ARTHRITIS INVOLVING MULTIPLE SITES: ICD-10-CM

## 2022-08-12 PROCEDURE — G0299 HHS/HOSPICE OF RN EA 15 MIN: HCPCS

## 2022-08-12 RX ORDER — OXYCODONE HYDROCHLORIDE 5 MG/1
5 TABLET ORAL EVERY 6 HOURS PRN
Qty: 60 TABLET | Refills: 0 | Status: SHIPPED | OUTPATIENT
Start: 2022-08-12 | End: 2022-08-30 | Stop reason: SDUPTHER

## 2022-08-12 RX ORDER — OXYCODONE AND ACETAMINOPHEN 10; 325 MG/1; MG/1
1 TABLET ORAL EVERY 4 HOURS PRN
Qty: 120 TABLET | Refills: 0 | Status: CANCELLED
Start: 2022-08-12

## 2022-08-12 RX ORDER — DIAZEPAM 2 MG/1
2 TABLET ORAL EVERY 12 HOURS PRN
Qty: 30 TABLET | Refills: 0 | Status: SHIPPED | OUTPATIENT
Start: 2022-08-12

## 2022-08-12 NOTE — TELEPHONE ENCOUNTER
Please call.  I gave enough to get her till her appointment.  However, we had  changed the Percocet 10 to plain oxycodone 5 and she should not take any more than 1 up to 4 times a day.  I gave her a 2-week supply.

## 2022-08-14 VITALS
OXYGEN SATURATION: 96 % | DIASTOLIC BLOOD PRESSURE: 79 MMHG | SYSTOLIC BLOOD PRESSURE: 133 MMHG | TEMPERATURE: 98.2 F | RESPIRATION RATE: 20 BRPM | HEART RATE: 88 BPM

## 2022-08-14 NOTE — HOME HEALTH
Focus of care:  Drsg change for abd wound completed.  Wet to dry, with abd pad, taped to abdomen.  Wound healing with no SSI noted, area decreaseing in size.  Patient teaching continueed with general and specific wound hygeine.  Patient to cleanse wound daily with antibaterial soap and rise well with water, daily or BID if drainage increases.

## 2022-08-14 NOTE — CASE COMMUNICATION
Patient missed HHPT visit scheduled on 8/11/2022, therefore will not meet visit frequency for the week.    Reason: En route to PT visit patient states she wished to reschedule visit because she just woke up, pt. informed this visit was already rescheduled from Tuesday and staff is not able to move this visit again this week.      Thank you,  Sue Ochoa, PTA

## 2022-08-16 ENCOUNTER — HOME CARE VISIT (OUTPATIENT)
Dept: HOME HEALTH SERVICES | Facility: HOME HEALTHCARE | Age: 57
End: 2022-08-16

## 2022-08-16 PROCEDURE — G0152 HHCP-SERV OF OT,EA 15 MIN: HCPCS

## 2022-08-16 PROCEDURE — G0299 HHS/HOSPICE OF RN EA 15 MIN: HCPCS

## 2022-08-16 PROCEDURE — G0180 MD CERTIFICATION HHA PATIENT: HCPCS | Performed by: FAMILY MEDICINE

## 2022-08-17 ENCOUNTER — HOME CARE VISIT (OUTPATIENT)
Dept: HOME HEALTH SERVICES | Facility: HOME HEALTHCARE | Age: 57
End: 2022-08-17

## 2022-08-18 DIAGNOSIS — Z96.641 HISTORY OF RIGHT HIP REPLACEMENT: ICD-10-CM

## 2022-08-18 DIAGNOSIS — E11.42 DIABETIC PERIPHERAL NEUROPATHY: ICD-10-CM

## 2022-08-18 DIAGNOSIS — G89.4 CHRONIC PAIN SYNDROME: ICD-10-CM

## 2022-08-18 DIAGNOSIS — J44.9 CHRONIC OBSTRUCTIVE PULMONARY DISEASE, UNSPECIFIED COPD TYPE: ICD-10-CM

## 2022-08-19 RX ORDER — ALBUTEROL SULFATE 90 UG/1
1-2 AEROSOL, METERED RESPIRATORY (INHALATION) EVERY 4 HOURS PRN
Qty: 8.5 G | Refills: 0 | Status: SHIPPED | OUTPATIENT
Start: 2022-08-19 | End: 2022-09-19

## 2022-08-19 RX ORDER — FUROSEMIDE 20 MG/1
TABLET ORAL
Qty: 30 TABLET | Refills: 0 | Status: SHIPPED | OUTPATIENT
Start: 2022-08-19 | End: 2022-09-19

## 2022-08-19 RX ORDER — GABAPENTIN 600 MG/1
TABLET ORAL
Qty: 90 TABLET | Refills: 0 | Status: SHIPPED | OUTPATIENT
Start: 2022-08-19

## 2022-08-19 RX ORDER — ESCITALOPRAM OXALATE 20 MG/1
TABLET ORAL
Qty: 30 TABLET | Refills: 0 | Status: SHIPPED | OUTPATIENT
Start: 2022-08-19 | End: 2022-09-19

## 2022-08-19 RX ORDER — METOPROLOL SUCCINATE 50 MG/1
TABLET, EXTENDED RELEASE ORAL
Qty: 30 TABLET | Refills: 0 | Status: SHIPPED | OUTPATIENT
Start: 2022-08-19 | End: 2022-09-19

## 2022-08-19 RX ORDER — ARIPIPRAZOLE 5 MG/1
TABLET ORAL
Qty: 30 TABLET | Refills: 0 | Status: SHIPPED | OUTPATIENT
Start: 2022-08-19 | End: 2022-09-19

## 2022-08-19 RX ORDER — ROFLUMILAST 500 UG/1
TABLET ORAL
Qty: 30 TABLET | Refills: 0 | Status: SHIPPED | OUTPATIENT
Start: 2022-08-19 | End: 2022-09-19

## 2022-08-20 VITALS
SYSTOLIC BLOOD PRESSURE: 131 MMHG | OXYGEN SATURATION: 95 % | RESPIRATION RATE: 20 BRPM | DIASTOLIC BLOOD PRESSURE: 79 MMHG | HEART RATE: 88 BPM

## 2022-08-20 NOTE — HOME HEALTH
"Focus of Care:  Drsg change completed, wound healing, small drainage noted, odor resolved, with patient bathing wound with antibacterial soap and water as advised. Patient and caregiver proficient with wound care.  Per patient wound on her foot, \"opens up\", from time to time\".  Will monitor for SSI.  Patient will discuss any further treatment plans or recommendations with PCP at upcoming visit.  Call was made to pcp office to advise of wound status, foot, and abdomen.  Will continue with wound care."

## 2022-08-24 ENCOUNTER — HOME CARE VISIT (OUTPATIENT)
Dept: HOME HEALTH SERVICES | Facility: HOME HEALTHCARE | Age: 57
End: 2022-08-24

## 2022-08-24 DIAGNOSIS — J44.1 CHRONIC OBSTRUCTIVE PULMONARY DISEASE WITH ACUTE EXACERBATION: ICD-10-CM

## 2022-08-24 PROCEDURE — G0299 HHS/HOSPICE OF RN EA 15 MIN: HCPCS

## 2022-08-24 RX ORDER — ALBUTEROL SULFATE 2.5 MG/3ML
SOLUTION RESPIRATORY (INHALATION)
Qty: 180 ML | Refills: 1 | Status: SHIPPED | OUTPATIENT
Start: 2022-08-24 | End: 2023-01-26

## 2022-08-26 ENCOUNTER — PATIENT OUTREACH (OUTPATIENT)
Dept: CASE MANAGEMENT | Facility: OTHER | Age: 57
End: 2022-08-26

## 2022-08-26 ENCOUNTER — HOME CARE VISIT (OUTPATIENT)
Dept: HOME HEALTH SERVICES | Facility: HOME HEALTHCARE | Age: 57
End: 2022-08-26

## 2022-08-26 ENCOUNTER — TELEPHONE (OUTPATIENT)
Dept: FAMILY MEDICINE CLINIC | Facility: CLINIC | Age: 57
End: 2022-08-26

## 2022-08-26 VITALS
OXYGEN SATURATION: 95 % | RESPIRATION RATE: 20 BRPM | HEART RATE: 84 BPM | SYSTOLIC BLOOD PRESSURE: 134 MMHG | DIASTOLIC BLOOD PRESSURE: 80 MMHG | TEMPERATURE: 98.2 F

## 2022-08-26 VITALS
RESPIRATION RATE: 16 BRPM | SYSTOLIC BLOOD PRESSURE: 132 MMHG | OXYGEN SATURATION: 90 % | TEMPERATURE: 98.1 F | DIASTOLIC BLOOD PRESSURE: 82 MMHG | HEART RATE: 78 BPM

## 2022-08-26 PROCEDURE — G0152 HHCP-SERV OF OT,EA 15 MIN: HCPCS

## 2022-08-26 PROCEDURE — G0151 HHCP-SERV OF PT,EA 15 MIN: HCPCS

## 2022-08-26 NOTE — TELEPHONE ENCOUNTER
Caller: anisa Faith [facility]    Relationship:     Best call back number: [phone]    What orders are you requesting (i.e. lab or imaging): order for medical social work    In what timeframe would the patient need to come in: Asap    Where will you receive your lab/imaging services:     Additional notes:

## 2022-08-26 NOTE — TELEPHONE ENCOUNTER
Phoned Clarita at [facility] and gave verbal orders per Dr Tovar for  to visit pt

## 2022-08-26 NOTE — OUTREACH NOTE
"AMBULATORY CASE MANAGEMENT NOTE    Name and Relationship of Patient/Support Person: Dani Ziegler L - Self    Patient Outreach    F/u RN-ACM outreach.  States she is still receiving [facility].  States her wound is almost healed.  Wearing her O2 90% of time and is checking pulse O2.  States staying betw 85-92%.  States she is monitoring her bp and bs and \"everything is doing good.\"  She has appt with her PCP, Dr. Tovar 8/30/22.  States she is still smoking and not ready to quit, but \"I know I need to and I am upset with myself.\"  Encouraged to discuss smoking cessation assistance with Dr. Tovar on Tues.  She does express food insecurities.  States \"I only get $100 in food stamps a month.\"  Her SO is going to [facility] today.  RN-ACM will check with her [facility] regarding SW eval.     Care Coordination    [facility] contacted regarding need of SW eval.        ADRIAN RICHARD  Ambulatory Case Management    8/26/2022, 12:49 EDT  "

## 2022-08-26 NOTE — TELEPHONE ENCOUNTER
Clarita from [facility] needs verbal order to have medical social worker go to patient's home to set her up with food resources-pt having hard time obtaining food-call Clarita back after approved

## 2022-08-27 NOTE — HOME HEALTH
PT Reassessment/D/C Visit Note:    Skill/education provided: educated pt re: safety with gait, transfers, environmental fall hazards; reassessment completed    Patient/caregiver response: pt drowsy, difficulty attending to task    Plan for next visit: n/a    Other pertinent info: pt has cancelled/refused PT visits x past 3 weeks

## 2022-08-30 ENCOUNTER — OFFICE VISIT (OUTPATIENT)
Dept: FAMILY MEDICINE CLINIC | Facility: CLINIC | Age: 57
End: 2022-08-30

## 2022-08-30 ENCOUNTER — HOME CARE VISIT (OUTPATIENT)
Dept: HOME HEALTH SERVICES | Facility: HOME HEALTHCARE | Age: 57
End: 2022-08-30

## 2022-08-30 ENCOUNTER — TELEPHONE (OUTPATIENT)
Dept: FAMILY MEDICINE CLINIC | Facility: CLINIC | Age: 57
End: 2022-08-30

## 2022-08-30 VITALS
OXYGEN SATURATION: 90 % | DIASTOLIC BLOOD PRESSURE: 76 MMHG | BODY MASS INDEX: 45.04 KG/M2 | WEIGHT: 287 LBS | TEMPERATURE: 97.5 F | HEIGHT: 67 IN | HEART RATE: 89 BPM | SYSTOLIC BLOOD PRESSURE: 138 MMHG

## 2022-08-30 DIAGNOSIS — M12.9 ARTHRITIS INVOLVING MULTIPLE SITES: ICD-10-CM

## 2022-08-30 DIAGNOSIS — G89.4 CHRONIC PAIN SYNDROME: ICD-10-CM

## 2022-08-30 DIAGNOSIS — J44.1 CHRONIC OBSTRUCTIVE PULMONARY DISEASE WITH ACUTE EXACERBATION: Primary | ICD-10-CM

## 2022-08-30 DIAGNOSIS — Z79.899 HIGH RISK MEDICATION USE: ICD-10-CM

## 2022-08-30 DIAGNOSIS — M51.9 LUMBAR DISC DISEASE: ICD-10-CM

## 2022-08-30 DIAGNOSIS — Z09 HOSPITAL DISCHARGE FOLLOW-UP: ICD-10-CM

## 2022-08-30 DIAGNOSIS — L97.521 ULCER OF LEFT FOOT, LIMITED TO BREAKDOWN OF SKIN: ICD-10-CM

## 2022-08-30 PROCEDURE — G0299 HHS/HOSPICE OF RN EA 15 MIN: HCPCS

## 2022-08-30 PROCEDURE — 99214 OFFICE O/P EST MOD 30 MIN: CPT | Performed by: FAMILY MEDICINE

## 2022-08-30 RX ORDER — OXYCODONE HYDROCHLORIDE 5 MG/1
5 TABLET ORAL EVERY 6 HOURS PRN
Qty: 60 TABLET | Refills: 0 | Status: SHIPPED | OUTPATIENT
Start: 2022-08-30

## 2022-08-30 NOTE — PROGRESS NOTES
Chief Complaint  Hospital F/U (Needs  med refills  Lexapro, Cymbalta, trelegy, oxycodone), Pneumonia (May 27 ), and Abdominal Pain (Hernia also while in hospital, had surgery)    Subjective          Dani Ziegler presents to [facility]  History of Present Illness    Hospital follow-up  The patient presents today for a hospital follow-up. She was admitted to [facility] [place], operated by [facility] for pneumonia. She reports that she had to cancel her appointment twice due to having dust mites, bed bugs, aguilera ants, and something else in her home. She states that she was in the hospital with pneumonia; however, she did well and was going to be discharged; however, she then had an emergency hernia surgery. She then went to [facility] on 06/26/2022 due to her wound opening up and she had to go to the hospital to have it repaired again and was placed on a wound VAC. The patient states that she was discharged from [facility] on 07/24/2022. She reports that she has been in the hospital 6 times and put in coma at 5 hospitalizations. She states that her breathing has improved and she is doing what she is supposed to do most of the time. She reports that she is wearing her oxygen and not smoking. She states that she has to call the oxygen people when she gets home because her oxygen light continues to come on. She reports that she is 99.9 percent healed from the surgery; however, she still has a small hole.    Left foot ulcer  She reports that she had a sore on her left foot and she was going to see a podiatrist; however, she never made it due to her hospital admission. She states that she was informed that they were treating it and it was almost resolved. She reports that it is difficult for her to ambulate due to the pain. She states that she has been applying Betadine and elevating her foot. She reports that the nurse informed her to apply Neosporin; however, she then applied a Neosporin bandage and  it opened up approximately 20 to 25 percent more. She reports that she has home health coming to her home. She reports that the pain is mainly in her back and her foot. She states that she took 1 oxycodone 5 mg last night. She reports that the drainage has resolved; however, she continues to have some drainage.    Social history  She reports that her father passed away in 01/2022 while the patient was in a coma.    D/C Summary    Dani Ziegler is a 56 y.o. female with PMH diabetes mellitus, hypertension, history DVT's on Eliquis, recurrent pneumonia, morbid obesity, chronic mixed hypoxemic/hypercarbic respiratory failure (required intubation and tracheostomy in the past), chronic home O2 2 L nasal cannula, chronic pain syndrome with narcotic/benzodiazepine use, and history illicit drug use (UDS positive for methamphetamine).  Patient was admitted to [facility] 5/31/2022 with recurrent mixed respiratory failure requiring BiPAP due to left lung MRSA pneumonia for which she was started on vancomycin and Zosyn, continued on her home Eliquis, and given steroids which were tapered.  Patient improved and was transferred to telemetry 6/10/2022.  Unfortunately the night of 6/11/2022 she developed nausea and large-volume emesis with abdominal CT revealing a SBO due to a ventral abdominal hernia in the epigastric body wall.  Was taken to the OR 6/12/2022 and underwent exploratory laparotomy and incisional hernia repair with mesh.  Did not require any bowel resection.  Was returned to the ICU on ventilatory support but was able to be weaned and extubated by 6/13/2022.  She was initially on BiPAP and HFNC dependent but this gradually improved and was tapered to standard nasal oxygen. Now on chronic/ baseline oxygen at 3LNC and has been stable on telemetry floor. Concern for polypharmacy contributing to respiratory failure, have discontinued some and decreased other doses as tolerated. Needs a sleep study, follow with PCP.    Treated  "with Relastor and Reglan to assist with bowel movements postoperatively. Has now been having regular BM's almost daily and will stop Reglan at Dc. Needs to continue to use abdominal binder, and will follow up with surgery in 2 weeks for staple removal.   HgA1c of 5.7, significant adjustments made to insulin, likely only requiring at this time due to steroid treatment and will need to continue to monitor with tapering steroids. Steroids continue to wean over the next 1-2 weeks as ordered. Noted to have a persistent, stable leukocytosis over the hospital stay, have follow up with PCP with repeat labs. Now medically stable and will be transferred to rehab today.          Review of Systems   Constitutional: Positive for fatigue. Negative for fever.   HENT: Negative.    Respiratory: Positive for shortness of breath.    Cardiovascular: Negative.    Gastrointestinal: Negative.    Musculoskeletal: Positive for arthralgias and back pain.   Psychiatric/Behavioral: Positive for depressed mood and stress. Negative for suicidal ideas.        Objective       Vital Signs:   /76   Pulse 89   Temp 97.5 °F (36.4 °C)   Ht 170.2 cm (67\")   Wt 130 kg (287 lb) Comment: pt states weight  SpO2 90%   BMI 44.95 kg/m²     Physical Exam  Vitals and nursing note reviewed.   Constitutional:       Appearance: She is well-developed.   HENT:      Head: Normocephalic and atraumatic.      Right Ear: Hearing and external ear normal.      Left Ear: Hearing and external ear normal.   Eyes:      Conjunctiva/sclera: Conjunctivae normal.      Pupils: Pupils are equal, round, and reactive to light.   Cardiovascular:      Rate and Rhythm: Normal rate and regular rhythm.      Heart sounds: Normal heart sounds. No murmur heard.    No friction rub.   Pulmonary:      Effort: Pulmonary effort is normal. No respiratory distress.      Breath sounds: Wheezing and rhonchi present. No rales.   Abdominal:      Tenderness: There is no abdominal tenderness. " There is no guarding or rebound.      Comments: Incision from hernia/SBO improving.  There is no redness.  No drainage.   Musculoskeletal:      Right lower leg: No edema.      Left lower leg: No edema.   Skin:     General: Skin is warm.   Neurological:      Mental Status: She is alert and oriented to person, place, and time.   Psychiatric:         Behavior: Behavior normal.          Left foot ulcer.  Approximately 1 cm.  Drainage.  Diffuse tenderness around the ulcer.    Result Review :                     Assessment and Plan    Diagnoses and all orders for this visit:    1. Chronic obstructive pulmonary disease with acute exacerbation (HCC) (Primary)    2. Ulcer of left foot, limited to breakdown of skin (HCC)    3. Hospital discharge follow-up    4. Chronic pain syndrome  -     Drug Analysis,Comp,Oral Fluid - Saliva,  -     oxyCODONE (Roxicodone) 5 MG immediate release tablet; Take 1 tablet by mouth Every 6 (Six) Hours As Needed for Moderate Pain.  Dispense: 60 tablet; Refill: 0    5. Lumbar disc disease  -     Drug Analysis,Comp,Oral Fluid - Saliva,  -     oxyCODONE (Roxicodone) 5 MG immediate release tablet; Take 1 tablet by mouth Every 6 (Six) Hours As Needed for Moderate Pain.  Dispense: 60 tablet; Refill: 0    6. High risk medication use  -     Drug Analysis,Comp,Oral Fluid - Saliva,    7. Arthritis involving multiple sites  -     oxyCODONE (Roxicodone) 5 MG immediate release tablet; Take 1 tablet by mouth Every 6 (Six) Hours As Needed for Moderate Pain.  Dispense: 60 tablet; Refill: 0          DISCUSSION    Patient with chronic obstructive pulmonary disease on oxygen. Oxygen levels initially 90%. Maintain oxygen level between 88% and 92%. She is also on Trelegy ventilator machine at home. She will seek urgent medical attention if develops any worsening problems. She had recent hospitalization as noted with intubation and respiratory failure. She was subsequently discharged to [facility]. She developed a  large hernia and a small bowel obstruction and had surgery. She is healing from the surgical wound. Her breathing has stabilized and overall she is feeling somewhat better, but still poorly. She will continue her inhalers and oxygen at home and Trelegy ventilator machine.    Ulcer of left foot  Still having persistent problems. It is getting better. There is no evidence of any infection at this time. Continue local wound care and Betadine.    Chronic pain due to lumbar disc disease  She is currently on oxycodone 5 mg every 6 hours. She previously was on Percocet 10 mg, but she had a positive drug screen for methamphetamine in the hospital. She denies any methamphetamine use. I explained to her that we would need to repeat a drug screen today. She is agreeable. If it does show any abnormalities, we would not be able to continue her narcotics. Further plan once that is back. She was given a 2-week supply of her oxycodone 5 mg to take one every 6 hours.     Reviewed blood work from [facility]. Her white blood cell count had improved. Her hemoglobin was increasing. Electrolytes were stable. We will plan on repeating blood work in 6 weeks at follow-up.      Alden dated 8/31/2022 was reviewed and appropriate.     Follow Up   Return in about 6 weeks (around 10/11/2022).    Patient was given instructions and counseling regarding her condition or for health maintenance advice. Please see specific information pulled into the AVS if appropriate.     Transcribed from ambient dictation for Darrion Tovar MD by DEMETRIO GOFF.  08/31/22   09:43 EDT    Patient verbalized consent to the visit recording.  I have personally performed the services described in this document as transcribed by the above individual, and it is both accurate and complete.  Darrion Tovar MD  8/31/2022  15:53 EDT        Darrion Tovar MD

## 2022-08-30 NOTE — HOME HEALTH
Called patient to discuss social work visit.  She said she just needs help with home delivered meals.  She said her boyfriend is staying with her and helping her currently.  She said she had received frozen meals in the past.  Discussed that this was probably from her insurance and she said she had a different insurance then.  She said she will call her current insurance to see if they do this.  She isn't old enough to get meal delivered from the [facility].  Discussed the food pantry and she said she is aware of it and that she and her boyfriend both cook.  I will explore other options for her.  She denies needing an in person visit from me and is agreeable to me sending resources to her home.  Resources sent.  She said her boyfriend is taking her to the doctor today.  Denies further needs at this time.

## 2022-08-31 VITALS
DIASTOLIC BLOOD PRESSURE: 80 MMHG | HEART RATE: 78 BPM | RESPIRATION RATE: 16 BRPM | OXYGEN SATURATION: 91 % | TEMPERATURE: 97.6 F | SYSTOLIC BLOOD PRESSURE: 130 MMHG

## 2022-08-31 NOTE — HOME HEALTH
Routine Visit Note:    Skill/education provided: Instr/completed breathing exercises, coordinating breathing w/ bending/reaching task in sitting w/ LB dressing, L foot pain limiting pt's standing/walking in the home. Increase score on Modified Irena Index = 84/100    Patient/caregiver response: Pt tolerated session well, completing tasks in sitting and 02 stat 89-90% w/ 2 L 02    Plan for next visit: d/c    Other pertinent info: rec vo for additional visist to make up for MV and complete d/c planning

## 2022-09-01 ENCOUNTER — HOME CARE VISIT (OUTPATIENT)
Dept: HOME HEALTH SERVICES | Facility: HOME HEALTHCARE | Age: 57
End: 2022-09-01

## 2022-09-01 VITALS
HEART RATE: 88 BPM | DIASTOLIC BLOOD PRESSURE: 78 MMHG | TEMPERATURE: 97.7 F | OXYGEN SATURATION: 96 % | RESPIRATION RATE: 20 BRPM | SYSTOLIC BLOOD PRESSURE: 127 MMHG

## 2022-09-01 NOTE — HOME HEALTH
Focus of care:  Patient abdominal wound healed, area closed with epithelialized tissue present.  Chronic callous/wound on patient left plantat region of foot, healing with no SSI noted.  Patient competent with cleansing area and application of bandaid dressing.  Patient will be discharged at next SN visit, if no firther needs present.

## 2022-09-01 NOTE — HOME HEALTH
Provided resources to patient including food resources that she requested.  She remains at home with her boyfriend assisting.   No further social work visits planned at this time.

## 2022-09-02 ENCOUNTER — HOME CARE VISIT (OUTPATIENT)
Dept: HOME HEALTH SERVICES | Facility: HOME HEALTHCARE | Age: 57
End: 2022-09-02

## 2022-09-06 NOTE — HOME HEALTH
Pt clx OT visit due to Left foot pain and states she is not able to get up and work w/ OT today. Recommend OT discharge at this time as pt appears to be doing the best she can w/ ADL/lt IADL skills, Pt has clx 2 of 5 OT visits and limited improvement w/ activity tolerance during OT sessions due to foot pain. Reviewed HEP, EC/WS principles, and encouraged to wear walking boot when up and increase activity tolerance as able. Pt has bathroom equipment in place and ind w/ ADL skills w/ s/u-clean up assist. Cg'er assist w/ meals, laundry and cleaning tasks. Discharge from OT on 8-2-22. RN remains in the home.

## 2022-09-07 ENCOUNTER — HOME CARE VISIT (OUTPATIENT)
Dept: HOME HEALTH SERVICES | Facility: HOME HEALTHCARE | Age: 57
End: 2022-09-07

## 2022-09-07 PROCEDURE — G0299 HHS/HOSPICE OF RN EA 15 MIN: HCPCS

## 2022-09-08 VITALS
DIASTOLIC BLOOD PRESSURE: 78 MMHG | RESPIRATION RATE: 20 BRPM | SYSTOLIC BLOOD PRESSURE: 132 MMHG | HEART RATE: 84 BPM | OXYGEN SATURATION: 94 % | TEMPERATURE: 97.7 F

## 2022-09-08 VITALS
TEMPERATURE: 96.8 F | RESPIRATION RATE: 20 BRPM | DIASTOLIC BLOOD PRESSURE: 84 MMHG | SYSTOLIC BLOOD PRESSURE: 128 MMHG | HEART RATE: 82 BPM | OXYGEN SATURATION: 94 %

## 2022-09-09 LAB
6MAM SAL CFM-MCNC: NOT DETECTED NG/ML
6MAM SAL QL CFM: NEGATIVE
ALPRAZ SAL CFM-MCNC: NOT DETECTED NG/ML
AMPHET SAL CFM-MCNC: 16.5 NG/ML
AMPHET SAL QL CFM: ABNORMAL
ANTICONVULSANTS SAL QL CFM: NEGATIVE
BENZODIAZ SAL QL CFM: NEGATIVE
BUPRENORPHINE SAL CFM-MCNC: NOT DETECTED NG/ML
BUPRENORPHINE SAL QL CFM: NEGATIVE
BZE SAL CFM-MCNC: NOT DETECTED NG/ML
CANNABINOIDS SAL QL SCN: NEGATIVE
CARBOXYTHC SAL CFM-MCNC: NOT DETECTED NG/ML
CARISOPRODOL SAL CFM-MCNC: NOT DETECTED NG/ML
CLONAZEPAM SAL CFM-MCNC: NOT DETECTED NG/ML
COC+MET SAL QL CFM: NEGATIVE
COCAINE SAL CFM-MCNC: NOT DETECTED NG/ML
CODEINE SAL CFM-MCNC: NOT DETECTED NG/ML
COTININE SAL CFM-MCNC: 30.5 NG/ML
COTININE SAL QL CFM: ABNORMAL
CYCLOBENZAPRINE SAL CFM-MCNC: NOT DETECTED NG/ML
DHC SAL CFM-MCNC: NOT DETECTED NG/ML
DIAZEPAM SAL CFM-MCNC: NOT DETECTED NG/ML
DULOXETINE SAL CFM-MCNC: NOT DETECTED NG/ML
DULOXETINE SAL QL CFM: NEGATIVE
EDDP SAL QL CFM: NOT DETECTED NG/ML
FENTANYL SAL CFM-MCNC: ABNORMAL NG/ML
FENTANYL SAL QL SCN: 1.1 NG/ML
FLUNITRAZEPAM SAL CFM-MCNC: NOT DETECTED NG/ML
FLURAZEPAM SAL CFM-MCNC: NOT DETECTED NG/ML
GABAPENTIN SAL CFM-MCNC: NOT DETECTED UG/ML
HYDROCODONE SAL CFM-MCNC: NOT DETECTED NG/ML
HYDROMORPHONE SAL CFM-MCNC: NOT DETECTED NG/ML
HYPNOTICS SAL QL CFM: NEGATIVE
LORAZEPAM SAL-MCNC: NOT DETECTED NG/ML
MDMA SAL CFM-MCNC: NOT DETECTED NG/ML
MEPERIDINE SAL CFM-MCNC: NOT DETECTED NG/ML
MEPROBAMATE SAL CFM-MCNC: NOT DETECTED NG/ML
METHADONE SAL CFM-MCNC: NOT DETECTED NG/ML
METHADONE SAL QL CFM: NEGATIVE
METHAMPHET SAL CFM-MCNC: 194 NG/ML
MIDAZOLAM SAL CFM-MCNC: NOT DETECTED NG/ML
MORPHINE SAL CFM-MCNC: NOT DETECTED NG/ML
MUSCLE RELAXANTS: NEGATIVE
NARCOTICS SAL QL CFM: NEGATIVE
NORBUPRENORPHINE SAL CFM-MCNC: NOT DETECTED NG/ML
NORDIAZEPAM SAL-MCNC: NOT DETECTED NG/ML
NORHYDROCODONE SAL CFM-MCNC: NOT DETECTED NG/ML
NOROXYCODONE SAL CFM-MCNC: NOT DETECTED NG/ML
OPIATES SAL QL CFM: NEGATIVE
OXAZEPAM SAL CFM-MCNC: NOT DETECTED NG/ML
OXYCODONE SAL CFM-MCNC: NOT DETECTED NG/ML
OXYCODONE SAL QL CFM: NEGATIVE
OXYMORPHONE SAL CFM-MCNC: NOT DETECTED NG/ML
PCP SAL CFM-MCNC: NOT DETECTED NG/ML
PCP SAL QL CFM: NEGATIVE
PREGABALIN SAL CFM-MCNC: NOT DETECTED UG/ML
PROPOXYPH SAL CFM-MCNC: NOT DETECTED NG/ML
TAPENTADOL SAL CFM-MCNC: NOT DETECTED NG/ML
TAPENTADOL SAL QL SCN: NEGATIVE
TEMAZEPAM SAL CFM-MCNC: NOT DETECTED NG/ML
TRAMADOL SAL CFM-MCNC: NOT DETECTED NG/ML
TRIAZOLAM SAL CFM-MCNC: NOT DETECTED NG/ML
ZOLPIDEM SAL CFM-MCNC: NOT DETECTED NG/ML

## 2022-09-19 DIAGNOSIS — Z96.641 HISTORY OF RIGHT HIP REPLACEMENT: ICD-10-CM

## 2022-09-19 DIAGNOSIS — G89.4 CHRONIC PAIN SYNDROME: ICD-10-CM

## 2022-09-19 DIAGNOSIS — B37.9 YEAST INFECTION: ICD-10-CM

## 2022-09-19 DIAGNOSIS — E11.42 DIABETIC PERIPHERAL NEUROPATHY: ICD-10-CM

## 2022-09-19 DIAGNOSIS — J44.9 CHRONIC OBSTRUCTIVE PULMONARY DISEASE, UNSPECIFIED COPD TYPE: ICD-10-CM

## 2022-09-19 RX ORDER — METOPROLOL SUCCINATE 50 MG/1
TABLET, EXTENDED RELEASE ORAL
Qty: 30 TABLET | Refills: 2 | Status: SHIPPED | OUTPATIENT
Start: 2022-09-19 | End: 2023-01-26

## 2022-09-19 RX ORDER — NIFEDIPINE 30 MG/1
TABLET, EXTENDED RELEASE ORAL
Qty: 30 TABLET | Refills: 2 | Status: SHIPPED | OUTPATIENT
Start: 2022-09-19 | End: 2023-01-26

## 2022-09-19 RX ORDER — ARIPIPRAZOLE 5 MG/1
TABLET ORAL
Qty: 30 TABLET | Refills: 2 | Status: SHIPPED | OUTPATIENT
Start: 2022-09-19 | End: 2023-01-26

## 2022-09-19 RX ORDER — APIXABAN 5 MG/1
TABLET, FILM COATED ORAL
Qty: 60 TABLET | Refills: 2 | Status: SHIPPED | OUTPATIENT
Start: 2022-09-19 | End: 2023-01-26

## 2022-09-19 RX ORDER — ALBUTEROL SULFATE 90 UG/1
AEROSOL, METERED RESPIRATORY (INHALATION)
Qty: 8.5 G | Refills: 2 | Status: SHIPPED | OUTPATIENT
Start: 2022-09-19 | End: 2023-01-26

## 2022-09-19 RX ORDER — FUROSEMIDE 20 MG/1
TABLET ORAL
Qty: 30 TABLET | Refills: 2 | Status: SHIPPED | OUTPATIENT
Start: 2022-09-19 | End: 2023-01-26

## 2022-09-19 RX ORDER — ESCITALOPRAM OXALATE 20 MG/1
TABLET ORAL
Qty: 30 TABLET | Refills: 2 | Status: SHIPPED | OUTPATIENT
Start: 2022-09-19 | End: 2023-01-26 | Stop reason: SDUPTHER

## 2022-09-19 RX ORDER — GABAPENTIN 600 MG/1
TABLET ORAL
Qty: 90 TABLET | Refills: 2 | OUTPATIENT
Start: 2022-09-19

## 2022-09-19 RX ORDER — NYSTATIN 100000 [USP'U]/G
POWDER TOPICAL
Qty: 60 G | Refills: 2 | Status: SHIPPED | OUTPATIENT
Start: 2022-09-19 | End: 2023-01-26

## 2022-09-19 RX ORDER — ROFLUMILAST 500 UG/1
TABLET ORAL
Qty: 30 TABLET | Refills: 2 | Status: SHIPPED | OUTPATIENT
Start: 2022-09-19 | End: 2023-01-26

## 2022-09-19 RX ORDER — NYSTATIN 100000 U/G
CREAM TOPICAL
Qty: 30 G | Refills: 2 | Status: SHIPPED | OUTPATIENT
Start: 2022-09-19 | End: 2023-01-26

## 2022-10-17 ENCOUNTER — TELEPHONE (OUTPATIENT)
Dept: FAMILY MEDICINE CLINIC | Facility: CLINIC | Age: 57
End: 2022-10-17

## 2022-10-17 NOTE — TELEPHONE ENCOUNTER
[facility]  called for to verify we will follow orders with patient. Looks like [facility] placed orders for PT and OT. For resent hospital visit. Called requested records.   Okayed verbal orders.      [phone] La

## 2022-10-18 ENCOUNTER — TELEPHONE (OUTPATIENT)
Dept: FAMILY MEDICINE CLINIC | Facility: CLINIC | Age: 57
End: 2022-10-18

## 2022-10-18 NOTE — TELEPHONE ENCOUNTER
needing a order having a a lot of emotional and problems dealing and processing things, wanting to know if they can get the verbal order for this to be placed.    [phone] shen  I did not get where she is calling from

## 2022-10-27 ENCOUNTER — TELEPHONE (OUTPATIENT)
Dept: FAMILY MEDICINE CLINIC | Facility: CLINIC | Age: 57
End: 2022-10-27

## 2022-10-27 NOTE — TELEPHONE ENCOUNTER
Caller: [facility]    Relationship to patient: Home Health    Best call back number: [phone]    New or established patient?  [] New  [x] Established    Date of discharge: 10/11    Facility discharged from: [facility]    Diagnosis/Symptoms: FENTANYL USE AND RESPIRATORY FAILURE    Length of stay (If applicable): 10/3 - 10/11    WANG WAS CALLING IN TO CHECK THE STATUS OF HER HOSPITAL FOLLOW UP APPOINTMENT. SHE HAS NOT BEEN SCHEDULED AND THEY HAVE BEEN UNABLE TO REACH HER. SHE HAS A SMALL SCAB ON HER LEFT DORSAL FOOT. IT IS COMPLETELY SCABBED. SHE MAY NEED TO SEE A PODIATRIST.

## 2022-10-28 NOTE — TELEPHONE ENCOUNTER
Please call.  She had an appointment on October 18 but she canceled it.     Please try to call patient and recommend that she make a follow-up appointment.

## 2022-11-01 ENCOUNTER — TELEPHONE (OUTPATIENT)
Dept: FAMILY MEDICINE CLINIC | Facility: CLINIC | Age: 57
End: 2022-11-01

## 2022-11-01 DIAGNOSIS — J44.9 CHRONIC OBSTRUCTIVE PULMONARY DISEASE, UNSPECIFIED COPD TYPE: ICD-10-CM

## 2022-11-01 RX ORDER — FLUTICASONE FUROATE, UMECLIDINIUM BROMIDE AND VILANTEROL TRIFENATATE 100; 62.5; 25 UG/1; UG/1; UG/1
POWDER RESPIRATORY (INHALATION)
Qty: 60 EACH | Refills: 0 | Status: SHIPPED | OUTPATIENT
Start: 2022-11-01 | End: 2022-12-14

## 2022-11-16 ENCOUNTER — PATIENT OUTREACH (OUTPATIENT)
Dept: CASE MANAGEMENT | Facility: OTHER | Age: 57
End: 2022-11-16

## 2022-11-16 NOTE — OUTREACH NOTE
"AMBULATORY CASE MANAGEMENT NOTE    Name and Relationship of Patient/Support Person: Dani Ziegler L - Self    Patient Outreach    F/u outreach. Pt states doing \"great.\"  Sounded SOB, but when asked about it, she stated that she had fallen asleep watching a movie and did not have her trelegy vent on, which she states she usually wears when sleeping or napping.  Discussed COPD action Plan and states written action plan is on her fridge and she reports that she is in green zone today.  Has upcoming appt with her PCP this Friday and states a friend will be taking her to the appt.  Offered to assist in applying for Wheels, however she states \"I do not go anywhere, b/c I am afraid of Covid,\" and is able to get ride to her medical appts.  She states she has been under a lot of stress with her mother and father dying, death of a friend and has legal action against her former SO.  States she is receiving therapy via hospice grief counseling.   She is still receiving home health, and was proud to state she has not used w/c in weeks.  She is ambulating with walker.  She states her pulse O2 is staying betw 88-92% without O2 and after her breathing exercises or with O2 on, it ist 92-97%.  She states she has plenty of food in the home, but does rely on her neighbor at times. Asked if she had tried contacting [facility] regarding benefits, which she had not.  Offered to call on her behalf, which she accepted.  Explained it may take 4-6 weeks to hear from anyone.  She denies any other needs and voiced her appreciation for the call.     Care Coordination    Left message on pt's behalf on [facility] with pt and RN-ACM information.         ADRIAN RICHARD  Ambulatory Case Management    11/16/2022, 14:17 EST  "

## 2022-11-18 ENCOUNTER — TELEPHONE (OUTPATIENT)
Dept: FAMILY MEDICINE CLINIC | Facility: CLINIC | Age: 57
End: 2022-11-18

## 2022-11-18 NOTE — TELEPHONE ENCOUNTER
Hub staff attempted to follow warm transfer process and was unsuccessful     Caller: Dani Ziegler    Relationship to patient: Self    Best call back number: [phone]    Patient is needing: PATIENT NEEDS TO RESCHEDULE HOSPITAL FOLLOW UP DUE TO ILLNESS. SHE IS THROWING UP AND HAS DIARRHEA UNABLE TO MAKE IT IN TO APPOINTMENT. HER DISCHARGE DATE WAS IN October. PATIENT REFUSED TO CHANGE TO VIRTUAL VISIT STATING DR SHIELDS WOULD NOT DO A VISIT.    RESCHEDULED TO FIRST AVAILABLE 12/19/22. PATIENT WOULD LIKE SOONER AND AN AFTERNOON APPOINTMENT DUE TO GETTING A RIDE.

## 2022-12-14 DIAGNOSIS — J44.9 CHRONIC OBSTRUCTIVE PULMONARY DISEASE, UNSPECIFIED COPD TYPE: ICD-10-CM

## 2022-12-14 RX ORDER — FLUTICASONE FUROATE, UMECLIDINIUM BROMIDE AND VILANTEROL TRIFENATATE 100; 62.5; 25 UG/1; UG/1; UG/1
POWDER RESPIRATORY (INHALATION)
Qty: 60 EACH | Refills: 0 | Status: SHIPPED | OUTPATIENT
Start: 2022-12-14 | End: 2023-01-26

## 2022-12-27 ENCOUNTER — PATIENT OUTREACH (OUTPATIENT)
Dept: CASE MANAGEMENT | Facility: OTHER | Age: 57
End: 2022-12-27

## 2022-12-27 NOTE — OUTREACH NOTE
AMBULATORY CASE MANAGEMENT NOTE    Name and Relationship of Patient/Support Person: Dani Ziegler L - Self    Patient Outreach    F/u RN-ACM outreach.  States she is doing well.  She now has 2 roommates that can assist her if needed.   She states she was without phone for 2 weeks, which is why she missed appt with her PCP, but now has phone situation fixed and has appt scheduled for 1/24/23.  She had not heard from [facility] yet, but states it may be b/c of phone issue.  Provided with number to call to ck on benefits which she may have eligibility.  She reports she has all her medicines and is compliant.  States her bs is running higher at present.  She reports highest 224.  Encouraged to monitor, log and take log to PCP appt for review.  She states her pulse O2 has been doing OK.  93% at time of call.  States if gets low, she uses her O2. She reports she is in green level today.  She requested another written COPD action plan handout.  States hers has been misplaced and she likes to keep on fridge.  Will mail.   Discussed need for AWV and that a notation is on scheduling detail to discuss at Lv appt. Denies any needs today, but knows to call RN-ACM for any future needs.     ADRIAN RICHARD  Ambulatory Case Management    12/27/2022, 11:36 EST

## 2023-01-26 ENCOUNTER — TELEPHONE (OUTPATIENT)
Dept: FAMILY MEDICINE CLINIC | Facility: CLINIC | Age: 58
End: 2023-01-26
Payer: COMMERCIAL

## 2023-01-26 DIAGNOSIS — J44.1 CHRONIC OBSTRUCTIVE PULMONARY DISEASE WITH ACUTE EXACERBATION: ICD-10-CM

## 2023-01-26 DIAGNOSIS — J44.9 CHRONIC OBSTRUCTIVE PULMONARY DISEASE, UNSPECIFIED COPD TYPE: ICD-10-CM

## 2023-01-26 DIAGNOSIS — B37.9 YEAST INFECTION: ICD-10-CM

## 2023-01-26 RX ORDER — NYSTATIN 100000 U/G
CREAM TOPICAL
Qty: 30 G | Refills: 1 | Status: SHIPPED | OUTPATIENT
Start: 2023-01-26

## 2023-01-26 RX ORDER — METOPROLOL SUCCINATE 50 MG/1
TABLET, EXTENDED RELEASE ORAL
Qty: 30 TABLET | Refills: 1 | Status: SHIPPED | OUTPATIENT
Start: 2023-01-26

## 2023-01-26 RX ORDER — ALBUTEROL SULFATE 90 UG/1
AEROSOL, METERED RESPIRATORY (INHALATION)
Qty: 8.5 G | Refills: 1 | Status: SHIPPED | OUTPATIENT
Start: 2023-01-26

## 2023-01-26 RX ORDER — ROFLUMILAST 500 UG/1
TABLET ORAL
Qty: 30 TABLET | Refills: 1 | Status: SHIPPED | OUTPATIENT
Start: 2023-01-26

## 2023-01-26 RX ORDER — ESCITALOPRAM OXALATE 20 MG/1
TABLET ORAL
Qty: 30 TABLET | Refills: 1 | Status: SHIPPED | OUTPATIENT
Start: 2023-01-26 | End: 2023-03-13 | Stop reason: SDUPTHER

## 2023-01-26 RX ORDER — APIXABAN 5 MG/1
TABLET, FILM COATED ORAL
Qty: 60 TABLET | Refills: 1 | Status: SHIPPED | OUTPATIENT
Start: 2023-01-26

## 2023-01-26 RX ORDER — NIFEDIPINE 30 MG/1
TABLET, EXTENDED RELEASE ORAL
Qty: 30 TABLET | Refills: 1 | Status: SHIPPED | OUTPATIENT
Start: 2023-01-26

## 2023-01-26 RX ORDER — FUROSEMIDE 20 MG/1
TABLET ORAL
Qty: 30 TABLET | Refills: 1 | Status: SHIPPED | OUTPATIENT
Start: 2023-01-26

## 2023-01-26 RX ORDER — ESCITALOPRAM OXALATE 20 MG/1
20 TABLET ORAL DAILY
Qty: 30 TABLET | Refills: 1 | Status: SHIPPED | OUTPATIENT
Start: 2023-01-26 | End: 2023-01-26

## 2023-01-26 RX ORDER — FLUTICASONE FUROATE, UMECLIDINIUM BROMIDE AND VILANTEROL TRIFENATATE 100; 62.5; 25 UG/1; UG/1; UG/1
POWDER RESPIRATORY (INHALATION)
Qty: 60 EACH | Refills: 0 | Status: SHIPPED | OUTPATIENT
Start: 2023-01-26 | End: 2023-03-14

## 2023-01-26 RX ORDER — NYSTATIN 100000 [USP'U]/G
POWDER TOPICAL
Qty: 60 G | Refills: 1 | Status: SHIPPED | OUTPATIENT
Start: 2023-01-26

## 2023-01-26 RX ORDER — ALBUTEROL SULFATE 2.5 MG/3ML
SOLUTION RESPIRATORY (INHALATION)
Qty: 180 ML | Refills: 0 | Status: SHIPPED | OUTPATIENT
Start: 2023-01-26 | End: 2023-03-13 | Stop reason: SDUPTHER

## 2023-01-26 RX ORDER — ARIPIPRAZOLE 5 MG/1
TABLET ORAL
Qty: 30 TABLET | Refills: 1 | Status: SHIPPED | OUTPATIENT
Start: 2023-01-26

## 2023-01-26 NOTE — TELEPHONE ENCOUNTER
Please call, requested meds sent to pharmacy.     Keep appointment as scheduled on March 16, 2023.    Please let her know that I have received request from [employer] for her different types of medical equipment and unfortunately, I am not able to sign off on those until she is seen due to insurance requirements.

## 2023-01-26 NOTE — TELEPHONE ENCOUNTER
Caller: Dani Ziegler    Relationship: Self    Best call back number: [phone]    Requested Prescriptions:   Requested Prescriptions     Pending Prescriptions Disp Refills   • escitalopram (LEXAPRO) 20 MG tablet 30 tablet 2     Sig: Take 1 tablet by mouth Daily.        Pharmacy where request should be sent: [facility] [address] - [phone]  - [phone] FX     Additional details provided by patient:PLEASE REFILL     Does the patient have less than a 3 day supply:  [] Yes  [x] No    Would you like a call back once the refill request has been completed: [] Yes [x] No    If the office needs to give you a call back, can they leave a voicemail: [] Yes [x] No    Thania Castaneda Rep   01/26/23 12:07 EST

## 2023-02-02 NOTE — HOME HEALTH
Pt reports to have run out of several medications and Dr. Tovar working with her to refill. [facility] called w/ meds ready to p/u. [facility] serviced 02 concentrator and resting 02 stat > 90%, however activity decreases 02 to mid 80's.  Recommend additonal OT visit next week use of EC/WS w/ ADL/IADL skills and 02 stat >90% w/ functional tasks.
02-Feb-2023 05:48

## 2023-03-13 ENCOUNTER — TELEPHONE (OUTPATIENT)
Dept: FAMILY MEDICINE CLINIC | Facility: CLINIC | Age: 58
End: 2023-03-13
Payer: COMMERCIAL

## 2023-03-13 DIAGNOSIS — J44.1 CHRONIC OBSTRUCTIVE PULMONARY DISEASE WITH ACUTE EXACERBATION: ICD-10-CM

## 2023-03-13 DIAGNOSIS — J44.9 CHRONIC OBSTRUCTIVE PULMONARY DISEASE, UNSPECIFIED COPD TYPE: ICD-10-CM

## 2023-03-13 NOTE — TELEPHONE ENCOUNTER
HOPE IS ASKING THAT YOU MAIL HER A REMINDER FOR HER 04/20/23 OFFICE VISIT.      SHE IS ALSO ASKING THAT DR SHIELDS MAIL HER A LETTER STATING HER DOGS ARE THERAPY DOGS.

## 2023-03-13 NOTE — TELEPHONE ENCOUNTER
Please call.  She should get a reminder call about the appointment.  In addition, I would not be able to do any type of letter for therapy dogs until she is seen in the office.  I have not seen her since August.

## 2023-03-13 NOTE — TELEPHONE ENCOUNTER
Caller: Dani Ziegler    Relationship: Self    Best call back number:     Requested Prescriptions:   Requested Prescriptions     Pending Prescriptions Disp Refills   • Trelegy Ellipta 100-62.5-25 MCG/ACT inhaler [Pharmacy Med Name: TRELEGY ELLIPTA 100MCG] 60 each 0     Sig: INHALE 1 PUFF INTO THE LUNGS EVERY DAY AS DIRECTED   • albuterol (PROVENTIL) (2.5 MG/3ML) 0.083% nebulizer solution 180 mL 0   • escitalopram (LEXAPRO) 20 MG tablet 30 tablet 1     Sig: Take 1 tablet by mouth Daily.        Pharmacy where request should be sent: [facility] - [place] [address] - [phone] [facility] [phone]      Additional details provided by patient: NEEDS BY [id number]     Does the patient have less than a 3 day supply:  [x] Yes  [] No    Would you like a call back once the refill request has been completed: [] Yes [] No    If the office needs to give you a call back, can they leave a voicemail: [] Yes [] No    Thania Balbuena Rep   03/13/23 16:00 EDT

## 2023-03-14 RX ORDER — ESCITALOPRAM OXALATE 20 MG/1
20 TABLET ORAL DAILY
Qty: 30 TABLET | Refills: 0 | Status: SHIPPED | OUTPATIENT
Start: 2023-03-14

## 2023-03-14 RX ORDER — FLUTICASONE FUROATE, UMECLIDINIUM BROMIDE AND VILANTEROL TRIFENATATE 100; 62.5; 25 UG/1; UG/1; UG/1
POWDER RESPIRATORY (INHALATION)
Qty: 60 EACH | Refills: 0 | Status: SHIPPED | OUTPATIENT
Start: 2023-03-14

## 2023-03-14 RX ORDER — ALBUTEROL SULFATE 2.5 MG/3ML
SOLUTION RESPIRATORY (INHALATION)
Qty: 180 ML | Refills: 0 | Status: SHIPPED | OUTPATIENT
Start: 2023-03-14 | End: 2023-03-31

## 2023-03-31 DIAGNOSIS — J44.1 CHRONIC OBSTRUCTIVE PULMONARY DISEASE WITH ACUTE EXACERBATION: ICD-10-CM

## 2023-03-31 RX ORDER — ALBUTEROL SULFATE 2.5 MG/3ML
SOLUTION RESPIRATORY (INHALATION)
Qty: 180 ML | Refills: 0 | Status: SHIPPED | OUTPATIENT
Start: 2023-03-31

## 2023-04-19 DIAGNOSIS — J44.9 CHRONIC OBSTRUCTIVE PULMONARY DISEASE, UNSPECIFIED COPD TYPE: ICD-10-CM

## 2023-04-20 ENCOUNTER — OFFICE VISIT (OUTPATIENT)
Dept: FAMILY MEDICINE CLINIC | Facility: CLINIC | Age: 58
End: 2023-04-20
Payer: COMMERCIAL

## 2023-04-20 VITALS
SYSTOLIC BLOOD PRESSURE: 130 MMHG | OXYGEN SATURATION: 90 % | BODY MASS INDEX: 45.99 KG/M2 | WEIGHT: 293 LBS | HEART RATE: 100 BPM | TEMPERATURE: 97.7 F | RESPIRATION RATE: 22 BRPM | DIASTOLIC BLOOD PRESSURE: 78 MMHG | HEIGHT: 67 IN

## 2023-04-20 DIAGNOSIS — I10 ESSENTIAL HYPERTENSION: ICD-10-CM

## 2023-04-20 DIAGNOSIS — J44.1 CHRONIC OBSTRUCTIVE PULMONARY DISEASE WITH ACUTE EXACERBATION: Primary | ICD-10-CM

## 2023-04-20 DIAGNOSIS — J96.10 CHRONIC RESPIRATORY FAILURE, UNSPECIFIED WHETHER WITH HYPOXIA OR HYPERCAPNIA: ICD-10-CM

## 2023-04-20 DIAGNOSIS — E55.9 VITAMIN D DEFICIENCY: ICD-10-CM

## 2023-04-20 DIAGNOSIS — E11.65 UNCONTROLLED TYPE 2 DIABETES MELLITUS WITH HYPERGLYCEMIA, WITHOUT LONG-TERM CURRENT USE OF INSULIN: ICD-10-CM

## 2023-04-20 DIAGNOSIS — K29.50 CHRONIC GASTRITIS WITHOUT BLEEDING, UNSPECIFIED GASTRITIS TYPE: ICD-10-CM

## 2023-04-20 DIAGNOSIS — F34.1 DYSTHYMIA: ICD-10-CM

## 2023-04-20 DIAGNOSIS — D64.9 ANEMIA, UNSPECIFIED TYPE: ICD-10-CM

## 2023-04-20 DIAGNOSIS — D50.9 IRON DEFICIENCY ANEMIA, UNSPECIFIED IRON DEFICIENCY ANEMIA TYPE: ICD-10-CM

## 2023-04-20 RX ORDER — ARIPIPRAZOLE 10 MG/1
10 TABLET ORAL DAILY
Qty: 30 TABLET | Refills: 2 | Status: SHIPPED | OUTPATIENT
Start: 2023-04-20

## 2023-04-20 RX ORDER — PANTOPRAZOLE SODIUM 40 MG/1
TABLET, DELAYED RELEASE ORAL
Qty: 15 TABLET | Refills: 1 | Status: SHIPPED | OUTPATIENT
Start: 2023-04-20

## 2023-04-20 RX ORDER — PREDNISONE 10 MG/1
TABLET ORAL
Qty: 20 TABLET | Refills: 0 | Status: SHIPPED | OUTPATIENT
Start: 2023-04-20

## 2023-04-20 NOTE — PROGRESS NOTES
Chief Complaint  Diabetes (F/u )    Subjective          Dani Ziegler presents to [facility]  History of Present Illness    The patient consents to being recorded using TONJA.    COVID-19  The patient reports that she had COVID-19 approximately 3 weeks ago. She states that she had a fever, rhinorrhea, and a dry hacking cough. She reports that her breathing was awful. She states that her oxygen is 90 percent right now. She reports that she still has 2 hours before her next breathing treatment. She denies any fever. She reports that it took 5 days to break the fever, aches, and pains. She reports that she felt like she had the flu. She reports that it ended approximately 1 week ago.    Diabetes  She reports that she checks her blood glucose at home. She reports that she has not been eating well because there are no food. She reports that there are times that she is open to a can of string beans and eats a can of string beans. She reports that whenever her father is not working, she will go to [employer] and get her box, but they only give her 1 bag with 6 chicken lags in it or they give her 6 pieces of fish. She reports that they cut her food stamps down to 60 dollars. She reports that she has a lot going on. She reports that she got rid of the last coma when her father . She reports that he was the one giving her fentanyl. She reports that she talked to the police about it and they told her that there was no proof. She reports that the last time she knew she was sitting on her couch and then she felt like she was gone and she could not breathe and she was panicking. She reports that she does not like how her father was giving it to her. She reports that she is still taking all of her other medications. She reports that she is out of pantoprazole. She reports that she is not getting much acid reflux right now. She reports that she was drooling a lot. She denies any burning. She  "reports that she has been out of her blood pressure medication. She reports that she was taking it every other day. She reports that she only had 3 left when she was making her weekly. She reports that she got a new mask yesterday for her Trelegy ventilator. She reports that she has not slept yet. She reports that it is so uncomfortable. She reports that she is waiting on the order for anyone. She reports that she has not been on Cymbalta quite some time. She reports that she is on Abilify. She reports that she needs a refill on the Abilify. She reports that she was taking 30 units of Lantus in the morning. She reports that there are times at dinnertime sometimes it was 300. She reports that she was taking 50 units at night and 50 units in the morning. She reports that it was doing good. She reports that she is getting the pens. She reports that she gets muscle spasm when she rolls over in bed. She reports that she feels like she is having a baby forever. She reports that she had a lot of saliva. She reports that it was white bubbly and then she would cough green and it would go away. She reports that she has had 1 cigarette in the past 2 weeks.    [phone] friends phone for labs results  Review of Systems   Constitutional: Negative for fever.   HENT: Negative.    Respiratory: Positive for shortness of breath and wheezing.         Objective       Vital Signs:   /78   Pulse 100   Temp 97.7 °F (36.5 °C)   Resp 22   Ht 170.2 cm (67\")   Wt 133 kg (294 lb)   SpO2 90%   BMI 46.05 kg/m²     Physical Exam  Vitals and nursing note reviewed.   Constitutional:       Appearance: She is well-developed.   HENT:      Head: Normocephalic and atraumatic.      Right Ear: Hearing and external ear normal.      Left Ear: Hearing and external ear normal.   Eyes:      Conjunctiva/sclera: Conjunctivae normal.      Pupils: Pupils are equal, round, and reactive to light.   Cardiovascular:      Rate and Rhythm: Normal rate " and regular rhythm.      Heart sounds: Normal heart sounds. No murmur heard.    No friction rub.   Pulmonary:      Effort: Pulmonary effort is normal. No respiratory distress.      Breath sounds: Wheezing (with cough) and rhonchi present. No rales.   Musculoskeletal:      Right lower leg: No edema.      Left lower leg: No edema.   Skin:     General: Skin is warm.   Neurological:      Mental Status: She is alert.   Psychiatric:         Behavior: Behavior normal.          Result Review :                     Assessment and Plan    Diagnoses and all orders for this visit:    1. Chronic obstructive pulmonary disease with acute exacerbation (Primary)  -     predniSONE (DELTASONE) 10 MG tablet; 4 po x 2 days then 3 po daily x 2 days then 2 po daily x 2 days then 1 po daily x 2 days then stop.  Dispense: 20 tablet; Refill: 0    2. Uncontrolled type 2 diabetes mellitus with hyperglycemia, without long-term current use of insulin  -     Comprehensive Metabolic Panel  -     Lipid Panel With / Chol / HDL Ratio  -     Hemoglobin A1c  -     TSH  -     Insulin Glargine (LANTUS SOLOSTAR) 100 UNIT/ML injection pen; Inject 35 Units under the skin into the appropriate area as directed Daily.  Dispense: 10 mL; Refill: 5    3. Essential hypertension    4. Vitamin D deficiency  -     Vitamin D,25-Hydroxy    5. Iron deficiency anemia, unspecified iron deficiency anemia type  -     CBC & Differential  -     Iron and TIBC  -     Ferritin    6. Anemia, unspecified type  -     CBC & Differential  -     Vitamin B12    7. Chronic gastritis without bleeding, unspecified gastritis type  -     pantoprazole (PROTONIX) 40 MG EC tablet; One po every other day  Dispense: 15 tablet; Refill: 1    8. Chronic respiratory failure, unspecified whether with hypoxia or hypercapnia    9. Dysthymia  -     ARIPiprazole (Abilify) 10 MG tablet; Take 1 tablet by mouth Daily.  Dispense: 30 tablet; Refill: 2          DISCUSSION    1. Diabetes  - We will increase her  Lantus to 35 units daily.  Check A1c and labs as noted.    2. Depression  - We will increase her Abilify to 10 mg daily.  Call if not helpful.    3. Acid reflux  - She will continue pantoprazole every other day.    4. COPD  - She will continue Trelegy ventilator at home.  Prednisone taper to help with current issues.  Call if not improving.  Antibiotics not needed.      Alden dated 4/20/2023  was reviewed and appropriate.     Follow Up   No follow-ups on file.    Patient was given instructions and counseling regarding her condition or for health maintenance advice. Please see specific information pulled into the AVS if appropriate.     Transcribed from ambient dictation for Darrion Tovar MD by Cece Carlisle.  04/20/23   18:54 EDT    Patient or patient representative verbalized consent to the visit recording.  I have personally performed the services described in this document as transcribed by the above individual, and it is both accurate and complete.  Darrion Tovar MD  4/21/2023  18:26 EDT        Darrion Tovar MD

## 2023-04-21 LAB
25(OH)D3+25(OH)D2 SERPL-MCNC: 16.3 NG/ML (ref 30–100)
ALBUMIN SERPL-MCNC: 3.6 G/DL (ref 3.8–4.9)
ALBUMIN/GLOB SERPL: 0.8 {RATIO} (ref 1.2–2.2)
ALP SERPL-CCNC: 137 IU/L (ref 44–121)
ALT SERPL-CCNC: 13 IU/L (ref 0–32)
AST SERPL-CCNC: 13 IU/L (ref 0–40)
BASOPHILS # BLD AUTO: 0.1 X10E3/UL (ref 0–0.2)
BASOPHILS NFR BLD AUTO: 1 %
BILIRUB SERPL-MCNC: 0.2 MG/DL (ref 0–1.2)
BUN SERPL-MCNC: 15 MG/DL (ref 6–24)
BUN/CREAT SERPL: 15 (ref 9–23)
CALCIUM SERPL-MCNC: 10.1 MG/DL (ref 8.7–10.2)
CHLORIDE SERPL-SCNC: 98 MMOL/L (ref 96–106)
CHOLEST SERPL-MCNC: 134 MG/DL (ref 100–199)
CHOLEST/HDLC SERPL: 4.8 RATIO (ref 0–4.4)
CO2 SERPL-SCNC: 23 MMOL/L (ref 20–29)
CREAT SERPL-MCNC: 1.02 MG/DL (ref 0.57–1)
EGFRCR SERPLBLD CKD-EPI 2021: 64 ML/MIN/1.73
EOSINOPHIL # BLD AUTO: 0.3 X10E3/UL (ref 0–0.4)
EOSINOPHIL NFR BLD AUTO: 2 %
ERYTHROCYTE [DISTWIDTH] IN BLOOD BY AUTOMATED COUNT: 14.4 % (ref 11.7–15.4)
FERRITIN SERPL-MCNC: 362 NG/ML (ref 15–150)
GLOBULIN SER CALC-MCNC: 4.4 G/DL (ref 1.5–4.5)
GLUCOSE SERPL-MCNC: 230 MG/DL (ref 70–99)
HBA1C MFR BLD: 9.3 % (ref 4.8–5.6)
HCT VFR BLD AUTO: 37 % (ref 34–46.6)
HDLC SERPL-MCNC: 28 MG/DL
HGB BLD-MCNC: 12.2 G/DL (ref 11.1–15.9)
IMM GRANULOCYTES # BLD AUTO: 0.7 X10E3/UL (ref 0–0.1)
IMM GRANULOCYTES NFR BLD AUTO: 4 %
IRON SATN MFR SERPL: 16 % (ref 15–55)
IRON SERPL-MCNC: 44 UG/DL (ref 27–159)
LDLC SERPL CALC-MCNC: 70 MG/DL (ref 0–99)
LYMPHOCYTES # BLD AUTO: 3.3 X10E3/UL (ref 0.7–3.1)
LYMPHOCYTES NFR BLD AUTO: 21 %
MCH RBC QN AUTO: 28.7 PG (ref 26.6–33)
MCHC RBC AUTO-ENTMCNC: 33 G/DL (ref 31.5–35.7)
MCV RBC AUTO: 87 FL (ref 79–97)
MONOCYTES # BLD AUTO: 0.6 X10E3/UL (ref 0.1–0.9)
MONOCYTES NFR BLD AUTO: 4 %
NEUTROPHILS # BLD AUTO: 11 X10E3/UL (ref 1.4–7)
NEUTROPHILS NFR BLD AUTO: 68 %
PLATELET # BLD AUTO: 438 X10E3/UL (ref 150–450)
POTASSIUM SERPL-SCNC: 5.4 MMOL/L (ref 3.5–5.2)
PROT SERPL-MCNC: 8 G/DL (ref 6–8.5)
RBC # BLD AUTO: 4.25 X10E6/UL (ref 3.77–5.28)
SODIUM SERPL-SCNC: 136 MMOL/L (ref 134–144)
TIBC SERPL-MCNC: 274 UG/DL (ref 250–450)
TRIGL SERPL-MCNC: 219 MG/DL (ref 0–149)
TSH SERPL DL<=0.005 MIU/L-ACNC: 1.4 UIU/ML (ref 0.45–4.5)
UIBC SERPL-MCNC: 230 UG/DL (ref 131–425)
VIT B12 SERPL-MCNC: 484 PG/ML (ref 232–1245)
VLDLC SERPL CALC-MCNC: 36 MG/DL (ref 5–40)
WBC # BLD AUTO: 16 X10E3/UL (ref 3.4–10.8)

## 2023-04-21 RX ORDER — METOPROLOL SUCCINATE 50 MG/1
TABLET, EXTENDED RELEASE ORAL
Qty: 30 TABLET | Refills: 2 | Status: SHIPPED | OUTPATIENT
Start: 2023-04-21

## 2023-04-21 RX ORDER — NYSTATIN 100000 U/G
CREAM TOPICAL
Qty: 30 G | Refills: 2 | Status: SHIPPED | OUTPATIENT
Start: 2023-04-21

## 2023-04-21 RX ORDER — ROFLUMILAST 500 UG/1
TABLET ORAL
Qty: 30 TABLET | Refills: 2 | Status: SHIPPED | OUTPATIENT
Start: 2023-04-21

## 2023-04-21 RX ORDER — APIXABAN 5 MG/1
TABLET, FILM COATED ORAL
Qty: 60 TABLET | Refills: 2 | Status: SHIPPED | OUTPATIENT
Start: 2023-04-21

## 2023-04-21 RX ORDER — FLUTICASONE FUROATE, UMECLIDINIUM BROMIDE AND VILANTEROL TRIFENATATE 100; 62.5; 25 UG/1; UG/1; UG/1
POWDER RESPIRATORY (INHALATION)
Qty: 60 EACH | Refills: 2 | Status: SHIPPED | OUTPATIENT
Start: 2023-04-21

## 2023-04-21 RX ORDER — ARIPIPRAZOLE 5 MG/1
TABLET ORAL
Qty: 30 TABLET | Refills: 2 | OUTPATIENT
Start: 2023-04-21

## 2023-04-21 RX ORDER — ALBUTEROL SULFATE 90 UG/1
AEROSOL, METERED RESPIRATORY (INHALATION)
Qty: 8.5 G | Refills: 2 | Status: SHIPPED | OUTPATIENT
Start: 2023-04-21

## 2023-04-21 RX ORDER — NIFEDIPINE 30 MG/1
TABLET, EXTENDED RELEASE ORAL
Qty: 30 TABLET | Refills: 2 | Status: SHIPPED | OUTPATIENT
Start: 2023-04-21

## 2023-04-28 DIAGNOSIS — E55.9 VITAMIN D DEFICIENCY: Primary | ICD-10-CM

## 2023-04-28 RX ORDER — MULTIVIT-MIN/IRON/FOLIC ACID/K 18-600-40
2000 CAPSULE ORAL DAILY
Qty: 30 CAPSULE | Refills: 5 | Status: SHIPPED | OUTPATIENT
Start: 2023-04-28

## 2023-05-09 ENCOUNTER — TELEPHONE (OUTPATIENT)
Dept: CASE MANAGEMENT | Facility: OTHER | Age: 58
End: 2023-05-09
Payer: COMMERCIAL

## 2023-05-09 NOTE — TELEPHONE ENCOUNTER
Attempted to follow up with patient regarding case management and/or  needs.  4 attempts have been made and all 4 attempts were unsuccessful. Was able to leave message with  RN-ACM contact info on 1st 2 attempts.   Last successful RN-ACM outreach was 12/27/22.  Will route this message to PCP, Dr. Tovar ([facility]) and close program.

## 2023-08-07 DIAGNOSIS — J44.1 CHRONIC OBSTRUCTIVE PULMONARY DISEASE WITH ACUTE EXACERBATION: ICD-10-CM

## 2023-08-07 DIAGNOSIS — F34.1 DYSTHYMIA: ICD-10-CM

## 2023-08-08 RX ORDER — ROFLUMILAST 500 UG/1
TABLET ORAL
Qty: 30 TABLET | Refills: 1 | Status: SHIPPED | OUTPATIENT
Start: 2023-08-08

## 2023-08-08 RX ORDER — METOPROLOL SUCCINATE 50 MG/1
TABLET, EXTENDED RELEASE ORAL
Qty: 30 TABLET | Refills: 1 | Status: SHIPPED | OUTPATIENT
Start: 2023-08-08

## 2023-08-08 RX ORDER — ARIPIPRAZOLE 10 MG/1
TABLET ORAL
Qty: 30 TABLET | Refills: 1 | Status: SHIPPED | OUTPATIENT
Start: 2023-08-08

## 2023-08-08 RX ORDER — ALBUTEROL SULFATE 2.5 MG/3ML
SOLUTION RESPIRATORY (INHALATION)
Qty: 180 ML | Refills: 0 | Status: SHIPPED | OUTPATIENT
Start: 2023-08-08

## 2023-08-31 ENCOUNTER — TELEPHONE (OUTPATIENT)
Dept: FAMILY MEDICINE CLINIC | Facility: CLINIC | Age: 58
End: 2023-08-31
Payer: MEDICARE

## 2023-08-31 NOTE — TELEPHONE ENCOUNTER
Caller: Dani Ziegler    Relationship to patient: Self    Best call back number: [phone]     Patient is needing: PATIENT CALLED TO SPEAK TO SAMAN ()  I TRIED THE PHONE NUMBER IN THE EMAIL CONTACTS BUT GOT VOICEMAIL SO I SENT HER AN EMAIL ON BEHALF OF THE PATIENT.    I AM SENDING PROVIDER AND NURSE A COPY/PASTE OF THE MESSAGE.    PATIENT STATED SHE WOULD REALLY LIKE TO TALK TO DOCTOR TOVAR TO.    PATIENTS PHONE NUMBER HAS BEEN UPDATED.      Patient Dani Ziegler, MRN: [id number]  is urgently wanting to speak to you regarding her landlord trying to evict her.  Ms. Ziegler said that her court date is on the 11th.    Ms. Ziegler said that her anxiety is through the roof and unable to get in to see doctor Darrion Tovar.  Ms. Ziegler says she has no food and has been eating corn and rice.

## 2023-08-31 NOTE — TELEPHONE ENCOUNTER
Left detailed msg informing pt we would work her in tomorrow at 3:15 and if she cannot keep the appt call first thing in the morning and cancel it.

## 2023-09-01 ENCOUNTER — TELEMEDICINE (OUTPATIENT)
Dept: FAMILY MEDICINE CLINIC | Facility: CLINIC | Age: 58
End: 2023-09-01
Payer: COMMERCIAL

## 2023-09-01 DIAGNOSIS — F41.9 ANXIETY: Primary | ICD-10-CM

## 2023-09-01 PROCEDURE — 3046F HEMOGLOBIN A1C LEVEL >9.0%: CPT | Performed by: FAMILY MEDICINE

## 2023-09-01 PROCEDURE — 99213 OFFICE O/P EST LOW 20 MIN: CPT | Performed by: FAMILY MEDICINE

## 2023-09-01 RX ORDER — HYDROXYZINE PAMOATE 25 MG/1
25-50 CAPSULE ORAL 3 TIMES DAILY PRN
Qty: 30 CAPSULE | Refills: 1 | Status: SHIPPED | OUTPATIENT
Start: 2023-09-01

## 2023-09-01 NOTE — PROGRESS NOTES
This was an audio and video enabled telemedicine encounter.     You have chosen to receive care through a telehealth visit.  Do you consent to use a video/audio connection for your medical care today? Yes     Time spent: 17 min total with patient in discussion    Patient was in their home and I was in my office.     [facility] video visit was used for video visit         Chief Complaint  Anxiety    Subjective          Dani Ziegler presents to [facility]  History of Present Illness    Dani Ziegler is a 58-year-old female who presents today via video visit for evaluation of anxiety.    Anxiety  The patient has been having a lot of anxiety due to stressors in her life, mainly, she may be evicted, and has court on 09/11/2023, and has no money for food. Her income went from 900 dollars to 750 dollars. She is not sleeping at night. She has tension headaches. She tosses and turns all night long. She denies taking anything for anxiety. She has started moving her mouth a lot and repeating things over and over again. Sometimes, she says it in her head and sometimes she will say it out loud. She usually lives by herself; however, has other people living with her at the time, but not legally.         She is still using a walker. She sleeps with her oxygen. She slept with her ventilator.      Review of Systems   Psychiatric/Behavioral:  Positive for sleep disturbance and depressed mood. The patient is nervous/anxious.    All other systems reviewed and are negative.     Objective       Vital Signs:   There were no vitals taken for this visit.    Physical Exam  Constitutional:       General: She is not in acute distress.  Pulmonary:      Effort: Pulmonary effort is normal.   Neurological:      Mental Status: She is alert.   Psychiatric:         Mood and Affect: Mood is anxious. Affect is tearful.        Result Review :                     Assessment and Plan    Diagnoses and all orders for this  visit:    1. Anxiety (Primary)  -     hydrOXYzine pamoate (VISTARIL) 25 MG capsule; Take 1-2 capsules by mouth 3 (Three) Times a Day As Needed for Anxiety.  Dispense: 30 capsule; Refill: 1          DISCUSSION  Significant anxiety related to potential eviction.   - She has previous abnormal drug screen, so I do not feel comfortable giving any type of controlled medications.   - We will send in hydroxyzine 25 mg 1 to 2 every 6 to 8 hours as needed for anxiety.   - Side effects explained including sedation.   - I explained to her that if she does get evicted and she has nowhere to go, I would not be able to admit her directly to a nursing home, and that she would need to go to the hospital to see if she would be a candidate for a possible admission given her chronic medical condition and potential placement for nursing home at that point.   - She expressed understanding.   - In addition, there was no reported suicidal ideation.          Alden dated 9/1/2023  was reviewed and appropriate.     Transcribed from ambient dictation for Darrion Tovar MD by Stella العلي.  09/01/23   18:10 EDT    Patient or patient representative verbalized consent to the visit recording.  I have personally performed the services described in this document as transcribed by the above individual, and it is both accurate and complete.  Darrion Tovar MD  9/1/2023  18:30 EDT      Follow Up   No follow-ups on file.    Patient was given instructions and counseling regarding her condition or for health maintenance advice. Please see specific information pulled into the AVS if appropriate.       Darrion Tovar MD

## 2023-09-21 RX ORDER — APIXABAN 5 MG/1
TABLET, FILM COATED ORAL
Qty: 60 TABLET | Refills: 1 | Status: SHIPPED | OUTPATIENT
Start: 2023-09-21

## 2023-09-21 RX ORDER — NIFEDIPINE 30 MG/1
TABLET, EXTENDED RELEASE ORAL
Qty: 30 TABLET | Refills: 1 | Status: SHIPPED | OUTPATIENT
Start: 2023-09-21

## 2023-11-20 DIAGNOSIS — F41.9 ANXIETY: ICD-10-CM

## 2023-11-20 DIAGNOSIS — F34.1 DYSTHYMIA: ICD-10-CM

## 2023-11-20 DIAGNOSIS — K29.50 CHRONIC GASTRITIS WITHOUT BLEEDING, UNSPECIFIED GASTRITIS TYPE: ICD-10-CM

## 2023-11-20 NOTE — TELEPHONE ENCOUNTER
Caller: Karlie Dani L    Relationship: Self    Best call back number: [phone]     Requested Prescriptions:   Requested Prescriptions     Pending Prescriptions Disp Refills    hydrOXYzine pamoate (VISTARIL) 25 MG capsule 30 capsule 1     Sig: Take 1-2 capsules by mouth 3 (Three) Times a Day As Needed for Anxiety.    ARIPiprazole (ABILIFY) 10 MG tablet 30 tablet 1     Sig: Take 1 tablet by mouth Daily.    roflumilast (DALIRESP) 500 MCG tablet tablet 30 tablet 1     Sig: Take 1 tablet by mouth Daily.    pantoprazole (PROTONIX) 40 MG EC tablet 15 tablet 2     Sig: TAKE 1 TABLET BY MOUTH EVERY OTHER DAY    metoprolol succinate XL (TOPROL-XL) 50 MG 24 hr tablet 30 tablet 1     Sig: Take 1 tablet by mouth Daily.        Pharmacy where request should be sent: [address] [phone] [facility] [phone] FX     Last office visit with prescribing clinician: 4/20/2023   Last telemedicine visit with prescribing clinician: 9/1/2023   Next office visit with prescribing clinician: Visit date not found     Additional details provided by patient:     Does the patient have less than a 3 day supply:  [x] Yes  [] No    Would you like a call back once the refill request has been completed: [] Yes [x] No    If the office needs to give you a call back, can they leave a voicemail: [] Yes [x] No    Thania Morales Rep   11/20/23 14:59 EST

## 2023-11-21 RX ORDER — PANTOPRAZOLE SODIUM 40 MG/1
TABLET, DELAYED RELEASE ORAL
Qty: 30 TABLET | Refills: 1 | Status: SHIPPED | OUTPATIENT
Start: 2023-11-21

## 2023-11-21 RX ORDER — ARIPIPRAZOLE 10 MG/1
10 TABLET ORAL DAILY
Qty: 30 TABLET | Refills: 1 | Status: SHIPPED | OUTPATIENT
Start: 2023-11-21

## 2023-11-21 RX ORDER — METOPROLOL SUCCINATE 50 MG/1
50 TABLET, EXTENDED RELEASE ORAL DAILY
Qty: 30 TABLET | Refills: 1 | Status: SHIPPED | OUTPATIENT
Start: 2023-11-21

## 2023-11-21 RX ORDER — ROFLUMILAST 500 UG/1
500 TABLET ORAL DAILY
Qty: 30 TABLET | Refills: 1 | Status: SHIPPED | OUTPATIENT
Start: 2023-11-21

## 2023-11-21 RX ORDER — HYDROXYZINE PAMOATE 25 MG/1
25-50 CAPSULE ORAL 3 TIMES DAILY PRN
Qty: 30 CAPSULE | Refills: 1 | Status: SHIPPED | OUTPATIENT
Start: 2023-11-21

## 2023-12-27 ENCOUNTER — TELEPHONE (OUTPATIENT)
Dept: FAMILY MEDICINE CLINIC | Facility: CLINIC | Age: 58
End: 2023-12-27
Payer: MEDICARE

## 2023-12-27 NOTE — TELEPHONE ENCOUNTER
Karolina with [facility] called with questions regarding patient's medication. They are needing to update their records.

## 2023-12-29 ENCOUNTER — TELEPHONE (OUTPATIENT)
Dept: FAMILY MEDICINE CLINIC | Facility: CLINIC | Age: 58
End: 2023-12-29
Payer: MEDICARE

## 2023-12-29 RX ORDER — GUAIFENESIN 600 MG/1
1200 TABLET, EXTENDED RELEASE ORAL 2 TIMES DAILY
Qty: 30 TABLET | Refills: 1 | Status: SHIPPED | OUTPATIENT
Start: 2023-12-29

## 2023-12-29 NOTE — TELEPHONE ENCOUNTER
Caller: Dani Ziegler    Relationship: Self    Best call back number: [phone]    What medication are you requesting: MUCINEX AND STEROID    What are your current symptoms: COUGH, CONGESTION    How long have you been experiencing symptoms: 4 DAYS    Have you had these symptoms before:    [x] Yes  [] No    Have you been treated for these symptoms before:   [x] Yes  [] No    If a prescription is needed, what is your preferred pharmacy and phone number:    [facility] [phone]  Additional notes:  PATIENT IS HAVING CONGESTION AND COUGH AGAIN AND WOULD LIKE MEDICATION SENT TO PHARMACY

## 2023-12-29 NOTE — TELEPHONE ENCOUNTER
"Number did not go to \"[facility]\"  Spoke w/ patient, she does not know anything about [facility] or why they would need info on her.  "

## 2023-12-29 NOTE — TELEPHONE ENCOUNTER
Spoke w/ pt for more info.  Denies fever or body aches. She did have a negative Covid test at home. Cough is productive but clear sputum. Using her neb treatments QID.    Requesting an Rx of Mucinex and a steroid to be sent to [facility] since it is so late in the evening.

## 2023-12-29 NOTE — TELEPHONE ENCOUNTER
Please call, I sent in Mucinex but she needs to be evaluated if she thinks she needs steroids. Recommend to ER if worsens.

## 2024-01-23 ENCOUNTER — TELEPHONE (OUTPATIENT)
Dept: FAMILY MEDICINE CLINIC | Facility: CLINIC | Age: 59
End: 2024-01-23
Payer: MEDICARE

## 2024-01-23 DIAGNOSIS — J44.1 CHRONIC OBSTRUCTIVE PULMONARY DISEASE WITH ACUTE EXACERBATION: ICD-10-CM

## 2024-01-23 DIAGNOSIS — E11.65 UNCONTROLLED TYPE 2 DIABETES MELLITUS WITH HYPERGLYCEMIA, WITHOUT LONG-TERM CURRENT USE OF INSULIN: ICD-10-CM

## 2024-01-23 RX ORDER — ALBUTEROL SULFATE 2.5 MG/3ML
SOLUTION RESPIRATORY (INHALATION)
Qty: 180 ML | Refills: 0 | Status: SHIPPED | OUTPATIENT
Start: 2024-01-23

## 2024-01-23 RX ORDER — NIFEDIPINE 30 MG/1
30 TABLET, EXTENDED RELEASE ORAL DAILY
Qty: 30 TABLET | Refills: 1 | Status: SHIPPED | OUTPATIENT
Start: 2024-01-23

## 2024-01-23 RX ORDER — ESCITALOPRAM OXALATE 20 MG/1
20 TABLET ORAL DAILY
Qty: 30 TABLET | Refills: 1 | Status: SHIPPED | OUTPATIENT
Start: 2024-01-23

## 2024-01-23 NOTE — TELEPHONE ENCOUNTER
Caller: Saleem Ziegleralisson LEO    Relationship: Self    Best call back number: [phone]    Requested Prescriptions:   Requested Prescriptions     Pending Prescriptions Disp Refills    NIFEdipine XL (PROCARDIA XL) 30 MG 24 hr tablet 30 tablet 1     Sig: Take 1 tablet by mouth Daily.    albuterol (PROVENTIL) (2.5 MG/3ML) 0.083% nebulizer solution 180 mL 0     Sig: INHALE CONTENTS OF 1 VIAL VIA NEBULIZER EVERY SIX HOURS AS NEEDED FOR WHEEZING    escitalopram (LEXAPRO) 20 MG tablet 30 tablet 2     Sig: Take 1 tablet by mouth Daily.    Insulin Glargine (LANTUS SOLOSTAR) 100 UNIT/ML injection pen 10 mL 5     Sig: Inject 35 Units under the skin into the appropriate area as directed Daily.        Pharmacy where request should be sent: [place] [phone] [facility] [phone] FX     Last office visit with prescribing clinician: 4/20/2023   Last telemedicine visit with prescribing clinician: 9/1/2023   Next office visit with prescribing clinician: Visit date not found     Additional details provided by patient: HAS A FEW DAYS LEFT     Does the patient have less than a 3 day supply:  [x] Yes  [] No    Would you like a call back once the refill request has been completed: [x] Yes [] No    If the office needs to give you a call back, can they leave a voicemail: [x] Yes [] No    Thania Mckeon Rep   01/23/24 14:14 EST

## 2024-01-24 NOTE — TELEPHONE ENCOUNTER
Pt said she would call us back to schedule as transportation is an issue for her. I advised her to call back as soon as she can as the appointment is needed for further refills.

## 2024-04-09 ENCOUNTER — TELEPHONE (OUTPATIENT)
Dept: FAMILY MEDICINE CLINIC | Facility: CLINIC | Age: 59
End: 2024-04-09

## 2024-04-09 DIAGNOSIS — J44.1 CHRONIC OBSTRUCTIVE PULMONARY DISEASE WITH ACUTE EXACERBATION: ICD-10-CM

## 2024-04-10 RX ORDER — ESCITALOPRAM OXALATE 20 MG/1
TABLET ORAL
Qty: 30 TABLET | Refills: 1 | Status: SHIPPED | OUTPATIENT
Start: 2024-04-10

## 2024-04-10 RX ORDER — ALBUTEROL SULFATE 2.5 MG/3ML
SOLUTION RESPIRATORY (INHALATION)
Qty: 180 ML | Refills: 1 | Status: SHIPPED | OUTPATIENT
Start: 2024-04-10

## 2024-06-25 DIAGNOSIS — E11.65 UNCONTROLLED TYPE 2 DIABETES MELLITUS WITH HYPERGLYCEMIA, WITHOUT LONG-TERM CURRENT USE OF INSULIN: ICD-10-CM

## 2024-06-25 DIAGNOSIS — J44.1 CHRONIC OBSTRUCTIVE PULMONARY DISEASE WITH ACUTE EXACERBATION: Primary | ICD-10-CM

## 2024-06-25 DIAGNOSIS — I10 ESSENTIAL HYPERTENSION: Chronic | ICD-10-CM

## 2024-06-25 DIAGNOSIS — Z86.718 HISTORY OF RECURRENT DEEP VEIN THROMBOSIS (DVT): Chronic | ICD-10-CM

## 2024-06-25 DIAGNOSIS — F34.1 DYSTHYMIA: ICD-10-CM

## 2024-06-25 NOTE — TELEPHONE ENCOUNTER
Caller: Dani Ziegler FELIPA    Relationship: Self    Best call back number: [phone]     Requested Prescriptions:   Requested Prescriptions     Pending Prescriptions Disp Refills    Insulin Glargine (LANTUS SOLOSTAR) 100 UNIT/ML injection pen 10 mL 1     Sig: Inject 35 Units under the skin into the appropriate area as directed Daily.    metoprolol succinate XL (TOPROL-XL) 50 MG 24 hr tablet 30 tablet 1     Sig: Take 1 tablet by mouth Daily.    ARIPiprazole (ABILIFY) 10 MG tablet 30 tablet 1     Sig: Take 1 tablet by mouth Daily.    NIFEdipine XL (PROCARDIA XL) 30 MG 24 hr tablet 30 tablet 1     Sig: Take 1 tablet by mouth Daily.    apixaban (Eliquis) 5 MG tablet tablet 60 tablet 1     Sig: Take 1 tablet by mouth Every 12 (Twelve) Hours.    roflumilast (DALIRESP) 500 MCG tablet tablet 30 tablet 1     Sig: Take 1 tablet by mouth Daily.        Pharmacy where request should be sent: [place] [phone] [facility] [phone] FX     Last office visit with prescribing clinician: 4/20/2023   Last telemedicine visit with prescribing clinician: Visit date not found   Next office visit with prescribing clinician: Visit date not found     Additional details provided by patient: PATIENT HAS CALLED REQUESTING REFILLS ON ALL ABOVE MEDICATIONS. PATIENT HAS BEEN WITHOUT MEDICATION FOR A MONTH AND HALF. PATIENT HAS BEEN A WEEK WITHOUT LANTUS. PATIENT CURRENTLY DOES NOT HAVE PHONE AND NUMBER PROVIDED IS A NEIGHBORS PHONE NUMBER.    Does the patient have less than a 3 day supply:  [x] Yes  [] No    Would you like a call back once the refill request has been completed: [] Yes [x] No    If the office needs to give you a call back, can they leave a voicemail: [] Yes [x] No    Nick Morel   06/25/24 12:02 EDT

## 2024-06-26 ENCOUNTER — TELEPHONE (OUTPATIENT)
Dept: FAMILY MEDICINE CLINIC | Facility: CLINIC | Age: 59
End: 2024-06-26
Payer: MEDICARE

## 2024-06-26 RX ORDER — INSULIN GLARGINE 100 [IU]/ML
INJECTION, SOLUTION SUBCUTANEOUS
Qty: 15 ML | Refills: 0 | Status: SHIPPED | OUTPATIENT
Start: 2024-06-26

## 2024-06-26 RX ORDER — METOPROLOL SUCCINATE 50 MG/1
50 TABLET, EXTENDED RELEASE ORAL DAILY
Qty: 30 TABLET | Refills: 1 | Status: SHIPPED | OUTPATIENT
Start: 2024-06-26

## 2024-06-26 RX ORDER — ARIPIPRAZOLE 10 MG/1
10 TABLET ORAL DAILY
Qty: 30 TABLET | Refills: 1 | Status: SHIPPED | OUTPATIENT
Start: 2024-06-26

## 2024-06-26 RX ORDER — NIFEDIPINE 30 MG/1
30 TABLET, EXTENDED RELEASE ORAL DAILY
Qty: 30 TABLET | Refills: 1 | Status: SHIPPED | OUTPATIENT
Start: 2024-06-26

## 2024-06-26 RX ORDER — ROFLUMILAST 500 UG/1
500 TABLET ORAL DAILY
Qty: 30 TABLET | Refills: 1 | Status: SHIPPED | OUTPATIENT
Start: 2024-06-26

## 2024-06-26 NOTE — TELEPHONE ENCOUNTER
Outcome  Approved today  PA Case: [id number], Status: Approved, Coverage Starts on: 3/27/2024 12:00:00 AM, Coverage Ends on: 6/26/2025 12:00:00 AM.  Authorization Expiration Date: 6/25/2025  Called to pharmacy

## 2024-07-15 ENCOUNTER — TELEPHONE (OUTPATIENT)
Dept: FAMILY MEDICINE CLINIC | Facility: CLINIC | Age: 59
End: 2024-07-15

## 2024-07-15 NOTE — TELEPHONE ENCOUNTER
PATIENT HAS CALLED AND STATED SHE HAS BEEN HAVING TROUBLE WITH GETTING TRANSPORTATION TO AND FROM APPOINTMENTS. PATIENT STATES SHE KNOWS ANOTHER PATIENT WHO HAS AN APPOINTMENT ON 07/22/24 AT 3 PM. PATIENT IS REQUESTING IF DR. SHIELDS CAN GET HER SQUEEZED IN CLOSE TO THE 3 PM TIME SLOT ON 07/22/24 SO THAT SHE CAN RIDE IN WITH OTHER PATIENT. MS. MANTILLA IS REQUESTING IF PCP CAN PUT IN HER LAB ORDERS IF SHE CAN NOT BE SEEN SO SHE CAN AT LEAST COME HAVE HER LAB WORK DONE.  PATIENT IS REQUESTING A CALL BACK ASAP TO LET HER KNOW IF SHE CAN BE SQUEEZED IN OR IF LAB ORDERS CAN BE PUT IN. PATIENT HAS BEEN SCHEDULED TO SEE TROY GUAJARDO AT 2:45 ON 07/22/24. IF DR. SIHELDS IS OK WITH PATIENT SEEING TROY FOR HER MEDICATION REFILLS, LABS, AND A1C CHECK SHE WILL BE FINE WITH BEING SEEN BY TROY ALSO.    CALL BACK NUMBER [phone]

## 2024-07-22 ENCOUNTER — OFFICE VISIT (OUTPATIENT)
Dept: FAMILY MEDICINE CLINIC | Facility: CLINIC | Age: 59
End: 2024-07-22
Payer: MEDICARE

## 2024-07-22 VITALS
RESPIRATION RATE: 18 BRPM | HEART RATE: 103 BPM | BODY MASS INDEX: 42.28 KG/M2 | HEIGHT: 67 IN | OXYGEN SATURATION: 95 % | SYSTOLIC BLOOD PRESSURE: 120 MMHG | DIASTOLIC BLOOD PRESSURE: 80 MMHG | TEMPERATURE: 97.5 F | WEIGHT: 269.4 LBS

## 2024-07-22 DIAGNOSIS — J44.1 CHRONIC OBSTRUCTIVE PULMONARY DISEASE WITH ACUTE EXACERBATION: ICD-10-CM

## 2024-07-22 DIAGNOSIS — F41.9 ANXIETY: ICD-10-CM

## 2024-07-22 DIAGNOSIS — E78.2 MIXED HYPERLIPIDEMIA: ICD-10-CM

## 2024-07-22 DIAGNOSIS — E11.65 UNCONTROLLED TYPE 2 DIABETES MELLITUS WITH HYPERGLYCEMIA, WITHOUT LONG-TERM CURRENT USE OF INSULIN: Primary | Chronic | ICD-10-CM

## 2024-07-22 PROCEDURE — 3074F SYST BP LT 130 MM HG: CPT | Performed by: NURSE PRACTITIONER

## 2024-07-22 PROCEDURE — 99214 OFFICE O/P EST MOD 30 MIN: CPT | Performed by: NURSE PRACTITIONER

## 2024-07-22 PROCEDURE — 3079F DIAST BP 80-89 MM HG: CPT | Performed by: NURSE PRACTITIONER

## 2024-07-22 PROCEDURE — 1125F AMNT PAIN NOTED PAIN PRSNT: CPT | Performed by: NURSE PRACTITIONER

## 2024-07-22 RX ORDER — ALBUTEROL SULFATE 2.5 MG/3ML
SOLUTION RESPIRATORY (INHALATION)
Qty: 180 ML | Refills: 1 | Status: SHIPPED | OUTPATIENT
Start: 2024-07-22

## 2024-07-22 RX ORDER — HYDROXYZINE PAMOATE 25 MG/1
25-50 CAPSULE ORAL 3 TIMES DAILY PRN
Qty: 30 CAPSULE | Refills: 1 | Status: SHIPPED | OUTPATIENT
Start: 2024-07-22

## 2024-07-22 NOTE — PROGRESS NOTES
Date: 2024   Patient Name: Dani Ziegler  : 1965   MRN: [id number]     Chief Complaint:    Chief Complaint   Patient presents with    Med Refill     Wants labs       History of Present Illness: Dani Ziegler is a 58 y.o. female who is here today to follow up for Chronic care management. She is due for UTD lab work today. She is needing refills on albuterol neb solution and hydroxyzine. She reports increase and anxiety and that is why she has not made it in for follow up appts. She denies any thoughts of harming self or others and no suicidal ideation. She is a DM patient and she not currently monitoring BG. She is taking her medication as prescribed. Last A1C was 9.3 in 2023. She is requesting to start on Ozempic to aid in diabetes control. She denies any hx of pancreatitis and no thyroid cancer history. Vitals are stable in clinic today. No acute complaints in clinic, she is using a rolling walker in clinic to aid in mobilization.            Review of Systems:   Review of Systems   Constitutional:  Negative for fatigue.   Endocrine: Negative for polydipsia, polyphagia and polyuria.   Psychiatric/Behavioral:  The patient is nervous/anxious.        I have reviewed the patients family history, social history, past medical history, past surgical history and have updated it as appropriate.     Medications:     Current Outpatient Medications:     albuterol (PROVENTIL) (2.5 MG/3ML) 0.083% nebulizer solution, INHALE THE CONTENTS OF ONE VIAL VIA NEBULIZER EVERY SIX HOURS AS NEEDED FOR WHEEZING, Disp: 180 mL, Rfl: 1    albuterol sulfate  (90 Base) MCG/ACT inhaler, INHALE 1 TO 2 PUFFS INTO LUNGS EVERY FOUR HOURS AS NEEDED FOR WHEEZING, Disp: 8.5 g, Rfl: 2    Alcohol Swabs pads, Use when checking sugars, Disp: 100 each, Rfl: 1    amLODIPine (NORVASC) 10 MG tablet, Take 1 tablet by mouth Daily., Disp: , Rfl:     apixaban (Eliquis) 5 MG tablet tablet, Take 1 tablet by mouth Every 12 (Twelve) Hours.,  Disp: 60 tablet, Rfl: 1    ARIPiprazole (ABILIFY) 10 MG tablet, Take 1 tablet by mouth Daily., Disp: 30 tablet, Rfl: 1    escitalopram (LEXAPRO) 20 MG tablet, TAKE ONE TABLET BY MOUTH BY MOUTH ONCE DAILY, Disp: 30 tablet, Rfl: 1    furosemide (LASIX) 20 MG tablet, TAKE 1 TABLET BY MOUTH EVERY DAY AS NEEDED FOR SWELLING, Disp: 30 tablet, Rfl: 1    glucose monitor monitoring kit, Check glucose twice daily, Disp: 1 each, Rfl: 0    hydrOXYzine pamoate (VISTARIL) 25 MG capsule, Take 1-2 capsules by mouth 3 (Three) Times a Day As Needed for Anxiety., Disp: 30 capsule, Rfl: 1    Lantus SoloStar 100 UNIT/ML injection pen, INJECT 35 UNITS SUBCUTANEOUSLY ONCE DAILY AS DIRECTED, Disp: 15 mL, Rfl: 0    metoprolol succinate XL (TOPROL-XL) 50 MG 24 hr tablet, Take 1 tablet by mouth Daily., Disp: 30 tablet, Rfl: 1    Multiple Vitamins-Iron (MULTI-VITAMIN/IRON) tablet, Take 1 tablet by mouth Daily., Disp: 30 each, Rfl: 5    nystatin (MYCOSTATIN) 799667 UNIT/GM cream, APPLY TOPICALLY TO AFFECTED AREA TWICE DAILY AS DIRECTED, Disp: 30 g, Rfl: 2    O2 (OXYGEN), Inhale 2 L/min Daily., Disp: , Rfl:     pantoprazole (PROTONIX) 40 MG EC tablet, TAKE 1 TABLET BY MOUTH EVERY OTHER DAY, Disp: 30 tablet, Rfl: 1    roflumilast (DALIRESP) 500 MCG tablet tablet, Take 1 tablet by mouth Daily., Disp: 30 tablet, Rfl: 1    Trelegy Ellipta 100-62.5-25 MCG/ACT inhaler, INHALE 1 PUFF INTO THE LUNGS EVERY DAY AS DIRECTED, Disp: 60 each, Rfl: 2    B-D ULTRAFINE III SHORT PEN 31G X 8 MM misc, , Disp: , Rfl:     busPIRone (BUSPAR) 10 MG tablet, TAKE 1 TABLET BY MOUTH TWICE DAILY (Patient not taking: Reported on 7/22/2024), Disp: 60 tablet, Rfl: 0    Cholecalciferol (Vitamin D) 50 MCG (2000 UT) capsule, Take 1 capsule by mouth Daily. (Patient not taking: Reported on 7/22/2024), Disp: 30 capsule, Rfl: 5    docusate sodium 100 MG capsule, Take 1 capsule by mouth 2 (Two) Times a Day. (Patient not taking: Reported on 7/22/2024), Disp: , Rfl:     Easy Comfort  "Pen Needles 32G X 4 MM misc, USE AS DIRECTED TO INJECT INSULIN (Patient not taking: Reported on 7/22/2024), Disp: 100 each, Rfl: 2    Insulin Glargine (LANTUS SOLOSTAR) 100 UNIT/ML injection pen, Inject 35 Units under the skin into the appropriate area as directed Daily. (Patient not taking: Reported on 7/22/2024), Disp: 10 mL, Rfl: 1    Insulin Lispro (humaLOG) 100 UNIT/ML injection, Inject 0-24 Units under the skin into the appropriate area as directed 4 (Four) Times a Day With Meals & at Bedtime. (Patient not taking: Reported on 7/22/2024), Disp: , Rfl: 12    Lancet Devices (Leader Advanced Lancing Device) misc, USE US TO TEST BLOOD SUGAR (Patient not taking: Reported on 7/22/2024), Disp: 1 each, Rfl: 1    NIFEdipine XL (PROCARDIA XL) 30 MG 24 hr tablet, Take 1 tablet by mouth Daily., Disp: 30 tablet, Rfl: 1    nystatin 733250 UNIT/GM topical powder, APPLY TOPICALLY TO AFFECTED AREA(S) TWICE DAILY AS DIRECTED (Patient not taking: Reported on 7/22/2024), Disp: 60 g, Rfl: 1    terazosin (HYTRIN) 2 MG capsule, Take 1 capsule by mouth Every Night., Disp: 30 capsule, Rfl: 0    TRUEplus Lancets 28G misc, USE TWICE DAILY TO CHECK BLOOD SUGAR AS DIRECTED (Patient not taking: Reported on 7/22/2024), Disp: 100 each, Rfl: 2    Allergies:   Allergies   Allergen Reactions    Diphenhydramine Hives    Lortab [Hydrocodone-Acetaminophen] Hives     Tolerates percocet    Toradol [Ketorolac Tromethamine] Hives     Per patient tolerates motrin    Alprazolam Delirium    Nsaids Other (See Comments)     Liver Dx       PHQ-9 Total Score: 0     Physical Exam:  Vital Signs:   Vitals:    07/22/24 1445   BP: 120/80   Pulse: 103   Resp: 18   Temp: 97.5 °F (36.4 °C)   SpO2: 95%   Weight: 122 kg (269 lb 6.4 oz)   Height: 170.2 cm (67\")     Body mass index is 42.19 kg/m².   Class 3 Severe Obesity (BMI >=40). Obesity-related health conditions include the following: hypertension, diabetes mellitus, and dyslipidemias. Obesity is improving with " lifestyle modifications. BMI is is above average; BMI management plan is completed. We discussed low calorie, low carb based diet program, portion control, and increasing exercise.       Physical Exam  Vitals and nursing note reviewed.   Constitutional:       Appearance: Normal appearance. She is morbidly obese.   HENT:      Head: Normocephalic and atraumatic.   Cardiovascular:      Rate and Rhythm: Normal rate and regular rhythm.   Pulmonary:      Effort: Pulmonary effort is normal.      Breath sounds: Normal breath sounds.   Abdominal:      General: Bowel sounds are normal.   Skin:     General: Skin is warm.   Neurological:      General: No focal deficit present.      Mental Status: She is alert and oriented to person, place, and time.   Psychiatric:         Mood and Affect: Mood normal.           Assessment/Plan:   Diagnoses and all orders for this visit:    1. Uncontrolled type 2 diabetes mellitus with hyperglycemia, without long-term current use of insulin (Primary)  -     Comprehensive Metabolic Panel  -     CBC Auto Differential  -     Hemoglobin A1c    2. Chronic obstructive pulmonary disease with acute exacerbation  -     albuterol (PROVENTIL) (2.5 MG/3ML) 0.083% nebulizer solution; INHALE THE CONTENTS OF ONE VIAL VIA NEBULIZER EVERY SIX HOURS AS NEEDED FOR WHEEZING  Dispense: 180 mL; Refill: 1    3. Anxiety  -     hydrOXYzine pamoate (VISTARIL) 25 MG capsule; Take 1-2 capsules by mouth 3 (Three) Times a Day As Needed for Anxiety.  Dispense: 30 capsule; Refill: 1  -     T4, Free  -     TSH    4. Mixed hyperlipidemia  -     Lipid Panel       Diabetes Education  Will start ozempic depending on lab work.  Goal A1C 7 or less  Check glucose at least 1x per day unless otherwise specified  Yearly eye exams  Check feet daily  Eat low carb diet, low sugar  Increase water intake  Exercise 30-45 mins, 3-4x a week  Take meds as prescribed  Anxiety and Depression  Take medications as prescribed  Notify if symptoms are  worsening   Develop coping mechanisms  Best outcome of improving anx/dep is in conjunction with therapy/counseling; this was discussed in clinic today and suggested to the patient.  HLD education  Untreated HLD increases risks of cardiovascular disease and stroke  Increase exercises  Eat healthy food choices, decrease red meats, eggs, figueredo  Increase lean meats  Increase water intake  Take medications as prescribed      Follow Up:   No follow-ups on file.      Isabel Campbell. MILEY   [facility]

## 2024-07-23 LAB
ALBUMIN SERPL-MCNC: 3.7 G/DL (ref 3.8–4.9)
ALP SERPL-CCNC: 120 IU/L (ref 44–121)
ALT SERPL-CCNC: 9 IU/L (ref 0–32)
AST SERPL-CCNC: 10 IU/L (ref 0–40)
BASOPHILS # BLD AUTO: 0.1 X10E3/UL (ref 0–0.2)
BASOPHILS NFR BLD AUTO: 0 %
BILIRUB SERPL-MCNC: 0.2 MG/DL (ref 0–1.2)
BUN SERPL-MCNC: 19 MG/DL (ref 6–24)
BUN/CREAT SERPL: 19 (ref 9–23)
CALCIUM SERPL-MCNC: 9.7 MG/DL (ref 8.7–10.2)
CHLORIDE SERPL-SCNC: 101 MMOL/L (ref 96–106)
CHOLEST SERPL-MCNC: 146 MG/DL (ref 100–199)
CO2 SERPL-SCNC: 25 MMOL/L (ref 20–29)
CREAT SERPL-MCNC: 1 MG/DL (ref 0.57–1)
EGFRCR SERPLBLD CKD-EPI 2021: 65 ML/MIN/1.73
EOSINOPHIL # BLD AUTO: 0.2 X10E3/UL (ref 0–0.4)
EOSINOPHIL NFR BLD AUTO: 1 %
ERYTHROCYTE [DISTWIDTH] IN BLOOD BY AUTOMATED COUNT: 13.6 % (ref 11.7–15.4)
GLOBULIN SER CALC-MCNC: 3.9 G/DL (ref 1.5–4.5)
GLUCOSE SERPL-MCNC: 227 MG/DL (ref 70–99)
HBA1C MFR BLD: 7.5 % (ref 4.8–5.6)
HCT VFR BLD AUTO: 42 % (ref 34–46.6)
HDLC SERPL-MCNC: 40 MG/DL
HGB BLD-MCNC: 13.7 G/DL (ref 11.1–15.9)
IMM GRANULOCYTES # BLD AUTO: 0.2 X10E3/UL (ref 0–0.1)
IMM GRANULOCYTES NFR BLD AUTO: 1 %
LDLC SERPL CALC-MCNC: 78 MG/DL (ref 0–99)
LYMPHOCYTES # BLD AUTO: 3.9 X10E3/UL (ref 0.7–3.1)
LYMPHOCYTES NFR BLD AUTO: 26 %
MCH RBC QN AUTO: 28.7 PG (ref 26.6–33)
MCHC RBC AUTO-ENTMCNC: 32.6 G/DL (ref 31.5–35.7)
MCV RBC AUTO: 88 FL (ref 79–97)
MONOCYTES # BLD AUTO: 0.7 X10E3/UL (ref 0.1–0.9)
MONOCYTES NFR BLD AUTO: 4 %
NEUTROPHILS # BLD AUTO: 10.3 X10E3/UL (ref 1.4–7)
NEUTROPHILS NFR BLD AUTO: 68 %
PLATELET # BLD AUTO: 330 X10E3/UL (ref 150–450)
POTASSIUM SERPL-SCNC: 4.9 MMOL/L (ref 3.5–5.2)
PROT SERPL-MCNC: 7.6 G/DL (ref 6–8.5)
RBC # BLD AUTO: 4.78 X10E6/UL (ref 3.77–5.28)
SODIUM SERPL-SCNC: 138 MMOL/L (ref 134–144)
T4 FREE SERPL-MCNC: 1.1 NG/DL (ref 0.82–1.77)
TRIGL SERPL-MCNC: 164 MG/DL (ref 0–149)
TSH SERPL DL<=0.005 MIU/L-ACNC: 1.21 UIU/ML (ref 0.45–4.5)
VLDLC SERPL CALC-MCNC: 28 MG/DL (ref 5–40)
WBC # BLD AUTO: 15.3 X10E3/UL (ref 3.4–10.8)

## 2024-07-30 ENCOUNTER — TELEPHONE (OUTPATIENT)
Dept: FAMILY MEDICINE CLINIC | Facility: CLINIC | Age: 59
End: 2024-07-30
Payer: MEDICARE

## 2024-07-30 NOTE — TELEPHONE ENCOUNTER
Dani Called in stating that she would like to have a call back about her insulin. She said someone was supposed to call her back with how much she was supposed to be taking.

## 2024-07-31 ENCOUNTER — TELEPHONE (OUTPATIENT)
Dept: FAMILY MEDICINE CLINIC | Facility: CLINIC | Age: 59
End: 2024-07-31
Payer: MEDICARE

## 2024-07-31 DIAGNOSIS — E11.65 UNCONTROLLED TYPE 2 DIABETES MELLITUS WITH HYPERGLYCEMIA, WITHOUT LONG-TERM CURRENT USE OF INSULIN: Primary | ICD-10-CM

## 2024-07-31 NOTE — TELEPHONE ENCOUNTER
Increase to 40 units daily on lantus. And then she wanted to try ozempic. As long as she does not have a hx of pancreatitis I can send in the starting dose.

## 2024-07-31 NOTE — TELEPHONE ENCOUNTER
Called informed pt. No history please send in starting dose. Informed pharmacy of dose increase as well

## 2024-07-31 NOTE — TELEPHONE ENCOUNTER
[facility] REQUESTING A REFILL ON EVERYTHING AND AN UPDATED MED LIST. DUE TO PHARMACY CHANGE.    PT IS STATING HER LANTUS SOLOSTAR DOSAGE HAS BEEN CHANGED AND THEY ARE NEEDING AN UPDATE ON HER.      [phone]

## 2024-08-08 ENCOUNTER — TELEPHONE (OUTPATIENT)
Dept: FAMILY MEDICINE CLINIC | Facility: CLINIC | Age: 59
End: 2024-08-08
Payer: MEDICARE

## 2024-08-08 DIAGNOSIS — R10.84 GENERALIZED ABDOMINAL PAIN: Primary | ICD-10-CM

## 2024-08-08 DIAGNOSIS — R11.2 NAUSEA AND VOMITING, UNSPECIFIED VOMITING TYPE: ICD-10-CM

## 2024-08-08 NOTE — TELEPHONE ENCOUNTER
Pt states that the pain is now at a 1 or 2.  Only symptom is diarrhea currently but seems to be getting better.     Will try to find a ride to come by and have the labs drawn, also advised the pt if the pain persist with nausea and vomiting to call an ambulance due to not having ride.

## 2024-08-08 NOTE — TELEPHONE ENCOUNTER
Caller: Dani Ziegler    Relationship: Self    Best call back number: [phone]     What was the call regarding: PATIENT STATES THAT SHE HAS BEEN NAUSEA,VOMITING,DIARRHEA AND PAIN IN THE PANCREAS AREA AFTER STARTING     Semaglutide,0.25 or 0.5MG/DOS, (OZEMPIC) 2 MG/3ML solution pen-injector     AND SHE FEELS LIKE SHE HAS LOST 20 POUNDS AND HAVING PAIN IN PANCREAS AREA AND DOES NOT WANT TO TAKE THE SHOT ANYMORE. SHE WOULD LIKE A CALL BACK TO SEE IF THERE IS ANYTHING THAT SHE CAN BE PRESCRIBED TO HELP WITH HER SYMPTOMS  Is it okay if the provider responds through MyChart:

## 2024-08-08 NOTE — TELEPHONE ENCOUNTER
Order for blood work has been placed including CBC, CMP, amylase and lipase.    Since pain is improving, may not need to be seen here or in the office unless does not continue to improve.

## 2024-08-08 NOTE — TELEPHONE ENCOUNTER
Caller: Dani Ziegler    Relationship: Self    Best call back number: [phone]     What orders are you requesting (i.e. lab or imaging): BLOOD WORK    In what timeframe would the patient need to come in: 8/9/24    Where will you receive your lab/imaging services: IN OFFICE    Additional notes: PATIENT STATES THAT DR SHIELDS WANTED HER TO COME GET LABS DONE TODAY BUT SHE CANNOT GET A RIDE UNTIL TOMORROW AND ASKS THAT THE ORDERS BE SENT OVER FOR HER

## 2024-08-08 NOTE — TELEPHONE ENCOUNTER
Please call.  1.  She must stop that immediately.  2.  We need to get blood test to make sure she does not have pancreatitis.  Can she stop in and do that?  3.  If she is having increasing pain, nausea or vomiting, she needs to go to the ER for urgent evaluation.

## 2024-08-09 ENCOUNTER — LAB (OUTPATIENT)
Dept: FAMILY MEDICINE CLINIC | Facility: CLINIC | Age: 59
End: 2024-08-09
Payer: MEDICARE

## 2024-08-09 ENCOUNTER — CLINICAL SUPPORT (OUTPATIENT)
Dept: FAMILY MEDICINE CLINIC | Facility: CLINIC | Age: 59
End: 2024-08-09
Payer: MEDICARE

## 2024-08-09 VITALS — WEIGHT: 264.8 LBS | BODY MASS INDEX: 41.47 KG/M2

## 2024-08-09 DIAGNOSIS — E11.65 UNCONTROLLED TYPE 2 DIABETES MELLITUS WITH HYPERGLYCEMIA, WITHOUT LONG-TERM CURRENT USE OF INSULIN: Primary | ICD-10-CM

## 2024-08-09 DIAGNOSIS — R11.2 NAUSEA AND VOMITING, UNSPECIFIED VOMITING TYPE: ICD-10-CM

## 2024-08-09 DIAGNOSIS — R10.84 GENERALIZED ABDOMINAL PAIN: ICD-10-CM

## 2024-08-10 LAB
ALBUMIN SERPL-MCNC: 3.8 G/DL (ref 3.8–4.9)
ALP SERPL-CCNC: 121 IU/L (ref 44–121)
ALT SERPL-CCNC: 12 IU/L (ref 0–32)
AMYLASE SERPL-CCNC: 29 U/L (ref 31–110)
AST SERPL-CCNC: 12 IU/L (ref 0–40)
BASOPHILS # BLD AUTO: 0.1 X10E3/UL (ref 0–0.2)
BASOPHILS NFR BLD AUTO: 0 %
BILIRUB SERPL-MCNC: 0.3 MG/DL (ref 0–1.2)
BUN SERPL-MCNC: 21 MG/DL (ref 8–27)
BUN/CREAT SERPL: 16 (ref 12–28)
CALCIUM SERPL-MCNC: 9.8 MG/DL (ref 8.7–10.3)
CHLORIDE SERPL-SCNC: 101 MMOL/L (ref 96–106)
CO2 SERPL-SCNC: 19 MMOL/L (ref 20–29)
CREAT SERPL-MCNC: 1.29 MG/DL (ref 0.57–1)
EGFRCR SERPLBLD CKD-EPI 2021: 48 ML/MIN/1.73
EOSINOPHIL # BLD AUTO: 0.5 X10E3/UL (ref 0–0.4)
EOSINOPHIL NFR BLD AUTO: 3 %
ERYTHROCYTE [DISTWIDTH] IN BLOOD BY AUTOMATED COUNT: 13.3 % (ref 11.7–15.4)
GLOBULIN SER CALC-MCNC: 4.1 G/DL (ref 1.5–4.5)
GLUCOSE SERPL-MCNC: 124 MG/DL (ref 70–99)
HCT VFR BLD AUTO: 45.6 % (ref 34–46.6)
HGB BLD-MCNC: 14.7 G/DL (ref 11.1–15.9)
IMM GRANULOCYTES # BLD AUTO: 0.2 X10E3/UL (ref 0–0.1)
IMM GRANULOCYTES NFR BLD AUTO: 1 %
LIPASE SERPL-CCNC: 43 U/L (ref 14–72)
LYMPHOCYTES # BLD AUTO: 4.3 X10E3/UL (ref 0.7–3.1)
LYMPHOCYTES NFR BLD AUTO: 26 %
MCH RBC QN AUTO: 28.9 PG (ref 26.6–33)
MCHC RBC AUTO-ENTMCNC: 32.2 G/DL (ref 31.5–35.7)
MCV RBC AUTO: 90 FL (ref 79–97)
MONOCYTES # BLD AUTO: 0.6 X10E3/UL (ref 0.1–0.9)
MONOCYTES NFR BLD AUTO: 4 %
NEUTROPHILS # BLD AUTO: 10.8 X10E3/UL (ref 1.4–7)
NEUTROPHILS NFR BLD AUTO: 66 %
PLATELET # BLD AUTO: 402 X10E3/UL (ref 150–450)
POTASSIUM SERPL-SCNC: 4.8 MMOL/L (ref 3.5–5.2)
PROT SERPL-MCNC: 7.9 G/DL (ref 6–8.5)
RBC # BLD AUTO: 5.08 X10E6/UL (ref 3.77–5.28)
SODIUM SERPL-SCNC: 135 MMOL/L (ref 134–144)
WBC # BLD AUTO: 16.4 X10E3/UL (ref 3.4–10.8)

## 2024-08-20 DIAGNOSIS — I10 ESSENTIAL HYPERTENSION: Chronic | ICD-10-CM

## 2024-08-20 DIAGNOSIS — J44.1 CHRONIC OBSTRUCTIVE PULMONARY DISEASE WITH ACUTE EXACERBATION: ICD-10-CM

## 2024-08-20 DIAGNOSIS — K29.50 CHRONIC GASTRITIS WITHOUT BLEEDING, UNSPECIFIED GASTRITIS TYPE: ICD-10-CM

## 2024-08-20 DIAGNOSIS — Z86.718 HISTORY OF RECURRENT DEEP VEIN THROMBOSIS (DVT): Chronic | ICD-10-CM

## 2024-08-20 DIAGNOSIS — F34.1 DYSTHYMIA: ICD-10-CM

## 2024-08-20 RX ORDER — NIFEDIPINE 30 MG/1
30 TABLET, EXTENDED RELEASE ORAL DAILY
Qty: 30 TABLET | Refills: 1 | Status: SHIPPED | OUTPATIENT
Start: 2024-08-20

## 2024-08-20 RX ORDER — ARIPIPRAZOLE 10 MG/1
10 TABLET ORAL DAILY
Qty: 30 TABLET | Refills: 1 | Status: SHIPPED | OUTPATIENT
Start: 2024-08-20

## 2024-08-20 RX ORDER — PANTOPRAZOLE SODIUM 40 MG/1
TABLET, DELAYED RELEASE ORAL
Qty: 30 TABLET | Refills: 1 | Status: SHIPPED | OUTPATIENT
Start: 2024-08-20

## 2024-08-20 RX ORDER — ESCITALOPRAM OXALATE 20 MG/1
20 TABLET ORAL DAILY
Qty: 30 TABLET | Refills: 1 | Status: SHIPPED | OUTPATIENT
Start: 2024-08-20

## 2024-08-20 RX ORDER — ROFLUMILAST 500 UG/1
500 TABLET ORAL DAILY
Qty: 30 TABLET | Refills: 1 | Status: SHIPPED | OUTPATIENT
Start: 2024-08-20

## 2024-08-20 RX ORDER — METOPROLOL SUCCINATE 50 MG/1
50 TABLET, EXTENDED RELEASE ORAL DAILY
Qty: 30 TABLET | Refills: 1 | Status: SHIPPED | OUTPATIENT
Start: 2024-08-20

## 2024-08-20 NOTE — TELEPHONE ENCOUNTER
Please call patient and clarify.  Amlodipine is still on her med list from 2022 and she is also on nifedipine.  Please confirm which 1 she is taking.  The most recent one sent was nifedipine.  She should not be taking both.

## 2024-08-20 NOTE — TELEPHONE ENCOUNTER
[facility] called to request refills for Dani     She stated that she had switched pharmacies to [facility] and she only has 2 days left.

## 2024-08-27 DIAGNOSIS — E11.65 UNCONTROLLED TYPE 2 DIABETES MELLITUS WITH HYPERGLYCEMIA, WITHOUT LONG-TERM CURRENT USE OF INSULIN: ICD-10-CM

## 2024-08-27 RX ORDER — INSULIN GLARGINE 100 [IU]/ML
INJECTION, SOLUTION SUBCUTANEOUS
Qty: 15 ML | Refills: 0 | Status: SHIPPED | OUTPATIENT
Start: 2024-08-27

## 2024-09-09 ENCOUNTER — TELEPHONE (OUTPATIENT)
Dept: FAMILY MEDICINE CLINIC | Facility: CLINIC | Age: 59
End: 2024-09-09
Payer: MEDICARE

## 2024-09-09 DIAGNOSIS — J44.1 CHRONIC OBSTRUCTIVE PULMONARY DISEASE WITH ACUTE EXACERBATION: Primary | ICD-10-CM

## 2024-09-09 RX ORDER — METHYLPREDNISOLONE 4 MG
TABLET, DOSE PACK ORAL
Qty: 21 EACH | Refills: 0 | Status: SHIPPED | OUTPATIENT
Start: 2024-09-09

## 2024-09-09 NOTE — TELEPHONE ENCOUNTER
PT CALLED AND HAS BEEN SICK FOR 4 DAYS. HAS CONGESTION, COUGH AND SHORTNESS OF BREATH. SHE HAS BEEN DOING HER BREATHING TREATMENTS EVERY 4 HOURS INSTEAD OF SIX AND THAT IS HELPING SOME. PT STATED SHE IS UNABLE TO COME IN THE OFFICE BUT WAS WONDERING IF DR SHIELDS COULD CALL IN A STEROID FOR HER BREATHING

## 2024-09-09 NOTE — TELEPHONE ENCOUNTER
Please call.  1.  I will send in a steroid pack.  2.  May need to do a home COVID test because that is going around.  3.  Office visit if not getting better or ER if worsening.    Sent to [facility]

## 2024-09-16 DIAGNOSIS — E11.65 UNCONTROLLED TYPE 2 DIABETES MELLITUS WITH HYPERGLYCEMIA, WITHOUT LONG-TERM CURRENT USE OF INSULIN: ICD-10-CM

## 2024-09-16 RX ORDER — SEMAGLUTIDE 0.68 MG/ML
INJECTION, SOLUTION SUBCUTANEOUS
Qty: 3 ML | Refills: 1 | Status: SHIPPED | OUTPATIENT
Start: 2024-09-16

## 2024-09-19 DIAGNOSIS — J44.1 CHRONIC OBSTRUCTIVE PULMONARY DISEASE WITH ACUTE EXACERBATION: ICD-10-CM

## 2024-09-19 DIAGNOSIS — F41.9 ANXIETY: ICD-10-CM

## 2024-09-19 DIAGNOSIS — E11.65 UNCONTROLLED TYPE 2 DIABETES MELLITUS WITH HYPERGLYCEMIA, WITHOUT LONG-TERM CURRENT USE OF INSULIN: ICD-10-CM

## 2024-09-19 RX ORDER — ALBUTEROL SULFATE 0.83 MG/ML
SOLUTION RESPIRATORY (INHALATION)
Qty: 180 ML | Refills: 1 | Status: SHIPPED | OUTPATIENT
Start: 2024-09-19

## 2024-09-19 RX ORDER — INSULIN GLARGINE 100 [IU]/ML
INJECTION, SOLUTION SUBCUTANEOUS
Qty: 15 ML | Refills: 0 | Status: SHIPPED | OUTPATIENT
Start: 2024-09-19

## 2024-09-19 RX ORDER — HYDROXYZINE PAMOATE 25 MG/1
CAPSULE ORAL
Qty: 30 CAPSULE | Refills: 0 | Status: SHIPPED | OUTPATIENT
Start: 2024-09-19

## 2024-10-09 DIAGNOSIS — J44.1 CHRONIC OBSTRUCTIVE PULMONARY DISEASE WITH ACUTE EXACERBATION: ICD-10-CM

## 2024-10-09 DIAGNOSIS — I10 ESSENTIAL HYPERTENSION: Chronic | ICD-10-CM

## 2024-10-09 DIAGNOSIS — F34.1 DYSTHYMIA: ICD-10-CM

## 2024-10-09 DIAGNOSIS — Z86.718 HISTORY OF RECURRENT DEEP VEIN THROMBOSIS (DVT): Chronic | ICD-10-CM

## 2024-10-09 RX ORDER — ESCITALOPRAM OXALATE 20 MG/1
20 TABLET ORAL DAILY
Qty: 30 TABLET | Refills: 1 | Status: SHIPPED | OUTPATIENT
Start: 2024-10-09

## 2024-10-09 RX ORDER — APIXABAN 5 MG/1
5 TABLET, FILM COATED ORAL EVERY 12 HOURS
Qty: 60 TABLET | Refills: 1 | Status: SHIPPED | OUTPATIENT
Start: 2024-10-09

## 2024-10-09 RX ORDER — ARIPIPRAZOLE 10 MG/1
10 TABLET ORAL DAILY
Qty: 30 TABLET | Refills: 1 | Status: SHIPPED | OUTPATIENT
Start: 2024-10-09

## 2024-10-09 RX ORDER — ROFLUMILAST 500 UG/1
500 TABLET ORAL DAILY
Qty: 30 TABLET | Refills: 1 | Status: SHIPPED | OUTPATIENT
Start: 2024-10-09

## 2024-10-09 RX ORDER — METOPROLOL SUCCINATE 50 MG/1
50 TABLET, EXTENDED RELEASE ORAL DAILY
Qty: 30 TABLET | Refills: 1 | Status: SHIPPED | OUTPATIENT
Start: 2024-10-09

## 2024-10-09 RX ORDER — NIFEDIPINE 30 MG/1
30 TABLET, EXTENDED RELEASE ORAL DAILY
Qty: 30 TABLET | Refills: 1 | Status: SHIPPED | OUTPATIENT
Start: 2024-10-09

## 2024-10-17 DIAGNOSIS — F41.9 ANXIETY: ICD-10-CM

## 2024-10-17 RX ORDER — HYDROXYZINE PAMOATE 25 MG/1
CAPSULE ORAL
Qty: 30 CAPSULE | Refills: 0 | Status: SHIPPED | OUTPATIENT
Start: 2024-10-17

## 2024-11-06 DIAGNOSIS — J44.1 CHRONIC OBSTRUCTIVE PULMONARY DISEASE WITH ACUTE EXACERBATION: ICD-10-CM

## 2024-11-06 RX ORDER — ALBUTEROL SULFATE 0.83 MG/ML
SOLUTION RESPIRATORY (INHALATION)
Qty: 180 ML | Refills: 1 | Status: SHIPPED | OUTPATIENT
Start: 2024-11-06

## 2024-12-03 NOTE — PLAN OF CARE
Detail Level: Zone Goal Outcome Evaluation:  Plan of Care Reviewed With: patient    SLP evaluation completed. Will address dysphagia. Please see note for further details and recommendations.

## 2024-12-08 DIAGNOSIS — F34.1 DYSTHYMIA: ICD-10-CM

## 2024-12-08 DIAGNOSIS — J44.1 CHRONIC OBSTRUCTIVE PULMONARY DISEASE WITH ACUTE EXACERBATION: ICD-10-CM

## 2024-12-08 DIAGNOSIS — I10 ESSENTIAL HYPERTENSION: Chronic | ICD-10-CM

## 2024-12-08 DIAGNOSIS — K29.50 CHRONIC GASTRITIS WITHOUT BLEEDING, UNSPECIFIED GASTRITIS TYPE: ICD-10-CM

## 2024-12-08 DIAGNOSIS — Z86.718 HISTORY OF RECURRENT DEEP VEIN THROMBOSIS (DVT): Chronic | ICD-10-CM

## 2024-12-09 RX ORDER — ROFLUMILAST 500 UG/1
500 TABLET ORAL DAILY
Qty: 30 TABLET | Refills: 1 | Status: SHIPPED | OUTPATIENT
Start: 2024-12-09

## 2024-12-09 RX ORDER — NIFEDIPINE 30 MG/1
30 TABLET, EXTENDED RELEASE ORAL DAILY
Qty: 30 TABLET | Refills: 1 | Status: SHIPPED | OUTPATIENT
Start: 2024-12-09

## 2024-12-09 RX ORDER — ARIPIPRAZOLE 10 MG/1
10 TABLET ORAL DAILY
Qty: 30 TABLET | Refills: 1 | Status: SHIPPED | OUTPATIENT
Start: 2024-12-09

## 2024-12-09 RX ORDER — METOPROLOL SUCCINATE 50 MG/1
50 TABLET, EXTENDED RELEASE ORAL DAILY
Qty: 30 TABLET | Refills: 1 | Status: SHIPPED | OUTPATIENT
Start: 2024-12-09

## 2024-12-09 RX ORDER — APIXABAN 5 MG/1
5 TABLET, FILM COATED ORAL EVERY 12 HOURS
Qty: 60 TABLET | Refills: 1 | Status: SHIPPED | OUTPATIENT
Start: 2024-12-09

## 2024-12-09 RX ORDER — ESCITALOPRAM OXALATE 20 MG/1
20 TABLET ORAL DAILY
Qty: 30 TABLET | Refills: 1 | Status: SHIPPED | OUTPATIENT
Start: 2024-12-09

## 2024-12-09 RX ORDER — PANTOPRAZOLE SODIUM 40 MG/1
TABLET, DELAYED RELEASE ORAL
Qty: 30 TABLET | Refills: 1 | Status: SHIPPED | OUTPATIENT
Start: 2024-12-09

## 2024-12-16 DIAGNOSIS — F41.9 ANXIETY: ICD-10-CM

## 2024-12-17 RX ORDER — HYDROXYZINE PAMOATE 25 MG/1
CAPSULE ORAL
Qty: 30 CAPSULE | Refills: 0 | Status: SHIPPED | OUTPATIENT
Start: 2024-12-17

## 2025-01-03 ENCOUNTER — TELEPHONE (OUTPATIENT)
Dept: FAMILY MEDICINE CLINIC | Facility: CLINIC | Age: 60
End: 2025-01-03
Payer: MEDICARE

## 2025-01-03 NOTE — TELEPHONE ENCOUNTER
THE PATIENT STATES THAT SHE IS FILING OUT PAPERWORK TO HAVE SOMEONE BE ABLE TO COME TO HER HOME TO ASSIST HER AND STAY WITH HER AT NIGHT  SHE WANT THE DOCTOR TO KNOW THAT SOMEONE WILL BE CONTACTING HIM TO VERIFY HER DISABILITY

## 2025-01-03 NOTE — TELEPHONE ENCOUNTER
Noted. Bds    Seen her since 2023 through telemedicine visit.  She was last seen by Isabel Campbell in July 2024.

## 2025-01-14 DIAGNOSIS — F41.9 ANXIETY: ICD-10-CM

## 2025-01-15 RX ORDER — HYDROXYZINE PAMOATE 25 MG/1
CAPSULE ORAL
Qty: 30 CAPSULE | Refills: 0 | Status: SHIPPED | OUTPATIENT
Start: 2025-01-15

## 2025-02-03 DIAGNOSIS — I10 ESSENTIAL HYPERTENSION: Chronic | ICD-10-CM

## 2025-02-03 DIAGNOSIS — J44.1 CHRONIC OBSTRUCTIVE PULMONARY DISEASE WITH ACUTE EXACERBATION: ICD-10-CM

## 2025-02-03 DIAGNOSIS — F34.1 DYSTHYMIA: ICD-10-CM

## 2025-02-03 DIAGNOSIS — Z86.718 HISTORY OF RECURRENT DEEP VEIN THROMBOSIS (DVT): Chronic | ICD-10-CM

## 2025-02-04 RX ORDER — NIFEDIPINE 30 MG/1
30 TABLET, EXTENDED RELEASE ORAL DAILY
Qty: 30 TABLET | Refills: 1 | Status: SHIPPED | OUTPATIENT
Start: 2025-02-04

## 2025-02-04 RX ORDER — ARIPIPRAZOLE 10 MG/1
10 TABLET ORAL DAILY
Qty: 30 TABLET | Refills: 1 | Status: SHIPPED | OUTPATIENT
Start: 2025-02-04

## 2025-02-04 RX ORDER — APIXABAN 5 MG/1
5 TABLET, FILM COATED ORAL
Qty: 60 TABLET | Refills: 1 | Status: SHIPPED | OUTPATIENT
Start: 2025-02-04

## 2025-02-04 RX ORDER — METOPROLOL SUCCINATE 50 MG/1
50 TABLET, EXTENDED RELEASE ORAL DAILY
Qty: 30 TABLET | Refills: 1 | Status: SHIPPED | OUTPATIENT
Start: 2025-02-04

## 2025-02-04 RX ORDER — ESCITALOPRAM OXALATE 20 MG/1
20 TABLET ORAL DAILY
Qty: 30 TABLET | Refills: 1 | Status: SHIPPED | OUTPATIENT
Start: 2025-02-04

## 2025-02-04 RX ORDER — ROFLUMILAST 500 UG/1
500 TABLET ORAL DAILY
Qty: 30 TABLET | Refills: 1 | Status: SHIPPED | OUTPATIENT
Start: 2025-02-04

## 2025-02-25 DIAGNOSIS — E11.65 UNCONTROLLED TYPE 2 DIABETES MELLITUS WITH HYPERGLYCEMIA, WITHOUT LONG-TERM CURRENT USE OF INSULIN: ICD-10-CM

## 2025-02-28 RX ORDER — INSULIN GLARGINE 100 [IU]/ML
INJECTION, SOLUTION SUBCUTANEOUS
Qty: 15 ML | Refills: 0 | OUTPATIENT
Start: 2025-02-28

## 2025-03-05 DIAGNOSIS — J44.1 CHRONIC OBSTRUCTIVE PULMONARY DISEASE WITH ACUTE EXACERBATION: ICD-10-CM

## 2025-03-05 DIAGNOSIS — E11.65 UNCONTROLLED TYPE 2 DIABETES MELLITUS WITH HYPERGLYCEMIA, WITHOUT LONG-TERM CURRENT USE OF INSULIN: ICD-10-CM

## 2025-03-05 RX ORDER — ALBUTEROL SULFATE 0.83 MG/ML
SOLUTION RESPIRATORY (INHALATION)
Qty: 180 ML | Refills: 1 | Status: SHIPPED | OUTPATIENT
Start: 2025-03-05

## 2025-03-05 NOTE — TELEPHONE ENCOUNTER
Caller: Dani Ziegler FELIPA    Relationship: Self    Best call back number: [phone]     Requested Prescriptions:   Requested Prescriptions     Pending Prescriptions Disp Refills    albuterol (PROVENTIL) (2.5 MG/3ML) 0.083% nebulizer solution 180 mL 1     Sig: INHALE CONTENTS OF 1 VIAL VIA NEBULIZER EVERY 6 HOURS AS NEEDED FOR WHEEZING    Insulin Glargine (LANTUS SOLOSTAR) 100 UNIT/ML injection pen 10 mL 1     Sig: Inject 35 Units under the skin into the appropriate area as directed Daily.        Pharmacy where request should be sent: [address] - [phone]  - [phone]     Last office visit with prescribing clinician: 4/20/2023   Last telemedicine visit with prescribing clinician: Visit date not found   Next office visit with prescribing clinician: 5/13/2025     Additional details provided by patient: APPOINTMENT ON 5/13/25    Does the patient have less than a 3 day supply:  [x] Yes  [] No    Would you like a call back once the refill request has been completed: [] Yes [x] No    If the office needs to give you a call back, can they leave a voicemail: [] Yes [x] No    Thania Garrison Rep   03/05/25 11:24 EST

## 2025-03-17 DIAGNOSIS — F41.9 ANXIETY: ICD-10-CM

## 2025-03-18 RX ORDER — HYDROXYZINE PAMOATE 25 MG/1
CAPSULE ORAL
Qty: 30 CAPSULE | Refills: 0 | Status: SHIPPED | OUTPATIENT
Start: 2025-03-18

## 2025-03-31 ENCOUNTER — TELEPHONE (OUTPATIENT)
Dept: FAMILY MEDICINE CLINIC | Facility: CLINIC | Age: 60
End: 2025-03-31
Payer: MEDICARE

## 2025-03-31 DIAGNOSIS — Z12.31 ENCOUNTER FOR SCREENING MAMMOGRAM FOR BREAST CANCER: Primary | ICD-10-CM

## 2025-03-31 NOTE — TELEPHONE ENCOUNTER
Caller: Dani Ziegler    Relationship: Self    Best call back number: [phone]     What was the call regarding: PLEASE REACH OUT TO PATIENT ABOUT NEEDING A MAMMOGRAM. PATIENT AGREES SHE NEEDS ONE BUT HAS TROUBLE WITH TRANSPORTATION.     Is it okay if the provider responds through MyChart:

## 2025-04-05 DIAGNOSIS — E11.65 UNCONTROLLED TYPE 2 DIABETES MELLITUS WITH HYPERGLYCEMIA, WITHOUT LONG-TERM CURRENT USE OF INSULIN: ICD-10-CM

## 2025-04-06 DIAGNOSIS — I10 ESSENTIAL HYPERTENSION: Chronic | ICD-10-CM

## 2025-04-06 DIAGNOSIS — F34.1 DYSTHYMIA: ICD-10-CM

## 2025-04-06 DIAGNOSIS — K29.50 CHRONIC GASTRITIS WITHOUT BLEEDING, UNSPECIFIED GASTRITIS TYPE: ICD-10-CM

## 2025-04-06 DIAGNOSIS — Z86.718 HISTORY OF RECURRENT DEEP VEIN THROMBOSIS (DVT): Chronic | ICD-10-CM

## 2025-04-06 DIAGNOSIS — J44.1 CHRONIC OBSTRUCTIVE PULMONARY DISEASE WITH ACUTE EXACERBATION: ICD-10-CM

## 2025-04-07 RX ORDER — ARIPIPRAZOLE 10 MG/1
10 TABLET ORAL DAILY
Qty: 30 TABLET | Refills: 1 | Status: SHIPPED | OUTPATIENT
Start: 2025-04-07

## 2025-04-07 RX ORDER — METOPROLOL SUCCINATE 50 MG/1
50 TABLET, EXTENDED RELEASE ORAL DAILY
Qty: 30 TABLET | Refills: 1 | Status: SHIPPED | OUTPATIENT
Start: 2025-04-07

## 2025-04-07 RX ORDER — INSULIN GLARGINE-YFGN 100 [IU]/ML
INJECTION, SOLUTION SUBCUTANEOUS
Qty: 10 ML | Refills: 0 | Status: SHIPPED | OUTPATIENT
Start: 2025-04-07

## 2025-04-07 RX ORDER — PANTOPRAZOLE SODIUM 40 MG/1
40 TABLET, DELAYED RELEASE ORAL
Qty: 30 TABLET | Refills: 1 | Status: SHIPPED | OUTPATIENT
Start: 2025-04-07

## 2025-04-07 RX ORDER — NIFEDIPINE 30 MG/1
30 TABLET, EXTENDED RELEASE ORAL DAILY
Qty: 30 TABLET | Refills: 1 | Status: SHIPPED | OUTPATIENT
Start: 2025-04-07

## 2025-04-07 RX ORDER — ROFLUMILAST 500 UG/1
500 TABLET ORAL DAILY
Qty: 30 TABLET | Refills: 1 | Status: SHIPPED | OUTPATIENT
Start: 2025-04-07

## 2025-04-07 RX ORDER — APIXABAN 5 MG/1
5 TABLET, FILM COATED ORAL EVERY 12 HOURS SCHEDULED
Qty: 60 TABLET | Refills: 1 | Status: SHIPPED | OUTPATIENT
Start: 2025-04-07

## 2025-04-07 RX ORDER — ESCITALOPRAM OXALATE 20 MG/1
20 TABLET ORAL DAILY
Qty: 30 TABLET | Refills: 1 | Status: SHIPPED | OUTPATIENT
Start: 2025-04-07

## 2025-04-14 DIAGNOSIS — F41.9 ANXIETY: ICD-10-CM

## 2025-04-14 DIAGNOSIS — J44.1 CHRONIC OBSTRUCTIVE PULMONARY DISEASE WITH ACUTE EXACERBATION: ICD-10-CM

## 2025-04-14 RX ORDER — ALBUTEROL SULFATE 0.83 MG/ML
SOLUTION RESPIRATORY (INHALATION)
Qty: 180 ML | Refills: 1 | Status: SHIPPED | OUTPATIENT
Start: 2025-04-14

## 2025-04-15 RX ORDER — HYDROXYZINE PAMOATE 25 MG/1
CAPSULE ORAL
Qty: 30 CAPSULE | Refills: 0 | Status: SHIPPED | OUTPATIENT
Start: 2025-04-15

## 2025-04-23 ENCOUNTER — TELEPHONE (OUTPATIENT)
Dept: FAMILY MEDICINE CLINIC | Facility: CLINIC | Age: 60
End: 2025-04-23
Payer: MEDICARE

## 2025-04-23 NOTE — TELEPHONE ENCOUNTER
Caller: Dani Ziegler    Relationship: Self    Best call back number: [phone]     What medication are you requesting: NEBULIZER MACHINE     What are your current symptoms: BREATHING TREATMENT    How long have you been experiencing symptoms:     Have you had these symptoms before:    [] Yes  [] No    Have you been treated for these symptoms before:   [] Yes  [] No    If a prescription is needed, what is your preferred pharmacy and phone number: [place], [place] - [address] - [phone]  - [phone] FX     Additional notes: THE PATIENT MACHINE BROKE 04/22/25 IN THE MIDDLE OF A BREATHING TREATMENT        PLEASE CALL PATIENT ONCE THE REQUEST HAS BEEN APPROVED AND SENT TO THE PHARMACY

## 2025-04-24 DIAGNOSIS — J44.1 CHRONIC OBSTRUCTIVE PULMONARY DISEASE WITH ACUTE EXACERBATION: Primary | ICD-10-CM

## 2025-04-24 NOTE — TELEPHONE ENCOUNTER
Rx faxed. Expressed to pt that she rearrange for transportation for her upcoming appt since she hasn't been seen in 9mo.     She understood and said she's 'got a ride.' Reminded her [place] does have a bus service. She understood.

## 2025-04-24 NOTE — TELEPHONE ENCOUNTER
Pt aware and understood that Dr Tovar is out this wk. She's unable to come to office to p/u neb machine since she doesn't have transportation.     She's asking for neb machine and tubing (already has the meds) be sent to [facility] since they deliver. Stated her machine broke and is 10yrs old or older.

## 2025-05-13 NOTE — TELEPHONE ENCOUNTER
Patient is a 70-year-old male being referred in by Dr. Brayan Smith for evaluation of fatty liver.   A copy done at recent referring providers.  Patient has been seen previously by Lexi Quiles PA-C in our group last on February 11, 2025.   Patient was being seen for GERD, dyspepsia, personal history of adenomatous and serrated colon polyps and colon surveillance.   Patient reported at that visit he was taking 20 mg pantoprazole daily was asymptomatic and had stopped using Tums completely.   He had been diagnosed with pernicious anemia which was suspected was possibly due to his PPI had stopped it for a while but then developed worsening symptoms and improved with Tums and then had gone back to these issues.   Also noted to have calcium levels aware of the possibility of parathyroid issues.   Patient had COVID and was hospitalized for therapy of time back in 2020 improved with convalescent plasma.   He has a history of hiatal hernia on CT imaging.   Has previously back in 2021 but noted to have a chronic dry cough and seeing pulmonary for that.   He also has sleep apnea, diabetes, coronary disease, prostate cancer remission, and hypertension.   Patient is taking simvastatin for his lipids.  Takes Januvia for his diabetes to help with risk factor for fatty liver that as well.   EGD September 2023 with a normal esophagus normal stomach and a single 1 mm angiectasia without bleeding seen and treated with argon plasma.   July 2022 EGD also showed single angiodysplastic lesion in duodenum that was noted and a 5 deficiency persisted plan was to treat with argon plasma.   Prior colon with polyp in cecum that was removed.  Also 5 mm polyp in hepatic flexure as well as a 5 mm polyp of transverse colon 6 mm polyp rectosigmoid and another 6 mm polyp in the rectosigmoid.  The transverse colon descending colon and rectosigmoid were hyperplastic but the colon and hepatic flexure was a tubular adenoma.   At last visit also 11,025 was noted to be overweight status at 25.09.   No recent records faxed in from primary I did see labs from April 2022 that showed cholesterol 144 HDL 54 triglycerides elevated 186 and LDL 65 A1c was elevated 6.6 and TSH 1.84 iron saturation was low at 9% and ferritin low at 7   Plan   1.  _update labs.  2.  Check fib 4/ELF.  3.  Check ultrasound imaging and consider FibroScan versus elastography.  4.  Check hepatitis A and B status.  5.  Check hep C level.  6.  Depending above do additional testing accordingly.  7.  Continue to focus on healthy habits such as optimizing diet, exercise and 5 to 10% weight loss.   Duration of visit was 80 minutes with 40 minutes spent prepping chart and loading info for the visit to [facility] records and then 40 additional minutes spent for this face to face visit reviewing chart and events and plans with the pt. Please call patient.  We received refill request for metoprolol and Lantus but we still show her in the hospital.  We can refill this once she gets discharged.

## 2025-05-14 DIAGNOSIS — F41.9 ANXIETY: ICD-10-CM

## 2025-05-15 RX ORDER — HYDROXYZINE PAMOATE 25 MG/1
CAPSULE ORAL
Qty: 30 CAPSULE | Refills: 0 | Status: SHIPPED | OUTPATIENT
Start: 2025-05-15

## 2025-05-19 ENCOUNTER — TELEPHONE (OUTPATIENT)
Dept: FAMILY MEDICINE CLINIC | Facility: CLINIC | Age: 60
End: 2025-05-19
Payer: MEDICARE

## 2025-05-19 NOTE — TELEPHONE ENCOUNTER
Caller: Dani Ziegler    Relationship: Self    Best call back number: [phone]     What is the best time to reach you: ANYTIME     Who are you requesting to speak with (clinical staff, provider,  specific staff member): CLINICAL STAFF    What was the call regarding: PATIENT IS CALLING TO LET THE PROVIDER KNOW THAT YESTERDAY SHE HAD A HARD TIME BREATHING AND SHE WENT TO THE [place] [facility]. SHE SAYS THAT SHE IS NOW IN THE ICU AND HAS BEEN DIAGNOSED WITH DOUBLE PNEUMONIA.     Is it okay if the provider responds through PingMehart: YES

## 2025-05-20 NOTE — TELEPHONE ENCOUNTER
Noted. Bds    Will review hospital records as they come in.   Subjective:       Patient ID: Reagan Carbajal is a 57 y.o. male.    Chief Complaint: Gout ( foot and knee)  pt work up with foot and knee swollen  No trauma recent or remote  HPI see above  Review of Systems   Constitutional: Positive for fatigue.   HENT: Negative.    Eyes: Negative.    Respiratory: Negative.    Cardiovascular: Negative.    Endocrine: Negative.    Genitourinary: Negative.    Musculoskeletal: Positive for arthralgias and joint swelling.   Skin: Positive for color change.   Allergic/Immunologic: Negative.    Neurological: Negative.    Hematological: Negative.    Psychiatric/Behavioral: Negative.        Objective:      Physical Exam   Constitutional: He appears well-developed and well-nourished. He appears distressed.   HENT:   Head: Normocephalic and atraumatic.   Eyes: Conjunctivae and EOM are normal. Pupils are equal, round, and reactive to light.   Pulmonary/Chest: Effort normal.   Abdominal: Soft. Bowel sounds are normal.   Musculoskeletal:        Right shoulder: He exhibits decreased range of motion, tenderness, swelling, deformity, pain and spasm.        Left knee: He exhibits deformity. He exhibits normal range of motion, no swelling and no effusion.        Lumbar back: He exhibits decreased range of motion, tenderness, pain and spasm.        Right foot: Normal.        Left foot: There is decreased range of motion, tenderness, swelling, crepitus and deformity.   Nursing note and vitals reviewed.      Assessment:       1. gouty arthritis of knee    2. Right foot pain     3.     djd  Plan:     see med card 2-9-18   omeprazole (PRILOSEC) 40 MG capsule 40 mg, Daily        lovastatin (MEVACOR) 40 MG tablet         lisinopril (PRINIVIL,ZESTRIL) 40 MG tablet 40 mg, Daily        indomethacin (INDOCIN) 50 MG capsule 50 mg, 3 times daily with meals        hydrocodone-acetaminophen 5-325mg (NORCO) 5-325 mg per tablet 1 tablet, Every 6 hours PRN        furosemide (LASIX) 20 MG tablet         dexamethasone  injection 4 mg (Completed) 4 mg, Clinic/HOD 1 time        ALPRAZolam (XANAX) 1 MG tablet        F/u if not better.

## 2025-05-30 ENCOUNTER — TELEPHONE (OUTPATIENT)
Dept: FAMILY MEDICINE CLINIC | Facility: CLINIC | Age: 60
End: 2025-05-30
Payer: MEDICARE

## 2025-05-30 NOTE — TELEPHONE ENCOUNTER
Caller: MALIHA ROWLEY    Relationship: Home Health    Best call back number: [phone]     What was the call regarding: REQUESTING AN ORDER TO GO SEE PATIENT ON MONDAY

## 2025-06-03 DIAGNOSIS — F34.1 DYSTHYMIA: ICD-10-CM

## 2025-06-03 DIAGNOSIS — Z86.718 HISTORY OF RECURRENT DEEP VEIN THROMBOSIS (DVT): Chronic | ICD-10-CM

## 2025-06-03 DIAGNOSIS — J44.1 CHRONIC OBSTRUCTIVE PULMONARY DISEASE WITH ACUTE EXACERBATION: ICD-10-CM

## 2025-06-03 DIAGNOSIS — I10 ESSENTIAL HYPERTENSION: Chronic | ICD-10-CM

## 2025-06-03 RX ORDER — ROFLUMILAST 500 UG/1
500 TABLET ORAL DAILY
Qty: 30 TABLET | Refills: 0 | OUTPATIENT
Start: 2025-06-03

## 2025-06-03 RX ORDER — METOPROLOL SUCCINATE 50 MG/1
50 TABLET, EXTENDED RELEASE ORAL DAILY
Qty: 30 TABLET | Refills: 0 | OUTPATIENT
Start: 2025-06-03

## 2025-06-03 RX ORDER — NIFEDIPINE 30 MG/1
30 TABLET, EXTENDED RELEASE ORAL DAILY
Qty: 30 TABLET | Refills: 0 | OUTPATIENT
Start: 2025-06-03

## 2025-06-03 RX ORDER — ARIPIPRAZOLE 10 MG/1
10 TABLET ORAL DAILY
Qty: 30 TABLET | Refills: 0 | OUTPATIENT
Start: 2025-06-03

## 2025-06-03 RX ORDER — APIXABAN 5 MG/1
5 TABLET, FILM COATED ORAL EVERY 12 HOURS SCHEDULED
Qty: 60 TABLET | Refills: 0 | OUTPATIENT
Start: 2025-06-03

## 2025-06-03 RX ORDER — ESCITALOPRAM OXALATE 20 MG/1
20 TABLET ORAL DAILY
Qty: 30 TABLET | Refills: 0 | OUTPATIENT
Start: 2025-06-03

## 2025-06-06 ENCOUNTER — TELEPHONE (OUTPATIENT)
Dept: FAMILY MEDICINE CLINIC | Facility: CLINIC | Age: 60
End: 2025-06-06
Payer: MEDICARE

## 2025-06-06 NOTE — TELEPHONE ENCOUNTER
Caller: [facility]    Relationship: Home Health    Best call back number: [phone]     What was the call regarding: YANETH WOULD TO KNOW IF PROVIDER WILL FOLLOW HOME HEALTH ORDERS.    Is it okay if the provider responds through MyChart: NO

## 2025-06-06 NOTE — TELEPHONE ENCOUNTER
Please call.  Will follow patient but she must keep appointment that is scheduled with me on June 16.

## 2025-06-09 ENCOUNTER — TELEPHONE (OUTPATIENT)
Dept: FAMILY MEDICINE CLINIC | Facility: CLINIC | Age: 60
End: 2025-06-09
Payer: MEDICARE

## 2025-06-09 NOTE — TELEPHONE ENCOUNTER
Caller: NICK    Relationship: Other PHYSICAL THERAPY WITH [facility]    Best call back number:      What is the best time to reach you: ANYTIME    Who are you requesting to speak with (clinical staff, provider,  specific staff member): CLINICAL STAFF    Do you know the name of the person who called: NICK    What was the call regarding: PATIENT WAS SCHEDULED FOR AN EVALUATION THIS PAST FRIDAY AND DIDN'T COME TO THE DOOR AND ASKED TO RESCHEDULE; HE IS NEEDING A VERBAL ORDER TO GO ON WED [id number]    PLEASE CALL TO ADVISE

## 2025-06-15 DIAGNOSIS — J44.1 CHRONIC OBSTRUCTIVE PULMONARY DISEASE WITH ACUTE EXACERBATION: ICD-10-CM

## 2025-06-16 RX ORDER — ALBUTEROL SULFATE 0.83 MG/ML
SOLUTION RESPIRATORY (INHALATION)
Qty: 180 ML | Refills: 1 | Status: SHIPPED | OUTPATIENT
Start: 2025-06-16

## 2025-06-23 ENCOUNTER — TELEPHONE (OUTPATIENT)
Dept: FAMILY MEDICINE CLINIC | Facility: CLINIC | Age: 60
End: 2025-06-23
Payer: MEDICARE

## 2025-06-23 NOTE — TELEPHONE ENCOUNTER
Kaia with [facility] called to report a that Ms. Ziegler's heart rate is averaging around 130 bpm today   Over the weekend she had neasuea, vomiting diarrhea. She took all of her regular medication over the weekend but she is not sure if they came back up when she was getting sick.  She is now starting to feel better. She did not have any chest pains or any other symptoms. (heart rate was not checked over the weekend while she was sick.)

## 2025-06-23 NOTE — TELEPHONE ENCOUNTER
Please call.  She has an appointment with me tomorrow still very important that she follow-up.  I have not seen her in quite some time.  If she worsens overnight, recommend that she go to the ER.

## 2025-06-24 ENCOUNTER — OFFICE VISIT (OUTPATIENT)
Dept: FAMILY MEDICINE CLINIC | Facility: CLINIC | Age: 60
End: 2025-06-24
Payer: MEDICARE

## 2025-06-24 VITALS
OXYGEN SATURATION: 94 % | TEMPERATURE: 97.1 F | DIASTOLIC BLOOD PRESSURE: 80 MMHG | BODY MASS INDEX: 41.47 KG/M2 | HEIGHT: 67 IN | HEART RATE: 90 BPM | SYSTOLIC BLOOD PRESSURE: 126 MMHG | RESPIRATION RATE: 22 BRPM

## 2025-06-24 DIAGNOSIS — F41.9 ANXIETY: ICD-10-CM

## 2025-06-24 DIAGNOSIS — J44.1 CHRONIC OBSTRUCTIVE PULMONARY DISEASE WITH ACUTE EXACERBATION: ICD-10-CM

## 2025-06-24 DIAGNOSIS — F34.1 DYSTHYMIA: ICD-10-CM

## 2025-06-24 DIAGNOSIS — Z86.718 HISTORY OF RECURRENT DEEP VEIN THROMBOSIS (DVT): Chronic | ICD-10-CM

## 2025-06-24 DIAGNOSIS — J44.9 CHRONIC OBSTRUCTIVE PULMONARY DISEASE, UNSPECIFIED COPD TYPE: ICD-10-CM

## 2025-06-24 DIAGNOSIS — Z79.4 TYPE 2 DIABETES MELLITUS WITH HYPERGLYCEMIA, WITH LONG-TERM CURRENT USE OF INSULIN: Primary | ICD-10-CM

## 2025-06-24 DIAGNOSIS — I10 ESSENTIAL HYPERTENSION: Chronic | ICD-10-CM

## 2025-06-24 DIAGNOSIS — K29.50 CHRONIC GASTRITIS WITHOUT BLEEDING, UNSPECIFIED GASTRITIS TYPE: ICD-10-CM

## 2025-06-24 DIAGNOSIS — E11.65 TYPE 2 DIABETES MELLITUS WITH HYPERGLYCEMIA, WITH LONG-TERM CURRENT USE OF INSULIN: Primary | ICD-10-CM

## 2025-06-24 DIAGNOSIS — R39.81 FUNCTIONAL URINARY INCONTINENCE: ICD-10-CM

## 2025-06-24 DIAGNOSIS — J13 PNEUMONIA OF BOTH LUNGS DUE TO STREPTOCOCCUS PNEUMONIAE, UNSPECIFIED PART OF LUNG: ICD-10-CM

## 2025-06-24 DIAGNOSIS — B35.6 TINEA CRURIS: ICD-10-CM

## 2025-06-24 PROCEDURE — G2211 COMPLEX E/M VISIT ADD ON: HCPCS | Performed by: FAMILY MEDICINE

## 2025-06-24 PROCEDURE — 3074F SYST BP LT 130 MM HG: CPT | Performed by: FAMILY MEDICINE

## 2025-06-24 PROCEDURE — 99214 OFFICE O/P EST MOD 30 MIN: CPT | Performed by: FAMILY MEDICINE

## 2025-06-24 PROCEDURE — 3079F DIAST BP 80-89 MM HG: CPT | Performed by: FAMILY MEDICINE

## 2025-06-24 PROCEDURE — 1159F MED LIST DOCD IN RCRD: CPT | Performed by: FAMILY MEDICINE

## 2025-06-24 PROCEDURE — 1125F AMNT PAIN NOTED PAIN PRSNT: CPT | Performed by: FAMILY MEDICINE

## 2025-06-24 PROCEDURE — 1160F RVW MEDS BY RX/DR IN RCRD: CPT | Performed by: FAMILY MEDICINE

## 2025-06-24 RX ORDER — HYDROXYZINE PAMOATE 25 MG/1
25-50 CAPSULE ORAL 3 TIMES DAILY PRN
Qty: 30 CAPSULE | Refills: 5 | Status: SHIPPED | OUTPATIENT
Start: 2025-06-24

## 2025-06-24 RX ORDER — ROFLUMILAST 500 UG/1
500 TABLET ORAL DAILY
Qty: 30 TABLET | Refills: 1 | Status: SHIPPED | OUTPATIENT
Start: 2025-06-24

## 2025-06-24 RX ORDER — NYSTATIN 100000 U/G
CREAM TOPICAL 2 TIMES DAILY
Qty: 60 G | Refills: 2 | Status: SHIPPED | OUTPATIENT
Start: 2025-06-24

## 2025-06-24 RX ORDER — ESCITALOPRAM OXALATE 20 MG/1
20 TABLET ORAL DAILY
Qty: 30 TABLET | Refills: 1 | Status: SHIPPED | OUTPATIENT
Start: 2025-06-24

## 2025-06-24 RX ORDER — OXYBUTYNIN CHLORIDE 5 MG/1
5 TABLET, EXTENDED RELEASE ORAL DAILY
Qty: 30 TABLET | Refills: 2 | Status: SHIPPED | OUTPATIENT
Start: 2025-06-24

## 2025-06-24 RX ORDER — BUDESONIDE, GLYCOPYRROLATE, AND FORMOTEROL FUMARATE 160; 9; 4.8 UG/1; UG/1; UG/1
2 AEROSOL, METERED RESPIRATORY (INHALATION) 2 TIMES DAILY
Qty: 10.7 G | Refills: 5 | Status: SHIPPED | OUTPATIENT
Start: 2025-06-24

## 2025-06-24 RX ORDER — HYDROCHLOROTHIAZIDE 12.5 MG/1
CAPSULE ORAL
Qty: 2 EACH | Refills: 5 | Status: SHIPPED | OUTPATIENT
Start: 2025-06-24

## 2025-06-24 RX ORDER — ALBUTEROL SULFATE 90 UG/1
1 INHALANT RESPIRATORY (INHALATION) EVERY 4 HOURS PRN
Qty: 8.5 G | Refills: 2 | Status: SHIPPED | OUTPATIENT
Start: 2025-06-24

## 2025-06-24 RX ORDER — PANTOPRAZOLE SODIUM 40 MG/1
40 TABLET, DELAYED RELEASE ORAL
Qty: 30 TABLET | Refills: 1 | Status: SHIPPED | OUTPATIENT
Start: 2025-06-24

## 2025-06-24 RX ORDER — NIFEDIPINE 30 MG/1
30 TABLET, EXTENDED RELEASE ORAL DAILY
Qty: 30 TABLET | Refills: 1 | Status: SHIPPED | OUTPATIENT
Start: 2025-06-24

## 2025-06-24 RX ORDER — ARIPIPRAZOLE 10 MG/1
10 TABLET ORAL DAILY
Qty: 30 TABLET | Refills: 1 | Status: SHIPPED | OUTPATIENT
Start: 2025-06-24

## 2025-06-24 RX ORDER — FLUCONAZOLE 100 MG/1
100 TABLET ORAL DAILY
Qty: 5 TABLET | Refills: 0 | Status: SHIPPED | OUTPATIENT
Start: 2025-06-24

## 2025-06-24 RX ORDER — METOPROLOL SUCCINATE 50 MG/1
50 TABLET, EXTENDED RELEASE ORAL DAILY
Qty: 30 TABLET | Refills: 1 | Status: SHIPPED | OUTPATIENT
Start: 2025-06-24

## 2025-06-24 RX ORDER — INSULIN GLARGINE-YFGN 100 [IU]/ML
50 INJECTION, SOLUTION SUBCUTANEOUS DAILY
Qty: 15 ML | Refills: 2 | Status: SHIPPED | OUTPATIENT
Start: 2025-06-24 | End: 2025-06-27

## 2025-06-24 NOTE — PROGRESS NOTES
Chief Complaint  COPD (F/u) and Hospital Follow Up Visit    Subjective          Dani Ziegler presents to [facility]    HPI         The patient is a 59-year-old female here for a hospital follow-up. She was admitted on 05/18/2025 and discharged on 05/26/2025 at [facility] with diagnoses of community-acquired pneumonia, acute on chronic hypoxic respiratory failure, diabetes mellitus, and obesity. She has a history of COPD, CHF, and pulmonary embolism, for which she takes Eliquis. She is insulin-dependent. During her ER visit, she appeared ill, diaphoretic, and dyspneic.    On arrival, labs showed sodium 130, blood sugar 263, bilirubin 1.1, magnesium 1.6, , and WBC 26,000. Diagnosed with sepsis, bacteremia, and acute on chronic hypoxic respiratory failure secondary to community-acquired pneumonia caused by invasive streptococcal pneumonia. Discharged on 3 L of oxygen. Elevated WBC likely due to steroids. Completed 7 days of IV antibiotics; repeat cultures negative. Echocardiogram showed no endocarditis. CT scan showed no acute pulmonary embolism but revealed mass-like densities and small nodules in the right lung, and mediastinal lymphadenopathy, possible malignancy unruled out. Right upper quadrant ultrasound showed enlarged liver consistent with fatty liver. Transesophageal echocardiogram showed EF 60%-65%. Discharged stable on multiple medications.    She reports feeling better overall but continues to have a productive cough with clear sputum. Not currently on steroids. Using oxygen due to high humidity. Has not used Trelegy and does not have an inhaler.    Experienced vomiting and diarrhea from Friday night through Sunday, attributed to food intake. Symptoms resolved; normal bowel movement today. Consumed 2 eggs and 2 bottles of water today without issues.    Administering 35 units of Lantus insulin but not monitoring blood glucose due to broken meter.  "Requests continuous glucose monitor. Recalls previous reading of 400. Severe reaction to Ozempic caused 7 days of vomiting and diarrhea.    Developed yeast infection under breast and upper abdomen after penicillin treatment, managed with nystatin cream. Unsure if previously taken Diflucan.    Had a large boil on her vagina for a month, treated with penicillin during hospital stay. Boil resolved.    Reports severe hip pain, uses walker. Requests gabapentin for foot pain.    Experiences urinary incontinence, uses diapers. Leakage occurs when standing.    Feeling very depressed, has not spoken to daughter in 2 years.    Requests refills for all medications.    Has physical therapy tomorrow and nurse visits once a week.       OTHER NOTES:          Review of Systems   Respiratory:  Positive for shortness of breath.    Cardiovascular: Negative.    Gastrointestinal: Negative.         Better now.   Genitourinary:  Positive for urinary incontinence.   All other systems reviewed and are negative.       Objective       Vital Signs:   /80   Pulse 90   Temp 97.1 °F (36.2 °C)   Resp 22   Ht 170.2 cm (67\")   SpO2 94%   BMI 41.47 kg/m²     Physical Exam  Vitals and nursing note reviewed.   Constitutional:       General: She is not in acute distress.     Appearance: Normal appearance. She is obese. She is not ill-appearing or toxic-appearing.   HENT:      Head: Normocephalic.   Neurological:      Mental Status: She is alert.   Psychiatric:         Mood and Affect: Mood normal.         Behavior: Behavior normal.             Respiratory: Clear to auscultation, no wheezing, rales, or rhonchi  Cardiovascular: Regular rate and rhythm, no murmurs, rubs, or gallops  Extremities: No edema  Skin: Yeast infection under breasts and upper abdomen       Result Review :            Other Results    Results  - Labs:    - Sodium: 130    - Blood Sugar: 263    - Bilirubin: 1.1    - Magnesium: 1.6    - BNP: 157    - WBC: 26,000    - A1c: " 05/19/2025, 11.0    - Imaging:    - CT of the right lung: No acute pulmonary embolism. Mass-like densities and small nodules in the right lung, mediastinal lymphadenopathy. Possible malignancy unruled out.    - Right upper quadrant ultrasound: Enlarged liver consistent with fatty liver.    - Transesophageal echocardiogram: EF 60%-65%             Assessment and Plan    Diagnoses and all orders for this visit:    1. Type 2 diabetes mellitus with hyperglycemia, with long-term current use of insulin (Primary)  -     Continuous Glucose Sensor (FreeStyle Glynn 3 Plus Sensor); Use Every 14 (Fourteen) Days.  Dispense: 2 each; Refill: 5  -     Insulin Glargine-yfgn 100 UNIT/ML solution pen-injector; Inject 50 Units under the skin into the appropriate area as directed Daily.  Dispense: 15 mL; Refill: 2  -     Comprehensive Metabolic Panel    2. Tinea cruris  -     nystatin (MYCOSTATIN) 477398 UNIT/GM cream; Apply  topically to the appropriate area as directed 2 (Two) Times a Day.  Dispense: 60 g; Refill: 2  -     fluconazole (Diflucan) 100 MG tablet; Take 1 tablet by mouth Daily.  Dispense: 5 tablet; Refill: 0    3. Essential hypertension  -     metoprolol succinate XL (TOPROL-XL) 50 MG 24 hr tablet; Take 1 tablet by mouth Daily.  Dispense: 30 tablet; Refill: 1  -     NIFEdipine XL (PROCARDIA XL) 30 MG 24 hr tablet; Take 1 tablet by mouth Daily.  Dispense: 30 tablet; Refill: 1  -     CBC & Differential  -     Comprehensive Metabolic Panel    4. Chronic obstructive pulmonary disease with acute exacerbation  -     Budeson-Glycopyrrol-Formoterol (Breztri Aerosphere) 160-9-4.8 MCG/ACT aerosol inhaler; Inhale 2 puffs 2 (Two) Times a Day.  Dispense: 10.7 g; Refill: 5  -     roflumilast (DALIRESP) 500 MCG tablet tablet; Take 1 tablet by mouth Daily.  Dispense: 30 tablet; Refill: 1    5. Pneumonia of both lungs due to Streptococcus pneumoniae, unspecified part of lung    6. Functional urinary incontinence  -     oxybutynin XL  (Ditropan XL) 5 MG 24 hr tablet; Take 1 tablet by mouth Daily.  Dispense: 30 tablet; Refill: 2    7. Chronic obstructive pulmonary disease, unspecified COPD type  -     albuterol sulfate  (90 Base) MCG/ACT inhaler; Inhale 1 puff Every 4 (Four) Hours As Needed for Wheezing.  Dispense: 8.5 g; Refill: 2    8. Dysthymia  -     ARIPiprazole (ABILIFY) 10 MG tablet; Take 1 tablet by mouth Daily.  Dispense: 30 tablet; Refill: 1  -     escitalopram (LEXAPRO) 20 MG tablet; Take 1 tablet by mouth Daily.  Dispense: 30 tablet; Refill: 1    9. H/O recurrent DVT on Eliquis   -     apixaban (Eliquis) 5 MG tablet tablet; Take 1 tablet by mouth Every 12 (Twelve) Hours.  Dispense: 60 tablet; Refill: 1    10. Anxiety  -     escitalopram (LEXAPRO) 20 MG tablet; Take 1 tablet by mouth Daily.  Dispense: 30 tablet; Refill: 1  -     hydrOXYzine pamoate (VISTARIL) 25 MG capsule; Take 1-2 capsules by mouth 3 (Three) Times a Day As Needed for Anxiety.  Dispense: 30 capsule; Refill: 5    11. Chronic gastritis without bleeding, unspecified gastritis type  -     pantoprazole (PROTONIX) 40 MG EC tablet; Take 1 tablet by mouth Every Other Day.  Dispense: 30 tablet; Refill: 1               DISCUSSION       Community-acquired pneumonia.  Completed 7 days of IV antibiotics; repeat cultures negative. Stable at discharge; continues home oxygen.    Acute on chronic hypoxic respiratory failure.  History of COPD; requires home oxygen. Weaned to 3 L at discharge; continues home oxygen.    Diabetes mellitus.  A1c 11.0 on 05/19/2025. On Lantus 35 units daily; prescription for continuous glucose monitor provided. Insulin may be increased to 50 units daily if blood sugar remains high.    Obesity.  Weight management monitored as part of health plan.    Congestive heart failure.  History of CHF; EF 60%-65%. Continues metoprolol XL 50 mg daily and nifedipine ER 30 mg daily.    Pulmonary embolism.  History of PE; on Eliquis 5 mg twice daily.    Yeast  infection.  Under breast and upper abdomen. Prescription for Diflucan; nystatin cream twice daily; keep area dry.    Sepsis.  Secondary to community-acquired pneumonia. Completed 7 days of IV antibiotics; repeat cultures negative.    Hip pain.  Severe; uses walker. Gabapentin discussed but not prescribed due to cost.    Urinary incontinence.  Severe; uses diapers. Prescription for Ditropan; potential side effects discussed.    Depression.  Very depressed; has not spoken to daughter in 2 years. Continues Abilify 10 mg daily and Lexapro 20 mg daily.    Medication management.  Refills provided for all medications.    Health maintenance.  Blood tests ordered to monitor WBC and electrolytes.       In addition, further reviewed the CT scan that showed several masses and we will order PET CT scan.  See result note.  Patient is agreeable.      Alden dated 6/24/2025  was reviewed and appropriate.     Follow Up   No follow-ups on file.    Patient was given instructions and counseling regarding her condition or for health maintenance advice. Please see specific information pulled into the AVS if appropriate.       Darrion Tovar MD    Patient or patient representative verbalized consent for the use of Ambient Listening during the visit with  Darrion Tovar MD for chart documentation. 6/24/2025  15:51 EDT

## 2025-06-25 LAB
ALBUMIN SERPL-MCNC: 3.5 G/DL (ref 3.8–4.9)
ALP SERPL-CCNC: 125 IU/L (ref 44–121)
ALT SERPL-CCNC: 11 IU/L (ref 0–32)
AST SERPL-CCNC: 12 IU/L (ref 0–40)
BASOPHILS # BLD AUTO: 0.1 X10E3/UL (ref 0–0.2)
BASOPHILS NFR BLD AUTO: 0 %
BILIRUB SERPL-MCNC: 0.5 MG/DL (ref 0–1.2)
BUN SERPL-MCNC: 21 MG/DL (ref 8–27)
BUN/CREAT SERPL: 17 (ref 12–28)
CALCIUM SERPL-MCNC: 9.7 MG/DL (ref 8.7–10.3)
CHLORIDE SERPL-SCNC: 100 MMOL/L (ref 96–106)
CO2 SERPL-SCNC: 20 MMOL/L (ref 20–29)
CREAT SERPL-MCNC: 1.25 MG/DL (ref 0.57–1)
EGFRCR SERPLBLD CKD-EPI 2021: 49 ML/MIN/1.73
EOSINOPHIL # BLD AUTO: 0.3 X10E3/UL (ref 0–0.4)
EOSINOPHIL NFR BLD AUTO: 1 %
ERYTHROCYTE [DISTWIDTH] IN BLOOD BY AUTOMATED COUNT: 14.7 % (ref 11.7–15.4)
GLOBULIN SER CALC-MCNC: 4.2 G/DL (ref 1.5–4.5)
GLUCOSE SERPL-MCNC: 242 MG/DL (ref 70–99)
HCT VFR BLD AUTO: 41.7 % (ref 34–46.6)
HGB BLD-MCNC: 12.9 G/DL (ref 11.1–15.9)
IMM GRANULOCYTES # BLD AUTO: 0.3 X10E3/UL (ref 0–0.1)
IMM GRANULOCYTES NFR BLD AUTO: 2 %
LYMPHOCYTES # BLD AUTO: 4.1 X10E3/UL (ref 0.7–3.1)
LYMPHOCYTES NFR BLD AUTO: 21 %
MCH RBC QN AUTO: 27.7 PG (ref 26.6–33)
MCHC RBC AUTO-ENTMCNC: 30.9 G/DL (ref 31.5–35.7)
MCV RBC AUTO: 90 FL (ref 79–97)
MONOCYTES # BLD AUTO: 0.8 X10E3/UL (ref 0.1–0.9)
MONOCYTES NFR BLD AUTO: 4 %
NEUTROPHILS # BLD AUTO: 13.8 X10E3/UL (ref 1.4–7)
NEUTROPHILS NFR BLD AUTO: 72 %
PLATELET # BLD AUTO: 385 X10E3/UL (ref 150–450)
POTASSIUM SERPL-SCNC: 4.2 MMOL/L (ref 3.5–5.2)
PROT SERPL-MCNC: 7.7 G/DL (ref 6–8.5)
RBC # BLD AUTO: 4.65 X10E6/UL (ref 3.77–5.28)
SODIUM SERPL-SCNC: 136 MMOL/L (ref 134–144)
WBC # BLD AUTO: 19.2 X10E3/UL (ref 3.4–10.8)

## 2025-06-26 ENCOUNTER — RESULTS FOLLOW-UP (OUTPATIENT)
Dept: FAMILY MEDICINE CLINIC | Facility: CLINIC | Age: 60
End: 2025-06-26
Payer: MEDICARE

## 2025-06-27 ENCOUNTER — TELEPHONE (OUTPATIENT)
Dept: FAMILY MEDICINE CLINIC | Facility: CLINIC | Age: 60
End: 2025-06-27
Payer: MEDICARE

## 2025-06-27 DIAGNOSIS — Z79.4 TYPE 2 DIABETES MELLITUS WITH HYPERGLYCEMIA, WITH LONG-TERM CURRENT USE OF INSULIN: Primary | ICD-10-CM

## 2025-06-27 DIAGNOSIS — E11.65 TYPE 2 DIABETES MELLITUS WITH HYPERGLYCEMIA, WITH LONG-TERM CURRENT USE OF INSULIN: Primary | ICD-10-CM

## 2025-06-27 RX ORDER — INSULIN GLARGINE 100 [IU]/ML
50 INJECTION, SOLUTION SUBCUTANEOUS DAILY
Qty: 15 ML | Refills: 2 | Status: SHIPPED | OUTPATIENT
Start: 2025-06-27

## 2025-06-27 NOTE — TELEPHONE ENCOUNTER
Caller: Dani Ziegler    Relationship: Self    Best call back number: [phone]    Which medication are you concerned about: INSULIN LANTUS    Who prescribed you this medication: DOCTOR SHIELDS         What are your concerns: THE PATIENT STATES THAT THE GENERIC WAS PRESCRIBED AND NEEDS A PRIOR AUTHORIZATION THE PATIENT STATES THAT THE INSURANCE COMPANY PREFERS THE BRAND NAME LANTUS TO BE CALLED IN SHE WOULD LIKE TO KNOW IF THE MEDICATION CAN BE CHANGED THE THE BRAND NAME

## 2025-07-08 ENCOUNTER — TELEPHONE (OUTPATIENT)
Dept: FAMILY MEDICINE CLINIC | Facility: CLINIC | Age: 60
End: 2025-07-08
Payer: MEDICARE

## 2025-07-08 DIAGNOSIS — F41.8 SITUATIONAL ANXIETY: Primary | ICD-10-CM

## 2025-07-08 NOTE — TELEPHONE ENCOUNTER
PATIENT HAS HAD A COUPLE DAYS OF NAUSEA AND DIARRHEA, STATES THAT NAUSEA HAS SUBSIDED BUT IS STILL HAVING THE OTHER. ALSO WANTED TO RELAY THAT PATIENTS HEART RATE WAS , BUT ALL OTHER VITALS ARE NORMAL

## 2025-07-08 NOTE — TELEPHONE ENCOUNTER
Please call.  1.  See other message in regards to if PET scan has been set up.    2.  Give the diarrhea a few more days and if it is not improving by early next week or worsening before then, let us know.  We would need to see her in the office to evaluate this.    Darrion Tovar MD

## 2025-07-14 ENCOUNTER — TELEPHONE (OUTPATIENT)
Dept: FAMILY MEDICINE CLINIC | Facility: CLINIC | Age: 60
End: 2025-07-14
Payer: MEDICARE

## 2025-07-14 DIAGNOSIS — E11.65 TYPE 2 DIABETES MELLITUS WITH HYPERGLYCEMIA, WITH LONG-TERM CURRENT USE OF INSULIN: Primary | ICD-10-CM

## 2025-07-14 DIAGNOSIS — Z79.4 TYPE 2 DIABETES MELLITUS WITH HYPERGLYCEMIA, WITH LONG-TERM CURRENT USE OF INSULIN: Primary | ICD-10-CM

## 2025-07-14 RX ORDER — PROCHLORPERAZINE 25 MG/1
1 SUPPOSITORY RECTAL DAILY PRN
Qty: 1 EACH | Refills: 0 | Status: SHIPPED | OUTPATIENT
Start: 2025-07-14

## 2025-07-14 RX ORDER — PROCHLORPERAZINE 25 MG/1
SUPPOSITORY RECTAL
Qty: 3 EACH | Refills: 5 | Status: SHIPPED | OUTPATIENT
Start: 2025-07-14

## 2025-07-14 RX ORDER — PROCHLORPERAZINE 25 MG/1
1 SUPPOSITORY RECTAL DAILY
Qty: 1 EACH | Refills: 0 | Status: SHIPPED | OUTPATIENT
Start: 2025-07-14

## 2025-07-14 RX ORDER — DIAZEPAM 5 MG/1
TABLET ORAL
Qty: 2 TABLET | Refills: 0 | Status: SHIPPED | OUTPATIENT
Start: 2025-07-14

## 2025-07-14 NOTE — TELEPHONE ENCOUNTER
Caller: Dani Ziegler    Relationship: Self    Best call back number:   Telephone Information:   Mobile [phone]       What medication are you requesting: DEXCOM G6      If a prescription is needed, what is your preferred pharmacy and phone number:    [facility] - [place], [place] - [address] [facility] [address] - [phone]  - [phone] FX       Additional notes:PATIENT STARTED ON A FREESTYLE CARLIE 2-3 WEEKS AGO, BUT IT IS NOT COMPATIBLE WITH HER PHONE. PATIENT IS WANTING TO KNOW IF SHE CAN BE CHANGED TO A DEXCOM G6 AS THAT IS COMPATIBLE WITH HER PHONE. PLEASE CALL TO DISCUSS. PATIENT HAS NOT BEEN ABLE TO CHECK HER SUGAR AS SHE HAS BEEN UNABLE TO USE THE FREESTYLE CARLIE

## 2025-07-14 NOTE — TELEPHONE ENCOUNTER
Please call.  A PET/CT scan is not like an MRI.  It is completely wide open and she should not need any diazepam for that.

## 2025-07-14 NOTE — TELEPHONE ENCOUNTER
ENCOUNTER RELAYED, PATIENT DOES HAVE HER PET SCAN SCHEDULED FOR 7/21. SHE ALSO WANTED TO ASK FOR AN PRESCRIPTION FOR A VALIUM DO TO PANIC ATTACK WHEN BEING IN THE MACHINE. SHE ALSO STATED THAT HER DIARRHEA ONLY LASTED ANOTHER DAY OR SO AFTER THE MADE ENCOUNTER

## 2025-07-14 NOTE — TELEPHONE ENCOUNTER
Please call.  I sent that in for the G6 with the sensor and the  just in case it does not work with her phone.

## 2025-07-22 ENCOUNTER — APPOINTMENT (OUTPATIENT)
Dept: CT IMAGING | Facility: HOSPITAL | Age: 60
End: 2025-07-22
Payer: MEDICARE

## 2025-07-22 ENCOUNTER — APPOINTMENT (OUTPATIENT)
Dept: CARDIOLOGY | Facility: HOSPITAL | Age: 60
End: 2025-07-22
Payer: MEDICARE

## 2025-07-22 ENCOUNTER — HOSPITAL ENCOUNTER (INPATIENT)
Facility: HOSPITAL | Age: 60
LOS: 9 days | Discharge: HOME HEALTH | End: 2025-07-31
Attending: EMERGENCY MEDICINE | Admitting: FAMILY MEDICINE
Payer: MEDICARE

## 2025-07-22 ENCOUNTER — APPOINTMENT (OUTPATIENT)
Dept: GENERAL RADIOLOGY | Facility: HOSPITAL | Age: 60
End: 2025-07-22
Payer: MEDICARE

## 2025-07-22 DIAGNOSIS — J44.1 COPD WITH ACUTE EXACERBATION: ICD-10-CM

## 2025-07-22 DIAGNOSIS — J96.21 ACUTE ON CHRONIC HYPOXIC RESPIRATORY FAILURE: ICD-10-CM

## 2025-07-22 DIAGNOSIS — A41.9 SEPSIS WITHOUT ACUTE ORGAN DYSFUNCTION, DUE TO UNSPECIFIED ORGANISM: ICD-10-CM

## 2025-07-22 DIAGNOSIS — J18.9 PNEUMONIA OF RIGHT MIDDLE LOBE DUE TO INFECTIOUS ORGANISM: ICD-10-CM

## 2025-07-22 DIAGNOSIS — R06.03 RESPIRATORY DISTRESS: Primary | ICD-10-CM

## 2025-07-22 DIAGNOSIS — R13.12 OROPHARYNGEAL DYSPHAGIA: ICD-10-CM

## 2025-07-22 DIAGNOSIS — R00.0 SINUS TACHYCARDIA: ICD-10-CM

## 2025-07-22 DIAGNOSIS — J96.01 ACUTE RESPIRATORY FAILURE WITH HYPOXIA: ICD-10-CM

## 2025-07-22 DIAGNOSIS — D72.829 LEUKOCYTOSIS, UNSPECIFIED TYPE: ICD-10-CM

## 2025-07-22 DIAGNOSIS — J96.21 ACUTE ON CHRONIC RESPIRATORY FAILURE WITH HYPOXIA: ICD-10-CM

## 2025-07-22 LAB
ALBUMIN SERPL-MCNC: 3.3 G/DL (ref 3.5–5.2)
ALBUMIN/GLOB SERPL: 0.6 G/DL
ALP SERPL-CCNC: 127 U/L (ref 39–117)
ALT SERPL W P-5'-P-CCNC: 7 U/L (ref 1–33)
AMORPH URATE CRY URNS QL MICRO: ABNORMAL /HPF
ANION GAP SERPL CALCULATED.3IONS-SCNC: 15.1 MMOL/L (ref 5–15)
AORTIC DIMENSIONLESS INDEX: 0.78 (DI)
ARTERIAL PATENCY WRIST A: ABNORMAL
AST SERPL-CCNC: 10 U/L (ref 1–32)
ATMOSPHERIC PRESS: ABNORMAL MM[HG]
AV MEAN PRESS GRAD SYS DOP V1V2: 5 MMHG
AV VMAX SYS DOP: 146 CM/SEC
B PARAPERT DNA SPEC QL NAA+PROBE: NOT DETECTED
B PERT DNA SPEC QL NAA+PROBE: NOT DETECTED
B-OH-BUTYR SERPL-SCNC: 1.53 MMOL/L (ref 0.02–0.27)
BACTERIA UR QL AUTO: ABNORMAL /HPF
BASE EXCESS BLDA CALC-SCNC: -1.1 MMOL/L (ref 0–2)
BASOPHILS # BLD AUTO: 0.08 10*3/MM3 (ref 0–0.2)
BASOPHILS NFR BLD AUTO: 0.3 % (ref 0–1.5)
BDY SITE: ABNORMAL
BH CV ECHO MEAS - AO MAX PG: 8.5 MMHG
BH CV ECHO MEAS - AO ROOT DIAM: 3.7 CM
BH CV ECHO MEAS - AO V2 VTI: 31 CM
BH CV ECHO MEAS - AVA(I,D): 3.2 CM2
BH CV ECHO MEAS - EDV(CUBED): 64 ML
BH CV ECHO MEAS - EDV(MOD-SP2): 134 ML
BH CV ECHO MEAS - EDV(MOD-SP4): 112 ML
BH CV ECHO MEAS - EF(MOD-SP2): 52.3 %
BH CV ECHO MEAS - EF(MOD-SP4): 66.2 %
BH CV ECHO MEAS - ESV(CUBED): 17.6 ML
BH CV ECHO MEAS - ESV(MOD-SP2): 63.9 ML
BH CV ECHO MEAS - ESV(MOD-SP4): 37.8 ML
BH CV ECHO MEAS - FS: 35 %
BH CV ECHO MEAS - IVS/LVPW: 1 CM
BH CV ECHO MEAS - IVSD: 1.2 CM
BH CV ECHO MEAS - LA DIMENSION: 4.3 CM
BH CV ECHO MEAS - LAT PEAK E' VEL: 8.6 CM/SEC
BH CV ECHO MEAS - LV DIASTOLIC VOL/BSA (35-75): 47.5 CM2
BH CV ECHO MEAS - LV MASS(C)D: 165.5 GRAMS
BH CV ECHO MEAS - LV MAX PG: 4.5 MMHG
BH CV ECHO MEAS - LV MEAN PG: 2 MMHG
BH CV ECHO MEAS - LV SYSTOLIC VOL/BSA (12-30): 16 CM2
BH CV ECHO MEAS - LV V1 MAX: 106 CM/SEC
BH CV ECHO MEAS - LV V1 VTI: 24.1 CM
BH CV ECHO MEAS - LVIDD: 4 CM
BH CV ECHO MEAS - LVIDS: 2.6 CM
BH CV ECHO MEAS - LVOT AREA: 4.2 CM2
BH CV ECHO MEAS - LVOT DIAM: 2.3 CM
BH CV ECHO MEAS - LVPWD: 1.2 CM
BH CV ECHO MEAS - MED PEAK E' VEL: 7.3 CM/SEC
BH CV ECHO MEAS - MV A MAX VEL: 98.5 CM/SEC
BH CV ECHO MEAS - MV DEC SLOPE: 456 CM/SEC2
BH CV ECHO MEAS - MV E MAX VEL: 85.4 CM/SEC
BH CV ECHO MEAS - MV E/A: 0.87
BH CV ECHO MEAS - MV MAX PG: 4.6 MMHG
BH CV ECHO MEAS - MV MEAN PG: 2 MMHG
BH CV ECHO MEAS - MV P1/2T: 64.9 MSEC
BH CV ECHO MEAS - MV V2 VTI: 28.2 CM
BH CV ECHO MEAS - MVA(P1/2T): 3.4 CM2
BH CV ECHO MEAS - MVA(VTI): 3.6 CM2
BH CV ECHO MEAS - RAP SYSTOLE: 8 MMHG
BH CV ECHO MEAS - SV(LVOT): 100.1 ML
BH CV ECHO MEAS - SV(MOD-SP2): 70.1 ML
BH CV ECHO MEAS - SV(MOD-SP4): 74.2 ML
BH CV ECHO MEAS - SVI(LVOT): 42.5 ML/M2
BH CV ECHO MEAS - SVI(MOD-SP2): 29.8 ML/M2
BH CV ECHO MEAS - SVI(MOD-SP4): 31.5 ML/M2
BH CV ECHO MEAS - TAPSE (>1.6): 2.9 CM
BH CV ECHO MEASUREMENTS AVERAGE E/E' RATIO: 10.74
BH CV XLRA - RV BASE: 3.8 CM
BH CV XLRA - RV LENGTH: 6.7 CM
BH CV XLRA - RV MID: 2.4 CM
BH CV XLRA - TDI S': 17.1 CM/SEC
BILIRUB SERPL-MCNC: 0.8 MG/DL (ref 0–1.2)
BILIRUB UR QL STRIP: NEGATIVE
BODY TEMPERATURE: 37
BUN SERPL-MCNC: 9.8 MG/DL (ref 8–23)
BUN/CREAT SERPL: 11 (ref 7–25)
C PNEUM DNA NPH QL NAA+NON-PROBE: NOT DETECTED
CALCIUM SPEC-SCNC: 9.2 MG/DL (ref 8.6–10.5)
CHLORIDE SERPL-SCNC: 98 MMOL/L (ref 98–107)
CLARITY UR: ABNORMAL
CO2 BLDA-SCNC: 23.3 MMOL/L (ref 22–33)
CO2 SERPL-SCNC: 19.9 MMOL/L (ref 22–29)
COHGB MFR BLD: 1.3 % (ref 0–2)
COLOR UR: YELLOW
CREAT SERPL-MCNC: 0.89 MG/DL (ref 0.57–1)
CRP SERPL-MCNC: 18.37 MG/DL (ref 0–0.5)
D-LACTATE SERPL-SCNC: 1.3 MMOL/L (ref 0.5–2)
D-LACTATE SERPL-SCNC: 2.1 MMOL/L (ref 0.5–2)
D-LACTATE SERPL-SCNC: 2.3 MMOL/L (ref 0.5–2)
DEPRECATED RDW RBC AUTO: 48.8 FL (ref 37–54)
EGFRCR SERPLBLD CKD-EPI 2021: 74.3 ML/MIN/1.73
EOSINOPHIL # BLD AUTO: 0.02 10*3/MM3 (ref 0–0.4)
EOSINOPHIL NFR BLD AUTO: 0.1 % (ref 0.3–6.2)
EPAP: 12
ERYTHROCYTE [DISTWIDTH] IN BLOOD BY AUTOMATED COUNT: 15.5 % (ref 12.3–15.4)
FLUAV SUBTYP SPEC NAA+PROBE: NOT DETECTED
FLUBV RNA NPH QL NAA+NON-PROBE: NOT DETECTED
GEN 5 1HR TROPONIN T REFLEX: 28 NG/L
GLOBULIN UR ELPH-MCNC: 5.2 GM/DL
GLUCOSE BLDC GLUCOMTR-MCNC: 205 MG/DL (ref 70–130)
GLUCOSE BLDC GLUCOMTR-MCNC: 267 MG/DL (ref 70–130)
GLUCOSE BLDC GLUCOMTR-MCNC: 289 MG/DL (ref 70–130)
GLUCOSE BLDC GLUCOMTR-MCNC: 298 MG/DL (ref 70–130)
GLUCOSE BLDC GLUCOMTR-MCNC: 349 MG/DL (ref 70–130)
GLUCOSE BLDC GLUCOMTR-MCNC: 359 MG/DL (ref 70–130)
GLUCOSE BLDC GLUCOMTR-MCNC: 364 MG/DL (ref 70–130)
GLUCOSE BLDC GLUCOMTR-MCNC: 406 MG/DL (ref 70–130)
GLUCOSE SERPL-MCNC: 270 MG/DL (ref 65–99)
GLUCOSE UR STRIP-MCNC: NEGATIVE MG/DL
HADV DNA SPEC NAA+PROBE: NOT DETECTED
HBA1C MFR BLD: 9.01 % (ref 4.8–5.6)
HCO3 BLDA-SCNC: 22.3 MMOL/L (ref 20–26)
HCOV 229E RNA SPEC QL NAA+PROBE: NOT DETECTED
HCOV HKU1 RNA SPEC QL NAA+PROBE: NOT DETECTED
HCOV NL63 RNA SPEC QL NAA+PROBE: NOT DETECTED
HCOV OC43 RNA SPEC QL NAA+PROBE: NOT DETECTED
HCT VFR BLD AUTO: 44.4 % (ref 34–46.6)
HCT VFR BLD CALC: 45.4 % (ref 38–51)
HGB BLD-MCNC: 14.5 G/DL (ref 12–15.9)
HGB BLDA-MCNC: 14.8 G/DL (ref 14–18)
HGB UR QL STRIP.AUTO: ABNORMAL
HMPV RNA NPH QL NAA+NON-PROBE: NOT DETECTED
HOLD SPECIMEN: NORMAL
HPIV1 RNA ISLT QL NAA+PROBE: NOT DETECTED
HPIV2 RNA SPEC QL NAA+PROBE: NOT DETECTED
HPIV3 RNA NPH QL NAA+PROBE: NOT DETECTED
HPIV4 P GENE NPH QL NAA+PROBE: NOT DETECTED
HYALINE CASTS UR QL AUTO: ABNORMAL /LPF
IMM GRANULOCYTES # BLD AUTO: 0.39 10*3/MM3 (ref 0–0.05)
IMM GRANULOCYTES NFR BLD AUTO: 1.3 % (ref 0–0.5)
INHALED O2 CONCENTRATION: 100 %
IPAP: 24
IVRT: 130 MS
KETONES UR QL STRIP: ABNORMAL
L PNEUMO1 AG UR QL IA: NEGATIVE
LEFT ATRIUM VOLUME INDEX: 26.4 ML/M2
LEUKOCYTE ESTERASE UR QL STRIP.AUTO: NEGATIVE
LV EF BIPLANE MOD: 60.8 %
LYMPHOCYTES # BLD AUTO: 3.42 10*3/MM3 (ref 0.7–3.1)
LYMPHOCYTES NFR BLD AUTO: 11.8 % (ref 19.6–45.3)
Lab: ABNORMAL
M PNEUMO IGG SER IA-ACNC: NOT DETECTED
MAGNESIUM SERPL-MCNC: 1.7 MG/DL (ref 1.6–2.4)
MCH RBC QN AUTO: 28.2 PG (ref 26.6–33)
MCHC RBC AUTO-ENTMCNC: 32.7 G/DL (ref 31.5–35.7)
MCV RBC AUTO: 86.2 FL (ref 79–97)
METHGB BLD QL: 0 % (ref 0–1.5)
MODALITY: ABNORMAL
MONOCYTES # BLD AUTO: 1.27 10*3/MM3 (ref 0.1–0.9)
MONOCYTES NFR BLD AUTO: 4.4 % (ref 5–12)
MRSA DNA SPEC QL NAA+PROBE: POSITIVE
NEUTROPHILS NFR BLD AUTO: 23.9 10*3/MM3 (ref 1.7–7)
NEUTROPHILS NFR BLD AUTO: 82.1 % (ref 42.7–76)
NITRITE UR QL STRIP: POSITIVE
NOTIFIED BY: ABNORMAL
NOTIFIED WHO: ABNORMAL
NRBC BLD AUTO-RTO: 0 /100 WBC (ref 0–0.2)
NT-PROBNP SERPL-MCNC: 8901 PG/ML (ref 0–900)
OXYHGB MFR BLDV: ABNORMAL %
PAW @ PEAK INSP FLOW SETTING VENT: 0 CMH2O
PCO2 BLDA: 32.9 MM HG (ref 35–45)
PCO2 TEMP ADJ BLD: 32.9 MM HG (ref 35–45)
PH BLDA: 7.44 PH UNITS (ref 7.35–7.45)
PH UR STRIP.AUTO: <=5 [PH] (ref 5–8)
PH, TEMP CORRECTED: 7.44 PH UNITS
PLATELET # BLD AUTO: 430 10*3/MM3 (ref 140–450)
PMV BLD AUTO: 10.3 FL (ref 6–12)
PO2 BLDA: 286 MM HG (ref 83–108)
PO2 TEMP ADJ BLD: 286 MM HG (ref 83–108)
POTASSIUM SERPL-SCNC: 4.2 MMOL/L (ref 3.5–5.2)
PROCALCITONIN SERPL-MCNC: 0.23 NG/ML (ref 0–0.25)
PROCALCITONIN SERPL-MCNC: 0.3 NG/ML (ref 0–0.25)
PROT SERPL-MCNC: 8.5 G/DL (ref 6–8.5)
PROT UR QL STRIP: ABNORMAL
RBC # BLD AUTO: 5.15 10*6/MM3 (ref 3.77–5.28)
RBC # UR STRIP: ABNORMAL /HPF
REF LAB TEST METHOD: ABNORMAL
RHINOVIRUS RNA SPEC NAA+PROBE: NOT DETECTED
RSV RNA NPH QL NAA+NON-PROBE: NOT DETECTED
S PNEUM AG SPEC QL LA: NEGATIVE
SARS-COV-2 RNA RESP QL NAA+PROBE: NOT DETECTED
SODIUM SERPL-SCNC: 133 MMOL/L (ref 136–145)
SP GR UR STRIP: >1.03 (ref 1–1.03)
SQUAMOUS #/AREA URNS HPF: ABNORMAL /HPF
TOTAL RATE: 0 BREATHS/MINUTE
TROPONIN T % DELTA: 8
TROPONIN T NUMERIC DELTA: 2 NG/L
TROPONIN T SERPL HS-MCNC: 26 NG/L
UROBILINOGEN UR QL STRIP: ABNORMAL
WBC # UR STRIP: ABNORMAL /HPF
WBC NRBC COR # BLD AUTO: 29.08 10*3/MM3 (ref 3.4–10.8)
WHOLE BLOOD HOLD COAG: NORMAL
WHOLE BLOOD HOLD SPECIMEN: NORMAL

## 2025-07-22 PROCEDURE — 82375 ASSAY CARBOXYHB QUANT: CPT

## 2025-07-22 PROCEDURE — 85025 COMPLETE CBC W/AUTO DIFF WBC: CPT | Performed by: EMERGENCY MEDICINE

## 2025-07-22 PROCEDURE — P9612 CATHETERIZE FOR URINE SPEC: HCPCS

## 2025-07-22 PROCEDURE — 86140 C-REACTIVE PROTEIN: CPT | Performed by: INTERNAL MEDICINE

## 2025-07-22 PROCEDURE — 25010000002 CEFTRIAXONE PER 250 MG

## 2025-07-22 PROCEDURE — 71275 CT ANGIOGRAPHY CHEST: CPT

## 2025-07-22 PROCEDURE — 63710000001 INSULIN REGULAR HUMAN PER 5 UNITS

## 2025-07-22 PROCEDURE — 94799 UNLISTED PULMONARY SVC/PX: CPT

## 2025-07-22 PROCEDURE — 87449 NOS EACH ORGANISM AG IA: CPT

## 2025-07-22 PROCEDURE — 25010000002 METHYLPREDNISOLONE PER 40 MG

## 2025-07-22 PROCEDURE — 82948 REAGENT STRIP/BLOOD GLUCOSE: CPT

## 2025-07-22 PROCEDURE — 71045 X-RAY EXAM CHEST 1 VIEW: CPT

## 2025-07-22 PROCEDURE — 25010000002 DOXYCYCLINE 100 MG RECONSTITUTED SOLUTION 1 EACH VIAL

## 2025-07-22 PROCEDURE — 82805 BLOOD GASES W/O2 SATURATION: CPT

## 2025-07-22 PROCEDURE — 99291 CRITICAL CARE FIRST HOUR: CPT | Performed by: INTERNAL MEDICINE

## 2025-07-22 PROCEDURE — 63710000001 INSULIN GLARGINE PER 5 UNITS

## 2025-07-22 PROCEDURE — 93005 ELECTROCARDIOGRAM TRACING: CPT | Performed by: EMERGENCY MEDICINE

## 2025-07-22 PROCEDURE — 25010000002 AZTREONAM PER 500 MG: Performed by: EMERGENCY MEDICINE

## 2025-07-22 PROCEDURE — 80053 COMPREHEN METABOLIC PANEL: CPT | Performed by: EMERGENCY MEDICINE

## 2025-07-22 PROCEDURE — 36600 WITHDRAWAL OF ARTERIAL BLOOD: CPT

## 2025-07-22 PROCEDURE — 83735 ASSAY OF MAGNESIUM: CPT | Performed by: EMERGENCY MEDICINE

## 2025-07-22 PROCEDURE — 25010000002 LEVOFLOXACIN PER 250 MG: Performed by: EMERGENCY MEDICINE

## 2025-07-22 PROCEDURE — 93306 TTE W/DOPPLER COMPLETE: CPT | Performed by: INTERNAL MEDICINE

## 2025-07-22 PROCEDURE — 25510000001 IOPAMIDOL PER 1 ML: Performed by: EMERGENCY MEDICINE

## 2025-07-22 PROCEDURE — 81001 URINALYSIS AUTO W/SCOPE: CPT

## 2025-07-22 PROCEDURE — 25810000003 SODIUM CHLORIDE 0.9 % SOLUTION

## 2025-07-22 PROCEDURE — 82010 KETONE BODYS QUAN: CPT

## 2025-07-22 PROCEDURE — 94640 AIRWAY INHALATION TREATMENT: CPT

## 2025-07-22 PROCEDURE — 25010000002 SULFUR HEXAFLUORIDE MICROSPH 60.7-25 MG RECONSTITUTED SUSPENSION

## 2025-07-22 PROCEDURE — 99291 CRITICAL CARE FIRST HOUR: CPT

## 2025-07-22 PROCEDURE — 87641 MR-STAPH DNA AMP PROBE: CPT

## 2025-07-22 PROCEDURE — 25010000002 MAGNESIUM SULFATE 4 GM/100ML SOLUTION: Performed by: INTERNAL MEDICINE

## 2025-07-22 PROCEDURE — 83050 HGB METHEMOGLOBIN QUAN: CPT

## 2025-07-22 PROCEDURE — 94660 CPAP INITIATION&MGMT: CPT

## 2025-07-22 PROCEDURE — 25010000002 FUROSEMIDE PER 20 MG: Performed by: EMERGENCY MEDICINE

## 2025-07-22 PROCEDURE — 84484 ASSAY OF TROPONIN QUANT: CPT | Performed by: EMERGENCY MEDICINE

## 2025-07-22 PROCEDURE — 36415 COLL VENOUS BLD VENIPUNCTURE: CPT

## 2025-07-22 PROCEDURE — 83605 ASSAY OF LACTIC ACID: CPT | Performed by: EMERGENCY MEDICINE

## 2025-07-22 PROCEDURE — 93306 TTE W/DOPPLER COMPLETE: CPT

## 2025-07-22 PROCEDURE — 87040 BLOOD CULTURE FOR BACTERIA: CPT | Performed by: EMERGENCY MEDICINE

## 2025-07-22 PROCEDURE — 25010000002 VANCOMYCIN 10 G RECONSTITUTED SOLUTION

## 2025-07-22 PROCEDURE — 83880 ASSAY OF NATRIURETIC PEPTIDE: CPT | Performed by: EMERGENCY MEDICINE

## 2025-07-22 PROCEDURE — 0202U NFCT DS 22 TRGT SARS-COV-2: CPT | Performed by: EMERGENCY MEDICINE

## 2025-07-22 PROCEDURE — 84145 PROCALCITONIN (PCT): CPT | Performed by: INTERNAL MEDICINE

## 2025-07-22 PROCEDURE — 84145 PROCALCITONIN (PCT): CPT

## 2025-07-22 PROCEDURE — 83036 HEMOGLOBIN GLYCOSYLATED A1C: CPT

## 2025-07-22 RX ORDER — SODIUM CHLORIDE 0.9 % (FLUSH) 0.9 %
10 SYRINGE (ML) INJECTION AS NEEDED
Status: DISCONTINUED | OUTPATIENT
Start: 2025-07-22 | End: 2025-07-31 | Stop reason: HOSPADM

## 2025-07-22 RX ORDER — ESCITALOPRAM OXALATE 20 MG/1
20 TABLET ORAL DAILY
Status: DISCONTINUED | OUTPATIENT
Start: 2025-07-22 | End: 2025-07-31 | Stop reason: HOSPADM

## 2025-07-22 RX ORDER — ARIPIPRAZOLE 10 MG/1
10 TABLET ORAL DAILY
Status: DISCONTINUED | OUTPATIENT
Start: 2025-07-22 | End: 2025-07-31 | Stop reason: HOSPADM

## 2025-07-22 RX ORDER — OXYBUTYNIN CHLORIDE 5 MG/1
5 TABLET, EXTENDED RELEASE ORAL DAILY
Status: DISCONTINUED | OUTPATIENT
Start: 2025-07-22 | End: 2025-07-31 | Stop reason: HOSPADM

## 2025-07-22 RX ORDER — SODIUM CHLORIDE 9 MG/ML
40 INJECTION, SOLUTION INTRAVENOUS AS NEEDED
Status: DISCONTINUED | OUTPATIENT
Start: 2025-07-22 | End: 2025-07-31 | Stop reason: HOSPADM

## 2025-07-22 RX ORDER — IPRATROPIUM BROMIDE AND ALBUTEROL SULFATE 2.5; .5 MG/3ML; MG/3ML
3 SOLUTION RESPIRATORY (INHALATION)
Status: DISCONTINUED | OUTPATIENT
Start: 2025-07-22 | End: 2025-07-23

## 2025-07-22 RX ORDER — DEXTROSE MONOHYDRATE 25 G/50ML
25 INJECTION, SOLUTION INTRAVENOUS
Status: DISCONTINUED | OUTPATIENT
Start: 2025-07-22 | End: 2025-07-31 | Stop reason: HOSPADM

## 2025-07-22 RX ORDER — BUDESONIDE 0.5 MG/2ML
0.5 INHALANT ORAL
Status: DISCONTINUED | OUTPATIENT
Start: 2025-07-22 | End: 2025-07-23

## 2025-07-22 RX ORDER — NICOTINE POLACRILEX 4 MG
15 LOZENGE BUCCAL
Status: DISCONTINUED | OUTPATIENT
Start: 2025-07-22 | End: 2025-07-31 | Stop reason: HOSPADM

## 2025-07-22 RX ORDER — SODIUM CHLORIDE 0.9 % (FLUSH) 0.9 %
10 SYRINGE (ML) INJECTION EVERY 12 HOURS SCHEDULED
Status: DISCONTINUED | OUTPATIENT
Start: 2025-07-22 | End: 2025-07-31 | Stop reason: HOSPADM

## 2025-07-22 RX ORDER — LEVOFLOXACIN 5 MG/ML
750 INJECTION, SOLUTION INTRAVENOUS ONCE
Status: COMPLETED | OUTPATIENT
Start: 2025-07-22 | End: 2025-07-22

## 2025-07-22 RX ORDER — ACETAMINOPHEN 500 MG
1000 TABLET ORAL ONCE
Status: COMPLETED | OUTPATIENT
Start: 2025-07-22 | End: 2025-07-22

## 2025-07-22 RX ORDER — METHYLPREDNISOLONE SODIUM SUCCINATE 40 MG/ML
40 INJECTION, POWDER, LYOPHILIZED, FOR SOLUTION INTRAMUSCULAR; INTRAVENOUS EVERY 12 HOURS
Status: DISCONTINUED | OUTPATIENT
Start: 2025-07-22 | End: 2025-07-23

## 2025-07-22 RX ORDER — IPRATROPIUM BROMIDE AND ALBUTEROL SULFATE 2.5; .5 MG/3ML; MG/3ML
3 SOLUTION RESPIRATORY (INHALATION) EVERY 4 HOURS PRN
Status: DISCONTINUED | OUTPATIENT
Start: 2025-07-22 | End: 2025-07-25

## 2025-07-22 RX ORDER — VANCOMYCIN 2 GRAM/500 ML IN 0.9 % SODIUM CHLORIDE INTRAVENOUS
2000 EVERY 24 HOURS
Status: DISCONTINUED | OUTPATIENT
Start: 2025-07-23 | End: 2025-07-23

## 2025-07-22 RX ORDER — ACETAMINOPHEN 325 MG/1
650 TABLET ORAL EVERY 6 HOURS PRN
Status: DISCONTINUED | OUTPATIENT
Start: 2025-07-22 | End: 2025-07-31 | Stop reason: HOSPADM

## 2025-07-22 RX ORDER — IBUPROFEN 600 MG/1
1 TABLET ORAL
Status: DISCONTINUED | OUTPATIENT
Start: 2025-07-22 | End: 2025-07-31 | Stop reason: HOSPADM

## 2025-07-22 RX ORDER — FUROSEMIDE 10 MG/ML
80 INJECTION INTRAMUSCULAR; INTRAVENOUS ONCE
Status: COMPLETED | OUTPATIENT
Start: 2025-07-22 | End: 2025-07-22

## 2025-07-22 RX ORDER — IOPAMIDOL 755 MG/ML
100 INJECTION, SOLUTION INTRAVASCULAR
Status: COMPLETED | OUTPATIENT
Start: 2025-07-22 | End: 2025-07-22

## 2025-07-22 RX ORDER — PANTOPRAZOLE SODIUM 40 MG/1
40 TABLET, DELAYED RELEASE ORAL
Status: DISCONTINUED | OUTPATIENT
Start: 2025-07-22 | End: 2025-07-31 | Stop reason: HOSPADM

## 2025-07-22 RX ORDER — MAGNESIUM SULFATE HEPTAHYDRATE 40 MG/ML
4 INJECTION, SOLUTION INTRAVENOUS ONCE
Status: COMPLETED | OUTPATIENT
Start: 2025-07-22 | End: 2025-07-22

## 2025-07-22 RX ADMIN — INSULIN HUMAN 6.9 UNITS/HR: 1 INJECTION, SOLUTION INTRAVENOUS at 18:15

## 2025-07-22 RX ADMIN — IPRATROPIUM BROMIDE AND ALBUTEROL SULFATE 3 ML: 2.5; .5 SOLUTION RESPIRATORY (INHALATION) at 19:32

## 2025-07-22 RX ADMIN — SULFUR HEXAFLUORIDE 3 ML: KIT at 15:42

## 2025-07-22 RX ADMIN — INSULIN HUMAN 20 UNITS: 100 INJECTION, SOLUTION PARENTERAL at 14:27

## 2025-07-22 RX ADMIN — MUPIROCIN 1 APPLICATION: 20 OINTMENT TOPICAL at 08:23

## 2025-07-22 RX ADMIN — Medication 10 ML: at 20:22

## 2025-07-22 RX ADMIN — LEVOFLOXACIN 750 MG: 5 INJECTION, SOLUTION INTRAVENOUS at 04:42

## 2025-07-22 RX ADMIN — ARIPIPRAZOLE 10 MG: 10 TABLET ORAL at 08:28

## 2025-07-22 RX ADMIN — ACETAMINOPHEN 1000 MG: 500 TABLET, FILM COATED ORAL at 03:45

## 2025-07-22 RX ADMIN — FUROSEMIDE 80 MG: 10 INJECTION, SOLUTION INTRAMUSCULAR; INTRAVENOUS at 04:20

## 2025-07-22 RX ADMIN — ESCITALOPRAM 20 MG: 20 TABLET, FILM COATED ORAL at 08:23

## 2025-07-22 RX ADMIN — MUPIROCIN 1 APPLICATION: 20 OINTMENT TOPICAL at 20:21

## 2025-07-22 RX ADMIN — METHYLPREDNISOLONE SODIUM SUCCINATE 40 MG: 40 INJECTION, POWDER, FOR SOLUTION INTRAMUSCULAR; INTRAVENOUS at 20:21

## 2025-07-22 RX ADMIN — CEFTRIAXONE SODIUM 1000 MG: 1 INJECTION, POWDER, FOR SOLUTION INTRAMUSCULAR; INTRAVENOUS at 08:28

## 2025-07-22 RX ADMIN — IPRATROPIUM BROMIDE AND ALBUTEROL SULFATE 3 ML: 2.5; .5 SOLUTION RESPIRATORY (INHALATION) at 16:20

## 2025-07-22 RX ADMIN — AZTREONAM 2 G: 2 INJECTION, POWDER, LYOPHILIZED, FOR SOLUTION INTRAMUSCULAR; INTRAVENOUS at 03:44

## 2025-07-22 RX ADMIN — DOXYCYCLINE 100 MG: 100 INJECTION, POWDER, LYOPHILIZED, FOR SOLUTION INTRAVENOUS at 08:22

## 2025-07-22 RX ADMIN — BUDESONIDE 0.5 MG: 0.5 SUSPENSION RESPIRATORY (INHALATION) at 08:40

## 2025-07-22 RX ADMIN — PANTOPRAZOLE SODIUM 40 MG: 40 TABLET, DELAYED RELEASE ORAL at 08:28

## 2025-07-22 RX ADMIN — IOPAMIDOL 80 ML: 755 INJECTION, SOLUTION INTRAVENOUS at 05:27

## 2025-07-22 RX ADMIN — METHYLPREDNISOLONE SODIUM SUCCINATE 40 MG: 40 INJECTION, POWDER, FOR SOLUTION INTRAMUSCULAR; INTRAVENOUS at 08:28

## 2025-07-22 RX ADMIN — MAGNESIUM SULFATE IN WATER FOR 4 G: 40 INJECTION INTRAVENOUS at 08:23

## 2025-07-22 RX ADMIN — Medication 10 ML: at 08:29

## 2025-07-22 RX ADMIN — IPRATROPIUM BROMIDE AND ALBUTEROL SULFATE 3 ML: 2.5; .5 SOLUTION RESPIRATORY (INHALATION) at 08:40

## 2025-07-22 RX ADMIN — OXYBUTYNIN CHLORIDE 5 MG: 5 TABLET, EXTENDED RELEASE ORAL at 08:28

## 2025-07-22 RX ADMIN — VANCOMYCIN HYDROCHLORIDE 2500 MG: 10 INJECTION, POWDER, LYOPHILIZED, FOR SOLUTION INTRAVENOUS at 17:31

## 2025-07-22 RX ADMIN — INSULIN HUMAN 8 UNITS: 100 INJECTION, SOLUTION PARENTERAL at 08:35

## 2025-07-22 RX ADMIN — MICONAZOLE NITRATE 1 APPLICATION: 20 POWDER TOPICAL at 17:32

## 2025-07-22 RX ADMIN — APIXABAN 5 MG: 5 TABLET, FILM COATED ORAL at 08:23

## 2025-07-22 RX ADMIN — BUDESONIDE 0.5 MG: 0.5 SUSPENSION RESPIRATORY (INHALATION) at 19:35

## 2025-07-22 RX ADMIN — SODIUM CHLORIDE, POTASSIUM CHLORIDE, SODIUM LACTATE AND CALCIUM CHLORIDE 2682 ML: 600; 310; 30; 20 INJECTION, SOLUTION INTRAVENOUS at 11:33

## 2025-07-22 RX ADMIN — DOXYCYCLINE 100 MG: 100 INJECTION, POWDER, LYOPHILIZED, FOR SOLUTION INTRAVENOUS at 20:21

## 2025-07-22 RX ADMIN — APIXABAN 5 MG: 5 TABLET, FILM COATED ORAL at 20:21

## 2025-07-22 RX ADMIN — INSULIN GLARGINE 15 UNITS: 100 INJECTION, SOLUTION SUBCUTANEOUS at 14:28

## 2025-07-22 NOTE — ED PROVIDER NOTES
Subjective   History of Present Illness  59-year-old female with history of COPD and chronic respiratory failure with hypoxia on chronic home oxygen at 3 L/min at all times presents to the emergency department brought by EMS for evaluation of shortness of breath which began yesterday and worsened today.  She denies recent chest pain.  EMS reports that they found the patient in respiratory distress and cyanotic with decreased air entry throughout on exam with a respiratory rate of 55/min, and an oxygen saturation of 80% on her usual 3 L/min of oxygen by nasal cannula.  EMS administered Solu-Medrol 125 mg IV, a DuoNeb inhaled, and 3 albuterol nebulizer treatments.  EMS initiated treatment with BiPAP with pressures of 20/10 and the patient's oxygen 97%.  The patient was hospitalized with pneumonia in May 2025, and states that she quit smoking cigarettes about 4 weeks ago, but did have a cigarette about 5 days ago.      Review of Systems   Unable to perform ROS: Acuity of condition   HENT:  Negative for nosebleeds.    Respiratory:  Positive for shortness of breath. Negative for stridor.    Cardiovascular:  Negative for chest pain.       Past Medical History:   Diagnosis Date    Abnormal weight gain     Acute UTI     Anxiety     Arthritis involving multiple sites     Chronic obstructive pulmonary disease     Chronic pain     Current every day smoker     Depression     Diabetes mellitus     Diarrhea     Dysuria     Edema, lower extremity     Fever     Head injury     Hypertension     Intervertebral disc disorder     Degeneration of intervertebral disc, site unspecified (722.6)    Left bundle branch block     Leukocytosis     Long term current use of methadone for pain control     Mass of right breast     Nausea     Obesity     Overactive bladder     Peripheral neuropathy     Superficial phlebitis     right medial calf    Upper respiratory infection     Urinary incontinence     Vitamin D deficiency     Vomiting     Pneumonia  Pulmonary embolus    Allergies   Allergen Reactions    Diphenhydramine Hives    Lortab [Hydrocodone-Acetaminophen] Hives     Tolerates percocet    Toradol [Ketorolac Tromethamine] Hives     Per patient tolerates motrin    Alprazolam Delirium    Nsaids Other (See Comments)     Liver Dx       Past Surgical History:   Procedure Laterality Date    BLADDER SURGERY      pubovaginal sling    CHOLECYSTECTOMY      EXPLORATORY LAPAROTOMY N/A 2022    Procedure: LAPAROTOMY EXPLORATORY UMBILICAL HERNIA REPAIR WITH MESH;  Surgeon: Reji Brown MD;  Location: [facility];  Service: General;  Laterality: N/A;    HIP ARTHROSCOPY Right 10/29/2020       Family History   Problem Relation Age of Onset    Breast cancer Mother     Cancer Mother     Arthritis Mother     Diabetes Mother     Obesity Mother     Arthritis Father     Stroke Father     Hypertension Father     Heart attack Father     Diabetes Maternal Grandmother     Migraines Other        Social History     Socioeconomic History    Marital status:    Tobacco Use    Smoking status: Every Day     Current packs/day: 0.00     Types: Cigarettes     Start date: 1989     Last attempt to quit: 2019     Years since quittin.2    Smokeless tobacco: Never    Tobacco comments:     1 daily   Vaping Use    Vaping status: Never Used   Substance and Sexual Activity    Alcohol use: No    Drug use: No    Sexual activity: Defer           Objective   Physical Exam  Vitals and nursing note reviewed.   Constitutional:       Appearance: She is ill-appearing. She is not diaphoretic.      Comments: Central obesity, BMI 42, unkempt appearance.   Eyes:      Comments: No photophobia or discharge.   Neck:      Trachea: No tracheal deviation.   Cardiovascular:      Rate and Rhythm: Regular rhythm. Tachycardia present.      Comments: S1, S2, distant heart sounds. No murmur appreciated.  Pulmonary:      Effort: Tachypnea, accessory muscle usage and respiratory distress  present.      Breath sounds: No stridor. Decreased breath sounds present. No wheezing or rales.   Musculoskeletal:      Comments: No significant peripheral edema.   Skin:     General: Skin is warm and dry.   Neurological:      Mental Status: She is alert.      Comments: Follows commands, interactive, answers questions, exam limited by respiratory distress..         Procedures           ED Course  ED Course as of 07/22/25 0701   Tue Jul 22, 2025   0231 pH, Arterial: 7.440 [LD]   0231 pCO2, Arterial(!): 32.9 [LD]   0231 pO2, Arterial(!): 286.0 [LD]   0231 HCO3, Arterial: 22.3 [LD]   0231 Base Excess(!): -1.1 [LD]   0249 WBC(!): 29.08 [LD]   0249 Hemoglobin: 14.5 [LD]   0249 Platelets: 430 [LD]   0249 Neutrophil Rel %(!): 82.1 [LD]   0326 Results and plan discussed with patient. [LD]   0326 Creatinine: 0.89 [LD]   0326 proBNP(!): 8,901.0 [LD]   0326 HS Troponin T(!): 26 [LD]   0326 Lactate(!!): 2.1 [LD]   0326 Magnesium: 1.7 [LD]   0326 HS Troponin T(!): 26 [LD]   0342 I discussed the patient with intensivist on-call,, Dr. Hampton, who recommends furosemide 80 mg IV x 1, and agrees with the plan for CT PE protocol. [LD]   0343 Fio2 was decreased to 55% after ABG result, sat was down to 88%, now  fio2 65% and sat is 92%.  [LD]   0530 HS Troponin T(!): 28 [LD]   0530 Troponin T Numeric Delta: 2 [LD]   0551 BP: 94/64 [LD]   0551 Repeat BP 89/37, I discussed the CT scan with radiologist on-call, Dr. Barron, which shows pneumonia and nodular densities without acute pulmonary embolus.  I discussed the patient with Dr. Hampton, intensivist, who accepts the patient to ICU.  Repeat blood pressure 89/37 with a MAP of 69. [LD]   0614 BP: 100/65   BPM. Repeat EKG ordered. Telemetry monitor shows sinus tachycardia.  [LD]   0656 I did not give her sepsis fluid bolus because she had signs of heart failure including elevated BNP, and this decision was made in consultation with the intensivist on call, Dr. Hampton.  [LD]      ED Course  User Index  [LD] Nu Salazar MD                                                       Medical Decision Making  Differential diagnosis includes pneumonia, sepsis, CHF exacerbation, pulmonary embolus, anemia, viral illness, COPD exacerbation, spontaneous pneumothorax, pleural effusion, and others.    Problems Addressed:  Acute respiratory failure with hypoxia: complicated acute illness or injury  COPD with acute exacerbation: complicated acute illness or injury  Leukocytosis, unspecified type: complicated acute illness or injury  Pneumonia of right middle lobe due to infectious organism: complicated acute illness or injury  Respiratory distress: complicated acute illness or injury  Sepsis without acute organ dysfunction, due to unspecified organism: complicated acute illness or injury  Sinus tachycardia: complicated acute illness or injury    Amount and/or Complexity of Data Reviewed  Independent Historian: EMS  External Data Reviewed: notes.     Details: I reviewed the most recent office visit note from her primary care provider from 6/24/2025.  The patient was hospitalized at [facility] from 5/18/2025 through 5/26/2025 with diagnoses of community-acquired pneumonia, acute on chronic hypoxic respiratory failure, diabetes mellitus and obesity.  She has a history of COPD, CHF, and pulmonary embolism for which she takes Eliquis.  She is insulin-dependent.  Labs: ordered. Decision-making details documented in ED Course.  Radiology: ordered. Decision-making details documented in ED Course.  ECG/medicine tests: ordered and independent interpretation performed. Decision-making details documented in ED Course.     Details: EKG at 0233 shows sinus tachycardia with occasional premature ventricular complexes at a rate of 153 beats per minute, incomplete left bundle branch block, nonspecific ST/T wave changes. Repeat EKG at 0353 shows sinus tachycardia with short ME interval at a rate of 146 beats per  minute, nonspecific ST/T wave changes.   Discussion of management or test interpretation with external provider(s): See ED course.    Risk  OTC drugs.  Prescription drug management.  Decision regarding hospitalization.    Critical Care  Total time providing critical care: 32 minutes (Acute on chronic hypoxic respiratory failure, respiratory decompensation risk.  Interventions include ABG interpretation, rapid assessment, frequent reevaluations, review of prior records, discussion with specialists, and others.)      Recent Results (from the past 24 hours)   Comprehensive Metabolic Panel    Collection Time: 07/22/25  2:27 AM    Specimen: Blood   Result Value Ref Range    Glucose 270 (H) 65 - 99 mg/dL    BUN 9.8 8.0 - 23.0 mg/dL    Creatinine 0.89 0.57 - 1.00 mg/dL    Sodium 133 (L) 136 - 145 mmol/L    Potassium 4.2 3.5 - 5.2 mmol/L    Chloride 98 98 - 107 mmol/L    CO2 19.9 (L) 22.0 - 29.0 mmol/L    Calcium 9.2 8.6 - 10.5 mg/dL    Total Protein 8.5 6.0 - 8.5 g/dL    Albumin 3.3 (L) 3.5 - 5.2 g/dL    ALT (SGPT) 7 1 - 33 U/L    AST (SGOT) 10 1 - 32 U/L    Alkaline Phosphatase 127 (H) 39 - 117 U/L    Total Bilirubin 0.8 0.0 - 1.2 mg/dL    Globulin 5.2 gm/dL    A/G Ratio 0.6 g/dL    BUN/Creatinine Ratio 11.0 7.0 - 25.0    Anion Gap 15.1 (H) 5.0 - 15.0 mmol/L    eGFR 74.3 >60.0 mL/min/1.73   BNP    Collection Time: 07/22/25  2:27 AM    Specimen: Blood   Result Value Ref Range    proBNP 8,901.0 (H) 0.0 - 900.0 pg/mL   High Sensitivity Troponin T    Collection Time: 07/22/25  2:27 AM    Specimen: Blood   Result Value Ref Range    HS Troponin T 26 (H) <14 ng/L   Green Top (Gel)    Collection Time: 07/22/25  2:27 AM   Result Value Ref Range    Extra Tube Hold for add-ons.    Lavender Top    Collection Time: 07/22/25  2:27 AM   Result Value Ref Range    Extra Tube hold for add-on    Gold Top - SST    Collection Time: 07/22/25  2:27 AM   Result Value Ref Range    Extra Tube Hold for add-ons.    Gray Top    Collection Time:  07/22/25  2:27 AM   Result Value Ref Range    Extra Tube Hold for add-ons.    Light Blue Top    Collection Time: 07/22/25  2:27 AM   Result Value Ref Range    Extra Tube Hold for add-ons.    CBC Auto Differential    Collection Time: 07/22/25  2:27 AM    Specimen: Blood   Result Value Ref Range    WBC 29.08 (H) 3.40 - 10.80 10*3/mm3    RBC 5.15 3.77 - 5.28 10*6/mm3    Hemoglobin 14.5 12.0 - 15.9 g/dL    Hematocrit 44.4 34.0 - 46.6 %    MCV 86.2 79.0 - 97.0 fL    MCH 28.2 26.6 - 33.0 pg    MCHC 32.7 31.5 - 35.7 g/dL    RDW 15.5 (H) 12.3 - 15.4 %    RDW-SD 48.8 37.0 - 54.0 fl    MPV 10.3 6.0 - 12.0 fL    Platelets 430 140 - 450 10*3/mm3    Neutrophil % 82.1 (H) 42.7 - 76.0 %    Lymphocyte % 11.8 (L) 19.6 - 45.3 %    Monocyte % 4.4 (L) 5.0 - 12.0 %    Eosinophil % 0.1 (L) 0.3 - 6.2 %    Basophil % 0.3 0.0 - 1.5 %    Immature Grans % 1.3 (H) 0.0 - 0.5 %    Neutrophils, Absolute 23.90 (H) 1.70 - 7.00 10*3/mm3    Lymphocytes, Absolute 3.42 (H) 0.70 - 3.10 10*3/mm3    Monocytes, Absolute 1.27 (H) 0.10 - 0.90 10*3/mm3    Eosinophils, Absolute 0.02 0.00 - 0.40 10*3/mm3    Basophils, Absolute 0.08 0.00 - 0.20 10*3/mm3    Immature Grans, Absolute 0.39 (H) 0.00 - 0.05 10*3/mm3    nRBC 0.0 0.0 - 0.2 /100 WBC   Magnesium    Collection Time: 07/22/25  2:27 AM    Specimen: Blood   Result Value Ref Range    Magnesium 1.7 1.6 - 2.4 mg/dL   Lactic Acid, Plasma    Collection Time: 07/22/25  2:27 AM    Specimen: Blood   Result Value Ref Range    Lactate 2.1 (C) 0.5 - 2.0 mmol/L   Blood Gas, Arterial With Co-Ox    Collection Time: 07/22/25  2:30 AM    Specimen: Arterial Blood   Result Value Ref Range    Site Left Radial     Darwin's Test N/A     pH, Arterial 7.440 7.350 - 7.450 pH units    pCO2, Arterial 32.9 (L) 35.0 - 45.0 mm Hg    pO2, Arterial 286.0 (H) 83.0 - 108.0 mm Hg    HCO3, Arterial 22.3 20.0 - 26.0 mmol/L    Base Excess, Arterial -1.1 (L) 0.0 - 2.0 mmol/L    Hemoglobin, Blood Gas 14.8 14 - 18 g/dL    Hematocrit, Blood Gas 45.4  38.0 - 51.0 %    Oxyhemoglobin      Methemoglobin 0.00 0.00 - 1.50 %    Carboxyhemoglobin 1.3 0 - 2 %    CO2 Content 23.3 22 - 33 mmol/L    Temperature 37.0     Barometric Pressure for Blood Gas      Modality BiPap     FIO2 100 %    Rate 0 Breaths/minute    PIP 0 cmH2O    IPAP 24     EPAP 12     Notified Who MANUEL MEHTA MD     Notified By [id number]     Notified Time 07/22/2025 02:29     pH, Temp Corrected 7.440 pH Units    pCO2, Temperature Corrected 32.9 (L) 35 - 45 mm Hg    pO2, Temperature Corrected 286 (H) 83 - 108 mm Hg   ECG 12 Lead Dyspnea    Collection Time: 07/22/25  2:33 AM   Result Value Ref Range    QT Interval 340 ms    QTC Interval 542 ms   Respiratory Panel PCR w/COVID-19(SARS-CoV-2) ALVINO/VELASQUEZ/DIANE/PAD/COR/RUTH In-House, NP Swab in UTM/VTM, 2 HR TAT - Swab, Nasopharynx    Collection Time: 07/22/25  2:36 AM    Specimen: Nasopharynx; Swab   Result Value Ref Range    ADENOVIRUS, PCR Not Detected Not Detected    Coronavirus 229E Not Detected Not Detected    Coronavirus HKU1 Not Detected Not Detected    Coronavirus NL63 Not Detected Not Detected    Coronavirus OC43 Not Detected Not Detected    COVID19 Not Detected Not Detected - Ref. Range    Human Metapneumovirus Not Detected Not Detected    Human Rhinovirus/Enterovirus Not Detected Not Detected    Influenza A PCR Not Detected Not Detected    Influenza B PCR Not Detected Not Detected    Parainfluenza Virus 1 Not Detected Not Detected    Parainfluenza Virus 2 Not Detected Not Detected    Parainfluenza Virus 3 Not Detected Not Detected    Parainfluenza Virus 4 Not Detected Not Detected    RSV, PCR Not Detected Not Detected    Bordetella pertussis pcr Not Detected Not Detected    Bordetella parapertussis PCR Not Detected Not Detected    Chlamydophila pneumoniae PCR Not Detected Not Detected    Mycoplasma pneumo by PCR Not Detected Not Detected   High Sensitivity Troponin T 1Hr    Collection Time: 07/22/25  4:22 AM    Specimen: Blood   Result Value Ref Range    HS  Troponin T 28 (H) <14 ng/L    Troponin T Numeric Delta 2 ng/L    Troponin T % Delta 8 Abnormal if >/= 20%   Procalcitonin    Collection Time: 07/22/25  4:22 AM    Specimen: Blood   Result Value Ref Range    Procalcitonin 0.23 0.00 - 0.25 ng/mL   C-reactive Protein    Collection Time: 07/22/25  4:22 AM    Specimen: Blood   Result Value Ref Range    C-Reactive Protein 18.37 (H) 0.00 - 0.50 mg/dL     Note: In addition to lab results from this visit, the labs listed above may include labs taken at another facility or during a different encounter within the last 24 hours. Please correlate lab times with ED admission and discharge times for further clarification of the services performed during this visit.    CT Angiogram Chest Pulmonary Embolism   Final Result   Impression:   1.No evidence of pulmonary embolism.   2.Extensive tree-in-bud nodules in the right lung and to a lesser extent in the left lung consistent with infectious bronchiolitis. There is also patchy airspace disease in the right middle lobe consistent with pneumonia.   3.Mediastinal and right hilar lymphadenopathy, likely reactive.   4.34 mm heterogeneous right adrenal nodule. Diminutive left adrenal gland. Right adrenal nodule is unchanged from 2022 likely reflecting adenoma.   5.Multiple chronic compression deformities in the thoracic spine.   6.Small hiatal hernia.                  Electronically Signed: Juanpablo Barron MD     7/22/2025 5:54 AM EDT     Workstation ID: [id number]      XR Chest 1 View   Final Result   Impression:   New focus of airspace disease in the periphery of the right lung base that could reflect pneumonia or atelectasis.               Electronically Signed: Juanpablo Barron MD     7/22/2025 3:37 AM EDT     Workstation ID: [id number]        Vitals:    07/22/25 0500 07/22/25 0530 07/22/25 0600 07/22/25 0630   BP: 115/86 94/64 100/65 90/59   BP Location:       Patient Position:       Pulse: (!) 144 (!) 140 (!) 133 (!) 137   Resp:       Temp:        TempSrc:       SpO2: 90% 95% 94% 95%   Weight:       Height:         Medications   sodium chloride 0.9 % flush 10 mL (has no administration in time range)   sodium chloride 0.9 % flush 10 mL (has no administration in time range)   sodium chloride 0.9 % flush 10 mL (has no administration in time range)   sodium chloride 0.9 % infusion 40 mL (has no administration in time range)   mupirocin (BACTROBAN) 2 % nasal ointment 1 Application (has no administration in time range)   apixaban (ELIQUIS) tablet 5 mg (has no administration in time range)   escitalopram (LEXAPRO) tablet 20 mg (has no administration in time range)   dextrose (GLUTOSE) oral gel 15 g (has no administration in time range)   dextrose (D50W) (25 g/50 mL) IV injection 25 g (has no administration in time range)   glucagon (GLUCAGEN) injection 1 mg (has no administration in time range)   Potassium Replacement - Follow Nurse / BPA Driven Protocol (has no administration in time range)   Magnesium Cardiology Dose Replacement - Follow Nurse / BPA Driven Protocol (has no administration in time range)   Phosphorus Replacement - Follow Nurse / BPA Driven Protocol (has no administration in time range)   Calcium Replacement - Follow Nurse / BPA Driven Protocol (has no administration in time range)   magnesium sulfate 4g/100mL (PREMIX) infusion (has no administration in time range)   doxycycline (VIBRAMYCIN) 100 mg in sodium chloride 0.9 % 100 mL MBP (has no administration in time range)   cefTRIAXone (ROCEPHIN) 1,000 mg in sodium chloride 0.9 % 100 mL MBP (has no administration in time range)   acetaminophen (TYLENOL) tablet 650 mg (has no administration in time range)   ipratropium-albuterol (DUO-NEB) nebulizer solution 3 mL (has no administration in time range)   budesonide (PULMICORT) nebulizer solution 0.5 mg (has no administration in time range)   methylPREDNISolone sodium succinate (SOLU-Medrol) injection 40 mg (has no administration in time range)    ipratropium-albuterol (DUO-NEB) nebulizer solution 3 mL (has no administration in time range)   insulin regular (humuLIN R,novoLIN R) injection 3-14 Units (has no administration in time range)   pantoprazole (PROTONIX) EC tablet 40 mg (has no administration in time range)   oxybutynin XL (DITROPAN-XL) 24 hr tablet 5 mg (has no administration in time range)   ARIPiprazole (ABILIFY) tablet 10 mg (has no administration in time range)   aztreonam (AZACTAM) 2 g in sodium chloride 0.9 % 100 mL MBP (0 g Intravenous Stopped 7/22/25 0429)   levoFLOXacin (LEVAQUIN) 750 mg/150 mL D5W (premix) (LEVAQUIN) 750 mg (0 mg Intravenous Stopped 7/22/25 0625)   acetaminophen (TYLENOL) tablet 1,000 mg (1,000 mg Oral Given 7/22/25 0345)   furosemide (LASIX) injection 80 mg (80 mg Intravenous Given 7/22/25 0420)   iopamidol (ISOVUE-370) 76 % injection 100 mL (80 mL Intravenous Given 7/22/25 0527)     ECG/EMG Results (last 24 hours)       Procedure Component Value Units Date/Time    ECG 12 Lead Dyspnea [[id number]] Collected: 07/22/25 0233     Updated: 07/22/25 0232     QT Interval 340 ms      QTC Interval 542 ms     Narrative:      Test Reason : Dyspnea  Blood Pressure :   */*   mmHG  Vent. Rate : 153 BPM     Atrial Rate : 153 BPM     P-R Int : 104 ms          QRS Dur : 116 ms      QT Int : 340 ms       P-R-T Axes :   *  -1  94 degrees    QTcB Int : 542 ms    Sinus tachycardia with short CA with occasional premature ventricular  complexes  Incomplete left bundle branch block  ST & T wave abnormality, consider lateral ischemia  Abnormal ECG  When compared with ECG of 01-Jun-2022 08:42,  premature ventricular complexes are now present  premature atrial complexes are no longer present  CA interval has decreased  Vent. rate has increased by  74 bpm  Incomplete left bundle branch block is now present    Referred By: edmd           Confirmed By:     Telemetry Scan [[id number]] Resulted: 07/22/25 0221     Updated: 07/22/25 0421          ECG 12  Lead Dyspnea   Preliminary Result   Test Reason : Dyspnea   Blood Pressure :   */*   mmHG   Vent. Rate : 153 BPM     Atrial Rate : 153 BPM      P-R Int : 104 ms          QRS Dur : 116 ms       QT Int : 340 ms       P-R-T Axes :   *  -1  94 degrees     QTcB Int : 542 ms      Sinus tachycardia with short NJ with occasional premature ventricular   complexes   Incomplete left bundle branch block   ST & T wave abnormality, consider lateral ischemia   Abnormal ECG   When compared with ECG of 01-Jun-2022 08:42,   premature ventricular complexes are now present   premature atrial complexes are no longer present   NJ interval has decreased   Vent. rate has increased by  74 bpm   Incomplete left bundle branch block is now present      Referred By: edmd           Confirmed By:       Telemetry Scan   Final Result      ECG 12 Lead Dyspnea    (Results Pending)   ECG 12 Lead Rhythm Change    (Results Pending)           Final diagnoses:   Respiratory distress   Acute respiratory failure with hypoxia   Pneumonia of right middle lobe due to infectious organism   Sepsis without acute organ dysfunction, due to unspecified organism   COPD with acute exacerbation   Leukocytosis, unspecified type   Sinus tachycardia       ED Disposition  ED Disposition       ED Disposition   Decision to Admit    Condition   --    Comment   Level of Care: Critical Care [6]   Diagnosis: Acute on chronic respiratory failure with hypoxia [[id number]]   Certification: I Certify That Inpatient Hospital Services Are Medically Necessary For Greater Than 2 Midnights                 No follow-up provider specified.       Medication List      No changes were made to your prescriptions during this visit.            Nu Salazar MD  07/22/25 0783

## 2025-07-22 NOTE — H&P
Intensive Care Admission Note     Acute on chronic respiratory failure with hypoxia    History of Present Illness     Ms. Dani Ziegler 59-year-old female with a past medical history of COPD on 3 L nasal cannula, DM 2, HTN, anxiety, pulmonary embolism (on Eliquis), morbid obesity and arthritis presents to [facility] ED with complaints of shortness of breath.  Patient states that she has been experience shortness of breath,cough with white sputum,fevers,weakness last week but sob that got worse on Sunday into Monday  when she called EMS.  EMS arrived to her home and reported that they found the patient in respiratory distress. She was cyanotic with decreased air movement,  a respiratory rate of 55 breaths minute and her oxygen saturation was 80% on her baseline supplemental oxygen requirement.  EMS administered 125 mg IV Solu-Medrol, DuoNeb and 3 albuterol nebulizer treatments.  They placed her on BiPAP with pressures of 20/10 and her oxygen saturations increased to 97%.  Of note, patient had a recent hospitalization in May 2025 at [facility] for community acquired pneumonia. She was found to have lung nodules and her PCP ordered PET scan which was supposed to be done yesterday but she couldn't go.    On arrival to her ED, heart rate was 161, respiratory rate 40 BP, 129/74 and oxygen saturations 92% on 100% BiPAP.  Pertinent labs include proBNP 8.9K, anion gap 15, glucose 270, lactate 2.1 with reflex pending, WBC 29.08.  RVP negative.  CTA chest reveals no evidence of PE but there is extensive tree-in-bud nodules in the right lung and left lung consistent with infectious bronchiolitis.  There is also patchy airspace disease in the right middle lobe consistent with pneumonia.  Patient received 80 mg IV Lasix, 1 g of Tylenol, Levaquin and aztreonam in the ED. patient will be admitted to the ICU for further evaluation and treatment.    Time spent: 9 minutes  Electronically signed by MILEY Jones,  07/22/25, 6:24 AM EDT.      Problem List, Surgical History, Family, Social History, and ROS     Past Medical History:   Diagnosis Date    Abnormal weight gain     Acute UTI     Anxiety     Arthritis involving multiple sites     Chronic obstructive pulmonary disease     Chronic pain     Current every day smoker     Depression     Diabetes mellitus     Diarrhea     Dysuria     Edema, lower extremity     Fever     Head injury     Hypertension     Intervertebral disc disorder     Degeneration of intervertebral disc, site unspecified (722.6)    Left bundle branch block     Leukocytosis     Long term current use of methadone for pain control     Mass of right breast     Nausea     Obesity     Overactive bladder     Peripheral neuropathy     Superficial phlebitis     right medial calf    Upper respiratory infection     Urinary incontinence     Vitamin D deficiency     Vomiting       Past Surgical History:   Procedure Laterality Date    BLADDER SURGERY      pubovaginal sling    CHOLECYSTECTOMY      EXPLORATORY LAPAROTOMY N/A 6/12/2022    Procedure: LAPAROTOMY EXPLORATORY UMBILICAL HERNIA REPAIR WITH MESH;  Surgeon: Reji Brown MD;  Location: [facility];  Service: General;  Laterality: N/A;    HIP ARTHROSCOPY Right 10/29/2020       Allergies   Allergen Reactions    Diphenhydramine Hives    Lortab [Hydrocodone-Acetaminophen] Hives     Tolerates percocet    Toradol [Ketorolac Tromethamine] Hives     Per patient tolerates motrin    Alprazolam Delirium    Nsaids Other (See Comments)     Liver Dx     No current facility-administered medications on file prior to encounter.     Current Outpatient Medications on File Prior to Encounter   Medication Sig    albuterol (PROVENTIL) (2.5 MG/3ML) 0.083% nebulizer solution INHALE CONTENTS OF 1 VIAL VIA NEBULIZER EVERY 6 HOURS AS NEEDED FOR WHEEZING    albuterol sulfate  (90 Base) MCG/ACT inhaler Inhale 1 puff Every 4 (Four) Hours As Needed for Wheezing.    Alcohol Swabs pads  Use when checking sugars    apixaban (Eliquis) 5 MG tablet tablet Take 1 tablet by mouth Every 12 (Twelve) Hours.    ARIPiprazole (ABILIFY) 10 MG tablet Take 1 tablet by mouth Daily.    Budeson-Glycopyrrol-Formoterol (Breztri Aerosphere) 160-9-4.8 MCG/ACT aerosol inhaler Inhale 2 puffs 2 (Two) Times a Day.    Continuous Glucose  (Dexcom G6 ) device Use 1 Device Daily As Needed (to check sugars). Check sugars daily as needed    Continuous Glucose Sensor (Dexcom G6 Sensor) Use Every 10 (Ten) Days.    Continuous Glucose Transmitter (Dexcom G6 Transmitter) misc Use 1 Device Daily. Check sugars daily as needed    diazePAM (Valium) 5 MG tablet 1-2 po 1 hour before procedure.    escitalopram (LEXAPRO) 20 MG tablet Take 1 tablet by mouth Daily.    fluconazole (Diflucan) 100 MG tablet Take 1 tablet by mouth Daily.    glucose monitor monitoring kit Check glucose twice daily    hydrOXYzine pamoate (VISTARIL) 25 MG capsule Take 1-2 capsules by mouth 3 (Three) Times a Day As Needed for Anxiety.    Lantus SoloStar 100 UNIT/ML injection pen Inject 50 Units under the skin into the appropriate area as directed Daily.    metoprolol succinate XL (TOPROL-XL) 50 MG 24 hr tablet Take 1 tablet by mouth Daily.    Multiple Vitamins-Iron (MULTI-VITAMIN/IRON) tablet Take 1 tablet by mouth Daily.    NIFEdipine XL (PROCARDIA XL) 30 MG 24 hr tablet Take 1 tablet by mouth Daily.    nystatin (MYCOSTATIN) 069748 UNIT/GM cream Apply  topically to the appropriate area as directed 2 (Two) Times a Day.    O2 (OXYGEN) Inhale 2 L/min Daily.    oxybutynin XL (Ditropan XL) 5 MG 24 hr tablet Take 1 tablet by mouth Daily.    pantoprazole (PROTONIX) 40 MG EC tablet Take 1 tablet by mouth Every Other Day.    roflumilast (DALIRESP) 500 MCG tablet tablet Take 1 tablet by mouth Daily.     MEDICATION LIST AND ALLERGIES REVIEWED.    Family History   Problem Relation Age of Onset    Breast cancer Mother     Cancer Mother     Arthritis Mother      "Diabetes Mother     Obesity Mother     Arthritis Father     Stroke Father     Hypertension Father     Heart attack Father     Diabetes Maternal Grandmother     Migraines Other      Social History     Tobacco Use    Smoking status: Every Day     Current packs/day: 0.00     Types: Cigarettes     Start date: 1989     Last attempt to quit: 2019     Years since quittin.2    Smokeless tobacco: Never    Tobacco comments:     1 daily   Vaping Use    Vaping status: Never Used   Substance Use Topics    Alcohol use: No    Drug use: No     Social History     Social History Narrative    Not on file     FAMILY AND SOCIAL HISTORY REVIEWED.    ALL OTHER SYSTEMS REVIEWED AND ARE NEGATIVE.    Physical Exam and Clinical Information   /65   Pulse (!) 133   Temp 100 °F (37.8 °C) (Axillary)   Resp (!) 40   Ht 170.2 cm (67\")   Wt 122 kg (270 lb)   SpO2 94%   BMI 42.29 kg/m²     Physical exam  General : Morbid obese.  Laying in the bed with dyspnea and tachypnea.  Able to communicate in full sentences.  Neuro: CN 2-12 grossly intact, moves bilateral upper and lower extremities.  HEENT : AT,NC,PERRLA,No pallor, No Icterus,No thyromegaly. No JVD.  CVS : Tachycardia noted, No M/R/G. B/l UE and LE palpable pulses +  Resp/Chest: B/l posterior chest with late expiratory wheezing and right chest Rales present.  Abd: Soft, Non distended, No tenderness, No organomegaly. Bowel sounds +  EXT: NO edema.   SKIN : NO palpable skin lesions/rashes noted.    Results from last 7 days   Lab Units 25  0227   WBC 10*3/mm3 29.08*   HEMOGLOBIN g/dL 14.5   PLATELETS 10*3/mm3 430     Results from last 7 days   Lab Units 25  0227   SODIUM mmol/L 133*   POTASSIUM mmol/L 4.2   CO2 mmol/L 19.9*   BUN mg/dL 9.8   CREATININE mg/dL 0.89   MAGNESIUM mg/dL 1.7   GLUCOSE mg/dL 270*     Estimated Creatinine Clearance: 91 mL/min (by C-G formula based on SCr of 0.89 mg/dL).      Results from last 7 days   Lab Units 25  0230   PH, " ARTERIAL pH units 7.440   PCO2, ARTERIAL mm Hg 32.9*   PO2 ART mm Hg 286.0*     Lab Results   Component Value Date    LACTATE 2.1 (C) 07/22/2025        Images: CTA personally reviewed.  No evidence of any pulmonary embolism.  Patchy opacities and nodules noted predominantly in the right lung.  No evidence of any effusions or edema.    I reviewed the patient's results and images.     Impression     Acute on chronic respiratory failure with hypoxia  Acute exacerbation of COPD  Community-acquired pneumonia  Morbid obesity with likely OHS  Essential hypertension  Sinus tachycardia  Insulin-dependent type 2 diabetes mellitus with hyperglycemia  History of recurrent DVT and pulmonary embolism on Eliquis  Anxiety disorder  GERD without esophagitis      Plan/Recommendations     -Admit patient to ICU  -CT chest with evidence of pneumonia and lung nodules.  No evidence of any interstitial edema or effusions.  Negative for pulmonary embolism  -Started ceftriaxone and doxycycline.  Solu-Medrol 40 IV twice daily.  Scheduled DuoNebs.  Pulmicort nebs.  - Continue to slowly wean off of BiPAP.  - Sinus tachycardia likely secondary to bronchodilator treatment and pneumonia.  Improving.  - Continue Eliquis.  - Hold antihypertensive medications.  Blood pressure is borderline.  - Continue Lantus and sliding scale insulin.  High risk for hyperglycemia being on steroids.  - Continue PPI  - Full code        Time spent Critical care 45 min (exclusive of procedure time)  including high complexity decision making to assess, manipulate, and support vital organ system failure in this individual who has impairment of one or more vital organ systems such that there is a high probability of imminent or life threatening deterioration in the patient’s condition.      Alberto Hampton MD   Critical Care Medicine  07/22/25 06:12 EDT

## 2025-07-22 NOTE — SIGNIFICANT NOTE
07/22/25 1345   SLP Deferred Reason   SLP Deferred Reason Patient unavailable for evaluation  (Consult for clinical swallow eval received. Reviewed chart & spoke to pt's RN multiple times this morning/afternoon. Pt remains on BiPAP. Will check back tomorrow.)

## 2025-07-22 NOTE — Clinical Note
Level of Care: Critical Care [6]   Admitting Physician: TREASURE ALDRIDGE [[id number]]   Attending Physician: TREASURE ALDRIDGE [[id number]]

## 2025-07-22 NOTE — CONSULTS
INFECTIOUS DISEASE CONSULT/INITIAL HOSPITAL VISIT    Dani Ziegler  1965  [id number]    Date of Consult: 7/22/2025    Admission Date: 7/22/2025      Requesting Provider: No ref. provider found  Evaluating Physician: Earl Cheney MD    Reason for Consultation: pneumonia/foot ulcer    History of present illness:    Patient is a 59 y.o. femaleWith COPD, chronic hypoxia on home oxygen presents to this emergency room with dyspnea patient found to be in respite distress and cyanotic patient breathing rapidly with a respirate of 55 inspirations per minute patient had hypoxia despite 3 L of oxygen patient given Solu-Medrol DuoNeb inhalation albuterol treatments and placed on BiPAP.  Apparently patient has received care at [facility] for commune acquired pneumonia.    CTA chest performed to exclude pulmonary embolism but showed extensive tree-in-bud nodules in right lung and left lung concerning for infectious bronchiolitis.  Patient been given diuretics levofloxacin as family emergency room antibiotics have been modified to ceftriaxone and doxycycline.  We are being consulted for further antibiotic management    Patient is on BiPAP currently and is getting a bedside transthoracic echocardiogram    Past Medical History:   Diagnosis Date    Abnormal weight gain     Acute UTI     Anxiety     Arthritis involving multiple sites     Chronic obstructive pulmonary disease     Chronic pain     Current every day smoker     Depression     Diabetes mellitus     Diarrhea     Dysuria     Edema, lower extremity     Fever     Head injury     Hypertension     Intervertebral disc disorder     Degeneration of intervertebral disc, site unspecified (722.6)    Left bundle branch block     Leukocytosis     Long term current use of methadone for pain control     Mass of right breast     Nausea     Obesity     Overactive bladder     Peripheral neuropathy     Superficial phlebitis     right medial calf    Upper respiratory  infection     Urinary incontinence     Vitamin D deficiency     Vomiting        Past Surgical History:   Procedure Laterality Date    BLADDER SURGERY      pubovaginal sling    CHOLECYSTECTOMY      EXPLORATORY LAPAROTOMY N/A 2022    Procedure: LAPAROTOMY EXPLORATORY UMBILICAL HERNIA REPAIR WITH MESH;  Surgeon: Reji Brown MD;  Location: [facility];  Service: General;  Laterality: N/A;    HERNIA REPAIR      HIP ARTHROSCOPY Right 10/29/2020    JOINT REPLACEMENT      TRACHEOSTOMY         Family History   Problem Relation Age of Onset    Breast cancer Mother     Cancer Mother     Arthritis Mother     Diabetes Mother     Obesity Mother     Arthritis Father     Stroke Father     Hypertension Father     Heart attack Father     Diabetes Maternal Grandmother     Migraines Other        Social History     Socioeconomic History    Marital status:    Tobacco Use    Smoking status: Every Day     Current packs/day: 0.00     Types: Cigarettes     Start date: 1989     Last attempt to quit: 2019     Years since quittin.2    Smokeless tobacco: Never    Tobacco comments:     1 daily   Vaping Use    Vaping status: Never Used   Substance and Sexual Activity    Alcohol use: No    Drug use: No    Sexual activity: Defer       Allergies   Allergen Reactions    Diphenhydramine Hives    Lortab [Hydrocodone-Acetaminophen] Hives     Tolerates percocet    Toradol [Ketorolac Tromethamine] Hives     Per patient tolerates motrin    Alprazolam Delirium    Nsaids Other (See Comments)     Liver Dx         Medication:    Current Facility-Administered Medications:     acetaminophen (TYLENOL) tablet 650 mg, 650 mg, Oral, Q6H PRN, Lilli Sprague APRN    apixaban (ELIQUIS) tablet 5 mg, 5 mg, Oral, Q12H, Lilli Sprague APRN, 5 mg at 25 0823    ARIPiprazole (ABILIFY) tablet 10 mg, 10 mg, Oral, Daily, Alberto Hampton MD, 10 mg at 25 0828    budesonide (PULMICORT) nebulizer solution 0.5 mg, 0.5 mg,  Nebulization, BID - RT, Lilli Sprague APRN, 0.5 mg at 07/22/25 0840    Calcium Replacement - Follow Nurse / BPA Driven Protocol, , Not Applicable, PRN, Lilli Sprague APRN    cefTRIAXone (ROCEPHIN) 1,000 mg in sodium chloride 0.9 % 100 mL MBP, 1,000 mg, Intravenous, Q24H, Lilli Sprague APRN, Last Rate: 200 mL/hr at 07/22/25 0828, 1,000 mg at 07/22/25 0828    dextrose (D50W) (25 g/50 mL) IV injection 25 g, 25 g, Intravenous, Q15 Min PRN, Lilli Sprague APRN    dextrose (GLUTOSE) oral gel 15 g, 15 g, Oral, Q15 Min PRN, Lilli Sprague APRN    doxycycline (VIBRAMYCIN) 100 mg in sodium chloride 0.9 % 100 mL MBP, 100 mg, Intravenous, Q12H, Lilli Sprague APRN, 100 mg at 07/22/25 0822    escitalopram (LEXAPRO) tablet 20 mg, 20 mg, Oral, Daily, Lilli Sprague APRN, 20 mg at 07/22/25 0823    glucagon (GLUCAGEN) injection 1 mg, 1 mg, Intramuscular, Q15 Min PRN, Lilli Sprague APRN    insulin glargine (LANTUS, SEMGLEE) injection 15 Units, 15 Units, Subcutaneous, Daily, Nadia Livingston APRN, 15 Units at 07/22/25 1428    insulin regular (humuLIN R,novoLIN R) injection 4-24 Units, 4-24 Units, Subcutaneous, Q6H, Nadia Livingston APRN, 20 Units at 07/22/25 1427    insulin regular (humuLIN R,novoLIN R) injection 5 Units, 5 Units, Subcutaneous, Q6H, Nadia Livingston APRN    ipratropium-albuterol (DUO-NEB) nebulizer solution 3 mL, 3 mL, Nebulization, 4x Daily - RT, Lilli Sprague APRN, 3 mL at 07/22/25 0840    ipratropium-albuterol (DUO-NEB) nebulizer solution 3 mL, 3 mL, Nebulization, Q4H PRN, Lilli Sprague APRN    Magnesium Cardiology Dose Replacement - Follow Nurse / BPA Driven Protocol, , Not Applicable, PRN, Lilli Sprague APRN    methylPREDNISolone sodium succinate (SOLU-Medrol) injection 40 mg, 40 mg, Intravenous, Q12H, Lilli Sprague APRN, 40 mg at 07/22/25 0828    mupirocin (BACTROBAN) 2 % nasal ointment 1 Application, 1 Application, Each Nare, BID, Lilli Sprague,  APRN, 1 Application at 07/22/25 0823    oxybutynin XL (DITROPAN-XL) 24 hr tablet 5 mg, 5 mg, Oral, Daily, Alberto Hampton MD, 5 mg at 07/22/25 0828    pantoprazole (PROTONIX) EC tablet 40 mg, 40 mg, Oral, Q AM, Alberto Hampton MD, 40 mg at 07/22/25 0828    Pharmacy to dose vancomycin, , Not Applicable, Continuous PRN, Nadia Livingston APRN    Phosphorus Replacement - Follow Nurse / BPA Driven Protocol, , Not Applicable, PRN, Lilli Sprague APRN    Potassium Replacement - Follow Nurse / BPA Driven Protocol, , Not Applicable, PRNCelestine Natasha R, APRN    sodium chloride 0.9 % flush 10 mL, 10 mL, Intravenous, PRN, Nu Salazar MD    sodium chloride 0.9 % flush 10 mL, 10 mL, Intravenous, Q12H, Lilli Sprague APRN, 10 mL at 07/22/25 0829    sodium chloride 0.9 % flush 10 mL, 10 mL, Intravenous, PRN, Lilli Sprague APRN    sodium chloride 0.9 % infusion 40 mL, 40 mL, Intravenous, PRN, Lilli Sprague APRN    Antibiotics:  Anti-Infectives (From admission, onward)      Ordered     Dose/Rate Route Frequency Start Stop    07/22/25 1526  Pharmacy to dose vancomycin        Ordering Provider: Nadia Livingston APRN     Not Applicable Continuous PRN 07/22/25 1526 07/27/25 1525    07/22/25 0613  doxycycline (VIBRAMYCIN) 100 mg in sodium chloride 0.9 % 100 mL MBP        Ordering Provider: Lilli Sprague APRN    100 mg  over 60 Minutes Intravenous Every 12 Hours 07/22/25 0900 07/27/25 0859    07/22/25 0613  cefTRIAXone (ROCEPHIN) 1,000 mg in sodium chloride 0.9 % 100 mL MBP        Ordering Provider: Lilli Sprague APRN    1,000 mg  200 mL/hr over 30 Minutes Intravenous Every 24 Hours 07/22/25 0900 07/27/25 0859    07/22/25 0230  aztreonam (AZACTAM) 2 g in sodium chloride 0.9 % 100 mL MBP        Ordering Provider: Nu Salazar MD    2 g  200 mL/hr over 30 Minutes Intravenous Once 07/22/25 0246 07/22/25 0429    07/22/25 0230  levoFLOXacin (LEVAQUIN) 750 mg/150 mL D5W (premix)  "(LEVAQUIN) 750 mg        Ordering Provider: Nu Salazar MD    750 mg  100 mL/hr over 90 Minutes Intravenous Once 25 0246 25 0625              Review of Systems:    Reports shortness of breath fevers or chills  Physical Exam:   Vital Signs  Temp (24hrs), Av.7 °F (37.1 °C), Min:97.5 °F (36.4 °C), Max:100 °F (37.8 °C)    Temp  Min: 97.5 °F (36.4 °C)  Max: 100 °F (37.8 °C)  BP  Min: 71/51  Max: 129/74  Pulse  Min: 73  Max: 161  Resp  Min: 29  Max: 40  SpO2  Min: 90 %  Max: 99 %    GENERAL: Awake and alert, in no acute distress.   HEENT: Normocephalic, atraumatic.  PERRL. EOMI. No conjunctival injection. No icterus. No ext oral lesions    HEART: RRR; No murmur, rubs, gallops.   LUNGS: Clear to auscultation bilaterally   ABDOMEN: Soft, nontender,   EXT:  No cyanosis, clubbing or edema. No cord.  :  Without Carroll catheter.  MSK: No joint effusions or erythema  SKIN: Warm and dry without cutaneous eruptions on Inspection/palpation.    NEURO: no focal deficit    Laboratory Data    Results from last 7 days   Lab Units 25  022   WBC 10*3/mm3 29.08*   HEMOGLOBIN g/dL 14.5   HEMATOCRIT % 44.4   PLATELETS 10*3/mm3 430     Results from last 7 days   Lab Units 25  0227   SODIUM mmol/L 133*   POTASSIUM mmol/L 4.2   CHLORIDE mmol/L 98   CO2 mmol/L 19.9*   BUN mg/dL 9.8   CREATININE mg/dL 0.89   GLUCOSE mg/dL 270*   CALCIUM mg/dL 9.2     Results from last 7 days   Lab Units 25  0227   ALK PHOS U/L 127*   BILIRUBIN mg/dL 0.8   ALT (SGPT) U/L 7   AST (SGOT) U/L 10         Results from last 7 days   Lab Units 25  0422   CRP mg/dL 18.37*     Results from last 7 days   Lab Units 25  1137   LACTATE mmol/L 1.3             Estimated Creatinine Clearance: 94.4 mL/min (by C-G formula based on SCr of 0.89 mg/dL).      Microbiology:  No results found for: \"ACANTHNAEG\", \"AFBCX\", \"BPERTUSSISCX\", \"BLOODCX\"  No results found for: \"BCIDPCR\", \"CXREFLEX\", \"CSFCX\", \"CULTURETIS\"  No results found for: " "\"CULTURES\", \"HSVCX\", \"URCX\"  No results found for: \"EYECULTURE\", \"GCCX\", \"HSVCULTURE\", \"LABHSV\"  No results found for: \"LEGIONELLA\", \"MRSACX\", \"MUMPSCX\", \"MYCOPLASCX\"  No results found for: \"NOCARDIACX\", \"STOOLCX\"  No results found for: \"THROATCX\", \"UNSTIMCULT\", \"URINECX\", \"CULTURE\", \"VZVCULTUR\"  No results found for: \"VIRALCULTU\", \"WOUNDCX\"        Radiology:  Imaging Results (Last 72 Hours)       Procedure Component Value Units Date/Time    CT Angiogram Chest Pulmonary Embolism [[id number]] Collected: 07/22/25 0544     Updated: 07/22/25 0557    Narrative:      CT ANGIOGRAM CHEST PULMONARY EMBOLISM    Date of Exam: 7/22/2025 5:27 AM EDT    Indication: Pulmonary Embolism.    Comparison: CT chest 12/30/2021 and chest radiograph performed earlier today. CT abdomen 6/12/2022.    Technique: Axial CT images were obtained of the chest after the uneventful intravenous administration of 80 mL [id number]-370 utilizing pulmonary embolism protocol.  In addition, a 3-D volume rendered image was created for interpretation.  Reconstructed   coronal and sagittal images were also obtained. Automated exposure control and iterative construction methods were used.      Findings:  The thyroid, trachea and esophagus appear within normal limits. There is a small hiatal hernia. There is multifocal mediastinal lymphadenopathy including an anterior right paratracheal lymph node on image 26 of the axial series measuring 19 mm in the   short axis, distal left paratracheal lymph node on image 37 measuring 17 mm in the short axis and a right hilar lymph node on axial image 43 measuring up to 21 mm diameter in the short axis. There is no pericardial effusion. The aorta, aortic branch   vessels and main pulmonary artery appear within normal limits. There is no evidence of pulmonary embolism.    There are extensive tree-in-bud nodules present in the right lung. There are more subtle and less pronounced tree-in-bud nodules in the left lung all consistent " with infectious bronchiolitis. There are patchy areas of airspace disease in the right middle   lobe consistent with pneumonia and there is mild diffuse peribronchial thickening most pronounced on the right and worse in the right lower lobe likely also infectious/inflammatory. No pneumothorax or pleural effusion. There is a calcified right upper   lobe granuloma.    No acute findings in the superficial soft tissues. There is a heterogeneous appearing right adrenal nodule measuring 34 mm. Left adrenal gland is diminutive. Patient is status post cholecystectomy. Liver is partially seen, but appears enlarged. Spleen is   also partially seen, but likely enlarged. There are multiple chronic compression deformities in the thoracic spine including at T5, T6, T7, T8 and T9. There is a minor segmentation anomaly at C6-C7 with incomplete disc and facet joint formation. No   acute osseous abnormality. Bones are demineralized.      Impression:      Impression:  1.No evidence of pulmonary embolism.  2.Extensive tree-in-bud nodules in the right lung and to a lesser extent in the left lung consistent with infectious bronchiolitis. There is also patchy airspace disease in the right middle lobe consistent with pneumonia.  3.Mediastinal and right hilar lymphadenopathy, likely reactive.  4.34 mm heterogeneous right adrenal nodule. Diminutive left adrenal gland. Right adrenal nodule is unchanged from 2022 likely reflecting adenoma.  5.Multiple chronic compression deformities in the thoracic spine.  6.Small hiatal hernia.            Electronically Signed: Juanpablo Barron MD    7/22/2025 5:54 AM EDT    Workstation ID: [id number]    XR Chest 1 View [[id number]] Collected: 07/22/25 0336     Updated: 07/22/25 0340    Narrative:      XR CHEST 1 VW    Date of Exam: 7/22/2025 2:58 AM EDT    Indication: SOA triage protocol    Comparison: 6/28/2022.    Findings:  There is a new focus of airspace disease in the periphery of the right lung base that  could reflect pneumonia or atelectasis. Left lung remains clear. There are calcified granulomas in the right lung. Cardiac silhouette and pulmonary vasculature appear   within normal limits. No acute osseous abnormality.      Impression:      Impression:  New focus of airspace disease in the periphery of the right lung base that could reflect pneumonia or atelectasis.          Electronically Signed: Juanpablo Barron MD    7/22/2025 3:37 AM EDT    Workstation ID: [id number]              Impression:   Acute hypoxic respiratory failure   morbid obesity  Nasal MRSA colonization  Tachycardia  Tachypnea  Hypotension  Lacitc acidosis concerning for tissue perfusion mismatch      PLAN/RECOMMENDATIONS:   Thank you for asking us to see Dani Ziegler, I recommend the following:  CT chest independently reviewed    Aspiration bronchiolitis is common in obese patient with obstructive sleep apnea.      Infectious bronchiolitis can follow viral infection, bacterial bronchopenumonia, atypical mycobacterial infection, fungal infection    Agree with TTE to assess for pulmonary hypertension/right heart strain      Cont ceftriaxone    Cont doxycyline    Consider swallowing study    This visit included the following complex service elements:  Complex medical decision-making associated with antimicrobial prescribing.  In-depth chart review with high level synthesis for complex diagnoses.       Patient is critically ill  Critical care time 45 minutes  Earl Cheney MD  7/22/2025  16:06 EDT

## 2025-07-22 NOTE — PROGRESS NOTES
Clinical Nutrition     Patient Name: Dani Ziegler  YOB: 1965  MRN: [id number]  Date of Encounter: 07/22/25 16:37 EDT  Admission date: 7/22/2025  Reason for Visit: Identified at risk by screening criteria, Nonhealing wound or pressure ulcer, Need for education    Assessment   Nutrition Assessment   Admission Diagnosis:  Acute on chronic respiratory failure with hypoxia [J96.21]    Problem List:    Acute on chronic respiratory failure with hypoxia      PMH:   She  has a past medical history of Abnormal weight gain, Acute UTI, Anxiety, Arthritis involving multiple sites, Chronic obstructive pulmonary disease, Chronic pain, Current every day smoker, Depression, Diabetes mellitus, Diarrhea, Dysuria, Edema, lower extremity, Fever, Head injury, Hypertension, Intervertebral disc disorder, Left bundle branch block, Leukocytosis, Long term current use of methadone for pain control, Mass of right breast, Nausea, Obesity, Overactive bladder, Peripheral neuropathy, Superficial phlebitis, Upper respiratory infection, Urinary incontinence, Vitamin D deficiency, and Vomiting.    PSH:  She  has a past surgical history that includes Bladder surgery; Cholecystectomy; Hip Arthroscopy (Right, 10/29/2020); Exploratory Laparotomy (N/A, 06/12/2022); Hernia repair; Joint replacement; and Tracheostomy tube placement.    Substance history: tobacco    Applicable Nutrition History:     SKIN: L great toe ulcer    Labs    Labs Reviewed: Yes    Results from last 7 days   Lab Units 07/22/25  1137 07/22/25  0700 07/22/25  0227   GLUCOSE mg/dL  --   --  270*   BUN mg/dL  --   --  9.8   CREATININE mg/dL  --   --  0.89   SODIUM mmol/L  --   --  133*   CHLORIDE mmol/L  --   --  98   POTASSIUM mmol/L  --   --  4.2   MAGNESIUM mg/dL  --   --  1.7   ALT (SGPT) U/L  --   --  7   LACTATE mmol/L 1.3 2.3* 2.1*       Results from last 7 days   Lab Units 07/22/25  0422 07/22/25  0227   ALBUMIN g/dL  --  3.3*   CRP mg/dL 18.37*   --        Results from last 7 days   Lab Units 07/22/25  1342 07/22/25  0831   GLUCOSE mg/dL 364* 298*     Lab Results   Lab Value Date/Time    HGBA1C 9.01 (H) 07/22/2025 0227    HGBA1C 7.5 (H) 07/22/2024 1522    HGBA1C 9.3 (H) 04/20/2023 1555    HGBA1C 6.40 (H) 01/19/2022 0323    HGBA1C 8.3 01/11/2021 0000         Results from last 7 days   Lab Units 07/22/25  0227   PROBNP pg/mL 8,901.0*       Medications    Medications Reviewed: yes    Scheduled Meds:apixaban, 5 mg, Oral, Q12H  ARIPiprazole, 10 mg, Oral, Daily  budesonide, 0.5 mg, Nebulization, BID - RT  cefTRIAXone, 1,000 mg, Intravenous, Q24H  doxycycline, 100 mg, Intravenous, Q12H  escitalopram, 20 mg, Oral, Daily  insulin glargine, 15 Units, Subcutaneous, Daily  insulin regular, 4-24 Units, Subcutaneous, Q6H  insulin regular, 5 Units, Subcutaneous, Q6H  ipratropium-albuterol, 3 mL, Nebulization, 4x Daily - RT  methylPREDNISolone sodium succinate, 40 mg, Intravenous, Q12H  mupirocin, 1 Application, Each Nare, BID  oxybutynin XL, 5 mg, Oral, Daily  pantoprazole, 40 mg, Oral, Q AM  sodium chloride, 10 mL, Intravenous, Q12H  vancomycin, 20 mg/kg, Intravenous, Once  [START ON 7/23/2025] vancomycin, 2,000 mg, Intravenous, Q24H      Continuous Infusions:Pharmacy to dose vancomycin,       PRN Meds:.  acetaminophen    Calcium Replacement - Follow Nurse / BPA Driven Protocol    dextrose    dextrose    glucagon (human recombinant)    ipratropium-albuterol    Magnesium Cardiology Dose Replacement - Follow Nurse / BPA Driven Protocol    Pharmacy to dose vancomycin    Phosphorus Replacement - Follow Nurse / BPA Driven Protocol    Potassium Replacement - Follow Nurse / BPA Driven Protocol    sodium chloride    sodium chloride    sodium chloride    Intake/Ouptut 24 hrs (0701 - 0700)   I&O's Reviewed: yes    Intake & Output (last day)         07/21 0701 07/22 0700 07/22 0701 07/23 0700    I.V. (mL/kg)  140 (1.1)    IV Piggyback 250 1452    Total Intake(mL/kg) 250 (2) 1592  "(12.2)    Urine (mL/kg/hr)  550 (0.4)    Total Output  550    Net +250 +1042                 Anthropometrics     Height: Height: 170.2 cm (67\")  Last Filed Weight: Weight: 130 kg (287 lb) (07/22/25 1540)  Method: Weight Method: Bed scale  BMI: BMI (Calculated): 44.9    UBW: ?  Weight change: per EMR 23lb wt gain over past year     Weight       Weight (kg) Weight (lbs) Weight Method Visit Report   6/21/2022 119.75 kg  264 lb  Bed scale     6/22/2022 119.75 kg  264 lb      6/24/2022 119.75 kg  264 lb      6/25/2022 126.355 kg  278 lb 9 oz  Bed scale     6/30/2022 122.471 kg  270 lb  Stated     8/30/2022 130.182 kg  287 lb   --    4/20/2023 133.358 kg  294 lb   --    7/22/2024 122.199 kg  269 lb 6.4 oz   --    8/9/2024 120.112 kg  264 lb 12.8 oz      7/22/2025 122.471 kg  270 lb  Stated      130.6 kg  287 lb 14.7 oz  Bed scale      130.182 kg  287 lb          Nutrition Focused Physical Exam     Date:     Unable to perform due to Defer pending indication     Subjective   Reported/Observed/Food/Nutrition Related History:     7-22:Pt resting in bed, on Bipap, getting breathing treatment, is alert and oriented    Pt reports she is starving, has not eaten in 2 days, she does not follow a diabetic diet, she eats whatever her roommate cooks for her, does not check blood sugars as she does not have a glucometer, her last Ha1c was ~11, currently it is 9.01    Per RN: pt has not been able to come off Bipap yet 2nd respiratory status    SLP eval pending    Current Nutrition Prescription     PO: NPO Diet NPO Type: Sips with Meds, Ice Chips  Oral Nutrition Supplement:  Intake: N/A    Assessment & Plan   Nutrition Diagnosis   Date: 7-22-25 Updated:   Problem Food and nutrition knowledge deficit    Etiology Uncontrolled DM   Signs/Symptoms Ha1c= 9.01   Status: New  Problem Increased nutrient needs   Etiology Poor Skin Integrity   Signs/Symptoms L great toe ulcer   Status: New    Goal:   Nutrition to support treatment and Advance diet " when feasible      Nutrition Intervention      Follow treatment progress, Care plan reviewed, Education provided    DM Diet Education provided: discussed importance of good blood sugar control and protein intake for wound healing, and complication of uncontrolled DM    Diabetes Educator has been consulted as well by RN    Hopefully pt will be able to advance diet once respiratory status stable    Will send protein supplements once diet ordered      Monitoring/Evaluation:   Per protocol, I&O, PO intake, Pertinent labs, Weight, Skin status, GI status, Symptoms, Swallow function, Hemodynamic stability    Jessica Dennis RD  Time Spent::45min

## 2025-07-22 NOTE — NURSING NOTE
Patient mentions various discomforts such as pain with inspiration (knows relationship to PE), trouble sleeping (asking about ambien), some vague swallowing issue (not brand new), and sensitivities to many stuff. Given extra dose of toradol with minor improvement but not interested at this time in taking narcotic agents.  Pulse ox 96% on room air, doesn't appear dyspneic. Will order dose of ambien WOC consult for   Diabetic ulcer left foot, black non blanchable   Question Answer Comment   Reason for Consult? Other    Indicate Reason for Consult: Diabetic ulcer         Visited and noted left great toe plantar area with dry blood blister and callusing, scabbing vs stable eschar. Agree with diabetic ulcer. Recommend painting with betadine. Open to air. Will sign off.

## 2025-07-22 NOTE — PROGRESS NOTES
Pharmacy Consult - Vancomycin Dosing and Monitoring    Dani Ziegler is a 59 y.o. female receiving vancomycin therapy.     Indication: Pneumonia  Consulting Provider: MILEY Bowen  ID Consult: Yes    Goal AUC: 400-600 mg/L*hr    Current Antimicrobial Therapy  Anti-Infectives (From admission, onward)      Ordered     Dose/Rate Route Frequency Start Stop    07/22/25 1526  Pharmacy to dose vancomycin        Ordering Provider: Nadia Livingston APRN     Not Applicable Continuous PRN 07/22/25 1526 07/27/25 1525    07/22/25 0613  doxycycline (VIBRAMYCIN) 100 mg in sodium chloride 0.9 % 100 mL MBP        Ordering Provider: Lilli Sprague APRN    100 mg  over 60 Minutes Intravenous Every 12 Hours 07/22/25 0900 07/27/25 0859    07/22/25 0613  cefTRIAXone (ROCEPHIN) 1,000 mg in sodium chloride 0.9 % 100 mL MBP        Ordering Provider: Lilli Sprague APRN    1,000 mg  200 mL/hr over 30 Minutes Intravenous Every 24 Hours 07/22/25 0900 07/27/25 0859    07/22/25 0230  aztreonam (AZACTAM) 2 g in sodium chloride 0.9 % 100 mL MBP        Ordering Provider: Nu Salazar MD    2 g  200 mL/hr over 30 Minutes Intravenous Once 07/22/25 0246 07/22/25 0429    07/22/25 0230  levoFLOXacin (LEVAQUIN) 750 mg/150 mL D5W (premix) (LEVAQUIN) 750 mg        Ordering Provider: Nu Salazar MD    750 mg  100 mL/hr over 90 Minutes Intravenous Once 07/22/25 0246 07/22/25 0625          Allergies  Allergies as of 07/22/2025 - Reviewed 07/22/2025   Allergen Reaction Noted    Diphenhydramine Hives 01/20/2021    Lortab [hydrocodone-acetaminophen] Hives 07/12/2018    Toradol [ketorolac tromethamine] Hives 12/17/2018    Alprazolam Delirium 04/19/2016    Nsaids Other (See Comments) 04/19/2016     Labs  Results from last 7 days   Lab Units 07/22/25  0227   BUN mg/dL 9.8   CREATININE mg/dL 0.89     Results from last 7 days   Lab Units 07/22/25  0227   WBC 10*3/mm3 29.08*     Evaluation of Dosing     Last Dose Received in the ED/Outside  "Facility: N/A  Is Patient on Dialysis or Renal Replacement: No    Height - 170.2 cm (67\")  Weight - 130 kg (287 lb)    Estimated Creatinine Clearance: 94.4 mL/min (by C-G formula based on SCr of 0.89 mg/dL).    I/O last 3 completed shifts:  In: 250 [IV Piggyback:250]  Out: -     Microbiology and Radiology  Microbiology Results (last 10 days)       Procedure Component Value - Date/Time    MRSA Screen, PCR (Inpatient) - Swab, Nares [[id number]]  (Abnormal) Collected: 07/22/25 1325    Lab Status: Final result Specimen: Swab from Nares Updated: 07/22/25 1510     MRSA PCR Positive    Narrative:      The negative predictive value of this diagnostic test is high and should only be used to consider de-escalating anti-MRSA therapy. A positive result may indicate colonization with MRSA and must be correlated clinically.    S. Pneumo Ag Urine or CSF - Urine, Urine, Clean Catch [[id number]]  (Normal) Collected: 07/22/25 0654    Lab Status: Final result Specimen: Urine, Clean Catch Updated: 07/22/25 1308     Strep Pneumo Ag Negative    Legionella Antigen, Urine - Urine, Urine, Clean Catch [[id number]]  (Normal) Collected: 07/22/25 0654    Lab Status: Final result Specimen: Urine, Clean Catch Updated: 07/22/25 1308     LEGIONELLA ANTIGEN, URINE Negative    Respiratory Panel PCR w/COVID-19(SARS-CoV-2) ALVINO/VELASQUEZ/DIANE/PAD/COR/RUTH In-House, NP Swab in UTM/VTM, 2 HR TAT - Swab, Nasopharynx [[id number]]  (Normal) Collected: 07/22/25 0236    Lab Status: Final result Specimen: Swab from Nasopharynx Updated: 07/22/25 0333     ADENOVIRUS, PCR Not Detected     Coronavirus 229E Not Detected     Coronavirus HKU1 Not Detected     Coronavirus NL63 Not Detected     Coronavirus OC43 Not Detected     COVID19 Not Detected     Human Metapneumovirus Not Detected     Human Rhinovirus/Enterovirus Not Detected     Influenza A PCR Not Detected     Influenza B PCR Not Detected     Parainfluenza Virus 1 Not Detected     Parainfluenza Virus 2 Not Detected     " Parainfluenza Virus 3 Not Detected     Parainfluenza Virus 4 Not Detected     RSV, PCR Not Detected     Bordetella pertussis pcr Not Detected     Bordetella parapertussis PCR Not Detected     Chlamydophila pneumoniae PCR Not Detected     Mycoplasma pneumo by PCR Not Detected    Narrative:      In the setting of a positive respiratory panel with a viral infection PLUS a negative procalcitonin without other underlying concern for bacterial infection, consider observing off antibiotics or discontinuation of antibiotics and continue supportive care. If the respiratory panel is positive for atypical bacterial infection (Bordetella pertussis, Chlamydophila pneumoniae, or Mycoplasma pneumoniae), consider antibiotic de-escalation to target atypical bacterial infection.            Insight[employer] AUC Calculation:    Current AUC: -- mg/L*hr    Predicted Steady State AUC on Current Dose: -- mg/L*hr  _________________________________    Predicted Steady State AUC on New Dose: 565 mg/L*hr    Assessment/Plan:     Pharmacy consulted to dose vancomycin for pneumonia, goal -600 mg/L*hr.  Patient to be loaded with vancomycin 2500 mg ( ~ 20 mg/kg) IV x 1 @ 1700.   Culture data is pending. MRSA PCR is positive.   Serum creatinine is 0.89, BUN is 9.8, and WBC is 29.08.  Based on evaluation, initiate a maintenance regimen of vancomycin 2000 mg ( ~ 15 mg/kg) IV q24h beginning 7/23 @ 1700.   A vancomycin random will be assessed on 7/25 with AM labs.  Will continue to follow and adjust vancomycin dose as needed based on renal function, cultures, and patient clinical status.    Thank you,    David Aguirre [facility]  7/22/2025  15:41 EDT

## 2025-07-23 ENCOUNTER — ANCILLARY PROCEDURE (OUTPATIENT)
Dept: SPEECH THERAPY | Facility: HOSPITAL | Age: 60
End: 2025-07-23
Payer: MEDICARE

## 2025-07-23 LAB
ALBUMIN SERPL-MCNC: 2.8 G/DL (ref 3.5–5.2)
ALBUMIN/GLOB SERPL: 0.6 G/DL
ALP SERPL-CCNC: 91 U/L (ref 39–117)
ALT SERPL W P-5'-P-CCNC: 6 U/L (ref 1–33)
ANION GAP SERPL CALCULATED.3IONS-SCNC: 9.1 MMOL/L (ref 5–15)
AST SERPL-CCNC: 11 U/L (ref 1–32)
BASOPHILS # BLD AUTO: 0.04 10*3/MM3 (ref 0–0.2)
BASOPHILS NFR BLD AUTO: 0.2 % (ref 0–1.5)
BILIRUB SERPL-MCNC: 0.2 MG/DL (ref 0–1.2)
BUN SERPL-MCNC: 15.8 MG/DL (ref 8–23)
BUN/CREAT SERPL: 19 (ref 7–25)
CALCIUM SPEC-SCNC: 8.8 MG/DL (ref 8.6–10.5)
CHLORIDE SERPL-SCNC: 104 MMOL/L (ref 98–107)
CO2 SERPL-SCNC: 22.9 MMOL/L (ref 22–29)
CREAT SERPL-MCNC: 0.83 MG/DL (ref 0.57–1)
DEPRECATED RDW RBC AUTO: 51.7 FL (ref 37–54)
EGFRCR SERPLBLD CKD-EPI 2021: 80.8 ML/MIN/1.73
EOSINOPHIL # BLD AUTO: 0 10*3/MM3 (ref 0–0.4)
EOSINOPHIL NFR BLD AUTO: 0 % (ref 0.3–6.2)
ERYTHROCYTE [DISTWIDTH] IN BLOOD BY AUTOMATED COUNT: 15.7 % (ref 12.3–15.4)
GLOBULIN UR ELPH-MCNC: 4.5 GM/DL
GLUCOSE BLDC GLUCOMTR-MCNC: 139 MG/DL (ref 70–130)
GLUCOSE BLDC GLUCOMTR-MCNC: 140 MG/DL (ref 70–130)
GLUCOSE BLDC GLUCOMTR-MCNC: 140 MG/DL (ref 70–130)
GLUCOSE BLDC GLUCOMTR-MCNC: 144 MG/DL (ref 70–130)
GLUCOSE BLDC GLUCOMTR-MCNC: 147 MG/DL (ref 70–130)
GLUCOSE BLDC GLUCOMTR-MCNC: 152 MG/DL (ref 70–130)
GLUCOSE BLDC GLUCOMTR-MCNC: 162 MG/DL (ref 70–130)
GLUCOSE BLDC GLUCOMTR-MCNC: 168 MG/DL (ref 70–130)
GLUCOSE BLDC GLUCOMTR-MCNC: 176 MG/DL (ref 70–130)
GLUCOSE BLDC GLUCOMTR-MCNC: 179 MG/DL (ref 70–130)
GLUCOSE BLDC GLUCOMTR-MCNC: 182 MG/DL (ref 70–130)
GLUCOSE BLDC GLUCOMTR-MCNC: 193 MG/DL (ref 70–130)
GLUCOSE BLDC GLUCOMTR-MCNC: 349 MG/DL (ref 70–130)
GLUCOSE BLDC GLUCOMTR-MCNC: 377 MG/DL (ref 70–130)
GLUCOSE SERPL-MCNC: 176 MG/DL (ref 65–99)
HCT VFR BLD AUTO: 37.5 % (ref 34–46.6)
HCT VFR BLD AUTO: 39.6 % (ref 34–46.6)
HGB BLD-MCNC: 11.5 G/DL (ref 12–15.9)
HGB BLD-MCNC: 12.1 G/DL (ref 12–15.9)
IMM GRANULOCYTES # BLD AUTO: 0.28 10*3/MM3 (ref 0–0.05)
IMM GRANULOCYTES NFR BLD AUTO: 1.4 % (ref 0–0.5)
LYMPHOCYTES # BLD AUTO: 1.29 10*3/MM3 (ref 0.7–3.1)
LYMPHOCYTES NFR BLD AUTO: 6.7 % (ref 19.6–45.3)
MAGNESIUM SERPL-MCNC: 2.5 MG/DL (ref 1.6–2.4)
MCH RBC QN AUTO: 27.6 PG (ref 26.6–33)
MCHC RBC AUTO-ENTMCNC: 30.7 G/DL (ref 31.5–35.7)
MCV RBC AUTO: 90.1 FL (ref 79–97)
MONOCYTES # BLD AUTO: 0.44 10*3/MM3 (ref 0.1–0.9)
MONOCYTES NFR BLD AUTO: 2.3 % (ref 5–12)
NEUTROPHILS NFR BLD AUTO: 17.33 10*3/MM3 (ref 1.7–7)
NEUTROPHILS NFR BLD AUTO: 89.4 % (ref 42.7–76)
NRBC BLD AUTO-RTO: 0 /100 WBC (ref 0–0.2)
PHOSPHATE SERPL-MCNC: 2.8 MG/DL (ref 2.5–4.5)
PLATELET # BLD AUTO: 336 10*3/MM3 (ref 140–450)
PMV BLD AUTO: 10.3 FL (ref 6–12)
POTASSIUM SERPL-SCNC: 4.7 MMOL/L (ref 3.5–5.2)
PROT SERPL-MCNC: 7.3 G/DL (ref 6–8.5)
RBC # BLD AUTO: 4.16 10*6/MM3 (ref 3.77–5.28)
SODIUM SERPL-SCNC: 136 MMOL/L (ref 136–145)
WBC NRBC COR # BLD AUTO: 19.38 10*3/MM3 (ref 3.4–10.8)

## 2025-07-23 PROCEDURE — 85025 COMPLETE CBC W/AUTO DIFF WBC: CPT

## 2025-07-23 PROCEDURE — 85014 HEMATOCRIT: CPT | Performed by: INTERNAL MEDICINE

## 2025-07-23 PROCEDURE — 63710000001 INSULIN REGULAR HUMAN PER 5 UNITS

## 2025-07-23 PROCEDURE — 85018 HEMOGLOBIN: CPT | Performed by: INTERNAL MEDICINE

## 2025-07-23 PROCEDURE — 94799 UNLISTED PULMONARY SVC/PX: CPT

## 2025-07-23 PROCEDURE — 63710000001 INSULIN GLARGINE PER 5 UNITS

## 2025-07-23 PROCEDURE — 92610 EVALUATE SWALLOWING FUNCTION: CPT

## 2025-07-23 PROCEDURE — 84100 ASSAY OF PHOSPHORUS: CPT

## 2025-07-23 PROCEDURE — 92612 ENDOSCOPY SWALLOW (FEES) VID: CPT

## 2025-07-23 PROCEDURE — 63710000001 INSULIN GLARGINE PER 5 UNITS: Performed by: INTERNAL MEDICINE

## 2025-07-23 PROCEDURE — 82948 REAGENT STRIP/BLOOD GLUCOSE: CPT

## 2025-07-23 PROCEDURE — 25010000002 DOXYCYCLINE 100 MG RECONSTITUTED SOLUTION 1 EACH VIAL

## 2025-07-23 PROCEDURE — 99233 SBSQ HOSP IP/OBS HIGH 50: CPT | Performed by: INTERNAL MEDICINE

## 2025-07-23 PROCEDURE — 63710000001 INSULIN LISPRO (HUMAN) PER 5 UNITS

## 2025-07-23 PROCEDURE — 25010000002 CEFTRIAXONE PER 250 MG

## 2025-07-23 PROCEDURE — 80053 COMPREHEN METABOLIC PANEL: CPT

## 2025-07-23 PROCEDURE — 25010000002 METHYLPREDNISOLONE PER 40 MG

## 2025-07-23 PROCEDURE — 83735 ASSAY OF MAGNESIUM: CPT | Performed by: INTERNAL MEDICINE

## 2025-07-23 PROCEDURE — 94664 DEMO&/EVAL PT USE INHALER: CPT

## 2025-07-23 RX ORDER — INSULIN LISPRO 100 [IU]/ML
3-14 INJECTION, SOLUTION INTRAVENOUS; SUBCUTANEOUS
Status: DISCONTINUED | OUTPATIENT
Start: 2025-07-23 | End: 2025-07-29

## 2025-07-23 RX ORDER — INSULIN LISPRO 100 [IU]/ML
5 INJECTION, SOLUTION INTRAVENOUS; SUBCUTANEOUS
Status: DISCONTINUED | OUTPATIENT
Start: 2025-07-23 | End: 2025-07-24

## 2025-07-23 RX ORDER — METHYLPREDNISOLONE SODIUM SUCCINATE 40 MG/ML
40 INJECTION, POWDER, LYOPHILIZED, FOR SOLUTION INTRAMUSCULAR; INTRAVENOUS DAILY
Status: DISCONTINUED | OUTPATIENT
Start: 2025-07-24 | End: 2025-07-24

## 2025-07-23 RX ORDER — TRAZODONE HYDROCHLORIDE 50 MG/1
50 TABLET ORAL NIGHTLY PRN
Status: DISCONTINUED | OUTPATIENT
Start: 2025-07-23 | End: 2025-07-31 | Stop reason: HOSPADM

## 2025-07-23 RX ORDER — SODIUM CHLORIDE FOR INHALATION 7 %
4 VIAL, NEBULIZER (ML) INHALATION
Status: DISCONTINUED | OUTPATIENT
Start: 2025-07-23 | End: 2025-07-23

## 2025-07-23 RX ORDER — IPRATROPIUM BROMIDE AND ALBUTEROL SULFATE 2.5; .5 MG/3ML; MG/3ML
3 SOLUTION RESPIRATORY (INHALATION) 2 TIMES DAILY
Status: DISCONTINUED | OUTPATIENT
Start: 2025-07-23 | End: 2025-07-25

## 2025-07-23 RX ORDER — SODIUM CHLORIDE FOR INHALATION 7 %
4 VIAL, NEBULIZER (ML) INHALATION
Status: DISCONTINUED | OUTPATIENT
Start: 2025-07-23 | End: 2025-07-31 | Stop reason: HOSPADM

## 2025-07-23 RX ADMIN — MICONAZOLE NITRATE 1 APPLICATION: 20 POWDER TOPICAL at 22:02

## 2025-07-23 RX ADMIN — INSULIN GLARGINE 20 UNITS: 100 INJECTION, SOLUTION SUBCUTANEOUS at 10:38

## 2025-07-23 RX ADMIN — DOXYCYCLINE 100 MG: 100 INJECTION, POWDER, LYOPHILIZED, FOR SOLUTION INTRAVENOUS at 21:38

## 2025-07-23 RX ADMIN — DOXYCYCLINE 100 MG: 100 INJECTION, POWDER, LYOPHILIZED, FOR SOLUTION INTRAVENOUS at 08:35

## 2025-07-23 RX ADMIN — Medication 10 ML: at 21:39

## 2025-07-23 RX ADMIN — ESCITALOPRAM 20 MG: 20 TABLET, FILM COATED ORAL at 08:36

## 2025-07-23 RX ADMIN — Medication 10 ML: at 08:36

## 2025-07-23 RX ADMIN — OXYBUTYNIN CHLORIDE 5 MG: 5 TABLET, EXTENDED RELEASE ORAL at 08:36

## 2025-07-23 RX ADMIN — ARIPIPRAZOLE 10 MG: 10 TABLET ORAL at 08:36

## 2025-07-23 RX ADMIN — MUPIROCIN 1 APPLICATION: 20 OINTMENT TOPICAL at 21:39

## 2025-07-23 RX ADMIN — INSULIN LISPRO 5 UNITS: 100 INJECTION, SOLUTION INTRAVENOUS; SUBCUTANEOUS at 21:38

## 2025-07-23 RX ADMIN — APIXABAN 5 MG: 5 TABLET, FILM COATED ORAL at 21:38

## 2025-07-23 RX ADMIN — Medication 4 ML: at 19:36

## 2025-07-23 RX ADMIN — PANTOPRAZOLE SODIUM 40 MG: 40 TABLET, DELAYED RELEASE ORAL at 05:01

## 2025-07-23 RX ADMIN — INSULIN GLARGINE 15 UNITS: 100 INJECTION, SOLUTION SUBCUTANEOUS at 21:37

## 2025-07-23 RX ADMIN — BUDESONIDE 0.5 MG: 0.5 SUSPENSION RESPIRATORY (INHALATION) at 08:03

## 2025-07-23 RX ADMIN — APIXABAN 5 MG: 5 TABLET, FILM COATED ORAL at 08:36

## 2025-07-23 RX ADMIN — INSULIN HUMAN 10 UNITS: 100 INJECTION, SOLUTION PARENTERAL at 18:32

## 2025-07-23 RX ADMIN — TRAZODONE HYDROCHLORIDE 50 MG: 50 TABLET ORAL at 23:57

## 2025-07-23 RX ADMIN — MUPIROCIN 1 APPLICATION: 20 OINTMENT TOPICAL at 08:36

## 2025-07-23 RX ADMIN — INSULIN HUMAN 3.1 UNITS/HR: 1 INJECTION, SOLUTION INTRAVENOUS at 08:35

## 2025-07-23 RX ADMIN — INSULIN LISPRO 12 UNITS: 100 INJECTION, SOLUTION INTRAVENOUS; SUBCUTANEOUS at 21:37

## 2025-07-23 RX ADMIN — IPRATROPIUM BROMIDE AND ALBUTEROL SULFATE 3 ML: 2.5; .5 SOLUTION RESPIRATORY (INHALATION) at 08:03

## 2025-07-23 RX ADMIN — IPRATROPIUM BROMIDE AND ALBUTEROL SULFATE 3 ML: 2.5; .5 SOLUTION RESPIRATORY (INHALATION) at 12:42

## 2025-07-23 RX ADMIN — IPRATROPIUM BROMIDE AND ALBUTEROL SULFATE 3 ML: .5; 3 SOLUTION RESPIRATORY (INHALATION) at 19:36

## 2025-07-23 RX ADMIN — MICONAZOLE NITRATE 1 APPLICATION: 20 POWDER TOPICAL at 08:36

## 2025-07-23 RX ADMIN — Medication 4 ML: at 12:42

## 2025-07-23 RX ADMIN — CEFTRIAXONE SODIUM 1000 MG: 1 INJECTION, POWDER, FOR SOLUTION INTRAMUSCULAR; INTRAVENOUS at 08:35

## 2025-07-23 RX ADMIN — METHYLPREDNISOLONE SODIUM SUCCINATE 40 MG: 40 INJECTION, POWDER, FOR SOLUTION INTRAMUSCULAR; INTRAVENOUS at 08:36

## 2025-07-23 NOTE — CASE MANAGEMENT/SOCIAL WORK
Discharge Planning Assessment  [facility]     Patient Name: Dani Ziegler  MRN: [id number]  Today's Date: 7/23/2025    Admit Date: 7/22/2025    Plan: Home   Discharge Needs Assessment       Row Name 07/23/25 1623       Living Environment    People in Home friend(s)    Current Living Arrangements home    Primary Care Provided by self    Provides Primary Care For no one    Family Caregiver if Needed friend(s)    Family Caregiver Names Елена House (Friend) [phone]    Able to Return to Prior Arrangements yes       Resource/Environmental Concerns    Transportation Concerns none       Transition Planning    Patient/Family Anticipates Transition to home    Transportation Anticipated family or friend will provide       Discharge Needs Assessment    Readmission Within the Last 30 Days no previous admission in last 30 days    Equipment Currently Used at Home oxygen;walker, rolling;shower chair                   Discharge Plan       Row Name 07/23/25 162       Plan    Plan Home    Patient/Family in Agreement with Plan yes    Plan Comments Spoke with Ms. Ziegler at the bedside. She lives with her friend Елена in [facility]. She is independent with ADL's and uses a walker and shower chair. She uses 3L oxygen through [facility]. She is current with [facility] [employer]. She does not have advance directives. Her Pharmacy is [facility]. Her PCP is Darrion Tovar and she has [employer] Medicare and [place] Medicaid insurance. Discharge plan is home. CM will continue to follow for discharge needs.    Final Discharge Disposition Code 01 - home or self-care                  Continued Care and Services - Admitted Since 7/22/2025    No active coordination exists.          Demographic Summary       Row Name 07/23/25 1622       General Information    Admission Type inpatient    Arrived From home    Referral Source admission list    Reason for Consult discharge planning    Preferred Language English       Contact Information     Permission Granted to Share Info With                    Functional Status       Row Name 07/23/25 1622       Functional Status, IADL    Medications independent    Meal Preparation independent    Housekeeping independent    Laundry independent    Shopping independent       Mental Status    General Appearance WDL WDL                   Psychosocial    No documentation.                  Abuse/Neglect    No documentation.                  Legal    No documentation.                  Substance Abuse    No documentation.                  Patient Forms    No documentation.                     Stella Campos RN

## 2025-07-23 NOTE — PROGRESS NOTES
"Critical Care Note     LOS: 1 day   Patient Care Team:  Darrion Tovar MD as PCP - General (Family Medicine)    Chief Complaint/Reason for visit:    Chief Complaint   Patient presents with    Shortness of Breath     Acute on chronic respiratory failure  COPD  Pneumonia  Morbid obesity  History of DVT/PE on anticoagulation  Diabetic ulcer, left great toe    Subjective     Interval History:     This morning she was on BiPAP 12/6 with a rate of 18 and 65% FiO2.  Respiratory rate was 31, saturation 97% and tidal volume 540.  She is alert and following commands.  She remains on an insulin drip at 4.2 units/h.  She did receive her sepsis bolus yesterday and her urine output improved.  She had 1.7 L plus unmeasured.  She is currently afebrile.  She is maintaining sinus rhythm.    Review of Systems:    All systems were reviewed and negative except as noted in subjective.    Medical history, surgical history, social history, family history reviewed    Objective     Intake/Output:    Intake/Output Summary (Last 24 hours) at 7/23/2025 1332  Last data filed at 7/23/2025 1200  Gross per 24 hour   Intake 984.5 ml   Output 1750 ml   Net -765.5 ml       Nutrition:  NPO Diet NPO Type: Sips with Meds, Ice Chips    Infusions:       Mechanical Ventilator Settings:                                                Telemetry: Sinus rhythm             Vital Signs  Blood pressure 108/49, pulse 60, temperature 97.7 °F (36.5 °C), temperature source Axillary, resp. rate 28, height 170.2 cm (67\"), weight 130 kg (287 lb), SpO2 93%.    Physical Exam:  General Appearance:  Overweight middle-aged woman laying slightly on her right side wearing BiPAP   Head:  Atraumatic   Eyes:          No jaundice, conjunctiva pink   Ears:     Throat: Wearing BiPAP   Neck: Short, thick neck, trachea midline, no crepitus   Back:      Lungs:   Decreased breath sounds right base, rhonchi anteriorly, no wheezing    Heart:  Distant heart sounds, regular   Abdomen:   " Large abdomen, bowel sounds present, nontender   Rectal:   Deferred   Extremities: No pretibial edema.     Pulses:    Skin: Left great toe with a small ulcer laterally   Lymph nodes:    Neurologic: Awake, nods to questions, follows commands      Results Review:     I reviewed the patient's new clinical results.   Results from last 7 days   Lab Units 07/23/25  0511 07/22/25  0227   SODIUM mmol/L 136 133*   POTASSIUM mmol/L 4.7 4.2   CHLORIDE mmol/L 104 98   CO2 mmol/L 22.9 19.9*   BUN mg/dL 15.8 9.8   CREATININE mg/dL 0.83 0.89   CALCIUM mg/dL 8.8 9.2   BILIRUBIN mg/dL 0.2 0.8   ALK PHOS U/L 91 127*   ALT (SGPT) U/L 6 7   AST (SGOT) U/L 11 10   GLUCOSE mg/dL 176* 270*     Results from last 7 days   Lab Units 07/23/25  0511 07/22/25  0227   WBC 10*3/mm3 19.38* 29.08*   HEMOGLOBIN g/dL 11.5* 14.5   HEMATOCRIT % 37.5 44.4   PLATELETS 10*3/mm3 336 430     Results from last 7 days   Lab Units 07/22/25  0230   PH, ARTERIAL pH units 7.440   PO2 ART mm Hg 286.0*   PCO2, ARTERIAL mm Hg 32.9*   HCO3 ART mmol/L 22.3     Lab Results   Component Value Date    BLOODCX No growth at 24 hours 07/22/2025    BLOODCX No growth at 24 hours 07/22/2025     Lab Results   Component Value Date    URINECX Final report 12/11/2018       I reviewed the patient's new imaging including images and reports.    Interpretation Summary echocardiogram July 22, 2025         Left ventricular systolic function is normal. Calculated left ventricular EF = 60.8% Left ventricular ejection fraction appears to be 56 - 60%.    The following left ventricular wall segments are hypokinetic: apical septal, basal inferoseptal, mid inferoseptal, mid anteroseptal and basal inferoseptal.    Left ventricular wall thickness is consistent with borderline concentric hypertrophy.  Grade I diastolic dysfunction is present.    Normal right ventricular size and function.    Cardiac valves very poorly visualized due to poor acoustic windows. No hemodynamically  significant  valvular dysfunction noted by Doppler evaluation.       CT ANGIOGRAM CHEST PULMONARY EMBOLISM    Date of Exam: 7/22/2025 5:27 AM EDT    Indication: Pulmonary Embolism.    Comparison: CT chest 12/30/2021 and chest radiograph performed earlier today. CT abdomen 6/12/2022.    Technique: Axial CT images were obtained of the chest after the uneventful intravenous administration of 80 mL Isovue-370 utilizing pulmonary embolism protocol.  In addition, a 3-D volume rendered image was created for interpretation.  Reconstructed  coronal and sagittal images were also obtained. Automated exposure control and iterative construction methods were used.      Findings:  The thyroid, trachea and esophagus appear within normal limits. There is a small hiatal hernia. There is multifocal mediastinal lymphadenopathy including an anterior right paratracheal lymph node on image 26 of the axial series measuring 19 mm in the  short axis, distal left paratracheal lymph node on image 37 measuring 17 mm in the short axis and a right hilar lymph node on axial image 43 measuring up to 21 mm diameter in the short axis. There is no pericardial effusion. The aorta, aortic branch  vessels and main pulmonary artery appear within normal limits. There is no evidence of pulmonary embolism.    There are extensive tree-in-bud nodules present in the right lung. There are more subtle and less pronounced tree-in-bud nodules in the left lung all consistent with infectious bronchiolitis. There are patchy areas of airspace disease in the right middle   lobe consistent with pneumonia and there is mild diffuse peribronchial thickening most pronounced on the right and worse in the right lower lobe likely also infectious/inflammatory. No pneumothorax or pleural effusion. There is a calcified right upper  lobe granuloma.    No acute findings in the superficial soft tissues. There is a heterogeneous appearing right adrenal nodule measuring 34 mm. Left adrenal gland is  diminutive. Patient is status post cholecystectomy. Liver is partially seen, but appears enlarged. Spleen is   also partially seen, but likely enlarged. There are multiple chronic compression deformities in the thoracic spine including at T5, T6, T7, T8 and T9. There is a minor segmentation anomaly at C6-C7 with incomplete disc and facet joint formation. No  acute osseous abnormality. Bones are demineralized.   Impression:     Impression:  1.No evidence of pulmonary embolism.  2.Extensive tree-in-bud nodules in the right lung and to a lesser extent in the left lung consistent with infectious bronchiolitis. There is also patchy airspace disease in the right middle lobe consistent with pneumonia.  3.Mediastinal and right hilar lymphadenopathy, likely reactive.  4.34 mm heterogeneous right adrenal nodule. Diminutive left adrenal gland. Right adrenal nodule is unchanged from 2022 likely reflecting adenoma.  5.Multiple chronic compression deformities in the thoracic spine.  6.Small hiatal hernia.            Electronically Signed: Juanpablo Barron MD   7/22/2025 5:54 AM EDT         All medications reviewed.   apixaban, 5 mg, Oral, Q12H  ARIPiprazole, 10 mg, Oral, Daily  budesonide, 0.5 mg, Nebulization, BID - RT  cefTRIAXone, 1,000 mg, Intravenous, Q24H  doxycycline, 100 mg, Intravenous, Q12H  escitalopram, 20 mg, Oral, Daily  insulin glargine, 20 Units, Subcutaneous, Daily  insulin regular, 3-14 Units, Subcutaneous, Q6H  ipratropium-albuterol, 3 mL, Nebulization, 4x Daily - RT  methylPREDNISolone sodium succinate, 40 mg, Intravenous, Q12H  miconazole, 1 Application, Topical, Q12H  mupirocin, 1 Application, Each Nare, BID  oxybutynin XL, 5 mg, Oral, Daily  pantoprazole, 40 mg, Oral, Q AM  sodium chloride, 10 mL, Intravenous, Q12H  sodium chloride, 4 mL, Nebulization, BID - RT          Assessment & Plan     Acute on chronic respiratory failure  COPD  Pneumonia  Morbid obesity  History of DVT/PE on anticoagulation  Diabetic  ulcer, left great toe    59-year-old woman, former smoker with chronic respiratory failure on 3 L nasal cannula, underlying COPD, morbid obesity with obesity hypoventilation, history of PE on Eliquis, diabetes, hypertension presenting with worsening shortness of air, productive cough and subjective fever.  On initial presentation she was tripoding in extreme respiratory distress and visibly cyanotic.  She was placed on BiPAP by the EMS service and this was continued in the emergency room.  She received steroids, bronchodilators, antibiotics, furosemide.  Imaging revealed an infiltrate in the right lower lobe.  CT scan confirmed extensive tree-in-bud opacities in the right lung a lesser extent the left lung with some reactive adenopathy.  Legionella and pneumococcal urinary antigens are negative.  Respiratory panel PCR is negative.  Nasal swab for MRSA is positive.  Blood cultures are negative at 24 hours.  Echo reveals a normal EF although she has some wall motion abnormalities.  There was no evidence of PE.    In addition to her pneumonia, she does have an ulceration of the left great toe.  Asked infectious disease to evaluate.  She was initially received aztreonam, Levaquin in the emergency room on July 21.  Placed on Rocephin, doxycycline, on admission 7/22.  She then received a dose of vancomycin on July 22 but it was not continued.  Count is improved today at 19.3 despite steroids.    Blood glucoses were poorly controlled.  A1c is 9.  She did have a slight anion gap of 15.  Insulin drip was initiated.  This morning her anion gap is closed.  She also had some elevated ketones on admission.  She does have peripheral neuropathy.  She has a history of a severe reaction to Ozempic causing refractory vomiting and diarrhea.    She reports a history of tracheostomy x 2 in the past for ventilator weaning.  Was last intubated here in December 2021 but did not require tracheostomy.  According to those notes she had a  prolonged intubation and mechanical ventilation in January 2010, April 2019.  Last night on BiPAP 24/12, 100% FiO2 ABG revealed a pH of 7.44, CO2 33, O2 286.  BiPAP settings were adjusted to 12/6 with a rate of 18.  This night attempted transition to 6 L and she desaturated to 83%.    Was hospitalized in [place] May 18, 2025 with pneumococcal pneumonia and sepsis.  Imaging at that time was negative for PE.  She remains on Eliquis for a previous history of DVT PE.  She initially had a PE in 2017 in the left upper lobe.  However multiple CTAs of the chest since that scan have been negative.  In addition, multiple venous duplex studies Prolastin 2020, are negative for DVT.  With her limited mobility and body habitus, she is at risk for DVT PE and therefore we will leave her anticoagulated.  I could not find any evidence that a hypercoagulable workup been performed.  She also had some mediastinal adenopathy on that scan as well as her scan last night.      Hemoglobin was 14.5 on admission and has decreased to 11.5.  She denies hematemesis or melena.  She did get hydrated and it may be dilutional.      PLAN:    Transition to nasal cannula as tolerates but BiPAP nightly and as needed  Antibiotics per infectious disease, Rocephin, doxycycline    Stop Pulmicort nebulizer  Continue DuoNeb but decrease to twice daily  Add hypertonic saline nebulized twice daily    Decrease steroids to 40 mg IV daily    Transitioned off insulin drip  Lantus 20 units plus sliding scale  Diabetes educator    Repeat H&H  Continue anticoagulation  Mobilize, at baseline she walks with a walker    VTE Prophylaxis: Eliquis    Stress Ulcer Prophylaxis: Protonix    Rain Beltran MD  07/23/25  13:32 EDT      Time: Critical care 30 min  I personally provided care to this critically ill patient as documented above.  Critical care time does not include time spent on separately billed procedures.  None of my critical care time was concurrent  with other critical care providers.

## 2025-07-23 NOTE — PLAN OF CARE
Goal Outcome Evaluation:  Plan of Care Reviewed With: patient                Anticipated Discharge Disposition (SLP): home with home health          SLP Swallowing Diagnosis: mild, oral dysphagia, pharyngeal dysphagia (07/23/25 5254)

## 2025-07-23 NOTE — PROGRESS NOTES
INFECTIOUS DISEASE CONSULT/INITIAL HOSPITAL VISIT    Dani Ziegler  1965  [id number]    Date of Consult: 7/23/2025    Admission Date: 7/22/2025      Requesting Provider: No ref. provider found  Evaluating Physician: Earl Cheney MD    Reason for Consultation: pneumonia/foot ulcer    History of present illness:    Patient is a 59 y.o. femaleWith COPD, chronic hypoxia on home oxygen presents to this emergency room with dyspnea patient found to be in respite distress and cyanotic patient breathing rapidly with a respirate of 55 inspirations per minute patient had hypoxia despite 3 L of oxygen patient given Solu-Medrol DuoNeb inhalation albuterol treatments and placed on BiPAP.  Apparently patient has received care at [facility] for commune acquired pneumonia.    CTA chest performed to exclude pulmonary embolism but showed extensive tree-in-bud nodules in right lung and left lung concerning for infectious bronchiolitis.  Patient been given diuretics levofloxacin as family emergency room antibiotics have been modified to ceftriaxone and doxycycline.  We are being consulted for further antibiotic management    Patient is on BiPAP currently and is getting a bedside transthoracic echocardiogram    7/23/25; on  6 L o2 by nasal cannula; awake, reports that she walked in new shoes and developed a blister on left foot; no fever, rash    Past Medical History:   Diagnosis Date    Abnormal weight gain     Acute UTI     Anxiety     Arthritis involving multiple sites     Chronic obstructive pulmonary disease     Chronic pain     Current every day smoker     Depression     Diabetes mellitus     Diarrhea     Dysuria     Edema, lower extremity     Fever     Head injury     Hypertension     Intervertebral disc disorder     Degeneration of intervertebral disc, site unspecified (722.6)    Left bundle branch block     Leukocytosis     Long term current use of methadone for pain control     Mass of right breast      Nausea     Obesity     Overactive bladder     Peripheral neuropathy     Superficial phlebitis     right medial calf    Upper respiratory infection     Urinary incontinence     Vitamin D deficiency     Vomiting        Past Surgical History:   Procedure Laterality Date    BLADDER SURGERY      pubovaginal sling    CHOLECYSTECTOMY      EXPLORATORY LAPAROTOMY N/A 2022    Procedure: LAPAROTOMY EXPLORATORY UMBILICAL HERNIA REPAIR WITH MESH;  Surgeon: Reji Brown MD;  Location: [facility];  Service: General;  Laterality: N/A;    HERNIA REPAIR      HIP ARTHROSCOPY Right 10/29/2020    JOINT REPLACEMENT      TRACHEOSTOMY         Family History   Problem Relation Age of Onset    Breast cancer Mother     Cancer Mother     Arthritis Mother     Diabetes Mother     Obesity Mother     Arthritis Father     Stroke Father     Hypertension Father     Heart attack Father     Diabetes Maternal Grandmother     Migraines Other        Social History     Socioeconomic History    Marital status:    Tobacco Use    Smoking status: Every Day     Current packs/day: 0.00     Types: Cigarettes     Start date: 1989     Last attempt to quit: 2019     Years since quittin.2    Smokeless tobacco: Never    Tobacco comments:     1 daily   Vaping Use    Vaping status: Never Used   Substance and Sexual Activity    Alcohol use: No    Drug use: No    Sexual activity: Defer       Allergies   Allergen Reactions    Diphenhydramine Hives    Lortab [Hydrocodone-Acetaminophen] Hives     Tolerates percocet    Toradol [Ketorolac Tromethamine] Hives     Per patient tolerates motrin    Alprazolam Delirium    Nsaids Other (See Comments)     Liver Dx         Medication:    Current Facility-Administered Medications:     acetaminophen (TYLENOL) tablet 650 mg, 650 mg, Oral, Q6H PRN, Lilli Sprague APRN    apixaban (ELIQUIS) tablet 5 mg, 5 mg, Oral, Q12H, Lilli Sprague APRN, 5 mg at 25 0836    ARIPiprazole (ABILIFY) tablet 10  mg, 10 mg, Oral, Daily, Alberto Hampton MD, 10 mg at 07/23/25 0836    Calcium Replacement - Follow Nurse / BPA Driven Protocol, , Not Applicable, PRN, Lilli Sprague APRN    cefTRIAXone (ROCEPHIN) 1,000 mg in sodium chloride 0.9 % 100 mL MBP, 1,000 mg, Intravenous, Q24H, Lilli Sprague APRN, Last Rate: 200 mL/hr at 07/23/25 0835, 1,000 mg at 07/23/25 0835    dextrose (D50W) (25 g/50 mL) IV injection 25 g, 25 g, Intravenous, Q15 Min PRN, Lilli Sprague APRN    dextrose (GLUTOSE) oral gel 15 g, 15 g, Oral, Q15 Min PRN, Lilli Sprague APRN    doxycycline (VIBRAMYCIN) 100 mg in sodium chloride 0.9 % 100 mL MBP, 100 mg, Intravenous, Q12H, Lilli Sprague APRN, 100 mg at 07/23/25 0835    escitalopram (LEXAPRO) tablet 20 mg, 20 mg, Oral, Daily, iLlli Sprague APRN, 20 mg at 07/23/25 0836    glucagon (GLUCAGEN) injection 1 mg, 1 mg, Intramuscular, Q15 Min PRN, Lilli Sprague APRN    insulin glargine (LANTUS, SEMGLEE) injection 20 Units, 20 Units, Subcutaneous, Daily, Rain Beltran MD, 20 Units at 07/23/25 1038    insulin regular (humuLIN R,novoLIN R) injection 3-14 Units, 3-14 Units, Subcutaneous, Q6H, Rain Beltran MD    ipratropium-albuterol (DUO-NEB) nebulizer solution 3 mL, 3 mL, Nebulization, Q4H PRN, Lilli Sprague APRN    ipratropium-albuterol (DUO-NEB) nebulizer solution 3 mL, 3 mL, Nebulization, BID, Rain Beltran MD    Magnesium Cardiology Dose Replacement - Follow Nurse / BPA Driven Protocol, , Not Applicable, PRN, Lilli Sprague APRN    [START ON 7/24/2025] methylPREDNISolone sodium succinate (SOLU-Medrol) injection 40 mg, 40 mg, Intravenous, Daily, Rain Beltran MD    miconazole (MICOTIN) 2 % powder 1 Application, 1 Application, Topical, Q12H, Nadia Livingston APRN, 1 Application at 07/23/25 0836    mupirocin (BACTROBAN) 2 % nasal ointment 1 Application, 1 Application, Each Nare, BID, Lilli Sprague APRN, 1 Application at  07/23/25 0836    oxybutynin XL (DITROPAN-XL) 24 hr tablet 5 mg, 5 mg, Oral, Daily, Alberto Hampton MD, 5 mg at 07/23/25 0836    pantoprazole (PROTONIX) EC tablet 40 mg, 40 mg, Oral, Q AM, Alberto Hampton MD, 40 mg at 07/23/25 0501    Phosphorus Replacement - Follow Nurse / BPA Driven Protocol, , Not Applicable, PRN, Lilli Sprague APRN    Potassium Replacement - Follow Nurse / BPA Driven Protocol, , Not Applicable, PRN, iMlagros Spraguea LUCINDA, APRN    sodium chloride 0.9 % flush 10 mL, 10 mL, Intravenous, PRN, Nu Salazar MD    sodium chloride 0.9 % flush 10 mL, 10 mL, Intravenous, Q12H, BlaMilagros cata LUCINDA, APRN, 10 mL at 07/23/25 0836    sodium chloride 0.9 % flush 10 mL, 10 mL, Intravenous, PRN, Milagros Spraguea LUCINDA, APRN    sodium chloride 0.9 % infusion 40 mL, 40 mL, Intravenous, PRN, Milagros Spraguea R, APRN    sodium chloride 7 % nebulizer solution nebulizer solution 4 mL, 4 mL, Nebulization, BID - RT, Rain Beltran MD, 4 mL at 07/23/25 1242    Antibiotics:  Anti-Infectives (From admission, onward)      Ordered     Dose/Rate Route Frequency Start Stop    07/22/25 1621  vancomycin IVPB 2000 mg in 0.9% Sodium Chloride 500 mL  Status:  Discontinued        Ordering Provider: David Aguirre RPH    2,000 mg  250 mL/hr over 120 Minutes Intravenous Every 24 Hours 07/23/25 1700 07/23/25 1035    07/22/25 1621  vancomycin 2500 mg/500 mL 0.9% NS IVPB ([facility])        Ordering Provider: David Aguirre RPH    20 mg/kg × 130 kg  over 150 Minutes Intravenous Once 07/22/25 1700 07/22/25 2001    07/22/25 1526  Pharmacy to dose vancomycin  Status:  Discontinued        Ordering Provider: Nadia Livingston APRN     Not Applicable Continuous PRN 07/22/25 1526 07/23/25 1037    07/22/25 0613  doxycycline (VIBRAMYCIN) 100 mg in sodium chloride 0.9 % 100 mL MBP        Ordering Provider: Lilli Sprague APRN    100 mg  over 60 Minutes Intravenous Every 12 Hours 07/22/25 0900 07/27/25 0859    07/22/25 0613   cefTRIAXone (ROCEPHIN) 1,000 mg in sodium chloride 0.9 % 100 mL MBP        Ordering Provider: Lilli Sprague APRN    1,000 mg  200 mL/hr over 30 Minutes Intravenous Every 24 Hours 25 0900 25 0859    25 0230  aztreonam (AZACTAM) 2 g in sodium chloride 0.9 % 100 mL MBP        Ordering Provider: Nu Salazar MD    2 g  200 mL/hr over 30 Minutes Intravenous Once 25 0246 25 0429    25 0230  levoFLOXacin (LEVAQUIN) 750 mg/150 mL D5W (premix) (LEVAQUIN) 750 mg        Ordering Provider: Nu Salazar MD    750 mg  100 mL/hr over 90 Minutes Intravenous Once 25 0246 25 0625              Review of Systems:    Reports shortness of breath fevers or chills  Physical Exam:   Vital Signs  Temp (24hrs), Av.9 °F (36.6 °C), Min:97.7 °F (36.5 °C), Max:98.2 °F (36.8 °C)    Temp  Min: 97.7 °F (36.5 °C)  Max: 98.2 °F (36.8 °C)  BP  Min: 91/62  Max: 160/83  Pulse  Min: 49  Max: 84  Resp  Min: 20  Max: 28  SpO2  Min: 92 %  Max: 100 %    GENERAL: Awake and alert, in no acute distress.   HEENT: Normocephalic, atraumatic.  PERRL. EOMI. No conjunctival injection. No icterus. No ext oral lesions    HEART: RRR; No murmur,   LUNGS: Clear to auscultation bilaterally  no wheeze  ABDOMEN: Soft, nontender,   EXT:  No cyanosis, clubbing or edema. No cord.  :  Without Carroll catheter.  MSK: left foot with slight bruising/blister; no surrounding erythema, no drainage, no tenderness  SKIN: Warm and dry without cutaneous eruptions on Inspection/palpation.    NEURO: no focal deficit    Laboratory Data    Results from last 7 days   Lab Units 25  0511 25  0227   WBC 10*3/mm3 19.38* 29.08*   HEMOGLOBIN g/dL 11.5* 14.5   HEMATOCRIT % 37.5 44.4   PLATELETS 10*3/mm3 336 430     Results from last 7 days   Lab Units 25  0511   SODIUM mmol/L 136   POTASSIUM mmol/L 4.7   CHLORIDE mmol/L 104   CO2 mmol/L 22.9   BUN mg/dL 15.8   CREATININE mg/dL 0.83   GLUCOSE mg/dL 176*   CALCIUM mg/dL  "8.8     Results from last 7 days   Lab Units 07/23/25  0511   ALK PHOS U/L 91   BILIRUBIN mg/dL 0.2   ALT (SGPT) U/L 6   AST (SGOT) U/L 11         Results from last 7 days   Lab Units 07/22/25  0422   CRP mg/dL 18.37*     Results from last 7 days   Lab Units 07/22/25  1137   LACTATE mmol/L 1.3             Estimated Creatinine Clearance: 101.3 mL/min (by C-G formula based on SCr of 0.83 mg/dL).      Microbiology:  No results found for: \"ACANTHNAEG\", \"AFBCX\", \"BPERTUSSISCX\", \"BLOODCX\"  No results found for: \"BCIDPCR\", \"CXREFLEX\", \"CSFCX\", \"CULTURETIS\"  No results found for: \"CULTURES\", \"HSVCX\", \"URCX\"  No results found for: \"EYECULTURE\", \"GCCX\", \"HSVCULTURE\", \"LABHSV\"  No results found for: \"LEGIONELLA\", \"MRSACX\", \"MUMPSCX\", \"MYCOPLASCX\"  No results found for: \"NOCARDIACX\", \"STOOLCX\"  No results found for: \"THROATCX\", \"UNSTIMCULT\", \"URINECX\", \"CULTURE\", \"VZVCULTUR\"  No results found for: \"VIRALCULTU\", \"WOUNDCX\"        Radiology:  Imaging Results (Last 72 Hours)       Procedure Component Value Units Date/Time    SLP FEES - Fiberoptic Endo Eval Swallow [[id number]] Resulted: 07/23/25 1622     Updated: 07/23/25 1622    Narrative:      This procedure was auto-finalized with no dictation required.    CT Angiogram Chest Pulmonary Embolism [[id number]] Collected: 07/22/25 0544     Updated: 07/22/25 0557    Narrative:      CT ANGIOGRAM CHEST PULMONARY EMBOLISM    Date of Exam: 7/22/2025 5:27 AM EDT    Indication: Pulmonary Embolism.    Comparison: CT chest 12/30/2021 and chest radiograph performed earlier today. CT abdomen 6/12/2022.    Technique: Axial CT images were obtained of the chest after the uneventful intravenous administration of 80 mL Isovue-370 utilizing pulmonary embolism protocol.  In addition, a 3-D volume rendered image was created for interpretation.  Reconstructed   coronal and sagittal images were also obtained. Automated exposure control and iterative construction methods were used.      Findings:  The " thyroid, trachea and esophagus appear within normal limits. There is a small hiatal hernia. There is multifocal mediastinal lymphadenopathy including an anterior right paratracheal lymph node on image 26 of the axial series measuring 19 mm in the   short axis, distal left paratracheal lymph node on image 37 measuring 17 mm in the short axis and a right hilar lymph node on axial image 43 measuring up to 21 mm diameter in the short axis. There is no pericardial effusion. The aorta, aortic branch   vessels and main pulmonary artery appear within normal limits. There is no evidence of pulmonary embolism.    There are extensive tree-in-bud nodules present in the right lung. There are more subtle and less pronounced tree-in-bud nodules in the left lung all consistent with infectious bronchiolitis. There are patchy areas of airspace disease in the right middle   lobe consistent with pneumonia and there is mild diffuse peribronchial thickening most pronounced on the right and worse in the right lower lobe likely also infectious/inflammatory. No pneumothorax or pleural effusion. There is a calcified right upper   lobe granuloma.    No acute findings in the superficial soft tissues. There is a heterogeneous appearing right adrenal nodule measuring 34 mm. Left adrenal gland is diminutive. Patient is status post cholecystectomy. Liver is partially seen, but appears enlarged. Spleen is   also partially seen, but likely enlarged. There are multiple chronic compression deformities in the thoracic spine including at T5, T6, T7, T8 and T9. There is a minor segmentation anomaly at C6-C7 with incomplete disc and facet joint formation. No   acute osseous abnormality. Bones are demineralized.      Impression:      Impression:  1.No evidence of pulmonary embolism.  2.Extensive tree-in-bud nodules in the right lung and to a lesser extent in the left lung consistent with infectious bronchiolitis. There is also patchy airspace disease in  the right middle lobe consistent with pneumonia.  3.Mediastinal and right hilar lymphadenopathy, likely reactive.  4.34 mm heterogeneous right adrenal nodule. Diminutive left adrenal gland. Right adrenal nodule is unchanged from 2022 likely reflecting adenoma.  5.Multiple chronic compression deformities in the thoracic spine.  6.Small hiatal hernia.            Electronically Signed: Juanpablo Barron MD    7/22/2025 5:54 AM EDT    Workstation ID: [id number]    XR Chest 1 View [[id number]] Collected: 07/22/25 0336     Updated: 07/22/25 0340    Narrative:      XR CHEST 1 VW    Date of Exam: 7/22/2025 2:58 AM EDT    Indication: SOA triage protocol    Comparison: 6/28/2022.    Findings:  There is a new focus of airspace disease in the periphery of the right lung base that could reflect pneumonia or atelectasis. Left lung remains clear. There are calcified granulomas in the right lung. Cardiac silhouette and pulmonary vasculature appear   within normal limits. No acute osseous abnormality.      Impression:      Impression:  New focus of airspace disease in the periphery of the right lung base that could reflect pneumonia or atelectasis.          Electronically Signed: Juanpablo Barron MD    7/22/2025 3:37 AM EDT    Workstation ID: [id number]              Impression:   Acute hypoxic respiratory failure   morbid obesity  Leukocytosis with neutrophilia descending  Nasal MRSA colonization  Tachycardia  Tachypnea  Hypotension  Lacitc acidosis concerning for tissue perfusion mismatch      PLAN/RECOMMENDATIONS:   Thank you for asking us to see Dani Ziegler, I recommend the following:  CT chest independently reviewed    Aspiration bronchiolitis is common in obese patient with obstructive sleep apnea.    Agree with swallowing study    Left medial foot blister/bruise appears noninfected would not broaden abx on this account    Infectious bronchiolitis can follow viral infection, bacterial bronchopenumonia, atypical mycobacterial  infection, fungal infection    F/u  TTE to assess for pulmonary hypertension/right heart strain      Cont ceftriaxone    Cont doxycyline    D/w Dr. Love;        This visit included the following complex service elements:  Complex medical decision-making associated with antimicrobial prescribing.  In-depth chart review with high level synthesis for complex diagnoses.       Patient is critically ill  Critical care time 45 minutes  Earl Cheney MD  7/23/2025  16:45 EDT

## 2025-07-23 NOTE — CONSULTS
Diabetes Education    Patient Name:  Dani Ziegler  YOB: 1965  MRN: [id number]  Admit Date:  7/22/2025      Consult for diabetes education received per nursing order. Chart reviewed. Pt was seen at bedside today. Permission given for visit.       Ms. Ziegler stated that she has had diabetes less than 10 years.  She states that she has a room mate that she lives with.  She states that she takes Lantus 35 units at bedtime.  She states she had a DexCom G6 , but it was not working right and she does not have a finger stick meter at home right now.  She requested a prescription for one before she leaves.  Encouraged her to call customer service for DexCom to see if she can get it worked out, as that would be a helpful tool for her.  Explained benefits of CGM and how to use the data it will provide.    Discussed and taught about type 2 diabetes self-management, risk factors, and importance of blood glucose control to reduce complications. Target blood glucose readings and A1c goals per ADA were reviewed. Reviewed with patient current A1c of 9.01%.  She states it is down from the 11% it was last time. Reviewed how A1c is used to make treatment decisions.     Reviewed the following ADA survival skill concepts with pt:     Meal planning: Discussed pts current eating pattern and potential strategies to help improve it.  Suggested “the plate method” as a potential healthy eating plan and reviewed this with pt. She states that her roommate does all the cooking and grocery shopping.  She states she typically only eats 2 meals a day, Lunch and supper.     Safe medication administration: Reviewed pts current medication regimen. Pt taking Lantus 35 units at bedtime. Encouraged pt to take medications as prescribed and contact provider or pharmacist if they are experiencing side effects.      Monitoring (timing and technique): Encouraged pt to monitor blood sugar at home at least one time per day before her injection  "at bedtime and to call PCP if blood sugar is trending high. Discussed benefits of SMBG/CGMS to help guide pt and provider decision making. Encouraged to keep trying to see if she can get DexCom to work. Encouraged her to call our outpatient office for help if she is not able to get the help she needs from customer service.  Reviewed Time in range goal of 70%.      Prevention and treatment of hypoglycemia and hyperglycemia: Signs, symptoms, and treatment of hypoglycemia and hyperglycemia discussed with pt. Reviewed prevention strategies such as consistent eating pattern and taking medications as directed.  Pt is able to teach back appropriate treatment of hypoglycemia using the rule of 15s after receiving instruction.      Sick day management: Reviewed general sick day guidelines with pt, including drinking plenty of water, keeping simple carbs handy such as jell-o or popsicles, checking blood glucose more often (every 2-4 hours or as directed by provider).  Encouraged her to call provider if > 5 times diarrhea, vomiting, or fever.       Physical activity: Reviewed benefits of physical activity for diabetes management and overall health. Encouraged pt to begin in 10 min increments to build up to recommended 150 minute per week after speaking with doctor about which activities may be right for them.  Encouraged chair exercise videos.     Follow-up care: Reviewed importance of ongoing follow-up with provider. Reviewed importance of annual physical exams, annual eye exams, regular dental exams, daily foot examination, and encouraged patient to discuss immunization needs with provider. Pt encouraged to attend scheduled appointments. Provided information about outpatient diabetes education classes at [facility].      Provided patient with copy of [facility] \"Life with Diabetes\" handout.       Thank you for this consult.      Total time spent reviewing chart, preparing education/materials, providing " education at bedside, and coordinating care approx 30 minutes.         Electronically signed by:  Cara Vallejo RN  07/23/25 13:22 EDT

## 2025-07-23 NOTE — MBS/VFSS/FEES
Acute Care - Speech Language Pathology   Swallow Initial Evaluation [facility]  Clinical Swallow Evaluation  & Fiberoptic Endoscopic Evaluation of Swallowing (FEES)     Patient Name: Dani Ziegler  : 1965  MRN: [id number]  Today's Date: 2025               Admit Date: 2025    Visit Dx:     ICD-10-CM ICD-9-CM   1. Respiratory distress  R06.03 786.09   2. Acute respiratory failure with hypoxia  J96.01 518.81   3. Pneumonia of right middle lobe due to infectious organism  J18.9 486   4. Sepsis without acute organ dysfunction, due to unspecified organism  A41.9 038.9     995.91   5. COPD with acute exacerbation  J44.1 491.21   6. Leukocytosis, unspecified type  D72.829 288.60   7. Sinus tachycardia  R00.0 427.89   8. Oropharyngeal dysphagia  R13.12 787.22     Patient Active Problem List   Diagnosis    Uncontrolled type 2 diabetes mellitus with hyperglycemia, without long-term current use of insulin    Vitamin D deficiency    Peripheral neuropathy    Adiposity    Essential hypertension    Chronic pain    Mucopurulent chronic bronchitis    Anxiety and depression    Arthritis involving multiple sites    Mixed hyperlipidemia    Special screening for malignant neoplasms, colon    Cellulitis of left lower extremity    Acute on chronic renal insufficiency    Severe sepsis    Cellulitis of left leg    Lumbar disc disease    Diabetic peripheral neuropathy    MRSA pneumonia of left midlung present on admission    Polypharmacy    Morbid obesity with BMI of 45.0-49.9. ?  LOUANN/OHS    Acute on chronic respiratory failure with hypoxia and hypercapnia    H/O recurrent DVT on Eliquis     Chronic narcotic/BZD use    Illicit drug use (UDS + methamphetamines)     Smoker    SBO s/p exploratory laparotomy and incisional hernia repair with mesh 2022    Incisional hernia with obstruction but no gangrene    Acute on chronic respiratory failure with hypoxia     Past Medical History:   Diagnosis Date    Abnormal weight  gain     Acute UTI     Anxiety     Arthritis involving multiple sites     Chronic obstructive pulmonary disease     Chronic pain     Current every day smoker     Depression     Diabetes mellitus     Diarrhea     Dysuria     Edema, lower extremity     Fever     Head injury     Hypertension     Intervertebral disc disorder     Degeneration of intervertebral disc, site unspecified (722.6)    Left bundle branch block     Leukocytosis     Long term current use of methadone for pain control     Mass of right breast     Nausea     Obesity     Overactive bladder     Peripheral neuropathy     Superficial phlebitis     right medial calf    Upper respiratory infection     Urinary incontinence     Vitamin D deficiency     Vomiting      Past Surgical History:   Procedure Laterality Date    BLADDER SURGERY      pubovaginal sling    CHOLECYSTECTOMY      EXPLORATORY LAPAROTOMY N/A 06/12/2022    Procedure: LAPAROTOMY EXPLORATORY UMBILICAL HERNIA REPAIR WITH MESH;  Surgeon: Reji Brown MD;  Location: [facility], 1928–1983;  Service: General;  Laterality: N/A;    HERNIA REPAIR      HIP ARTHROSCOPY Right 10/29/2020    JOINT REPLACEMENT      TRACHEOSTOMY         SLP Recommendation and Plan  SLP Swallowing Diagnosis: mild, oral dysphagia, pharyngeal dysphagia (07/23/25 1515)  SLP Diet Recommendation: soft to chew textures, chopped, no mixed consistencies, thin liquids, other (see comments) (if concerns w/ toleration of thin liquid, downgrade to nectar-thick) (07/23/25 1515)  Recommended Precautions and Strategies: small bites of food and sips of liquid, upright posture during/after eating, general aspiration precautions, fatigue precautions (small/single sips, straws ok) (07/23/25 1515)  SLP Rec. for Method of Medication Administration: meds whole, with thick liquids, with puree, as tolerated (07/23/25 1515)     Monitor for Signs of Aspiration: yes, notify SLP if any concerns (07/23/25 1515)    Swallow Criteria for Skilled Therapeutic  Interventions Met: demonstrates skilled criteria (07/23/25 1515)  Anticipated Discharge Disposition (SLP): home with home health (07/23/25 1515)  Rehab Potential/Prognosis, Swallowing: good, to achieve stated therapy goals (07/23/25 1515)  Therapy Frequency (Swallow): 5 days per week (07/23/25 1515)  Predicted Duration Therapy Intervention (Days): 1 week (07/23/25 1515)  Oral Care Recommendations: Oral Care BID/PRN, Toothbrush (07/23/25 1515)               SWALLOW EVALUATION (Last 72 Hours)       SLP Adult Swallow Evaluation       Row Name 07/23/25 1515 07/23/25 1440                Rehab Evaluation    Document Type evaluation  -AC evaluation  -AC       Subjective Information -- no complaints  -AC       Patient Observations -- alert;cooperative  -AC       Patient/Family/Caregiver Comments/Observations -- No family present.  -AC       Patient Effort good  -AC good  -AC          General Information    Patient Profile Reviewed yes  -AC yes  -AC       Pertinent History Of Current Problem -- Acute on chronic respiratory failure w/ hypoxia. CT findings c/w RML pna. Recent adm @ OSH in May for CAP. Found lung nodules during that adm. Hx COPD, chronic bronchitis, hx meth use, dysphagia (when adm for AECOPD 1/'22 & when adm w/ pna 6/'22, silent aspiration per instrumental studies & SLP recommended nectar-thick liquids both occasions, until eventually resolved and able to upgrade to thin liquids).  -AC       Current Method of Nutrition clear liquids  NPO recommendations made until FEES  -AC clear liquids  has not yet received tray  -AC       Precautions/Limitations, Vision -- WFL;for purposes of eval  -AC       Precautions/Limitations, Hearing -- WFL;for purposes of eval  -AC       Prior Level of Function-Communication -- unknown  -AC       Prior Level of Function-Swallowing -- no diet consistency restrictions  -AC       Plans/Goals Discussed with patient;agreed upon  -AC patient;agreed upon  -AC       Barriers to Rehab  previous functional deficit  -AC previous functional deficit  -AC       Patient's Goals for Discharge return to PO diet;eat/drink without coughing/choking  -AC return to PO diet;eat/drink without coughing/choking  -AC          Pain    Pretreatment Pain Rating 0/10 - no pain  -AC 0/10 - no pain  -AC       Posttreatment Pain Rating 0/10 - no pain  -AC 0/10 - no pain  -AC          Oral Motor Structure and Function    Dentition Assessment upper dentures/partial in place;missing teeth;teeth are in poor condition  - edentulous;missing teeth;teeth are in poor condition  upper dentures @ bedside, reported she's anticipating anterior lower teeth need to be pulled soon  -       Secretion Management -- WNL/WFL  -AC       Mucosal Quality -- moist, healthy  -AC       Volitional Cough -- WFL  -          Oral Musculature and Cranial Nerve Assessment    Oral Motor General Assessment -- L  -          General Eating/Swallowing Observations    Respiratory Support Currently in Use nasal cannula  -AC nasal cannula  -AC       O2 Liters 6L  -AC 6L  -AC       Eating/Swallowing Skills self-fed;fed by staff/caregiver;appropriate self-feeding skills observed  -AC fed by SLP;self-fed;appropriate self-feeding skills observed  -AC       Positioning During Eating upright in bed  -AC upright in bed  -       Utensils Used -- spoon;cup;straw  -       Consistencies Trialed -- ice chips;thin liquids;pureed;soft to chew textures;chopped  -          Respiratory    Respiratory Status -- increase in respiratory rate;during swallowing/eating  -       Respiratory Pattern -- decrease control/incoordination of breathing swallow  -AC          Clinical Swallow Eval    Oral Prep Phase -- impaired  -AC       Oral Transit -- WFL  -AC       Oral Residue -- WFL  -AC       Pharyngeal Phase -- suspected pharyngeal impairment  -AC       Esophageal Phase -- unremarkable  -AC          Oral Prep Concerns    Oral Prep Concerns -- increased prep time  -        Increased Prep Time -- mechanical soft  -AC          Pharyngeal Phase Concerns    Pharyngeal Phase Concerns -- other (see comments)  -AC       Pharyngeal Phase Concerns, Comment -- Felt to be high risk for aspiration/dysphagia given respiratory status/pattern.  -AC          Fiberoptic Endoscopic Evaluation of Swallowing (FEES)    Risks/Benefits Reviewed risks/benefits explained;patient;agreed to eval  -AC --       Nasal Entry right:  -AC --       Scope serial number/identification 338  -AC --          Anatomy and Physiology    Anatomic Considerations edema;arytenoids  + cobblestoning of intra-arytenoid space  -AC --       Comment Prominent tissue R posterior hypopharynx.  -AC --       Velopharyngeal WFL  -AC --       Base of Tongue symmetrical;range reduced  -AC --       Epiglottis WFL  -AC --       Laryngeal Function Breathing symmetrical  -AC --       Laryngeal Function Phonation symmetrical  -AC --       Laryngeal Function to Breath Hold TVF/FVF/Arytenoid  -AC --       Secretion Rating Scale (Kumar et al. 1996) 0- normal, no visible secretions  -AC --       Sensory sensed scope  -AC --       Utensils Used Spoon;Cup;Straw  -AC --       Consistencies Trialed thin liquids;nectar-thick liquids;pudding/puree;mixed consistency;regular textures  -AC --          FEES Interpretation    Oral Phase prespill of liquids into pharynx;reduced lingual control;prolonged manipulation;with solids only;oral residue present  -AC --       Oral Phase, Comment Oral residue pooled to pharynx.  -AC --          Initiation of Pharyngeal Swallow    Initiation of Pharyngeal Swallow bolus in pyriform sinuses  -AC --       Pharyngeal Phase impaired pharyngeal phase of swallowing  -AC --       Penetration Before the Swallow thin liquids;mixed consistency;secondary to reduced back of tongue control;secondary to delayed swallow initiation or mistiming  thin via large/sequential cup or straw drinks  -AC --       Depth of Penetration deep  at  least to the level of the TVFs--potentially aspirated below the TVFs, as well (and u/a to visualize 2' anatomy)  -AC --       Response to Penetration No  -AC --       No spontaneous response to penetration and effective laryngeal clearance with cue (see comments)  cough  -AC --       Rosenbek's Scale thin:;5-->Level 5  -AC --       Residue thin liquids;diffuse within pharynx;secondary to reduced laryngeal elevation  -AC --       Response to Residue cleared residue;with spontaneous subsequent swallow  -AC --       Attempted Compensatory Maneuvers bolus size;bolus presentation style  -AC --       Response to Attempted Compensatory Maneuvers prevented penetration  drinking thin liquid via small/single cup or straw drinks  -AC --       Successful Compensatory Maneuver Competency patient able to;demonstrate compensations;teach back compensations;independently  -AC --       Pharyngeal Phase, Comment No penetration/aspiration appreciated w/ small/single sips of thin liquid via cup/straw or sequential drinks of nectar-thick liquid via straw. Pt prefers avoiding thickened liquids and using compensatory strategies w/ thin liquids. Provided exercise handout.  -AC --          Swallowing Quality of Life Assessment    Patient/family preferences Avoid thickened liquids  -AC --       Education and counseling provided Silent aspiration;Risks of aspiration;Safest diet options;Aspiration precautions;Compensatory strategy recommendations and rationale  -AC Silent aspiration;Risks of aspiration  -AC          SLP Evaluation Clinical Impression    SLP Swallowing Diagnosis mild;oral dysphagia;pharyngeal dysphagia  -AC R/O pharyngeal dysphagia  -AC       Functional Impact risk of aspiration/pneumonia  -AC risk of aspiration/pneumonia;risk of malnutrition;risk of dehydration  -AC       Rehab Potential/Prognosis, Swallowing good, to achieve stated therapy goals  -AC good, to achieve stated therapy goals  -AC       Swallow Criteria for Skilled  Therapeutic Interventions Met demonstrates skilled criteria  -AC demonstrates skilled criteria  -AC          Recommendations    Therapy Frequency (Swallow) 5 days per week  -AC --       Predicted Duration Therapy Intervention (Days) 1 week  -AC --       SLP Diet Recommendation soft to chew textures;chopped;no mixed consistencies;thin liquids;other (see comments)  if concerns w/ toleration of thin liquid, downgrade to nectar-thick  -AC NPO  -AC       Recommended Diagnostics -- FEES  -AC       Recommended Precautions and Strategies small bites of food and sips of liquid;upright posture during/after eating;general aspiration precautions;fatigue precautions  small/single sips, straws ok  -AC --       Oral Care Recommendations Oral Care BID/PRN;Toothbrush  -AC Oral Care BID/PRN;Suction toothbrush  -AC       SLP Rec. for Method of Medication Administration meds whole;with thick liquids;with puree;as tolerated  -AC meds via alternate route  -       Monitor for Signs of Aspiration yes;notify SLP if any concerns  -AC --       Anticipated Discharge Disposition (SLP) home with home health  - --                 User Key  (r) = Recorded By, (t) = Taken By, (c) = Cosigned By      Initials Name Effective Dates    AC Mary Baker MS [facility]-SLP 02/03/23 -                     EDUCATION  The patient has been educated in the following areas:   Dysphagia (Swallowing Impairment) Modified Diet Instruction.        SLP GOALS       Row Name 07/23/25 1515             (LTG) Patient will demonstrate functional swallow for    Diet Texture (Demonstrate functional swallow) regular textures  -AC      Liquid viscosity (Demonstrate functional swallow) thin liquids  -AC      [place] (Demonstrate functional swallow) with use of compensatory strategies  -AC      Time Frame (Demonstrate functional swallow) 1 week  -AC         (STG) Patient will tolerate trials of    Consistencies Trialed (Tolerate trials) soft to chew (chopped) textures;thin  liquids  -AC      Desired Outcome (Tolerate trials) without signs/symptoms of aspiration;with adequate oral prep/transit/clearance;with use of compensatory strategies (see comments)  small/single sips  -AC      [place] (Tolerate trials) independently (over 90% accuracy)  -AC      Time Frame (Tolerate trials) 1 week  -AC         (STG) Lingual Strengthening Goal 1 (SLP)    Activity (Lingual Strengthening Goal 1, SLP) increase tongue back strength  -AC      Increase Tongue Back Strength lingual resistance exercises  -AC      [place]/Accuracy (Lingual Strengthening Goal 1, SLP) independently (over 90% accuracy)  -AC      Time Frame (Lingual Strengthening Goal 1, SLP) 1 week  -AC         (STG) Pharyngeal Strengthening Exercise Goal 1 (SLP)    Activity (Pharyngeal Strengthening Goal 1, SLP) increase timing;increase superior movement of the hyolaryngeal complex  -AC      Increase Timing prepping - 3 second prep or suck swallow or 3-step swallow  -AC      Increase Superior Movement of the Hyolaryngeal Complex super-supraglottic swallow  -AC      [place]/Accuracy (Pharyngeal Strengthening Goal 1, SLP) independently (over 90% accuracy)  -AC      Time Frame (Pharyngeal Strengthening Goal 1, SLP) 1 week  -AC                User Key  (r) = Recorded By, (t) = Taken By, (c) = Cosigned By      Initials Name Provider Type     Mary Baker MS CCC-SLP Speech and Language Pathologist                         Time Calculation:    Time Calculation- SLP       Row Name 07/23/25 1647             Time Calculation- SLP    SLP Start Time 1440  -      SLP Received On 07/23/25  -         Untimed Charges    18304-MI Eval Oral Pharyng Swallow Minutes 49  -AC      61894-IQ Fiberoptic Endo Eval Swallow Minutes 105  -AC         Total Minutes    Untimed Charges Total Minutes 154  -AC       Total Minutes 154  -AC                User Key  (r) = Recorded By, (t) = Taken By, (c) = Cosigned By      Initials Name Provider Type    AC  Mary Baker, MS CCC-SLP Speech and Language Pathologist                    Therapy Charges for Today       Code Description Service Date Service Provider Modifiers Qty    47482651189 HC ST EVAL ORAL PHARYNG SWALLOW 3 7/23/2025 Mary Baker, MS CCC-SLP GN 1    [id number] HC ST FIBEROPTIC ENDO EVAL SWALL 7 7/23/2025 Mary Baker, MS CCC-SLP GN 1                 Mary Baker MS CCC-SLP  7/23/2025

## 2025-07-24 PROBLEM — J96.21 ACUTE ON CHRONIC HYPOXIC RESPIRATORY FAILURE: Status: ACTIVE | Noted: 2025-07-24

## 2025-07-24 LAB
ARTERIAL PATENCY WRIST A: ABNORMAL
ATMOSPHERIC PRESS: ABNORMAL MM[HG]
BASE EXCESS BLDA CALC-SCNC: 1.5 MMOL/L (ref 0–2)
BDY SITE: ABNORMAL
BODY TEMPERATURE: 37
CO2 BLDA-SCNC: 28.3 MMOL/L (ref 22–33)
COHGB MFR BLD: 0.9 % (ref 0–2)
EPAP: 0
GLUCOSE BLDC GLUCOMTR-MCNC: 119 MG/DL (ref 70–130)
GLUCOSE BLDC GLUCOMTR-MCNC: 144 MG/DL (ref 70–130)
GLUCOSE BLDC GLUCOMTR-MCNC: 206 MG/DL (ref 70–130)
GLUCOSE BLDC GLUCOMTR-MCNC: 233 MG/DL (ref 70–130)
GLUCOSE BLDC GLUCOMTR-MCNC: 308 MG/DL (ref 70–130)
GLUCOSE BLDC GLUCOMTR-MCNC: 323 MG/DL (ref 70–130)
GLUCOSE BLDC GLUCOMTR-MCNC: 340 MG/DL (ref 70–130)
HCO3 BLDA-SCNC: 26.9 MMOL/L (ref 20–26)
HCT VFR BLD CALC: 35.7 % (ref 38–51)
HGB BLDA-MCNC: 11.6 G/DL (ref 14–18)
INHALED O2 CONCENTRATION: 44 %
IPAP: 0
METHGB BLD QL: 0.3 % (ref 0–1.5)
MODALITY: ABNORMAL
OXYHGB MFR BLDV: 94.5 % (ref 94–99)
PAW @ PEAK INSP FLOW SETTING VENT: 0 CMH2O
PCO2 BLDA: 44.9 MM HG (ref 35–45)
PCO2 TEMP ADJ BLD: 44.9 MM HG (ref 35–45)
PH BLDA: 7.39 PH UNITS (ref 7.35–7.45)
PH, TEMP CORRECTED: 7.39 PH UNITS
PO2 BLDA: 75.3 MM HG (ref 83–108)
PO2 TEMP ADJ BLD: 75.3 MM HG (ref 83–108)
TOTAL RATE: 0 BREATHS/MINUTE

## 2025-07-24 PROCEDURE — 82948 REAGENT STRIP/BLOOD GLUCOSE: CPT

## 2025-07-24 PROCEDURE — 94664 DEMO&/EVAL PT USE INHALER: CPT

## 2025-07-24 PROCEDURE — 63710000001 INSULIN GLARGINE PER 5 UNITS: Performed by: INTERNAL MEDICINE

## 2025-07-24 PROCEDURE — 94799 UNLISTED PULMONARY SVC/PX: CPT

## 2025-07-24 PROCEDURE — 25010000002 DOXYCYCLINE 100 MG RECONSTITUTED SOLUTION 1 EACH VIAL

## 2025-07-24 PROCEDURE — 83050 HGB METHEMOGLOBIN QUAN: CPT

## 2025-07-24 PROCEDURE — 25010000002 METHYLPREDNISOLONE PER 40 MG: Performed by: INTERNAL MEDICINE

## 2025-07-24 PROCEDURE — 63710000001 INSULIN LISPRO (HUMAN) PER 5 UNITS: Performed by: INTERNAL MEDICINE

## 2025-07-24 PROCEDURE — 97162 PT EVAL MOD COMPLEX 30 MIN: CPT

## 2025-07-24 PROCEDURE — 63710000001 INSULIN GLARGINE PER 5 UNITS

## 2025-07-24 PROCEDURE — 25010000002 CEFTRIAXONE PER 250 MG

## 2025-07-24 PROCEDURE — 25010000002 DOXYCYCLINE 100 MG RECONSTITUTED SOLUTION 1 EACH VIAL: Performed by: INTERNAL MEDICINE

## 2025-07-24 PROCEDURE — 63710000001 INSULIN LISPRO (HUMAN) PER 5 UNITS

## 2025-07-24 PROCEDURE — 82805 BLOOD GASES W/O2 SATURATION: CPT

## 2025-07-24 PROCEDURE — 99233 SBSQ HOSP IP/OBS HIGH 50: CPT | Performed by: INTERNAL MEDICINE

## 2025-07-24 PROCEDURE — 97530 THERAPEUTIC ACTIVITIES: CPT

## 2025-07-24 PROCEDURE — 82375 ASSAY CARBOXYHB QUANT: CPT

## 2025-07-24 PROCEDURE — 36600 WITHDRAWAL OF ARTERIAL BLOOD: CPT

## 2025-07-24 RX ORDER — INSULIN LISPRO 100 [IU]/ML
5 INJECTION, SOLUTION INTRAVENOUS; SUBCUTANEOUS
Status: DISCONTINUED | OUTPATIENT
Start: 2025-07-24 | End: 2025-07-29

## 2025-07-24 RX ADMIN — INSULIN LISPRO 5 UNITS: 100 INJECTION, SOLUTION INTRAVENOUS; SUBCUTANEOUS at 12:22

## 2025-07-24 RX ADMIN — Medication 10 ML: at 08:54

## 2025-07-24 RX ADMIN — IPRATROPIUM BROMIDE AND ALBUTEROL SULFATE 3 ML: .5; 3 SOLUTION RESPIRATORY (INHALATION) at 09:59

## 2025-07-24 RX ADMIN — OXYBUTYNIN CHLORIDE 5 MG: 5 TABLET, EXTENDED RELEASE ORAL at 08:53

## 2025-07-24 RX ADMIN — MICONAZOLE NITRATE 1 APPLICATION: 20 POWDER TOPICAL at 08:54

## 2025-07-24 RX ADMIN — CEFTRIAXONE SODIUM 1000 MG: 1 INJECTION, POWDER, FOR SOLUTION INTRAMUSCULAR; INTRAVENOUS at 08:53

## 2025-07-24 RX ADMIN — INSULIN GLARGINE 15 UNITS: 100 INJECTION, SOLUTION SUBCUTANEOUS at 08:53

## 2025-07-24 RX ADMIN — Medication 10 ML: at 22:02

## 2025-07-24 RX ADMIN — PANTOPRAZOLE SODIUM 40 MG: 40 TABLET, DELAYED RELEASE ORAL at 05:48

## 2025-07-24 RX ADMIN — INSULIN LISPRO 5 UNITS: 100 INJECTION, SOLUTION INTRAVENOUS; SUBCUTANEOUS at 18:13

## 2025-07-24 RX ADMIN — APIXABAN 5 MG: 5 TABLET, FILM COATED ORAL at 22:01

## 2025-07-24 RX ADMIN — INSULIN LISPRO 10 UNITS: 100 INJECTION, SOLUTION INTRAVENOUS; SUBCUTANEOUS at 18:12

## 2025-07-24 RX ADMIN — MICONAZOLE NITRATE 1 APPLICATION: 20 POWDER TOPICAL at 22:02

## 2025-07-24 RX ADMIN — TRAZODONE HYDROCHLORIDE 50 MG: 50 TABLET ORAL at 23:24

## 2025-07-24 RX ADMIN — ESCITALOPRAM 20 MG: 20 TABLET, FILM COATED ORAL at 08:53

## 2025-07-24 RX ADMIN — INSULIN GLARGINE 15 UNITS: 100 INJECTION, SOLUTION SUBCUTANEOUS at 22:01

## 2025-07-24 RX ADMIN — MUPIROCIN 1 APPLICATION: 20 OINTMENT TOPICAL at 08:54

## 2025-07-24 RX ADMIN — Medication 4 ML: at 09:59

## 2025-07-24 RX ADMIN — DOXYCYCLINE 100 MG: 100 INJECTION, POWDER, LYOPHILIZED, FOR SOLUTION INTRAVENOUS at 22:01

## 2025-07-24 RX ADMIN — INSULIN LISPRO 10 UNITS: 100 INJECTION, SOLUTION INTRAVENOUS; SUBCUTANEOUS at 22:01

## 2025-07-24 RX ADMIN — APIXABAN 5 MG: 5 TABLET, FILM COATED ORAL at 08:53

## 2025-07-24 RX ADMIN — MUPIROCIN 1 APPLICATION: 20 OINTMENT TOPICAL at 22:01

## 2025-07-24 RX ADMIN — ARIPIPRAZOLE 10 MG: 10 TABLET ORAL at 08:53

## 2025-07-24 RX ADMIN — METHYLPREDNISOLONE SODIUM SUCCINATE 40 MG: 40 INJECTION, POWDER, FOR SOLUTION INTRAMUSCULAR; INTRAVENOUS at 08:53

## 2025-07-24 RX ADMIN — DOXYCYCLINE 100 MG: 100 INJECTION, POWDER, LYOPHILIZED, FOR SOLUTION INTRAVENOUS at 08:53

## 2025-07-24 NOTE — PLAN OF CARE
Goal Outcome Evaluation:  Plan of Care Reviewed With: patient        Progress: no change  Outcome Evaluation: PT eval complete. Pt presents below baseline with generalized weakness, dyspnea on exertion, mild balance deficits, and decreased functional endurance warranting IPPT. Pt amb 12 ft +20 ft w/ FWW, CGA. PT rec home w/ assist and HHPT at d/c, will continue to monitor progress.    Anticipated Discharge Disposition (PT): home with assist, home with home health

## 2025-07-24 NOTE — PLAN OF CARE
Goal Outcome Evaluation:              Outcome Evaluation: VSS on 6LNC throughout shift. Up to chair with minimal assist. No c/o of pain or SOA. Ordered to telemetry.

## 2025-07-24 NOTE — PROGRESS NOTES
Clinical Nutrition     Patient Name: Dani Ziegler  YOB: 1965  MRN: [id number]  Date of Encounter: 07/24/25 13:56 EDT  Admission date: 7/22/2025  Reason for Visit: Follow-up protocol    Assessment   Nutrition Assessment   Admission Diagnosis:  Acute on chronic respiratory failure with hypoxia [J96.21]    Problem List:    Acute on chronic respiratory failure with hypoxia      PMH:   She  has a past medical history of Abnormal weight gain, Acute UTI, Anxiety, Arthritis involving multiple sites, Chronic obstructive pulmonary disease, Chronic pain, Current every day smoker, Depression, Diabetes mellitus, Diarrhea, Dysuria, Edema, lower extremity, Fever, Head injury, Hypertension, Intervertebral disc disorder, Left bundle branch block, Leukocytosis, Long term current use of methadone for pain control, Mass of right breast, Nausea, Obesity, Overactive bladder, Peripheral neuropathy, Superficial phlebitis, Upper respiratory infection, Urinary incontinence, Vitamin D deficiency, and Vomiting.    PSH:  She  has a past surgical history that includes Bladder surgery; Cholecystectomy; Hip Arthroscopy (Right, 10/29/2020); Exploratory Laparotomy (N/A, 06/12/2022); Hernia repair; Joint replacement; and Tracheostomy tube placement.    Substance history: tobacco    Applicable Nutrition History:     SKIN: L great toe blood blister/ ulcer     SLP Recommendation and Plan  SLP Swallowing Diagnosis: mild, oral dysphagia, pharyngeal dysphagia (07/23/25 1515)  SLP Diet Recommendation: soft to chew textures, chopped, no mixed consistencies, thin liquids, other (see comments) (if concerns w/ toleration of thin liquid, downgrade to nectar-thick) (07/23/25 1515)  Recommended Precautions and Strategies: small bites of food and sips of liquid, upright posture during/after eating, general aspiration precautions, fatigue precautions (small/single sips, straws ok) (07/23/25 1515)  SLP Rec. for Method of Medication  Administration: meds whole, with thick liquids, with puree, as tolerated (07/23/25 1515)  Monitor for Signs of Aspiration: yes, notify SLP if any concerns (07/23/25 1515)  Labs    Labs Reviewed: Yes    Results from last 7 days   Lab Units 07/23/25  0511 07/22/25  1137 07/22/25  0700 07/22/25  0227   GLUCOSE mg/dL 176*  --   --  270*   BUN mg/dL 15.8  --   --  9.8   CREATININE mg/dL 0.83  --   --  0.89   SODIUM mmol/L 136  --   --  133*   CHLORIDE mmol/L 104  --   --  98   POTASSIUM mmol/L 4.7  --   --  4.2   PHOSPHORUS mg/dL 2.8  --   --   --    MAGNESIUM mg/dL 2.5*  --   --  1.7   ALT (SGPT) U/L 6  --   --  7   LACTATE mmol/L  --  1.3 2.3* 2.1*       Results from last 7 days   Lab Units 07/23/25  0511 07/22/25  0422 07/22/25  0227   ALBUMIN g/dL 2.8*  --  3.3*   CRP mg/dL  --  18.37*  --        Results from last 7 days   Lab Units 07/24/25  1131 07/24/25  0712 07/24/25  0506 07/23/25  2356 07/23/25  2056 07/23/25  1812   GLUCOSE mg/dL 206* 119 144* 233* 377* 349*     Lab Results   Lab Value Date/Time    HGBA1C 9.01 (H) 07/22/2025 0227    HGBA1C 7.5 (H) 07/22/2024 1522    HGBA1C 9.3 (H) 04/20/2023 1555    HGBA1C 6.40 (H) 01/19/2022 0323    HGBA1C 8.3 01/11/2021 0000         Results from last 7 days   Lab Units 07/22/25  0227   PROBNP pg/mL 8,901.0*       Medications    Medications Reviewed: yes    Scheduled Meds:apixaban, 5 mg, Oral, Q12H  ARIPiprazole, 10 mg, Oral, Daily  cefTRIAXone, 1,000 mg, Intravenous, Q24H  doxycycline, 100 mg, Intravenous, Q12H  escitalopram, 20 mg, Oral, Daily  insulin glargine, 15 Units, Subcutaneous, Q12H  insulin lispro, 3-14 Units, Subcutaneous, 4x Daily AC & at Bedtime  ipratropium-albuterol, 3 mL, Nebulization, BID  methylPREDNISolone sodium succinate, 40 mg, Intravenous, Daily  miconazole, 1 Application, Topical, Q12H  mupirocin, 1 Application, Each Nare, BID  oxybutynin XL, 5 mg, Oral, Daily  pantoprazole, 40 mg, Oral, Q AM  sodium chloride, 10 mL, Intravenous, Q12H  sodium  "chloride, 4 mL, Nebulization, BID - RT      Continuous Infusions:     PRN Meds:.  acetaminophen    Calcium Replacement - Follow Nurse / BPA Driven Protocol    dextrose    dextrose    glucagon (human recombinant)    ipratropium-albuterol    Magnesium Cardiology Dose Replacement - Follow Nurse / BPA Driven Protocol    Phosphorus Replacement - Follow Nurse / BPA Driven Protocol    Potassium Replacement - Follow Nurse / BPA Driven Protocol    sodium chloride    sodium chloride    sodium chloride    traZODone    Intake/Ouptut 24 hrs (0701 - 0700)   I&O's Reviewed: yes    Intake & Output (last day)         07/23 0701 07/24 0700 07/24 0701 07/25 0700    P.O. 240 240    I.V. (mL/kg) 169.5 (1.3) 423 (3.3)    IV Piggyback 300     Total Intake(mL/kg) 709.5 (5.5) 663 (5.1)    Urine (mL/kg/hr) 1675 (0.5)     Stool 0     Total Output 1675     Net -965.5 +663          Urine Unmeasured Occurrence 2 x     Stool Unmeasured Occurrence 5 x            Anthropometrics     Height: Height: 170.2 cm (67\")  Last Filed Weight: Weight: 130 kg (287 lb) (07/22/25 1540)  Method: Weight Method: Bed scale  BMI: BMI (Calculated): 44.9    UBW: ?  Weight change: per EMR 23lb wt gain over past year     Weight       Weight (kg) Weight (lbs) Weight Method Visit Report   6/21/2022 119.75 kg  264 lb  Bed scale     6/22/2022 119.75 kg  264 lb      6/24/2022 119.75 kg  264 lb      6/25/2022 126.355 kg  278 lb 9 oz  Bed scale     6/30/2022 122.471 kg  270 lb  Stated     8/30/2022 130.182 kg  287 lb   --    4/20/2023 133.358 kg  294 lb   --    7/22/2024 122.199 kg  269 lb 6.4 oz   --    8/9/2024 120.112 kg  264 lb 12.8 oz      7/22/2025 122.471 kg  270 lb  Stated      130.6 kg  287 lb 14.7 oz  Bed scale      130.182 kg  287 lb          Nutrition Focused Physical Exam     Date:     Unable to perform due to Defer pending indication     Subjective   Reported/Observed/Food/Nutrition Related History:     7-24: pt resting in bed, on nasal cannula, just ate lunch, " reports good appetite, swallowing ok, taking it slow    DM Diet Education reviewed    7-22:Pt resting in bed, on Bipap, getting breathing treatment, is alert and oriented    Pt reports she is starving, has not eaten in 2 days, she does not follow a diabetic diet, she eats whatever her roommate cooks for her, does not check blood sugars as she does not have a glucometer, her last Ha1c was ~11, currently it is 9.01    Per RN: pt has not been able to come off Bipap yet 2nd respiratory status    SLP eval pending    Current Nutrition Prescription     PO: Diet: Diabetic; Consistent Carbohydrate; No Mixed Consistencies; Texture: Soft to Chew (NDD 3); Soft to Chew: Chopped Meat; Fluid Consistency: Thin (IDDSI 0)  Oral Nutrition Supplement:  Intake: Insufficient data: 100% of 2 meals    Assessment & Plan   Nutrition Diagnosis   Date: 7-22-25 Updated: 7-24  Problem Food and nutrition knowledge deficit    Etiology Uncontrolled DM   Signs/Symptoms Ha1c= 9.01   Status: Active  Problem Increased nutrient needs   Etiology Poor Skin Integrity   Signs/Symptoms L great toe ulcer   Status: Active    Goal:   Nutrition to support treatment and Advance diet when feasible      Nutrition Intervention      Follow treatment progress, Care plan reviewed, Supplement provided, Education provided    Ad Prosousrce No Carb to provide additional protein for wound healing    Reviewed DM Diet Education    Monitoring/Evaluation:   Per protocol, I&O, PO intake, Pertinent labs, Weight, Skin status, GI status, Symptoms, Swallow function    Jessica Dennis RD  Time Spent::30min

## 2025-07-24 NOTE — PLAN OF CARE
Goal Outcome Evaluation:  Plan of Care Reviewed With: patient        Progress: improving  Outcome Evaluation: A&OX4, FC, CRESENCIO. Turns well in bed. Tolerated bipap for 4 hours during shift. 6LNC for remainder. NSR. Tolerating PO diet, small sips, thickened liquids with pills. PW in place. Adequate UOP. BG more controlled with insulin orders.

## 2025-07-24 NOTE — PROGRESS NOTES
INFECTIOUS DISEASE CONSULT/INITIAL HOSPITAL VISIT    Dani Ziegler  1965  [id number]    Date of Consult: 7/24/2025    Admission Date: 7/22/2025      Requesting Provider: No ref. provider found  Evaluating Physician: Earl Cheney MD    Reason for Consultation: pneumonia/foot ulcer    History of present illness:    Patient is a 60 y.o. femaleWith COPD, chronic hypoxia on home oxygen presents to this emergency room with dyspnea patient found to be in respite distress and cyanotic patient breathing rapidly with a respirate of 55 inspirations per minute patient had hypoxia despite 3 L of oxygen patient given Solu-Medrol DuoNeb inhalation albuterol treatments and placed on BiPAP.  Apparently patient has received care at [facility] for commune acquired pneumonia.    CTA chest performed to exclude pulmonary embolism but showed extensive tree-in-bud nodules in right lung and left lung concerning for infectious bronchiolitis.  Patient been given diuretics levofloxacin as family emergency room antibiotics have been modified to ceftriaxone and doxycycline.  We are being consulted for further antibiotic management    Patient is on BiPAP currently and is getting a bedside transthoracic echocardiogram    7/23/25; on  6 L o2 by nasal cannula; awake, reports that she walked in new shoes and developed a blister on left foot; no fever, rash    7/24/25; doing well; was on bipap last night but couldn't sleep, wearing o2 by nasal cannula currently; no fever, rash, sore throat    Past Medical History:   Diagnosis Date    Abnormal weight gain     Acute UTI     Anxiety     Arthritis involving multiple sites     Chronic obstructive pulmonary disease     Chronic pain     Current every day smoker     Depression     Diabetes mellitus     Diarrhea     Dysuria     Edema, lower extremity     Fever     Head injury     Hypertension     Intervertebral disc disorder     Degeneration of intervertebral disc, site unspecified  (722.6)    Left bundle branch block     Leukocytosis     Long term current use of methadone for pain control     Mass of right breast     Nausea     Obesity     Overactive bladder     Peripheral neuropathy     Superficial phlebitis     right medial calf    Upper respiratory infection     Urinary incontinence     Vitamin D deficiency     Vomiting        Past Surgical History:   Procedure Laterality Date    BLADDER SURGERY      pubovaginal sling    CHOLECYSTECTOMY      EXPLORATORY LAPAROTOMY N/A 2022    Procedure: LAPAROTOMY EXPLORATORY UMBILICAL HERNIA REPAIR WITH MESH;  Surgeon: Reji Brown MD;  Location: [facility];  Service: General;  Laterality: N/A;    HERNIA REPAIR      HIP ARTHROSCOPY Right 10/29/2020    JOINT REPLACEMENT      TRACHEOSTOMY         Family History   Problem Relation Age of Onset    Breast cancer Mother     Cancer Mother     Arthritis Mother     Diabetes Mother     Obesity Mother     Arthritis Father     Stroke Father     Hypertension Father     Heart attack Father     Diabetes Maternal Grandmother     Migraines Other        Social History     Socioeconomic History    Marital status:    Tobacco Use    Smoking status: Every Day     Current packs/day: 0.00     Types: Cigarettes     Start date: 1989     Last attempt to quit: 2019     Years since quittin.3    Smokeless tobacco: Never    Tobacco comments:     1 daily   Vaping Use    Vaping status: Never Used   Substance and Sexual Activity    Alcohol use: No    Drug use: No    Sexual activity: Defer       Allergies   Allergen Reactions    Diphenhydramine Hives    Lortab [Hydrocodone-Acetaminophen] Hives     Tolerates percocet    Toradol [Ketorolac Tromethamine] Hives     Per patient tolerates motrin    Alprazolam Delirium    Nsaids Other (See Comments)     Liver Dx         Medication:    Current Facility-Administered Medications:     acetaminophen (TYLENOL) tablet 650 mg, 650 mg, Oral, Q6H PRN, Lilli Sprague, APRN     apixaban (ELIQUIS) tablet 5 mg, 5 mg, Oral, Q12H, Lilli Sprague APRN, 5 mg at 07/24/25 0853    ARIPiprazole (ABILIFY) tablet 10 mg, 10 mg, Oral, Daily, Alberto Hampton MD, 10 mg at 07/24/25 0853    Calcium Replacement - Follow Nurse / BPA Driven Protocol, , Not Applicable, PRN, Lilli Sprague APRN    cefTRIAXone (ROCEPHIN) 1,000 mg in sodium chloride 0.9 % 100 mL MBP, 1,000 mg, Intravenous, Q24H, Lilli Sprague APRN, Last Rate: 200 mL/hr at 07/24/25 0853, 1,000 mg at 07/24/25 0853    dextrose (D50W) (25 g/50 mL) IV injection 25 g, 25 g, Intravenous, Q15 Min PRN, Lilli Sprague APRN    dextrose (GLUTOSE) oral gel 15 g, 15 g, Oral, Q15 Min PRN, Lilli Sprague APRN    doxycycline (VIBRAMYCIN) 100 mg in sodium chloride 0.9 % 100 mL MBP, 100 mg, Intravenous, Q12H, Lilli Sprague APRN, 100 mg at 07/24/25 0853    escitalopram (LEXAPRO) tablet 20 mg, 20 mg, Oral, Daily, Lilli Sprague APRN, 20 mg at 07/24/25 0853    glucagon (GLUCAGEN) injection 1 mg, 1 mg, Intramuscular, Q15 Min PRN, Lilli Sprague APRN    insulin glargine (LANTUS, SEMGLEE) injection 15 Units, 15 Units, Subcutaneous, Q12H, Nadia Livingston APRN, 15 Units at 07/24/25 0853    Insulin Lispro (humaLOG) injection 3-14 Units, 3-14 Units, Subcutaneous, 4x Daily AC & at Bedtime, Nadia Livingston APRN, 12 Units at 07/23/25 2137    ipratropium-albuterol (DUO-NEB) nebulizer solution 3 mL, 3 mL, Nebulization, Q4H PRN, Lilli Sprague APRN    ipratropium-albuterol (DUO-NEB) nebulizer solution 3 mL, 3 mL, Nebulization, BID, Rain Beltran MD, 3 mL at 07/24/25 0959    Magnesium Cardiology Dose Replacement - Follow Nurse / BPA Driven Protocol, , Not Applicable, PRN, Lilli Sprague APRN    methylPREDNISolone sodium succinate (SOLU-Medrol) injection 40 mg, 40 mg, Intravenous, Daily, Rain Beltran MD, 40 mg at 07/24/25 0853    miconazole (MICOTIN) 2 % powder 1 Application, 1 Application, Topical, Q12H, Miki,  Nadia NOYOLA APRN, 1 Application at 07/24/25 0854    mupirocin (BACTROBAN) 2 % nasal ointment 1 Application, 1 Application, Each Nare, BID, Lilli Sprague APRN, 1 Application at 07/24/25 0854    oxybutynin XL (DITROPAN-XL) 24 hr tablet 5 mg, 5 mg, Oral, Daily, Alberto Hampton MD, 5 mg at 07/24/25 0853    pantoprazole (PROTONIX) EC tablet 40 mg, 40 mg, Oral, Q AM, Alberto Hampton MD, 40 mg at 07/24/25 0548    Phosphorus Replacement - Follow Nurse / BPA Driven Protocol, , Not Applicable, PRNCelestine Natasha R, APRN    Potassium Replacement - Follow Nurse / BPA Driven Protocol, , Not Applicable, PRN, Lilli Sprague APRN    sodium chloride 0.9 % flush 10 mL, 10 mL, Intravenous, PRN, MartinNu MD    sodium chloride 0.9 % flush 10 mL, 10 mL, Intravenous, Q12H, Lilli Sprague APRN, 10 mL at 07/24/25 0854    sodium chloride 0.9 % flush 10 mL, 10 mL, Intravenous, PRN, Lilli Sprague APRN    sodium chloride 0.9 % infusion 40 mL, 40 mL, Intravenous, PRN, Lilli Sprague APRN    sodium chloride 7 % nebulizer solution nebulizer solution 4 mL, 4 mL, Nebulization, BID - RT, Rain Beltran MD, 4 mL at 07/24/25 0959    traZODone (DESYREL) tablet 50 mg, 50 mg, Oral, Nightly PRN, Chau Oliva PA-C, 50 mg at 07/23/25 3834    Antibiotics:  Anti-Infectives (From admission, onward)      Ordered     Dose/Rate Route Frequency Start Stop    07/22/25 1621  vancomycin IVPB 2000 mg in 0.9% Sodium Chloride 500 mL  Status:  Discontinued        Ordering Provider: David Aguirre RPH    2,000 mg  250 mL/hr over 120 Minutes Intravenous Every 24 Hours 07/23/25 1700 07/23/25 1035    07/22/25 1621  vancomycin 2500 mg/500 mL 0.9% NS IVPB ([facility])        Ordering Provider: David Aguirre RPH    20 mg/kg × 130 kg  over 150 Minutes Intravenous Once 07/22/25 1700 07/22/25 2001 07/22/25 1526  Pharmacy to dose vancomycin  Status:  Discontinued        Ordering Provider: Nadia Livingston  APRN     Not Applicable Continuous PRN 25 1526 25 1037    25 0613  doxycycline (VIBRAMYCIN) 100 mg in sodium chloride 0.9 % 100 mL MBP        Ordering Provider: Lilli Sprague APRN    100 mg  over 60 Minutes Intravenous Every 12 Hours 25 0900 25 0859    25 0613  cefTRIAXone (ROCEPHIN) 1,000 mg in sodium chloride 0.9 % 100 mL MBP        Ordering Provider: Lilli Sprague APRN    1,000 mg  200 mL/hr over 30 Minutes Intravenous Every 24 Hours 25 0900 25 0859    25 0230  aztreonam (AZACTAM) 2 g in sodium chloride 0.9 % 100 mL MBP        Ordering Provider: Nu Salazar MD    2 g  200 mL/hr over 30 Minutes Intravenous Once 25 0246 25 0429    25 0230  levoFLOXacin (LEVAQUIN) 750 mg/150 mL D5W (premix) (LEVAQUIN) 750 mg        Ordering Provider: Nu Salazar MD    750 mg  100 mL/hr over 90 Minutes Intravenous Once 25 0246 25 0625              Review of Systems:  See hpi  Physical Exam:   Vital Signs  Temp (24hrs), Av.9 °F (36.6 °C), Min:97.7 °F (36.5 °C), Max:98 °F (36.7 °C)    Temp  Min: 97.7 °F (36.5 °C)  Max: 98 °F (36.7 °C)  BP  Min: 108/61  Max: 167/89  Pulse  Min: 49  Max: 103  Resp  Min: 22  Max: 30  SpO2  Min: 91 %  Max: 99 %    GENERAL: Awake and alert, in no acute distress.   HEENT: Normocephalic, atraumatic.  PERRL. EOMI. No conjunctival injection. No icterus. No ext oral lesions    HEART: RRR; No murmur,   LUNGS: Clear to auscultation bilaterally  no wheeze  ABDOMEN: Soft, nontender,   EXT:  No cyanosis, clubbing or edema. No cord.  :  Without Carroll catheter.  MSK stable blister/eschar medial left foot  SKIN: Warm and dry without cutaneous eruptions on Inspection/palpation.    NEURO: no focal deficit    Laboratory Data    Results from last 7 days   Lab Units 25  1759 25  0511 25  0227   WBC 10*3/mm3  --  19.38* 29.08*   HEMOGLOBIN g/dL 12.1 11.5* 14.5   HEMATOCRIT % 39.6 37.5 44.4  "  PLATELETS 10*3/mm3  --  336 430     Results from last 7 days   Lab Units 07/23/25  0511   SODIUM mmol/L 136   POTASSIUM mmol/L 4.7   CHLORIDE mmol/L 104   CO2 mmol/L 22.9   BUN mg/dL 15.8   CREATININE mg/dL 0.83   GLUCOSE mg/dL 176*   CALCIUM mg/dL 8.8     Results from last 7 days   Lab Units 07/23/25  0511   ALK PHOS U/L 91   BILIRUBIN mg/dL 0.2   ALT (SGPT) U/L 6   AST (SGOT) U/L 11         Results from last 7 days   Lab Units 07/22/25  0422   CRP mg/dL 18.37*     Results from last 7 days   Lab Units 07/22/25  1137   LACTATE mmol/L 1.3             Estimated Creatinine Clearance: 100 mL/min (by C-G formula based on SCr of 0.83 mg/dL).      Microbiology:  No results found for: \"ACANTHNAEG\", \"AFBCX\", \"BPERTUSSISCX\", \"BLOODCX\"  No results found for: \"BCIDPCR\", \"CXREFLEX\", \"CSFCX\", \"CULTURETIS\"  No results found for: \"CULTURES\", \"HSVCX\", \"URCX\"  No results found for: \"EYECULTURE\", \"GCCX\", \"HSVCULTURE\", \"LABHSV\"  No results found for: \"LEGIONELLA\", \"MRSACX\", \"MUMPSCX\", \"MYCOPLASCX\"  No results found for: \"NOCARDIACX\", \"STOOLCX\"  No results found for: \"THROATCX\", \"UNSTIMCULT\", \"URINECX\", \"CULTURE\", \"VZVCULTUR\"  No results found for: \"VIRALCULTU\", \"WOUNDCX\"        Radiology:  Imaging Results (Last 72 Hours)       Procedure Component Value Units Date/Time    SLP FEES - Fiberoptic Endo Eval Swallow [[id number]] Resulted: 07/23/25 1622     Updated: 07/23/25 1622    Narrative:      This procedure was auto-finalized with no dictation required.    CT Angiogram Chest Pulmonary Embolism [[id number]] Collected: 07/22/25 0544     Updated: 07/22/25 0557    Narrative:      CT ANGIOGRAM CHEST PULMONARY EMBOLISM    Date of Exam: 7/22/2025 5:27 AM EDT    Indication: Pulmonary Embolism.    Comparison: CT chest 12/30/2021 and chest radiograph performed earlier today. CT abdomen 6/12/2022.    Technique: Axial CT images were obtained of the chest after the uneventful intravenous administration of 80 mL Isovue-370 utilizing pulmonary " embolism protocol.  In addition, a 3-D volume rendered image was created for interpretation.  Reconstructed   coronal and sagittal images were also obtained. Automated exposure control and iterative construction methods were used.      Findings:  The thyroid, trachea and esophagus appear within normal limits. There is a small hiatal hernia. There is multifocal mediastinal lymphadenopathy including an anterior right paratracheal lymph node on image 26 of the axial series measuring 19 mm in the   short axis, distal left paratracheal lymph node on image 37 measuring 17 mm in the short axis and a right hilar lymph node on axial image 43 measuring up to 21 mm diameter in the short axis. There is no pericardial effusion. The aorta, aortic branch   vessels and main pulmonary artery appear within normal limits. There is no evidence of pulmonary embolism.    There are extensive tree-in-bud nodules present in the right lung. There are more subtle and less pronounced tree-in-bud nodules in the left lung all consistent with infectious bronchiolitis. There are patchy areas of airspace disease in the right middle   lobe consistent with pneumonia and there is mild diffuse peribronchial thickening most pronounced on the right and worse in the right lower lobe likely also infectious/inflammatory. No pneumothorax or pleural effusion. There is a calcified right upper   lobe granuloma.    No acute findings in the superficial soft tissues. There is a heterogeneous appearing right adrenal nodule measuring 34 mm. Left adrenal gland is diminutive. Patient is status post cholecystectomy. Liver is partially seen, but appears enlarged. Spleen is   also partially seen, but likely enlarged. There are multiple chronic compression deformities in the thoracic spine including at T5, T6, T7, T8 and T9. There is a minor segmentation anomaly at C6-C7 with incomplete disc and facet joint formation. No   acute osseous abnormality. Bones are  demineralized.      Impression:      Impression:  1.No evidence of pulmonary embolism.  2.Extensive tree-in-bud nodules in the right lung and to a lesser extent in the left lung consistent with infectious bronchiolitis. There is also patchy airspace disease in the right middle lobe consistent with pneumonia.  3.Mediastinal and right hilar lymphadenopathy, likely reactive.  4.34 mm heterogeneous right adrenal nodule. Diminutive left adrenal gland. Right adrenal nodule is unchanged from 2022 likely reflecting adenoma.  5.Multiple chronic compression deformities in the thoracic spine.  6.Small hiatal hernia.            Electronically Signed: Juanpablo Barron MD    7/22/2025 5:54 AM EDT    Workstation ID: [id number]    XR Chest 1 View [[id number]] Collected: 07/22/25 0336     Updated: 07/22/25 0340    Narrative:      XR CHEST 1 VW    Date of Exam: 7/22/2025 2:58 AM EDT    Indication: SOA triage protocol    Comparison: 6/28/2022.    Findings:  There is a new focus of airspace disease in the periphery of the right lung base that could reflect pneumonia or atelectasis. Left lung remains clear. There are calcified granulomas in the right lung. Cardiac silhouette and pulmonary vasculature appear   within normal limits. No acute osseous abnormality.      Impression:      Impression:  New focus of airspace disease in the periphery of the right lung base that could reflect pneumonia or atelectasis.          Electronically Signed: Juanpablo Barron MD    7/22/2025 3:37 AM EDT    Workstation ID: [id number]              Impression:   Acute hypoxic respiratory failure   morbid obesity  Leukocytosis with neutrophilia descending  Nasal MRSA colonization  Tachycardia  Tachypnea  Hypotension  Lacitc acidosis concerning for tissue perfusion mismatch      PLAN/RECOMMENDATIONS:   Thank you for asking us to see Dani Ziegler, I recommend the following:  CT chest independently reviewed    Aspiration bronchiolitis is common in obese patient with  obstructive sleep apnea.    Agree with swallowing study    Left medial foot blister/bruise appears noninfected would not broaden abx on this account    Infectious bronchiolitis can follow viral infection, bacterial bronchopenumonia, atypical mycobacterial infection, fungal infection    F/u  TTE to assess for pulmonary hypertension/right heart strain      Cont ceftriaxone x 5 days    Cont doxycyline x 5 days          This visit included the following complex service elements:  Complex medical decision-making associated with antimicrobial prescribing.  In-depth chart review with high level synthesis for complex diagnoses.       Patient is critically ill  Critical care time 45 minutes  Earl Cheney MD  7/24/2025  10:00 EDT

## 2025-07-24 NOTE — PROGRESS NOTES
"Critical Care Note     LOS: 2 days   Patient Care Team:  Darrion Tovar MD as PCP - General (Family Medicine)    Chief Complaint/Reason for visit:    Chief Complaint   Patient presents with    Shortness of Breath     Acute on chronic respiratory failure  COPD  Pneumonia  Morbid obesity  History of DVT/PE on anticoagulation  Diabetic ulcer, left great toe    Subjective     Interval History:     He has been off BiPAP since yesterday morning.  She did wear BiPAP for 4 hours overnight but then she just could not sleep and took it off.  She is on 4 to 6 L nasal cannula.  Today physical therapy assessed and noted some generalized weakness and poor balance.  She ambulated 12 feet and then 20 additional feet with a rolling walker.  Speech did assess her swallowing yesterday and felt she had only mild oral and pharyngeal dysphagia and recommended soft to chew foods and thin liquids.    Review of Systems:    All systems were reviewed and negative except as noted in subjective.    Medical history, surgical history, social history, family history reviewed    Objective     Intake/Output:    Intake/Output Summary (Last 24 hours) at 7/24/2025 1652  Last data filed at 7/24/2025 1200  Gross per 24 hour   Intake 903 ml   Output 650 ml   Net 253 ml       Nutrition:  Diet: Diabetic; Consistent Carbohydrate; No Mixed Consistencies; Texture: Soft to Chew (NDD 3); Soft to Chew: Chopped Meat; Fluid Consistency: Thin (IDDSI 0)    Infusions:       Mechanical Ventilator Settings:                                                Telemetry: Sinus rhythm             Vital Signs  Blood pressure 140/73, pulse 83, temperature 98.6 °F (37 °C), temperature source Oral, resp. rate 22, height 170.2 cm (67\"), weight 130 kg (287 lb), SpO2 92%.    Physical Exam:  General Appearance:  Overweight middle-aged woman, upright in bed   Head:  Atraumatic   Eyes:          No jaundice, conjunctiva pink   Ears:     Throat: No thrush   Neck: Short, thick neck, " PHQ 7/6/2021 3/19/2022 3/28/2022   PHQ-9 Total Score 6 8 9   Q9: Thoughts of better off dead/self-harm past 2 weeks Not at all Several days Several days   F/U: Thoughts of suicide or self-harm - No No   F/U: Safety concerns - No No     Sertraline was very good for mood.     Added venlafaxine for mood/depression augmentation and also migraine prophylaxis.  Worked well for migraine but seemed to make mood worse.  Has started/stopped venlafaxine twice and same problem each time.     Plan:  Reviewed other migraine controller options and potential risks/side effects including amitriptyline and metoprolol     Given also being treated for hypertension elected to trial metoprolol xl 25mg daily.     Will  Monitor blood pressue daily and if starting to be consistently in 100s/60-70s we can start to back of lisinopril/hctz dose.     Follow up with myself in about one month     4:32-4:51 vide0   trachea midline, no crepitus   Back:      Lungs:   Decreased breath sounds right base, scattered rhonchi anteriorly, no wheezing, diminished at the right base posteriorly    Heart:  Distant heart sounds, regular   Abdomen:   Large abdomen, bowel sounds present, nontender   Rectal:   Deferred   Extremities: No pretibial edema.     Pulses:    Skin: Left foot medial ball with ecchymotic area and some skin sloughing but no erythema or purulence   Lymph nodes:    Neurologic: Awake, alert, oriented, speech fluent,  symmetric      Results Review:     I reviewed the patient's new clinical results.   Results from last 7 days   Lab Units 07/23/25  0511 07/22/25  0227   SODIUM mmol/L 136 133*   POTASSIUM mmol/L 4.7 4.2   CHLORIDE mmol/L 104 98   CO2 mmol/L 22.9 19.9*   BUN mg/dL 15.8 9.8   CREATININE mg/dL 0.83 0.89   CALCIUM mg/dL 8.8 9.2   BILIRUBIN mg/dL 0.2 0.8   ALK PHOS U/L 91 127*   ALT (SGPT) U/L 6 7   AST (SGOT) U/L 11 10   GLUCOSE mg/dL 176* 270*     Results from last 7 days   Lab Units 07/23/25  1759 07/23/25  0511 07/22/25  0227   WBC 10*3/mm3  --  19.38* 29.08*   HEMOGLOBIN g/dL 12.1 11.5* 14.5   HEMATOCRIT % 39.6 37.5 44.4   PLATELETS 10*3/mm3  --  336 430     Results from last 7 days   Lab Units 07/24/25  0441   PH, ARTERIAL pH units 7.386   PO2 ART mm Hg 75.3*   PCO2, ARTERIAL mm Hg 44.9   HCO3 ART mmol/L 26.9*     Lab Results   Component Value Date    BLOODCX No growth at 2 days 07/22/2025    BLOODCX No growth at 2 days 07/22/2025     Lab Results   Component Value Date    URINECX Final report 12/11/2018       I reviewed the patient's new imaging including images and reports.    Interpretation Summary echocardiogram July 22, 2025         Left ventricular systolic function is normal. Calculated left ventricular EF = 60.8% Left ventricular ejection fraction appears to be 56 - 60%.    The following left ventricular wall segments are hypokinetic: apical septal, basal inferoseptal, mid inferoseptal, mid  anteroseptal and basal inferoseptal.    Left ventricular wall thickness is consistent with borderline concentric hypertrophy.  Grade I diastolic dysfunction is present.    Normal right ventricular size and function.    Cardiac valves very poorly visualized due to poor acoustic windows. No hemodynamically  significant valvular dysfunction noted by Doppler evaluation.       CT ANGIOGRAM CHEST PULMONARY EMBOLISM    Date of Exam: 7/22/2025 5:27 AM EDT    Indication: Pulmonary Embolism.    Comparison: CT chest 12/30/2021 and chest radiograph performed earlier today. CT abdomen 6/12/2022.    Technique: Axial CT images were obtained of the chest after the uneventful intravenous administration of 80 mL Isovue-370 utilizing pulmonary embolism protocol.  In addition, a 3-D volume rendered image was created for interpretation.  Reconstructed  coronal and sagittal images were also obtained. Automated exposure control and iterative construction methods were used.      Findings:  The thyroid, trachea and esophagus appear within normal limits. There is a small hiatal hernia. There is multifocal mediastinal lymphadenopathy including an anterior right paratracheal lymph node on image 26 of the axial series measuring 19 mm in the  short axis, distal left paratracheal lymph node on image 37 measuring 17 mm in the short axis and a right hilar lymph node on axial image 43 measuring up to 21 mm diameter in the short axis. There is no pericardial effusion. The aorta, aortic branch  vessels and main pulmonary artery appear within normal limits. There is no evidence of pulmonary embolism.    There are extensive tree-in-bud nodules present in the right lung. There are more subtle and less pronounced tree-in-bud nodules in the left lung all consistent with infectious bronchiolitis. There are patchy areas of airspace disease in the right middle   lobe consistent with pneumonia and there is mild diffuse peribronchial thickening most pronounced  on the right and worse in the right lower lobe likely also infectious/inflammatory. No pneumothorax or pleural effusion. There is a calcified right upper  lobe granuloma.    No acute findings in the superficial soft tissues. There is a heterogeneous appearing right adrenal nodule measuring 34 mm. Left adrenal gland is diminutive. Patient is status post cholecystectomy. Liver is partially seen, but appears enlarged. Spleen is   also partially seen, but likely enlarged. There are multiple chronic compression deformities in the thoracic spine including at T5, T6, T7, T8 and T9. There is a minor segmentation anomaly at C6-C7 with incomplete disc and facet joint formation. No  acute osseous abnormality. Bones are demineralized.   Impression:     Impression:  1.No evidence of pulmonary embolism.  2.Extensive tree-in-bud nodules in the right lung and to a lesser extent in the left lung consistent with infectious bronchiolitis. There is also patchy airspace disease in the right middle lobe consistent with pneumonia.  3.Mediastinal and right hilar lymphadenopathy, likely reactive.  4.34 mm heterogeneous right adrenal nodule. Diminutive left adrenal gland. Right adrenal nodule is unchanged from 2022 likely reflecting adenoma.  5.Multiple chronic compression deformities in the thoracic spine.  6.Small hiatal hernia.            Electronically Signed: Juanpablo Barron MD   7/22/2025 5:54 AM EDT         All medications reviewed.   apixaban, 5 mg, Oral, Q12H  ARIPiprazole, 10 mg, Oral, Daily  cefTRIAXone, 1,000 mg, Intravenous, Q24H  doxycycline, 100 mg, Intravenous, Q12H  escitalopram, 20 mg, Oral, Daily  insulin glargine, 15 Units, Subcutaneous, Q12H  insulin lispro, 3-14 Units, Subcutaneous, 4x Daily AC & at Bedtime  ipratropium-albuterol, 3 mL, Nebulization, BID  methylPREDNISolone sodium succinate, 40 mg, Intravenous, Daily  miconazole, 1 Application, Topical, Q12H  mupirocin, 1 Application, Each Nare, BID  oxybutynin XL, 5 mg,  Oral, Daily  pantoprazole, 40 mg, Oral, Q AM  sodium chloride, 10 mL, Intravenous, Q12H  sodium chloride, 4 mL, Nebulization, BID - RT          Assessment & Plan     Acute on chronic respiratory failure  COPD  Pneumonia  Morbid obesity  History of DVT/PE on anticoagulation  Diabetic ulcer, left great toe    59-year-old woman, former smoker with chronic respiratory failure on 3 L nasal cannula, underlying COPD, morbid obesity with obesity hypoventilation, history of PE on Eliquis, diabetes, hypertension presenting with worsening shortness of air, productive cough and subjective fever.  On initial presentation she was tripoding in extreme respiratory distress and visibly cyanotic.  She was placed on BiPAP by the EMS service and this was continued in the emergency room.  She received steroids, bronchodilators, antibiotics, furosemide.  Imaging revealed an infiltrate in the right lower lobe.  CT scan confirmed extensive tree-in-bud opacities in the right lung a lesser extent the left lung with some reactive adenopathy.  Legionella and pneumococcal urinary antigens are negative.  Respiratory panel PCR is negative.  Nasal swab for MRSA is positive.  Blood cultures are negative at 24 hours.  Echo reveals a normal EF although she has some wall motion abnormalities.  There was no evidence of PE.    In addition to her pneumonia, she does have an ecchymotic area on the medial ball of her left foot with some superficial skin breakdown.  Asked infectious disease to evaluate.  She was initially received aztreonam, Levaquin in the emergency room on July 21.  Placed on Rocephin, doxycycline, on admission 7/22.  She then received a dose of vancomycin on July 22 but it was not continued.  WBC is 19.4, improved compared to 29.  She has been able to come off BiPAP and tolerate 6 L nasal cannula.    Blood glucoses were poorly controlled.  A1c is 9.  She did have a slight anion gap of 15.  Insulin drip was initiated. 7/23 anion gap  closed and drip transitioned.  She also had some elevated ketones on admission.  She does have peripheral neuropathy.  She has a history of a severe reaction to Ozempic causing refractory vomiting and diarrhea.  Currently blood glucoses are 120-200.  However with the afternoon meal she did jump to 300    She reports a history of tracheostomy x 2 in the past for ventilator weaning.  Was last intubated here in December 2021 but did not require tracheostomy.  According to those notes she had a prolonged intubation and mechanical ventilation in January 2010, April 2019.  Today on 6 L nasal cannula ABG reveals a pH of 7.38, CO2 45, O2 of 75.  This indicates that she does not chronically retain CO2 from obesity hypoventilation.    Was hospitalized in [place] May 18, 2025 with pneumococcal pneumonia and sepsis.  Imaging at that time was negative for PE.  She remains on Eliquis for a previous history of DVT PE.  She initially had a PE in 2017 in the left upper lobe.  However multiple CTAs of the chest since that scan have been negative.  In addition, multiple venous duplex studies Prolastin 2020, are negative for DVT.  With her limited mobility and body habitus, she is at risk for DVT PE and therefore we will leave her anticoagulated.  I could not find any evidence that a hypercoagulable workup been performed.  She also had some mediastinal adenopathy on that scan as well as her scan last night.      Hemoglobin was 14.5 on admission and has decreased to 11.5.  Repeat hemoglobin is stable at 12.1.  Clinically I suspect hydration caused a mild drop in her hemoglobin.  She denies hematemesis or melena.       PLAN:    BiPAP nightly and as needed  Nasal cannula oxygen to maintain saturation 88 to 92%  Antibiotics per infectious disease, Rocephin, doxycycline    Continue DuoNeb but decrease to twice daily  Add hypertonic saline nebulized twice daily    Addition to oral steroids      Lantus 15 units every 12 hours plus sliding  scale  Add additional meal coverage  Diabetes educator    Continue anticoagulation  Mobilize, at baseline she walks with a walker  Telemetry    VTE Prophylaxis: Eliquis    Stress Ulcer Prophylaxis: Protonix    Rain Beltran MD  07/24/25  16:52 EDT      Time: Critical care 30 min  I personally provided care to this critically ill patient as documented above.  Critical care time does not include time spent on separately billed procedures.  None of my critical care time was concurrent with other critical care providers.

## 2025-07-24 NOTE — THERAPY EVALUATION
Patient Name: Dani Ziegler  : 1965    MRN: [id number]                              Today's Date: 2025       Admit Date: 2025    Visit Dx:     ICD-10-CM ICD-9-CM   1. Respiratory distress  R06.03 786.09   2. Acute respiratory failure with hypoxia  J96.01 518.81   3. Pneumonia of right middle lobe due to infectious organism  J18.9 486   4. Sepsis without acute organ dysfunction, due to unspecified organism  A41.9 038.9     995.91   5. COPD with acute exacerbation  J44.1 491.21   6. Leukocytosis, unspecified type  D72.829 288.60   7. Sinus tachycardia  R00.0 427.89   8. Oropharyngeal dysphagia  R13.12 787.22     Patient Active Problem List   Diagnosis    Uncontrolled type 2 diabetes mellitus with hyperglycemia, without long-term current use of insulin    Vitamin D deficiency    Peripheral neuropathy    Adiposity    Essential hypertension    Chronic pain    Mucopurulent chronic bronchitis    Anxiety and depression    Arthritis involving multiple sites    Mixed hyperlipidemia    Special screening for malignant neoplasms, colon    Cellulitis of left lower extremity    Acute on chronic renal insufficiency    Severe sepsis    Cellulitis of left leg    Lumbar disc disease    Diabetic peripheral neuropathy    MRSA pneumonia of left midlung present on admission    Polypharmacy    Morbid obesity with BMI of 45.0-49.9. ?  LOUANN/OHS    Acute on chronic respiratory failure with hypoxia and hypercapnia    H/O recurrent DVT on Eliquis     Chronic narcotic/BZD use    Illicit drug use (UDS + methamphetamines)     Smoker    SBO s/p exploratory laparotomy and incisional hernia repair with mesh 2022    Incisional hernia with obstruction but no gangrene    Acute on chronic respiratory failure with hypoxia     Past Medical History:   Diagnosis Date    Abnormal weight gain     Acute UTI     Anxiety     Arthritis involving multiple sites     Chronic obstructive pulmonary disease     Chronic pain     Current every day  smoker     Depression     Diabetes mellitus     Diarrhea     Dysuria     Edema, lower extremity     Fever     Head injury     Hypertension     Intervertebral disc disorder     Degeneration of intervertebral disc, site unspecified (722.6)    Left bundle branch block     Leukocytosis     Long term current use of methadone for pain control     Mass of right breast     Nausea     Obesity     Overactive bladder     Peripheral neuropathy     Superficial phlebitis     right medial calf    Upper respiratory infection     Urinary incontinence     Vitamin D deficiency     Vomiting      Past Surgical History:   Procedure Laterality Date    BLADDER SURGERY      pubovaginal sling    CHOLECYSTECTOMY      EXPLORATORY LAPAROTOMY N/A 06/12/2022    Procedure: LAPAROTOMY EXPLORATORY UMBILICAL HERNIA REPAIR WITH MESH;  Surgeon: Reji Brown MD;  Location: [facility];  Service: General;  Laterality: N/A;    HERNIA REPAIR      HIP ARTHROSCOPY Right 10/29/2020    JOINT REPLACEMENT      TRACHEOSTOMY        General Information       Row Name 07/24/25 1523          Physical Therapy Time and Intention    Document Type evaluation  -KE     Mode of Treatment physical therapy  -SYLWIA       Row Name 07/24/25 1523          General Information    Patient Profile Reviewed yes  -KE     Prior Level of Function independent:;all household mobility;community mobility;ADL's;bed mobility  ind w/ use of rollator, states she just finished HHPT, denies recent falls. Roommate assists as needed for home management and cooking  -SYLWIA     Existing Precautions/Restrictions fall;oxygen therapy device and L/min  -SLYWIA     Barriers to Rehab medically complex  -SYLWIA       Row Name 07/24/25 1523          Living Environment    Current Living Arrangements home  -KE     People in Home friend(s)  -SYLWIA       Row Name 07/24/25 1523          Home Main Entrance    Number of Stairs, Main Entrance two  -KE     Stair Railings, Main Entrance railing on left side (ascending)  -SYLWIA        Row Name 07/24/25 1523          Stairs Within Home, Primary    Number of Stairs, Within Home, Primary none  -KE       Row Name 07/24/25 1523          Cognition    Orientation Status (Cognition) oriented x 3  -KE       Row Name 07/24/25 1523          Safety Issues/Impairments Affecting Functional Mobility    Safety Issues Affecting Function (Mobility) awareness of need for assistance;safety precautions follow-through/compliance;safety precaution awareness;insight into deficits/self-awareness;sequencing abilities  -KE     Impairments Affecting Function (Mobility) balance;endurance/activity tolerance;shortness of breath;strength;pain  -KE               User Key  (r) = Recorded By, (t) = Taken By, (c) = Cosigned By      Initials Name Provider Type    Camila Gambino, STEPHAN Physical Therapist                   Mobility       Row Name 07/24/25 1530          Bed Mobility    Bed Mobility supine-sit  -KE     Supine-Sit [place] (Bed Mobility) minimum assist (75% patient effort)  -KE     Assistive Device (Bed Mobility) head of bed elevated;bed rails  -KE     Comment, (Bed Mobility) increased time to reach EOB, reaching for UE support to bring trunk upright  -KE       Row Name 07/24/25 1530          Bed-Chair Transfer    Bed-Chair [place] (Transfers) contact guard  -KE     Assistive Device (Bed-Chair Transfers) other (see comments)  UE support on BSC handles  -KE     Comment, (Bed-Chair Transfer) EOB>BSC  -KE       Row Name 07/24/25 1530          Sit-Stand Transfer    Sit-Stand [place] (Transfers) contact guard;1 person assist  -KE     Assistive Device (Sit-Stand Transfers) walker, front-wheeled  -KE     Comment, (Sit-Stand Transfer) x1 from EOB, x1 from BSC, x1 from chair  -KE       Row Name 07/24/25 1530          Gait/Stairs (Locomotion)    [place] Level (Gait) contact guard;1 person assist  -KE     Assistive Device (Gait) walker, front-wheeled  -KE     Patient was able to Ambulate yes  -KE     Distance  "in Feet (Gait) 12  +20  -KE     Deviations/Abnormal Patterns (Gait) bilateral deviations;gait speed decreased;stride length decreased;base of support, wide  -KE     Bilateral Gait Deviations heel strike decreased;forward flexed posture  -KE     Comment, (Gait/Stairs) Pt demo step through gait pattern with wide MARK, slow gait speed, and fwd flexed posture. Req prolonged seated rest break between bouts. Noted to be SOA however SpO2 remaining >90%. Further mobility limited by fatigue.  -KE               User Key  (r) = Recorded By, (t) = Taken By, (c) = Cosigned By      Initials Name Provider Type    Camila Gambino, PT Physical Therapist                   Obj/Interventions       Row Name 07/24/25 1536          Range of Motion Comprehensive    General Range of Motion bilateral lower extremity ROM WFL  -KE       Row Name 07/24/25 1536          Strength Comprehensive (MMT)    General Manual Muscle Testing (MMT) Assessment lower extremity strength deficits identified  -KE     Comment, General Manual Muscle Testing (MMT) Assessment B LE grossly 3+/5  -KE       Row Name 07/24/25 1536          Balance    Balance Assessment sitting static balance;sitting dynamic balance;standing static balance;standing dynamic balance  -KE     Static Sitting Balance standby assist  -KE     Dynamic Sitting Balance standby assist  -KE     Position, Sitting Balance sitting edge of bed  -KE     Static Standing Balance contact guard  -KE     Dynamic Standing Balance contact guard  -KE     Position/Device Used, Standing Balance supported  -KE     Balance Interventions sitting;standing;sit to stand;supported;static;dynamic  -KE     Comment, Balance no overt LOB  -KE       Row Name 07/24/25 1536          Sensory Assessment (Somatosensory)    Sensory Assessment (Somatosensory) other (see comments)  B LE impaired, baseline neuropathy  -KE     Sensory Subjective Reports tingling;insensate  pt described as \"dull\"  -KE               User Key  (r) = " Recorded By, (t) = Taken By, (c) = Cosigned By      Initials Name Provider Type    Camila Gambino, PT Physical Therapist                   Goals/Plan       Row Name 07/24/25 1550          Bed Mobility Goal 1 (PT)    Activity/Assistive Device (Bed Mobility Goal 1, PT) sit to supine/supine to sit  -KE     [place] Level/Cues Needed (Bed Mobility Goal 1, PT) modified independence  -KE     Time Frame (Bed Mobility Goal 1, PT) short term goal (STG);5 days  -KE     Progress/Outcomes (Bed Mobility Goal 1, PT) new goal  -KE       Row Name 07/24/25 1550          Transfer Goal 1 (PT)    Activity/Assistive Device (Transfer Goal 1, PT) sit-to-stand/stand-to-sit;bed-to-chair/chair-to-bed  -KE     [place] Level/Cues Needed (Transfer Goal 1, PT) modified independence  -KE     Time Frame (Transfer Goal 1, PT) long term goal (LTG);10 days  -KE     Progress/Outcome (Transfer Goal 1, PT) new goal  -KE       Row Name 07/24/25 1550          Gait Training Goal 1 (PT)    Activity/Assistive Device (Gait Training Goal 1, PT) gait (walking locomotion)  -KE     [place] Level (Gait Training Goal 1, PT) modified independence  -KE     Distance (Gait Training Goal 1, PT) 100  -KE     Time Frame (Gait Training Goal 1, PT) long term goal (LTG);10 days  -KE     Progress/Outcome (Gait Training Goal 1, PT) new goal  -KE       Row Name 07/24/25 1550          Stairs Goal 1 (PT)    Activity/Assistive Device (Stairs Goal 1, PT) stairs, all skills  -KE     [place] Level/Cues Needed (Stairs Goal 1, PT) contact guard required  -KE     Number of Stairs (Stairs Goal 1, PT) 2  -KE     Time Frame (Stairs Goal 1, PT) long term goal (LTG);10 days  -KE     Progress/Outcome (Stairs Goal 1, PT) new goal  -KE       Row Name 07/24/25 1550          Therapy Assessment/Plan (PT)    Planned Therapy Interventions (PT) balance training;bed mobility training;home exercise program;postural re-education;patient/family education;neuromuscular  re-education;gait training;ROM (range of motion);stair training;strengthening;stretching;transfer training  -KE               User Key  (r) = Recorded By, (t) = Taken By, (c) = Cosigned By      Initials Name Provider Type    Camila Gambino, PT Physical Therapist                   Clinical Impression       Row Name 07/24/25 1537          Pain    Pretreatment Pain Rating 0/10 - no pain  -KE     Posttreatment Pain Rating 0/10 - no pain  -KE       Row Name 07/24/25 1537          Plan of Care Review    Plan of Care Reviewed With patient  -KE     Progress no change  -KE     Outcome Evaluation PT eval complete. Pt presents below baseline with generalized weakness, dyspnea on exertion, mild balance deficits, and decreased functional endurance warranting IPPT. Pt amb 12 ft +20 ft w/ FWW, CGA. PT rec home w/ assist and HHPT at d/c, will continue to monitor progress.  -       Row Name 07/24/25 1537          Therapy Assessment/Plan (PT)    Patient/Family Therapy Goals Statement (PT) to go home  -KE     Rehab Potential (PT) fair  -KE     Criteria for Skilled Interventions Met (PT) yes;meets criteria;skilled treatment is necessary  -KE     Therapy Frequency (PT) daily  -KE     Predicted Duration of Therapy Intervention (PT) 10 days  -KE       Row Name 07/24/25 1537          Vital Signs    Pre Systolic BP Rehab 140  -KE     Pre Treatment Diastolic BP 73  -KE     Post Systolic BP Rehab 132  -KE     Post Treatment Diastolic BP 80  -KE     Pretreatment Heart Rate (beats/min) 89  -KE     Posttreatment Heart Rate (beats/min) 82  -KE     Pre SpO2 (%) 93  -KE     O2 Delivery Pre Treatment room air  -KE     Intra SpO2 (%) 91  -KE     O2 Delivery Intra Treatment room air  -KE     Post SpO2 (%) 93  -KE     O2 Delivery Post Treatment room air  -KE     Pre Patient Position Supine  -KE     Intra Patient Position Standing  -KE     Post Patient Position Sitting  -KE       Row Name 07/24/25 1537          Positioning and Restraints     Pre-Treatment Position in bed  -KE     Post Treatment Position chair  -KE     In Chair notified nsg;reclined;waffle cushion;call light within reach;encouraged to call for assist;exit alarm on;legs elevated  -KE               User Key  (r) = Recorded By, (t) = Taken By, (c) = Cosigned By      Initials Name Provider Type    Camila Gambino PT Physical Therapist                   Outcome Measures       Row Name 07/24/25 1551          How much help from another person do you currently need...    Turning from your back to your side while in flat bed without using bedrails? 3  -KE     Moving from lying on back to sitting on the side of a flat bed without bedrails? 3  -KE     Moving to and from a bed to a chair (including a wheelchair)? 3  -KE     Standing up from a chair using your arms (e.g., wheelchair, bedside chair)? 3  -KE     Climbing 3-5 steps with a railing? 2  -KE     To walk in hospital room? 3  -KE     AM-PAC 6 Clicks Score (PT) 17  -KE     Highest Level of Mobility Goal Stand (1 or More Minutes)-5  -KE       Row Name 07/24/25 1551          Functional Assessment    Outcome Measure Options AM-PAC 6 Clicks Basic Mobility (PT)  -KE               User Key  (r) = Recorded By, (t) = Taken By, (c) = Cosigned By      Initials Name Provider Type    Camila Gambino, STEPHAN Physical Therapist                                 Physical Therapy Education       Title: PT OT SLP Therapies (In Progress)       Topic: Physical Therapy (In Progress)       Point: Mobility training (In Progress)       Learning Progress Summary            Patient Acceptance, E, NR by SYLWIA at 7/24/2025 1552                      Point: Home exercise program (Not Started)       Learner Progress:  Not documented in this visit.              Point: Body mechanics (In Progress)       Learning Progress Summary            Patient Acceptance, E, NR by SYLWIA at 7/24/2025 1552                      Point: Precautions (In Progress)       Learning Progress Summary             Patient Acceptance, E, NR by  at 7/24/2025 1552                                      User Key       Initials Effective Dates Name Provider Type Discipline     11/16/23 -  Camila Webb PT Physical Therapist PT                  PT Recommendation and Plan  Planned Therapy Interventions (PT): balance training, bed mobility training, home exercise program, postural re-education, patient/family education, neuromuscular re-education, gait training, ROM (range of motion), stair training, strengthening, stretching, transfer training  Progress: no change  Outcome Evaluation: PT eval complete. Pt presents below baseline with generalized weakness, dyspnea on exertion, mild balance deficits, and decreased functional endurance warranting IPPT. Pt amb 12 ft +20 ft w/ FWW, CGA. PT rec home w/ assist and HHPT at d/c, will continue to monitor progress.     Time Calculation:   PT Evaluation Complexity  History, PT Evaluation Complexity: 3 or more personal factors and/or comorbidities  Examination of Body Systems (PT Eval Complexity): total of 3 or more elements  Clinical Presentation (PT Evaluation Complexity): evolving  Clinical Decision Making (PT Evaluation Complexity): moderate complexity  Overall Complexity (PT Evaluation Complexity): moderate complexity     PT Charges       Row Name 07/24/25 1553             Time Calculation    Start Time 1356  -KE      PT Received On 07/24/25  -KE      PT Goal Re-Cert Due Date 08/03/25  -KE         Timed Charges    34569 - PT Therapeutic Activity Minutes 14  -KE         Untimed Charges    PT Eval/Re-eval Minutes 51  -KE         Total Minutes    Timed Charges Total Minutes 14  -KE      Untimed Charges Total Minutes 51  -KE       Total Minutes 65  -KE                User Key  (r) = Recorded By, (t) = Taken By, (c) = Cosigned By      Initials Name Provider Type    Camila Gambino PT Physical Therapist                  Therapy Charges for Today       Code Description Service  Date Service Provider Modifiers Qty    [id number]  PT THERAPEUTIC ACT EA 15 MIN 7/24/2025 Camila Webb, PT GP 1    [id number] HC PT EVAL MOD COMPLEXITY 4 7/24/2025 Camila Webb, PT GP 1            PT G-Codes  Outcome Measure Options: AM-PAC 6 Clicks Basic Mobility (PT)  AM-PAC 6 Clicks Score (PT): 17  PT Discharge Summary  Anticipated Discharge Disposition (PT): home with assist, home with home health    Camila Webb, PT  7/24/2025

## 2025-07-25 ENCOUNTER — APPOINTMENT (OUTPATIENT)
Dept: GENERAL RADIOLOGY | Facility: HOSPITAL | Age: 60
End: 2025-07-25
Payer: MEDICARE

## 2025-07-25 LAB
ANION GAP SERPL CALCULATED.3IONS-SCNC: 6.7 MMOL/L (ref 5–15)
ARTERIAL PATENCY WRIST A: ABNORMAL
ATMOSPHERIC PRESS: ABNORMAL MM[HG]
BASE EXCESS BLDA CALC-SCNC: 2.3 MMOL/L (ref 0–2)
BASOPHILS # BLD AUTO: 0.05 10*3/MM3 (ref 0–0.2)
BASOPHILS NFR BLD AUTO: 0.4 % (ref 0–1.5)
BDY SITE: ABNORMAL
BODY TEMPERATURE: 37
BUN SERPL-MCNC: 21.2 MG/DL (ref 8–23)
BUN/CREAT SERPL: 23 (ref 7–25)
CALCIUM SPEC-SCNC: 8.7 MG/DL (ref 8.6–10.5)
CHLORIDE SERPL-SCNC: 105 MMOL/L (ref 98–107)
CO2 BLDA-SCNC: 29.9 MMOL/L (ref 22–33)
CO2 SERPL-SCNC: 26.3 MMOL/L (ref 22–29)
COHGB MFR BLD: 1 % (ref 0–2)
CREAT SERPL-MCNC: 0.92 MG/DL (ref 0.57–1)
DEPRECATED RDW RBC AUTO: 51.9 FL (ref 37–54)
EGFRCR SERPLBLD CKD-EPI 2021: 71 ML/MIN/1.73
EOSINOPHIL # BLD AUTO: 0.03 10*3/MM3 (ref 0–0.4)
EOSINOPHIL NFR BLD AUTO: 0.2 % (ref 0.3–6.2)
EPAP: 0
ERYTHROCYTE [DISTWIDTH] IN BLOOD BY AUTOMATED COUNT: 15.6 % (ref 12.3–15.4)
GLUCOSE BLDC GLUCOMTR-MCNC: 207 MG/DL (ref 70–130)
GLUCOSE BLDC GLUCOMTR-MCNC: 285 MG/DL (ref 70–130)
GLUCOSE BLDC GLUCOMTR-MCNC: 344 MG/DL (ref 70–130)
GLUCOSE BLDC GLUCOMTR-MCNC: 89 MG/DL (ref 70–130)
GLUCOSE SERPL-MCNC: 237 MG/DL (ref 65–99)
HCO3 BLDA-SCNC: 28.4 MMOL/L (ref 20–26)
HCT VFR BLD AUTO: 37.1 % (ref 34–46.6)
HCT VFR BLD CALC: 36.5 % (ref 38–51)
HGB BLD-MCNC: 11.3 G/DL (ref 12–15.9)
HGB BLDA-MCNC: 11.9 G/DL (ref 14–18)
IMM GRANULOCYTES # BLD AUTO: 0.25 10*3/MM3 (ref 0–0.05)
IMM GRANULOCYTES NFR BLD AUTO: 1.9 % (ref 0–0.5)
INHALED O2 CONCENTRATION: 44 %
IPAP: 0
LYMPHOCYTES # BLD AUTO: 3.28 10*3/MM3 (ref 0.7–3.1)
LYMPHOCYTES NFR BLD AUTO: 25.2 % (ref 19.6–45.3)
MAGNESIUM SERPL-MCNC: 1.9 MG/DL (ref 1.6–2.4)
MCH RBC QN AUTO: 27.9 PG (ref 26.6–33)
MCHC RBC AUTO-ENTMCNC: 30.5 G/DL (ref 31.5–35.7)
MCV RBC AUTO: 91.6 FL (ref 79–97)
METHGB BLD QL: -0.1 % (ref 0–1.5)
MODALITY: ABNORMAL
MONOCYTES # BLD AUTO: 0.77 10*3/MM3 (ref 0.1–0.9)
MONOCYTES NFR BLD AUTO: 5.9 % (ref 5–12)
NEUTROPHILS NFR BLD AUTO: 66.4 % (ref 42.7–76)
NEUTROPHILS NFR BLD AUTO: 8.65 10*3/MM3 (ref 1.7–7)
NRBC BLD AUTO-RTO: 0 /100 WBC (ref 0–0.2)
OXYHGB MFR BLDV: 95.7 % (ref 94–99)
PAW @ PEAK INSP FLOW SETTING VENT: 0 CMH2O
PCO2 BLDA: 49.4 MM HG (ref 35–45)
PCO2 TEMP ADJ BLD: 49.4 MM HG (ref 35–45)
PH BLDA: 7.37 PH UNITS (ref 7.35–7.45)
PH, TEMP CORRECTED: 7.37 PH UNITS
PLATELET # BLD AUTO: 332 10*3/MM3 (ref 140–450)
PMV BLD AUTO: 10.6 FL (ref 6–12)
PO2 BLDA: 78.6 MM HG (ref 83–108)
PO2 TEMP ADJ BLD: 78.6 MM HG (ref 83–108)
POTASSIUM SERPL-SCNC: 4.6 MMOL/L (ref 3.5–5.2)
RBC # BLD AUTO: 4.05 10*6/MM3 (ref 3.77–5.28)
SODIUM SERPL-SCNC: 138 MMOL/L (ref 136–145)
TOTAL RATE: 0 BREATHS/MINUTE
WBC NRBC COR # BLD AUTO: 13.03 10*3/MM3 (ref 3.4–10.8)

## 2025-07-25 PROCEDURE — 25010000002 MAGNESIUM SULFATE 4 GM/100ML SOLUTION: Performed by: INTERNAL MEDICINE

## 2025-07-25 PROCEDURE — 99232 SBSQ HOSP IP/OBS MODERATE 35: CPT | Performed by: INTERNAL MEDICINE

## 2025-07-25 PROCEDURE — 97165 OT EVAL LOW COMPLEX 30 MIN: CPT

## 2025-07-25 PROCEDURE — 85025 COMPLETE CBC W/AUTO DIFF WBC: CPT | Performed by: INTERNAL MEDICINE

## 2025-07-25 PROCEDURE — 83735 ASSAY OF MAGNESIUM: CPT | Performed by: INTERNAL MEDICINE

## 2025-07-25 PROCEDURE — 82948 REAGENT STRIP/BLOOD GLUCOSE: CPT

## 2025-07-25 PROCEDURE — 80048 BASIC METABOLIC PNL TOTAL CA: CPT | Performed by: INTERNAL MEDICINE

## 2025-07-25 PROCEDURE — 97535 SELF CARE MNGMENT TRAINING: CPT

## 2025-07-25 PROCEDURE — 63710000001 INSULIN LISPRO (HUMAN) PER 5 UNITS: Performed by: INTERNAL MEDICINE

## 2025-07-25 PROCEDURE — 82805 BLOOD GASES W/O2 SATURATION: CPT

## 2025-07-25 PROCEDURE — 83050 HGB METHEMOGLOBIN QUAN: CPT

## 2025-07-25 PROCEDURE — 94799 UNLISTED PULMONARY SVC/PX: CPT

## 2025-07-25 PROCEDURE — 63710000001 PREDNISONE PER 1 MG: Performed by: INTERNAL MEDICINE

## 2025-07-25 PROCEDURE — 82375 ASSAY CARBOXYHB QUANT: CPT

## 2025-07-25 PROCEDURE — 36600 WITHDRAWAL OF ARTERIAL BLOOD: CPT

## 2025-07-25 PROCEDURE — 25010000002 DOXYCYCLINE 100 MG RECONSTITUTED SOLUTION 1 EACH VIAL: Performed by: INTERNAL MEDICINE

## 2025-07-25 PROCEDURE — 63710000001 INSULIN GLARGINE PER 5 UNITS: Performed by: INTERNAL MEDICINE

## 2025-07-25 PROCEDURE — 25010000002 CEFTRIAXONE PER 250 MG

## 2025-07-25 PROCEDURE — 71045 X-RAY EXAM CHEST 1 VIEW: CPT

## 2025-07-25 RX ORDER — MAGNESIUM SULFATE HEPTAHYDRATE 40 MG/ML
4 INJECTION, SOLUTION INTRAVENOUS ONCE
Status: COMPLETED | OUTPATIENT
Start: 2025-07-25 | End: 2025-07-25

## 2025-07-25 RX ORDER — IPRATROPIUM BROMIDE AND ALBUTEROL SULFATE 2.5; .5 MG/3ML; MG/3ML
3 SOLUTION RESPIRATORY (INHALATION)
Status: DISCONTINUED | OUTPATIENT
Start: 2025-07-25 | End: 2025-07-31 | Stop reason: HOSPADM

## 2025-07-25 RX ADMIN — DOXYCYCLINE 100 MG: 100 INJECTION, POWDER, LYOPHILIZED, FOR SOLUTION INTRAVENOUS at 10:22

## 2025-07-25 RX ADMIN — MAGNESIUM SULFATE IN WATER FOR 4 G: 40 INJECTION INTRAVENOUS at 09:12

## 2025-07-25 RX ADMIN — TRAZODONE HYDROCHLORIDE 50 MG: 50 TABLET ORAL at 21:52

## 2025-07-25 RX ADMIN — INSULIN LISPRO 10 UNITS: 100 INJECTION, SOLUTION INTRAVENOUS; SUBCUTANEOUS at 20:19

## 2025-07-25 RX ADMIN — MUPIROCIN 1 APPLICATION: 20 OINTMENT TOPICAL at 09:13

## 2025-07-25 RX ADMIN — ARIPIPRAZOLE 10 MG: 10 TABLET ORAL at 09:11

## 2025-07-25 RX ADMIN — ESCITALOPRAM 20 MG: 20 TABLET, FILM COATED ORAL at 09:12

## 2025-07-25 RX ADMIN — IPRATROPIUM BROMIDE AND ALBUTEROL SULFATE 3 ML: .5; 3 SOLUTION RESPIRATORY (INHALATION) at 09:29

## 2025-07-25 RX ADMIN — INSULIN GLARGINE 15 UNITS: 100 INJECTION, SOLUTION SUBCUTANEOUS at 09:10

## 2025-07-25 RX ADMIN — MICONAZOLE NITRATE 1 APPLICATION: 20 POWDER TOPICAL at 20:19

## 2025-07-25 RX ADMIN — INSULIN LISPRO 5 UNITS: 100 INJECTION, SOLUTION INTRAVENOUS; SUBCUTANEOUS at 12:22

## 2025-07-25 RX ADMIN — PREDNISONE 30 MG: 10 TABLET ORAL at 09:11

## 2025-07-25 RX ADMIN — MUPIROCIN 1 APPLICATION: 20 OINTMENT TOPICAL at 20:20

## 2025-07-25 RX ADMIN — CEFTRIAXONE SODIUM 1000 MG: 1 INJECTION, POWDER, FOR SOLUTION INTRAMUSCULAR; INTRAVENOUS at 09:12

## 2025-07-25 RX ADMIN — INSULIN GLARGINE 15 UNITS: 100 INJECTION, SOLUTION SUBCUTANEOUS at 20:19

## 2025-07-25 RX ADMIN — APIXABAN 5 MG: 5 TABLET, FILM COATED ORAL at 20:20

## 2025-07-25 RX ADMIN — IPRATROPIUM BROMIDE AND ALBUTEROL SULFATE 3 ML: .5; 3 SOLUTION RESPIRATORY (INHALATION) at 16:08

## 2025-07-25 RX ADMIN — OXYBUTYNIN CHLORIDE 5 MG: 5 TABLET, EXTENDED RELEASE ORAL at 09:12

## 2025-07-25 RX ADMIN — INSULIN LISPRO 5 UNITS: 100 INJECTION, SOLUTION INTRAVENOUS; SUBCUTANEOUS at 17:14

## 2025-07-25 RX ADMIN — IPRATROPIUM BROMIDE AND ALBUTEROL SULFATE 3 ML: .5; 3 SOLUTION RESPIRATORY (INHALATION) at 13:19

## 2025-07-25 RX ADMIN — PANTOPRAZOLE SODIUM 40 MG: 40 TABLET, DELAYED RELEASE ORAL at 04:52

## 2025-07-25 RX ADMIN — APIXABAN 5 MG: 5 TABLET, FILM COATED ORAL at 09:11

## 2025-07-25 RX ADMIN — INSULIN LISPRO 3 UNITS: 100 INJECTION, SOLUTION INTRAVENOUS; SUBCUTANEOUS at 09:10

## 2025-07-25 RX ADMIN — INSULIN LISPRO 8 UNITS: 100 INJECTION, SOLUTION INTRAVENOUS; SUBCUTANEOUS at 17:13

## 2025-07-25 RX ADMIN — Medication 10 ML: at 20:20

## 2025-07-25 RX ADMIN — IPRATROPIUM BROMIDE AND ALBUTEROL SULFATE 3 ML: .5; 3 SOLUTION RESPIRATORY (INHALATION) at 20:44

## 2025-07-25 RX ADMIN — MICONAZOLE NITRATE 1 APPLICATION: 20 POWDER TOPICAL at 09:13

## 2025-07-25 RX ADMIN — DOXYCYCLINE 100 MG: 100 INJECTION, POWDER, LYOPHILIZED, FOR SOLUTION INTRAVENOUS at 20:20

## 2025-07-25 RX ADMIN — INSULIN LISPRO 5 UNITS: 100 INJECTION, SOLUTION INTRAVENOUS; SUBCUTANEOUS at 09:11

## 2025-07-25 RX ADMIN — Medication 4 ML: at 09:29

## 2025-07-25 RX ADMIN — Medication 4 ML: at 20:45

## 2025-07-25 NOTE — PLAN OF CARE
Goal Outcome Evaluation:  Plan of Care Reviewed With: patient           Outcome Evaluation: Pt presents below baseline with ADLs d/t pain, weakness and decr activity tolerance. Pt min A LBD with use of AE,  CGA to ambulate with RW.  OT will follow to advance pt toward PLOF.  Recommend home with assist and HHOT upon d/c.    Anticipated Discharge Disposition (OT): home with assist, home with home health

## 2025-07-25 NOTE — PLAN OF CARE
Goal Outcome Evaluation:      Out of bed today.  Tolerated 6LNC . Nebs changed from prn to scheduled and worked well  for patient. Denied pain throughout day. Cont POC

## 2025-07-25 NOTE — PROGRESS NOTES
[facility] Medicine Services  PROGRESS NOTE    Patient Name: Dani Ziegler  : 1965  MRN: [id number]    Date of Admission: 2025  Primary Care Physician: Darrion Tovar MD    Subjective   Subjective     CC:  Copd exac    HPI:  Doing ok. Resting in chair, reports she has some incr wob this morning. No family present, nursing at bedside    Ros   As above      Objective   Objective     Vital Signs:   Temp:  [97.7 °F (36.5 °C)-99.2 °F (37.3 °C)] 99.2 °F (37.3 °C)  Heart Rate:  [67-90] 77  Resp:  [20-24] 24  BP: (122-148)/(64-85) 137/76  Flow (L/min) (Oxygen Therapy):  [5-6] 6     Physical Exam:  GEN: NAD, resting in chair, BMI 51  HEENT: on 6L, atraumatic, normocephalic  NECK: supple, no masses  RESP: on 6L incr effort, tachypneic  CV: on tele, sinus rhythm  PSYCH: normal affect, appropriate  NEURO: awake, alert, no focal deficits noted  MSK: no edema noted  SKIN: no rashes noted       Results Reviewed:  LAB RESULTS:      Lab 25  0447 25  1759 25  0511 25  1137 25  0700 25  0422 25  0227   WBC 13.03*  --  19.38*  --   --   --  29.08*   HEMOGLOBIN 11.3* 12.1 11.5*  --   --   --  14.5   HEMATOCRIT 37.1 39.6 37.5  --   --   --  44.4   PLATELETS 332  --  336  --   --   --  430   NEUTROS ABS 8.65*  --  17.33*  --   --   --  23.90*   IMMATURE GRANS (ABS) 0.25*  --  0.28*  --   --   --  0.39*   LYMPHS ABS 3.28*  --  1.29  --   --   --  3.42*   MONOS ABS 0.77  --  0.44  --   --   --  1.27*   EOS ABS 0.03  --  0.00  --   --   --  0.02   MCV 91.6  --  90.1  --   --   --  86.2   CRP  --   --   --   --   --  18.37*  --    PROCALCITONIN  --   --   --   --  0.30* 0.23  --    LACTATE  --   --   --  1.3 2.3*  --  2.1*   HSTROP T  --   --   --   --   --  28* 26*         Lab 25  0447 25  0511 25  0227   SODIUM 138 136 133*   POTASSIUM 4.6 4.7 4.2   CHLORIDE 105 104 98   CO2 26.3 22.9 19.9*   ANION GAP 6.7 9.1 15.1*   BUN 21.2 15.8 9.8    CREATININE 0.92 0.83 0.89   EGFR 71.0 80.8 74.3   GLUCOSE 237* 176* 270*   CALCIUM 8.7 8.8 9.2   MAGNESIUM 1.9 2.5* 1.7   PHOSPHORUS  --  2.8  --    HEMOGLOBIN A1C  --   --  9.01*         Lab 07/23/25  0511 07/22/25  0227   TOTAL PROTEIN 7.3 8.5   ALBUMIN 2.8* 3.3*   GLOBULIN 4.5 5.2   ALT (SGPT) 6 7   AST (SGOT) 11 10   BILIRUBIN 0.2 0.8   ALK PHOS 91 127*         Lab 07/22/25  0422 07/22/25  0227   PROBNP  --  8,901.0*   HSTROP T 28* 26*                 Lab 07/25/25  0952 07/24/25  0441 07/22/25  0230   PH, ARTERIAL 7.368 7.386 7.440   PCO2, ARTERIAL 49.4* 44.9 32.9*   PO2 ART 78.6* 75.3* 286.0*   FIO2 44 44 100   HCO3 ART 28.4* 26.9* 22.3   BASE EXCESS ART 2.3* 1.5 -1.1*   CARBOXYHEMOGLOBIN 1.0 0.9 1.3     Brief Urine Lab Results  (Last result in the past 365 days)        Color   Clarity   Blood   Leuk Est   Nitrite   Protein   CREAT   Urine HCG        07/22/25 0655 Yellow   Cloudy   Trace   Negative   Positive   Trace                   Microbiology Results Abnormal       Procedure Component Value - Date/Time    MRSA Screen, PCR (Inpatient) - Swab, Nares [[id number]]  (Abnormal) Collected: 07/22/25 1325    Lab Status: Final result Specimen: Swab from Nares Updated: 07/22/25 1510     MRSA PCR Positive    Narrative:      The negative predictive value of this diagnostic test is high and should only be used to consider de-escalating anti-MRSA therapy. A positive result may indicate colonization with MRSA and must be correlated clinically.            XR Chest 1 View  Result Date: 7/25/2025  XR CHEST 1 VW Date of Exam: 7/25/2025 9:08 AM EDT Indication: hypoxia/copd exac Comparison: 7/22/2025 chest radiograph and chest CT scan Findings: Prior chest radiograph report indicated new focus of airspace disease in the right lung base. CT scan report described right middle lobe pneumonia and infectious bronchiolitis of both lungs. Mediastinal and right hilar adenopathy. Today's chest radiograph shows decreased focal disease  in the right lung base, but a persistent pattern of diffuse interstitial disease elsewhere that appears either stable or further increased. Although today's images taken with more true AP technique than the prior lordotic film, there appears to be significant further enlargement of the pulmonary rupal. No effusion or pneumothorax is identified.     Impression: Impression: 1. Improved focal pneumonia of the right lung base. 2. Appearance of mildly increased diffuse bronchial wall thickening and interstitial disease elsewhere in the lungs. 3. Further enlargement of the pulmonary rupal, suggesting increasing adenopathy. Electronically Signed: Elton Cain MD  7/25/2025 10:07 AM EDT  Workstation ID: [id number]    SLP FEES - Fiberoptic Endo Eval Swallow  Result Date: 7/23/2025  This procedure was auto-finalized with no dictation required.      Results for orders placed during the hospital encounter of 07/22/25    Adult Transthoracic Echo Complete W/ Cont if Necessary Per Protocol 07/22/2025  4:26 PM    Interpretation Summary    Left ventricular systolic function is normal. Calculated left ventricular EF = 60.8% Left ventricular ejection fraction appears to be 56 - 60%.    The following left ventricular wall segments are hypokinetic: apical septal, basal inferoseptal, mid inferoseptal, mid anteroseptal and basal inferoseptal.    Left ventricular wall thickness is consistent with borderline concentric hypertrophy.  Grade I diastolic dysfunction is present.    Normal right ventricular size and function.    Cardiac valves very poorly visualized due to poor acoustic windows. No hemodynamically  significant valvular dysfunction noted by Doppler evaluation.      Current medications:  Scheduled Meds:apixaban, 5 mg, Oral, Q12H  ARIPiprazole, 10 mg, Oral, Daily  cefTRIAXone, 1,000 mg, Intravenous, Q24H  doxycycline, 100 mg, Intravenous, Q12H  escitalopram, 20 mg, Oral, Daily  insulin glargine, 15 Units, Subcutaneous, Q12H  insulin  lispro, 3-14 Units, Subcutaneous, 4x Daily AC & at Bedtime  Insulin Lispro, 5 Units, Subcutaneous, TID With Meals  ipratropium-albuterol, 3 mL, Nebulization, BID  ipratropium-albuterol, 3 mL, Nebulization, 4x Daily - RT  magnesium sulfate, 4 g, Intravenous, Once  miconazole, 1 Application, Topical, Q12H  mupirocin, 1 Application, Each Nare, BID  oxybutynin XL, 5 mg, Oral, Daily  pantoprazole, 40 mg, Oral, Q AM  predniSONE, 30 mg, Oral, Daily With Breakfast  sodium chloride, 10 mL, Intravenous, Q12H  sodium chloride, 4 mL, Nebulization, BID - RT      Continuous Infusions:   PRN Meds:.  acetaminophen    Calcium Replacement - Follow Nurse / BPA Driven Protocol    dextrose    dextrose    glucagon (human recombinant)    Magnesium Cardiology Dose Replacement - Follow Nurse / BPA Driven Protocol    Phosphorus Replacement - Follow Nurse / BPA Driven Protocol    Potassium Replacement - Follow Nurse / BPA Driven Protocol    sodium chloride    sodium chloride    sodium chloride    traZODone    Assessment & Plan   Assessment & Plan     Active Hospital Problems    Diagnosis  POA    **Acute on chronic respiratory failure with hypoxia [J96.21]  Yes    Acute on chronic hypoxic respiratory failure [J96.21]  Yes      Resolved Hospital Problems   No resolved problems to display.        Brief Hospital Course to date:  Dani Ziegler is a 60 y.o. female with history of tobacco use, COPD, morbid obesity, h/o PE, T2DM, HTN who presents with soa, cough and fever. Patient was initially admitted to the ICU with respiratory failure requiring BIPAP. She received steroids, bronchodilators, antibiotics, furosemide. Imaging revealed an infiltrate in the right lower lobe. CT scan confirmed extensive tree-in-bud opacities in the right lung a lesser extent the left lung with some reactive adenopathy. Legionella and pneumococcal urinary antigens are negative. Respiratory panel PCR is negative. Nasal swab for MRSA is positive. Blood cultures are  negative at 24 hours. Echo reveals a normal EF although she has some wall motion abnormalities. There was no evidence of PE. Patient noted to have an ecchymotic area on the medial ball of her left foot with some superficial skin breakdown.  ID following with abx ongoing. Patient transitioned to floor 7/25    Acute on chronic respiratory failure  COPD exacerbation  Possible PNA  --currently on 6L, 3L is her baseline  --continue abx, steroids, nebs   --wean as tolerated  --check CXR/ABG today due to some tachypnea during visit--both look reassuring and patient improved following neb    Left medial foot blister/bruise  --ID following, do not favor this is source of infection  --abx per above PNA    T2DM  --A1C 9, initially on insulin gtt  --continue basal/prandial/bolus    History of DVT/PE  --on eliquis             Expected Discharge Location and Transportation: home with   Expected Discharge   Expected discharge date/ time has not been documented.     VTE Prophylaxis:  Pharmacologic VTE prophylaxis orders are present.         AM-PAC 6 Clicks Score (PT): 17 (07/24/25 2000)    CODE STATUS:   Code Status and Medical Interventions: CPR (Attempt to Resuscitate); Full Support   Ordered at: 07/24/25 1705     Code Status (Patient has no pulse and is not breathing):    CPR (Attempt to Resuscitate)     Medical Interventions (Patient has pulse or is breathing):    Full Support       Tete Cunha MD  07/25/25

## 2025-07-25 NOTE — THERAPY EVALUATION
Patient Name: Dani Ziegler  : 1965    MRN: [id number]                              Today's Date: 2025       Admit Date: 2025    Visit Dx:     ICD-10-CM ICD-9-CM   1. Respiratory distress  R06.03 786.09   2. Acute respiratory failure with hypoxia  J96.01 518.81   3. Pneumonia of right middle lobe due to infectious organism  J18.9 486   4. Sepsis without acute organ dysfunction, due to unspecified organism  A41.9 038.9     995.91   5. COPD with acute exacerbation  J44.1 491.21   6. Leukocytosis, unspecified type  D72.829 288.60   7. Sinus tachycardia  R00.0 427.89   8. Oropharyngeal dysphagia  R13.12 787.22     Patient Active Problem List   Diagnosis    Uncontrolled type 2 diabetes mellitus with hyperglycemia, without long-term current use of insulin    Vitamin D deficiency    Peripheral neuropathy    Adiposity    Essential hypertension    Chronic pain    Mucopurulent chronic bronchitis    Anxiety and depression    Arthritis involving multiple sites    Mixed hyperlipidemia    Special screening for malignant neoplasms, colon    Cellulitis of left lower extremity    Acute on chronic renal insufficiency    Severe sepsis    Cellulitis of left leg    Lumbar disc disease    Diabetic peripheral neuropathy    MRSA pneumonia of left midlung present on admission    Polypharmacy    Morbid obesity with BMI of 45.0-49.9. ?  LOUANN/OHS    Acute on chronic respiratory failure with hypoxia and hypercapnia    H/O recurrent DVT on Eliquis     Chronic narcotic/BZD use    Illicit drug use (UDS + methamphetamines)     Smoker    SBO s/p exploratory laparotomy and incisional hernia repair with mesh 2022    Incisional hernia with obstruction but no gangrene    Acute on chronic respiratory failure with hypoxia    Acute on chronic hypoxic respiratory failure     Past Medical History:   Diagnosis Date    Abnormal weight gain     Acute UTI     Anxiety     Arthritis involving multiple sites     Chronic obstructive pulmonary  disease     Chronic pain     Current every day smoker     Depression     Diabetes mellitus     Diarrhea     Dysuria     Edema, lower extremity     Fever     Head injury     Hypertension     Intervertebral disc disorder     Degeneration of intervertebral disc, site unspecified (722.6)    Left bundle branch block     Leukocytosis     Long term current use of methadone for pain control     Mass of right breast     Nausea     Obesity     Overactive bladder     Peripheral neuropathy     Superficial phlebitis     right medial calf    Upper respiratory infection     Urinary incontinence     Vitamin D deficiency     Vomiting      Past Surgical History:   Procedure Laterality Date    BLADDER SURGERY      pubovaginal sling    CHOLECYSTECTOMY      EXPLORATORY LAPAROTOMY N/A 06/12/2022    Procedure: LAPAROTOMY EXPLORATORY UMBILICAL HERNIA REPAIR WITH MESH;  Surgeon: Reji Borwn MD;  Location: [facility];  Service: General;  Laterality: N/A;    HERNIA REPAIR      HIP ARTHROSCOPY Right 10/29/2020    JOINT REPLACEMENT      TRACHEOSTOMY        General Information       Row Name 07/25/25 1412          OT Time and Intention    Document Type evaluation  -AC     Mode of Treatment occupational therapy  -AC     Patient Effort good  -AC       Row Name 07/25/25 1412          General Information    Patient Profile Reviewed yes  -AC     Prior Level of Function independent:;all household mobility;ADL's  uses a rollator to ambulate,  roommate assists with home mgmt  -AC     Existing Precautions/Restrictions fall;oxygen therapy device and L/min  -AC     Barriers to Rehab medically complex  -AC       Row Name 07/25/25 1412          Occupational Profile    Environmental Supports and Barriers (Occupational Profile) tub showerr with shower seat  -AC       Row Name 07/25/25 1412          Living Environment    Current Living Arrangements home  -AC     People in Home friend(s)  -AC       Row Name 07/25/25 1412          Home Main Entrance     Number of Stairs, Main Entrance two  -     Stair Railings, Main Entrance railing on left side (ascending)  -       Row Name 07/25/25 1412          Stairs Within Home, Primary    Number of Stairs, Within Home, Primary none  -       Row Name 07/25/25 1412          Cognition    Orientation Status (Cognition) oriented x 3  -       Row Name 07/25/25 1412          Safety Issues/Impairments Affecting Functional Mobility    Safety Issues Affecting Function (Mobility) insight into deficits/self-awareness;safety precaution awareness  -     Impairments Affecting Function (Mobility) balance;endurance/activity tolerance;shortness of breath;strength;pain  -               User Key  (r) = Recorded By, (t) = Taken By, (c) = Cosigned By      Initials Name Provider Type     Carmen Carballo, OT Occupational Therapist                     Mobility/ADL's       Row Name 07/25/25 1414          Bed Mobility    Bed Mobility supine-sit  -     Supine-Sit [place] (Bed Mobility) verbal cues;minimum assist (75% patient effort)  -     Assistive Device (Bed Mobility) bed rails;head of bed elevated  -     Comment, (Bed Mobility) increased time and effort  -       Row Name 07/25/25 1414          Sit-Stand Transfer    Sit-Stand [place] (Transfers) contact guard;1 person assist  -     Assistive Device (Sit-Stand Transfers) walker, front-wheeled  -       Row Name 07/25/25 1414          Functional Mobility    Functional Mobility- Ind. Level verbal cues required;contact guard assist  -     Functional Mobility- Device walker, front-wheeled  -     Functional Mobility-Distance (Feet) 16  -     Functional Mobility- Safety Issues step length decreased  -       Row Name 07/25/25 1414          Activities of Daily Living    BADL Assessment/Intervention lower body dressing  -       Row Name 07/25/25 1414          Lower Body Dressing Assessment/Training    [place] Level (Lower Body Dressing) doff;don;socks;minimum  assist (75% patient effort)  -     Assistive Devices (Lower Body Dressing) reacher;sock-aid  -AC     Position (Lower Body Dressing) unsupported sitting  -AC               User Key  (r) = Recorded By, (t) = Taken By, (c) = Cosigned By      Initials Name Provider Type    Carmen Villarreal OT Occupational Therapist                   Obj/Interventions       Row Name 07/25/25 1415          Sensory Assessment (Somatosensory)    Sensory Assessment (Somatosensory) UE sensation intact  -       Row Name 07/25/25 1415          Range of Motion Comprehensive    General Range of Motion bilateral upper extremity ROM WNL  -AC       Row Name 07/25/25 1415          Strength Comprehensive (MMT)    Comment, General Manual Muscle Testing (MMT) Assessment BUE grossly 4/5  -AC       Row Name 07/25/25 1415          Balance    Balance Assessment sitting static balance;sitting dynamic balance;standing static balance;standing dynamic balance  -AC     Static Sitting Balance standby assist  -AC     Dynamic Sitting Balance standby assist  -AC     Position, Sitting Balance sitting edge of bed  -AC     Static Standing Balance contact guard  -AC     Dynamic Standing Balance contact guard  -AC     Position/Device Used, Standing Balance supported;walker, front-wheeled  -AC               User Key  (r) = Recorded By, (t) = Taken By, (c) = Cosigned By      Initials Name Provider Type    Carmen Villarreal, DWIGHT Occupational Therapist                   Goals/Plan       Row Name 07/25/25 1239          Transfer Goal 1 (OT)    Activity/Assistive Device (Transfer Goal 1, OT) bed-to-chair/chair-to-bed;toilet;walker, rolling  -AC     [place] Level/Cues Needed (Transfer Goal 1, OT) standby assist  -AC     Time Frame (Transfer Goal 1, OT) long term goal (LTG);10 days  -AC     Progress/Outcome (Transfer Goal 1, OT) new goal;goal ongoing  -       Row Name 07/25/25 9900          Dressing Goal 1 (OT)    Activity/Device (Dressing Goal 1, OT) lower body  dressing;reacher;sock-aid  -AC     [place]/Cues Needed (Dressing Goal 1, OT) set-up required;supervision required;verbal cues required  -AC     Time Frame (Dressing Goal 1, OT) short term goal (STG);5 days  -AC       Row Name 07/25/25 1458          Toileting Goal 1 (OT)    Activity/Device (Toileting Goal 1, OT) adjust/manage clothing;perform perineal hygiene  -AC     [place] Level/Cues Needed (Toileting Goal 1, OT) standby assist  -AC     Time Frame (Toileting Goal 1, OT) short term goal (STG);5 days  -AC     Progress/Outcome (Toileting Goal 1, OT) new goal;goal ongoing  -AC       Row Name 07/25/25 0627          Therapy Assessment/Plan (OT)    Planned Therapy Interventions (OT) activity tolerance training;adaptive equipment training;BADL retraining;functional balance retraining;occupation/activity based interventions;patient/caregiver education/training;strengthening exercise;transfer/mobility retraining  -AC               User Key  (r) = Recorded By, (t) = Taken By, (c) = Cosigned By      Initials Name Provider Type    AC Carmen Carballo, OT Occupational Therapist                   Clinical Impression       Row Name 07/25/25 1416          Pain Assessment    Pretreatment Pain Rating 5/10  -AC     Posttreatment Pain Rating 5/10  -AC     Pain Location flank  -AC     Pain Side/Orientation left  -AC     Pain Management Interventions exercise or physical activity utilized  -AC     Response to Pain Interventions activity participation with tolerable pain  -AC       Row Name 07/25/25 1415          Plan of Care Review    Plan of Care Reviewed With patient  -AC     Outcome Evaluation Pt presents below baseline with ADLs d/t pain, weakness and decr activity tolerance. Pt min A LBD with use of AE,  CGA to ambulate with RW.  OT will follow to advance pt toward PLOF.  Recommend home with assist and HHOT upon d/c.  -AC       Row Name 07/25/25 1216          Therapy Assessment/Plan (OT)    Criteria for Skilled Therapeutic  Interventions Met (OT) yes;skilled treatment is necessary  -AC     Therapy Frequency (OT) daily  -AC       Row Name 07/25/25 1416          Therapy Plan Review/Discharge Plan (OT)    Anticipated Discharge Disposition (OT) home with assist;home with home health  -       Row Name 07/25/25 1416          Vital Signs    Pre Systolic BP Rehab 147  -AC     Pre Treatment Diastolic BP 76  -AC     Pretreatment Heart Rate (beats/min) 74  -AC     Posttreatment Heart Rate (beats/min) 71  -AC     Pre SpO2 (%) 95  -AC     O2 Delivery Pre Treatment supplemental O2  -AC     Intra SpO2 (%) 90  -AC     O2 Delivery Intra Treatment supplemental O2  -AC     Post SpO2 (%) 92  -AC     O2 Delivery Post Treatment supplemental O2  -AC     Pre Patient Position Supine  -AC     Intra Patient Position Standing  -AC     Post Patient Position Sitting  -AC       Row Name 07/25/25 1416          Positioning and Restraints    Pre-Treatment Position in bed  -AC     Post Treatment Position chair  -AC     In Chair notified nsg;reclined;call light within reach;encouraged to call for assist;exit alarm on;compression device  -               User Key  (r) = Recorded By, (t) = Taken By, (c) = Cosigned By      Initials Name Provider Type    AC Carmen Carballo, OT Occupational Therapist                   Outcome Measures       Row Name 07/25/25 2277          How much help from another is currently needed...    Putting on and taking off regular lower body clothing? 3  -AC     Bathing (including washing, rinsing, and drying) 3  -AC     Toileting (which includes using toilet bed pan or urinal) 3  -AC     Putting on and taking off regular upper body clothing 3  -AC     Taking care of personal grooming (such as brushing teeth) 3  -AC     Eating meals 4  -AC     AM-PAC 6 Clicks Score (OT) 19  -AC       Row Name 07/25/25 1247          How much help from another person do you currently need...    Turning from your back to your side while in flat bed without using  bedrails? 3  -CRISTOPHER     Moving from lying on back to sitting on the side of a flat bed without bedrails? 3  -CRISTOPHER     Moving to and from a bed to a chair (including a wheelchair)? 3  -CRISTOPHER     Standing up from a chair using your arms (e.g., wheelchair, bedside chair)? 3  -CRISTOPHER     Climbing 3-5 steps with a railing? 2  -CRISTOPHER     To walk in hospital room? 3  -CRISTOPHER     AM-PAC 6 Clicks Score (PT) 17  -CRISTOPHER     Highest Level of Mobility Goal Stand (1 or More Minutes)-5  -CRISTOPHER       Row Name 07/25/25 1459          Functional Assessment    Outcome Measure Options AM-PAC 6 Clicks Daily Activity (OT)  -               User Key  (r) = Recorded By, (t) = Taken By, (c) = Cosigned By      Initials Name Provider Type    AC Carmen Carballo, OT Occupational Therapist    Mel Michaud RN Registered Nurse                    Occupational Therapy Education       Title: PT OT SLP Therapies (In Progress)       Topic: Occupational Therapy (In Progress)       Point: ADL training (In Progress)       Learning Progress Summary            Patient Acceptance, E, NR by  at 7/25/2025 1459                                      User Key       Initials Effective Dates Name Provider Type Discipline     02/03/23 -  Carmen Carballo, OT Occupational Therapist OT                  OT Recommendation and Plan  Planned Therapy Interventions (OT): activity tolerance training, adaptive equipment training, BADL retraining, functional balance retraining, occupation/activity based interventions, patient/caregiver education/training, strengthening exercise, transfer/mobility retraining  Therapy Frequency (OT): daily  Plan of Care Review  Plan of Care Reviewed With: patient  Outcome Evaluation: Pt presents below baseline with ADLs d/t pain, weakness and decr activity tolerance. Pt min A LBD with use of AE,  CGA to ambulate with RW.  OT will follow to advance pt toward PLOF.  Recommend home with assist and HHOT upon d/c.     Time Calculation:   Evaluation Complexity  (OT)  Review Occupational Profile/Medical/Therapy History Complexity: brief/low complexity  Assessment, Occupational Performance/Identification of Deficit Complexity: 1-3 performance deficits  Clinical Decision Making Complexity (OT): problem focused assessment/low complexity  Overall Complexity of Evaluation (OT): low complexity     Time Calculation- OT       Row Name 07/25/25 0735             Time Calculation- OT    OT Start Time 0735  -AC      OT Received On 07/25/25  -AC      OT Goal Re-Cert Due Date 08/04/25  -AC         Timed Charges    83872 - OT Self Care/Mgmt Minutes 10  -AC         Untimed Charges    OT Eval/Re-eval Minutes 50  -AC         Total Minutes    Timed Charges Total Minutes 10  -AC      Untimed Charges Total Minutes 50  -AC       Total Minutes 60  -AC                User Key  (r) = Recorded By, (t) = Taken By, (c) = Cosigned By      Initials Name Provider Type    AC Carmen Carballo, OT Occupational Therapist                  Therapy Charges for Today       Code Description Service Date Service Provider Modifiers Qty    [id number] HC OT SELF CARE/MGMT/TRAIN EA 15 MIN 7/25/2025 Carmen Carballo, OT GO 1    [id number]  OT EVAL LOW COMPLEXITY 4 7/25/2025 Carmen Carballo, OT GO 1                 Carmen Carballo OT  7/25/2025

## 2025-07-25 NOTE — CASE MANAGEMENT/SOCIAL WORK
Discharge Planning Assessment  [facility]     Patient Name: Dani Ziegler  MRN: [id number]  Today's Date: 7/25/2025    Admit Date: 7/22/2025    Plan: Plan is still home/HH   Discharge Needs Assessment    No documentation.                  Discharge Plan       Row Name 07/25/25 1248       Plan    Plan Plan is still home/HH    Plan Comments Patient has [facility] confirmed for PT only. Please call [phone] with d/c date and place new order in [facility] for HH. Patient here thru the [facility].    Final Discharge Disposition Code 06 - home with home health care                  Continued Care and Services - Admitted Since 7/22/2025       Home Medical Care       Service Provider Request Status Services Address Phone Fax Patient Preferred    [facility]  [facility] [address] [place] [address], [facility] [address] [address] [phone] --                  Expected Discharge Date and Time       Expected Discharge Date Expected Discharge Time    Jul 28, 2025            Demographic Summary    No documentation.                  Functional Status    No documentation.                  Psychosocial    No documentation.                  Abuse/Neglect    No documentation.                  Legal    No documentation.                  Substance Abuse    No documentation.                  Patient Forms    No documentation.                     Maddi Peterson RN

## 2025-07-25 NOTE — PROGRESS NOTES
INFECTIOUS DISEASE CONSULT/INITIAL HOSPITAL VISIT    Dani Ziegler  1965  [id number]    Date of Consult: 7/25/2025    Admission Date: 7/22/2025      Requesting Provider: No ref. provider found  Evaluating Physician: Earl Cheney MD    Reason for Consultation: pneumonia/foot ulcer    History of present illness:    Patient is a 60 y.o. femaleWith COPD, chronic hypoxia on home oxygen presents to this emergency room with dyspnea patient found to be in respite distress and cyanotic patient breathing rapidly with a respirate of 55 inspirations per minute patient had hypoxia despite 3 L of oxygen patient given Solu-Medrol DuoNeb inhalation albuterol treatments and placed on BiPAP.  Apparently patient has received care at [facility] for commune acquired pneumonia.    CTA chest performed to exclude pulmonary embolism but showed extensive tree-in-bud nodules in right lung and left lung concerning for infectious bronchiolitis.  Patient been given diuretics levofloxacin as family emergency room antibiotics have been modified to ceftriaxone and doxycycline.  We are being consulted for further antibiotic management    Patient is on BiPAP currently and is getting a bedside transthoracic echocardiogram    7/23/25; on  6 L o2 by nasal cannula; awake, reports that she walked in new shoes and developed a blister on left foot; no fever, rash    7/24/25; doing well; was on bipap last night but couldn't sleep, wearing o2 by nasal cannula currently; no fever, rash, sore throat    7/25/25; asleep no distress, afebrile normotensive    Past Medical History:   Diagnosis Date    Abnormal weight gain     Acute UTI     Anxiety     Arthritis involving multiple sites     Chronic obstructive pulmonary disease     Chronic pain     Current every day smoker     Depression     Diabetes mellitus     Diarrhea     Dysuria     Edema, lower extremity     Fever     Head injury     Hypertension     Intervertebral disc disorder      Degeneration of intervertebral disc, site unspecified (722.6)    Left bundle branch block     Leukocytosis     Long term current use of methadone for pain control     Mass of right breast     Nausea     Obesity     Overactive bladder     Peripheral neuropathy     Superficial phlebitis     right medial calf    Upper respiratory infection     Urinary incontinence     Vitamin D deficiency     Vomiting        Past Surgical History:   Procedure Laterality Date    BLADDER SURGERY      pubovaginal sling    CHOLECYSTECTOMY      EXPLORATORY LAPAROTOMY N/A 2022    Procedure: LAPAROTOMY EXPLORATORY UMBILICAL HERNIA REPAIR WITH MESH;  Surgeon: Reji Brown MD;  Location: [facility];  Service: General;  Laterality: N/A;    HERNIA REPAIR      HIP ARTHROSCOPY Right 10/29/2020    JOINT REPLACEMENT      TRACHEOSTOMY         Family History   Problem Relation Age of Onset    Breast cancer Mother     Cancer Mother     Arthritis Mother     Diabetes Mother     Obesity Mother     Arthritis Father     Stroke Father     Hypertension Father     Heart attack Father     Diabetes Maternal Grandmother     Migraines Other        Social History     Socioeconomic History    Marital status:    Tobacco Use    Smoking status: Every Day     Current packs/day: 0.00     Types: Cigarettes     Start date: 1989     Last attempt to quit: 2019     Years since quittin.3    Smokeless tobacco: Never    Tobacco comments:     1 daily   Vaping Use    Vaping status: Never Used   Substance and Sexual Activity    Alcohol use: No    Drug use: No    Sexual activity: Defer       Allergies   Allergen Reactions    Diphenhydramine Hives    Lortab [Hydrocodone-Acetaminophen] Hives     Tolerates percocet    Toradol [Ketorolac Tromethamine] Hives     Per patient tolerates motrin    Alprazolam Delirium    Nsaids Other (See Comments)     Liver Dx         Medication:    Current Facility-Administered Medications:     acetaminophen (TYLENOL) tablet 650  mg, 650 mg, Oral, Q6H PRN, Rain Beltran MD    apixaban (ELIQUIS) tablet 5 mg, 5 mg, Oral, Q12H, Rain Beltran MD, 5 mg at 07/25/25 0911    ARIPiprazole (ABILIFY) tablet 10 mg, 10 mg, Oral, Daily, Rain Beltran MD, 10 mg at 07/25/25 0911    Calcium Replacement - Follow Nurse / BPA Driven Protocol, , Not Applicable, PRN, Rain Beltran MD    cefTRIAXone (ROCEPHIN) 1,000 mg in sodium chloride 0.9 % 100 mL MBP, 1,000 mg, Intravenous, Q24H, Lilli Sprague, APRN, Last Rate: 200 mL/hr at 07/25/25 0912, 1,000 mg at 07/25/25 0912    dextrose (D50W) (25 g/50 mL) IV injection 25 g, 25 g, Intravenous, Q15 Min PRN, Rain Beltran MD    dextrose (GLUTOSE) oral gel 15 g, 15 g, Oral, Q15 Min PRN, Rain Beltran MD    doxycycline (VIBRAMYCIN) 100 mg in sodium chloride 0.9 % 100 mL MBP, 100 mg, Intravenous, Q12H, Rain Beltran MD, 100 mg at 07/25/25 1022    escitalopram (LEXAPRO) tablet 20 mg, 20 mg, Oral, Daily, Rain Beltran MD, 20 mg at 07/25/25 0912    glucagon (GLUCAGEN) injection 1 mg, 1 mg, Intramuscular, Q15 Min PRN, Rain Beltran MD    insulin glargine (LANTUS, SEMGLEE) injection 15 Units, 15 Units, Subcutaneous, Q12H, Rain Beltran MD, 15 Units at 07/25/25 0910    Insulin Lispro (humaLOG) injection 3-14 Units, 3-14 Units, Subcutaneous, 4x Daily AC & at Bedtime, Rain Beltran MD, 3 Units at 07/25/25 0910    Insulin Lispro (humaLOG) injection 5 Units, 5 Units, Subcutaneous, TID With Meals, Rain Beltran MD, 5 Units at 07/25/25 1222    ipratropium-albuterol (DUO-NEB) nebulizer solution 3 mL, 3 mL, Nebulization, BID, Rain Beltran MD, 3 mL at 07/25/25 0929    ipratropium-albuterol (DUO-NEB) nebulizer solution 3 mL, 3 mL, Nebulization, 4x Daily - RT, Tete Cunha MD, 3 mL at 07/25/25 1319    Magnesium Cardiology Dose Replacement - Follow Nurse / BPA Driven Protocol, , Not Applicable, PRN, Rain Beltran  MD ZULY    miconazole (MICOTIN) 2 % powder 1 Application, 1 Application, Topical, Q12H, Rain Beltran MD, 1 Application at 07/25/25 0913    mupirocin (BACTROBAN) 2 % nasal ointment 1 Application, 1 Application, Each Nare, BID, Rain Beltran MD, 1 Application at 07/25/25 0913    oxybutynin XL (DITROPAN-XL) 24 hr tablet 5 mg, 5 mg, Oral, Daily, Rain Beltran MD, 5 mg at 07/25/25 0912    pantoprazole (PROTONIX) EC tablet 40 mg, 40 mg, Oral, Q AM, Rain Beltran MD, 40 mg at 07/25/25 0452    Phosphorus Replacement - Follow Nurse / BPA Driven Protocol, , Not Applicable, PRN, Rain Beltran MD    Potassium Replacement - Follow Nurse / BPA Driven Protocol, , Not Applicable, PRN, Rain Beltran MD    predniSONE (DELTASONE) tablet 30 mg, 30 mg, Oral, Daily With Breakfast, Rain Beltran MD, 30 mg at 07/25/25 0911    sodium chloride 0.9 % flush 10 mL, 10 mL, Intravenous, PRN, Rain Beltran MD    sodium chloride 0.9 % flush 10 mL, 10 mL, Intravenous, Q12H, Rain Beltran MD, 10 mL at 07/24/25 2202    sodium chloride 0.9 % flush 10 mL, 10 mL, Intravenous, PRN, Rain Beltran MD    sodium chloride 0.9 % infusion 40 mL, 40 mL, Intravenous, PRN, Rain Beltran MD    sodium chloride 7 % nebulizer solution nebulizer solution 4 mL, 4 mL, Nebulization, BID - RT, Rain Beltran MD, 4 mL at 07/25/25 0929    traZODone (DESYREL) tablet 50 mg, 50 mg, Oral, Nightly PRN, Rain Beltran MD, 50 mg at 07/24/25 2324    Antibiotics:  Anti-Infectives (From admission, onward)      Ordered     Dose/Rate Route Frequency Start Stop    07/22/25 1621  vancomycin IVPB 2000 mg in 0.9% Sodium Chloride 500 mL  Status:  Discontinued        Ordering Provider: David Aguirre RPH    2,000 mg  250 mL/hr over 120 Minutes Intravenous Every 24 Hours 07/23/25 1700 07/23/25 1035    07/22/25 1621  vancomycin 2500 mg/500 mL 0.9% NS IVPB ([facility])        Ordering  Provider: David Aguirre RPH    20 mg/kg × 130 kg  over 150 Minutes Intravenous Once 25 1700 25 2001    25 1526  Pharmacy to dose vancomycin  Status:  Discontinued        Ordering Provider: Nadia Livingston APRN     Not Applicable Continuous PRN 25 1526 25 1037    25 0613  doxycycline (VIBRAMYCIN) 100 mg in sodium chloride 0.9 % 100 mL MBP        Ordering Provider: Rain Beltran MD    100 mg  over 60 Minutes Intravenous Every 12 Hours 25 0900 25 0859    25 0613  cefTRIAXone (ROCEPHIN) 1,000 mg in sodium chloride 0.9 % 100 mL MBP        Ordering Provider: Lilli Sprague APRN    1,000 mg  200 mL/hr over 30 Minutes Intravenous Every 24 Hours 25 0900 25 0859    25 0230  aztreonam (AZACTAM) 2 g in sodium chloride 0.9 % 100 mL MBP        Ordering Provider: Nu Salazar MD    2 g  200 mL/hr over 30 Minutes Intravenous Once 25 0246 25 0429    25 0230  levoFLOXacin (LEVAQUIN) 750 mg/150 mL D5W (premix) (LEVAQUIN) 750 mg        Ordering Provider: Nu Salazar MD    750 mg  100 mL/hr over 90 Minutes Intravenous Once 25 0246 25 0625              Review of Systems:  See hpi  Physical Exam:   Vital Signs  Temp (24hrs), Av.3 °F (36.8 °C), Min:97.7 °F (36.5 °C), Max:99.2 °F (37.3 °C)    Temp  Min: 97.7 °F (36.5 °C)  Max: 99.2 °F (37.3 °C)  BP  Min: 122/64  Max: 148/77  Pulse  Min: 67  Max: 90  Resp  Min: 20  Max: 24  SpO2  Min: 91 %  Max: 95 %    GENERAL: =in no acute distress.   HEENT: Normocephalic, atraumatic.  PERRL. EOMI. No conjunctival injection. No icterus. No ext oral lesions    LUNGS: symmetrical lilia.  ABDOMEN: Soft, nontender,   EXT:  No cyanosis, clubbing or edema. No cord.  :  Without Carroll catheter.  MSK no joint deformity  Laboratory Data    Results from last 7 days   Lab Units 25  0447 25  1759 25  0511 25  0227   WBC 10*3/mm3 13.03*  --  19.38* 29.08*  "  HEMOGLOBIN g/dL 11.3* 12.1 11.5* 14.5   HEMATOCRIT % 37.1 39.6 37.5 44.4   PLATELETS 10*3/mm3 332  --  336 430     Results from last 7 days   Lab Units 07/25/25  0447   SODIUM mmol/L 138   POTASSIUM mmol/L 4.6   CHLORIDE mmol/L 105   CO2 mmol/L 26.3   BUN mg/dL 21.2   CREATININE mg/dL 0.92   GLUCOSE mg/dL 237*   CALCIUM mg/dL 8.7     Results from last 7 days   Lab Units 07/23/25  0511   ALK PHOS U/L 91   BILIRUBIN mg/dL 0.2   ALT (SGPT) U/L 6   AST (SGOT) U/L 11         Results from last 7 days   Lab Units 07/22/25  0422   CRP mg/dL 18.37*     Results from last 7 days   Lab Units 07/22/25  1137   LACTATE mmol/L 1.3             Estimated Creatinine Clearance: 90.2 mL/min (by C-G formula based on SCr of 0.92 mg/dL).      Microbiology:  No results found for: \"ACANTHNAEG\", \"AFBCX\", \"BPERTUSSISCX\", \"BLOODCX\"  No results found for: \"BCIDPCR\", \"CXREFLEX\", \"CSFCX\", \"CULTURETIS\"  No results found for: \"CULTURES\", \"HSVCX\", \"URCX\"  No results found for: \"EYECULTURE\", \"GCCX\", \"HSVCULTURE\", \"LABHSV\"  No results found for: \"LEGIONELLA\", \"MRSACX\", \"MUMPSCX\", \"MYCOPLASCX\"  No results found for: \"NOCARDIACX\", \"STOOLCX\"  No results found for: \"THROATCX\", \"UNSTIMCULT\", \"URINECX\", \"CULTURE\", \"VZVCULTUR\"  No results found for: \"VIRALCULTU\", \"WOUNDCX\"        Radiology:  Imaging Results (Last 72 Hours)       Procedure Component Value Units Date/Time    XR Chest 1 View [[id number]] Collected: 07/25/25 1002     Updated: 07/25/25 1010    Narrative:      XR CHEST 1 VW    Date of Exam: 7/25/2025 9:08 AM EDT    Indication: hypoxia/copd exac    Comparison: 7/22/2025 chest radiograph and chest CT scan        Findings:  Prior chest radiograph report indicated new focus of airspace disease in the right lung base. CT scan report described right middle lobe pneumonia and infectious bronchiolitis of both lungs. Mediastinal and right hilar adenopathy.    Today's chest radiograph shows decreased focal disease in the right lung base, but a persistent " pattern of diffuse interstitial disease elsewhere that appears either stable or further increased.    Although today's images taken with more true AP technique than the prior lordotic film, there appears to be significant further enlargement of the pulmonary ruapl. No effusion or pneumothorax is identified.        Impression:      Impression:    1. Improved focal pneumonia of the right lung base.    2. Appearance of mildly increased diffuse bronchial wall thickening and interstitial disease elsewhere in the lungs.    3. Further enlargement of the pulmonary rupal, suggesting increasing adenopathy.      Electronically Signed: Elton Cain MD    7/25/2025 10:07 AM EDT    Workstation ID: [id number]    SLP FEES - Fiberoptic Endo Eval Swallow [[id number]] Resulted: 07/23/25 1622     Updated: 07/23/25 1622    Narrative:      This procedure was auto-finalized with no dictation required.              Impression:   Acute hypoxic respiratory failure   morbid obesity  Leukocytosis with neutrophilia descending  Nasal MRSA colonization  Tachycardia  Tachypnea  Hypotension  Lacitc acidosis concerning for tissue perfusion mismatch      PLAN/RECOMMENDATIONS:   Thank you for asking us to see Dani Ziegler, I recommend the following:  CT chest independently reviewed; consider ct findings to be related to aspiration bronchiolitis is common in obese patient with obstructive sleep apnea.      Left medial foot blister/bruise appears noninfected would not broaden abx on this account    Infectious bronchiolitis can follow viral infection, bacterial bronchopenumonia, atypical mycobacterial infection, fungal infection    F/u  TTE to assess for pulmonary hypertension/right heart strain      Cont ceftriaxone x 5 days    Cont doxycyline x 5 days    Dc/ abx tomorrow        This visit included the following complex service elements:  Complex medical decision-making associated with antimicrobial prescribing.  In-depth chart review with high level  synthesis for complex diagnoses.       Patient is critically ill  Critical care time 45 minutes  Earl Cheney MD  7/25/2025  14:37 EDT

## 2025-07-26 LAB
ALBUMIN SERPL-MCNC: 2.9 G/DL (ref 3.5–5.2)
ALBUMIN/GLOB SERPL: 0.7 G/DL
ALP SERPL-CCNC: 79 U/L (ref 39–117)
ALT SERPL W P-5'-P-CCNC: 5 U/L (ref 1–33)
ANION GAP SERPL CALCULATED.3IONS-SCNC: 9 MMOL/L (ref 5–15)
AST SERPL-CCNC: 7 U/L (ref 1–32)
BASOPHILS # BLD AUTO: 0.06 10*3/MM3 (ref 0–0.2)
BASOPHILS NFR BLD AUTO: 0.4 % (ref 0–1.5)
BILIRUB SERPL-MCNC: <0.2 MG/DL (ref 0–1.2)
BUN SERPL-MCNC: 20.6 MG/DL (ref 8–23)
BUN/CREAT SERPL: 21.7 (ref 7–25)
CALCIUM SPEC-SCNC: 9 MG/DL (ref 8.6–10.5)
CHLORIDE SERPL-SCNC: 101 MMOL/L (ref 98–107)
CO2 SERPL-SCNC: 28 MMOL/L (ref 22–29)
CREAT SERPL-MCNC: 0.95 MG/DL (ref 0.57–1)
DEPRECATED RDW RBC AUTO: 50.4 FL (ref 37–54)
EGFRCR SERPLBLD CKD-EPI 2021: 68.3 ML/MIN/1.73
EOSINOPHIL # BLD AUTO: 0.06 10*3/MM3 (ref 0–0.4)
EOSINOPHIL NFR BLD AUTO: 0.4 % (ref 0.3–6.2)
ERYTHROCYTE [DISTWIDTH] IN BLOOD BY AUTOMATED COUNT: 15.2 % (ref 12.3–15.4)
GLOBULIN UR ELPH-MCNC: 4 GM/DL
GLUCOSE BLDC GLUCOMTR-MCNC: 148 MG/DL (ref 70–130)
GLUCOSE BLDC GLUCOMTR-MCNC: 214 MG/DL (ref 70–130)
GLUCOSE BLDC GLUCOMTR-MCNC: 288 MG/DL (ref 70–130)
GLUCOSE BLDC GLUCOMTR-MCNC: 297 MG/DL (ref 70–130)
GLUCOSE SERPL-MCNC: 283 MG/DL (ref 65–99)
HCT VFR BLD AUTO: 37 % (ref 34–46.6)
HGB BLD-MCNC: 11.2 G/DL (ref 12–15.9)
IMM GRANULOCYTES # BLD AUTO: 0.33 10*3/MM3 (ref 0–0.05)
IMM GRANULOCYTES NFR BLD AUTO: 2.3 % (ref 0–0.5)
LYMPHOCYTES # BLD AUTO: 3.38 10*3/MM3 (ref 0.7–3.1)
LYMPHOCYTES NFR BLD AUTO: 24 % (ref 19.6–45.3)
MAGNESIUM SERPL-MCNC: 2.2 MG/DL (ref 1.6–2.4)
MCH RBC QN AUTO: 27.5 PG (ref 26.6–33)
MCHC RBC AUTO-ENTMCNC: 30.3 G/DL (ref 31.5–35.7)
MCV RBC AUTO: 90.9 FL (ref 79–97)
MONOCYTES # BLD AUTO: 0.69 10*3/MM3 (ref 0.1–0.9)
MONOCYTES NFR BLD AUTO: 4.9 % (ref 5–12)
NEUTROPHILS NFR BLD AUTO: 68 % (ref 42.7–76)
NEUTROPHILS NFR BLD AUTO: 9.58 10*3/MM3 (ref 1.7–7)
NRBC BLD AUTO-RTO: 0 /100 WBC (ref 0–0.2)
PLATELET # BLD AUTO: 331 10*3/MM3 (ref 140–450)
PMV BLD AUTO: 10.6 FL (ref 6–12)
POTASSIUM SERPL-SCNC: 4.3 MMOL/L (ref 3.5–5.2)
PROT SERPL-MCNC: 6.9 G/DL (ref 6–8.5)
RBC # BLD AUTO: 4.07 10*6/MM3 (ref 3.77–5.28)
SODIUM SERPL-SCNC: 138 MMOL/L (ref 136–145)
WBC NRBC COR # BLD AUTO: 14.1 10*3/MM3 (ref 3.4–10.8)

## 2025-07-26 PROCEDURE — 63710000001 INSULIN GLARGINE PER 5 UNITS: Performed by: INTERNAL MEDICINE

## 2025-07-26 PROCEDURE — 25010000002 DOXYCYCLINE 100 MG RECONSTITUTED SOLUTION 1 EACH VIAL: Performed by: INTERNAL MEDICINE

## 2025-07-26 PROCEDURE — 63710000001 PREDNISONE PER 5 MG: Performed by: INTERNAL MEDICINE

## 2025-07-26 PROCEDURE — 94799 UNLISTED PULMONARY SVC/PX: CPT

## 2025-07-26 PROCEDURE — 94664 DEMO&/EVAL PT USE INHALER: CPT

## 2025-07-26 PROCEDURE — 63710000001 INSULIN LISPRO (HUMAN) PER 5 UNITS: Performed by: INTERNAL MEDICINE

## 2025-07-26 PROCEDURE — 82948 REAGENT STRIP/BLOOD GLUCOSE: CPT

## 2025-07-26 PROCEDURE — 25010000002 CEFTRIAXONE PER 250 MG

## 2025-07-26 PROCEDURE — 80053 COMPREHEN METABOLIC PANEL: CPT | Performed by: INTERNAL MEDICINE

## 2025-07-26 PROCEDURE — 63710000001 PREDNISONE PER 1 MG: Performed by: INTERNAL MEDICINE

## 2025-07-26 PROCEDURE — 85025 COMPLETE CBC W/AUTO DIFF WBC: CPT | Performed by: INTERNAL MEDICINE

## 2025-07-26 PROCEDURE — 83735 ASSAY OF MAGNESIUM: CPT | Performed by: INTERNAL MEDICINE

## 2025-07-26 PROCEDURE — 99232 SBSQ HOSP IP/OBS MODERATE 35: CPT | Performed by: INTERNAL MEDICINE

## 2025-07-26 RX ADMIN — ARIPIPRAZOLE 10 MG: 10 TABLET ORAL at 08:22

## 2025-07-26 RX ADMIN — APIXABAN 5 MG: 5 TABLET, FILM COATED ORAL at 20:07

## 2025-07-26 RX ADMIN — INSULIN LISPRO 5 UNITS: 100 INJECTION, SOLUTION INTRAVENOUS; SUBCUTANEOUS at 08:21

## 2025-07-26 RX ADMIN — IPRATROPIUM BROMIDE AND ALBUTEROL SULFATE 3 ML: .5; 3 SOLUTION RESPIRATORY (INHALATION) at 15:53

## 2025-07-26 RX ADMIN — IPRATROPIUM BROMIDE AND ALBUTEROL SULFATE 3 ML: .5; 3 SOLUTION RESPIRATORY (INHALATION) at 19:51

## 2025-07-26 RX ADMIN — MUPIROCIN 1 APPLICATION: 20 OINTMENT TOPICAL at 20:07

## 2025-07-26 RX ADMIN — MICONAZOLE NITRATE 1 APPLICATION: 20 POWDER TOPICAL at 08:22

## 2025-07-26 RX ADMIN — INSULIN LISPRO 5 UNITS: 100 INJECTION, SOLUTION INTRAVENOUS; SUBCUTANEOUS at 12:01

## 2025-07-26 RX ADMIN — INSULIN LISPRO 8 UNITS: 100 INJECTION, SOLUTION INTRAVENOUS; SUBCUTANEOUS at 20:08

## 2025-07-26 RX ADMIN — PANTOPRAZOLE SODIUM 40 MG: 40 TABLET, DELAYED RELEASE ORAL at 05:12

## 2025-07-26 RX ADMIN — DOXYCYCLINE 100 MG: 100 INJECTION, POWDER, LYOPHILIZED, FOR SOLUTION INTRAVENOUS at 20:07

## 2025-07-26 RX ADMIN — INSULIN LISPRO 5 UNITS: 100 INJECTION, SOLUTION INTRAVENOUS; SUBCUTANEOUS at 17:17

## 2025-07-26 RX ADMIN — ESCITALOPRAM 20 MG: 20 TABLET, FILM COATED ORAL at 08:21

## 2025-07-26 RX ADMIN — Medication 4 ML: at 19:51

## 2025-07-26 RX ADMIN — MICONAZOLE NITRATE 1 APPLICATION: 20 POWDER TOPICAL at 20:09

## 2025-07-26 RX ADMIN — OXYBUTYNIN CHLORIDE 5 MG: 5 TABLET, EXTENDED RELEASE ORAL at 08:20

## 2025-07-26 RX ADMIN — INSULIN GLARGINE 15 UNITS: 100 INJECTION, SOLUTION SUBCUTANEOUS at 20:08

## 2025-07-26 RX ADMIN — APIXABAN 5 MG: 5 TABLET, FILM COATED ORAL at 08:21

## 2025-07-26 RX ADMIN — Medication 10 ML: at 08:22

## 2025-07-26 RX ADMIN — INSULIN LISPRO 8 UNITS: 100 INJECTION, SOLUTION INTRAVENOUS; SUBCUTANEOUS at 17:16

## 2025-07-26 RX ADMIN — INSULIN GLARGINE 15 UNITS: 100 INJECTION, SOLUTION SUBCUTANEOUS at 08:21

## 2025-07-26 RX ADMIN — MUPIROCIN 1 APPLICATION: 20 OINTMENT TOPICAL at 08:21

## 2025-07-26 RX ADMIN — Medication 4 ML: at 07:16

## 2025-07-26 RX ADMIN — DOXYCYCLINE 100 MG: 100 INJECTION, POWDER, LYOPHILIZED, FOR SOLUTION INTRAVENOUS at 10:05

## 2025-07-26 RX ADMIN — CEFTRIAXONE SODIUM 1000 MG: 1 INJECTION, POWDER, FOR SOLUTION INTRAMUSCULAR; INTRAVENOUS at 08:20

## 2025-07-26 RX ADMIN — PREDNISONE 30 MG: 10 TABLET ORAL at 08:21

## 2025-07-26 RX ADMIN — IPRATROPIUM BROMIDE AND ALBUTEROL SULFATE 3 ML: .5; 3 SOLUTION RESPIRATORY (INHALATION) at 07:15

## 2025-07-26 RX ADMIN — IPRATROPIUM BROMIDE AND ALBUTEROL SULFATE 3 ML: .5; 3 SOLUTION RESPIRATORY (INHALATION) at 12:11

## 2025-07-26 NOTE — PLAN OF CARE
Problem: Adult Inpatient Plan of Care  Goal: Plan of Care Review  7/26/2025 0442 by Carina Suazo RN  Outcome: Progressing  7/26/2025 0436 by Carina Suazo RN  Outcome: Progressing  Goal: Patient-Specific Goal (Individualized)  7/26/2025 0442 by Carina Suazo RN  Outcome: Progressing  7/26/2025 0436 by Carina Suazo RN  Outcome: Progressing  Goal: Absence of Hospital-Acquired Illness or Injury  7/26/2025 0442 by Carina Suazo RN  Outcome: Progressing  7/26/2025 0436 by Carina Suazo RN  Outcome: Progressing  Intervention: Identify and Manage Fall Risk  Description: Perform standard risk assessment on admission using a validated tool or comprehensive approach appropriate to the patient; reassess fall risk frequently, with change in status or transfer to another level of care.Communicate risk to interprofessional healthcare team; ensure fall risk visible cue.Determine need for increased observation, equipment and environmental modification, as well as use of supportive, nonskid footwear.Adjust safety measures to individual needs and identified risk factors.Reinforce the importance of active participation with fall risk prevention, safety, and physical activity with the patient and family.Perform regular intentional rounding to assess need for position change, pain assessment and personal needs, including assistance with toileting.  Recent Flowsheet Documentation  Taken 7/26/2025 0400 by Carina Suazo RN  Safety Promotion/Fall Prevention:   activity supervised   assistive device/personal items within reach   clutter free environment maintained   fall prevention program maintained   lighting adjusted   mobility aid in reach   nonskid shoes/slippers when out of bed   room organization consistent   safety round/check completed  Taken 7/26/2025 0200 by Carina Suazo RN  Safety Promotion/Fall Prevention:   activity supervised   assistive device/personal items within reach   clutter free environment  maintained   fall prevention program maintained   lighting adjusted   mobility aid in reach   nonskid shoes/slippers when out of bed   room organization consistent   safety round/check completed  Taken 7/26/2025 0000 by Carina Suazo RN  Safety Promotion/Fall Prevention:   activity supervised   assistive device/personal items within reach   clutter free environment maintained   fall prevention program maintained   lighting adjusted   mobility aid in reach   nonskid shoes/slippers when out of bed   room organization consistent   safety round/check completed  Taken 7/25/2025 2200 by Carina Suazo RN  Safety Promotion/Fall Prevention:   activity supervised   assistive device/personal items within reach   clutter free environment maintained   fall prevention program maintained   lighting adjusted   mobility aid in reach   nonskid shoes/slippers when out of bed   room organization consistent   safety round/check completed  Taken 7/25/2025 2000 by Carina Suazo RN  Safety Promotion/Fall Prevention:   activity supervised   assistive device/personal items within reach   clutter free environment maintained   fall prevention program maintained   lighting adjusted   mobility aid in reach   nonskid shoes/slippers when out of bed   room organization consistent   safety round/check completed  Intervention: Prevent Skin Injury  Description: Perform a screening for skin injury risk, such as pressure or moisture-associated skin damage on admission and at regular intervals throughout hospital stay.Keep all areas of skin (especially folds) clean and dry.Maintain adequate skin hydration.Relieve and redistribute pressure and protect bony prominences and skin at risk for injury; implement measures based on patient-specific risk factors.Match turning and repositioning schedule to clinical condition.Encourage weight shift frequently; assist with reposition if unable to complete independently.Float heels off bed; avoid pressure on the  Achilles tendon.Keep skin free from extended contact with medical devices.Optimize nutrition and hydration.Encourage functional activity and mobility, as early as tolerated.Use aids (e.g., slide boards, mechanical lift) during transfer.  Recent Flowsheet Documentation  Taken 7/26/2025 0400 by Carina Suazo RN  Body Position: position changed independently  Skin Protection:   transparent dressing maintained   silicone foam dressing in place   incontinence pads utilized  Taken 7/26/2025 0200 by Carina Suazo RN  Body Position: position changed independently  Skin Protection:   transparent dressing maintained   skin sealant/moisture barrier applied   silicone foam dressing in place   incontinence pads utilized  Taken 7/26/2025 0000 by Carina Suazo RN  Body Position: position changed independently  Skin Protection:   transparent dressing maintained   skin sealant/moisture barrier applied   incontinence pads utilized  Taken 7/25/2025 2200 by Carina Suazo RN  Body Position: position changed independently  Skin Protection:   transparent dressing maintained   skin sealant/moisture barrier applied   silicone foam dressing in place   incontinence pads utilized  Taken 7/25/2025 2000 by Carina Suazo RN  Body Position: position changed independently  Skin Protection:   transparent dressing maintained   skin sealant/moisture barrier applied   silicone foam dressing in place   incontinence pads utilized  Intervention: Prevent Infection  Description: Maintain skin and mucous membrane integrity; promote hand, oral and pulmonary hygiene.Optimize fluid balance, nutrition, sleep and glycemic control to maximize infection resistance.Identify potential sources of infection early to prevent or mitigate progression of infection (e.g., wound, lines, devices).Evaluate ongoing need for invasive devices; remove promptly when no longer indicated.Review vaccination status.  Recent Flowsheet Documentation  Taken 7/25/2025 2000 by  Carina Suazo RN  Infection Prevention:   environmental surveillance performed   equipment surfaces disinfected   hand hygiene promoted   personal protective equipment utilized  Goal: Optimal Comfort and Wellbeing  7/26/2025 0442 by Carina Suazo RN  Outcome: Progressing  7/26/2025 0436 by Carina Suazo RN  Outcome: Progressing  Intervention: Provide Person-Centered Care  Description: Use a family-focused approach to care; encourage support system presence and participation.Develop trust and rapport by proactively providing information, encouraging questions, addressing concerns and offering reassurance.Acknowledge emotional response to hospitalization.Recognize and utilize personal coping strategies and strengths; develop goals via shared decision-making.Honor spiritual and cultural preferences.  Recent Flowsheet Documentation  Taken 7/25/2025 2000 by Carina Suazo RN  Trust Relationship/Rapport:   care explained   choices provided   emotional support provided   empathic listening provided   questions answered   questions encouraged   thoughts/feelings acknowledged  Goal: Readiness for Transition of Care  7/26/2025 0442 by Carina Suazo RN  Outcome: Progressing  7/26/2025 0436 by Cairna Suazo RN  Outcome: Progressing     Problem: Skin Injury Risk Increased  Goal: Skin Health and Integrity  7/26/2025 0442 by Carina Suazo RN  Outcome: Progressing  7/26/2025 0436 by Carina Suazo RN  Outcome: Progressing  Intervention: Optimize Skin Protection  Description: Perform a full pressure injury risk assessment, as indicated by screening, upon admission to care unit.Reassess skin (full inspection and injury risk, including skin temperature, consistency and color) frequently (e.g., scheduled interval, with change in condition) to provide optimal early detection and prevention.Maintain adequate tissue perfusion (e.g., encourage fluid balance; avoid crossing legs, constrictive clothing or devices) to promote  tissue oxygenation.Maintain head of bed at lowest degree of elevation tolerated, considering medical condition and other restrictions. Use positioning supports to prevent sliding and friction. Consider low friction textiles.Avoid positioning onto an area that remains reddened or on bony prominences.Minimize incontinence and moisture (e.g., toileting schedule; moisture-wicking pad, diaper or incontinence collection device; skin moisture barrier).Cleanse skin promptly and gently, when soiled, utilizing a pH-balanced cleanser.Relieve and redistribute pressure (e.g., scheduled position changes, weight shifts, use of support surface, medical device repositioning, protective dressing application, use of positioning device, microclimate control, use of pressure-injury-monitorEncourage increased activity, such as sitting in a chair at the bedside or early mobilization, when able to tolerate. Avoid prolonged sitting.  Recent Flowsheet Documentation  Taken 7/26/2025 0400 by Carina Suazo, RN  Activity Management: activity encouraged  Pressure Reduction Techniques:   frequent weight shift encouraged   weight shift assistance provided   heels elevated off bed   positioned off wounds   pressure points protected  Pressure Reduction Devices:   pressure-redistributing mattress utilized   positioning supports utilized   heel offloading device utilized  Skin Protection:   transparent dressing maintained   silicone foam dressing in place   incontinence pads utilized  Taken 7/26/2025 0200 by Carina Suazo, RN  Activity Management: activity encouraged  Pressure Reduction Techniques:   frequent weight shift encouraged   weight shift assistance provided   positioned off wounds   heels elevated off bed   pressure points protected  Pressure Reduction Devices:   pressure-redistributing mattress utilized   positioning supports utilized   heel offloading device utilized  Skin Protection:   transparent dressing maintained   skin  sealant/moisture barrier applied   silicone foam dressing in place   incontinence pads utilized  Taken 7/26/2025 0000 by Carina Suazo RN  Activity Management: activity encouraged  Pressure Reduction Techniques:   frequent weight shift encouraged   weight shift assistance provided   heels elevated off bed   positioned off wounds   pressure points protected  Pressure Reduction Devices:   pressure-redistributing mattress utilized   positioning supports utilized   heel offloading device utilized  Skin Protection:   transparent dressing maintained   skin sealant/moisture barrier applied   incontinence pads utilized  Taken 7/25/2025 2200 by Carina Suazo RN  Activity Management: activity encouraged  Pressure Reduction Techniques:   frequent weight shift encouraged   weight shift assistance provided   heels elevated off bed   positioned off wounds   pressure points protected  Pressure Reduction Devices:   positioning supports utilized   heel offloading device utilized   pressure-redistributing mattress utilized  Skin Protection:   transparent dressing maintained   skin sealant/moisture barrier applied   silicone foam dressing in place   incontinence pads utilized  Taken 7/25/2025 2000 by Carina Suazo RN  Activity Management: up in chair  Pressure Reduction Techniques:   frequent weight shift encouraged   weight shift assistance provided   heels elevated off bed   positioned off wounds   pressure points protected  Head of Bed (HOB) Positioning: HOB at 30-45 degrees  Pressure Reduction Devices:   pressure-redistributing mattress utilized   positioning supports utilized   heel offloading device utilized  Skin Protection:   transparent dressing maintained   skin sealant/moisture barrier applied   silicone foam dressing in place   incontinence pads utilized     Problem: Comorbidity Management  Goal: Maintenance of COPD Symptom Control  7/26/2025 0442 by Carina Suazo RN  Outcome: Progressing  7/26/2025 0436 by Lakeisha  NIKKI Lim  Outcome: Progressing  Intervention: Maintain COPD (Chronic Obstructive Pulmonary Disease) Symptom Control  Description: Evaluate adherence to self-management (COPD action plan), such as medication, symptom control, trigger avoidance, infection prevention and self-monitoring.Advocate for continuation of home regimen, including oxygen, medication and noninvasive positive pressure use.Anticipate the need for breathing techniques and activity pacing to minimize fatigue and breathlessness.Assess for proper use of inhaled medication and delivery technique; assist or reinstruct if needed.Evaluate effectiveness of coping skills; encourage expression of feelings, expectations and concerns related to disease management and quality of life; reinforce education to enhance management plan and wellbeing.  Recent Flowsheet Documentation  Taken 7/26/2025 0400 by Carina Suazo RN  Medication Review/Management: medications reviewed  Taken 7/26/2025 0200 by Carina Suazo RN  Medication Review/Management: medications reviewed  Taken 7/26/2025 0000 by Carina Suazo RN  Medication Review/Management: medications reviewed  Taken 7/25/2025 2200 by Carina Suazo RN  Medication Review/Management: medications reviewed  Taken 7/25/2025 2000 by Carina Suazo RN  Medication Review/Management: medications reviewed  Goal: Blood Glucose Level Within Target Range  7/26/2025 0442 by Carina Suazo RN  Outcome: Progressing  7/26/2025 0436 by Carina Suazo RN  Outcome: Progressing  Intervention: Monitor and Manage Glycemia  Description: Establish target blood glucose levels based on patient-specific factors, such as age, diabetes-related complications and illness severity.Document blood glucose levels and monitor trend.Provide antihyperglycemic pharmacologic therapy to maintain blood glucose levels within targeted range.Advocate for patient use of home devices such as insulin pump, continuous glucose monitor; assess for safe and  competent participation in care.Check blood glucose level if there is a change in mental or cognitive status.Recognize, treat and document hypoglycemia event and potential cause.Assess current lifestyle patterns; acknowledging positive patterns supporting wellbeing.Evaluate effectiveness of coping skills; encourage expression of feelings, expectations and concerns related to disease management and quality of life; reinforce education to enhance management plan and wellbeing.  Recent Flowsheet Documentation  Taken 7/26/2025 0400 by Carina Suazo RN  Medication Review/Management: medications reviewed  Taken 7/26/2025 0200 by Carina Suazo RN  Medication Review/Management: medications reviewed  Taken 7/26/2025 0000 by Carina Suazo RN  Medication Review/Management: medications reviewed  Taken 7/25/2025 2200 by Carina Suazo RN  Medication Review/Management: medications reviewed  Taken 7/25/2025 2000 by Carina Suazo RN  Medication Review/Management: medications reviewed  Goal: Maintenance of Heart Failure Symptom Control  7/26/2025 0442 by Carina Suazo RN  Outcome: Progressing  7/26/2025 0436 by Carina Suazo RN  Outcome: Progressing  Intervention: Maintain Heart Failure Management  Description: Evaluate adherence to home heart failure self-care regimen (e.g., medication, fluid balance, sodium intake, daily weight, physical activity, telemonitoring, support).Advocate continuation of home medication and schedule.Consider pharmacologic therapy administration time and effects (e.g., avoid giving diuretic prior to bedtime or nitrates on empty stomach).Monitor response to pharmacologic therapy, including weight fluctuations, blood pressure and electrolyte levels.Monitor for signs and symptoms of anxiety and depression, including severity and duration; if present, provide psychosocial support.Consider need for heart failure clinic or palliative care consult.  Recent Flowsheet Documentation  Taken 7/26/2025  0400 by Carina Suazo RN  Medication Review/Management: medications reviewed  Taken 7/26/2025 0200 by Carina Suazo RN  Medication Review/Management: medications reviewed  Taken 7/26/2025 0000 by Carina Suazo RN  Medication Review/Management: medications reviewed  Taken 7/25/2025 2200 by Carina Suazo RN  Medication Review/Management: medications reviewed  Taken 7/25/2025 2000 by Carina Suazo RN  Medication Review/Management: medications reviewed  Goal: Blood Pressure in Desired Range  7/26/2025 0442 by Carina Suazo RN  Outcome: Progressing  7/26/2025 0436 by Carina Suazo RN  Outcome: Progressing  Intervention: Maintain Blood Pressure Management  Description: Evaluate adherence to home antihypertensive regimen (e.g., exercise and activity, diet modification, medication).Provide scheduled antihypertensive medication; consider administration time and effects (e.g., avoid giving diuretic prior to bedtime).Monitor response to antihypertensive medication therapy (e.g., blood pressure, electrolyte levels, medication effects).Minimize risk of orthostatic hypotension; encourage caution with position changes, particularly if elderly.  Recent Flowsheet Documentation  Taken 7/26/2025 0400 by Carina Suazo RN  Medication Review/Management: medications reviewed  Taken 7/26/2025 0200 by Carina Suazo RN  Medication Review/Management: medications reviewed  Taken 7/26/2025 0000 by Carina Suazo RN  Medication Review/Management: medications reviewed  Taken 7/25/2025 2200 by Carina Suazo RN  Medication Review/Management: medications reviewed  Taken 7/25/2025 2000 by Carina Suazo RN  Medication Review/Management: medications reviewed     Problem: Sepsis/Septic Shock  Goal: Optimal Coping  7/26/2025 0442 by Carina Suazo RN  Outcome: Progressing  7/26/2025 0436 by Carina Suazo RN  Outcome: Progressing  Goal: Absence of Bleeding  7/26/2025 0442 by Carina Suazo RN  Outcome: Progressing  7/26/2025  0436 by Bohara, Carina, RN  Outcome: Progressing  Goal: Blood Glucose Level Within Target Range  7/26/2025 0442 by Carina Suazo RN  Outcome: Progressing  7/26/2025 0436 by Carina Suazo RN  Outcome: Progressing  Goal: Absence of Infection Signs and Symptoms  7/26/2025 0442 by Carina Suazo RN  Outcome: Progressing  7/26/2025 0436 by Carina Suazo RN  Outcome: Progressing  Intervention: Initiate Sepsis Management  Description: Provide fluid therapy, such as crystalloid or albumin, to increase intravascular volume, organ perfusion and oxygen delivery.Provide respiratory support, such as oxygen therapy, noninvasive or invasive positive pressure ventilation, to achieve oxygenation and ventilation goal; avoid hyperoxemia.Obtain cultures prior to initiating antimicrobial therapy when possible. Do not delay treatment for laboratory results in the presence of high suspicion or clinical indicators.Administer intravenous broad-spectrum antimicrobial therapy promptly.Implement hemodynamic monitoring to guide intravascular support based on individual targeted parameters.Determine and address underlying source of infection aggressively; implement transmission-based precautions and isolation, as indicated.  Recent Flowsheet Documentation  Taken 7/25/2025 2000 by Carina Suazo RN  Infection Prevention:   environmental surveillance performed   equipment surfaces disinfected   hand hygiene promoted   personal protective equipment utilized  Intervention: Promote Recovery  Description: Encourage pulmonary hygiene, such as cough-enhancement and airway-clearance techniques, that may include use of incentive spirometry, deep breathing and cough.Encourage early rehabilitation and physical activity to optimize functional ability and activity tolerance, as well as minimize delirium.Promote energy conservation; minimize oxygen demand and consumption by adjusting environment, decreasing stimulation, maintaining normothermia and  treating pain.Optimize fluid balance, nutrition intake, sleep and glycemic control to maintain tissue perfusion and enhance immune response.  Recent Flowsheet Documentation  Taken 7/26/2025 0400 by Carina Suazo RN  Activity Management: activity encouraged  Taken 7/26/2025 0200 by Carina Suazo RN  Activity Management: activity encouraged  Taken 7/26/2025 0000 by Carina Suazo RN  Activity Management: activity encouraged  Taken 7/25/2025 2200 by Carina Suazo RN  Activity Management: activity encouraged  Taken 7/25/2025 2000 by Carina Suazo RN  Activity Management: up in chair  Goal: Optimal Nutrition Delivery  7/26/2025 0442 by Carina Suazo RN  Outcome: Progressing  7/26/2025 0436 by Carina Suazo RN  Outcome: Progressing     Problem: Fall Injury Risk  Goal: Absence of Fall and Fall-Related Injury  7/26/2025 0442 by Carina Suazo RN  Outcome: Progressing  7/26/2025 0436 by Carina Suazo RN  Outcome: Progressing  Intervention: Identify and Manage Contributors  Description: Develop a fall prevention plan, considering patient-centered interventions and family/caregiver involvement; identify and address patient's facilitators and barriers.Provide reorientation, appropriate sensory stimulation and routines with changes in mental status to decrease risk of fall.Promote use of personal vision and auditory aids.Assess assistance level required for safe and effective self-care; provide support as needed, such as toileting and mobilization. For age 65 and older, implement timed toileting with assistance.Encourage physical activity, such as performance of mobility and self-care at highest level of patient ability, multicomponent exercise program and provision of appropriate assistive devices.If fall occurs, assess the severity of injury; implement fall injury protocol. Determine the cause and revise fall injury prevention plan.Regularly review and advocate for medication adjustment to decrease fall risk;  consider administration times, polypharmacy and age.Balance adequate pain management with potential for oversedation.  Recent Flowsheet Documentation  Taken 7/26/2025 0400 by Carina Suazo RN  Medication Review/Management: medications reviewed  Taken 7/26/2025 0200 by Carina Suazo RN  Medication Review/Management: medications reviewed  Taken 7/26/2025 0000 by Carina Suazo RN  Medication Review/Management: medications reviewed  Taken 7/25/2025 2200 by Carina Suazo RN  Medication Review/Management: medications reviewed  Taken 7/25/2025 2000 by Carina Suazo RN  Medication Review/Management: medications reviewed  Intervention: Promote Injury-Free Environment  Description: Provide a safe, barrier-free environment that encourages independent activity.Keep care area uncluttered and well-lighted.Determine need for increased observation or monitoring.Avoid use of devices that minimize mobility, such as restraints or indwelling urinary catheter.  Recent Flowsheet Documentation  Taken 7/26/2025 0400 by Carina Suazo RN  Safety Promotion/Fall Prevention:   activity supervised   assistive device/personal items within reach   clutter free environment maintained   fall prevention program maintained   lighting adjusted   mobility aid in reach   nonskid shoes/slippers when out of bed   room organization consistent   safety round/check completed  Taken 7/26/2025 0200 by Carina Suazo RN  Safety Promotion/Fall Prevention:   activity supervised   assistive device/personal items within reach   clutter free environment maintained   fall prevention program maintained   lighting adjusted   mobility aid in reach   nonskid shoes/slippers when out of bed   room organization consistent   safety round/check completed  Taken 7/26/2025 0000 by Carina Suazo RN  Safety Promotion/Fall Prevention:   activity supervised   assistive device/personal items within reach   clutter free environment maintained   fall prevention program  maintained   lighting adjusted   mobility aid in reach   nonskid shoes/slippers when out of bed   room organization consistent   safety round/check completed  Taken 7/25/2025 2200 by Carina Suazo, RN  Safety Promotion/Fall Prevention:   activity supervised   assistive device/personal items within reach   clutter free environment maintained   fall prevention program maintained   lighting adjusted   mobility aid in reach   nonskid shoes/slippers when out of bed   room organization consistent   safety round/check completed  Taken 7/25/2025 2000 by Carina Suazo, RN  Safety Promotion/Fall Prevention:   activity supervised   assistive device/personal items within reach   clutter free environment maintained   fall prevention program maintained   lighting adjusted   mobility aid in reach   nonskid shoes/slippers when out of bed   room organization consistent   safety round/check completed   Goal Outcome Evaluation:

## 2025-07-26 NOTE — PROGRESS NOTES
[facility] Medicine Services  PROGRESS NOTE    Patient Name: Dani Ziegler  : 1965  MRN: [id number]    Date of Admission: 2025  Primary Care Physician: Darrion Tovar MD    Subjective   Subjective     CC:  Copd exac    HPI:  Resting in bed. Remains on 5L. Feels better today- gets good relief with breathing treatments.     Ros   As above      Objective   Objective     Vital Signs:   Temp:  [97.6 °F (36.4 °C)-98.6 °F (37 °C)] 97.8 °F (36.6 °C)  Heart Rate:  [62-86] 62  Resp:  [17-20] 18  BP: (141-162)/(69-87) 153/79  Flow (L/min) (Oxygen Therapy):  [5-6] 5     Physical Exam:  GEN: NAD, resting in chair, BMI 51  HEENT: on 5L, atraumatic, normocephalic  NECK: supple, no masses  RESP: on 5L, normal effort   CV: on tele, sinus rhythm  PSYCH: normal affect, appropriate  NEURO: awake, alert, no focal deficits noted  MSK: no edema noted  SKIN: no rashes noted       Results Reviewed:  LAB RESULTS:      Lab 25  0502 25  0447 25  1759 25  0511 25  1137 25  0700 25  0422 25  0227   WBC 14.10* 13.03*  --  19.38*  --   --   --  29.08*   HEMOGLOBIN 11.2* 11.3* 12.1 11.5*  --   --   --  14.5   HEMATOCRIT 37.0 37.1 39.6 37.5  --   --   --  44.4   PLATELETS 331 332  --  336  --   --   --  430   NEUTROS ABS 9.58* 8.65*  --  17.33*  --   --   --  23.90*   IMMATURE GRANS (ABS) 0.33* 0.25*  --  0.28*  --   --   --  0.39*   LYMPHS ABS 3.38* 3.28*  --  1.29  --   --   --  3.42*   MONOS ABS 0.69 0.77  --  0.44  --   --   --  1.27*   EOS ABS 0.06 0.03  --  0.00  --   --   --  0.02   MCV 90.9 91.6  --  90.1  --   --   --  86.2   CRP  --   --   --   --   --   --  18.37*  --    PROCALCITONIN  --   --   --   --   --  0.30* 0.23  --    LACTATE  --   --   --   --  1.3 2.3*  --  2.1*   HSTROP T  --   --   --   --   --   --  28* 26*         Lab 25  0502 25  0447 25  0511 25  0227   SODIUM 138 138 136 133*   POTASSIUM 4.3 4.6 4.7 4.2    CHLORIDE 101 105 104 98   CO2 28.0 26.3 22.9 19.9*   ANION GAP 9.0 6.7 9.1 15.1*   BUN 20.6 21.2 15.8 9.8   CREATININE 0.95 0.92 0.83 0.89   EGFR 68.3 71.0 80.8 74.3   GLUCOSE 283* 237* 176* 270*   CALCIUM 9.0 8.7 8.8 9.2   MAGNESIUM 2.2 1.9 2.5* 1.7   PHOSPHORUS  --   --  2.8  --    HEMOGLOBIN A1C  --   --   --  9.01*         Lab 07/26/25  0502 07/23/25  0511 07/22/25  0227   TOTAL PROTEIN 6.9 7.3 8.5   ALBUMIN 2.9* 2.8* 3.3*   GLOBULIN 4.0 4.5 5.2   ALT (SGPT) 5 6 7   AST (SGOT) 7 11 10   BILIRUBIN <0.2 0.2 0.8   ALK PHOS 79 91 127*         Lab 07/22/25  0422 07/22/25  0227   PROBNP  --  8,901.0*   HSTROP T 28* 26*                 Lab 07/25/25  0952 07/24/25  0441 07/22/25  0230   PH, ARTERIAL 7.368 7.386 7.440   PCO2, ARTERIAL 49.4* 44.9 32.9*   PO2 ART 78.6* 75.3* 286.0*   FIO2 44 44 100   HCO3 ART 28.4* 26.9* 22.3   BASE EXCESS ART 2.3* 1.5 -1.1*   CARBOXYHEMOGLOBIN 1.0 0.9 1.3     Brief Urine Lab Results  (Last result in the past 365 days)        Color   Clarity   Blood   Leuk Est   Nitrite   Protein   CREAT   Urine HCG        07/22/25 0655 Yellow   Cloudy   Trace   Negative   Positive   Trace                   Microbiology Results Abnormal       Procedure Component Value - Date/Time    MRSA Screen, PCR (Inpatient) - Swab, Nares [[id number]]  (Abnormal) Collected: 07/22/25 1325    Lab Status: Final result Specimen: Swab from Nares Updated: 07/22/25 1510     MRSA PCR Positive    Narrative:      The negative predictive value of this diagnostic test is high and should only be used to consider de-escalating anti-MRSA therapy. A positive result may indicate colonization with MRSA and must be correlated clinically.            XR Chest 1 View  Result Date: 7/25/2025  XR CHEST 1 VW Date of Exam: 7/25/2025 9:08 AM EDT Indication: hypoxia/copd exac Comparison: 7/22/2025 chest radiograph and chest CT scan Findings: Prior chest radiograph report indicated new focus of airspace disease in the right lung base. CT scan  report described right middle lobe pneumonia and infectious bronchiolitis of both lungs. Mediastinal and right hilar adenopathy. Today's chest radiograph shows decreased focal disease in the right lung base, but a persistent pattern of diffuse interstitial disease elsewhere that appears either stable or further increased. Although today's images taken with more true AP technique than the prior lordotic film, there appears to be significant further enlargement of the pulmonary rupal. No effusion or pneumothorax is identified.     Impression: Impression: 1. Improved focal pneumonia of the right lung base. 2. Appearance of mildly increased diffuse bronchial wall thickening and interstitial disease elsewhere in the lungs. 3. Further enlargement of the pulmonary rupal, suggesting increasing adenopathy. Electronically Signed: Elton Cain MD  7/25/2025 10:07 AM EDT  Workstation ID: [id number]      Results for orders placed during the hospital encounter of 07/22/25    Adult Transthoracic Echo Complete W/ Cont if Necessary Per Protocol 07/22/2025  4:26 PM    Interpretation Summary    Left ventricular systolic function is normal. Calculated left ventricular EF = 60.8% Left ventricular ejection fraction appears to be 56 - 60%.    The following left ventricular wall segments are hypokinetic: apical septal, basal inferoseptal, mid inferoseptal, mid anteroseptal and basal inferoseptal.    Left ventricular wall thickness is consistent with borderline concentric hypertrophy.  Grade I diastolic dysfunction is present.    Normal right ventricular size and function.    Cardiac valves very poorly visualized due to poor acoustic windows. No hemodynamically  significant valvular dysfunction noted by Doppler evaluation.      Current medications:  Scheduled Meds:apixaban, 5 mg, Oral, Q12H  ARIPiprazole, 10 mg, Oral, Daily  doxycycline, 100 mg, Intravenous, Q12H  escitalopram, 20 mg, Oral, Daily  insulin glargine, 15 Units, Subcutaneous,  Q12H  insulin lispro, 3-14 Units, Subcutaneous, 4x Daily AC & at Bedtime  Insulin Lispro, 5 Units, Subcutaneous, TID With Meals  ipratropium-albuterol, 3 mL, Nebulization, 4x Daily - RT  miconazole, 1 Application, Topical, Q12H  mupirocin, 1 Application, Each Nare, BID  oxybutynin XL, 5 mg, Oral, Daily  pantoprazole, 40 mg, Oral, Q AM  predniSONE, 30 mg, Oral, Daily With Breakfast  sodium chloride, 10 mL, Intravenous, Q12H  sodium chloride, 4 mL, Nebulization, BID - RT      Continuous Infusions:   PRN Meds:.  acetaminophen    Calcium Replacement - Follow Nurse / BPA Driven Protocol    dextrose    dextrose    glucagon (human recombinant)    Magnesium Cardiology Dose Replacement - Follow Nurse / BPA Driven Protocol    Phosphorus Replacement - Follow Nurse / BPA Driven Protocol    Potassium Replacement - Follow Nurse / BPA Driven Protocol    sodium chloride    sodium chloride    sodium chloride    traZODone    Assessment & Plan   Assessment & Plan     Active Hospital Problems    Diagnosis  POA    **Acute on chronic respiratory failure with hypoxia [J96.21]  Yes    Acute on chronic hypoxic respiratory failure [J96.21]  Yes      Resolved Hospital Problems   No resolved problems to display.        Brief Hospital Course to date:  Dani Ziegler is a 60 y.o. female with history of tobacco use, COPD, morbid obesity, h/o PE, T2DM, HTN who presents with soa, cough and fever. Patient was initially admitted to the ICU with respiratory failure requiring BIPAP. She received steroids, bronchodilators, antibiotics, furosemide. Imaging revealed an infiltrate in the right lower lobe. CT scan confirmed extensive tree-in-bud opacities in the right lung a lesser extent the left lung with some reactive adenopathy. Legionella and pneumococcal urinary antigens are negative. Respiratory panel PCR is negative. Nasal swab for MRSA is positive. Blood cultures are negative at 24 hours. Echo reveals a normal EF although she has some wall motion  abnormalities. There was no evidence of PE. Patient noted to have an ecchymotic area on the medial ball of her left foot with some superficial skin breakdown.  ID following with abx ongoing. Patient transitioned to floor 7/25    Acute on chronic respiratory failure  COPD exacerbation  Possible PNA  --currently on 5L, 3L is her baseline  --continue abx, steroids, nebs   --wean as tolerated  --ABG/CXR done yesterday resassuring     Left medial foot blister/bruise  --ID following, do not favor this is source of infection  --abx per above PNA, to stop today     T2DM  --A1C 9, initially on insulin gtt  --continue basal/prandial/bolus    History of DVT/PE  --on eliquis             Expected Discharge Location and Transportation: home with   Expected Discharge   Expected Discharge Date: 7/28/2025; Expected Discharge Time:      VTE Prophylaxis:  Pharmacologic VTE prophylaxis orders are present.         AM-PAC 6 Clicks Score (PT): 17 (07/26/25 0900)    CODE STATUS:   Code Status and Medical Interventions: CPR (Attempt to Resuscitate); Full Support   Ordered at: 07/24/25 1705     Code Status (Patient has no pulse and is not breathing):    CPR (Attempt to Resuscitate)     Medical Interventions (Patient has pulse or is breathing):    Full Support       Tete Cunha MD  07/26/25

## 2025-07-27 ENCOUNTER — APPOINTMENT (OUTPATIENT)
Dept: GENERAL RADIOLOGY | Facility: HOSPITAL | Age: 60
End: 2025-07-27
Payer: MEDICARE

## 2025-07-27 LAB
ALBUMIN SERPL-MCNC: 3.2 G/DL (ref 3.5–5.2)
ALBUMIN/GLOB SERPL: 0.9 G/DL
ALP SERPL-CCNC: 78 U/L (ref 39–117)
ALT SERPL W P-5'-P-CCNC: 6 U/L (ref 1–33)
ANION GAP SERPL CALCULATED.3IONS-SCNC: 11.6 MMOL/L (ref 5–15)
AST SERPL-CCNC: 8 U/L (ref 1–32)
BACTERIA SPEC AEROBE CULT: NORMAL
BACTERIA SPEC AEROBE CULT: NORMAL
BASOPHILS # BLD AUTO: 0.15 10*3/MM3 (ref 0–0.2)
BASOPHILS NFR BLD AUTO: 0.8 % (ref 0–1.5)
BILIRUB SERPL-MCNC: <0.2 MG/DL (ref 0–1.2)
BUN SERPL-MCNC: 22.7 MG/DL (ref 8–23)
BUN/CREAT SERPL: 26.7 (ref 7–25)
CALCIUM SPEC-SCNC: 8.9 MG/DL (ref 8.6–10.5)
CHLORIDE SERPL-SCNC: 101 MMOL/L (ref 98–107)
CO2 SERPL-SCNC: 25.4 MMOL/L (ref 22–29)
CREAT SERPL-MCNC: 0.85 MG/DL (ref 0.57–1)
DEPRECATED RDW RBC AUTO: 49.7 FL (ref 37–54)
EGFRCR SERPLBLD CKD-EPI 2021: 78.1 ML/MIN/1.73
EOSINOPHIL # BLD AUTO: 0.16 10*3/MM3 (ref 0–0.4)
EOSINOPHIL NFR BLD AUTO: 0.9 % (ref 0.3–6.2)
ERYTHROCYTE [DISTWIDTH] IN BLOOD BY AUTOMATED COUNT: 15 % (ref 12.3–15.4)
GLOBULIN UR ELPH-MCNC: 3.6 GM/DL
GLUCOSE BLDC GLUCOMTR-MCNC: 214 MG/DL (ref 70–130)
GLUCOSE BLDC GLUCOMTR-MCNC: 225 MG/DL (ref 70–130)
GLUCOSE BLDC GLUCOMTR-MCNC: 231 MG/DL (ref 70–130)
GLUCOSE BLDC GLUCOMTR-MCNC: 359 MG/DL (ref 70–130)
GLUCOSE SERPL-MCNC: 230 MG/DL (ref 65–99)
HCT VFR BLD AUTO: 39 % (ref 34–46.6)
HGB BLD-MCNC: 12 G/DL (ref 12–15.9)
IMM GRANULOCYTES # BLD AUTO: 0.72 10*3/MM3 (ref 0–0.05)
IMM GRANULOCYTES NFR BLD AUTO: 3.9 % (ref 0–0.5)
LYMPHOCYTES # BLD AUTO: 5.3 10*3/MM3 (ref 0.7–3.1)
LYMPHOCYTES NFR BLD AUTO: 28.6 % (ref 19.6–45.3)
MCH RBC QN AUTO: 27.8 PG (ref 26.6–33)
MCHC RBC AUTO-ENTMCNC: 30.8 G/DL (ref 31.5–35.7)
MCV RBC AUTO: 90.5 FL (ref 79–97)
MONOCYTES # BLD AUTO: 0.74 10*3/MM3 (ref 0.1–0.9)
MONOCYTES NFR BLD AUTO: 4 % (ref 5–12)
NEUTROPHILS NFR BLD AUTO: 11.49 10*3/MM3 (ref 1.7–7)
NEUTROPHILS NFR BLD AUTO: 61.8 % (ref 42.7–76)
NRBC BLD AUTO-RTO: 0 /100 WBC (ref 0–0.2)
PLATELET # BLD AUTO: 356 10*3/MM3 (ref 140–450)
PMV BLD AUTO: 10.6 FL (ref 6–12)
POTASSIUM SERPL-SCNC: 4.1 MMOL/L (ref 3.5–5.2)
PROT SERPL-MCNC: 6.8 G/DL (ref 6–8.5)
RBC # BLD AUTO: 4.31 10*6/MM3 (ref 3.77–5.28)
SODIUM SERPL-SCNC: 138 MMOL/L (ref 136–145)
WBC NRBC COR # BLD AUTO: 18.56 10*3/MM3 (ref 3.4–10.8)

## 2025-07-27 PROCEDURE — 94799 UNLISTED PULMONARY SVC/PX: CPT

## 2025-07-27 PROCEDURE — 63710000001 INSULIN LISPRO (HUMAN) PER 5 UNITS: Performed by: INTERNAL MEDICINE

## 2025-07-27 PROCEDURE — 85025 COMPLETE CBC W/AUTO DIFF WBC: CPT | Performed by: INTERNAL MEDICINE

## 2025-07-27 PROCEDURE — 63710000001 PREDNISONE PER 5 MG: Performed by: INTERNAL MEDICINE

## 2025-07-27 PROCEDURE — 99232 SBSQ HOSP IP/OBS MODERATE 35: CPT | Performed by: INTERNAL MEDICINE

## 2025-07-27 PROCEDURE — 63710000001 INSULIN GLARGINE PER 5 UNITS: Performed by: INTERNAL MEDICINE

## 2025-07-27 PROCEDURE — 63710000001 PREDNISONE PER 1 MG: Performed by: INTERNAL MEDICINE

## 2025-07-27 PROCEDURE — 82948 REAGENT STRIP/BLOOD GLUCOSE: CPT

## 2025-07-27 PROCEDURE — 94664 DEMO&/EVAL PT USE INHALER: CPT

## 2025-07-27 PROCEDURE — 71045 X-RAY EXAM CHEST 1 VIEW: CPT

## 2025-07-27 PROCEDURE — 80053 COMPREHEN METABOLIC PANEL: CPT | Performed by: INTERNAL MEDICINE

## 2025-07-27 RX ADMIN — ARIPIPRAZOLE 10 MG: 10 TABLET ORAL at 08:15

## 2025-07-27 RX ADMIN — INSULIN LISPRO 5 UNITS: 100 INJECTION, SOLUTION INTRAVENOUS; SUBCUTANEOUS at 08:16

## 2025-07-27 RX ADMIN — INSULIN LISPRO 5 UNITS: 100 INJECTION, SOLUTION INTRAVENOUS; SUBCUTANEOUS at 11:33

## 2025-07-27 RX ADMIN — OXYBUTYNIN CHLORIDE 5 MG: 5 TABLET, EXTENDED RELEASE ORAL at 08:14

## 2025-07-27 RX ADMIN — APIXABAN 5 MG: 5 TABLET, FILM COATED ORAL at 08:14

## 2025-07-27 RX ADMIN — MICONAZOLE NITRATE 1 APPLICATION: 20 POWDER TOPICAL at 20:26

## 2025-07-27 RX ADMIN — INSULIN LISPRO 5 UNITS: 100 INJECTION, SOLUTION INTRAVENOUS; SUBCUTANEOUS at 16:59

## 2025-07-27 RX ADMIN — MICONAZOLE NITRATE 1 APPLICATION: 20 POWDER TOPICAL at 08:15

## 2025-07-27 RX ADMIN — INSULIN LISPRO 5 UNITS: 100 INJECTION, SOLUTION INTRAVENOUS; SUBCUTANEOUS at 11:32

## 2025-07-27 RX ADMIN — IPRATROPIUM BROMIDE AND ALBUTEROL SULFATE 3 ML: .5; 3 SOLUTION RESPIRATORY (INHALATION) at 19:59

## 2025-07-27 RX ADMIN — IPRATROPIUM BROMIDE AND ALBUTEROL SULFATE 3 ML: .5; 3 SOLUTION RESPIRATORY (INHALATION) at 13:17

## 2025-07-27 RX ADMIN — IPRATROPIUM BROMIDE AND ALBUTEROL SULFATE 3 ML: .5; 3 SOLUTION RESPIRATORY (INHALATION) at 16:34

## 2025-07-27 RX ADMIN — PANTOPRAZOLE SODIUM 40 MG: 40 TABLET, DELAYED RELEASE ORAL at 05:32

## 2025-07-27 RX ADMIN — PREDNISONE 30 MG: 10 TABLET ORAL at 08:14

## 2025-07-27 RX ADMIN — INSULIN GLARGINE 15 UNITS: 100 INJECTION, SOLUTION SUBCUTANEOUS at 20:22

## 2025-07-27 RX ADMIN — IPRATROPIUM BROMIDE AND ALBUTEROL SULFATE 3 ML: .5; 3 SOLUTION RESPIRATORY (INHALATION) at 08:29

## 2025-07-27 RX ADMIN — Medication 4 ML: at 08:29

## 2025-07-27 RX ADMIN — ESCITALOPRAM 20 MG: 20 TABLET, FILM COATED ORAL at 08:14

## 2025-07-27 RX ADMIN — TRAZODONE HYDROCHLORIDE 50 MG: 50 TABLET ORAL at 20:23

## 2025-07-27 RX ADMIN — INSULIN LISPRO 12 UNITS: 100 INJECTION, SOLUTION INTRAVENOUS; SUBCUTANEOUS at 16:59

## 2025-07-27 RX ADMIN — TRAZODONE HYDROCHLORIDE 50 MG: 50 TABLET ORAL at 00:04

## 2025-07-27 RX ADMIN — INSULIN LISPRO 5 UNITS: 100 INJECTION, SOLUTION INTRAVENOUS; SUBCUTANEOUS at 20:22

## 2025-07-27 RX ADMIN — INSULIN GLARGINE 15 UNITS: 100 INJECTION, SOLUTION SUBCUTANEOUS at 08:15

## 2025-07-27 RX ADMIN — APIXABAN 5 MG: 5 TABLET, FILM COATED ORAL at 20:21

## 2025-07-27 RX ADMIN — Medication 4 ML: at 19:59

## 2025-07-27 NOTE — PROGRESS NOTES
[facility] Medicine Services  PROGRESS NOTE    Patient Name: Dani Ziegler  : 1965  MRN: [id number]    Date of Admission: 2025  Primary Care Physician: Darrion Tovar MD    Subjective   Subjective     CC:  Copd exac    HPI:  Resting in bed. Remains on 5L. Feels better today- gets good relief with breathing treatments.     Ros   As above      Objective   Objective     Vital Signs:   Temp:  [97.8 °F (36.6 °C)-98.6 °F (37 °C)] 98.2 °F (36.8 °C)  Heart Rate:  [62-96] 96  Resp:  [18-24] 20  BP: (145-160)/(79-90) 160/90  Flow (L/min) (Oxygen Therapy):  [4-5] 4     Physical Exam:  GEN: NAD, resting in chair, BMI 51  HEENT: on 5L, atraumatic, normocephalic  NECK: supple, no masses  RESP: on 5L, slightly incr effort   CV: on tele, sinus rhythm  PSYCH: normal affect, appropriate  NEURO: awake, alert, no focal deficits noted  MSK: no edema noted  SKIN: no rashes noted       Results Reviewed:  LAB RESULTS:      Lab 25  0510 25  0502 25  0447 25  1759 25  0511 25  1137 25  0700 25  0422 25  0227   WBC 18.56* 14.10* 13.03*  --  19.38*  --   --   --  29.08*   HEMOGLOBIN 12.0 11.2* 11.3* 12.1 11.5*  --   --   --  14.5   HEMATOCRIT 39.0 37.0 37.1 39.6 37.5  --   --   --  44.4   PLATELETS 356 331 332  --  336  --   --   --  430   NEUTROS ABS 11.49* 9.58* 8.65*  --  17.33*  --   --   --  23.90*   IMMATURE GRANS (ABS) 0.72* 0.33* 0.25*  --  0.28*  --   --   --  0.39*   LYMPHS ABS 5.30* 3.38* 3.28*  --  1.29  --   --   --  3.42*   MONOS ABS 0.74 0.69 0.77  --  0.44  --   --   --  1.27*   EOS ABS 0.16 0.06 0.03  --  0.00  --   --   --  0.02   MCV 90.5 90.9 91.6  --  90.1  --   --   --  86.2   CRP  --   --   --   --   --   --   --  18.37*  --    PROCALCITONIN  --   --   --   --   --   --  0.30* 0.23  --    LACTATE  --   --   --   --   --  1.3 2.3*  --  2.1*   HSTROP T  --   --   --   --   --   --   --  28* 26*         Lab 25  0510  07/26/25  0502 07/25/25  0447 07/23/25  0511 07/22/25 0227   SODIUM 138 138 138 136 133*   POTASSIUM 4.1 4.3 4.6 4.7 4.2   CHLORIDE 101 101 105 104 98   CO2 25.4 28.0 26.3 22.9 19.9*   ANION GAP 11.6 9.0 6.7 9.1 15.1*   BUN 22.7 20.6 21.2 15.8 9.8   CREATININE 0.85 0.95 0.92 0.83 0.89   EGFR 78.1 68.3 71.0 80.8 74.3   GLUCOSE 230* 283* 237* 176* 270*   CALCIUM 8.9 9.0 8.7 8.8 9.2   MAGNESIUM  --  2.2 1.9 2.5* 1.7   PHOSPHORUS  --   --   --  2.8  --    HEMOGLOBIN A1C  --   --   --   --  9.01*         Lab 07/27/25  0510 07/26/25  0502 07/23/25  0511 07/22/25 0227   TOTAL PROTEIN 6.8 6.9 7.3 8.5   ALBUMIN 3.2* 2.9* 2.8* 3.3*   GLOBULIN 3.6 4.0 4.5 5.2   ALT (SGPT) 6 5 6 7   AST (SGOT) 8 7 11 10   BILIRUBIN <0.2 <0.2 0.2 0.8   ALK PHOS 78 79 91 127*         Lab 07/22/25  0422 07/22/25 0227   PROBNP  --  8,901.0*   HSTROP T 28* 26*                 Lab 07/25/25  0952 07/24/25  0441 07/22/25  0230   PH, ARTERIAL 7.368 7.386 7.440   PCO2, ARTERIAL 49.4* 44.9 32.9*   PO2 ART 78.6* 75.3* 286.0*   FIO2 44 44 100   HCO3 ART 28.4* 26.9* 22.3   BASE EXCESS ART 2.3* 1.5 -1.1*   CARBOXYHEMOGLOBIN 1.0 0.9 1.3     Brief Urine Lab Results  (Last result in the past 365 days)        Color   Clarity   Blood   Leuk Est   Nitrite   Protein   CREAT   Urine HCG        07/22/25 0655 Yellow   Cloudy   Trace   Negative   Positive   Trace                   Microbiology Results Abnormal       Procedure Component Value - Date/Time    MRSA Screen, PCR (Inpatient) - Swab, Nares [[id number]]  (Abnormal) Collected: 07/22/25 1325    Lab Status: Final result Specimen: Swab from Nares Updated: 07/22/25 1510     MRSA PCR Positive    Narrative:      The negative predictive value of this diagnostic test is high and should only be used to consider de-escalating anti-MRSA therapy. A positive result may indicate colonization with MRSA and must be correlated clinically.            No radiology results from the last 24 hrs      Results for orders placed during  the hospital encounter of 07/22/25    Adult Transthoracic Echo Complete W/ Cont if Necessary Per Protocol 07/22/2025  4:26 PM    Interpretation Summary    Left ventricular systolic function is normal. Calculated left ventricular EF = 60.8% Left ventricular ejection fraction appears to be 56 - 60%.    The following left ventricular wall segments are hypokinetic: apical septal, basal inferoseptal, mid inferoseptal, mid anteroseptal and basal inferoseptal.    Left ventricular wall thickness is consistent with borderline concentric hypertrophy.  Grade I diastolic dysfunction is present.    Normal right ventricular size and function.    Cardiac valves very poorly visualized due to poor acoustic windows. No hemodynamically  significant valvular dysfunction noted by Doppler evaluation.      Current medications:  Scheduled Meds:apixaban, 5 mg, Oral, Q12H  ARIPiprazole, 10 mg, Oral, Daily  escitalopram, 20 mg, Oral, Daily  insulin glargine, 15 Units, Subcutaneous, Q12H  insulin lispro, 3-14 Units, Subcutaneous, 4x Daily AC & at Bedtime  Insulin Lispro, 5 Units, Subcutaneous, TID With Meals  ipratropium-albuterol, 3 mL, Nebulization, 4x Daily - RT  miconazole, 1 Application, Topical, Q12H  oxybutynin XL, 5 mg, Oral, Daily  pantoprazole, 40 mg, Oral, Q AM  predniSONE, 30 mg, Oral, Daily With Breakfast  sodium chloride, 10 mL, Intravenous, Q12H  sodium chloride, 4 mL, Nebulization, BID - RT      Continuous Infusions:   PRN Meds:.  acetaminophen    Calcium Replacement - Follow Nurse / BPA Driven Protocol    dextrose    dextrose    glucagon (human recombinant)    Magnesium Cardiology Dose Replacement - Follow Nurse / BPA Driven Protocol    Phosphorus Replacement - Follow Nurse / BPA Driven Protocol    Potassium Replacement - Follow Nurse / BPA Driven Protocol    sodium chloride    sodium chloride    sodium chloride    traZODone    Assessment & Plan   Assessment & Plan     Active Hospital Problems    Diagnosis  POA    **Acute on  chronic respiratory failure with hypoxia [J96.21]  Yes    Acute on chronic hypoxic respiratory failure [J96.21]  Yes      Resolved Hospital Problems   No resolved problems to display.        Brief Hospital Course to date:  Dani Ziegler is a 60 y.o. female with history of tobacco use, COPD, morbid obesity, h/o PE, T2DM, HTN who presents with soa, cough and fever. Patient was initially admitted to the ICU with respiratory failure requiring BIPAP. She received steroids, bronchodilators, antibiotics, furosemide. Imaging revealed an infiltrate in the right lower lobe. CT scan confirmed extensive tree-in-bud opacities in the right lung a lesser extent the left lung with some reactive adenopathy. Legionella and pneumococcal urinary antigens are negative. Respiratory panel PCR is negative. Nasal swab for MRSA is positive. Blood cultures are negative at 24 hours. Echo reveals a normal EF although she has some wall motion abnormalities. There was no evidence of PE. Patient noted to have an ecchymotic area on the medial ball of her left foot with some superficial skin breakdown.  ID following with abx ongoing. Patient transitioned to floor 7/25    Acute on chronic respiratory failure  COPD exacerbation  Possible PNA  --currently on 4L, 3L is her baseline  --continue abx, steroids, nebs   --wean as tolerated  --repeat CXR today due to worsening leukocytosis and wheezing    Left medial foot blister/bruise  --ID following, do not favor this is source of infection  --abx per above PNA, to stop today     T2DM  --A1C 9, initially on insulin gtt  --continue basal/prandial/bolus    History of DVT/PE  --on eliquis             Expected Discharge Location and Transportation: home with   Expected Discharge   Expected Discharge Date: 7/28/2025; Expected Discharge Time:      VTE Prophylaxis:  Pharmacologic VTE prophylaxis orders are present.         AM-PAC 6 Clicks Score (PT): 17 (07/27/25 0800)    CODE STATUS:   Code Status and Medical  Interventions: CPR (Attempt to Resuscitate); Full Support   Ordered at: 07/24/25 1705     Code Status (Patient has no pulse and is not breathing):    CPR (Attempt to Resuscitate)     Medical Interventions (Patient has pulse or is breathing):    Full Support       Tete Cunha MD  07/27/25

## 2025-07-28 LAB
ALBUMIN SERPL-MCNC: 3.1 G/DL (ref 3.5–5.2)
ALBUMIN/GLOB SERPL: 0.9 G/DL
ALP SERPL-CCNC: 73 U/L (ref 39–117)
ALT SERPL W P-5'-P-CCNC: 7 U/L (ref 1–33)
ANION GAP SERPL CALCULATED.3IONS-SCNC: 10.1 MMOL/L (ref 5–15)
AST SERPL-CCNC: 11 U/L (ref 1–32)
BASOPHILS # BLD MANUAL: 0 10*3/MM3 (ref 0–0.2)
BASOPHILS NFR BLD MANUAL: 0 % (ref 0–1.5)
BILIRUB SERPL-MCNC: 0.2 MG/DL (ref 0–1.2)
BUN SERPL-MCNC: 27 MG/DL (ref 8–23)
BUN/CREAT SERPL: 30 (ref 7–25)
CALCIUM SPEC-SCNC: 8.9 MG/DL (ref 8.6–10.5)
CHLORIDE SERPL-SCNC: 100 MMOL/L (ref 98–107)
CO2 SERPL-SCNC: 25.9 MMOL/L (ref 22–29)
CREAT SERPL-MCNC: 0.9 MG/DL (ref 0.57–1)
DEPRECATED RDW RBC AUTO: 50.5 FL (ref 37–54)
EGFRCR SERPLBLD CKD-EPI 2021: 72.9 ML/MIN/1.73
EOSINOPHIL # BLD MANUAL: 0.23 10*3/MM3 (ref 0–0.4)
EOSINOPHIL NFR BLD MANUAL: 1 % (ref 0.3–6.2)
ERYTHROCYTE [DISTWIDTH] IN BLOOD BY AUTOMATED COUNT: 15 % (ref 12.3–15.4)
GLOBULIN UR ELPH-MCNC: 3.6 GM/DL
GLUCOSE BLDC GLUCOMTR-MCNC: 192 MG/DL (ref 70–130)
GLUCOSE BLDC GLUCOMTR-MCNC: 220 MG/DL (ref 70–130)
GLUCOSE BLDC GLUCOMTR-MCNC: 291 MG/DL (ref 70–130)
GLUCOSE BLDC GLUCOMTR-MCNC: 325 MG/DL (ref 70–130)
GLUCOSE SERPL-MCNC: 195 MG/DL (ref 65–99)
HCT VFR BLD AUTO: 40.1 % (ref 34–46.6)
HGB BLD-MCNC: 12.1 G/DL (ref 12–15.9)
LYMPHOCYTES # BLD MANUAL: 4.1 10*3/MM3 (ref 0.7–3.1)
LYMPHOCYTES NFR BLD MANUAL: 1 % (ref 5–12)
MAGNESIUM SERPL-MCNC: 1.6 MG/DL (ref 1.6–2.4)
MCH RBC QN AUTO: 27.4 PG (ref 26.6–33)
MCHC RBC AUTO-ENTMCNC: 30.2 G/DL (ref 31.5–35.7)
MCV RBC AUTO: 90.9 FL (ref 79–97)
METAMYELOCYTES NFR BLD MANUAL: 4 % (ref 0–0)
MONOCYTES # BLD: 0.23 10*3/MM3 (ref 0.1–0.9)
MYELOCYTES NFR BLD MANUAL: 1 % (ref 0–0)
NEUTROPHILS # BLD AUTO: 17.1 10*3/MM3 (ref 1.7–7)
NEUTROPHILS NFR BLD MANUAL: 75 % (ref 42.7–76)
PLAT MORPH BLD: NORMAL
PLATELET # BLD AUTO: 353 10*3/MM3 (ref 140–450)
PMV BLD AUTO: 10.4 FL (ref 6–12)
POTASSIUM SERPL-SCNC: 4.3 MMOL/L (ref 3.5–5.2)
PROT SERPL-MCNC: 6.7 G/DL (ref 6–8.5)
RBC # BLD AUTO: 4.41 10*6/MM3 (ref 3.77–5.28)
RBC MORPH BLD: NORMAL
SODIUM SERPL-SCNC: 136 MMOL/L (ref 136–145)
VARIANT LYMPHS NFR BLD MANUAL: 1 % (ref 0–5)
VARIANT LYMPHS NFR BLD MANUAL: 17 % (ref 19.6–45.3)
WBC MORPH BLD: NORMAL
WBC NRBC COR # BLD AUTO: 22.8 10*3/MM3 (ref 3.4–10.8)

## 2025-07-28 PROCEDURE — 97535 SELF CARE MNGMENT TRAINING: CPT

## 2025-07-28 PROCEDURE — 92526 ORAL FUNCTION THERAPY: CPT

## 2025-07-28 PROCEDURE — 99232 SBSQ HOSP IP/OBS MODERATE 35: CPT | Performed by: INTERNAL MEDICINE

## 2025-07-28 PROCEDURE — 80053 COMPREHEN METABOLIC PANEL: CPT | Performed by: INTERNAL MEDICINE

## 2025-07-28 PROCEDURE — 94799 UNLISTED PULMONARY SVC/PX: CPT

## 2025-07-28 PROCEDURE — 63710000001 INSULIN LISPRO (HUMAN) PER 5 UNITS: Performed by: INTERNAL MEDICINE

## 2025-07-28 PROCEDURE — 25010000002 MAGNESIUM SULFATE 4 GM/100ML SOLUTION: Performed by: FAMILY MEDICINE

## 2025-07-28 PROCEDURE — 63710000001 PREDNISONE PER 5 MG: Performed by: INTERNAL MEDICINE

## 2025-07-28 PROCEDURE — 63710000001 PREDNISONE PER 1 MG: Performed by: INTERNAL MEDICINE

## 2025-07-28 PROCEDURE — 85007 BL SMEAR W/DIFF WBC COUNT: CPT | Performed by: INTERNAL MEDICINE

## 2025-07-28 PROCEDURE — 63710000001 INSULIN GLARGINE PER 5 UNITS: Performed by: INTERNAL MEDICINE

## 2025-07-28 PROCEDURE — 83735 ASSAY OF MAGNESIUM: CPT | Performed by: FAMILY MEDICINE

## 2025-07-28 PROCEDURE — 82948 REAGENT STRIP/BLOOD GLUCOSE: CPT

## 2025-07-28 PROCEDURE — 94664 DEMO&/EVAL PT USE INHALER: CPT

## 2025-07-28 PROCEDURE — 99232 SBSQ HOSP IP/OBS MODERATE 35: CPT | Performed by: FAMILY MEDICINE

## 2025-07-28 PROCEDURE — 85025 COMPLETE CBC W/AUTO DIFF WBC: CPT | Performed by: INTERNAL MEDICINE

## 2025-07-28 RX ORDER — MAGNESIUM SULFATE HEPTAHYDRATE 40 MG/ML
4 INJECTION, SOLUTION INTRAVENOUS ONCE
Status: COMPLETED | OUTPATIENT
Start: 2025-07-28 | End: 2025-07-28

## 2025-07-28 RX ADMIN — MICONAZOLE NITRATE 1 APPLICATION: 20 POWDER TOPICAL at 09:37

## 2025-07-28 RX ADMIN — INSULIN LISPRO 5 UNITS: 100 INJECTION, SOLUTION INTRAVENOUS; SUBCUTANEOUS at 08:57

## 2025-07-28 RX ADMIN — Medication 10 ML: at 09:02

## 2025-07-28 RX ADMIN — OXYBUTYNIN CHLORIDE 5 MG: 5 TABLET, EXTENDED RELEASE ORAL at 08:57

## 2025-07-28 RX ADMIN — MAGNESIUM SULFATE IN WATER FOR 4 G: 40 INJECTION INTRAVENOUS at 09:37

## 2025-07-28 RX ADMIN — Medication 4 ML: at 08:30

## 2025-07-28 RX ADMIN — TRAZODONE HYDROCHLORIDE 50 MG: 50 TABLET ORAL at 20:45

## 2025-07-28 RX ADMIN — IPRATROPIUM BROMIDE AND ALBUTEROL SULFATE 3 ML: .5; 3 SOLUTION RESPIRATORY (INHALATION) at 08:30

## 2025-07-28 RX ADMIN — IPRATROPIUM BROMIDE AND ALBUTEROL SULFATE 3 ML: .5; 3 SOLUTION RESPIRATORY (INHALATION) at 19:49

## 2025-07-28 RX ADMIN — INSULIN LISPRO 5 UNITS: 100 INJECTION, SOLUTION INTRAVENOUS; SUBCUTANEOUS at 12:09

## 2025-07-28 RX ADMIN — ESCITALOPRAM 20 MG: 20 TABLET, FILM COATED ORAL at 08:57

## 2025-07-28 RX ADMIN — PREDNISONE 30 MG: 10 TABLET ORAL at 08:56

## 2025-07-28 RX ADMIN — INSULIN GLARGINE 15 UNITS: 100 INJECTION, SOLUTION SUBCUTANEOUS at 08:58

## 2025-07-28 RX ADMIN — INSULIN LISPRO 10 UNITS: 100 INJECTION, SOLUTION INTRAVENOUS; SUBCUTANEOUS at 18:10

## 2025-07-28 RX ADMIN — IPRATROPIUM BROMIDE AND ALBUTEROL SULFATE 3 ML: .5; 3 SOLUTION RESPIRATORY (INHALATION) at 15:58

## 2025-07-28 RX ADMIN — Medication 4 ML: at 19:49

## 2025-07-28 RX ADMIN — MICONAZOLE NITRATE 1 APPLICATION: 20 POWDER TOPICAL at 20:46

## 2025-07-28 RX ADMIN — INSULIN LISPRO 8 UNITS: 100 INJECTION, SOLUTION INTRAVENOUS; SUBCUTANEOUS at 20:45

## 2025-07-28 RX ADMIN — ACETAMINOPHEN 650 MG: 325 TABLET ORAL at 02:11

## 2025-07-28 RX ADMIN — INSULIN LISPRO 3 UNITS: 100 INJECTION, SOLUTION INTRAVENOUS; SUBCUTANEOUS at 12:09

## 2025-07-28 RX ADMIN — ARIPIPRAZOLE 10 MG: 10 TABLET ORAL at 09:36

## 2025-07-28 RX ADMIN — PANTOPRAZOLE SODIUM 40 MG: 40 TABLET, DELAYED RELEASE ORAL at 04:54

## 2025-07-28 RX ADMIN — APIXABAN 5 MG: 5 TABLET, FILM COATED ORAL at 20:45

## 2025-07-28 RX ADMIN — IPRATROPIUM BROMIDE AND ALBUTEROL SULFATE 3 ML: .5; 3 SOLUTION RESPIRATORY (INHALATION) at 12:20

## 2025-07-28 RX ADMIN — INSULIN LISPRO 5 UNITS: 100 INJECTION, SOLUTION INTRAVENOUS; SUBCUTANEOUS at 18:10

## 2025-07-28 RX ADMIN — APIXABAN 5 MG: 5 TABLET, FILM COATED ORAL at 08:57

## 2025-07-28 RX ADMIN — Medication 10 ML: at 20:46

## 2025-07-28 RX ADMIN — INSULIN GLARGINE 15 UNITS: 100 INJECTION, SOLUTION SUBCUTANEOUS at 20:45

## 2025-07-28 NOTE — PROGRESS NOTES
Pulmonary Medicine Follow-up     Hospital:  LOS: 6 days   Ms. Dani Ziegler, 60 y.o. female is followed for:   Acute on chronic respiratory failure          Subjective   Interval History:    60-year-old patient known to us from her admission to the intensive care unit this week.  She has been weaned down to 4 L nasal cannula.  She states that she is coughing up yellow secretions.  Overall she feels that she is doing a bit better.  She does state that she is occasionally choking on food and has a modified diet currently.    The patient's relevant past medical, surgical and social history were reviewed and updated in Epic as appropriate.     Review of systems was completed and is negative except as detailed above.     Objective     Medications:  apixaban, 5 mg, Oral, Q12H  ARIPiprazole, 10 mg, Oral, Daily  escitalopram, 20 mg, Oral, Daily  insulin glargine, 15 Units, Subcutaneous, Q12H  insulin lispro, 3-14 Units, Subcutaneous, 4x Daily AC & at Bedtime  Insulin Lispro, 5 Units, Subcutaneous, TID With Meals  ipratropium-albuterol, 3 mL, Nebulization, 4x Daily - RT  magnesium sulfate, 4 g, Intravenous, Once  miconazole, 1 Application, Topical, Q12H  oxybutynin XL, 5 mg, Oral, Daily  pantoprazole, 40 mg, Oral, Q AM  predniSONE, 30 mg, Oral, Daily With Breakfast  sodium chloride, 10 mL, Intravenous, Q12H  sodium chloride, 4 mL, Nebulization, BID - RT        Vital Sign Min/Max for last 24 hours  Temp  Min: 97.9 °F (36.6 °C)  Max: 98.3 °F (36.8 °C)   BP  Min: 132/74  Max: 160/83   Pulse  Min: 74  Max: 114   Resp  Min: 20  Max: 25   SpO2  Min: 90 %  Max: 95 %   Flow (L/min) (Oxygen Therapy)  Min: 4  Max: 4       Input/Output for last 24 hour shift  07/27 0701 - 07/28 0700  In: -   Out: 1100 [Urine:1100]     Objective:  General Appearance:  Uncomfortable and in no acute distress.    Vital signs: (most recent): Blood pressure 135/76, pulse 114, temperature 98.1 °F (36.7 °C), temperature source Oral, resp. rate 24, height  "170.2 cm (67\"), weight 130 kg (287 lb), SpO2 93%.    HEENT: (Cannula oxygen in place.  Previous tracheostomy scar lower anterior neck)    Lungs:  Normal effort and normal respiratory rate.  (Mild late expiratory wheezes primarily on the right side.  Basilar rhonchi.  No stridor noted.)  Heart: Tachycardia.  Regular rhythm.  S1 normal and S2 normal.  No murmur.   Chest: Symmetric chest wall expansion.   Abdomen: Abdomen is soft and non-distended.  Bowel sounds are normal.   There is no abdominal tenderness.     Extremities: Normal range of motion.  There is no dependent edema.    Neurological: Patient is alert and oriented to person, place and time.  Normal strength.    Pupils:  Pupils are equal, round, and reactive to light.  Pupils are equal.   Skin:  Warm.                Results from last 7 days   Lab Units 07/28/25  0425 07/27/25  0510 07/26/25  0502   WBC 10*3/mm3 22.80* 18.56* 14.10*   HEMOGLOBIN g/dL 12.1 12.0 11.2*   PLATELETS 10*3/mm3 353 356 331     Results from last 7 days   Lab Units 07/28/25  0425 07/27/25  0510 07/26/25  0502 07/25/25  0447 07/23/25  0511   SODIUM mmol/L 136 138 138 138 136   POTASSIUM mmol/L 4.3 4.1 4.3 4.6 4.7   CO2 mmol/L 25.9 25.4 28.0 26.3 22.9   BUN mg/dL 27.0* 22.7 20.6 21.2 15.8   CREATININE mg/dL 0.90 0.85 0.95 0.92 0.83   MAGNESIUM mg/dL 1.6  --  2.2 1.9 2.5*   PHOSPHORUS mg/dL  --   --   --   --  2.8   GLUCOSE mg/dL 195* 230* 283* 237* 176*     Estimated Creatinine Clearance: 92.2 mL/min (by C-G formula based on SCr of 0.9 mg/dL).    Results from last 7 days   Lab Units 07/25/25  0952   PH, ARTERIAL pH units 7.368   PCO2, ARTERIAL mm Hg 49.4*   PO2 ART mm Hg 78.6*          I reviewed the patient's results and images.     Assessment & Plan   Impression & Plan     Acute on chronic hypoxemic respiratory failure  COPD in acute exacerbation  History of thromboembolic disease       Patient is currently on 4 L nasal cannula.  I would recommend that we continue to wean this as " tolerated.  Continue with steroids at current dose.  There is evidence of bronchospasm currently.  She will continue on with her bronchodilator therapy as before.  I will get her a flutter valve which may help with increasing her sputum clearance.  She states that she has been able to get most of this up.  Mobilize as tolerated.  Continue with anticoagulation.      I discussed the patient's findings and my recommendations with patient       Reji Tavares MD, [facility]  Pulmonary and Critical Care Medicine  07/28/25 11:02 EDT

## 2025-07-28 NOTE — THERAPY TREATMENT NOTE
Acute Care - Speech Language Pathology   Swallow Treatment Note [facility]     Patient Name: Dani Ziegler  : 1965  MRN: [id number]  Today's Date: 2025               Admit Date: 2025    Visit Dx:     ICD-10-CM ICD-9-CM   1. Respiratory distress  R06.03 786.09   2. Acute respiratory failure with hypoxia  J96.01 518.81   3. Pneumonia of right middle lobe due to infectious organism  J18.9 486   4. Sepsis without acute organ dysfunction, due to unspecified organism  A41.9 038.9     995.91   5. COPD with acute exacerbation  J44.1 491.21   6. Leukocytosis, unspecified type  D72.829 288.60   7. Sinus tachycardia  R00.0 427.89   8. Oropharyngeal dysphagia  R13.12 787.22     Patient Active Problem List   Diagnosis    Uncontrolled type 2 diabetes mellitus with hyperglycemia, without long-term current use of insulin    Vitamin D deficiency    Peripheral neuropathy    Adiposity    Essential hypertension    Chronic pain    Mucopurulent chronic bronchitis    Anxiety and depression    Arthritis involving multiple sites    Mixed hyperlipidemia    Special screening for malignant neoplasms, colon    Cellulitis of left lower extremity    Acute on chronic renal insufficiency    Severe sepsis    Cellulitis of left leg    Lumbar disc disease    Diabetic peripheral neuropathy    MRSA pneumonia of left midlung present on admission    Polypharmacy    Morbid obesity with BMI of 45.0-49.9. ?  LOUANN/OHS    Acute on chronic respiratory failure with hypoxia and hypercapnia    H/O recurrent DVT on Eliquis     Chronic narcotic/BZD use    Illicit drug use (UDS + methamphetamines)     Smoker    SBO s/p exploratory laparotomy and incisional hernia repair with mesh 2022    Incisional hernia with obstruction but no gangrene    Acute on chronic respiratory failure with hypoxia    Acute on chronic hypoxic respiratory failure     Past Medical History:   Diagnosis Date    Abnormal weight gain     Acute UTI     Anxiety     Arthritis  involving multiple sites     Chronic obstructive pulmonary disease     Chronic pain     Current every day smoker     Depression     Diabetes mellitus     Diarrhea     Dysuria     Edema, lower extremity     Fever     Head injury     Hypertension     Intervertebral disc disorder     Degeneration of intervertebral disc, site unspecified (722.6)    Left bundle branch block     Leukocytosis     Long term current use of methadone for pain control     Mass of right breast     Nausea     Obesity     Overactive bladder     Peripheral neuropathy     Superficial phlebitis     right medial calf    Upper respiratory infection     Urinary incontinence     Vitamin D deficiency     Vomiting      Past Surgical History:   Procedure Laterality Date    BLADDER SURGERY      pubovaginal sling    CHOLECYSTECTOMY      EXPLORATORY LAPAROTOMY N/A 06/12/2022    Procedure: LAPAROTOMY EXPLORATORY UMBILICAL HERNIA REPAIR WITH MESH;  Surgeon: Reji Brown MD;  Location: [facility];  Service: General;  Laterality: N/A;    HERNIA REPAIR      HIP ARTHROSCOPY Right 10/29/2020    JOINT REPLACEMENT      TRACHEOSTOMY         SLP Recommendation and Plan     SLP Diet Recommendation: soft to chew textures, chopped, no mixed consistencies, thin liquids, other (see comments) (if concerns w/ thin liquid toleration, downgrade to nectar-thick liquid) (07/28/25 1605)  Recommended Precautions and Strategies: small bites of food and sips of liquid, upright posture during/after eating, general aspiration precautions, fatigue precautions (small/single sips, straws ok) (07/28/25 1605)  SLP Rec. for Method of Medication Administration: meds whole, with thick liquids, with puree, as tolerated (07/28/25 1605)              Anticipated Discharge Disposition (SLP): home with home health (07/28/25 1605)                    Daily Summary of Progress (SLP): progress toward functional goals as expected (07/28/25 1605)               Treatment Assessment (SLP): continued,  toleration of diet, mild, oral dysphagia, pharyngeal dysphagia, suspected (07/28/25 1605)     Plan for Continued Treatment (SLP): continue treatment per plan of care (07/28/25 1605)         Progress: no change      SWALLOW EVALUATION (Last 72 Hours)       SLP Adult Swallow Evaluation       Row Name 07/28/25 1605                   Rehab Evaluation    Document Type therapy note (daily note)  -        Subjective Information no complaints  -AC        Patient Observations alert;cooperative  -AC        Patient/Family/Caregiver Comments/Observations No family present.  -AC        Patient Effort good  -           General Information    Patient Profile Reviewed yes  -AC           Pain    Pretreatment Pain Rating 0/10 - no pain  -AC        Posttreatment Pain Rating 0/10 - no pain  -AC           Swallowing Quality of Life Assessment    Education and counseling provided Aspiration precautions;Compensatory strategy recommendations and rationale  -           SLP Treatment Clinical Impressions    Treatment Assessment (SLP) continued;toleration of diet;mild;oral dysphagia;pharyngeal dysphagia;suspected  -AC        Daily Summary of Progress (SLP) progress toward functional goals as expected  -AC        Plan for Continued Treatment (SLP) continue treatment per plan of care  -AC        Care Plan Review evaluation/treatment results reviewed;care plan/treatment goals reviewed;risks/benefits reviewed;current/potential barriers reviewed;patient/other agree to care plan  -           Recommendations    SLP Diet Recommendation soft to chew textures;chopped;no mixed consistencies;thin liquids;other (see comments)  if concerns w/ thin liquid toleration, downgrade to nectar-thick liquid  -        Recommended Precautions and Strategies small bites of food and sips of liquid;upright posture during/after eating;general aspiration precautions;fatigue precautions  small/single sips, straws ok  -AC        SLP Rec. for Method of Medication  Administration meds whole;with thick liquids;with puree;as tolerated  -AC        Anticipated Discharge Disposition (SLP) home with home health  -AC                  User Key  (r) = Recorded By, (t) = Taken By, (c) = Cosigned By      Initials Name Effective Dates    AC Mary Baker, MS CCC-SLP 02/03/23 -                     EDUCATION  The patient has been educated in the following areas:   Dysphagia (Swallowing Impairment) Modified Diet Instruction.        SLP GOALS       Row Name 07/28/25 1605             (LTG) Patient will demonstrate functional swallow for    Diet Texture (Demonstrate functional swallow) soft to chew (whole) textures  -AC      Liquid viscosity (Demonstrate functional swallow) thin liquids  -AC      [place] (Demonstrate functional swallow) with use of compensatory strategies  -AC      Time Frame (Demonstrate functional swallow) 1 week  -AC      Progress/Outcomes (Demonstrate functional swallow) goal revised this date  -AC      Comment (Demonstrate functional swallow) Pt reported front lower teeth are loose/sore. Pt continues to have swallowing exercise handout to reference in room.  -AC         (STG) Patient will tolerate trials of    Consistencies Trialed (Tolerate trials) soft to chew (chopped) textures;thin liquids  -AC      Desired Outcome (Tolerate trials) without signs/symptoms of aspiration;with adequate oral prep/transit/clearance;with use of compensatory strategies (see comments)  small/single sips  -AC      [place] (Tolerate trials) independently (over 90% accuracy)  -AC      Time Frame (Tolerate trials) 1 week  -AC      Progress/Outcomes (Tolerate trials) good progress toward goal  -AC      Comment (Tolerate trials) Pt able to teach back recommended compensatory strategies independently and demonstrated use w/ trials of thin liquid via cup/straw and soft solids. Adequate oral prep/clearance and no overt clinical s/sxs aspiration.  -AC         (STG) Lingual Strengthening Goal  1 (SLP)    Increase Tongue Back Strength lingual resistance exercises  -AC      [place]/Accuracy (Lingual Strengthening Goal 1, SLP) independently (over 90% accuracy)  -AC      Time Frame (Lingual Strengthening Goal 1, SLP) 1 week  -AC      Progress/Outcomes (Lingual Strengthening Goal 1, SLP) good progress toward goal  -AC      Comment (Lingual Strengthening Goal 1, SLP) Pt demonstrated at least 10x independently. Some difficulty applying moderate amount of lingual resistance 2' front lower teeth loose/painful.  -AC         (STG) Pharyngeal Strengthening Exercise Goal 1 (SLP)    Increase Timing prepping - 3 second prep or suck swallow or 3-step swallow  -AC      Increase Superior Movement of the Hyolaryngeal Complex super-supraglottic swallow  -AC      [place]/Accuracy (Pharyngeal Strengthening Goal 1, SLP) independently (over 90% accuracy)  -AC      Time Frame (Pharyngeal Strengthening Goal 1, SLP) 1 week  -AC      Progress/Outcomes (Pharyngeal Strengthening Goal 1, SLP) good progress toward goal  -AC      Comment (Pharyngeal Strengthening Goal 1, SLP) Pt was able to demonstrate 3 sec prep and SGS 2-3x each w/ min cues.  -AC                User Key  (r) = Recorded By, (t) = Taken By, (c) = Cosigned By      Initials Name Provider Type    Mary Chung MS CCC-SLP Speech and Language Pathologist                         Time Calculation:    Time Calculation- SLP       Row Name 07/28/25 1633             Time Calculation- SLP    SLP Start Time 1605  -AC      SLP Received On 07/28/25  -AC         Untimed Charges    64939-IH Treatment Swallow Minutes 33  -AC         Total Minutes    Untimed Charges Total Minutes 33  -AC       Total Minutes 33  -AC                User Key  (r) = Recorded By, (t) = Taken By, (c) = Cosigned By      Initials Name Provider Type    Mary Chung MS CCC-SLP Speech and Language Pathologist                    Therapy Charges for Today       Code Description Service Date Service  Provider Modifiers Qty    64462689445 HC ST TREATMENT SWALLOW 2 7/28/2025 Mary Baker, MS CCC-SLP GN 1                 Mary Baker, MS HAWK-SLP  7/28/2025

## 2025-07-28 NOTE — THERAPY TREATMENT NOTE
Patient Name: Dani Ziegler  : 1965    MRN: [id number]                              Today's Date: 2025       Admit Date: 2025    Visit Dx:     ICD-10-CM ICD-9-CM   1. Respiratory distress  R06.03 786.09   2. Acute respiratory failure with hypoxia  J96.01 518.81   3. Pneumonia of right middle lobe due to infectious organism  J18.9 486   4. Sepsis without acute organ dysfunction, due to unspecified organism  A41.9 038.9     995.91   5. COPD with acute exacerbation  J44.1 491.21   6. Leukocytosis, unspecified type  D72.829 288.60   7. Sinus tachycardia  R00.0 427.89   8. Oropharyngeal dysphagia  R13.12 787.22     Patient Active Problem List   Diagnosis    Uncontrolled type 2 diabetes mellitus with hyperglycemia, without long-term current use of insulin    Vitamin D deficiency    Peripheral neuropathy    Adiposity    Essential hypertension    Chronic pain    Mucopurulent chronic bronchitis    Anxiety and depression    Arthritis involving multiple sites    Mixed hyperlipidemia    Special screening for malignant neoplasms, colon    Cellulitis of left lower extremity    Acute on chronic renal insufficiency    Severe sepsis    Cellulitis of left leg    Lumbar disc disease    Diabetic peripheral neuropathy    MRSA pneumonia of left midlung present on admission    Polypharmacy    Morbid obesity with BMI of 45.0-49.9. ?  LOUANN/OHS    Acute on chronic respiratory failure with hypoxia and hypercapnia    H/O recurrent DVT on Eliquis     Chronic narcotic/BZD use    Illicit drug use (UDS + methamphetamines)     Smoker    SBO s/p exploratory laparotomy and incisional hernia repair with mesh 2022    Incisional hernia with obstruction but no gangrene    Acute on chronic respiratory failure with hypoxia    Acute on chronic hypoxic respiratory failure     Past Medical History:   Diagnosis Date    Abnormal weight gain     Acute UTI     Anxiety     Arthritis involving multiple sites     Chronic obstructive pulmonary  disease     Chronic pain     Current every day smoker     Depression     Diabetes mellitus     Diarrhea     Dysuria     Edema, lower extremity     Fever     Head injury     Hypertension     Intervertebral disc disorder     Degeneration of intervertebral disc, site unspecified (722.6)    Left bundle branch block     Leukocytosis     Long term current use of methadone for pain control     Mass of right breast     Nausea     Obesity     Overactive bladder     Peripheral neuropathy     Superficial phlebitis     right medial calf    Upper respiratory infection     Urinary incontinence     Vitamin D deficiency     Vomiting      Past Surgical History:   Procedure Laterality Date    BLADDER SURGERY      pubovaginal sling    CHOLECYSTECTOMY      EXPLORATORY LAPAROTOMY N/A 06/12/2022    Procedure: LAPAROTOMY EXPLORATORY UMBILICAL HERNIA REPAIR WITH MESH;  Surgeon: Reji Brown MD;  Location: [facility];  Service: General;  Laterality: N/A;    HERNIA REPAIR      HIP ARTHROSCOPY Right 10/29/2020    JOINT REPLACEMENT      TRACHEOSTOMY        General Information       Row Name 07/28/25 0735          OT Time and Intention    Document Type therapy note (daily note)  -AC     Mode of Treatment occupational therapy  -AC     Patient Effort good  -AC       Row Name 07/28/25 0735          General Information    Patient Profile Reviewed yes  -AC     Existing Precautions/Restrictions fall;oxygen therapy device and L/min  -AC     Barriers to Rehab medically complex  -AC       Row Name 07/28/25 0735          Cognition    Orientation Status (Cognition) oriented x 3  -AC       Row Name 07/28/25 0735          Safety Issues/Impairments Affecting Functional Mobility    Safety Issues Affecting Function (Mobility) insight into deficits/self-awareness;safety precaution awareness  -AC     Impairments Affecting Function (Mobility) balance;endurance/activity tolerance;shortness of breath;strength;pain  -AC               User Key  (r) = Recorded  By, (t) = Taken By, (c) = Cosigned By      Initials Name Provider Type    Carmen Villarreal OT Occupational Therapist                     Mobility/ADL's       Row Name 07/28/25 1028          Bed Mobility    Comment, (Bed Mobility) sitting pre/post tx  -AC       Row Name 07/28/25 1028          Transfers    Transfers sit-stand transfer;toilet transfer  -AC       Row Name 07/28/25 1028          Sit-Stand Transfer    Sit-Stand [place] (Transfers) contact guard;1 person assist  -     Assistive Device (Sit-Stand Transfers) walker, front-wheeled  -AC       [facility] Name 07/28/25 1028          Toilet Transfer    Type (Toilet Transfer) sit-stand  -AC     [place] Level (Toilet Transfer) contact guard  -     Assistive Device (Toilet Transfer) commode, bedside without drop arms;walker, front-wheeled  -AC       [facility] Name 07/28/25 1028          Functional Mobility    Functional Mobility- Ind. Level verbal cues required;contact guard assist  -     Functional Mobility- Device walker, front-wheeled  -     Functional Mobility-Distance (Feet) 16  -AC       Row Name 07/28/25 1028          Activities of Daily Living    BADL Assessment/Intervention grooming;upper body dressing;bathing;toileting  -[facility] 07/28/25 1028          Grooming Assessment/Training    [place] Level (Grooming) hair care, combing/brushing;wash face, hands;set up;supervision  -     Position (Grooming) supported sitting  -AC       Row Name 07/28/25 1028          Upper Body Dressing Assessment/Training    [place] Level (Upper Body Dressing) doff;don;pajama/robe;minimum assist (75% patient effort)  -     Position (Upper Body Dressing) supported sitting  -AC       Row Name 07/28/25 1028          Bathing Assessment/Intervention    [place] Level (Bathing) upper body;proximal lower extremities;set up;supervision  -     Position (Bathing) supported sitting  -AC       Row Name 07/28/25 1028          Toileting Assessment/Training     [place] Level (Toileting) perform perineal hygiene;moderate assist (50% patient effort)  -     Assistive Devices (Toileting) commode, bedside without drop arms  -AC     Position (Toileting) supported standing  -AC               User Key  (r) = Recorded By, (t) = Taken By, (c) = Cosigned By      Initials Name Provider Type     Carmen Carballo, OT Occupational Therapist                   Obj/Interventions       Row Name 07/28/25 1031          Balance    Balance Assessment sitting static balance;sitting dynamic balance;standing static balance;standing dynamic balance  -AC     Static Sitting Balance independent  -AC     Dynamic Sitting Balance standby assist  -AC     Position, Sitting Balance supported  -AC     Static Standing Balance contact guard  -AC     Dynamic Standing Balance contact guard  -AC     Position/Device Used, Standing Balance supported;walker, front-wheeled  -AC               User Key  (r) = Recorded By, (t) = Taken By, (c) = Cosigned By      Initials Name Provider Type     Carmen Carballo, OT Occupational Therapist                   Goals/Plan    No documentation.                  Clinical Impression       Row Name 07/28/25 1031          Pain Assessment    Pretreatment Pain Rating 2/10  -AC     Posttreatment Pain Rating 2/10  -AC     Pain Location head  -AC     Pain Management Interventions exercise or physical activity utilized  -AC     Response to Pain Interventions activity participation with tolerable pain  -AC       Row Name 07/28/25 1031          Plan of Care Review    Plan of Care Reviewed With patient  -AC     Progress improving  -AC     Outcome Evaluation Pt setup /supervision to bathe UB/ prox LE,  min A UBD, setup grooming, CGA to ambulate with RW.  Pt is progressing, but continues below baseline with ADLs/mobility d/t weakness and decr activity tolerance.  Recommend home with assist and HHOT upon d/c.  -       Row Name 07/28/25 1031          Vital Signs    Pre Systolic BP Rehab  135  -AC     Pre Treatment Diastolic BP 76  -AC     Posttreatment Heart Rate (beats/min) 99  -AC     O2 Delivery Pre Treatment supplemental O2  -AC     O2 Delivery Intra Treatment supplemental O2  -AC     Post SpO2 (%) 93  -AC     O2 Delivery Post Treatment supplemental O2  -AC     Pre Patient Position Sitting  -AC     Post Patient Position Sitting  -AC       Row Name 07/28/25 1031          Positioning and Restraints    Pre-Treatment Position bedside commode  -AC     Post Treatment Position chair  -AC     In Chair notified nsg;call light within reach;encouraged to call for assist;exit alarm on;waffle cushion;sitting  -AC               User Key  (r) = Recorded By, (t) = Taken By, (c) = Cosigned By      Initials Name Provider Type    Carmen Villarreal, OT Occupational Therapist                   Outcome Measures       Row Name 07/28/25 1035          How much help from another is currently needed...    Putting on and taking off regular lower body clothing? 3  -AC     Bathing (including washing, rinsing, and drying) 3  -AC     Toileting (which includes using toilet bed pan or urinal) 2  -AC     Putting on and taking off regular upper body clothing 3  -AC     Taking care of personal grooming (such as brushing teeth) 3  -AC     Eating meals 4  -AC     AM-PAC 6 Clicks Score (OT) 18  -AC       Row Name 07/28/25 0800 07/28/25 0400       How much help from another person do you currently need...    Turning from your back to your side while in flat bed without using bedrails? 3  -MM 3  -LW    Moving from lying on back to sitting on the side of a flat bed without bedrails? 3  -MM 3  -LW    Moving to and from a bed to a chair (including a wheelchair)? 3  -MM 3  -LW    Standing up from a chair using your arms (e.g., wheelchair, bedside chair)? 3  -MM 3  -LW    Climbing 3-5 steps with a railing? 2  -MM 2  -LW    To walk in hospital room? 3  -MM 3  -LW    AM-PAC 6 Clicks Score (PT) 17  -MM 17  -LW    Highest Level of Mobility Goal  Stand (1 or More Minutes)-5  -MM Stand (1 or More Minutes)-5  -LW      Row Name 07/28/25 0000          How much help from another person do you currently need...    Turning from your back to your side while in flat bed without using bedrails? 3  -LW     Moving from lying on back to sitting on the side of a flat bed without bedrails? 3  -LW     Moving to and from a bed to a chair (including a wheelchair)? 3  -LW     Standing up from a chair using your arms (e.g., wheelchair, bedside chair)? 3  -LW     Climbing 3-5 steps with a railing? 2  -LW     To walk in hospital room? 3  -LW     AM-PAC 6 Clicks Score (PT) 17  -LW     Highest Level of Mobility Goal Stand (1 or More Minutes)-5  -LW       Row Name 07/28/25 1035          Functional Assessment    Outcome Measure Options AM-PAC 6 Clicks Daily Activity (OT)  -               User Key  (r) = Recorded By, (t) = Taken By, (c) = Cosigned By      Initials Name Provider Type    AC Carmen Carballo, OT Occupational Therapist    LW Aida Gupta, RN Registered Nurse    Benjamin Caputo RN Registered Nurse                    Occupational Therapy Education       Title: PT OT SLP Therapies (In Progress)       Topic: Occupational Therapy (In Progress)       Point: ADL training (In Progress)       Learning Progress Summary            Patient Acceptance, E, NR by  at 7/28/2025 1036    Acceptance, E, NR by  at 7/25/2025 1459                                      User Key       Initials Effective Dates Name Provider Type Discipline     02/03/23 -  Carmen Carballo, OT Occupational Therapist OT                  OT Recommendation and Plan  Planned Therapy Interventions (OT): activity tolerance training, adaptive equipment training, BADL retraining, functional balance retraining, occupation/activity based interventions, patient/caregiver education/training, strengthening exercise, transfer/mobility retraining  Therapy Frequency (OT): daily  Plan of Care Review  Plan of Care  Reviewed With: patient  Progress: improving  Outcome Evaluation: Pt setup /supervision to bathe UB/ prox LE,  min A UBD, setup grooming, CGA to ambulate with RW.  Pt is progressing, but continues below baseline with ADLs/mobility d/t weakness and decr activity tolerance.  Recommend home with assist and HHOT upon d/c.     Time Calculation:   Evaluation Complexity (OT)  Review Occupational Profile/Medical/Therapy History Complexity: brief/low complexity  Assessment, Occupational Performance/Identification of Deficit Complexity: 1-3 performance deficits  Clinical Decision Making Complexity (OT): problem focused assessment/low complexity  Overall Complexity of Evaluation (OT): low complexity     Time Calculation- OT       Row Name 07/28/25 0735             Time Calculation- OT    OT Start Time 0735  -AC      OT Received On 07/28/25  -AC      OT Goal Re-Cert Due Date 08/04/25  -AC         Timed Charges    17257 - OT Self Care/Mgmt Minutes 40  -AC         Total Minutes    Timed Charges Total Minutes 40  -AC       Total Minutes 40  -AC                User Key  (r) = Recorded By, (t) = Taken By, (c) = Cosigned By      Initials Name Provider Type    AC Carmen Carballo, OT Occupational Therapist                  Therapy Charges for Today       Code Description Service Date Service Provider Modifiers Qty    83976997404 HC OT SELF CARE/MGMT/TRAIN EA 15 MIN 7/28/2025 Carmen Carballo OT GO 3                 Carmen Carballo OT  7/28/2025

## 2025-07-28 NOTE — PLAN OF CARE
Goal Outcome Evaluation:  Plan of Care Reviewed With: patient        Progress: improving  Outcome Evaluation: Pt setup /supervision to bathe UB/ prox LE,  min A UBD, setup grooming, CGA to ambulate with RW.  Pt is progressing, but continues below baseline with ADLs/mobility d/t weakness and decr activity tolerance.  Recommend home with assist and HHOT upon d/c.    Anticipated Discharge Disposition (OT): home with assist, home with home health

## 2025-07-28 NOTE — PROGRESS NOTES
[facility] Medicine Services  PROGRESS NOTE    Patient Name: Dani Ziegler  : 1965  MRN: [id number]    Date of Admission: 2025  Primary Care Physician: Darrion Tovar MD    Subjective   Subjective     CC:  Acute on chronic respiratory failure     HPI:  Patient reports is feeling better today.  Denies nausea vomiting fevers chills.  Does endorse coughing up yellow secretions.  She still remains on 4 L nasal cannula.      Objective   Objective     Vital Signs:   Temp:  [97.9 °F (36.6 °C)-98.3 °F (36.8 °C)] 98.3 °F (36.8 °C)  Heart Rate:  [73-96] 89  Resp:  [18-25] 25  BP: (132-160)/(71-90) 145/85  Flow (L/min) (Oxygen Therapy):  [4] 4     Physical Exam:  Constitutional:  female no acute distress, awake, alert  HENT: NCAT, mucous membranes moist  Respiratory: Basilar rhonchi bilaterally  Cardiovascular: RRR, no murmurs, rubs, or gallops  Gastrointestinal: Positive bowel sounds, soft, nontender, nondistended  Musculoskeletal: No bilateral ankle edema  Psychiatric: Appropriate affect, cooperative  Neurologic: Oriented x 3, speech clear  Skin: No rashes    Results Reviewed:  LAB RESULTS:      Lab 25  0425 25  0510 25  0502 25  0447 25  1759 25  0511 25  1137 25  0700 25  0422 25  0227   WBC 22.80* 18.56* 14.10* 13.03*  --  19.38*  --   --   --  29.08*   HEMOGLOBIN 12.1 12.0 11.2* 11.3* 12.1 11.5*  --   --   --  14.5   HEMATOCRIT 40.1 39.0 37.0 37.1 39.6 37.5  --   --   --  44.4   PLATELETS 353 356 331 332  --  336  --   --   --  430   NEUTROS ABS 17.10* 11.49* 9.58* 8.65*  --  17.33*  --   --   --  23.90*   IMMATURE GRANS (ABS)  --  0.72* 0.33* 0.25*  --  0.28*  --   --   --  0.39*   LYMPHS ABS  --  5.30* 3.38* 3.28*  --  1.29  --   --   --  3.42*   MONOS ABS  --  0.74 0.69 0.77  --  0.44  --   --   --  1.27*   EOS ABS 0.23 0.16 0.06 0.03  --  0.00  --   --   --  0.02   MCV 90.9 90.5 90.9 91.6  --  90.1  --   --    --  86.2   CRP  --   --   --   --   --   --   --   --  18.37*  --    PROCALCITONIN  --   --   --   --   --   --   --  0.30* 0.23  --    LACTATE  --   --   --   --   --   --  1.3 2.3*  --  2.1*         Lab 07/28/25 0425 07/27/25  0510 07/26/25 0502 07/25/25 0447 07/23/25 0511 07/22/25 0227   SODIUM 136 138 138 138 136 133*   POTASSIUM 4.3 4.1 4.3 4.6 4.7 4.2   CHLORIDE 100 101 101 105 104 98   CO2 25.9 25.4 28.0 26.3 22.9 19.9*   ANION GAP 10.1 11.6 9.0 6.7 9.1 15.1*   BUN 27.0* 22.7 20.6 21.2 15.8 9.8   CREATININE 0.90 0.85 0.95 0.92 0.83 0.89   EGFR 72.9 78.1 68.3 71.0 80.8 74.3   GLUCOSE 195* 230* 283* 237* 176* 270*   CALCIUM 8.9 8.9 9.0 8.7 8.8 9.2   MAGNESIUM  --   --  2.2 1.9 2.5* 1.7   PHOSPHORUS  --   --   --   --  2.8  --    HEMOGLOBIN A1C  --   --   --   --   --  9.01*         Lab 07/28/25 0425 07/27/25 0510 07/26/25 0502 07/23/25  0511 07/22/25 0227   TOTAL PROTEIN 6.7 6.8 6.9 7.3 8.5   ALBUMIN 3.1* 3.2* 2.9* 2.8* 3.3*   GLOBULIN 3.6 3.6 4.0 4.5 5.2   ALT (SGPT) 7 6 5 6 7   AST (SGOT) 11 8 7 11 10   BILIRUBIN 0.2 <0.2 <0.2 0.2 0.8   ALK PHOS 73 78 79 91 127*         Lab 07/22/25 0422 07/22/25 0227   PROBNP  --  8,901.0*   HSTROP T 28* 26*                 Lab 07/25/25  0952 07/24/25  0441 07/22/25  0230   PH, ARTERIAL 7.368 7.386 7.440   PCO2, ARTERIAL 49.4* 44.9 32.9*   PO2 ART 78.6* 75.3* 286.0*   FIO2 44 44 100   HCO3 ART 28.4* 26.9* 22.3   BASE EXCESS ART 2.3* 1.5 -1.1*   CARBOXYHEMOGLOBIN 1.0 0.9 1.3     Brief Urine Lab Results  (Last result in the past 365 days)        Color   Clarity   Blood   Leuk Est   Nitrite   Protein   CREAT   Urine HCG        07/22/25 0655 Yellow   Cloudy   Trace   Negative   Positive   Trace                   Cultures:  Blood Culture   Date Value Ref Range Status   07/22/2025 No growth at 5 days  Final   07/22/2025 No growth at 5 days  Final       Microbiology Results Abnormal       Procedure Component Value - Date/Time    MRSA Screen, PCR (Inpatient) - Swab,  Prem [[id number]]  (Abnormal) Collected: 07/22/25 1325    Lab Status: Final result Specimen: Swab from Nares Updated: 07/22/25 1510     MRSA PCR Positive    Narrative:      The negative predictive value of this diagnostic test is high and should only be used to consider de-escalating anti-MRSA therapy. A positive result may indicate colonization with MRSA and must be correlated clinically.            XR Chest 1 View  Result Date: 7/27/2025  XR CHEST 1 VW Date of Exam: 7/27/2025 9:55 AM EDT Indication: Wheezing, hypoxia Comparison: 7/25/2025. Findings: The heart size is normal. There are increased interstitial markings similar to the previous exam. There is fullness in the pulmonary rupal felt to be due to enlarged lymph nodes as seen on the CT of the chest from 7/22/2025. The lungs and pleural spaces are otherwise clear. There are chronic age-related changes involving the bony thorax and thoracic aorta.     Impression: Impression: 1.Increased interstitial markings similar to the previous exam. 2.Fullness in the pulmonary rupal felt to be due to enlarged lymph nodes. Electronically Signed: Darrion Randolph MD  7/27/2025 3:43 PM EDT  Workstation ID: [id number]      Results for orders placed during the hospital encounter of 07/22/25    Adult Transthoracic Echo Complete W/ Cont if Necessary Per Protocol 07/22/2025  4:26 PM    Interpretation Summary    Left ventricular systolic function is normal. Calculated left ventricular EF = 60.8% Left ventricular ejection fraction appears to be 56 - 60%.    The following left ventricular wall segments are hypokinetic: apical septal, basal inferoseptal, mid inferoseptal, mid anteroseptal and basal inferoseptal.    Left ventricular wall thickness is consistent with borderline concentric hypertrophy.  Grade I diastolic dysfunction is present.    Normal right ventricular size and function.    Cardiac valves very poorly visualized due to poor acoustic windows. No hemodynamically  significant  valvular dysfunction noted by Doppler evaluation.      Current medications:  Scheduled Meds:apixaban, 5 mg, Oral, Q12H  ARIPiprazole, 10 mg, Oral, Daily  escitalopram, 20 mg, Oral, Daily  insulin glargine, 15 Units, Subcutaneous, Q12H  insulin lispro, 3-14 Units, Subcutaneous, 4x Daily AC & at Bedtime  Insulin Lispro, 5 Units, Subcutaneous, TID With Meals  ipratropium-albuterol, 3 mL, Nebulization, 4x Daily - RT  miconazole, 1 Application, Topical, Q12H  oxybutynin XL, 5 mg, Oral, Daily  pantoprazole, 40 mg, Oral, Q AM  predniSONE, 30 mg, Oral, Daily With Breakfast  sodium chloride, 10 mL, Intravenous, Q12H  sodium chloride, 4 mL, Nebulization, BID - RT      Continuous Infusions:   PRN Meds:.  acetaminophen    Calcium Replacement - Follow Nurse / BPA Driven Protocol    dextrose    dextrose    glucagon (human recombinant)    Magnesium Cardiology Dose Replacement - Follow Nurse / BPA Driven Protocol    Phosphorus Replacement - Follow Nurse / BPA Driven Protocol    Potassium Replacement - Follow Nurse / BPA Driven Protocol    sodium chloride    sodium chloride    sodium chloride    traZODone    Assessment & Plan   Assessment & Plan     Active Hospital Problems    Diagnosis  POA    **Acute on chronic respiratory failure with hypoxia [J96.21]  Yes    Acute on chronic hypoxic respiratory failure [J96.21]  Yes      Resolved Hospital Problems   No resolved problems to display.        Brief Hospital Course to date:  Dani Ziegler is a 60 y.o. female with history of tobacco use, COPD, morbid obesity, h/o PE, T2DM, HTN who presents with soa, cough and fever. Patient was initially admitted to the ICU with respiratory failure requiring BIPAP. She received steroids, bronchodilators, antibiotics, furosemide. Imaging revealed an infiltrate in the right lower lobe. CT scan confirmed extensive tree-in-bud opacities in the right lung a lesser extent the left lung with some reactive adenopathy. Legionella and pneumococcal urinary  antigens are negative. Respiratory panel PCR is negative. Nasal swab for MRSA is positive. Blood cultures are negative at 24 hours. Echo reveals a normal EF although she has some wall motion abnormalities. There was no evidence of PE. Patient noted to have an ecchymotic area on the medial ball of her left foot with some superficial skin breakdown.  ID following with abx ongoing. Patient transitioned to floor 7/25     Acute on chronic respiratory failure  COPD exacerbation  Possible PNA  --currently on 4L, 3L is her baseline  --currently on steroids. Abx completed on 07/26  --wean as tolerated  --Appreciate pulmonology recommendations given complicated history      Left medial foot blister/bruise  --ID following, do not favor this is source of infection     T2DM  --A1C 9, initially on insulin gtt  --continue basal/prandial/bolus     History of DVT/PE  --on eliquis         Expected Discharge Location and Transportation: Home with   Expected Discharge 07/30/2025  Expected Discharge Date: 7/28/2025; Expected Discharge Time:      VTE Prophylaxis:  Pharmacologic VTE prophylaxis orders are present.         AM-PAC 6 Clicks Score (PT): 17 (07/28/25 0400)    CODE STATUS:   Code Status and Medical Interventions: CPR (Attempt to Resuscitate); Full Support   Ordered at: 07/24/25 0454     Code Status (Patient has no pulse and is not breathing):    CPR (Attempt to Resuscitate)     Medical Interventions (Patient has pulse or is breathing):    Full Support       Nu Asencio MD  07/28/25

## 2025-07-28 NOTE — PROGRESS NOTES
Continued Stay Note  [facility]     Patient Name: Dani Ziegler  MRN: [id number]  Today's Date: 7/28/2025    Admit Date: 7/22/2025    Plan: Plan is still home/   Discharge Plan       Row Name 07/28/25 1001       Plan    Plan Comments Met with the patient and she has [facility] for physical therapy and case management is following Mrs. Ziegler for discharge needs and will assist in sending an order for [facility].                   Discharge Codes    No documentation.                 Expected Discharge Date and Time       Expected Discharge Date Expected Discharge Time    Jul 28, 2025               CARLA Amin

## 2025-07-28 NOTE — PLAN OF CARE
Goal Outcome Evaluation:  Plan of Care Reviewed With: patient        Progress: no change       Anticipated Discharge Disposition (SLP): home with home health             Treatment Assessment (SLP): continued, toleration of diet, mild, oral dysphagia, pharyngeal dysphagia, suspected (07/28/25 1601)     Plan for Continued Treatment (SLP): continue treatment per plan of care (07/28/25 9246)

## 2025-07-28 NOTE — PROGRESS NOTES
INFECTIOUS DISEASE CONSULT/INITIAL HOSPITAL VISIT    Dani Ziegler  1965  [id number]    Date of Consult: 7/28/2025    Admission Date: 7/22/2025      Requesting Provider: No ref. provider found  Evaluating Physician: Earl Cheney MD    Reason for Consultation: pneumonia/foot ulcer    History of present illness:    Patient is a 60 y.o. femaleWith COPD, chronic hypoxia on home oxygen presents to this emergency room with dyspnea patient found to be in respite distress and cyanotic patient breathing rapidly with a respirate of 55 inspirations per minute patient had hypoxia despite 3 L of oxygen patient given Solu-Medrol DuoNeb inhalation albuterol treatments and placed on BiPAP.  Apparently patient has received care at [facility] for commune acquired pneumonia.    CTA chest performed to exclude pulmonary embolism but showed extensive tree-in-bud nodules in right lung and left lung concerning for infectious bronchiolitis.  Patient been given diuretics levofloxacin as family emergency room antibiotics have been modified to ceftriaxone and doxycycline.  We are being consulted for further antibiotic management    Patient is on BiPAP currently and is getting a bedside transthoracic echocardiogram    7/23/25; on  6 L o2 by nasal cannula; awake, reports that she walked in new shoes and developed a blister on left foot; no fever, rash    7/24/25; doing well; was on bipap last night but couldn't sleep, wearing o2 by nasal cannula currently; no fever, rash, sore throat    7/25/25; asleep no distress, afebrile normotensive      7/28/25; arousable; afebrile on o2 by nasal cannula  Past Medical History:   Diagnosis Date    Abnormal weight gain     Acute UTI     Anxiety     Arthritis involving multiple sites     Chronic obstructive pulmonary disease     Chronic pain     Current every day smoker     Depression     Diabetes mellitus     Diarrhea     Dysuria     Edema, lower extremity     Fever     Head injury      Hypertension     Intervertebral disc disorder     Degeneration of intervertebral disc, site unspecified (722.6)    Left bundle branch block     Leukocytosis     Long term current use of methadone for pain control     Mass of right breast     Nausea     Obesity     Overactive bladder     Peripheral neuropathy     Superficial phlebitis     right medial calf    Upper respiratory infection     Urinary incontinence     Vitamin D deficiency     Vomiting        Past Surgical History:   Procedure Laterality Date    BLADDER SURGERY      pubovaginal sling    CHOLECYSTECTOMY      EXPLORATORY LAPAROTOMY N/A 2022    Procedure: LAPAROTOMY EXPLORATORY UMBILICAL HERNIA REPAIR WITH MESH;  Surgeon: Reji Brown MD;  Location: [facility];  Service: General;  Laterality: N/A;    HERNIA REPAIR      HIP ARTHROSCOPY Right 10/29/2020    JOINT REPLACEMENT      TRACHEOSTOMY         Family History   Problem Relation Age of Onset    Breast cancer Mother     Cancer Mother     Arthritis Mother     Diabetes Mother     Obesity Mother     Arthritis Father     Stroke Father     Hypertension Father     Heart attack Father     Diabetes Maternal Grandmother     Migraines Other        Social History     Socioeconomic History    Marital status:    Tobacco Use    Smoking status: Every Day     Current packs/day: 0.00     Types: Cigarettes     Start date: 1989     Last attempt to quit: 2019     Years since quittin.3    Smokeless tobacco: Never    Tobacco comments:     1 daily   Vaping Use    Vaping status: Never Used   Substance and Sexual Activity    Alcohol use: No    Drug use: No    Sexual activity: Defer       Allergies   Allergen Reactions    Diphenhydramine Hives    Lortab [Hydrocodone-Acetaminophen] Hives     Tolerates percocet    Toradol [Ketorolac Tromethamine] Hives     Per patient tolerates motrin    Alprazolam Delirium    Nsaids Other (See Comments)     Liver Dx         Medication:    Current  Facility-Administered Medications:     acetaminophen (TYLENOL) tablet 650 mg, 650 mg, Oral, Q6H PRN, Rain Beltran MD, 650 mg at 07/28/25 0211    apixaban (ELIQUIS) tablet 5 mg, 5 mg, Oral, Q12H, Rain Beltran MD, 5 mg at 07/28/25 0857    ARIPiprazole (ABILIFY) tablet 10 mg, 10 mg, Oral, Daily, Rain Beltran MD, 10 mg at 07/28/25 0936    Calcium Replacement - Follow Nurse / BPA Driven Protocol, , Not Applicable, PRN, Rain Beltran MD    dextrose (D50W) (25 g/50 mL) IV injection 25 g, 25 g, Intravenous, Q15 Min PRN, Rain Beltran MD    dextrose (GLUTOSE) oral gel 15 g, 15 g, Oral, Q15 Min PRN, Rain Beltran MD    escitalopram (LEXAPRO) tablet 20 mg, 20 mg, Oral, Daily, Rain Beltran MD, 20 mg at 07/28/25 0857    glucagon (GLUCAGEN) injection 1 mg, 1 mg, Intramuscular, Q15 Min PRN, Rain Beltran MD    insulin glargine (LANTUS, SEMGLEE) injection 15 Units, 15 Units, Subcutaneous, Q12H, Rain Beltran MD, 15 Units at 07/28/25 0858    Insulin Lispro (humaLOG) injection 3-14 Units, 3-14 Units, Subcutaneous, 4x Daily AC & at Bedtime, Rain Beltran MD, 3 Units at 07/28/25 1209    Insulin Lispro (humaLOG) injection 5 Units, 5 Units, Subcutaneous, TID With Meals, Rain Beltran MD, 5 Units at 07/28/25 1209    ipratropium-albuterol (DUO-NEB) nebulizer solution 3 mL, 3 mL, Nebulization, 4x Daily - RT, Tete Cunha MD, 3 mL at 07/28/25 1558    Magnesium Cardiology Dose Replacement - Follow Nurse / BPA Driven Protocol, , Not Applicable, PRN, Rain Beltran MD    miconazole (MICOTIN) 2 % powder 1 Application, 1 Application, Topical, Q12H, Rain Beltran MD, 1 Application at 07/28/25 0937    oxybutynin XL (DITROPAN-XL) 24 hr tablet 5 mg, 5 mg, Oral, Daily, Rain Beltran MD, 5 mg at 07/28/25 0857    pantoprazole (PROTONIX) EC tablet 40 mg, 40 mg, Oral, Q AM, Rain Beltran MD, 40 mg at 07/28/25 2894     Phosphorus Replacement - Follow Nurse / BPA Driven Protocol, , Not Applicable, PRN, Rain Beltran MD    Potassium Replacement - Follow Nurse / BPA Driven Protocol, , Not Applicable, PRN, Rain Beltran MD    predniSONE (DELTASONE) tablet 30 mg, 30 mg, Oral, Daily With Breakfast, Rain Beltran MD, 30 mg at 07/28/25 0856    sodium chloride 0.9 % flush 10 mL, 10 mL, Intravenous, PRN, Rain Beltran MD    sodium chloride 0.9 % flush 10 mL, 10 mL, Intravenous, Q12H, Rain Beltran MD, 10 mL at 07/28/25 0902    sodium chloride 0.9 % flush 10 mL, 10 mL, Intravenous, PRN, Rain Beltran MD    sodium chloride 0.9 % infusion 40 mL, 40 mL, Intravenous, PRN, Rain Beltran MD    sodium chloride 7 % nebulizer solution nebulizer solution 4 mL, 4 mL, Nebulization, BID - RT, Rain Beltran MD, 4 mL at 07/28/25 0830    traZODone (DESYREL) tablet 50 mg, 50 mg, Oral, Nightly PRN, Rain Beltran MD, 50 mg at 07/27/25 2023    Antibiotics:  Anti-Infectives (From admission, onward)      Ordered     Dose/Rate Route Frequency Start Stop    07/22/25 1621  vancomycin IVPB 2000 mg in 0.9% Sodium Chloride 500 mL  Status:  Discontinued        Ordering Provider: David Aguirre RPH    2,000 mg  250 mL/hr over 120 Minutes Intravenous Every 24 Hours 07/23/25 1700 07/23/25 1035    07/22/25 1621  vancomycin 2500 mg/500 mL 0.9% NS IVPB ([facility])        Ordering Provider: David Aguirre RPH    20 mg/kg × 130 kg  over 150 Minutes Intravenous Once 07/22/25 1700 07/22/25 2001    07/22/25 1526  Pharmacy to dose vancomycin  Status:  Discontinued        Ordering Provider: Nadia Livingston APRN     Not Applicable Continuous PRN 07/22/25 1526 07/23/25 1037    07/22/25 0613  doxycycline (VIBRAMYCIN) 100 mg in sodium chloride 0.9 % 100 mL MBP        Ordering Provider: Rain Beltran MD    100 mg  over 60 Minutes Intravenous Every 12 Hours 07/22/25 0900 07/26/25 2107    07/22/25  0613  cefTRIAXone (ROCEPHIN) 1,000 mg in sodium chloride 0.9 % 100 mL MBP        Ordering Provider: Lilli Sprague APRN    1,000 mg  200 mL/hr over 30 Minutes Intravenous Every 24 Hours 25 0900 25 0850    25 0230  aztreonam (AZACTAM) 2 g in sodium chloride 0.9 % 100 mL MBP        Ordering Provider: Nu Salazar MD    2 g  200 mL/hr over 30 Minutes Intravenous Once 25 0246 25 0429    25 0230  levoFLOXacin (LEVAQUIN) 750 mg/150 mL D5W (premix) (LEVAQUIN) 750 mg        Ordering Provider: Nu Salazar MD    750 mg  100 mL/hr over 90 Minutes Intravenous Once 25 0246 25 0625              Review of Systems:  See hpi  Physical Exam:   Vital Signs  Temp (24hrs), Av.2 °F (36.8 °C), Min:98.1 °F (36.7 °C), Max:98.3 °F (36.8 °C)    Temp  Min: 98.1 °F (36.7 °C)  Max: 98.3 °F (36.8 °C)  BP  Min: 132/74  Max: 145/85  Pulse  Min: 79  Max: 114  Resp  Min: 18  Max: 25  SpO2  Min: 93 %  Max: 95 %    GENERAL: in no acute distress.   HEENT: Normocephalic, atraumatic.  PERRL. EOMI. No conjunctival injection. No icterus. No ext oral lesions    LUNGS: symmetrical lilia.  ABDOMEN: Soft, nontender,   EXT:  No cyanosis, clubbing or edema. No cord.  :  Without Carroll catheter.  MSK no joint deformity  Laboratory Data    Results from last 7 days   Lab Units 25  0425 25  0510 25  0502   WBC 10*3/mm3 22.80* 18.56* 14.10*   HEMOGLOBIN g/dL 12.1 12.0 11.2*   HEMATOCRIT % 40.1 39.0 37.0   PLATELETS 10*3/mm3 353 356 331     Results from last 7 days   Lab Units 25  0425   SODIUM mmol/L 136   POTASSIUM mmol/L 4.3   CHLORIDE mmol/L 100   CO2 mmol/L 25.9   BUN mg/dL 27.0*   CREATININE mg/dL 0.90   GLUCOSE mg/dL 195*   CALCIUM mg/dL 8.9     Results from last 7 days   Lab Units 25  0425   ALK PHOS U/L 73   BILIRUBIN mg/dL 0.2   ALT (SGPT) U/L 7   AST (SGOT) U/L 11         Results from last 7 days   Lab Units 25  0422   CRP mg/dL 18.37*     Results from last  "7 days   Lab Units 07/22/25  1137   LACTATE mmol/L 1.3             Estimated Creatinine Clearance: 92.2 mL/min (by C-G formula based on SCr of 0.9 mg/dL).      Microbiology:  No results found for: \"ACANTHNAEG\", \"AFBCX\", \"BPERTUSSISCX\", \"BLOODCX\"  No results found for: \"BCIDPCR\", \"CXREFLEX\", \"CSFCX\", \"CULTURETIS\"  No results found for: \"CULTURES\", \"HSVCX\", \"URCX\"  No results found for: \"EYECULTURE\", \"GCCX\", \"HSVCULTURE\", \"LABHSV\"  No results found for: \"LEGIONELLA\", \"MRSACX\", \"MUMPSCX\", \"MYCOPLASCX\"  No results found for: \"NOCARDIACX\", \"STOOLCX\"  No results found for: \"THROATCX\", \"UNSTIMCULT\", \"URINECX\", \"CULTURE\", \"VZVCULTUR\"  No results found for: \"VIRALCULTU\", \"WOUNDCX\"        Radiology:  Imaging Results (Last 72 Hours)       Procedure Component Value Units Date/Time    XR Chest 1 View [[id number]] Collected: 07/27/25 1542     Updated: 07/27/25 1547    Narrative:      XR CHEST 1 VW    Date of Exam: 7/27/2025 9:55 AM EDT    Indication: Wheezing, hypoxia    Comparison: 7/25/2025.    Findings:  The heart size is normal. There are increased interstitial markings similar to the previous exam. There is fullness in the pulmonary rupal felt to be due to enlarged lymph nodes as seen on the CT of the chest from 7/22/2025. The lungs and pleural spaces   are otherwise clear. There are chronic age-related changes involving the bony thorax and thoracic aorta.      Impression:      Impression:  1.Increased interstitial markings similar to the previous exam.  2.Fullness in the pulmonary rupal felt to be due to enlarged lymph nodes.        Electronically Signed: Darrion Randolph MD    7/27/2025 3:43 PM EDT    Workstation ID: [id number]              Impression:   Acute hypoxic respiratory failure   morbid obesity  Leukocytosis with neutrophilia descending  Nasal MRSA colonization  Tachycardia  Tachypnea  Hypotension  Lacitc acidosis concerning for tissue perfusion mismatch      PLAN/RECOMMENDATIONS:   Thank you for asking us to see Dani LEO" Karlie I recommend the following:  CT chest independently reviewed; consider ct findings to be related to aspiration bronchiolitis is common in obese patient with obstructive sleep apnea.      Left medial foot blister/bruise appears noninfected would not broaden abx on this account    Infectious bronchiolitis can follow viral infection, bacterial bronchopenumonia, atypical mycobacterial infection, fungal infection    F/u  TTE to assess for pulmonary hypertension/right heart strain      Observe off abx          This visit included the following complex service elements:  Complex medical decision-making associated with antimicrobial prescribing.  In-depth chart review with high level synthesis for complex diagnoses.       Patient is critically ill  Critical care time 45 minutes  Earl Cheney MD  7/28/2025  16:25 EDT                   alert/follows commands

## 2025-07-29 LAB
ALBUMIN SERPL-MCNC: 3.2 G/DL (ref 3.5–5.2)
ALBUMIN/GLOB SERPL: 0.9 G/DL
ALP SERPL-CCNC: 84 U/L (ref 39–117)
ALT SERPL W P-5'-P-CCNC: 8 U/L (ref 1–33)
ANION GAP SERPL CALCULATED.3IONS-SCNC: 9 MMOL/L (ref 5–15)
AST SERPL-CCNC: 8 U/L (ref 1–32)
BILIRUB SERPL-MCNC: 0.2 MG/DL (ref 0–1.2)
BUN SERPL-MCNC: 31.3 MG/DL (ref 8–23)
BUN/CREAT SERPL: 27.9 (ref 7–25)
CALCIUM SPEC-SCNC: 9.2 MG/DL (ref 8.6–10.5)
CHLORIDE SERPL-SCNC: 96 MMOL/L (ref 98–107)
CO2 SERPL-SCNC: 28 MMOL/L (ref 22–29)
CREAT SERPL-MCNC: 1.12 MG/DL (ref 0.57–1)
DEPRECATED RDW RBC AUTO: 50.5 FL (ref 37–54)
EGFRCR SERPLBLD CKD-EPI 2021: 56.1 ML/MIN/1.73
ERYTHROCYTE [DISTWIDTH] IN BLOOD BY AUTOMATED COUNT: 15.2 % (ref 12.3–15.4)
GLOBULIN UR ELPH-MCNC: 3.7 GM/DL
GLUCOSE BLDC GLUCOMTR-MCNC: 199 MG/DL (ref 70–130)
GLUCOSE BLDC GLUCOMTR-MCNC: 206 MG/DL (ref 70–130)
GLUCOSE BLDC GLUCOMTR-MCNC: 255 MG/DL (ref 70–130)
GLUCOSE BLDC GLUCOMTR-MCNC: 374 MG/DL (ref 70–130)
GLUCOSE SERPL-MCNC: 250 MG/DL (ref 65–99)
HCT VFR BLD AUTO: 41.3 % (ref 34–46.6)
HGB BLD-MCNC: 12.7 G/DL (ref 12–15.9)
MAGNESIUM SERPL-MCNC: 2.3 MG/DL (ref 1.6–2.4)
MCH RBC QN AUTO: 28 PG (ref 26.6–33)
MCHC RBC AUTO-ENTMCNC: 30.8 G/DL (ref 31.5–35.7)
MCV RBC AUTO: 91 FL (ref 79–97)
PLATELET # BLD AUTO: 373 10*3/MM3 (ref 140–450)
PMV BLD AUTO: 10.4 FL (ref 6–12)
POTASSIUM SERPL-SCNC: 4.4 MMOL/L (ref 3.5–5.2)
PROT SERPL-MCNC: 6.9 G/DL (ref 6–8.5)
QT INTERVAL: 340 MS
QTC INTERVAL: 542 MS
RBC # BLD AUTO: 4.54 10*6/MM3 (ref 3.77–5.28)
SODIUM SERPL-SCNC: 133 MMOL/L (ref 136–145)
WBC NRBC COR # BLD AUTO: 23.96 10*3/MM3 (ref 3.4–10.8)

## 2025-07-29 PROCEDURE — 99232 SBSQ HOSP IP/OBS MODERATE 35: CPT | Performed by: INTERNAL MEDICINE

## 2025-07-29 PROCEDURE — 63710000001 INSULIN GLARGINE PER 5 UNITS: Performed by: FAMILY MEDICINE

## 2025-07-29 PROCEDURE — 94799 UNLISTED PULMONARY SVC/PX: CPT

## 2025-07-29 PROCEDURE — 97530 THERAPEUTIC ACTIVITIES: CPT | Performed by: PHYSICAL THERAPIST

## 2025-07-29 PROCEDURE — 63710000001 INSULIN LISPRO (HUMAN) PER 5 UNITS: Performed by: INTERNAL MEDICINE

## 2025-07-29 PROCEDURE — 99232 SBSQ HOSP IP/OBS MODERATE 35: CPT | Performed by: FAMILY MEDICINE

## 2025-07-29 PROCEDURE — 63710000001 PREDNISONE PER 5 MG: Performed by: INTERNAL MEDICINE

## 2025-07-29 PROCEDURE — 83735 ASSAY OF MAGNESIUM: CPT | Performed by: FAMILY MEDICINE

## 2025-07-29 PROCEDURE — 63710000001 INSULIN GLARGINE PER 5 UNITS: Performed by: INTERNAL MEDICINE

## 2025-07-29 PROCEDURE — 63710000001 INSULIN LISPRO (HUMAN) PER 5 UNITS: Performed by: FAMILY MEDICINE

## 2025-07-29 PROCEDURE — 94664 DEMO&/EVAL PT USE INHALER: CPT

## 2025-07-29 PROCEDURE — 63710000001 PREDNISONE PER 1 MG: Performed by: INTERNAL MEDICINE

## 2025-07-29 PROCEDURE — 97116 GAIT TRAINING THERAPY: CPT | Performed by: PHYSICAL THERAPIST

## 2025-07-29 PROCEDURE — 80053 COMPREHEN METABOLIC PANEL: CPT | Performed by: FAMILY MEDICINE

## 2025-07-29 PROCEDURE — 85027 COMPLETE CBC AUTOMATED: CPT | Performed by: FAMILY MEDICINE

## 2025-07-29 PROCEDURE — 82948 REAGENT STRIP/BLOOD GLUCOSE: CPT

## 2025-07-29 RX ORDER — INSULIN LISPRO 100 [IU]/ML
10 INJECTION, SOLUTION INTRAVENOUS; SUBCUTANEOUS
Status: DISCONTINUED | OUTPATIENT
Start: 2025-07-29 | End: 2025-07-31 | Stop reason: HOSPADM

## 2025-07-29 RX ADMIN — INSULIN LISPRO 10 UNITS: 100 INJECTION, SOLUTION INTRAVENOUS; SUBCUTANEOUS at 12:52

## 2025-07-29 RX ADMIN — INSULIN LISPRO 10 UNITS: 100 INJECTION, SOLUTION INTRAVENOUS; SUBCUTANEOUS at 17:16

## 2025-07-29 RX ADMIN — PREDNISONE 30 MG: 10 TABLET ORAL at 08:24

## 2025-07-29 RX ADMIN — PANTOPRAZOLE SODIUM 40 MG: 40 TABLET, DELAYED RELEASE ORAL at 05:27

## 2025-07-29 RX ADMIN — IPRATROPIUM BROMIDE AND ALBUTEROL SULFATE 3 ML: .5; 3 SOLUTION RESPIRATORY (INHALATION) at 07:37

## 2025-07-29 RX ADMIN — APIXABAN 5 MG: 5 TABLET, FILM COATED ORAL at 21:37

## 2025-07-29 RX ADMIN — IPRATROPIUM BROMIDE AND ALBUTEROL SULFATE 3 ML: .5; 3 SOLUTION RESPIRATORY (INHALATION) at 18:52

## 2025-07-29 RX ADMIN — Medication 4 ML: at 18:52

## 2025-07-29 RX ADMIN — ESCITALOPRAM 20 MG: 20 TABLET, FILM COATED ORAL at 08:24

## 2025-07-29 RX ADMIN — Medication 10 ML: at 21:39

## 2025-07-29 RX ADMIN — MICONAZOLE NITRATE 1 APPLICATION: 20 POWDER TOPICAL at 09:00

## 2025-07-29 RX ADMIN — IPRATROPIUM BROMIDE AND ALBUTEROL SULFATE 3 ML: .5; 3 SOLUTION RESPIRATORY (INHALATION) at 15:56

## 2025-07-29 RX ADMIN — Medication 4 ML: at 07:37

## 2025-07-29 RX ADMIN — INSULIN LISPRO 5 UNITS: 100 INJECTION, SOLUTION INTRAVENOUS; SUBCUTANEOUS at 08:23

## 2025-07-29 RX ADMIN — OXYBUTYNIN CHLORIDE 5 MG: 5 TABLET, EXTENDED RELEASE ORAL at 08:24

## 2025-07-29 RX ADMIN — INSULIN GLARGINE 15 UNITS: 100 INJECTION, SOLUTION SUBCUTANEOUS at 08:23

## 2025-07-29 RX ADMIN — ARIPIPRAZOLE 10 MG: 10 TABLET ORAL at 08:24

## 2025-07-29 RX ADMIN — INSULIN GLARGINE 20 UNITS: 100 INJECTION, SOLUTION SUBCUTANEOUS at 21:37

## 2025-07-29 RX ADMIN — TRAZODONE HYDROCHLORIDE 50 MG: 50 TABLET ORAL at 21:37

## 2025-07-29 RX ADMIN — Medication 10 ML: at 08:56

## 2025-07-29 RX ADMIN — IPRATROPIUM BROMIDE AND ALBUTEROL SULFATE 3 ML: .5; 3 SOLUTION RESPIRATORY (INHALATION) at 12:03

## 2025-07-29 RX ADMIN — MICONAZOLE NITRATE 1 APPLICATION: 20 POWDER TOPICAL at 21:38

## 2025-07-29 RX ADMIN — APIXABAN 5 MG: 5 TABLET, FILM COATED ORAL at 08:24

## 2025-07-29 NOTE — THERAPY TREATMENT NOTE
Patient Name: Dani Ziegler  : 1965    MRN: [id number]                              Today's Date: 2025       Admit Date: 2025    Visit Dx:     ICD-10-CM ICD-9-CM   1. Respiratory distress  R06.03 786.09   2. Acute respiratory failure with hypoxia  J96.01 518.81   3. Pneumonia of right middle lobe due to infectious organism  J18.9 486   4. Sepsis without acute organ dysfunction, due to unspecified organism  A41.9 038.9     995.91   5. COPD with acute exacerbation  J44.1 491.21   6. Leukocytosis, unspecified type  D72.829 288.60   7. Sinus tachycardia  R00.0 427.89   8. Oropharyngeal dysphagia  R13.12 787.22     Patient Active Problem List   Diagnosis    Uncontrolled type 2 diabetes mellitus with hyperglycemia, without long-term current use of insulin    Vitamin D deficiency    Peripheral neuropathy    Adiposity    Essential hypertension    Chronic pain    Mucopurulent chronic bronchitis    Anxiety and depression    Arthritis involving multiple sites    Mixed hyperlipidemia    Special screening for malignant neoplasms, colon    Cellulitis of left lower extremity    Acute on chronic renal insufficiency    Severe sepsis    Cellulitis of left leg    Lumbar disc disease    Diabetic peripheral neuropathy    MRSA pneumonia of left midlung present on admission    Polypharmacy    Morbid obesity with BMI of 45.0-49.9. ?  LOUANN/OHS    Acute on chronic respiratory failure with hypoxia and hypercapnia    H/O recurrent DVT on Eliquis     Chronic narcotic/BZD use    Illicit drug use (UDS + methamphetamines)     Smoker    SBO s/p exploratory laparotomy and incisional hernia repair with mesh 2022    Incisional hernia with obstruction but no gangrene    Acute on chronic respiratory failure with hypoxia    Acute on chronic hypoxic respiratory failure     Past Medical History:   Diagnosis Date    Abnormal weight gain     Acute UTI     Anxiety     Arthritis involving multiple sites     Chronic obstructive pulmonary  disease     Chronic pain     Current every day smoker     Depression     Diabetes mellitus     Diarrhea     Dysuria     Edema, lower extremity     Fever     Head injury     Hypertension     Intervertebral disc disorder     Degeneration of intervertebral disc, site unspecified (722.6)    Left bundle branch block     Leukocytosis     Long term current use of methadone for pain control     Mass of right breast     Nausea     Obesity     Overactive bladder     Peripheral neuropathy     Superficial phlebitis     right medial calf    Upper respiratory infection     Urinary incontinence     Vitamin D deficiency     Vomiting      Past Surgical History:   Procedure Laterality Date    BLADDER SURGERY      pubovaginal sling    CHOLECYSTECTOMY      EXPLORATORY LAPAROTOMY N/A 06/12/2022    Procedure: LAPAROTOMY EXPLORATORY UMBILICAL HERNIA REPAIR WITH MESH;  Surgeon: Reji Brown MD;  Location: [facility];  Service: General;  Laterality: N/A;    HERNIA REPAIR      HIP ARTHROSCOPY Right 10/29/2020    JOINT REPLACEMENT      TRACHEOSTOMY        General Information       Row Name 07/29/25 1603          Physical Therapy Time and Intention    Document Type therapy note (daily note)  -LM     Mode of Treatment individual therapy;physical therapy  -LM       Row Name 07/29/25 1603          General Information    Patient Profile Reviewed yes  -LM     Existing Precautions/Restrictions fall;oxygen therapy device and L/min  -LM       Row Name 07/29/25 1603          Cognition    Orientation Status (Cognition) oriented x 3  -LM       Row Name 07/29/25 1603          Safety Issues/Impairments Affecting Functional Mobility    Safety Issues Affecting Function (Mobility) safety precaution awareness  -LM     Impairments Affecting Function (Mobility) endurance/activity tolerance;shortness of breath;strength  -LM               User Key  (r) = Recorded By, (t) = Taken By, (c) = Cosigned By      Initials Name Provider Type    LM Renea Gallo, PT  Physical Therapist                   Mobility       Row Name 07/29/25 1603          Bed Mobility    Bed Mobility supine-sit  -LM     Supine-Sit [place] (Bed Mobility) standby assist  -LM     Assistive Device (Bed Mobility) bed rails;head of bed elevated  -LM       Row Name 07/29/25 1603          Sit-Stand Transfer    Sit-Stand [place] (Transfers) contact guard;1 person assist;verbal cues  -LM     Assistive Device (Sit-Stand Transfers) walker, front-wheeled  -LM       Row Name 07/29/25 1603          Gait/Stairs (Locomotion)    [place] Level (Gait) contact guard;1 person assist;verbal cues  -LM     Assistive Device (Gait) walker, front-wheeled  -LM     Distance in Feet (Gait) 185  -LM     Deviations/Abnormal Patterns (Gait) bilateral deviations;gait speed decreased;stride length decreased;base of support, wide  -LM     Bilateral Gait Deviations heel strike decreased;forward flexed posture  -LM     Comment, (Gait/Stairs) Ambulated on 4L O2.  Educated pt on PLB.  Vc's for upright posture.  Pt noted to start heavy breathing around the 150' tatiana, but O2 at 91% once back to room.  -LM               User Key  (r) = Recorded By, (t) = Taken By, (c) = Cosigned By      Initials Name Provider Type    Renea Pettit PT Physical Therapist                   Obj/Interventions       Row Name 07/29/25 1605          Balance    Static Sitting Balance independent  -LM     Position, Sitting Balance unsupported;sitting edge of bed  -LM     Static Standing Balance contact guard  -LM     Dynamic Standing Balance contact guard  -LM     Position/Device Used, Standing Balance supported;walker, front-wheeled  -LM               User Key  (r) = Recorded By, (t) = Taken By, (c) = Cosigned By      Initials Name Provider Type    Renea Pettit PT Physical Therapist                   Goals/Plan    No documentation.                  Clinical Impression       Row Name 07/29/25 1605          Pain    Pretreatment Pain Rating 2/10   -LM     Posttreatment Pain Rating 2/10  -LM     Pain Location buttock  -LM     Pain Side/Orientation generalized  -LM     Pain Management Interventions exercise or physical activity utilized;positioning techniques utilized  -LM     Response to Pain Interventions activity participation with tolerable pain;no change per patient report  -LM       Row Name 07/29/25 1605          Plan of Care Review    Plan of Care Reviewed With patient  -LM     Progress improving  -LM     Outcome Evaluation Pt progressing well towards skilled PT goals.  Pt stood with CGA and increased gait distance to 185 feet using rw with CGAx1.  Pt ambulated on 4L O2 with O2 at 91% post gait.  Pt continues to be limited by decreased activity tolerance and weakness.  Continue to recommend home with assist and HH PT at d/c.  -LM       Row Name 07/29/25 1605          Vital Signs    Pre Systolic BP Rehab 147  -LM     Pre Treatment Diastolic BP 82  -LM     Pretreatment Heart Rate (beats/min) 99  -LM     Intratreatment Heart Rate (beats/min) 136  -LM     Posttreatment Heart Rate (beats/min) 100  -LM     Pre SpO2 (%) 94  -LM     O2 Delivery Pre Treatment supplemental O2  -LM     Intra SpO2 (%) 91  -LM     O2 Delivery Intra Treatment supplemental O2  -LM     Post SpO2 (%) 95  -LM     O2 Delivery Post Treatment supplemental O2  -LM     Pre Patient Position Supine  -LM     Intra Patient Position Standing  -LM     Post Patient Position Sitting  -LM       Row Name 07/29/25 1603          Positioning and Restraints    Pre-Treatment Position in bed  -LM     Post Treatment Position chair  -LM     In Chair sitting;call light within reach;encouraged to call for assist;exit alarm on;waffle cushion;with other staff;notified nsg  with RT  -LM               User Key  (r) = Recorded By, (t) = Taken By, (c) = Cosigned By      Initials Name Provider Type    LM Renea Gallo, PT Physical Therapist                   Outcome Measures       Row Name 07/29/25 1607 07/29/25 0800        How much help from another person do you currently need...    Turning from your back to your side while in flat bed without using bedrails? 3  -LM 3  -MM    Moving from lying on back to sitting on the side of a flat bed without bedrails? 3  -LM 3  -MM    Moving to and from a bed to a chair (including a wheelchair)? 3  -LM 3  -MM    Standing up from a chair using your arms (e.g., wheelchair, bedside chair)? 3  -LM 3  -MM    Climbing 3-5 steps with a railing? 3  -LM 2  -MM    To walk in hospital room? 3  -LM 3  -MM    AM-PAC 6 Clicks Score (PT) 18  -LM 17  -MM    Highest Level of Mobility Goal Walk 10 Steps or More-6  -LM Stand (1 or More Minutes)-5  -MM      Row Name 07/29/25 1607          Functional Assessment    Outcome Measure Options AM-PAC 6 Clicks Basic Mobility (PT)  -LM               User Key  (r) = Recorded By, (t) = Taken By, (c) = Cosigned By      Initials Name Provider Type    LM Renea Gallo, PT Physical Therapist    MM Benjamin Dawson RN Registered Nurse                                 Physical Therapy Education       Title: PT OT SLP Therapies (In Progress)       Topic: Physical Therapy (In Progress)       Point: Mobility training (In Progress)       Learning Progress Summary            Patient Acceptance, E, NR by  at 7/24/2025 1552                      Point: Home exercise program (Not Started)       Learner Progress:  Not documented in this visit.              Point: Body mechanics (In Progress)       Learning Progress Summary            Patient Acceptance, E, NR by  at 7/24/2025 1552                      Point: Precautions (In Progress)       Learning Progress Summary            Patient Acceptance, E, NR by  at 7/24/2025 1552                                      User Key       Initials Effective Dates Name Provider Type Discipline     11/16/23 -  Camila Webb, PT Physical Therapist PT                  PT Recommendation and Plan     Progress: improving  Outcome Evaluation: Pt  progressing well towards skilled PT goals.  Pt stood with CGA and increased gait distance to 185 feet using rw with CGAx1.  Pt ambulated on 4L O2 with O2 at 91% post gait.  Pt continues to be limited by decreased activity tolerance and weakness.  Continue to recommend home with assist and HH PT at d/c.     Time Calculation:         PT Charges       Row Name 07/29/25 1607             Time Calculation    Start Time 1535  -LM      PT Received On 07/29/25  -LM      PT Goal Re-Cert Due Date 08/03/25  -LM         Timed Charges    96806 - Gait Training Minutes  15  -LM      59997 - PT Therapeutic Activity Minutes 12  -LM         Total Minutes    Timed Charges Total Minutes 27  -LM       Total Minutes 27  -LM                User Key  (r) = Recorded By, (t) = Taken By, (c) = Cosigned By      Initials Name Provider Type    LM Renea Gallo, PT Physical Therapist                  Therapy Charges for Today       Code Description Service Date Service Provider Modifiers Qty    55319028790 HC GAIT TRAINING EA 15 MIN 7/29/2025 Renea Gallo, PT GP 1    [id number]  PT THERAPEUTIC ACT EA 15 MIN 7/29/2025 Renea Gallo, PT GP 1            PT G-Codes  Outcome Measure Options: AM-PAC 6 Clicks Basic Mobility (PT)  AM-PAC 6 Clicks Score (PT): 18  AM-PAC 6 Clicks Score (OT): 18  PT Discharge Summary  Anticipated Discharge Disposition (PT): home with assist, home with home health    Renea Gallo PT  7/29/2025

## 2025-07-29 NOTE — PLAN OF CARE
Goal Outcome Evaluation:  Plan of Care Reviewed With: patient        Progress: improving  Outcome Evaluation: Pt progressing well towards skilled PT goals.  Pt stood with CGA and increased gait distance to 185 feet using rw with CGAx1.  Pt ambulated on 4L O2 with O2 at 91% post gait.  Pt continues to be limited by decreased activity tolerance and weakness.  Continue to recommend home with assist and  PT at d/c.    Anticipated Discharge Disposition (PT): home with assist, home with home health

## 2025-07-29 NOTE — PROGRESS NOTES
"Pulmonary Medicine Follow-up     Hospital:  LOS: 7 days   Ms. Dani Ziegler, 60 y.o. female is followed for:   Respiratory failure, COPD exacerbation        Subjective   Interval History:  The chart has been reviewed.  The patient states that she is feeling better.  She has been clearing her secretions without much difficulty.  She denies any wheezing currently.  She is on 4 L nasal cannula.    The patient's relevant past medical, surgical and social history were reviewed and updated in Epic as appropriate.     Review of systems was completed and is negative except as detailed above.     Objective     Medications:  apixaban, 5 mg, Oral, Q12H  ARIPiprazole, 10 mg, Oral, Daily  escitalopram, 20 mg, Oral, Daily  insulin glargine, 20 Units, Subcutaneous, Nightly  [START ON 7/30/2025] insulin glargine, 35 Units, Subcutaneous, Nightly  Insulin Lispro, 10 Units, Subcutaneous, TID With Meals  ipratropium-albuterol, 3 mL, Nebulization, 4x Daily - RT  miconazole, 1 Application, Topical, Q12H  oxybutynin XL, 5 mg, Oral, Daily  pantoprazole, 40 mg, Oral, Q AM  predniSONE, 30 mg, Oral, Daily With Breakfast  sodium chloride, 10 mL, Intravenous, Q12H  sodium chloride, 4 mL, Nebulization, BID - RT        Vital Sign Min/Max for last 24 hours  Temp  Min: 98 °F (36.7 °C)  Max: 98.4 °F (36.9 °C)   BP  Min: 146/85  Max: 147/82   Pulse  Min: 76  Max: 94   Resp  Min: 18  Max: 22   SpO2  Min: 91 %  Max: 93 %   Flow (L/min) (Oxygen Therapy)  Min: 4  Max: 4       Input/Output for last 24 hour shift  07/28 0701 - 07/29 0700  In: 275 [P.O.:275]  Out: 1900 [Urine:1900]     Objective:  General Appearance:  In no acute distress and comfortable.    Vital signs: (most recent): Blood pressure 147/82, pulse 95, temperature 98.3 °F (36.8 °C), temperature source Oral, resp. rate 18, height 170.2 cm (67\"), weight 130 kg (287 lb), SpO2 93%.    HEENT: (Cannula oxygen in place.  Previous tracheostomy scar lower anterior neck)    Lungs:  Normal effort and " normal respiratory rate.  There are rhonchi.  No wheezes.    Heart: Normal rate.  Regular rhythm.  S1 normal and S2 normal.  No murmur.   Chest: Symmetric chest wall expansion.   Abdomen: Abdomen is soft and non-distended.  Bowel sounds are normal.   There is no abdominal tenderness.     Extremities: Normal range of motion.  There is no dependent edema.    Neurological: Patient is alert and oriented to person, place and time.  Normal strength.    Pupils:  Pupils are equal, round, and reactive to light.  Pupils are equal.   Skin:  Warm.                Results from last 7 days   Lab Units 07/29/25 0447 07/28/25  0425 07/27/25  0510   WBC 10*3/mm3 23.96* 22.80* 18.56*   HEMOGLOBIN g/dL 12.7 12.1 12.0   PLATELETS 10*3/mm3 373 353 356     Results from last 7 days   Lab Units 07/29/25 0447 07/28/25 0425 07/27/25  0510 07/26/25  0502 07/25/25 0447 07/23/25  0511   SODIUM mmol/L 133* 136 138 138   < > 136   POTASSIUM mmol/L 4.4 4.3 4.1 4.3   < > 4.7   CO2 mmol/L 28.0 25.9 25.4 28.0   < > 22.9   BUN mg/dL 31.3* 27.0* 22.7 20.6   < > 15.8   CREATININE mg/dL 1.12* 0.90 0.85 0.95   < > 0.83   MAGNESIUM mg/dL 2.3 1.6  --  2.2   < > 2.5*   PHOSPHORUS mg/dL  --   --   --   --   --  2.8   GLUCOSE mg/dL 250* 195* 230* 283*   < > 176*    < > = values in this interval not displayed.     Estimated Creatinine Clearance: 74.1 mL/min (A) (by C-G formula based on SCr of 1.12 mg/dL (H)).    Results from last 7 days   Lab Units 07/25/25  0952   PH, ARTERIAL pH units 7.368   PCO2, ARTERIAL mm Hg 49.4*   PO2 ART mm Hg 78.6*          I reviewed the patient's results and images.     Assessment & Plan   Impression & Plan     Acute on chronic hypoxemic respiratory failure  COPD in acute exacerbation  History of thromboembolic disease       The patient has been transitioned over to oral prednisone.  I would recommend a taper over the next week and 1/2 to 2 weeks.  Anticoagulation as before.  Continue with current bronchodilator  therapy.  Patient will need to have a follow-up appointment within the next month or so with the pulmonary clinic for a follow-up.  I will see as needed.      I discussed the patient's findings and my recommendations with patient       Reji Tavares MD, [facility]  Pulmonary and Critical Care Medicine  07/29/25 12:01 EDT

## 2025-07-29 NOTE — PROGRESS NOTES
[facility] Medicine Services  PROGRESS NOTE    Patient Name: Dani Ziegler  : 1965  MRN: [id number]    Date of Admission: 2025  Primary Care Physician: Darrion Tovar MD    Subjective   Subjective     CC:  Acute on chronic respiratory failure     HPI:  Patient reports is feeling better today.  Denies nausea vomiting fevers chills. She still remains on 4 L nasal cannula. Reports was not eating and drinking well prior to hospitalization. Encouraged oral water intake.       Objective   Objective     Vital Signs:   Temp:  [98 °F (36.7 °C)-98.4 °F (36.9 °C)] 98.4 °F (36.9 °C)  Heart Rate:  [] 88  Resp:  [18-24] 18  BP: (147)/(86) 147/86  Flow (L/min) (Oxygen Therapy):  [4] 4     Physical Exam:  Constitutional:  female no acute distress, awake, alert  HENT: NCAT, mucous membranes moist  Respiratory: Basilar rhonchi bilaterally  Cardiovascular: RRR, no murmurs, rubs, or gallops  Gastrointestinal: Positive bowel sounds, soft, nontender, nondistended  Musculoskeletal: No bilateral ankle edema  Psychiatric: Appropriate affect, cooperative  Neurologic: Oriented x 3, speech clear  Skin: No rashes    Results Reviewed:  LAB RESULTS:      Lab 25  0447 25  0425 25  0510 25  0502 25  0447 25  1759 25  0511 25  1137   0000   WBC 23.96* 22.80* 18.56* 14.10* 13.03*  --  19.38*  --    < >   HEMOGLOBIN 12.7 12.1 12.0 11.2* 11.3*   < > 11.5*  --   --    HEMATOCRIT 41.3 40.1 39.0 37.0 37.1   < > 37.5  --   --    PLATELETS 373 353 356 331 332  --  336  --    < >   NEUTROS ABS  --  17.10* 11.49* 9.58* 8.65*  --  17.33*  --   --    IMMATURE GRANS (ABS)  --   --  0.72* 0.33* 0.25*  --  0.28*  --   --    LYMPHS ABS  --   --  5.30* 3.38* 3.28*  --  1.29  --   --    MONOS ABS  --   --  0.74 0.69 0.77  --  0.44  --   --    EOS ABS  --  0.23 0.16 0.06 0.03  --  0.00  --   --    MCV 91.0 90.9 90.5 90.9 91.6  --  90.1  --    < >   LACTATE  --   --    --   --   --   --   --  1.3  --     < > = values in this interval not displayed.         Lab 07/29/25  0447 07/28/25  0425 07/27/25  0510 07/26/25  0502 07/25/25  0447 07/23/25  0511   SODIUM 133* 136 138 138 138 136   POTASSIUM 4.4 4.3 4.1 4.3 4.6 4.7   CHLORIDE 96* 100 101 101 105 104   CO2 28.0 25.9 25.4 28.0 26.3 22.9   ANION GAP 9.0 10.1 11.6 9.0 6.7 9.1   BUN 31.3* 27.0* 22.7 20.6 21.2 15.8   CREATININE 1.12* 0.90 0.85 0.95 0.92 0.83   EGFR 56.1* 72.9 78.1 68.3 71.0 80.8   GLUCOSE 250* 195* 230* 283* 237* 176*   CALCIUM 9.2 8.9 8.9 9.0 8.7 8.8   MAGNESIUM 2.3 1.6  --  2.2 1.9 2.5*   PHOSPHORUS  --   --   --   --   --  2.8         Lab 07/29/25 0447 07/28/25  0425 07/27/25  0510 07/26/25  0502 07/23/25  0511   TOTAL PROTEIN 6.9 6.7 6.8 6.9 7.3   ALBUMIN 3.2* 3.1* 3.2* 2.9* 2.8*   GLOBULIN 3.7 3.6 3.6 4.0 4.5   ALT (SGPT) 8 7 6 5 6   AST (SGOT) 8 11 8 7 11   BILIRUBIN 0.2 0.2 <0.2 <0.2 0.2   ALK PHOS 84 73 78 79 91                       Lab 07/25/25  0952 07/24/25  0441   PH, ARTERIAL 7.368 7.386   PCO2, ARTERIAL 49.4* 44.9   PO2 ART 78.6* 75.3*   FIO2 44 44   HCO3 ART 28.4* 26.9*   BASE EXCESS ART 2.3* 1.5   CARBOXYHEMOGLOBIN 1.0 0.9     Brief Urine Lab Results  (Last result in the past 365 days)        Color   Clarity   Blood   Leuk Est   Nitrite   Protein   CREAT   Urine HCG        07/22/25 0655 Yellow   Cloudy   Trace   Negative   Positive   Trace                   Cultures:  Blood Culture   Date Value Ref Range Status   07/22/2025 No growth at 5 days  Final   07/22/2025 No growth at 5 days  Final       Microbiology Results Abnormal       Procedure Component Value - Date/Time    MRSA Screen, PCR (Inpatient) - Swab, Nares [[id number]]  (Abnormal) Collected: 07/22/25 1325    Lab Status: Final result Specimen: Swab from Nares Updated: 07/22/25 1510     MRSA PCR Positive    Narrative:      The negative predictive value of this diagnostic test is high and should only be used to consider de-escalating anti-MRSA  therapy. A positive result may indicate colonization with MRSA and must be correlated clinically.            XR Chest 1 View  Result Date: 7/27/2025  XR CHEST 1 VW Date of Exam: 7/27/2025 9:55 AM EDT Indication: Wheezing, hypoxia Comparison: 7/25/2025. Findings: The heart size is normal. There are increased interstitial markings similar to the previous exam. There is fullness in the pulmonary rupal felt to be due to enlarged lymph nodes as seen on the CT of the chest from 7/22/2025. The lungs and pleural spaces are otherwise clear. There are chronic age-related changes involving the bony thorax and thoracic aorta.     Impression: Impression: 1.Increased interstitial markings similar to the previous exam. 2.Fullness in the pulmonary rpual felt to be due to enlarged lymph nodes. Electronically Signed: Darrion Randolph MD  7/27/2025 3:43 PM EDT  Workstation ID: [id number]      Results for orders placed during the hospital encounter of 07/22/25    Adult Transthoracic Echo Complete W/ Cont if Necessary Per Protocol 07/22/2025  4:26 PM    Interpretation Summary    Left ventricular systolic function is normal. Calculated left ventricular EF = 60.8% Left ventricular ejection fraction appears to be 56 - 60%.    The following left ventricular wall segments are hypokinetic: apical septal, basal inferoseptal, mid inferoseptal, mid anteroseptal and basal inferoseptal.    Left ventricular wall thickness is consistent with borderline concentric hypertrophy.  Grade I diastolic dysfunction is present.    Normal right ventricular size and function.    Cardiac valves very poorly visualized due to poor acoustic windows. No hemodynamically  significant valvular dysfunction noted by Doppler evaluation.      Current medications:  Scheduled Meds:apixaban, 5 mg, Oral, Q12H  ARIPiprazole, 10 mg, Oral, Daily  escitalopram, 20 mg, Oral, Daily  insulin glargine, 15 Units, Subcutaneous, Q12H  insulin lispro, 3-14 Units, Subcutaneous, 4x Daily AC & at  Bedtime  Insulin Lispro, 5 Units, Subcutaneous, TID With Meals  ipratropium-albuterol, 3 mL, Nebulization, 4x Daily - RT  miconazole, 1 Application, Topical, Q12H  oxybutynin XL, 5 mg, Oral, Daily  pantoprazole, 40 mg, Oral, Q AM  predniSONE, 30 mg, Oral, Daily With Breakfast  sodium chloride, 10 mL, Intravenous, Q12H  sodium chloride, 4 mL, Nebulization, BID - RT      Continuous Infusions:   PRN Meds:.  acetaminophen    Calcium Replacement - Follow Nurse / BPA Driven Protocol    dextrose    dextrose    glucagon (human recombinant)    Magnesium Cardiology Dose Replacement - Follow Nurse / BPA Driven Protocol    Phosphorus Replacement - Follow Nurse / BPA Driven Protocol    Potassium Replacement - Follow Nurse / BPA Driven Protocol    sodium chloride    sodium chloride    sodium chloride    traZODone    Assessment & Plan   Assessment & Plan     Active Hospital Problems    Diagnosis  POA    **Acute on chronic respiratory failure with hypoxia [J96.21]  Yes    Acute on chronic hypoxic respiratory failure [J96.21]  Yes      Resolved Hospital Problems   No resolved problems to display.        Brief Hospital Course to date:  Dani Ziegler is a 60 y.o. female with history of tobacco use, COPD, morbid obesity, h/o PE, T2DM, HTN who presents with soa, cough and fever. Patient was initially admitted to the ICU with respiratory failure requiring BIPAP. She received steroids, bronchodilators, antibiotics, furosemide. Imaging revealed an infiltrate in the right lower lobe. CT scan confirmed extensive tree-in-bud opacities in the right lung a lesser extent the left lung with some reactive adenopathy. Legionella and pneumococcal urinary antigens are negative. Respiratory panel PCR is negative. Nasal swab for MRSA is positive. Blood cultures are negative at 24 hours. Echo reveals a normal EF although she has some wall motion abnormalities. There was no evidence of PE. Patient noted to have an ecchymotic area on the medial ball of  her left foot with some superficial skin breakdown.  ID following with abx ongoing. Patient transitioned to floor 7/25     Acute on chronic respiratory failure  COPD exacerbation  Possible PNA  --currently on 4L, 3L is her baseline  --currently on steroids. Abx completed on 07/26  --wean as tolerated  --Appreciate pulmonology recommendations given complicated history      Left medial foot blister/bruise  --ID following, do not favor this is source of infection     T2DM  --A1C 9, initially on insulin gtt  --patient takes 35 U QHS. Will adjust insulin accordingly      History of DVT/PE  --on eliquis         Expected Discharge Location and Transportation: Home with   Expected Discharge 07/30/2025  Expected Discharge Date: 7/28/2025; Expected Discharge Time:      VTE Prophylaxis:  Pharmacologic VTE prophylaxis orders are present.         AM-PAC 6 Clicks Score (PT): 17 (07/28/25 2000)    CODE STATUS:   Code Status and Medical Interventions: CPR (Attempt to Resuscitate); Full Support   Ordered at: 07/24/25 2723     Code Status (Patient has no pulse and is not breathing):    CPR (Attempt to Resuscitate)     Medical Interventions (Patient has pulse or is breathing):    Full Support       Nu Asencio MD  07/29/25

## 2025-07-30 LAB
ALBUMIN SERPL-MCNC: 3.2 G/DL (ref 3.5–5.2)
ALBUMIN/GLOB SERPL: 0.9 G/DL
ALP SERPL-CCNC: 76 U/L (ref 39–117)
ALT SERPL W P-5'-P-CCNC: 8 U/L (ref 1–33)
ANION GAP SERPL CALCULATED.3IONS-SCNC: 9.5 MMOL/L (ref 5–15)
AST SERPL-CCNC: 10 U/L (ref 1–32)
BILIRUB SERPL-MCNC: 0.2 MG/DL (ref 0–1.2)
BUN SERPL-MCNC: 31.5 MG/DL (ref 8–23)
BUN/CREAT SERPL: 31.2 (ref 7–25)
CALCIUM SPEC-SCNC: 9.3 MG/DL (ref 8.6–10.5)
CHLORIDE SERPL-SCNC: 96 MMOL/L (ref 98–107)
CO2 SERPL-SCNC: 28.5 MMOL/L (ref 22–29)
CREAT SERPL-MCNC: 1.01 MG/DL (ref 0.57–1)
DEPRECATED RDW RBC AUTO: 50.3 FL (ref 37–54)
EGFRCR SERPLBLD CKD-EPI 2021: 63.5 ML/MIN/1.73
ERYTHROCYTE [DISTWIDTH] IN BLOOD BY AUTOMATED COUNT: 15.3 % (ref 12.3–15.4)
GLOBULIN UR ELPH-MCNC: 3.7 GM/DL
GLUCOSE BLDC GLUCOMTR-MCNC: 220 MG/DL (ref 70–130)
GLUCOSE BLDC GLUCOMTR-MCNC: 225 MG/DL (ref 70–130)
GLUCOSE BLDC GLUCOMTR-MCNC: 322 MG/DL (ref 70–130)
GLUCOSE BLDC GLUCOMTR-MCNC: 326 MG/DL (ref 70–130)
GLUCOSE SERPL-MCNC: 205 MG/DL (ref 65–99)
HCT VFR BLD AUTO: 40.7 % (ref 34–46.6)
HGB BLD-MCNC: 12.5 G/DL (ref 12–15.9)
MAGNESIUM SERPL-MCNC: 1.8 MG/DL (ref 1.6–2.4)
MCH RBC QN AUTO: 27.9 PG (ref 26.6–33)
MCHC RBC AUTO-ENTMCNC: 30.7 G/DL (ref 31.5–35.7)
MCV RBC AUTO: 90.8 FL (ref 79–97)
PLATELET # BLD AUTO: 349 10*3/MM3 (ref 140–450)
PMV BLD AUTO: 10.5 FL (ref 6–12)
POTASSIUM SERPL-SCNC: 4.4 MMOL/L (ref 3.5–5.2)
PROT SERPL-MCNC: 6.9 G/DL (ref 6–8.5)
RBC # BLD AUTO: 4.48 10*6/MM3 (ref 3.77–5.28)
SODIUM SERPL-SCNC: 134 MMOL/L (ref 136–145)
WBC NRBC COR # BLD AUTO: 22.98 10*3/MM3 (ref 3.4–10.8)

## 2025-07-30 PROCEDURE — 94799 UNLISTED PULMONARY SVC/PX: CPT

## 2025-07-30 PROCEDURE — 80053 COMPREHEN METABOLIC PANEL: CPT | Performed by: FAMILY MEDICINE

## 2025-07-30 PROCEDURE — 83735 ASSAY OF MAGNESIUM: CPT | Performed by: FAMILY MEDICINE

## 2025-07-30 PROCEDURE — 99232 SBSQ HOSP IP/OBS MODERATE 35: CPT | Performed by: NURSE PRACTITIONER

## 2025-07-30 PROCEDURE — 85027 COMPLETE CBC AUTOMATED: CPT | Performed by: FAMILY MEDICINE

## 2025-07-30 PROCEDURE — 25010000002 MAGNESIUM SULFATE 4 GM/100ML SOLUTION: Performed by: FAMILY MEDICINE

## 2025-07-30 PROCEDURE — 97535 SELF CARE MNGMENT TRAINING: CPT

## 2025-07-30 PROCEDURE — 82948 REAGENT STRIP/BLOOD GLUCOSE: CPT

## 2025-07-30 PROCEDURE — 63710000001 PREDNISONE PER 1 MG: Performed by: INTERNAL MEDICINE

## 2025-07-30 PROCEDURE — 63710000001 PREDNISONE PER 5 MG: Performed by: INTERNAL MEDICINE

## 2025-07-30 PROCEDURE — 63710000001 INSULIN LISPRO (HUMAN) PER 5 UNITS: Performed by: FAMILY MEDICINE

## 2025-07-30 PROCEDURE — 63710000001 INSULIN GLARGINE PER 5 UNITS: Performed by: FAMILY MEDICINE

## 2025-07-30 PROCEDURE — 97530 THERAPEUTIC ACTIVITIES: CPT

## 2025-07-30 RX ORDER — MAGNESIUM SULFATE HEPTAHYDRATE 40 MG/ML
4 INJECTION, SOLUTION INTRAVENOUS ONCE
Status: COMPLETED | OUTPATIENT
Start: 2025-07-30 | End: 2025-07-30

## 2025-07-30 RX ORDER — PREDNISONE 20 MG/1
20 TABLET ORAL
Status: DISCONTINUED | OUTPATIENT
Start: 2025-07-31 | End: 2025-07-31 | Stop reason: HOSPADM

## 2025-07-30 RX ADMIN — INSULIN LISPRO 10 UNITS: 100 INJECTION, SOLUTION INTRAVENOUS; SUBCUTANEOUS at 12:05

## 2025-07-30 RX ADMIN — ESCITALOPRAM 20 MG: 20 TABLET, FILM COATED ORAL at 09:00

## 2025-07-30 RX ADMIN — Medication 10 ML: at 09:00

## 2025-07-30 RX ADMIN — Medication 10 ML: at 20:50

## 2025-07-30 RX ADMIN — MICONAZOLE NITRATE 1 APPLICATION: 20 POWDER TOPICAL at 08:59

## 2025-07-30 RX ADMIN — INSULIN LISPRO 10 UNITS: 100 INJECTION, SOLUTION INTRAVENOUS; SUBCUTANEOUS at 17:00

## 2025-07-30 RX ADMIN — OXYBUTYNIN CHLORIDE 5 MG: 5 TABLET, EXTENDED RELEASE ORAL at 09:00

## 2025-07-30 RX ADMIN — IPRATROPIUM BROMIDE AND ALBUTEROL SULFATE 3 ML: .5; 3 SOLUTION RESPIRATORY (INHALATION) at 21:46

## 2025-07-30 RX ADMIN — IPRATROPIUM BROMIDE AND ALBUTEROL SULFATE 3 ML: .5; 3 SOLUTION RESPIRATORY (INHALATION) at 10:48

## 2025-07-30 RX ADMIN — PREDNISONE 30 MG: 10 TABLET ORAL at 09:00

## 2025-07-30 RX ADMIN — INSULIN LISPRO 10 UNITS: 100 INJECTION, SOLUTION INTRAVENOUS; SUBCUTANEOUS at 08:59

## 2025-07-30 RX ADMIN — TRAZODONE HYDROCHLORIDE 50 MG: 50 TABLET ORAL at 20:49

## 2025-07-30 RX ADMIN — IPRATROPIUM BROMIDE AND ALBUTEROL SULFATE 3 ML: .5; 3 SOLUTION RESPIRATORY (INHALATION) at 06:19

## 2025-07-30 RX ADMIN — Medication 4 ML: at 06:20

## 2025-07-30 RX ADMIN — PANTOPRAZOLE SODIUM 40 MG: 40 TABLET, DELAYED RELEASE ORAL at 05:58

## 2025-07-30 RX ADMIN — MICONAZOLE NITRATE 1 APPLICATION: 20 POWDER TOPICAL at 20:50

## 2025-07-30 RX ADMIN — MAGNESIUM SULFATE IN WATER FOR 4 G: 40 INJECTION INTRAVENOUS at 08:59

## 2025-07-30 RX ADMIN — APIXABAN 5 MG: 5 TABLET, FILM COATED ORAL at 09:00

## 2025-07-30 RX ADMIN — INSULIN GLARGINE 35 UNITS: 100 INJECTION, SOLUTION SUBCUTANEOUS at 20:49

## 2025-07-30 RX ADMIN — IPRATROPIUM BROMIDE AND ALBUTEROL SULFATE 3 ML: .5; 3 SOLUTION RESPIRATORY (INHALATION) at 16:45

## 2025-07-30 RX ADMIN — Medication 4 ML: at 21:46

## 2025-07-30 RX ADMIN — ARIPIPRAZOLE 10 MG: 10 TABLET ORAL at 08:59

## 2025-07-30 RX ADMIN — APIXABAN 5 MG: 5 TABLET, FILM COATED ORAL at 20:49

## 2025-07-30 NOTE — PLAN OF CARE
Goal Outcome Evaluation:  Plan of Care Reviewed With: patient        Progress: improving  Outcome Evaluation: Pt  CGA BSC transfer, progressed from mod A to min A with perineal hygiene,  CGA to ambulate with FWW.  Pt is progressing, but continues below baseline d/t weakness and decr activity tolerance.  Recommend home with assist and HHOT upon d/c.    Anticipated Discharge Disposition (OT): home with assist, home with home health

## 2025-07-30 NOTE — PROGRESS NOTES
[facility] Medicine Services  PROGRESS NOTE    Patient Name: Dani Ziegler  : 1965  MRN: [id number]    Date of Admission: 2025  Primary Care Physician: Darrion Tovar MD    Subjective   Subjective     CC:  Acute on chronic respiratory failure     HPI:  No new issues overnight  Concerned about being on 4L      Objective   Objective     Vital Signs:   Temp:  [97.7 °F (36.5 °C)-98.2 °F (36.8 °C)] 98 °F (36.7 °C)  Heart Rate:  [] 79  Resp:  [17-20] 18  BP: (135-163)/() 135/79  Flow (L/min) (Oxygen Therapy):  [4] 4     Physical Exam:  Constitutional: No acute distress, awake, alert, up in chair  HENT: NCAT, mucous membranes moist  Respiratory: Mild exp wheezing, respiratory effort normal, sats 93% on 4L  Cardiovascular: RRR, no murmurs, rubs, or gallops  Gastrointestinal: Positive bowel sounds, soft, nontender, nondistended  Musculoskeletal: No bilateral ankle edema  Psychiatric: Appropriate affect, cooperative  Neurologic: Oriented x 3, DUPONT, speech clear  Skin: No rashes      Results Reviewed:  LAB RESULTS:      Lab 25  0411 25  0447 25  0425 25  0510 25  0502 25  0447   WBC 22.98* 23.96* 22.80* 18.56* 14.10* 13.03*   HEMOGLOBIN 12.5 12.7 12.1 12.0 11.2* 11.3*   HEMATOCRIT 40.7 41.3 40.1 39.0 37.0 37.1   PLATELETS 349 373 353 356 331 332   NEUTROS ABS  --   --  17.10* 11.49* 9.58* 8.65*   IMMATURE GRANS (ABS)  --   --   --  0.72* 0.33* 0.25*   LYMPHS ABS  --   --   --  5.30* 3.38* 3.28*   MONOS ABS  --   --   --  0.74 0.69 0.77   EOS ABS  --   --  0.23 0.16 0.06 0.03   MCV 90.8 91.0 90.9 90.5 90.9 91.6         Lab 25  0411 25  0447 25  0425 25  0510 25  0502 25  0447   SODIUM 134* 133* 136 138 138 138   POTASSIUM 4.4 4.4 4.3 4.1 4.3 4.6   CHLORIDE 96* 96* 100 101 101 105   CO2 28.5 28.0 25.9 25.4 28.0 26.3   ANION GAP 9.5 9.0 10.1 11.6 9.0 6.7   BUN 31.5* 31.3* 27.0* 22.7 20.6 21.2   CREATININE  1.01* 1.12* 0.90 0.85 0.95 0.92   EGFR 63.5 56.1* 72.9 78.1 68.3 71.0   GLUCOSE 205* 250* 195* 230* 283* 237*   CALCIUM 9.3 9.2 8.9 8.9 9.0 8.7   MAGNESIUM 1.8 2.3 1.6  --  2.2 1.9         Lab 07/30/25  0411 07/29/25  0447 07/28/25  0425 07/27/25  0510 07/26/25  0502   TOTAL PROTEIN 6.9 6.9 6.7 6.8 6.9   ALBUMIN 3.2* 3.2* 3.1* 3.2* 2.9*   GLOBULIN 3.7 3.7 3.6 3.6 4.0   ALT (SGPT) 8 8 7 6 5   AST (SGOT) 10 8 11 8 7   BILIRUBIN 0.2 0.2 0.2 <0.2 <0.2   ALK PHOS 76 84 73 78 79           Lab 07/25/25  0952 07/24/25  0441   PH, ARTERIAL 7.368 7.386   PCO2, ARTERIAL 49.4* 44.9   PO2 ART 78.6* 75.3*   FIO2 44 44   HCO3 ART 28.4* 26.9*   BASE EXCESS ART 2.3* 1.5   CARBOXYHEMOGLOBIN 1.0 0.9     Brief Urine Lab Results  (Last result in the past 365 days)        Color   Clarity   Blood   Leuk Est   Nitrite   Protein   CREAT   Urine HCG        07/22/25 0655 Yellow   Cloudy   Trace   Negative   Positive   Trace                   Cultures:  Blood Culture   Date Value Ref Range Status   07/22/2025 No growth at 5 days  Final   07/22/2025 No growth at 5 days  Final       Microbiology Results Abnormal       Procedure Component Value - Date/Time    MRSA Screen, PCR (Inpatient) - Swab, Nares [[id number]]  (Abnormal) Collected: 07/22/25 1325    Lab Status: Final result Specimen: Swab from Nares Updated: 07/22/25 1510     MRSA PCR Positive    Narrative:      The negative predictive value of this diagnostic test is high and should only be used to consider de-escalating anti-MRSA therapy. A positive result may indicate colonization with MRSA and must be correlated clinically.            No radiology results from the last 24 hrs      Results for orders placed during the hospital encounter of 07/22/25    Adult Transthoracic Echo Complete W/ Cont if Necessary Per Protocol 07/22/2025  4:26 PM    Interpretation Summary    Left ventricular systolic function is normal. Calculated left ventricular EF = 60.8% Left ventricular ejection fraction  appears to be 56 - 60%.    The following left ventricular wall segments are hypokinetic: apical septal, basal inferoseptal, mid inferoseptal, mid anteroseptal and basal inferoseptal.    Left ventricular wall thickness is consistent with borderline concentric hypertrophy.  Grade I diastolic dysfunction is present.    Normal right ventricular size and function.    Cardiac valves very poorly visualized due to poor acoustic windows. No hemodynamically  significant valvular dysfunction noted by Doppler evaluation.      Current medications:  Scheduled Meds:apixaban, 5 mg, Oral, Q12H  ARIPiprazole, 10 mg, Oral, Daily  escitalopram, 20 mg, Oral, Daily  insulin glargine, 35 Units, Subcutaneous, Nightly  Insulin Lispro, 10 Units, Subcutaneous, TID With Meals  ipratropium-albuterol, 3 mL, Nebulization, 4x Daily - RT  miconazole, 1 Application, Topical, Q12H  oxybutynin XL, 5 mg, Oral, Daily  pantoprazole, 40 mg, Oral, Q AM  predniSONE, 30 mg, Oral, Daily With Breakfast  sodium chloride, 10 mL, Intravenous, Q12H  sodium chloride, 4 mL, Nebulization, BID - RT      Continuous Infusions:   PRN Meds:.  acetaminophen    Calcium Replacement - Follow Nurse / BPA Driven Protocol    dextrose    dextrose    glucagon (human recombinant)    Magnesium Cardiology Dose Replacement - Follow Nurse / BPA Driven Protocol    Phosphorus Replacement - Follow Nurse / BPA Driven Protocol    Potassium Replacement - Follow Nurse / BPA Driven Protocol    sodium chloride    sodium chloride    sodium chloride    traZODone    Assessment & Plan   Assessment & Plan     Active Hospital Problems    Diagnosis  POA    **Acute on chronic respiratory failure with hypoxia [J96.21]  Yes    Acute on chronic hypoxic respiratory failure [J96.21]  Yes      Resolved Hospital Problems   No resolved problems to display.        Brief Hospital Course to date:  Dani Ziegler is a 60 y.o. female with history of tobacco use, COPD, morbid obesity, h/o PE, T2DM, HTN who presents  with soa, cough and fever. Patient was initially admitted to the ICU with respiratory failure requiring BIPAP. She received steroids, bronchodilators, antibiotics, furosemide. Imaging revealed an infiltrate in the right lower lobe. CT scan confirmed extensive tree-in-bud opacities in the right lung a lesser extent the left lung with some reactive adenopathy. Legionella and pneumococcal urinary antigens are negative. Respiratory panel PCR is negative. Nasal swab for MRSA is positive. Blood cultures are negative at 24 hours. Echo reveals a normal EF although she has some wall motion abnormalities. There was no evidence of PE. Patient noted to have an ecchymotic area on the medial ball of her left foot with some superficial skin breakdown.  ID following with abx ongoing. Patient transitioned to floor 7/25     Acute on chronic respiratory failure  COPD exacerbation  Possible PNA  --currently on 4L, 3L is her baseline. Will likely dc on increased requirement  --currently tapering steroids. Abx completed on 07/26  --Appreciate pulmonology recommendations given complicated history      Left medial foot blister/bruise  --ID following, do not favor this is source of infection     T2DM  --A1C 9, initially on insulin gtt  --patient takes 35 U QHS. Will adjust insulin accordingly, worse with steroids    Latest Reference Range & Units 07/29/25 16:23 07/29/25 20:38 07/30/25 04:11 07/30/25 07:14 07/30/25 11:05   Glucose 70 - 130 mg/dL 374 (H) 255 (H) 205 (H) 225 (H) 220 (H)     History of DVT/PE  --on eliquis       Expected Discharge Location and Transportation: Home with   Expected Discharge   Expected Discharge Date: 7/31/2025; Expected Discharge Time:      VTE Prophylaxis:  Pharmacologic VTE prophylaxis orders are present.         AM-PAC 6 Clicks Score (PT): 17 (07/30/25 1752)    CODE STATUS:   Code Status and Medical Interventions: CPR (Attempt to Resuscitate); Full Support   Ordered at: 07/24/25 8173     Code Status  (Patient has no pulse and is not breathing):    CPR (Attempt to Resuscitate)     Medical Interventions (Patient has pulse or is breathing):    Full Support       Amparo Lowry, APRN  07/30/25

## 2025-07-30 NOTE — PROGRESS NOTES
Clinical Nutrition     Patient Name: Dani Ziegler  YOB: 1965  MRN: [id number]  Date of Encounter: 07/29/25 22:51 EDT  Admission date: 7/22/2025  Reason for Visit: Follow-up protocol    Assessment   Nutrition Assessment   Admission Diagnosis:  Acute on chronic respiratory failure with hypoxia [J96.21]  Acute on chronic hypoxic respiratory failure [J96.21]    Problem List:    Acute on chronic respiratory failure with hypoxia    Acute on chronic hypoxic respiratory failure      PMH:   She  has a past medical history of Abnormal weight gain, Acute UTI, Anxiety, Arthritis involving multiple sites, Chronic obstructive pulmonary disease, Chronic pain, Current every day smoker, Depression, Diabetes mellitus, Diarrhea, Dysuria, Edema, lower extremity, Fever, Head injury, Hypertension, Intervertebral disc disorder, Left bundle branch block, Leukocytosis, Long term current use of methadone for pain control, Mass of right breast, Nausea, Obesity, Overactive bladder, Peripheral neuropathy, Superficial phlebitis, Upper respiratory infection, Urinary incontinence, Vitamin D deficiency, and Vomiting.    PSH:  She  has a past surgical history that includes Bladder surgery; Cholecystectomy; Hip Arthroscopy (Right, 10/29/2020); Exploratory Laparotomy (N/A, 06/12/2022); Hernia repair; Joint replacement; and Tracheostomy tube placement.    Substance history: tobacco    Applicable Nutrition History:     SKIN: L great toe blood blister/ ulcer     SLP Recommendation and Plan  SLP Swallowing Diagnosis: mild, oral dysphagia, pharyngeal dysphagia (07/23/25 1515)  7/23 SLP Diet Recommendation: soft to chew textures, chopped, no mixed consistencies, thin liquids, other (see comments) (if concerns w/ toleration of thin liquid, downgrade to nectar-thick) (07/23/25 1515)  Recommended Precautions and Strategies: small bites of food and sips of liquid, upright posture during/after eating, general aspiration  precautions, fatigue precautions (small/single sips, straws ok) (07/23/25 1515)  SLP Rec. for Method of Medication Administration: meds whole, with thick liquids, with puree, as tolerated (07/23/25 1515)  Monitor for Signs of Aspiration: yes, notify SLP if any concerns (07/23/25 1515)  Labs    Labs Reviewed: Yes    Results from last 7 days   Lab Units 07/29/25 0447 07/28/25  0425 07/27/25  0510 07/26/25  0502 07/25/25  0447 07/23/25  0511   GLUCOSE mg/dL 250* 195* 230* 283*   < > 176*   BUN mg/dL 31.3* 27.0* 22.7 20.6   < > 15.8   CREATININE mg/dL 1.12* 0.90 0.85 0.95   < > 0.83   SODIUM mmol/L 133* 136 138 138   < > 136   CHLORIDE mmol/L 96* 100 101 101   < > 104   POTASSIUM mmol/L 4.4 4.3 4.1 4.3   < > 4.7   PHOSPHORUS mg/dL  --   --   --   --   --  2.8   MAGNESIUM mg/dL 2.3 1.6  --  2.2   < > 2.5*   ALT (SGPT) U/L 8 7 6 5  --  6    < > = values in this interval not displayed.       Results from last 7 days   Lab Units 07/29/25 0447 07/28/25  0425 07/27/25  0510   ALBUMIN g/dL 3.2* 3.1* 3.2*       Results from last 7 days   Lab Units 07/29/25 2038 07/29/25  1623 07/29/25  1118 07/29/25  0807 07/28/25 2002 07/28/25  1613   GLUCOSE mg/dL 255* 374* 199* 206* 291* 325*     Lab Results   Lab Value Date/Time    HGBA1C 9.01 (H) 07/22/2025 0227    HGBA1C 7.5 (H) 07/22/2024 1522    HGBA1C 9.3 (H) 04/20/2023 1555    HGBA1C 6.40 (H) 01/19/2022 0323    HGBA1C 8.3 01/11/2021 0000                 Medications    Medications Reviewed: yes    Scheduled Meds:apixaban, 5 mg, Oral, Q12H  ARIPiprazole, 10 mg, Oral, Daily  escitalopram, 20 mg, Oral, Daily  [START ON 7/30/2025] insulin glargine, 35 Units, Subcutaneous, Nightly  Insulin Lispro, 10 Units, Subcutaneous, TID With Meals  ipratropium-albuterol, 3 mL, Nebulization, 4x Daily - RT  miconazole, 1 Application, Topical, Q12H  oxybutynin XL, 5 mg, Oral, Daily  pantoprazole, 40 mg, Oral, Q AM  predniSONE, 30 mg, Oral, Daily With Breakfast  sodium chloride, 10 mL, Intravenous,  "Q12H  sodium chloride, 4 mL, Nebulization, BID - RT      Continuous Infusions:     PRN Meds:.  acetaminophen    Calcium Replacement - Follow Nurse / BPA Driven Protocol    dextrose    dextrose    glucagon (human recombinant)    Magnesium Cardiology Dose Replacement - Follow Nurse / BPA Driven Protocol    Phosphorus Replacement - Follow Nurse / BPA Driven Protocol    Potassium Replacement - Follow Nurse / BPA Driven Protocol    sodium chloride    sodium chloride    sodium chloride    traZODone    Intake/Ouptut 24 hrs (0701 - 0700)   I&O's Reviewed: yes    Intake & Output (last day)         07/29 0701 07/30 0700    P.O. 1860    Total Intake(mL/kg) 1860 (14.3)    Urine (mL/kg/hr) 2350 (1.1)    Stool 0    Total Output 2350    Net -490         Stool Unmeasured Occurrence 1 x         7/28 Stool x 1  Anthropometrics     Height: Height: 170.2 cm (67\")  Last Filed Weight: Weight: 130 kg (287 lb) (07/22/25 1540)  Method: Weight Method: Bed scale  BMI: BMI (Calculated): 44.9    UBW: ?  Weight change: per EMR 23lb wt gain over past year     Weight       Weight (kg) Weight (lbs) Weight Method Visit Report   6/21/2022 119.75 kg  264 lb  Bed scale     6/22/2022 119.75 kg  264 lb      6/24/2022 119.75 kg  264 lb      6/25/2022 126.355 kg  278 lb 9 oz  Bed scale     6/30/2022 122.471 kg  270 lb  Stated     8/30/2022 130.182 kg  287 lb   --    4/20/2023 133.358 kg  294 lb   --    7/22/2024 122.199 kg  269 lb 6.4 oz   --    8/9/2024 120.112 kg  264 lb 12.8 oz      7/22/2025 122.471 kg  270 lb  Stated      130.6 kg  287 lb 14.7 oz  Bed scale      130.182 kg  287 lb          Nutrition Focused Physical Exam     Date: 7/24    Unable to perform due to Defer pending indication     Subjective   Reported/Observed/Food/Nutrition Related History:     7/29  Pt allows doing well w food; sometimes requesting double protein portions. Is using Prosource suppl.     7-24: pt resting in bed, on nasal cannula, just ate lunch, reports good appetite, " swallowing ok, taking it slow  DM Diet Education reviewed    7-22:Pt resting in bed, on Bipap, getting breathing treatment, is alert and oriented  Pt reports she is starving, has not eaten in 2 days, she does not follow a diabetic diet, she eats whatever her roommate cooks for her, does not check blood sugars as she does not have a glucometer, her last Ha1c was ~11, currently it is 9.01  Per RN: pt has not been able to come off Bipap yet 2nd respiratory status  SLP eval pending    Current Nutrition Prescription     PO: Diet: Diabetic; Consistent Carbohydrate; No Mixed Consistencies; Texture: Soft to Chew (NDD 3); Soft to Chew: Chopped Meat; Fluid Consistency: Thin (IDDSI 0)  Oral Nutrition Supplement: Prosource no carb L & D  Intake: 7 DAYS: 98% of 11meals recorded    Assessment & Plan   Nutrition Diagnosis   Date: 7-22-25 Updated: 7-24  Problem Food and nutrition knowledge deficit    Etiology Uncontrolled DM   Signs/Symptoms Ha1c= 9.01   Status: Active note education given, pt w materials to follow    Date: 7/22                 Updated: 7/24  Problem Increased nutrient needs   Etiology Poor Skin Integrity   Signs/Symptoms L great toe ulcer   Status: Active    Goal:   Nutrition to support treatment and Advance diet when feasible      Nutrition Intervention      Follow treatment progress, Care plan reviewed, Advise alternate selection    Continue Prosource 2x/da.  Follow for continued intake as able    Monitoring/Evaluation:   Per protocol, I&O, PO intake, Pertinent labs, Weight, Skin status, GI status, Symptoms, Swallow function    Blanca Kam RD  Time Spent::20min

## 2025-07-30 NOTE — NURSING NOTE
WOC consulted for left gluteal.        Pt relates that this area is chronic, heals and then intermittently returns. She has been assessed by her doctor, still unsure of etiology of wound, but she often applies antibiotic ointment when it reoccurs.   Area is red, nonblanchable, and firm. Pale tissue toward center of wound. Skin is mostly intact, small amount of serosanguinous drainage noted on dressing. Vashe may be helpful to expedite healing with antimicrobial property.     Recommendation:  Vashe soak 5 minutes 2x/day. Apply skin prep, allow to dry, Allevyn.    PI Prevention measures.    WOC will continue to follow, please re-consult if wound worsens.

## 2025-07-30 NOTE — DISCHARGE PLACEMENT REQUEST
"Karlie, Dani L (60 y.o. Female) From Clarence Block RN  [phone]   NUMBER S [phone]      Date of Birth   1965    Social Security Number       Address   [address] [place] [address]    Home Phone   [phone]    MRN   [id number]       Restorationism   Hindu    Marital Status                               Admission Date   7/22/2025    Admission Type   Emergency    Admitting Provider   Nu Asencio MD    Attending Provider   Nu Asencio MD    Department, Room/Bed   [address], [address]/1       Discharge Date       Discharge Disposition       Discharge Destination                                 Attending Provider: Nu Asencio MD    Allergies: Diphenhydramine, Lortab [Hydrocodone-acetaminophen], Toradol [Ketorolac Tromethamine], Alprazolam, Nsaids    Isolation: None   Infection: MRSA (05/31/22)   Code Status: CPR    Ht: 170.2 cm (67\")   Wt: 130 kg (287 lb)    Admission Cmt: None   Principal Problem: Acute on chronic respiratory failure with hypoxia [J96.21]                   Active Insurance as of 7/22/2025       Primary Coverage       Payor Plan Insurance Group Employer/Plan Group    AETNA MEDICARE REPLACEMENT AETNA MEDICARE ADVANTAGE SNP [id number]       Payor Plan Address Payor Plan Phone Number Payor Plan Fax Number Effective Dates    [address] [phone]  3/1/2025 - None Entered    [facility] [id number]         Subscriber Name Subscriber Birth Date Member ID       DANI MANTILLA 1965 [id number]               Secondary Coverage       Payor Plan Insurance Group Employer/Plan Group    [place]        Payor Plan Address Payor Plan Phone Number Payor Plan Fax Number Effective Dates    [address] [phone]  7/14/2020 - None Entered    [facility] [place] [address]         Subscriber Name Subscriber Birth Date Member ID       DANI MANTILLA 1965 [id number]                     Emergency Contacts        " (Rel.) Home Phone Work Phone Mobile Phone    Елена House (Friend) -- -- [phone]                 History & Physical        Alberto Hampton MD at 07/22/25 0610            Intensive Care Admission Note     Acute on chronic respiratory failure with hypoxia    History of Present Illness     Ms. Dani Ziegler 59-year-old female with a past medical history of COPD on 3 L nasal cannula, DM 2, HTN, anxiety, pulmonary embolism (on Eliquis), morbid obesity and arthritis presents to [facility] ED with complaints of shortness of breath.  Patient states that she has been experience shortness of breath,cough with white sputum,fevers,weakness last week but sob that got worse on Sunday into Monday  when she called EMS.  EMS arrived to her home and reported that they found the patient in respiratory distress. She was cyanotic with decreased air movement,  a respiratory rate of 55 breaths minute and her oxygen saturation was 80% on her baseline supplemental oxygen requirement.  EMS administered 125 mg IV Solu-Medrol, DuoNeb and 3 albuterol nebulizer treatments.  They placed her on BiPAP with pressures of 20/10 and her oxygen saturations increased to 97%.  Of note, patient had a recent hospitalization in May 2025 at [facility] for community acquired pneumonia. She was found to have lung nodules and her PCP ordered PET scan which was supposed to be done yesterday but she couldn't go.    On arrival to her ED, heart rate was 161, respiratory rate 40 BP, 129/74 and oxygen saturations 92% on 100% BiPAP.  Pertinent labs include proBNP 8.9K, anion gap 15, glucose 270, lactate 2.1 with reflex pending, WBC 29.08.  RVP negative.  CTA chest reveals no evidence of PE but there is extensive tree-in-bud nodules in the right lung and left lung consistent with infectious bronchiolitis.  There is also patchy airspace disease in the right middle lobe consistent with pneumonia.  Patient received 80 mg IV Lasix, 1 g of Tylenol,  Levaquin and aztreonam in the ED. patient will be admitted to the ICU for further evaluation and treatment.    Time spent: 9 minutes  Electronically signed by MILEY Jones, 07/22/25, 6:24 AM EDT.      Problem List, Surgical History, Family, Social History, and ROS     Past Medical History:   Diagnosis Date    Abnormal weight gain     Acute UTI     Anxiety     Arthritis involving multiple sites     Chronic obstructive pulmonary disease     Chronic pain     Current every day smoker     Depression     Diabetes mellitus     Diarrhea     Dysuria     Edema, lower extremity     Fever     Head injury     Hypertension     Intervertebral disc disorder     Degeneration of intervertebral disc, site unspecified (722.6)    Left bundle branch block     Leukocytosis     Long term current use of methadone for pain control     Mass of right breast     Nausea     Obesity     Overactive bladder     Peripheral neuropathy     Superficial phlebitis     right medial calf    Upper respiratory infection     Urinary incontinence     Vitamin D deficiency     Vomiting       Past Surgical History:   Procedure Laterality Date    BLADDER SURGERY      pubovaginal sling    CHOLECYSTECTOMY      EXPLORATORY LAPAROTOMY N/A 6/12/2022    Procedure: LAPAROTOMY EXPLORATORY UMBILICAL HERNIA REPAIR WITH MESH;  Surgeon: Reji Brown MD;  Location: [facility];  Service: General;  Laterality: N/A;    HIP ARTHROSCOPY Right 10/29/2020       Allergies   Allergen Reactions    Diphenhydramine Hives    Lortab [Hydrocodone-Acetaminophen] Hives     Tolerates percocet    Toradol [Ketorolac Tromethamine] Hives     Per patient tolerates motrin    Alprazolam Delirium    Nsaids Other (See Comments)     Liver Dx     No current facility-administered medications on file prior to encounter.     Current Outpatient Medications on File Prior to Encounter   Medication Sig    albuterol (PROVENTIL) (2.5 MG/3ML) 0.083% nebulizer solution INHALE CONTENTS OF 1 VIAL VIA  NEBULIZER EVERY 6 HOURS AS NEEDED FOR WHEEZING    albuterol sulfate  (90 Base) MCG/ACT inhaler Inhale 1 puff Every 4 (Four) Hours As Needed for Wheezing.    Alcohol Swabs pads Use when checking sugars    apixaban (Eliquis) 5 MG tablet tablet Take 1 tablet by mouth Every 12 (Twelve) Hours.    ARIPiprazole (ABILIFY) 10 MG tablet Take 1 tablet by mouth Daily.    Budeson-Glycopyrrol-Formoterol (Breztri Aerosphere) 160-9-4.8 MCG/ACT aerosol inhaler Inhale 2 puffs 2 (Two) Times a Day.    Continuous Glucose  (Dexcom G6 ) device Use 1 Device Daily As Needed (to check sugars). Check sugars daily as needed    Continuous Glucose Sensor (Dexcom G6 Sensor) Use Every 10 (Ten) Days.    Continuous Glucose Transmitter (Dexcom G6 Transmitter) misc Use 1 Device Daily. Check sugars daily as needed    diazePAM (Valium) 5 MG tablet 1-2 po 1 hour before procedure.    escitalopram (LEXAPRO) 20 MG tablet Take 1 tablet by mouth Daily.    fluconazole (Diflucan) 100 MG tablet Take 1 tablet by mouth Daily.    glucose monitor monitoring kit Check glucose twice daily    hydrOXYzine pamoate (VISTARIL) 25 MG capsule Take 1-2 capsules by mouth 3 (Three) Times a Day As Needed for Anxiety.    Lantus SoloStar 100 UNIT/ML injection pen Inject 50 Units under the skin into the appropriate area as directed Daily.    metoprolol succinate XL (TOPROL-XL) 50 MG 24 hr tablet Take 1 tablet by mouth Daily.    Multiple Vitamins-Iron (MULTI-VITAMIN/IRON) tablet Take 1 tablet by mouth Daily.    NIFEdipine XL (PROCARDIA XL) 30 MG 24 hr tablet Take 1 tablet by mouth Daily.    nystatin (MYCOSTATIN) 147550 UNIT/GM cream Apply  topically to the appropriate area as directed 2 (Two) Times a Day.    O2 (OXYGEN) Inhale 2 L/min Daily.    oxybutynin XL (Ditropan XL) 5 MG 24 hr tablet Take 1 tablet by mouth Daily.    pantoprazole (PROTONIX) 40 MG EC tablet Take 1 tablet by mouth Every Other Day.    roflumilast (DALIRESP) 500 MCG tablet tablet Take 1  "tablet by mouth Daily.     MEDICATION LIST AND ALLERGIES REVIEWED.    Family History   Problem Relation Age of Onset    Breast cancer Mother     Cancer Mother     Arthritis Mother     Diabetes Mother     Obesity Mother     Arthritis Father     Stroke Father     Hypertension Father     Heart attack Father     Diabetes Maternal Grandmother     Migraines Other      Social History     Tobacco Use    Smoking status: Every Day     Current packs/day: 0.00     Types: Cigarettes     Start date: 1989     Last attempt to quit: 2019     Years since quittin.2    Smokeless tobacco: Never    Tobacco comments:     1 daily   Vaping Use    Vaping status: Never Used   Substance Use Topics    Alcohol use: No    Drug use: No     Social History     Social History Narrative    Not on file     FAMILY AND SOCIAL HISTORY REVIEWED.    ALL OTHER SYSTEMS REVIEWED AND ARE NEGATIVE.    Physical Exam and Clinical Information   /65   Pulse (!) 133   Temp 100 °F (37.8 °C) (Axillary)   Resp (!) 40   Ht 170.2 cm (67\")   Wt 122 kg (270 lb)   SpO2 94%   BMI 42.29 kg/m²     Physical exam  General : Morbid obese.  Laying in the bed with dyspnea and tachypnea.  Able to communicate in full sentences.  Neuro: CN 2-12 grossly intact, moves bilateral upper and lower extremities.  HEENT : AT,NC,PERRLA,No pallor, No Icterus,No thyromegaly. No JVD.  CVS : Tachycardia noted, No M/R/G. B/l UE and LE palpable pulses +  Resp/Chest: B/l posterior chest with late expiratory wheezing and right chest Rales present.  Abd: Soft, Non distended, No tenderness, No organomegaly. Bowel sounds +  EXT: NO edema.   SKIN : NO palpable skin lesions/rashes noted.    Results from last 7 days   Lab Units 25  0227   WBC 10*3/mm3 29.08*   HEMOGLOBIN g/dL 14.5   PLATELETS 10*3/mm3 430     Results from last 7 days   Lab Units 25  0227   SODIUM mmol/L 133*   POTASSIUM mmol/L 4.2   CO2 mmol/L 19.9*   BUN mg/dL 9.8   CREATININE mg/dL 0.89   MAGNESIUM mg/dL " 1.7   GLUCOSE mg/dL 270*     Estimated Creatinine Clearance: 91 mL/min (by C-G formula based on SCr of 0.89 mg/dL).      Results from last 7 days   Lab Units 07/22/25  0230   PH, ARTERIAL pH units 7.440   PCO2, ARTERIAL mm Hg 32.9*   PO2 ART mm Hg 286.0*     Lab Results   Component Value Date    LACTATE 2.1 (C) 07/22/2025        Images: CTA personally reviewed.  No evidence of any pulmonary embolism.  Patchy opacities and nodules noted predominantly in the right lung.  No evidence of any effusions or edema.    I reviewed the patient's results and images.     Impression     Acute on chronic respiratory failure with hypoxia  Acute exacerbation of COPD  Community-acquired pneumonia  Morbid obesity with likely OHS  Essential hypertension  Sinus tachycardia  Insulin-dependent type 2 diabetes mellitus with hyperglycemia  History of recurrent DVT and pulmonary embolism on Eliquis  Anxiety disorder  GERD without esophagitis      Plan/Recommendations     -Admit patient to ICU  -CT chest with evidence of pneumonia and lung nodules.  No evidence of any interstitial edema or effusions.  Negative for pulmonary embolism  -Started ceftriaxone and doxycycline.  Solu-Medrol 40 IV twice daily.  Scheduled DuoNebs.  Pulmicort nebs.  - Continue to slowly wean off of BiPAP.  - Sinus tachycardia likely secondary to bronchodilator treatment and pneumonia.  Improving.  - Continue Eliquis.  - Hold antihypertensive medications.  Blood pressure is borderline.  - Continue Lantus and sliding scale insulin.  High risk for hyperglycemia being on steroids.  - Continue PPI  - Full code        Time spent Critical care 45 min (exclusive of procedure time)  including high complexity decision making to assess, manipulate, and support vital organ system failure in this individual who has impairment of one or more vital organ systems such that there is a high probability of imminent or life threatening deterioration in the patient’s  condition.      Alberto Hampton MD   Critical Care Medicine  07/22/25 06:12 EDT           Electronically signed by Alberto Hampton MD at 07/22/25 0636       Oxygen Therapy (last day)       Date/Time SpO2 Device (Oxygen Therapy) Flow (L/min) (Oxygen Therapy) Oxygen Concentration (%) ETCO2 (mmHg)    07/30/25 1000 -- nasal cannula 4 -- --    07/30/25 0905 -- nasal cannula 4 -- --    07/30/25 0858 91 nasal cannula 4 -- --    07/30/25 0620 90 nasal cannula 4 -- --    07/30/25 0600 -- humidified;nasal cannula 4 -- --    07/30/25 0400 -- humidified;nasal cannula 4 -- --    07/30/25 0200 -- nasal cannula;humidified 4 -- --    07/30/25 0020 92 -- -- -- --    07/30/25 0000 -- humidified;nasal cannula 4 -- --    07/29/25 2251 92 -- -- -- --    07/29/25 2200 -- humidified;nasal cannula 4 -- --    07/29/25 2135 94 humidified;nasal cannula 4 -- --    07/29/25 2000 -- nasal cannula;humidified 4 -- --    07/29/25 1900 94 -- -- -- --    07/29/25 1852 95 humidified;nasal cannula 4 -- --    07/29/25 1556 93 humidified;nasal cannula 4 -- --    07/29/25 1203 93 humidified;nasal cannula 4 -- --    07/29/25 0900 93 -- -- -- --    07/29/25 0756 92 humidified;nasal cannula -- -- --    07/29/25 0737 92 humidified;nasal cannula 4 -- --    07/29/25 0410 92 -- -- -- --

## 2025-07-30 NOTE — PLAN OF CARE
"Goal Outcome Evaluation:  Plan of Care Reviewed With: patient        Progress: improving  Outcome Evaluation: VSS, on 4LNC. No c/o pain. Pt remains hyperglycemic r/t oral steroids and food consumption. Pt angry that she can not have multipe \"lunchboxes\" (at least 3, per patient) RN explained that the unit was completely out of lunchboxes and that in order to get her a lunchbox we had to get one from another floor. PCT retrieved lunchbox for patient and took it to her,  Pt then requested mara crackers, PCT took her 2 packs of mara crackers, Pt upset about the amount of food being given to her. PCT informed RN of situation, RN asked PCT if she could take the patient 3 more packs of mara crackers and to tell the patient that this would be it for a while. At this time patient seems satisfied. Will continue to monitor.                             "

## 2025-07-30 NOTE — CASE MANAGEMENT/SOCIAL WORK
Continued Stay Note  [facility]     Patient Name: Dani Ziegler  MRN: [id number]  Today's Date: 7/30/2025    Admit Date: 7/22/2025    Plan: DC plan remains home w/HH [facility], which she is already current   Discharge Plan       Row Name 07/30/25 1038       Plan    Plan DC plan remains home w/HH [facility], which she is already current    Patient/Family in Agreement with Plan yes    Plan Comments I met w/Ms. Ziegler in room, her d/c plans remain to d/c to home and resume her HH thru [facility], she will need new orders, I placed today in Epic. She states that she should have transportation, however, person who will be transporting, their truck is currently not working, so may need a Lyft. SHe stated that if her friend is not able to come, she may need portable tank for d/c home. I called [employer], they will deliver a portable tank to her room tomorrow, unless she will be discharging earlier. I will fax them her room number and current liter flow,normally @ 3L , currently @ 4L. CM will continue to follow. Per MDR, may be able to d/c today, I called [employer] back, they will deliver portable tank to room this afternoon, but won't be able to bring til this afternoon between [id number].     Final Discharge Disposition Code 06 - home with home health care                   Discharge Codes    No documentation.                 Expected Discharge Date and Time       Expected Discharge Date Expected Discharge Time    Aug 2, 2025               Clarence Block RN

## 2025-07-31 VITALS
SYSTOLIC BLOOD PRESSURE: 135 MMHG | RESPIRATION RATE: 22 BRPM | HEIGHT: 67 IN | TEMPERATURE: 98 F | HEART RATE: 96 BPM | OXYGEN SATURATION: 93 % | DIASTOLIC BLOOD PRESSURE: 84 MMHG | WEIGHT: 288.4 LBS | BODY MASS INDEX: 45.27 KG/M2

## 2025-07-31 PROBLEM — J96.21 ACUTE ON CHRONIC HYPOXIC RESPIRATORY FAILURE: Status: RESOLVED | Noted: 2025-07-24 | Resolved: 2025-07-31

## 2025-07-31 PROBLEM — J96.21 ACUTE ON CHRONIC RESPIRATORY FAILURE WITH HYPOXIA: Status: RESOLVED | Noted: 2023-04-20 | Resolved: 2025-07-31

## 2025-07-31 LAB
ALBUMIN SERPL-MCNC: 3.1 G/DL (ref 3.5–5.2)
ALBUMIN/GLOB SERPL: 0.8 G/DL
ALP SERPL-CCNC: 77 U/L (ref 39–117)
ALT SERPL W P-5'-P-CCNC: 7 U/L (ref 1–33)
ANION GAP SERPL CALCULATED.3IONS-SCNC: 10.7 MMOL/L (ref 5–15)
AST SERPL-CCNC: 10 U/L (ref 1–32)
BILIRUB SERPL-MCNC: <0.2 MG/DL (ref 0–1.2)
BUN SERPL-MCNC: 32.1 MG/DL (ref 8–23)
BUN/CREAT SERPL: 32.1 (ref 7–25)
CALCIUM SPEC-SCNC: 8.6 MG/DL (ref 8.6–10.5)
CHLORIDE SERPL-SCNC: 99 MMOL/L (ref 98–107)
CO2 SERPL-SCNC: 26.3 MMOL/L (ref 22–29)
CREAT SERPL-MCNC: 1 MG/DL (ref 0.57–1)
DEPRECATED RDW RBC AUTO: 50.5 FL (ref 37–54)
EGFRCR SERPLBLD CKD-EPI 2021: 64.2 ML/MIN/1.73
ERYTHROCYTE [DISTWIDTH] IN BLOOD BY AUTOMATED COUNT: 15.3 % (ref 12.3–15.4)
GLOBULIN UR ELPH-MCNC: 3.9 GM/DL
GLUCOSE BLDC GLUCOMTR-MCNC: 196 MG/DL (ref 70–130)
GLUCOSE BLDC GLUCOMTR-MCNC: 242 MG/DL (ref 70–130)
GLUCOSE BLDC GLUCOMTR-MCNC: 333 MG/DL (ref 70–130)
GLUCOSE SERPL-MCNC: 249 MG/DL (ref 65–99)
HCT VFR BLD AUTO: 39.2 % (ref 34–46.6)
HGB BLD-MCNC: 12.3 G/DL (ref 12–15.9)
MAGNESIUM SERPL-MCNC: 2.1 MG/DL (ref 1.6–2.4)
MCH RBC QN AUTO: 28 PG (ref 26.6–33)
MCHC RBC AUTO-ENTMCNC: 31.4 G/DL (ref 31.5–35.7)
MCV RBC AUTO: 89.3 FL (ref 79–97)
PLATELET # BLD AUTO: 326 10*3/MM3 (ref 140–450)
PMV BLD AUTO: 10.5 FL (ref 6–12)
POTASSIUM SERPL-SCNC: 4.3 MMOL/L (ref 3.5–5.2)
PROT SERPL-MCNC: 7 G/DL (ref 6–8.5)
RBC # BLD AUTO: 4.39 10*6/MM3 (ref 3.77–5.28)
SODIUM SERPL-SCNC: 136 MMOL/L (ref 136–145)
WBC NRBC COR # BLD AUTO: 21.92 10*3/MM3 (ref 3.4–10.8)

## 2025-07-31 PROCEDURE — 63710000001 INSULIN LISPRO (HUMAN) PER 5 UNITS: Performed by: FAMILY MEDICINE

## 2025-07-31 PROCEDURE — 85027 COMPLETE CBC AUTOMATED: CPT | Performed by: FAMILY MEDICINE

## 2025-07-31 PROCEDURE — 94799 UNLISTED PULMONARY SVC/PX: CPT

## 2025-07-31 PROCEDURE — 99239 HOSP IP/OBS DSCHRG MGMT >30: CPT | Performed by: NURSE PRACTITIONER

## 2025-07-31 PROCEDURE — 80053 COMPREHEN METABOLIC PANEL: CPT | Performed by: FAMILY MEDICINE

## 2025-07-31 PROCEDURE — 83735 ASSAY OF MAGNESIUM: CPT | Performed by: FAMILY MEDICINE

## 2025-07-31 PROCEDURE — 82948 REAGENT STRIP/BLOOD GLUCOSE: CPT

## 2025-07-31 PROCEDURE — 94664 DEMO&/EVAL PT USE INHALER: CPT

## 2025-07-31 PROCEDURE — 63710000001 PREDNISONE PER 1 MG: Performed by: NURSE PRACTITIONER

## 2025-07-31 PROCEDURE — 94761 N-INVAS EAR/PLS OXIMETRY MLT: CPT

## 2025-07-31 RX ORDER — PREDNISONE 20 MG/1
TABLET ORAL
Qty: 7 TABLET | Refills: 0 | Status: SHIPPED | OUTPATIENT
Start: 2025-08-01 | End: 2025-08-10

## 2025-07-31 RX ORDER — ACETAMINOPHEN 325 MG/1
650 TABLET ORAL EVERY 6 HOURS PRN
Start: 2025-07-31

## 2025-07-31 RX ORDER — PANTOPRAZOLE SODIUM 40 MG/1
40 TABLET, DELAYED RELEASE ORAL
Qty: 30 TABLET | Refills: 0 | Status: SHIPPED | OUTPATIENT
Start: 2025-08-01

## 2025-07-31 RX ORDER — DIPHENHYDRAMINE HYDROCHLORIDE 25 MG/1
1 CAPSULE, LIQUID FILLED ORAL DAILY
Qty: 1 EACH | Refills: 0 | Status: SHIPPED | OUTPATIENT
Start: 2025-07-31

## 2025-07-31 RX ORDER — ACYCLOVIR 400 MG/1
1 TABLET ORAL DAILY
Qty: 1 EACH | Refills: 0 | Status: SHIPPED | OUTPATIENT
Start: 2025-07-31

## 2025-07-31 RX ORDER — ACYCLOVIR 400 MG/1
1 TABLET ORAL WEEKLY
Qty: 4 EACH | Refills: 2 | Status: SHIPPED | OUTPATIENT
Start: 2025-07-31

## 2025-07-31 RX ORDER — TRAZODONE HYDROCHLORIDE 50 MG/1
50 TABLET ORAL NIGHTLY PRN
Qty: 30 TABLET | Refills: 0 | Status: SHIPPED | OUTPATIENT
Start: 2025-07-31

## 2025-07-31 RX ADMIN — INSULIN LISPRO 10 UNITS: 100 INJECTION, SOLUTION INTRAVENOUS; SUBCUTANEOUS at 08:44

## 2025-07-31 RX ADMIN — ARIPIPRAZOLE 10 MG: 10 TABLET ORAL at 08:45

## 2025-07-31 RX ADMIN — ESCITALOPRAM 20 MG: 20 TABLET, FILM COATED ORAL at 08:45

## 2025-07-31 RX ADMIN — OXYBUTYNIN CHLORIDE 5 MG: 5 TABLET, EXTENDED RELEASE ORAL at 08:44

## 2025-07-31 RX ADMIN — IPRATROPIUM BROMIDE AND ALBUTEROL SULFATE 3 ML: .5; 3 SOLUTION RESPIRATORY (INHALATION) at 07:29

## 2025-07-31 RX ADMIN — ACETAMINOPHEN 650 MG: 325 TABLET ORAL at 06:41

## 2025-07-31 RX ADMIN — APIXABAN 5 MG: 5 TABLET, FILM COATED ORAL at 08:44

## 2025-07-31 RX ADMIN — MICONAZOLE NITRATE 1 APPLICATION: 20 POWDER TOPICAL at 08:45

## 2025-07-31 RX ADMIN — INSULIN LISPRO 10 UNITS: 100 INJECTION, SOLUTION INTRAVENOUS; SUBCUTANEOUS at 12:19

## 2025-07-31 RX ADMIN — IPRATROPIUM BROMIDE AND ALBUTEROL SULFATE 3 ML: .5; 3 SOLUTION RESPIRATORY (INHALATION) at 11:52

## 2025-07-31 RX ADMIN — INSULIN LISPRO 10 UNITS: 100 INJECTION, SOLUTION INTRAVENOUS; SUBCUTANEOUS at 18:08

## 2025-07-31 RX ADMIN — PREDNISONE 20 MG: 20 TABLET ORAL at 08:45

## 2025-07-31 RX ADMIN — PANTOPRAZOLE SODIUM 40 MG: 40 TABLET, DELAYED RELEASE ORAL at 05:30

## 2025-07-31 RX ADMIN — Medication 4 ML: at 07:35

## 2025-07-31 RX ADMIN — IPRATROPIUM BROMIDE AND ALBUTEROL SULFATE 3 ML: .5; 3 SOLUTION RESPIRATORY (INHALATION) at 16:12

## 2025-07-31 RX ADMIN — Medication 10 ML: at 08:45

## 2025-07-31 NOTE — DISCHARGE SUMMARY
[facility] Medicine Services  DISCHARGE SUMMARY    Patient Name: Dani Ziegler  : 1965  MRN: [id number]    Date of Admission: 2025  Date of Discharge:  2025  Primary Care Physician: Darrion Tovar MD    Consults       Date and Time Order Name Status Description    2025  7:37 AM Inpatient Pulmonology Consult Completed     2025 10:25 AM Inpatient Infectious Diseases Consult Completed             Hospital Course     Presenting Problem:   Acute on chronic respiratory failure with hypoxia [J96.21]  Acute on chronic hypoxic respiratory failure [J96.21]    Active Hospital Problems   No active problems to display.      Resolved Hospital Problems    Diagnosis Date Resolved POA    **Acute on chronic respiratory failure with hypoxia [J96.21] 2025 Yes    Acute on chronic hypoxic respiratory failure [J96.21] 2025 Yes      Hospital Course:  Dani Ziegler is a 60 y.o. female PMH tobacco use, COPD, morbid obesity, H/oh PE, T2DM, HTN who presented with shortness of air cough and fever.  Patient initially admitted to ICU with respiratory failure requiring BiPAP.  She received steroid, bronchodilators, antibiotics, furosemide with improvement of symptoms.  Imaging revealed an infract in the right lower lobe.  CT scan confirmed excessive tree-in-bud opacities in the right lung, lesser extent into the left lung with some reactive adenopathy.  Legionella and pneumococcal urinary antigens negative.  Respiratory PCR negative.  Nasal swab for MRSA positive.  Blood cultures negative.  Echo revealed a normal EF although there was some wall motion abnormalities.  No evidence of PE noted.  Patient noted to have an ecchymotic area on the medial ball of her left foot and some superficial skin breakdown.  ID followed with antibiotic recommendations.  Patient was transferred to the floor on .    Chronic respiratory failure  COPD exacerbation  Possible pneumonia  - Currently  on 4 L, 3 L as baseline.  Will DC on increased requirement  - Currently tapering steroids, antibiotic completed 7/26  - Appreciate pulmonary recommendations given complicated history    Left medial foot blister/bruise  - ID following do not favor the source of infection    T2DM  - A1c 9 initially on insulin drip  - Diabetes education delivered    History of DVT/PE  - Eliquis      Discharge Follow Up Recommendations for labs/diagnostics:  Follow-up with PCP 8/5/2025 at 12 PM  Follow-up with [facility] pulmonology 8/13/2025 at 9 AM    Day of Discharge     HPI:   Patient sitting up in bed, reports she feels much better.  She denies any issues today.      Otherwise ROS is negative except as mentioned in the HPI.    Vital Signs:   Temp:  [97.7 °F (36.5 °C)-98.2 °F (36.8 °C)] 97.9 °F (36.6 °C)  Heart Rate:  [] 109  Resp:  [18-22] 22  BP: (132-147)/(75-89) 147/84     Physical Exam:  Constitutional: Awake, alert, NAD  HENT: NCAT, mucous membranes moist  Respiratory: scattered wheezing, no retractions.   Cardiovascular: RRR, no murmurs, rubs, or gallops  Gastrointestinal: Positive bowel sounds, soft, nontender, nondistended  Musculoskeletal: No bilateral ankle edema  Psychiatric: Appropriate affect, cooperative  Neurologic: Oriented x 3, strength symmetric in all extremities, Cranial Nerves grossly intact to confrontation, speech clear  Skin: No rashes      Pertinent  and/or Most Recent Results     Results from last 7 days   Lab Units 07/31/25  0408 07/30/25  0411 07/29/25  0447 07/28/25  0425 07/27/25  0510 07/26/25  0502 07/25/25  0447   WBC 10*3/mm3 21.92* 22.98* 23.96* 22.80* 18.56* 14.10* 13.03*   HEMOGLOBIN g/dL 12.3 12.5 12.7 12.1 12.0 11.2* 11.3*   HEMATOCRIT % 39.2 40.7 41.3 40.1 39.0 37.0 37.1   PLATELETS 10*3/mm3 326 349 373 353 356 331 332   SODIUM mmol/L 136 134* 133* 136 138 138 138   POTASSIUM mmol/L 4.3 4.4 4.4 4.3 4.1 4.3 4.6   CHLORIDE mmol/L 99 96* 96* 100 101 101 105   CO2 mmol/L  "26.3 28.5 28.0 25.9 25.4 28.0 26.3   BUN mg/dL 32.1* 31.5* 31.3* 27.0* 22.7 20.6 21.2   CREATININE mg/dL 1.00 1.01* 1.12* 0.90 0.85 0.95 0.92   GLUCOSE mg/dL 249* 205* 250* 195* 230* 283* 237*   CALCIUM mg/dL 8.6 9.3 9.2 8.9 8.9 9.0 8.7     Results from last 7 days   Lab Units 07/31/25  0408 07/30/25  0411 07/29/25  0447 07/28/25  0425 07/27/25  0510 07/26/25  0502   BILIRUBIN mg/dL <0.2 0.2 0.2 0.2 <0.2 <0.2   ALK PHOS U/L 77 76 84 73 78 79   ALT (SGPT) U/L 7 8 8 7 6 5   AST (SGOT) U/L 10 10 8 11 8 7           Invalid input(s): \"TG\", \"LDLCALC\", \"LDLREALC\"        Brief Urine Lab Results  (Last result in the past 365 days)        Color   Clarity   Blood   Leuk Est   Nitrite   Protein   CREAT   Urine HCG        07/22/25 0655 Yellow   Cloudy   Trace   Negative   Positive   Trace                   Microbiology Results Abnormal       Procedure Component Value - Date/Time    MRSA Screen, PCR (Inpatient) - Swab, Nares [[id number]]  (Abnormal) Collected: 07/22/25 1325    Lab Status: Final result Specimen: Swab from Nares Updated: 07/22/25 1510     MRSA PCR Positive    Narrative:      The negative predictive value of this diagnostic test is high and should only be used to consider de-escalating anti-MRSA therapy. A positive result may indicate colonization with MRSA and must be correlated clinically.            Imaging Results (All)       Procedure Component Value Units Date/Time    XR Chest 1 View [[id number]] Collected: 07/27/25 1542     Updated: 07/27/25 1547    Narrative:      XR CHEST 1 VW    Date of Exam: 7/27/2025 9:55 AM EDT    Indication: Wheezing, hypoxia    Comparison: 7/25/2025.    Findings:  The heart size is normal. There are increased interstitial markings similar to the previous exam. There is fullness in the pulmonary rupal felt to be due to enlarged lymph nodes as seen on the CT of the chest from 7/22/2025. The lungs and pleural spaces   are otherwise clear. There are chronic age-related changes involving the " bony thorax and thoracic aorta.      Impression:      Impression:  1.Increased interstitial markings similar to the previous exam.  2.Fullness in the pulmonary rupal felt to be due to enlarged lymph nodes.        Electronically Signed: Darrion Randolph MD    7/27/2025 3:43 PM EDT    Workstation ID: [id number]    XR Chest 1 View [[id number]] Collected: 07/25/25 1002     Updated: 07/25/25 1010    Narrative:      XR CHEST 1 VW    Date of Exam: 7/25/2025 9:08 AM EDT    Indication: hypoxia/copd exac    Comparison: 7/22/2025 chest radiograph and chest CT scan        Findings:  Prior chest radiograph report indicated new focus of airspace disease in the right lung base. CT scan report described right middle lobe pneumonia and infectious bronchiolitis of both lungs. Mediastinal and right hilar adenopathy.    Today's chest radiograph shows decreased focal disease in the right lung base, but a persistent pattern of diffuse interstitial disease elsewhere that appears either stable or further increased.    Although today's images taken with more true AP technique than the prior lordotic film, there appears to be significant further enlargement of the pulmonary rupal. No effusion or pneumothorax is identified.        Impression:      Impression:    1. Improved focal pneumonia of the right lung base.    2. Appearance of mildly increased diffuse bronchial wall thickening and interstitial disease elsewhere in the lungs.    3. Further enlargement of the pulmonary rupal, suggesting increasing adenopathy.      Electronically Signed: Elton Cain MD    7/25/2025 10:07 AM EDT    Workstation ID: [id number]    SLP FEES - Fiberoptic Endo Eval Swallow [[id number]] Resulted: 07/23/25 1622     Updated: 07/23/25 1622    Narrative:      This procedure was auto-finalized with no dictation required.    CT Angiogram Chest Pulmonary Embolism [[id number]] Collected: 07/22/25 0544     Updated: 07/22/25 0557    Narrative:      CT ANGIOGRAM CHEST PULMONARY  EMBOLISM    Date of Exam: 7/22/2025 5:27 AM EDT    Indication: Pulmonary Embolism.    Comparison: CT chest 12/30/2021 and chest radiograph performed earlier today. CT abdomen 6/12/2022.    Technique: Axial CT images were obtained of the chest after the uneventful intravenous administration of 80 mL Isovue-370 utilizing pulmonary embolism protocol.  In addition, a 3-D volume rendered image was created for interpretation.  Reconstructed   coronal and sagittal images were also obtained. Automated exposure control and iterative construction methods were used.      Findings:  The thyroid, trachea and esophagus appear within normal limits. There is a small hiatal hernia. There is multifocal mediastinal lymphadenopathy including an anterior right paratracheal lymph node on image 26 of the axial series measuring 19 mm in the   short axis, distal left paratracheal lymph node on image 37 measuring 17 mm in the short axis and a right hilar lymph node on axial image 43 measuring up to 21 mm diameter in the short axis. There is no pericardial effusion. The aorta, aortic branch   vessels and main pulmonary artery appear within normal limits. There is no evidence of pulmonary embolism.    There are extensive tree-in-bud nodules present in the right lung. There are more subtle and less pronounced tree-in-bud nodules in the left lung all consistent with infectious bronchiolitis. There are patchy areas of airspace disease in the right middle   lobe consistent with pneumonia and there is mild diffuse peribronchial thickening most pronounced on the right and worse in the right lower lobe likely also infectious/inflammatory. No pneumothorax or pleural effusion. There is a calcified right upper   lobe granuloma.    No acute findings in the superficial soft tissues. There is a heterogeneous appearing right adrenal nodule measuring 34 mm. Left adrenal gland is diminutive. Patient is status post cholecystectomy. Liver is partially seen, but  appears enlarged. Spleen is   also partially seen, but likely enlarged. There are multiple chronic compression deformities in the thoracic spine including at T5, T6, T7, T8 and T9. There is a minor segmentation anomaly at C6-C7 with incomplete disc and facet joint formation. No   acute osseous abnormality. Bones are demineralized.      Impression:      Impression:  1.No evidence of pulmonary embolism.  2.Extensive tree-in-bud nodules in the right lung and to a lesser extent in the left lung consistent with infectious bronchiolitis. There is also patchy airspace disease in the right middle lobe consistent with pneumonia.  3.Mediastinal and right hilar lymphadenopathy, likely reactive.  4.34 mm heterogeneous right adrenal nodule. Diminutive left adrenal gland. Right adrenal nodule is unchanged from 2022 likely reflecting adenoma.  5.Multiple chronic compression deformities in the thoracic spine.  6.Small hiatal hernia.            Electronically Signed: Juanpablo Barron MD    7/22/2025 5:54 AM EDT    Workstation ID: [id number]    XR Chest 1 View [[id number]] Collected: 07/22/25 0336     Updated: 07/22/25 0340    Narrative:      XR CHEST 1 VW    Date of Exam: 7/22/2025 2:58 AM EDT    Indication: SOA triage protocol    Comparison: 6/28/2022.    Findings:  There is a new focus of airspace disease in the periphery of the right lung base that could reflect pneumonia or atelectasis. Left lung remains clear. There are calcified granulomas in the right lung. Cardiac silhouette and pulmonary vasculature appear   within normal limits. No acute osseous abnormality.      Impression:      Impression:  New focus of airspace disease in the periphery of the right lung base that could reflect pneumonia or atelectasis.          Electronically Signed: Juanpablo Barron MD    7/22/2025 3:37 AM EDT    Workstation ID: [id number]            Results for orders placed during the hospital encounter of 03/27/18    Duplex Venous Lower Extremity - Left CAR  03/28/2018 12:32 PM    Interpretation Summary  · No evidence of deep or superficial venous thrombosis in the left lower extremity.      Results for orders placed during the hospital encounter of 03/27/18    Duplex Venous Lower Extremity - Left CAR 03/28/2018 12:32 PM    Interpretation Summary  · No evidence of deep or superficial venous thrombosis in the left lower extremity.      Results for orders placed during the hospital encounter of 07/22/25    Adult Transthoracic Echo Complete W/ Cont if Necessary Per Protocol 07/22/2025  4:26 PM    Interpretation Summary    Left ventricular systolic function is normal. Calculated left ventricular EF = 60.8% Left ventricular ejection fraction appears to be 56 - 60%.    The following left ventricular wall segments are hypokinetic: apical septal, basal inferoseptal, mid inferoseptal, mid anteroseptal and basal inferoseptal.    Left ventricular wall thickness is consistent with borderline concentric hypertrophy.  Grade I diastolic dysfunction is present.    Normal right ventricular size and function.    Cardiac valves very poorly visualized due to poor acoustic windows. No hemodynamically  significant valvular dysfunction noted by Doppler evaluation.        Discharge Details        Discharge Medications        New Medications        Instructions Start Date   acetaminophen 325 MG tablet  Commonly known as: TYLENOL   650 mg, Oral, Every 6 Hours PRN      predniSONE 20 MG tablet  Commonly known as: DELTASONE   1 tablet Daily for 4 days, THEN 0.5 tablets Daily for 5 days.   Start Date: August 1, 2025     traZODone 50 MG tablet  Commonly known as: DESYREL   50 mg, Oral, Nightly PRN             Changes to Medications        Instructions Start Date   Dexcom G6  device  What changed: additional instructions   1 Device, Not Applicable, Daily PRN, Check sugars daily as needed      Dexcom G7  device  What changed: You were already taking a medication with the same name, and  this prescription was added. Make sure you understand how and when to take each.   1 Device, Not Applicable, Daily      Dexcom G6 Sensor  What changed: Another medication with the same name was added. Make sure you understand how and when to take each.   Not Applicable, Every 10 Days      Dexcom G7 Sensor misc  What changed: You were already taking a medication with the same name, and this prescription was added. Make sure you understand how and when to take each.   1 Box, Not Applicable, Weekly      Multi-Vitamin/Iron tablet  What changed: additional instructions   1 tablet, Oral, Daily      pantoprazole 40 MG EC tablet  Commonly known as: PROTONIX  What changed: when to take this   40 mg, Oral, Every Early Morning   Start Date: August 1, 2025            Continue These Medications        Instructions Start Date   albuterol (2.5 MG/3ML) 0.083% nebulizer solution  Commonly known as: PROVENTIL   INHALE CONTENTS OF 1 VIAL VIA NEBULIZER EVERY 6 HOURS AS NEEDED FOR WHEEZING      albuterol sulfate  (90 Base) MCG/ACT inhaler  Commonly known as: PROVENTIL HFA;VENTOLIN HFA;PROAIR HFA   1 puff, Inhalation, Every 4 Hours PRN      Alcohol Swabs pads   Use when checking sugars      apixaban 5 MG tablet tablet  Commonly known as: Eliquis   5 mg, Oral, Every 12 Hours Scheduled      ARIPiprazole 10 MG tablet  Commonly known as: ABILIFY   10 mg, Oral, Daily      Breztri Aerosphere 160-9-4.8 MCG/ACT aerosol inhaler  Generic drug: Budeson-Glycopyrrol-Formoterol   2 puffs, Inhalation, 2 Times Daily      Dexcom G6 Transmitter misc   1 Device, Not Applicable, Daily, Check sugars daily as needed      escitalopram 20 MG tablet  Commonly known as: LEXAPRO   20 mg, Oral, Daily      glucose monitor monitoring kit   Check glucose twice daily      hydrOXYzine pamoate 25 MG capsule  Commonly known as: VISTARIL   25-50 mg, Oral, 3 Times Daily PRN      Lantus SoloStar 100 UNIT/ML injection pen  Generic drug: Insulin Glargine   50 Units,  Subcutaneous, Daily      metoprolol succinate XL 50 MG 24 hr tablet  Commonly known as: TOPROL-XL   50 mg, Oral, Daily      NIFEdipine XL 30 MG 24 hr tablet  Commonly known as: PROCARDIA XL   30 mg, Oral, Daily      nystatin [id number] UNIT/GM cream  Commonly known as: MYCOSTATIN   Topical, 2 Times Daily      O2  Commonly known as: OXYGEN   2 L/min, Daily      oxybutynin XL 5 MG 24 hr tablet  Commonly known as: Ditropan XL   5 mg, Oral, Daily      roflumilast 500 MCG tablet tablet  Commonly known as: DALIRESP   500 mcg, Oral, Daily             Stop These Medications      diazePAM 5 MG tablet  Commonly known as: Valium     fluconazole 100 MG tablet  Commonly known as: Diflucan              Allergies   Allergen Reactions    Diphenhydramine Hives    Lortab [Hydrocodone-Acetaminophen] Hives     Tolerates percocet    Toradol [Ketorolac Tromethamine] Hives     Per patient tolerates motrin    Alprazolam Delirium    Nsaids Other (See Comments)     Liver Dx         Discharge Disposition:  Home-Health Care c    Discharge Diet:  Diet Order   Procedures    Diet: Diabetic; Consistent Carbohydrate; No Mixed Consistencies; Texture: Soft to Chew (NDD 3); Soft to Chew: Chopped Meat; Fluid Consistency: Thin (IDDSI 0)         Discharge Activity:   Activity Instructions       Activity as Tolerated      Up WIth Assist                CODE STATUS:    Code Status and Medical Interventions: CPR (Attempt to Resuscitate); Full Support   Ordered at: 07/24/25 1705     Code Status (Patient has no pulse and is not breathing):    CPR (Attempt to Resuscitate)     Medical Interventions (Patient has pulse or is breathing):    Full Support         Future Appointments   Date Time Provider Department Center   8/5/2025 12:00 PM Tovar, Darrion D, MD [facility] [place]   9/29/2025 12:40 PM [place]  [place] [facility] [place]       Additional Instructions for the Follow-ups that You Need to Schedule       Ambulatory Referral to Home Health   As directed       Face to Face Visit Date: 7/31/2025   Follow-up provider for Plan of Care?: I treated the patient in an acute care facility and will not continue treatment after discharge.   Follow-up provider: ALEXANDRE TOVAR [[id number]]   Reason/Clinical Findings: impaired endurance, mobility   Describe mobility limitations that make leaving home difficult: impaired endurance,mobility   Nursing/Therapeutic Services Requested: Skilled Nursing Physical Therapy Occupational Therapy   Skilled nursing orders: Cardiopulmonary assessments   PT orders: Total joint pathway Therapeutic exercise Gait Training Transfer training Strengthening Home safety assessment   Weight Bearing Status: As Tolerated   Occupational orders: Activities of daily living Energy conservation Strengthening Home safety assessment   Frequency: 1 Week 1        Discharge Follow-up with PCP   As directed       Currently Documented PCP:    Alexandre Tovar MD    PCP Phone Number:    [phone]     Follow Up Details: 08/05/25 @ 12:00        Discharge Follow-up with Specified Provider: [facility] pulmonology; 1 Month   As directed      To: [facility] pulmonology   Follow Up: 1 Month                Time Spent on Discharge:  60 minutes    Electronically signed by MILEY Hopkins, 07/31/25, 10:59 AM EDT.

## 2025-07-31 NOTE — CASE MANAGEMENT/SOCIAL WORK
Case Management Discharge Note      Final Note: Patient has d/c orders in computer for today. I met w/patient in room, updated IMM given and signed. Her d/c plan remains to be home w/[facility], new orders updated in Epic. I called and left SRINIVASAN w/Abigail w/[facility] and notified of d/c today. [employer] delivered a portable O2 tank to room yesterday afternoon. Patient told me this am that her person picking her up, truck is fixed and plans to pick her up today, but will be a little later. I told patient that if anything changes, can let me know. No other d/c needs expressed @ this time.         Selected Continued Care - Admitted Since 7/22/2025       Destination    No services have been selected for the patient.                Durable Medical Equipment Coordination complete.      Service Provider Services Address Phone Fax Patient Preferred    [facility] [address] [facility] [id number] [phone] --              Dialysis/Infusion    No services have been selected for the patient.                Home Medical Care       Service Provider Services Address Phone Fax Patient Preferred    [facility] [facility], [facility] [address] [facility] [address] [phone] --              Therapy    No services have been selected for the patient.                Community Resources    No services have been selected for the patient.                Community & DME    No services have been selected for the patient.                         Final Discharge Disposition Code: 06 - home with home health care

## 2025-07-31 NOTE — PLAN OF CARE
Problem: Adult Inpatient Plan of Care  Goal: Plan of Care Review  Outcome: Met  Goal: Patient-Specific Goal (Individualized)  Outcome: Met  Goal: Absence of Hospital-Acquired Illness or Injury  Outcome: Met  Intervention: Identify and Manage Fall Risk  Recent Flowsheet Documentation  Taken 7/31/2025 0842 by Mariama Guzman RN  Safety Promotion/Fall Prevention:   activity supervised   assistive device/personal items within reach   clutter free environment maintained   safety round/check completed   room organization consistent   nonskid shoes/slippers when out of bed  Intervention: Prevent Skin Injury  Recent Flowsheet Documentation  Taken 7/31/2025 0842 by Mariama Guzman RN  Body Position: sitting up in bed  Skin Protection:   incontinence pads utilized   transparent dressing maintained   silicone foam dressing in place  Intervention: Prevent Infection  Recent Flowsheet Documentation  Taken 7/31/2025 0842 by Mariama Guzman RN  Infection Prevention:   cohorting utilized   environmental surveillance performed  Goal: Optimal Comfort and Wellbeing  Outcome: Met  Intervention: Monitor Pain and Promote Comfort  Recent Flowsheet Documentation  Taken 7/31/2025 0842 by Mariama Guzman RN  Pain Management Interventions:   care clustered   position adjusted   pillow support provided  Intervention: Provide Person-Centered Care  Recent Flowsheet Documentation  Taken 7/31/2025 0842 by Mariama Guzman RN  Trust Relationship/Rapport:   care explained   thoughts/feelings acknowledged   questions answered   questions encouraged   reassurance provided  Goal: Readiness for Transition of Care  Outcome: Met     Problem: Skin Injury Risk Increased  Goal: Skin Health and Integrity  Outcome: Met  Intervention: Optimize Skin Protection  Recent Flowsheet Documentation  Taken 7/31/2025 0842 by Mariama Guzman RN  Activity Management:   activity encouraged   ambulated in room   up to bedside commode  Pressure Reduction Techniques: frequent weight  shift encouraged  Head of Bed (HOB) Positioning: HOB at 30-45 degrees  Pressure Reduction Devices:   specialty bed utilized   foam padding utilized   heel offloading device utilized   positioning supports utilized  Skin Protection:   incontinence pads utilized   transparent dressing maintained   silicone foam dressing in place     Problem: Comorbidity Management  Goal: Maintenance of COPD Symptom Control  Outcome: Met  Intervention: Maintain COPD (Chronic Obstructive Pulmonary Disease) Symptom Control  Recent Flowsheet Documentation  Taken 7/31/2025 0842 by Mariama Guzman RN  Medication Review/Management: medications reviewed  Goal: Blood Glucose Level Within Target Range  Outcome: Met  Intervention: Monitor and Manage Glycemia  Recent Flowsheet Documentation  Taken 7/31/2025 0842 by Mariama Guzman RN  Medication Review/Management: medications reviewed  Goal: Maintenance of Heart Failure Symptom Control  Outcome: Met  Intervention: Maintain Heart Failure Management  Recent Flowsheet Documentation  Taken 7/31/2025 0842 by Mariama Guzman RN  Medication Review/Management: medications reviewed  Goal: Blood Pressure in Desired Range  Outcome: Met  Intervention: Maintain Blood Pressure Management  Recent Flowsheet Documentation  Taken 7/31/2025 0842 by Mariama Guzman RN  Medication Review/Management: medications reviewed     Problem: Sepsis/Septic Shock  Goal: Optimal Coping  Outcome: Met  Intervention: Support Patient and Family Response  Recent Flowsheet Documentation  Taken 7/31/2025 0842 by Mariama Guzman RN  Family/Support System Care:   support provided   self-care encouraged  Goal: Absence of Bleeding  Outcome: Met  Goal: Blood Glucose Level Within Target Range  Outcome: Met  Intervention: Optimize Glycemic Control  Recent Flowsheet Documentation  Taken 7/31/2025 0842 by Mariama Guzman RN  Hyperglycemia Management: blood glucose monitored  Hypoglycemia Management: blood glucose monitored  Goal: Absence of Infection  Signs and Symptoms  Outcome: Met  Intervention: Initiate Sepsis Management  Recent Flowsheet Documentation  Taken 7/31/2025 0842 by Mariama Guzman, RN  Infection Prevention:   cohorting utilized   environmental surveillance performed  Intervention: Promote Recovery  Recent Flowsheet Documentation  Taken 7/31/2025 0842 by Mariama Guzman, RN  Activity Management:   activity encouraged   ambulated in room   up to bedside commode  Goal: Optimal Nutrition Delivery  Outcome: Met     Problem: Fall Injury Risk  Goal: Absence of Fall and Fall-Related Injury  Outcome: Met  Intervention: Identify and Manage Contributors  Recent Flowsheet Documentation  Taken 7/31/2025 0842 by Mariama Guzman, RN  Medication Review/Management: medications reviewed  Intervention: Promote Injury-Free Environment  Recent Flowsheet Documentation  Taken 7/31/2025 0842 by Mariama Guzman RN  Safety Promotion/Fall Prevention:   activity supervised   assistive device/personal items within reach   clutter free environment maintained   safety round/check completed   room organization consistent   nonskid shoes/slippers when out of bed   Goal Outcome Evaluation:

## 2025-08-01 ENCOUNTER — READMISSION MANAGEMENT (OUTPATIENT)
Dept: CALL CENTER | Facility: HOSPITAL | Age: 60
End: 2025-08-01
Payer: MEDICARE

## 2025-08-01 ENCOUNTER — TELEPHONE (OUTPATIENT)
Dept: FAMILY MEDICINE CLINIC | Facility: CLINIC | Age: 60
End: 2025-08-01
Payer: MEDICARE

## 2025-08-01 NOTE — OUTREACH NOTE
Prep Survey      Flowsheet Row Responses   [facility] patient discharged from? [place]   Is LACE score < 7 ? No   Eligibility [facility]   Date of Admission 07/22/25   Date of Discharge 07/31/25   Discharge Disposition Home-Health Care Sv   Discharge diagnosis Acute on chronic respiratory failure with hypoxia   Does the patient have one of the following disease processes/diagnoses(primary or secondary)? Other   Does the patient have Home health ordered? Yes   What is the Home health agency?  [facility]   Is there a DME ordered? Yes   What DME was ordered? [employer] ([facility]   Prep survey completed? Yes            ANITA GO - Registered Nurse

## 2025-08-01 NOTE — TELEPHONE ENCOUNTER
DEREK WITH [facility] IS NEEDING A VERBAL ORDER FOR NURSING TO SEE PATIENT ON AUGUST 5. PLEASE CALL [phone]

## 2025-08-04 ENCOUNTER — TRANSITIONAL CARE MANAGEMENT TELEPHONE ENCOUNTER (OUTPATIENT)
Dept: CALL CENTER | Facility: HOSPITAL | Age: 60
End: 2025-08-04
Payer: MEDICARE

## 2025-08-04 NOTE — PAYOR COMM NOTE
"Ref# [id number]   Discharge Summary    LUCAS Morin, RN  Utilization Review  Phone [phone]  Fax [phone]    [place] [id number]         Dani Mantilla (60 y.o. Female)       Date of Birth   1965    Social Security Number       Address   [address] [place] [address]    Home Phone   [phone]    MRN   [id number]       [employer]   Denominational    Marital Status                               Admission Date   7/22/2025    Admission Type   Emergency    Admitting Provider   Nu Asencio MD    Attending Provider       Department, Room/Bed   [address], S573/1       Discharge Date   7/31/2025    Discharge Disposition   Home-Health Care [facility]    Discharge Destination                                 Attending Provider: (none)   Allergies: Diphenhydramine, Lortab [Hydrocodone-acetaminophen], Toradol [Ketorolac Tromethamine], Alprazolam, Nsaids    Isolation: None   Infection: MRSA (05/31/22)   Code Status: Prior    Ht: 170.2 cm (67\")   Wt: 131 kg (288 lb 6.4 oz)    Admission Cmt: None   Principal Problem: Acute on chronic respiratory failure with hypoxia [J96.21]                   Active Insurance as of 7/22/2025       Primary Coverage       Payor Plan Insurance Group Employer/Plan Group    AETNA MEDICARE REPLACEMENT AETNA MEDICARE ADVANTAGE [facility] [id number]       Payor Plan Address Payor Plan Phone Number Payor Plan Fax Number Effective Dates    [address] [phone]  3/1/2025 - None Entered    [facility] [address]         Subscriber Name Subscriber Birth Date Member ID       DANI MANTILLA 1965 [id number]               Secondary Coverage       Payor Plan Insurance Group Employer/Plan Group    [place]        Payor Plan Address Payor Plan Phone Number Payor Plan Fax Number Effective Dates    [address] [phone]  7/14/2020 - None Entered    [facility] [id number]         " Subscriber Name Subscriber Birth Date Member ID       DANI ZIEGLER 1965 [id number]                     Emergency Contacts        (Rel.) Home Phone Work Phone Mobile Phone    Елена House (Friend) -- -- [phone]                 Discharge Summary        Kacy WeissMILEY at 25 1051       Attestation signed by Nu Asencio MD at 25 1410      I have reviewed this documentation and agree.                          [facility] Medicine Services  DISCHARGE SUMMARY    Patient Name: Dani Ziegler  : 1965  MRN: [id number]    Date of Admission: 2025  Date of Discharge:  2025  Primary Care Physician: Darrion Tovar MD    Consults       Date and Time Order Name Status Description    2025  7:37 AM Inpatient Pulmonology Consult Completed     2025 10:25 AM Inpatient Infectious Diseases Consult Completed             Hospital Course     Presenting Problem:   Acute on chronic respiratory failure with hypoxia [J96.21]  Acute on chronic hypoxic respiratory failure [J96.21]    Active Hospital Problems   No active problems to display.      Resolved Hospital Problems    Diagnosis Date Resolved POA    **Acute on chronic respiratory failure with hypoxia [J96.21] 2025 Yes    Acute on chronic hypoxic respiratory failure [J96.21] 2025 Yes      Hospital Course:  Dani Ziegler is a 60 y.o. female PMH tobacco use, COPD, morbid obesity, H/oh PE, T2DM, HTN who presented with shortness of air cough and fever.  Patient initially admitted to ICU with respiratory failure requiring BiPAP.  She received steroid, bronchodilators, antibiotics, furosemide with improvement of symptoms.  Imaging revealed an infract in the right lower lobe.  CT scan confirmed excessive tree-in-bud opacities in the right lung, lesser extent into the left lung with some reactive adenopathy.  Legionella and pneumococcal urinary antigens negative.  Respiratory PCR  negative.  Nasal swab for MRSA positive.  Blood cultures negative.  Echo revealed a normal EF although there was some wall motion abnormalities.  No evidence of PE noted.  Patient noted to have an ecchymotic area on the medial ball of her left foot and some superficial skin breakdown.  ID followed with antibiotic recommendations.  Patient was transferred to the floor on 7/25.    Chronic respiratory failure  COPD exacerbation  Possible pneumonia  - Currently on 4 L, 3 L as baseline.  Will DC on increased requirement  - Currently tapering steroids, antibiotic completed 7/26  - Appreciate pulmonary recommendations given complicated history    Left medial foot blister/bruise  - ID following do not favor the source of infection    T2DM  - A1c 9 initially on insulin drip  - Diabetes education delivered    History of DVT/PE  - Eliquis      Discharge Follow Up Recommendations for labs/diagnostics:  Follow-up with PCP 8/5/2025 at 12 PM  Follow-up with [facility] pulmonology 8/13/2025 at 9 AM    Day of Discharge     HPI:   Patient sitting up in bed, reports she feels much better.  She denies any issues today.      Otherwise ROS is negative except as mentioned in the HPI.    Vital Signs:   Temp:  [97.7 °F (36.5 °C)-98.2 °F (36.8 °C)] 97.9 °F (36.6 °C)  Heart Rate:  [] 109  Resp:  [18-22] 22  BP: (132-147)/(75-89) 147/84     Physical Exam:  Constitutional: Awake, alert, NAD  HENT: NCAT, mucous membranes moist  Respiratory: scattered wheezing, no retractions.   Cardiovascular: RRR, no murmurs, rubs, or gallops  Gastrointestinal: Positive bowel sounds, soft, nontender, nondistended  Musculoskeletal: No bilateral ankle edema  Psychiatric: Appropriate affect, cooperative  Neurologic: Oriented x 3, strength symmetric in all extremities, Cranial Nerves grossly intact to confrontation, speech clear  Skin: No rashes      Pertinent  and/or Most Recent Results     Results from last 7 days   Lab Units  "07/31/25  0408 07/30/25  0411 07/29/25 0447 07/28/25  0425 07/27/25  0510 07/26/25  0502 07/25/25  0447   WBC 10*3/mm3 21.92* 22.98* 23.96* 22.80* 18.56* 14.10* 13.03*   HEMOGLOBIN g/dL 12.3 12.5 12.7 12.1 12.0 11.2* 11.3*   HEMATOCRIT % 39.2 40.7 41.3 40.1 39.0 37.0 37.1   PLATELETS 10*3/mm3 326 349 373 353 356 331 332   SODIUM mmol/L 136 134* 133* 136 138 138 138   POTASSIUM mmol/L 4.3 4.4 4.4 4.3 4.1 4.3 4.6   CHLORIDE mmol/L 99 96* 96* 100 101 101 105   CO2 mmol/L 26.3 28.5 28.0 25.9 25.4 28.0 26.3   BUN mg/dL 32.1* 31.5* 31.3* 27.0* 22.7 20.6 21.2   CREATININE mg/dL 1.00 1.01* 1.12* 0.90 0.85 0.95 0.92   GLUCOSE mg/dL 249* 205* 250* 195* 230* 283* 237*   CALCIUM mg/dL 8.6 9.3 9.2 8.9 8.9 9.0 8.7     Results from last 7 days   Lab Units 07/31/25  0408 07/30/25  0411 07/29/25 0447 07/28/25  0425 07/27/25  0510 07/26/25  0502   BILIRUBIN mg/dL <0.2 0.2 0.2 0.2 <0.2 <0.2   ALK PHOS U/L 77 76 84 73 78 79   ALT (SGPT) U/L 7 8 8 7 6 5   AST (SGOT) U/L 10 10 8 11 8 7           Invalid input(s): \"TG\", \"LDLCALC\", \"LDLREALC\"        Brief Urine Lab Results  (Last result in the past 365 days)        Color   Clarity   Blood   Leuk Est   Nitrite   Protein   CREAT   Urine HCG        07/22/25 0655 Yellow   Cloudy   Trace   Negative   Positive   Trace                   Microbiology Results Abnormal       Procedure Component Value - Date/Time    MRSA Screen, PCR (Inpatient) - Swab, Nares [[id number]]  (Abnormal) Collected: 07/22/25 1325    Lab Status: Final result Specimen: Swab from Nares Updated: 07/22/25 1510     MRSA PCR Positive    Narrative:      The negative predictive value of this diagnostic test is high and should only be used to consider de-escalating anti-MRSA therapy. A positive result may indicate colonization with MRSA and must be correlated clinically.            Imaging Results (All)       Procedure Component Value Units Date/Time    XR Chest 1 View [[id number]] Collected: 07/27/25 1542     Updated: 07/27/25 1547 "    Narrative:      XR CHEST 1 VW    Date of Exam: 7/27/2025 9:55 AM EDT    Indication: Wheezing, hypoxia    Comparison: 7/25/2025.    Findings:  The heart size is normal. There are increased interstitial markings similar to the previous exam. There is fullness in the pulmonary rupal felt to be due to enlarged lymph nodes as seen on the CT of the chest from 7/22/2025. The lungs and pleural spaces   are otherwise clear. There are chronic age-related changes involving the bony thorax and thoracic aorta.      Impression:      Impression:  1.Increased interstitial markings similar to the previous exam.  2.Fullness in the pulmonary rupal felt to be due to enlarged lymph nodes.        Electronically Signed: Darrion Randolph MD    7/27/2025 3:43 PM EDT    Workstation ID: [id number]    XR Chest 1 View [[id number]] Collected: 07/25/25 1002     Updated: 07/25/25 1010    Narrative:      XR CHEST 1 VW    Date of Exam: 7/25/2025 9:08 AM EDT    Indication: hypoxia/copd exac    Comparison: 7/22/2025 chest radiograph and chest CT scan        Findings:  Prior chest radiograph report indicated new focus of airspace disease in the right lung base. CT scan report described right middle lobe pneumonia and infectious bronchiolitis of both lungs. Mediastinal and right hilar adenopathy.    Today's chest radiograph shows decreased focal disease in the right lung base, but a persistent pattern of diffuse interstitial disease elsewhere that appears either stable or further increased.    Although today's images taken with more true AP technique than the prior lordotic film, there appears to be significant further enlargement of the pulmonary rupal. No effusion or pneumothorax is identified.        Impression:      Impression:    1. Improved focal pneumonia of the right lung base.    2. Appearance of mildly increased diffuse bronchial wall thickening and interstitial disease elsewhere in the lungs.    3. Further enlargement of the pulmonary rupal,  suggesting increasing adenopathy.      Electronically Signed: Elton Cain MD    7/25/2025 10:07 AM EDT    Workstation ID: [id number]    SLP FEES - Fiberoptic Endo Eval Swallow [[id number]] Resulted: 07/23/25 1622     Updated: 07/23/25 1622    Narrative:      This procedure was auto-finalized with no dictation required.    CT Angiogram Chest Pulmonary Embolism [[id number]] Collected: 07/22/25 0544     Updated: 07/22/25 0557    Narrative:      CT ANGIOGRAM CHEST PULMONARY EMBOLISM    Date of Exam: 7/22/2025 5:27 AM EDT    Indication: Pulmonary Embolism.    Comparison: CT chest 12/30/2021 and chest radiograph performed earlier today. CT abdomen 6/12/2022.    Technique: Axial CT images were obtained of the chest after the uneventful intravenous administration of 80 mL Isovue-370 utilizing pulmonary embolism protocol.  In addition, a 3-D volume rendered image was created for interpretation.  Reconstructed   coronal and sagittal images were also obtained. Automated exposure control and iterative construction methods were used.      Findings:  The thyroid, trachea and esophagus appear within normal limits. There is a small hiatal hernia. There is multifocal mediastinal lymphadenopathy including an anterior right paratracheal lymph node on image 26 of the axial series measuring 19 mm in the   short axis, distal left paratracheal lymph node on image 37 measuring 17 mm in the short axis and a right hilar lymph node on axial image 43 measuring up to 21 mm diameter in the short axis. There is no pericardial effusion. The aorta, aortic branch   vessels and main pulmonary artery appear within normal limits. There is no evidence of pulmonary embolism.    There are extensive tree-in-bud nodules present in the right lung. There are more subtle and less pronounced tree-in-bud nodules in the left lung all consistent with infectious bronchiolitis. There are patchy areas of airspace disease in the right middle   lobe consistent with  pneumonia and there is mild diffuse peribronchial thickening most pronounced on the right and worse in the right lower lobe likely also infectious/inflammatory. No pneumothorax or pleural effusion. There is a calcified right upper   lobe granuloma.    No acute findings in the superficial soft tissues. There is a heterogeneous appearing right adrenal nodule measuring 34 mm. Left adrenal gland is diminutive. Patient is status post cholecystectomy. Liver is partially seen, but appears enlarged. Spleen is   also partially seen, but likely enlarged. There are multiple chronic compression deformities in the thoracic spine including at T5, T6, T7, T8 and T9. There is a minor segmentation anomaly at C6-C7 with incomplete disc and facet joint formation. No   acute osseous abnormality. Bones are demineralized.      Impression:      Impression:  1.No evidence of pulmonary embolism.  2.Extensive tree-in-bud nodules in the right lung and to a lesser extent in the left lung consistent with infectious bronchiolitis. There is also patchy airspace disease in the right middle lobe consistent with pneumonia.  3.Mediastinal and right hilar lymphadenopathy, likely reactive.  4.34 mm heterogeneous right adrenal nodule. Diminutive left adrenal gland. Right adrenal nodule is unchanged from 2022 likely reflecting adenoma.  5.Multiple chronic compression deformities in the thoracic spine.  6.Small hiatal hernia.            Electronically Signed: Juanpablo Barron MD    7/22/2025 5:54 AM EDT    Workstation ID: [id number]    XR Chest 1 View [[id number]] Collected: 07/22/25 0336     Updated: 07/22/25 0340    Narrative:      XR CHEST 1 VW    Date of Exam: 7/22/2025 2:58 AM EDT    Indication: SOA triage protocol    Comparison: 6/28/2022.    Findings:  There is a new focus of airspace disease in the periphery of the right lung base that could reflect pneumonia or atelectasis. Left lung remains clear. There are calcified granulomas in the right lung.  Cardiac silhouette and pulmonary vasculature appear   within normal limits. No acute osseous abnormality.      Impression:      Impression:  New focus of airspace disease in the periphery of the right lung base that could reflect pneumonia or atelectasis.          Electronically Signed: Juanpablo Barron MD    7/22/2025 3:37 AM EDT    Workstation ID: [id number]            Results for orders placed during the hospital encounter of 03/27/18    Duplex Venous Lower Extremity - Left CAR 03/28/2018 12:32 PM    Interpretation Summary  · No evidence of deep or superficial venous thrombosis in the left lower extremity.      Results for orders placed during the hospital encounter of 03/27/18    Duplex Venous Lower Extremity - Left CAR 03/28/2018 12:32 PM    Interpretation Summary  · No evidence of deep or superficial venous thrombosis in the left lower extremity.      Results for orders placed during the hospital encounter of 07/22/25    Adult Transthoracic Echo Complete W/ Cont if Necessary Per Protocol 07/22/2025  4:26 PM    Interpretation Summary    Left ventricular systolic function is normal. Calculated left ventricular EF = 60.8% Left ventricular ejection fraction appears to be 56 - 60%.    The following left ventricular wall segments are hypokinetic: apical septal, basal inferoseptal, mid inferoseptal, mid anteroseptal and basal inferoseptal.    Left ventricular wall thickness is consistent with borderline concentric hypertrophy.  Grade I diastolic dysfunction is present.    Normal right ventricular size and function.    Cardiac valves very poorly visualized due to poor acoustic windows. No hemodynamically  significant valvular dysfunction noted by Doppler evaluation.        Discharge Details        Discharge Medications        New Medications        Instructions Start Date   acetaminophen 325 MG tablet  Commonly known as: TYLENOL   650 mg, Oral, Every 6 Hours PRN      predniSONE 20 MG tablet  Commonly known as: DELTASONE    1 tablet Daily for 4 days, THEN 0.5 tablets Daily for 5 days.   Start Date: August 1, 2025     traZODone 50 MG tablet  Commonly known as: DESYREL   50 mg, Oral, Nightly PRN             Changes to Medications        Instructions Start Date   Dexcom G6  device  What changed: additional instructions   1 Device, Not Applicable, Daily PRN, Check sugars daily as needed      Dexcom G7  device  What changed: You were already taking a medication with the same name, and this prescription was added. Make sure you understand how and when to take each.   1 Device, Not Applicable, Daily      Dexcom G6 Sensor  What changed: Another medication with the same name was added. Make sure you understand how and when to take each.   Not Applicable, Every 10 Days      Dexcom G7 Sensor misc  What changed: You were already taking a medication with the same name, and this prescription was added. Make sure you understand how and when to take each.   1 Box, Not Applicable, Weekly      Multi-Vitamin/Iron tablet  What changed: additional instructions   1 tablet, Oral, Daily      pantoprazole 40 MG EC tablet  Commonly known as: PROTONIX  What changed: when to take this   40 mg, Oral, Every Early Morning   Start Date: August 1, 2025            Continue These Medications        Instructions Start Date   albuterol (2.5 MG/3ML) 0.083% nebulizer solution  Commonly known as: PROVENTIL   INHALE CONTENTS OF 1 VIAL VIA NEBULIZER EVERY 6 HOURS AS NEEDED FOR WHEEZING      albuterol sulfate  (90 Base) MCG/ACT inhaler  Commonly known as: PROVENTIL HFA;VENTOLIN HFA;PROAIR HFA   1 puff, Inhalation, Every 4 Hours PRN      Alcohol Swabs pads   Use when checking sugars      apixaban 5 MG tablet tablet  Commonly known as: Eliquis   5 mg, Oral, Every 12 Hours Scheduled      ARIPiprazole 10 MG tablet  Commonly known as: ABILIFY   10 mg, Oral, Daily      Breztri Aerosphere 160-9-4.8 MCG/ACT aerosol inhaler  Generic drug:  Budeson-Glycopyrrol-Formoterol   2 puffs, Inhalation, 2 Times Daily      Dexcom G6 Transmitter misc   1 Device, Not Applicable, Daily, Check sugars daily as needed      escitalopram 20 MG tablet  Commonly known as: LEXAPRO   20 mg, Oral, Daily      glucose monitor monitoring kit   Check glucose twice daily      hydrOXYzine pamoate 25 MG capsule  Commonly known as: VISTARIL   25-50 mg, Oral, 3 Times Daily PRN      Lantus SoloStar 100 UNIT/ML injection pen  Generic drug: Insulin Glargine   50 Units, Subcutaneous, Daily      metoprolol succinate XL 50 MG 24 hr tablet  Commonly known as: TOPROL-XL   50 mg, Oral, Daily      NIFEdipine XL 30 MG 24 hr tablet  Commonly known as: PROCARDIA XL   30 mg, Oral, Daily      nystatin 565855 UNIT/GM cream  Commonly known as: MYCOSTATIN   Topical, 2 Times Daily      O2  Commonly known as: OXYGEN   2 L/min, Daily      oxybutynin XL 5 MG 24 hr tablet  Commonly known as: Ditropan XL   5 mg, Oral, Daily      roflumilast 500 MCG tablet tablet  Commonly known as: DALIRESP   500 mcg, Oral, Daily             Stop These Medications      diazePAM 5 MG tablet  Commonly known as: Valium     fluconazole 100 MG tablet  Commonly known as: Diflucan              Allergies   Allergen Reactions    Diphenhydramine Hives    Lortab [Hydrocodone-Acetaminophen] Hives     Tolerates percocet    Toradol [Ketorolac Tromethamine] Hives     Per patient tolerates motrin    Alprazolam Delirium    Nsaids Other (See Comments)     Liver Dx         Discharge Disposition:  Home-Health Care [facility]    Discharge Diet:  Diet Order   Procedures    Diet: Diabetic; Consistent Carbohydrate; No Mixed Consistencies; Texture: Soft to Chew (NDD 3); Soft to Chew: Chopped Meat; Fluid Consistency: Thin (IDDSI 0)         Discharge Activity:   Activity Instructions       Activity as Tolerated      Up WIth Assist                CODE STATUS:    Code Status and Medical Interventions: CPR (Attempt to Resuscitate); Full Support   Ordered at:  07/24/25 1705     Code Status (Patient has no pulse and is not breathing):    CPR (Attempt to Resuscitate)     Medical Interventions (Patient has pulse or is breathing):    Full Support         Future Appointments   Date Time Provider Department Center   8/5/2025 12:00 PM Alexandre Tovar MD [facility]   9/29/2025 12:40 PM [facility] [place]       Additional Instructions for the Follow-ups that You Need to Schedule       Ambulatory Referral to Home Health   As directed      Face to Face Visit Date: 7/31/2025   Follow-up provider for Plan of Care?: I treated the patient in an acute care facility and will not continue treatment after discharge.   Follow-up provider: ALEXANDRE TOVAR [[id number]]   Reason/Clinical Findings: impaired endurance, mobility   Describe mobility limitations that make leaving home difficult: impaired endurance,mobility   Nursing/Therapeutic Services Requested: Skilled Nursing Physical Therapy Occupational Therapy   Skilled nursing orders: Cardiopulmonary assessments   PT orders: Total joint pathway Therapeutic exercise Gait Training Transfer training Strengthening Home safety assessment   Weight Bearing Status: As Tolerated   Occupational orders: Activities of daily living Energy conservation Strengthening Home safety assessment   Frequency: 1 Week 1        Discharge Follow-up with PCP   As directed       Currently Documented PCP:    Alexandre Tovar MD    PCP Phone Number:    [phone]     Follow Up Details: 08/05/25 @ 12:00        Discharge Follow-up with Specified Provider: [facility] pulmonology; 1 Month   As directed      To: [facility] pulmonology   Follow Up: 1 Month                Time Spent on Discharge:  60 minutes    Electronically signed by MILEY Hopkins, 07/31/25, 10:59 AM EDT.       Electronically signed by Nu Aesncio MD at 07/31/25 1417       Discharge Order (From admission, onward)       Start     Ordered    07/31/25 0751  Discharge  patient  Once        Expected Discharge Date: 07/31/25   Expected Discharge Time: Midday   Discharge Disposition: [place]Health [place] [facility] [place]   Physician of Record for Attribution - Please select from Treatment Team: LONA MAHARAJ [[id number]]   Review needed by CMO to determine Physician of Record: No      Question Answer Comment   Physician of Record for Attribution - Please select from Treatment Team LONA MAHARAJ    Review needed by CMO to determine Physician of Record No        07/31/25 0750

## 2025-08-11 DIAGNOSIS — J44.1 CHRONIC OBSTRUCTIVE PULMONARY DISEASE WITH ACUTE EXACERBATION: ICD-10-CM

## 2025-08-11 DIAGNOSIS — I10 ESSENTIAL HYPERTENSION: Chronic | ICD-10-CM

## 2025-08-11 DIAGNOSIS — F34.1 DYSTHYMIA: ICD-10-CM

## 2025-08-11 DIAGNOSIS — F41.9 ANXIETY: ICD-10-CM

## 2025-08-11 DIAGNOSIS — Z86.718 HISTORY OF RECURRENT DEEP VEIN THROMBOSIS (DVT): Chronic | ICD-10-CM

## 2025-08-11 RX ORDER — ARIPIPRAZOLE 10 MG/1
10 TABLET ORAL DAILY
Qty: 30 TABLET | Refills: 1 | Status: SHIPPED | OUTPATIENT
Start: 2025-08-11

## 2025-08-11 RX ORDER — APIXABAN 5 MG/1
5 TABLET, FILM COATED ORAL EVERY 12 HOURS SCHEDULED
Qty: 60 TABLET | Refills: 1 | Status: SHIPPED | OUTPATIENT
Start: 2025-08-11

## 2025-08-11 RX ORDER — ROFLUMILAST 500 UG/1
500 TABLET ORAL DAILY
Qty: 30 TABLET | Refills: 1 | Status: SHIPPED | OUTPATIENT
Start: 2025-08-11

## 2025-08-11 RX ORDER — NIFEDIPINE 30 MG/1
30 TABLET, EXTENDED RELEASE ORAL DAILY
Qty: 30 TABLET | Refills: 1 | Status: SHIPPED | OUTPATIENT
Start: 2025-08-11

## 2025-08-11 RX ORDER — ESCITALOPRAM OXALATE 20 MG/1
20 TABLET ORAL DAILY
Qty: 30 TABLET | Refills: 1 | Status: SHIPPED | OUTPATIENT
Start: 2025-08-11

## 2025-08-11 RX ORDER — METOPROLOL SUCCINATE 50 MG/1
50 TABLET, EXTENDED RELEASE ORAL DAILY
Qty: 30 TABLET | Refills: 1 | Status: SHIPPED | OUTPATIENT
Start: 2025-08-11

## 2025-08-14 ENCOUNTER — TELEPHONE (OUTPATIENT)
Dept: FAMILY MEDICINE CLINIC | Facility: CLINIC | Age: 60
End: 2025-08-14
Payer: MEDICARE

## 2025-08-21 ENCOUNTER — TELEPHONE (OUTPATIENT)
Dept: FAMILY MEDICINE CLINIC | Facility: CLINIC | Age: 60
End: 2025-08-21
Payer: MEDICARE

## (undated) DEVICE — LEX GENERAL ABDOMINAL SPLIT: Brand: MEDLINE INDUSTRIES, INC.

## (undated) DEVICE — TOTAL TRAY, 16FR 10ML SIL FOLEY, URN: Brand: MEDLINE

## (undated) DEVICE — ANTIBACTERIAL UNDYED BRAIDED (POLYGLACTIN 910), SYNTHETIC ABSORBABLE SUTURE: Brand: COATED VICRYL

## (undated) DEVICE — TRAP FLD MINIVAC MEGADYNE 100ML

## (undated) DEVICE — SAFESECURE,SECUREMENT,FOLEY CATH,STERILE: Brand: MEDLINE

## (undated) DEVICE — GLV SURG SENSICARE PI MIC PF SZ7.5 LF STRL

## (undated) DEVICE — BNDR ABD PREMIUM/UNIV 10IN 27TO48IN

## (undated) DEVICE — GLV SURG SENSICARE PI MIC PF SZ7 LF STRL

## (undated) DEVICE — 3M™ IOBAN™ 2 ANTIMICROBIAL INCISE DRAPE 6651EZ: Brand: IOBAN™ 2

## (undated) DEVICE — TBG PENCL TELESCP MEGADYNE SMOKE EVAC 10FT

## (undated) DEVICE — SUT PROLN 2/0 PC3 8833H

## (undated) DEVICE — GOWN,PREVENTION PLUS,XXLARGE,STERILE: Brand: MEDLINE

## (undated) DEVICE — SUT PDS O CT1 CR/8 18IN Z740D

## (undated) DEVICE — SUT SILK 2/0 TIES 18IN A185H

## (undated) DEVICE — 450 ML BOTTLE OF 0.05% CHLORHEXIDINE GLUCONATE IN 99.95% STERILE WATER FOR IRRIGATION, USP AND APPLICATOR.: Brand: IRRISEPT ANTIMICROBIAL WOUND LAVAGE

## (undated) DEVICE — CLTH CLENS READYCLEANSE PERI CARE PK/5

## (undated) DEVICE — SUT PDS LP 1 TP1 96IN VIO PDP880GA

## (undated) DEVICE — PATIENT RETURN ELECTRODE, SINGLE-USE, CONTACT QUALITY MONITORING, ADULT, WITH 9FT CORD, FOR PATIENTS WEIGING OVER 33LBS. (15KG): Brand: MEGADYNE

## (undated) DEVICE — SUT SILK 3/0 TIES 18IN A184H